# Patient Record
Sex: MALE | Race: WHITE | NOT HISPANIC OR LATINO | Employment: OTHER | URBAN - METROPOLITAN AREA
[De-identification: names, ages, dates, MRNs, and addresses within clinical notes are randomized per-mention and may not be internally consistent; named-entity substitution may affect disease eponyms.]

---

## 2017-02-13 ENCOUNTER — TRANSFERRED RECORDS (OUTPATIENT)
Dept: HEALTH INFORMATION MANAGEMENT | Facility: CLINIC | Age: 77
End: 2017-02-13

## 2017-04-22 ENCOUNTER — TELEPHONE (OUTPATIENT)
Dept: NURSING | Facility: CLINIC | Age: 77
End: 2017-04-22

## 2017-04-22 DIAGNOSIS — E11.8 TYPE 2 DIABETES MELLITUS WITH COMPLICATION (H): ICD-10-CM

## 2017-04-22 RX ORDER — INSULIN GLARGINE 100 [IU]/ML
INJECTION, SOLUTION SUBCUTANEOUS
Qty: 45 ML | Refills: 0 | Status: CANCELLED | OUTPATIENT
Start: 2017-04-22

## 2017-04-22 NOTE — TELEPHONE ENCOUNTER
"Call Type: Triage Call    Presenting Problem: \"I am completely out of my Lantus.\"  Per FNA  protocol, patient will be given a refill on his Lantus.  Triage Note:  Guideline Title: Medication Questions - Adult  Recommended Disposition: Call Dispensing Pharmacy or Provider  Immediately  Original Inclination: Wanted to speak with a nurse  Override Disposition:  Intended Action: Follow advice given  Physician Contacted: No  Requests refill of prescribed medication that does NOT have a valid refill; lack  of medication may cause clinical risk to patient if not available. ?  YES  Sign(s) or symptom(s) associated with a diagnosed condition or with a new illness  ? NO  Prescription ordered today and not available at pharmacy putting patient at  clinical risk ? NO  Recurrence of a symptom(s) or illness post prescribed medication treatment AND  provider instructed patient to call if symptom(s) returned. ? NO  Unable to obtain prescribed medication related to available resources AND  situation poses immediate clinical risk ? NO  Pharmacy calling to clarify prescription order. ? NO  Physician Instructions:  Care Advice:  "

## 2017-04-22 NOTE — TELEPHONE ENCOUNTER
Patient requesting a refill on his Lantus Solostar 100 unit/mL          Last Written Prescription Date: 1/9/17  Last Fill Quantity: 45mL, # refills: 0  Last Office Visit with G, P or [facility] prescribing provider:  10/25/2016        BP Readings from Last 3 Encounters:   10/25/16 162/87   08/24/16 148/83     No results found for: MICROL  No results found for: UMALCR  No results found for: CR]  No results found for: GFRESTIMATED  No results found for: GFRESTBLACK  Lab Results   Component Value Date    CHOL 144 07/14/2016     Lab Results   Component Value Date    HDL 43 07/14/2016     Lab Results   Component Value Date    LDL 83 07/14/2016     Lab Results   Component Value Date    TRIG 90 07/14/2016     No results found for: CHOLHDLRATIO  No results found for: AST  No results found for: ALT  Lab Results   Component Value Date    A1C 8.4 10/25/2016    A1C 7.8 07/14/2016    A1C 6.0 09/23/2015    A1C 7.0 06/01/2015    A1C 9.0 02/26/2015     No results found for: POTASSIUM     Tonia Moon RN/FNA

## 2017-04-22 NOTE — TELEPHONE ENCOUNTER
Pending Prescriptions:                       Disp   Refills    LANTUS SOLOSTAR 100 UNIT/ML soln [Pharmac*45 mL  0            Sig: ADMINISTER 50 UNITS UNDER THE SKIN AT BEDTIME AS           DIRECTED             Last Written Prescription Date: 1/9/17  Last Fill Quantity: 45, # refills: 0  Last Office Visit with [facility], P or [facility] prescribing provider:  10/25/16 Yari        BP Readings from Last 3 Encounters:   10/25/16 162/87   08/24/16 148/83     No results found for: MICROL  No results found for: UMALCR  No results found for: CR]  No results found for: GFRESTIMATED  No results found for: GFRESTBLACK  Lab Results   Component Value Date    CHOL 144 07/14/2016     Lab Results   Component Value Date    HDL 43 07/14/2016     Lab Results   Component Value Date    LDL 83 07/14/2016     Lab Results   Component Value Date    TRIG 90 07/14/2016     No results found for: CHOLHDLRATIO  No results found for: AST  No results found for: ALT  Lab Results   Component Value Date    A1C 8.4 10/25/2016    A1C 7.8 07/14/2016    A1C 6.0 09/23/2015    A1C 7.0 06/01/2015    A1C 9.0 02/26/2015     No results found for: POTASSIUM    Yadira Francis, RT(R)

## 2017-05-09 DIAGNOSIS — Z79.4 TYPE 2 DIABETES MELLITUS WITHOUT COMPLICATION, WITH LONG-TERM CURRENT USE OF INSULIN (H): ICD-10-CM

## 2017-05-09 DIAGNOSIS — E11.9 TYPE 2 DIABETES MELLITUS WITHOUT COMPLICATION, WITH LONG-TERM CURRENT USE OF INSULIN (H): ICD-10-CM

## 2017-05-09 RX ORDER — PEN NEEDLE, DIABETIC 32GX 5/32"
NEEDLE, DISPOSABLE MISCELLANEOUS
Qty: 100 EACH | Refills: 0 | Status: SHIPPED | OUTPATIENT
Start: 2017-05-09 | End: 2017-06-09

## 2017-05-09 NOTE — TELEPHONE ENCOUNTER
BD Katelyn      Last Written Prescription Date: 11/25/16  Last Fill Quantity: 300,  # refills: 0   Last Office Visit with G, [facility] or [facility] prescribing provider: 10/25/16    Tawnya Newman MA

## 2017-05-09 NOTE — TELEPHONE ENCOUNTER
TCs,  Patient overdue for 2 month diabetes f/u with PCP  Please schedule    Once scheduled, can close encounter  Refilled Pen Needles for short term supply  Thank you,  Gloria FAUST RN

## 2017-05-16 ENCOUNTER — OFFICE VISIT (OUTPATIENT)
Dept: FAMILY MEDICINE | Facility: CLINIC | Age: 77
End: 2017-05-16
Payer: MEDICARE

## 2017-05-16 VITALS
BODY MASS INDEX: 28.85 KG/M2 | SYSTOLIC BLOOD PRESSURE: 136 MMHG | WEIGHT: 213 LBS | TEMPERATURE: 97.4 F | HEART RATE: 98 BPM | DIASTOLIC BLOOD PRESSURE: 76 MMHG | OXYGEN SATURATION: 95 % | HEIGHT: 72 IN

## 2017-05-16 DIAGNOSIS — G47.00 INSOMNIA, UNSPECIFIED TYPE: ICD-10-CM

## 2017-05-16 DIAGNOSIS — Z79.4 TYPE 2 DIABETES MELLITUS WITHOUT COMPLICATION, WITH LONG-TERM CURRENT USE OF INSULIN (H): Primary | ICD-10-CM

## 2017-05-16 DIAGNOSIS — E11.9 TYPE 2 DIABETES MELLITUS WITHOUT COMPLICATION, WITH LONG-TERM CURRENT USE OF INSULIN (H): Primary | ICD-10-CM

## 2017-05-16 DIAGNOSIS — E78.5 HYPERLIPIDEMIA LDL GOAL <100: ICD-10-CM

## 2017-05-16 DIAGNOSIS — J30.1 NON-SEASONAL ALLERGIC RHINITIS DUE TO POLLEN: ICD-10-CM

## 2017-05-16 DIAGNOSIS — I10 BENIGN ESSENTIAL HYPERTENSION: ICD-10-CM

## 2017-05-16 DIAGNOSIS — M25.561 RIGHT KNEE PAIN, UNSPECIFIED CHRONICITY: ICD-10-CM

## 2017-05-16 LAB
ALBUMIN UR-MCNC: 30 MG/DL
ANION GAP SERPL CALCULATED.3IONS-SCNC: 9 MMOL/L (ref 3–14)
APPEARANCE UR: CLEAR
BACTERIA #/AREA URNS HPF: ABNORMAL /HPF
BILIRUB UR QL STRIP: NEGATIVE
BUN SERPL-MCNC: 29 MG/DL (ref 7–30)
CALCIUM SERPL-MCNC: 8.4 MG/DL (ref 8.5–10.1)
CHLORIDE SERPL-SCNC: 109 MMOL/L (ref 94–109)
CO2 SERPL-SCNC: 23 MMOL/L (ref 20–32)
COLOR UR AUTO: YELLOW
CREAT SERPL-MCNC: 1.3 MG/DL (ref 0.66–1.25)
GFR SERPL CREATININE-BSD FRML MDRD: 53 ML/MIN/1.7M2
GLUCOSE SERPL-MCNC: 230 MG/DL (ref 70–99)
GLUCOSE UR STRIP-MCNC: 500 MG/DL
HBA1C MFR BLD: 7.1 % (ref 4.3–6)
HGB UR QL STRIP: NEGATIVE
KETONES UR STRIP-MCNC: NEGATIVE MG/DL
LEUKOCYTE ESTERASE UR QL STRIP: NEGATIVE
MUCOUS THREADS #/AREA URNS LPF: PRESENT /LPF
NITRATE UR QL: NEGATIVE
NON-SQ EPI CELLS #/AREA URNS LPF: ABNORMAL /LPF
PH UR STRIP: 5 PH (ref 5–7)
POTASSIUM SERPL-SCNC: 4.6 MMOL/L (ref 3.4–5.3)
RBC #/AREA URNS AUTO: ABNORMAL /HPF (ref 0–2)
SODIUM SERPL-SCNC: 141 MMOL/L (ref 133–144)
SP GR UR STRIP: 1.02 (ref 1–1.03)
URN SPEC COLLECT METH UR: ABNORMAL
UROBILINOGEN UR STRIP-ACNC: 0.2 EU/DL (ref 0.2–1)
WBC #/AREA URNS AUTO: ABNORMAL /HPF (ref 0–2)

## 2017-05-16 PROCEDURE — 80048 BASIC METABOLIC PNL TOTAL CA: CPT | Performed by: INTERNAL MEDICINE

## 2017-05-16 PROCEDURE — 83036 HEMOGLOBIN GLYCOSYLATED A1C: CPT | Performed by: INTERNAL MEDICINE

## 2017-05-16 PROCEDURE — 82043 UR ALBUMIN QUANTITATIVE: CPT | Performed by: INTERNAL MEDICINE

## 2017-05-16 PROCEDURE — 81001 URINALYSIS AUTO W/SCOPE: CPT | Performed by: INTERNAL MEDICINE

## 2017-05-16 PROCEDURE — 99214 OFFICE O/P EST MOD 30 MIN: CPT | Performed by: INTERNAL MEDICINE

## 2017-05-16 PROCEDURE — 99207 C FOOT EXAM  NO CHARGE: CPT | Performed by: INTERNAL MEDICINE

## 2017-05-16 PROCEDURE — 36415 COLL VENOUS BLD VENIPUNCTURE: CPT | Performed by: INTERNAL MEDICINE

## 2017-05-16 RX ORDER — SILDENAFIL CITRATE 100 MG
TABLET ORAL
Refills: 0 | COMMUNITY
Start: 2016-06-01 | End: 2018-12-05

## 2017-05-16 RX ORDER — BROMFENAC SODIUM 0.7 MG/ML
SOLUTION/ DROPS OPHTHALMIC
Refills: 4 | COMMUNITY
Start: 2017-01-21 | End: 2017-10-10

## 2017-05-16 NOTE — PROGRESS NOTES
"  SUBJECTIVE:                                                    Justyn Olivas is a 76 year old male who presents to clinic today for the following health issues:    Chief Complaint   Patient presents with     Diabetes     Patient reports he has been experiencing seasonal allergies with associated rhinorrhea, cough and sore throat. He reports Claritin helps. Last night he notes difficulty with swallowing for which he used \"cough medicine\" with help.    Patient has a history of diabetes and reports better control of symptoms since last visit. Blood glucose readings average 105, with the lowest below 100 and the highest at 150. He has been obrien diligent with blood glucose monitoring, particularly with afternoon reading. He notes exercise has decreased which he states is due to construction on his favorite park. While playing gold, he walks instead of useing a gold cart. Daily, he walks 2.5-3 miles over 45 minutes.    He complains of 2 days of right knee pain with known antecedent injury while playing gold. He reports a ball ricocheted off of a tree while using his iron, and hit his right knee. Right knee is slightly swollen in the morning but resolves as the day progresses. 2 tablets of Aleve offers help, though he has only used NSAID sporadically.    He notes weight gain which he attributes to lack of maintaining a healthy well-balanced diet during the holiday seasons. He states his goal weight is 200-202 pounds.     Problem list and histories reviewed & adjusted, as indicated.  Additional history: as documented    Current Outpatient Prescriptions   Medication Sig Dispense Refill     BD FERCHO U/F 32G X 4 MM insulin pen needle USE AS DIRECTED 100 each 0     insulin glargine (LANTUS SOLOSTAR) 100 UNIT/ML injection Inject 50 Units Subcutaneous At Bedtime As directed 45 mL 0     HUMALOG KWIKPEN 100 UNIT/ML soln INJECT UP TO 55 UNITS SUBCUTANEOUSLY DAILY AS DIRECTED 45 mL 11     metFORMIN (GLUCOPHAGE) 1000 MG tablet " Take 1 tablet (1,000 mg) by mouth 2 times daily (with meals) 180 tablet 3     simvastatin (ZOCOR) 20 MG tablet Take 1 tablet (20 mg) by mouth At Bedtime 90 tablet 3     valsartan (DIOVAN) 160 MG tablet Take 1 tablet (160 mg) by mouth 2 times daily 90 tablet 3     losartan (COZAAR) 50 MG tablet Take 1 tablet (50 mg) by mouth daily 90 tablet 3     traZODone (DESYREL) 100 MG tablet Take 1 tablet (100 mg) by mouth At Bedtime 90 tablet 3     fluticasone (FLONASE) 50 MCG/ACT nasal spray Spray 1 spray into both nostrils 2 times daily as needed for rhinitis or allergies       ketorolac (ACULAR) 0.5 % ophthalmic solution 1 drop 4 times daily       Glucose Blood (STEVE CONTOUR TEST VI)        blood glucose monitoring (NO BRAND SPECIFIED) test strip Use to test blood sugars 4 times daily or as directed 100 each 11     VIAGRA 100 MG cap/tab TK 1 T PO PRIOR TO INTERCOURSE  0     PROLENSA 0.07 % ophthalmic solution PLACE 1 DROP INTO THE RIGHT EYE QD  4     No Known Allergies    Reviewed and updated as needed this visit by clinical staff  Tobacco  Allergies  Meds       Reviewed and updated as needed this visit by Provider       ROS:  HEENT: Patient visits retina specialist every 6 weeks.    Constitutional, HEENT, cardiovascular, pulmonary, gi and gu systems are negative, except as otherwise noted.    This document serves as a record of the services and decisions personally performed and made by Bandar Greenberg MD. It was created on his/her behalf by Jeaneth Courtney, a trained medical scribe. The creation of this document is based the provider's statements to the medical scribe.  Adarsh Courtney 9:43 AM, May 16, 2017     OBJECTIVE:                                                    /88 (BP Location: Right arm, Cuff Size: Adult Regular)  Pulse 98  Temp 97.4  F (36.3  C) (Tympanic)  Ht 1.829 m (6')  Wt 96.6 kg (213 lb)  SpO2 95%  BMI 28.89 kg/m2  Body mass index is 28.89 kg/(m^2).   Weight gain 8 pounds since  last visit  HEENT: his nose is essentially closed with excessive mucus and nasal polyps. Throat streaky erythema.   Neck was supple without adenopathy or thyromegaly his carotids were normal without bruits  Chest clear to auscultation and percussion  Cardiovascular S1 and S2 are physiologic without murmurs or gallops  Abdomen bowel sounds were normal.  There is no palpable mass or organomegaly. Obese.  Extremities nontender without any edema  Right knee: medial inferior area of knee he has soft tissue swelling and point tenderness at the level of the anserine bursa.  Neuro: Filament exam normal.  Pulses pedal pulses are as described otherwise his pulses are bilaterally symmetrical throughout without bruits  Feet: Dry and scaly without any other skin defects.     ASSESSMENT/PLAN:                                                    1. Type 2 diabetes mellitus without complication, with long-term current use of insulin (H)  - UA reflex to Microscopic and Culture  - Hemoglobin A1c  - Albumin Random Urine Quantitative  - FOOT EXAM    2. Benign essential hypertension  - Basic metabolic panel  (Ca, Cl, CO2, Creat, Gluc, K, Na, BUN)    3. Hyperlipidemia LDL goal <100    4. Non-seasonal allergic rhinitis due to pollen  Claritin BID. Begin fluticasone 2 sprays each nostril for one week then 2 sprays QD.    5. Insomnia, unspecified type    6. Right knee pain, unspecified chronicity  2 tablets Aleve BID for one week.    Follow up with lab results   Patient reinitiating a an improved diet and exercise plan to regain control of his blood sugars etc. Plan on follow-up of lipids in such probably in August    Bandar Greenberg MD  [facility]    The information in this document, created by the medical scribe for me, accurately reflects the services I personally performed and the decisions made by me. I have reviewed and approved this document for accuracy prior to leaving the patient care area.  Bandar Greenberg MD  9:43  AM, 05/16/17

## 2017-05-16 NOTE — MR AVS SNAPSHOT
"              After Visit Summary   5/16/2017    Justyn Olivas    MRN: [id number]           Patient Information     Date Of Birth          1940        Visit Information        Provider Department      5/16/2017 9:30 AM Bandar Greenberg MD [facility]        Today's Diagnoses     Type 2 diabetes mellitus without complication, with long-term current use of insulin (H)    -  1    Benign essential hypertension        Hyperlipidemia LDL goal <100        Non-seasonal allergic rhinitis due to pollen        Insomnia, unspecified type        Right knee pain, unspecified chronicity           Follow-ups after your visit        Who to contact     If you have questions or need follow up information about today's clinic visit or your schedule please contact [facility] directly at [phone].  Normal or non-critical lab and imaging results will be communicated to you by MyChart, letter or phone within 4 business days after the clinic has received the results. If you do not hear from us within 7 days, please contact the clinic through MyChart or phone. If you have a critical or abnormal lab result, we will notify you by phone as soon as possible.  Submit refill requests through [employer] or call your pharmacy and they will forward the refill request to us. Please allow 3 business days for your refill to be completed.          Additional Information About Your Visit        My[employer]k Information     [employer] lets you send messages to your doctor, view your test results, renew your prescriptions, schedule appointments and more. To sign up, go to [email or web address] . Click on \"Log in\" on the left side of the screen, which will take you to the Welcome page. Then click on \"Sign up Now\" on the right side of the page.     You will be asked to enter the access code listed below, as well as some personal information. Please follow the directions to create your username and password.     Your access code " is: [email or web address]  Expires: 2017  5:22 AM     Your access code will  in 90 days. If you need help or a new code, please call your [facility] clinic or [phone].        Care EveryWhere ID     This is your Care EveryWhere ID. This could be used by other organizations to access your [facility] medical records  [id number]        Your Vitals Were     Pulse Temperature Height Pulse Oximetry BMI (Body Mass Index)       98 97.4  F (36.3  C) (Tympanic) 6' (1.829 m) 95% 28.89 kg/m2        Blood Pressure from Last 3 Encounters:   17 136/76   10/25/16 162/87   16 148/83    Weight from Last 3 Encounters:   17 213 lb (96.6 kg)   10/25/16 205 lb 9.6 oz (93.3 kg)   16 202 lb (91.6 kg)              We Performed the Following     Albumin Random Urine Quantitative     Basic metabolic panel  (Ca, Cl, CO2, Creat, Gluc, K, Na, BUN)     FOOT EXAM     Hemoglobin A1c     UA reflex to Microscopic and Culture     Urine Microscopic        Primary Care Provider Office Phone # Fax #    Bandar Greenberg [phone]       [facility] [address] [facility] [address]  [facility] [address]        Thank you!     Thank you for choosing [facility]  for your care. Our goal is always to provide you with excellent care. Hearing back from our patients is one way we can continue to improve our services. Please take a few minutes to complete the written survey that you may receive in the mail after your visit with us. Thank you!             Your Updated Medication List - Protect others around you: Learn how to safely use, store and throw away your medicines at www.disposemymeds.org.          This list is accurate as of: 17 11:59 PM.  Always use your most recent med list.                   Brand Name Dispense Instructions for use    * STEVE CONTOUR TEST VI          * blood glucose monitoring test strip    no brand specified    100 each    Use to test blood sugars 4 times daily or as directed        BD FERCHO U/F 32G X 4 MM   Generic drug:  insulin pen needle     100 each    USE AS DIRECTED       FLONASE 50 MCG/ACT spray   Generic drug:  fluticasone      Spray 1 spray into both nostrils 2 times daily as needed for rhinitis or allergies       HumaLOG KWIKpen 100 UNIT/ML injection   Generic drug:  insulin lispro     45 mL    INJECT UP TO 55 UNITS SUBCUTANEOUSLY DAILY AS DIRECTED       insulin glargine 100 UNIT/ML injection    LANTUS SOLOSTAR    45 mL    Inject 50 Units Subcutaneous At Bedtime As directed       ketorolac 0.5 % ophthalmic solution    ACULAR     1 drop 4 times daily       losartan 50 MG tablet    COZAAR    90 tablet    Take 1 tablet (50 mg) by mouth daily       metFORMIN 1000 MG tablet    GLUCOPHAGE    180 tablet    Take 1 tablet (1,000 mg) by mouth 2 times daily (with meals)       PROLENSA 0.07 % ophthalmic solution   Generic drug:  bromfenac      PLACE 1 DROP INTO THE RIGHT EYE QD       simvastatin 20 MG tablet    ZOCOR    90 tablet    Take 1 tablet (20 mg) by mouth At Bedtime       traZODone 100 MG tablet    DESYREL    90 tablet    Take 1 tablet (100 mg) by mouth At Bedtime       valsartan 160 MG tablet    DIOVAN    90 tablet    Take 1 tablet (160 mg) by mouth 2 times daily       VIAGRA 100 MG cap/tab   Generic drug:  sildenafil      TK 1 T PO PRIOR TO INTERCOURSE       * Notice:  This list has 2 medication(s) that are the same as other medications prescribed for you. Read the directions carefully, and ask your doctor or other care provider to review them with you.

## 2017-05-16 NOTE — NURSING NOTE
Chief Complaint   Patient presents with     Diabetes       Initial /88 (BP Location: Right arm, Cuff Size: Adult Regular)  Pulse 98  Temp 97.4  F (36.3  C) (Tympanic)  Ht 6' (1.829 m)  Wt 213 lb (96.6 kg)  SpO2 95%  BMI 28.89 kg/m2 Estimated body mass index is 28.89 kg/(m^2) as calculated from the following:    Height as of this encounter: 6' (1.829 m).    Weight as of this encounter: 213 lb (96.6 kg).  Medication Reconciliation: complete     Ijeoma Lyons MA

## 2017-05-17 LAB
CREAT UR-MCNC: 131 MG/DL
MICROALBUMIN UR-MCNC: 171 MG/L
MICROALBUMIN/CREAT UR: 130.53 MG/G CR (ref 0–17)

## 2017-05-19 ENCOUNTER — TELEPHONE (OUTPATIENT)
Dept: FAMILY MEDICINE | Facility: CLINIC | Age: 77
End: 2017-05-19

## 2017-05-19 NOTE — TELEPHONE ENCOUNTER
Reason for Call:  Request for results:    Name of test or procedure: labs    Date of test of procedure: 5/16/2017    Location of the test or procedure: [facility]    OK to leave the result message on voice mail or with a family member? YES    Phone number Patient can be reached at:  Home number on file [phone] (home)    Additional comments:  The patient said that Dr. Greenberg was going to call him with the results and he said he is the [facility]. (nicely)    Call taken on 5/19/2017 at 10:57 AM by Brenda Ricardo

## 2017-06-09 DIAGNOSIS — E11.9 TYPE 2 DIABETES MELLITUS WITHOUT COMPLICATION, WITH LONG-TERM CURRENT USE OF INSULIN (H): ICD-10-CM

## 2017-06-09 DIAGNOSIS — Z79.4 TYPE 2 DIABETES MELLITUS WITHOUT COMPLICATION, WITH LONG-TERM CURRENT USE OF INSULIN (H): ICD-10-CM

## 2017-06-09 RX ORDER — PEN NEEDLE, DIABETIC 32GX 5/32"
NEEDLE, DISPOSABLE MISCELLANEOUS
Qty: 100 EACH | Refills: 1 | Status: SHIPPED | OUTPATIENT
Start: 2017-06-09 | End: 2017-08-14

## 2017-06-09 NOTE — TELEPHONE ENCOUNTER
Prescription approved per [facility] Refill Protocol.  Ashleigh Daugherty RN

## 2017-06-09 NOTE — TELEPHONE ENCOUNTER
Pending Prescriptions:                       Disp   Refills    BD FERCHO U/F 32G X 4 MM insulin pen needle*100 ea*0            Sig: USE AS DIRECTED             Last Written Prescription Date: 5/9/17  Last Fill Quantity: 100, # refills: 0  Last Office Visit with [facility], [facility] or [facility] prescribing provider:  5/6/17        BP Readings from Last 3 Encounters:   05/16/17 136/76   10/25/16 162/87   08/24/16 148/83     Lab Results   Component Value Date    MICROL 171 05/16/2017     Lab Results   Component Value Date    UMALCR 130.53 05/16/2017     Creatinine   Date Value Ref Range Status   05/16/2017 1.30 (H) 0.66 - 1.25 mg/dL Final   ]  GFR Estimate   Date Value Ref Range Status   05/16/2017 53 (L) >60 mL/min/1.7m2 Final     Comment:     Non  GFR Calc     GFR Estimate If Black   Date Value Ref Range Status   05/16/2017 65 >60 mL/min/1.7m2 Final     Comment:      GFR Calc     Lab Results   Component Value Date    CHOL 144 07/14/2016     Lab Results   Component Value Date    HDL 43 07/14/2016     Lab Results   Component Value Date    LDL 83 07/14/2016     Lab Results   Component Value Date    TRIG 90 07/14/2016     No results found for: CHOLHDLRATIO  No results found for: AST  No results found for: ALT  Lab Results   Component Value Date    A1C 7.1 05/16/2017    A1C 8.4 10/25/2016    A1C 7.8 07/14/2016    A1C 6.0 09/23/2015    A1C 7.0 06/01/2015     Potassium   Date Value Ref Range Status   05/16/2017 4.6 3.4 - 5.3 mmol/L Final

## 2017-07-13 DIAGNOSIS — G47.00 INSOMNIA, UNSPECIFIED TYPE: ICD-10-CM

## 2017-07-13 NOTE — TELEPHONE ENCOUNTER
traZODone (DESYREL) 100 MG tablet       Last Written Prescription Date: 8/24/16  Last Fill Quantity: 90; # refills: 3  Last Office Visit with [facility], [facility] or [facility] prescribing provider:  5/16/17        Last PHQ-9 score on record= No flowsheet data found.    No results found for: AST  No results found for: ALT          Iraida Mi RT(R)

## 2017-07-14 RX ORDER — TRAZODONE HYDROCHLORIDE 100 MG/1
100 TABLET ORAL AT BEDTIME
Qty: 90 TABLET | Refills: 2 | Status: SHIPPED | OUTPATIENT
Start: 2017-07-14 | End: 2018-04-11

## 2017-07-14 NOTE — TELEPHONE ENCOUNTER
Prescription approved per [facility] Refill Protocol.    Lizzeth Morales RN

## 2017-07-18 DIAGNOSIS — E11.9 TYPE 2 DIABETES MELLITUS WITHOUT COMPLICATION, WITH LONG-TERM CURRENT USE OF INSULIN (H): Primary | ICD-10-CM

## 2017-07-18 DIAGNOSIS — Z79.4 TYPE 2 DIABETES MELLITUS WITHOUT COMPLICATION, WITH LONG-TERM CURRENT USE OF INSULIN (H): Primary | ICD-10-CM

## 2017-07-18 DIAGNOSIS — E11.8 TYPE 2 DIABETES MELLITUS WITH COMPLICATION (H): ICD-10-CM

## 2017-07-18 NOTE — TELEPHONE ENCOUNTER
WENDI KHALILOSTAR 100 UNIT/ML soln           Last Written Prescription Date: 4/22/2017  Last Fill Quantity: 45mL, # refills: 0  Last Office Visit with G, P or [facility] prescribing provider:  5/16/2017        BP Readings from Last 3 Encounters:   05/16/17 136/76   10/25/16 162/87   08/24/16 148/83     Lab Results   Component Value Date    MICROL 171 05/16/2017     Lab Results   Component Value Date    UMALCR 130.53 05/16/2017     Creatinine   Date Value Ref Range Status   05/16/2017 1.30 (H) 0.66 - 1.25 mg/dL Final   ]  GFR Estimate   Date Value Ref Range Status   05/16/2017 53 (L) >60 mL/min/1.7m2 Final     Comment:     Non  GFR Calc     GFR Estimate If Black   Date Value Ref Range Status   05/16/2017 65 >60 mL/min/1.7m2 Final     Comment:      GFR Calc     Lab Results   Component Value Date    CHOL 144 07/14/2016     Lab Results   Component Value Date    HDL 43 07/14/2016     Lab Results   Component Value Date    LDL 83 07/14/2016     Lab Results   Component Value Date    TRIG 90 07/14/2016     No results found for: CHOLHDLRATIO  No results found for: AST  No results found for: ALT  Lab Results   Component Value Date    A1C 7.1 05/16/2017    A1C 8.4 10/25/2016    A1C 7.8 07/14/2016    A1C 6.0 09/23/2015    A1C 7.0 06/01/2015     Potassium   Date Value Ref Range Status   05/16/2017 4.6 3.4 - 5.3 mmol/L Final

## 2017-07-19 NOTE — TELEPHONE ENCOUNTER
Routing refill request to provider for review/approval because:  Labs out of range:    Lab Results   Component Value Date    CR 1.30 05/16/2017     Lucero Fall RN

## 2017-07-21 RX ORDER — INSULIN GLARGINE 100 [IU]/ML
INJECTION, SOLUTION SUBCUTANEOUS
Qty: 45 ML | Refills: 1 | Status: SHIPPED | OUTPATIENT
Start: 2017-07-21 | End: 2018-01-12

## 2017-07-26 ENCOUNTER — TELEPHONE (OUTPATIENT)
Dept: FAMILY MEDICINE | Facility: CLINIC | Age: 77
End: 2017-07-26

## 2017-07-26 NOTE — TELEPHONE ENCOUNTER
I got form back, signed. I will fax to [facility] Medicare department at [phone] and sent copy to scanning.    Cong Sanchez, CMA

## 2017-07-26 NOTE — TELEPHONE ENCOUNTER
I received form from [facility] Medicare for the N diabetes testing. I filled this form out in its entirety and placed on Dr Davidson's desk. It looks like Dr Davidson's name was put on the form by [facility], perhaps because he signed a script at some point for the testing supplies in the past.     Anyway it just needs signature and then I can fax back to [facility].    Cong Sanchez, [facility]

## 2017-08-14 DIAGNOSIS — Z79.4 TYPE 2 DIABETES MELLITUS WITHOUT COMPLICATION, WITH LONG-TERM CURRENT USE OF INSULIN (H): ICD-10-CM

## 2017-08-14 DIAGNOSIS — E11.9 TYPE 2 DIABETES MELLITUS WITHOUT COMPLICATION, WITH LONG-TERM CURRENT USE OF INSULIN (H): ICD-10-CM

## 2017-08-14 NOTE — TELEPHONE ENCOUNTER
BD FERCHO U/F 32G X 4 MM insulin pen needle 100 each 1 6/9/2017  No   Sig: USE AS DIRECTED         DO WE NEED TO BE MORE SPECIFIC with directions like daily as needed ?     Last Written Prescription Date: 06/09/2017  Last Fill Quantity: 100,  # refills: 1   Last Office Visit with [facility], [facility] or [facility] prescribing provider: 05/2017

## 2017-08-15 RX ORDER — PEN NEEDLE, DIABETIC 32GX 5/32"
NEEDLE, DISPOSABLE MISCELLANEOUS
Qty: 100 EACH | Refills: 1 | Status: SHIPPED | OUTPATIENT
Start: 2017-08-15 | End: 2017-10-17

## 2017-08-15 NOTE — TELEPHONE ENCOUNTER
Prescription approved per [facility] Refill Protocol.    Jocelyn Mcclellan RN   [facility]

## 2017-09-21 DIAGNOSIS — E11.8 TYPE 2 DIABETES MELLITUS WITH COMPLICATION (H): ICD-10-CM

## 2017-09-22 ENCOUNTER — TELEPHONE (OUTPATIENT)
Dept: FAMILY MEDICINE | Facility: CLINIC | Age: 77
End: 2017-09-22

## 2017-10-10 ENCOUNTER — OFFICE VISIT (OUTPATIENT)
Dept: FAMILY MEDICINE | Facility: CLINIC | Age: 77
End: 2017-10-10
Payer: COMMERCIAL

## 2017-10-10 ENCOUNTER — RADIANT APPOINTMENT (OUTPATIENT)
Dept: GENERAL RADIOLOGY | Facility: CLINIC | Age: 77
End: 2017-10-10
Attending: INTERNAL MEDICINE
Payer: MEDICARE

## 2017-10-10 VITALS
HEART RATE: 94 BPM | WEIGHT: 208 LBS | OXYGEN SATURATION: 97 % | TEMPERATURE: 97.7 F | HEIGHT: 72 IN | SYSTOLIC BLOOD PRESSURE: 147 MMHG | BODY MASS INDEX: 28.17 KG/M2 | DIASTOLIC BLOOD PRESSURE: 86 MMHG

## 2017-10-10 DIAGNOSIS — E78.5 HYPERLIPIDEMIA LDL GOAL <100: ICD-10-CM

## 2017-10-10 DIAGNOSIS — Z79.4 TYPE 2 DIABETES MELLITUS WITHOUT COMPLICATION, WITH LONG-TERM CURRENT USE OF INSULIN (H): ICD-10-CM

## 2017-10-10 DIAGNOSIS — M16.0 PRIMARY OSTEOARTHRITIS OF BOTH HIPS: Primary | ICD-10-CM

## 2017-10-10 DIAGNOSIS — M16.0 PRIMARY OSTEOARTHRITIS OF BOTH HIPS: ICD-10-CM

## 2017-10-10 DIAGNOSIS — E11.9 TYPE 2 DIABETES MELLITUS WITHOUT COMPLICATION, WITH LONG-TERM CURRENT USE OF INSULIN (H): ICD-10-CM

## 2017-10-10 DIAGNOSIS — I10 BENIGN ESSENTIAL HYPERTENSION: ICD-10-CM

## 2017-10-10 LAB — HBA1C MFR BLD: 7.5 % (ref 4.3–6)

## 2017-10-10 PROCEDURE — 36415 COLL VENOUS BLD VENIPUNCTURE: CPT | Performed by: INTERNAL MEDICINE

## 2017-10-10 PROCEDURE — 99214 OFFICE O/P EST MOD 30 MIN: CPT | Performed by: INTERNAL MEDICINE

## 2017-10-10 PROCEDURE — 73502 X-RAY EXAM HIP UNI 2-3 VIEWS: CPT

## 2017-10-10 PROCEDURE — 83036 HEMOGLOBIN GLYCOSYLATED A1C: CPT | Performed by: INTERNAL MEDICINE

## 2017-10-10 NOTE — PROGRESS NOTES
SUBJECTIVE:   Justyn Olivas is a 77 year old male who presents to clinic today for the following health issues:    Musculoskeletal problem/pain      Duration: [id number]    Description  Location: Left leg    Intensity:  moderate, severe    Accompanying signs and symptoms: radiation of pain to down side of left leg inside groin region of mid thigh and weakness of left leg    History  Previous similar problem: YES  Previous evaluation:  None-no recent evaluation    Precipitating or alleviating factors:  Trauma or overuse: YES- playing golf   Aggravating factors include: standing, walking, climbing stairs, overuse and bending.    Therapies tried and outcome: rest/inactivity, heat, ice, massage, stretching, Ibuprofen and NSAID - aleve    Patient reports left hip pain with radiation to the lateral thigh for 3 weeks. He believes he there was an injury while playing golf. He has used ice, extra strength tylenol, 2 tablets Aleve BID with food and 2-3 200 mg tablets Ibuprofen in the and 2 200 mg tablets in the PM. He notes nothing has worked in the moment but he has seen general improvment within the last week. Of note, he stopped golfing 3 weeks ago. Patient reports previous cortisone injection has helped.    Patient note he has been having tooth pain which is aggravated with cool weather. He notes pain originates from the maseter muscle and is helped with a specialty mouth guard.     He reports acid reflux as he experienced chest pain after dinner while walking the dog. Denies radiation.     Patient has a history of diabetes and report he monitors his blood glucose levels routinely throughout the day. His morning readings range from  and his afternoon and PM readings range are <150. He notes slight numbness of the feet.     Problem list and histories reviewed & adjusted, as indicated.  Additional history: as documented    Current Outpatient Prescriptions   Medication Sig Dispense Refill     STEVE CONTOUR test  strip TESTING FOUR TIMES DAILY OR AS DIRECTED 300 strip 1     valsartan (DIOVAN) 160 MG tablet TAKE 1 TABLET(160 MG) BY MOUTH TWICE DAILY 180 tablet 3     BD FERCHO U/F 32G X 4 MM insulin pen needle USE AS DIRECTED 100 each 1     LANTUS SOLOSTAR 100 UNIT/ML soln ADMINISTER 50 UNITS UNDER THE SKIN AT BEDTIME AS DIRECTED 45 mL 1     traZODone (DESYREL) 100 MG tablet Take 1 tablet (100 mg) by mouth At Bedtime 90 tablet 2     VIAGRA 100 MG cap/tab TK 1 T PO PRIOR TO INTERCOURSE  0     HUMALOG KWIKPEN 100 UNIT/ML soln INJECT UP TO 55 UNITS SUBCUTANEOUSLY DAILY AS DIRECTED 45 mL 11     metFORMIN (GLUCOPHAGE) 1000 MG tablet Take 1 tablet (1,000 mg) by mouth 2 times daily (with meals) 180 tablet 3     simvastatin (ZOCOR) 20 MG tablet Take 1 tablet (20 mg) by mouth At Bedtime 90 tablet 3     losartan (COZAAR) 50 MG tablet Take 1 tablet (50 mg) by mouth daily 90 tablet 3     fluticasone (FLONASE) 50 MCG/ACT nasal spray Spray 1 spray into both nostrils 2 times daily as needed for rhinitis or allergies       ketorolac (ACULAR) 0.5 % ophthalmic solution 1 drop 4 times daily       Glucose Blood (STEVE CONTOUR TEST VI)        No Known Allergies    Reviewed and updated as needed this visit by clinical staffTobacco  Allergies  Soc Hx      Reviewed and updated as needed this visit by Provider       ROS:  Constitutional, HEENT, cardiovascular, pulmonary, gi and gu systems are negative, except as otherwise noted.    This document serves as a record of the services and decisions personally performed and made by Bandar Greenberg MD. It was created on his/her behalf by Jeaneth Courtney, a trained medical scribe. The creation of this document is based the provider's statements to the medical scribe.  Adarsh Courtney 3:39 PM, October 10, 2017     OBJECTIVE:   /86 (BP Location: Left arm, Patient Position: Sitting, Cuff Size: Adult Large)  Pulse 94  Temp 97.7  F (36.5  C) (Tympanic)  Ht 1.829 m (6')  Wt 94.3 kg (208 lb)   SpO2 97%  BMI 28.21 kg/m2  Body mass index is 28.21 kg/(m^2).  Neck was supple without adenopathy or thyromegaly his carotids were normal without bruits  Chest clear to auscultation and percussion  Cardiovascular S1 and S2 are physiologic without murmurs or gallops  Abdomen bowel sounds were normal.  There is no palpable mass or organomegaly  Extremities nontender without any edema  Back nontender, SI joints good, normal flexion right hip decreased external rotation to 60 degrees without pain. Left hip has decreased flexion to less that 85 degrees external rotation to about 45 reproduces pain.   Pulses pedal pulses are as described otherwise his pulses are bilaterally symmetrical throughout without bruits  Skin without significant abnormality     ASSESSMENT/PLAN:   1. Primary osteoarthritis of both hips  Notable arthritic changes  - XR Hip Left 2-3 Views; Future    2. Hyperlipidemia LDL goal <100    3. Benign essential hypertension    4. Type 2 diabetes mellitus without complication, with long-term current use of insulin (H)  - Hemoglobin A1c    Except otherwise noted as above, all conditions are stable and medications are tolerated well. Continue related medications unchanged.     Schedule cortisone injection with radiology. Advised to stop ASA and NSAIDs until injection    Follow up dependent on efficacy of injection and lab results.    Bandar Greenberg MD  [facility]    The information in this document, created by the medical scribe for me, accurately reflects the services I personally performed and the decisions made by me. I have reviewed and approved this document for accuracy prior to leaving the patient care area.  Bandar Greenberg MD  3:39 PM, 10/10/17

## 2017-10-10 NOTE — MR AVS SNAPSHOT
"              After Visit Summary   10/10/2017    Justyn Olivas    MRN: [id number]           Patient Information     Date Of Birth          1940        Visit Information        Provider Department      10/10/2017 3:30 PM Bandar Greenberg MD [facility]        Today's Diagnoses     Primary osteoarthritis of both hips    -  1    Hyperlipidemia LDL goal <100        Benign essential hypertension        Type 2 diabetes mellitus without complication, with long-term current use of insulin (H)           Follow-ups after your visit        Who to contact     If you have questions or need follow up information about today's clinic visit or your schedule please contact [facility] directly at [phone].  Normal or non-critical lab and imaging results will be communicated to you by MyChart, letter or phone within 4 business days after the clinic has received the results. If you do not hear from us within 7 days, please contact the clinic through MyChart or phone. If you have a critical or abnormal lab result, we will notify you by phone as soon as possible.  Submit refill requests through [employer] or call your pharmacy and they will forward the refill request to us. Please allow 3 business days for your refill to be completed.          Additional Information About Your Visit        MyChart Information     [employer] lets you send messages to your doctor, view your test results, renew your prescriptions, schedule appointments and more. To sign up, go to [email or web address] . Click on \"Log in\" on the left side of the screen, which will take you to the Welcome page. Then click on \"Sign up Now\" on the right side of the page.     You will be asked to enter the access code listed below, as well as some personal information. Please follow the directions to create your username and password.     Your access code is: [email or web address]  Expires: 2018 12:26 PM     Your access code will  in 90 days. " If you need help or a new code, please call your [place] clinic or [phone].        Care EveryWhere ID     This is your Care EveryWhere ID. This could be used by other organizations to access your [facility] medical records  [id number]        Your Vitals Were     Pulse Temperature Height Pulse Oximetry BMI (Body Mass Index)       94 97.7  F (36.5  C) (Tympanic) 6' (1.829 m) 97% 28.21 kg/m2        Blood Pressure from Last 3 Encounters:   10/10/17 147/86   05/16/17 136/76   10/25/16 162/87    Weight from Last 3 Encounters:   10/10/17 208 lb (94.3 kg)   05/16/17 213 lb (96.6 kg)   10/25/16 205 lb 9.6 oz (93.3 kg)              We Performed the Following     Hemoglobin A1c        Primary Care Provider Office Phone # Fax #    Bandar Greenberg [phone] [address] [facility] [address]  [facility] [address]        Equal Access to Services     [facility]: Hadii aad ku hadasho Soomaali, waaxda luqadaha, qaybta kaalmada adeegyada, lynne russell . So [facility] [phone].    ATENCIÓN: Si habla español, tiene a joe disposición servicios gratuitos de asistencia lingüística. Tataame al [phone].    We comply with applicable federal civil rights laws and [place] laws. We do not discriminate on the basis of race, color, national origin, age, disability, sex, sexual orientation, or gender identity.            Thank you!     Thank you for choosing [facility]  for your care. Our goal is always to provide you with excellent care. Hearing back from our patients is one way we can continue to improve our services. Please take a few minutes to complete the written survey that you may receive in the mail after your visit with us. Thank you!             Your Updated Medication List - Protect others around you: Learn how to safely use, store and throw away your medicines at www.disposemymeds.org.          This list is accurate as of: 10/10/17 11:59 PM.  Always use your most recent  med list.                   Brand Name Dispense Instructions for use Diagnosis    * STEVE CONTOUR TEST VI           * STEVE CONTOUR test strip   Generic drug:  blood glucose monitoring     300 strip    TESTING FOUR TIMES DAILY OR AS DIRECTED    Type 2 diabetes mellitus with complication (H)       BD FERCHO U/F 32G X 4 MM   Generic drug:  insulin pen needle     100 each    USE AS DIRECTED    Type 2 diabetes mellitus without complication, with long-term current use of insulin (H)       FLONASE 50 MCG/ACT spray   Generic drug:  fluticasone      Spray 1 spray into both nostrils 2 times daily as needed for rhinitis or allergies        HumaLOG KWIKpen 100 UNIT/ML injection   Generic drug:  insulin lispro     45 mL    INJECT UP TO 55 UNITS SUBCUTANEOUSLY DAILY AS DIRECTED    Type 2 diabetes mellitus without complication, with long-term current use of insulin (H)       ketorolac 0.5 % ophthalmic solution    ACULAR     1 drop 4 times daily        LANTUS SOLOSTAR 100 UNIT/ML injection   Generic drug:  insulin glargine     45 mL    ADMINISTER 50 UNITS UNDER THE SKIN AT BEDTIME AS DIRECTED    Type 2 diabetes mellitus without complication, with long-term current use of insulin (H)       losartan 50 MG tablet    COZAAR    90 tablet    Take 1 tablet (50 mg) by mouth daily    Benign essential hypertension       metFORMIN 1000 MG tablet    GLUCOPHAGE    180 tablet    Take 1 tablet (1,000 mg) by mouth 2 times daily (with meals)    Type 2 diabetes mellitus without complication, with long-term current use of insulin (H)       simvastatin 20 MG tablet    ZOCOR    90 tablet    Take 1 tablet (20 mg) by mouth At Bedtime    Hyperlipidemia LDL goal <100       traZODone 100 MG tablet    DESYREL    90 tablet    Take 1 tablet (100 mg) by mouth At Bedtime    Insomnia, unspecified type       valsartan 160 MG tablet    DIOVAN    180 tablet    TAKE 1 TABLET(160 MG) BY MOUTH TWICE DAILY    Benign essential hypertension       VIAGRA 100 MG tablet    Generic drug:  sildenafil      TK 1 T PO PRIOR TO INTERCOURSE        * Notice:  This list has 2 medication(s) that are the same as other medications prescribed for you. Read the directions carefully, and ask your doctor or other care provider to review them with you.

## 2017-10-11 ENCOUNTER — TELEPHONE (OUTPATIENT)
Dept: FAMILY MEDICINE | Facility: CLINIC | Age: 77
End: 2017-10-11

## 2017-10-11 DIAGNOSIS — M16.10 PRIMARY OSTEOARTHRITIS OF HIP, UNSPECIFIED LATERALITY: Primary | ICD-10-CM

## 2017-10-11 NOTE — TELEPHONE ENCOUNTER
"Spoke with pt's wife Cecile (on Consent to Communicate).   Per OV notes from yesterday \"Schedule cortisone injection with radiology. Advised to stop ASA and NSAIDs until injection\"  Cecile has number for [facility] but asking that PCP place order for injection   PCP - please order cortisone injection in pt's chart     Miguelina FLOOD RN    "

## 2017-10-11 NOTE — TELEPHONE ENCOUNTER
Reason for Call:  Cortizone Shot Friday or Saturdau    Detailed comments: Justyn Olivas is calling because he is wondering if Dr. Greenberg can schedule his cortizone injection for this Friday or Saturday due to the fact that he is unavailable. Justyn Olivas would like a call as soon as possible.    Phone Number Patient can be reached at: Home number on file [phone] (home)    Best Time: ASAP     Can we leave a detailed message on this number? YES    Call taken on 10/11/2017 at 10:06 AM by Amanda Barakat

## 2017-10-12 ENCOUNTER — TRANSFERRED RECORDS (OUTPATIENT)
Dept: HEALTH INFORMATION MANAGEMENT | Facility: CLINIC | Age: 77
End: 2017-10-12

## 2017-10-17 DIAGNOSIS — E78.5 HYPERLIPIDEMIA LDL GOAL <100: ICD-10-CM

## 2017-10-17 DIAGNOSIS — E11.9 TYPE 2 DIABETES MELLITUS WITHOUT COMPLICATION, WITH LONG-TERM CURRENT USE OF INSULIN (H): ICD-10-CM

## 2017-10-17 DIAGNOSIS — Z79.4 TYPE 2 DIABETES MELLITUS WITHOUT COMPLICATION, WITH LONG-TERM CURRENT USE OF INSULIN (H): ICD-10-CM

## 2017-10-18 RX ORDER — PEN NEEDLE, DIABETIC 32GX 5/32"
NEEDLE, DISPOSABLE MISCELLANEOUS
Qty: 100 EACH | Refills: 4 | Status: SHIPPED | OUTPATIENT
Start: 2017-10-18 | End: 2018-03-14

## 2017-10-18 RX ORDER — SIMVASTATIN 20 MG
20 TABLET ORAL AT BEDTIME
Qty: 90 TABLET | Refills: 3 | Status: SHIPPED | OUTPATIENT
Start: 2017-10-18 | End: 2018-02-08

## 2017-10-18 NOTE — TELEPHONE ENCOUNTER
Requesting 90 day supply of Simvastatin - Duplicate    Pending Prescriptions:                       Disp   Refills    simvastatin (ZOCOR) 20 MG tablet [Pharmac*90 tab*0            Sig: TAKE 1 TABLET BY MOUTH EVERY NIGHT AT BEDTIME    BD FERCHO U/F 32G X 4 MM insulin pen needle*100 ea*0            Sig: USE AS DIRECTED    Simvastatin     Last Written Prescription Date: 10/17/17  Last Fill Quantity: 30, # refills: 0  Last Office Visit with [facility], [facility] or [facility] prescribing provider: 10/10/17 Yari       Lab Results   Component Value Date    CHOL 144 07/14/2016     Lab Results   Component Value Date    HDL 43 07/14/2016     Lab Results   Component Value Date    LDL 83 07/14/2016     Lab Results   Component Value Date    TRIG 90 07/14/2016     No results found for: CHOLHDLRATIO    [place]      Last Written Prescription Date: 8/15/17  Last Fill Quantity: 100,  # refills: 1   Last Office Visit with [facility], [facility] or [facility] prescribing provider: 10/10/17 RT Jennifer(R)

## 2017-10-31 DIAGNOSIS — E11.9 TYPE 2 DIABETES MELLITUS WITHOUT COMPLICATION, WITH LONG-TERM CURRENT USE OF INSULIN (H): ICD-10-CM

## 2017-10-31 DIAGNOSIS — Z79.4 TYPE 2 DIABETES MELLITUS WITHOUT COMPLICATION, WITH LONG-TERM CURRENT USE OF INSULIN (H): ICD-10-CM

## 2017-11-01 NOTE — TELEPHONE ENCOUNTER
Routing refill request to provider for review/approval because:  Labs not current:  Needs yearly LDL  Recent B/Ps also above goal of 140/90  Gloria FAUST RN    Requested Prescriptions   Pending Prescriptions Disp Refills     insulin lispro (HUMALOG KWIKPEN) 100 UNIT/ML injection 45 mL 11     Sig: INJECT UP TO 55 UNITS SUBCUTANEOUSLY DAILY AS DIRECTED    Short Acting Insulin Protocol Failed    10/31/2017  5:50 PM       Failed - Blood pressure less than 140/90 in past 6 months    BP Readings from Last 3 Encounters:   10/10/17 147/86   05/16/17 136/76   10/25/16 162/87                Failed - LDL on file in past 12 months    Recent Labs   Lab Test 07/14/16   LDL  83            Passed - Microalbumin on file in past 12 months    Recent Labs   Lab Test  05/16/17   1017   MICROL  171   UMALCR  130.53*            Passed - Serum creatinine on file in past 12 months    Recent Labs   Lab Test  05/16/17   1017   CR  1.30*            Passed - HgbA1C in past 3 or 6 months    Recent Labs   Lab Test  10/10/17   1646   A1C  7.5*            Passed - Patient is age 18 or older       Passed - Recent visit with authorizing provider's specialty in past 6 months    Patient had office visit in the last 6 months or has a visit in the next 30 days with authorizing provider.  See chart review.

## 2017-11-15 ENCOUNTER — RADIANT APPOINTMENT (OUTPATIENT)
Dept: GENERAL RADIOLOGY | Facility: CLINIC | Age: 77
End: 2017-11-15
Attending: INTERNAL MEDICINE
Payer: MEDICARE

## 2017-11-15 ENCOUNTER — OFFICE VISIT (OUTPATIENT)
Dept: FAMILY MEDICINE | Facility: CLINIC | Age: 77
End: 2017-11-15
Payer: MEDICARE

## 2017-11-15 VITALS
OXYGEN SATURATION: 97 % | HEART RATE: 101 BPM | TEMPERATURE: 98.7 F | HEIGHT: 72 IN | WEIGHT: 213 LBS | DIASTOLIC BLOOD PRESSURE: 86 MMHG | SYSTOLIC BLOOD PRESSURE: 152 MMHG | BODY MASS INDEX: 28.85 KG/M2

## 2017-11-15 DIAGNOSIS — E11.9 TYPE 2 DIABETES MELLITUS WITHOUT COMPLICATION, WITH LONG-TERM CURRENT USE OF INSULIN (H): ICD-10-CM

## 2017-11-15 DIAGNOSIS — J69.0 ASPIRATION PNEUMONITIS (H): ICD-10-CM

## 2017-11-15 DIAGNOSIS — Z79.4 TYPE 2 DIABETES MELLITUS WITHOUT COMPLICATION, WITH LONG-TERM CURRENT USE OF INSULIN (H): ICD-10-CM

## 2017-11-15 DIAGNOSIS — J30.89 CHRONIC NONSEASONAL ALLERGIC RHINITIS DUE TO POLLEN: Primary | ICD-10-CM

## 2017-11-15 DIAGNOSIS — I10 BENIGN ESSENTIAL HYPERTENSION: ICD-10-CM

## 2017-11-15 PROCEDURE — 99214 OFFICE O/P EST MOD 30 MIN: CPT | Performed by: INTERNAL MEDICINE

## 2017-11-15 PROCEDURE — 71020 XR CHEST 2 VW: CPT

## 2017-11-15 RX ORDER — LEVOFLOXACIN 500 MG/1
500 TABLET, FILM COATED ORAL DAILY
Qty: 7 TABLET | Refills: 0 | Status: SHIPPED | OUTPATIENT
Start: 2017-11-15 | End: 2017-12-11

## 2017-11-15 RX ORDER — BENZONATATE 200 MG/1
200 CAPSULE ORAL
COMMUNITY
Start: 2017-11-11 | End: 2018-02-01

## 2017-11-15 RX ORDER — ALBUTEROL SULFATE 90 UG/1
AEROSOL, METERED RESPIRATORY (INHALATION)
Refills: 0 | COMMUNITY
Start: 2017-11-11 | End: 2017-12-11

## 2017-11-15 RX ORDER — AZITHROMYCIN 250 MG/1
TABLET, FILM COATED ORAL
COMMUNITY
Start: 2017-11-11 | End: 2017-12-11

## 2017-11-15 NOTE — MR AVS SNAPSHOT
"              After Visit Summary   11/15/2017    Justyn Olivas    MRN: [id number]           Patient Information     Date Of Birth          1940        Visit Information        Provider Department      11/15/2017 10:00 AM Bandar Greenberg MD [facility]        Today's Diagnoses     Chronic nonseasonal allergic rhinitis due to pollen    -  1    Type 2 diabetes mellitus without complication, with long-term current use of insulin (H)        Benign essential hypertension        Aspiration pneumonitis (H)           Follow-ups after your visit        Who to contact     If you have questions or need follow up information about today's clinic visit or your schedule please contact [facility] directly at [phone].  Normal or non-critical lab and imaging results will be communicated to you by MyChart, letter or phone within 4 business days after the clinic has received the results. If you do not hear from us within 7 days, please contact the clinic through MyChart or phone. If you have a critical or abnormal lab result, we will notify you by phone as soon as possible.  Submit refill requests through [employer] or call your pharmacy and they will forward the refill request to us. Please allow 3 business days for your refill to be completed.          Additional Information About Your Visit        MyChart Information     [employer] lets you send messages to your doctor, view your test results, renew your prescriptions, schedule appointments and more. To sign up, go to [email or web address] . Click on \"Log in\" on the left side of the screen, which will take you to the Welcome page. Then click on \"Sign up Now\" on the right side of the page.     You will be asked to enter the access code listed below, as well as some personal information. Please follow the directions to create your username and password.     Your access code is: [email or web address]  Expires: 1/8/2018 11:26 AM     Your access code will "  in 90 days. If you need help or a new code, please call your [place] clinic or [phone].        Care EveryWhere ID     This is your Care EveryWhere ID. This could be used by other organizations to access your [place] medical records  [id number]        Your Vitals Were     Pulse Temperature Height Pulse Oximetry BMI (Body Mass Index)       101 98.7  F (37.1  C) (Tympanic) 6' (1.829 m) 97% 28.89 kg/m2        Blood Pressure from Last 3 Encounters:   11/15/17 152/86   10/10/17 147/86   17 136/76    Weight from Last 3 Encounters:   11/15/17 213 lb (96.6 kg)   10/10/17 208 lb (94.3 kg)   17 213 lb (96.6 kg)                 Today's Medication Changes          These changes are accurate as of: 11/15/17 11:59 PM.  If you have any questions, ask your nurse or doctor.               Start taking these medicines.        Dose/Directions    levofloxacin 500 MG tablet   Commonly known as:  LEVAQUIN   Used for:  Aspiration pneumonitis (H)   Started by:  Bandar Greenberg MD        Dose:  500 mg   Take 1 tablet (500 mg) by mouth daily   Quantity:  7 tablet   Refills:  0            Where to get your medicines      [facility]  [address] [facility] [address]  [address] [place] [address]     Phone:  [phone]     levofloxacin 500 MG tablet                Primary Care Provider Office Phone # Fax #    Bandar Greenberg [phone] [address]  [place] [address]        Equal Access to Services     BRANDY MORENO AH: Zen Lopez, zana eubanks, loyd nance, lynne santos. So [facility] [phone].    ATENCIÓN: Si habla español, tiene a joe disposición servicios gratuitos de asistencia lingüística. Llame al [phone].    We comply with applicable federal civil rights laws and [place] laws. We do not discriminate on the basis of race,  color, national origin, age, disability, sex, sexual orientation, or gender identity.            Thank you!     Thank you for choosing [facility]  for your care. Our goal is always to provide you with excellent care. Hearing back from our patients is one way we can continue to improve our services. Please take a few minutes to complete the written survey that you may receive in the mail after your visit with us. Thank you!             Your Updated Medication List - Protect others around you: Learn how to safely use, store and throw away your medicines at www.disposemymeds.org.          This list is accurate as of: 11/15/17 11:59 PM.  Always use your most recent med list.                   Brand Name Dispense Instructions for use Diagnosis    azithromycin 250 MG tablet    ZITHROMAX     Take 500 mg (2 tabs) by mouth on day 1, then 250 mg (1 tab) daily for days 2-5.        * STEVE CONTOUR TEST VI           * STEVE CONTOUR test strip   Generic drug:  blood glucose monitoring     300 strip    TESTING FOUR TIMES DAILY OR AS DIRECTED    Type 2 diabetes mellitus with complication (H)       BD FERCHO U/F 32G X 4 MM   Generic drug:  insulin pen needle     100 each    USE AS DIRECTED    Type 2 diabetes mellitus without complication, with long-term current use of insulin (H)       benzonatate 200 MG capsule    TESSALON     Take 200 mg by mouth        FLONASE 50 MCG/ACT spray   Generic drug:  fluticasone      Spray 1 spray into both nostrils 2 times daily as needed for rhinitis or allergies        insulin lispro 100 UNIT/ML injection    HumaLOG KWIKpen    45 mL    INJECT UP TO 55 UNITS SUBCUTANEOUSLY DAILY AS DIRECTED    Type 2 diabetes mellitus without complication, with long-term current use of insulin (H)       ketorolac 0.5 % ophthalmic solution    ACULAR     1 drop 4 times daily        LANTUS SOLOSTAR 100 UNIT/ML injection   Generic drug:  insulin glargine     45 mL    ADMINISTER 50 UNITS UNDER THE SKIN AT BEDTIME  AS DIRECTED    Type 2 diabetes mellitus without complication, with long-term current use of insulin (H)       levofloxacin 500 MG tablet    LEVAQUIN    7 tablet    Take 1 tablet (500 mg) by mouth daily    Aspiration pneumonitis (H)       losartan 50 MG tablet    COZAAR    90 tablet    Take 1 tablet (50 mg) by mouth daily    Benign essential hypertension       metFORMIN 1000 MG tablet    GLUCOPHAGE    180 tablet    Take 1 tablet (1,000 mg) by mouth 2 times daily (with meals)    Type 2 diabetes mellitus without complication, with long-term current use of insulin (H)       order for DME     1 Units    Hip steroid injectoion    Primary osteoarthritis of hip, unspecified laterality       simvastatin 20 MG tablet    ZOCOR    90 tablet    Take 1 tablet (20 mg) by mouth At Bedtime    Hyperlipidemia LDL goal <100       traZODone 100 MG tablet    DESYREL    90 tablet    Take 1 tablet (100 mg) by mouth At Bedtime    Insomnia, unspecified type       valsartan 160 MG tablet    DIOVAN    180 tablet    TAKE 1 TABLET(160 MG) BY MOUTH TWICE DAILY    Benign essential hypertension       VENTOLIN  (90 BASE) MCG/ACT Inhaler   Generic drug:  albuterol      INL 2 PFS PO QID PRN        VIAGRA 100 MG tablet   Generic drug:  sildenafil      TK 1 T PO PRIOR TO INTERCOURSE        * Notice:  This list has 2 medication(s) that are the same as other medications prescribed for you. Read the directions carefully, and ask your doctor or other care provider to review them with you.

## 2017-11-15 NOTE — NURSING NOTE
Chief Complaint   Patient presents with     Hospital F/U       Initial /86 (BP Location: Left arm, Cuff Size: Adult Large)  Pulse 101  Temp 98.7  F (37.1  C) (Tympanic)  Ht 6' (1.829 m)  Wt 213 lb (96.6 kg)  SpO2 97%  BMI 28.89 kg/m2 Estimated body mass index is 28.89 kg/(m^2) as calculated from the following:    Height as of this encounter: 6' (1.829 m).    Weight as of this encounter: 213 lb (96.6 kg).  Medication Reconciliation: complete     Tawnya Newman MA

## 2017-11-15 NOTE — PROGRESS NOTES
SUBJECTIVE:   Justyn Olivas is a 77 year old male who presents to clinic today for the following health issues:      ED/UC Followup:    Facility:  [facility]  Date of visit: 11/11/17  Reason for visit: URI  Current Status: patient still not feeling well, pt feeling very short of breath and cannot catch his breath when coughing     Patient presents to the clinic complaining of continued URI symptoms. Patient was treated at [facility] on 11/11/17. He notes symptoms started with a sore throat 12-13 days ago. He then developed rhinorrhea for several days. Patient then developed a pronounced congested cough with small amounts of dark green sputum and related central chest pain, shortness of breath and wheezing. Cough worsens when he lays down. Patient notes he started Zithromax and Ventolin on 2 days ago. Patient self-discontinued Ventolin as it worsened his cough. Endorses one night of night sweats. Denies arthralgias and myalgias. Of note, patient has a history of diabetes and notes his blood glucose levels have stayed WNL with present illness.     Problem list and histories reviewed & adjusted, as indicated.  Additional history: as documented    Current Outpatient Prescriptions   Medication Sig Dispense Refill     azithromycin (ZITHROMAX) 250 MG tablet Take 500 mg (2 tabs) by mouth on day 1, then 250 mg (1 tab) daily for days 2-5.       benzonatate (TESSALON) 200 MG capsule Take 200 mg by mouth       VENTOLIN  (90 BASE) MCG/ACT Inhaler INL 2 PFS PO QID PRN  0     insulin lispro (HUMALOG KWIKPEN) 100 UNIT/ML injection INJECT UP TO 55 UNITS SUBCUTANEOUSLY DAILY AS DIRECTED 45 mL 11     simvastatin (ZOCOR) 20 MG tablet Take 1 tablet (20 mg) by mouth At Bedtime 90 tablet 3     BD FERCHO U/F 32G X 4 MM insulin pen needle USE AS DIRECTED 100 each 4     order for DME Hip steroid injectoion 1 Units 0     STEVE CONTOUR test strip TESTING FOUR TIMES DAILY OR AS DIRECTED 300 strip 1     valsartan (DIOVAN)  160 MG tablet TAKE 1 TABLET(160 MG) BY MOUTH TWICE DAILY 180 tablet 3     LANTUS SOLOSTAR 100 UNIT/ML soln ADMINISTER 50 UNITS UNDER THE SKIN AT BEDTIME AS DIRECTED 45 mL 1     traZODone (DESYREL) 100 MG tablet Take 1 tablet (100 mg) by mouth At Bedtime 90 tablet 2     VIAGRA 100 MG cap/tab TK 1 T PO PRIOR TO INTERCOURSE  0     metFORMIN (GLUCOPHAGE) 1000 MG tablet Take 1 tablet (1,000 mg) by mouth 2 times daily (with meals) 180 tablet 3     losartan (COZAAR) 50 MG tablet Take 1 tablet (50 mg) by mouth daily 90 tablet 3     fluticasone (FLONASE) 50 MCG/ACT nasal spray Spray 1 spray into both nostrils 2 times daily as needed for rhinitis or allergies       ketorolac (ACULAR) 0.5 % ophthalmic solution 1 drop 4 times daily       Glucose Blood (STEVE CONTOUR TEST VI)        No Known Allergies    Reviewed and updated as needed this visit by clinical staff  Tobacco  Allergies  Meds  Problems  Med Hx  Surg Hx  Fam Hx  Soc Hx        Reviewed and updated as needed this visit by Provider       ROS:  MS: He has a history of left hip pain and reports cortisone injection has sufficiently resolved pain.    Constitutional, HEENT, cardiovascular, pulmonary, gi and gu systems are negative, except as otherwise noted.    This document serves as a record of the services and decisions personally performed and made by Bandar Greenberg MD. It was created on his/her behalf by Jeaneth Courtney, a trained medical scribe. The creation of this document is based the provider's statements to the medical scribe.  Scribjose antonio Courtney 10:19 AM, November 15, 2017     OBJECTIVE:   /86 (BP Location: Left arm, Cuff Size: Adult Large)  Pulse 101  Temp 98.7  F (37.1  C) (Tympanic)  Ht 1.829 m (6')  Wt 96.6 kg (213 lb)  SpO2 97%  BMI 28.89 kg/m2  Body mass index is 28.89 kg/(m^2).   Inactive SpO2 94%  HEENT: TM normal, throat clear, lindsey nasi congested with excessive clear secretions  Neck was supple without adenopathy or  thyromegaly his carotids were normal without bruits  Chest inspitory rales with faint rhonchi on the left, no wheezing   Cardiovascular S1 and S2 are physiologic without murmurs or gallops  Abdomen bowel sounds were normal.  There is no palpable mass or organomegaly  Extremities nontender without any edema  Pulses pedal pulses are as described otherwise his pulses are bilaterally symmetrical throughout without bruits  Skin without significant abnormality     Xray reviewed: showed patchy right lower lobe infiltrate     ASSESSMENT/PLAN:   Chronic nonseasonal allergic rhinitis due to pollen    Type 2 diabetes mellitus without complication, with long-term current use of insulin (H)    Benign essential hypertension    Aspiration pneumonitis (H)  Discontinue Zithromax and Ventolin. Begin Levaquin at dose and regimen below. Use lozenges or hard candies to help moisten the throat. Discussed adding prednisone if irritability increases. Before bed or for paroxymal coughing, use Tessalon Perles.  - XR Chest 2 Views  - levofloxacin (LEVAQUIN) 500 MG tablet  Dispense: 7 tablet; Refill: 0    Follow up after antibiotic course is finished     Bandar Greenberg MD  [facility]    The information in this document, created by the medical scribe for me, accurately reflects the services I personally performed and the decisions made by me. I have reviewed and approved this document for accuracy prior to leaving the patient care area.  Bandar Greenberg MD  10:10 AM, 11/15/17

## 2017-11-15 NOTE — PROGRESS NOTES
"  SUBJECTIVE:   Justyn Olivas is a 77 year old male who presents to clinic today for the following health issues:      RESPIRATORY SYMPTOMS      Duration: ***    Description  { :070319}    Severity: {MILD, MODERATE, SEVERE LOW:759079}    Accompanying signs and symptoms: {NONE DEFAULTED:886234::\"None\"}    History (predisposing factors):  { :122786::\"none\"}    Precipitating or alleviating factors: {NONE DEFAULTED:290566::\"None\"}    Therapies tried and outcome:  { :149721::\"none\"}        {additional problems for provider to add:057323}    Problem list and histories reviewed & adjusted, as indicated.  Additional history: {NONE - AS DOCUMENTED:683105::\"as documented\"}    {HIST REVIEW/ LINKS 2:666998}    Reviewed and updated as needed this visit by clinical staff     Reviewed and updated as needed this visit by Provider         {PROVIDER CHARTING PREFERENCE:170453}  "

## 2017-12-04 ENCOUNTER — TELEPHONE (OUTPATIENT)
Dept: FAMILY MEDICINE | Facility: CLINIC | Age: 77
End: 2017-12-04

## 2017-12-04 NOTE — TELEPHONE ENCOUNTER
Reason for Call:  Form, our goal is to have forms completed with 72 hours, however, some forms may require a visit or additional information.    Type of letter, form or note:  [place] [employer] form    Who is the form from?: Patient    Where did the form come from: Patient or family brought in       What clinic location was the form placed at?: [facility]    Where the form was placed: 's Box    What number is listed as a contact on the form?:        Additional comments:     Call taken on 12/4/2017 at 1:40 PM by Tawnya Rodriguez

## 2017-12-11 ENCOUNTER — OFFICE VISIT (OUTPATIENT)
Dept: FAMILY MEDICINE | Facility: CLINIC | Age: 77
End: 2017-12-11
Payer: MEDICARE

## 2017-12-11 VITALS
DIASTOLIC BLOOD PRESSURE: 86 MMHG | SYSTOLIC BLOOD PRESSURE: 143 MMHG | HEART RATE: 96 BPM | WEIGHT: 214 LBS | TEMPERATURE: 97.3 F | BODY MASS INDEX: 28.99 KG/M2 | OXYGEN SATURATION: 96 % | HEIGHT: 72 IN

## 2017-12-11 DIAGNOSIS — J20.9 ACUTE BRONCHITIS, UNSPECIFIED ORGANISM: Primary | ICD-10-CM

## 2017-12-11 DIAGNOSIS — R05.9 COUGH: ICD-10-CM

## 2017-12-11 PROCEDURE — 99213 OFFICE O/P EST LOW 20 MIN: CPT | Performed by: INTERNAL MEDICINE

## 2017-12-11 RX ORDER — CODEINE PHOSPHATE AND GUAIFENESIN 10; 100 MG/5ML; MG/5ML
1 SOLUTION ORAL 2 TIMES DAILY PRN
Qty: 120 ML | Refills: 1 | Status: SHIPPED | OUTPATIENT
Start: 2017-12-11 | End: 2018-02-01

## 2017-12-11 RX ORDER — PREDNISONE 20 MG/1
20 TABLET ORAL 2 TIMES DAILY WITH MEALS
Qty: 20 TABLET | Refills: 1 | Status: SHIPPED | OUTPATIENT
Start: 2017-12-11 | End: 2017-12-21

## 2017-12-11 RX ORDER — LEVOFLOXACIN 500 MG/1
500 TABLET, FILM COATED ORAL DAILY
Qty: 7 TABLET | Refills: 0 | Status: SHIPPED | OUTPATIENT
Start: 2017-12-11 | End: 2018-02-01

## 2017-12-11 RX ORDER — BENZONATATE 200 MG/1
200 CAPSULE ORAL
Qty: 42 CAPSULE | Status: CANCELLED | OUTPATIENT
Start: 2017-12-11

## 2017-12-11 RX ORDER — ALBUTEROL SULFATE 90 UG/1
AEROSOL, METERED RESPIRATORY (INHALATION)
Qty: 1 INHALER | Refills: 3 | Status: SHIPPED | OUTPATIENT
Start: 2017-12-11 | End: 2020-12-30

## 2017-12-11 NOTE — PROGRESS NOTES
SUBJECTIVE:   Justyn Olivas is a 77 year old male who presents to clinic today for the following health issues:      RESPIRATORY SYMPTOMS      Duration: recurrent URI    Description  cough, wheezing, headache, fatigue/malaise and hoarse voice    Severity: severe    Accompanying signs and symptoms: None    History (predisposing factors):  none    Precipitating or alleviating factors: None    Therapies tried and outcome:  rest and fluids oral decongestant    Current Medications:     Current Outpatient Prescriptions   Medication Sig Dispense Refill     VENTOLIN  (90 BASE) MCG/ACT Inhaler INL 2 PFS PO QID PRN 1 Inhaler 3     predniSONE (DELTASONE) 20 MG tablet Take 1 tablet (20 mg) by mouth 2 times daily (with meals) for 10 days 20 tablet 1     guaiFENesin-codeine (ROBITUSSIN AC) 100-10 MG/5ML SOLN solution Take 5 mLs by mouth 2 times daily as needed 120 mL 1     levofloxacin (LEVAQUIN) 500 MG tablet Take 1 tablet (500 mg) by mouth daily 7 tablet 0     benzonatate (TESSALON) 200 MG capsule Take 200 mg by mouth       insulin lispro (HUMALOG KWIKPEN) 100 UNIT/ML injection INJECT UP TO 55 UNITS SUBCUTANEOUSLY DAILY AS DIRECTED 45 mL 11     simvastatin (ZOCOR) 20 MG tablet Take 1 tablet (20 mg) by mouth At Bedtime 90 tablet 3     BD FERCHO U/F 32G X 4 MM insulin pen needle USE AS DIRECTED 100 each 4     order for DME Hip steroid injectoion 1 Units 0     STEVE CONTOUR test strip TESTING FOUR TIMES DAILY OR AS DIRECTED 300 strip 1     valsartan (DIOVAN) 160 MG tablet TAKE 1 TABLET(160 MG) BY MOUTH TWICE DAILY 180 tablet 3     LANTUS SOLOSTAR 100 UNIT/ML soln ADMINISTER 50 UNITS UNDER THE SKIN AT BEDTIME AS DIRECTED 45 mL 1     traZODone (DESYREL) 100 MG tablet Take 1 tablet (100 mg) by mouth At Bedtime 90 tablet 2     VIAGRA 100 MG cap/tab TK 1 T PO PRIOR TO INTERCOURSE  0     metFORMIN (GLUCOPHAGE) 1000 MG tablet Take 1 tablet (1,000 mg) by mouth 2 times daily (with meals) 180 tablet 3     losartan (COZAAR) 50 MG  tablet Take 1 tablet (50 mg) by mouth daily 90 tablet 3     fluticasone (FLONASE) 50 MCG/ACT nasal spray Spray 1 spray into both nostrils 2 times daily as needed for rhinitis or allergies       ketorolac (ACULAR) 0.5 % ophthalmic solution 1 drop 4 times daily       Glucose Blood (STEVE CONTOUR TEST VI)            Allergies:    No Known Allergies         Past Medical History:   No past medical history on file.      Past Surgical History:   No past surgical history on file.      Family Medical History:     Family History   Problem Relation Age of Onset     Family History Negative Mother      Family History Negative Father          Social History:     Social History     Social History     Marital status:      Spouse name: N/A     Number of children: N/A     Years of education: N/A     Occupational History     Not on file.     Social History Main Topics     Smoking status: Never Smoker     Smokeless tobacco: Never Used     Alcohol use No     Drug use: No     Sexual activity: Yes     Partners: Female     Birth control/ protection: None     Other Topics Concern     Not on file     Social History Narrative           Review of System:     Constitutional: Negative for fever or chills  Skin: Negative for rashes  Ears/Nose/Throat: Negative for nasal congestion, sore throat  Respiratory: Positive for cough  Cardiovascular: Negative for chest pain  Gastrointestinal: Negative for nausea, vomiting  Genitourinary: Negative for dysuria, hematuria  Musculoskeletal: Negative for myalgias  Neurologic: Negative for headaches  Psychiatric: Negative for depression, anxiety  Hematologic/Lymphatic/Immunologic: Negative  Endocrine: Negative  Behavioral: Negative for tobacco use       Physical Exam:   /86 (BP Location: Right arm, Patient Position: Sitting, Cuff Size: Adult Regular)  Pulse 96  Temp 97.3  F (36.3  C) (Tympanic)  Ht 6' (1.829 m)  Wt 214 lb (97.1 kg)  SpO2 96%  BMI 29.02 kg/m2    GENERAL: alert and no  distress  EYES: eyes grossly normal to inspection, and conjunctivae and sclerae normal  HENT: Normocephalic atraumatic. Nose and mouth without ulcers or lesions  NECK: supple  RESP: intermittent dry coughing spells present   CV: regular rate and rhythm, normal S1 S2  LYMPH: no peripheral edema   ABDOMEN: nondistended  MS: no gross musculoskeletal defects noted  SKIN: no suspicious lesions or rashes  NEURO: Alert & Oriented x 3.   PSYCH: mentation appears normal, affect normal          ASSESSMENT/PLAN:       (J20.9) Acute bronchitis, unspecified organism  (primary encounter diagnosis)  (R05) Cough  Comment: several days of new acute bronchitis symptoms with cough.  Plan: I have ordered  levofloxacin (LEVAQUIN) 500 MG tablet, VENTOLIN  (90 BASE) MCG/ACT Inhaler, predniSONE (DELTASONE) 20 MG tablet, guaiFENesin-codeine (ROBITUSSIN AC) 100-10 MG/5ML SOLN solution for treatment.             Follow Up Plan:     Patient is instructed to return to Internal Medicine clinic for follow-up visit in 1 week.        Meagan Sawyer MD  Internal Medicine  [facility]

## 2017-12-11 NOTE — NURSING NOTE
Chief Complaint   Patient presents with     Cough       Initial /86 (BP Location: Right arm, Patient Position: Sitting, Cuff Size: Adult Regular)  Pulse 96  Temp 97.3  F (36.3  C) (Tympanic)  Ht 6' (1.829 m)  Wt 214 lb (97.1 kg)  SpO2 96%  BMI 29.02 kg/m2 Estimated body mass index is 29.02 kg/(m^2) as calculated from the following:    Height as of this encounter: 6' (1.829 m).    Weight as of this encounter: 214 lb (97.1 kg).  Medication Reconciliation: complete

## 2017-12-11 NOTE — MR AVS SNAPSHOT
"              After Visit Summary   2017    Justyn Olivas    MRN: [id number]           Patient Information     Date Of Birth          1940        Visit Information        Provider Department      2017 3:00 PM Meagan Sawyer MD [facility]        Today's Diagnoses     Acute bronchitis, unspecified organism    -  1    Cough           Follow-ups after your visit        Who to contact     If you have questions or need follow up information about today's clinic visit or your schedule please contact [facility] directly at [phone].  Normal or non-critical lab and imaging results will be communicated to you by [employer], letter or phone within 4 business days after the clinic has received the results. If you do not hear from us within 7 days, please contact the clinic through [employer] or phone. If you have a critical or abnormal lab result, we will notify you by phone as soon as possible.  Submit refill requests through [employer] or call your pharmacy and they will forward the refill request to us. Please allow 3 business days for your refill to be completed.          Additional Information About Your Visit        MyChart Information     [employer] lets you send messages to your doctor, view your test results, renew your prescriptions, schedule appointments and more. To sign up, go to [email or web address] . Click on \"Log in\" on the left side of the screen, which will take you to the Welcome page. Then click on \"Sign up Now\" on the right side of the page.     You will be asked to enter the access code listed below, as well as some personal information. Please follow the directions to create your username and password.     Your access code is: [email or web address]  Expires: 2018 11:26 AM     Your access code will  in 90 days. If you need help or a new code, please call your [facility] or [phone].        Care EveryWhere ID     This is your Care EveryWhere ID. This " could be used by other organizations to access your [facility] medical records  [id number]        Your Vitals Were     Pulse Temperature Height Pulse Oximetry BMI (Body Mass Index)       96 97.3  F (36.3  C) (Tympanic) 6' (1.829 m) 96% 29.02 kg/m2        Blood Pressure from Last 3 Encounters:   12/11/17 143/86   11/15/17 152/86   10/10/17 147/86    Weight from Last 3 Encounters:   12/11/17 214 lb (97.1 kg)   11/15/17 213 lb (96.6 kg)   10/10/17 208 lb (94.3 kg)              Today, you had the following     No orders found for display         Today's Medication Changes          These changes are accurate as of: 12/11/17 11:59 PM.  If you have any questions, ask your nurse or doctor.               Start taking these medicines.        Dose/Directions    guaiFENesin-codeine 100-10 MG/5ML Soln solution   Commonly known as:  ROBITUSSIN AC   Used for:  Cough, Acute bronchitis, unspecified organism   Started by:  Meagan Sawyer MD        Dose:  1 tsp.   Take 5 mLs by mouth 2 times daily as needed   Quantity:  120 mL   Refills:  1       predniSONE 20 MG tablet   Commonly known as:  DELTASONE   Used for:  Acute bronchitis, unspecified organism, Cough   Started by:  Meagan Sawyer MD        Dose:  20 mg   Take 1 tablet (20 mg) by mouth 2 times daily (with meals) for 10 days   Quantity:  20 tablet   Refills:  1         These medicines have changed or have updated prescriptions.        Dose/Directions    VENTOLIN  (90 BASE) MCG/ACT Inhaler   This may have changed:  See the new instructions.   Used for:  Acute bronchitis, unspecified organism   Generic drug:  albuterol   Changed by:  Meagan Sawyer MD        INL 2 PFS PO QID PRN   Quantity:  1 Inhaler   Refills:  3            Where to get your medicines      These medications were sent to [facility] [address] [place] [address] [place] [address] [place] [facility] [place] [address]     Phone:  [phone]     levofloxacin  500 MG tablet    VENTOLIN  (90 BASE) MCG/ACT Inhaler         Some of these will need a paper prescription and others can be bought over the counter.  Ask your nurse if you have questions.     Bring a paper prescription for each of these medications     guaiFENesin-codeine 100-10 MG/5ML Soln solution    predniSONE 20 MG tablet                Primary Care Provider Office Phone # Fax #    Bandar Greenberg [phone] [address]  [facility] [address]        Equal Access to Services     [facility]: Hadii aad ku hadasho Soomaali, waaxda luqadaha, qaybta kaalmada adeegyada, waxzully brennan haychristiane russell . So [facility] [phone].    ATENCIÓN: Si habla español, tiene a joe disposición servicios gratuitos de asistencia lingüística. [facility] [phone].    We comply with applicable federal civil rights laws and [place] laws. We do not discriminate on the basis of race, color, national origin, age, disability, sex, sexual orientation, or gender identity.            Thank you!     Thank you for choosing [facility]  for your care. Our goal is always to provide you with excellent care. Hearing back from our patients is one way we can continue to improve our services. Please take a few minutes to complete the written survey that you may receive in the mail after your visit with us. Thank you!             Your Updated Medication List - Protect others around you: Learn how to safely use, store and throw away your medicines at www.disposemymeds.org.          This list is accurate as of: 12/11/17 11:59 PM.  Always use your most recent med list.                   Brand Name Dispense Instructions for use Diagnosis    * STEVE CONTOUR TEST VI           * STEVE CONTOUR test strip   Generic drug:  blood glucose monitoring     300 strip    TESTING FOUR TIMES DAILY OR AS DIRECTED    Type 2 diabetes mellitus with complication (H)       BD FERCHO U/F 32G X 4 MM   Generic drug:   insulin pen needle     100 each    USE AS DIRECTED    Type 2 diabetes mellitus without complication, with long-term current use of insulin (H)       benzonatate 200 MG capsule    TESSALON     Take 200 mg by mouth        FLONASE 50 MCG/ACT spray   Generic drug:  fluticasone      Spray 1 spray into both nostrils 2 times daily as needed for rhinitis or allergies        guaiFENesin-codeine 100-10 MG/5ML Soln solution    ROBITUSSIN AC    120 mL    Take 5 mLs by mouth 2 times daily as needed    Cough, Acute bronchitis, unspecified organism       insulin lispro 100 UNIT/ML injection    HumaLOG KWIKpen    45 mL    INJECT UP TO 55 UNITS SUBCUTANEOUSLY DAILY AS DIRECTED    Type 2 diabetes mellitus without complication, with long-term current use of insulin (H)       ketorolac 0.5 % ophthalmic solution    ACULAR     1 drop 4 times daily        LANTUS SOLOSTAR 100 UNIT/ML injection   Generic drug:  insulin glargine     45 mL    ADMINISTER 50 UNITS UNDER THE SKIN AT BEDTIME AS DIRECTED    Type 2 diabetes mellitus without complication, with long-term current use of insulin (H)       levofloxacin 500 MG tablet    LEVAQUIN    7 tablet    Take 1 tablet (500 mg) by mouth daily        losartan 50 MG tablet    COZAAR    90 tablet    Take 1 tablet (50 mg) by mouth daily    Benign essential hypertension       metFORMIN 1000 MG tablet    GLUCOPHAGE    180 tablet    Take 1 tablet (1,000 mg) by mouth 2 times daily (with meals)    Type 2 diabetes mellitus without complication, with long-term current use of insulin (H)       order for DME     1 Units    Hip steroid injectoion    Primary osteoarthritis of hip, unspecified laterality       predniSONE 20 MG tablet    DELTASONE    20 tablet    Take 1 tablet (20 mg) by mouth 2 times daily (with meals) for 10 days    Acute bronchitis, unspecified organism, Cough       simvastatin 20 MG tablet    ZOCOR    90 tablet    Take 1 tablet (20 mg) by mouth At Bedtime    Hyperlipidemia LDL goal <100        traZODone 100 MG tablet    DESYREL    90 tablet    Take 1 tablet (100 mg) by mouth At Bedtime    Insomnia, unspecified type       valsartan 160 MG tablet    DIOVAN    180 tablet    TAKE 1 TABLET(160 MG) BY MOUTH TWICE DAILY    Benign essential hypertension       VENTOLIN  (90 BASE) MCG/ACT Inhaler   Generic drug:  albuterol     1 Inhaler    INL 2 PFS PO QID PRN    Acute bronchitis, unspecified organism       VIAGRA 100 MG tablet   Generic drug:  sildenafil      TK 1 T PO PRIOR TO INTERCOURSE        * Notice:  This list has 2 medication(s) that are the same as other medications prescribed for you. Read the directions carefully, and ask your doctor or other care provider to review them with you.

## 2017-12-14 DIAGNOSIS — Z79.4 TYPE 2 DIABETES MELLITUS WITHOUT COMPLICATION, WITH LONG-TERM CURRENT USE OF INSULIN (H): ICD-10-CM

## 2017-12-14 DIAGNOSIS — E11.9 TYPE 2 DIABETES MELLITUS WITHOUT COMPLICATION, WITH LONG-TERM CURRENT USE OF INSULIN (H): ICD-10-CM

## 2017-12-18 NOTE — TELEPHONE ENCOUNTER
Routing refill request to provider for review/approval because:  Labs out of range:  Creat  No lipids in last year    Requested Prescriptions   Pending Prescriptions Disp Refills     metFORMIN (GLUCOPHAGE) 1000 MG tablet [Pharmacy Med Name: METFORMIN 1000MG TABLETS] 180 tablet      Sig: TAKE 1 TABLET(1000 MG) BY MOUTH TWICE DAILY WITH MEALS    Biguanide Agents Failed    12/14/2017  2:53 PM       Failed - Patient has documented LDL within the past 12 mos.    Recent Labs   Lab Test 07/14/16   LDL  83            Passed - Patient's BP is less than 140/90    BP Readings from Last 3 Encounters:   12/11/17 143/86   11/15/17 152/86   10/10/17 147/86                Passed - Patient has had a Microalbumin in the past 12 mos.    Recent Labs   Lab Test  05/16/17   1017   MICROL  171   UMALCR  130.53*            Passed - Patient is age 10 or older       Passed - Patient has documented A1c within the specified period of time.    Recent Labs   Lab Test  10/10/17   1646   A1C  7.5*            Passed - Patient's CR is NOT>1.4 OR Patient's EGFR is NOT<45 within past 12 mos.    Recent Labs   Lab Test  05/16/17   1017   GFRESTIMATED  53*   GFRESTBLACK  65       Recent Labs   Lab Test  05/16/17   1017   CR  1.30*            Passed - Patient does NOT have a diagnosis of CHF.       Passed - Recent (6 mos) or future visit with authorizing provider's specialty    Patient had office visit in the last 6 months or has a visit in the next 30 days with authorizing provider.  See chart review.

## 2018-01-24 ENCOUNTER — TELEPHONE (OUTPATIENT)
Dept: FAMILY MEDICINE | Facility: CLINIC | Age: 78
End: 2018-01-24

## 2018-01-24 NOTE — TELEPHONE ENCOUNTER
Called pt to schedule appointment with Dr. MCKEON for bp and lab f/u.     Please schedule him.   Tawnya Newman MA

## 2018-01-24 NOTE — TELEPHONE ENCOUNTER
Spoke with patient and reviewed plan for an appt. Patient states he would like a physical. Scheduled 2-1-2018 for physical. Will come in fasting.  Deb John MA

## 2018-02-01 ENCOUNTER — OFFICE VISIT (OUTPATIENT)
Dept: FAMILY MEDICINE | Facility: CLINIC | Age: 78
End: 2018-02-01
Payer: MEDICARE

## 2018-02-01 ENCOUNTER — RADIANT APPOINTMENT (OUTPATIENT)
Dept: GENERAL RADIOLOGY | Facility: CLINIC | Age: 78
End: 2018-02-01
Attending: INTERNAL MEDICINE
Payer: MEDICARE

## 2018-02-01 VITALS
HEIGHT: 72 IN | OXYGEN SATURATION: 97 % | WEIGHT: 215 LBS | TEMPERATURE: 97 F | HEART RATE: 100 BPM | SYSTOLIC BLOOD PRESSURE: 145 MMHG | DIASTOLIC BLOOD PRESSURE: 87 MMHG | BODY MASS INDEX: 29.12 KG/M2 | RESPIRATION RATE: 18 BRPM

## 2018-02-01 DIAGNOSIS — J30.89 CHRONIC NONSEASONAL ALLERGIC RHINITIS DUE TO POLLEN: ICD-10-CM

## 2018-02-01 DIAGNOSIS — Z12.5 PROSTATE CANCER SCREENING: ICD-10-CM

## 2018-02-01 DIAGNOSIS — Z79.4 TYPE 2 DIABETES MELLITUS WITHOUT COMPLICATION, WITH LONG-TERM CURRENT USE OF INSULIN (H): Primary | ICD-10-CM

## 2018-02-01 DIAGNOSIS — E78.5 HYPERLIPIDEMIA LDL GOAL <100: ICD-10-CM

## 2018-02-01 DIAGNOSIS — Z00.00 ENCOUNTER FOR ROUTINE ADULT HEALTH EXAMINATION WITHOUT ABNORMAL FINDINGS: ICD-10-CM

## 2018-02-01 DIAGNOSIS — I10 BENIGN ESSENTIAL HYPERTENSION: ICD-10-CM

## 2018-02-01 DIAGNOSIS — M15.0 PRIMARY OSTEOARTHRITIS INVOLVING MULTIPLE JOINTS: ICD-10-CM

## 2018-02-01 DIAGNOSIS — E11.9 TYPE 2 DIABETES MELLITUS WITHOUT COMPLICATION, WITH LONG-TERM CURRENT USE OF INSULIN (H): Primary | ICD-10-CM

## 2018-02-01 DIAGNOSIS — G47.00 INSOMNIA, UNSPECIFIED TYPE: ICD-10-CM

## 2018-02-01 DIAGNOSIS — E55.9 VITAMIN D DEFICIENCY: ICD-10-CM

## 2018-02-01 LAB
ALBUMIN SERPL-MCNC: 3.8 G/DL (ref 3.4–5)
ALBUMIN UR-MCNC: 30 MG/DL
ALP SERPL-CCNC: 92 U/L (ref 40–150)
ALT SERPL W P-5'-P-CCNC: 20 U/L (ref 0–70)
ANION GAP SERPL CALCULATED.3IONS-SCNC: 7 MMOL/L (ref 3–14)
APPEARANCE UR: CLEAR
AST SERPL W P-5'-P-CCNC: 16 U/L (ref 0–45)
BACTERIA #/AREA URNS HPF: ABNORMAL /HPF
BILIRUB SERPL-MCNC: 0.5 MG/DL (ref 0.2–1.3)
BILIRUB UR QL STRIP: NEGATIVE
BUN SERPL-MCNC: 25 MG/DL (ref 7–30)
CALCIUM SERPL-MCNC: 8.8 MG/DL (ref 8.5–10.1)
CHLORIDE SERPL-SCNC: 109 MMOL/L (ref 94–109)
CHOLEST SERPL-MCNC: 141 MG/DL
CO2 SERPL-SCNC: 24 MMOL/L (ref 20–32)
COLOR UR AUTO: YELLOW
CREAT SERPL-MCNC: 1.3 MG/DL (ref 0.66–1.25)
CREAT UR-MCNC: 176 MG/DL
DEPRECATED CALCIDIOL+CALCIFEROL SERPL-MC: 30 UG/L (ref 20–75)
ERYTHROCYTE [DISTWIDTH] IN BLOOD BY AUTOMATED COUNT: 14.7 % (ref 10–15)
FEF 25/75: NORMAL
FEV-1: NORMAL
FEV1/FVC: NORMAL
FVC: NORMAL
GFR SERPL CREATININE-BSD FRML MDRD: 53 ML/MIN/1.7M2
GLUCOSE SERPL-MCNC: 173 MG/DL (ref 70–99)
GLUCOSE UR STRIP-MCNC: NEGATIVE MG/DL
GRAN CASTS #/AREA URNS LPF: ABNORMAL /LPF
HBA1C MFR BLD: 8.3 % (ref 4.3–6)
HCT VFR BLD AUTO: 40.4 % (ref 40–53)
HDLC SERPL-MCNC: 48 MG/DL
HGB BLD-MCNC: 13.1 G/DL (ref 13.3–17.7)
HGB UR QL STRIP: NEGATIVE
KETONES UR STRIP-MCNC: NEGATIVE MG/DL
LDLC SERPL CALC-MCNC: 78 MG/DL
LEUKOCYTE ESTERASE UR QL STRIP: NEGATIVE
MCH RBC QN AUTO: 27.1 PG (ref 26.5–33)
MCHC RBC AUTO-ENTMCNC: 32.4 G/DL (ref 31.5–36.5)
MCV RBC AUTO: 84 FL (ref 78–100)
MICROALBUMIN UR-MCNC: 177 MG/L
MICROALBUMIN/CREAT UR: 100.57 MG/G CR (ref 0–17)
MUCOUS THREADS #/AREA URNS LPF: PRESENT /LPF
NITRATE UR QL: NEGATIVE
NON-SQ EPI CELLS #/AREA URNS LPF: ABNORMAL /LPF
NONHDLC SERPL-MCNC: 93 MG/DL
PH UR STRIP: 5.5 PH (ref 5–7)
PLATELET # BLD AUTO: 229 10E9/L (ref 150–450)
POTASSIUM SERPL-SCNC: 4.7 MMOL/L (ref 3.4–5.3)
PROT SERPL-MCNC: 7 G/DL (ref 6.8–8.8)
PSA SERPL-ACNC: 0.31 UG/L (ref 0–4)
RBC # BLD AUTO: 4.83 10E12/L (ref 4.4–5.9)
RBC #/AREA URNS AUTO: ABNORMAL /HPF
SODIUM SERPL-SCNC: 140 MMOL/L (ref 133–144)
SOURCE: ABNORMAL
SP GR UR STRIP: 1.02 (ref 1–1.03)
TRIGL SERPL-MCNC: 75 MG/DL
TSH SERPL DL<=0.005 MIU/L-ACNC: 1.22 MU/L (ref 0.4–4)
UROBILINOGEN UR STRIP-ACNC: 0.2 EU/DL (ref 0.2–1)
WBC # BLD AUTO: 8.3 10E9/L (ref 4–11)
WBC #/AREA URNS AUTO: ABNORMAL /HPF

## 2018-02-01 PROCEDURE — 83036 HEMOGLOBIN GLYCOSYLATED A1C: CPT | Performed by: INTERNAL MEDICINE

## 2018-02-01 PROCEDURE — 36415 COLL VENOUS BLD VENIPUNCTURE: CPT | Performed by: INTERNAL MEDICINE

## 2018-02-01 PROCEDURE — 99397 PER PM REEVAL EST PAT 65+ YR: CPT | Mod: 25 | Performed by: INTERNAL MEDICINE

## 2018-02-01 PROCEDURE — 99207 C FOOT EXAM  NO CHARGE: CPT | Mod: 25 | Performed by: INTERNAL MEDICINE

## 2018-02-01 PROCEDURE — 94010 BREATHING CAPACITY TEST: CPT | Performed by: INTERNAL MEDICINE

## 2018-02-01 PROCEDURE — 82306 VITAMIN D 25 HYDROXY: CPT | Performed by: INTERNAL MEDICINE

## 2018-02-01 PROCEDURE — 84443 ASSAY THYROID STIM HORMONE: CPT | Performed by: INTERNAL MEDICINE

## 2018-02-01 PROCEDURE — 71046 X-RAY EXAM CHEST 2 VIEWS: CPT

## 2018-02-01 PROCEDURE — G0103 PSA SCREENING: HCPCS | Performed by: INTERNAL MEDICINE

## 2018-02-01 PROCEDURE — 81001 URINALYSIS AUTO W/SCOPE: CPT | Performed by: INTERNAL MEDICINE

## 2018-02-01 PROCEDURE — 82043 UR ALBUMIN QUANTITATIVE: CPT | Performed by: INTERNAL MEDICINE

## 2018-02-01 PROCEDURE — 80061 LIPID PANEL: CPT | Performed by: INTERNAL MEDICINE

## 2018-02-01 PROCEDURE — 80053 COMPREHEN METABOLIC PANEL: CPT | Performed by: INTERNAL MEDICINE

## 2018-02-01 PROCEDURE — 85027 COMPLETE CBC AUTOMATED: CPT | Performed by: INTERNAL MEDICINE

## 2018-02-01 PROCEDURE — 99213 OFFICE O/P EST LOW 20 MIN: CPT | Mod: 25 | Performed by: INTERNAL MEDICINE

## 2018-02-01 NOTE — MR AVS SNAPSHOT
After Visit Summary   2/1/2018    Justyn Olivas    MRN: [id number]           Patient Information     Date Of Birth          1940        Visit Information        Provider Department      2/1/2018 8:30 AM Bandar Greenberg MD [facility]        Today's Diagnoses     Type 2 diabetes mellitus without complication, with long-term current use of insulin (H)    -  1    Benign essential hypertension        Hyperlipidemia LDL goal <100        Chronic nonseasonal allergic rhinitis due to pollen        Insomnia, unspecified type        Primary osteoarthritis involving multiple joints        Encounter for routine adult health examination without abnormal findings        Vitamin D deficiency        Prostate cancer screening          Care Instructions      Preventive Health Recommendations:       Male Ages 65 and over    Yearly exam:             See your health care provider every year in order to  o   Review health changes.   o   Discuss preventive care.    o   Review your medicines if your doctor has prescribed any.    Talk with your health care provider about whether you should have a test to screen for prostate cancer (PSA).    Every 3 years, have a diabetes test (fasting glucose). If you are at risk for diabetes, you should have this test more often.    Every 5 years, have a cholesterol test. Have this test more often if you are at risk for high cholesterol or heart disease.     Every 10 years, have a colonoscopy. Or, have a yearly FIT test (stool test). These exams will check for colon cancer.    Talk to with your health care provider about screening for Abdominal Aortic Aneurysm if you have a family history of AAA or have a history of smoking.  Shots:     Get a flu shot each year.     Get a tetanus shot every 10 years.     Talk to your doctor about your pneumonia vaccines. There are now two you should receive - Pneumovax (PPSV 23) and Prevnar (PCV 13).    Talk to your doctor about a  shingles vaccine.     Talk to your doctor about the hepatitis B vaccine.  Nutrition:     Eat at least 5 servings of fruits and vegetables each day.     Eat whole-grain bread, whole-wheat pasta and brown rice instead of white grains and rice.     Talk to your doctor about Calcium and Vitamin D.   Lifestyle    Exercise for at least 150 minutes a week (30 minutes a day, 5 days a week). This will help you control your weight and prevent disease.     Limit alcohol to one drink per day.     No smoking.     Wear sunscreen to prevent skin cancer.     See your dentist every six months for an exam and cleaning.     See your eye doctor every 1 to 2 years to screen for conditions such as glaucoma, macular degeneration and cataracts.          Follow-ups after your visit        Your next 10 appointments already scheduled     Feb 08, 2018  1:00 PM CST   [facility]    [address] [id number] [phone]           Bring a current list of meds and any records pertaining to this visit. For Physicals, please bring immunization records and any forms needing to be filled out. Please arrive 10 minutes early to complete paperwork.              Who to contact     If you have questions or need follow up information about today's clinic visit or your schedule please contact [facility] directly at [phone].  Normal or non-critical lab and imaging results will be communicated to you by MyChart, letter or phone within 4 business days after the clinic has received the results. If you do not hear from us within 7 days, please contact the clinic through MyChart or phone. If you have a critical or abnormal lab result, we will notify you by phone as soon as possible.  Submit refill requests through [employer] or call your pharmacy and they will forward the refill request to us. Please allow 3 business days for your refill to be completed.     "      Additional Information About Your Visit        MyChart Information     [employer]Me[employer] lets you send messages to your doctor, view your test results, renew your prescriptions, schedule appointments and more. To sign up, go to [email or web address] . Click on \"Log in\" on the left side of the screen, which will take you to the Welcome page. Then click on \"Sign up Now\" on the right side of the page.     You will be asked to enter the access code listed below, as well as some personal information. Please follow the directions to create your username and password.     Your access code is: [email or web address]  Expires: 2018  4:17 AM     Your access code will  in 90 days. If you need help or a new code, please call your [facility] clinic or [phone].        Care EveryWhere ID     This is your Care EveryWhere ID. This could be used by other organizations to access your [facility] medical records  [id number]        Your Vitals Were     Pulse Temperature Respirations Height Pulse Oximetry BMI (Body Mass Index)    100 97  F (36.1  C) (Oral) 18 6' (1.829 m) 97% 29.16 kg/m2       Blood Pressure from Last 3 Encounters:   18 145/87   17 143/86   11/15/17 152/86    Weight from Last 3 Encounters:   18 215 lb (97.5 kg)   17 214 lb (97.1 kg)   11/15/17 213 lb (96.6 kg)              We Performed the Following     Albumin Random Urine Quantitative with Creat Ratio     C FOOT EXAM  NO CHARGE     CBC with platelets     Comprehensive metabolic panel     Hemoglobin A1c     Lipid panel reflex to direct LDL Fasting     OFFICE/OUTPT VISIT,EST,LEVL III     Prostate spec antigen screen     Spirometry, Breathing Capacity: Normal Order, Clinic Performed     TSH with free T4 reflex     UA reflex to Microscopic and Culture     Urine Microscopic     Vitamin D Deficiency          Today's Medication Changes          These changes are accurate as of 18 11:59 PM.  If you have any questions, ask your nurse or doctor.       "         Stop taking these medicines if you haven't already. Please contact your care team if you have questions.     benzonatate 200 MG capsule   Commonly known as:  TESSALON   Stopped by:  Bandar Greenberg MD           guaiFENesin-codeine 100-10 MG/5ML Soln solution   Commonly known as:  ROBITUSSIN AC   Stopped by:  Bandar Greenberg MD           levofloxacin 500 MG tablet   Commonly known as:  LEVAQUIN   Stopped by:  Bandar Greenberg MD                    Primary Care Provider Office Phone # Fax #    Bandar Greenberg [phone] [address]  [facility] [address]        Equal Access to Services     [facility]: Hadii ramila rizzo hadasho Soomaali, waaxda luqadaha, qaybta kaalmada adeegyada, lynne russell . So [facility] [phone].    ATENCIÓN: Si habla español, tiene a joe disposición servicios gratuitos de asistencia lingüística. Llame al [phone].    We comply with applicable federal civil rights laws and [place] laws. We do not discriminate on the basis of race, color, national origin, age, disability, sex, sexual orientation, or gender identity.            Thank you!     Thank you for choosing [facility]  for your care. Our goal is always to provide you with excellent care. Hearing back from our patients is one way we can continue to improve our services. Please take a few minutes to complete the written survey that you may receive in the mail after your visit with us. Thank you!             Your Updated Medication List - Protect others around you: Learn how to safely use, store and throw away your medicines at www.disposemymeds.org.          This list is accurate as of 2/1/18 11:59 PM.  Always use your most recent med list.                   Brand Name Dispense Instructions for use Diagnosis    * STEVE CONTOUR TEST VI           * STEVE CONTOUR test strip   Generic drug:  blood glucose monitoring     300 strip    TESTING FOUR TIMES DAILY OR  AS DIRECTED    Type 2 diabetes mellitus with complication (H)       BD FERCHO U/F 32G X 4 MM   Generic drug:  insulin pen needle     100 each    USE AS DIRECTED    Type 2 diabetes mellitus without complication, with long-term current use of insulin (H)       FLONASE 50 MCG/ACT spray   Generic drug:  fluticasone      Spray 1 spray into both nostrils 2 times daily as needed for rhinitis or allergies        insulin lispro 100 UNIT/ML injection    HumaLOG KWIKpen    45 mL    INJECT UP TO 55 UNITS SUBCUTANEOUSLY DAILY AS DIRECTED    Type 2 diabetes mellitus without complication, with long-term current use of insulin (H)       ketorolac 0.5 % ophthalmic solution    ACULAR     1 drop 4 times daily        LANTUS SOLOSTAR 100 UNIT/ML injection   Generic drug:  insulin glargine     45 mL    ADMINISTER 50 UNITS UNDER THE SKIN AT BEDTIME AS DIRECTED    Type 2 diabetes mellitus without complication, with long-term current use of insulin (H)       losartan 50 MG tablet    COZAAR    90 tablet    Take 1 tablet (50 mg) by mouth daily    Benign essential hypertension       metFORMIN 1000 MG tablet    GLUCOPHAGE    180 tablet    TAKE 1 TABLET(1000 MG) BY MOUTH TWICE DAILY WITH MEALS    Type 2 diabetes mellitus without complication, with long-term current use of insulin (H)       order for DME     1 Units    Hip steroid injectoion    Primary osteoarthritis of hip, unspecified laterality       simvastatin 20 MG tablet    ZOCOR    90 tablet    Take 1 tablet (20 mg) by mouth At Bedtime    Hyperlipidemia LDL goal <100       traZODone 100 MG tablet    DESYREL    90 tablet    Take 1 tablet (100 mg) by mouth At Bedtime    Insomnia, unspecified type       valsartan 160 MG tablet    DIOVAN    180 tablet    TAKE 1 TABLET(160 MG) BY MOUTH TWICE DAILY    Benign essential hypertension       VENTOLIN  (90 BASE) MCG/ACT Inhaler   Generic drug:  albuterol     1 Inhaler    INL 2 PFS PO QID PRN    Acute bronchitis, unspecified organism       VIAGRA  100 MG tablet   Generic drug:  sildenafil      TK 1 T PO PRIOR TO INTERCOURSE        * Notice:  This list has 2 medication(s) that are the same as other medications prescribed for you. Read the directions carefully, and ask your doctor or other care provider to review them with you.

## 2018-02-01 NOTE — PROGRESS NOTES
SUBJECTIVE:   Justyn Olivas is a 77 year old male who presents for Preventive Visit.    Are you in the first 12 months of your Medicare Part B coverage?  No    Healthy Habits:    Do you get at least three servings of calcium containing foods daily (dairy, green leafy vegetables, etc.)? yes    Amount of exercise or daily activities, outside of work: 2 day(s) per week    Problems taking medications regularly No    Medication side effects: No    Have you had an eye exam in the past two years? yes    Do you see a dentist twice per year? yes    Do you have sleep apnea, excessive snoring or daytime drowsiness?no    Ability to successfully perform activities of daily living: Yes, no assistance needed    Home safety:  none identified     Hearing impairment: No    Fall risk:  Fallen 2 or more times in the past year?: No  Any fall with injury in the past year?: No    COGNITIVE SCREEN  1) Repeat 3 items (Banana, Sunrise, Chair)    2) Clock draw: NORMAL  3) 3 item recall: Recalls 2 objects   Results: NORMAL clock, 1-2 items recalled: COGNITIVE IMPAIRMENT LESS LIKELY    Mini-CogTM Copyright YELENA Franco. Licensed by the author for use in [facility]; reprinted with permission ([email or web address]). All rights reserved.      Patient presents to the clinic for a wellness visit. He reports that he had a bout of pneumonia in December. He was seen in the clinic and was given prednisone. He reports that the prednisone improved his breathing but he feels as though his breathing is not back to normal. He isn't having any coughing episodes regularly and states his breathing is better now than it was in December. He is using fluticasone nasal spray to manage his breathing. He denies any wheezing or musical breath sounds. He also denies coughing due to cold weather. He hasn't been doing mch walking and or exercise.    He has history of hyperglycemia. States that he has not done a good job since thanksgiving with regard to eating.  He thinks he is eating a well balanced nutritious diet. Started cutting back on the snacking a week ago and his blood sugars are improving. His good blood sugar readings have been around 100, the maximum number he has gotten in the last week was 240 before dinner, and average before dinner blood pressure ranges from 140-160. Denies any problems with his feet or sores on his feet. He has nocturia 2x.    Patient reports that he has chronic masseter jaw pain. He states that the pain is aggravated by cold weather. He states that he often finds himself losing sleep due to the chronic pain in his jaw. Reports feeling stiff or achy in the morning for about an hour. He reports arthritis in his left hip pain is severe enough that he is unable to put his sock on by himself.    He denies any headaches, vision changes, angina, palpitations, heartburn, acid indigestion, diarrhea, constipation, hematochezia, urinary issues, and skin changes.    Reviewed and updated as needed this visit by clinical staff  Tobacco  Allergies  Meds  Med Hx  Surg Hx  Fam Hx  Soc Hx      Reviewed and updated as needed this visit by Provider        Social History   Substance Use Topics     Smoking status: Never Smoker     Smokeless tobacco: Never Used     Alcohol use No     If you drink alcohol do you typically have >3 drinks per day or >7 drinks per week? No                        Today's PHQ-2 Score:   PHQ-2 ( 1999 [employer]) 2/1/2018 10/10/2017   Q1: Little interest or pleasure in doing things 0 0   Q2: Feeling down, depressed or hopeless 0 0   PHQ-2 Score 0 0     Do you feel safe in your environment - Yes    Do you have a Health Care Directive?: Yes: Patient states has Advance Directive and will bring in a copy to clinic.    Current providers sharing in care for this patient include:   Patient Care Team:  Bandar Greenberg MD as PCP - General (Internal Medicine)    The following health maintenance items are reviewed in Epic and correct as of  today:  Health Maintenance   Topic Date Due     EYE EXAM Q1 YEAR  09/30/1941     TSH W/ FREE T4 REFLEX Q2 YEAR  09/30/1941     ADVANCE DIRECTIVE PLANNING Q5 YRS  09/30/1994     PNEUMOCOCCAL (2 of 2 - PPSV23) 02/26/2016     LIPID MONITORING Q1 YEAR  07/14/2017     A1C Q6 MO  04/10/2018     FOOT EXAM Q1 YEAR  05/16/2018     CREATININE Q1 YEAR  05/16/2018     MICROALBUMIN Q1 YEAR  05/16/2018     FALL RISK ASSESSMENT  10/10/2018     TETANUS IMMUNIZATION (SYSTEM ASSIGNED)  03/06/2023     INFLUENZA VACCINE (SYSTEM ASSIGNED)  Completed     Current Outpatient Prescriptions   Medication Sig Dispense Refill     LANTUS SOLOSTAR 100 UNIT/ML soln ADMINISTER 50 UNITS UNDER THE SKIN AT BEDTIME AS DIRECTED 45 mL 3     metFORMIN (GLUCOPHAGE) 1000 MG tablet TAKE 1 TABLET(1000 MG) BY MOUTH TWICE DAILY WITH MEALS 180 tablet 3     VENTOLIN  (90 BASE) MCG/ACT Inhaler INL 2 PFS PO QID PRN 1 Inhaler 3     insulin lispro (HUMALOG KWIKPEN) 100 UNIT/ML injection INJECT UP TO 55 UNITS SUBCUTANEOUSLY DAILY AS DIRECTED 45 mL 11     simvastatin (ZOCOR) 20 MG tablet Take 1 tablet (20 mg) by mouth At Bedtime 90 tablet 3     BD FERCHO U/F 32G X 4 MM insulin pen needle USE AS DIRECTED 100 each 4     order for DME Hip steroid injectoion 1 Units 0     STEVE CONTOUR test strip TESTING FOUR TIMES DAILY OR AS DIRECTED 300 strip 1     valsartan (DIOVAN) 160 MG tablet TAKE 1 TABLET(160 MG) BY MOUTH TWICE DAILY 180 tablet 3     traZODone (DESYREL) 100 MG tablet Take 1 tablet (100 mg) by mouth At Bedtime 90 tablet 2     VIAGRA 100 MG cap/tab TK 1 T PO PRIOR TO INTERCOURSE  0     losartan (COZAAR) 50 MG tablet Take 1 tablet (50 mg) by mouth daily 90 tablet 3     fluticasone (FLONASE) 50 MCG/ACT nasal spray Spray 1 spray into both nostrils 2 times daily as needed for rhinitis or allergies       ketorolac (ACULAR) 0.5 % ophthalmic solution 1 drop 4 times daily       Glucose Blood (STEVE CONTOUR TEST VI)        No Known Allergies    ROS:  C: NEGATIVE for  fever, chills, change in weight  I: NEGATIVE for worrisome rashes, moles or lesions  E: NEGATIVE for vision changes or irritation  E/M: NEGATIVE for ear, mouth and throat problems  R: NEGATIVE for significant cough or SOB  B: NEGATIVE for masses, tenderness or discharge  CV: NEGATIVE for chest pain, palpitations or peripheral edema  GI: NEGATIVE for nausea, abdominal pain, heartburn, or change in bowel habits  : NEGATIVE for frequency, dysuria, or hematuria  M: NEGATIVE for significant arthralgias or myalgia  N: NEGATIVE for weakness, dizziness or paresthesias  E: NEGATIVE for temperature intolerance, skin/hair changes  H: NEGATIVE for bleeding problems  P: NEGATIVE for changes in mood or affect    This document serves as a record of the services and decisions personally performed and made by Bandar Greenberg MD. It was created on his/her behalf by Flako Eaton, a trained medical scribe. The creation of this document is based the provider's statements to the medical scribe.  Scribjose antonio Eaton 8:27 AM, February 1, 2018    OBJECTIVE:   /87 (BP Location: Right arm, Patient Position: Chair, Cuff Size: Adult Large)  Pulse 100  Temp 97  F (36.1  C) (Oral)  Resp 18  Ht 6' (1.829 m)  Wt 215 lb (97.5 kg)  SpO2 97%  BMI 29.16 kg/m2 Estimated body mass index is 29.16 kg/(m^2) as calculated from the following:    Height as of this encounter: 6' (1.829 m).    Weight as of this encounter: 215 lb (97.5 kg).  EXAM:   GENERAL: healthy, alert and no distress  EYES: Eyes grossly normal to inspection, PERRL and conjunctivae and sclerae normal  HENT: ear canals and TM's normal, nose and mouth without ulcers or lesions, his ali nasi are almost completely closed with congestion and secretions  NECK: no adenopathy, no asymmetry, masses, or scars and thyroid normal to palpation  RESP: lungs clear to auscultation - no rales, rhonchi or wheezes  CV: regular rate and rhythm, normal S1 S2, no S3 or S4, no murmur, click  or rub, no peripheral edema and peripheral pulses strong  ABDOMEN: soft, nontender, no hepatosplenomegaly, no masses and bowel sounds normal   (male): normal male genitalia without lesions or urethral discharge, no hernia  RECTAL: normal sphincter tone, no rectal masses, prostate 2+, smooth, nontender without nodules or masses  MS: no gross musculoskeletal defects noted, no edema, he has a trace to 1+ pretibial edema  HIPS: his left hip has 85 degrees of flexion and 15 degrees of external rotation, the right hip has normal flexion and 45 degrees of external rotation  FEET: dry and scaly with prominent callus formation  SKIN: no suspicious lesions or rashes, dry  NEURO: Normal strength and tone, mentation intact and speech normal  PSYCH: mentation appears normal, affect normal/bright    ASSESSMENT / PLAN:   1. Type 2 diabetes mellitus without complication, with long-term current use of insulin (H)  - Hemoglobin A1c  - Albumin Random Urine Quantitative with Creat Ratio  - C FOOT EXAM  NO CHARGE    2. Benign essential hypertension  - UA reflex to Microscopic and Culture  - Comprehensive metabolic panel    3. Hyperlipidemia LDL goal <100  - Lipid panel reflex to direct LDL Fasting    4. Chronic nonseasonal allergic rhinitis due to pollen  - Spirometry, Breathing Capacity: Normal Order, Clinic Performed  - XR Chest 2 Views; Future    5. Insomnia, unspecified type    6. Primary osteoarthritis involving multiple joints    7. Encounter for routine adult health examination without abnormal findings  - UA reflex to Microscopic and Culture  - CBC with platelets  - Comprehensive metabolic panel  - Lipid panel reflex to direct LDL Fasting  - Prostate spec antigen screen  - TSH with free T4 reflex  - Vitamin D Deficiency  - Hemoglobin A1c  - Albumin Random Urine Quantitative with Creat Ratio  - C FOOT EXAM  NO CHARGE  - Spirometry, Breathing Capacity: Normal Order, Clinic Performed    8. Vitamin D deficiency  - Vitamin D  Deficiency    9. Prostate cancer screening  - Prostate spec antigen screen      -Advised patient to return in a week to follow up on labs and medications.    End of Life Planning:  Patient currently has an advanced directive: No.  I have verified the patient's ablity to prepare an advanced directive/make health care decisions.  Literature was provided to assist patient in preparing an advanced directive.    COUNSELING:  Reviewed preventive health counseling, as reflected in patient instructions       Regular exercise       Healthy diet/nutrition       Vision screening       Hearing screening       Dental care    Estimated body mass index is 29.16 kg/(m^2) as calculated from the following:    Height as of this encounter: 6' (1.829 m).    Weight as of this encounter: 215 lb (97.5 kg).     reports that he has never smoked. He has never used smokeless tobacco.    Appropriate preventive services were discussed with this patient, including applicable screening as appropriate for cardiovascular disease, diabetes, osteopenia/osteoporosis, and glaucoma.  As appropriate for age/gender, discussed screening for colorectal cancer, prostate cancer, breast cancer, and cervical cancer. Checklist reviewing preventive services available has been given to the patient.    Reviewed patients plan of care and provided an AVS. The Basic Care Plan (routine screening as documented in Health Maintenance) for Justyn meets the Care Plan requirement. This Care Plan has been established and reviewed with the Patient.    Counseling Resources:  ATP IV Guidelines  Pooled Cohorts Equation Calculator  Breast Cancer Risk Calculator  FRAX Risk Assessment  ICSI Preventive Guidelines  Dietary Guidelines for Americans, 2010  USDA's MyPlate  ASA Prophylaxis  Lung CA Screening    Bandar Greenberg MD  [facility]

## 2018-02-08 ENCOUNTER — OFFICE VISIT (OUTPATIENT)
Dept: FAMILY MEDICINE | Facility: CLINIC | Age: 78
End: 2018-02-08
Payer: MEDICARE

## 2018-02-08 VITALS
DIASTOLIC BLOOD PRESSURE: 83 MMHG | WEIGHT: 215 LBS | HEIGHT: 72 IN | HEART RATE: 102 BPM | OXYGEN SATURATION: 97 % | TEMPERATURE: 96.9 F | SYSTOLIC BLOOD PRESSURE: 132 MMHG | BODY MASS INDEX: 29.12 KG/M2

## 2018-02-08 DIAGNOSIS — M16.0 PRIMARY OSTEOARTHRITIS OF BOTH HIPS: ICD-10-CM

## 2018-02-08 DIAGNOSIS — Z79.4 TYPE 2 DIABETES MELLITUS WITHOUT COMPLICATION, WITH LONG-TERM CURRENT USE OF INSULIN (H): Primary | ICD-10-CM

## 2018-02-08 DIAGNOSIS — E11.9 TYPE 2 DIABETES MELLITUS WITHOUT COMPLICATION, WITH LONG-TERM CURRENT USE OF INSULIN (H): Primary | ICD-10-CM

## 2018-02-08 DIAGNOSIS — J30.89 CHRONIC NON-SEASONAL ALLERGIC RHINITIS, UNSPECIFIED TRIGGER: ICD-10-CM

## 2018-02-08 DIAGNOSIS — E78.5 HYPERLIPIDEMIA LDL GOAL <100: ICD-10-CM

## 2018-02-08 DIAGNOSIS — I10 BENIGN ESSENTIAL HYPERTENSION: ICD-10-CM

## 2018-02-08 PROCEDURE — 99214 OFFICE O/P EST MOD 30 MIN: CPT | Performed by: INTERNAL MEDICINE

## 2018-02-08 NOTE — NURSING NOTE
Chief Complaint   Patient presents with     RECHECK     f/u labs       Initial /83 (BP Location: Left arm, Cuff Size: Adult Large)  Pulse 102  Temp 96.9  F (36.1  C) (Oral)  Ht 6' (1.829 m)  Wt 215 lb (97.5 kg)  SpO2 97%  BMI 29.16 kg/m2 Estimated body mass index is 29.16 kg/(m^2) as calculated from the following:    Height as of this encounter: 6' (1.829 m).    Weight as of this encounter: 215 lb (97.5 kg).  Medication Reconciliation: complete     Tawnya Newman MA

## 2018-02-08 NOTE — MR AVS SNAPSHOT
"              After Visit Summary   2/8/2018    Justyn Olivas    MRN: [id number]           Patient Information     Date Of Birth          1940        Visit Information        Provider Department      2/8/2018 1:00 PM Bandar Greenberg MD [facility]        Today's Diagnoses     Type 2 diabetes mellitus without complication, with long-term current use of insulin (H)    -  1    Benign essential hypertension        Hyperlipidemia LDL goal <100        Primary osteoarthritis of both hips        Chronic non-seasonal allergic rhinitis, unspecified trigger           Follow-ups after your visit        Who to contact     If you have questions or need follow up information about today's clinic visit or your schedule please contact [facility] directly at [phone].  Normal or non-critical lab and imaging results will be communicated to you by MyChart, letter or phone within 4 business days after the clinic has received the results. If you do not hear from us within 7 days, please contact the clinic through [employer] or phone. If you have a critical or abnormal lab result, we will notify you by phone as soon as possible.  Submit refill requests through [employer] or call your pharmacy and they will forward the refill request to us. Please allow 3 business days for your refill to be completed.          Additional Information About Your Visit        MyChart Information     [employer] lets you send messages to your doctor, view your test results, renew your prescriptions, schedule appointments and more. To sign up, go to [email or web address] . Click on \"Log in\" on the left side of the screen, which will take you to the Welcome page. Then click on \"Sign up Now\" on the right side of the page.     You will be asked to enter the access code listed below, as well as some personal information. Please follow the directions to create your username and password.     Your access code is: [email or web address]  Expires: " 2018  4:17 AM     Your access code will  in 90 days. If you need help or a new code, please call your [place] clinic or [phone].        Care EveryWhere ID     This is your Care EveryWhere ID. This could be used by other organizations to access your [place] medical records  [id number]        Your Vitals Were     Pulse Temperature Height Pulse Oximetry BMI (Body Mass Index)       102 96.9  F (36.1  C) (Oral) 6' (1.829 m) 97% 29.16 kg/m2        Blood Pressure from Last 3 Encounters:   18 132/83   18 145/87   17 143/86    Weight from Last 3 Encounters:   18 215 lb (97.5 kg)   18 215 lb (97.5 kg)   17 214 lb (97.1 kg)              Today, you had the following     No orders found for display         Where to get your medicines      These medications were sent to [id number] - [place] - [address] [facility] [address]  [facility] [place] [address]     Phone:  [phone]     simvastatin 20 MG tablet          Primary Care Provider Office Phone # Fax #    Bandar Greenberg [phone] [address]  [place] [address]        Equal Access to Services     [facility] : Hadii aad ku hadasho Soomaali, waaxda luqadaha, qaybta kaalmada adeegyada, lynne santos. So [facility] [phone].    ATENCIÓN: Si habla español, tiene a joe disposición servicios gratuitos de asistencia lingüística. Llame al [phone].    We comply with applicable federal civil rights laws and [place] laws. We do not discriminate on the basis of race, color, national origin, age, disability, sex, sexual orientation, or gender identity.            Thank you!     Thank you for choosing [facility]  for your care. Our goal is always to provide you with excellent care. Hearing back from our patients is one way we can continue to improve our services. Please take a few minutes to complete the  written survey that you may receive in the mail after your visit with us. Thank you!             Your Updated Medication List - Protect others around you: Learn how to safely use, store and throw away your medicines at www.disposemymeds.org.          This list is accurate as of 2/8/18 11:59 PM.  Always use your most recent med list.                   Brand Name Dispense Instructions for use Diagnosis    * STEVE CONTOUR TEST VI           * STEVE CONTOUR test strip   Generic drug:  blood glucose monitoring     300 strip    TESTING FOUR TIMES DAILY OR AS DIRECTED    Type 2 diabetes mellitus with complication (H)       BD FERCHO U/F 32G X 4 MM   Generic drug:  insulin pen needle     100 each    USE AS DIRECTED    Type 2 diabetes mellitus without complication, with long-term current use of insulin (H)       FLONASE 50 MCG/ACT spray   Generic drug:  fluticasone      Spray 1 spray into both nostrils 2 times daily as needed for rhinitis or allergies        insulin lispro 100 UNIT/ML injection    HumaLOG KWIKpen    45 mL    INJECT UP TO 55 UNITS SUBCUTANEOUSLY DAILY AS DIRECTED    Type 2 diabetes mellitus without complication, with long-term current use of insulin (H)       ketorolac 0.5 % ophthalmic solution    ACULAR     1 drop 4 times daily        LANTUS SOLOSTAR 100 UNIT/ML injection   Generic drug:  insulin glargine     45 mL    ADMINISTER 50 UNITS UNDER THE SKIN AT BEDTIME AS DIRECTED    Type 2 diabetes mellitus without complication, with long-term current use of insulin (H)       losartan 50 MG tablet    COZAAR    90 tablet    Take 1 tablet (50 mg) by mouth daily    Benign essential hypertension       metFORMIN 1000 MG tablet    GLUCOPHAGE    180 tablet    TAKE 1 TABLET(1000 MG) BY MOUTH TWICE DAILY WITH MEALS    Type 2 diabetes mellitus without complication, with long-term current use of insulin (H)       order for DME     1 Units    Hip steroid injectoion    Primary osteoarthritis of hip, unspecified laterality        simvastatin 20 MG tablet    ZOCOR    90 tablet    Take 1 tablet (20 mg) by mouth At Bedtime    Hyperlipidemia LDL goal <100       traZODone 100 MG tablet    DESYREL    90 tablet    Take 1 tablet (100 mg) by mouth At Bedtime    Insomnia, unspecified type       valsartan 160 MG tablet    DIOVAN    180 tablet    TAKE 1 TABLET(160 MG) BY MOUTH TWICE DAILY    Benign essential hypertension       VENTOLIN  (90 BASE) MCG/ACT Inhaler   Generic drug:  albuterol     1 Inhaler    INL 2 PFS PO QID PRN    Acute bronchitis, unspecified organism       VIAGRA 100 MG tablet   Generic drug:  sildenafil      TK 1 T PO PRIOR TO INTERCOURSE        * Notice:  This list has 2 medication(s) that are the same as other medications prescribed for you. Read the directions carefully, and ask your doctor or other care provider to review them with you.

## 2018-02-08 NOTE — PROGRESS NOTES
SUBJECTIVE:   Justyn Olivas is a 77 year old male who presents to clinic today for the following health issues:    Patient presents to the clinic to follow up on his lab reports and to review his medications. He reports that he hasn't been checking his blood sugars that frequently but he did check it today and it was 116. We discussed options to better control patient's blood sugar.        Problem list and histories reviewed & adjusted, as indicated.  Additional history: as documented    Current Outpatient Prescriptions   Medication Sig Dispense Refill     LANTUS SOLOSTAR 100 UNIT/ML soln ADMINISTER 50 UNITS UNDER THE SKIN AT BEDTIME AS DIRECTED 45 mL 3     metFORMIN (GLUCOPHAGE) 1000 MG tablet TAKE 1 TABLET(1000 MG) BY MOUTH TWICE DAILY WITH MEALS 180 tablet 3     VENTOLIN  (90 BASE) MCG/ACT Inhaler INL 2 PFS PO QID PRN 1 Inhaler 3     insulin lispro (HUMALOG KWIKPEN) 100 UNIT/ML injection INJECT UP TO 55 UNITS SUBCUTANEOUSLY DAILY AS DIRECTED 45 mL 11     simvastatin (ZOCOR) 20 MG tablet Take 1 tablet (20 mg) by mouth At Bedtime 90 tablet 3     BD FERCHO U/F 32G X 4 MM insulin pen needle USE AS DIRECTED 100 each 4     order for DME Hip steroid injectoion 1 Units 0     STEVE CONTOUR test strip TESTING FOUR TIMES DAILY OR AS DIRECTED 300 strip 1     valsartan (DIOVAN) 160 MG tablet TAKE 1 TABLET(160 MG) BY MOUTH TWICE DAILY 180 tablet 3     traZODone (DESYREL) 100 MG tablet Take 1 tablet (100 mg) by mouth At Bedtime 90 tablet 2     VIAGRA 100 MG cap/tab TK 1 T PO PRIOR TO INTERCOURSE  0     losartan (COZAAR) 50 MG tablet Take 1 tablet (50 mg) by mouth daily 90 tablet 3     fluticasone (FLONASE) 50 MCG/ACT nasal spray Spray 1 spray into both nostrils 2 times daily as needed for rhinitis or allergies       ketorolac (ACULAR) 0.5 % ophthalmic solution 1 drop 4 times daily       Glucose Blood (STEVE CONTOUR TEST VI)        No Known Allergies    Reviewed and updated as needed this visit by clinical  staff  Allergies       Reviewed and updated as needed this visit by Provider         ROS:  Constitutional, HEENT, cardiovascular, pulmonary, gi and gu systems are negative, except as otherwise noted.    This document serves as a record of the services and decisions personally performed and made by Bandar Greenberg MD. It was created on his/her behalf by Flako Eaton, a trained medical scribe. The creation of this document is based the provider's statements to the medical scribe.  Scribe Flako Eaton 1:07 PM, February 8, 2018    OBJECTIVE:     /83 (BP Location: Left arm, Cuff Size: Adult Large)  Pulse 102  Temp 96.9  F (36.1  C) (Oral)  Ht 1.829 m (6')  Wt 97.5 kg (215 lb)  SpO2 97%  BMI 29.16 kg/m2  Body mass index is 29.16 kg/(m^2).    25 minutes spent with patient, over 50% time counseling, coordinating care and explaining about nature of the patient's conditions.    Diagnostic Test Results:  none     ASSESSMENT/PLAN:     1. Type 2 diabetes mellitus without complication, with long-term current use of insulin (H)  -Recommend patient undertake a exercise program. We also recommended he watch his diet better, and to monitor his blood sugars more regularly at home.    2. Benign essential hypertension  -Well managed with current therapies.    3. Hyperlipidemia LDL goal <100  - simvastatin (ZOCOR) 20 MG tablet; Take 1 tablet (20 mg) by mouth At Bedtime  Dispense: 90 tablet; Refill: 3    4. Primary osteoarthritis of both hips    5. Chronic non-seasonal allergic rhinitis, unspecified trigger  -Will be reevaluated at another time      -Recommend he do some aerobic activity regularly.    -Patient will adjust his routine to become more healthy and will call in a week with his blood sugar reports.    Bandar Greenberg MD  [facility]    The information in this document, created by the medical scribe for me, accurately reflects the services I personally performed and the decisions made by me. I  have reviewed and approved this document for accuracy prior to leaving the patient care area.  Bandar Greenberg MD  1:46 PM, 02/08/18

## 2018-02-11 RX ORDER — SIMVASTATIN 20 MG
20 TABLET ORAL AT BEDTIME
Qty: 90 TABLET | Refills: 3 | Status: SHIPPED | OUTPATIENT
Start: 2018-02-11 | End: 2019-02-06

## 2018-02-20 ENCOUNTER — OFFICE VISIT (OUTPATIENT)
Dept: FAMILY MEDICINE | Facility: CLINIC | Age: 78
End: 2018-02-20
Payer: MEDICARE

## 2018-02-20 VITALS
TEMPERATURE: 97.2 F | BODY MASS INDEX: 29.12 KG/M2 | OXYGEN SATURATION: 97 % | WEIGHT: 215 LBS | SYSTOLIC BLOOD PRESSURE: 146 MMHG | HEART RATE: 88 BPM | HEIGHT: 72 IN | DIASTOLIC BLOOD PRESSURE: 85 MMHG

## 2018-02-20 DIAGNOSIS — E78.5 HYPERLIPIDEMIA LDL GOAL <100: ICD-10-CM

## 2018-02-20 DIAGNOSIS — Z79.4 TYPE 2 DIABETES MELLITUS WITHOUT COMPLICATION, WITH LONG-TERM CURRENT USE OF INSULIN (H): Primary | ICD-10-CM

## 2018-02-20 DIAGNOSIS — E11.9 TYPE 2 DIABETES MELLITUS WITHOUT COMPLICATION, WITH LONG-TERM CURRENT USE OF INSULIN (H): Primary | ICD-10-CM

## 2018-02-20 DIAGNOSIS — I10 BENIGN ESSENTIAL HYPERTENSION: ICD-10-CM

## 2018-02-20 PROCEDURE — 99213 OFFICE O/P EST LOW 20 MIN: CPT | Performed by: INTERNAL MEDICINE

## 2018-02-20 NOTE — MR AVS SNAPSHOT
After Visit Summary   2/20/2018    Justyn Olivas    MRN: [id number]           Patient Information     Date Of Birth          1940        Visit Information        Provider Department      2/20/2018 10:00 AM Bandar Greenberg MD [facility]        Today's Diagnoses     Type 2 diabetes mellitus without complication, with long-term current use of insulin (H)    -  1    Hyperlipidemia LDL goal <100        Benign essential hypertension           Follow-ups after your visit        Who to contact     If you have questions or need follow up information about today's clinic visit or your schedule please contact [facility] directly at [phone].  Normal or non-critical lab and imaging results will be communicated to you by MyChart, letter or phone within 4 business days after the clinic has received the results. If you do not hear from us within 7 days, please contact the clinic through [employer] or phone. If you have a critical or abnormal lab result, we will notify you by phone as soon as possible.  Submit refill requests through [facility] or call your pharmacy and they will forward the refill request to us. Please allow 3 business days for your refill to be completed.          Additional Information About Your Visit        MyChart Information     [facility] gives you secure access to your electronic health record. If you see a primary care provider, you can also send messages to your care team and make appointments. If you have questions, please call your primary care clinic.  If you do not have a primary care provider, please call [phone] and they will assist you.        Care EveryWhere ID     This is your Care EveryWhere ID. This could be used by other organizations to access your [place] medical records  [id number]        Your Vitals Were     Pulse Temperature Height Pulse Oximetry BMI (Body Mass Index)       88 97.2  F (36.2  C) (Oral) 6' (1.829 m) 97% 29.16 kg/m2         Blood Pressure from Last 3 Encounters:   02/20/18 146/85   02/08/18 132/83   02/01/18 145/87    Weight from Last 3 Encounters:   02/20/18 215 lb (97.5 kg)   02/08/18 215 lb (97.5 kg)   02/01/18 215 lb (97.5 kg)              Today, you had the following     No orders found for display       Primary Care Provider Office Phone # Fax #    Bandar Greenberg [phone] [address]  [facility] [address]        Equal Access to Services     [facility]: Hadii aad ku hadasho Soomaali, waaxda luqadaha, qaybta kaalmada ademonseyada, lynne russell . So [facility] [phone].    ATENCIÓN: Si habla español, tiene a joe disposición servicios gratuitos de asistencia lingüística. [facility] [phone].    We comply with applicable federal civil rights laws and [place] laws. We do not discriminate on the basis of race, color, national origin, age, disability, sex, sexual orientation, or gender identity.            Thank you!     Thank you for choosing [facility]  for your care. Our goal is always to provide you with excellent care. Hearing back from our patients is one way we can continue to improve our services. Please take a few minutes to complete the written survey that you may receive in the mail after your visit with us. Thank you!             Your Updated Medication List - Protect others around you: Learn how to safely use, store and throw away your medicines at www.disposemymeds.org.          This list is accurate as of 2/20/18 11:59 PM.  Always use your most recent med list.                   Brand Name Dispense Instructions for use Diagnosis    * STEVE CONTOUR TEST VI           * STEVE CONTOUR test strip   Generic drug:  blood glucose monitoring     300 strip    TESTING FOUR TIMES DAILY OR AS DIRECTED    Type 2 diabetes mellitus with complication (H)       BD FERCHO U/F 32G X 4 MM   Generic drug:  insulin pen needle     100 each    USE AS DIRECTED    Type 2  diabetes mellitus without complication, with long-term current use of insulin (H)       FLONASE 50 MCG/ACT spray   Generic drug:  fluticasone      Spray 1 spray into both nostrils 2 times daily as needed for rhinitis or allergies        insulin lispro 100 UNIT/ML injection    HumaLOG KWIKpen    45 mL    INJECT UP TO 55 UNITS SUBCUTANEOUSLY DAILY AS DIRECTED    Type 2 diabetes mellitus without complication, with long-term current use of insulin (H)       ketorolac 0.5 % ophthalmic solution    ACULAR     1 drop 4 times daily        LANTUS SOLOSTAR 100 UNIT/ML injection   Generic drug:  insulin glargine     45 mL    ADMINISTER 50 UNITS UNDER THE SKIN AT BEDTIME AS DIRECTED    Type 2 diabetes mellitus without complication, with long-term current use of insulin (H)       losartan 50 MG tablet    COZAAR    90 tablet    Take 1 tablet (50 mg) by mouth daily    Benign essential hypertension       metFORMIN 1000 MG tablet    GLUCOPHAGE    180 tablet    TAKE 1 TABLET(1000 MG) BY MOUTH TWICE DAILY WITH MEALS    Type 2 diabetes mellitus without complication, with long-term current use of insulin (H)       order for DME     1 Units    Hip steroid injectoion    Primary osteoarthritis of hip, unspecified laterality       simvastatin 20 MG tablet    ZOCOR    90 tablet    Take 1 tablet (20 mg) by mouth At Bedtime    Hyperlipidemia LDL goal <100       traZODone 100 MG tablet    DESYREL    90 tablet    Take 1 tablet (100 mg) by mouth At Bedtime    Insomnia, unspecified type       valsartan 160 MG tablet    DIOVAN    180 tablet    TAKE 1 TABLET(160 MG) BY MOUTH TWICE DAILY    Benign essential hypertension       VENTOLIN  (90 BASE) MCG/ACT Inhaler   Generic drug:  albuterol     1 Inhaler    INL 2 PFS PO QID PRN    Acute bronchitis, unspecified organism       VIAGRA 100 MG tablet   Generic drug:  sildenafil      TK 1 T PO PRIOR TO INTERCOURSE        * Notice:  This list has 2 medication(s) that are the same as other medications  prescribed for you. Read the directions carefully, and ask your doctor or other care provider to review them with you.

## 2018-02-20 NOTE — NURSING NOTE
Chief Complaint   Patient presents with     RECHECK       Initial /85 (BP Location: Left arm, Patient Position: Sitting, Cuff Size: Adult Regular)  Pulse 88  Temp 97.2  F (36.2  C) (Oral)  Ht 6' (1.829 m)  Wt 215 lb (97.5 kg)  SpO2 97%  BMI 29.16 kg/m2 Estimated body mass index is 29.16 kg/(m^2) as calculated from the following:    Height as of this encounter: 6' (1.829 m).    Weight as of this encounter: 215 lb (97.5 kg).  Medication Reconciliation: complete   Olga [place]- CMA

## 2018-02-20 NOTE — PROGRESS NOTES
SUBJECTIVE:   Justyn Olivas is a 77 year old male who presents to clinic today for the following health issues:    Diabetes Follow-up  Patient is checking blood sugars: four times daily.    Blood sugar testing frequency justification: Uncontrolled diabetes - His hemoglobin A1C was 8.3 on 2/1/2018.  Results are as follows:         am - 82,71,125,71,98,139         lunchtime - na         suppertime - 140s-160s (Based on his sliding scale he should be taking 7-9 units of Humalog before dinner but for the last week he has been taking 20 units before dinner in an attempt to lower his readings before bedtime)         bedtime - 129,242,250,221,127,195    Diabetic concerns: None     Symptoms of hypoglycemia (low blood sugar): none     Paresthesias (numbness or burning in feet) or sores: No     Date of last diabetic eye exam: 02/2018    The patient was seen on 2/8/2018 for uncontrolled diabetes. He was recommended to undertake an exercise program and to improve his diet. He has been doing these lifestyle changes since his last visit in an attempt to better manage his diabetes.     He is taking Humalog before meals three times a day on a sliding scale and Lantus before bedtime. He is also taking 1000 mg of metformin BID.    One week ago the patient mistakenly took 50 mg of Humalog rather than his Lantus. His blood glucose level dropped to 40. He ate a few candy bars and was able to get his blood glucose level above 100 within forty minutes.      BP Readings from Last 2 Encounters:   02/20/18 146/85   02/08/18 132/83     Hemoglobin A1C (%)   Date Value   02/01/2018 8.3 (H)   10/10/2017 7.5 (H)     LDL Cholesterol Calculated (mg/dL)   Date Value   02/01/2018 78   07/14/2016 83       Amount of exercise or physical activity: 4-5 days/week walks dog    Problems taking medications regularly: No    Medication side effects: none    Diet: regular (no restrictions)        Problem list and histories reviewed & adjusted, as  indicated.  Additional history: as documented    Current Outpatient Prescriptions   Medication Sig Dispense Refill     simvastatin (ZOCOR) 20 MG tablet Take 1 tablet (20 mg) by mouth At Bedtime 90 tablet 3     LANTUS SOLOSTAR 100 UNIT/ML soln ADMINISTER 50 UNITS UNDER THE SKIN AT BEDTIME AS DIRECTED 45 mL 3     metFORMIN (GLUCOPHAGE) 1000 MG tablet TAKE 1 TABLET(1000 MG) BY MOUTH TWICE DAILY WITH MEALS 180 tablet 3     VENTOLIN  (90 BASE) MCG/ACT Inhaler INL 2 PFS PO QID PRN 1 Inhaler 3     insulin lispro (HUMALOG KWIKPEN) 100 UNIT/ML injection INJECT UP TO 55 UNITS SUBCUTANEOUSLY DAILY AS DIRECTED 45 mL 11     BD FERCHO U/F 32G X 4 MM insulin pen needle USE AS DIRECTED 100 each 4     order for DME Hip steroid injectoion 1 Units 0     STEVE CONTOUR test strip TESTING FOUR TIMES DAILY OR AS DIRECTED 300 strip 1     valsartan (DIOVAN) 160 MG tablet TAKE 1 TABLET(160 MG) BY MOUTH TWICE DAILY 180 tablet 3     traZODone (DESYREL) 100 MG tablet Take 1 tablet (100 mg) by mouth At Bedtime 90 tablet 2     VIAGRA 100 MG cap/tab TK 1 T PO PRIOR TO INTERCOURSE  0     losartan (COZAAR) 50 MG tablet Take 1 tablet (50 mg) by mouth daily 90 tablet 3     fluticasone (FLONASE) 50 MCG/ACT nasal spray Spray 1 spray into both nostrils 2 times daily as needed for rhinitis or allergies       ketorolac (ACULAR) 0.5 % ophthalmic solution 1 drop 4 times daily       Glucose Blood (STEVE CONTOUR TEST VI)        Recent Labs   Lab Test  02/01/18   1002  10/10/17   1646  05/16/17   1017  07/14/16  05/04/15   A1C  8.3*  7.5*  7.1*   < >  7.8*   < >   --    LDL  78   --    --    --   83   --   83   HDL  48   --    --    --   43   --   44   TRIG  75   --    --    --   90   --   64   ALT  20   --    --    --    --    --    --    CR  1.30*   --   1.30*   --    --    --    --    GFRESTIMATED  53*   --   53*   --    --    --    --    GFRESTBLACK  65   --   65   --    --    --    --    POTASSIUM  4.7   --   4.6   --    --    --    --    TSH  1.22    --    --    --    --    --    --     < > = values in this interval not displayed.      BP Readings from Last 3 Encounters:   02/20/18 146/85   02/08/18 132/83   02/01/18 145/87    Wt Readings from Last 3 Encounters:   02/20/18 97.5 kg (215 lb)   02/08/18 97.5 kg (215 lb)   02/01/18 97.5 kg (215 lb)              Reviewed and updated as needed this visit by clinical staff  Tobacco  Allergies  Meds  Soc Hx      Reviewed and updated as needed this visit by Provider         ROS:  Constitutional, HEENT, cardiovascular, pulmonary, gi and gu systems are negative, except as otherwise noted.    This document serves as a record of the services and decisions personally performed and made by Bandar Greenberg MD. It was created on his behalf by Kelly Avila, a trained medical scribe. The creation of this document is based the provider's statements to the medical scribe. 2/20/2018  OBJECTIVE:   /85 (BP Location: Left arm, Patient Position: Sitting, Cuff Size: Adult Regular)  Pulse 88  Temp 97.2  F (36.2  C) (Oral)  Ht 1.829 m (6')  Wt 97.5 kg (215 lb)  SpO2 97%  BMI 29.16 kg/m2  Body mass index is 29.16 kg/(m^2).  25 minutes spent with patient, over 50% time counseling, coordinating care and explaining about nature of the patient's conditions.    Diagnostic Test Results:  none   ASSESSMENT/PLAN:   1. Type 2 diabetes mellitus without complication, with long-term current use of insulin (H)  Unable to completely evaluate due to the patient not taking his blood glucose levels prior to lunch and dinner meals. Instructed patient to discontinue taking static 20 units of Humalog prior to evening meal and instead use sliding scale to adjust Humalog dose.   Continue with exercise routine and dietary changes.     2. Hyperlipidemia LDL goal <100  Controlled with simvastatin.     3. Benign essential hypertension  Controlled with valsartan and losartan.       Bandar Greenberg MD  [facility]    The information in  this document, created by the medical scribe for me, accurately reflects the services I personally performed and the decisions made by me. I have reviewed and approved this document for accuracy prior to leaving the patient care area.  Bandar Greenberg MD  10:47 AM, 02/20/18

## 2018-03-04 DIAGNOSIS — E11.8 TYPE 2 DIABETES MELLITUS WITH COMPLICATION (H): ICD-10-CM

## 2018-03-05 NOTE — TELEPHONE ENCOUNTER
"STEVE CONTOUR test strip 300 strip 1 9/21/2017         Last Written Prescription Date:  09/21/2017  Last Fill Quantity: 300,  # refills: 1   Last office visit: 2/20/2018 with prescribing provider:     Future Office Visit:  Unknown    Requested Prescriptions   Pending Prescriptions Disp Refills     STEVE CONTOUR test strip [Pharmacy Med Name: CONTOUR TEST STRIPS 100'S] 300 strip 0     Sig: TESTING FOUR TIMES DAILY OR AS DIRECTED    Diabetic Supplies Protocol Passed    3/4/2018 11:30 AM       Passed - Patient is 18 years of age or older       Passed - Recent (6 mo) or future (30 days) visit within the authorizing provider's specialty    Patient had office visit in the last 6 months or has a visit in the next 30 days with authorizing provider.  See \"Patient Info\" tab in inbasket, or \"Choose Columns\" in Meds & Orders section of the refill encounter.              "

## 2018-03-06 NOTE — TELEPHONE ENCOUNTER
Prescription approved per [facility] Refill Protocol.  Yudi Arguelles RN

## 2018-04-11 DIAGNOSIS — G47.00 INSOMNIA, UNSPECIFIED TYPE: ICD-10-CM

## 2018-04-11 NOTE — TELEPHONE ENCOUNTER
"Last Written Prescription Date:  7/14/17  Last Fill Quantity: 90 tablet,  # refills: 2   Last office visit: 2/20/2018 with prescribing provider:  Yari   Future Office Visit:      Requested Prescriptions   Pending Prescriptions Disp Refills     traZODone (DESYREL) 100 MG tablet [Pharmacy Med Name: TRAZODONE 100MG TABLETS] 90 tablet 0     Sig: TAKE 1 TABLET(100 MG) BY MOUTH AT BEDTIME    Serotonin Modulators Passed    4/11/2018  5:20 PM       Passed - Recent (12 mo) or future (30 days) visit within the authorizing provider's specialty    Patient had office visit in the last 12 months or has a visit in the next 30 days with authorizing provider or within the authorizing provider's specialty.  See \"Patient Info\" tab in inbasket, or \"Choose Columns\" in Meds & Orders section of the refill encounter.           Passed - Patient is age 18 or older          "

## 2018-04-13 RX ORDER — TRAZODONE HYDROCHLORIDE 100 MG/1
TABLET ORAL
Qty: 90 TABLET | Refills: 1 | Status: SHIPPED | OUTPATIENT
Start: 2018-04-13 | End: 2018-10-05

## 2018-04-13 NOTE — TELEPHONE ENCOUNTER
Prescription approved per [facility] Refill Protocol.  Yudi Arguelles RN

## 2018-05-18 DIAGNOSIS — E11.8 TYPE 2 DIABETES MELLITUS WITH COMPLICATION (H): ICD-10-CM

## 2018-05-18 NOTE — TELEPHONE ENCOUNTER
"STEVE CONTOUR test strip 300 strip 0 3/6/2018     Last Written Prescription Date:  3/6/18  Last Fill Quantity: 300,  # refills: 0   Last office visit: 2/20/2018 with prescribing provider:  Yari   Future Office Visit:  none    Requested Prescriptions   Pending Prescriptions Disp Refills     STEVE CONTOUR test strip [Pharmacy Med Name: CONTOUR TEST STRIPS 100'S] 300 strip 0     Sig: TESTING FOUR TIMES DAILY OR AS DIRECTED    Diabetic Supplies Protocol Passed    5/18/2018 10:19 AM       Passed - Patient is 18 years of age or older       Passed - Recent (6 mo) or future (30 days) visit within the authorizing provider's specialty    Patient had office visit in the last 6 months or has a visit in the next 30 days with authorizing provider.  See \"Patient Info\" tab in inbasket, or \"Choose Columns\" in Meds & Orders section of the refill encounter.            No flowsheet data found.  "

## 2018-05-18 NOTE — TELEPHONE ENCOUNTER
Prescription approved per [facility] Refill Protocol.  Miguelina FLOOD RN

## 2018-05-31 ENCOUNTER — OFFICE VISIT (OUTPATIENT)
Dept: FAMILY MEDICINE | Facility: CLINIC | Age: 78
End: 2018-05-31
Payer: MEDICARE

## 2018-05-31 VITALS
OXYGEN SATURATION: 98 % | WEIGHT: 215.5 LBS | TEMPERATURE: 98 F | HEIGHT: 72 IN | SYSTOLIC BLOOD PRESSURE: 132 MMHG | DIASTOLIC BLOOD PRESSURE: 78 MMHG | BODY MASS INDEX: 29.19 KG/M2 | HEART RATE: 72 BPM

## 2018-05-31 DIAGNOSIS — I10 BENIGN ESSENTIAL HYPERTENSION: ICD-10-CM

## 2018-05-31 DIAGNOSIS — J30.89 CHRONIC NON-SEASONAL ALLERGIC RHINITIS, UNSPECIFIED TRIGGER: ICD-10-CM

## 2018-05-31 DIAGNOSIS — M16.0 PRIMARY OSTEOARTHRITIS OF BOTH HIPS: ICD-10-CM

## 2018-05-31 DIAGNOSIS — Z79.4 TYPE 2 DIABETES MELLITUS WITHOUT COMPLICATION, WITH LONG-TERM CURRENT USE OF INSULIN (H): Primary | ICD-10-CM

## 2018-05-31 DIAGNOSIS — E11.9 TYPE 2 DIABETES MELLITUS WITHOUT COMPLICATION, WITH LONG-TERM CURRENT USE OF INSULIN (H): Primary | ICD-10-CM

## 2018-05-31 PROCEDURE — 99214 OFFICE O/P EST MOD 30 MIN: CPT | Performed by: INTERNAL MEDICINE

## 2018-05-31 RX ORDER — OXYCODONE AND ACETAMINOPHEN 5; 325 MG/1; MG/1
1 TABLET ORAL EVERY 6 HOURS PRN
Qty: 18 TABLET | Refills: 0 | Status: SHIPPED | OUTPATIENT
Start: 2018-05-31 | End: 2018-12-05

## 2018-05-31 NOTE — PROGRESS NOTES
SUBJECTIVE:   Justyn Olivas is a 77 year old male who presents to clinic today for the following health issues:    Musculoskeletal problem/pain      Duration: 3 days    Description  Location: sharp shooting pains that start at posterior R hip and shoots down thigh to R knee    Intensity:  severe    Accompanying signs and symptoms: none    History  Previous similar problem: YES- has had previous hip and knee pain, given cortisone inj in past  Previous evaluation:  x-ray    Precipitating or alleviating factors:  Trauma or overuse: YES  Aggravating factors include: laying down    Therapies tried and outcome: heat, acetaminophen, Ibuprofen and steroid injection      Patient presents to the clinic to follow up on his right hip pain. He reports a sharp shooting pain that starts at posterior right hip and shoots down the thigh to his right knee. He reports that at night it is more of a constant ache. He states that pain is so severe he is unable to sleep for two days. He has had previous knee and hip pain on the left side and this is similar. Hip pain exacerbated by laying down. He has used heat, acetaminophen, and Ibuprofen and they were not effective. Some repetitive activities that may have led to the hip pain was playing golf and walking the dog for long period of time.      Problem list and histories reviewed & adjusted, as indicated.  Additional history: as documented    Current Outpatient Prescriptions   Medication Sig Dispense Refill     fluticasone (FLONASE) 50 MCG/ACT nasal spray Spray 1 spray into both nostrils 2 times daily as needed for rhinitis or allergies       Glucose Blood (STEVE CONTOUR TEST VI)        insulin lispro (HUMALOG KWIKPEN) 100 UNIT/ML injection INJECT UP TO 55 UNITS SUBCUTANEOUSLY DAILY AS DIRECTED 45 mL 11     ketorolac (ACULAR) 0.5 % ophthalmic solution 1 drop 4 times daily       LANTUS SOLOSTAR 100 UNIT/ML soln ADMINISTER 50 UNITS UNDER THE SKIN AT BEDTIME AS DIRECTED 45 mL 3      losartan (COZAAR) 50 MG tablet Take 1 tablet (50 mg) by mouth daily 90 tablet 3     metFORMIN (GLUCOPHAGE) 1000 MG tablet TAKE 1 TABLET(1000 MG) BY MOUTH TWICE DAILY WITH MEALS 180 tablet 3     order for DME Hip steroid injectoion 1 Units 0     simvastatin (ZOCOR) 20 MG tablet Take 1 tablet (20 mg) by mouth At Bedtime 90 tablet 3     valsartan (DIOVAN) 160 MG tablet TAKE 1 TABLET(160 MG) BY MOUTH TWICE DAILY 180 tablet 3     VENTOLIN  (90 BASE) MCG/ACT Inhaler INL 2 PFS PO QID PRN 1 Inhaler 3     VIAGRA 100 MG cap/tab TK 1 T PO PRIOR TO INTERCOURSE  0     STEVE CONTOUR test strip TESTING FOUR TIMES DAILY OR AS DIRECTED 300 strip 0     BD FERCHO U/F 32G X 4 MM insulin pen needle USE AS DIRECTED 100 each 3     traZODone (DESYREL) 100 MG tablet TAKE 1 TABLET(100 MG) BY MOUTH AT BEDTIME 90 tablet 1     No Known Allergies    Reviewed and updated as needed this visit by clinical staff       Reviewed and updated as needed this visit by Provider         ROS:  Constitutional, HEENT, cardiovascular, pulmonary, gi and gu systems are negative, except as otherwise noted.    This document serves as a record of the services and decisions personally performed and made by Bandar Greenberg MD. It was created on his/her behalf by Flako Eaton, a trained medical scribe. The creation of this document is based the provider's statements to the medical scribe.  Adarsh Eaton 5:32 PM, May 31, 2018    OBJECTIVE:     /78  Pulse 72  Temp 98  F (36.7  C) (Oral)  Ht 1.829 m (6')  Wt 97.8 kg (215 lb 8 oz)  SpO2 98%  BMI 29.23 kg/m2  Body mass index is 29.23 kg/(m^2).    Neck was supple without adenopathy or thyromegaly his carotids were normal without bruits  Chest clear to auscultation and percussion  Cardiovascular S1 and S2 are physiologic without murmurs or gallops  Abdomen bowel sounds were normal.  There is no palpable mass or organomegaly  Back: non tender straight leg was negative, his hip was uncomfortable  with flexion at 85 degrees pain was reproduced with external rotation at 60 degree, posterior aspect of hip joint was tender to the touch  Extremities nontender without any edema  Pulses pedal pulses are as described otherwise his pulses are bilaterally symmetrical throughout without bruits  Skin without significant abnormality    Diagnostic Test Results:  none     ASSESSMENT/PLAN:     1. Type 2 diabetes mellitus without complication, with long-term current use of insulin (H)  -Well controlled with current therapies.    2. Benign essential hypertension  -Controlled.    3. Primary osteoarthritis of both hips  - oxyCODONE-acetaminophen (PERCOCET) 5-325 MG per tablet; Take 1 tablet by mouth every 6 hours as needed for pain  Dispense: 18 tablet; Refill: 0  Schedule for right hip injection      4. Chronic non-seasonal allergic rhinitis, unspecified trigger    -Refer him for rt hip injection on Monday, Jun 04 2018  -Recommended patient hold his Aspirin and Naproxen until then.    Bandar Greenberg MD  [facility]    The information in this document, created by the medical scribe for me, accurately reflects the services I personally performed and the decisions made by me. I have reviewed and approved this document for accuracy prior to leaving the patient care area.  Bandar Greenberg MD  5:44 PM, 05/31/18

## 2018-05-31 NOTE — MR AVS SNAPSHOT
After Visit Summary   5/31/2018    Justyn Olivas    MRN: [id number]           Patient Information     Date Of Birth          1940        Visit Information        Provider Department      5/31/2018 4:30 PM Bandar Greenberg MD [facility]        Today's Diagnoses     Type 2 diabetes mellitus without complication, with long-term current use of insulin (H)    -  1    Benign essential hypertension        Primary osteoarthritis of both hips        Chronic non-seasonal allergic rhinitis, unspecified trigger           Follow-ups after your visit        Who to contact     If you have questions or need follow up information about today's clinic visit or your schedule please contact [facility] directly at [phone].  Normal or non-critical lab and imaging results will be communicated to you by MyChart, letter or phone within 4 business days after the clinic has received the results. If you do not hear from us within 7 days, please contact the clinic through [facility] or phone. If you have a critical or abnormal lab result, we will notify you by phone as soon as possible.  Submit refill requests through [employer] or call your pharmacy and they will forward the refill request to us. Please allow 3 business days for your refill to be completed.          Additional Information About Your Visit        MyChart Information     [employer] gives you secure access to your electronic health record. If you see a primary care provider, you can also send messages to your care team and make appointments. If you have questions, please call your primary care clinic.  If you do not have a primary care provider, please call [phone] and they will assist you.        Care EveryWhere ID     This is your Care EveryWhere ID. This could be used by other organizations to access your [facility] medical records  [id number]        Your Vitals Were     Pulse Temperature Height Pulse Oximetry BMI (Body Mass  Index)       72 98  F (36.7  C) (Oral) 6' (1.829 m) 98% 29.23 kg/m2        Blood Pressure from Last 3 Encounters:   05/31/18 132/78   02/20/18 146/85   02/08/18 132/83    Weight from Last 3 Encounters:   05/31/18 215 lb 8 oz (97.8 kg)   02/20/18 215 lb (97.5 kg)   02/08/18 215 lb (97.5 kg)              Today, you had the following     No orders found for display         Today's Medication Changes          These changes are accurate as of 5/31/18 11:59 PM.  If you have any questions, ask your nurse or doctor.               Start taking these medicines.        Dose/Directions    oxyCODONE-acetaminophen 5-325 MG per tablet   Commonly known as:  PERCOCET   Used for:  Primary osteoarthritis of both hips   Started by:  Bandar Greenberg MD        Dose:  1 tablet   Take 1 tablet by mouth every 6 hours as needed for pain   Quantity:  18 tablet   Refills:  0            Where to get your medicines      Some of these will need a paper prescription and others can be bought over the counter.  Ask your nurse if you have questions.     Bring a paper prescription for each of these medications     oxyCODONE-acetaminophen 5-325 MG per tablet               Information about OPIOIDS     PRESCRIPTION OPIOIDS: WHAT YOU NEED TO KNOW   You have a prescription for an opioid (narcotic) pain medicine. Opioids can cause addiction. If you have a history of chemical dependency of any type, you are at a higher risk of becoming addicted to opioids. Only take this medicine after all other options have been tried. Take it for as short a time and as few doses as possible.     Do not:    Drive. If you drive while taking these medicines, you could be arrested for driving under the influence (DUI).    Operate heavy machinery    Do any other dangerous activities while taking these medicines.     Drink any alcohol while taking these medicines.      Take with any other medicines that contain acetaminophen. Read all labels carefully. Look for the word   acetaminophen  or  Tylenol.  Ask your pharmacist if you have questions or are unsure.    Store your pills in a secure place, locked if possible. We will not replace any lost or stolen medicine. If you don t finish your medicine, please throw away (dispose) as directed by your pharmacist. The [place] Pollution [employer] has more information about safe disposal: https://www.pca.[facility].[place]./living-green/managing-unwanted-medications    All opioids tend to cause constipation. Drink plenty of water and eat foods that have a lot of fiber, such as fruits, vegetables, prune juice, apple juice and high-fiber cereal. Take a laxative (Miralax, milk of magnesia, Colace, Senna) if you don t move your bowels at least every other day.          Primary Care Provider Office Phone # Fax #    Bandar Greenberg [phone] [address] [address]        Equal Access to Services     JOE MORENO : Hadii aad ku hadasho Soomaali, waaxda luqadaha, qaybta kaalmada adeegyada, waxay anu russell . So [facility] [phone].    ATENCIÓN: Si habla español, tiene a joe disposición servicios gratuitos de asistencia lingüística. Constantin al [phone].    We comply with applicable federal civil rights laws and [place] laws. We do not discriminate on the basis of race, color, national origin, age, disability, sex, sexual orientation, or gender identity.            Thank you!     Thank you for choosing [facility]  for your care. Our goal is always to provide you with excellent care. Hearing back from our patients is one way we can continue to improve our services. Please take a few minutes to complete the written survey that you may receive in the mail after your visit with us. Thank you!             Your Updated Medication List - Protect others around you: Learn how to safely use, store and throw away your medicines at www.disposemymeds.org.          This list is  accurate as of 5/31/18 11:59 PM.  Always use your most recent med list.                   Brand Name Dispense Instructions for use Diagnosis    * STEVE CONTOUR TEST VI           * STEVE CONTOUR test strip   Generic drug:  blood glucose monitoring     300 strip    TESTING FOUR TIMES DAILY OR AS DIRECTED    Type 2 diabetes mellitus with complication (H)       BD FERCHO U/F 32G X 4 MM   Generic drug:  insulin pen needle     100 each    USE AS DIRECTED    Type 2 diabetes mellitus without complication, with long-term current use of insulin (H)       FLONASE 50 MCG/ACT spray   Generic drug:  fluticasone      Spray 1 spray into both nostrils 2 times daily as needed for rhinitis or allergies        insulin lispro 100 UNIT/ML injection    HumaLOG KWIKpen    45 mL    INJECT UP TO 55 UNITS SUBCUTANEOUSLY DAILY AS DIRECTED    Type 2 diabetes mellitus without complication, with long-term current use of insulin (H)       ketorolac 0.5 % ophthalmic solution    ACULAR     1 drop 4 times daily        LANTUS SOLOSTAR 100 UNIT/ML injection   Generic drug:  insulin glargine     45 mL    ADMINISTER 50 UNITS UNDER THE SKIN AT BEDTIME AS DIRECTED    Type 2 diabetes mellitus without complication, with long-term current use of insulin (H)       losartan 50 MG tablet    COZAAR    90 tablet    Take 1 tablet (50 mg) by mouth daily    Benign essential hypertension       metFORMIN 1000 MG tablet    GLUCOPHAGE    180 tablet    TAKE 1 TABLET(1000 MG) BY MOUTH TWICE DAILY WITH MEALS    Type 2 diabetes mellitus without complication, with long-term current use of insulin (H)       order for DME     1 Units    Hip steroid injectoion    Primary osteoarthritis of hip, unspecified laterality       oxyCODONE-acetaminophen 5-325 MG per tablet    PERCOCET    18 tablet    Take 1 tablet by mouth every 6 hours as needed for pain    Primary osteoarthritis of both hips       simvastatin 20 MG tablet    ZOCOR    90 tablet    Take 1 tablet (20 mg) by mouth At  Bedtime    Hyperlipidemia LDL goal <100       traZODone 100 MG tablet    DESYREL    90 tablet    TAKE 1 TABLET(100 MG) BY MOUTH AT BEDTIME    Insomnia, unspecified type       valsartan 160 MG tablet    DIOVAN    180 tablet    TAKE 1 TABLET(160 MG) BY MOUTH TWICE DAILY    Benign essential hypertension       VENTOLIN  (90 Base) MCG/ACT Inhaler   Generic drug:  albuterol     1 Inhaler    INL 2 PFS PO QID PRN    Acute bronchitis, unspecified organism       VIAGRA 100 MG tablet   Generic drug:  sildenafil      TK 1 T PO PRIOR TO INTERCOURSE        * Notice:  This list has 2 medication(s) that are the same as other medications prescribed for you. Read the directions carefully, and ask your doctor or other care provider to review them with you.

## 2018-06-04 ENCOUNTER — TRANSFERRED RECORDS (OUTPATIENT)
Dept: HEALTH INFORMATION MANAGEMENT | Facility: CLINIC | Age: 78
End: 2018-06-04

## 2018-06-11 DIAGNOSIS — Z79.4 TYPE 2 DIABETES MELLITUS WITHOUT COMPLICATION, WITH LONG-TERM CURRENT USE OF INSULIN (H): ICD-10-CM

## 2018-06-11 DIAGNOSIS — E11.9 TYPE 2 DIABETES MELLITUS WITHOUT COMPLICATION, WITH LONG-TERM CURRENT USE OF INSULIN (H): ICD-10-CM

## 2018-06-11 NOTE — TELEPHONE ENCOUNTER
"**90 Day Supply Request**    Last Written Prescription Date:  3/15/18  Last Fill Quantity: 100 each,  # refills: 3   Last office visit: 5/31/2018 with prescribing provider:  Yari   Future Office Visit:      Requested Prescriptions   Pending Prescriptions Disp Refills     insulin pen needle (BD FERCHO U/F) 32G X 4  each 3     Sig: See Admin Instructions Use  pen needles daily or as directed.    Diabetic Supplies Protocol Passed    6/11/2018  5:28 PM       Passed - Patient is 18 years of age or older       Passed - Recent (6 mo) or future (30 days) visit within the authorizing provider's specialty    Patient had office visit in the last 6 months or has a visit in the next 30 days with authorizing provider.  See \"Patient Info\" tab in inbasket, or \"Choose Columns\" in Meds & Orders section of the refill encounter.              "

## 2018-07-30 ENCOUNTER — TELEPHONE (OUTPATIENT)
Dept: FAMILY MEDICINE | Facility: CLINIC | Age: 78
End: 2018-07-30

## 2018-07-30 DIAGNOSIS — E11.8 TYPE 2 DIABETES MELLITUS WITH COMPLICATION (H): ICD-10-CM

## 2018-07-30 DIAGNOSIS — I10 BENIGN ESSENTIAL HYPERTENSION: ICD-10-CM

## 2018-07-30 DIAGNOSIS — I10 BENIGN ESSENTIAL HYPERTENSION: Primary | ICD-10-CM

## 2018-07-30 RX ORDER — IRBESARTAN 150 MG/1
150 TABLET ORAL AT BEDTIME
Qty: 30 TABLET | Refills: 1 | Status: SHIPPED | OUTPATIENT
Start: 2018-07-30 | End: 2018-09-07

## 2018-07-30 NOTE — TELEPHONE ENCOUNTER
"Last Written Prescription Date:  5/18/18  Last Fill Quantity: 300,  # refills: 0   Last office visit: 5/31/2018 with prescribing provider:     Future Office Visit:    Requested Prescriptions   Pending Prescriptions Disp Refills     STEVE CONTOUR test strip [Pharmacy Med Name: CONTOUR TEST STRIPS 100'S] 300 strip 0     Sig: TESTING FOUR TIMES DAILY OR AS DIRECTED    Diabetic Supplies Protocol Passed    7/30/2018 10:16 AM       Passed - Patient is 18 years of age or older       Passed - Recent (6 mo) or future (30 days) visit within the authorizing provider's specialty    Patient had office visit in the last 6 months or has a visit in the next 30 days with authorizing provider.  See \"Patient Info\" tab in inbasket, or \"Choose Columns\" in Meds & Orders section of the refill encounter.              "

## 2018-07-30 NOTE — TELEPHONE ENCOUNTER
Reason for Call:  Medication or medication refill:    Do you use a [facility]?  Name of the pharmacy and phone number for the current request:  [facility] [place], [place] - [address] AT [facility] [address]      Name of the medication requested: VALSARTAN  replacement    Other request: pt is out of meds would like filled asap    Can we leave a detailed message on this number? YES    Phone number patient can be reached at: Home number on file [phone] (home)    Best Time: anytime    Call taken on 7/30/2018 at 8:43 AM by Ramu Espinosa

## 2018-07-31 RX ORDER — IRBESARTAN 150 MG/1
TABLET ORAL
Start: 2018-07-31

## 2018-07-31 NOTE — TELEPHONE ENCOUNTER
"Rx sent yesterday for 30 with 1 refill  Pharmacy requesting 90 day  Denied  New med - 90 day not appropriate  Gloria FAUST, RN    Requested Prescriptions   Pending Prescriptions Disp Refills     irbesartan (AVAPRO) 150 MG tablet [Pharmacy Med Name: IRBESARTAN 150MG TABLETS] 90 tablet 1     Sig: TAKE 1 TABLET(150 MG) BY MOUTH AT BEDTIME    Angiotensin-II Receptors Failed    7/30/2018  5:57 PM       Failed - Normal serum creatinine on file in past 12 months    Recent Labs   Lab Test  02/01/18   1002   CR  1.30*            Passed - Blood pressure under 140/90 in past 12 months    BP Readings from Last 3 Encounters:   05/31/18 132/78   02/20/18 146/85   02/08/18 132/83                Passed - Recent (12 mo) or future (30 days) visit within the authorizing provider's specialty    Patient had office visit in the last 12 months or has a visit in the next 30 days with authorizing provider or within the authorizing provider's specialty.  See \"Patient Info\" tab in inbasket, or \"Choose Columns\" in Meds & Orders section of the refill encounter.           Passed - Patient is age 18 or older       Passed - Normal serum potassium on file in past 12 months    Recent Labs   Lab Test  02/01/18   1002   POTASSIUM  4.7                        "

## 2018-08-01 NOTE — TELEPHONE ENCOUNTER
Prescription approved per [facility] Refill Protocol.  Annie Jenkins RN

## 2018-09-07 DIAGNOSIS — I10 BENIGN ESSENTIAL HYPERTENSION: ICD-10-CM

## 2018-09-10 RX ORDER — IRBESARTAN 150 MG/1
TABLET ORAL
Qty: 30 TABLET | Refills: 0 | Status: SHIPPED | OUTPATIENT
Start: 2018-09-10 | End: 2018-10-25

## 2018-09-10 NOTE — TELEPHONE ENCOUNTER
"irbesartan (AVAPRO) 150 MG tablet 30 tablet 1 7/30/2018     Last Written Prescription Date:  7/30/18  Last Fill Quantity: 30,  # refills: 1   Last office visit: 5/31/2018 with prescribing provider:  Yari   Future Office Visit:    Requested Prescriptions   Pending Prescriptions Disp Refills     irbesartan (AVAPRO) 150 MG tablet [Pharmacy Med Name: IRBESARTAN 150MG TABLETS] 30 tablet 0     Sig: TAKE 1 TABLET(150 MG) BY MOUTH AT BEDTIME    Angiotensin-II Receptors Failed    9/7/2018  1:03 PM       Failed - Normal serum creatinine on file in past 12 months    Recent Labs   Lab Test  02/01/18   1002   CR  1.30*            Passed - Blood pressure under 140/90 in past 12 months    BP Readings from Last 3 Encounters:   05/31/18 132/78   02/20/18 146/85   02/08/18 132/83                Passed - Recent (12 mo) or future (30 days) visit within the authorizing provider's specialty    Patient had office visit in the last 12 months or has a visit in the next 30 days with authorizing provider or within the authorizing provider's specialty.  See \"Patient Info\" tab in inbasket, or \"Choose Columns\" in Meds & Orders section of the refill encounter.           Passed - Patient is age 18 or older       Passed - Normal serum potassium on file in past 12 months    Recent Labs   Lab Test  02/01/18   1002   POTASSIUM  4.7                    No flowsheet data found.  "

## 2018-09-10 NOTE — TELEPHONE ENCOUNTER
To PCP:     Routing refill request to provider for review/approval because:  Labs out of range:  Creatinine      Recent Labs   Lab Test  02/01/18   1002   CR  1.30*        Please review and authorize if appropriate,     Thank you,   Ana SANCHES RN

## 2018-10-05 DIAGNOSIS — G47.00 INSOMNIA, UNSPECIFIED TYPE: ICD-10-CM

## 2018-10-06 NOTE — TELEPHONE ENCOUNTER
"Requested Prescriptions   Pending Prescriptions Disp Refills     traZODone (DESYREL) 100 MG tablet [Pharmacy Med Name: TRAZODONE 100MG TABLETS]  Last Written Prescription Date:  4/13/18  Last Fill Quantity: 90,  # refills: 1   Last office visit: 5/31/2018 with prescribing provider:  leela   Future Office Visit:     90 tablet 0     Sig: TAKE 1 TABLET(100 MG) BY MOUTH AT BEDTIME    Serotonin Modulators Passed    10/5/2018  1:17 PM       Passed - Recent (12 mo) or future (30 days) visit within the authorizing provider's specialty    Patient had office visit in the last 12 months or has a visit in the next 30 days with authorizing provider or within the authorizing provider's specialty.  See \"Patient Info\" tab in inbasket, or \"Choose Columns\" in Meds & Orders section of the refill encounter.           Passed - Patient is age 18 or older              "

## 2018-10-08 RX ORDER — TRAZODONE HYDROCHLORIDE 100 MG/1
TABLET ORAL
Qty: 90 TABLET | Refills: 0 | Status: SHIPPED | OUTPATIENT
Start: 2018-10-08 | End: 2019-01-03

## 2018-10-08 NOTE — TELEPHONE ENCOUNTER
Prescription approved per [facility] Refill Protocol.  Due for diabetes follow up end November/December.  Yudi Arguelles RN

## 2018-10-25 DIAGNOSIS — I10 BENIGN ESSENTIAL HYPERTENSION: ICD-10-CM

## 2018-10-25 NOTE — TELEPHONE ENCOUNTER
"Irbesartan 150 mg    Last Written Prescription Date:  09/10/18  Last Fill Quantity: 30 tablets,  # refills: 0   Last office visit: 5/31/2018 with prescribing provider:  Yari   Future Office Visit:      Requested Prescriptions   Pending Prescriptions Disp Refills     irbesartan (AVAPRO) 150 MG tablet 30 tablet 0    Angiotensin-II Receptors Failed    10/25/2018 11:33 AM       Failed - Normal serum creatinine on file in past 12 months    Recent Labs   Lab Test  02/01/18   1002   CR  1.30*            Passed - Blood pressure under 140/90 in past 12 months    BP Readings from Last 3 Encounters:   05/31/18 132/78   02/20/18 146/85   02/08/18 132/83                Passed - Recent (12 mo) or future (30 days) visit within the authorizing provider's specialty    Patient had office visit in the last 12 months or has a visit in the next 30 days with authorizing provider or within the authorizing provider's specialty.  See \"Patient Info\" tab in inbasket, or \"Choose Columns\" in Meds & Orders section of the refill encounter.             Passed - Patient is age 18 or older       Passed - Normal serum potassium on file in past 12 months    Recent Labs   Lab Test  02/01/18   1002   POTASSIUM  4.7                      "

## 2018-10-26 DIAGNOSIS — I10 BENIGN ESSENTIAL HYPERTENSION: ICD-10-CM

## 2018-10-26 RX ORDER — IRBESARTAN 150 MG/1
TABLET ORAL
Qty: 30 TABLET | Refills: 0 | Status: SHIPPED | OUTPATIENT
Start: 2018-10-26 | End: 2019-02-05

## 2018-10-26 RX ORDER — IRBESARTAN 150 MG/1
TABLET ORAL
Qty: 90 TABLET | Refills: 0 | OUTPATIENT
Start: 2018-10-26

## 2018-10-26 NOTE — TELEPHONE ENCOUNTER
Carolin Robles ([facility]),  Routing refill request to provider for review/approval because:  Labs out of range:  Creatinine = 1.30 (2/1/18)  Thanks, Yudi Arguelles, RN

## 2018-10-26 NOTE — TELEPHONE ENCOUNTER
"Last Written Prescription Date:  10/26/2018  Last Fill Quantity: 30,  # refills: 0   Last office visit: 5/31/2018 with prescribing provider:  Yari   Future Office Visit:    Requested Prescriptions   Pending Prescriptions Disp Refills     irbesartan (AVAPRO) 150 MG tablet [Pharmacy Med Name: IRBESARTAN 150MG TABLETS] 90 tablet 0     Sig: TAKE 1 TABLET(150 MG) BY MOUTH AT BEDTIME    Angiotensin-II Receptors Failed    10/26/2018 10:10 AM       Failed - Normal serum creatinine on file in past 12 months    Recent Labs   Lab Test  02/01/18   1002   CR  1.30*            Passed - Blood pressure under 140/90 in past 12 months    BP Readings from Last 3 Encounters:   05/31/18 132/78   02/20/18 146/85   02/08/18 132/83                Passed - Recent (12 mo) or future (30 days) visit within the authorizing provider's specialty    Patient had office visit in the last 12 months or has a visit in the next 30 days with authorizing provider or within the authorizing provider's specialty.  See \"Patient Info\" tab in inbasket, or \"Choose Columns\" in Meds & Orders section of the refill encounter.             Passed - Patient is age 18 or older       Passed - Normal serum potassium on file in past 12 months    Recent Labs   Lab Test  02/01/18   1002   POTASSIUM  4.7                      "

## 2018-11-24 DIAGNOSIS — I10 BENIGN ESSENTIAL HYPERTENSION: ICD-10-CM

## 2018-11-24 NOTE — TELEPHONE ENCOUNTER
"Last Written Prescription Date:  10/26/18  Last Fill Quantity: 30 tablet,  # refills: 0   Last office visit: 5/31/2018 with prescribing provider:  Yari   Future Office Visit:      Requested Prescriptions   Pending Prescriptions Disp Refills     irbesartan (AVAPRO) 150 MG tablet [Pharmacy Med Name: IRBESARTAN 150MG TABLETS] 30 tablet 0     Sig: TAKE 1 TABLET(150 MG) BY MOUTH AT BEDTIME    Angiotensin-II Receptors Failed    11/24/2018 11:36 AM       Failed - Normal serum creatinine on file in past 12 months    Recent Labs   Lab Test  02/01/18   1002   CR  1.30*            Passed - Blood pressure under 140/90 in past 12 months    BP Readings from Last 3 Encounters:   05/31/18 132/78   02/20/18 146/85   02/08/18 132/83                Passed - Recent (12 mo) or future (30 days) visit within the authorizing provider's specialty    Patient had office visit in the last 12 months or has a visit in the next 30 days with authorizing provider or within the authorizing provider's specialty.  See \"Patient Info\" tab in inbasket, or \"Choose Columns\" in Meds & Orders section of the refill encounter.             Passed - Patient is age 18 or older       Passed - Normal serum potassium on file in past 12 months    Recent Labs   Lab Test  02/01/18   1002   POTASSIUM  4.7                      "

## 2018-11-27 RX ORDER — IRBESARTAN 150 MG/1
TABLET ORAL
Qty: 30 TABLET | Refills: 0 | Status: SHIPPED | OUTPATIENT
Start: 2018-11-27 | End: 2018-12-05

## 2018-11-27 NOTE — TELEPHONE ENCOUNTER
Called and left VM asking patient to return call: due for OV (6 month Diabetes check). Also sent Prepared Response message.     Ana LUNDBERG RN

## 2018-11-27 NOTE — TELEPHONE ENCOUNTER
Pt is scheduled for first available appointment on 12/5 at 10:30.  Please send 30 day refill, he is down to last pill

## 2018-12-05 ENCOUNTER — OFFICE VISIT (OUTPATIENT)
Dept: FAMILY MEDICINE | Facility: CLINIC | Age: 78
End: 2018-12-05
Payer: MEDICARE

## 2018-12-05 VITALS
DIASTOLIC BLOOD PRESSURE: 80 MMHG | BODY MASS INDEX: 28.91 KG/M2 | HEIGHT: 72 IN | SYSTOLIC BLOOD PRESSURE: 136 MMHG | WEIGHT: 213.4 LBS | HEART RATE: 81 BPM | TEMPERATURE: 96.8 F | OXYGEN SATURATION: 99 %

## 2018-12-05 DIAGNOSIS — J30.1 NON-SEASONAL ALLERGIC RHINITIS DUE TO POLLEN: ICD-10-CM

## 2018-12-05 DIAGNOSIS — Z79.4 TYPE 2 DIABETES MELLITUS WITHOUT COMPLICATION, WITH LONG-TERM CURRENT USE OF INSULIN (H): Primary | ICD-10-CM

## 2018-12-05 DIAGNOSIS — E78.5 HYPERLIPIDEMIA LDL GOAL <100: ICD-10-CM

## 2018-12-05 DIAGNOSIS — M16.0 PRIMARY OSTEOARTHRITIS OF BOTH HIPS: ICD-10-CM

## 2018-12-05 DIAGNOSIS — E11.9 TYPE 2 DIABETES MELLITUS WITHOUT COMPLICATION, WITH LONG-TERM CURRENT USE OF INSULIN (H): Primary | ICD-10-CM

## 2018-12-05 DIAGNOSIS — I10 BENIGN ESSENTIAL HYPERTENSION: ICD-10-CM

## 2018-12-05 LAB — HBA1C MFR BLD: 8.1 % (ref 0–5.6)

## 2018-12-05 PROCEDURE — 99214 OFFICE O/P EST MOD 30 MIN: CPT | Performed by: INTERNAL MEDICINE

## 2018-12-05 PROCEDURE — 36415 COLL VENOUS BLD VENIPUNCTURE: CPT | Performed by: INTERNAL MEDICINE

## 2018-12-05 PROCEDURE — 80048 BASIC METABOLIC PNL TOTAL CA: CPT | Performed by: INTERNAL MEDICINE

## 2018-12-05 PROCEDURE — 83036 HEMOGLOBIN GLYCOSYLATED A1C: CPT | Performed by: INTERNAL MEDICINE

## 2018-12-05 NOTE — MR AVS SNAPSHOT
After Visit Summary   12/5/2018    Justyn Olivas    MRN: [id number]           Patient Information     Date Of Birth          1940        Visit Information        Provider Department      12/5/2018 10:30 AM Bandar Greenberg MD [facility]        Today's Diagnoses     Type 2 diabetes mellitus without complication, with long-term current use of insulin (H)    -  1    Benign essential hypertension        Hyperlipidemia LDL goal <100        Non-seasonal allergic rhinitis due to pollen        Primary osteoarthritis of both hips           Follow-ups after your visit        Follow-up notes from your care team     Return in about 3 months (around 3/5/2019) for Physical Exam.      Your next 10 appointments already scheduled     Feb 05, 2019 10:30 AM CST   Office Visit with Bandar Greenberg MD   [facility]    [address] [address] [phone]           Bring a current list of meds and any records pertaining to this visit. For Physicals, please bring immunization records and any forms needing to be filled out. Please arrive 10 minutes early to complete paperwork.              Who to contact     If you have questions or need follow up information about today's clinic visit or your schedule please contact [facility] directly at [phone].  Normal or non-critical lab and imaging results will be communicated to you by MyChart, letter or phone within 4 business days after the clinic has received the results. If you do not hear from us within 7 days, please contact the clinic through MyChart or phone. If you have a critical or abnormal lab result, we will notify you by phone as soon as possible.  Submit refill requests through [employer] or call your pharmacy and they will forward the refill request to us. Please allow 3 business days for your refill to be completed.          Additional Information About Your Visit         [employer] Information     [employer] gives you secure access to your electronic health record. If you see a primary care provider, you can also send messages to your care team and make appointments. If you have questions, please call your primary care clinic.  If you do not have a primary care provider, please call [phone] and they will assist you.        Care EveryWhere ID     This is your Care EveryWhere ID. This could be used by other organizations to access your [place] medical records  [id number]        Your Vitals Were     Pulse Temperature Height Pulse Oximetry BMI (Body Mass Index)       81 96.8  F (36  C) (Tympanic) 6' (1.829 m) 99% 28.94 kg/m2        Blood Pressure from Last 3 Encounters:   12/05/18 166/87   05/31/18 132/78   02/20/18 146/85    Weight from Last 3 Encounters:   12/05/18 213 lb 6.4 oz (96.8 kg)   05/31/18 215 lb 8 oz (97.8 kg)   02/20/18 215 lb (97.5 kg)              We Performed the Following     Basic metabolic panel     Hemoglobin A1c          Today's Medication Changes          These changes are accurate as of 12/5/18 11:55 AM.  If you have any questions, ask your nurse or doctor.               These medicines have changed or have updated prescriptions.        Dose/Directions    irbesartan 150 MG tablet   Commonly known as:  AVAPRO   This may have changed:  Another medication with the same name was removed. Continue taking this medication, and follow the directions you see here.   Used for:  Benign essential hypertension   Changed by:  Bandar Greenberg MD        TAKE 1 TABLET(150 MG) BY MOUTH AT BEDTIME   Quantity:  30 tablet   Refills:  0         Stop taking these medicines if you haven't already. Please contact your care team if you have questions.     ketorolac 0.5 % ophthalmic solution   Commonly known as:  ACULAR   Stopped by:  Bandar Greenberg MD           losartan 50 MG tablet   Commonly known as:  COZAAR   Stopped by:  Bandar Greenberg MD           oxyCODONE-acetaminophen  5-325 MG tablet   Commonly known as:  PERCOCET   Stopped by:  Bandar Greenberg MD           valsartan 160 MG tablet   Commonly known as:  DIOVAN   Stopped by:  Bandar Greenberg MD           VIAGRA 100 MG tablet   Generic drug:  sildenafil   Stopped by:  Bandar Greenberg MD                    Primary Care Provider Office Phone # Fax #    Bandar Greenberg [phone] [address]  [facility] [address]        Equal Access to Services     [facility]: Hadii aad ku hadasho Soomaali, waaxda luqadaha, qaybta kaalmada adeegyada, waxay idiin hayaan adeeg kharastacy laAgnesaasasha . So [facility] [phone].    ATENCIÓN: Si habla español, tiene a joe disposición servicios gratuitos de asistencia lingüística. Ll[facility] [phone].    We comply with applicable federal civil rights laws and [place] laws. We do not discriminate on the basis of race, color, national origin, age, disability, sex, sexual orientation, or gender identity.            Thank you!     Thank you for choosing [facility]  for your care. Our goal is always to provide you with excellent care. Hearing back from our patients is one way we can continue to improve our services. Please take a few minutes to complete the written survey that you may receive in the mail after your visit with us. Thank you!             Your Updated Medication List - Protect others around you: Learn how to safely use, store and throw away your medicines at www.disposemymeds.org.          This list is accurate as of 12/5/18 11:55 AM.  Always use your most recent med list.                   Brand Name Dispense Instructions for use Diagnosis    ASPIRIN PO      Take 325 mg by mouth        * STEVE CONTOUR TEST VI           * STEVE CONTOUR test strip   Generic drug:  blood glucose monitoring     300 strip    TESTING FOUR TIMES DAILY OR AS DIRECTED    Type 2 diabetes mellitus with complication (H)       FLONASE 50 MCG/ACT nasal spray   Generic drug:   fluticasone      Spray 1 spray into both nostrils 2 times daily as needed for rhinitis or allergies        insulin lispro 100 UNIT/ML pen    HumaLOG KWIKpen    45 mL    INJECT UP TO 55 UNITS SUBCUTANEOUSLY DAILY AS DIRECTED    Type 2 diabetes mellitus without complication, with long-term current use of insulin (H)       insulin pen needle 32G X 4 MM miscellaneous    BD FERCHO U/F    400 each    USE AS DIRECTED up to 4 times daily    Type 2 diabetes mellitus without complication, with long-term current use of insulin (H)       irbesartan 150 MG tablet    AVAPRO    30 tablet    TAKE 1 TABLET(150 MG) BY MOUTH AT BEDTIME    Benign essential hypertension       LANTUS SOLOSTAR 100 UNIT/ML pen   Generic drug:  insulin glargine     45 mL    ADMINISTER 50 UNITS UNDER THE SKIN AT BEDTIME AS DIRECTED    Type 2 diabetes mellitus without complication, with long-term current use of insulin (H)       metFORMIN 1000 MG tablet    GLUCOPHAGE    180 tablet    TAKE 1 TABLET(1000 MG) BY MOUTH TWICE DAILY WITH MEALS    Type 2 diabetes mellitus without complication, with long-term current use of insulin (H)       order for DME     1 Units    Hip steroid injectoion    Primary osteoarthritis of hip, unspecified laterality       simvastatin 20 MG tablet    ZOCOR    90 tablet    Take 1 tablet (20 mg) by mouth At Bedtime    Hyperlipidemia LDL goal <100       traZODone 100 MG tablet    DESYREL    90 tablet    TAKE 1 TABLET(100 MG) BY MOUTH AT BEDTIME    Insomnia, unspecified type       VENTOLIN  (90 Base) MCG/ACT inhaler   Generic drug:  albuterol     1 Inhaler    INL 2 PFS PO QID PRN    Acute bronchitis, unspecified organism       * Notice:  This list has 2 medication(s) that are the same as other medications prescribed for you. Read the directions carefully, and ask your doctor or other care provider to review them with you.

## 2018-12-05 NOTE — PROGRESS NOTES
SUBJECTIVE:   Justyn Olivas is a 78 year old male who presents to clinic today for the following health issues:    Diabetes Follow-up  Patient is checking blood sugars: three times daily.   Blood sugar testing frequency justification: Uncontrolled diabetes  Results are as follows:         am -          lunchtime -          suppertime -     Diabetic concerns: None     Symptoms of hypoglycemia (low blood sugar): none     Paresthesias (numbness or burning in feet) or sores: No     Date of last diabetic eye exam: sees specialist [id number]    Kamran stats that six months ago, he had periods of high sugar counts while using 50 units of Lantus. He increased his intake to 52 units in which his counts were reduced to 80. He has lost two pounds since his last office visit and states he takes dog for a walk for half an hour (1-1.5mi). He proudly announces that he will be joining a health club in January and commit to becoming in shape with a goal of 200lbs.       BP Readings from Last 2 Encounters:   12/05/18 166/87   05/31/18 132/78     Diabetes Management Resources  Hemoglobin A1C (%)   Date Value   02/01/2018 8.3 (H)   10/10/2017 7.5 (H)     LDL Cholesterol Calculated (mg/dL)   Date Value   02/01/2018 78   07/14/2016 83     Hypertension Follow-up    Outpatient blood pressures are not being checked.    Low Salt Diet: no added salt    Amount of exercise or physical activity: 4-5 days/week for an average of 30-45 minutes    Problems taking medications regularly: No    Medication side effects: none    Diet: regular (no restrictions)    Kamran's BP reading was elevated initially but after a recheck was normalized to 130/80. He reports that he has been taking his hypertensive medications as prescribed without complication.       Medication list reviewed.  Problem list and histories reviewed & adjusted, as indicated.  Additional history: as documented    Patient Active Problem List   Diagnosis     Shoulder pain      Insomnia, unspecified type     Type 2 diabetes mellitus without complication, with long-term current use of insulin (H)     Benign essential hypertension     Hyperlipidemia LDL goal <100     Non-seasonal allergic rhinitis due to pollen     Primary osteoarthritis of both hips     Primary osteoarthritis involving multiple joints     Chronic non-seasonal allergic rhinitis, unspecified trigger     No past surgical history on file.    Social History   Substance Use Topics     Smoking status: Never Smoker     Smokeless tobacco: Never Used     Alcohol use No     Family History   Problem Relation Age of Onset     Family History Negative Mother      Family History Negative Father          Labs reviewed in EPIC    Reviewed and updated as needed this visit by clinical staff  Tobacco  Allergies  Meds  Problems       Reviewed and updated as needed this visit by Provider         ROS:  CONSTITUTIONAL: NEGATIVE for fever, chills, change in weight  INTEGUMENTARY/SKIN: NEGATIVE for worrisome rashes, moles or lesions; Right middle flank Cyst  EYES: NEGATIVE for vision changes or irritation  ENT/MOUTH: NEGATIVE for ear, mouth and throat problems  RESP: NEGATIVE for significant cough or SOB  BREAST: NEGATIVE for masses, tenderness or discharge  CV: NEGATIVE for chest pain, palpitations or peripheral edema  GI: NEGATIVE for nausea, abdominal pain, heartburn, or change in bowel habits  : NEGATIVE for frequency, dysuria, or hematuria  MUSCULOSKELETAL: NEGATIVE for significant arthralgias or myalgia  NEURO: NEGATIVE for weakness, dizziness or paresthesias  ENDOCRINE: NEGATIVE for temperature intolerance, skin/hair changes  HEME: NEGATIVE for bleeding problems  PSYCHIATRIC: NEGATIVE for changes in mood or affect    This document serves as a record of the services and decisions personally performed and made by Bandar Greenberg MD. It was created on his behalf by Angel Newsome, a trained medical scribe. The creation of this document is based  the provider's statements to the medical scribe.  Adarsh Angel Newsome 11:26 AM, December 5, 2018    OBJECTIVE:   /87 (BP Location: Left arm, Cuff Size: Adult Large)  Pulse 81  Temp 96.8  F (36  C) (Tympanic)  Ht 1.829 m (6')  Wt 96.8 kg (213 lb 6.4 oz)  SpO2 99%  BMI 28.94 kg/m2  Body mass index is 28.94 kg/(m^2).  Neck was supple without adenopathy or thyromegaly his carotids were normal without bruits  Chest clear to auscultation and percussion  Cardiovascular S1 and S2 are physiologic without murmurs or gallops  Abdomen bowel sounds were normal.  There is no palpable mass or organomegaly  Extremities nontender without any edema  Pulses pedal pulses are as described otherwise his pulses are bilaterally symmetrical throughout without bruits  Skin without significant abnormality  Feet: Clean and dry    BP Recheck: 136/80    ASSESSMENT/PLAN:   Diagnoses and all orders for this visit:    Type 2 diabetes mellitus without complication, with long-term current use of insulin (H)  Controlled on current therapy, pt is looking to lose weight and join a fitness club to increase his activities.   -     Hemoglobin A1c    Benign essential hypertension  Well controlled on current therapy, encouraged to continue exercise routine and to lose weight  -     Basic metabolic panel    Hyperlipidemia LDL goal <100    Non-seasonal allergic rhinitis due to pollen  Stable no concerns    Primary osteoarthritis of both hips    Patient is due for wellness exam    The information in this document, created by the medical scribe for me, accurately reflects the services I personally performed and the decisions made by me. I have reviewed and approved this document for accuracy prior to leaving the patient care area.  11:51 AM, 12/05/18    Bandar Greenberg MD  [facility]

## 2018-12-06 LAB
ANION GAP SERPL CALCULATED.3IONS-SCNC: 5 MMOL/L (ref 3–14)
BUN SERPL-MCNC: 28 MG/DL (ref 7–30)
CALCIUM SERPL-MCNC: 8.9 MG/DL (ref 8.5–10.1)
CHLORIDE SERPL-SCNC: 111 MMOL/L (ref 94–109)
CO2 SERPL-SCNC: 25 MMOL/L (ref 20–32)
CREAT SERPL-MCNC: 1.28 MG/DL (ref 0.66–1.25)
GFR SERPL CREATININE-BSD FRML MDRD: 54 ML/MIN/1.7M2
GLUCOSE SERPL-MCNC: 95 MG/DL (ref 70–99)
POTASSIUM SERPL-SCNC: 4.8 MMOL/L (ref 3.4–5.3)
SODIUM SERPL-SCNC: 141 MMOL/L (ref 133–144)

## 2018-12-09 DIAGNOSIS — E11.9 TYPE 2 DIABETES MELLITUS WITHOUT COMPLICATION, WITH LONG-TERM CURRENT USE OF INSULIN (H): ICD-10-CM

## 2018-12-09 DIAGNOSIS — Z79.4 TYPE 2 DIABETES MELLITUS WITHOUT COMPLICATION, WITH LONG-TERM CURRENT USE OF INSULIN (H): ICD-10-CM

## 2018-12-10 NOTE — TELEPHONE ENCOUNTER
"Last Written Prescription Date:  11/2/17  Last Fill Quantity: 45 ml,  # refills: 11   Last office visit: 12/5/2018 with prescribing provider:     Future Office Visit:   Next 5 appointments (look out 90 days)    Feb 05, 2019 10:30 AM CST  [facility] [address] [facility] [address]  [phone]         Requested Prescriptions   Pending Prescriptions Disp Refills     insulin lispro (HUMALOG KWIKPEN) 100 UNIT/ML pen [Pharmacy Med Name: HUMALOG 100 U/ML KWIK PEN INJ 3ML] 45 mL 0     Sig: ADMINISTER UP TO 55 UNITS UNDER THE SKIN DAILY AS DIRECTED    Short Acting Insulin Protocol Passed - 12/9/2018  3:12 PM       Passed - Blood pressure less than 140/90 in past 6 months    BP Readings from Last 3 Encounters:   12/05/18 136/80   05/31/18 132/78   02/20/18 146/85                Passed - LDL on file in past 12 months    Recent Labs   Lab Test 02/01/18  1002   LDL 78            Passed - Microalbumin on file in past 12 months    Recent Labs   Lab Test 02/01/18  1002   MICROL 177   UMALCR 100.57*            Passed - Serum creatinine on file in past 12 months    Recent Labs   Lab Test 12/05/18  1201   CR 1.28*            Passed - HgbA1C in past 3 or 6 months    If HgbA1C is 8 or greater, it needs to be on file within the past 3 months.  If less than 8, must be on file within the past 6 months.     Recent Labs   Lab Test 12/05/18  1201   A1C 8.1*            Passed - Patient is age 18 or older       Passed - Recent (6 mo) or future (30 days) visit within the authorizing provider's specialty    Patient had office visit in the last 6 months or has a visit in the next 30 days with authorizing provider or within the authorizing provider's specialty.  See \"Patient Info\" tab in inbasket, or \"Choose Columns\" in Meds & Orders section of the refill encounter.              "

## 2018-12-11 DIAGNOSIS — Z79.4 TYPE 2 DIABETES MELLITUS WITHOUT COMPLICATION, WITH LONG-TERM CURRENT USE OF INSULIN (H): ICD-10-CM

## 2018-12-11 DIAGNOSIS — E11.9 TYPE 2 DIABETES MELLITUS WITHOUT COMPLICATION, WITH LONG-TERM CURRENT USE OF INSULIN (H): ICD-10-CM

## 2018-12-11 NOTE — TELEPHONE ENCOUNTER
Prescription approved per [facility] Refill Protocol.  Due for a 3 month visit in march.  Bobbi Aguilar RN

## 2018-12-20 ENCOUNTER — TELEPHONE (OUTPATIENT)
Dept: FAMILY MEDICINE | Facility: CLINIC | Age: 78
End: 2018-12-20

## 2018-12-20 DIAGNOSIS — E11.8 TYPE 2 DIABETES MELLITUS WITH COMPLICATION (H): ICD-10-CM

## 2018-12-20 DIAGNOSIS — Z79.4 TYPE 2 DIABETES MELLITUS WITHOUT COMPLICATION, WITH LONG-TERM CURRENT USE OF INSULIN (H): ICD-10-CM

## 2018-12-20 DIAGNOSIS — E11.9 TYPE 2 DIABETES MELLITUS WITHOUT COMPLICATION, WITH LONG-TERM CURRENT USE OF INSULIN (H): ICD-10-CM

## 2018-12-20 RX ORDER — INFLUENZA A VIRUS A/VICTORIA/4897/2022 IVR-238 (H1N1) ANTIGEN (FORMALDEHYDE INACTIVATED), INFLUENZA A VIRUS A/CALIFORNIA/122/2022 SAN-022 (H3N2) ANTIGEN (FORMALDEHYDE INACTIVATED), AND INFLUENZA B VIRUS B/MICHIGAN/01/2021 ANTIGEN (FORMALDEHYDE INACTIVATED) 60; 60; 60 UG/.5ML; UG/.5ML; UG/.5ML
INJECTION, SUSPENSION INTRAMUSCULAR
Refills: 0 | COMMUNITY
Start: 2018-09-18 | End: 2020-07-21

## 2018-12-20 RX ORDER — ZOSTER VACCINE RECOMBINANT, ADJUVANTED 50 MCG/0.5
KIT INTRAMUSCULAR
Refills: 0 | COMMUNITY
Start: 2018-11-24 | End: 2020-07-21

## 2018-12-20 NOTE — TELEPHONE ENCOUNTER
Reason for Call:  Medication or medication refill:    Do you use a [facility]?  Name of the pharmacy and phone number for the current request:      [facility] [address] [facility] [place] - [address] AT [facility] [address]    Name of the medication requested: STEVE CONTOUR test strip    Other request: patient just realized he is out of test strips.  He needs these ordered asap!    Can we leave a detailed message on this number? YES    Phone number patient can be reached at: Home number on file [phone] (home)    Best Time: anytime    Call taken on 12/20/2018 at 8:56 AM by Liberty Maher  .

## 2018-12-20 NOTE — TELEPHONE ENCOUNTER
Prescription approved per [facility] Refill Protocol.  Miguelina FLOOD RN

## 2018-12-21 NOTE — TELEPHONE ENCOUNTER
metFORMIN (GLUCOPHAGE) 1000 MG tablet  Last Written Prescription Date:  121/18/17  Last Fill Quantity: 180,  # refills: 3   Last office visit: 12/5/2018 with prescribing provider:  leela   Future Office Visit:   Next 5 appointments (look out 90 days)    Feb 05, 2019 10:30 AM CST  Office Visit with Bandar Greenberg MD  [facility] [address] [facility] [id number] [phone]               Requested Prescriptions   Pending Prescriptions Disp Refills     metFORMIN (GLUCOPHAGE) 1000 MG tablet [Pharmacy Med Name: METFORMIN 1000MG TABLETS] 180 tablet 0     Sig: TAKE 1 TABLET(1000 MG) BY MOUTH TWICE DAILY WITH MEALS    Biguanide Agents Passed - 12/20/2018 10:14 AM       Passed - Blood pressure less than 140/90 in past 6 months    BP Readings from Last 3 Encounters:   12/05/18 136/80   05/31/18 132/78   02/20/18 146/85                Passed - Patient has documented LDL within the past 12 mos.    Recent Labs   Lab Test 02/01/18  1002   LDL 78            Passed - Patient has had a Microalbumin in the past 15 mos.    Recent Labs   Lab Test 02/01/18  1002   MICROL 177   UMALCR 100.57*            Passed - Patient is age 10 or older       Passed - Patient has documented A1c within the specified period of time.    If HgbA1C is 8 or greater, it needs to be on file within the past 3 months.  If less than 8, must be on file within the past 6 months.     Recent Labs   Lab Test 12/05/18  1201   A1C 8.1*            Passed - Patient's CR is NOT>1.4 OR Patient's EGFR is NOT<45 within past 12 mos.    Recent Labs   Lab Test 12/05/18  1201   GFRESTIMATED 54*   GFRESTBLACK 66       Recent Labs   Lab Test 12/05/18  1201   CR 1.28*            Passed - Patient does NOT have a diagnosis of CHF.       Passed - Recent (6 mo) or future (30 days) visit within the authorizing provider's specialty    Patient had office visit in the last 6 months or has a visit in the next 30 days with authorizing  "provider or within the authorizing provider's specialty.  See \"Patient Info\" tab in inbasket, or \"Choose Columns\" in Meds & Orders section of the refill encounter.              "

## 2018-12-21 NOTE — TELEPHONE ENCOUNTER
Called pharmacy   They will ask Medicare to fax us a form to complete   Patient notified - will plan to leave encounter in basket as reminder   He is okay until Monday but states he needs strips that day    Miguelina FLOOD RN

## 2018-12-21 NOTE — TELEPHONE ENCOUNTER
Routing refill request to provider for review/approval because:  Labs out of range:  Cr, A1c    DURAN AlfredN, RN  [employer] [facility]

## 2018-12-21 NOTE — TELEPHONE ENCOUNTER
Patient called back.  Miguel received the prescription for test strips, but now, they need some form completed for Medicare before he can get the test strips!    Patient thinks that it is called a CN form ( but not sure).    Is there any way we can do this today?    Patient only has 10 strips left and really needs this filled asap!    Patient can be reached at:  [phone]  OK to leave VM

## 2018-12-24 DIAGNOSIS — I10 BENIGN ESSENTIAL HYPERTENSION: ICD-10-CM

## 2018-12-26 NOTE — TELEPHONE ENCOUNTER
"  irbesartan (AVAPRO) 150 MG tablet 30 tablet 0 10/26/2018       Last Written Prescription Date:  10/26/2018  Last Fill Quantity: 30,  # refills: 0   Last office visit: 12/5/2018 with prescribing provider:     Future Office Visit: 02/05/2019  Next 5 appointments (look out 90 days)    Feb 05, 2019 10:30 AM CST  [facility] [address] [id number] [phone]         Requested Prescriptions   Pending Prescriptions Disp Refills     irbesartan (AVAPRO) 150 MG tablet [Pharmacy Med Name: IRBESARTAN 150MG TABLETS] 30 tablet 0     Sig: TAKE 1 TABLET(150 MG) BY MOUTH AT BEDTIME    Angiotensin-II Receptors Failed - 12/24/2018  5:09 PM       Failed - Normal serum creatinine on file in past 12 months    Recent Labs   Lab Test 12/05/18  1201   CR 1.28*            Passed - Blood pressure under 140/90 in past 12 months    BP Readings from Last 3 Encounters:   12/05/18 136/80   05/31/18 132/78   02/20/18 146/85                Passed - Recent (12 mo) or future (30 days) visit within the authorizing provider's specialty    Patient had office visit in the last 12 months or has a visit in the next 30 days with authorizing provider or within the authorizing provider's specialty.  See \"Patient Info\" tab in inbasket, or \"Choose Columns\" in Meds & Orders section of the refill encounter.             Passed - Patient is age 18 or older       Passed - Normal serum potassium on file in past 12 months    Recent Labs   Lab Test 12/05/18  1201   POTASSIUM 4.8                      "

## 2018-12-27 RX ORDER — IRBESARTAN 150 MG/1
TABLET ORAL
Qty: 90 TABLET | Refills: 0 | Status: SHIPPED | OUTPATIENT
Start: 2018-12-27 | End: 2019-02-05

## 2018-12-27 NOTE — TELEPHONE ENCOUNTER
Prescription approved per [facility].  Has upcoming appt  Gloria FAUST RN

## 2018-12-29 DIAGNOSIS — Z79.4 TYPE 2 DIABETES MELLITUS WITHOUT COMPLICATION, WITH LONG-TERM CURRENT USE OF INSULIN (H): ICD-10-CM

## 2018-12-29 DIAGNOSIS — E11.9 TYPE 2 DIABETES MELLITUS WITHOUT COMPLICATION, WITH LONG-TERM CURRENT USE OF INSULIN (H): ICD-10-CM

## 2018-12-29 NOTE — TELEPHONE ENCOUNTER
"Requested Prescriptions   Pending Prescriptions Disp Refills     LANTUS SOLOSTAR 100 UNIT/ML soln [Pharmacy Med Name: LANTUS SOLOSTAR PEN INJ 3ML]  Last Written Prescription Date:  1/17/18  Last Fill Quantity: 45 ML,  # refills: 3   Last office visit: 12/5/2018 with prescribing provider:  SANDRA   Future Office Visit:   Next 5 appointments (look out 90 days)    Feb 05, 2019 10:30 AM CST  Office Visit with Bandar Greenberg MD  [facility] [address] [address]  [phone]          45 mL 0     Sig: ADMINISTER 50 UNITS UNDER THE SKIN AT BEDTIME AS DIRECTED    Long Acting Insulin Protocol Passed - 12/29/2018 10:16 AM       Passed - Blood pressure less than 140/90 in past 6 months    BP Readings from Last 3 Encounters:   12/05/18 136/80   05/31/18 132/78   02/20/18 146/85                Passed - LDL on file in past 12 months    Recent Labs   Lab Test 02/01/18  1002   LDL 78            Passed - Microalbumin on file in past 12 months    Recent Labs   Lab Test 02/01/18  1002   MICROL 177   UMALCR 100.57*            Passed - Serum creatinine on file in past 12 months    Recent Labs   Lab Test 12/05/18  1201   CR 1.28*            Passed - HgbA1C in past 3 or 6 months    If HgbA1C is 8 or greater, it needs to be on file within the past 3 months.  If less than 8, must be on file within the past 6 months.     Recent Labs   Lab Test 12/05/18  1201   A1C 8.1*            Passed - Patient is age 18 or older       Passed - Recent (6 mo) or future (30 days) visit within the authorizing provider's specialty    Patient had office visit in the last 6 months or has a visit in the next 30 days with authorizing provider or within the authorizing provider's specialty.  See \"Patient Info\" tab in inbasket, or \"Choose Columns\" in Meds & Orders section of the refill encounter.              "

## 2018-12-31 RX ORDER — INSULIN GLARGINE 100 [IU]/ML
INJECTION, SOLUTION SUBCUTANEOUS
Qty: 45 ML | Refills: 0 | Status: SHIPPED | OUTPATIENT
Start: 2018-12-31 | End: 2020-12-20

## 2018-12-31 NOTE — TELEPHONE ENCOUNTER
Reviewed chart  Unsure if form was received at clinic or if PCP completed  Called pt - states Dr Greenberg filled out forms  Pt already picked up test strips  Will close encounter  Gloria FAUST RN

## 2019-02-05 ENCOUNTER — OFFICE VISIT (OUTPATIENT)
Dept: FAMILY MEDICINE | Facility: CLINIC | Age: 79
End: 2019-02-05
Payer: MEDICARE

## 2019-02-05 VITALS
HEIGHT: 72 IN | TEMPERATURE: 97.3 F | SYSTOLIC BLOOD PRESSURE: 140 MMHG | DIASTOLIC BLOOD PRESSURE: 76 MMHG | WEIGHT: 215 LBS | HEART RATE: 74 BPM | BODY MASS INDEX: 29.12 KG/M2 | OXYGEN SATURATION: 97 %

## 2019-02-05 DIAGNOSIS — E55.9 VITAMIN D DEFICIENCY: ICD-10-CM

## 2019-02-05 DIAGNOSIS — E78.5 HYPERLIPIDEMIA LDL GOAL <100: ICD-10-CM

## 2019-02-05 DIAGNOSIS — J30.89 CHRONIC NONSEASONAL ALLERGIC RHINITIS DUE TO POLLEN: ICD-10-CM

## 2019-02-05 DIAGNOSIS — Z12.5 SCREENING FOR PROSTATE CANCER: ICD-10-CM

## 2019-02-05 DIAGNOSIS — Z00.00 ENCOUNTER FOR ROUTINE ADULT HEALTH EXAMINATION WITHOUT ABNORMAL FINDINGS: ICD-10-CM

## 2019-02-05 DIAGNOSIS — Z79.4 TYPE 2 DIABETES MELLITUS WITHOUT COMPLICATION, WITH LONG-TERM CURRENT USE OF INSULIN (H): Primary | ICD-10-CM

## 2019-02-05 DIAGNOSIS — G47.00 INSOMNIA, UNSPECIFIED TYPE: ICD-10-CM

## 2019-02-05 DIAGNOSIS — M16.0 PRIMARY OSTEOARTHRITIS OF BOTH HIPS: ICD-10-CM

## 2019-02-05 DIAGNOSIS — M15.0 PRIMARY OSTEOARTHRITIS INVOLVING MULTIPLE JOINTS: ICD-10-CM

## 2019-02-05 DIAGNOSIS — I10 BENIGN ESSENTIAL HYPERTENSION: ICD-10-CM

## 2019-02-05 DIAGNOSIS — E11.9 TYPE 2 DIABETES MELLITUS WITHOUT COMPLICATION, WITH LONG-TERM CURRENT USE OF INSULIN (H): Primary | ICD-10-CM

## 2019-02-05 LAB
ALBUMIN UR-MCNC: 30 MG/DL
APPEARANCE UR: CLEAR
BILIRUB UR QL STRIP: NEGATIVE
COLOR UR AUTO: YELLOW
ERYTHROCYTE [DISTWIDTH] IN BLOOD BY AUTOMATED COUNT: 14 % (ref 10–15)
GLUCOSE UR STRIP-MCNC: >=1000 MG/DL
HBA1C MFR BLD: 7.7 % (ref 0–5.6)
HCT VFR BLD AUTO: 41.1 % (ref 40–53)
HGB BLD-MCNC: 13.5 G/DL (ref 13.3–17.7)
HGB UR QL STRIP: NEGATIVE
HYALINE CASTS #/AREA URNS LPF: ABNORMAL /LPF
KETONES UR STRIP-MCNC: NEGATIVE MG/DL
LEUKOCYTE ESTERASE UR QL STRIP: NEGATIVE
MCH RBC QN AUTO: 29 PG (ref 26.5–33)
MCHC RBC AUTO-ENTMCNC: 32.8 G/DL (ref 31.5–36.5)
MCV RBC AUTO: 88 FL (ref 78–100)
NITRATE UR QL: NEGATIVE
NON-SQ EPI CELLS #/AREA URNS LPF: ABNORMAL /LPF
PH UR STRIP: 5.5 PH (ref 5–7)
PLATELET # BLD AUTO: 218 10E9/L (ref 150–450)
RBC # BLD AUTO: 4.66 10E12/L (ref 4.4–5.9)
RBC #/AREA URNS AUTO: ABNORMAL /HPF
SOURCE: ABNORMAL
SP GR UR STRIP: >1.03 (ref 1–1.03)
UROBILINOGEN UR STRIP-ACNC: 0.2 EU/DL (ref 0.2–1)
WBC # BLD AUTO: 6.9 10E9/L (ref 4–11)
WBC #/AREA URNS AUTO: ABNORMAL /HPF

## 2019-02-05 PROCEDURE — 36415 COLL VENOUS BLD VENIPUNCTURE: CPT | Performed by: INTERNAL MEDICINE

## 2019-02-05 PROCEDURE — 85027 COMPLETE CBC AUTOMATED: CPT | Performed by: INTERNAL MEDICINE

## 2019-02-05 PROCEDURE — 82306 VITAMIN D 25 HYDROXY: CPT | Performed by: INTERNAL MEDICINE

## 2019-02-05 PROCEDURE — 80053 COMPREHEN METABOLIC PANEL: CPT | Performed by: INTERNAL MEDICINE

## 2019-02-05 PROCEDURE — 99207 C FOOT EXAM  NO CHARGE: CPT | Performed by: INTERNAL MEDICINE

## 2019-02-05 PROCEDURE — 80061 LIPID PANEL: CPT | Performed by: INTERNAL MEDICINE

## 2019-02-05 PROCEDURE — 84443 ASSAY THYROID STIM HORMONE: CPT | Performed by: INTERNAL MEDICINE

## 2019-02-05 PROCEDURE — 83036 HEMOGLOBIN GLYCOSYLATED A1C: CPT | Performed by: INTERNAL MEDICINE

## 2019-02-05 PROCEDURE — 82043 UR ALBUMIN QUANTITATIVE: CPT | Performed by: INTERNAL MEDICINE

## 2019-02-05 PROCEDURE — 81001 URINALYSIS AUTO W/SCOPE: CPT | Performed by: INTERNAL MEDICINE

## 2019-02-05 PROCEDURE — G0103 PSA SCREENING: HCPCS | Performed by: INTERNAL MEDICINE

## 2019-02-05 PROCEDURE — G0439 PPPS, SUBSEQ VISIT: HCPCS | Performed by: INTERNAL MEDICINE

## 2019-02-05 PROCEDURE — 99213 OFFICE O/P EST LOW 20 MIN: CPT | Mod: 25 | Performed by: INTERNAL MEDICINE

## 2019-02-05 ASSESSMENT — MIFFLIN-ST. JEOR: SCORE: 1728.23

## 2019-02-05 NOTE — PROGRESS NOTES
"  SUBJECTIVE:   Justyn Olivas is a 78 year old male who presents for Preventive Visit.    Are you in the first 12 months of your Medicare Part B coverage?  No    Physical Health:    In general, how would you rate your overall physical health? good    Outside of work, how many days during the week do you exercise? 2-3 days/week    Outside of work, approximately how many minutes a day do you exercise?greater than 60 minutes    If you drink alcohol do you typically have >3 drinks per day or >7 drinks per week? No    Do you usually eat at least 4 servings of fruit and vegetables a day, include whole grains & fiber and avoid regularly eating high fat or \"junk\" foods? Yes- snacks on peanuts and protein type snacks.    Do you have any problems taking medications regularly?  No    Do you have any side effects from medications? none    Needs assistance for the following daily activities: no assistance needed    Which of the following safety concerns are present in your home?  none identified     Hearing impairment: Yes, Bilateral hearing aids.    In the past 6 months, have you been bothered by leaking of urine? no    Mental Health:    In general, how would you rate your overall mental or emotional health? good  PHQ-2 Score:      Do you feel safe in your environment? YES    Do you have a Health Care Directive? No: Advance care planning reviewed with patient; information given to patient to review.    Additional concerns to address?      Joined [employer] and has a  2x/week. Then goes 1 more time during the week. Drinks a daily protein shake. Weight tetters between 214-216.     DM  This morning his blood sugar was 84. He has been checking it before dinner and has been staying below 150. Denies feeling of lightheadedness or extreme hunger. Has experienced nights of lows causing excessive night sweats but is only rare - does not associate this with specific foods eating. Has not had an eye exam. 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 Struggling with vision as he feels he needs a stronger prescription.   Denies     Fall risk:  Fallen 2 or more times in the past year?: No  Any fall with injury in the past year?: No    Cognitive Screenin) Repeat 3 items (Leader, Season, Table)    2) Clock draw: NORMAL  3) 3 item recall: Recalls 3 objects  Results: 3 items recalled: COGNITIVE IMPAIRMENT LESS LIKELY    Mini-CogTM Copyright YELENA Franco. Licensed by the author for use in [facility]; reprinted with permission (gisel@[facility]). All rights reserved.      Do you have sleep apnea, excessive snoring or daytime drowsiness?: daytime drowsiness       Reviewed and updated as needed this visit by clinical staff  Tobacco  Meds         Reviewed and updated as needed this visit by Provider        Social History     Tobacco Use     Smoking status: Never Smoker     Smokeless tobacco: Never Used   Substance Use Topics     Alcohol use: No     Alcohol/week: 0.0 oz                           Current providers sharing in care for this patient include:   Patient Care Team:  Bandar Greenberg MD as PCP - General (Internal Medicine)  Bandar Greenberg MD as PCP - Assigned PCP    The following health maintenance items are reviewed in Epic and correct as of today:  Health Maintenance   Topic Date Due     ADVANCE DIRECTIVE PLANNING Q5 YRS  1994     PNEUMOCOCCAL IMMUNIZATION 65+ LOW/MEDIUM RISK (2 of 2 - PPSV23) 2016     FOOT EXAM Q1 YEAR  2019     LIPID MONITORING Q1 YEAR  2019     MICROALBUMIN Q1 YEAR  2019     FALL RISK ASSESSMENT  2019     PHQ-2 Q1 YR  2019     EYE EXAM Q1 YEAR  02/15/2019     A1C Q6 MO  2019     CREATININE Q1 YEAR  2019     TSH W/ FREE T4 REFLEX Q2 YEAR  2020     DTAP/TDAP/TD IMMUNIZATION (2 - Td) 2023     INFLUENZA VACCINE  Completed     ZOSTER IMMUNIZATION  Completed     IPV IMMUNIZATION  Aged  "Out     MENINGITIS IMMUNIZATION  Aged Out     {Chronicprobdata (Optional):295325}  {Decision Support (Optional):468074}    ROS:  {ROS COMP:763150}    OBJECTIVE:   Ht 1.829 m (6')   Wt 97.5 kg (215 lb)   BMI 29.16 kg/m   Estimated body mass index is 29.16 kg/m  as calculated from the following:    Height as of this encounter: 1.829 m (6').    Weight as of this encounter: 97.5 kg (215 lb).  EXAM:   {Exam :301127}    {Diagnostic Test Results (Optional):642871::\"Diagnostic Test Results:\",\"none \"}    ASSESSMENT / PLAN:   {Diag Picklist:503575}    End of Life Planning:  Patient currently has an advanced directive: { :264885}    COUNSELING:  {Medicare Counselin}    BP Readings from Last 1 Encounters:   18 136/80     Estimated body mass index is 29.16 kg/m  as calculated from the following:    Height as of this encounter: 1.829 m (6').    Weight as of this encounter: 97.5 kg (215 lb).    {BP Counseling- Complete if BP >= 120/80  (Optional):476656}  {Weight Management Plan (ACO) Complete if BMI is abnormal-  Ages 18-64  BMI >24.9.  Age 65+ with BMI <23 or >30 (Optional):112417}     reports that  has never smoked. he has never used smokeless tobacco.  {Tobacco Cessation -- Complete if patient is a smoker (Optional):183468}    Appropriate preventive services were discussed with this patient, including applicable screening as appropriate for cardiovascular disease, diabetes, osteopenia/osteoporosis, and glaucoma.  As appropriate for age/gender, discussed screening for colorectal cancer, prostate cancer, breast cancer, and cervical cancer. Checklist reviewing preventive services available has been given to the patient.    Reviewed patients plan of care and provided an AVS. The {CarePlan:356502} for Justyn meets the Care Plan requirement. This Care Plan has been established and reviewed with the {PATIENT, FAMILY MEMBER, CAREGIVER:544931}.    Counseling Resources:  ATP IV Guidelines  Pooled Cohorts Equation " Calculator  Breast Cancer Risk Calculator  FRAX Risk Assessment  ICSI Preventive Guidelines  Dietary Guidelines for Americans, 2010  [employer] MyPlate  ASA Prophylaxis  Lung CA Screening    Bandar Greenberg MD  [facility]

## 2019-02-05 NOTE — PROGRESS NOTES
"  SUBJECTIVE:   Justyn Olivas is a 78 year old male who presents for Preventive Visit.    Are you in the first 12 months of your Medicare Part B coverage?  No    Physical Health:    In general, how would you rate your overall physical health? good    Outside of work, how many days during the week do you exercise? 2-3 days/week    Outside of work, approximately how many minutes a day do you exercise?greater than 60 minutes    If you drink alcohol do you typically have >3 drinks per day or >7 drinks per week? No    Do you usually eat at least 4 servings of fruit and vegetables a day, include whole grains & fiber and avoid regularly eating high fat or \"junk\" foods? Yes- snacks on peanuts and protein type snacks.    Do you have any problems taking medications regularly?  No    Do you have any side effects from medications? none    Needs assistance for the following daily activities: no assistance needed    Which of the following safety concerns are present in your home?  none identified     Hearing impairment: Yes, Bilateral hearing aids.    In the past 6 months, have you been bothered by leaking of urine? no    Mental Health:    In general, how would you rate your overall mental or emotional health? good  PHQ-2 Score:      Do you feel safe in your environment? YES    Do you have a Health Care Directive? No: Advance care planning reviewed with patient; information given to patient to review.    Additional concerns to address?      Exercise  Joined [employer] and has a  2x/week. Goes 1 more time during the week and exercises on his own. Drinks a daily protein shake. Weight tetters between 214-216. Goal weight is under 200.  Wt Readings from Last 5 Encounters:   02/05/19 97.5 kg (215 lb)   12/05/18 96.8 kg (213 lb 6.4 oz)   05/31/18 97.8 kg (215 lb 8 oz)   02/20/18 97.5 kg (215 lb)   02/08/18 97.5 kg (215 lb)       DM  Justyn is controlling his DM with Lantus 50 units at bedtime, metformin 1000 mg " 2x/day, and humalog up to 55 units PRN. This morning his blood sugar was 84. He has been checking it before dinner as well, which has been staying below 150. Denies feeling of lightheadedness or extreme hunger. Has experienced rare nights of lows causing excessive night sweats but does not associate this with specific foods. 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 Struggling with vision as he feels he needs a stronger prescription.  Denies issues with driving.   Lab Results   Component Value Date    A1C 7.7 2019    A1C 8.1 2018    A1C 8.3 2018    A1C 7.5 10/10/2017    A1C 7.1 2017         HTN  Justyn is controlling his HTN with Avapro 150 mg at bedtime. He reports compliance with this medication and no adverse side effects.   BP Readings from Last 3 Encounters:   19 140/76   18 136/80   18 132/78     HLD  Pt is controlling his HLD with simvastatin 20 mg at bedtime. He reports this medication is working well with no adverse side effects.      Insomnia  Pt is controlling his insomnia with Trazodone 100 mg at bedtime. He reports compliance with this medication with no adverse side effects.  No longer getting up 2-3x/night to use the bathroom - only getting up 1x/night.     Fall risk:  Fallen 2 or more times in the past year?: No  Any fall with injury in the past year?: No    Cognitive Screenin) Repeat 3 items (Leader, Season, Table)    2) Clock draw: NORMAL  3) 3 item recall: Recalls 3 objects  Results: 3 items recalled: COGNITIVE IMPAIRMENT LESS LIKELY    Mini-CogTM Copyright S Salvador. Licensed by the author for use in [facility]; reprinted with permission (gisel@.[facility]). All rights reserved.      Do you have sleep apnea, excessive snoring or daytime drowsiness?: daytime drowsiness       Reviewed and updated as needed this visit by clinical staff  Tobacco  Meds         Reviewed and updated as needed  this visit by Provider        Social History     Tobacco Use     Smoking status: Never Smoker     Smokeless tobacco: Never Used   Substance Use Topics     Alcohol use: No     Alcohol/week: 0.0 oz                           Current providers sharing in care for this patient include:   Patient Care Team:  Bandar Greenberg MD as PCP - General (Internal Medicine)  Bandar Greenberg MD as PCP - Assigned PCP    The following health maintenance items are reviewed in Epic and correct as of today:  Health Maintenance   Topic Date Due     ADVANCE DIRECTIVE PLANNING Q5 YRS  09/30/1994     PNEUMOCOCCAL IMMUNIZATION 65+ LOW/MEDIUM RISK (2 of 2 - PPSV23) 02/26/2016     FOOT EXAM Q1 YEAR  02/01/2019     LIPID MONITORING Q1 YEAR  02/01/2019     MICROALBUMIN Q1 YEAR  02/01/2019     FALL RISK ASSESSMENT  02/08/2019     PHQ-2 Q1 YR  02/08/2019     EYE EXAM Q1 YEAR  02/15/2019     A1C Q6 MO  06/05/2019     CREATININE Q1 YEAR  12/05/2019     TSH W/ FREE T4 REFLEX Q2 YEAR  02/01/2020     DTAP/TDAP/TD IMMUNIZATION (2 - Td) 03/06/2023     INFLUENZA VACCINE  Completed     ZOSTER IMMUNIZATION  Completed     IPV IMMUNIZATION  Aged Out     MENINGITIS IMMUNIZATION  Aged Out     Current Outpatient Medications   Medication Sig Dispense Refill     ASPIRIN PO Take 325 mg by mouth       blood glucose monitoring (STEVE CONTOUR) test strip TESTING FOUR TIMES DAILY OR AS DIRECTED 400 strip 1     fluticasone (FLONASE) 50 MCG/ACT nasal spray Spray 1 spray into both nostrils 2 times daily as needed for rhinitis or allergies       FLUZONE HIGH-DOSE 0.5 ML injection ADM 0.5ML IM UTD  0     Glucose Blood (STEVE CONTOUR TEST VI)        insulin glargine (LANTUS SOLOSTAR PEN) 100 UNIT/ML pen Inject 50 Units Subcutaneous At Bedtime 45 mL 0     insulin lispro (HUMALOG KWIKPEN) 100 UNIT/ML pen ADMINISTER UP TO 55 UNITS UNDER THE SKIN DAILY AS DIRECTED 45 mL 0     insulin pen needle (BD FERCHO U/F) 32G X 4 MM USE AS DIRECTED up to 4 times daily 400 each 1      irbesartan (AVAPRO) 150 MG tablet TAKE 1 TABLET(150 MG) BY MOUTH AT BEDTIME 90 tablet 0     LANTUS SOLOSTAR 100 UNIT/ML soln ADMINISTER 50 UNITS UNDER THE SKIN AT BEDTIME AS DIRECTED 45 mL 0     metFORMIN (GLUCOPHAGE) 1000 MG tablet TAKE 1 TABLET(1000 MG) BY MOUTH TWICE DAILY WITH MEALS 180 tablet 1     order for DME Hip steroid injectoion 1 Units 0     SHINGRIX injection ADM 0.5ML IM UTD  0     simvastatin (ZOCOR) 20 MG tablet Take 1 tablet (20 mg) by mouth At Bedtime 90 tablet 3     traZODone (DESYREL) 100 MG tablet TAKE 1 TABLET(100 MG) BY MOUTH AT BEDTIME 90 tablet 1     VENTOLIN  (90 BASE) MCG/ACT Inhaler INL 2 PFS PO QID PRN 1 Inhaler 3     No Known Allergies  Pneumonia Vaccine:Adults age 65+ who received Pneumovax (PPSV23) at 65 years or older: Should be given PCV13 > 1 year after their most recent PPSV23    ROS:  Constitutional, HEENT, cardiovascular, pulmonary, gi and gu systems are negative, except as otherwise noted.    This document serves as a record of the services and decisions personally performed and made by Bandar Greenberg MD. It was created on his behalf by Ijeoma Mayer, a trained medical scribe. The creation of this document is based on the provider's statements to the medical scribe.  Ijeoma Mayer February 5, 2019 11:17 AM      OBJECTIVE:   Ht 1.829 m (6')   Wt 97.5 kg (215 lb)   BMI 29.16 kg/m   Estimated body mass index is 29.16 kg/m  as calculated from the following:    Height as of this encounter: 1.829 m (6').    Weight as of this encounter: 97.5 kg (215 lb).  EXAM:   GENERAL: healthy, alert and no distress  EYES: Eyes grossly normal to inspection, PERRL and conjunctivae and sclerae normal  HENT: ear canals and TM's normal, nose and mouth without ulcers or lesions  NECK: no adenopathy, no asymmetry, masses, or scars and thyroid normal to palpation  RESP: lungs clear to auscultation - no rales, rhonchi or wheezes  CV: regular rate and rhythm, normal S1 S2, no S3 or S4, no  murmur, click or rub, no peripheral edema and peripheral pulses strong  ABDOMEN: soft, nontender, no hepatosplenomegaly, no masses and bowel sounds normal   (male): normal male genitalia without lesions or urethral discharge, no hernia  RECTAL: prostate is 2-3+enlarged, smooth, normal sphincter tone, no rectal masses, nontender without nodules or masses  MS: no gross musculoskeletal defects noted, no edema  SKIN: no suspicious lesions or rashes  FEET: clean and dry with excessive scaliness and no paraesthesias  NEURO: Normal strength and tone, mentation intact and speech normal  PSYCH: mentation appears normal, affect normal/bright  LYMPH: no cervical, supraclavicular, axillary, or inguinal adenopathy    Diagnostic Test Results:  Results for orders placed or performed in visit on 02/05/19 (from the past 24 hour(s))   UA reflex to Microscopic and Culture   Result Value Ref Range    Color Urine Yellow     Appearance Urine Clear     Glucose Urine >=1000 (A) NEG^Negative mg/dL    Bilirubin Urine Negative NEG^Negative    Ketones Urine Negative NEG^Negative mg/dL    Specific Gravity Urine >1.030 1.003 - 1.035    Blood Urine Negative NEG^Negative    pH Urine 5.5 5.0 - 7.0 pH    Protein Albumin Urine 30 (A) NEG^Negative mg/dL    Urobilinogen Urine 0.2 0.2 - 1.0 EU/dL    Nitrite Urine Negative NEG^Negative    Leukocyte Esterase Urine Negative NEG^Negative    Source Midstream Urine    Urine Microscopic   Result Value Ref Range    WBC Urine 0 - 5 OTO5^0 - 5 /HPF    RBC Urine O - 2 OTO2^O - 2 /HPF    Hyaline Casts 2-5 (A) OTO2^O - 2 /LPF    Squamous Epithelial /LPF Urine Few FEW^Few /LPF   CBC with platelets   Result Value Ref Range    WBC 6.9 4.0 - 11.0 10e9/L    RBC Count 4.66 4.4 - 5.9 10e12/L    Hemoglobin 13.5 13.3 - 17.7 g/dL    Hematocrit 41.1 40.0 - 53.0 %    MCV 88 78 - 100 fl    MCH 29.0 26.5 - 33.0 pg    MCHC 32.8 31.5 - 36.5 g/dL    RDW 14.0 10.0 - 15.0 %    Platelet Count 218 150 - 450 10e9/L   Hemoglobin A1c    Result Value Ref Range    Hemoglobin A1C 7.7 (H) 0 - 5.6 %       ASSESSMENT / PLAN:   Justyn was seen today for wellness visit.    Diagnoses and all orders for this visit:    Encounter for routine adult health examination without abnormal findings  Normal physical exam, pt doing well. Checking labs today for surveillance.   -     UA reflex to Microscopic and Culture  -     CBC with platelets  -     Comprehensive metabolic panel  -     Lipid panel reflex to direct LDL Fasting  -     TSH with free T4 reflex  -     Vitamin D Deficiency  -     Prostate spec antigen screen  -     Albumin Random Urine Quantitative with Creat Ratio  -     C FOOT EXAM  NO CHARGE  -     Hemoglobin A1c    Type 2 diabetes mellitus without complication, with long-term current use of insulin (H)  Rechecking labs for surveillance. Recommended OTC Amlactin cream for dry feet.  -     TSH with free T4 reflex  -     Albumin Random Urine Quantitative with Creat Ratio  -     C FOOT EXAM  NO CHARGE  -     Hemoglobin A1c  -     metFORMIN (GLUCOPHAGE) 1000 MG tablet; TAKE 1 TABLET(1000 MG) BY MOUTH TWICE DAILY WITH MEALS  -     insulin glargine (LANTUS SOLOSTAR PEN) 100 UNIT/ML pen; Inject 50 Units Subcutaneous At Bedtime  -     Urine Microscopic    Primary osteoarthritis of both hips  Will continue to follow closely as his activity continues to increase, it may be helping to control his arthritis.    Hyperlipidemia LDL goal <100  -     Lipid panel reflex to direct LDL Fasting  -     simvastatin (ZOCOR) 20 MG tablet; Take 1 tablet (20 mg) by mouth At Bedtime    Chronic nonseasonal allergic rhinitis due to pollen  Has improved, not requiring as much medication.    Benign essential hypertension  Well controlled with current therapy.  -     UA reflex to Microscopic and Culture  -     CBC with platelets  -     Comprehensive metabolic panel  -     irbesartan (AVAPRO) 150 MG tablet; TAKE 1 TABLET(150 MG) BY MOUTH AT BEDTIME    Primary osteoarthritis involving  multiple joints    Insomnia, unspecified type    Screening for prostate cancer  -     Prostate spec antigen screen    Vitamin D deficiency  -     Vitamin D Deficiency    Type 2 diabetes mellitus with complication (H)  With his new exercise program, his A1c is falling and hopefully he will reach his goal on 7.0 with ongoing weight loss and increased activity.  -     blood glucose (STEVE CONTOUR) test strip; TESTING FOUR TIMES DAILY OR AS DIRECTED    Other orders  -     fluticasone (FLONASE) 50 MCG/ACT nasal spray; Spray 1 spray into both nostrils 2 times daily as needed for rhinitis or allergies       End of Life Planning:  Patient currently has an advanced directive: Unknown    COUNSELING:  Reviewed preventive health counseling, as reflected in patient instructions       Regular exercise       Healthy diet/nutrition       Vision screening       Prostate cancer screening    BP Readings from Last 1 Encounters:   02/05/19 140/76     Estimated body mass index is 29.16 kg/m  as calculated from the following:    Height as of this encounter: 1.829 m (6').    Weight as of this encounter: 97.5 kg (215 lb).      Weight management plan: Discussed healthy diet and exercise guidelines     reports that  has never smoked. he has never used smokeless tobacco.      Appropriate preventive services were discussed with this patient, including applicable screening as appropriate for cardiovascular disease, diabetes, osteopenia/osteoporosis, and glaucoma.  As appropriate for age/gender, discussed screening for colorectal cancer, prostate cancer, breast cancer, and cervical cancer. Checklist reviewing preventive services available has been given to the patient.    Reviewed patients plan of care and provided an AVS. The Basic Care Plan (routine screening as documented in Health Maintenance) for Justyn meets the Care Plan requirement. This Care Plan has been established and reviewed with the Patient.    Counseling Resources:  ATP IV  Guidelines  Pooled Cohorts Equation Calculator  Breast Cancer Risk Calculator  FRAX Risk Assessment  ICSI Preventive Guidelines  Dietary Guidelines for Americans, 2010  USDA's MyPlate  ASA Prophylaxis  Lung CA Screening    The information in this document, created by the medical scribe for me, accurately reflects the services I personally performed and the decisions made by me. I have reviewed and approved this document for accuracy prior to leaving the patient care area.  February 5, 2019 11:18 AM    Bandar Greenberg MD  [facility]

## 2019-02-05 NOTE — PATIENT INSTRUCTIONS
Preventive Health Recommendations:     See your health care provider every year to    Review health changes.     Discuss preventive care.      Review your medicines if your doctor has prescribed any.    Talk with your health care provider about whether you should have a test to screen for prostate cancer (PSA).    Every 3 years, have a diabetes test (fasting glucose). If you are at risk for diabetes, you should have this test more often.    Every 5 years, have a cholesterol test. Have this test more often if you are at risk for high cholesterol or heart disease.     Every 10 years, have a colonoscopy. Or, have a yearly FIT test (stool test). These exams will check for colon cancer.    Talk to with your health care provider about screening for Abdominal Aortic Aneurysm if you have a family history of AAA or have a history of smoking.  Shots:     Get a flu shot each year.     Get a tetanus shot every 10 years.     Talk to your doctor about your pneumonia vaccines. There are now two you should receive - Pneumovax (PPSV 23) and Prevnar (PCV 13).    Talk to your pharmacist about a shingles vaccine.     Talk to your doctor about the hepatitis B vaccine.  Nutrition:     Eat at least 5 servings of fruits and vegetables each day.     Eat whole-grain bread, whole-wheat pasta and brown rice instead of white grains and rice.     Get adequate Calcium and Vitamin D.   Lifestyle    Exercise for at least 150 minutes a week (30 minutes a day, 5 days a week). This will help you control your weight and prevent disease.     Limit alcohol to one drink per day.     No smoking.     Wear sunscreen to prevent skin cancer.     See your dentist every six months for an exam and cleaning.     See your eye doctor every 1 to 2 years to screen for conditions such as glaucoma, macular degeneration and cataracts.    Personalized Prevention Plan  You are due for the preventive services outlined below.  Your care team is available to assist you in  scheduling these services.  If you have already completed any of these items, please share that information with your care team to update in your medical record.    Health Maintenance Due   Topic Date Due     Discuss Advance Directive Planning  09/30/1994     Pneumococcal Vaccine (2 of 2 - PPSV23) 02/26/2016     Diabetic Foot Exam - yearly  02/01/2019     Cholesterol Lab - yearly  02/01/2019     Microalbumin Lab - yearly  02/01/2019     FALL RISK ASSESSMENT  02/08/2019     Depression Assessment 2 - yearly  02/08/2019     Eye Exam - yearly  02/15/2019

## 2019-02-06 LAB
ALBUMIN SERPL-MCNC: 3.9 G/DL (ref 3.4–5)
ALP SERPL-CCNC: 89 U/L (ref 40–150)
ALT SERPL W P-5'-P-CCNC: 30 U/L (ref 0–70)
ANION GAP SERPL CALCULATED.3IONS-SCNC: 3 MMOL/L (ref 3–14)
AST SERPL W P-5'-P-CCNC: 18 U/L (ref 0–45)
BILIRUB SERPL-MCNC: 0.5 MG/DL (ref 0.2–1.3)
BUN SERPL-MCNC: 28 MG/DL (ref 7–30)
CALCIUM SERPL-MCNC: 8.7 MG/DL (ref 8.5–10.1)
CHLORIDE SERPL-SCNC: 107 MMOL/L (ref 94–109)
CHOLEST SERPL-MCNC: 156 MG/DL
CO2 SERPL-SCNC: 25 MMOL/L (ref 20–32)
CREAT SERPL-MCNC: 1.37 MG/DL (ref 0.66–1.25)
CREAT UR-MCNC: 143 MG/DL
DEPRECATED CALCIDIOL+CALCIFEROL SERPL-MC: 29 UG/L (ref 20–75)
GFR SERPL CREATININE-BSD FRML MDRD: 49 ML/MIN/{1.73_M2}
GLUCOSE SERPL-MCNC: 282 MG/DL (ref 70–99)
HDLC SERPL-MCNC: 40 MG/DL
LDLC SERPL CALC-MCNC: 92 MG/DL
MICROALBUMIN UR-MCNC: 289 MG/L
MICROALBUMIN/CREAT UR: 202.1 MG/G CR (ref 0–17)
NONHDLC SERPL-MCNC: 116 MG/DL
POTASSIUM SERPL-SCNC: 4.7 MMOL/L (ref 3.4–5.3)
PROT SERPL-MCNC: 7.3 G/DL (ref 6.8–8.8)
PSA SERPL-ACNC: 0.22 UG/L (ref 0–4)
SODIUM SERPL-SCNC: 135 MMOL/L (ref 133–144)
TRIGL SERPL-MCNC: 119 MG/DL
TSH SERPL DL<=0.005 MIU/L-ACNC: 1.02 MU/L (ref 0.4–4)

## 2019-02-06 RX ORDER — SIMVASTATIN 20 MG
20 TABLET ORAL AT BEDTIME
Qty: 90 TABLET | Refills: 3 | Status: SHIPPED | OUTPATIENT
Start: 2019-02-06 | End: 2019-06-04

## 2019-02-06 RX ORDER — IRBESARTAN 150 MG/1
TABLET ORAL
Qty: 90 TABLET | Refills: 3 | Status: SHIPPED | OUTPATIENT
Start: 2019-02-06 | End: 2019-06-04

## 2019-02-06 RX ORDER — FLUTICASONE PROPIONATE 50 MCG
1 SPRAY, SUSPENSION (ML) NASAL 2 TIMES DAILY PRN
Qty: 3 BOTTLE | Refills: 3 | Status: SHIPPED | OUTPATIENT
Start: 2019-02-06 | End: 2020-12-20

## 2019-02-19 ENCOUNTER — TRANSFERRED RECORDS (OUTPATIENT)
Dept: HEALTH INFORMATION MANAGEMENT | Facility: CLINIC | Age: 79
End: 2019-02-19

## 2019-02-21 ENCOUNTER — OFFICE VISIT (OUTPATIENT)
Dept: FAMILY MEDICINE | Facility: CLINIC | Age: 79
End: 2019-02-21
Payer: MEDICARE

## 2019-02-21 VITALS
BODY MASS INDEX: 28.66 KG/M2 | WEIGHT: 211.6 LBS | HEART RATE: 100 BPM | HEIGHT: 72 IN | TEMPERATURE: 97.1 F | OXYGEN SATURATION: 96 % | SYSTOLIC BLOOD PRESSURE: 129 MMHG | DIASTOLIC BLOOD PRESSURE: 78 MMHG

## 2019-02-21 DIAGNOSIS — Z79.4 TYPE 2 DIABETES MELLITUS WITHOUT COMPLICATION, WITH LONG-TERM CURRENT USE OF INSULIN (H): ICD-10-CM

## 2019-02-21 DIAGNOSIS — I10 BENIGN ESSENTIAL HYPERTENSION: ICD-10-CM

## 2019-02-21 DIAGNOSIS — M15.0 PRIMARY OSTEOARTHRITIS INVOLVING MULTIPLE JOINTS: ICD-10-CM

## 2019-02-21 DIAGNOSIS — I63.9 CEREBROVASCULAR ACCIDENT (CVA), UNSPECIFIED MECHANISM (H): Primary | ICD-10-CM

## 2019-02-21 DIAGNOSIS — E78.5 HYPERLIPIDEMIA LDL GOAL <100: ICD-10-CM

## 2019-02-21 DIAGNOSIS — E11.9 TYPE 2 DIABETES MELLITUS WITHOUT COMPLICATION, WITH LONG-TERM CURRENT USE OF INSULIN (H): ICD-10-CM

## 2019-02-21 DIAGNOSIS — J01.91 ACUTE RECURRENT SINUSITIS, UNSPECIFIED LOCATION: ICD-10-CM

## 2019-02-21 PROCEDURE — 99214 OFFICE O/P EST MOD 30 MIN: CPT | Performed by: INTERNAL MEDICINE

## 2019-02-21 RX ORDER — ATORVASTATIN CALCIUM 20 MG/1
20 TABLET, FILM COATED ORAL DAILY
COMMUNITY
End: 2019-05-07

## 2019-02-21 RX ORDER — CLOPIDOGREL BISULFATE 75 MG/1
75 TABLET ORAL DAILY
COMMUNITY
End: 2019-05-06

## 2019-02-21 ASSESSMENT — MIFFLIN-ST. JEOR: SCORE: 1712.81

## 2019-02-21 NOTE — PROGRESS NOTES
SUBJECTIVE:   Justyn Olivas is a 78 year old male who presents to clinic today for the following health issues:      ED/UC Followup:    Facility:  [facility] Emergency Department  Date of visit: 02/13/2019  Reason for visit: Stroke SX  Current Status: still having discomfort     Reports when he was traveling to [place] he was in the emergency department for stroke. States 1 week prior to going to [place] his L side started to become weak and lack of balance which he noticed was odd since he was going to a . Was progressively worsening when getting to [place] and went into emergency department after landing. 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 Was placed on plavix and reduced aspirin to 81 MG. Was also switched to atorvostatin  20 MG. Notes his strength is about the same as when in emergency department; balance still is not good particularly after sitting for long periods of time. Wife notes speech is better, a little slower but not searching for words as prior. Denies trouble with word searching. Denies tachycardia with exercise.     Notes his sugars avg  and . Was cut back to 30 units insulin since being in hospital.    States about 1 week ago around the same time in [place] he developed a cold. Congestion, ear pain, sore throat and rhinorrhea. Is now coughing up dark brown phlegm. He is not feeling feverish, sweats or chills. Denies sinus pain.           Problem list and histories reviewed & adjusted, as indicated.  Additional history: as documented    Patient Active Problem List   Diagnosis     Shoulder pain     Insomnia, unspecified type     Type 2 diabetes mellitus without complication, with long-term current use of insulin (H)     Benign essential hypertension     Hyperlipidemia LDL goal <100     Non-seasonal allergic rhinitis due to pollen     Primary osteoarthritis of both hips     Primary osteoarthritis involving multiple joints     Chronic non-seasonal allergic rhinitis, unspecified  trigger     History reviewed. No pertinent surgical history.    Social History     Tobacco Use     Smoking status: Never Smoker     Smokeless tobacco: Never Used   Substance Use Topics     Alcohol use: No     Alcohol/week: 0.0 oz     Family History   Problem Relation Age of Onset     Family History Negative Mother      Family History Negative Father          Current Outpatient Medications   Medication Sig Dispense Refill     ASPIRIN PO Take 325 mg by mouth       atorvastatin (LIPITOR) 20 MG tablet Take 20 mg by mouth daily       blood glucose (STEVE CONTOUR) test strip TESTING FOUR TIMES DAILY OR AS DIRECTED 400 strip 1     clopidogrel (PLAVIX) 75 MG tablet Take 75 mg by mouth daily       fluticasone (FLONASE) 50 MCG/ACT nasal spray Spray 1 spray into both nostrils 2 times daily as needed for rhinitis or allergies 3 Bottle 3     FLUZONE HIGH-DOSE 0.5 ML injection ADM 0.5ML IM UTD  0     Glucose Blood (STEVE CONTOUR TEST VI)        insulin glargine (LANTUS SOLOSTAR PEN) 100 UNIT/ML pen Inject 50 Units Subcutaneous At Bedtime 45 mL 0     insulin lispro (HUMALOG KWIKPEN) 100 UNIT/ML pen ADMINISTER UP TO 55 UNITS UNDER THE SKIN DAILY AS DIRECTED 45 mL 0     insulin pen needle (BD FERCHO U/F) 32G X 4 MM USE AS DIRECTED up to 4 times daily 400 each 1     irbesartan (AVAPRO) 150 MG tablet TAKE 1 TABLET(150 MG) BY MOUTH AT BEDTIME 90 tablet 3     LANTUS SOLOSTAR 100 UNIT/ML soln ADMINISTER 50 UNITS UNDER THE SKIN AT BEDTIME AS DIRECTED 45 mL 0     metFORMIN (GLUCOPHAGE) 1000 MG tablet TAKE 1 TABLET(1000 MG) BY MOUTH TWICE DAILY WITH MEALS 180 tablet 3     order for DME Hip steroid injectoion 1 Units 0     SHINGRIX injection ADM 0.5ML IM UTD  0     simvastatin (ZOCOR) 20 MG tablet Take 1 tablet (20 mg) by mouth At Bedtime 90 tablet 3     traZODone (DESYREL) 100 MG tablet TAKE 1 TABLET(100 MG) BY MOUTH AT BEDTIME 90 tablet 1     VENTOLIN  (90 BASE) MCG/ACT Inhaler INL 2 PFS PO QID PRN 1 Inhaler 3     No Known  Allergies  Recent Labs   Lab Test 02/05/19  1217 12/05/18  1201 02/01/18  1002  07/14/16   A1C 7.7* 8.1* 8.3*   < > 7.8*   LDL 92  --  78  --  83   HDL 40  --  48  --  43   TRIG 119  --  75  --  90   ALT 30  --  20  --   --    CR 1.37* 1.28* 1.30*   < >  --    GFRESTIMATED 49* 54* 53*   < >  --    GFRESTBLACK 56* 66 65   < >  --    POTASSIUM 4.7 4.8 4.7   < >  --    TSH 1.02  --  1.22  --   --     < > = values in this interval not displayed.      BP Readings from Last 3 Encounters:   02/21/19 129/78   02/05/19 140/76   12/05/18 136/80    Wt Readings from Last 3 Encounters:   02/21/19 96 kg (211 lb 9.6 oz)   02/05/19 97.5 kg (215 lb)   12/05/18 96.8 kg (213 lb 6.4 oz)                  Labs reviewed in EPIC    Reviewed and updated as needed this visit by clinical staff       Reviewed and updated as needed this visit by Provider         ROS:  Constitutional, HEENT, cardiovascular, pulmonary, GI, , musculoskeletal, neuro, skin, endocrine and psych systems are negative, except as otherwise noted.    OBJECTIVE:     /78 (BP Location: Left arm, Patient Position: Sitting, Cuff Size: Adult Regular)   Pulse 100   Temp 97.1  F (36.2  C) (Tympanic)   Ht 1.829 m (6')   Wt 96 kg (211 lb 9.6 oz)   SpO2 96%   BMI 28.70 kg/m    Body mass index is 28.7 kg/m .  GENERAL: healthy, alert, situational distressed and also having excessive sinus discomfort   HENT: ear canals and TM's normal, nose and mouth without ulcers or lesions; streaky erythema in posterior pharynx with dangling secretions   NECK: no adenopathy, no asymmetry, masses, or scars and thyroid normal to palpation  RESP: lungs clear to auscultation - no rales, rhonchi or wheezes  CV: regular rate and rhythm, normal S1 S2, no S3 or S4, no murmur, click or rub, no peripheral edema and peripheral pulses strong  ABDOMEN: soft, nontender, no hepatosplenomegaly, no masses and bowel sounds normal  MS: no gross musculoskeletal defects noted, no edema  NEURO: Alert and  oriented with confluent speech assocciated with a nasal twang  Normal strength and tone, mentation intact and speech normal; extremities 5/5 bilaterally throughout; DTRs 2+ and symmetric babinski withdrawal bilaterally   Romberg shaky but normal   PSYCH: mentation appears normal, affect normal/bright    Diagnostic Test Results:  No results found for this or any previous visit (from the past 24 hour(s)).    ASSESSMENT/PLAN:   1. Cerebrovascular accident (CVA), unspecified mechanism (H)  Stable. Continue plavix and aspirin; advised to have w/fiood.  Send for speech therapy, OT and Zio patch for 1 month to r/o afib or arrhythmia as cause. Hold on driving for time being; handicap sticker form completed.     2. Type 2 diabetes mellitus without complication, with long-term current use of insulin (H)  Stable; monitoring blood sugars closely, discussed monitoring blood sugars and not worrying about dose of insulin. No changes. Discussed eating a healthy, balanced and portion controlled diet.     3. Benign essential hypertension  Normotensive in clinic today. Continue medications w/o changes. Advised patient not to push too hard with workouts.  Reevaluate blood pressure medication to probably increase to 10 mg of lisinopril in the near term    4. Hyperlipidemia LDL goal <100  Switched to atorvastatin 20 MG in emergency department; continue without changes.     5. Primary osteoarthritis involving multiple joints    Sinusitis   Advised mucinex regularly; augmentin sent in advised taking yogurt with abx daily. Side effects reviewed including diarrhea.     Follow up in 1 month or as needed.     The information in this document, created by the medical scribe for me, accurately reflects the services I personally performed and the decisions made by me. I have reviewed and approved this document for accuracy prior to leaving the patient care area.  Bandar Greenberg MD  [facility]

## 2019-02-21 NOTE — PATIENT INSTRUCTIONS
Mucinex regularly for cold. Start and complete Augmentin 1 tab 2x per day (antibioitc) have a yogurt daily with the antibiotic.

## 2019-02-25 ENCOUNTER — HOSPITAL ENCOUNTER (OUTPATIENT)
Dept: PHYSICAL THERAPY | Facility: CLINIC | Age: 79
Setting detail: THERAPIES SERIES
End: 2019-02-25
Attending: INTERNAL MEDICINE
Payer: MEDICARE

## 2019-02-25 DIAGNOSIS — I63.9 CEREBROVASCULAR ACCIDENT (CVA), UNSPECIFIED MECHANISM (H): ICD-10-CM

## 2019-02-25 PROCEDURE — 97112 NEUROMUSCULAR REEDUCATION: CPT | Mod: GP | Performed by: PHYSICAL THERAPIST

## 2019-02-25 PROCEDURE — 97162 PT EVAL MOD COMPLEX 30 MIN: CPT | Mod: GP | Performed by: PHYSICAL THERAPIST

## 2019-02-25 ASSESSMENT — 6 MINUTE WALK TEST (6MWT): TOTAL DISTANCE WALKED (FT): 1240

## 2019-02-25 NOTE — PROGRESS NOTES
02/25/19 1600   Quick Adds   Type of Visit Initial OP PT Evaluation   General Information   Start of Care Date 02/25/19   Referring Physician Dr. Bandar Greenberg   Orders Evaluate and Treat as Indicated   Order Date 02/22/19   Medical Diagnosis CVA, unspecified mechanism   Onset of illness/injury or Date of Surgery 02/13/19   Surgical/Medical history reviewed Yes   Pertinent history of current problem (include personal factors and/or comorbidities that impact the POC) Reports when he was traveling to [place] he was in the emergency department for stroke. States 1 week prior to going to OR his L side started to become weak and lack of balance which he noticed was odd since he was going to a . Was progressively worsening when getting to [place], spoke with a neurologist and  went into emergency department after landing. 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 Was placed on plavix and reduced aspirin to 81 MG. Was also switched to atorvostatin  20 MG. Notes his strength is about the same as when in emergency department; balance still is not good particularly after sitting for long periods of time. Wife notes speech is better, a little slower but not searching for words as prior. Denies trouble with word searching. Denies tachycardia with exercise.    Prior level of function comment Did not use an AD. Had been working with  3x/week and then decreased to 2x/week. Works out at [employer]. 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  No   Have you fallen and had an injury in the past year? No   Timed Up and Go score (seconds) 12.14   Is patient a fall risk? Yes   Fall screen comments according to FGA is at increased risk for falls.    Pain   Patient currently in pain No   Cognitive Status Examination   Level of Consciousness alert   Cognitive Comment Wears hearing aids. No slurred speech noted.    Strength   Strength Comments B hip flexion, B hip abduction, B hip extension, B knee extension, B knee flexion, B dorsiflexion all 5/5 throughout.    Bed Mobility   Bed Mobility Comments Independent   Transfer Skills   Transfer Comments Independent   Gait   Gait Comments Arrived ambulating with cane. When ambulating without an AD demonstrates increased path deviation and unsteadiness.    Gait Special Tests Functional Gait Assessment Score out of 30   Score out of 30 19   Comments Indicates at increased risk for falls.    Gait Special Tests Six Minute Walk Test   Feet 1240 Feet   Balance   Balance Comments SLS 1-2 seconds on either side. Unable to do tandem stand.    Balance Special Tests Modified CTSIB Conditions   Condition 4, seconds 30 Seconds  (mild sway but very shaky)   Condition 5, seconds 3 Seconds   Balance Special Tests Sit to Stand Reps in 30 Seconds   Reps in 30 seconds 6   Comments Is below age/gender norms.    Sensory Examination   Sensory Perception Comments Reports sensation feels symmetrical   Coordination   Coordination Comments Heel to shin WFL, Finger to nose WFL. Still appears slightly incoordinated with L while walking though.    Planned Therapy Interventions   Planned Therapy Interventions balance training;gait training;neuromuscular re-education;strengthening   Clinical Impression   Criteria for Skilled Therapeutic Interventions Met yes, treatment indicated   PT Diagnosis Difficulty walking   Influenced by the following impairments Impaired standing balance   Functional limitations due to impairments at increased risk for falls,  using cane.    Clinical Presentation Evolving/Changing   Clinical Presentation Rationale variable fatigue, TUG, 6MWT, FGA, SLS, mCTSIB   Clinical Decision Making (Complexity) Moderate complexity   Therapy Frequency other (see comments)   Predicted Duration of Therapy Intervention (days/wks) 2x/week for 2 weeks and then 1x/week for 4 weeks   Risk & Benefits of therapy have been explained Yes   Patient, Family & other staff in agreement with plan of care Yes   GOALS   PT Eval Goals 1;2;3   Goal 1   Goal Identifier 6MWT   Goal Description Client will score at least a 1400 ft to be closer to age/gender means/    Target Date 04/07/19   Goal 2   Goal Identifier AD   Goal Description Client will be able to ambulate both indoor and community ambulation without an AD in order to be back to baseline level of functioning   Target Date 04/07/19   Goal 3   Goal Identifier FGA   Goal Description Client will score a 24 or better on the FGA to demonstrate that he is not at increased risk for falls   Target Date 04/07/19   Total Evaluation Time   PT Eval, Moderate Complexity Minutes (90324) 46

## 2019-02-25 NOTE — PROGRESS NOTES
[facility]        OUTPATIENT PHYSICAL THERAPY FUNCTIONAL EVALUATION  PLAN OF TREATMENT FOR OUTPATIENT REHABILITATION  (COMPLETE FOR INITIAL CLAIMS ONLY)  Patient's Last Name, First Name, M.I.  YOB: 1940  Justyn Olivas     Provider's Name   [facility]   Medical Record No.  [id number]     Start of Care Date:  02/25/19   Onset Date:  02/13/19   Type:     _X__PT   ____OT  ____SLP Medical Diagnosis:   CVA, unspecified mechanism     PT Diagnosis:  Difficulty walking Visits from SOC:  1                              __________________________________________________________________________________  Plan of Treatment/Functional Goals:  balance training, gait training, neuromuscular re-education, strengthening           GOALS  6MWT  Client will score at least a 1400 ft to be closer to age/gender means/   04/07/19    AD  Client will be able to ambulate both indoor and community ambulation without an AD in order to be back to baseline level of functioning  04/07/19    FGA  Client will score a 24 or better on the FGA to demonstrate that he is not at increased risk for falls  04/07/19        Therapy Frequency:  other (see comments)   Predicted Duration of Therapy Intervention:  2x/week for 2 weeks and then 1x/week for 4 weeks    Annamarie Coppola, PT                                    I CERTIFY THE NEED FOR THESE SERVICES FURNISHED UNDER        THIS PLAN OF TREATMENT AND WHILE UNDER MY CARE     (Physician co-signature of this document indicates review and certification of the therapy plan).                Certification Date From:    2/25/19  Certification Date To:   4/7/19    Referring Provider:  Dr. Bandar Greenberg    Initial Assessment  See Epic Evaluation- Start of Care Date: 02/25/19

## 2019-02-27 ENCOUNTER — HOSPITAL ENCOUNTER (OUTPATIENT)
Dept: SPEECH THERAPY | Facility: CLINIC | Age: 79
Setting detail: THERAPIES SERIES
End: 2019-02-27
Attending: INTERNAL MEDICINE
Payer: MEDICARE

## 2019-02-27 DIAGNOSIS — I63.9 CEREBROVASCULAR ACCIDENT (CVA), UNSPECIFIED MECHANISM (H): ICD-10-CM

## 2019-02-27 PROCEDURE — 92523 SPEECH SOUND LANG COMPREHEN: CPT | Mod: GN | Performed by: SPEECH-LANGUAGE PATHOLOGIST

## 2019-02-27 NOTE — PROGRESS NOTES
"     Speech-Language Pathology Department   EVALUATION  [facility]      02/27/19 1200       Present No   General Information   Type of Evaluation Speech and Language;Dysarthria   Type Of Visit Initial   Start Of Care Date 02/27/19   Referring Physician Dr. JOSHUA Greenberg MD    Orders Evaluate And Treat   Orders Comment Aphasia/Cognition/Language    Medical Diagnosis CVA   Onset Of Illness/injury Or Date Of Surgery 02/13/19  (Pt presented to ED. )   Precautions/Limitations no known precautions/limitations   Surgical/Medical history reviewed Yes   Pertinent History Of Current Problem Pt is a 78 y.o. male who traveled to [place] to visit family, he presented to the ED @ [facility] on 2/13/2019 d/t L leg weakness, imbalanced gait and unsteadiness (Pt reports these symptoms as early as 2/8/19), hoarse voice, and slower/slurred speech. CVA identified, Pt started on Plavex and discharged. Pt referred for OP SLP evaluation following apt with PCP on 2/22/19, where Pt reported improved word finding, but mildly reduced speaking rate.    Prior Level Of Function Comment Pt independent in all ADL's.    Current Community Support  Family/friend caregiver  (Pt lives @ home with his wife. )   Patient Role/employment History Retired  (Still involved with personal business. )   Living environment [facility]   Patient/family Goals To complete testing w/o concerns. Pt does not having concerns regarding speech/language/cog/dysphagia at this time.    Fall Risk Screen   Fall screen completed by PT   Pain Assessment   Pain Reported No   Oral Motor Sensory Function   Comments Pt reports perhaps mild changes in swallowing, but no report of overt s/sx of aspiration. Pt reports going slower, small bites/sips, reduced rate of intake.    Speech   Speech Comments Pt read aloud the \"Grandfather Passage\"=100% intelligibilty with appropriate inflection and pacing. In conversation Pt is 100% intelligible, he does " not feel a need to impliment reduced pacing for speech.    Language: Auditory Comprehension (understanding of spoken language)   Tests were administered at the following levels Complex (vocation/community/social activities)   Paragraph; Discourse Comprehension Test (out of 8 total; less than 7 is below mean) 8   Functional Assessment Scale (Auditory Comprehension) No Impairment   Language: Verbal Expression (use of spoken language to express information)   Tests were administered at the following levels Moderate (routine daily activities);Complex (vocation/community/social activities)   Generate Sentences; [place] Test for Differential Diagnosis Of Aphasia (out of 6 total) 6   [place] Naming Test, short form (out of 15 total) 12   Define Words; [place] Test for Differential Diagnosis Of Aphasia (out of 10 total) 8.5   Generative Naming Score; Cognitive Linguistic Quick Test 6   Generative Naming; Cognitive Linguistic Quick Test Result WNL  (Mean=5.14, SD=+/- 1.53)   Conversation; [place] Diagnostic Aphasia Exam rating (out of 5 total) 5   Functional Assessment Scale (Verbal Expression) No Impairment   Reading Comprehension (understanding of written language)   Comments (Reading Comprehension) Did not assess, Pt does not report any concerns.    Written Expression (use of writing to express information)   Comments (Written Expression) Did not assess, Pt did not report any concerns.    Education Assessment   Barriers to Learning No barriers   Preferred Learning Style Listening;Reading;Demonstration;Pictures/video   General Therapy Interventions   Intervention Comments No skilled intervention recommended based on normal results.    Clinical Impression, SLP Eval   Criteria for Skilled Therapeutic Interventions Met (SLP Eval) no problems identified which require skilled intervention   Risks and Benefits of Treatment have been explained. Yes   Patient, Family & other staff in agreement with plan of care Yes   Clinical  Impression Comments Based on the results of this assessment Pt does not present with deficits in the areas of speech or language requiring skilled intervention. Pt demonstrate mild word finding errors on the [place] Naming Test, however, Pt reports this is baseline for him. Pt verbalized understanding of all test results, and recommendation to return for services should further concerns arise.    Total Session Time   Sound production with lang comprehension and expression minutes (89726) 38   Total Evaluation Time 38     Thank you for referring Justyn MarkKamranTheo Deisy  to outpatient therapy at [facility] in [place].  Please call Florence Huff MA, SLP-CCC at [phone] or email [email or web address] with any questions or concerns.      Florence Huff M.A., SLP-CCC  Speech-Language Pathologist

## 2019-02-28 ENCOUNTER — HOSPITAL ENCOUNTER (OUTPATIENT)
Dept: PHYSICAL THERAPY | Facility: CLINIC | Age: 79
Setting detail: THERAPIES SERIES
End: 2019-02-28
Attending: INTERNAL MEDICINE
Payer: MEDICARE

## 2019-02-28 PROCEDURE — 97112 NEUROMUSCULAR REEDUCATION: CPT | Mod: GP | Performed by: PHYSICAL THERAPIST

## 2019-03-01 ENCOUNTER — TELEPHONE (OUTPATIENT)
Dept: FAMILY MEDICINE | Facility: CLINIC | Age: 79
End: 2019-03-01

## 2019-03-01 DIAGNOSIS — I48.0 PAROXYSMAL ATRIAL FIBRILLATION (H): Primary | ICD-10-CM

## 2019-03-01 NOTE — TELEPHONE ENCOUNTER
Reason for Call:  Other call back    Detailed comments: hospital was suppose to contact patient about placing in a heart monitor.  has not heard anything about this appointment told to call Dr Greenberg and tell him.    Phone Number Patient can be reached at: Home number on file [phone] (home)    Best Time: Soon    Can we leave a detailed message on this number? NO    Call taken on 3/1/2019 at 11:24 AM by Tea Kitchen

## 2019-03-01 NOTE — TELEPHONE ENCOUNTER
To PCP:     Please see message below.      If you would like 30 days of monitoring: The order would be Cardiac Event Monitor     If 14 days or less: Zio Patch     Pended Event Monitor for 30 days.     Please see pended order and revise as necessary.     Thank you,   Ana LUNDBERG RN

## 2019-03-03 DIAGNOSIS — E11.9 TYPE 2 DIABETES MELLITUS WITHOUT COMPLICATION, WITH LONG-TERM CURRENT USE OF INSULIN (H): ICD-10-CM

## 2019-03-03 DIAGNOSIS — Z79.4 TYPE 2 DIABETES MELLITUS WITHOUT COMPLICATION, WITH LONG-TERM CURRENT USE OF INSULIN (H): ICD-10-CM

## 2019-03-04 ENCOUNTER — HOSPITAL ENCOUNTER (OUTPATIENT)
Dept: PHYSICAL THERAPY | Facility: CLINIC | Age: 79
Setting detail: THERAPIES SERIES
End: 2019-03-04
Attending: INTERNAL MEDICINE
Payer: MEDICARE

## 2019-03-04 PROCEDURE — 97112 NEUROMUSCULAR REEDUCATION: CPT | Mod: GP | Performed by: PHYSICAL THERAPIST

## 2019-03-04 NOTE — TELEPHONE ENCOUNTER
"  insulin pen needle (BD FERCHO U/F) 32G X 4  each 1 6/12/2018         Last Written Prescription Date:  06/12/2018  Last Fill Quantity: 400,  # refills: 1   Last office visit: 2/21/2019 with prescribing provider:     Future Office Visit:  Unknown    Requested Prescriptions   Pending Prescriptions Disp Refills     insulin pen needle (BD FERCHO U/F) 32G X 4 MM miscellaneous [Pharmacy Med Name: B-D PEN NDL FERCHO [id number](5/32) GRN] 400 each 0     Sig: USE AS DIRECTED UP TO FOUR TIMES DAILY    Diabetic Supplies Protocol Passed - 3/3/2019  4:14 PM       Passed - Medication is active on med list       Passed - Patient is 18 years of age or older       Passed - Recent (6 mo) or future (30 days) visit within the authorizing provider's specialty    Patient had office visit in the last 6 months or has a visit in the next 30 days with authorizing provider.  See \"Patient Info\" tab in inbasket, or \"Choose Columns\" in Meds & Orders section of the refill encounter.              "

## 2019-03-05 DIAGNOSIS — I48.0 PAROXYSMAL A-FIB (H): Primary | ICD-10-CM

## 2019-03-05 NOTE — TELEPHONE ENCOUNTER
Called [facility]  They do not have the order  Fax: [phone]  refaxed    Rad HARRISON RN

## 2019-03-05 NOTE — TELEPHONE ENCOUNTER
Pt calling to inform the clinic that the hospital has not received the order for this  Please resend

## 2019-03-06 ENCOUNTER — HOSPITAL ENCOUNTER (OUTPATIENT)
Dept: CARDIOLOGY | Facility: CLINIC | Age: 79
Discharge: HOME | End: 2019-03-06
Attending: INTERNAL MEDICINE | Admitting: INTERNAL MEDICINE
Payer: MEDICARE

## 2019-03-06 DIAGNOSIS — I48.0 PAROXYSMAL A-FIB (H): ICD-10-CM

## 2019-03-06 PROCEDURE — 93270 REMOTE 30 DAY ECG REV/REPORT: CPT

## 2019-03-06 PROCEDURE — 93272 ECG/REVIEW INTERPRET ONLY: CPT | Performed by: INTERNAL MEDICINE

## 2019-03-07 ENCOUNTER — HOSPITAL ENCOUNTER (OUTPATIENT)
Dept: PHYSICAL THERAPY | Facility: CLINIC | Age: 79
Setting detail: THERAPIES SERIES
End: 2019-03-07
Attending: INTERNAL MEDICINE
Payer: MEDICARE

## 2019-03-07 PROCEDURE — 97112 NEUROMUSCULAR REEDUCATION: CPT | Mod: GP | Performed by: PHYSICAL THERAPIST

## 2019-03-11 ENCOUNTER — HOSPITAL ENCOUNTER (OUTPATIENT)
Dept: PHYSICAL THERAPY | Facility: CLINIC | Age: 79
Setting detail: THERAPIES SERIES
End: 2019-03-11
Attending: INTERNAL MEDICINE
Payer: MEDICARE

## 2019-03-11 PROCEDURE — 97112 NEUROMUSCULAR REEDUCATION: CPT | Mod: GP | Performed by: PHYSICAL THERAPIST

## 2019-03-11 PROCEDURE — 97750 PHYSICAL PERFORMANCE TEST: CPT | Mod: GP | Performed by: PHYSICAL THERAPIST

## 2019-03-11 NOTE — ADDENDUM NOTE
Encounter addended by: Florence Huff, SLP on: 3/11/2019 3:16 PM   Actions taken: Document created, Document edited

## 2019-03-14 ENCOUNTER — HOSPITAL ENCOUNTER (OUTPATIENT)
Dept: PHYSICAL THERAPY | Facility: CLINIC | Age: 79
Setting detail: THERAPIES SERIES
End: 2019-03-14
Attending: INTERNAL MEDICINE
Payer: MEDICARE

## 2019-03-14 PROCEDURE — 97112 NEUROMUSCULAR REEDUCATION: CPT | Mod: GP | Performed by: PHYSICAL THERAPIST

## 2019-03-18 ENCOUNTER — HOSPITAL ENCOUNTER (OUTPATIENT)
Dept: PHYSICAL THERAPY | Facility: CLINIC | Age: 79
Setting detail: THERAPIES SERIES
End: 2019-03-18
Attending: INTERNAL MEDICINE
Payer: MEDICARE

## 2019-03-18 PROCEDURE — 97112 NEUROMUSCULAR REEDUCATION: CPT | Mod: GP | Performed by: PHYSICAL THERAPIST

## 2019-03-18 PROCEDURE — 97110 THERAPEUTIC EXERCISES: CPT | Mod: GP | Performed by: PHYSICAL THERAPIST

## 2019-03-25 ENCOUNTER — HOSPITAL ENCOUNTER (OUTPATIENT)
Dept: PHYSICAL THERAPY | Facility: CLINIC | Age: 79
Setting detail: THERAPIES SERIES
End: 2019-03-25
Attending: INTERNAL MEDICINE
Payer: MEDICARE

## 2019-03-25 PROCEDURE — 97110 THERAPEUTIC EXERCISES: CPT | Mod: GP | Performed by: PHYSICAL THERAPIST

## 2019-03-25 PROCEDURE — 97750 PHYSICAL PERFORMANCE TEST: CPT | Mod: GP | Performed by: PHYSICAL THERAPIST

## 2019-04-08 ENCOUNTER — MYC MEDICAL ADVICE (OUTPATIENT)
Dept: FAMILY MEDICINE | Facility: CLINIC | Age: 79
End: 2019-04-08

## 2019-04-08 NOTE — TELEPHONE ENCOUNTER
Next 5 appointments (look out 90 days)    Apr 18, 2019 10:00 AM CDT  Office Visit with Bandar [facility]berg [facility]  [facility] [address] [facility] [id number] [phone]        Spoke with patient and rescheduled his visit to next week    Miguelina FLOOD RN

## 2019-04-09 ENCOUNTER — HOSPITAL ENCOUNTER (OUTPATIENT)
Dept: PHYSICAL THERAPY | Facility: CLINIC | Age: 79
Setting detail: THERAPIES SERIES
End: 2019-04-09
Attending: INTERNAL MEDICINE
Payer: MEDICARE

## 2019-04-09 PROCEDURE — 97112 NEUROMUSCULAR REEDUCATION: CPT | Mod: GP | Performed by: PHYSICAL THERAPIST

## 2019-04-09 NOTE — DISCHARGE SUMMARY
Outpatient Physical Therapy Discharge Note     Patient: Justyn Olivas  : 1940    Beginning/End Dates of Reporting Period:  2019 to 2019    Referring Provider: Dr. Bandar Greenberg    Therapy Diagnosis: Difficulty Walking     Client Self Report: Justyn states he feels he is back to normal and feels he is ready to be done with therapy. Plans to continue with HEP and regular exercise.     Objective Measurements:   30 second sit to stand:  Start: 6    6 Minute Walk Test:  Start: 1,240 ft  End: 1,580 ft       Modified CTSIB:  Condition 4: 30 seconds  Condition 5: 3 seconds      FGA:   Start:    End:        Goals:  Goal Identifier 6MWT   Goal Description Client will score at least a 1400 ft to be closer to age/gender means/    Target Date 19   Date Met      Progress: Goal met.      Goal Identifier AD   Goal Description Client will be able to ambulate both indoor and community ambulation without an AD in order to be back to baseline level of functioning   Target Date 19(MET)   Date Met      Progress:     Goal Identifier FGA   Goal Description Client will score a 24 or better on the FGA to demonstrate that he is not at increased risk for falls   Target Date 19   Date Met      Progress:  Goal met.        Progress Toward Goals:   Progress this reporting period: Justyn has met 3/3 of his goals. He demonstrates improvement in FGA, indicating decreased fall risk. Significant improvement as well in 6 minute walk test, indicating improved activity tolerance for community ambulation. Overall, he has improved in activity tolerance and is demonstrates safety with most static/dynamic activity. He is currently not using an assistive device with ambulation. He regularly goes on walks outside and will continue with HEP and exercising with a .    Plan:  Discharge from therapy.    Discharge:    Reason for Discharge: Patient has met all goals.    Equipment Issued: None    Discharge Plan:  Patient to continue home program.

## 2019-04-18 ENCOUNTER — OFFICE VISIT (OUTPATIENT)
Dept: FAMILY MEDICINE | Facility: CLINIC | Age: 79
End: 2019-04-18
Payer: MEDICARE

## 2019-04-18 VITALS
HEIGHT: 72 IN | HEART RATE: 77 BPM | WEIGHT: 212.6 LBS | OXYGEN SATURATION: 98 % | TEMPERATURE: 96.7 F | BODY MASS INDEX: 28.79 KG/M2 | DIASTOLIC BLOOD PRESSURE: 89 MMHG | SYSTOLIC BLOOD PRESSURE: 152 MMHG

## 2019-04-18 DIAGNOSIS — E78.5 HYPERLIPIDEMIA LDL GOAL <100: ICD-10-CM

## 2019-04-18 DIAGNOSIS — E11.9 TYPE 2 DIABETES MELLITUS WITHOUT COMPLICATION, WITH LONG-TERM CURRENT USE OF INSULIN (H): Primary | ICD-10-CM

## 2019-04-18 DIAGNOSIS — I10 BENIGN ESSENTIAL HYPERTENSION: ICD-10-CM

## 2019-04-18 DIAGNOSIS — Z79.4 TYPE 2 DIABETES MELLITUS WITHOUT COMPLICATION, WITH LONG-TERM CURRENT USE OF INSULIN (H): Primary | ICD-10-CM

## 2019-04-18 DIAGNOSIS — I63.40 CEREBROVASCULAR ACCIDENT (CVA) DUE TO EMBOLISM OF CEREBRAL ARTERY (H): ICD-10-CM

## 2019-04-18 DIAGNOSIS — M15.0 PRIMARY OSTEOARTHRITIS INVOLVING MULTIPLE JOINTS: ICD-10-CM

## 2019-04-18 LAB — HBA1C MFR BLD: 8.1 % (ref 0–5.6)

## 2019-04-18 PROCEDURE — 83036 HEMOGLOBIN GLYCOSYLATED A1C: CPT | Performed by: INTERNAL MEDICINE

## 2019-04-18 PROCEDURE — 99214 OFFICE O/P EST MOD 30 MIN: CPT | Performed by: INTERNAL MEDICINE

## 2019-04-18 PROCEDURE — 36415 COLL VENOUS BLD VENIPUNCTURE: CPT | Performed by: INTERNAL MEDICINE

## 2019-04-18 ASSESSMENT — MIFFLIN-ST. JEOR: SCORE: 1717.35

## 2019-04-18 NOTE — PROGRESS NOTES
SUBJECTIVE:   Justyn Olivas is a 78 year old male who presents to clinic today for the following   health issues:    Patient did well with rehab for his stroke and has completed therapy.  He feels that his symptoms have completely resolved.    He has an ongoing back which was treated with Robitussin and seems to be better.  He sounds nasally congested and is unaware of postnasal drip.  There is been no shortness of breath, wheezing, fevers, chills or sweats.    DM  Has embarked on an exercise program, he feels it is diet is improved however his weight has not changed and upon further review seems to be doing an excessive amount of grazing which includes simple carbohydrates.  Medication Followup of Plavix    Taking Medication as prescribed: yes    Side Effects:  None    Medication Helping Symptoms:  yes       Medication Followup of Simvastatin    Taking Medication as prescribed: NO-was told to stop taking  Side Effects:  None    Medication Helping Symptoms:  yes       Additional history: as documented    Reviewed  and updated as needed this visit by clinical staff  Tobacco  Allergies  Meds         Reviewed and updated as needed this visit by Provider         Current Outpatient Medications   Medication Sig Dispense Refill     ASPIRIN PO Take 325 mg by mouth       atorvastatin (LIPITOR) 20 MG tablet Take 20 mg by mouth daily       blood glucose (STEVE CONTOUR) test strip TESTING FOUR TIMES DAILY OR AS DIRECTED 400 strip 1     clopidogrel (PLAVIX) 75 MG tablet Take 75 mg by mouth daily       fluticasone (FLONASE) 50 MCG/ACT nasal spray Spray 1 spray into both nostrils 2 times daily as needed for rhinitis or allergies 3 Bottle 3     FLUZONE HIGH-DOSE 0.5 ML injection ADM 0.5ML IM UTD  0     Glucose Blood (STEVE CONTOUR TEST VI)        insulin glargine (LANTUS SOLOSTAR PEN) 100 UNIT/ML pen Inject 50 Units Subcutaneous At Bedtime 45 mL 0     insulin lispro (HUMALOG KWIKPEN) 100 UNIT/ML pen ADMINISTER UP TO 55  UNITS UNDER THE SKIN DAILY AS DIRECTED 45 mL 0     insulin pen needle (BD FERCHO U/F) 32G X 4 MM miscellaneous USE AS DIRECTED UP TO FOUR TIMES DAILY 400 each 1     irbesartan (AVAPRO) 150 MG tablet TAKE 1 TABLET(150 MG) BY MOUTH AT BEDTIME 90 tablet 3     LANTUS SOLOSTAR 100 UNIT/ML soln ADMINISTER 50 UNITS UNDER THE SKIN AT BEDTIME AS DIRECTED 45 mL 0     metFORMIN (GLUCOPHAGE) 1000 MG tablet TAKE 1 TABLET(1000 MG) BY MOUTH TWICE DAILY WITH MEALS 180 tablet 3     order for DME Hip steroid injectoion 1 Units 0     SHINGRIX injection ADM 0.5ML IM UTD  0     traZODone (DESYREL) 100 MG tablet TAKE 1 TABLET(100 MG) BY MOUTH AT BEDTIME 90 tablet 1     VENTOLIN  (90 BASE) MCG/ACT Inhaler INL 2 PFS PO QID PRN 1 Inhaler 3     simvastatin (ZOCOR) 20 MG tablet Take 1 tablet (20 mg) by mouth At Bedtime (Patient not taking: Reported on 4/18/2019) 90 tablet 3     No Known Allergies    ROS:  Constitutional, HEENT, cardiovascular, pulmonary, gi and gu systems are negative, except as otherwise noted.    OBJECTIVE:     /89   Pulse 77   Temp 96.7  F (35.9  C) (Oral)   Ht 1.829 m (6')   Wt 96.4 kg (212 lb 9.6 oz)   SpO2 98%   BMI 28.83 kg/m    Body mass index is 28.83 kg/m .  GENERAL: healthy, alert and no distress.  HEENT nostrils both congested throat showed streaking of the posterior pharynx  NECK: no adenopathy, no asymmetry, masses, or scars and thyroid normal to palpation  RESP: lungs clear to auscultation - no rales, rhonchi or wheezes  CV: regular rate and rhythm, normal S1 S2, no S3 or S4, no murmur, click or rub, no peripheral edema and peripheral pulses strong  ABDOMEN: soft, nontender, no hepatosplenomegaly, no masses and bowel sounds normal  MS: no gross musculoskeletal defects noted, no edema    Hemoglobin A1c 8.1    ASSESSMENT/PLAN:             1. Type 2 diabetes mellitus without complication, with long-term current use of insulin (H)  Reviewed opportunities to improve his-control including increasing  his insulin which would most likely cause weight gain or improved dietary discretion along with regular exercise or activity.  Patient agreeable to managing his diet and exercise more carefully and will proceed with close follow-up.  - Hemoglobin A1c    2. Benign essential hypertension  Recommend checking his blood pressure frequently at home to ensure good control with a goal of 130/80 or less    3. Cerebrovascular accident (CVA) due to embolism of cerebral artery (H)  Finished rehab with good success with managing his deficits with return of normal neuro function.    4. Hyperlipidemia LDL goal <100  Continue with same parameters and hopefully this will improve with his plan routine    5. Primary osteoarthritis involving multiple joints          Bandar Greenberg MD  [facility]

## 2019-05-05 ENCOUNTER — MYC MEDICAL ADVICE (OUTPATIENT)
Dept: FAMILY MEDICINE | Facility: CLINIC | Age: 79
End: 2019-05-05

## 2019-05-05 DIAGNOSIS — I63.40 CEREBROVASCULAR ACCIDENT (CVA) DUE TO EMBOLISM OF CEREBRAL ARTERY (H): ICD-10-CM

## 2019-05-05 DIAGNOSIS — E78.5 HYPERLIPIDEMIA LDL GOAL <100: Primary | ICD-10-CM

## 2019-05-07 RX ORDER — ATORVASTATIN CALCIUM 20 MG/1
20 TABLET, FILM COATED ORAL DAILY
Qty: 90 TABLET | Refills: 3 | Status: SHIPPED | OUTPATIENT
Start: 2019-05-07 | End: 2020-02-10

## 2019-05-07 RX ORDER — CLOPIDOGREL BISULFATE 75 MG/1
75 TABLET ORAL DAILY
Qty: 90 TABLET | Refills: 3 | Status: SHIPPED | OUTPATIENT
Start: 2019-05-07 | End: 2020-05-04

## 2019-05-07 NOTE — TELEPHONE ENCOUNTER
Routing refill request to provider for review/approval because:  Medication is reported/historical  Please authorize if appropriate.  Thanks,  Bobbi Aguilar RN

## 2019-06-04 ENCOUNTER — OFFICE VISIT (OUTPATIENT)
Dept: FAMILY MEDICINE | Facility: CLINIC | Age: 79
End: 2019-06-04
Payer: MEDICARE

## 2019-06-04 VITALS
BODY MASS INDEX: 28.85 KG/M2 | OXYGEN SATURATION: 97 % | DIASTOLIC BLOOD PRESSURE: 86 MMHG | WEIGHT: 213 LBS | SYSTOLIC BLOOD PRESSURE: 139 MMHG | TEMPERATURE: 97.4 F | HEART RATE: 92 BPM | HEIGHT: 72 IN

## 2019-06-04 DIAGNOSIS — J30.1 NON-SEASONAL ALLERGIC RHINITIS DUE TO POLLEN: ICD-10-CM

## 2019-06-04 DIAGNOSIS — E78.5 HYPERLIPIDEMIA LDL GOAL <100: ICD-10-CM

## 2019-06-04 DIAGNOSIS — M16.0 PRIMARY OSTEOARTHRITIS OF BOTH HIPS: ICD-10-CM

## 2019-06-04 DIAGNOSIS — I10 BENIGN ESSENTIAL HYPERTENSION: ICD-10-CM

## 2019-06-04 DIAGNOSIS — E11.9 TYPE 2 DIABETES MELLITUS WITHOUT COMPLICATION, WITH LONG-TERM CURRENT USE OF INSULIN (H): Primary | ICD-10-CM

## 2019-06-04 DIAGNOSIS — M15.0 PRIMARY OSTEOARTHRITIS INVOLVING MULTIPLE JOINTS: ICD-10-CM

## 2019-06-04 DIAGNOSIS — Z79.4 TYPE 2 DIABETES MELLITUS WITHOUT COMPLICATION, WITH LONG-TERM CURRENT USE OF INSULIN (H): Primary | ICD-10-CM

## 2019-06-04 DIAGNOSIS — I63.40 CEREBROVASCULAR ACCIDENT (CVA) DUE TO EMBOLISM OF CEREBRAL ARTERY (H): ICD-10-CM

## 2019-06-04 LAB
ERYTHROCYTE [DISTWIDTH] IN BLOOD BY AUTOMATED COUNT: 13.9 % (ref 10–15)
HBA1C MFR BLD: 8.1 % (ref 0–5.6)
HCT VFR BLD AUTO: 38.7 % (ref 40–53)
HGB BLD-MCNC: 12.9 G/DL (ref 13.3–17.7)
MCH RBC QN AUTO: 29.3 PG (ref 26.5–33)
MCHC RBC AUTO-ENTMCNC: 33.3 G/DL (ref 31.5–36.5)
MCV RBC AUTO: 88 FL (ref 78–100)
PLATELET # BLD AUTO: 216 10E9/L (ref 150–450)
RBC # BLD AUTO: 4.41 10E12/L (ref 4.4–5.9)
WBC # BLD AUTO: 6.1 10E9/L (ref 4–11)

## 2019-06-04 PROCEDURE — 99214 OFFICE O/P EST MOD 30 MIN: CPT | Performed by: INTERNAL MEDICINE

## 2019-06-04 PROCEDURE — 83036 HEMOGLOBIN GLYCOSYLATED A1C: CPT | Performed by: INTERNAL MEDICINE

## 2019-06-04 PROCEDURE — 80048 BASIC METABOLIC PNL TOTAL CA: CPT | Performed by: INTERNAL MEDICINE

## 2019-06-04 PROCEDURE — 85027 COMPLETE CBC AUTOMATED: CPT | Performed by: INTERNAL MEDICINE

## 2019-06-04 PROCEDURE — 36415 COLL VENOUS BLD VENIPUNCTURE: CPT | Performed by: INTERNAL MEDICINE

## 2019-06-04 RX ORDER — IRBESARTAN 150 MG/1
TABLET ORAL
Qty: 90 TABLET | Refills: 3 | Status: SHIPPED | OUTPATIENT
Start: 2019-06-04 | End: 2020-05-04

## 2019-06-04 ASSESSMENT — MIFFLIN-ST. JEOR: SCORE: 1719.16

## 2019-06-05 LAB
ANION GAP SERPL CALCULATED.3IONS-SCNC: 9 MMOL/L (ref 3–14)
BUN SERPL-MCNC: 26 MG/DL (ref 7–30)
CALCIUM SERPL-MCNC: 8.6 MG/DL (ref 8.5–10.1)
CHLORIDE SERPL-SCNC: 111 MMOL/L (ref 94–109)
CO2 SERPL-SCNC: 21 MMOL/L (ref 20–32)
CREAT SERPL-MCNC: 1.41 MG/DL (ref 0.66–1.25)
GFR SERPL CREATININE-BSD FRML MDRD: 47 ML/MIN/{1.73_M2}
GLUCOSE SERPL-MCNC: 228 MG/DL (ref 70–99)
POTASSIUM SERPL-SCNC: 4.8 MMOL/L (ref 3.4–5.3)
SODIUM SERPL-SCNC: 141 MMOL/L (ref 133–144)

## 2019-06-10 ENCOUNTER — TRANSFERRED RECORDS (OUTPATIENT)
Dept: HEALTH INFORMATION MANAGEMENT | Facility: CLINIC | Age: 79
End: 2019-06-10

## 2019-06-27 DIAGNOSIS — G47.00 INSOMNIA, UNSPECIFIED TYPE: ICD-10-CM

## 2019-06-27 NOTE — TELEPHONE ENCOUNTER
"Last Written Prescription Date:  1/04/19  Last Fill Quantity: 90 tablet,  # refills: 1   Last office visit: 6/4/2019 with prescribing provider:  Yari   Future Office Visit:   Next 5 appointments (look out 90 days)    Jul 08, 2019 10:30 AM CDT  Office Visit with Bandar Greenberg MD  [facility] [address] [facility] [address]  [phone]         Requested Prescriptions   Pending Prescriptions Disp Refills     traZODone (DESYREL) 100 MG tablet [Pharmacy Med Name: TRAZODONE 100MG TABLETS] 90 tablet 0     Sig: TAKE 1 TABLET(100 MG) BY MOUTH AT BEDTIME       Serotonin Modulators Passed - 6/27/2019  9:45 AM        Passed - Recent (12 mo) or future (30 days) visit within the authorizing provider's specialty     Patient had office visit in the last 12 months or has a visit in the next 30 days with authorizing provider or within the authorizing provider's specialty.  See \"Patient Info\" tab in inbasket, or \"Choose Columns\" in Meds & Orders section of the refill encounter.              Passed - Medication is active on med list        Passed - Patient is age 18 or older          "

## 2019-07-01 RX ORDER — TRAZODONE HYDROCHLORIDE 100 MG/1
TABLET ORAL
Qty: 90 TABLET | Refills: 1 | Status: SHIPPED | OUTPATIENT
Start: 2019-07-01 | End: 2019-12-30

## 2019-07-01 NOTE — TELEPHONE ENCOUNTER
Prescription approved per [facility] Refill Protocol.    Rad HARRISON RN

## 2019-07-08 ENCOUNTER — TELEPHONE (OUTPATIENT)
Dept: FAMILY MEDICINE | Facility: CLINIC | Age: 79
End: 2019-07-08

## 2019-07-08 NOTE — TELEPHONE ENCOUNTER
Spoke with patient and set up OV for him at sooner date which was available on  schedule   Live Zuniga CMA on 7/8/2019 at 5:08 PM'

## 2019-07-08 NOTE — TELEPHONE ENCOUNTER
Reason for Call:  Other     Detailed comments: Pt requested Olga to give him a call. Did not want to leave any further details to his message.     This is after Pt came in to see Dr. Greenberg for an tim't and did not receive our VM to R/S. Pt was unhappy the next available appointment is 2 weeks later    Phone Number Patient can be reached at: Home number on file [phone] (home)    Best Time: any    Can we leave a detailed message on this number? YES    Call taken on 7/8/2019 at 10:24 AM by Shelbi Johnson

## 2019-07-11 ENCOUNTER — ANCILLARY PROCEDURE (OUTPATIENT)
Dept: GENERAL RADIOLOGY | Facility: CLINIC | Age: 79
End: 2019-07-11
Attending: PHYSICIAN ASSISTANT
Payer: MEDICARE

## 2019-07-11 ENCOUNTER — OFFICE VISIT (OUTPATIENT)
Dept: URGENT CARE | Facility: URGENT CARE | Age: 79
End: 2019-07-11
Payer: MEDICARE

## 2019-07-11 VITALS
SYSTOLIC BLOOD PRESSURE: 118 MMHG | WEIGHT: 213 LBS | BODY MASS INDEX: 28.89 KG/M2 | TEMPERATURE: 98.8 F | DIASTOLIC BLOOD PRESSURE: 66 MMHG | HEART RATE: 100 BPM | OXYGEN SATURATION: 93 %

## 2019-07-11 DIAGNOSIS — J11.1 INFLUENZA-LIKE ILLNESS: Primary | ICD-10-CM

## 2019-07-11 DIAGNOSIS — E11.9 TYPE 2 DIABETES MELLITUS WITHOUT COMPLICATION, WITH LONG-TERM CURRENT USE OF INSULIN (H): ICD-10-CM

## 2019-07-11 DIAGNOSIS — J20.9 ACUTE BRONCHITIS WITH SYMPTOMS > 10 DAYS: ICD-10-CM

## 2019-07-11 DIAGNOSIS — Z79.4 TYPE 2 DIABETES MELLITUS WITHOUT COMPLICATION, WITH LONG-TERM CURRENT USE OF INSULIN (H): ICD-10-CM

## 2019-07-11 PROCEDURE — 99214 OFFICE O/P EST MOD 30 MIN: CPT

## 2019-07-11 PROCEDURE — 71046 X-RAY EXAM CHEST 2 VIEWS: CPT

## 2019-07-11 RX ORDER — AZITHROMYCIN 250 MG/1
TABLET, FILM COATED ORAL
Qty: 6 TABLET | Refills: 0 | Status: SHIPPED | OUTPATIENT
Start: 2019-07-11 | End: 2019-07-17

## 2019-07-11 ASSESSMENT — ENCOUNTER SYMPTOMS
HEADACHES: 0
EYE REDNESS: 0
NEUROLOGICAL NEGATIVE: 1
DIZZINESS: 0
LIGHT-HEADEDNESS: 0
CARDIOVASCULAR NEGATIVE: 1
DIAPHORESIS: 0
ADENOPATHY: 0
MUSCULOSKELETAL NEGATIVE: 1
NAUSEA: 0
EYES NEGATIVE: 1
DIARRHEA: 0
CONSTITUTIONAL NEGATIVE: 1
ENDOCRINE NEGATIVE: 1
PALPITATIONS: 0
ABDOMINAL PAIN: 0
VOMITING: 0
WEAKNESS: 0
EYE ITCHING: 0
FEVER: 0
CHILLS: 0
DYSURIA: 0
MYALGIAS: 0
POLYDIPSIA: 0
SHORTNESS OF BREATH: 0
FREQUENCY: 0
RHINORRHEA: 0
WHEEZING: 0
COUGH: 1
GASTROINTESTINAL NEGATIVE: 1
EYE DISCHARGE: 0
CHEST TIGHTNESS: 0
HEMATURIA: 0
SORE THROAT: 0

## 2019-07-11 NOTE — PROGRESS NOTES
Chief Complaint:     Chief Complaint   Patient presents with     Urgent Care     URI     Coughing and congested. 10 days ago at  for viral infection. last couple days went into chest. gets worse when lays down       HPI: Justyn Olivas is an 78 year old male who presents with productive cough and nasal congestion. It began 10 day(s) ago and has unchanged. He has had some green and yellow sputum.  He did have body aches and a sore throat, but these have resolved.  He was in to an  in [place], and strep testing was negative.  Cough is nonproductive, occasional There is no shortness of breath, wheezing and chest pain.      Recent travel?  no.      ROS:     Review of Systems   Constitutional: Negative.  Negative for chills, diaphoresis and fever.   HENT: Positive for congestion. Negative for ear pain, rhinorrhea and sore throat.    Eyes: Negative.  Negative for discharge, redness and itching.   Respiratory: Positive for cough. Negative for chest tightness, shortness of breath and wheezing.    Cardiovascular: Negative.  Negative for chest pain and palpitations.   Gastrointestinal: Negative.  Negative for abdominal pain, diarrhea, nausea and vomiting.   Endocrine: Negative.  Negative for polydipsia and polyuria.   Genitourinary: Negative for dysuria, frequency, hematuria and urgency.   Musculoskeletal: Negative.  Negative for myalgias.   Skin: Negative for rash.   Allergic/Immunologic: Negative for immunocompromised state.   Neurological: Negative.  Negative for dizziness, weakness, light-headedness and headaches.   Hematological: Negative for adenopathy.        Respiratory History  occasional episodes of bronchitis       Family History   Family History   Problem Relation Age of Onset     Family History Negative Mother      Family History Negative Father         Problem history  Patient Active Problem List   Diagnosis     Shoulder pain     Insomnia, unspecified type     Type 2 diabetes mellitus without  complication, with long-term current use of insulin (H)     Benign essential hypertension     Hyperlipidemia LDL goal <100     Non-seasonal allergic rhinitis due to pollen     Primary osteoarthritis of both hips     Primary osteoarthritis involving multiple joints     Chronic non-seasonal allergic rhinitis, unspecified trigger     Cerebrovascular accident (CVA) due to embolism of cerebral artery (H)        Allergies  No Known Allergies     Social History  Social History     Socioeconomic History     Marital status:      Spouse name: Not on file     Number of children: Not on file     Years of education: Not on file     Highest education level: Not on file   Occupational History     Not on file   Social Needs     Financial resource strain: Not on file     Food insecurity:     Worry: Not on file     Inability: Not on file     Transportation needs:     Medical: Not on file     Non-medical: Not on file   Tobacco Use     Smoking status: Never Smoker     Smokeless tobacco: Never Used   Substance and Sexual Activity     Alcohol use: No     Alcohol/week: 0.0 oz     Drug use: No     Sexual activity: Yes     Partners: Female     Birth control/protection: None   Lifestyle     Physical activity:     Days per week: Not on file     Minutes per session: Not on file     Stress: Not on file   Relationships     Social connections:     Talks on phone: Not on file     Gets together: Not on file     Attends Taoism service: Not on file     Active member of club or organization: Not on file     Attends meetings of clubs or organizations: Not on file     Relationship status: Not on file     Intimate partner violence:     Fear of current or ex partner: Not on file     Emotionally abused: Not on file     Physically abused: Not on file     Forced sexual activity: Not on file   Other Topics Concern     Parent/sibling w/ CABG, MI or angioplasty before 65F 55M? Not Asked   Social History Narrative     Not on file        Current  Meds    Current Outpatient Medications:      azithromycin (ZITHROMAX) 250 MG tablet, Two tablets first day, then one tablet daily for four days., Disp: 6 tablet, Rfl: 0     ASPIRIN PO, Take 325 mg by mouth, Disp: , Rfl:      atorvastatin (LIPITOR) 20 MG tablet, Take 1 tablet (20 mg) by mouth daily, Disp: 90 tablet, Rfl: 3     blood glucose (STEVE CONTOUR) test strip, TESTING FOUR TIMES DAILY OR AS DIRECTED, Disp: 400 strip, Rfl: 1     clopidogrel (PLAVIX) 75 MG tablet, Take 1 tablet (75 mg) by mouth daily, Disp: 90 tablet, Rfl: 3     fluticasone (FLONASE) 50 MCG/ACT nasal spray, Spray 1 spray into both nostrils 2 times daily as needed for rhinitis or allergies, Disp: 3 Bottle, Rfl: 3     FLUZONE HIGH-DOSE 0.5 ML injection, ADM 0.5ML IM UTD, Disp: , Rfl: 0     Glucose Blood (STEVE CONTOUR TEST VI), , Disp: , Rfl:      insulin glargine (LANTUS SOLOSTAR PEN) 100 UNIT/ML pen, Inject 50 Units Subcutaneous At Bedtime, Disp: 45 mL, Rfl: 0     insulin lispro (HUMALOG KWIKPEN) 100 UNIT/ML pen, ADMINISTER UP TO 55 UNITS UNDER THE SKIN DAILY AS DIRECTED, Disp: 45 mL, Rfl: 0     insulin pen needle (BD FERCHO U/F) 32G X 4 MM miscellaneous, USE AS DIRECTED UP TO FOUR TIMES DAILY, Disp: 400 each, Rfl: 1     irbesartan (AVAPRO) 150 MG tablet, TAKE 1 TABLET(150 MG) BY MOUTH AT BEDTIME, Disp: 90 tablet, Rfl: 3     LANTUS SOLOSTAR 100 UNIT/ML soln, ADMINISTER 50 UNITS UNDER THE SKIN AT BEDTIME AS DIRECTED, Disp: 45 mL, Rfl: 0     metFORMIN (GLUCOPHAGE) 1000 MG tablet, TAKE 1 TABLET(1000 MG) BY MOUTH TWICE DAILY WITH MEALS, Disp: 180 tablet, Rfl: 3     order for DME, Hip steroid injectoion, Disp: 1 Units, Rfl: 0     SHINGRIX injection, ADM 0.5ML IM UTD, Disp: , Rfl: 0     traZODone (DESYREL) 100 MG tablet, TAKE 1 TABLET(100 MG) BY MOUTH AT BEDTIME, Disp: 90 tablet, Rfl: 1     VENTOLIN  (90 BASE) MCG/ACT Inhaler, INL 2 PFS PO QID PRN, Disp: 1 Inhaler, Rfl: 3        OBJECTIVE     Vital signs reviewed by Leif Ornelas  /66    Pulse 100   Temp 98.8  F (37.1  C) (Oral)   Wt 96.6 kg (213 lb)   SpO2 93%   BMI 28.89 kg/m       Physical Exam   Constitutional: He is oriented to person, place, and time. He appears well-developed and well-nourished. He is cooperative.  Non-toxic appearance. He does not have a sickly appearance. He does not appear ill. No distress.   HENT:   Head: Normocephalic and atraumatic.   Right Ear: Hearing, tympanic membrane, external ear and ear canal normal. Tympanic membrane is not perforated, not erythematous, not retracted and not bulging.   Left Ear: Hearing, tympanic membrane, external ear and ear canal normal. Tympanic membrane is not perforated, not erythematous, not retracted and not bulging.   Nose: Mucosal edema and rhinorrhea present. Right sinus exhibits no maxillary sinus tenderness and no frontal sinus tenderness. Left sinus exhibits no maxillary sinus tenderness and no frontal sinus tenderness.   Mouth/Throat: Mucous membranes are normal. Posterior oropharyngeal erythema present. No oropharyngeal exudate or posterior oropharyngeal edema. Tonsils are 0 on the right. Tonsils are 0 on the left. No tonsillar exudate.   Eyes: Pupils are equal, round, and reactive to light. Conjunctivae, EOM and lids are normal. Right eye exhibits no discharge and no exudate. Left eye exhibits no discharge and no exudate. Right conjunctiva is not injected. Left conjunctiva is not injected.   Neck: Normal range of motion. Neck supple.   Cardiovascular: Normal rate, regular rhythm, normal heart sounds and intact distal pulses. Exam reveals no gallop and no friction rub.   No murmur heard.  Pulmonary/Chest: Effort normal. No stridor. No respiratory distress. He has no decreased breath sounds. He has wheezes. He has no rhonchi. He has no rales. He exhibits no tenderness.   Abdominal: Soft. Bowel sounds are normal. He exhibits no distension and no mass. There is no tenderness. There is no rigidity, no rebound, no guarding and no  CVA tenderness.   Musculoskeletal: Normal range of motion.   Neurological: He is alert and oriented to person, place, and time. He displays normal reflexes. No cranial nerve deficit or sensory deficit. He exhibits normal muscle tone. Coordination normal.   Skin: Skin is warm. Capillary refill takes less than 2 seconds. He is not diaphoretic.   Psychiatric: He has a normal mood and affect. His behavior is normal. Judgment and thought content normal.         Labs:     Results for orders placed or performed in visit on 07/11/19   XR Chest 2 Views    Narrative    CHEST TWO VIEWS    7/11/2019 5:31 PM     HISTORY: Ten days of worsening cough, rule out pneumonia.  Influenza-like illness.    COMPARISON: 2/1/2018.      Impression    IMPRESSION: No acute cardiopulmonary disease. Mild atelectasis at the  left lung base.       Medical Decision Making:    Differential Diagnosis:  URI Adult/Peds:  Bronchitis-viral, Influenza, Pneumonia and Viral upper respiratory illness        ASSESSMENT    1. Influenza-like illness    2. Type 2 diabetes mellitus without complication, with long-term current use of insulin (H)    3. Acute bronchitis with symptoms > 10 days        PLAN    Patient presents with 10 days of productive cough and nasal congestion.  Patient is in no acute distress.  Temp is 98.8 in clinic today.  Lung sounds had some wheezing bilaterally and O2 sats at 93% on RA.  Imaging to rule out pneumonia ordered.  CXR was negative for any acute infiltrates or consolidations per my read.  We will call with radiology interpretation only if different.  With length of symptoms, Rx for Zithromax today.  Patient encouraged to continue to monitor blood sugars closely.  Last A1C was 8.1 on 6/4/2019  Rest, Push fluids, vaporizer, elevation of head of bed.  Ibuprofen and or Tylenol for any fever or body aches.  Over the counter cough suppressant- PRN- as discussed.   If symptoms worsen, recheck immediately otherwise follow up with your PCP  in 1 week if symptoms are not improving.  Worrisome symptoms discussed with instructions to go to the ED.  Patient verbalized understanding and agreed with this plan.         Leif Ornelas  7/11/2019, 5:01 PM

## 2019-07-11 NOTE — PATIENT INSTRUCTIONS

## 2019-07-17 ENCOUNTER — OFFICE VISIT (OUTPATIENT)
Dept: FAMILY MEDICINE | Facility: CLINIC | Age: 79
End: 2019-07-17
Payer: MEDICARE

## 2019-07-17 VITALS
BODY MASS INDEX: 28.88 KG/M2 | SYSTOLIC BLOOD PRESSURE: 124 MMHG | HEART RATE: 105 BPM | OXYGEN SATURATION: 96 % | WEIGHT: 213.2 LBS | HEIGHT: 72 IN | TEMPERATURE: 97.4 F | DIASTOLIC BLOOD PRESSURE: 78 MMHG

## 2019-07-17 DIAGNOSIS — J20.9 ACUTE BRONCHITIS, UNSPECIFIED ORGANISM: ICD-10-CM

## 2019-07-17 DIAGNOSIS — I48.0 PAROXYSMAL ATRIAL FIBRILLATION (H): Primary | ICD-10-CM

## 2019-07-17 DIAGNOSIS — M16.0 PRIMARY OSTEOARTHRITIS OF BOTH HIPS: ICD-10-CM

## 2019-07-17 DIAGNOSIS — I10 BENIGN ESSENTIAL HYPERTENSION: ICD-10-CM

## 2019-07-17 DIAGNOSIS — I63.40 CEREBROVASCULAR ACCIDENT (CVA) DUE TO EMBOLISM OF CEREBRAL ARTERY (H): ICD-10-CM

## 2019-07-17 DIAGNOSIS — E78.5 HYPERLIPIDEMIA LDL GOAL <100: ICD-10-CM

## 2019-07-17 DIAGNOSIS — E11.9 TYPE 2 DIABETES MELLITUS WITHOUT COMPLICATION, WITH LONG-TERM CURRENT USE OF INSULIN (H): ICD-10-CM

## 2019-07-17 DIAGNOSIS — J30.89 CHRONIC NONSEASONAL ALLERGIC RHINITIS DUE TO POLLEN: ICD-10-CM

## 2019-07-17 DIAGNOSIS — Z79.4 TYPE 2 DIABETES MELLITUS WITHOUT COMPLICATION, WITH LONG-TERM CURRENT USE OF INSULIN (H): ICD-10-CM

## 2019-07-17 DIAGNOSIS — G47.00 INSOMNIA, UNSPECIFIED TYPE: ICD-10-CM

## 2019-07-17 LAB
ERYTHROCYTE [DISTWIDTH] IN BLOOD BY AUTOMATED COUNT: 12.8 % (ref 10–15)
HBA1C MFR BLD: 8.3 % (ref 0–5.6)
HCT VFR BLD AUTO: 36.7 % (ref 40–53)
HGB BLD-MCNC: 12.3 G/DL (ref 13.3–17.7)
MCH RBC QN AUTO: 29 PG (ref 26.5–33)
MCHC RBC AUTO-ENTMCNC: 33.5 G/DL (ref 31.5–36.5)
MCV RBC AUTO: 87 FL (ref 78–100)
PLATELET # BLD AUTO: 316 10E9/L (ref 150–450)
RBC # BLD AUTO: 4.24 10E12/L (ref 4.4–5.9)
WBC # BLD AUTO: 8.7 10E9/L (ref 4–11)

## 2019-07-17 PROCEDURE — 83036 HEMOGLOBIN GLYCOSYLATED A1C: CPT | Performed by: INTERNAL MEDICINE

## 2019-07-17 PROCEDURE — 85027 COMPLETE CBC AUTOMATED: CPT | Performed by: INTERNAL MEDICINE

## 2019-07-17 PROCEDURE — 99214 OFFICE O/P EST MOD 30 MIN: CPT | Performed by: INTERNAL MEDICINE

## 2019-07-17 PROCEDURE — 36415 COLL VENOUS BLD VENIPUNCTURE: CPT | Performed by: INTERNAL MEDICINE

## 2019-07-17 ASSESSMENT — MIFFLIN-ST. JEOR: SCORE: 1720.07

## 2019-07-17 NOTE — PROGRESS NOTES
Subjective     Justyn Olivas is a 78 year old male who presents to clinic today for the following health issues:    HPI   ED/UC Followup:    Facility:  [facility]  Date of visit: 07/11/19  Reason for visit: URI  Current Status: Symptoms are starting to get better but are still slightly present     Patient reported to urgent care with progressive illness for 1 week with significant sore throat.  He also had an associated hacking dry cough without wheezing or shortness of breath.  He has a residual dry cough which is less frequent and less severe    Patient states that since his last office visit his blood sugars have been improved and he has been losing weight ?  He reports he has been managing his diet better has been reducing his Lantus insulin and his blood sugar this morning was 102.      Current Outpatient Medications   Medication Sig Dispense Refill     ASPIRIN PO Take 325 mg by mouth       atorvastatin (LIPITOR) 20 MG tablet Take 1 tablet (20 mg) by mouth daily 90 tablet 3     blood glucose (STEVE CONTOUR) test strip TESTING FOUR TIMES DAILY OR AS DIRECTED 400 strip 1     clopidogrel (PLAVIX) 75 MG tablet Take 1 tablet (75 mg) by mouth daily 90 tablet 3     fluticasone (FLONASE) 50 MCG/ACT nasal spray Spray 1 spray into both nostrils 2 times daily as needed for rhinitis or allergies 3 Bottle 3     FLUZONE HIGH-DOSE 0.5 ML injection ADM 0.5ML IM UTD  0     Glucose Blood (STEVE CONTOUR TEST VI)        insulin glargine (LANTUS SOLOSTAR PEN) 100 UNIT/ML pen Inject 50 Units Subcutaneous At Bedtime 45 mL 0     insulin lispro (HUMALOG KWIKPEN) 100 UNIT/ML pen ADMINISTER UP TO 55 UNITS UNDER THE SKIN DAILY AS DIRECTED 45 mL 0     insulin pen needle (BD FERCHO U/F) 32G X 4 MM miscellaneous USE AS DIRECTED UP TO FOUR TIMES DAILY 400 each 1     irbesartan (AVAPRO) 150 MG tablet TAKE 1 TABLET(150 MG) BY MOUTH AT BEDTIME 90 tablet 3     LANTUS SOLOSTAR 100 UNIT/ML soln ADMINISTER 50 UNITS UNDER  THE SKIN AT BEDTIME AS DIRECTED 45 mL 0     metFORMIN (GLUCOPHAGE) 1000 MG tablet TAKE 1 TABLET(1000 MG) BY MOUTH TWICE DAILY WITH MEALS 180 tablet 3     order for DME Hip steroid injectoion 1 Units 0     SHINGRIX injection ADM 0.5ML IM UTD  0     traZODone (DESYREL) 100 MG tablet TAKE 1 TABLET(100 MG) BY MOUTH AT BEDTIME 90 tablet 1     VENTOLIN  (90 BASE) MCG/ACT Inhaler INL 2 PFS PO QID PRN 1 Inhaler 3     No Known Allergies      Reviewed and updated as needed this visit by Provider         Review of Systems   ROS COMP: Constitutional, HEENT, cardiovascular, pulmonary, gi and gu systems are negative, except as otherwise noted.    /78 (BP Location: Left arm, Patient Position: Sitting, Cuff Size: Adult Regular)   Pulse 105   Temp 97.4  F (36.3  C) (Oral)   Ht 1.829 m (6')   Wt 96.7 kg (213 lb 3.2 oz)   SpO2 96%   BMI 28.92 kg/m      Objective    There were no vitals taken for this visit.  There is no height or weight on file to calculate BMI.  Physical Exam   GENERAL: healthy, alert and no distress  HEENT nasal congestion throat was clear  NECK: no adenopathy, no asymmetry, masses, or scars and thyroid normal to palpation  RESP: lungs clear to auscultation - no rales, rhonchi or wheezes  CV: regular rate and rhythm, normal S1 S2, no S3 or S4, no murmur, click or rub, no peripheral edema and peripheral pulses strong  ABDOMEN: soft, nontender, no hepatosplenomegaly, no masses and bowel sounds normal  MS: no gross musculoskeletal defects noted, no edema            Assessment & Plan     1. Paroxysmal atrial fibrillation (H)  No current symptoms of recurrence    2. Benign essential hypertension  Blood pressure under good control with current therapy  - CBC with platelets    3. Primary osteoarthritis of both hips      4. Cerebrovascular accident (CVA) due to embolism of cerebral artery (H)      5. Type 2 diabetes mellitus without complication, with long-term current use of insulin (H)  Follow-up with  A1c to reevaluate his diabetes and to ensure improvement as suggested.  - Hemoglobin A1c    6. Insomnia, unspecified type      7. Hyperlipidemia LDL goal <100      8. Chronic nonseasonal allergic rhinitis due to pollen      9. Acute bronchitis, unspecified organism  , Improving and no further medications necessary at this time.     BMI:   Estimated body mass index is 28.92 kg/m  as calculated from the following:    Height as of this encounter: 1.829 m (6').    Weight as of this encounter: 96.7 kg (213 lb 3.2 oz).           FUTURE APPOINTMENTS:       - Follow-up visit in 2 mo.  No follow-ups on file.    Bandar Greenberg MD  [facility]

## 2019-08-04 DIAGNOSIS — E11.9 TYPE 2 DIABETES MELLITUS WITHOUT COMPLICATION, WITH LONG-TERM CURRENT USE OF INSULIN (H): ICD-10-CM

## 2019-08-04 DIAGNOSIS — Z79.4 TYPE 2 DIABETES MELLITUS WITHOUT COMPLICATION, WITH LONG-TERM CURRENT USE OF INSULIN (H): ICD-10-CM

## 2019-08-05 NOTE — TELEPHONE ENCOUNTER
"LANTUS SOLOSTAR 100 UNIT/ML soln 45 mL 0 12/31/2018     Last Written Prescription Date:  12/31/2018  Last Fill Quantity: 45 mL,  # refills: 0   Last office visit: 7/17/2019 with prescribing provider: KADY Greenberg   Future Office Visit: Unknown    Requested Prescriptions   Pending Prescriptions Disp Refills     LANTUS SOLOSTAR 100 UNIT/ML soln [Pharmacy Med Name: LANTUS SOLOSTAR PEN INJ 3ML] 45 mL 0     Sig: ADMINISTER 50 UNITS UNDER THE SKIN AT BEDTIME       Long Acting Insulin Protocol Passed - 8/4/2019 10:11 AM        Passed - Blood pressure less than 140/90 in past 6 months     BP Readings from Last 3 Encounters:   07/17/19 124/78   07/11/19 118/66   06/04/19 139/86                 Passed - LDL on file in past 12 months     Recent Labs   Lab Test 02/05/19  1217   LDL 92             Passed - Microalbumin on file in past 12 months     Recent Labs   Lab Test 02/05/19  1216   MICROL 289   UMALCR 202.10*             Passed - Serum creatinine on file in past 12 months     Recent Labs   Lab Test 06/04/19  1322   CR 1.41*             Passed - HgbA1C in past 3 or 6 months     If HgbA1C is 8 or greater, it needs to be on file within the past 3 months.  If less than 8, must be on file within the past 6 months.     Recent Labs   Lab Test 07/17/19  1720   A1C 8.3*             Passed - Medication is active on med list        Passed - Patient is age 18 or older        Passed - Recent (6 mo) or future (30 days) visit within the authorizing provider's specialty     Patient had office visit in the last 6 months or has a visit in the next 30 days with authorizing provider or within the authorizing provider's specialty.  See \"Patient Info\" tab in inbasket, or \"Choose Columns\" in Meds & Orders section of the refill encounter.              "

## 2019-08-06 RX ORDER — INSULIN GLARGINE 100 [IU]/ML
INJECTION, SOLUTION SUBCUTANEOUS
Qty: 14 ML | Refills: 0 | Status: SHIPPED | OUTPATIENT
Start: 2019-08-06 | End: 2019-09-16

## 2019-08-06 NOTE — TELEPHONE ENCOUNTER
Prescription approved per [facility].  Refilled 2 months and added in pharm comments due for visit in Sept.  Bobbi Aguilar RN       Return in about 2 months (around 9/17/2019)

## 2019-09-16 DIAGNOSIS — I10 BENIGN ESSENTIAL HYPERTENSION: ICD-10-CM

## 2019-09-16 DIAGNOSIS — E11.9 TYPE 2 DIABETES MELLITUS WITHOUT COMPLICATION, WITH LONG-TERM CURRENT USE OF INSULIN (H): ICD-10-CM

## 2019-09-16 DIAGNOSIS — Z79.4 TYPE 2 DIABETES MELLITUS WITHOUT COMPLICATION, WITH LONG-TERM CURRENT USE OF INSULIN (H): ICD-10-CM

## 2019-09-16 RX ORDER — IRBESARTAN 150 MG/1
TABLET ORAL
Qty: 90 TABLET | Refills: 3 | Status: CANCELLED | OUTPATIENT
Start: 2019-09-16

## 2019-09-17 NOTE — TELEPHONE ENCOUNTER
"LANTUS SOLOSTAR 100 UNIT/ML soln  Last Written Prescription Date:  8/06/19  Last Fill Quantity: 14 mL,  # refills: 0   Last office visit: 7/17/2019 with prescribing provider:  Yari Prabhakar Office Visit:      irbesartan (AVAPRO) 150 MG tablet  Last Written Prescription Date:  6/04/19  Last Fill Quantity: 90 tablet,  # refills: 3   Last office visit: 7/17/2019 with prescribing provider:  Yari Prabhakar Office Visit:      Requested Prescriptions   Pending Prescriptions Disp Refills     LANTUS SOLOSTAR 100 UNIT/ML soln [Pharmacy Med Name: LANTUS SOLOSTAR PEN INJ 3ML] 15 mL 0     Sig: INJECT 50 UNITS UNDER THE SKIN AT BEDTIME       Long Acting Insulin Protocol Passed - 9/16/2019  6:37 PM        Passed - Blood pressure less than 140/90 in past 6 months     BP Readings from Last 3 Encounters:   07/17/19 124/78   07/11/19 118/66   06/04/19 139/86                 Passed - LDL on file in past 12 months     Recent Labs   Lab Test 02/05/19  1217   LDL 92             Passed - Microalbumin on file in past 12 months     Recent Labs   Lab Test 02/05/19  1216   MICROL 289   UMALCR 202.10*             Passed - Serum creatinine on file in past 12 months     Recent Labs   Lab Test 06/04/19  1322   CR 1.41*             Passed - HgbA1C in past 3 or 6 months     If HgbA1C is 8 or greater, it needs to be on file within the past 3 months.  If less than 8, must be on file within the past 6 months.     Recent Labs   Lab Test 07/17/19  1720   A1C 8.3*             Passed - Medication is active on med list        Passed - Patient is age 18 or older        Passed - Recent (6 mo) or future (30 days) visit within the authorizing provider's specialty     Patient had office visit in the last 6 months or has a visit in the next 30 days with authorizing provider or within the authorizing provider's specialty.  See \"Patient Info\" tab in inbasket, or \"Choose Columns\" in Meds & Orders section of the refill encounter.            irbesartan (AVAPRO) " "150 MG tablet 90 tablet 3     Sig: TAKE 1 TABLET(150 MG) BY MOUTH AT BEDTIME       Angiotensin-II Receptors Failed - 9/16/2019  6:37 PM        Failed - Normal serum creatinine on file in past 12 months     Recent Labs   Lab Test 06/04/19  1322   CR 1.41*             Passed - Last blood pressure under 140/90 in past 12 months     BP Readings from Last 3 Encounters:   07/17/19 124/78   07/11/19 118/66   06/04/19 139/86                 Passed - Recent (12 mo) or future (30 days) visit within the authorizing provider's specialty     Patient had office visit in the last 12 months or has a visit in the next 30 days with authorizing provider or within the authorizing provider's specialty.  See \"Patient Info\" tab in inbasket, or \"Choose Columns\" in Meds & Orders section of the refill encounter.              Passed - Medication is active on med list        Passed - Patient is age 18 or older        Passed - Normal serum potassium on file in past 12 months     Recent Labs   Lab Test 06/04/19  1322   POTASSIUM 4.8                      "

## 2019-09-18 NOTE — TELEPHONE ENCOUNTER
Avapro:  Denied  Too early; Rx sent 6/4/2019 for 1 year    Lantus:   Routing refill request to provider for review/approval because:  Labs out of range:  Creatinine    Gloria FAUST RN

## 2019-09-20 RX ORDER — INSULIN GLARGINE 100 [IU]/ML
INJECTION, SOLUTION SUBCUTANEOUS
Qty: 15 ML | Refills: 0 | Status: SHIPPED | OUTPATIENT
Start: 2019-09-20 | End: 2019-09-20

## 2019-09-20 NOTE — TELEPHONE ENCOUNTER
TO PCP:     30 day Rx sent as patient completely OUT of medication     Routing to PCP as FYI as kidney labs out of range. Also pended larger quantity in case you want refills on file    Miguelina FLOOD RN

## 2019-09-20 NOTE — TELEPHONE ENCOUNTER
PCP - pt sent a [employer] message asking for 90 day Rx instead (pended)    Miguelina FLOOD RN

## 2019-10-11 NOTE — TELEPHONE ENCOUNTER
MARIANN:    Chart reviewed and attended am rounds. CM attempted to meet with pt this afternoon but she is resting. No family in room. No planned discharge over weekend. 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 CM will continue to follow.      Aguila Kohli RN [employer] Pt is calling and needs this med filled. He is down  To his last Pen. Then he will be out.

## 2019-10-21 DIAGNOSIS — E11.9 TYPE 2 DIABETES MELLITUS WITHOUT COMPLICATION, WITH LONG-TERM CURRENT USE OF INSULIN (H): ICD-10-CM

## 2019-10-21 DIAGNOSIS — Z79.4 TYPE 2 DIABETES MELLITUS WITHOUT COMPLICATION, WITH LONG-TERM CURRENT USE OF INSULIN (H): ICD-10-CM

## 2019-10-22 RX ORDER — INSULIN GLARGINE 100 [IU]/ML
INJECTION, SOLUTION SUBCUTANEOUS
Start: 2019-10-22

## 2019-11-05 DIAGNOSIS — Z79.4 TYPE 2 DIABETES MELLITUS WITHOUT COMPLICATION, WITH LONG-TERM CURRENT USE OF INSULIN (H): ICD-10-CM

## 2019-11-05 DIAGNOSIS — E11.9 TYPE 2 DIABETES MELLITUS WITHOUT COMPLICATION, WITH LONG-TERM CURRENT USE OF INSULIN (H): ICD-10-CM

## 2019-11-05 NOTE — TELEPHONE ENCOUNTER
"Last Written Prescription Date:  3/5/19  Last Fill Quantity: 400,  # refills: 1   Last office visit: 7/17/2019 with prescribing provider:     Future Office Visit:    Requested Prescriptions   Pending Prescriptions Disp Refills     insulin pen needle (BD FERCHO U/F) 32G X 4 MM miscellaneous [Pharmacy Med Name: B-D PEN NDL FERCHO 63NE2UI(5/32) GRN] 400 each 0     Sig: USE AS DIRECTED UP TO FOUR TIMES DAILY       Diabetic Supplies Protocol Passed - 11/5/2019 10:31 AM        Passed - Medication is active on med list        Passed - Patient is 18 years of age or older        Passed - Recent (6 mo) or future (30 days) visit within the authorizing provider's specialty     Patient had office visit in the last 6 months or has a visit in the next 30 days with authorizing provider.  See \"Patient Info\" tab in inbasket, or \"Choose Columns\" in Meds & Orders section of the refill encounter.              "

## 2019-11-14 ENCOUNTER — TELEPHONE (OUTPATIENT)
Dept: FAMILY MEDICINE | Facility: CLINIC | Age: 79
End: 2019-11-14

## 2019-11-14 ENCOUNTER — MYC REFILL (OUTPATIENT)
Dept: FAMILY MEDICINE | Facility: CLINIC | Age: 79
End: 2019-11-14

## 2019-11-14 DIAGNOSIS — E11.9 TYPE 2 DIABETES MELLITUS WITHOUT COMPLICATION, WITH LONG-TERM CURRENT USE OF INSULIN (H): ICD-10-CM

## 2019-11-14 DIAGNOSIS — Z79.4 TYPE 2 DIABETES MELLITUS WITHOUT COMPLICATION, WITH LONG-TERM CURRENT USE OF INSULIN (H): ICD-10-CM

## 2019-11-14 NOTE — TELEPHONE ENCOUNTER
Reason for Call: Request for an order or referral:    Order or referral being requested: [facility]one shot for [facility]     Date needed: as soon as possible    Has the patient been seen by the PCP for this problem? YES    Additional comments:     Phone number Patient can be reached at:  Home number on file [phone] (home)    Best Time:  any    Can we leave a detailed message on this number?  YES    Call taken on 11/14/2019 at 4:00 PM by Roula Fuller

## 2019-11-18 NOTE — TELEPHONE ENCOUNTER
Patient Called to follow-up on this today. Still waiting for orders at [facility] [place]    [phone]

## 2019-11-21 ENCOUNTER — MYC MEDICAL ADVICE (OUTPATIENT)
Dept: FAMILY MEDICINE | Facility: CLINIC | Age: 79
End: 2019-11-21

## 2019-11-21 NOTE — TELEPHONE ENCOUNTER
To PCP:     Please see message below RE: Cortisone Shot for right hip    Please advise,     Thank you,   Ana LUNDBERG RN

## 2019-11-26 ENCOUNTER — TRANSFERRED RECORDS (OUTPATIENT)
Dept: HEALTH INFORMATION MANAGEMENT | Facility: CLINIC | Age: 79
End: 2019-11-26

## 2019-12-06 DIAGNOSIS — E11.9 TYPE 2 DIABETES MELLITUS WITHOUT COMPLICATION, WITH LONG-TERM CURRENT USE OF INSULIN (H): ICD-10-CM

## 2019-12-06 DIAGNOSIS — Z79.4 TYPE 2 DIABETES MELLITUS WITHOUT COMPLICATION, WITH LONG-TERM CURRENT USE OF INSULIN (H): ICD-10-CM

## 2019-12-06 NOTE — TELEPHONE ENCOUNTER
"insulin lispro (HUMALOG KWIKPEN) 100 UNIT/ML pen 45 mL 0 12/11/2018     Last Written Prescription Date:  12/11/2018  Last Fill Quantity: 45 mL,  # refills: 0   Last office visit: 7/17/2019 with prescribing provider: KADY Greenberg    Future Office Visit: Unknown     Requested Prescriptions   Pending Prescriptions Disp Refills     insulin lispro (HUMALOG KWIKPEN) 100 UNIT/ML (1 unit dial) pen [Pharmacy Med Name: HUMALOG 100 U/ML KWIK PEN INJ 3ML] 45 mL 0     Sig: ADMINISTER UP TO 55 UNITS UNDER THE SKIN EVERY DAY AS DIRECTED       Short Acting Insulin Protocol Failed - 12/6/2019  9:20 AM        Failed - HgbA1C in past 3 or 6 months     If HgbA1C is 8 or greater, it needs to be on file within the past 3 months.  If less than 8, must be on file within the past 6 months.     Recent Labs   Lab Test 07/17/19  1720   A1C 8.3*             Passed - Blood pressure less than 140/90 in past 6 months     BP Readings from Last 3 Encounters:   07/17/19 124/78   07/11/19 118/66   06/04/19 139/86                 Passed - LDL on file in past 12 months     Recent Labs   Lab Test 02/05/19  1217   LDL 92             Passed - Microalbumin on file in past 12 months     Recent Labs   Lab Test 02/05/19  1216   MICROL 289   UMALCR 202.10*             Passed - Serum creatinine on file in past 12 months     Recent Labs   Lab Test 06/04/19  1322   CR 1.41*             Passed - Medication is active on med list        Passed - Patient is age 18 or older        Passed - Recent (6 mo) or future (30 days) visit within the authorizing provider's specialty     Patient had office visit in the last 6 months or has a visit in the next 30 days with authorizing provider or within the authorizing provider's specialty.  See \"Patient Info\" tab in inbasket, or \"Choose Columns\" in Meds & Orders section of the refill encounter.              "

## 2019-12-09 NOTE — TELEPHONE ENCOUNTER
Routing refill request to provider for review/approval because:  A break in medication - Last Rx 12/11/18 for #45 with 0 refills     Miguelina FLOOD RN

## 2019-12-10 DIAGNOSIS — Z79.4 TYPE 2 DIABETES MELLITUS WITHOUT COMPLICATION, WITH LONG-TERM CURRENT USE OF INSULIN (H): ICD-10-CM

## 2019-12-10 DIAGNOSIS — E11.9 TYPE 2 DIABETES MELLITUS WITHOUT COMPLICATION, WITH LONG-TERM CURRENT USE OF INSULIN (H): ICD-10-CM

## 2019-12-10 RX ORDER — INSULIN LISPRO 100 [IU]/ML
INJECTION, SOLUTION INTRAVENOUS; SUBCUTANEOUS
Qty: 48 ML | Refills: 0 | OUTPATIENT
Start: 2019-12-10

## 2019-12-10 RX ORDER — INSULIN LISPRO 100 [IU]/ML
INJECTION, SOLUTION INTRAVENOUS; SUBCUTANEOUS
Qty: 45 ML | Refills: 0 | Status: SHIPPED | OUTPATIENT
Start: 2019-12-10 | End: 2020-08-10

## 2019-12-10 NOTE — TELEPHONE ENCOUNTER
"Disp Refills Start End RODRIGUEZ    insulin lispro (HUMALOG KWIKPEN) 100 UNIT/ML (1 unit dial) pen 45 mL 0 12/10/2019  No   Last office visit: 7/17/2019 with prescribing provider:  PCP   Future Office Visit:    Requested Prescriptions   Pending Prescriptions Disp Refills     insulin lispro (HUMALOG KWIKPEN) 100 UNIT/ML (1 unit dial) pen [Pharmacy Med Name: HUMALOG 100 U/ML KWIK PEN INJ 3ML] 48 mL 0     Sig: INJECT UP TO 55 UNITS UNDER THE SKIN ONCE DAILY AS DIRECTED       Short Acting Insulin Protocol Failed - 12/10/2019  8:04 AM        Failed - HgbA1C in past 3 or 6 months     If HgbA1C is 8 or greater, it needs to be on file within the past 3 months.  If less than 8, must be on file within the past 6 months.     Recent Labs   Lab Test 07/17/19  1720   A1C 8.3*             Passed - Blood pressure less than 140/90 in past 6 months     BP Readings from Last 3 Encounters:   07/17/19 124/78   07/11/19 118/66   06/04/19 139/86                 Passed - LDL on file in past 12 months     Recent Labs   Lab Test 02/05/19  1217   LDL 92             Passed - Microalbumin on file in past 12 months     Recent Labs   Lab Test 02/05/19  1216   MICROL 289   UMALCR 202.10*             Passed - Serum creatinine on file in past 12 months     Recent Labs   Lab Test 06/04/19  1322   CR 1.41*             Passed - Medication is active on med list        Passed - Patient is age 18 or older        Passed - Recent (6 mo) or future (30 days) visit within the authorizing provider's specialty     Patient had office visit in the last 6 months or has a visit in the next 30 days with authorizing provider or within the authorizing provider's specialty.  See \"Patient Info\" tab in inbasket, or \"Choose Columns\" in Meds & Orders section of the refill encounter.          Future refills by PCP Dr. Bandar Greenberg with phone number [phone].  "

## 2019-12-30 ENCOUNTER — MYC MEDICAL ADVICE (OUTPATIENT)
Dept: FAMILY MEDICINE | Facility: CLINIC | Age: 79
End: 2019-12-30

## 2019-12-30 DIAGNOSIS — R05.9 COUGH: Primary | ICD-10-CM

## 2019-12-30 RX ORDER — BENZONATATE 200 MG/1
200 CAPSULE ORAL 3 TIMES DAILY PRN
Qty: 12 CAPSULE | Refills: 1 | Status: SHIPPED | OUTPATIENT
Start: 2019-12-30 | End: 2020-07-21

## 2019-12-30 NOTE — TELEPHONE ENCOUNTER
"Dr Greenberg--    Patient is requesting RX Benzonatate for dry cough. See [employer] message .     Sent several \"triage\" questions to patient and only received a answers to a couple of the questions.      Not sure onset of symptoms.  Not sure what OTC products patient has tried.     Do you want patient to schedule appointment?      If OK for Benzonatate---pharmacy is pended.    Looks like the med was Rx'd once in the past.     Thank you,  Vita PATE RN,BSN          "

## 2020-01-22 ENCOUNTER — MYC MEDICAL ADVICE (OUTPATIENT)
Dept: FAMILY MEDICINE | Facility: CLINIC | Age: 80
End: 2020-01-22

## 2020-01-22 DIAGNOSIS — E78.5 HYPERLIPIDEMIA LDL GOAL <100: ICD-10-CM

## 2020-01-22 NOTE — TELEPHONE ENCOUNTER
"   atorvastatin (LIPITOR) 20 MG tablet 90 tablet 3 5/7/2019       Last Written Prescription Date:  05/07/2019  Last Fill Quantity: 90,  # refills: 3   Last office visit: 7/17/2019 with prescribing provider:     Future Office Visit:   Next 5 appointments (look out 90 days)    Feb 10, 2020 11:00 AM CST  PHYSICAL with Bandar Greenberg MD  [facility] ([facility] [address] [facility] [id number] [phone]           Requested Prescriptions   Pending Prescriptions Disp Refills     atorvastatin (LIPITOR) 20 MG tablet [Pharmacy Med Name: ATORVASTATIN 20MG TABLETS] 90 tablet 3     Sig: TAKE 1 TABLET(20 MG) BY MOUTH DAILY       Statins Protocol Passed - 1/22/2020  5:13 AM        Passed - LDL on file in past 12 months     Recent Labs   Lab Test 02/05/19  1217   LDL 92             Passed - No abnormal creatine kinase in past 12 months     No lab results found.             Passed - Recent (12 mo) or future (30 days) visit within the authorizing provider's specialty     Patient has had an office visit with the authorizing provider or a provider within the authorizing providers department within the previous 12 mos or has a future within next 30 days. See \"Patient Info\" tab in inbasket, or \"Choose Columns\" in Meds & Orders section of the refill encounter.              Passed - Medication is active on med list        Passed - Patient is age 18 or older          "

## 2020-01-23 RX ORDER — ATORVASTATIN CALCIUM 20 MG/1
TABLET, FILM COATED ORAL
Qty: 90 TABLET | Refills: 3 | OUTPATIENT
Start: 2020-01-23

## 2020-01-23 NOTE — TELEPHONE ENCOUNTER
TO PCP:     See below, do you have a provider with [facility] you can recommend for pt to see for hip concerns?     Please advise     Miguelina FLOOD RN

## 2020-01-29 ENCOUNTER — TRANSFERRED RECORDS (OUTPATIENT)
Dept: HEALTH INFORMATION MANAGEMENT | Facility: CLINIC | Age: 80
End: 2020-01-29

## 2020-02-03 ENCOUNTER — TRANSFERRED RECORDS (OUTPATIENT)
Dept: HEALTH INFORMATION MANAGEMENT | Facility: CLINIC | Age: 80
End: 2020-02-03

## 2020-02-05 DIAGNOSIS — E78.5 HYPERLIPIDEMIA LDL GOAL <100: ICD-10-CM

## 2020-02-05 NOTE — TELEPHONE ENCOUNTER
"Last Written Prescription Date:  5/7/19  Last Fill Quantity: 90,  # refills: 3   Last office visit: 7/17/2019 with prescribing provider:     Future Office Visit:   Next 5 appointments (look out 90 days)    Feb 10, 2020 11:00 AM CST  PHYSICAL with Bandar Greenberg MD  [facility] [address] [facility] [id number] [phone]         Requested Prescriptions   Pending Prescriptions Disp Refills     atorvastatin (LIPITOR) 20 MG tablet [Pharmacy Med Name: ATORVASTATIN 20MG TABLETS] 90 tablet 3     Sig: TAKE 1 TABLET(20 MG) BY MOUTH DAILY       Statins Protocol Passed - 2/5/2020 10:11 AM        Passed - LDL on file in past 12 months     Recent Labs   Lab Test 02/05/19  1217   LDL 92             Passed - No abnormal creatine kinase in past 12 months     No lab results found.             Passed - Recent (12 mo) or future (30 days) visit within the authorizing provider's specialty     Patient has had an office visit with the authorizing provider or a provider within the authorizing providers department within the previous 12 mos or has a future within next 30 days. See \"Patient Info\" tab in inbasket, or \"Choose Columns\" in Meds & Orders section of the refill encounter.              Passed - Medication is active on med list        Passed - Patient is age 18 or older          "

## 2020-02-06 ENCOUNTER — TRANSFERRED RECORDS (OUTPATIENT)
Dept: HEALTH INFORMATION MANAGEMENT | Facility: CLINIC | Age: 80
End: 2020-02-06

## 2020-02-06 RX ORDER — ATORVASTATIN CALCIUM 20 MG/1
TABLET, FILM COATED ORAL
Qty: 90 TABLET | Refills: 3 | OUTPATIENT
Start: 2020-02-06

## 2020-02-08 DIAGNOSIS — E78.5 HYPERLIPIDEMIA LDL GOAL <100: ICD-10-CM

## 2020-02-08 NOTE — TELEPHONE ENCOUNTER
"Last Written Prescription Date:  5/07/19  Last Fill Quantity: 90 tablet,  # refills: 3   Last office visit: 7/17/2019 with prescribing provider:  Yari   Future Office Visit:   Next 5 appointments (look out 90 days)    Feb 10, 2020 11:00 AM CST  PHYSICAL with Bandar Greenberg MD  [facility] [address] [facility] [id number] [phone]         Requested Prescriptions   Pending Prescriptions Disp Refills     atorvastatin (LIPITOR) 20 MG tablet [Pharmacy Med Name: ATORVASTATIN 20MG TABLETS] 90 tablet 3     Sig: TAKE 1 TABLET(20 MG) BY MOUTH DAILY       Statins Protocol Failed - 2/8/2020 10:21 AM        Failed - LDL on file in past 12 months     Recent Labs   Lab Test 02/05/19  1217   LDL 92             Passed - No abnormal creatine kinase in past 12 months     No lab results found.             Passed - Recent (12 mo) or future (30 days) visit within the authorizing provider's specialty     Patient has had an office visit with the authorizing provider or a provider within the authorizing providers department within the previous 12 mos or has a future within next 30 days. See \"Patient Info\" tab in inbasket, or \"Choose Columns\" in Meds & Orders section of the refill encounter.              Passed - Medication is active on med list        Passed - Patient is age 18 or older          "

## 2020-02-10 ENCOUNTER — TELEPHONE (OUTPATIENT)
Dept: FAMILY MEDICINE | Facility: CLINIC | Age: 80
End: 2020-02-10

## 2020-02-10 ENCOUNTER — OFFICE VISIT (OUTPATIENT)
Dept: FAMILY MEDICINE | Facility: CLINIC | Age: 80
End: 2020-02-10
Payer: MEDICARE

## 2020-02-10 VITALS
BODY MASS INDEX: 28.85 KG/M2 | WEIGHT: 213 LBS | TEMPERATURE: 97 F | OXYGEN SATURATION: 96 % | HEART RATE: 85 BPM | DIASTOLIC BLOOD PRESSURE: 64 MMHG | SYSTOLIC BLOOD PRESSURE: 134 MMHG | HEIGHT: 72 IN

## 2020-02-10 DIAGNOSIS — I48.0 PAROXYSMAL ATRIAL FIBRILLATION (H): ICD-10-CM

## 2020-02-10 DIAGNOSIS — M16.0 PRIMARY OSTEOARTHRITIS OF BOTH HIPS: ICD-10-CM

## 2020-02-10 DIAGNOSIS — I10 BENIGN ESSENTIAL HYPERTENSION: ICD-10-CM

## 2020-02-10 DIAGNOSIS — E55.9 VITAMIN D DEFICIENCY: ICD-10-CM

## 2020-02-10 DIAGNOSIS — E11.9 TYPE 2 DIABETES MELLITUS WITHOUT COMPLICATION, WITH LONG-TERM CURRENT USE OF INSULIN (H): Primary | ICD-10-CM

## 2020-02-10 DIAGNOSIS — Z79.4 TYPE 2 DIABETES MELLITUS WITHOUT COMPLICATION, WITH LONG-TERM CURRENT USE OF INSULIN (H): Primary | ICD-10-CM

## 2020-02-10 DIAGNOSIS — J30.89 CHRONIC NON-SEASONAL ALLERGIC RHINITIS: ICD-10-CM

## 2020-02-10 DIAGNOSIS — J41.0 SIMPLE CHRONIC BRONCHITIS (H): ICD-10-CM

## 2020-02-10 DIAGNOSIS — Z00.00 ROUTINE GENERAL MEDICAL EXAMINATION AT A HEALTH CARE FACILITY: ICD-10-CM

## 2020-02-10 DIAGNOSIS — E78.5 HYPERLIPIDEMIA LDL GOAL <100: ICD-10-CM

## 2020-02-10 DIAGNOSIS — I63.40 CEREBROVASCULAR ACCIDENT (CVA) DUE TO EMBOLISM OF CEREBRAL ARTERY (H): ICD-10-CM

## 2020-02-10 LAB
ALBUMIN UR-MCNC: 30 MG/DL
APPEARANCE UR: CLEAR
BACTERIA #/AREA URNS HPF: ABNORMAL /HPF
BILIRUB UR QL STRIP: NEGATIVE
COLOR UR AUTO: YELLOW
ERYTHROCYTE [DISTWIDTH] IN BLOOD BY AUTOMATED COUNT: 16.2 % (ref 10–15)
FEF 25/75: NORMAL
FEV-1: NORMAL
FEV1/FVC: NORMAL
FVC: NORMAL
GLUCOSE UR STRIP-MCNC: NEGATIVE MG/DL
HBA1C MFR BLD: 8.3 % (ref 0–5.6)
HCT VFR BLD AUTO: 29.1 % (ref 40–53)
HGB BLD-MCNC: 8.7 G/DL (ref 13.3–17.7)
HGB UR QL STRIP: NEGATIVE
KETONES UR STRIP-MCNC: NEGATIVE MG/DL
LEUKOCYTE ESTERASE UR QL STRIP: NEGATIVE
MCH RBC QN AUTO: 23.3 PG (ref 26.5–33)
MCHC RBC AUTO-ENTMCNC: 29.9 G/DL (ref 31.5–36.5)
MCV RBC AUTO: 78 FL (ref 78–100)
NITRATE UR QL: NEGATIVE
PH UR STRIP: 5.5 PH (ref 5–7)
PLATELET # BLD AUTO: 242 10E9/L (ref 150–450)
RBC # BLD AUTO: 3.74 10E12/L (ref 4.4–5.9)
RBC #/AREA URNS AUTO: ABNORMAL /HPF
SOURCE: ABNORMAL
SP GR UR STRIP: 1.02 (ref 1–1.03)
UROBILINOGEN UR STRIP-ACNC: 0.2 EU/DL (ref 0.2–1)
WBC # BLD AUTO: 7.4 10E9/L (ref 4–11)
WBC #/AREA URNS AUTO: ABNORMAL /HPF

## 2020-02-10 PROCEDURE — 82043 UR ALBUMIN QUANTITATIVE: CPT | Performed by: INTERNAL MEDICINE

## 2020-02-10 PROCEDURE — 81001 URINALYSIS AUTO W/SCOPE: CPT | Performed by: INTERNAL MEDICINE

## 2020-02-10 PROCEDURE — 36415 COLL VENOUS BLD VENIPUNCTURE: CPT | Performed by: INTERNAL MEDICINE

## 2020-02-10 PROCEDURE — 99214 OFFICE O/P EST MOD 30 MIN: CPT | Mod: 25 | Performed by: INTERNAL MEDICINE

## 2020-02-10 PROCEDURE — 84443 ASSAY THYROID STIM HORMONE: CPT | Performed by: INTERNAL MEDICINE

## 2020-02-10 PROCEDURE — 80061 LIPID PANEL: CPT | Performed by: INTERNAL MEDICINE

## 2020-02-10 PROCEDURE — G0439 PPPS, SUBSEQ VISIT: HCPCS | Performed by: INTERNAL MEDICINE

## 2020-02-10 PROCEDURE — 83036 HEMOGLOBIN GLYCOSYLATED A1C: CPT | Performed by: INTERNAL MEDICINE

## 2020-02-10 PROCEDURE — 85027 COMPLETE CBC AUTOMATED: CPT | Performed by: INTERNAL MEDICINE

## 2020-02-10 PROCEDURE — 80053 COMPREHEN METABOLIC PANEL: CPT | Performed by: INTERNAL MEDICINE

## 2020-02-10 PROCEDURE — 94010 BREATHING CAPACITY TEST: CPT | Performed by: INTERNAL MEDICINE

## 2020-02-10 PROCEDURE — 99207 C FOOT EXAM  NO CHARGE: CPT | Performed by: INTERNAL MEDICINE

## 2020-02-10 PROCEDURE — 82306 VITAMIN D 25 HYDROXY: CPT | Performed by: INTERNAL MEDICINE

## 2020-02-10 RX ORDER — ATORVASTATIN CALCIUM 20 MG/1
20 TABLET, FILM COATED ORAL DAILY
Qty: 90 TABLET | Refills: 1 | Status: SHIPPED | OUTPATIENT
Start: 2020-02-10 | End: 2020-08-03

## 2020-02-10 RX ORDER — ATORVASTATIN CALCIUM 20 MG/1
TABLET, FILM COATED ORAL
Qty: 90 TABLET | Refills: 3 | OUTPATIENT
Start: 2020-02-10

## 2020-02-10 ASSESSMENT — MIFFLIN-ST. JEOR: SCORE: 1714.16

## 2020-02-10 ASSESSMENT — ACTIVITIES OF DAILY LIVING (ADL): CURRENT_FUNCTION: NO ASSISTANCE NEEDED

## 2020-02-10 NOTE — TELEPHONE ENCOUNTER
Spoke with [facility] pharmacist  The system will not allow them to fill 90 tabs  Pt was seen by PCP today  Will send new Rx of 90 tabs    Rad HARRISON RN

## 2020-02-10 NOTE — PROGRESS NOTES
"SUBJECTIVE:   Justyn Olivas is a 79 year old male who presents for Preventive Visit.      Are you in the first 12 months of your Medicare coverage?  No    Healthy Habits:    In general, how would you rate your overall health?  Good    Frequency of exercise:  6-7 days/week    Duration of exercise:  30-45 minutes    Do you usually eat at least 4 servings of fruit and vegetables a day, include whole grains    & fiber and avoid regularly eating high fat or \"junk\" foods?  Yes    Taking medications regularly:  Yes    Barriers to taking medications:  Not applicable    Medication side effects:  Not applicable    Ability to successfully perform activities of daily living:  No assistance needed    Home Safety:  No safety concerns identified    Hearing Impairment:  No hearing concerns    In the past 6 months, have you been bothered by leaking of urine?  No    In general, how would you rate your overall mental or emotional health?  Good      PHQ-2 Total Score:    Additional concerns today:  No  Pt with a hx of type 2 DM, CVA, atrial fibrillation and chronic bronchitis presents to the clinic for a routine physical exam. Today the pt states that he is feeling, 'okay'. The pt explains that his CVA was ~1 year ago. He inquires if he will always have to take Plavix. He denies having palpitations or racing heart beats.     The pt notes that he checks his blood/glucose levels various times throughout the day. He notes that his blood/glucose level this AM was 75 mg/dL and 80 mg/dL yesterday AM. He notes that his levels will be as high as 200 mg/dLby dinner. He admits that he forgets to take his dosage of Humalog at lunch occasionally. He also notes that he occasionally forgets to take his dosage of metformin before dinner.     The pt notes that the bursitis of his right hip has improved slightly with PT and icing. He notes that his PT routine consists of treadmill walking for 8 minutes and then a series of toning exercises. The pt " states that his bursitis is 6-7/10 today as compared to 8-9/10 during his LOV.     Patient denies any headaches, back or neck pain, chest pain, palpitations, heartburn, acid indigestion, bowel changes, hematochezia, urinary issues or skin changes.     Do you feel safe in your environment? Yes    Have you ever done Advance Care Planning? (For example, a Health Directive, POLST, or a discussion with a medical provider or your loved ones about your wishes): Yes, advance care planning is on file.      Fall risk  Fallen 2 or more times in the past year?: No  Any fall with injury in the past year?: No    Cognitive Screening   1) Repeat 3 items (Leader, Season, Table)    2) Clock draw: NORMAL  3) 3 item recall: Recalls 3 objects  Results: 3 items recalled: COGNITIVE IMPAIRMENT LESS LIKELY    Mini-CogTM Copyright S Salvador. Licensed by the author for use in [facility]; reprinted with permission (gisel@[facility]). All rights reserved.      Do you have sleep apnea, excessive snoring or daytime drowsiness?: no    Reviewed and updated as needed this visit by clinical staff  Tobacco  Allergies  Meds  Problems  Med Hx  Surg Hx  Soc Hx        Reviewed and updated as needed this visit by Provider        Social History     Tobacco Use     Smoking status: Never Smoker     Smokeless tobacco: Never Used   Substance Use Topics     Alcohol use: No     Alcohol/week: 0.0 standard drinks     If you drink alcohol do you typically have >3 drinks per day or >7 drinks per week? No    Alcohol Use 2/10/2020   Prescreen: >3 drinks/day or >7 drinks/week? No               Current providers sharing in care for this patient include:   Patient Care Team:  Bandar Greenberg MD as PCP - General (Internal Medicine)  Bandar Greenberg MD as Assigned PCP    The following health maintenance items are reviewed in Epic and correct as of today:  Health Maintenance   Topic Date Due     COPD ACTION PLAN  1940     PNEUMOCOCCAL IMMUNIZATION 65+  LOW/MEDIUM RISK (2 of 2 - PPSV23) 02/26/2016     A1C  01/17/2020     LIPID  02/05/2020     MICROALBUMIN  02/05/2020     DIABETIC FOOT EXAM  02/06/2020     MEDICARE ANNUAL WELLNESS VISIT  02/05/2020     EYE EXAM  02/19/2020     FALL RISK ASSESSMENT  02/21/2020     BMP  06/04/2020     TSH W/FREE T4 REFLEX  02/05/2021     DTAP/TDAP/TD IMMUNIZATION (2 - Td) 03/06/2023     ADVANCE CARE PLANNING  02/10/2025     SPIROMETRY  Completed     PHQ-2  Completed     INFLUENZA VACCINE  Completed     ZOSTER IMMUNIZATION  Completed     IPV IMMUNIZATION  Aged Out     MENINGITIS IMMUNIZATION  Aged Out     Patient Active Problem List   Diagnosis     Shoulder pain     Insomnia, unspecified type     Type 2 diabetes mellitus without complication, with long-term current use of insulin (H)     Benign essential hypertension     Hyperlipidemia LDL goal <100     Non-seasonal allergic rhinitis due to pollen     Primary osteoarthritis of both hips     Primary osteoarthritis involving multiple joints     Chronic non-seasonal allergic rhinitis, unspecified trigger     Cerebrovascular accident (CVA) due to embolism of cerebral artery (H)     History reviewed. No pertinent surgical history.    Social History     Tobacco Use     Smoking status: Never Smoker     Smokeless tobacco: Never Used   Substance Use Topics     Alcohol use: No     Alcohol/week: 0.0 standard drinks     Family History   Problem Relation Age of Onset     Family History Negative Mother      Family History Negative Father          Current Outpatient Medications   Medication Sig Dispense Refill     ASPIRIN PO Take 325 mg by mouth       atorvastatin (LIPITOR) 20 MG tablet Take 1 tablet (20 mg) by mouth daily 90 tablet 3     benzonatate (TESSALON) 200 MG capsule Take 1 capsule (200 mg) by mouth 3 times daily as needed for cough 12 capsule 1     blood glucose (STEVE CONTOUR) test strip TESTING FOUR TIMES DAILY OR AS DIRECTED 400 strip 1     clopidogrel (PLAVIX) 75 MG tablet Take 1 tablet  (75 mg) by mouth daily 90 tablet 3     fluticasone (FLONASE) 50 MCG/ACT nasal spray Spray 1 spray into both nostrils 2 times daily as needed for rhinitis or allergies 3 Bottle 3     FLUZONE HIGH-DOSE 0.5 ML injection ADM 0.5ML IM UTD  0     Glucose Blood (STEVE CONTOUR TEST VI)        insulin glargine (LANTUS SOLOSTAR) 100 UNIT/ML pen INJECT 50 UNITS UNDER THE SKIN AT BEDTIME 45 mL 3     insulin lispro (HUMALOG KWIKPEN) 100 UNIT/ML (1 unit dial) pen ADMINISTER UP TO 55 UNITS UNDER THE SKIN EVERY DAY AS DIRECTED 45 mL 0     insulin pen needle (BD FERCHO U/F) 32G X 4 MM miscellaneous USE AS DIRECTED UP TO FOUR TIMES DAILY 400 each 1     irbesartan (AVAPRO) 150 MG tablet TAKE 1 TABLET(150 MG) BY MOUTH AT BEDTIME 90 tablet 3     LANTUS SOLOSTAR 100 UNIT/ML soln ADMINISTER 50 UNITS UNDER THE SKIN AT BEDTIME AS DIRECTED 45 mL 0     metFORMIN (GLUCOPHAGE) 1000 MG tablet TAKE 1 TABLET(1000 MG) BY MOUTH TWICE DAILY WITH MEALS 180 tablet 3     order for DME Hip steroid injectoion 1 Units 0     SHINGRIX injection ADM 0.5ML IM UTD  0     traZODone (DESYREL) 100 MG tablet TAKE 1 TABLET(100 MG) BY MOUTH AT BEDTIME 90 tablet 1     VENTOLIN  (90 BASE) MCG/ACT Inhaler INL 2 PFS PO QID PRN 1 Inhaler 3     No Known Allergies      Review of Systems  CONSTITUTIONAL: NEGATIVE for fever, chills, change in weight  INTEGUMENTARY/SKIN: NEGATIVE for worrisome rashes, moles or lesions  EYES: POSITIVE for vision changes NEGATIVE for vision changes or irritation  ENT/MOUTH: POSITIVE for sinus problems NEGATIVE for ear, mouth and throat problems  RESP: POSITIVE for MARKS (stair walking) NEGATIVE for significant cough   BREAST: NEGATIVE for masses, tenderness or discharge  CV: NEGATIVE for chest pain, palpitations or peripheral edema  GI: NEGATIVE for nausea, abdominal pain, heartburn, or change in bowel habits  : POSITIVE for nocturia x2 NEGATIVE for frequency, dysuria, or hematuria  MUSCULOSKELETAL: POSITIVE for right hip pain NEGATIVE for  significant myalgia  NEURO: NEGATIVE for weakness, dizziness or paresthesias  ENDOCRINE: NEGATIVE for temperature intolerance, skin/hair changes  HEME: NEGATIVE for bleeding problems  PSYCHIATRIC: NEGATIVE for changes in mood or affect    This document serves as a record of the services and decisions personally performed and made by Bandar Greenberg MD. It was created on his behalf by Bishop Phillips, a trained medical scribe. The creation of this document is based on the provider's statements to the medical scribe.  Bishop Phillips February 10, 2020 11:08 AM      OBJECTIVE:   /64 (BP Location: Left arm, Patient Position: Sitting, Cuff Size: Adult Large)   Pulse 85   Temp 97  F (36.1  C) (Oral)   Ht 1.829 m (6')   Wt 96.6 kg (213 lb)   SpO2 96%   BMI 28.89 kg/m   Estimated body mass index is 28.89 kg/m  as calculated from the following:    Height as of this encounter: 1.829 m (6').    Weight as of this encounter: 96.6 kg (213 lb).     BP Recheck   134/64    Physical Exam  GENERAL: healthy, alert and no distress  EYES: Eyes grossly normal to inspection, PERRL and conjunctivae and sclerae normal  HENT: ear canals and TM's normal, nose and mouth without ulcers or lesions  NECK: no adenopathy, no asymmetry, masses, or scars and thyroid normal to palpation  RESP: lungs clear to auscultation - no rales, rhonchi or wheezes  CV: regular rate and rhythm, normal S1 S2, no S3 or S4, no murmur, click or rub, no peripheral edema and peripheral pulses strong  ABDOMEN: soft, nontender, no hepatosplenomegaly, no masses and bowel sounds normal   (male): normal male genitalia without lesions or urethral discharge, no hernia  RECTAL: normal sphincter tone, no rectal masses, prostate 2-3+ size, smooth, nontender without nodules or masses  MS: no gross musculoskeletal defects noted, no edema  SKIN: dry/scaly skin, eschars on the right posterior calf, left lower lateral knee and superior back most likely related to excoriations, no  suspicious lesions or rashes  FEET: clean and dry, filament exam normal   NEURO: Normal strength and tone, mentation intact and speech normal  PSYCH: mentation appears normal, affect normal/bright    Diagnostic Test Results:  Labs reviewed in Epic  Results for orders placed or performed in visit on 02/10/20 (from the past 24 hour(s))   Spirometry, Breathing Capacity: Normal Order, Clinic Performed   Result Value Ref Range    FEV-1      FVC      FEV1/FVC      FEF 25/75         ASSESSMENT / PLAN:   Type 2 diabetes mellitus without complication, with long-term current use of insulin (H)  Recommended Humalog TID (each meal) and metformin BID. Recommended Am Lactin cream for his feet.   - TSH with free T4 reflex  - Hemoglobin A1c  - Albumin Random Urine Quantitative with Creat Ratio  - C FOOT EXAM  NO CHARGE    Cerebrovascular accident (CVA) due to embolism of cerebral artery (H)  See below    Paroxysmal atrial fibrillation (H)  The pt should start taking his baby aspirin daily.     Benign essential hypertension  Well controlled with current therapies.   - UA reflex to Microscopic and Culture  - CBC with platelets  - Comprehensive metabolic panel    Primary osteoarthritis of both hips      Hyperlipidemia LDL goal <100  Rechecking the pt's lipid panel today in lab.   - Lipid panel reflex to direct LDL Fasting    Chronic non-seasonal allergic rhinitis, unspecified trigger  Suspect the pt's poor pulmonary function is complicated by his chronic rhinitis. Recommended that the pt start using Fluticasone nasal spray, 2 squirts each nostril BID. He should also take Claritin 1 tablet daily.     Routine general medical examination at a health care facility  The pt will receive a report concerning his lab results once they are made available.   - UA reflex to Microscopic and Culture  - CBC with platelets  - Comprehensive metabolic panel  - Lipid panel reflex to direct LDL Fasting  - TSH with free T4 reflex  - Vitamin D  Deficiency  - Hemoglobin A1c  - Albumin Random Urine Quantitative with Creat Ratio  - C FOOT EXAM  NO CHARGE  - Spirometry, Breathing Capacity: Normal Order, Clinic Performed    Simple chronic bronchitis (H)  See above. Will reevaluate in 1 month after using antihistamine and nasal sprayer regularly.   - Spirometry, Breathing Capacity: Normal Order, Clinic Performed    Vitamin D deficiency    - Vitamin D Deficiency            COUNSELING:  Reviewed preventive health counseling, as reflected in patient instructions       Regular exercise       Healthy diet/nutrition    Estimated body mass index is 28.89 kg/m  as calculated from the following:    Height as of this encounter: 1.829 m (6').    Weight as of this encounter: 96.6 kg (213 lb).         reports that he has never smoked. He has never used smokeless tobacco.      Appropriate preventive services were discussed with this patient, including applicable screening as appropriate for cardiovascular disease, diabetes, osteopenia/osteoporosis, and glaucoma.  As appropriate for age/gender, discussed screening for colorectal cancer, prostate cancer, breast cancer, and cervical cancer. Checklist reviewing preventive services available has been given to the patient.    Reviewed patients plan of care and provided an AVS. The Basic Care Plan (routine screening as documented in Health Maintenance) for Justyn meets the Care Plan requirement. This Care Plan has been established and reviewed with the Patient.    Counseling Resources:  ATP IV Guidelines  Pooled Cohorts Equation Calculator  Breast Cancer Risk Calculator  FRAX Risk Assessment  ICSI Preventive Guidelines  Dietary Guidelines for Americans, 2010  USDA's MyPlate  ASA Prophylaxis  Lung CA Screening    The information in this document, created by the medical scribe for me, accurately reflects the services I personally performed and the decisions made by me. I have reviewed and approved this document for accuracy prior to  leaving the patient care area.  February 10, 2020 12:10 AM    Bandar Greenberg MD  [facility]    Identified Health Risks:

## 2020-02-10 NOTE — TELEPHONE ENCOUNTER
Reason for call:  Other   Patient called regarding (reason for call): Jovanna, pharmacist [facility] [phone], called to request rx atorvastatin (LIPITOR) 20 MG tablet,qty 90 sent to them. The pt received only 3 tablets on last fill, she doesn't know why, and instead of dispensing the remaining 87 tablets to pt he would like all 90 tablets.  [facility] # [phone]  Additional comments:     Phone number to reach patient:  Home number on file [phone] (home)    Best Time:      Can we leave a detailed message on this number?  YES

## 2020-02-11 ENCOUNTER — TRANSFERRED RECORDS (OUTPATIENT)
Dept: HEALTH INFORMATION MANAGEMENT | Facility: CLINIC | Age: 80
End: 2020-02-11

## 2020-02-11 LAB
ALBUMIN SERPL-MCNC: 3.5 G/DL (ref 3.4–5)
ALP SERPL-CCNC: 88 U/L (ref 40–150)
ALT SERPL W P-5'-P-CCNC: 20 U/L (ref 0–70)
ANION GAP SERPL CALCULATED.3IONS-SCNC: 7 MMOL/L (ref 3–14)
AST SERPL W P-5'-P-CCNC: 13 U/L (ref 0–45)
BILIRUB SERPL-MCNC: 0.3 MG/DL (ref 0.2–1.3)
BUN SERPL-MCNC: 35 MG/DL (ref 7–30)
CALCIUM SERPL-MCNC: 8.5 MG/DL (ref 8.5–10.1)
CHLORIDE SERPL-SCNC: 112 MMOL/L (ref 94–109)
CHOLEST SERPL-MCNC: 141 MG/DL
CO2 SERPL-SCNC: 23 MMOL/L (ref 20–32)
CREAT SERPL-MCNC: 1.52 MG/DL (ref 0.66–1.25)
CREAT UR-MCNC: 130 MG/DL
DEPRECATED CALCIDIOL+CALCIFEROL SERPL-MC: 32 UG/L (ref 20–75)
GFR SERPL CREATININE-BSD FRML MDRD: 43 ML/MIN/{1.73_M2}
GLUCOSE SERPL-MCNC: 99 MG/DL (ref 70–99)
HDLC SERPL-MCNC: 48 MG/DL
LDLC SERPL CALC-MCNC: 79 MG/DL
MICROALBUMIN UR-MCNC: 224 MG/L
MICROALBUMIN/CREAT UR: 172.31 MG/G CR (ref 0–17)
NONHDLC SERPL-MCNC: 93 MG/DL
POTASSIUM SERPL-SCNC: 4.9 MMOL/L (ref 3.4–5.3)
PROT SERPL-MCNC: 7 G/DL (ref 6.8–8.8)
SODIUM SERPL-SCNC: 142 MMOL/L (ref 133–144)
TRIGL SERPL-MCNC: 71 MG/DL
TSH SERPL DL<=0.005 MIU/L-ACNC: 0.97 MU/L (ref 0.4–4)

## 2020-02-13 ENCOUNTER — TRANSFERRED RECORDS (OUTPATIENT)
Dept: HEALTH INFORMATION MANAGEMENT | Facility: CLINIC | Age: 80
End: 2020-02-13

## 2020-02-25 ENCOUNTER — TRANSFERRED RECORDS (OUTPATIENT)
Dept: HEALTH INFORMATION MANAGEMENT | Facility: CLINIC | Age: 80
End: 2020-02-25

## 2020-02-25 ENCOUNTER — MEDICAL CORRESPONDENCE (OUTPATIENT)
Dept: HEALTH INFORMATION MANAGEMENT | Facility: CLINIC | Age: 80
End: 2020-02-25

## 2020-02-25 ENCOUNTER — OFFICE VISIT (OUTPATIENT)
Dept: FAMILY MEDICINE | Facility: CLINIC | Age: 80
End: 2020-02-25
Payer: MEDICARE

## 2020-02-25 VITALS
OXYGEN SATURATION: 99 % | SYSTOLIC BLOOD PRESSURE: 136 MMHG | HEART RATE: 84 BPM | HEIGHT: 72 IN | DIASTOLIC BLOOD PRESSURE: 76 MMHG | TEMPERATURE: 97.5 F | BODY MASS INDEX: 28.85 KG/M2 | WEIGHT: 213 LBS

## 2020-02-25 DIAGNOSIS — Z79.4 TYPE 2 DIABETES MELLITUS WITHOUT COMPLICATION, WITH LONG-TERM CURRENT USE OF INSULIN (H): ICD-10-CM

## 2020-02-25 DIAGNOSIS — M15.0 PRIMARY OSTEOARTHRITIS INVOLVING MULTIPLE JOINTS: ICD-10-CM

## 2020-02-25 DIAGNOSIS — I10 BENIGN ESSENTIAL HYPERTENSION: ICD-10-CM

## 2020-02-25 DIAGNOSIS — I63.40 CEREBROVASCULAR ACCIDENT (CVA) DUE TO EMBOLISM OF CEREBRAL ARTERY (H): ICD-10-CM

## 2020-02-25 DIAGNOSIS — N18.30 CKD (CHRONIC KIDNEY DISEASE) STAGE 3, GFR 30-59 ML/MIN (H): ICD-10-CM

## 2020-02-25 DIAGNOSIS — E11.9 TYPE 2 DIABETES MELLITUS WITHOUT COMPLICATION, WITH LONG-TERM CURRENT USE OF INSULIN (H): ICD-10-CM

## 2020-02-25 DIAGNOSIS — I48.0 PAROXYSMAL ATRIAL FIBRILLATION (H): ICD-10-CM

## 2020-02-25 DIAGNOSIS — D62 ANEMIA DUE TO BLOOD LOSS, ACUTE: Primary | ICD-10-CM

## 2020-02-25 DIAGNOSIS — J41.0 SIMPLE CHRONIC BRONCHITIS (H): ICD-10-CM

## 2020-02-25 LAB
ERYTHROCYTE [DISTWIDTH] IN BLOOD BY AUTOMATED COUNT: 15.8 % (ref 10–15)
HCT VFR BLD AUTO: 30 % (ref 40–53)
HGB BLD-MCNC: 9 G/DL (ref 13.3–17.7)
MCH RBC QN AUTO: 22.6 PG (ref 26.5–33)
MCHC RBC AUTO-ENTMCNC: 30 G/DL (ref 31.5–36.5)
MCV RBC AUTO: 75 FL (ref 78–100)
PLATELET # BLD AUTO: 239 10E9/L (ref 150–450)
RBC # BLD AUTO: 3.98 10E12/L (ref 4.4–5.9)
RETINOPATHY: POSITIVE
WBC # BLD AUTO: 7.5 10E9/L (ref 4–11)

## 2020-02-25 PROCEDURE — 85027 COMPLETE CBC AUTOMATED: CPT | Performed by: INTERNAL MEDICINE

## 2020-02-25 PROCEDURE — 36415 COLL VENOUS BLD VENIPUNCTURE: CPT | Performed by: INTERNAL MEDICINE

## 2020-02-25 PROCEDURE — 99214 OFFICE O/P EST MOD 30 MIN: CPT | Performed by: INTERNAL MEDICINE

## 2020-02-25 PROCEDURE — 83540 ASSAY OF IRON: CPT | Performed by: INTERNAL MEDICINE

## 2020-02-25 PROCEDURE — 83550 IRON BINDING TEST: CPT | Performed by: INTERNAL MEDICINE

## 2020-02-25 ASSESSMENT — MIFFLIN-ST. JEOR: SCORE: 1714.16

## 2020-02-26 LAB
IRON SATN MFR SERPL: 4 % (ref 15–46)
IRON SERPL-MCNC: 21 UG/DL (ref 35–180)
TIBC SERPL-MCNC: 468 UG/DL (ref 240–430)

## 2020-03-05 ENCOUNTER — TELEPHONE (OUTPATIENT)
Dept: FAMILY MEDICINE | Facility: CLINIC | Age: 80
End: 2020-03-05

## 2020-03-05 NOTE — TELEPHONE ENCOUNTER
Panel Management Review      Patient has the following on his problem list: None      Composite cancer screening  Chart review shows that this patient is due/due soon for the following Eye exam  Summary:    Patient is due/failing the following:   Eye exam    Action needed:   Patient needs office visit for Eye exam.    Type of outreach:    [facility] faxed us the patients eye exam records. We are going to scan this into the patients medical records.     Questions for provider review:    None                                                                                                                                    Maury Kumari CMA on 3/5/2020 at 9:42 AM       Chart routed to Care Team .

## 2020-03-25 ENCOUNTER — TELEPHONE (OUTPATIENT)
Dept: FAMILY MEDICINE | Facility: CLINIC | Age: 80
End: 2020-03-25

## 2020-03-25 DIAGNOSIS — D62 ANEMIA DUE TO BLOOD LOSS, ACUTE: Primary | ICD-10-CM

## 2020-03-25 NOTE — TELEPHONE ENCOUNTER
Reason for Call:  Other lab    Detailed comments: patient said due to low hemoglobin he has been taking iron supplements suggested by Dr Greenberg.  Patient is asking when he should come back for  Repeat lab to see if supplements have been effective.     Phone Number Patient can be reached at: Home number on file [phone] (home)    Best Time: any    Can we leave a detailed message on this number? YES   OK TO LEAVE DETAILED  MESSAGE    Call taken on 3/25/2020 at 2:48 PM by Jacinta Cosby

## 2020-03-26 DIAGNOSIS — D62 ANEMIA DUE TO BLOOD LOSS, ACUTE: ICD-10-CM

## 2020-03-26 LAB
ERYTHROCYTE [DISTWIDTH] IN BLOOD BY AUTOMATED COUNT: 23 % (ref 10–15)
HCT VFR BLD AUTO: 36.7 % (ref 40–53)
HGB BLD-MCNC: 11.4 G/DL (ref 13.3–17.7)
IRON SATN MFR SERPL: 38 % (ref 15–46)
IRON SERPL-MCNC: 166 UG/DL (ref 35–180)
MCH RBC QN AUTO: 25.9 PG (ref 26.5–33)
MCHC RBC AUTO-ENTMCNC: 31.1 G/DL (ref 31.5–36.5)
MCV RBC AUTO: 83 FL (ref 78–100)
PLATELET # BLD AUTO: 214 10E9/L (ref 150–450)
RBC # BLD AUTO: 4.4 10E12/L (ref 4.4–5.9)
TIBC SERPL-MCNC: 437 UG/DL (ref 240–430)
WBC # BLD AUTO: 6.5 10E9/L (ref 4–11)

## 2020-03-26 PROCEDURE — 83550 IRON BINDING TEST: CPT | Performed by: INTERNAL MEDICINE

## 2020-03-26 PROCEDURE — 85027 COMPLETE CBC AUTOMATED: CPT | Performed by: INTERNAL MEDICINE

## 2020-03-26 PROCEDURE — 83540 ASSAY OF IRON: CPT | Performed by: INTERNAL MEDICINE

## 2020-03-26 PROCEDURE — 36415 COLL VENOUS BLD VENIPUNCTURE: CPT | Performed by: INTERNAL MEDICINE

## 2020-04-06 ENCOUNTER — TELEPHONE (OUTPATIENT)
Dept: FAMILY MEDICINE | Facility: CLINIC | Age: 80
End: 2020-04-06

## 2020-04-06 NOTE — TELEPHONE ENCOUNTER
Form updated with primary diabetes diagnosis code and faxed back over. Filed in accordion folder on Laura side.    Prudencio Newman CMA on 4/6/2020 at 12:20 PM

## 2020-04-06 NOTE — TELEPHONE ENCOUNTER
"Reason for Call:  Other Glucose Device/FAXED Paperwork    Detailed comments: Brie from [facility] calling in to have Dr. Greenberg \"vishal\" the primary DX for the pt as it was missed on the form. She just re-faxed it over to us.    Please have Yari update the form & send back.    Phone Number Patient can be reached at: Cell number on file:    Telephone Information:   Mobile [phone]       Best Time: any    Can we leave a detailed message on this number? YES    Call taken on 4/6/2020 at 10:07 AM by Demian Schaefer      "

## 2020-04-30 DIAGNOSIS — I63.40 CEREBROVASCULAR ACCIDENT (CVA) DUE TO EMBOLISM OF CEREBRAL ARTERY (H): ICD-10-CM

## 2020-04-30 DIAGNOSIS — Z79.4 TYPE 2 DIABETES MELLITUS WITHOUT COMPLICATION, WITH LONG-TERM CURRENT USE OF INSULIN (H): ICD-10-CM

## 2020-04-30 DIAGNOSIS — I10 BENIGN ESSENTIAL HYPERTENSION: ICD-10-CM

## 2020-04-30 DIAGNOSIS — E11.9 TYPE 2 DIABETES MELLITUS WITHOUT COMPLICATION, WITH LONG-TERM CURRENT USE OF INSULIN (H): ICD-10-CM

## 2020-05-01 NOTE — TELEPHONE ENCOUNTER
Routing refill request to provider for review/approval because:  Labs out of range:  Cr, Hgb    DURAN AlfredN, RN  [employer] Triage

## 2020-05-04 RX ORDER — IRBESARTAN 150 MG/1
TABLET ORAL
Qty: 90 TABLET | Refills: 3 | Status: SHIPPED | OUTPATIENT
Start: 2020-05-04 | End: 2020-12-20

## 2020-05-04 RX ORDER — CLOPIDOGREL BISULFATE 75 MG/1
TABLET ORAL
Qty: 90 TABLET | Refills: 3 | Status: SHIPPED | OUTPATIENT
Start: 2020-05-04 | End: 2021-02-12

## 2020-05-06 ENCOUNTER — TRANSFERRED RECORDS (OUTPATIENT)
Dept: HEALTH INFORMATION MANAGEMENT | Facility: CLINIC | Age: 80
End: 2020-05-06

## 2020-06-05 DIAGNOSIS — Z79.4 TYPE 2 DIABETES MELLITUS WITHOUT COMPLICATION, WITH LONG-TERM CURRENT USE OF INSULIN (H): ICD-10-CM

## 2020-06-05 DIAGNOSIS — E11.9 TYPE 2 DIABETES MELLITUS WITHOUT COMPLICATION, WITH LONG-TERM CURRENT USE OF INSULIN (H): ICD-10-CM

## 2020-06-06 ENCOUNTER — TELEPHONE (OUTPATIENT)
Dept: NURSING | Facility: CLINIC | Age: 80
End: 2020-06-06

## 2020-06-06 DIAGNOSIS — E11.9 TYPE 2 DIABETES MELLITUS WITHOUT COMPLICATION, WITH LONG-TERM CURRENT USE OF INSULIN (H): ICD-10-CM

## 2020-06-06 DIAGNOSIS — Z79.4 TYPE 2 DIABETES MELLITUS WITHOUT COMPLICATION, WITH LONG-TERM CURRENT USE OF INSULIN (H): ICD-10-CM

## 2020-06-08 RX ORDER — PEN NEEDLE, DIABETIC 32GX 5/32"
NEEDLE, DISPOSABLE MISCELLANEOUS
Qty: 400 EACH | Refills: 3 | Status: SHIPPED | OUTPATIENT
Start: 2020-06-08 | End: 2021-01-01

## 2020-06-08 RX ORDER — CARVEDILOL 25 MG/1
TABLET, FILM COATED ORAL
Qty: 400 STRIP | Refills: 1 | OUTPATIENT
Start: 2020-06-08

## 2020-06-27 DIAGNOSIS — G47.00 INSOMNIA, UNSPECIFIED TYPE: ICD-10-CM

## 2020-06-29 RX ORDER — TRAZODONE HYDROCHLORIDE 100 MG/1
TABLET ORAL
Qty: 90 TABLET | Refills: 1 | Status: SHIPPED | OUTPATIENT
Start: 2020-06-29 | End: 2020-11-02

## 2020-07-02 ENCOUNTER — THERAPY VISIT (OUTPATIENT)
Dept: PHYSICAL THERAPY | Facility: CLINIC | Age: 80
End: 2020-07-02
Payer: MEDICARE

## 2020-07-02 DIAGNOSIS — M25.551 HIP PAIN, RIGHT: ICD-10-CM

## 2020-07-02 DIAGNOSIS — M70.61 TROCHANTERIC BURSITIS OF RIGHT HIP: ICD-10-CM

## 2020-07-02 PROCEDURE — 97110 THERAPEUTIC EXERCISES: CPT | Mod: GP | Performed by: PHYSICAL THERAPIST

## 2020-07-02 PROCEDURE — 97140 MANUAL THERAPY 1/> REGIONS: CPT | Mod: GP | Performed by: PHYSICAL THERAPIST

## 2020-07-02 PROCEDURE — 97161 PT EVAL LOW COMPLEX 20 MIN: CPT | Mod: GP | Performed by: PHYSICAL THERAPIST

## 2020-07-02 ASSESSMENT — ACTIVITIES OF DAILY LIVING (ADL)
WALKING_DOWN_STEEP_HILLS: SLIGHT DIFFICULTY
STANDING_FOR_15_MINUTES: SLIGHT DIFFICULTY
GETTING_INTO_AND_OUT_OF_A_BATHTUB: SLIGHT DIFFICULTY
WALKING_INITIALLY: SLIGHT DIFFICULTY
WALKING_15_MINUTES_OR_GREATER: SLIGHT DIFFICULTY
GOING_DOWN_1_FLIGHT_OF_STAIRS: MODERATE DIFFICULTY
GOING_UP_1_FLIGHT_OF_STAIRS: MODERATE DIFFICULTY
WALKING_APPROXIMATELY_10_MINUTES: SLIGHT DIFFICULTY
WALKING_UP_STEEP_HILLS: MODERATE DIFFICULTY
PUTTING_ON_SOCKS_AND_SHOES: MODERATE DIFFICULTY
DEEP_SQUATTING: SLIGHT DIFFICULTY
STEPPING_UP_AND_DOWN_CURBS: SLIGHT DIFFICULTY
SITTING_FOR_15_MINUTES: SLIGHT DIFFICULTY
GETTING_INTO_AND_OUT_OF_AN_AVERAGE_CAR: EXTREME DIFFICULTY

## 2020-07-02 NOTE — LETTER
DEPARTMENT OF HEALTH AND HUMAN SERVICES  CENTERS FOR MEDICARE & MEDICAID SERVICES    PLAN/UPDATED PLAN OF PROGRESS FOR OUTPATIENT REHABILITATION    PATIENTS NAME:  Justyn Olivas   : 1940  PROVIDER NUMBER:    [id number]  HICN: [id number]   PROVIDER NAME: [facility]  MEDICAL RECORD NUMBER: [id number]   START OF CARE DATE:    2020  TYPE:  PT  PRIMARY/TREATMENT DIAGNOSIS: Trochanteric bursitis of right hip; Hip pain, right  VISITS FROM START OF CARE:  Rxs Used: 1     [facility] Initial Evaluation  Subjective:  The history is provided by the patient.   Patient Health History  Justyn Olivas being seen for R hip troch bursitis.   Problem began: 2020.   Problem occurred: insidious   Pain is reported as 7/10 on pain scale.  Pertinent medical history includes: high blood pressure, diabetes, hepatitis and stroke (CVA 19).   Current occupation is Self Employed.   Primary job tasks include:  Prolonged sitting.   Other job/home tasks details: plays golf for recreation.                Therapist Generated HPI Evaluation  Type of problem:  Right hip.  This is a chronic condition.  Condition occurred with:  Degenerative joint disease and insidious onset.  Patient reports pain:  Lateral.  Pain radiates to:  Hip.   Associated symptoms:  Loss of motion/stiffness. Symptoms are exacerbated by standing and sitting  and relieved by ice and NSAID's.                Objective:  Standing Alignment:  Lumbar:  Anterior pelvic tilt and sway back  Gait:    Weight Bearing Status:  WBAT     Deviations:  Lumbar:  Trunk lean R and trunk lean LHip:  Excessive flexion L, excessive flexion R and hip hiking R     Hip Evaluation  Hip PROM:  : limited end range IR, flex and add WNL. : Limited end range flex d/t ant hip pain, Limited IR past neutral, Flex/ER produces hip pain, limited add in 90 hip flex.  Hip Strength:  : Painful resisted hip abd; 3+/5.  Hip  Special Testing:    Right hip negative for the following special tests:  Piriformis    Hip Palpation:    Right hip tenderness present at:  Greater Trachanter; Piriformis; Gluteus Medius and Bursa (troch bursa tender )             PATIENTS NAME:  Justyn Olivas   : 1940          Assessment/Plan:    Patient is a 79 year old male with right side hip complaints.    Patient has the following significant findings with corresponding treatment plan.                Diagnosis 1:  R hip pain consistent with trochanteric bursitis and OA  Pain -  hot/cold therapy, manual therapy, self management, education and home program  Decreased ROM/flexibility - manual therapy, therapeutic exercise and home program  Decreased joint mobility - manual therapy, therapeutic exercise  Decreased strength - therapeutic exercise, therapeutic activities and home program  Impaired gait - gait training and home program  Impaired posture - neuro re-education, therapeutic activities and home program    Therapy Evaluation Codes: Cumulative Therapy Evaluation is: Low complexity.    Previous and current functional limitations:  (See Goal Flow Sheet for this information)    Short term and Long term goals: (See Goal Flow Sheet for this information)     Communication ability:  Patient appears to be able to clearly communicate and understand verbal and written communication and follow directions correctly.  Treatment Explanation - The following has been discussed with the patient:   RX ordered/plan of care  Anticipated outcomes  Possible risks and side effects  This patient would benefit from PT intervention to resume normal activities.   Rehab potential is good.    Frequency:  1 X week, once daily  Duration:  for 4 weeks tapering to 2 X a month over 6 weeks  Discharge Plan:  Achieve all LTG.  Independent in home treatment program.  Reach maximal therapeutic benefit.    Caregiver Signature/Credentials _____________________________ Date  "________       Treating Provider: Karina Del Toro DPT      I have reviewed and certified the need for these services and plan of treatment while under my care.        PHYSICIAN'S SIGNATURE:   _________________________________________  Date___________   Eliot Vivar MD    Certification period:  Beginning of Cert date period: 07/02/20 to  End of Cert period date: 09/29/20     Functional Level Progress Report: Please see attached \"Goal Flow sheet for Functional level.\"    ____X____ Continue Services or       ________ DC Services                Service dates: From    date to present                         "

## 2020-07-02 NOTE — LETTER
DEPARTMENT OF HEALTH AND HUMAN SERVICES  CENTERS FOR MEDICARE & MEDICAID SERVICES    PLAN/UPDATED PLAN OF PROGRESS FOR OUTPATIENT REHABILITATION    PATIENTS NAME:  Justyn Olivas   : 1940  PROVIDER NUMBER:    [id number]  HICN: [id number]   PROVIDER NAME: [facility] [place] [facility]  MEDICAL RECORD NUMBER: [id number]   START OF CARE DATE: 20   TYPE:  PT  PRIMARY/TREATMENT DIAGNOSIS: Trochanteric bursitis of right hip; Hip pain, right  VISITS FROM START OF CARE:  Rxs Used: 1     [place] [facility] Initial Evaluation  Subjective:  The history is provided by the patient.   Patient Health History  Justyn Olivas being seen for R hip troch bursitis.   Problem began: 2020.   Problem occurred: insidious   Pain is reported as 7/10 on pain scale.  Pertinent medical history includes: high blood pressure, diabetes, hepatitis and stroke (CVA 19).   Current occupation is Self Employed.   Primary job tasks include:  Prolonged sitting.   Other job/home tasks details: plays golf for recreation.                Therapist Generated HPI Evaluation  Type of problem:  Right hip.  This is a chronic condition.  Condition occurred with:  Degenerative joint disease and insidious onset.  Patient reports pain:  Lateral.  Pain radiates to:  Hip.   Associated symptoms:  Loss of motion/stiffness. Symptoms are exacerbated by standing and sitting  and relieved by ice and NSAID's.                   Objective:  Standing Alignment:    Lumbar:  Anterior pelvic tilt and sway back  Gait:    Weight Bearing Status:  WBAT     Deviations:  Lumbar:  Trunk lean R and trunk lean LHip:  Excessive flexion L, excessive flexion R and hip hiking R       Hip Evaluation  Hip PROM:  limited end range IR, flex and add WNL. Limited end range flex d/t ant hip pain, Limited IR past neutral, Flex/ER produces hip pain, limited add in 90 hip flex.  Hip Strength:  Painful resisted hip abd; 3+/5.  Hip  Special Testing:    Right hip negative for the following special tests:  Piriformis    Hip Palpation:    Right hip tenderness present at:  Greater Trachanter; Piriformis; Gluteus Medius and Bursa (troch bursa tender )         PATIENTS NAME:  Justyn Olivas   : 1940        Assessment/Plan:    Patient is a 79 year old male with right side hip complaints.    Patient has the following significant findings with corresponding treatment plan.                Diagnosis 1:  R hip pain consistent with trochanteric bursitis and OA  Pain -  hot/cold therapy, manual therapy, self management, education and home program  Decreased ROM/flexibility - manual therapy, therapeutic exercise and home program  Decreased joint mobility - manual therapy, therapeutic exercise  Decreased strength - therapeutic exercise, therapeutic activities and home program  Impaired gait - gait training and home program  Impaired posture - neuro re-education, therapeutic activities and home program    Therapy Evaluation Codes:   Cumulative Therapy Evaluation is: Low complexity.    Previous and current functional limitations:  (See Goal Flow Sheet for this information)    Short term and Long term goals: (See Goal Flow Sheet for this information)     Communication ability:  Patient appears to be able to clearly communicate and understand verbal and written communication and follow directions correctly.  Treatment Explanation - The following has been discussed with the patient:   RX ordered/plan of care  Anticipated outcomes  Possible risks and side effects  This patient would benefit from PT intervention to resume normal activities.   Rehab potential is good.    Frequency:  1 X week, once daily  Duration:  for 4 weeks tapering to 2 X a month over 6 weeks  Discharge Plan:  Achieve all LTG.  Independent in home treatment program.  Reach maximal therapeutic benefit.    Caregiver Signature/Credentials _____________________________ Date  "________       Treating Provider: Sea Albarran     I have reviewed and certified the need for these services and plan of treatment while under my care.        PHYSICIAN'S SIGNATURE:   _________________________________________  Date___________   Eliot Vivar MD    Certification period:  Beginning of Cert date period: 07/02/20 to  End of Cert period date: 09/29/20     Functional Level Progress Report: Please see attached \"Goal Flow sheet for Functional level.\"    ____X____ Continue Services or       ________ DC Services                Service dates: From  SOC Date: 07/02/20 date to present                         "

## 2020-07-02 NOTE — PROGRESS NOTES
[place] [facility] Initial Evaluation  Subjective:  The history is provided by the patient.   Patient Health History  Justyn Olivas being seen for R hip troch bursitis.     Problem began: 6/22/2020.   Problem occurred: insidious   Pain is reported as 7/10 on pain scale.    Pertinent medical history includes: high blood pressure, diabetes, hepatitis and stroke (CVA 2/13/19).                Current occupation is Self Employed.   Primary job tasks include:  Prolonged sitting.   Other job/home tasks details: plays golf for recreation.                Therapist Generated HPI Evaluation         Type of problem:  Right hip.    This is a chronic condition.  Condition occurred with:  Degenerative joint disease and insidious onset.    Patient reports pain:  Lateral.    Pain radiates to:  Hip.     Associated symptoms:  Loss of motion/stiffness. Symptoms are exacerbated by standing and sitting  and relieved by ice and NSAID's.                              Objective:  Standing Alignment:        Lumbar:  Anterior pelvic tilt and sway back            Gait:    Weight Bearing Status:  WBAT     Deviations:  Lumbar:  Trunk lean R and trunk lean LHip:  Excessive flexion L, excessive flexion R and hip hiking R                                                 Hip Evaluation  Hip PROM:  : limited end range IR, flex and add WNL. : Limited end range flex d/t ant hip pain, Limited IR past neutral, Flex/ER produces hip pain, limited add in 90 hip flex.                          Hip Strength:  : Painful resisted hip abd; 3+/5.                          Hip Special Testing:      Right hip negative for the following special tests:  Piriformis    Hip Palpation:      Right hip tenderness present at:  Greater Trachanter; Piriformis; Gluteus Medius and Bursa (troch bursa tender )             General     ROS    Assessment/Plan:    Patient is a 79 year old male with right side hip complaints.    Patient has the following significant  findings with corresponding treatment plan.                Diagnosis 1:  R hip pain consistent with trochanteric bursitis and OA  Pain -  hot/cold therapy, manual therapy, self management, education and home program  Decreased ROM/flexibility - manual therapy, therapeutic exercise and home program  Decreased joint mobility - manual therapy, therapeutic exercise  Decreased strength - therapeutic exercise, therapeutic activities and home program  Impaired gait - gait training and home program  Impaired posture - neuro re-education, therapeutic activities and home program    Therapy Evaluation Codes:   Cumulative Therapy Evaluation is: Low complexity.    Previous and current functional limitations:  (See Goal Flow Sheet for this information)    Short term and Long term goals: (See Goal Flow Sheet for this information)     Communication ability:  Patient appears to be able to clearly communicate and understand verbal and written communication and follow directions correctly.  Treatment Explanation - The following has been discussed with the patient:   RX ordered/plan of care  Anticipated outcomes  Possible risks and side effects  This patient would benefit from PT intervention to resume normal activities.   Rehab potential is good.    Frequency:  1 X week, once daily  Duration:  for 4 weeks tapering to 2 X a month over 6 weeks  Discharge Plan:  Achieve all LTG.  Independent in home treatment program.  Reach maximal therapeutic benefit.    Please refer to the daily flowsheet for treatment today, total treatment time and time spent performing 1:1 timed codes.

## 2020-07-16 ENCOUNTER — THERAPY VISIT (OUTPATIENT)
Dept: PHYSICAL THERAPY | Facility: CLINIC | Age: 80
End: 2020-07-16
Payer: MEDICARE

## 2020-07-16 DIAGNOSIS — M70.61 TROCHANTERIC BURSITIS OF RIGHT HIP: ICD-10-CM

## 2020-07-16 DIAGNOSIS — M25.551 HIP PAIN, RIGHT: ICD-10-CM

## 2020-07-16 PROCEDURE — 97110 THERAPEUTIC EXERCISES: CPT | Mod: GP | Performed by: PHYSICAL THERAPIST

## 2020-07-16 PROCEDURE — 97140 MANUAL THERAPY 1/> REGIONS: CPT | Mod: GP | Performed by: PHYSICAL THERAPIST

## 2020-07-21 ENCOUNTER — OFFICE VISIT (OUTPATIENT)
Dept: FAMILY MEDICINE | Facility: CLINIC | Age: 80
End: 2020-07-21
Payer: MEDICARE

## 2020-07-21 VITALS
SYSTOLIC BLOOD PRESSURE: 142 MMHG | DIASTOLIC BLOOD PRESSURE: 90 MMHG | OXYGEN SATURATION: 97 % | BODY MASS INDEX: 28.85 KG/M2 | WEIGHT: 213 LBS | HEART RATE: 90 BPM | HEIGHT: 72 IN | TEMPERATURE: 97.7 F

## 2020-07-21 DIAGNOSIS — E78.5 HYPERLIPIDEMIA LDL GOAL <100: ICD-10-CM

## 2020-07-21 DIAGNOSIS — I48.0 PAROXYSMAL ATRIAL FIBRILLATION (H): ICD-10-CM

## 2020-07-21 DIAGNOSIS — N18.30 CKD (CHRONIC KIDNEY DISEASE) STAGE 3, GFR 30-59 ML/MIN (H): ICD-10-CM

## 2020-07-21 DIAGNOSIS — Z01.818 PREOP GENERAL PHYSICAL EXAM: Primary | ICD-10-CM

## 2020-07-21 DIAGNOSIS — E11.9 TYPE 2 DIABETES MELLITUS WITHOUT COMPLICATION, WITH LONG-TERM CURRENT USE OF INSULIN (H): ICD-10-CM

## 2020-07-21 DIAGNOSIS — J30.1 NON-SEASONAL ALLERGIC RHINITIS DUE TO POLLEN: ICD-10-CM

## 2020-07-21 DIAGNOSIS — I63.40 CEREBROVASCULAR ACCIDENT (CVA) DUE TO EMBOLISM OF CEREBRAL ARTERY (H): ICD-10-CM

## 2020-07-21 DIAGNOSIS — Z79.4 TYPE 2 DIABETES MELLITUS WITHOUT COMPLICATION, WITH LONG-TERM CURRENT USE OF INSULIN (H): ICD-10-CM

## 2020-07-21 DIAGNOSIS — D62 ANEMIA DUE TO BLOOD LOSS, ACUTE: ICD-10-CM

## 2020-07-21 DIAGNOSIS — M16.0 PRIMARY OSTEOARTHRITIS OF BOTH HIPS: ICD-10-CM

## 2020-07-21 DIAGNOSIS — J41.0 SIMPLE CHRONIC BRONCHITIS (H): ICD-10-CM

## 2020-07-21 LAB
ERYTHROCYTE [DISTWIDTH] IN BLOOD BY AUTOMATED COUNT: 13.8 % (ref 10–15)
HBA1C MFR BLD: 8.1 % (ref 0–5.6)
HCT VFR BLD AUTO: 35.4 % (ref 40–53)
HGB BLD-MCNC: 11.4 G/DL (ref 13.3–17.7)
MCH RBC QN AUTO: 27.4 PG (ref 26.5–33)
MCHC RBC AUTO-ENTMCNC: 32.2 G/DL (ref 31.5–36.5)
MCV RBC AUTO: 85 FL (ref 78–100)
PLATELET # BLD AUTO: 254 10E9/L (ref 150–450)
RBC # BLD AUTO: 4.16 10E12/L (ref 4.4–5.9)
WBC # BLD AUTO: 5.3 10E9/L (ref 4–11)

## 2020-07-21 PROCEDURE — 83540 ASSAY OF IRON: CPT | Performed by: INTERNAL MEDICINE

## 2020-07-21 PROCEDURE — 99214 OFFICE O/P EST MOD 30 MIN: CPT | Performed by: INTERNAL MEDICINE

## 2020-07-21 PROCEDURE — 83550 IRON BINDING TEST: CPT | Performed by: INTERNAL MEDICINE

## 2020-07-21 PROCEDURE — 36415 COLL VENOUS BLD VENIPUNCTURE: CPT | Performed by: INTERNAL MEDICINE

## 2020-07-21 PROCEDURE — 85027 COMPLETE CBC AUTOMATED: CPT | Performed by: INTERNAL MEDICINE

## 2020-07-21 PROCEDURE — 80048 BASIC METABOLIC PNL TOTAL CA: CPT | Performed by: INTERNAL MEDICINE

## 2020-07-21 PROCEDURE — 83036 HEMOGLOBIN GLYCOSYLATED A1C: CPT | Performed by: INTERNAL MEDICINE

## 2020-07-21 ASSESSMENT — MIFFLIN-ST. JEOR: SCORE: 1714.16

## 2020-07-21 NOTE — PROGRESS NOTES
[address] [address]  [phone]  Dept: [phone]    PRE-OP EVALUATION:  Today's date: 2020    Justyn Olivas (: 1940) presents for pre-operative evaluation assessment as requested by Dr. Eliot Foss.  He requires evaluation and anesthesia risk assessment prior to undergoing surgery/procedure for treatment of Epi Retinol membrane ( Rt eye) .    Proposed Surgery/ Procedure: Pars Plana Victrectomy, Membrane peel( Rt eye)  Date of Surgery/ Procedure: 2020  Time of Surgery/ Procedure: 10:00 AM  Hospital/Surgical Facility: [facility]  Surgery Fax Number: [phone]/ [phone]  Primary Physician: Bandar Greenberg  Type of Anesthesia Anticipated: Local    Preoperative Questionnaire:   YES - HAVE YOU EVER HAD A HEART ATTACK OR STROKE? Stroke in 2019  No - Have you ever had surgery on your heart or blood vessels, such as a stent, coronary (heart) bypass, or surgery on an artery in the head, neck, heart, or legs?  No - Do you have chest pain when you are physically active?  No - Do you have a history of heart failure?  No - Do you currently have a cold, bronchitis, or symptoms of other respiratory (head and chest) infections?  No - Do you have a cough, shortness of breath, or wheezing?  No - Do you or anyone in your family have a history of blood clots?  No - Do you or anyone in your family have a serious bleeding problem, such as long-lasting bleeding after surgeries or cuts?  YES - HAVE YOU EVERY HAD ANEMIA OR BEEN TOLD TO TAKE IRON PILLS? Right after stroke had low iron, was advised iron tablets ( currently not taking)  No - Have you had any abnormal blood loss such as black, tarry or bloody stools, or abnormal vaginal bleeding?  No - Have you ever had a blood transfusion?  Yes - ARE YOU WILLING TO HAVE A BLOOD TRANSFUSION IF IT IS MEDICALLY NEEDED BEFORE, DURING, OR AFTER YOUR SURGERY?   No - Have you or anyone  in your family ever had problems with anesthesia (sedation for surgery)?  No - Do you have sleep apnea, excessive snoring, or daytime drowsiness?   No - Do you have any artifical heart valves or other implanted medical devices, such as a pacemaker, defibrillator, or continuous glucose monitor?  No - Do you have any artifical joints?  No - Are you allergic to latex?  No - Is there any chance that you may be pregnant?    Patient has a Health Care Directive or Living Will:  NO    HPI:     HPI related to upcoming procedure: 79-year-old with adult onset diabetes with inadequate control, CKD 3, history of CVA and chronic bronchitis presents for a preoperative evaluation prior to his right cataract surgery recommended because of declining vision.      COPD - Patient has a longstanding history of asthma . Patient has been doing well overall noting NO SYMPTOMS and continues on medication regimen consisting of    without adverse reactions or side effects.    DIABETES - Patient has a longstanding history of DiabetesType Type II . Patient is being treated with insulin injections and denies significant side effects. Control has been poor. . Patient reports that his a.m. blood sugars are between 85 and 105 and the p.m. readings are more variable.    HYPERTENSION - Patient has longstanding history of HTN , currently denies any symptoms referable to elevated blood pressure. Specifically denies chest pain, palpitations, dyspnea, orthopnea, PND or peripheral edema. Blood pressure readings have been in normal range. Current medication regimen is as listed below. Patient denies any side effects of medication.       MEDICAL HISTORY:     Patient Active Problem List    Diagnosis Date Noted     Trochanteric bursitis of right hip 07/02/2020     Priority: Medium     Hip pain, right 07/02/2020     Priority: Medium     CKD (chronic kidney disease) stage 3, GFR 30-59 ml/min (H) 02/25/2020     Priority: Medium     Anemia due to blood loss, acute  02/25/2020     Priority: Medium     Simple chronic bronchitis (H) 02/25/2020     Priority: Medium     Cerebrovascular accident (CVA) due to embolism of cerebral artery (H) 04/18/2019     Priority: Medium     Chronic non-seasonal allergic rhinitis, unspecified trigger 02/08/2018     Priority: Medium     Primary osteoarthritis involving multiple joints 02/01/2018     Priority: Medium     Primary osteoarthritis of both hips 10/10/2017     Priority: Medium     Non-seasonal allergic rhinitis due to pollen 05/16/2017     Priority: Medium     Type 2 diabetes mellitus without complication, with long-term current use of insulin (H) 10/25/2016     Priority: Medium     Benign essential hypertension 10/25/2016     Priority: Medium     Hyperlipidemia LDL goal <100 10/25/2016     Priority: Medium     Insomnia, unspecified type 08/24/2016     Priority: Medium     Shoulder pain 02/04/2013     Priority: Medium      No past medical history on file.  No past surgical history on file.  Current Outpatient Medications   Medication Sig Dispense Refill     ASPIRIN PO Take 81 mg by mouth daily        atorvastatin (LIPITOR) 20 MG tablet Take 1 tablet (20 mg) by mouth daily 90 tablet 1     blood glucose (STEVE CONTOUR) test strip TESTING FOUR TIMES DAILY OR AS DIRECTED 400 strip 0     blood glucose (STEVE CONTOUR) test strip TESTING FOUR TIMES DAILY OR AS DIRECTED 400 strip 1     clopidogrel (PLAVIX) 75 MG tablet TAKE 1 TABLET(75 MG) BY MOUTH DAILY 90 tablet 3     fluticasone (FLONASE) 50 MCG/ACT nasal spray Spray 1 spray into both nostrils 2 times daily as needed for rhinitis or allergies 3 Bottle 3     insulin glargine (LANTUS SOLOSTAR) 100 UNIT/ML pen INJECT 50 UNITS UNDER THE SKIN AT BEDTIME 45 mL 3     insulin lispro (HUMALOG KWIKPEN) 100 UNIT/ML (1 unit dial) pen ADMINISTER UP TO 55 UNITS UNDER THE SKIN EVERY DAY AS DIRECTED 45 mL 0     insulin pen needle (BD FERCHO U/F) 32G X 4 MM miscellaneous USE AS DIRECTED UP TO FOUR TIMES DAILY 400  each 3     irbesartan (AVAPRO) 150 MG tablet TAKE 1 TABLET(150 MG) BY MOUTH AT BEDTIME 90 tablet 3     LANTUS SOLOSTAR 100 UNIT/ML soln ADMINISTER 50 UNITS UNDER THE SKIN AT BEDTIME AS DIRECTED 45 mL 0     metFORMIN (GLUCOPHAGE) 1000 MG tablet TAKE 1 TABLET(1000 MG) BY MOUTH TWICE DAILY WITH MEALS 180 tablet 3     order for DME Hip steroid injectoion 1 Units 0     traZODone (DESYREL) 100 MG tablet TAKE 1 TABLET(100 MG) BY MOUTH AT BEDTIME 90 tablet 1     VENTOLIN  (90 BASE) MCG/ACT Inhaler INL 2 PFS PO QID PRN 1 Inhaler 3     Glucose Blood (STEVE CONTOUR TEST VI)        OTC products: None, except as noted above    No Known Allergies   Latex Allergy: NO    Social History     Tobacco Use     Smoking status: Never Smoker     Smokeless tobacco: Never Used   Substance Use Topics     Alcohol use: No     Alcohol/week: 0.0 standard drinks     History   Drug Use No       REVIEW OF SYSTEMS:   CONSTITUTIONAL: NEGATIVE for fever, chills, change in weight  INTEGUMENTARY/SKIN: NEGATIVE for worrisome rashes, moles or lesions  EYES: NEGATIVE for vision changes or irritation  ENT/MOUTH: NEGATIVE for ear, mouth and throat problems  Bifrontal headaches associated with chronic sinus congestion  RESP: NEGATIVE for significant cough or SOB  BREAST: NEGATIVE for masses, tenderness or discharge  CV: NEGATIVE for chest pain, palpitations or peripheral edema  inactive complicated by the pandemic  GI: NEGATIVE for nausea, abdominal pain, heartburn, or change in bowel habits  : NEGATIVE for frequency, dysuria, or hematuria, nocturia x1-2  MUSCULOSKELETAL: NEGATIVE for significant arthralgias or myalgia, greater trochanteric bursitis and physical therapy  NEURO: NEGATIVE for weakness, dizziness or paresthesias  ENDOCRINE: NEGATIVE for temperature intolerance, skin/hair changes  HEME: NEGATIVE for bleeding problems  PSYCHIATRIC: NEGATIVE for changes in mood or affect    EXAM:   BP (!) 142/90   Pulse 90   Temp 97.7  F (36.5  C) (Skin)    Ht 1.829 m (6')   Wt 96.6 kg (213 lb)   SpO2 97%   BMI 28.89 kg/m      GENERAL APPEARANCE: healthy, alert and no distress     EYES: EOMI,  PERRL     HENT: ear canals and TM's normal and nose and mouth without ulcers or lesions     NECK: no adenopathy, no asymmetry, masses, or scars and thyroid normal to palpation     RESP: lungs clear to auscultation - no rales, rhonchi or wheezes     CV: regular rates and rhythm, normal S1 S2, no S3 or S4 and no murmur, click or rub     ABDOMEN:  soft, nontender, no HSM or masses and bowel sounds normal     MS: extremities normal- no gross deformities noted, no evidence of inflammation in joints, FROM in all extremities.     SKIN: no suspicious lesions or rashes     NEURO: Normal strength and tone, sensory exam grossly normal, mentation intact and speech normal     PSYCH: mentation appears normal. and affect normal/bright     LYMPHATICS: No cervical adenopathy    DIAGNOSTICS:       Recent Labs CBC, BMP, hemoglobin A1c, iron iron-binding capacity       IMPRESSION:       The proposed surgical procedure is considered LOW risk.    REVISED CARDIAC RISK INDEX  The patient has the following serious cardiovascular risks for perioperative complications such as (MI, PE, VFib and 3  AV Block):  No serious cardiac risks  INTERPRETATION: 0 risks: Class I (very low risk - 0.4% complication rate)    The patient has the following additional risks for perioperative complications:  AODM  CKD 3  COPD  No identified additional risks      ICD-10-CM    1. Preop general physical exam  Z01.818 CBC with platelets     Basic metabolic panel     Hemoglobin A1c   2. CKD (chronic kidney disease) stage 3, GFR 30-59 ml/min (H)  N18.3    3. Type 2 diabetes mellitus without complication, with long-term current use of insulin (H)  E11.9     Z79.4    4. Simple chronic bronchitis (H)  J41.0    5. Cerebrovascular accident (CVA) due to embolism of cerebral artery (H)  I63.40    6. Hyperlipidemia LDL goal <100   E78.5    7. Primary osteoarthritis of both hips  M16.0    8. Non-seasonal allergic rhinitis due to pollen  J30.1    9. Anemia due to blood loss, acute  D62    10. Paroxysmal atrial fibrillation (H)  I48.0        RECOMMENDATIONS:         --Patient is to take all scheduled medications on the day of surgery EXCEPT for modifications listed below.    APPROVAL GIVEN to proceed with proposed procedure, without further diagnostic evaluation         Signed Electronically by: Bandar Greenberg MD    Copy of this evaluation report is provided to requesting physician.    [place] Preop Guidelines    Revised Cardiac Risk Index

## 2020-07-22 LAB
ANION GAP SERPL CALCULATED.3IONS-SCNC: 6 MMOL/L (ref 3–14)
BUN SERPL-MCNC: 26 MG/DL (ref 7–30)
CALCIUM SERPL-MCNC: 8.9 MG/DL (ref 8.5–10.1)
CHLORIDE SERPL-SCNC: 109 MMOL/L (ref 94–109)
CO2 SERPL-SCNC: 26 MMOL/L (ref 20–32)
CREAT SERPL-MCNC: 1.5 MG/DL (ref 0.66–1.25)
GFR SERPL CREATININE-BSD FRML MDRD: 43 ML/MIN/{1.73_M2}
GLUCOSE SERPL-MCNC: 263 MG/DL (ref 70–99)
IRON SATN MFR SERPL: 15 % (ref 15–46)
IRON SERPL-MCNC: 63 UG/DL (ref 35–180)
POTASSIUM SERPL-SCNC: 5 MMOL/L (ref 3.4–5.3)
SODIUM SERPL-SCNC: 141 MMOL/L (ref 133–144)
TIBC SERPL-MCNC: 426 UG/DL (ref 240–430)

## 2020-07-29 NOTE — RESULT ENCOUNTER NOTE
I called the patient and informed of results. The following changes were made to treatment:   I reviewed the lab studies associated with his upcoming surgery.  I tried to impress upon him the need to take responsibility for managing his diet and activities to improve the control of his diabetes.    Patient voices understanding and will contact me with any further questions.     Bandar Greenberg MD

## 2020-07-30 ENCOUNTER — THERAPY VISIT (OUTPATIENT)
Dept: PHYSICAL THERAPY | Facility: CLINIC | Age: 80
End: 2020-07-30
Payer: MEDICARE

## 2020-07-30 DIAGNOSIS — M25.551 HIP PAIN, RIGHT: ICD-10-CM

## 2020-07-30 DIAGNOSIS — M70.61 TROCHANTERIC BURSITIS OF RIGHT HIP: ICD-10-CM

## 2020-07-30 PROCEDURE — 97140 MANUAL THERAPY 1/> REGIONS: CPT | Mod: GP | Performed by: PHYSICAL THERAPIST

## 2020-07-30 PROCEDURE — 97110 THERAPEUTIC EXERCISES: CPT | Mod: GP | Performed by: PHYSICAL THERAPIST

## 2020-08-01 DIAGNOSIS — E78.5 HYPERLIPIDEMIA LDL GOAL <100: ICD-10-CM

## 2020-08-03 RX ORDER — ATORVASTATIN CALCIUM 20 MG/1
TABLET, FILM COATED ORAL
Qty: 90 TABLET | Refills: 2 | Status: SHIPPED | OUTPATIENT
Start: 2020-08-03 | End: 2021-04-28

## 2020-08-07 DIAGNOSIS — Z79.4 TYPE 2 DIABETES MELLITUS WITHOUT COMPLICATION, WITH LONG-TERM CURRENT USE OF INSULIN (H): ICD-10-CM

## 2020-08-07 DIAGNOSIS — E11.9 TYPE 2 DIABETES MELLITUS WITHOUT COMPLICATION, WITH LONG-TERM CURRENT USE OF INSULIN (H): ICD-10-CM

## 2020-08-10 RX ORDER — INSULIN LISPRO 100 [IU]/ML
INJECTION, SOLUTION INTRAVENOUS; SUBCUTANEOUS
Qty: 45 ML | Refills: 0 | Status: SHIPPED | OUTPATIENT
Start: 2020-08-10 | End: 2020-11-02

## 2020-09-04 ENCOUNTER — APPOINTMENT (OUTPATIENT)
Dept: MRI IMAGING | Facility: CLINIC | Age: 80
End: 2020-09-04
Attending: EMERGENCY MEDICINE
Payer: MEDICARE

## 2020-09-04 ENCOUNTER — HOSPITAL ENCOUNTER (EMERGENCY)
Facility: CLINIC | Age: 80
Discharge: HOME | End: 2020-09-04
Attending: EMERGENCY MEDICINE | Admitting: EMERGENCY MEDICINE
Payer: MEDICARE

## 2020-09-04 ENCOUNTER — MYC MEDICAL ADVICE (OUTPATIENT)
Dept: FAMILY MEDICINE | Facility: CLINIC | Age: 80
End: 2020-09-04

## 2020-09-04 VITALS
TEMPERATURE: 98.4 F | WEIGHT: 213 LBS | HEART RATE: 96 BPM | SYSTOLIC BLOOD PRESSURE: 181 MMHG | HEIGHT: 72 IN | OXYGEN SATURATION: 95 % | DIASTOLIC BLOOD PRESSURE: 98 MMHG | RESPIRATION RATE: 14 BRPM | BODY MASS INDEX: 28.85 KG/M2

## 2020-09-04 DIAGNOSIS — I67.1 INTRACRANIAL ANEURYSM: ICD-10-CM

## 2020-09-04 DIAGNOSIS — N18.30 CKD (CHRONIC KIDNEY DISEASE) STAGE 3, GFR 30-59 ML/MIN (H): ICD-10-CM

## 2020-09-04 DIAGNOSIS — R51.9 NONINTRACTABLE EPISODIC HEADACHE, UNSPECIFIED HEADACHE TYPE: ICD-10-CM

## 2020-09-04 DIAGNOSIS — R26.81 UNSTEADINESS: ICD-10-CM

## 2020-09-04 DIAGNOSIS — D64.9 ANEMIA, UNSPECIFIED TYPE: ICD-10-CM

## 2020-09-04 LAB
ANION GAP SERPL CALCULATED.3IONS-SCNC: 4 MMOL/L (ref 3–14)
APTT PPP: 29 SEC (ref 22–37)
BASOPHILS # BLD AUTO: 0 10E9/L (ref 0–0.2)
BASOPHILS NFR BLD AUTO: 0.6 %
BUN SERPL-MCNC: 29 MG/DL (ref 7–30)
CALCIUM SERPL-MCNC: 8.9 MG/DL (ref 8.5–10.1)
CHLORIDE SERPL-SCNC: 112 MMOL/L (ref 94–109)
CO2 SERPL-SCNC: 27 MMOL/L (ref 20–32)
CREAT SERPL-MCNC: 1.31 MG/DL (ref 0.66–1.25)
DIFFERENTIAL METHOD BLD: ABNORMAL
EOSINOPHIL # BLD AUTO: 0.4 10E9/L (ref 0–0.7)
EOSINOPHIL NFR BLD AUTO: 5.8 %
ERYTHROCYTE [DISTWIDTH] IN BLOOD BY AUTOMATED COUNT: 14 % (ref 10–15)
GFR SERPL CREATININE-BSD FRML MDRD: 51 ML/MIN/{1.73_M2}
GLUCOSE SERPL-MCNC: 139 MG/DL (ref 70–99)
HCT VFR BLD AUTO: 32.9 % (ref 40–53)
HGB BLD-MCNC: 10.4 G/DL (ref 13.3–17.7)
IMM GRANULOCYTES # BLD: 0 10E9/L (ref 0–0.4)
IMM GRANULOCYTES NFR BLD: 0.3 %
INR PPP: 0.99 (ref 0.86–1.14)
INTERPRETATION ECG - MUSE: NORMAL
LYMPHOCYTES # BLD AUTO: 1.2 10E9/L (ref 0.8–5.3)
LYMPHOCYTES NFR BLD AUTO: 16.4 %
MCH RBC QN AUTO: 25.1 PG (ref 26.5–33)
MCHC RBC AUTO-ENTMCNC: 31.6 G/DL (ref 31.5–36.5)
MCV RBC AUTO: 80 FL (ref 78–100)
MONOCYTES # BLD AUTO: 0.8 10E9/L (ref 0–1.3)
MONOCYTES NFR BLD AUTO: 11.2 %
NEUTROPHILS # BLD AUTO: 4.8 10E9/L (ref 1.6–8.3)
NEUTROPHILS NFR BLD AUTO: 65.7 %
NRBC # BLD AUTO: 0 10*3/UL
NRBC BLD AUTO-RTO: 0 /100
PLATELET # BLD AUTO: 275 10E9/L (ref 150–450)
POTASSIUM SERPL-SCNC: 4.1 MMOL/L (ref 3.4–5.3)
RBC # BLD AUTO: 4.14 10E12/L (ref 4.4–5.9)
SODIUM SERPL-SCNC: 143 MMOL/L (ref 133–144)
WBC # BLD AUTO: 7.3 10E9/L (ref 4–11)

## 2020-09-04 PROCEDURE — 99285 EMERGENCY DEPT VISIT HI MDM: CPT | Mod: 25

## 2020-09-04 PROCEDURE — 80048 BASIC METABOLIC PNL TOTAL CA: CPT | Performed by: EMERGENCY MEDICINE

## 2020-09-04 PROCEDURE — 85610 PROTHROMBIN TIME: CPT | Performed by: EMERGENCY MEDICINE

## 2020-09-04 PROCEDURE — 96361 HYDRATE IV INFUSION ADD-ON: CPT

## 2020-09-04 PROCEDURE — 93005 ELECTROCARDIOGRAM TRACING: CPT

## 2020-09-04 PROCEDURE — 70544 MR ANGIOGRAPHY HEAD W/O DYE: CPT

## 2020-09-04 PROCEDURE — 96360 HYDRATION IV INFUSION INIT: CPT | Mod: 59

## 2020-09-04 PROCEDURE — 25800030 ZZH RX IP 258 OP 636: Performed by: EMERGENCY MEDICINE

## 2020-09-04 PROCEDURE — 85025 COMPLETE CBC W/AUTO DIFF WBC: CPT | Performed by: EMERGENCY MEDICINE

## 2020-09-04 PROCEDURE — 85730 THROMBOPLASTIN TIME PARTIAL: CPT | Performed by: EMERGENCY MEDICINE

## 2020-09-04 PROCEDURE — 25000132 ZZH RX MED GY IP 250 OP 250 PS 637: Mod: GY | Performed by: EMERGENCY MEDICINE

## 2020-09-04 PROCEDURE — 25500064 ZZH RX 255 OP 636: Performed by: EMERGENCY MEDICINE

## 2020-09-04 PROCEDURE — 70553 MRI BRAIN STEM W/O & W/DYE: CPT

## 2020-09-04 PROCEDURE — A9585 GADOBUTROL INJECTION: HCPCS | Performed by: EMERGENCY MEDICINE

## 2020-09-04 PROCEDURE — 70549 MR ANGIOGRAPH NECK W/O&W/DYE: CPT

## 2020-09-04 RX ORDER — ACETAMINOPHEN 325 MG/1
975 TABLET ORAL ONCE
Status: COMPLETED | OUTPATIENT
Start: 2020-09-04 | End: 2020-09-04

## 2020-09-04 RX ORDER — GADOBUTROL 604.72 MG/ML
10 INJECTION INTRAVENOUS ONCE
Status: COMPLETED | OUTPATIENT
Start: 2020-09-04 | End: 2020-09-04

## 2020-09-04 RX ADMIN — GADOBUTROL 10 ML: 604.72 INJECTION INTRAVENOUS at 17:05

## 2020-09-04 RX ADMIN — ACETAMINOPHEN 975 MG: 325 TABLET, FILM COATED ORAL at 17:41

## 2020-09-04 RX ADMIN — SODIUM CHLORIDE 500 ML: 9 INJECTION, SOLUTION INTRAVENOUS at 15:30

## 2020-09-04 ASSESSMENT — ENCOUNTER SYMPTOMS
FEVER: 0
HEADACHES: 1
SPEECH DIFFICULTY: 0
COUGH: 0
NUMBNESS: 0

## 2020-09-04 ASSESSMENT — MIFFLIN-ST. JEOR: SCORE: 1714.16

## 2020-09-04 NOTE — ED NOTES
Complains of headache behind right eye.  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  Had surgery a week ago to remove the scar tissue.  He has felt that he has been weak bilaterally and has had slurred speech for a couple of weeks.  Wife states his speech has been slow, but not slurred.  Denies falls.  ABCDs intact.

## 2020-09-04 NOTE — DISCHARGE INSTRUCTIONS
Discharge Instructions  Headache    You were seen today for a headache. Headaches may be caused by many different things such as muscle tension, sinus inflammation, anxiety and stress, having too little sleep, too much alcohol, some medical conditions or injury. You may have a migraine, which is caused by changes in the blood vessels in your head.  At this time your provider does not find that your headache is a sign of anything dangerous or life-threatening.  However, sometimes the signs of serious illness do not show up right away.      Generally, every Emergency Department visit should have a follow-up clinic visit with either a primary or a specialty clinic/provider. Please follow-up as instructed by your emergency provider today.    Return to the Emergency Department if:  You get a new fever of 100.4 F or higher.  Your headache gets much worse.  You get a stiff neck with your headache.  You get a new headache that is significantly different or worse than headaches you have had before.  You are vomiting (throwing up) and cannot keep food or water down.  You have blurry or double vision or other problems with your eyes.  You have a new weakness on one side of your body.  You have difficulty with balance which is new.  You or your family thinks you are confused.  You have a seizure.    What can I do to help myself?  Pain medications - You may take a pain medication such as Tylenol  (acetaminophen), Advil , Motrin  (ibuprofen) or Aleve  (naproxen).  Take a pain reliever as soon as you notice symptoms.  Starting medications as soon as you start to have symptoms may lessen the amount of pain you have.  Relaxing in a quiet, dark room may help.  Get enough sleep and eat meals regularly.  You may need to watch for certain foods or other things which may trigger your headaches.  Keeping a journal of your headaches and possible triggers may help you and your primary provider to identify things which you should avoid which  may be causing your headaches.  If you were given a prescription for medicine here today, be sure to read all of the information (including the package insert) that comes with your prescription.  This will include important information about the medicine, its side effects, and any warnings that you need to know about.  The pharmacist who fills the prescription can provide more information and answer questions you may have about the medicine.  If you have questions or concerns that the pharmacist cannot address, please call or return to the Emergency Department.   Remember that you can always come back to the Emergency Department if you are not able to see your regular provider in the amount of time listed above, if you get any new symptoms, or if there is anything that worries you.     Discharge Instructions  Incidental Findings    An incidental finding is something unexpected that was found while you were being treated and is felt to not be related to the reason that you came to the Emergency Department.  While this finding is not an emergency, you need to follow up with your primary provider (or occasionally a specialist) to determine if anything should be done about it.    These findings can come from:  Checking your vital signs (example: high blood pressure).  Taking your history (example: unexplained weight loss).  The physical exam (example: a heart murmur).  Laboratory study (example: anemia or low blood count).  X-rays/ultrasound/CT or other imaging (example: an unexplained mass).    Generally, every Emergency Department visit should have a follow-up clinic visit with either a primary or a specialty clinic/provider. Please follow-up as instructed by your emergency provider today.    Return to the Emergency Department if:  Your condition worsens.  You develop unexpected pain.  You now develop new symptoms or have new concerns.  If you were given a prescription for medicine here today, be sure to read all of  the information (including the package insert) that comes with your prescription.  This will include important information about the medicine, its side effects, and any warnings that you need to know about.  The pharmacist who fills the prescription can provide more information and answer questions you may have about the medicine.  If you have questions or concerns that the pharmacist cannot address, please call or return to the Emergency Department.   Remember that you can always come back to the Emergency Department if you are not able to see your regular provider in the amount of time listed above, if you get any new symptoms, or if there is anything that worries you.

## 2020-09-04 NOTE — ED PROVIDER NOTES
History     Chief Complaint:  Headache    HPI   Justyn Olivas is a 79 year old male with a history of CVA, Type II diabetes, CKD, and hypertension who presents with a headache and being off-balance. The patient reports he has been off-balance for over a week and today he began having a headache. He last felt normal 1-2 weeks ago. The patient notes he had a stroke a year ago and he had outpatient eye surgery 2 weeks ago. The patient takes blood thinners and Plavix as blood thinners. He denies fall, head trauma, slurred speech, difficultly speaking, numbness or tingling in arms or legs, fever, cough, or known sick contacts.    Allergies:  No Known Drug Allergies      Medications:    Aspirin  Lipitor  Plavix  Insulin glargine  Insulin lispro  Avapro  Metformin  Desyrel  Ventolin inhaler    Past Medical History:    CVA  Type II diabetes    Hyperlipidemia   Insomnia  Hypertension   Osteoarthritis  CKD    Past Surgical History:    History reviewed. No pertinent surgical history.     Family History:    History reviewed. No pertinent family history.      Social History:  Smoking status: Former  Alcohol use: No  Drug use: No  Patient presents with wife.  PCP: Bandar Greenberg    Marital Status:        Review of Systems   Constitutional: Negative for fever.   Respiratory: Negative for cough.    Neurological: Positive for headaches. Negative for speech difficulty and numbness.   All other systems reviewed and are negative.      Physical Exam     Patient Vitals for the past 24 hrs:   BP Temp Temp src Pulse Resp SpO2 Height Weight   09/04/20 1500 (!) 165/97 -- -- 102 15 96 % -- --   09/04/20 1445 (!) 190/110 -- -- 98 14 96 % -- --   09/04/20 1430 (!) 179/105 -- -- 97 -- 96 % -- --   09/04/20 1415 (!) 174/99 -- -- 98 20 96 % -- --   09/04/20 1400 (!) 162/90 -- -- 95 14 95 % -- --   09/04/20 1345 (!) 156/104 -- -- 96 16 96 % -- --   09/04/20 1316 (!) 194/93 98.4  F (36.9  C) Temporal 100 18 94 % 1.829 m (6') 96.6 kg  (213 lb)      Physical Exam  General: Alert, interactive in mild distress  Head:  Scalp is atraumatic  Eyes:  The pupils are equal, round, and reactive to light    EOM's intact    No scleral icterus  ENT:      Nose:  The external nose is normal  Ears:  External ears are normal  Mouth/Throat: The oropharynx is normal    Mucus membranes are moist      Neck:  Normal range of motion.      There is no rigidity.    Trachea is in the midline         CV:  Regular rate and rhythm    No murmur   Resp:  Breath sounds are clear bilaterally    Non-labored, no retractions or accessory muscle use      GI:  Abdomen is soft, no distension, no tenderness.       MS:  Normal strength in all 4 extremities, No midline cervical, thoracic, or lumbar tenderness  Skin:  Warm and dry, No rash or lesions noted.  Neuro:      Strength 5/5 x4.  Sensation intact  In all 4 extremities.       Cranial nerves 2-12 intact.    GCS: 15    [employer] Stroke Scale  Exam Interval: Baseline   Score    Level of consciousness: (0)   Alert, keenly responsive    LOC questions: (0)   Answers both questions correctly    LOC commands: (0)   Performs both tasks correctly    Best gaze: (0)   Normal    Visual: (0)   No visual loss    Facial palsy: (0)   Normal symmetrical movements    Motor arm (left): (0)   No drift    Motor arm (right): (0)   No drift    Motor leg (left): (0)   No drift    Motor leg (right): (0)   No drift    Limb ataxia: (0)   Absent    Sensory: (0)   Normal- no sensory loss    Best language: (0)   Normal- no aphasia    Dysarthria: (0)   Normal    Extinction and inattention: (0)   No abnormality        Total Score:  0       Psych:  Awake. Alert.  Normal affect.      Appropriate interactions.     Emergency Department Course   ECG:  @ 1350  Vent. Rate 95 bpm. TN interval 174 ms. QRS duration 88 ms. QT/QTc 380/477 ms. P-R-T axis 45 42 29.   Normal sinus rhythm. Normal ECG.  No significant change when compared to previous ECG from  10/12/03   Read @ 1405 by Dr. Murcia.     Imaging:  MR Brain w/o & w/ contrast:  Pending  Result per radiology.    MR Neck w/o & w/ contrast angiogram:  Pending  Result per radiology.    MRA Head w/o contrast angiogram:  Pending    MR Neck w/o & w Contrast Angiogram   Preliminary Result   IMPRESSION:  Unremarkable MR angiogram of the neck.           MR Brain w/o & w Contrast   Preliminary Result   IMPRESSION:      1. No evidence of acute ischemia or hemorrhage.   2. Volume loss and white matter T2 hyperintensities which likely   represent chronic small vessel ischemic change.      MR Head w/o Contrast Angiogram   Final Result   IMPRESSION:   1. 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   2. Mild stenoses of the left internal carotid artery.   3. Questionable 2 to 3 mm outpouching off the cavernous segment of the   left internal carotid artery. Consider follow-up CTA or MRA in 6-12   months.      LAYTON COX MD         Laboratory:  CBC: WBC 7.3, HGB 10.4 (L),     BMP: Glucose 139 (H), Chloride: 112 (H), Creatinine: 1.31 (H), GFR: 51 (L), o/w WNL     INR: 0.99    Partial thromboplastin time: 29     Interventions:  1530 0.9% Sodium Chloride BOLUS 500 mLs IV       Emergency Department Course:  1329 Nursing notes and vitals reviewed. I performed an exam of the patient as documented above.     EKG was done, interpretation as above.    IV inserted. Medicine administered as documented above. Blood drawn. This was sent to the lab for further testing, results above.    The patient was sent for a MRI while in the emergency department, findings above.     Findings and plan explained to the Patient and spouse. Patient signed out to my partner Dr. Norris pending MRI results.     Impression & Plan      Medical Decision Making:  Following presentation history and physical examination were performed, given the patient's unsteadiness MRI was performed to evaluate for stroke this is currently pending.  Incidentally  the patient does have a potential aneurysm noted on his MR angiogram the patient was informed of this and advised to follow-up in 6 to 12 months for repeat imaging.  There is no signs of an acute infectious etiology, the patient's laboratory work-up is unremarkable, his headache is improving during his time in the ED.  He can safely be discharged home and closely follow-up with his primary care provider.    Diagnosis:    ICD-10-CM    1. Nonintractable episodic headache, unspecified headache type  R51    2. Unsteadiness  R26.81    3. Intracranial aneurysm  I67.1    4. CKD (chronic kidney disease) stage 3, GFR 30-59 ml/min (H)  N18.3    5. Anemia, unspecified type  D64.9        Disposition:  Signed out to Dr. Norris    Discharge Medications:  New Prescriptions    No medications on file       Rody Garvey  9/4/2020    EMERGENCY DEPARTMENT    Scribe Disclosure:  I, Rody Garvey, am serving as a scribe at 1:29 PM on 9/4/2020 to document services personally performed by Paco Murcia MD based on my observations and the provider's statements to me.         Paco Murcia MD  09/04/20 1721       Paco Murcia MD  09/04/20 1738

## 2020-09-04 NOTE — ED AVS SNAPSHOT
Emergency Department  [address] [place] [id number]  Phone:  [phone]  Fax:  [phone]                                    Justyn Olivas   MRN: [id number]    Department:   Emergency Department   Date of Visit:  9/4/2020           After Visit Summary Signature Page    I have received my discharge instructions, and my questions have been answered. I have discussed any challenges I see with this plan with the nurse or doctor.    ..........................................................................................................................................  Patient/Patient Representative Signature      ..........................................................................................................................................  Patient Representative Print Name and Relationship to Patient    ..................................................               ................................................  Date                                   Time    ..........................................................................................................................................  Reviewed by Signature/Title    ...................................................              ..............................................  Date                                               Time          22EPIC Rev 08/18

## 2020-09-08 ENCOUNTER — TELEPHONE (OUTPATIENT)
Dept: FAMILY MEDICINE | Facility: CLINIC | Age: 80
End: 2020-09-08

## 2020-09-08 NOTE — TELEPHONE ENCOUNTER
LVM to call to schedule Hospital F/U   The patient is a 64y Female who is followed by neurology because of headache    Headache  suspect occipital neuralgia,  ESR=28, CRP<0.40, doubt temporal arteritis  continue to treat headache/neuralgia symptomatically with GBP and prn flexeril  If medication no help, may need occipital nerve block as out patient      HTN  normalize BP gradually to avoid CVA from hypotension    Neurologically stable for d/c when medically stable    Thank you for allowing me to participate in the care of your patient    Loyd Muñoz MD, PhD   [id number]

## 2020-09-14 ENCOUNTER — OFFICE VISIT (OUTPATIENT)
Dept: FAMILY MEDICINE | Facility: CLINIC | Age: 80
End: 2020-09-14
Payer: MEDICARE

## 2020-09-14 VITALS
HEART RATE: 97 BPM | OXYGEN SATURATION: 97 % | WEIGHT: 216 LBS | TEMPERATURE: 98.3 F | HEIGHT: 72 IN | SYSTOLIC BLOOD PRESSURE: 165 MMHG | BODY MASS INDEX: 29.26 KG/M2 | DIASTOLIC BLOOD PRESSURE: 87 MMHG

## 2020-09-14 DIAGNOSIS — Z79.4 TYPE 2 DIABETES MELLITUS WITHOUT COMPLICATION, WITH LONG-TERM CURRENT USE OF INSULIN (H): Primary | ICD-10-CM

## 2020-09-14 DIAGNOSIS — I10 BENIGN ESSENTIAL HYPERTENSION: ICD-10-CM

## 2020-09-14 DIAGNOSIS — D62 ANEMIA DUE TO BLOOD LOSS, ACUTE: ICD-10-CM

## 2020-09-14 DIAGNOSIS — I48.0 PAROXYSMAL A-FIB (H): ICD-10-CM

## 2020-09-14 DIAGNOSIS — J41.0 SIMPLE CHRONIC BRONCHITIS (H): ICD-10-CM

## 2020-09-14 DIAGNOSIS — G44.209 TENSION HEADACHE: ICD-10-CM

## 2020-09-14 DIAGNOSIS — I63.40 CEREBROVASCULAR ACCIDENT (CVA) DUE TO EMBOLISM OF CEREBRAL ARTERY (H): ICD-10-CM

## 2020-09-14 DIAGNOSIS — M15.0 PRIMARY OSTEOARTHRITIS INVOLVING MULTIPLE JOINTS: ICD-10-CM

## 2020-09-14 DIAGNOSIS — N18.30 CKD (CHRONIC KIDNEY DISEASE) STAGE 3, GFR 30-59 ML/MIN (H): ICD-10-CM

## 2020-09-14 DIAGNOSIS — E11.9 TYPE 2 DIABETES MELLITUS WITHOUT COMPLICATION, WITH LONG-TERM CURRENT USE OF INSULIN (H): Primary | ICD-10-CM

## 2020-09-14 LAB — HBA1C MFR BLD: 7.3 % (ref 0–5.6)

## 2020-09-14 PROCEDURE — 99214 OFFICE O/P EST MOD 30 MIN: CPT | Performed by: INTERNAL MEDICINE

## 2020-09-14 PROCEDURE — 83036 HEMOGLOBIN GLYCOSYLATED A1C: CPT | Performed by: INTERNAL MEDICINE

## 2020-09-14 PROCEDURE — 36415 COLL VENOUS BLD VENIPUNCTURE: CPT | Performed by: INTERNAL MEDICINE

## 2020-09-14 RX ORDER — HYDROCHLOROTHIAZIDE 25 MG/1
25 TABLET ORAL DAILY
Qty: 30 TABLET | Refills: 1 | Status: SHIPPED | OUTPATIENT
Start: 2020-09-14 | End: 2020-10-07

## 2020-09-14 ASSESSMENT — MIFFLIN-ST. JEOR: SCORE: 1727.77

## 2020-09-14 NOTE — PROGRESS NOTES
Subjective     Justyn Olivas is a 79 year old male who presents to clinic today for the following health issues:    HPI       ED/UC Followup:    Facility:  [facility] Emergency Department  Date of visit: 9/4/2020  Reason for visit: headache, off balance    Current Status: bursitis in right hip is throwing off his balance    Presented to the emergency room with a severe headache at at bedtime on both sides and characterized as bifrontal.?  Chronic sinusitis    AODM  Blood sugars in the morning are consistently in the 80s however they are much more variable in the day and he oftentimes skips the noon insulin and is not checking his blood sugars before meals to use the sliding scale.  Again noted    Right hip bursitis allowing him to walk for 35 to 40 minutes.  Prior to the pandemic he was walking and using a  twice weekly.  He plays golf with a cart  At the golf club he feels imbalanced or woozy as he would to stand up around or picks his ball up off the green      Review of Systems   Constitutional, HEENT, cardiovascular, pulmonary, gi and gu systems are negative, except as otherwise noted.      Objective    BP (!) 165/87 (BP Location: Right arm, Patient Position: Sitting)   Pulse 97   Temp 98.3  F (36.8  C) (Tympanic)   Ht 1.829 m (6')   Wt 98 kg (216 lb)   SpO2 97%   BMI 29.29 kg/m    Body mass index is 29.29 kg/m .  Physical Exam   GENERAL: healthy, alert and no distress  NECK: no adenopathy, no asymmetry, masses, or scars and thyroid normal to palpation  RESP: lungs clear to auscultation - no rales, rhonchi or wheezes  CV: regular rate and rhythm, normal S1 S2, no S3 or S4, no murmur, click or rub, no peripheral edema and peripheral pulses strong  ABDOMEN: soft, nontender, no hepatosplenomegaly, no masses and bowel sounds normal  MS: no gross musculoskeletal defects noted, no edema            Assessment & Plan     Type 2 diabetes mellitus without complication, with long-term current use  of insulin (H)  Recommend not skipping his insulin and checking his blood sugars regularly to assist in improving his blood sugar control I did warn him about the possibility of weight gain if he is not managing his diet as well.  - Hemoglobin A1c    CKD (chronic kidney disease) stage 3, GFR 30-59 ml/min (H)  Follow closely to avoid prerenal azotemia and avoiding nephrotoxic drugs  - hydrochlorothiazide (HYDRODIURIL) 25 MG tablet; Take 1 tablet (25 mg) by mouth daily    Simple chronic bronchitis (H)  No recent exacerbations    Cerebrovascular accident (CVA) due to embolism of cerebral artery (H)    No sign of any reoccurrences and his previous CVA was probably related to paroxysmal atrial fibrillation  Paroxysmal A-fib (H)  Quiesced sent at this time  Anemia due to blood loss, acute      Benign essential hypertension  Follow closely and reevaluate medications at his next visit  - hydrochlorothiazide (HYDRODIURIL) 25 MG tablet; Take 1 tablet (25 mg) by mouth daily    Primary osteoarthritis involving multiple joints      Tension headache  Complicated by ongoing sinusitis       BMI:   Estimated body mass index is 29.29 kg/m  as calculated from the following:    Height as of this encounter: 1.829 m (6').    Weight as of this encounter: 98 kg (216 lb).           FUTURE APPOINTMENTS:       - Follow-up visit in 1 mo    No follow-ups on file.    Bandar Greenberg MD  [facility]

## 2020-09-23 ENCOUNTER — TRANSFERRED RECORDS (OUTPATIENT)
Dept: HEALTH INFORMATION MANAGEMENT | Facility: CLINIC | Age: 80
End: 2020-09-23

## 2020-10-05 ENCOUNTER — MYC MEDICAL ADVICE (OUTPATIENT)
Dept: FAMILY MEDICINE | Facility: CLINIC | Age: 80
End: 2020-10-05

## 2020-10-05 ENCOUNTER — OFFICE VISIT (OUTPATIENT)
Dept: FAMILY MEDICINE | Facility: CLINIC | Age: 80
End: 2020-10-05
Payer: MEDICARE

## 2020-10-05 VITALS
DIASTOLIC BLOOD PRESSURE: 87 MMHG | WEIGHT: 211 LBS | HEIGHT: 72 IN | OXYGEN SATURATION: 97 % | SYSTOLIC BLOOD PRESSURE: 182 MMHG | BODY MASS INDEX: 28.58 KG/M2 | HEART RATE: 95 BPM | TEMPERATURE: 97.2 F

## 2020-10-05 DIAGNOSIS — N18.31 STAGE 3A CHRONIC KIDNEY DISEASE (H): ICD-10-CM

## 2020-10-05 DIAGNOSIS — E11.9 TYPE 2 DIABETES MELLITUS WITHOUT COMPLICATION, WITH LONG-TERM CURRENT USE OF INSULIN (H): ICD-10-CM

## 2020-10-05 DIAGNOSIS — Z79.4 TYPE 2 DIABETES MELLITUS WITHOUT COMPLICATION, WITH LONG-TERM CURRENT USE OF INSULIN (H): ICD-10-CM

## 2020-10-05 DIAGNOSIS — I10 BENIGN ESSENTIAL HYPERTENSION: ICD-10-CM

## 2020-10-05 PROCEDURE — 99214 OFFICE O/P EST MOD 30 MIN: CPT | Performed by: INTERNAL MEDICINE

## 2020-10-05 ASSESSMENT — MIFFLIN-ST. JEOR: SCORE: 1700.09

## 2020-10-05 NOTE — PROGRESS NOTES
Subjective     Justyn Olivas is a 80 year old male who presents to clinic today for the following health issues:    HPI    Follow-up on blood pressure and diabetes.  Patient's control is variable.  Notably continuing to miss checking his blood sugars before lunch and not using his recommended insulin at that time.    Exercise and activity status quo, noted 5 pound weight loss with improved dietary discretion     2 week follow up       Review of Systems   Constitutional, HEENT, cardiovascular, pulmonary, gi and gu systems are negative, except as otherwise noted.      Objective    BP (!) 182/87 (BP Location: Left arm, Patient Position: Sitting)   Pulse 95   Temp 97.2  F (36.2  C) (Tympanic)   Ht 1.829 m (6')   Wt 95.7 kg (211 lb)   SpO2 97%   BMI 28.62 kg/m    Body mass index is 28.62 kg/m .  Physical Exam /84  GENERAL: healthy, alert and no distress  NECK: no adenopathy, no asymmetry, masses, or scars and thyroid normal to palpation  RESP: lungs clear to auscultation - no rales, rhonchi or wheezes  CV: regular rate and rhythm, normal S1 S2, no S3 or S4, no murmur, click or ruband peripheral pulses strong  ABDOMEN: soft, nontender, no hepatosplenomegaly, no masses and bowel sounds normal  MS: no gross musculoskeletal defects noted, 1+ pretibial edema            Assessment & Plan     Stage 3a chronic kidney disease  Add hydrochlorothiazide for blood pressure control, watching closely to avoid prerenal azotemia and progression of his CKD.  Return to clinic in 1 to 2 weeks for follow-up labs and reevaluation of his blood pressure  - hydrochlorothiazide (HYDRODIURIL) 25 MG tablet; Take 1 tablet (25 mg) by mouth daily    Benign essential hypertension  See discussion above  - hydrochlorothiazide (HYDRODIURIL) 25 MG tablet; Take 1 tablet (25 mg) by mouth daily    Type 2 diabetes mellitus without complication, with long-term current use of insulin (H)  We discussed blood sugar control and the importance of  consistency in taking his insulin and the importance of checking his blood sugars regularly.  We reviewed diet  - insulin glargine (LANTUS SOLOSTAR) 100 UNIT/ML pen; INJECT 50 UNITS UNDER THE SKIN AT BEDTIME     BMI:   Estimated body mass index is 28.62 kg/m  as calculated from the following:    Height as of this encounter: 1.829 m (6').    Weight as of this encounter: 95.7 kg (211 lb).            FUTURE APPOINTMENTS:       - Follow-up visit in 2 mo    No follow-ups on file.  30 minutes were spent during this encounter with greater than 50% of the time spent in counseling and coordinating care regarding his hypertension and diabetes  Bandar Greenberg MD  [facility]

## 2020-10-06 DIAGNOSIS — N18.30 CKD (CHRONIC KIDNEY DISEASE) STAGE 3, GFR 30-59 ML/MIN (H): ICD-10-CM

## 2020-10-06 DIAGNOSIS — I10 BENIGN ESSENTIAL HYPERTENSION: ICD-10-CM

## 2020-10-06 NOTE — TELEPHONE ENCOUNTER
Refill request:    HYDROCHLOROTHIAZIDE 25 MG TABLETS    Summary: Take 1 tablet (25 mg) by mouth daily, Disp-30 tablet,R-1, E-Prescribe   Dose, Route, Frequency: 25 mg, Oral, DAILY  Start: 9/14/2020  End: 10/14/2020  Ord/Sold: 9/14/2020

## 2020-10-07 ENCOUNTER — TELEPHONE (OUTPATIENT)
Dept: FAMILY MEDICINE | Facility: CLINIC | Age: 80
End: 2020-10-07

## 2020-10-07 RX ORDER — HYDROCHLOROTHIAZIDE 25 MG/1
25 TABLET ORAL DAILY
Qty: 30 TABLET | Refills: 1 | Status: SHIPPED | OUTPATIENT
Start: 2020-10-07 | End: 2020-11-11

## 2020-10-07 RX ORDER — HYDROCHLOROTHIAZIDE 25 MG/1
25 TABLET ORAL DAILY
Start: 2020-10-07

## 2020-10-07 NOTE — TELEPHONE ENCOUNTER
Same recommendation as before on the irbesartan, 300 mg/day.  Oh by the way he had an episode of hypoglycemia playing golf this morning.  This morning blood sugar was 65 had a piece of toast and went to play golf with subsequent hypoglycemia.  His blood sugar responded to oral therapy and he is now doing well.  We reviewed diet and activities to avoid similar episodes in the future.

## 2020-10-07 NOTE — TELEPHONE ENCOUNTER
Fax received from [email or web address]    Seeing refill of Lantus Solostar pen    Medication was pended for refill at LOV 10/5/20 - waiting for approval.    Please review and approve    RT Salty (R)

## 2020-10-14 ENCOUNTER — MYC MEDICAL ADVICE (OUTPATIENT)
Dept: FAMILY MEDICINE | Facility: CLINIC | Age: 80
End: 2020-10-14

## 2020-10-14 DIAGNOSIS — I10 BENIGN ESSENTIAL HYPERTENSION: ICD-10-CM

## 2020-10-15 RX ORDER — IRBESARTAN 150 MG/1
300 TABLET ORAL DAILY
Qty: 180 TABLET | Refills: 1 | Status: CANCELLED | OUTPATIENT
Start: 2020-10-15

## 2020-10-15 RX ORDER — IRBESARTAN 300 MG/1
300 TABLET ORAL AT BEDTIME
Qty: 90 TABLET | Refills: 3 | Status: SHIPPED | OUTPATIENT
Start: 2020-10-15 | End: 2021-03-03

## 2020-10-15 NOTE — TELEPHONE ENCOUNTER
Dr Greenberg,  Please see below MyChart message and advise.  Assume pt means 300mg?  Tried to clarify with pt several times  See previous MyCharts  Thanks,  Gloria FAUST RN

## 2020-11-02 DIAGNOSIS — N18.31 STAGE 3A CHRONIC KIDNEY DISEASE (H): ICD-10-CM

## 2020-11-02 DIAGNOSIS — Z79.4 TYPE 2 DIABETES MELLITUS WITHOUT COMPLICATION, WITH LONG-TERM CURRENT USE OF INSULIN (H): ICD-10-CM

## 2020-11-02 DIAGNOSIS — I10 BENIGN ESSENTIAL HYPERTENSION: ICD-10-CM

## 2020-11-02 DIAGNOSIS — E11.9 TYPE 2 DIABETES MELLITUS WITHOUT COMPLICATION, WITH LONG-TERM CURRENT USE OF INSULIN (H): ICD-10-CM

## 2020-11-02 DIAGNOSIS — G47.00 INSOMNIA, UNSPECIFIED TYPE: ICD-10-CM

## 2020-11-02 NOTE — TELEPHONE ENCOUNTER
LOV 10-5-2020 Woody  Follow up 2 months, not scheduled  Pharm note given    Will send MyChart reminder    RT Salty (R)

## 2020-11-03 RX ORDER — INSULIN LISPRO 100 [IU]/ML
INJECTION, SOLUTION INTRAVENOUS; SUBCUTANEOUS
Qty: 45 ML | Refills: 1 | Status: SHIPPED | OUTPATIENT
Start: 2020-11-03 | End: 2021-01-01

## 2020-11-03 RX ORDER — TRAZODONE HYDROCHLORIDE 100 MG/1
TABLET ORAL
Qty: 90 TABLET | Refills: 0 | Status: SHIPPED | OUTPATIENT
Start: 2020-11-03 | End: 2021-01-29

## 2020-11-10 NOTE — TELEPHONE ENCOUNTER
Routing refill request to provider for review/approval because:  BP out of range    Please review and authorize if appropriate.    Thank you,   Rad HARRISON RN

## 2020-11-10 NOTE — TELEPHONE ENCOUNTER
Pharmacy still hasn't rcvd this rx   hydrochlorothiazide (HYDRODIURIL) 25 MG tablet 30 tablet 1     Please refill pt is out

## 2020-11-11 ENCOUNTER — TRANSFERRED RECORDS (OUTPATIENT)
Dept: HEALTH INFORMATION MANAGEMENT | Facility: CLINIC | Age: 80
End: 2020-11-11

## 2020-11-11 DIAGNOSIS — N18.31 STAGE 3A CHRONIC KIDNEY DISEASE (H): ICD-10-CM

## 2020-11-11 DIAGNOSIS — I10 BENIGN ESSENTIAL HYPERTENSION: ICD-10-CM

## 2020-11-11 RX ORDER — HYDROCHLOROTHIAZIDE 25 MG/1
25 TABLET ORAL DAILY
Qty: 30 TABLET | Refills: 1 | Status: SHIPPED | OUTPATIENT
Start: 2020-11-11 | End: 2020-11-12

## 2020-11-12 NOTE — TELEPHONE ENCOUNTER
Routing refill request to provider for review/approval because:  Labs out of range:  BP and creat.  Please authorize if appropriate.  Thanks,  Bobbi Aguilar RN    Pt is scheduled this month with PCP

## 2020-11-13 RX ORDER — HYDROCHLOROTHIAZIDE 25 MG/1
TABLET ORAL
Qty: 90 TABLET | Refills: 3 | Status: ON HOLD | OUTPATIENT
Start: 2020-11-13 | End: 2020-12-23

## 2020-11-26 ENCOUNTER — NURSE TRIAGE (OUTPATIENT)
Dept: NURSING | Facility: CLINIC | Age: 80
End: 2020-11-26

## 2020-11-26 ENCOUNTER — APPOINTMENT (OUTPATIENT)
Dept: CT IMAGING | Facility: CLINIC | Age: 80
End: 2020-11-26
Attending: EMERGENCY MEDICINE
Payer: MEDICARE

## 2020-11-26 ENCOUNTER — HOSPITAL ENCOUNTER (EMERGENCY)
Facility: CLINIC | Age: 80
Discharge: HOME | End: 2020-11-26
Attending: EMERGENCY MEDICINE | Admitting: EMERGENCY MEDICINE
Payer: MEDICARE

## 2020-11-26 ENCOUNTER — APPOINTMENT (OUTPATIENT)
Dept: ULTRASOUND IMAGING | Facility: CLINIC | Age: 80
End: 2020-11-26
Attending: EMERGENCY MEDICINE
Payer: MEDICARE

## 2020-11-26 ENCOUNTER — MYC MEDICAL ADVICE (OUTPATIENT)
Dept: FAMILY MEDICINE | Facility: CLINIC | Age: 80
End: 2020-11-26

## 2020-11-26 VITALS
HEART RATE: 95 BPM | HEIGHT: 72 IN | WEIGHT: 210 LBS | DIASTOLIC BLOOD PRESSURE: 97 MMHG | OXYGEN SATURATION: 94 % | TEMPERATURE: 98 F | SYSTOLIC BLOOD PRESSURE: 172 MMHG | BODY MASS INDEX: 28.44 KG/M2 | RESPIRATION RATE: 15 BRPM

## 2020-11-26 DIAGNOSIS — R09.1 PLEURISY: ICD-10-CM

## 2020-11-26 DIAGNOSIS — R07.81 PLEURITIC CHEST PAIN: ICD-10-CM

## 2020-11-26 LAB
ALBUMIN SERPL-MCNC: 3.2 G/DL (ref 3.4–5)
ALP SERPL-CCNC: 89 U/L (ref 40–150)
ALT SERPL W P-5'-P-CCNC: 25 U/L (ref 0–70)
ANION GAP SERPL CALCULATED.3IONS-SCNC: 8 MMOL/L (ref 3–14)
AST SERPL W P-5'-P-CCNC: 13 U/L (ref 0–45)
BASOPHILS # BLD AUTO: 0 10E9/L (ref 0–0.2)
BASOPHILS NFR BLD AUTO: 0.3 %
BILIRUB DIRECT SERPL-MCNC: 0.2 MG/DL (ref 0–0.2)
BILIRUB SERPL-MCNC: 0.4 MG/DL (ref 0.2–1.3)
BUN SERPL-MCNC: 24 MG/DL (ref 7–30)
CALCIUM SERPL-MCNC: 9.3 MG/DL (ref 8.5–10.1)
CHLORIDE SERPL-SCNC: 103 MMOL/L (ref 94–109)
CO2 SERPL-SCNC: 28 MMOL/L (ref 20–32)
CREAT SERPL-MCNC: 1.43 MG/DL (ref 0.66–1.25)
DIFFERENTIAL METHOD BLD: ABNORMAL
EOSINOPHIL # BLD AUTO: 0.2 10E9/L (ref 0–0.7)
EOSINOPHIL NFR BLD AUTO: 1.8 %
ERYTHROCYTE [DISTWIDTH] IN BLOOD BY AUTOMATED COUNT: 16.6 % (ref 10–15)
GFR SERPL CREATININE-BSD FRML MDRD: 46 ML/MIN/{1.73_M2}
GLUCOSE SERPL-MCNC: 159 MG/DL (ref 70–99)
HCT VFR BLD AUTO: 36.5 % (ref 40–53)
HGB BLD-MCNC: 11.4 G/DL (ref 13.3–17.7)
IMM GRANULOCYTES # BLD: 0 10E9/L (ref 0–0.4)
IMM GRANULOCYTES NFR BLD: 0.3 %
INTERPRETATION ECG - MUSE: NORMAL
LYMPHOCYTES # BLD AUTO: 1.4 10E9/L (ref 0.8–5.3)
LYMPHOCYTES NFR BLD AUTO: 14.6 %
MCH RBC QN AUTO: 26.5 PG (ref 26.5–33)
MCHC RBC AUTO-ENTMCNC: 31.2 G/DL (ref 31.5–36.5)
MCV RBC AUTO: 85 FL (ref 78–100)
MONOCYTES # BLD AUTO: 0.5 10E9/L (ref 0–1.3)
MONOCYTES NFR BLD AUTO: 5.1 %
NEUTROPHILS # BLD AUTO: 7.6 10E9/L (ref 1.6–8.3)
NEUTROPHILS NFR BLD AUTO: 77.9 %
NRBC # BLD AUTO: 0 10*3/UL
NRBC BLD AUTO-RTO: 0 /100
NT-PROBNP SERPL-MCNC: 1544 PG/ML (ref 0–1800)
PLATELET # BLD AUTO: 279 10E9/L (ref 150–450)
POTASSIUM SERPL-SCNC: 3.2 MMOL/L (ref 3.4–5.3)
PROT SERPL-MCNC: 6.8 G/DL (ref 6.8–8.8)
RBC # BLD AUTO: 4.3 10E12/L (ref 4.4–5.9)
SARS-COV-2 RNA SPEC QL NAA+PROBE: NORMAL
SODIUM SERPL-SCNC: 139 MMOL/L (ref 133–144)
SPECIMEN SOURCE: NORMAL
TROPONIN I SERPL-MCNC: 0.03 UG/L (ref 0–0.04)
TROPONIN I SERPL-MCNC: 0.04 UG/L (ref 0–0.04)
TSH SERPL DL<=0.005 MIU/L-ACNC: 0.76 MU/L (ref 0.4–4)
WBC # BLD AUTO: 9.7 10E9/L (ref 4–11)

## 2020-11-26 PROCEDURE — 71275 CT ANGIOGRAPHY CHEST: CPT

## 2020-11-26 PROCEDURE — 93005 ELECTROCARDIOGRAM TRACING: CPT

## 2020-11-26 PROCEDURE — 258N000003 HC RX IP 258 OP 636: Performed by: EMERGENCY MEDICINE

## 2020-11-26 PROCEDURE — 84443 ASSAY THYROID STIM HORMONE: CPT | Performed by: EMERGENCY MEDICINE

## 2020-11-26 PROCEDURE — 85025 COMPLETE CBC W/AUTO DIFF WBC: CPT | Performed by: EMERGENCY MEDICINE

## 2020-11-26 PROCEDURE — 80048 BASIC METABOLIC PNL TOTAL CA: CPT | Performed by: EMERGENCY MEDICINE

## 2020-11-26 PROCEDURE — 83880 ASSAY OF NATRIURETIC PEPTIDE: CPT | Performed by: EMERGENCY MEDICINE

## 2020-11-26 PROCEDURE — 80076 HEPATIC FUNCTION PANEL: CPT | Performed by: EMERGENCY MEDICINE

## 2020-11-26 PROCEDURE — 250N000009 HC RX 250: Performed by: EMERGENCY MEDICINE

## 2020-11-26 PROCEDURE — 250N000011 HC RX IP 250 OP 636: Performed by: EMERGENCY MEDICINE

## 2020-11-26 PROCEDURE — U0003 INFECTIOUS AGENT DETECTION BY NUCLEIC ACID (DNA OR RNA); SEVERE ACUTE RESPIRATORY SYNDROME CORONAVIRUS 2 (SARS-COV-2) (CORONAVIRUS DISEASE [COVID-19]), AMPLIFIED PROBE TECHNIQUE, MAKING USE OF HIGH THROUGHPUT TECHNOLOGIES AS DESCRIBED BY CMS-2020-01-R: HCPCS | Performed by: EMERGENCY MEDICINE

## 2020-11-26 PROCEDURE — 99285 EMERGENCY DEPT VISIT HI MDM: CPT | Mod: 25

## 2020-11-26 PROCEDURE — 96374 THER/PROPH/DIAG INJ IV PUSH: CPT | Mod: 59

## 2020-11-26 PROCEDURE — 96361 HYDRATE IV INFUSION ADD-ON: CPT

## 2020-11-26 PROCEDURE — 84484 ASSAY OF TROPONIN QUANT: CPT | Performed by: EMERGENCY MEDICINE

## 2020-11-26 PROCEDURE — 250N000013 HC RX MED GY IP 250 OP 250 PS 637: Performed by: EMERGENCY MEDICINE

## 2020-11-26 PROCEDURE — C9803 HOPD COVID-19 SPEC COLLECT: HCPCS

## 2020-11-26 PROCEDURE — 76705 ECHO EXAM OF ABDOMEN: CPT

## 2020-11-26 PROCEDURE — 250N000013 HC RX MED GY IP 250 OP 250 PS 637: Mod: GY | Performed by: EMERGENCY MEDICINE

## 2020-11-26 RX ORDER — LABETALOL HYDROCHLORIDE 5 MG/ML
20 INJECTION, SOLUTION INTRAVENOUS ONCE
Status: COMPLETED | OUTPATIENT
Start: 2020-11-26 | End: 2020-11-26

## 2020-11-26 RX ORDER — SODIUM CHLORIDE 9 MG/ML
INJECTION, SOLUTION INTRAVENOUS CONTINUOUS
Status: DISCONTINUED | OUTPATIENT
Start: 2020-11-26 | End: 2020-11-26 | Stop reason: HOSPADM

## 2020-11-26 RX ORDER — ACETAMINOPHEN 500 MG
1000 TABLET ORAL ONCE
Status: COMPLETED | OUTPATIENT
Start: 2020-11-26 | End: 2020-11-26

## 2020-11-26 RX ORDER — POTASSIUM CHLORIDE 1.5 G/1.58G
40 POWDER, FOR SOLUTION ORAL ONCE
Status: COMPLETED | OUTPATIENT
Start: 2020-11-26 | End: 2020-11-26

## 2020-11-26 RX ORDER — IOPAMIDOL 755 MG/ML
75 INJECTION, SOLUTION INTRAVASCULAR ONCE
Status: COMPLETED | OUTPATIENT
Start: 2020-11-26 | End: 2020-11-26

## 2020-11-26 RX ADMIN — SODIUM CHLORIDE 98 ML: 9 INJECTION, SOLUTION INTRAVENOUS at 14:38

## 2020-11-26 RX ADMIN — LIDOCAINE HYDROCHLORIDE 30 ML: 20 SOLUTION ORAL; TOPICAL at 15:31

## 2020-11-26 RX ADMIN — IOPAMIDOL 75 ML: 755 INJECTION, SOLUTION INTRAVENOUS at 14:38

## 2020-11-26 RX ADMIN — POTASSIUM CHLORIDE 40 MEQ: 1.5 POWDER, FOR SOLUTION ORAL at 15:26

## 2020-11-26 RX ADMIN — ACETAMINOPHEN 1000 MG: 500 TABLET, FILM COATED ORAL at 17:55

## 2020-11-26 RX ADMIN — LABETALOL HYDROCHLORIDE 20 MG: 5 INJECTION, SOLUTION INTRAVENOUS at 15:27

## 2020-11-26 RX ADMIN — SODIUM CHLORIDE 1000 ML: 9 INJECTION, SOLUTION INTRAVENOUS at 13:20

## 2020-11-26 ASSESSMENT — MIFFLIN-ST. JEOR: SCORE: 1695.55

## 2020-11-26 NOTE — TELEPHONE ENCOUNTER
Pt calls in with concern of CHEST PAIN ( at rest rates 5-6/10)  As well slight difficulty breathing - pain increase with inspiration     Pt is 81 yo    This all started when he woke up this morning     Per protocol advised to go to ED now     No 2nd level triage need for chest pain       Pt will have wife drive him to [facility] ED now    Protocol and care advice reviewed  Caller states understanding of the recommended disposition  Advised to call back if further questions or concerns    Bandar Sanchez , RN / [facility]    Reason for Disposition    [1] Chest pain lasts > 5 minutes AND [2] age > 50    Additional Information    Negative: Severe difficulty breathing (e.g., struggling for each breath, speaks in single words)    Negative: Difficult to awaken or acting confused (e.g., disoriented, slurred speech)    Negative: Shock suspected (e.g., cold/pale/clammy skin, too weak to stand, low BP, rapid pulse)    Negative: [1] Chest pain lasts > 5 minutes AND [2] history of heart disease  (i.e., heart attack, bypass surgery, angina, angioplasty, CHF; not just a heart murmur)    Negative: [1] Chest pain lasts > 5 minutes AND [2] described as crushing, pressure-like, or heavy    Protocols used: CHEST PAIN-A-

## 2020-11-26 NOTE — ED PROVIDER NOTES
History     Chief Complaint:  Chest Pain and Shortness of Breath     The history is provided by the patient.     Justyn Olivas is a 80-year-old male with past medical history significant for type 2 diabetes who presents to the emergency department with chief complaint of moderate to severe, central left, pleuritic chest pain onset overnight prior to arrival, radiating to upper back without specific alleviating symptoms.  Patient denies cough but endorses associated shortness of breath.  He denies fevers, chills, nausea, vomiting, changes in urination, changes in bowel movements, rashes, focal weakness, recent trauma, history of VTE/DVT.  Patient reports that he is in contact with his nephew who had Covid-like symptoms but tested negative.     Patient denies tobacco, alcohol, illicit drugs.    Allergies:  No Known Allergies    Medications:   Aspirin  Lipitor  Plavix  Hydrochlorothiazide  Lantus  Humalog  Avapro  Metformin  Trazodone     Medical History:   Cerebral infarction  Diabetes  Hyperlipidemia   Trochanteric bursitis of right hip  CKD, stage 4  Osteoarthritis  Hypertension    Surgical History   The patient does not have any pertinent past surgical history.    Family History:   No past pertinent family history.    Social History:  Smoking status: Former - quit date: 1990  Alcohol use: Positive  Drug use: Negative  Marital Status:   PCP: Bandar Greenberg     Review of Systems  Full ROS completed and negative other than pertinent positives and negatives noted in HPI    Physical Exam     Patient Vitals for the past 24 hrs:   BP Temp Temp src Pulse Resp SpO2 Height Weight   11/26/20 1600 (!) 148/96 -- -- 88 -- 97 % -- --   11/26/20 1530 (!) 177/97 -- -- 96 -- 97 % -- --   11/26/20 1455 (!) 175/93 -- -- 103 -- -- -- --   11/26/20 1400 (!) 183/100 -- -- 98 15 96 % -- --   11/26/20 1252 (!) 202/106 98  F (36.7  C) Temporal 109 16 98 % 1.829 m (6') 95.3 kg (210 lb)       Physical Exam  Constitutional: Well  developed, nontox appearance  Head: Atraumatic.   Neck:  no stridor  Eyes: no scleral icterus  Cardiovascular: Reg tachycardia, 2+ bilat radial pulses  Pulmonary/Chest: nml resp effort, Clear BS bilat, chest nontender  Abdominal: ND, soft, NT, no rebound or guarding   Ext: Warm, well perfused, no edema  Neurological: A&O, symmetric facies, moves ext x4  Skin: Skin is warm and dry.   Psychiatric: Behavior is normal. Thought content normal.   Nursing note and vitals reviewed.    Emergency Department Course     ECG:  Indication: Chest pain and shortness of breath   Time: 1321  Vent. Rate 102 bpm. MA interval 158. QRS duration 86. QT/QTc 360/469. P-R-T axis 48 49 71. Sinus tachycardia with premature atrial complexes. Possible left atrial enlargement. T wave abnormality, consider inferolateral ischemia. Abnormal ECG. Read time: 1326    There are no significant changes when compared to an EKG done on 07/04/2020.    Imaging:  Radiology findings were communicated with the patient who voiced understanding of the findings.    CT Chest w/ IV contrast - PE protocol:   1.  No pulmonary embolism demonstrated.  2.  No aortic dissection.  3.  Mild mediastinal adenopathy which is nonspecific.  4.  Gallbladder distention with tiny probable gallstones present. As per radiology.     Laboratory:  Laboratory findings were communicated with the patient who voiced understanding of the findings.    CBC: WBC: 9.7, HGB: 11.4 (L), PLT: 279  BMP: Glucose: 159 (H), Potassium: 3.2 (L), GFR: 46 (L), Creatinine: 1.43 (H), o/w WNL     BNP: 1,544  TSH with Free T4 Reflex: 0.76    Troponin (1317): 0.030    Symptomatic COVID-19 PCR: Pending     Procedures      Interventions:  1320 NS 1L IV    Medications   0.9% sodium chloride BOLUS (0 mLs Intravenous Stopped 11/26/20 1525)     Followed by   sodium chloride 0.9% infusion (has no administration in time range)   Saline (98 mLs As instructed Given 11/26/20 1438)   iopamidol (ISOVUE-370) solution 75 mL (75  mLs Intravenous Given 11/26/20 1438)   potassium chloride (KLOR-CON) Packet 40 mEq (40 mEq Oral Given 11/26/20 1526)   lidocaine (XYLOCAINE) 2 % 15 mL, alum & mag hydroxide-simethicone (MAALOX) 15 mL GI Cocktail (30 mLs Oral Given 11/26/20 1531)   labetalol (NORMODYNE/TRANDATE) injection 20 mg (20 mg Intravenous Given 11/26/20 1527)       Emergency Department Course:  Past medical records, nursing notes, and vitals reviewed.    1:06 PM I performed an exam of the patient as documented above.     1445 I rechecked the patient and discussed the results of his workup thus far.       Impression & Plan     Covid-19  Justyn Olivas was evaluated during a global COVID-19 pandemic, which necessitated consideration that the patient might be at risk for infection with the SARS-CoV-2 virus that causes COVID-19.   Applicable protocols for evaluation were followed during the patient's care. A test was obtained during this visit.    Medical Decision Making:  Justyn Olivas is an 80 year old male presenting w/ left-sided pleuritic chest pain     DDx includes viral syndrome NOS, influenza-like illness, influenza, COVID-19, PE, pneumonia, dissection, pleurisy, pericarditis.  EKG interpretation as noted above significant for sinus tachycardia.  ACS seems unlikely given pleuritic nature.  Labs significant for troponin within normal limits, BNP within normal limits, hypokalemia, COVID-19 test ordered and pending.  Imaging sig for mild mediastinal adenopathy, mildly distended gallbladder, neg for PE or pneumonia, .  Pt declines pain meds.  Upon recheck, pt has no RUQ or epigastric tenderness, but RUQ ordered given no obvious cause of pt's pleuritic chest pain.  Rpt trop ordered as well and given pt is somewhat hypertensive and tachycardic, labetalol ordered.  Pt subsequently signed out shift change awaiting f/u troponin and RUQ US.  Should these return unremarkable/WNL, the pt will likely be safe for discharge particularly given 8  hours since pt's pain started. Pt counseled on all results, disposition and diagnosis thus far.  They are understanding and agreeable to plan. Patient signed out in stable condition.      Diagnosis:    ICD-10-CM    1. Pleuritic chest pain  R07.81    2. Pleurisy  R09.1        Disposition:  Signed out to Dr. Pyle     Discharge Medications:  New Prescriptions    No medications on file       Scribe Disclosure:  I, Keyona Etienne, am serving as a scribe on 11/26/2020 at 1:06 PM to personally document services performed by Calderon Roberts MD based on my observations and the provider's statements to me.      Calderon Roberts MD  11/26/20 2872

## 2020-11-26 NOTE — ED AVS SNAPSHOT
[facility] Emergency Dept  [address] [place] [address]  Phone: [phone]  Fax: [phone]                                    Justyn Olivas   MRN: [id number]    Department: [facility] Emergency Dept   Date of Visit: 11/26/2020           After Visit Summary Signature Page    I have received my discharge instructions, and my questions have been answered. I have discussed any challenges I see with this plan with the nurse or doctor.    ..........................................................................................................................................  Patient/Patient Representative Signature      ..........................................................................................................................................  Patient Representative Print Name and Relationship to Patient    ..................................................               ................................................  Date                                   Time    ..........................................................................................................................................  Reviewed by Signature/Title    ...................................................              ..............................................  Date                                               Time          22EPIC Rev 08/18

## 2020-11-26 NOTE — DISCHARGE INSTRUCTIONS
Discharge Instructions  Chest Pain    You have been seen today for chest pain or discomfort.  At this time, your provider has found no signs that your chest pain is due to a serious or life-threatening condition, (or you have declined more testing and/or admission to the hospital). However, sometimes there is a serious problem that does not show up right away. Your evaluation today may not be complete and you may need further testing and evaluation.     Generally, every Emergency Department visit should have a follow-up clinic visit with either a primary or a specialty clinic/provider. Please follow-up as instructed by your emergency provider today.  Return to the Emergency Department if:  Your chest pain changes, gets worse, starts to happen more often, or comes with less activity.  You are newly short of breath.  You get very weak or tired.  You pass out or faint.  You have any new symptoms, like fever, cough, numb legs, or you cough up blood.  You have anything else that worries you.    Until you follow-up with your regular provider, please do the following:  Take one aspirin daily unless you have an allergy or are told not to by your provider.  If a stress test appointment has been made, go to the appointment.  If you have questions, contact your regular provider.  Follow-up with your regular provider/clinic as directed; this is very important.    If you were given a prescription for medicine here today, be sure to read all of the information (including the package insert) that comes with your prescription.  This will include important information about the medicine, its side effects, and any warnings that you need to know about.  The pharmacist who fills the prescription can provide more information and answer questions you may have about the medicine.  If you have questions or concerns that the pharmacist cannot address, please call or return to the Emergency Department.       Remember that you can  always come back to the Emergency Department if you are not able to see your regular provider in the amount of time listed above, if you get any new symptoms, or if there is anything that worries you.

## 2020-11-27 ENCOUNTER — PATIENT OUTREACH (OUTPATIENT)
Dept: NURSING | Facility: CLINIC | Age: 80
End: 2020-11-27
Payer: MEDICARE

## 2020-11-27 DIAGNOSIS — Z20.822 SUSPECTED COVID-19 VIRUS INFECTION: Primary | ICD-10-CM

## 2020-11-27 DIAGNOSIS — Z20.822 SUSPECTED 2019 NOVEL CORONAVIRUS INFECTION: ICD-10-CM

## 2020-11-27 DIAGNOSIS — Z71.89 OTHER SPECIFIED COUNSELING: ICD-10-CM

## 2020-11-27 LAB
LABORATORY COMMENT REPORT: NORMAL
SARS-COV-2 RNA SPEC QL NAA+PROBE: NEGATIVE
SPECIMEN SOURCE: NORMAL

## 2020-11-27 NOTE — TELEPHONE ENCOUNTER
Patient sent My Chart Chest pain to Dr. Greenberg yesterday.   Then apparently called clinic and was triaged by LALITO and went to ED.  See ED encounter.  Diamante Bond RN

## 2020-11-27 NOTE — ED PROVIDER NOTES
ED course:  Patient received as a handoff from my partner Dr. Roberts.  On face-to-face evaluation patient reports no additional complaints.  He was provided Tylenol for pleuritic chest discomfort and recommended continued supportive care.  Right upper quadrant ultrasound without significant findings as noted below.  Patient's troponin is not significantly changed on repeat testing.  Presentation is inconsistent with ACS.  LFTs without significant findings.  At this time no emergent cause for patient's pleuritic chest pain could be determined.  Recommended close follow-up with PCP.  Return precautions were discussed.  Covid testing obtained and pending; discussed self quarantine until results known.  At time of discharge patient's symptoms were well controlled and he was hemodynamically stable without evidence of any respiratory distress.    Abdomen US, limited (RUQ only)   Final Result   IMPRESSION: Fatty infiltration of the liver. No gallstones or bile   duct dilatation.      NAVDEEP PULIDO MD      CT Chest Pulmonary Embolism w Contrast   Final Result   IMPRESSION:   1.  No pulmonary embolism demonstrated.   2.  No aortic dissection.   3.  Mild mediastinal adenopathy which is nonspecific.   4.  Gallbladder distention with tiny probable gallstones present.      WAYLON MCRAE MD          Impression:    ICD-10-CM    1. Pleuritic chest pain  R07.81 Symptomatic COVID-19 Virus (Coronavirus) by PCR     SARS-CoV-2 COVID-19 Virus (Coronavirus) RT-PCR     SARS-CoV-2 COVID-19 Virus (Coronavirus) RT-PCR   2. Pleurisy  R09.1          Disposition:  discharge         Calderon Pyle, DO  11/26/20 1805       Calderon Pyle, DO  11/26/20 1805

## 2020-11-27 NOTE — LETTER
[facility] CARE COORDINATION  [facility]  November 27, 2020    Justyn Olivas  [address]  [place] [address]      Dear Justyn,    I am a clinic community health worker who works with Bandar Greenberg MD at the [facility]. I wanted to thank you for spending the time to talk with me.  Below is a description of clinic care coordination and how I can further assist you.      The clinic care coordination team is made up of a registered nurse,  and community health worker who understand the health care system. The goal of clinic care coordination is to help you manage your health and improve access to the health care system in the most efficient manner. The team can assist you in meeting your health care goals by providing education, coordinating services, strengthening the communication among your providers and supporting you with any resource needs.    Please feel free to contact me at [phone] with any questions or concerns. We are focused on providing you with the highest-quality healthcare experience possible and that all starts with you.     Sincerely,     CAROL Osorio, B.S. [facility]  Clinic Care Coordination  [facility] Clinics:  [place], [place], [place], [place], and [place]  Phone: [phone]

## 2020-12-01 ENCOUNTER — OFFICE VISIT (OUTPATIENT)
Dept: FAMILY MEDICINE | Facility: CLINIC | Age: 80
End: 2020-12-01
Payer: MEDICARE

## 2020-12-01 DIAGNOSIS — I10 BENIGN ESSENTIAL HYPERTENSION: ICD-10-CM

## 2020-12-01 DIAGNOSIS — I63.40 CEREBROVASCULAR ACCIDENT (CVA) DUE TO EMBOLISM OF CEREBRAL ARTERY (H): ICD-10-CM

## 2020-12-01 DIAGNOSIS — J41.0 SIMPLE CHRONIC BRONCHITIS (H): ICD-10-CM

## 2020-12-01 DIAGNOSIS — J30.89 CHRONIC NON-SEASONAL ALLERGIC RHINITIS: ICD-10-CM

## 2020-12-01 DIAGNOSIS — Z79.4 TYPE 2 DIABETES MELLITUS WITHOUT COMPLICATION, WITH LONG-TERM CURRENT USE OF INSULIN (H): Primary | ICD-10-CM

## 2020-12-01 DIAGNOSIS — I48.0 PAROXYSMAL ATRIAL FIBRILLATION (H): ICD-10-CM

## 2020-12-01 DIAGNOSIS — M62.830 BACK MUSCLE SPASM: ICD-10-CM

## 2020-12-01 DIAGNOSIS — R09.1 PLEURISY: ICD-10-CM

## 2020-12-01 DIAGNOSIS — R07.81 PLEURITIC CHEST PAIN: ICD-10-CM

## 2020-12-01 DIAGNOSIS — D50.0 IRON DEFICIENCY ANEMIA DUE TO CHRONIC BLOOD LOSS: ICD-10-CM

## 2020-12-01 DIAGNOSIS — E11.9 TYPE 2 DIABETES MELLITUS WITHOUT COMPLICATION, WITH LONG-TERM CURRENT USE OF INSULIN (H): Primary | ICD-10-CM

## 2020-12-01 LAB
ERYTHROCYTE [DISTWIDTH] IN BLOOD BY AUTOMATED COUNT: 15.7 % (ref 10–15)
HBA1C MFR BLD: 7 % (ref 0–5.6)
HCT VFR BLD AUTO: 32.7 % (ref 40–53)
HGB BLD-MCNC: 10.2 G/DL (ref 13.3–17.7)
MCH RBC QN AUTO: 26.6 PG (ref 26.5–33)
MCHC RBC AUTO-ENTMCNC: 31.2 G/DL (ref 31.5–36.5)
MCV RBC AUTO: 85 FL (ref 78–100)
PLATELET # BLD AUTO: 295 10E9/L (ref 150–450)
RBC # BLD AUTO: 3.84 10E12/L (ref 4.4–5.9)
WBC # BLD AUTO: 7.4 10E9/L (ref 4–11)

## 2020-12-01 PROCEDURE — 99214 OFFICE O/P EST MOD 30 MIN: CPT | Performed by: INTERNAL MEDICINE

## 2020-12-01 PROCEDURE — 83540 ASSAY OF IRON: CPT | Performed by: INTERNAL MEDICINE

## 2020-12-01 PROCEDURE — 85027 COMPLETE CBC AUTOMATED: CPT | Performed by: INTERNAL MEDICINE

## 2020-12-01 PROCEDURE — 36415 COLL VENOUS BLD VENIPUNCTURE: CPT | Performed by: INTERNAL MEDICINE

## 2020-12-01 PROCEDURE — 83036 HEMOGLOBIN GLYCOSYLATED A1C: CPT | Performed by: INTERNAL MEDICINE

## 2020-12-01 PROCEDURE — 83550 IRON BINDING TEST: CPT | Performed by: INTERNAL MEDICINE

## 2020-12-01 PROCEDURE — 80053 COMPREHEN METABOLIC PANEL: CPT | Performed by: INTERNAL MEDICINE

## 2020-12-01 RX ORDER — CYCLOBENZAPRINE HCL 10 MG
TABLET ORAL
Qty: 30 TABLET | Refills: 0 | Status: SHIPPED | OUTPATIENT
Start: 2020-12-01 | End: 2020-12-20

## 2020-12-01 ASSESSMENT — MIFFLIN-ST. JEOR: SCORE: 1718.23

## 2020-12-01 NOTE — PROGRESS NOTES
Subjective     Justyn Olivas is a 80 year old male who presents to clinic today for the following health issues:    HPI         ED/UC Followup:    Facility:  [facility] ED  Date of visit: 11/26/2020  Reason for visit:     Pleuritic chest pain   Pleurisy     Current Status: patient reports that hes feeling better but not at his best   Reviewed emergency room evaluation and course with no clear cut explanation for his pleuritic chest pain.  He currently has developed excess sinus congestion with a cough which is nonproductive and associated with fatigue and dyspnea on exertion.  He has had no fever or other symptoms to suggest sinusitis or COVID-19       Walking the dog 35 to 40 minutes daily    Right neck pain without radicular symptoms        Review of Systems   Constitutional, HEENT, cardiovascular, pulmonary, gi and gu systems are negative, except as otherwise noted.    Neuromuscular right hip pain  Objective    BP (!) 145/88 (BP Location: Left arm, Patient Position: Sitting, Cuff Size: Adult Regular)   Pulse 104   Temp 96.9  F (36.1  C) (Temporal)   Ht 1.829 m (6')   Wt 97.5 kg (215 lb)   SpO2 98%   BMI 29.16 kg/m    Body mass index is 29.16 kg/m .  Physical Exam /86  GENERAL: healthy, alert and no distress  NECK: no adenopathy, no asymmetry, masses, or scars and thyroid normal to palpation  RESP: lungs clear to auscultation - no rales, rhonchi or wheezes  CV: regular rate and rhythm, normal S1 S2, no S3 or S4, no murmur, click or rub, no peripheral edema and peripheral pulses strong  ABDOMEN: soft, nontender, no hepatosplenomegaly, no masses and bowel sounds normal  MS: no gross musculoskeletal defects noted, no edema  Right hip flexion to 85 degrees with external rotation to 30+ degrees with pain          Assessment & Plan     Type 2 diabetes mellitus without complication, with long-term current use of insulin (H)  Noted significant improvement in control  - Hemoglobin A1c    Simple chronic  bronchitis (H)  Aggravated by chronic?  Seasonal rhinitis    Cerebrovascular accident (CVA) due to embolism of cerebral artery (H)  No current symptoms    Paroxysmal atrial fibrillation (H)  Currently in a normal sinus rhythm    Chronic non-seasonal allergic rhinitis, unspecified trigger  Chronic sinusitis, recommend using his Flonase and nonsedating antihistamine more regularly    Benign essential hypertension  Adequately controlled with current therapy recommend serial follow-up at home to determine whether his medications need further adjustment  - CBC with platelets  - Comprehensive metabolic panel    Pleurisy  No noted etiology and it may be associated with pleuritic chest pain from his ongoing periodic coughing    Iron deficiency anemia due to chronic blood loss  Follow-up with lab when available  - CBC with platelets  - Iron and iron binding capacity    Back muscle spasm  Right-sided neck tightness associated with tightness of the trapezius muscle.  Recommended follow-up exercise routine as well as Flexeril as needed  - cyclobenzaprine (FLEXERIL) 10 MG tablet; 1/2 po bid prn & 1po qhs         BMI:   Estimated body mass index is 29.16 kg/m  as calculated from the following:    Height as of this encounter: 1.829 m (6').    Weight as of this encounter: 97.5 kg (215 lb).            FUTURE APPOINTMENTS:       - Follow-up visit in 1 mo    No follow-ups on file.    Bandar Greenberg MD  [facility]

## 2020-12-02 LAB
ALBUMIN SERPL-MCNC: 3.2 G/DL (ref 3.4–5)
ALP SERPL-CCNC: 84 U/L (ref 40–150)
ALT SERPL W P-5'-P-CCNC: 19 U/L (ref 0–70)
ANION GAP SERPL CALCULATED.3IONS-SCNC: 5 MMOL/L (ref 3–14)
AST SERPL W P-5'-P-CCNC: 13 U/L (ref 0–45)
BILIRUB SERPL-MCNC: 0.4 MG/DL (ref 0.2–1.3)
BUN SERPL-MCNC: 30 MG/DL (ref 7–30)
CALCIUM SERPL-MCNC: 8.8 MG/DL (ref 8.5–10.1)
CHLORIDE SERPL-SCNC: 105 MMOL/L (ref 94–109)
CO2 SERPL-SCNC: 29 MMOL/L (ref 20–32)
CREAT SERPL-MCNC: 1.59 MG/DL (ref 0.66–1.25)
GFR SERPL CREATININE-BSD FRML MDRD: 40 ML/MIN/{1.73_M2}
GLUCOSE SERPL-MCNC: 125 MG/DL (ref 70–99)
IRON SATN MFR SERPL: 5 % (ref 15–46)
IRON SERPL-MCNC: 21 UG/DL (ref 35–180)
POTASSIUM SERPL-SCNC: 3.3 MMOL/L (ref 3.4–5.3)
PROT SERPL-MCNC: 7.1 G/DL (ref 6.8–8.8)
SODIUM SERPL-SCNC: 139 MMOL/L (ref 133–144)
TIBC SERPL-MCNC: 394 UG/DL (ref 240–430)

## 2020-12-06 VITALS
DIASTOLIC BLOOD PRESSURE: 86 MMHG | HEIGHT: 72 IN | OXYGEN SATURATION: 98 % | BODY MASS INDEX: 29.12 KG/M2 | HEART RATE: 104 BPM | SYSTOLIC BLOOD PRESSURE: 135 MMHG | WEIGHT: 215 LBS | TEMPERATURE: 96.9 F

## 2020-12-10 ENCOUNTER — NURSE TRIAGE (OUTPATIENT)
Dept: NURSING | Facility: CLINIC | Age: 80
End: 2020-12-10

## 2020-12-10 ENCOUNTER — TELEPHONE (OUTPATIENT)
Dept: FAMILY MEDICINE | Facility: CLINIC | Age: 80
End: 2020-12-10

## 2020-12-10 ENCOUNTER — OFFICE VISIT (OUTPATIENT)
Dept: FAMILY MEDICINE | Facility: CLINIC | Age: 80
End: 2020-12-10
Payer: MEDICARE

## 2020-12-10 VITALS
HEIGHT: 72 IN | WEIGHT: 215 LBS | SYSTOLIC BLOOD PRESSURE: 120 MMHG | TEMPERATURE: 97.1 F | OXYGEN SATURATION: 99 % | DIASTOLIC BLOOD PRESSURE: 60 MMHG | HEART RATE: 110 BPM | BODY MASS INDEX: 29.12 KG/M2

## 2020-12-10 DIAGNOSIS — R07.81 PLEURITIC CHEST PAIN: ICD-10-CM

## 2020-12-10 DIAGNOSIS — D62 ANEMIA DUE TO BLOOD LOSS, ACUTE: ICD-10-CM

## 2020-12-10 DIAGNOSIS — I10 BENIGN ESSENTIAL HYPERTENSION: ICD-10-CM

## 2020-12-10 DIAGNOSIS — I63.40 CEREBROVASCULAR ACCIDENT (CVA) DUE TO EMBOLISM OF CEREBRAL ARTERY (H): ICD-10-CM

## 2020-12-10 DIAGNOSIS — E87.6 HYPOKALEMIA: ICD-10-CM

## 2020-12-10 DIAGNOSIS — I48.0 PAROXYSMAL ATRIAL FIBRILLATION (H): ICD-10-CM

## 2020-12-10 DIAGNOSIS — N18.31 STAGE 3A CHRONIC KIDNEY DISEASE (H): ICD-10-CM

## 2020-12-10 DIAGNOSIS — Z79.4 TYPE 2 DIABETES MELLITUS WITHOUT COMPLICATION, WITH LONG-TERM CURRENT USE OF INSULIN (H): Primary | ICD-10-CM

## 2020-12-10 DIAGNOSIS — J41.0 SIMPLE CHRONIC BRONCHITIS (H): ICD-10-CM

## 2020-12-10 DIAGNOSIS — D64.9 ANEMIA, UNSPECIFIED TYPE: Primary | ICD-10-CM

## 2020-12-10 DIAGNOSIS — E11.9 TYPE 2 DIABETES MELLITUS WITHOUT COMPLICATION, WITH LONG-TERM CURRENT USE OF INSULIN (H): Primary | ICD-10-CM

## 2020-12-10 LAB
ERYTHROCYTE [DISTWIDTH] IN BLOOD BY AUTOMATED COUNT: 15.3 % (ref 10–15)
HCT VFR BLD AUTO: 25.2 % (ref 40–53)
HGB BLD-MCNC: 7.7 G/DL (ref 13.3–17.7)
MCH RBC QN AUTO: 25.8 PG (ref 26.5–33)
MCHC RBC AUTO-ENTMCNC: 30.6 G/DL (ref 31.5–36.5)
MCV RBC AUTO: 85 FL (ref 78–100)
PLATELET # BLD AUTO: 389 10E9/L (ref 150–450)
RBC # BLD AUTO: 2.98 10E12/L (ref 4.4–5.9)
WBC # BLD AUTO: 10.9 10E9/L (ref 4–11)

## 2020-12-10 PROCEDURE — 85027 COMPLETE CBC AUTOMATED: CPT | Performed by: INTERNAL MEDICINE

## 2020-12-10 PROCEDURE — 36415 COLL VENOUS BLD VENIPUNCTURE: CPT | Performed by: INTERNAL MEDICINE

## 2020-12-10 PROCEDURE — 80048 BASIC METABOLIC PNL TOTAL CA: CPT | Performed by: INTERNAL MEDICINE

## 2020-12-10 PROCEDURE — 99214 OFFICE O/P EST MOD 30 MIN: CPT | Performed by: INTERNAL MEDICINE

## 2020-12-10 ASSESSMENT — MIFFLIN-ST. JEOR: SCORE: 1718.23

## 2020-12-10 NOTE — PROGRESS NOTES
Subjective     Justyn Olivas is a 80 year old male who presents to clinic today for the following health issues:    HPI         Follow up pleuritic chest pain, cough and dyspnea on exertion  Patient's pleuritic chest pain has subsided however he continues to have a dry hacking cough with dyspnea on exertion and fatigue.    Undergoing follow-up injections in his right eye for macular degeneration            Review of Systems   Constitutional, HEENT, cardiovascular, pulmonary, gi and gu systems are negative, except as otherwise noted.    /60   Pulse 110   Temp 97.1  F (36.2  C) (Temporal)   Ht 1.829 m (6')   Wt 97.5 kg (215 lb)   SpO2 99%   BMI 29.16 kg/m      Objective    There were no vitals taken for this visit.  There is no height or weight on file to calculate BMI.  Physical Exam   GENERAL: Appears fatigued otherwise, alert and no distress  NECK: no adenopathy, no asymmetry, masses, or scars and thyroid normal to palpation  RESP: lungs clear to auscultation - no rales, rhonchi or wheezes  CV: regular rate and rhythm, normal S1 S2, no S3 or S4, no murmur, click or rub, no peripheral edema and peripheral pulses strong  ABDOMEN: soft, nontender, no hepatosplenomegaly, no masses and bowel sounds normal  MS: no gross musculoskeletal defects noted, no edema            Assessment & Plan     Type 2 diabetes mellitus without complication, with long-term current use of insulin (H)  Managing well and his A1c's are the best they have been in quite some time  - Basic metabolic panel    Paroxysmal atrial fibrillation (H)  No current symptoms    Stage 3a chronic kidney disease  Continue to follow closely monitoring for prerenal azotemia or nephrotoxic drugs    Simple chronic bronchitis (H)  Managing relatively well    Cerebrovascular accident (CVA) due to embolism of cerebral artery (H)    No sign of recurrence  Benign essential hypertension  Well-controlled with current therapy    Pleuritic chest pain  Most  likely related to chronic rhinitis with postnasal drip and dryness    Anemia due to blood loss, acute  Noted microcytic anemia with ongoing low blood counts from the ER and will follow-up iron stores and repeat hemoglobin  - CBC with platelets    Hypokalemia  Reevaluate lab,       BMI:   Estimated body mass index is 29.16 kg/m  as calculated from the following:    Height as of this encounter: 1.829 m (6').    Weight as of this encounter: 97.5 kg (215 lb).            FUTURE APPOINTMENTS:       - Follow-up visit in 1 mo    No follow-ups on file.    Bandar Greenberg MD  [facility]

## 2020-12-11 ENCOUNTER — DOCUMENTATION ONLY (OUTPATIENT)
Dept: FAMILY MEDICINE | Facility: CLINIC | Age: 80
End: 2020-12-11

## 2020-12-11 DIAGNOSIS — K29.01 ACUTE SUPERFICIAL GASTRITIS WITH HEMORRHAGE: ICD-10-CM

## 2020-12-11 DIAGNOSIS — K29.01 ACUTE SUPERFICIAL GASTRITIS WITH HEMORRHAGE: Primary | ICD-10-CM

## 2020-12-11 LAB
ANION GAP SERPL CALCULATED.3IONS-SCNC: 8 MMOL/L (ref 3–14)
BUN SERPL-MCNC: 39 MG/DL (ref 7–30)
CALCIUM SERPL-MCNC: 8.8 MG/DL (ref 8.5–10.1)
CHLORIDE SERPL-SCNC: 103 MMOL/L (ref 94–109)
CO2 SERPL-SCNC: 27 MMOL/L (ref 20–32)
CREAT SERPL-MCNC: 1.58 MG/DL (ref 0.66–1.25)
GFR SERPL CREATININE-BSD FRML MDRD: 40 ML/MIN/{1.73_M2}
GLUCOSE SERPL-MCNC: 158 MG/DL (ref 70–99)
POTASSIUM SERPL-SCNC: 3.7 MMOL/L (ref 3.4–5.3)
SODIUM SERPL-SCNC: 138 MMOL/L (ref 133–144)

## 2020-12-11 RX ORDER — PANTOPRAZOLE SODIUM 40 MG/1
40 TABLET, DELAYED RELEASE ORAL 2 TIMES DAILY
Qty: 60 TABLET | Refills: 1 | Status: SHIPPED | OUTPATIENT
Start: 2020-12-11 | End: 2020-12-14

## 2020-12-11 NOTE — TELEPHONE ENCOUNTER
Hemoglobin is 7.7  I spoke to patient as ' s note is not typed yet  He has chronic history of anemia  No active bleeding  He feels very tired   I tried to call  but could not get hold of him  He denied any active bleeding   Some epigastric discomfort  I told him to hold Asprin and Plavix  He has Pepcid at home told him to take twice a day  He would need work up   Patient says he would like to discuss with .  So I am sent  him a message   Patient is advised to go to ED is develop any acute sob  Severe pain or any active bleeding   Otherwise we will take care of this tomorrow  Dr.Nasima Ezra MD

## 2020-12-11 NOTE — TELEPHONE ENCOUNTER
"Call received from Cristina Comecer with [facility] lab.    She reports Critical Lab: Hgb = 7.7    Previous Hgb levels.  12/1 - Hgb = 10.2   11/26 - Hgb = 11.4    Chart review shows history of: Iron deficiency anemia due to chronic blood loss    7:40 pm - contacted pt; Justyn reports that he feels very tired - \"miquel off balance\";  He was in clinic this afternoon. He was tested again for Covid; He was also instructed to take 325 mg of Ferrous Sulfate supplement once a day.    Denies blood in stool. No vomiting  He was in the ER on 11/26 due to chest pain - diagnosed with pleurisy.  He reports that has improved some but still feels somewhat short of breath. He states he feels, \"lackadaisical\".    7:57 pm - Smart web page sent to on-call provider Dr. Chelly Munguia    8:06 pm - Call received from Dr. Munguia. Notified of Critical Lab result and pt reports of fatigue. Dr. Munguia states that she will call patient. She will also contact PCP tomorrow, (possibly tonight). ER not necessary at this time.      Janneth Antonio RN  [facility]        " stated

## 2020-12-14 ENCOUNTER — DOCUMENTATION ONLY (OUTPATIENT)
Dept: FAMILY MEDICINE | Facility: CLINIC | Age: 80
End: 2020-12-14

## 2020-12-14 ENCOUNTER — TELEPHONE (OUTPATIENT)
Dept: FAMILY MEDICINE | Facility: CLINIC | Age: 80
End: 2020-12-14

## 2020-12-14 ENCOUNTER — OFFICE VISIT (OUTPATIENT)
Dept: FAMILY MEDICINE | Facility: CLINIC | Age: 80
End: 2020-12-14
Payer: MEDICARE

## 2020-12-14 DIAGNOSIS — E11.9 TYPE 2 DIABETES MELLITUS WITHOUT COMPLICATION, WITH LONG-TERM CURRENT USE OF INSULIN (H): Primary | ICD-10-CM

## 2020-12-14 DIAGNOSIS — I48.0 PAROXYSMAL ATRIAL FIBRILLATION (H): ICD-10-CM

## 2020-12-14 DIAGNOSIS — Z79.4 TYPE 2 DIABETES MELLITUS WITHOUT COMPLICATION, WITH LONG-TERM CURRENT USE OF INSULIN (H): Primary | ICD-10-CM

## 2020-12-14 DIAGNOSIS — N18.31 STAGE 3A CHRONIC KIDNEY DISEASE (H): ICD-10-CM

## 2020-12-14 DIAGNOSIS — D50.0 IRON DEFICIENCY ANEMIA DUE TO CHRONIC BLOOD LOSS: ICD-10-CM

## 2020-12-14 DIAGNOSIS — J41.0 SIMPLE CHRONIC BRONCHITIS (H): ICD-10-CM

## 2020-12-14 DIAGNOSIS — I10 BENIGN ESSENTIAL HYPERTENSION: ICD-10-CM

## 2020-12-14 DIAGNOSIS — D62 ANEMIA DUE TO BLOOD LOSS, ACUTE: ICD-10-CM

## 2020-12-14 DIAGNOSIS — I63.40 CEREBROVASCULAR ACCIDENT (CVA) DUE TO EMBOLISM OF CEREBRAL ARTERY (H): ICD-10-CM

## 2020-12-14 LAB
ERYTHROCYTE [DISTWIDTH] IN BLOOD BY AUTOMATED COUNT: 15.6 % (ref 10–15)
HCT VFR BLD AUTO: 24.6 % (ref 40–53)
HGB BLD-MCNC: 7.4 G/DL (ref 13.3–17.7)
MCH RBC QN AUTO: 25.2 PG (ref 26.5–33)
MCHC RBC AUTO-ENTMCNC: 30.1 G/DL (ref 31.5–36.5)
MCV RBC AUTO: 84 FL (ref 78–100)
PLATELET # BLD AUTO: 433 10E9/L (ref 150–450)
RBC # BLD AUTO: 2.94 10E12/L (ref 4.4–5.9)
WBC # BLD AUTO: 10.2 10E9/L (ref 4–11)

## 2020-12-14 PROCEDURE — 36415 COLL VENOUS BLD VENIPUNCTURE: CPT | Performed by: INTERNAL MEDICINE

## 2020-12-14 PROCEDURE — 85027 COMPLETE CBC AUTOMATED: CPT | Performed by: INTERNAL MEDICINE

## 2020-12-14 PROCEDURE — 99214 OFFICE O/P EST MOD 30 MIN: CPT | Performed by: INTERNAL MEDICINE

## 2020-12-14 RX ORDER — HEPARIN SODIUM (PORCINE) LOCK FLUSH IV SOLN 100 UNIT/ML 100 UNIT/ML
5 SOLUTION INTRAVENOUS
Status: CANCELLED | OUTPATIENT
Start: 2020-12-14

## 2020-12-14 RX ORDER — HEPARIN SODIUM,PORCINE 10 UNIT/ML
5 VIAL (ML) INTRAVENOUS
Status: CANCELLED | OUTPATIENT
Start: 2020-12-14

## 2020-12-14 NOTE — TELEPHONE ENCOUNTER
Pt had OV today with PCP  PCP would like pt to have iron infusion asap    Pended in admit/therapy plan    Please review and route back once complete.      Thank you,   Rad HARRISON RN

## 2020-12-14 NOTE — PROGRESS NOTES
Nasal swab for Covid in lab has . If test is wanted patient will need to return and an order must be in as well.

## 2020-12-14 NOTE — PROGRESS NOTES
Subjective     Justyn Olivas is a 80 year old male who presents to clinic today for the following health issues:    HPI         Recheck -- cough and chest pains  Minor dyspnea with exertion, no fever chills or sweats  Cough prominent with supine positioning which is better within the last 24 hours but not resolved.  The patient started taking iron tablets so melena is not a option for follow-up of ongoing GI bleeding    Current Outpatient Medications   Medication     ASPIRIN PO     atorvastatin (LIPITOR) 20 MG tablet     blood glucose (STEVE CONTOUR) test strip     blood glucose (STEVE CONTOUR) test strip     clopidogrel (PLAVIX) 75 MG tablet     cyclobenzaprine (FLEXERIL) 10 MG tablet     fluticasone (FLONASE) 50 MCG/ACT nasal spray     Glucose Blood (STEVE CONTOUR TEST VI)     hydrochlorothiazide (HYDRODIURIL) 25 MG tablet     insulin glargine (LANTUS SOLOSTAR) 100 UNIT/ML pen     insulin lispro (HUMALOG KWIKPEN) 100 UNIT/ML (1 unit dial) KWIKPEN     insulin pen needle (BD FERCHO U/F) 32G X 4 MM miscellaneous     irbesartan (AVAPRO) 150 MG tablet     irbesartan (AVAPRO) 300 MG tablet     LANTUS SOLOSTAR 100 UNIT/ML soln     metFORMIN (GLUCOPHAGE) 1000 MG tablet     order for DME     pantoprazole (PROTONIX) 40 MG EC tablet     traZODone (DESYREL) 100 MG tablet     VENTOLIN  (90 BASE) MCG/ACT Inhaler     No current facility-administered medications for this visit.        Review of Systems   Constitutional, HEENT, cardiovascular, pulmonary, gi and gu systems are negative, except as otherwise noted.      Objective  BP (!) 153/77 (BP Location: Left arm, Cuff Size: Adult Regular)   Pulse 117   Temp 97.3  F (36.3  C) (Tympanic)   Wt 97.5 kg (215 lb)   SpO2 100%   BMI 29.16 kg/m    BP repeat 126/70, heart rate 112  There were no vitals taken for this visit.  There is no height or weight on file to calculate BMI.  Physical Exam   GENERAL: healthy, alert and no distress  NECK: no adenopathy, no asymmetry,  masses, or scars and thyroid normal to palpation  RESP: lungs clear to auscultation - no rales, rhonchi or wheezes  CV: Irregularly irregular rhythm, normal S1 S2, no S3 or S4, no murmur, click or rub, trace pretibial peripheral edema and peripheral pulses strong  ABDOMEN: soft, nontender, no hepatosplenomegaly, no masses and bowel sounds normal  MS: no gross musculoskeletal defects noted,             Assessment & Plan     Type 2 diabetes mellitus without complication, with long-term current use of insulin (H)    - Asymptomatic COVID-19 Virus (Coronavirus) by PCR; Future  Completed in the ER  Cerebrovascular accident (CVA) due to embolism of cerebral artery (H)  No residual, no new symptoms    Paroxysmal atrial fibrillation (H)  With the stress of the ongoing anemia the patient has recurrent atrial fibrillation    Simple chronic bronchitis (H)  No symptoms associated with his bronchospastic pulmonary disease.  The acute coughing seems to be improving    Stage 3a chronic kidney disease  Continuing to monitor closely.  Avoid complications associated with prerenal azotemia or nephrotoxic drugs    Benign essential hypertension  Follow closely to maintain blood pressure well within the normal range    Anemia due to blood loss, acute  Stable, reportedly scheduled for endoscopy on Wednesday.  Will reevaluate to make sure that there are no glitches in the schedule.  Case was discussed with Dr. Michel, Plavix and aspirin were discontinued anticipating endoscopy on Wednesday.  - CBC with platelets  - Asymptomatic COVID-19 Virus (Coronavirus) by PCR; Future    Iron deficiency anemia due to chronic blood loss  Low iron stores and with the degree of anemia iron infusion would be a superior choice to oral therapy.  - CBC with platelets     BMI:   Estimated body mass index is 29.16 kg/m  as calculated from the following:    Height as of 12/10/20: 1.829 m (6').    Weight as of this encounter: 97.5 kg (215 lb).            FUTURE  APPOINTMENTS:       - Follow-up visit in approximately 1 week or after iron infusion and upper GI endoscopy.  Endoscopy scheduled for 1120 on 12/16  Medications: Aspirin and Plavix on hold for 6 days  Other medications Lantus reduced to 35 mg the evening prior to the procedure, Avapro as per usual the night prior to the procedure  On the day of procedure he is holding his Metformin and hydrochlorothiazide.    No follow-ups on file.    Bandar Greenberg MD  [facility]

## 2020-12-15 VITALS
BODY MASS INDEX: 29.16 KG/M2 | HEART RATE: 117 BPM | TEMPERATURE: 97.3 F | OXYGEN SATURATION: 100 % | DIASTOLIC BLOOD PRESSURE: 77 MMHG | WEIGHT: 215 LBS | SYSTOLIC BLOOD PRESSURE: 153 MMHG

## 2020-12-15 DIAGNOSIS — D50.0 IRON DEFICIENCY ANEMIA DUE TO CHRONIC BLOOD LOSS: ICD-10-CM

## 2020-12-15 DIAGNOSIS — Z79.4 TYPE 2 DIABETES MELLITUS WITHOUT COMPLICATION, WITH LONG-TERM CURRENT USE OF INSULIN (H): ICD-10-CM

## 2020-12-15 DIAGNOSIS — D62 ANEMIA DUE TO BLOOD LOSS, ACUTE: ICD-10-CM

## 2020-12-15 DIAGNOSIS — E11.9 TYPE 2 DIABETES MELLITUS WITHOUT COMPLICATION, WITH LONG-TERM CURRENT USE OF INSULIN (H): ICD-10-CM

## 2020-12-15 DIAGNOSIS — E61.1 IRON DEFICIENCY: ICD-10-CM

## 2020-12-15 LAB
LABORATORY COMMENT REPORT: NORMAL
SARS-COV-2 RNA SPEC QL NAA+PROBE: NEGATIVE
SARS-COV-2 RNA SPEC QL NAA+PROBE: NORMAL
SPECIMEN SOURCE: NORMAL
SPECIMEN SOURCE: NORMAL

## 2020-12-15 PROCEDURE — U0003 INFECTIOUS AGENT DETECTION BY NUCLEIC ACID (DNA OR RNA); SEVERE ACUTE RESPIRATORY SYNDROME CORONAVIRUS 2 (SARS-COV-2) (CORONAVIRUS DISEASE [COVID-19]), AMPLIFIED PROBE TECHNIQUE, MAKING USE OF HIGH THROUGHPUT TECHNOLOGIES AS DESCRIBED BY CMS-2020-01-R: HCPCS | Performed by: INTERNAL MEDICINE

## 2020-12-16 ENCOUNTER — TRANSFERRED RECORDS (OUTPATIENT)
Dept: HEALTH INFORMATION MANAGEMENT | Facility: CLINIC | Age: 80
End: 2020-12-16

## 2020-12-16 PROBLEM — M25.551 HIP PAIN, RIGHT: Status: RESOLVED | Noted: 2020-07-02 | Resolved: 2020-12-16

## 2020-12-16 PROBLEM — M70.61 TROCHANTERIC BURSITIS OF RIGHT HIP: Status: RESOLVED | Noted: 2020-07-02 | Resolved: 2020-12-16

## 2020-12-16 RX ORDER — PANTOPRAZOLE SODIUM 40 MG/1
TABLET, DELAYED RELEASE ORAL
Qty: 180 TABLET | Refills: 0 | Status: SHIPPED | OUTPATIENT
Start: 2020-12-16 | End: 2021-01-11

## 2020-12-18 PROBLEM — D50.9 IDA (IRON DEFICIENCY ANEMIA): Status: ACTIVE | Noted: 2020-12-18

## 2020-12-18 PROBLEM — E61.1 IRON DEFICIENCY: Status: ACTIVE | Noted: 2020-12-18

## 2020-12-18 PROBLEM — D50.9 ANEMIA, IRON DEFICIENCY: Status: ACTIVE | Noted: 2020-12-18

## 2020-12-20 ENCOUNTER — HOSPITAL ENCOUNTER (INPATIENT)
Facility: CLINIC | Age: 80
LOS: 3 days | Discharge: HOME HEALTH | DRG: 314 | End: 2020-12-23
Attending: EMERGENCY MEDICINE | Admitting: INTERNAL MEDICINE
Payer: MEDICARE

## 2020-12-20 ENCOUNTER — APPOINTMENT (OUTPATIENT)
Dept: CARDIOLOGY | Facility: CLINIC | Age: 80
DRG: 314 | End: 2020-12-20
Attending: EMERGENCY MEDICINE
Payer: MEDICARE

## 2020-12-20 ENCOUNTER — NURSE TRIAGE (OUTPATIENT)
Dept: NURSING | Facility: CLINIC | Age: 80
End: 2020-12-20

## 2020-12-20 ENCOUNTER — APPOINTMENT (OUTPATIENT)
Dept: CT IMAGING | Facility: CLINIC | Age: 80
DRG: 314 | End: 2020-12-20
Attending: EMERGENCY MEDICINE
Payer: MEDICARE

## 2020-12-20 DIAGNOSIS — J41.0 SIMPLE CHRONIC BRONCHITIS (H): ICD-10-CM

## 2020-12-20 DIAGNOSIS — D50.9 ANEMIA, IRON DEFICIENCY: ICD-10-CM

## 2020-12-20 DIAGNOSIS — R06.03 ACUTE RESPIRATORY DISTRESS: ICD-10-CM

## 2020-12-20 DIAGNOSIS — M15.0 PRIMARY OSTEOARTHRITIS INVOLVING MULTIPLE JOINTS: ICD-10-CM

## 2020-12-20 DIAGNOSIS — J30.89 CHRONIC NON-SEASONAL ALLERGIC RHINITIS: ICD-10-CM

## 2020-12-20 DIAGNOSIS — M16.0 PRIMARY OSTEOARTHRITIS OF BOTH HIPS: ICD-10-CM

## 2020-12-20 DIAGNOSIS — I63.40 CEREBROVASCULAR ACCIDENT (CVA) DUE TO EMBOLISM OF CEREBRAL ARTERY (H): ICD-10-CM

## 2020-12-20 DIAGNOSIS — I10 BENIGN ESSENTIAL HYPERTENSION: ICD-10-CM

## 2020-12-20 DIAGNOSIS — I31.39 PERICARDIAL EFFUSION: Primary | ICD-10-CM

## 2020-12-20 DIAGNOSIS — E11.9 TYPE 2 DIABETES MELLITUS WITHOUT COMPLICATION, WITH LONG-TERM CURRENT USE OF INSULIN (H): ICD-10-CM

## 2020-12-20 DIAGNOSIS — D50.9 IDA (IRON DEFICIENCY ANEMIA): ICD-10-CM

## 2020-12-20 DIAGNOSIS — G47.00 INSOMNIA, UNSPECIFIED TYPE: ICD-10-CM

## 2020-12-20 DIAGNOSIS — E78.5 HYPERLIPIDEMIA LDL GOAL <100: ICD-10-CM

## 2020-12-20 DIAGNOSIS — R91.8 PULMONARY NODULES: ICD-10-CM

## 2020-12-20 DIAGNOSIS — Z79.4 TYPE 2 DIABETES MELLITUS WITHOUT COMPLICATION, WITH LONG-TERM CURRENT USE OF INSULIN (H): ICD-10-CM

## 2020-12-20 DIAGNOSIS — D62 ANEMIA DUE TO BLOOD LOSS, ACUTE: ICD-10-CM

## 2020-12-20 DIAGNOSIS — E61.1 IRON DEFICIENCY: ICD-10-CM

## 2020-12-20 DIAGNOSIS — J30.1 NON-SEASONAL ALLERGIC RHINITIS DUE TO POLLEN: ICD-10-CM

## 2020-12-20 DIAGNOSIS — D50.0 IRON DEFICIENCY ANEMIA DUE TO CHRONIC BLOOD LOSS: ICD-10-CM

## 2020-12-20 LAB
ALBUMIN SERPL-MCNC: 2.7 G/DL (ref 3.4–5)
ALP SERPL-CCNC: 94 U/L (ref 40–150)
ALT SERPL W P-5'-P-CCNC: 28 U/L (ref 0–70)
ANION GAP SERPL CALCULATED.3IONS-SCNC: 10 MMOL/L (ref 3–14)
AST SERPL W P-5'-P-CCNC: 17 U/L (ref 0–45)
BASE EXCESS BLDV CALC-SCNC: 5.9 MMOL/L
BASOPHILS # BLD AUTO: 0 10E9/L (ref 0–0.2)
BASOPHILS NFR BLD AUTO: 0.3 %
BILIRUB SERPL-MCNC: 0.5 MG/DL (ref 0.2–1.3)
BUN SERPL-MCNC: 25 MG/DL (ref 7–30)
CALCIUM SERPL-MCNC: 9 MG/DL (ref 8.5–10.1)
CHLORIDE SERPL-SCNC: 94 MMOL/L (ref 94–109)
CO2 SERPL-SCNC: 31 MMOL/L (ref 20–32)
CREAT SERPL-MCNC: 1.74 MG/DL (ref 0.66–1.25)
D DIMER PPP FEU-MCNC: 2.9 UG/ML FEU (ref 0–0.5)
DIFFERENTIAL METHOD BLD: ABNORMAL
EOSINOPHIL # BLD AUTO: 0 10E9/L (ref 0–0.7)
EOSINOPHIL NFR BLD AUTO: 0.2 %
ERYTHROCYTE [DISTWIDTH] IN BLOOD BY AUTOMATED COUNT: 16.2 % (ref 10–15)
GFR SERPL CREATININE-BSD FRML MDRD: 36 ML/MIN/{1.73_M2}
GLUCOSE BLDC GLUCOMTR-MCNC: 166 MG/DL (ref 70–99)
GLUCOSE SERPL-MCNC: 218 MG/DL (ref 70–99)
HCO3 BLDV-SCNC: 31 MMOL/L (ref 21–28)
HCT VFR BLD AUTO: 26.4 % (ref 40–53)
HGB BLD-MCNC: 7.9 G/DL (ref 13.3–17.7)
IMM GRANULOCYTES # BLD: 0.1 10E9/L (ref 0–0.4)
IMM GRANULOCYTES NFR BLD: 0.6 %
INTERPRETATION ECG - MUSE: NORMAL
LACTATE BLD-SCNC: 3.8 MMOL/L (ref 0.7–2)
LACTATE BLD-SCNC: 5.4 MMOL/L (ref 0.7–2)
LYMPHOCYTES # BLD AUTO: 0.6 10E9/L (ref 0.8–5.3)
LYMPHOCYTES NFR BLD AUTO: 4.3 %
MCH RBC QN AUTO: 23.8 PG (ref 26.5–33)
MCHC RBC AUTO-ENTMCNC: 29.9 G/DL (ref 31.5–36.5)
MCV RBC AUTO: 80 FL (ref 78–100)
MONOCYTES # BLD AUTO: 1.7 10E9/L (ref 0–1.3)
MONOCYTES NFR BLD AUTO: 12.8 %
NEUTROPHILS # BLD AUTO: 11.1 10E9/L (ref 1.6–8.3)
NEUTROPHILS NFR BLD AUTO: 81.8 %
NRBC # BLD AUTO: 0 10*3/UL
NRBC BLD AUTO-RTO: 0 /100
OXYHGB MFR BLDV: 21 %
PCO2 BLDV: 47 MM HG (ref 40–50)
PH BLDV: 7.43 PH (ref 7.32–7.43)
PLATELET # BLD AUTO: 446 10E9/L (ref 150–450)
PO2 BLDV: 18 MM HG (ref 25–47)
POTASSIUM SERPL-SCNC: 2.5 MMOL/L (ref 3.4–5.3)
PROCALCITONIN SERPL-MCNC: 0.17 NG/ML
PROT SERPL-MCNC: 7.6 G/DL (ref 6.8–8.8)
RBC # BLD AUTO: 3.32 10E12/L (ref 4.4–5.9)
SARS-COV-2 RNA SPEC QL NAA+PROBE: NORMAL
SODIUM SERPL-SCNC: 135 MMOL/L (ref 133–144)
SPECIMEN SOURCE: NORMAL
TROPONIN I SERPL-MCNC: 0.02 UG/L (ref 0–0.04)
WBC # BLD AUTO: 13.5 10E9/L (ref 4–11)

## 2020-12-20 PROCEDURE — 250N000011 HC RX IP 250 OP 636: Performed by: EMERGENCY MEDICINE

## 2020-12-20 PROCEDURE — 84145 PROCALCITONIN (PCT): CPT | Performed by: INTERNAL MEDICINE

## 2020-12-20 PROCEDURE — C9803 HOPD COVID-19 SPEC COLLECT: HCPCS

## 2020-12-20 PROCEDURE — 71275 CT ANGIOGRAPHY CHEST: CPT

## 2020-12-20 PROCEDURE — 84145 PROCALCITONIN (PCT): CPT | Performed by: EMERGENCY MEDICINE

## 2020-12-20 PROCEDURE — 99223 1ST HOSP IP/OBS HIGH 75: CPT | Mod: AI | Performed by: INTERNAL MEDICINE

## 2020-12-20 PROCEDURE — 86900 BLOOD TYPING SEROLOGIC ABO: CPT | Performed by: EMERGENCY MEDICINE

## 2020-12-20 PROCEDURE — 84484 ASSAY OF TROPONIN QUANT: CPT | Performed by: EMERGENCY MEDICINE

## 2020-12-20 PROCEDURE — 84484 ASSAY OF TROPONIN QUANT: CPT | Performed by: INTERNAL MEDICINE

## 2020-12-20 PROCEDURE — 96368 THER/DIAG CONCURRENT INF: CPT

## 2020-12-20 PROCEDURE — 255N000002 HC RX 255 OP 636: Performed by: INTERNAL MEDICINE

## 2020-12-20 PROCEDURE — 80053 COMPREHEN METABOLIC PANEL: CPT | Performed by: EMERGENCY MEDICINE

## 2020-12-20 PROCEDURE — 86923 COMPATIBILITY TEST ELECTRIC: CPT | Performed by: EMERGENCY MEDICINE

## 2020-12-20 PROCEDURE — 96365 THER/PROPH/DIAG IV INF INIT: CPT | Mod: 59

## 2020-12-20 PROCEDURE — 250N000009 HC RX 250: Performed by: EMERGENCY MEDICINE

## 2020-12-20 PROCEDURE — 86901 BLOOD TYPING SEROLOGIC RH(D): CPT | Performed by: EMERGENCY MEDICINE

## 2020-12-20 PROCEDURE — 93325 DOPPLER ECHO COLOR FLOW MAPG: CPT | Mod: 26 | Performed by: INTERNAL MEDICINE

## 2020-12-20 PROCEDURE — 87040 BLOOD CULTURE FOR BACTERIA: CPT | Performed by: EMERGENCY MEDICINE

## 2020-12-20 PROCEDURE — 96361 HYDRATE IV INFUSION ADD-ON: CPT

## 2020-12-20 PROCEDURE — 86850 RBC ANTIBODY SCREEN: CPT | Performed by: EMERGENCY MEDICINE

## 2020-12-20 PROCEDURE — 93005 ELECTROCARDIOGRAM TRACING: CPT | Mod: 76

## 2020-12-20 PROCEDURE — 93005 ELECTROCARDIOGRAM TRACING: CPT

## 2020-12-20 PROCEDURE — 999N000208 ECHOCARDIOGRAM LIMITED

## 2020-12-20 PROCEDURE — 258N000003 HC RX IP 258 OP 636: Performed by: EMERGENCY MEDICINE

## 2020-12-20 PROCEDURE — 85379 FIBRIN DEGRADATION QUANT: CPT | Performed by: EMERGENCY MEDICINE

## 2020-12-20 PROCEDURE — 210N000002 HC R&B HEART CARE

## 2020-12-20 PROCEDURE — 99291 CRITICAL CARE FIRST HOUR: CPT | Mod: 25

## 2020-12-20 PROCEDURE — 85025 COMPLETE CBC W/AUTO DIFF WBC: CPT | Performed by: EMERGENCY MEDICINE

## 2020-12-20 PROCEDURE — 82805 BLOOD GASES W/O2 SATURATION: CPT | Performed by: EMERGENCY MEDICINE

## 2020-12-20 PROCEDURE — 96367 TX/PROPH/DG ADDL SEQ IV INF: CPT

## 2020-12-20 PROCEDURE — 83605 ASSAY OF LACTIC ACID: CPT | Performed by: EMERGENCY MEDICINE

## 2020-12-20 PROCEDURE — 999N001017 HC STATISTIC GLUCOSE BY METER IP

## 2020-12-20 PROCEDURE — 93321 DOPPLER ECHO F-UP/LMTD STD: CPT | Mod: 26 | Performed by: INTERNAL MEDICINE

## 2020-12-20 PROCEDURE — 93308 TTE F-UP OR LMTD: CPT | Mod: 26 | Performed by: INTERNAL MEDICINE

## 2020-12-20 PROCEDURE — U0003 INFECTIOUS AGENT DETECTION BY NUCLEIC ACID (DNA OR RNA); SEVERE ACUTE RESPIRATORY SYNDROME CORONAVIRUS 2 (SARS-COV-2) (CORONAVIRUS DISEASE [COVID-19]), AMPLIFIED PROBE TECHNIQUE, MAKING USE OF HIGH THROUGHPUT TECHNOLOGIES AS DESCRIBED BY CMS-2020-01-R: HCPCS | Performed by: EMERGENCY MEDICINE

## 2020-12-20 PROCEDURE — 99292 CRITICAL CARE ADDL 30 MIN: CPT

## 2020-12-20 RX ORDER — ACETAMINOPHEN 650 MG/1
650 SUPPOSITORY RECTAL EVERY 4 HOURS PRN
Status: DISCONTINUED | OUTPATIENT
Start: 2020-12-20 | End: 2020-12-23 | Stop reason: HOSPADM

## 2020-12-20 RX ORDER — IOPAMIDOL 755 MG/ML
68 INJECTION, SOLUTION INTRAVASCULAR ONCE
Status: DISCONTINUED | OUTPATIENT
Start: 2020-12-20 | End: 2020-12-20

## 2020-12-20 RX ORDER — POTASSIUM CHLORIDE 7.45 MG/ML
10 INJECTION INTRAVENOUS ONCE
Status: COMPLETED | OUTPATIENT
Start: 2020-12-20 | End: 2020-12-20

## 2020-12-20 RX ORDER — PANTOPRAZOLE SODIUM 40 MG/1
40 TABLET, DELAYED RELEASE ORAL
Status: DISCONTINUED | OUTPATIENT
Start: 2020-12-21 | End: 2020-12-23 | Stop reason: HOSPADM

## 2020-12-20 RX ORDER — ATORVASTATIN CALCIUM 20 MG/1
20 TABLET, FILM COATED ORAL DAILY
Status: DISCONTINUED | OUTPATIENT
Start: 2020-12-21 | End: 2020-12-23 | Stop reason: HOSPADM

## 2020-12-20 RX ORDER — ONDANSETRON 2 MG/ML
4 INJECTION INTRAMUSCULAR; INTRAVENOUS EVERY 6 HOURS PRN
Status: DISCONTINUED | OUTPATIENT
Start: 2020-12-20 | End: 2020-12-23 | Stop reason: HOSPADM

## 2020-12-20 RX ORDER — ASPIRIN 81 MG/1
81 TABLET ORAL DAILY
Status: ON HOLD | COMMUNITY
End: 2021-04-01

## 2020-12-20 RX ORDER — FLUTICASONE PROPIONATE 50 MCG
1 SPRAY, SUSPENSION (ML) NASAL 2 TIMES DAILY PRN
COMMUNITY
End: 2021-02-03

## 2020-12-20 RX ORDER — DEXTROSE MONOHYDRATE 25 G/50ML
25-50 INJECTION, SOLUTION INTRAVENOUS
Status: DISCONTINUED | OUTPATIENT
Start: 2020-12-20 | End: 2020-12-21

## 2020-12-20 RX ORDER — HYDRALAZINE HYDROCHLORIDE 20 MG/ML
10 INJECTION INTRAMUSCULAR; INTRAVENOUS EVERY 4 HOURS PRN
Status: DISCONTINUED | OUTPATIENT
Start: 2020-12-20 | End: 2020-12-23 | Stop reason: HOSPADM

## 2020-12-20 RX ORDER — LIDOCAINE 40 MG/G
CREAM TOPICAL
Status: DISCONTINUED | OUTPATIENT
Start: 2020-12-20 | End: 2020-12-23 | Stop reason: HOSPADM

## 2020-12-20 RX ORDER — NICOTINE POLACRILEX 4 MG
15-30 LOZENGE BUCCAL
Status: DISCONTINUED | OUTPATIENT
Start: 2020-12-20 | End: 2020-12-21

## 2020-12-20 RX ORDER — DORZOLAMIDE HYDROCHLORIDE AND TIMOLOL MALEATE 20; 5 MG/ML; MG/ML
1 SOLUTION/ DROPS OPHTHALMIC 2 TIMES DAILY
COMMUNITY

## 2020-12-20 RX ORDER — ACETAMINOPHEN 325 MG/1
650 TABLET ORAL EVERY 4 HOURS PRN
Status: DISCONTINUED | OUTPATIENT
Start: 2020-12-20 | End: 2020-12-23 | Stop reason: HOSPADM

## 2020-12-20 RX ORDER — PIPERACILLIN SODIUM, TAZOBACTAM SODIUM 3; .375 G/15ML; G/15ML
3.38 INJECTION, POWDER, LYOPHILIZED, FOR SOLUTION INTRAVENOUS ONCE
Status: COMPLETED | OUTPATIENT
Start: 2020-12-20 | End: 2020-12-20

## 2020-12-20 RX ORDER — MAGNESIUM SULFATE HEPTAHYDRATE 40 MG/ML
2 INJECTION, SOLUTION INTRAVENOUS ONCE
Status: COMPLETED | OUTPATIENT
Start: 2020-12-20 | End: 2020-12-20

## 2020-12-20 RX ORDER — IOPAMIDOL 755 MG/ML
76 INJECTION, SOLUTION INTRAVASCULAR ONCE
Status: COMPLETED | OUTPATIENT
Start: 2020-12-20 | End: 2020-12-20

## 2020-12-20 RX ORDER — ONDANSETRON 4 MG/1
4 TABLET, ORALLY DISINTEGRATING ORAL EVERY 6 HOURS PRN
Status: DISCONTINUED | OUTPATIENT
Start: 2020-12-20 | End: 2020-12-23 | Stop reason: HOSPADM

## 2020-12-20 RX ADMIN — MAGNESIUM SULFATE HEPTAHYDRATE 2 G: 40 INJECTION, SOLUTION INTRAVENOUS at 18:03

## 2020-12-20 RX ADMIN — VANCOMYCIN HYDROCHLORIDE 2000 MG: 5 INJECTION, POWDER, LYOPHILIZED, FOR SOLUTION INTRAVENOUS at 18:05

## 2020-12-20 RX ADMIN — HUMAN ALBUMIN MICROSPHERES AND PERFLUTREN 9 ML: 10; .22 INJECTION, SOLUTION INTRAVENOUS at 20:45

## 2020-12-20 RX ADMIN — IOPAMIDOL 76 ML: 755 INJECTION, SOLUTION INTRAVENOUS at 17:44

## 2020-12-20 RX ADMIN — SODIUM CHLORIDE 1000 ML: 9 INJECTION, SOLUTION INTRAVENOUS at 17:04

## 2020-12-20 RX ADMIN — POTASSIUM CHLORIDE 10 MEQ: 7.46 INJECTION, SOLUTION INTRAVENOUS at 18:03

## 2020-12-20 RX ADMIN — POTASSIUM CHLORIDE 10 MEQ: 7.46 INJECTION, SOLUTION INTRAVENOUS at 19:09

## 2020-12-20 RX ADMIN — PIPERACILLIN SODIUM AND TAZOBACTAM SODIUM 3.38 G: 3; .375 INJECTION, POWDER, LYOPHILIZED, FOR SOLUTION INTRAVENOUS at 18:03

## 2020-12-20 RX ADMIN — SODIUM CHLORIDE 99 ML: 9 INJECTION, SOLUTION INTRAVENOUS at 17:45

## 2020-12-20 ASSESSMENT — ENCOUNTER SYMPTOMS: SHORTNESS OF BREATH: 1

## 2020-12-20 ASSESSMENT — MIFFLIN-ST. JEOR: SCORE: 1761.78

## 2020-12-20 NOTE — ED TRIAGE NOTES
Shortness of breath with cough. Patient states he is due for an iron infusion tomorrow. Dyspneic in triage.

## 2020-12-20 NOTE — ED PROVIDER NOTES
History   Chief Complaint  Shortness of Breath    HPI   Justyn Olivas is a 80 year old male with a history of cerebral infarction, diabetes, and hyperlipidemia who presents for evaluation of shortness of breath. Justyn states he is due for an iron infusion tomorrow. He notes shortness of breath with mild pain the last two days. He believes it is because he isanemic. No leg swelling, having significant issues with his ADL. Symptoms are constant and worsening.     Allergies  NKDA    Medications  Aspirin 81 mg  Lipitor  Plavix  Hydrochlorothiazide  Insulin glargine  insulin lispro  Avapro  lantus solostar  Glucophage  Protonix     Past Medical History  Cerebral infarction  Diabetes  Hyperlipidemia    Social History  Tobacco use: former smoker  Alcohol use: no  Drug use: no  Marital Status:     Review of Systems   Respiratory: Positive for shortness of breath.    Cardiovascular: Positive for chest pain. Negative for leg swelling.   All other systems reviewed and are negative.      Physical Exam     Patient Vitals for the past 24 hrs:   BP Temp Temp src Pulse Resp SpO2 Height   12/20/20 2100 -- -- -- 109 16 96 % --   12/20/20 2030 -- -- -- 101 10 93 % --   12/20/20 2000 (!) 154/104 -- -- 105 28 96 % --   12/20/20 1952 (!) 174/107 -- -- 108 26 96 % --   12/20/20 1930 -- -- -- 111 29 96 % --   12/20/20 1900 -- -- -- 103 18 93 % --   12/20/20 1830 -- -- -- 104 11 94 % --   12/20/20 1800 -- -- -- 102 16 94 % --   12/20/20 1700 -- -- -- 106 18 96 % --   12/20/20 1603 (!) 138/111 97.9  F (36.6  C) Oral 70 20 98 % 1.829 m (6')   12/20/20 1557 -- -- -- 125 (!) 32 95 % --       Physical Exam  Vitals: reviewed by me  General: Pt seen on [facility], pleasant, cooperative, and alert to conversation, appears to be in mild to moderate respiratory distress however.  Eyes: Tracking well, clear conjunctiva BL  ENT: MMM, midline trachea.   Lungs: Tachypneic, no accessory muscle use noted, mild to moderate respiratory  distress.  Satting well, speaking in full sentences.  CV: Rate as above, regular rhythm.    Abd: Soft, non tender, no guarding, no rebound. Non distended  MSK: no peripheral edema or joint effusion.  No evidence of trauma  Skin: No rash, normal turgor and temperature  Neuro: Clear speech and no facial droop.  Psych: Not RIS, no e/o AH/VH        Emergency Department Course   EKG  Indication: Shortness of breath  Time: 1626  Rate 107 bpm. CO interval 150. QRS duration 98. QT/QTc 358/477.   Sinus tachycardia  Possible left atrial enlargement  Minor ST elevation consider inferior injury or acute infarct   Consider right ventricular involvement in acute inferior infarct  Read time: 1630  Read by Jone Barnard MD    EKG  Indication: Shortness of breath  Time: 1649  Rate 106 bpm. CO interval 150. QRS duration 98. QT/QTc 374/496.   Sinus tachycardia with fusion complexes  Possible left atrial enlargement  Minor ST elevation consider inferior injury or acute infarct   Consider right ventricular involvement in acute inferior infarct      EKG  Indication: Shortness of breath  Time: 2044  Rate 105 bpm. CO interval 158. QRS duration 100. QT/QTc 366/483.   Sinus tachycardia with fusion complexes  Cannot rule out anterior infarct, age undetermined  Abnormal ECG   Read time: 2057  Read by Jone Barnard MD    Imaging:  Radiology findings were communicated with the patient who voiced understanding of the findings.    Echocardiogram Limited  IMPRESSION  Left ventricular systolic function is mildly reduced.The visual ejection  fraction is estimated at 45%.Basal inferior wall hypokinesia, basal to mid  inferolateral wall hypokinesia  The right ventricular systolic function is normal.  Moderate pericardial effusion with partial organization  Abnromal septal wall motion noted with respiratory variation.Early diastolic  flattening/mild collapse of RV free wall noted (best seen image # 21, frame  10)  Increased  respiratory variation noted trans tricuspid inflow velocities.  Dilation of the inferior vena cava is present with abnormal respiratory  variation in diameter.  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    CT Chest Pulmonary Embolism w Contrast  IMPRESSION:  1.  No pulmonary emboli.  2.  New large pericardial effusion.  3.  New small left pleural effusion and mild left basilar compressive  atelectasis.  4.  Stable mild nonspecific mediastinal and hilar lymphadenopathy.  5.  Stable 7 mm right upper lobe nodule. See follow-up guidelines.  6.  Cholelithiasis.    Readings per Radiology    Laboratory:  Laboratory findings were communicated with the patient who voiced understanding of the findings.    CBC: WBC: 13.5 (high), HGB: 7.9 (low), PLT: 446  CMP: Glucose 218 (high), Potassium: 2.5 (low), GFR Estimate: 36 (low), Albumin: 2.7 (low), o/w WNL (Creatinine: 1.74 (high))  D dimer: 2.9 (high)  Troponin I (Collected at 1614): 0.019  Troponin I (Collected at 1824): 0.024  Blood gas venous (collected at 1614): pO2 Venous 18 (low), Bicarbonate Venous 31 (high), o/w WNL on RA   Lactic acid (Resulted at 1654): 5.4 (high)  Lactic acid (Resulted at 1844): 3.8 (high)  ABO: A, Rh(D): Pos, Antibody: Neg    Blood Cultures (x2): pending    Asypmtomatic SARS-CoV2 virus multiplex: pending    Emergency Department Course:  Reviewed:  1602 PM I reviewed the patient's nursing notes, vitals, past medical records, Care Everywhere.     Assessments:  1607 I physically examined the patient as documented above.    Consults:   1632 Cardiology was paged.    1645 I spoke with Dr. Mcgee, Cardiology, who did not recommend Cath lab activation in favor of repeat EKG and troponin.  1714 I consulted with Dr. Mcgee, Cardiology, regarding the patient's second EKG and laboratory work up thus far. He agrees with no  STEMI.  1906 I consulted with Dr. Mcgee, Cardiology, regarding the patient's second troponin and CT results.   2124 Dr. Navarrete of Cardiology called to inform me of the results of the patient's Echocardiogram.  2136 I consulted with Dr. Mcgee, Cardiology, regarding the patient's admission course in the setting of his recent echocardiogram results.    Interventions:  1704 NS 1L IV   1803 Zosyn 3.375 g IV  1803 magnesium sulfate 2 g IV  1803 potassium chloride 10 mEq IV  1805 vancomycin 2000 mg IV    Disposition:  The patient was admitted to the hospital under the care of Dr. Robles.     Impression & Plan   Covid-19  Justyn Olivas was evaluated during a global COVID-19 pandemic, which necessitated consideration that the patient might be at risk for infection with the SARS-CoV-2 virus that causes COVID-19.   Applicable protocols for evaluation were followed during the patient's care.   COVID-19 was considered as part of the patient's evaluation. The plan for testing is:  a test was obtained during this visit.    Medical Decision Making:  This is a very pleasant 80-year-old male who presents emergency room with significant dyspnea for the last 48 hours.  He is quite clear that he does not have chest pain, but only shortness of breath and an uncomfortable feeling that he tells me he has trouble describing.  Regardless, I was very concerned when I 1st saw his EKG is read as a an acute STEMI, but after I spoke to the cardiologist, and reevaluate the patient, compared with priors, we decided to wait and repeat the EKG.  The 2nd EKG was essentially unchanged, and the troponin came back at a normal level.  The delta troponin at 2 hours also came back at a normal level.  Antibiotics were given for leukocytosis with a lactate over 4.  After the 1st trop came back, my concern switch to a possible pulmonary embolism, and a CT scan was done which revealed no pulmonary embolism, but a large pericardial effusion.   The patient was still normotensive if not hypertensive at this point, and clinically stable.  We got a stat echo, and I also spoke to Dr. Mcgee, and Dr. ALMAGUER, the latter after reading the echo, and we decided as a team that the patient was still stable to be admitted to the CCU, although the ultrasound did show very early signs of possible tamponade physiology.  I also spoke to Dr. Robles a number of times, of the hospitalist team will be admitting the patient to the CCU.  The patient is stable at this point with a good blood pressure, and no discomfort at rest.  Should things change or he become hypotensive, he would of course need to go for pericardial window and drainage immediately, even if it was the middle the night, but at this time he does appear to be stable to wait in the morning.  I left a voicemail with the patient's wife, with an update.  Will monitor very carefully till inpatient bed in the CCU is available.    Critical care time 35 minutes with a cardiac organ threat, initial treatment for severe sepsis, and IV fluids for preload dependent cardiac emergency.    Diagnosis:    ICD-10-CM    1. Acute respiratory distress  R06.03 Blood culture     Asymptomatic COVID-19 Virus (Coronavirus) by PCR     Procalcitonin     Procalcitonin     Troponin I (now)     CANCELED: Procalcitonin   2. Pericardial effusion  I31.3        Disposition:  Admitted to general medicine.    Scribe Disclosure:  I, Ayden Scales, am serving as a scribe at 4:02 PM on 12/20/2020 to document services personally performed by Jone Barnard MD based on my observations and the provider's statements to me.      Jone Barnard MD  12/20/20 2619

## 2020-12-20 NOTE — TELEPHONE ENCOUNTER
Called about worsened difficulty breathing.  States this started x 2 days ago.  Caller states he has a low iron level, has an appointment for iron infusion tomorrow.  States it has been very difficult for him to go up and down the stairs.  Caller denied chest pain, reported cough  Mei Varghese RN.  COVID 19 Nurse Triage Plan/Patient Instructions    Please be aware that novel coronavirus (COVID-19) may be circulating in the community. If you develop symptoms such as fever, cough, or SOB or if you have concerns about the presence of another infection including coronavirus (COVID-19), please contact your health care provider or visit [email or web address].     Disposition/Instructions    ED Visit recommended. Follow protocol based instructions.     Bring Your Own Device:  Please also bring your smart device(s) (smart phones, tablets, laptops) and their charging cables for your personal use and to communicate with your care team during your visit.    Thank you for taking steps to prevent the spread of this virus.  o Limit your contact with others.  o Wear a simple mask to cover your cough.  o Wash your hands well and often.    Resources    [facility]: About COVID-19: [email or web address]    CDC: What to Do If You're Sick: www.cdc.gov/coronavirus/2019-ncov/about/steps-when-sick.html    CDC: Ending Home Isolation: www.cdc.gov/coronavirus/2019-ncov/hcp/disposition-in-home-patients.html     CDC: Caring for Someone: www.cdc.gov/coronavirus/2019-ncov/if-you-are-sick/care-for-someone.html     [facility]: Interim Guidance for Hospital Discharge to Home: www.health.[facility].[place]/diseases/coronavirus/hcp/hospdischarge.pdf    [facility] clinical trials (COVID-19 research studies): [email or web address]     Below are the COVID-19 hotlines at the [place] Department of Health ([facility]). Interpreters are available.   o For health questions: Call 724-067-5075 or 1-553.108.7939 (7 a.m. to 7  p.m.)  o For questions about schools and childcare: Call 782-604-3871 or 1-343.123.8817 (7 a.m. to 7 p.m.)                     Reason for Disposition    [1] MODERATE difficulty breathing (e.g., speaks in phrases, SOB even at rest, pulse 100-120) AND [2] NEW-onset or WORSE than normal    Additional Information    Negative: [1] Breathing stopped AND [2] hasn't returned    Negative: Choking on something    Negative: Severe difficulty breathing (e.g., struggling for each breath, speaks in single words)    Negative: Bluish (or gray) lips or face now    Negative: Difficult to awaken or acting confused (e.g., disoriented, slurred speech)    Negative: Passed out (i.e., lost consciousness, collapsed and was not responding)    Negative: Wheezing started suddenly after medicine, an allergic food or bee sting    Negative: Stridor    Negative: Slow, shallow and weak breathing    Negative: Sounds like a life-threatening emergency to the triager    Negative: Chest pain    Negative: [1] Wheezing (high pitched whistling sound) AND [2] previous asthma attacks or use of asthma medicines    Negative: [1] Difficulty breathing AND [2] only present when coughing    Negative: [1] Difficulty breathing AND [2] only from stuffy or runny nose    Protocols used: BREATHING DIFFICULTY-A-AH

## 2020-12-21 ENCOUNTER — APPOINTMENT (OUTPATIENT)
Dept: CARDIOLOGY | Facility: CLINIC | Age: 80
DRG: 314 | End: 2020-12-21
Attending: INTERNAL MEDICINE
Payer: MEDICARE

## 2020-12-21 LAB
ABO + RH BLD: NORMAL
ALBUMIN FLD-MCNC: 2.1 G/DL
ANION GAP SERPL CALCULATED.3IONS-SCNC: 6 MMOL/L (ref 3–14)
APPEARANCE FLD: NORMAL
BASOPHILS NFR FLD MANUAL: 1 %
BLD GP AB SCN SERPL QL: NORMAL
BLD GP AB SCN SERPL QL: NORMAL
BLD PROD TYP BPU: NORMAL
BLD UNIT ID BPU: 0
BLD UNIT ID BPU: 0
BLOOD BANK CMNT PATIENT-IMP: NORMAL
BLOOD BANK CMNT PATIENT-IMP: NORMAL
BLOOD PRODUCT CODE: NORMAL
BLOOD PRODUCT CODE: NORMAL
BPU ID: NORMAL
BPU ID: NORMAL
BUN SERPL-MCNC: 26 MG/DL (ref 7–30)
CALCIUM SERPL-MCNC: 8.1 MG/DL (ref 8.5–10.1)
CHLORIDE SERPL-SCNC: 99 MMOL/L (ref 94–109)
CO2 SERPL-SCNC: 29 MMOL/L (ref 20–32)
COLOR FLD: NORMAL
CREAT SERPL-MCNC: 1.62 MG/DL (ref 0.66–1.25)
ERYTHROCYTE [DISTWIDTH] IN BLOOD BY AUTOMATED COUNT: 16.5 % (ref 10–15)
GFR SERPL CREATININE-BSD FRML MDRD: 39 ML/MIN/{1.73_M2}
GLUCOSE BLDC GLUCOMTR-MCNC: 150 MG/DL (ref 70–99)
GLUCOSE BLDC GLUCOMTR-MCNC: 152 MG/DL (ref 70–99)
GLUCOSE BLDC GLUCOMTR-MCNC: 163 MG/DL (ref 70–99)
GLUCOSE BLDC GLUCOMTR-MCNC: 179 MG/DL (ref 70–99)
GLUCOSE BLDC GLUCOMTR-MCNC: 181 MG/DL (ref 70–99)
GLUCOSE BLDC GLUCOMTR-MCNC: 277 MG/DL (ref 70–99)
GLUCOSE FLD-MCNC: 125 MG/DL
GLUCOSE SERPL-MCNC: 178 MG/DL (ref 70–99)
HCT VFR BLD AUTO: 22.8 % (ref 40–53)
HGB BLD-MCNC: 6.9 G/DL (ref 13.3–17.7)
LABORATORY COMMENT REPORT: NORMAL
LACTATE BLD-SCNC: 2 MMOL/L (ref 0.7–2)
LDH FLD L TO P-CCNC: 1028 U/L
LYMPHOCYTES NFR FLD MANUAL: 5 %
MCH RBC QN AUTO: 23.5 PG (ref 26.5–33)
MCHC RBC AUTO-ENTMCNC: 30.3 G/DL (ref 31.5–36.5)
MCV RBC AUTO: 78 FL (ref 78–100)
MONOS+MACROS NFR FLD MANUAL: 4 %
NEUTS BAND NFR FLD MANUAL: 90 %
NUM BPU REQUESTED: 1
NUM BPU REQUESTED: 1
PLATELET # BLD AUTO: 386 10E9/L (ref 150–450)
POTASSIUM SERPL-SCNC: 2.8 MMOL/L (ref 3.4–5.3)
POTASSIUM SERPL-SCNC: 2.8 MMOL/L (ref 3.4–5.3)
POTASSIUM SERPL-SCNC: 3 MMOL/L (ref 3.4–5.3)
PROT FLD-MCNC: 4.9 G/DL
RBC # BLD AUTO: 2.94 10E12/L (ref 4.4–5.9)
SARS-COV-2 RNA SPEC QL NAA+PROBE: NEGATIVE
SODIUM SERPL-SCNC: 134 MMOL/L (ref 133–144)
SPECIMEN EXP DATE BLD: NORMAL
SPECIMEN EXP DATE BLD: NORMAL
SPECIMEN SOURCE FLD: NORMAL
SPECIMEN SOURCE: NORMAL
TRANSFUSION STATUS PATIENT QL: NORMAL
WBC # BLD AUTO: 11.5 10E9/L (ref 4–11)
WBC # FLD AUTO: 2250 /UL

## 2020-12-21 PROCEDURE — 250N000012 HC RX MED GY IP 250 OP 636 PS 637: Performed by: INTERNAL MEDICINE

## 2020-12-21 PROCEDURE — 82945 GLUCOSE OTHER FLUID: CPT | Performed by: INTERNAL MEDICINE

## 2020-12-21 PROCEDURE — 250N000013 HC RX MED GY IP 250 OP 250 PS 637: Performed by: INTERNAL MEDICINE

## 2020-12-21 PROCEDURE — 84132 ASSAY OF SERUM POTASSIUM: CPT | Performed by: INTERNAL MEDICINE

## 2020-12-21 PROCEDURE — 999N000064 ECHOCARDIOGRAM LIMITED

## 2020-12-21 PROCEDURE — 82042 OTHER SOURCE ALBUMIN QUAN EA: CPT | Performed by: INTERNAL MEDICINE

## 2020-12-21 PROCEDURE — 88305 TISSUE EXAM BY PATHOLOGIST: CPT | Mod: TC | Performed by: INTERNAL MEDICINE

## 2020-12-21 PROCEDURE — 250N000011 HC RX IP 250 OP 636: Performed by: INTERNAL MEDICINE

## 2020-12-21 PROCEDURE — 99223 1ST HOSP IP/OBS HIGH 75: CPT | Mod: 25 | Performed by: INTERNAL MEDICINE

## 2020-12-21 PROCEDURE — 272N000001 HC OR GENERAL SUPPLY STERILE: Performed by: INTERNAL MEDICINE

## 2020-12-21 PROCEDURE — 86901 BLOOD TYPING SEROLOGIC RH(D): CPT | Performed by: INTERNAL MEDICINE

## 2020-12-21 PROCEDURE — 88112 CYTOPATH CELL ENHANCE TECH: CPT | Mod: 26 | Performed by: PATHOLOGY

## 2020-12-21 PROCEDURE — 87070 CULTURE OTHR SPECIMN AEROBIC: CPT | Performed by: INTERNAL MEDICINE

## 2020-12-21 PROCEDURE — 88112 CYTOPATH CELL ENHANCE TECH: CPT | Mod: TC | Performed by: INTERNAL MEDICINE

## 2020-12-21 PROCEDURE — 99152 MOD SED SAME PHYS/QHP 5/>YRS: CPT | Performed by: INTERNAL MEDICINE

## 2020-12-21 PROCEDURE — 83605 ASSAY OF LACTIC ACID: CPT | Performed by: INTERNAL MEDICINE

## 2020-12-21 PROCEDURE — 87205 SMEAR GRAM STAIN: CPT | Performed by: INTERNAL MEDICINE

## 2020-12-21 PROCEDURE — 86923 COMPATIBILITY TEST ELECTRIC: CPT | Performed by: INTERNAL MEDICINE

## 2020-12-21 PROCEDURE — C1894 INTRO/SHEATH, NON-LASER: HCPCS | Performed by: INTERNAL MEDICINE

## 2020-12-21 PROCEDURE — 84311 SPECTROPHOTOMETRY: CPT | Performed by: INTERNAL MEDICINE

## 2020-12-21 PROCEDURE — 250N000009 HC RX 250: Performed by: INTERNAL MEDICINE

## 2020-12-21 PROCEDURE — 84157 ASSAY OF PROTEIN OTHER: CPT | Performed by: INTERNAL MEDICINE

## 2020-12-21 PROCEDURE — 86850 RBC ANTIBODY SCREEN: CPT | Performed by: INTERNAL MEDICINE

## 2020-12-21 PROCEDURE — 84999 UNLISTED CHEMISTRY PROCEDURE: CPT | Performed by: INTERNAL MEDICINE

## 2020-12-21 PROCEDURE — 999N001014 HC STATISTIC CYTO WRIGHT STAIN TC: Performed by: INTERNAL MEDICINE

## 2020-12-21 PROCEDURE — 36415 COLL VENOUS BLD VENIPUNCTURE: CPT | Performed by: INTERNAL MEDICINE

## 2020-12-21 PROCEDURE — 0W9D30Z DRAINAGE OF PERICARDIAL CAVITY WITH DRAINAGE DEVICE, PERCUTANEOUS APPROACH: ICD-10-PCS | Performed by: INTERNAL MEDICINE

## 2020-12-21 PROCEDURE — 33017 PRCRD DRG 6YR+ W/O CGEN CAR: CPT | Performed by: INTERNAL MEDICINE

## 2020-12-21 PROCEDURE — 210N000002 HC R&B HEART CARE

## 2020-12-21 PROCEDURE — 83615 LACTATE (LD) (LDH) ENZYME: CPT | Performed by: INTERNAL MEDICINE

## 2020-12-21 PROCEDURE — 99153 MOD SED SAME PHYS/QHP EA: CPT | Performed by: INTERNAL MEDICINE

## 2020-12-21 PROCEDURE — 80048 BASIC METABOLIC PNL TOTAL CA: CPT | Performed by: INTERNAL MEDICINE

## 2020-12-21 PROCEDURE — P9016 RBC LEUKOCYTES REDUCED: HCPCS | Performed by: INTERNAL MEDICINE

## 2020-12-21 PROCEDURE — 89051 BODY FLUID CELL COUNT: CPT | Performed by: INTERNAL MEDICINE

## 2020-12-21 PROCEDURE — 85027 COMPLETE CBC AUTOMATED: CPT | Performed by: INTERNAL MEDICINE

## 2020-12-21 PROCEDURE — 999N001017 HC STATISTIC GLUCOSE BY METER IP

## 2020-12-21 PROCEDURE — 99233 SBSQ HOSP IP/OBS HIGH 50: CPT | Performed by: INTERNAL MEDICINE

## 2020-12-21 PROCEDURE — 999N001018 HC STATISTIC H-CELL BLOCK W/STAIN: Performed by: INTERNAL MEDICINE

## 2020-12-21 PROCEDURE — 88305 TISSUE EXAM BY PATHOLOGIST: CPT | Mod: 26 | Performed by: PATHOLOGY

## 2020-12-21 PROCEDURE — 86900 BLOOD TYPING SEROLOGIC ABO: CPT | Performed by: INTERNAL MEDICINE

## 2020-12-21 RX ORDER — POTASSIUM CHLORIDE 1500 MG/1
40 TABLET, EXTENDED RELEASE ORAL 3 TIMES DAILY
Status: DISCONTINUED | OUTPATIENT
Start: 2020-12-21 | End: 2020-12-22

## 2020-12-21 RX ORDER — GUAIFENESIN/DEXTROMETHORPHAN 100-10MG/5
5 SYRUP ORAL EVERY 4 HOURS PRN
Status: DISCONTINUED | OUTPATIENT
Start: 2020-12-21 | End: 2020-12-23 | Stop reason: HOSPADM

## 2020-12-21 RX ORDER — DEXTROSE MONOHYDRATE 25 G/50ML
25-50 INJECTION, SOLUTION INTRAVENOUS
Status: DISCONTINUED | OUTPATIENT
Start: 2020-12-21 | End: 2020-12-23 | Stop reason: HOSPADM

## 2020-12-21 RX ORDER — POTASSIUM CHLORIDE 1500 MG/1
40 TABLET, EXTENDED RELEASE ORAL 2 TIMES DAILY
Status: DISCONTINUED | OUTPATIENT
Start: 2020-12-22 | End: 2020-12-21

## 2020-12-21 RX ORDER — POTASSIUM CHLORIDE 1500 MG/1
20 TABLET, EXTENDED RELEASE ORAL ONCE
Status: DISCONTINUED | OUTPATIENT
Start: 2020-12-21 | End: 2020-12-23 | Stop reason: ALTCHOICE

## 2020-12-21 RX ORDER — NICOTINE POLACRILEX 4 MG
15-30 LOZENGE BUCCAL
Status: DISCONTINUED | OUTPATIENT
Start: 2020-12-21 | End: 2020-12-23 | Stop reason: HOSPADM

## 2020-12-21 RX ORDER — POTASSIUM CHLORIDE 1.5 G/1.58G
40 POWDER, FOR SOLUTION ORAL ONCE
Status: COMPLETED | OUTPATIENT
Start: 2020-12-21 | End: 2020-12-21

## 2020-12-21 RX ORDER — FENTANYL CITRATE 50 UG/ML
INJECTION, SOLUTION INTRAMUSCULAR; INTRAVENOUS
Status: DISCONTINUED | OUTPATIENT
Start: 2020-12-21 | End: 2020-12-21 | Stop reason: HOSPADM

## 2020-12-21 RX ORDER — POTASSIUM CHLORIDE 1500 MG/1
40 TABLET, EXTENDED RELEASE ORAL ONCE
Status: COMPLETED | OUTPATIENT
Start: 2020-12-21 | End: 2020-12-21

## 2020-12-21 RX ORDER — FUROSEMIDE 10 MG/ML
20 INJECTION INTRAMUSCULAR; INTRAVENOUS ONCE
Status: COMPLETED | OUTPATIENT
Start: 2020-12-21 | End: 2020-12-21

## 2020-12-21 RX ADMIN — INSULIN ASPART 1 UNITS: 100 INJECTION, SOLUTION INTRAVENOUS; SUBCUTANEOUS at 00:37

## 2020-12-21 RX ADMIN — ACETAMINOPHEN 650 MG: 325 TABLET, FILM COATED ORAL at 22:07

## 2020-12-21 RX ADMIN — POTASSIUM CHLORIDE 40 MEQ: 1500 TABLET, EXTENDED RELEASE ORAL at 16:58

## 2020-12-21 RX ADMIN — INSULIN ASPART 1 UNITS: 100 INJECTION, SOLUTION INTRAVENOUS; SUBCUTANEOUS at 05:58

## 2020-12-21 RX ADMIN — Medication 1 MG: at 02:02

## 2020-12-21 RX ADMIN — INSULIN ASPART 3 UNITS: 100 INJECTION, SOLUTION INTRAVENOUS; SUBCUTANEOUS at 22:07

## 2020-12-21 RX ADMIN — FUROSEMIDE 20 MG: 10 INJECTION, SOLUTION INTRAVENOUS at 12:47

## 2020-12-21 RX ADMIN — POTASSIUM CHLORIDE 40 MEQ: 1500 TABLET, EXTENDED RELEASE ORAL at 11:11

## 2020-12-21 RX ADMIN — INSULIN ASPART 1 UNITS: 100 INJECTION, SOLUTION INTRAVENOUS; SUBCUTANEOUS at 17:04

## 2020-12-21 RX ADMIN — INSULIN GLARGINE 40 UNITS: 100 INJECTION, SOLUTION SUBCUTANEOUS at 22:07

## 2020-12-21 RX ADMIN — INSULIN ASPART 1 UNITS: 100 INJECTION, SOLUTION INTRAVENOUS; SUBCUTANEOUS at 13:43

## 2020-12-21 RX ADMIN — POTASSIUM CHLORIDE 40 MEQ: 1.5 POWDER, FOR SOLUTION ORAL at 01:58

## 2020-12-21 RX ADMIN — GUAIFENESIN AND DEXTROMETHORPHAN 5 ML: 100; 10 SYRUP ORAL at 22:12

## 2020-12-21 RX ADMIN — HYDRALAZINE HYDROCHLORIDE 10 MG: 20 INJECTION INTRAMUSCULAR; INTRAVENOUS at 00:39

## 2020-12-21 RX ADMIN — POTASSIUM CHLORIDE 40 MEQ: 1500 TABLET, EXTENDED RELEASE ORAL at 09:08

## 2020-12-21 RX ADMIN — HYDRALAZINE HYDROCHLORIDE 10 MG: 20 INJECTION INTRAMUSCULAR; INTRAVENOUS at 07:58

## 2020-12-21 RX ADMIN — ATORVASTATIN CALCIUM 20 MG: 20 TABLET, FILM COATED ORAL at 08:04

## 2020-12-21 RX ADMIN — INSULIN GLARGINE 40 UNITS: 100 INJECTION, SOLUTION SUBCUTANEOUS at 00:37

## 2020-12-21 RX ADMIN — HYDRALAZINE HYDROCHLORIDE 10 MG: 20 INJECTION INTRAMUSCULAR; INTRAVENOUS at 17:02

## 2020-12-21 RX ADMIN — POTASSIUM CHLORIDE 40 MEQ: 1500 TABLET, EXTENDED RELEASE ORAL at 23:23

## 2020-12-21 RX ADMIN — PANTOPRAZOLE SODIUM 40 MG: 40 TABLET, DELAYED RELEASE ORAL at 08:04

## 2020-12-21 RX ADMIN — INSULIN ASPART 1 UNITS: 100 INJECTION, SOLUTION INTRAVENOUS; SUBCUTANEOUS at 09:09

## 2020-12-21 SDOH — ECONOMIC STABILITY: FOOD INSECURITY: WITHIN THE PAST 12 MONTHS, YOU WORRIED THAT YOUR FOOD WOULD RUN OUT BEFORE YOU GOT MONEY TO BUY MORE.: NOT ASKED

## 2020-12-21 SDOH — ECONOMIC STABILITY: FOOD INSECURITY: WITHIN THE PAST 12 MONTHS, THE FOOD YOU BOUGHT JUST DIDN'T LAST AND YOU DIDN'T HAVE MONEY TO GET MORE.: NOT ASKED

## 2020-12-21 SDOH — ECONOMIC STABILITY: TRANSPORTATION INSECURITY
IN THE PAST 12 MONTHS, HAS LACK OF TRANSPORTATION KEPT YOU FROM MEETINGS, WORK, OR FROM GETTING THINGS NEEDED FOR DAILY LIVING?: NOT ASKED

## 2020-12-21 SDOH — ECONOMIC STABILITY: INCOME INSECURITY: HOW HARD IS IT FOR YOU TO PAY FOR THE VERY BASICS LIKE FOOD, HOUSING, MEDICAL CARE, AND HEATING?: NOT ASKED

## 2020-12-21 SDOH — ECONOMIC STABILITY: TRANSPORTATION INSECURITY
IN THE PAST 12 MONTHS, HAS THE LACK OF TRANSPORTATION KEPT YOU FROM MEDICAL APPOINTMENTS OR FROM GETTING MEDICATIONS?: NOT ASKED

## 2020-12-21 NOTE — PROGRESS NOTES
[facility]    Hospitalist Progress Note    Assessment & Plan   Justyn Olivas is a 80 year old male with a history of DM type II, CVA, HTN, HLP and iron deficiency anemia who presented to the ED on 12/20/2020 with dyspnea and orthopnea and found to have a large pericardial effusion as the likely cause      Large pericardial effusion   Patient has chronic dyspnea that he states is related to MAIRA but over the past 2 days this has been worsening to the point that he state he is no longer able lay flat.  EKG on arrival showed possible ST elevations in the inferior leads and cardiology was consulted.  They reviewed the EKG and did not feel it was a true STEMI.  Troponin in the ED was 0.019 but d-dimer was significantly elevated concerning for PE.  CT scan did not show any PE though but there was a new large pericardial effusion with small left pleural effusion and mild left basilar compressive atelectasis.    -Appreciate cardiology input, plan for pericardiocentesis later today, continue patient in CCU, repeat echo as per cardiology.  Hypokalemia  -Severe, replace per protocol.  Lactic acidosis  Patient triggered the sepsis BPA due to tachycardiac and tachypnea with a WBC of 13.5.  A lactate was drawn and was elevated at 5.4.  He was given IVF and lactate improved to 3.8.  Given this initial elevation though the patient was given IV Zosyn and Vancomycin in the ED but there has been no evidence of infection.    I suspect this lactate elevation may actually be due to Metformin use with worsening kidney function   -His procalcitonin is mildly elevated, his white count is trending down, will hold off on any further antibiotics unless a source of infection is identified.      DM type II   [PTA Metformin 1000 mg BID, Lantus 50 U at bedtime].  HgbA1c from 12/1/20 was 7.0   - Holding metformin   - Decreased Lantus to 40 U while NPO   - Moderate intensity SSI      CKD stage IIIb with worsening Cr    Patient's baseline Cr appears to be 1.4-1.5 and was 1.74 on presentation   - Continue to monitor  - Avoid nephrotoxins.  Will hold PTA Irbesartan and hydrochlorothiazide for now, creatinine is slowly trending down to 1.62 today.     Iron deficiency anemia    Patient began to have issues with dyspnea almost 2 years ago and work up at that time showed iron deficiency anemia that was believed to be the cause.  Since then he has been receiving iron supplements and was actually scheduled for an IV infusion next week.  Hemoglobin in the ED was 7.9 and baseline appears to be 10 though since December 10th has been 7.7 and then 7.4 on December 14th without any evidence of bleeding.   - His hemoglobin dropped to 6.9, plan to transfuse 1 unit PRBC today, will repeat hemoglobin in a.m.      CVA   - Holding PTA ASA and Plavix due to possible intervention of the pericardial effusion.  Resume once okay with cardiology      HTN   HLP   - Holding PTA anti-hypertensives due to concerns of possible tamponade  - Hydralazine PRN for SBP >160   - PTA Lipitor      Pulmonary nodule  Stable 7 mm nodule in right upper lobe.    - Follow up CT scan in 6-12 months  - This was not discussed with the patient     DVT Prophylaxis:scd   Code Status: Full Code     Disposition: Expected discharge in 2 to 3 days    Cesilia Alejandra MD  Text Page   (7am to 6pm)    Interval History   Patient is awake, short of breath with minimal activity, he is 3 L of oxygen, denies any chest pain, consent obtained for blood transfusion he appears occasionally confused, denies nausea or vomiting.    -Data reviewed today: I reviewed all new labs and imaging results over the last 24 hours.    Physical Exam   Temp: 98.4  F (36.9  C) Temp src: Oral BP: (!) 163/81 Pulse: 105   Resp: 22 SpO2: 97 % O2 Device: None (Room air)    Vitals:    12/20/20 2243   Weight: 101.9 kg (224 lb 9.6 oz)     Vital Signs with Ranges  Temp:  [97.9  F (36.6  C)-98.4  F (36.9  C)] 98.4  F (36.9   C)  Pulse:  [] 105  Resp:  [8-32] 22  BP: (120-177)/() 163/81  SpO2:  [93 %-98 %] 97 %  I/O last 3 completed shifts:  In: 2290 [P.O.:360; IV Piggyback:1930]  Out: 475 [Urine:475]    Constitutional: Awake, alert, cooperative, no apparent distress  Respiratory: Breath sounds reduced bilateral bases  Cardiovascular: Tachycardic, heart sounds distant  GI: Normal bowel sounds, soft, non-distended, non-tender  Skin/Integumen: No rashes, no cyanosis, edema noted lower extremity  Neuro : moving all 4 extremities, no focal deficit noted     Medications       atorvastatin  20 mg Oral Daily     insulin aspart  1-6 Units Subcutaneous Q4H     insulin glargine  40 Units Subcutaneous At Bedtime     pantoprazole  40 mg Oral QAM AC     sodium chloride (PF)  3 mL Intracatheter Q8H       Data   Recent Labs   Lab 12/21/20  0614 12/20/20  2050 12/20/20  1824 12/20/20  1614 12/14/20  1119   WBC 11.5*  --   --  13.5* 10.2   HGB 6.9*  --   --  7.9* 7.4*   MCV 78  --   --  80 84     --   --  446 433     --   --  135  --    POTASSIUM 2.8*  --   --  2.5*  --    CHLORIDE 99  --   --  94  --    CO2 29  --   --  31  --    BUN 26  --   --  25  --    CR 1.62*  --   --  1.74*  --    ANIONGAP 6  --   --  10  --    VANESA 8.1*  --   --  9.0  --    *  --   --  218*  --    ALBUMIN  --   --   --  2.7*  --    PROTTOTAL  --   --   --  7.6  --    BILITOTAL  --   --   --  0.5  --    ALKPHOS  --   --   --  94  --    ALT  --   --   --  28  --    AST  --   --   --  17  --    TROPI  --  0.020 0.024 0.019  --      Recent Labs   Lab 12/21/20  0614 12/21/20  0517 12/21/20  0036 12/20/20  2247 12/20/20  1614   [facility] 178*  --   --   --  218*   BG  --  181* 179* 166*  --        Imaging:   Recent Results (from the past 24 hour(s))   CT Chest Pulmonary Embolism w Contrast    Narrative    CT CHEST PULMONARY EMBOLISM W CONTRAST 12/20/2020 5:58 PM    CLINICAL HISTORY: Shortness of breath  TECHNIQUE: CT angiogram chest during arterial phase  injection IV  contrast. 2D and 3D MIP reconstructions were performed by the CT  technologist. Dose reduction techniques were used.     CONTRAST: 76 mL Isovue-370    COMPARISON: 11/26/2020    FINDINGS:  ANGIOGRAM CHEST: Pulmonary arteries are normal caliber and negative  for pulmonary emboli. Thoracic aorta is negative for dissection. No CT  evidence of right heart strain.    LUNGS AND PLEURA: New small left pleural effusion. Mild compressive  atelectasis in the left lung base. No pulmonary infiltrate. Stable 7  mm right upper lobe spiculated nodule (series 4, image 39).    MEDIASTINUM/AXILLAE: Interval development of a large pericardial  effusion. Stable mildly enlarged mediastinal and hilar lymph nodes.  For example there is a 12 mm short axis right infrahilar lymph node  (series 3, image 76). Severe coronary artery calcifications.    UPPER ABDOMEN: Cholelithiasis.    MUSCULOSKELETAL: No concerning bone lesions.      Impression    IMPRESSION:  1.  No pulmonary emboli.  2.  New large pericardial effusion.  3.  New small left pleural effusion and mild left basilar compressive  atelectasis.  4.  Stable mild nonspecific mediastinal and hilar lymphadenopathy.  5.  Stable 7 mm right upper lobe nodule. See follow-up guidelines.  6.  Cholelithiasis.    Recommendations for an incidental lung nodule = or > 6mm to 8mm:    Low risk patients: Initial follow-up CT at 6-12 months, then  consider CT at 18-24 months if no change.    High risk patients: Initial follow-up CT at 6-12 months, then CT at  18-24 months if no change.    *Low Risk: Minimal or absent history of smoking or other known risk  factors.  *Nonsolid (ground-glass) or partly solid nodules may require longer  follow-up to exclude indolent adenocarcinoma.  *Recommendations based on Guidelines for the Management of Incidental  Pulmonary Nodules Detected at CT: From the Fleischner Society 2017,  Radiology 2017.    GRACE ALVAREZ MD   Echocardiogram Limited    Narrative     [id number]  [facility]  [id number]  [id number]^JOEL^FILI^AZALIA CARTAGENA           [facility]  Echocardiography Laboratory  [address] [place] [address]        Name: LISA CAMARA  MRN: [id number]  : 1940  Study Date: 2020 07:54 PM  Age: 80 yrs  Gender: Male  Patient Location: [facility]  Reason For Study: Pericardial Effusion  Ordering Physician: FILI MALDONADO  Referring Physician: WM FLORES  Performed By: Aldair Pressley RDCS     BSA: 2.2 m2  Height: 72 in  Weight: 215 lb  HR: 104  BP: 138/111 mmHg  _____________________________________________________________________________  __        Procedure  Limited Portable Echo Adult. (Emergent exam, abbreviated study performed).  Optison (NDC #[id number]) given intravenously.  _____________________________________________________________________________  __        Interpretation Summary     Left ventricular systolic function is mildly reduced.The visual ejection  fraction is estimated at 45%.Basal inferior wall hypokinesia, basal to mid  inferolateral wall hypokinesia  The right ventricular systolic function is normal.  Moderate pericardial effusion with partial organization  Abnromal septal wall motion noted with respiratory variation.Early diastolic  flattening/mild collapse of RV free wall noted (best seen image # 21, frame  10)  Increased respiratory variation noted trans tricuspid inflow velocities.  Dilation of the inferior vena cava is present with abnormal respiratory  variation in diameter.  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     Discussed in detail with Dr Lacey.  _____________________________________________________________________________  __        Left Ventricle  The left ventricle is normal in size. There is normal left ventricular wall  thickness. Left ventricular  systolic function is mildly reduced. The visual  ejection fraction is estimated at 45%. Basal inferior wall hypokinesia, basal  to mid inferolateral wall hypokinesia.     Right Ventricle  The right ventricle is normal size. The right ventricular systolic function is  normal.     Atria  The left atrium is mildly dilated. Right atrial size is normal.     Mitral Valve  There is trace mitral regurgitation.        Tricuspid Valve  There is trace tricuspid regurgitation. Right ventricular systolic pressure  could not be approximated due to inadequate tricuspid regurgitation.     Aortic Valve  No aortic regurgitation is present. No aortic stenosis is present.     Pulmonic Valve  There is mild (1+) pulmonic valvular regurgitation.     Vessels  Mild aortic root dilatation. 3.9 cm. The ascending aorta is Mildly dilated.  3.8 cm. Dilation of the inferior vena cava is present with abnormal  respiratory variation in diameter.     Pericardium  Moderate pericardial effusion. Partial organization  Abnromal septal wall motion noted with respiratory variation.  Early diastolic flattening/mild collapse of RV free wall noted (best seen  image # 21, frame 10  Increased respiratory variation noted trans mitral inflow velocities. Moderate  left pleural effusion.     _____________________________________________________________________________  __  MMode/2D Measurements & Calculations  IVSd: 1.1 cm  LVIDd: 5.2 cm  LVIDs: 4.1 cm  LVPWd: 0.90 cm  FS: 21.0 %  LV mass(C)d: 191.1 grams  LV mass(C)dI: 87.0 grams/m2     Ao root diam: 3.9 cm  LA dimension: 3.9 cm  asc Aorta Diam: 3.8 cm  LA/Ao: 1.0  RWT: 0.35                 _____________________________________________________________________________  __           Report approved by: Oneida Mccann 12/20/2020 09:33 PM

## 2020-12-21 NOTE — PLAN OF CARE
[id number] A&O x 4. Patient denies pain. VSS, on RA, HTN improved with 10mg hydralazine x1. Up with SBA at bedside to use urinal, minimal activity encouraged. Tele: SR. FAUST replaced overnight, recheck in AM. Plan for cardiology consult today - pt NPO since 3 am. Continue to Monitor.

## 2020-12-21 NOTE — CONSULTS
[facility]  Cardiology Consultation     Date of Admission:  12/20/2020  Date of Consult (When I saw the patient): 12/21/20  Reason for Consult: Pericardial effusion    Primary Cardiologist: No previous cardiology care    History of Present Illness   Justyn Olivas is a 80 year old male who presents with past medical history notable for hypertension, hyperlipidemia, history of CVA in 2019 (on aspirin and Plavix), and iron deficiency anemia (hemoglobin baseline is around 10 with recent decline to 7; recently underwent work-up with EGD which was normal).    Kamran presented to the hospital due to worsening shortness of breath with orthopnea, abdominal distention, and peripheral edema.  He denies acute bleeding issues.  He denies previous cardiac history.  He resides with his wife.  He denies recent history of tobacco use or alcohol use.  He denies URI type of symptoms recently.  He has always had shortness of breath but this has been significantly worse 1 week ago.  He was no longer able to lay flat which prompted him to come into the emergency room.    In the ED his oxygen saturation was normal at room air.  Blood pressure was significantly elevated.  Telemetry showed sinus tachycardia.  Initial ECG showed sinus tachycardia with trivial ST elevation in leads III and aVF.  Patient had no chest discomfort.  These findings were reviewed with cardiology team and recommendation was made to order stat echocardiogram and to repeat ECG.  Second ECGs showed slight improvement and ST elevation in leads III and aVF.  Echocardiogram showed reduced LVEF at 45% with basal inferior wall hypokinesis as well as basal and mid inferior wall hypokinesis.  Right ventricular function was normal.  He had moderate pericardial effusion with partial organization.  He did have abnormal septal wall motion with respiratory variation.  He also had early diastolic flattening and mild collapse of RV free wall.  IVC was  dilated.  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  Labs showed anemia with hemoglobin of 7.9 and mild leukocytosis with white count of 13.5.  BMP showed creatinine of 1.7 (baseline 1.3-1.4) with significant hypokalemia at 2.5.  Troponin x3 were essentially negative.    He was admitted for observation and possible consideration for pericardial drainage the following day.  He was felt to be hemodynamically stable.  He was placed on potassium replacement therapy and his aspirin and Plavix were held.    -------------------------------------------------------------------------------------------    Assessment:  Justyn Olivas is a 80 year old male who was admitted on 12/20/2020 with worsening shortness of breath.    Moderate pericardial effusion with early tamponade physiology due to presumed pericarditis  --He appears to be hemodynamically stable (telemetry shows sinus tachycardia but blood pressure is robust)  --N.p.o. today  --Plavix and aspirin held on 12/20    Acute systolic heart failure/systolic cardiomyopathy  --Pleural effusion noted on CT; evidence of hypervolemia on clinical exam today  --Not on diuretics due to significant pericardial effusion    Acute on chronic anemia  --History of iron deficiency anemia with routine IV infusions  --Hemoglobin worse today at 6.9  --Recent EGD normal  --Plan for 1 unit of blood transfusion today    Hypokalemia  --Slightly better at 2.8 today but needs further replacement therapy  --Getting 40 mEq 3 times daily today    Hypertension  --Blood pressure remains elevated    T2DM  -- insulin dependent    HLD    -------------------------------------------------------------------------------------------    Plan:   --Increase potassium supplement to 40 mEq 3 times daily  --Pericardial drainage today; consent form will be signed  --Blood transfusion today  --Continue to hold Plavix and aspirin  --Appears  hypervolemic and therefore will need gentle diuresis  --We will initiate therapy for presumed pericarditis    -------------------------------------------------------------------------------------------    Code Status    Full Code    Past Medical History   I have reviewed this patient's medical history and updated it with pertinent information if needed.   Past Medical History:   Diagnosis Date     Cerebral infarction (H)      Diabetes (H)      Hyperlipemia        Past Surgical History   I have reviewed this patient's surgical history and updated it with pertinent information if needed.  No past surgical history on file.    Prior to Admission Medications   Prior to Admission Medications   Prescriptions Last Dose Informant Patient Reported? Taking?   Glucose Blood (STEVE CONTOUR TEST VI)  Pharmacy Yes No   VENTOLIN  (90 BASE) MCG/ACT Inhaler  at PRN Pharmacy No Yes   Sig: INL 2 PFS PO QID PRN   aspirin 81 MG EC tablet 12/19/2020 at Unknown time Pharmacy Yes Yes   Sig: Take 81 mg by mouth daily   atorvastatin (LIPITOR) 20 MG tablet 12/20/2020 at Unknown time Pharmacy No Yes   Sig: TAKE 1 TABLET(20 MG) BY MOUTH DAILY   blood glucose (STEVE CONTOUR) test strip  Pharmacy No No   Sig: TESTING FOUR TIMES DAILY OR AS DIRECTED   blood glucose (STEVE CONTOUR) test strip  Pharmacy No No   Sig: TESTING FOUR TIMES DAILY OR AS DIRECTED   clopidogrel (PLAVIX) 75 MG tablet 12/19/2020 at Unknown time Pharmacy No Yes   Sig: TAKE 1 TABLET(75 MG) BY MOUTH DAILY   dorzolamide-timolol (COSOPT) 2-0.5 % ophthalmic solution  Pharmacy Yes Yes   Sig: Place 1 drop into both eyes 2 times daily   fluticasone (FLONASE) 50 MCG/ACT nasal spray 12/20/2020 at Unknown time Pharmacy Yes Yes   Sig: [place] 1 spray into both nostrils 2 times daily as needed for rhinitis or allergies   hydrochlorothiazide (HYDRODIURIL) 25 MG tablet  Pharmacy No No   Sig: TAKE 1 TABLET(25 MG) BY MOUTH DAILY   insulin glargine (LANTUS SOLOSTAR) 100 UNIT/ML pen  12/19/2020 at Unknown time Pharmacy No Yes   Sig: INJECT 50 UNITS UNDER THE SKIN AT BEDTIME   insulin lispro (HUMALOG KWIKPEN) 100 UNIT/ML (1 unit dial) KWIKPEN 12/20/2020 at Unknown time Pharmacy No Yes   Sig: INJECT UP TO 55 UNITS UNDER THE SKIN ONCE DAILY AS DIRECTED   insulin pen needle (BD FERCHO U/F) 32G X 4 MM miscellaneous  Pharmacy No No   Sig: USE AS DIRECTED UP TO FOUR TIMES DAILY   irbesartan (AVAPRO) 300 MG tablet 12/19/2020 at Unknown time Pharmacy No Yes   Sig: Take 1 tablet (300 mg) by mouth At Bedtime   metFORMIN (GLUCOPHAGE) 1000 MG tablet 12/20/2020 at Unknown time Pharmacy No Yes   Sig: TAKE 1 TABLET(1000 MG) BY MOUTH TWICE DAILY WITH MEALS   order for DME  Pharmacy No No   Sig: Hip steroid injectoion   pantoprazole (PROTONIX) 40 MG EC tablet  Pharmacy No No   Sig: TAKE 1 TABLET(40 MG) BY MOUTH TWICE DAILY   traZODone (DESYREL) 100 MG tablet 12/19/2020 at Unknown time Pharmacy No Yes   Sig: TAKE 1 TABLET(100 MG) BY MOUTH AT BEDTIME      Facility-Administered Medications: None     Allergies   No Known Allergies    Social History   I have reviewed this patient's social history and updated it with pertinent information if needed. Justyn Olivas  reports that he has quit smoking. He has never used smokeless tobacco. He reports that he does not drink alcohol or use drugs.    Family History   I have reviewed this patient's family history and updated it with pertinent information if needed.   Family History   Problem Relation Age of Onset     Family History Negative Mother      Family History Negative Father        --------------------------------------------------------------------------------------------    Review of Systems   The 10 point Review of Systems is negative other than noted in the HPI or here.     Temp:  [97.9  F (36.6  C)-98.9  F (37.2  C)] 98.9  F (37.2  C)  Pulse:  [] 105  Resp:  [8-32] 22  BP: (120-177)/() 163/81  SpO2:  [93 %-98 %] 97 %  224 lbs 9.6 oz    Constitutional:  Obese body habitus. Comfortable at rest. Cooperative, alert and oriented, well developed, well nourished.  Eyes: Pupils equal and round, conjunctivae and lids unremarkable, sclera white, no xanthalasma,   ENT: Satisfactory dentition.  No cyanosis or pallor.  Neck: No thyromegaly.     Respiratory: Normal respiratory effort with symmetrical chest wall movements and no use of accessory muscles. Good air entry with normal breath sounds and no rales or wheeze.  Cardiovascular: Normal jugular venous pulse and pressure.  Normal carotid pulse character and volume.  No carotid bruit.  Apical impulse is undisplaced and normal to palpation without parasternal heave or thrill.  Heart sounds are normal and regular.  No S3 or S4.  No audible murmur.  GI: Soft, nontender, no hepatosplenomegaly, no masses, active bowel sounds.    Skin: No rash, erythema, ecchymosis.  Musculoskeletal: Normal muscle tone and strength. Normal gait. No spinal deformities.  Neuropsychiatric: Oriented to time place and person.  Affect normal.  No gross motor deficits.  Extremities: No edema, clubbing or deformities.  Vascular:     Data   Reviewed.     Tele: Sinus tachycardia    Esteban Ruiz PA-C   12/21/2020  Pager: [phone]

## 2020-12-21 NOTE — PROVIDER NOTIFICATION
MD Notification    Notified Person: MD    Notified Person Name: Yenni      Notification Date/Time: 12/21/2020    Notification Interaction:  Page     Purpose of Notification: 268 FA...T. Pt's K was 2.5. Given 10 mEq of K x2 in ED. Do you want to recheck now or in morning? Sakshi RN *[phone]    Orders Received: 40 mEq potassium ordered. BMP in morning.     Comments:

## 2020-12-21 NOTE — ED NOTES
[facility]  ED Nurse Handoff Report    ED Chief complaint: Shortness of Breath      ED Diagnosis:   Final diagnoses:   Acute respiratory distress   Pericardial effusion       Code Status: To be addressed by admitting provider     Allergies: No Known Allergies    Patient Story: Pt comes from home for SOB with a cough.     Focused Assessment:  A&Ox4. Reports CP with coughing, and shortness of breath. Some ekg changes with ST elevation, cardiology consulted not considering stemi at this time.  Denies n/v/d. Hypertensive, otherwise VSS. Usually independent with cares and ambulation. Calm, cooperative and resting on cart.    Labs Ordered and Resulted from Time of ED Arrival Up to the Time of Departure from the ED   CBC WITH PLATELETS DIFFERENTIAL - Abnormal; Notable for the following components:       Result Value    WBC 13.5 (*)     RBC Count 3.32 (*)     Hemoglobin 7.9 (*)     Hematocrit 26.4 (*)     MCH 23.8 (*)     MCHC 29.9 (*)     RDW 16.2 (*)     Absolute Neutrophil 11.1 (*)     Absolute Lymphocytes 0.6 (*)     Absolute Monocytes 1.7 (*)     All other components within normal limits   COMPREHENSIVE METABOLIC PANEL - Abnormal; Notable for the following components:    Potassium 2.5 (*)     Glucose 218 (*)     Creatinine 1.74 (*)     GFR Estimate 36 (*)     GFR Estimate If Black 42 (*)     Albumin 2.7 (*)     All other components within normal limits   D DIMER QUANTITATIVE - Abnormal; Notable for the following components:    D Dimer 2.9 (*)     All other components within normal limits   LACTIC ACID WHOLE BLOOD - Abnormal; Notable for the following components:    Lactic Acid 5.4 (*)     All other components within normal limits   BLOOD GAS VENOUS AND OXYHGB - Abnormal; Notable for the following components:    PO2 Venous 18 (*)     Bicarbonate Venous 31 (*)     All other components within normal limits   LACTIC ACID WHOLE BLOOD - Abnormal; Notable for the following components:    Lactic Acid 3.8 (*)      All other components within normal limits   TROPONIN I   TROPONIN I   COVID-19 VIRUS (CORONAVIRUS) BY PCR   PROCALCITONIN   ASSIGN ATTENDING PROVIDER   VITAL SIGNS   PERIPHERAL IV CATHETER   APPLY PNEUMATIC COMPRESSION DEVICE (PCD)   ABO/RH TYPE AND SCREEN   BLOOD CULTURE   BLOOD CULTURE     CT Chest Pulmonary Embolism w Contrast   Final Result   IMPRESSION:   1.  No pulmonary emboli.   2.  New large pericardial effusion.   3.  New small left pleural effusion and mild left basilar compressive   atelectasis.   4.  Stable mild nonspecific mediastinal and hilar lymphadenopathy.   5.  Stable 7 mm right upper lobe nodule. See follow-up guidelines.   6.  Cholelithiasis.      Recommendations for an incidental lung nodule = or > 6mm to 8mm:     Low risk patients: Initial follow-up CT at 6-12 months, then   consider CT at 18-24 months if no change.     High risk patients: Initial follow-up CT at 6-12 months, then CT at   18-24 months if no change.      *Low Risk: Minimal or absent history of smoking or other known risk   factors.   *Nonsolid (ground-glass) or partly solid nodules may require longer   follow-up to exclude indolent adenocarcinoma.   *Recommendations based on Guidelines for the Management of Incidental   Pulmonary Nodules Detected at CT: From the Fleischner Society 2017,   Radiology 2017.      GRACE ALVAREZ MD      Echocardiogram Complete    (Results Pending)     Treatments and/or interventions provided: Stat echo at bedside, ct chest  Medications   iopamidol (ISOVUE-370) solution 68 mL (has no administration in time range)   Saline (has no administration in time range)   lidocaine 1 % 0.1-1 mL (has no administration in time range)   lidocaine (LMX4) cream (has no administration in time range)   sodium chloride (PF) 0.9% PF flush 3 mL (has no administration in time range)   sodium chloride (PF) 0.9% PF flush 3 mL (has no administration in time range)   melatonin tablet 1 mg (has no administration in time  range)   acetaminophen (TYLENOL) tablet 650 mg (has no administration in time range)   acetaminophen (TYLENOL) Suppository 650 mg (has no administration in time range)   ondansetron (ZOFRAN-ODT) ODT tab 4 mg (has no administration in time range)     Or   ondansetron (ZOFRAN) injection 4 mg (has no administration in time range)   0.9% sodium chloride BOLUS (0 mLs Intravenous Stopped 12/20/20 1819)   piperacillin-tazobactam (ZOSYN) 3.375 g vial to attach to  mL bag (0 g Intravenous Stopped 12/20/20 1909)   magnesium sulfate 2 g in water intermittent infusion (0 g Intravenous Stopped 12/20/20 1930)   potassium chloride 10 mEq in 100 mL sterile water intermittent infusion (premix) (0 mEq Intravenous Stopped 12/20/20 2009)   potassium chloride 10 mEq in 100 mL sterile water intermittent infusion (premix) (0 mEq Intravenous Stopped 12/20/20 1909)   vancomycin 2000 mg in 0.9% NaCl 500 ml intermittent infusion 2,000 mg (2,000 mg Intravenous Given 12/20/20 1805)   Saline (99 mLs As instructed Given 12/20/20 1745)   iopamidol (ISOVUE-370) solution 76 mL (76 mLs Intravenous Given 12/20/20 1744)     Patient's response to treatments and/or interventions: resting    To be done/followed up on inpatient unit:  Continue with plan of care per admitting MD.    Does this patient have any cognitive concerns?: None    Activity level - Baseline/Home:  Independent  Activity Level - Current:   Independent    Patient's Preferred language: English      Needed?: No    Isolation: None  Infection: Not Applicable  Patient tested for COVID 19 prior to admission: YES  Bariatric?: No    Vital Signs:   Vitals:    12/20/20 1830 12/20/20 1900 12/20/20 1930 12/20/20 1952   BP:    (!) 174/107   Pulse: 104 103 111 108   Resp: 11 18 29 26   Temp:       TempSrc:       SpO2: 94% 93% 96% 96%   Height:           Cardiac Rhythm:     Was the PSS-3 completed:   Yes  What interventions are required if any?  NA             Family Comments: NA  OBS  brochure/video discussed/provided to patient/family: NA                  For the majority of the shift this patient's behavior was Green.   Behavioral interventions performed were NA.    ED NURSE PHONE NUMBER: *[phone]

## 2020-12-21 NOTE — H&P
[facility]    History and Physical - Hospitalist Service       Date of Admission:  12/20/2020    Assessment & Plan   Justyn Olivas is a 80 year old male with a history of DM type II, CVA, HTN, HLP and iron deficiency anemia who presented to the ED on 12/20/2020 with dyspnea and orthopnea and found to have a large pericardial effusion as the likely cause     Large pericardial effusion   Patient has chronic dyspnea that he states is related to MAIRA but over the past 2 days this has been worsening to the point that he state he is no longer able lay flat.  EKG on arrival showed possible ST elevations in the inferior leads and cardiology was consulted.  They reviewed the EKG and did not feel it was a true STEMI.  Troponin in the ED was 0.019 but d-dimer was significantly elevated concerning for PE.  CT scan did not show any PE though but there was a new large pericardial effusion with small left pleural effusion and mild left basilar compressive atelectasis.    Cardiology is aware of the pericardial effusion and they recommended a stat echocardiogram in the ED and may possible take the patient for pericardial window if there is evidence of tamponade.    - Admission to CCU on telemetry  - Cardiology consulted and appreciate their recommendations    Addendum:   Echo done in the ED showed mildly reduced EF of 45% with basal inferior wall hypokinesis , basal to mid inferolateral wall hypokinesia.  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  At this time cardiology is planning to monitor over night and possible take to the OR/Cath lab tomorrow  - If patient decompensates cardiology should be made aware    Lactic acidosis  Patient triggered the sepsis BPA due to tachycardiac and tachypnea with a WBC of 13.5.  A lactate was drawn and was elevated at 5.4.  He was given IVF and lactate improved to 3.8.  Given this initial elevation though the patient was given IV  Zosyn and Vancomycin in the ED but there has been no evidence of infection.    I suspect this lactate elevation may actually be due to Metformin use with worsening kidney function   - Will add on pro-calcitonin  - Hold off on further antibiotics if pro-calcitonin is normal   - May consider stopping Metformin until seen by PCP       DM type II   [PTA Metformin 1000 mg BID, Lantus 50 U at bedtime].  HgbA1c from 12/1/20 was 7.0   - Holding metformin   - Decreased Lantus to 40 U while NPO   - Moderate intensity SSI     CKD stage IIIb with worsening Cr   Patient's baseline Cr appears to be 1.4-1.5 and was 1.74 on presentation   - Continue to monitor  - Avoid nephrotoxins.  Will hold PTA Irbesartan and hydrochlorothiazide for now     Iron deficiency anemia    Patient began to have issues with dyspnea almost 2 years ago and work up at that time showed iron deficiency anemia that was believed to be the cause.  Since then he has been receiving iron supplements and was actually scheduled for an IV infusion next week.  Hemoglobin in the ED was 7.9 and baseline appears to be 10 though since December 10th has been 7.7 and then 7.4 on December 14th without any evidence of bleeding.   - Repeat CBC in AM     CVA   - Holding PTA ASA and Plavix due to possible intervention of the pericardial effusion.  Resume once okay with cardiology     HTN   HLP   - Holding PTA anti-hypertensives due to concerns of possible tamponade  - Hydralazine PRN for SBP >160   - PTA Lipitor     Pulmonary nodule  Stable 7 mm nodule in right upper lobe.    - Follow up CT scan in 6-12 months  - This was not discussed with the patient        Diet: NPO per Anesthesia Guidelines for Procedure/Surgery Except for: Meds    DVT Prophylaxis: Pneumatic Compression Devices  Glasgow Catheter: not present  Code Status: Full Code           Disposition Plan   Expected discharge: TBD, recommended to prior living arrangement once evaluation complete  Entered: Eagle Robles,  DO 12/20/2020, 7:37 PM     The patient's care was discussed with the Patient and ED provider.    DO CHRISTY [facility]  Contact information available via [facility] Paging/Directory      ______________________________________________________________________    Chief Complaint   Dyspnea     History is obtained from the patient    History of Present Illness   Justyn Olivas is a 80 year old male with a history of DM type II, CVA, HTN, HLP and iron deficiency anemia who presented to the ED on 12/20/2020 with dyspnea and orthopnea.  Patient has chronic dyspnea that he states is related to MAIRA but over the past 2 days this has been worsening to the point that he state he is no longer able lay flat.   He also reported some increased lower extremity edema.  He denies any fevers, chills, cough, sore throat, chest pain, light headedness, palpitations, abdominal pain, N/V/D or difficulties with urination.      Review of Systems    The 10 point Review of Systems is negative other than noted in the HPI    Past Medical History    I have reviewed this patient's medical history and updated it with pertinent information if needed.   Past Medical History:   Diagnosis Date     Cerebral infarction (H)      Diabetes (H)      Hyperlipemia        Past Surgical History   I have reviewed this patient's surgical history and updated it with pertinent information if needed.  Patient denied any pertinent past surgical history     Social History   I have reviewed this patient's social history and updated it with pertinent information if needed.  Social History     Tobacco Use     Smoking status: Former Smoker     Smokeless tobacco: Never Used   Substance Use Topics     Alcohol use: No     Alcohol/week: 0.0 standard drinks     Drug use: No       Family History   I have reviewed this patient's family history and updated it with pertinent information if needed.  Family History   Problem Relation Age of Onset      Family History Negative Mother      Family History Negative Father        Prior to Admission Medications   Prior to Admission Medications   Prescriptions Last Dose Informant Patient Reported? Taking?   ASPIRIN PO   Yes No   Sig: Take 81 mg by mouth daily    Glucose Blood (STEVE CONTOUR TEST VI)   Yes No   LANTUS SOLOSTAR 100 UNIT/ML soln   No No   Sig: ADMINISTER 50 UNITS UNDER THE SKIN AT BEDTIME AS DIRECTED   VENTOLIN  (90 BASE) MCG/ACT Inhaler   No No   Sig: INL 2 PFS PO QID PRN   atorvastatin (LIPITOR) 20 MG tablet   No No   Sig: TAKE 1 TABLET(20 MG) BY MOUTH DAILY   blood glucose (STEVE CONTOUR) test strip   No No   Sig: TESTING FOUR TIMES DAILY OR AS DIRECTED   blood glucose (STEVE CONTOUR) test strip   No No   Sig: TESTING FOUR TIMES DAILY OR AS DIRECTED   clopidogrel (PLAVIX) 75 MG tablet   No No   Sig: TAKE 1 TABLET(75 MG) BY MOUTH DAILY   cyclobenzaprine (FLEXERIL) 10 MG tablet   No No   Si/2 po bid prn & 1po qhs   fluticasone (FLONASE) 50 MCG/ACT nasal spray   No No   Sig: Spray 1 spray into both nostrils 2 times daily as needed for rhinitis or allergies   Patient taking differently: Spray 1 spray into both nostrils 2 times daily as needed for rhinitis or allergies    hydrochlorothiazide (HYDRODIURIL) 25 MG tablet   No No   Sig: TAKE 1 TABLET(25 MG) BY MOUTH DAILY   insulin glargine (LANTUS SOLOSTAR) 100 UNIT/ML pen   No No   Sig: INJECT 50 UNITS UNDER THE SKIN AT BEDTIME   insulin lispro (HUMALOG KWIKPEN) 100 UNIT/ML (1 unit dial) KWIKPEN   No No   Sig: INJECT UP TO 55 UNITS UNDER THE SKIN ONCE DAILY AS DIRECTED   insulin pen needle (BD FERCHO U/F) 32G X 4 MM miscellaneous   No No   Sig: USE AS DIRECTED UP TO FOUR TIMES DAILY   irbesartan (AVAPRO) 150 MG tablet   No No   Sig: TAKE 1 TABLET(150 MG) BY MOUTH AT BEDTIME   irbesartan (AVAPRO) 300 MG tablet   No No   Sig: Take 1 tablet (300 mg) by mouth At Bedtime   metFORMIN (GLUCOPHAGE) 1000 MG tablet   No No   Sig: TAKE 1 TABLET(1000 MG) BY  MOUTH TWICE DAILY WITH MEALS   order for DME   No No   Sig: Hip steroid injectoion   pantoprazole (PROTONIX) 40 MG EC tablet   No No   Sig: TAKE 1 TABLET(40 MG) BY MOUTH TWICE DAILY   traZODone (DESYREL) 100 MG tablet   No No   Sig: TAKE 1 TABLET(100 MG) BY MOUTH AT BEDTIME      Facility-Administered Medications: None     Allergies   No Known Allergies    Physical Exam   Vital Signs: Temp: 97.9  F (36.6  C) Temp src: Oral BP: (!) 138/111 Pulse: 103   Resp: 18 SpO2: 93 % O2 Device: None (Room air)    Weight: 0 lbs 0 oz    General Appearance: Resting comfortably.  NAD   Eyes: EOMI.  Normal conjuctiva  HEENT: NC/AT.  Moist mucous membranes  Respiratory: Clear to auscultation.  No respiratory distress  Cardiovascular: Distant heart sounds.  No obvious murmurs  GI: Bowel sounds present.  Non-tender   Skin: No obvious rashes or cyanosis  Musculoskeletal: 1+ lower extremity edema.  No calf tenderness  Neurologic: Moving all extremities grossly.  CN appear intact  Psychiatric: Alert and pleasant     Data   Data reviewed today: I reviewed all medications, new labs and imaging results over the last 24 hours. I personally reviewed CT scan results as below  EKG:  Sinus tachycardia.  Rate 107 BPM.  ST elevations noted in the inferior leads    Recent Labs   Lab 12/20/20  1824 12/20/20  1614 12/14/20  1119   WBC  --  13.5* 10.2   HGB  --  7.9* 7.4*   MCV  --  80 84   PLT  --  446 433   NA  --  135  --    POTASSIUM  --  2.5*  --    CHLORIDE  --  94  --    CO2  --  31  --    BUN  --  25  --    CR  --  1.74*  --    ANIONGAP  --  10  --    VANESA  --  9.0  --    GLC  --  218*  --    ALBUMIN  --  2.7*  --    PROTTOTAL  --  7.6  --    BILITOTAL  --  0.5  --    ALKPHOS  --  94  --    ALT  --  28  --    AST  --  17  --    TROPI 0.024 0.019  --      Recent Results (from the past 24 hour(s))   CT Chest Pulmonary Embolism w Contrast    Narrative    CT CHEST PULMONARY EMBOLISM W CONTRAST 12/20/2020 5:58 PM    CLINICAL HISTORY: Shortness of  breath  TECHNIQUE: CT angiogram chest during arterial phase injection IV  contrast. 2D and 3D MIP reconstructions were performed by the CT  technologist. Dose reduction techniques were used.     CONTRAST: 76 mL Isovue-370    COMPARISON: 11/26/2020    FINDINGS:  ANGIOGRAM CHEST: Pulmonary arteries are normal caliber and negative  for pulmonary emboli. Thoracic aorta is negative for dissection. No CT  evidence of right heart strain.    LUNGS AND PLEURA: New small left pleural effusion. Mild compressive  atelectasis in the left lung base. No pulmonary infiltrate. Stable 7  mm right upper lobe spiculated nodule (series 4, image 39).    MEDIASTINUM/AXILLAE: Interval development of a large pericardial  effusion. Stable mildly enlarged mediastinal and hilar lymph nodes.  For example there is a 12 mm short axis right infrahilar lymph node  (series 3, image 76). Severe coronary artery calcifications.    UPPER ABDOMEN: Cholelithiasis.    MUSCULOSKELETAL: No concerning bone lesions.      Impression    IMPRESSION:  1.  No pulmonary emboli.  2.  New large pericardial effusion.  3.  New small left pleural effusion and mild left basilar compressive  atelectasis.  4.  Stable mild nonspecific mediastinal and hilar lymphadenopathy.  5.  Stable 7 mm right upper lobe nodule. See follow-up guidelines.  6.  Cholelithiasis.    Recommendations for an incidental lung nodule = or > 6mm to 8mm:    Low risk patients: Initial follow-up CT at 6-12 months, then  consider CT at 18-24 months if no change.    High risk patients: Initial follow-up CT at 6-12 months, then CT at  18-24 months if no change.    *Low Risk: Minimal or absent history of smoking or other known risk  factors.  *Nonsolid (ground-glass) or partly solid nodules may require longer  follow-up to exclude indolent adenocarcinoma.  *Recommendations based on Guidelines for the Management of Incidental  Pulmonary Nodules Detected at CT: From the Fleischner Society 2017,  Radiology  2017.    GRACE ALVAREZ MD

## 2020-12-21 NOTE — PLAN OF CARE
MD Notification    Notified Person: MD    Notified Person Name: Justyn    Notification Date/Time:3:40 PM     Notification Interaction:web-based paging    Purpose of Notification:Pt back from pericardiocentesis, RBC infusing, can he have a diet ordered?    Orders Received: heart healthy diet    Comments:

## 2020-12-21 NOTE — PLAN OF CARE
Assumed cares from [id number]. A&Ox4. 1 unit PRBC given for hgb of 6.9. Pericardiocentesis complete today, pericardial drain in place with serosanguinous drainage. 100 ml drainage in container when pt returned from cath lab,  Additional 100 ml drainage out since then. , 150, 163. Denies any pain, continues to have SOB worse while lying flat, per pt report is slightly improved after pericardiocentesis, 2L NC for comfort. Continues to have nonproductive cough. Voiding frequently. Hypertensive, PRN hydralazine x2. Wife Cecile updated this afternoon. Nursing will continue to monitor closely.     Yanci Andersen RN on 12/21/2020 at 6:34 PM

## 2020-12-21 NOTE — PLAN OF CARE
"MD Notification    Notified Person: MD    Notified Person Name: Justyn    Notification Date/Time: 7:34 AM     Notification Interaction: web-based    Purpose of Notification:Lab critical value hgb 6.9 this AM    Orders Received:    Comments:      \"K+ also 2.8 this  AM,do you want any replacement?\" paged 7:41AM    Potassium replacement ordered & 1U PRBC ordered - awaiting blood consent    "

## 2020-12-21 NOTE — PLAN OF CARE
MD Notification    Notified Person: MD    Notified Person Name: Justyn    Notification Date/Time: 10:16 AM     Notification Interaction: web-based paging    Purpose of Notification: Are you able to consent pt for blood transfusion? Plan for  pericardiocentesis today per cards    Orders Received: provider coming to bedside    Comments:

## 2020-12-21 NOTE — PHARMACY-ADMISSION MEDICATION HISTORY
Pharmacy Medication History  Admission medication history interview status for the 12/20/2020  admission is complete. See EPIC admission navigator for prior to admission medications       Medication history sources: Patient and Pharmacy ([phone])  Location of interview: Phone  Adherence Assessment: Good    Significant changes made to the medication list:  Removed cyclobenzaprine    Added Cosopt         Additional medication history information:   Patient wasn't confident about the list he has for his medications. He reported he is taking eye drop but didn't have it written. Per [facility] the recent filled eye drop is Cosopt. He was also not sure of the Protonix and Hydrochlorothiazide. I left it in the med list because he was filling it from [facility] with the other meds.Howevre, it can't be confirmed he is talking it for sure since he couldn't remember.He reported taking Humalog up to 55U with meals. He stated he took 9U of Humalog today.     Medication reconciliation completed by provider prior to medication history? No    Time spent in this activity: 25 minutes       Prior to Admission medications    Medication Sig Last Dose Taking? Auth Provider   aspirin 81 MG EC tablet Take 81 mg by mouth daily 12/19/2020 at Unknown time Yes Unknown, Entered By History   atorvastatin (LIPITOR) 20 MG tablet TAKE 1 TABLET(20 MG) BY MOUTH DAILY 12/20/2020 at Unknown time Yes Bandar Greenberg MD   clopidogrel (PLAVIX) 75 MG tablet TAKE 1 TABLET(75 MG) BY MOUTH DAILY 12/19/2020 at Unknown time Yes Bandar Greenberg MD   dorzolamide-timolol (COSOPT) 2-0.5 % ophthalmic solution Place 1 drop into both eyes 2 times daily  Yes Unknown, Entered By History   fluticasone (FLONASE) 50 MCG/ACT nasal spray Spray 1 spray into both nostrils 2 times daily as needed for rhinitis or allergies 12/20/2020 at Unknown time Yes Unknown, Entered By History   insulin glargine (LANTUS SOLOSTAR) 100 UNIT/ML pen INJECT 50 UNITS UNDER THE SKIN AT  BEDTIME 12/19/2020 at Unknown time Yes Bandar Greenberg MD   insulin lispro (HUMALOG KWIKPEN) 100 UNIT/ML (1 unit dial) KWIKPEN INJECT UP TO 55 UNITS UNDER THE SKIN ONCE DAILY AS DIRECTED 12/20/2020 at Unknown time Yes Bandar Greenberg MD   irbesartan (AVAPRO) 300 MG tablet Take 1 tablet (300 mg) by mouth At Bedtime 12/19/2020 at Unknown time Yes Bandar Greenberg MD   metFORMIN (GLUCOPHAGE) 1000 MG tablet TAKE 1 TABLET(1000 MG) BY MOUTH TWICE DAILY WITH MEALS 12/20/2020 at Unknown time Yes Bandar Greenberg MD   traZODone (DESYREL) 100 MG tablet TAKE 1 TABLET(100 MG) BY MOUTH AT BEDTIME 12/19/2020 at Unknown time Yes Bandar Greenberg MD   VENTOLIN  (90 BASE) MCG/ACT Inhaler INL 2 PFS PO QID PRN  at PRN Yes Meagan Sawyer MD   blood glucose (STEVE CONTOUR) test strip TESTING FOUR TIMES DAILY OR AS DIRECTED   Bandar Greenberg MD   blood glucose (STEVE CONTOUR) test strip TESTING FOUR TIMES DAILY OR AS DIRECTED   Bandar Greenberg MD   Glucose Blood (STEVE CONTOUR TEST VI)    Reported, Patient   hydrochlorothiazide (HYDRODIURIL) 25 MG tablet TAKE 1 TABLET(25 MG) BY MOUTH DAILY   Bandar Greenberg MD   insulin pen needle (BD FERCHO U/F) 32G X 4 MM miscellaneous USE AS DIRECTED UP TO FOUR TIMES DAILY   Bandar Greenberg MD   order for DME Hip steroid injectoion   Bandar Greenberg MD   pantoprazole (PROTONIX) 40 MG EC tablet TAKE 1 TABLET(40 MG) BY MOUTH TWICE DAILY   Bandar Greenberg MD       The information provided in this note is only as accurate as the sources available at the time of the update(s).   Marivel Said   Student Pharmacist

## 2020-12-22 ENCOUNTER — APPOINTMENT (OUTPATIENT)
Dept: CARDIOLOGY | Facility: CLINIC | Age: 80
DRG: 314 | End: 2020-12-22
Attending: STUDENT IN AN ORGANIZED HEALTH CARE EDUCATION/TRAINING PROGRAM
Payer: MEDICARE

## 2020-12-22 LAB
BUN SERPL-MCNC: 28 MG/DL (ref 7–30)
CALCIUM SERPL-MCNC: 8.6 MG/DL (ref 8.5–10.1)
CHLORIDE SERPL-SCNC: 102 MMOL/L (ref 94–109)
CO2 SERPL-SCNC: 29 MMOL/L (ref 20–32)
CREAT SERPL-MCNC: 1.55 MG/DL (ref 0.66–1.25)
GFR SERPL CREATININE-BSD FRML MDRD: 41 ML/MIN/{1.73_M2}
GLUCOSE BLDC GLUCOMTR-MCNC: 162 MG/DL (ref 70–99)
GLUCOSE BLDC GLUCOMTR-MCNC: 203 MG/DL (ref 70–99)
GLUCOSE BLDC GLUCOMTR-MCNC: 235 MG/DL (ref 70–99)
GLUCOSE BLDC GLUCOMTR-MCNC: 236 MG/DL (ref 70–99)
GLUCOSE BLDC GLUCOMTR-MCNC: 267 MG/DL (ref 70–99)
GLUCOSE BLDC GLUCOMTR-MCNC: 311 MG/DL (ref 70–99)
GLUCOSE SERPL-MCNC: 175 MG/DL (ref 70–99)
GRAM STN SPEC: NORMAL
HGB BLD-MCNC: 8.3 G/DL (ref 13.3–17.7)
LACTATE BLD-SCNC: 1.2 MMOL/L (ref 0.7–2)
POTASSIUM SERPL-SCNC: 3.1 MMOL/L (ref 3.4–5.3)
SODIUM SERPL-SCNC: 137 MMOL/L (ref 133–144)
SPECIMEN SOURCE: NORMAL

## 2020-12-22 PROCEDURE — 36415 COLL VENOUS BLD VENIPUNCTURE: CPT | Performed by: INTERNAL MEDICINE

## 2020-12-22 PROCEDURE — 250N000011 HC RX IP 250 OP 636: Performed by: INTERNAL MEDICINE

## 2020-12-22 PROCEDURE — 250N000013 HC RX MED GY IP 250 OP 250 PS 637: Performed by: INTERNAL MEDICINE

## 2020-12-22 PROCEDURE — 93325 DOPPLER ECHO COLOR FLOW MAPG: CPT

## 2020-12-22 PROCEDURE — 250N000012 HC RX MED GY IP 250 OP 636 PS 637: Performed by: INTERNAL MEDICINE

## 2020-12-22 PROCEDURE — 93321 DOPPLER ECHO F-UP/LMTD STD: CPT | Mod: 26 | Performed by: INTERNAL MEDICINE

## 2020-12-22 PROCEDURE — 999N001017 HC STATISTIC GLUCOSE BY METER IP

## 2020-12-22 PROCEDURE — 85018 HEMOGLOBIN: CPT | Performed by: INTERNAL MEDICINE

## 2020-12-22 PROCEDURE — 99232 SBSQ HOSP IP/OBS MODERATE 35: CPT | Mod: 25 | Performed by: INTERNAL MEDICINE

## 2020-12-22 PROCEDURE — 250N000013 HC RX MED GY IP 250 OP 250 PS 637: Performed by: PHYSICIAN ASSISTANT

## 2020-12-22 PROCEDURE — 93005 ELECTROCARDIOGRAM TRACING: CPT

## 2020-12-22 PROCEDURE — 99233 SBSQ HOSP IP/OBS HIGH 50: CPT | Performed by: INTERNAL MEDICINE

## 2020-12-22 PROCEDURE — 93010 ELECTROCARDIOGRAM REPORT: CPT | Performed by: INTERNAL MEDICINE

## 2020-12-22 PROCEDURE — 93325 DOPPLER ECHO COLOR FLOW MAPG: CPT | Mod: 26 | Performed by: INTERNAL MEDICINE

## 2020-12-22 PROCEDURE — 93308 TTE F-UP OR LMTD: CPT | Mod: 26 | Performed by: INTERNAL MEDICINE

## 2020-12-22 PROCEDURE — 83605 ASSAY OF LACTIC ACID: CPT | Performed by: INTERNAL MEDICINE

## 2020-12-22 PROCEDURE — 80048 BASIC METABOLIC PNL TOTAL CA: CPT | Performed by: INTERNAL MEDICINE

## 2020-12-22 PROCEDURE — 210N000002 HC R&B HEART CARE

## 2020-12-22 RX ORDER — IRBESARTAN 150 MG/1
300 TABLET ORAL EVERY MORNING
Status: DISCONTINUED | OUTPATIENT
Start: 2020-12-22 | End: 2020-12-23 | Stop reason: HOSPADM

## 2020-12-22 RX ORDER — POTASSIUM CHLORIDE 1500 MG/1
40 TABLET, EXTENDED RELEASE ORAL 4 TIMES DAILY
Status: DISCONTINUED | OUTPATIENT
Start: 2020-12-22 | End: 2020-12-23 | Stop reason: HOSPADM

## 2020-12-22 RX ORDER — TRAZODONE HYDROCHLORIDE 50 MG/1
50 TABLET, FILM COATED ORAL AT BEDTIME
Status: DISCONTINUED | OUTPATIENT
Start: 2020-12-22 | End: 2020-12-23 | Stop reason: HOSPADM

## 2020-12-22 RX ORDER — FUROSEMIDE 10 MG/ML
40 INJECTION INTRAMUSCULAR; INTRAVENOUS ONCE
Status: COMPLETED | OUTPATIENT
Start: 2020-12-22 | End: 2020-12-22

## 2020-12-22 RX ORDER — CARVEDILOL 6.25 MG/1
6.25 TABLET ORAL 2 TIMES DAILY WITH MEALS
Status: DISCONTINUED | OUTPATIENT
Start: 2020-12-22 | End: 2020-12-22

## 2020-12-22 RX ORDER — CARVEDILOL 6.25 MG/1
6.25 TABLET ORAL 2 TIMES DAILY WITH MEALS
Status: DISCONTINUED | OUTPATIENT
Start: 2020-12-22 | End: 2020-12-23 | Stop reason: HOSPADM

## 2020-12-22 RX ORDER — DIAZEPAM 5 MG
5 TABLET ORAL EVERY 6 HOURS PRN
Status: DISCONTINUED | OUTPATIENT
Start: 2020-12-22 | End: 2020-12-23 | Stop reason: HOSPADM

## 2020-12-22 RX ADMIN — ATORVASTATIN CALCIUM 20 MG: 20 TABLET, FILM COATED ORAL at 08:55

## 2020-12-22 RX ADMIN — FUROSEMIDE 40 MG: 10 INJECTION, SOLUTION INTRAVENOUS at 13:12

## 2020-12-22 RX ADMIN — IRBESARTAN 300 MG: 150 TABLET ORAL at 14:37

## 2020-12-22 RX ADMIN — ACETAMINOPHEN 650 MG: 325 TABLET, FILM COATED ORAL at 12:50

## 2020-12-22 RX ADMIN — HYDRALAZINE HYDROCHLORIDE 10 MG: 20 INJECTION INTRAMUSCULAR; INTRAVENOUS at 10:25

## 2020-12-22 RX ADMIN — POTASSIUM CHLORIDE 40 MEQ: 1500 TABLET, EXTENDED RELEASE ORAL at 21:53

## 2020-12-22 RX ADMIN — INSULIN ASPART 2 UNITS: 100 INJECTION, SOLUTION INTRAVENOUS; SUBCUTANEOUS at 13:10

## 2020-12-22 RX ADMIN — ACETAMINOPHEN 650 MG: 325 TABLET, FILM COATED ORAL at 19:17

## 2020-12-22 RX ADMIN — INSULIN ASPART 1 UNITS: 100 INJECTION, SOLUTION INTRAVENOUS; SUBCUTANEOUS at 06:29

## 2020-12-22 RX ADMIN — ACETAMINOPHEN 650 MG: 325 TABLET, FILM COATED ORAL at 23:30

## 2020-12-22 RX ADMIN — POTASSIUM CHLORIDE 40 MEQ: 1500 TABLET, EXTENDED RELEASE ORAL at 13:12

## 2020-12-22 RX ADMIN — INSULIN GLARGINE 42 UNITS: 100 INJECTION, SOLUTION SUBCUTANEOUS at 21:54

## 2020-12-22 RX ADMIN — TRAZODONE HYDROCHLORIDE 50 MG: 50 TABLET ORAL at 21:53

## 2020-12-22 RX ADMIN — DIAZEPAM 5 MG: 5 TABLET ORAL at 23:30

## 2020-12-22 RX ADMIN — POTASSIUM CHLORIDE 40 MEQ: 1500 TABLET, EXTENDED RELEASE ORAL at 08:55

## 2020-12-22 RX ADMIN — CARVEDILOL 6.25 MG: 6.25 TABLET, FILM COATED ORAL at 14:37

## 2020-12-22 RX ADMIN — INSULIN ASPART 2 UNITS: 100 INJECTION, SOLUTION INTRAVENOUS; SUBCUTANEOUS at 01:37

## 2020-12-22 RX ADMIN — INSULIN ASPART 4 UNITS: 100 INJECTION, SOLUTION INTRAVENOUS; SUBCUTANEOUS at 21:54

## 2020-12-22 RX ADMIN — CARVEDILOL 6.25 MG: 6.25 TABLET, FILM COATED ORAL at 21:53

## 2020-12-22 RX ADMIN — PANTOPRAZOLE SODIUM 40 MG: 40 TABLET, DELAYED RELEASE ORAL at 06:29

## 2020-12-22 RX ADMIN — ACETAMINOPHEN 650 MG: 325 TABLET, FILM COATED ORAL at 07:42

## 2020-12-22 RX ADMIN — GUAIFENESIN AND DEXTROMETHORPHAN 5 ML: 100; 10 SYRUP ORAL at 19:18

## 2020-12-22 RX ADMIN — ACETAMINOPHEN 650 MG: 325 TABLET, FILM COATED ORAL at 02:45

## 2020-12-22 RX ADMIN — GUAIFENESIN AND DEXTROMETHORPHAN 5 ML: 100; 10 SYRUP ORAL at 10:28

## 2020-12-22 RX ADMIN — GUAIFENESIN AND DEXTROMETHORPHAN 5 ML: 100; 10 SYRUP ORAL at 06:28

## 2020-12-22 RX ADMIN — POTASSIUM CHLORIDE 40 MEQ: 1500 TABLET, EXTENDED RELEASE ORAL at 19:14

## 2020-12-22 RX ADMIN — DIAZEPAM 5 MG: 5 TABLET ORAL at 14:53

## 2020-12-22 NOTE — PLAN OF CARE
Neuro- A/O x4 but can be forgetful   Most Recent Vitals- Temp: 98.9  F (37.2  C) Temp src: Oral BP: (!) 153/90 Pulse: 106   Resp: 28 SpO2: 95 % O2 Device: None (Room air) Oxygen Delivery: 2 LPM  Tele/Cardiac- ST, HR up to 160's intermittently, but did not sustain for longer than 5 mins. MD aware and no new orders or concerns addressed.    Resp- room air but still continues to have SOB and MARKS  Activity- 1 assist, GB, uses cane at home   Pain- C/o pain in chest at incision/pericardial drain site. Tylenol given with some relief.   Drips- IV SL   Skin- pale but warm, +2 edema to BLE, drain site CDI.   GI/- voiding via urinal, dribbling, urgency and hesitancy occurring with voiding.   Aggression Color- Green  Plan- have echo today.   Misc- patient had BM this morning, and cough syrup given x2 during this shift. Discharge pending condition improvement. Will continue to monitor.     KHUSHI SORENSEN RN

## 2020-12-22 NOTE — PROGRESS NOTES
[facility]    Hospitalist Progress Note    Assessment & Plan   Justyn Olivas is a 80 year old male with a history of DM type II, CVA, HTN, HLP and iron deficiency anemia who presented to the ED on 12/20/2020 with dyspnea and orthopnea and found to have a large pericardial effusion as the likely cause      Large pericardial effusion   Patient has chronic dyspnea that he states is related to MAIRA but over the past 2 days this has been worsening to the point that he state he is no longer able lay flat.  EKG on arrival showed possible ST elevations in the inferior leads and cardiology was consulted.  They reviewed the EKG and did not feel it was a true STEMI.  Troponin in the ED was 0.019 but d-dimer was significantly elevated concerning for PE.  CT scan did not show any PE though but there was a new large pericardial effusion with small left pleural effusion and mild left basilar compressive atelectasis.    -Status post pericardiocentesis on 12/21, he had around to 20 mL drained, currently there is around 260 mm in the drain tube.  Repeat echocardiogram tomorrow, management of the pericardial drain as per cardiology.  Hypokalemia  -Severe, replace per protocol.,  Cardiology scheduled 40 mEq potassium 3 times a day.  Lactic acidosis  Patient triggered the sepsis BPA due to tachycardiac and tachypnea with a WBC of 13.5.  A lactate was drawn and was elevated at 5.4.  He was given IVF and lactate improved to 3.8.  Given this initial elevation though the patient was given IV Zosyn and Vancomycin in the ED but there has been no evidence of infection.    I suspect this lactate elevation may actually be due to Metformin use with worsening kidney function   -His procalcitonin is mildly elevated, his white count is trending down, will hold off on any further antibiotics unless a source of infection is identified.  CBC in a.m.      DM type II   [PTA Metformin 1000 mg BID, Lantus 50 U at bedtime].   HgbA1c from 12/1/20 was 7.0   - Holding metformin   - Decreased Lantus to 40 U while NPO   - Moderate intensity SSI      CKD stage IIIb with worsening Cr   Patient's baseline Cr appears to be 1.4-1.5 and was 1.74 on presentation   - Continue to monitor  - Avoid nephrotoxins.    Creatinine is slowly trending down but cardiology initiated irbesartan 300 mg today     Iron deficiency anemia    Patient began to have issues with dyspnea almost 2 years ago and work up at that time showed iron deficiency anemia that was believed to be the cause.  Since then he has been receiving iron supplements and was actually scheduled for an IV infusion next week.  Hemoglobin in the ED was 7.9 and baseline appears to be 10 though since December 10th has been 7.7 and then 7.4 on December 14th without any evidence of bleeding.   - His hemoglobin dropped to 6.9, received 1 unit PRBC on 12/21, currently hemoglobin is 8.3.  CBC in a.m.  CVA   - Holding PTA ASA and Plavix due to possible intervention of the pericardial effusion.  Resume once okay with cardiology      HTN   HLP   -Blood pressures are high today, slowly restarting his PTA medications.  Started on irbesartan and carvedilol 12/22.  - Hydralazine PRN for SBP >160   - PTA Lipitor      Pulmonary nodule  Stable 7 mm nodule in right upper lobe.    - Follow up CT scan in 6-12 months  - This was not discussed with the patient     DVT Prophylaxis:scd   Code Status: Full Code     Disposition: Expected discharge in 2 to 3 days, might need TCU, PT OT to evaluate patient today.    Cesilia Alejandra MD  Text Page   (7am to 6pm)    Interval History   Patient is awake, he is off of oxygen, pericardial drain noted, bloody drainage seen, patient appears to be quite anxious, he was worried about how he would be doing in the future.  Denies any nausea    -Data reviewed today: I reviewed all new labs and imaging results over the last 24 hours.    Physical Exam   Temp: 98.2  F (36.8  C) Temp src: Oral  BP: (!) 144/73 Pulse: 105   Resp: 22 SpO2: 95 % O2 Device: None (Room air) Oxygen Delivery: 2 LPM  Vitals:    12/20/20 2243   Weight: 101.9 kg (224 lb 9.6 oz)     Vital Signs with Ranges  Temp:  [98.2  F (36.8  C)-98.9  F (37.2  C)] 98.2  F (36.8  C)  Pulse:  [] 105  Resp:  [16-42] 22  BP: (104-183)/() 144/73  SpO2:  [94 %-97 %] 95 %  I/O last 3 completed shifts:  In: 959 [P.O.:680]  Out: 1625 [Urine:1325; Drains:300]    Constitutional: Awake, alert, cooperative, no apparent distress  Respiratory: Breath sounds reduced bilateral bases  Cardiovascular: Tachycardic, S1-S2 heard.,  Pericardial drain present  GI: Normal bowel sounds, soft, non-distended, non-tender  Skin/Integumen: No rashes, no cyanosis, edema noted lower extremity  Neuro : moving all 4 extremities, no focal deficit noted     Medications       atorvastatin  20 mg Oral Daily     carvedilol  6.25 mg Oral BID w/meals     insulin aspart  1-6 Units Subcutaneous Q4H     insulin glargine  40 Units Subcutaneous At Bedtime     irbesartan  300 mg Oral QAM     pantoprazole  40 mg Oral QAM AC     potassium chloride  20 mEq Oral Once     potassium chloride  40 mEq Oral 4x Daily     sodium chloride (PF)  3 mL Intracatheter Q8H       Data   Recent Labs   Lab 12/22/20  0722 12/21/20  1553 12/21/20  1034 12/21/20  0614 12/20/20  2050 12/20/20  1824 12/20/20  1614   WBC  --   --   --  11.5*  --   --  13.5*   HGB 8.3*  --   --  6.9*  --   --  7.9*   MCV  --   --   --  78  --   --  80   PLT  --   --   --  386  --   --  446     --   --  134  --   --  135   POTASSIUM 3.1* 3.0* 2.8* 2.8*  --   --  2.5*   CHLORIDE 102  --   --  99  --   --  94   CO2 29  --   --  29  --   --  31   BUN 28  --   --  26  --   --  25   CR 1.55*  --   --  1.62*  --   --  1.74*   ANIONGAP  --   --   --  6  --   --  10   VANESA 8.6  --   --  8.1*  --   --  9.0   *  --   --  178*  --   --  218*   ALBUMIN  --   --   --   --   --   --  2.7*   PROTTOTAL  --   --   --   --   --   --   7.6   BILITOTAL  --   --   --   --   --   --  0.5   ALKPHOS  --   --   --   --   --   --  94   ALT  --   --   --   --   --   --  28   AST  --   --   --   --   --   --  17   TROPI  --   --   --   --  0.020 0.024 0.019     Recent Labs   Lab 20  1227 20  0957 20  0722 20  0625 20  0131 20  2151 20  0614 20  0614 20  1614 20  1614   GLC  --   --  175*  --   --   --   --  178*  --  218*   * 267*  --  162* 235* 277*   < >  --    < >  --     < > = values in this interval not displayed.       Imaging:   Recent Results (from the past 24 hour(s))   Cardiac Catheterization    Narrative    1. Successful placement of pericardial drain in left intercostal space.   220cc serosanguinous fluid drained.    Echocardiogram Limited    Narrative    [id number]  [id number]  [id number]  [id number]^AGUILAR^GAL^[facility]  Echocardiography Laboratory  [address] [place] [address]        Name: LISA CAMARA  MRN: [id number]  : 1940  Study Date: 2020 02:30 PM  Age: 80 yrs  Gender: Male  Patient Location: [facility]  Reason For Study: Pericardial Effusion  Ordering Physician: GAL SHEIKH  Referring Physician: Bandar Greenberg  Performed By: Aldair Pressley RDCS     BSA: 2.2 m2  Height: 72 in  Weight: 224 lb  HR: 88  BP: 140/84 mmHg  _____________________________________________________________________________  __        Procedure  Echo Guided Centesis Adult. Limited Portable Echo Adult.  _____________________________________________________________________________  __        Interpretation Summary     Left ventricular systolic function is normal.  This is echo guided periardial tap. Bubbles not performed for PE space  Moderate circumferential pericardial effusion at start of case. By end of case  trivial pericardial effusion.  Note there is either thin fibrin layer or more likely pericardial fat noted in  pericardial space.  By  report 200cc fluid removed with good result  Limited views were obtained.  _____________________________________________________________________________  __        Left Ventricle  The left ventricle is normal in size. Left ventricular systolic function is  normal.     Right Ventricle  The right ventricular systolic function is normal.     Pericardium  Moderate pericardial effusion.     _____________________________________________________________________________  __                             Report approved by: Oneida Vyas 2020 04:38 PM                    _____________________________________________________________________________  __      Echocardiogram Limited    Narrative    [id number]  [id number]  [id number]  [id number]^CHUCK^[facility]  Echocardiography Laboratory  [address] [place] [address]        Name: LISA CAMARA  MRN: [id number]  : 1940  Study Date: 2020 07:57 AM  Age: 80 yrs  Gender: Male  Patient Location: [facility]  Reason For Study: Pericardial Effusion  Ordering Physician: NAVEEN WOODS  Referring Physician: WM FLORES  Performed By: Aldair Pressley RDCS     BSA: 2.2 m2  Height: 72 in  Weight: 224 lb  HR: 104  BP: 131/71 mmHg  _____________________________________________________________________________  __        Procedure  Limited Portable Echo Adult.  _____________________________________________________________________________  __        Interpretation Summary     1. The left ventricle is normal in size. Left ventricular systolic function is  mildly reduced. The ejection fraction is estimated at 45-50%.  2. There is trivial pericardial effusion. There is a smooth layer of  echolucent material in the pericardial space. Differential diagnosis includes  pericardial fat and less likely clots.  When compared to the post procedure images from yesterday 2020,  no  change.  _____________________________________________________________________________  __        Left Ventricle  The left ventricle is normal in size. Left ventricular systolic function is  mildly reduced. The visual ejection fraction is estimated at 45-50%.     Mitral Valve  The mitral valve leaflets appear thickened, but open well.     Vessels  IVC diameter and respiratory changes fall into an intermediate range  suggesting an RA pressure of 8 mmHg.     Pericardium  Trivial pericardial effusion.     _____________________________________________________________________________  __                             Report approved by: Oneida Rome 12/22/2020 09:36 AM                       _____________________________________________________________________________  __

## 2020-12-22 NOTE — PLAN OF CARE
MD Notification    Notified Person: MD    Notified Person Name: Justyn    Notification Date/Time: 2:22 PM     Notification Interaction: web-based paging    Purpose of Notification: Pt having spasms in abdomen/around pericardial drain site - cardiology made aware & discussing possibly pulling drain but pt is having lots of pain/difficulty catching breath when it happens. Can we do anything in the meantime for anxiety/pain?    Orders Received: PRN valium ordered    Comments:  Esteban already assessed pt & stated she would discuss possibly pulling drain out with Dr. Rosenbaum. Pt is vitally stable except tachycardia & does not desat on RA.

## 2020-12-22 NOTE — PROGRESS NOTES
[facility]  Cardiology Progress Note    Date of Service (when I saw the patient): 12/22/2020  Primary Cardiologist: no previous cardiology care       Interval History:   His SOB is a bit better. He has some tenderness at the pericardial tube insertion site. No other issues. No CP.     ----------------------------------------------------------------------------------------    Assessment:  Justyn Olivas is a 80 year old male who was admitted on 12/20/2020 with worsening shortness of breath.     Moderate pericardial effusion   -- s/p pericardiocentesis 12/21 with 220 ml drained; had additional 260 ml since the procedure; drain tube in place  -- fluid analysis pending  -- echo pending this morning     Acute systolic heart failure/systolic cardiomyopathy  --Pleural effusion noted on CT; evidence of hypervolemia on clinical exam today  --Some improvement with IV lasix 20 mg once 12/21     Acute on chronic anemia  --History of iron deficiency anemia with routine IV infusions  --Hemoglobin worse 12/21 at 6.9; better at 8.3 today  --Recent EGD normal  --Received 1 unit PRBC 12/21  --management per primary service     Hypokalemia  --Slightly better at 2.8 today but needs further replacement therapy  --Getting 40 mEq 3 times daily today     Hypertension  --Blood pressure remains elevated     T2DM  -- insulin dependent     HLD  -- atorvastatin 20 mg     ----------------------------------------------------------------------------------------    Plan:  -- We need to continue with IV lasix   -- Increase potassium to 40 mEq QID   -- Start irbesartan 300 mg daily  -- Start carvedilol 6.25 mg BID   -- Check weight today   -- Low sodium diet and restricted fluid intake   -- Limited echo today   -- Management of anemia per primary team     ----------------------------------------------------------------------------------------  Physical Exam   Temp: 98.3  F (36.8  C) Temp src: Oral BP: (!) 164/89 Pulse:  108   Resp: 23 SpO2: 94 % O2 Device: None (Room air) Oxygen Delivery: 2 LPM  Vitals:    12/20/20 2243   Weight: 101.9 kg (224 lb 9.6 oz)     GEN:  NAD. Oxygen per nasal cannula. Pericardial drain in place.  HEENT: Mucous membranes moist.  NECK:  Elevated JVD.  C/V:  Regular rhythm with borderline tachycardia. No murmur, rub, or gallop.   RESP: Slightly diminished at the bases bilaterally.   GI: Abdomen soft, nontender, nondistended.    EXTREM: 2+ pitting LE edema.   NEURO: Alert and oriented, cooperative.   PSYCH: Normal affect.  SKIN: Warm and dry.   VASC: 2+ radial and dorsalis pedis pulses bilaterally.      Medications       atorvastatin  20 mg Oral Daily     furosemide  40 mg Intravenous Once     insulin aspart  1-6 Units Subcutaneous Q4H     insulin glargine  40 Units Subcutaneous At Bedtime     irbesartan  300 mg Oral QAM     pantoprazole  40 mg Oral QAM AC     potassium chloride  20 mEq Oral Once     potassium chloride  40 mEq Oral TID     sodium chloride (PF)  3 mL Intracatheter Q8H       Data   Reviewed.     Tele: sinus tachycardia      Esteban Ruiz PA-C   12/22/2020  Pager: [phone]

## 2020-12-22 NOTE — PLAN OF CARE
Assumed cares from [id number]. Lethargic, oriented x4. Pt having painful spasms in upper abdomen with pain to pericardiocentesis site & SOB. PRN valium ordered. Pericardial drain removed at 1600 per cardiology with some improvement in symptoms. Drain removal site CDI. Restarted home irbesartan & started carvedilol which is new med to pt. 40 mg IV lasix given. Tele SR 90s with PVCs. K+ 3.1 this AM, 40 meq QID per cardiology, recheck in AM. , 236, 203. Plan for PT/OT tomorrow. Nursing will continue to monitor closely.     Yanci Andersen RN on 12/22/2020 at 8:07 PM

## 2020-12-22 NOTE — PROVIDER NOTIFICATION
MD Notification    Notified Person: MD    Notified Person Name: Dr. Arevalo     Notification Date/Time: 2004    Notification Interaction: [email or web address] WEB    Purpose of Notification: Pt room 268 T.F. can patient have cough suppressant for mild cough?      Orders Received: guaifenesin ordered.     Comments:

## 2020-12-22 NOTE — PROVIDER NOTIFICATION
MD Notification    Notified Person: MD    Notified Person Name: Yenni    Notification Date/Time: 12/22/20 0255    Notification Interaction: [employer]con text page    Purpose of Notification: Pt intermittently up into 150s HR, BP stable, denies CP, no prns available, doing EKG now, please advise    Orders Received: no new orders received, advised to continue to monitor.America Jenkins RN     Comments:

## 2020-12-23 ENCOUNTER — APPOINTMENT (OUTPATIENT)
Dept: OCCUPATIONAL THERAPY | Facility: CLINIC | Age: 80
DRG: 314 | End: 2020-12-23
Attending: INTERNAL MEDICINE
Payer: MEDICARE

## 2020-12-23 ENCOUNTER — APPOINTMENT (OUTPATIENT)
Dept: PHYSICAL THERAPY | Facility: CLINIC | Age: 80
DRG: 314 | End: 2020-12-23
Attending: INTERNAL MEDICINE
Payer: MEDICARE

## 2020-12-23 VITALS
RESPIRATION RATE: 27 BRPM | BODY MASS INDEX: 29.93 KG/M2 | TEMPERATURE: 97.8 F | OXYGEN SATURATION: 97 % | HEART RATE: 89 BPM | SYSTOLIC BLOOD PRESSURE: 130 MMHG | DIASTOLIC BLOOD PRESSURE: 76 MMHG | HEIGHT: 72 IN | WEIGHT: 221 LBS

## 2020-12-23 LAB
ANION GAP SERPL CALCULATED.3IONS-SCNC: 5 MMOL/L (ref 3–14)
BUN SERPL-MCNC: 27 MG/DL (ref 7–30)
CALCIUM SERPL-MCNC: 8.4 MG/DL (ref 8.5–10.1)
CHLORIDE SERPL-SCNC: 103 MMOL/L (ref 94–109)
CO2 SERPL-SCNC: 29 MMOL/L (ref 20–32)
COPATH REPORT: NORMAL
CREAT SERPL-MCNC: 1.54 MG/DL (ref 0.66–1.25)
ERYTHROCYTE [DISTWIDTH] IN BLOOD BY AUTOMATED COUNT: 17 % (ref 10–15)
GFR SERPL CREATININE-BSD FRML MDRD: 42 ML/MIN/{1.73_M2}
GLUCOSE BLDC GLUCOMTR-MCNC: 102 MG/DL (ref 70–99)
GLUCOSE BLDC GLUCOMTR-MCNC: 131 MG/DL (ref 70–99)
GLUCOSE BLDC GLUCOMTR-MCNC: 133 MG/DL (ref 70–99)
GLUCOSE SERPL-MCNC: 106 MG/DL (ref 70–99)
HCT VFR BLD AUTO: 27.1 % (ref 40–53)
HGB BLD-MCNC: 8.1 G/DL (ref 13.3–17.7)
MCH RBC QN AUTO: 23.2 PG (ref 26.5–33)
MCHC RBC AUTO-ENTMCNC: 29.9 G/DL (ref 31.5–36.5)
MCV RBC AUTO: 78 FL (ref 78–100)
PLATELET # BLD AUTO: 396 10E9/L (ref 150–450)
POTASSIUM SERPL-SCNC: 3.7 MMOL/L (ref 3.4–5.3)
RBC # BLD AUTO: 3.49 10E12/L (ref 4.4–5.9)
RESULT: NORMAL
SEND OUTS MISC TEST CODE: NORMAL
SEND OUTS MISC TEST SPECIMEN: NORMAL
SODIUM SERPL-SCNC: 137 MMOL/L (ref 133–144)
TEST NAME: NORMAL
WBC # BLD AUTO: 9 10E9/L (ref 4–11)

## 2020-12-23 PROCEDURE — 999N001017 HC STATISTIC GLUCOSE BY METER IP

## 2020-12-23 PROCEDURE — 250N000013 HC RX MED GY IP 250 OP 250 PS 637: Performed by: INTERNAL MEDICINE

## 2020-12-23 PROCEDURE — 80048 BASIC METABOLIC PNL TOTAL CA: CPT | Performed by: INTERNAL MEDICINE

## 2020-12-23 PROCEDURE — 36415 COLL VENOUS BLD VENIPUNCTURE: CPT | Performed by: INTERNAL MEDICINE

## 2020-12-23 PROCEDURE — 97161 PT EVAL LOW COMPLEX 20 MIN: CPT | Mod: GP

## 2020-12-23 PROCEDURE — 85027 COMPLETE CBC AUTOMATED: CPT | Performed by: INTERNAL MEDICINE

## 2020-12-23 PROCEDURE — 99239 HOSP IP/OBS DSCHRG MGMT >30: CPT | Performed by: INTERNAL MEDICINE

## 2020-12-23 PROCEDURE — 97110 THERAPEUTIC EXERCISES: CPT | Mod: GP

## 2020-12-23 PROCEDURE — 250N000013 HC RX MED GY IP 250 OP 250 PS 637: Performed by: PHYSICIAN ASSISTANT

## 2020-12-23 PROCEDURE — 97165 OT EVAL LOW COMPLEX 30 MIN: CPT | Mod: GO

## 2020-12-23 PROCEDURE — 97535 SELF CARE MNGMENT TRAINING: CPT | Mod: GO

## 2020-12-23 RX ORDER — TORSEMIDE 10 MG/1
10 TABLET ORAL DAILY
Qty: 30 TABLET | Refills: 0 | Status: SHIPPED | OUTPATIENT
Start: 2020-12-23 | End: 2021-01-14

## 2020-12-23 RX ORDER — TORSEMIDE 10 MG/1
10 TABLET ORAL DAILY
Status: DISCONTINUED | OUTPATIENT
Start: 2020-12-23 | End: 2020-12-23 | Stop reason: HOSPADM

## 2020-12-23 RX ORDER — POTASSIUM CHLORIDE 1500 MG/1
40 TABLET, EXTENDED RELEASE ORAL 2 TIMES DAILY
Qty: 60 TABLET | Refills: 0 | Status: SHIPPED | OUTPATIENT
Start: 2020-12-23 | End: 2021-01-14

## 2020-12-23 RX ORDER — CARVEDILOL 6.25 MG/1
6.25 TABLET ORAL 2 TIMES DAILY WITH MEALS
Qty: 60 TABLET | Refills: 1 | Status: SHIPPED | OUTPATIENT
Start: 2020-12-23 | End: 2021-01-14

## 2020-12-23 RX ADMIN — PANTOPRAZOLE SODIUM 40 MG: 40 TABLET, DELAYED RELEASE ORAL at 08:10

## 2020-12-23 RX ADMIN — CARVEDILOL 6.25 MG: 6.25 TABLET, FILM COATED ORAL at 08:10

## 2020-12-23 RX ADMIN — IRBESARTAN 300 MG: 150 TABLET ORAL at 08:10

## 2020-12-23 RX ADMIN — POTASSIUM CHLORIDE 40 MEQ: 1500 TABLET, EXTENDED RELEASE ORAL at 08:09

## 2020-12-23 RX ADMIN — ATORVASTATIN CALCIUM 20 MG: 20 TABLET, FILM COATED ORAL at 08:10

## 2020-12-23 RX ADMIN — GUAIFENESIN AND DEXTROMETHORPHAN 5 ML: 100; 10 SYRUP ORAL at 11:48

## 2020-12-23 RX ADMIN — POTASSIUM CHLORIDE 40 MEQ: 1500 TABLET, EXTENDED RELEASE ORAL at 12:58

## 2020-12-23 RX ADMIN — TORSEMIDE 10 MG: 10 TABLET ORAL at 11:43

## 2020-12-23 ASSESSMENT — ACTIVITIES OF DAILY LIVING (ADL): PREVIOUS_RESPONSIBILITIES: DRIVING;FINANCES;MEDICATION MANAGEMENT

## 2020-12-23 ASSESSMENT — MIFFLIN-ST. JEOR: SCORE: 1745.45

## 2020-12-23 NOTE — DISCHARGE SUMMARY
[facility]    Discharge Summary  Hospitalist    Date of Admission:  12/20/2020  Date of Discharge:  12/23/2020  Discharging Provider: Cesilia Alejandra MD    Discharge Diagnoses     Pericardial effusion  Acute hypoxic respiratory distress secondary to above-resolved    History of Present Illness   Please review admission history and physical.    Hospital Course   Justyn Olivas was admitted on 12/20/2020.  The following problems were addressed during his hospitalization:    Active Problems:    Acute respiratory distress    Pericardial effusion  Justyn Olivas is a 80 year old male with a history of DM type II, CVA, HTN, HLP and iron deficiency anemia who presented to the ED on 12/20/2020 with dyspnea and orthopnea and found to have a large pericardial effusion as the likely cause      Large pericardial effusion   Patient has chronic dyspnea that he states is related to MAIRA but over the past 2 days this has been worsening to the point that he state he is no longer able lay flat.  EKG on arrival showed possible ST elevations in the inferior leads and cardiology was consulted.  They reviewed the EKG and did not feel it was a true STEMI.  Troponin in the ED was 0.019 but d-dimer was significantly elevated concerning for PE.  CT scan did not show any PE though but there was a new large pericardial effusion with small left pleural effusion and mild left basilar compressive atelectasis.  Patient underwent pericardiocentesis on 12/21, he had around to 200 mL drained, the drain was left and he had around 260 mL in the drain tube the next day, following repeat echocardiogram, drain was removed.  Cardiology recommended outpatient follow-up echocardiogram and follow-up with cardiology.  Patient was noted to have hypokalemia mostly related with the diuresis, he was started on high dose of potassium replacement, it was adjusted prior to discharge, he will need outpatient BMP to further adjust  this.    Lactic acidosis  Patient triggered the sepsis BPA due to tachycardiac and tachypnea with a WBC of 13.5.  A lactate was drawn and was elevated at 5.4.  He was given IVF and lactate improved to 3.8.  Given this initial elevation though the patient was given IV Zosyn and Vancomycin in the ED but there has been no evidence of infection.  His lactate elevation was suspected to be due to his respiratory distress, no antibiotics were continued during his stay here.  .      DM type II   [PTA Metformin 1000 mg BID, Lantus 50 U at bedtime].  HgbA1c from 12/1/20 was 7.0   His Metformin was held during his stay here, restarted on discharge, Lantus dose was also adjusted according to blood sugars here.       CKD stage IIIb with worsening Cr   Patient's baseline Cr appears to be 1.4-1.5 and was 1.74 on presentation   His creatinine had trended down to baseline, he was started on his PTA medications while here.       Iron deficiency anemia    Patient began to have issues with dyspnea almost 2 years ago and work up at that time showed iron deficiency anemia that was believed to be the cause.  Since then he has been receiving iron supplements and was actually scheduled for an IV infusion next week.  Hemoglobin in the ED was 7.9 and baseline appears to be 10 though since December 10th has been 7.7 and then 7.4 on December 14th without any evidence of bleeding.  His hemoglobin had dropped to 6.9 while here, received 1 unit PRBC on 12/21, upon discharge his hemoglobin is stable at 8.3.  Patient had work-up regarding this and he is going to follow-up outpatient with gastroenterology.    CVA   His ASA and Plavix was on hold prior to pericardiocentesis, it was restarted prior to discharge.     HTN   HLP   Continue his PTA medications, dose adjusted, Lipitor also restarted.     Pulmonary nodule  Stable 7 mm nodule in right upper lobe.    - Follow up CT scan in 6-12 months, outpatient CT scan orders placed    Plan of discharge was  discussed in detail with the patient/spouse on the phone prior to discharge.    Cesilia Alejandra MD    Significant Results and Procedures       Pending Results   These results will be followed up by primary care  Unresulted Labs Ordered in the Past 30 Days of this Admission     Date and Time Order Name Status Description    12/21/2020 1450 [facility]UP Miscellaneous Test In process     12/21/2020 1402 Fluid Culture Aerobic Bacterial Preliminary     12/21/2020 1402 Adenosine Deaminase Fluid: Laboratory Miscellaneous Order In process     12/20/2020 1657 Blood culture Preliminary     12/20/2020 1657 Blood culture Preliminary           Code Status   Full Code       Primary Care Physician   Bandar Greenberg    Physical Exam   Temp: 97.8  F (36.6  C) Temp src: Oral BP: (!) 147/85 Pulse: 90   Resp: 25 SpO2: 97 % O2 Device: None (Room air)    Vitals:    12/20/20 2243 12/23/20 0600   Weight: 101.9 kg (224 lb 9.6 oz) 100.2 kg (221 lb)     Vital Signs with Ranges  Temp:  [97.8  F (36.6  C)-98.4  F (36.9  C)] 97.8  F (36.6  C)  Pulse:  [] 90  Resp:  [18-42] 25  BP: (130-147)/() 147/85  SpO2:  [92 %-98 %] 97 %  I/O last 3 completed shifts:  In: 1355 [P.O.:1355]  Out: 1780 [Urine:1730; Drains:50]    The patient was examined on the day of discharge.    Discharge Disposition   Discharged to home with home health care  Condition at discharge: Stable    Consultations This Hospital Stay   PHARMACY TO DOSE VANCO  CARDIOLOGY IP CONSULT  PHYSICAL THERAPY ADULT IP CONSULT  OCCUPATIONAL THERAPY ADULT IP CONSULT  GASTROENTEROLOGY IP CONSULT    Time Spent on this Encounter   I, Cesilia Alejandra MD, personally saw the patient today and spent greater than 30 minutes discharging this patient.    Discharge Orders      Home care nursing referral      Home Care PT Referral for Hospital Discharge      Reason for your hospital stay    Pericardial effusion and shortness of breath secondary to that, anemia     Follow-up and recommended labs and tests      Follow up with primary care provider, Bandar Greenberg, within 7 days to evaluate medication change and for hospital follow- up.  The following labs/tests are recommended: CBC and BMP to be done as early as 12/28.  Follow-up with Dr. murphy next week.  Follow-up with cardiology as recommended     Activity    Your activity upon discharge: activity as tolerated     MD face to face encounter    Documentation of Face to Face and Certification for Home Health Services    I certify that patient: Justyn Olivas is under my care and that I, or a nurse practitioner or physician's assistant working with me, had a face-to-face encounter that meets the physician face-to-face encounter requirements with this patient on: 12/23/2020.    This encounter with the patient was in whole, or in part, for the following medical condition, which is the primary reason for home health care: .    I certify that, based on my findings, the following services are medically necessary home health services: Nursing and Physical Therapy.    My clinical findings support the need for the above services because: Nurse is needed: To assess for vital  signs after changes in medications or other medical regimen.. and Physical Therapy Services are needed to assess and treat the following functional impairments: Deconditioning.    Further, I certify that my clinical findings support that this patient is homebound (i.e. absences from home require considerable and taxing effort and are for medical reasons or Muslim services or infrequently or of short duration when for other reasons) because: Requires assistance of another person or specialized equipment to access medical services because patient: Is unable to exit home safely on own due to: Increased debility...    Based on the above findings. I certify that this patient is confined to the home and needs intermittent skilled nursing care, physical therapy and/or speech therapy.  The patient is under  my care, and I have initiated the establishment of the plan of care.  This patient will be followed by a physician who will periodically review the plan of care.  Physician/Provider to provide follow up care: Bandar Greenberg    Attending hospital physician (the Medicare certified [place] provider): Cesilia Alejandra MD  Physician Signature: See electronic signature associated with these discharge orders.  Date: 12/23/2020     Full Code     Diet    Follow this diet upon discharge: Orders Placed This Encounter      Low Saturated Fat Na <2400 mg     Discharge Medications   Current Discharge Medication List      START taking these medications    Details   carvedilol (COREG) 6.25 MG tablet Take 1 tablet (6.25 mg) by mouth 2 times daily (with meals)  Qty: 60 tablet, Refills: 1    Associated Diagnoses: Pericardial effusion      potassium chloride ER (KLOR-CON M) 20 MEQ CR tablet Take 2 tablets (40 mEq) by mouth 2 times daily  Qty: 60 tablet, Refills: 0    Associated Diagnoses: Pericardial effusion      torsemide (DEMADEX) 10 MG tablet Take 1 tablet (10 mg) by mouth daily  Qty: 30 tablet, Refills: 0    Associated Diagnoses: Pericardial effusion         CONTINUE these medications which have NOT CHANGED    Details   aspirin 81 MG EC tablet Take 81 mg by mouth daily      atorvastatin (LIPITOR) 20 MG tablet TAKE 1 TABLET(20 MG) BY MOUTH DAILY  Qty: 90 tablet, Refills: 2    Associated Diagnoses: Hyperlipidemia LDL goal <100      clopidogrel (PLAVIX) 75 MG tablet TAKE 1 TABLET(75 MG) BY MOUTH DAILY  Qty: 90 tablet, Refills: 3    Associated Diagnoses: Cerebrovascular accident (CVA) due to embolism of cerebral artery (H)      dorzolamide-timolol (COSOPT) 2-0.5 % ophthalmic solution Place 1 drop into both eyes 2 times daily      fluticasone (FLONASE) 50 MCG/ACT nasal spray Spray 1 spray into both nostrils 2 times daily as needed for rhinitis or allergies      insulin glargine (LANTUS SOLOSTAR) 100 UNIT/ML pen INJECT 50 UNITS UNDER THE  SKIN AT BEDTIME  Qty: 45 mL, Refills: 3    Comments: If Lantus is not covered by insurance, may substitute Basaglar at same dose and frequency.    Associated Diagnoses: Type 2 diabetes mellitus without complication, with long-term current use of insulin (H)      insulin lispro (HUMALOG KWIKPEN) 100 UNIT/ML (1 unit dial) KWIKPEN INJECT UP TO 55 UNITS UNDER THE SKIN ONCE DAILY AS DIRECTED  Qty: 45 mL, Refills: 1    Associated Diagnoses: Type 2 diabetes mellitus without complication, with long-term current use of insulin (H)      irbesartan (AVAPRO) 300 MG tablet Take 1 tablet (300 mg) by mouth At Bedtime  Qty: 90 tablet, Refills: 3    Associated Diagnoses: Benign essential hypertension      metFORMIN (GLUCOPHAGE) 1000 MG tablet TAKE 1 TABLET(1000 MG) BY MOUTH TWICE DAILY WITH MEALS  Qty: 180 tablet, Refills: 3    Associated Diagnoses: Type 2 diabetes mellitus without complication, with long-term current use of insulin (H)      traZODone (DESYREL) 100 MG tablet TAKE 1 TABLET(100 MG) BY MOUTH AT BEDTIME  Qty: 90 tablet, Refills: 0    Associated Diagnoses: Insomnia, unspecified type      VENTOLIN  (90 BASE) MCG/ACT Inhaler INL 2 PFS PO QID PRN  Qty: 1 Inhaler, Refills: 3    Associated Diagnoses: Acute bronchitis, unspecified organism      !! blood glucose (STEVE CONTOUR) test strip TESTING FOUR TIMES DAILY OR AS DIRECTED  Qty: 400 strip, Refills: 1    Associated Diagnoses: Type 2 diabetes mellitus without complication, with long-term current use of insulin (H)      !! blood glucose (STEVE CONTOUR) test strip TESTING FOUR TIMES DAILY OR AS DIRECTED  Qty: 400 strip, Refills: 0    Associated Diagnoses: Type 2 diabetes mellitus without complication, with long-term current use of insulin (H)      !! Glucose Blood (STEVE CONTOUR TEST VI)       insulin pen needle (BD FERCHO U/F) 32G X 4 MM miscellaneous USE AS DIRECTED UP TO FOUR TIMES DAILY  Qty: 400 each, Refills: 3    Associated Diagnoses: Type 2 diabetes mellitus  without complication, with long-term current use of insulin (H)      order for DME Hip steroid injectoion  Qty: 1 Units, Refills: 0    Associated Diagnoses: Primary osteoarthritis of hip, unspecified laterality      pantoprazole (PROTONIX) 40 MG EC tablet TAKE 1 TABLET(40 MG) BY MOUTH TWICE DAILY  Qty: 180 tablet, Refills: 0    Comments: **Patient requests 90 days supply**  Associated Diagnoses: Acute superficial gastritis with hemorrhage       !! - Potential duplicate medications found. Please discuss with provider.      STOP taking these medications       hydrochlorothiazide (HYDRODIURIL) 25 MG tablet Comments:   Reason for Stopping:             Allergies   No Known Allergies  Data   Most Recent 3 CBC's:  Recent Labs   Lab Test 12/23/20  0532 12/22/20  0722 12/21/20  0614 12/20/20  1614   WBC 9.0  --  11.5* 13.5*   HGB 8.1* 8.3* 6.9* 7.9*   MCV 78  --  78 80     --  386 446      Most Recent 3 BMP's:  Recent Labs   Lab Test 12/23/20  0532 12/22/20  0722 12/21/20  1553 12/21/20  0614 12/21/20  0614 12/20/20  1614    137  --   --  134 135   POTASSIUM 3.7 3.1* 3.0*   < > 2.8* 2.5*   CHLORIDE 103 102  --   --  99 94   CO2 29 29  --   --  29 31   BUN 27 28  --   --  26 25   CR 1.54* 1.55*  --   --  1.62* 1.74*   ANIONGAP 5  --   --   --  6 10   VANESA 8.4* 8.6  --   --  8.1* 9.0   * 175*  --   --  178* 218*    < > = values in this interval not displayed.     Most Recent 2 LFT's:  Recent Labs   Lab Test 12/20/20  1614 12/01/20  1224   AST 17 13   ALT 28 19   ALKPHOS 94 84   BILITOTAL 0.5 0.4     Most Recent INR's and Anticoagulation Dosing History:  Anticoagulation Dose History     Recent Dosing and Labs Latest Ref Rng & Units 9/4/2020    INR 0.86 - 1.14 0.99        Most Recent 3 Troponin's:  Recent Labs   Lab Test 12/20/20 2050 12/20/20  1824 12/20/20  1614   TROPI 0.020 0.024 0.019     Most Recent Cholesterol Panel:  Recent Labs   Lab Test 02/10/20  1215   CHOL 141   LDL 79   HDL 48   TRIG 71     Most  Recent 6 Bacteria Isolates From Any Culture (See EPIC Reports for Culture Details):  Recent Labs   Lab Test 12/21/20  1450 12/20/20  1802 12/20/20  1615   CULT Culture negative monitoring continues No growth after 3 days No growth after 3 days     Most Recent TSH, T4 and A1c Labs:  Recent Labs   Lab Test 12/01/20  1224 11/26/20  1317   TSH  --  0.76   A1C 7.0*  --      Results for orders placed or performed during the hospital encounter of 12/20/20   CT Chest Pulmonary Embolism w Contrast    Narrative    CT CHEST PULMONARY EMBOLISM W CONTRAST 12/20/2020 5:58 PM    CLINICAL HISTORY: Shortness of breath  TECHNIQUE: CT angiogram chest during arterial phase injection IV  contrast. 2D and 3D MIP reconstructions were performed by the CT  technologist. Dose reduction techniques were used.     CONTRAST: 76 mL [id number]-[id number]    COMPARISON: 11/26/2020    FINDINGS:  ANGIOGRAM CHEST: Pulmonary arteries are normal caliber and negative  for pulmonary emboli. Thoracic aorta is negative for dissection. No CT  evidence of right heart strain.    LUNGS AND PLEURA: New small left pleural effusion. Mild compressive  atelectasis in the left lung base. No pulmonary infiltrate. Stable 7  mm right upper lobe spiculated nodule (series 4, image 39).    MEDIASTINUM/AXILLAE: Interval development of a large pericardial  effusion. Stable mildly enlarged mediastinal and hilar lymph nodes.  For example there is a 12 mm short axis right infrahilar lymph node  (series 3, image 76). Severe coronary artery calcifications.    UPPER ABDOMEN: Cholelithiasis.    MUSCULOSKELETAL: No concerning bone lesions.      Impression    IMPRESSION:  1.  No pulmonary emboli.  2.  New large pericardial effusion.  3.  New small left pleural effusion and mild left basilar compressive  atelectasis.  4.  Stable mild nonspecific mediastinal and hilar lymphadenopathy.  5.  Stable 7 mm right upper lobe nodule. See follow-up guidelines.  6.  Cholelithiasis.    Recommendations for  an incidental lung nodule = or > 6mm to 8mm:    Low risk patients: Initial follow-up CT at 6-12 months, then  consider CT at 18-24 months if no change.    High risk patients: Initial follow-up CT at 6-12 months, then CT at  18-24 months if no change.    *Low Risk: Minimal or absent history of smoking or other known risk  factors.  *Nonsolid (ground-glass) or partly solid nodules may require longer  follow-up to exclude indolent adenocarcinoma.  *Recommendations based on Guidelines for the Management of Incidental  Pulmonary Nodules Detected at CT: From the Fleischner Society 2017,  Radiology 2017.    GRACE ALVAREZ MD   Echocardiogram Limited    Narrative    [id number]  [id number]  [id number]  [id number]^JOEL^FILI^AZALIA CARTAGENA           [facility]  Echocardiography Laboratory  [address] [place] [address]        Name: LISA CAMARA  MRN: [id number]  : 1940  Study Date: 2020 07:54 PM  Age: 80 yrs  Gender: Male  Patient Location: [facility]  Reason For Study: Pericardial Effusion  Ordering Physician: FILI MALDONADO  Referring Physician: WM FLORES  Performed By: Aldair Pressley RDCS     BSA: 2.2 m2  Height: 72 in  Weight: 215 lb  HR: 104  BP: 138/111 mmHg  _____________________________________________________________________________  __        Procedure  Limited Portable Echo Adult. (Emergent exam, abbreviated study performed).  Optison (NDC #2857-3143) given intravenously.  _____________________________________________________________________________  __        Interpretation Summary     Left ventricular systolic function is mildly reduced.The visual ejection  fraction is estimated at 45%.Basal inferior wall hypokinesia, basal to mid  inferolateral wall hypokinesia  The right ventricular systolic function is normal.  Moderate pericardial effusion with partial organization  Abnromal septal wall motion noted with respiratory variation.Early diastolic  flattening/mild  collapse of RV free wall noted (best seen image # 21, frame  10)  Increased respiratory variation noted trans tricuspid inflow velocities.  Dilation of the inferior vena cava is present with abnormal respiratory  variation in diameter.  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     Discussed in detail with Dr Lacey.  _____________________________________________________________________________  __        Left Ventricle  The left ventricle is normal in size. There is normal left ventricular wall  thickness. Left ventricular systolic function is mildly reduced. The visual  ejection fraction is estimated at 45%. Basal inferior wall hypokinesia, basal  to mid inferolateral wall hypokinesia.     Right Ventricle  The right ventricle is normal size. The right ventricular systolic function is  normal.     Atria  The left atrium is mildly dilated. Right atrial size is normal.     Mitral Valve  There is trace mitral regurgitation.        Tricuspid Valve  There is trace tricuspid regurgitation. Right ventricular systolic pressure  could not be approximated due to inadequate tricuspid regurgitation.     Aortic Valve  No aortic regurgitation is present. No aortic stenosis is present.     Pulmonic Valve  There is mild (1+) pulmonic valvular regurgitation.     Vessels  Mild aortic root dilatation. 3.9 cm. The ascending aorta is Mildly dilated.  3.8 cm. Dilation of the inferior vena cava is present with abnormal  respiratory variation in diameter.     Pericardium  Moderate pericardial effusion. Partial organization  Abnromal septal wall motion noted with respiratory variation.  Early diastolic flattening/mild collapse of RV free wall noted (best seen  image # 21, frame 10  Increased respiratory variation noted trans mitral inflow velocities. Moderate  left pleural effusion.      _____________________________________________________________________________  __  MMode/2D Measurements & Calculations  IVSd: 1.1 cm  LVIDd: 5.2 cm  LVIDs: 4.1 cm  LVPWd: 0.90 cm  FS: 21.0 %  LV mass(C)d: 191.1 grams  LV mass(C)dI: 87.0 grams/m2     Ao root diam: 3.9 cm  LA dimension: 3.9 cm  asc Aorta Diam: 3.8 cm  LA/Ao: 1.0  RWT: 0.35                 _____________________________________________________________________________  __           Report approved by: Oneida Mccann 2020 09:33 PM      Echocardiogram Limited    Narrative    [id number]  [id number]  [id number]  [id number]^AGUILAR^GAL^MARY           [facility]  Echocardiography Laboratory  [address] [place] [address]        Name: LISA CAMARA  MRN: [id number]  : 1940  Study Date: 2020 02:30 PM  Age: 80 yrs  Gender: Male  Patient Location: [facility]  Reason For Study: Pericardial Effusion  Ordering Physician: GAL SHEIKH  Referring Physician: Bandar Greenberg  Performed By: Aldair Pressely RDCS     BSA: 2.2 m2  Height: 72 in  Weight: 224 lb  HR: 88  BP: 140/84 mmHg  _____________________________________________________________________________  __        Procedure  Echo Guided Centesis Adult. Limited Portable Echo Adult.  _____________________________________________________________________________  __        Interpretation Summary     Left ventricular systolic function is normal.  This is echo guided periardial tap. Bubbles not performed for PE space  Moderate circumferential pericardial effusion at start of case. By end of case  trivial pericardial effusion.  Note there is either thin fibrin layer or more likely pericardial fat noted in  pericardial space.  By report 200cc fluid removed with good result  Limited views were obtained.  _____________________________________________________________________________  __        Left Ventricle  The left ventricle is normal in size. Left ventricular systolic  function is  normal.     Right Ventricle  The right ventricular systolic function is normal.     Pericardium  Moderate pericardial effusion.     _____________________________________________________________________________  __                             Report approved by: Oneida Vyas 2020 04:38 PM                    _____________________________________________________________________________  __      Echocardiogram Limited    Narrative    [id number]  [id number]  [id number]^CHUCK^NAVEEN           [facility]  Echocardiography Laboratory  [address] [place] [id number]        Name: LISA CAMARA  MRN: [id number]  : 1940  Study Date: 2020 07:57 AM  Age: 80 yrs  Gender: Male  Patient Location: [facility]  Reason For Study: Pericardial Effusion  Ordering Physician: NAVEEN WOODS  Referring Physician: WM FLORES  Performed By: Aldair Pressley RDCS     BSA: 2.2 m2  Height: 72 in  Weight: 224 lb  HR: 104  BP: 131/71 mmHg  _____________________________________________________________________________  __        Procedure  Limited Portable Echo Adult.  _____________________________________________________________________________  __        Interpretation Summary     1. The left ventricle is normal in size. Left ventricular systolic function is  mildly reduced. The ejection fraction is estimated at 45-50%.  2. There is trivial pericardial effusion. There is a smooth layer of  echolucent material in the pericardial space. Differential diagnosis includes  pericardial fat and less likely clots.  When compared to the post procedure images from yesterday 2020, no  change.  _____________________________________________________________________________  __        Left Ventricle  The left ventricle is normal in size. Left ventricular systolic function is  mildly reduced. The visual ejection fraction is estimated at 45-50%.     Mitral Valve  The mitral valve leaflets  appear thickened, but open well.     Vessels  IVC diameter and respiratory changes fall into an intermediate range  suggesting an RA pressure of 8 mmHg.     Pericardium  Trivial pericardial effusion.     _____________________________________________________________________________  __                             Report approved by: Oneida Rome 12/22/2020 09:36 AM                       _____________________________________________________________________________  __      Cardiac Catheterization    Narrative    1. Successful placement of pericardial drain in left intercostal space.   220cc serosanguinous fluid drained.

## 2020-12-23 NOTE — PROGRESS NOTES
[facility] Care  Spoke with patient's spouse to discuss plans for HC. Patient to be discharged home today and has agreed to have [facility] follow with RN and PT. Patient care support center processing referral. Patient's spouse verbalized understanding that initial visit is scheduled for 12/27 or 12/28. Patient has 24 hour phone number for [facility] for any questions or concerns.    Che Morataya RN   [facility] Care Liaison   [phone]

## 2020-12-23 NOTE — CONSULTS
[facility]  Gastroenterology Consultation         Justyn Olivas  [address] [place] [address]  80 year old male    Admission Date/Time: 12/20/2020  Primary Care Provider: Bandar Greenberg  Referring / Attending Physician: Cesilia Alejandra    We were asked to see the patient in consultation by Dr. Cesilia Alejandra for evaluation of anemia and possible GI bleed.     CC: Anemia and possible GI bleed    HPI: Justyn Olivas is a 80 year old male with a history of DM type II, CVA, HTN, HLP and iron deficiency anemia who presented to the ED on 12/20/2020 with dyspnea and orthopnea and found to have a large pericardial effusion as the likely cause. His hemoglobin was 7.9 on admission and dropped to 6.9. He was transfused one unit of blood and post-transfusion hemoglobin was 8.3 yesterday. His hemoglobin this AM is stable at 8.1. Nursing staff and patient report no bloody or black stools since admission. He underwent EGD on 12/18/2020 at [facility] in [place] and it was normal. Patient had some abdominal cramping yesterday which resolved after having two large bowel movements yesterday. He denies abdominal pain, chest pain, shortness of breath, hematochezia, melena, nausea or vomiting. He feels well and is hoping to go home today.     ROS: A comprehensive ten point review of systems was negative aside from those in mentioned in the HPI.      PAST MED HX:  I have reviewed this patient's medical history and updated it with pertinent information if needed.   Past Medical History:   Diagnosis Date     Cerebral infarction (H)      Diabetes (H)      Hyperlipemia        MEDICATIONS:   Prior to Admission Medications   Prescriptions Last Dose Informant Patient Reported? Taking?   Glucose Blood (STEVE CONTOUR TEST VI)  Pharmacy Yes No   VENTOLIN  (90 BASE) MCG/ACT Inhaler  at PRN Pharmacy No Yes   Sig: INL 2 PFS PO QID PRN   aspirin 81 MG EC tablet 12/19/2020 at Unknown time Pharmacy  Yes Yes   Sig: Take 81 mg by mouth daily   atorvastatin (LIPITOR) 20 MG tablet 12/20/2020 at Unknown time Pharmacy No Yes   Sig: TAKE 1 TABLET(20 MG) BY MOUTH DAILY   blood glucose (STEVE CONTOUR) test strip  Pharmacy No No   Sig: TESTING FOUR TIMES DAILY OR AS DIRECTED   blood glucose (STEVE CONTOUR) test strip  Pharmacy No No   Sig: TESTING FOUR TIMES DAILY OR AS DIRECTED   clopidogrel (PLAVIX) 75 MG tablet 12/19/2020 at Unknown time Pharmacy No Yes   Sig: TAKE 1 TABLET(75 MG) BY MOUTH DAILY   dorzolamide-timolol (COSOPT) 2-0.5 % ophthalmic solution  Pharmacy Yes Yes   Sig: Place 1 drop into both eyes 2 times daily   fluticasone (FLONASE) 50 MCG/ACT nasal spray 12/20/2020 at Unknown time Pharmacy Yes Yes   Sig: [place] 1 spray into both nostrils 2 times daily as needed for rhinitis or allergies   hydrochlorothiazide (HYDRODIURIL) 25 MG tablet  Pharmacy No No   Sig: TAKE 1 TABLET(25 MG) BY MOUTH DAILY   insulin glargine (LANTUS SOLOSTAR) 100 UNIT/ML pen 12/19/2020 at Unknown time Pharmacy No Yes   Sig: INJECT 50 UNITS UNDER THE SKIN AT BEDTIME   insulin lispro (HUMALOG KWIKPEN) 100 UNIT/ML (1 unit dial) KWIKPEN 12/20/2020 at Unknown time Pharmacy No Yes   Sig: INJECT UP TO 55 UNITS UNDER THE SKIN ONCE DAILY AS DIRECTED   insulin pen needle (BD FERCHO U/F) 32G X 4 MM miscellaneous  Pharmacy No No   Sig: USE AS DIRECTED UP TO FOUR TIMES DAILY   irbesartan (AVAPRO) 300 MG tablet 12/19/2020 at Unknown time Pharmacy No Yes   Sig: Take 1 tablet (300 mg) by mouth At Bedtime   metFORMIN (GLUCOPHAGE) 1000 MG tablet 12/20/2020 at Unknown time Pharmacy No Yes   Sig: TAKE 1 TABLET(1000 MG) BY MOUTH TWICE DAILY WITH MEALS   order for DME  Pharmacy No No   Sig: Hip steroid injectoion   pantoprazole (PROTONIX) 40 MG EC tablet  Pharmacy No No   Sig: TAKE 1 TABLET(40 MG) BY MOUTH TWICE DAILY   traZODone (DESYREL) 100 MG tablet 12/19/2020 at Unknown time Pharmacy No Yes   Sig: TAKE 1 TABLET(100 MG) BY MOUTH AT BEDTIME       Facility-Administered Medications: None       ALLERGIES: No Known Allergies    SOCIAL HISTORY:  Social History     Tobacco Use     Smoking status: Former Smoker     Smokeless tobacco: Never Used   Substance Use Topics     Alcohol use: No     Alcohol/week: 0.0 standard drinks     Drug use: No       FAMILY HISTORY:  Family History   Problem Relation Age of Onset     Family History Negative Mother      Family History Negative Father        PHYSICAL EXAM:   Vital Signs with Ranges  Temp: 97.8  F (36.6  C) Temp src: Oral BP: (!) 147/85 Pulse: 90   Resp: 25 SpO2: 97 % O2 Device: None (Room air)    I/O last 3 completed shifts:  In: 1355 [P.O.:1355]  Out: 1780 [Urine:1730; Drains:50]    Constitutional: Awake, alert, cooperative, no apparent distress.  Eyes: Conjunctiva and pupils examined and normal.  HEENT: Moist mucous membranes, normal dentition.  Respiratory: Clear to auscultation bilaterally, no crackles or wheezing.  Cardiovascular: Regular rate and rhythm, normal S1 and S2, and no murmur noted.  GI: Soft, non-distended, non-tender, normal bowel sounds.  Lymph/Hematologic: No anterior cervical or supraclavicular adenopathy.  Skin: No rashes, no cyanosis, no edema.  Musculoskeletal: No joint swelling, erythema or tenderness.  Neurologic: Cranial nerves 2-12 intact, normal strength and sensation.  Psychiatric: Alert, oriented to person, place and time, no obvious anxiety or depression.    ADDITIONAL COMMENTS:   I reviewed the patient's new clinical lab test results.   Recent Labs   Lab Test 12/23/20  0532 12/22/20  0722 12/21/20  0614 12/20/20  1614 09/04/20  1338 09/04/20  1338   WBC 9.0  --  11.5* 13.5*   < > 7.3   HGB 8.1* 8.3* 6.9* 7.9*   < > 10.4*   MCV 78  --  78 80   < > 80     --  386 446   < > 275   INR  --   --   --   --   --  0.99    < > = values in this interval not displayed.     Recent Labs   Lab Test 12/23/20  0532 12/22/20  0722 12/21/20  1553 12/21/20  0614 12/21/20  0614 12/20/20  1614    POTASSIUM 3.7 3.1* 3.0*   < > 2.8* 2.5*   CHLORIDE 103 102  --   --  99 94   CO2 29 29  --   --  29 31   BUN 27 28  --   --  26 25   ANIONGAP 5  --   --   --  6 10    < > = values in this interval not displayed.     Recent Labs   Lab Test 12/20/20  1614 12/01/20  1224 11/26/20  1540 02/10/20  1215 02/05/19  1216 02/05/19  1216 02/01/18  1002   ALBUMIN 2.7* 3.2* 3.2* 3.5   < >  --  3.8   BILITOTAL 0.5 0.4 0.4 0.3   < >  --  0.5   ALT 28 19 25 20   < >  --  20   AST 17 13 13 13   < >  --  16   PROTEIN  --   --   --  30*  --  30* 30*    < > = values in this interval not displayed.     I reviewed the patient's new imaging results.    CONSULTATION ASSESSMENT AND PLAN:      PRINCIPAL GI PROBLEMS:  Acute on chronic anemia: stable at this time, without signs of active GI bleed; DDX: hemodilution from IV fluids vs GI bleed. Less likely GI bleed with no active signs of bleeding    PLAN:  - Daily hemoglobins  - Continue pantoprazole 40 mg daily  - Ok to discharge patient when medically stable with plans to follow-up with [facility] as outpatient early next week with repeat hemoglobin level  - No need for endoscopy at this time, unless evidence of active bleeding occurs or hemoglobin drops significantly    ACTIVE PROBLEMS:    Acute respiratory distress    Pericardial effusion      [facility].  Office: [phone]  Cell : [phone]

## 2020-12-23 NOTE — CONSULTS
Care Management Discharge Note    Discharge Date: 12/24/20       Discharge Disposition:  Home w/ spouse    Discharge Services:  HHC RN/PT.    Discharge DME:      Discharge Transportation: family or friend will provide. Spouse    Private pay costs discussed: Not applicable    PAS Confirmation Code:    Patient/family educated on Medicare website which has current facility and service quality ratings:      Education Provided on the Discharge Plan:    Persons Notified of Discharge Plans: patient  Patient/Family in Agreement with the Plan:      Handoff Referral Completed: No    Additional Information:    Met w/ patient. He agrees to [facility] RN/PT. He wishes to use services from [facility]. Referral sent. AVS updated    PCP and Cards follow up scheduled. Info on AVS for GI follow up

## 2020-12-23 NOTE — PLAN OF CARE
Neuro- A&Ox4  Most Recent Vitals- Temp: 98.4  F (36.9  C) Temp src: Oral BP: 131/74 Pulse: 103   Resp: 18 SpO2: 98 % O2 Device: None (Room air)    Tele/Cardiac- SR  Resp- diminished  Activity- A1  Pain- denies  Drips- none  Drains/Tubes- PIV  Skin- bruising, incision  GI/- voiding adequately  Aggression Color- Green  COVID status- Negative  Plan- discharge 1 to 2 days pending clinical improvment  Misc-     Brie Porter, RN

## 2020-12-23 NOTE — PLAN OF CARE
VSS. No pain or complaints at discharge. Medications have been filled at discharge pharmacy and sent with patient. Discussed follow up appointments that have been made as well as follow up appt with GI that pt will need to make himself. All belongings sent with patient. Education discussed with patient and all questions answered. Picked up at door 5 by wife.     Yanci Andersen RN on 12/23/2020 at 3:33 PM

## 2020-12-23 NOTE — PLAN OF CARE
MD Notification    Notified Person: MD    Notified Person Name: Justyn    Notification Date/Time: 12:52 PM     Notification Interaction: web-based paging    Purpose of Notification: Just want to clarify - pt ok to restart his ASA & Plavix upon discharge? Awaiting OT to see him before discharge    Orders Received: ok to start plavix    Comments:

## 2020-12-23 NOTE — PLAN OF CARE
Occupational Therapy Discharge Summary    Reason for therapy discharge:    All goals and outcomes met, no further needs identified.    Progress towards therapy goal(s). See goals on Care Plan in [facility] electronic health record for goal details.  Goals met    Therapy recommendation(s):    No further therapy is recommended.

## 2020-12-23 NOTE — PROGRESS NOTES
12/23/20 0920   Quick Adds   Type of Visit Initial PT Evaluation   Living Environment   People in home spouse   Current Living Arrangements house   Home Accessibility stairs to enter home;stairs within home   Number of Stairs, Main Entrance 4   Stair Railings, Main Entrance railings on both sides of stairs   Number of Stairs, Within Home, Primary other (see comments)  (13)   Stair Railings, Within Home, Primary railing on left side (ascending)   Transportation Anticipated family or friend will provide   Living Environment Comments pt reports driving less recently, wife drives typically    Self-Care   Usual Activity Tolerance moderate   Current Activity Tolerance fair   Regular Exercise Yes   Activity/Exercise Type   (walks daily)   Exercise Amount/Frequency 2 times/wk   Equipment Currently Used at Home none   Activity/Exercise/Self-Care Comment pt reports just finished with PT for right hip bursitis, pt reports was going to a  at [employer] until the gyms were recently closed, reports had a stroke 2019 reports right sided weakness has resolved with therapy   Disability/Function   Hearing Difficulty or Deaf yes   Patient's preferred means of communication English speaker with hearing loss, no speech problems.   Use of hearing assistive devices none   Concentrating, Remembering or Making Decisions Difficulty no   Difficulty Communicating no   Difficulty Eating/Swallowing no   Walking or Climbing Stairs Difficulty no   Dressing/Bathing Difficulty no   Toileting issues no   Change in Functional Status Since Onset of Current Illness/Injury no   General Information   Onset of Illness/Injury or Date of Surgery 12/20/20   Referring Physician Cesilia Alejandra MD   Patient/Family Therapy Goals Statement (PT) to go home today   Pertinent History of Current Problem (include personal factors and/or comorbidities that impact the POC) 80 year old male with a history of DM type II, CVA (2019), HTN, HLP and  iron deficiency anemia who presented to the ED on 12/20/2020 with dyspnea and orthopnea and found to have a large pericardial effusion Status post pericardiocentesis on 12/21/2020   Existing Precautions/Restrictions no known precautions/restrictions   General Observations sitting up in recliner, NAD, pt reports feels much better than when he came into the hospital   Cognition   Orientation Status (Cognition) oriented x 3   Pain Assessment   Patient Currently in Pain No   Range of Motion (ROM)   ROM Quick Adds ROM WFL   ROM Comment with general mobility   Strength   Manual Muscle Testing Quick Adds Strength WFL   Strength Comments with general mobility   Bed Mobility   Comment (Bed Mobility) NT-pt sitting up in his chair during session.    Transfers   Transfer Safety Comments sit to stand IND, uses posterior thighs intermittently for initial standing balance   Gait/Stairs (Locomotion)   Comment (Gait/Stairs) amb 10ft with SBA, slight antalgic gait pattern-pt reports right hip bursitis.    Balance   Balance Comments IND with standing balance with WBOS. slight decreased dynamic standing balance, however no LOB-pt reports this has been ongoing since his stroke in 2019 and reports uses a cane in the winter when walking his dog   Sensory Examination   Sensory Perception Comments pt denies any numbness/tingling   Clinical Impression   Criteria for Skilled Therapeutic Intervention yes, treatment indicated   PT Diagnosis (PT) impaired gait   Influenced by the following impairments impaired activity tolerance, SOB   Functional limitations due to impairments impaired tolerance with functional activity   Clinical Presentation Stable/Uncomplicated   Clinical Presentation Rationale based on clinical judgement   Clinical Decision Making (Complexity) low complexity   Therapy Frequency (PT) One time eval and treatment only   Predicted Duration of Therapy Intervention (days/wks) 1 day   Planned Therapy Interventions (PT)  patient/family education;stair training;gait training   Anticipated Equipment Needs at Discharge (PT)   (none )   Risk & Benefits of therapy have been explained evaluation/treatment results reviewed;care plan/treatment goals reviewed;risks/benefits reviewed;current/potential barriers reviewed;participants voiced agreement with care plan;participants included;patient   PT Discharge Planning    PT Discharge Recommendation (DC Rec) home with assist   PT Rationale for DC Rec Pt with increased SOB with low level activity, therefore discussed with pt to have wife assist with household tasks until his activity tolerance improves.    Total Evaluation Time   Total Evaluation Time (Minutes) 15

## 2020-12-23 NOTE — PROGRESS NOTES
12/23/20 1318   Quick Adds   Type of Visit Initial Occupational Therapy Evaluation   Living Environment   People in home spouse   Current Living Arrangements house   Home Accessibility stairs to enter home;stairs within home   Transportation Anticipated car, drives self;family or friend will provide   Self-Care   Usual Activity Tolerance moderate   Current Activity Tolerance fair   Activity/Exercise/Self-Care Comment At baseline is independent in self-cares. Has shower chair but does not use. pt reports just finished with PT for right hip bursitis, pt reports was going to a  at [employer] until the gyms were recently closed, reports had a stroke 2019 reports right sided weakness has resolved with therapy   Disability/Function   Fall history within last six months no   General Information   Onset of Illness/Injury or Date of Surgery 12/20/20   Referring Physician Cesilia Alejandra MD   Patient/Family Therapy Goal Statement (OT) Return home today   Additional Occupational Profile Info/Pertinent History of Current Problem history of DM type II, CVA, HTN, HLP and iron deficiency anemia who presented to the ED on 12/20/2020 with dyspnea and orthopnea and found to have a large pericardial effusion as the likely cause   Existing Precautions/Restrictions fall   Cognitive Status Examination   Orientation Status orientation to person, place and time   Affect/Mental Status (Cognitive) WFL   Follows Commands follows multi-step commands   Cognitive Status Comments Very charismatic and engaged in education   Visual Perception   Visual Impairment/Limitations corrective lenses full-time;WFL   Sensory   Sensory Comments denies BUE/BLE numbness or tingling   Pain Assessment   Patient Currently in Pain Yes, see Vital Sign flowsheet   Strength Comprehensive (MMT)   Comment, General Manual Muscle Testing (MMT) Assessment WFL in BUEs   Coordination   Upper Extremity Coordination No deficits were identified    Transfers   Transfers sit-stand transfer;toilet transfer   Sit-Stand Transfer   Sit-Stand [place] (Transfers) supervision   Toilet Transfer   Type (Toilet Transfer) sit-stand   [place] Level (Toilet Transfer) supervision   Activities of Daily Living   BADL Assessment/Intervention grooming;lower body dressing;toileting   Lower Body Dressing Assessment/Training   [place] Level (Lower Body Dressing) supervision   Grooming Assessment/Training   [place] Level (Grooming) supervision   Position (Grooming) sink side;unsupported sitting   Toileting   [place] Level (Toileting) supervision   Instrumental Activities of Daily Living (IADL)   Previous Responsibilities driving;finances;medication management   IADL Comments has small dog   Clinical Impression   Criteria for Skilled Therapeutic Interventions Met (OT) yes   OT Diagnosis decline function   OT Problem List-Impairments impacting ADL activity tolerance impaired;balance   Assessment of Occupational Performance 5 or more Performance Deficits   Identified Performance Deficits bathing, toileting, functional mobility, caring for dog   Clinical Decision Making Complexity (OT) low complexity   Therapy Frequency (OT) 1x eval and treat   Anticipated Equipment Needs Upon Discharge (OT) reacher  (toilet safety frame)   Risks and Benefits of Treatment have been explained. Yes   Patient, Family & other staff in agreement with plan of care Yes   Comment-Clinical Impression Pt functioning below baseline and will benefit from continued skilled OT to maximize safety and independence   OT Discharge Planning    OT Discharge Recommendation (DC Rec) Home with assist   OT Rationale for DC Rec Overall pt is completing self-cares with supervision, can have supervision from his spouse at discharge. Pt SOB with minimal activity, Recommend assist with strenuous IADLs at discharge, pt reports his spouse can assist.   OT Brief overview of current status  Pt appears to have  a good understanding of adaptive equipment recommendations and energy conservation strategies.   Total Evaluation Time (Minutes)   Total Evaluation Time (Minutes) 8

## 2020-12-24 ENCOUNTER — PATIENT OUTREACH (OUTPATIENT)
Dept: NURSING | Facility: CLINIC | Age: 80
End: 2020-12-24
Payer: MEDICARE

## 2020-12-24 ENCOUNTER — TELEPHONE (OUTPATIENT)
Dept: FAMILY MEDICINE | Facility: CLINIC | Age: 80
End: 2020-12-24

## 2020-12-24 ASSESSMENT — ACTIVITIES OF DAILY LIVING (ADL): DEPENDENT_IADLS:: INDEPENDENT

## 2020-12-24 NOTE — LETTER
[facility]  Complex Care Plan  About Me:    Patient Name:  Justyn Olivas    YOB: 1940  Age:         80 year old   [facility] MRN:    [id number] Telephone Information:  Home Phone [phone]   Mobile [phone]       Address:  [address]   [place] [address] Email address:  [email or web address]      Emergency Contact(s)    Name Relationship Lgl Grd Work Phone Home Phone Mobile Phone   1. EBEN OLIVAS* Spouse   [phone]   2. LINH OLIVAS* Son   [phone]           Primary language:  English     needed? No   [facility]:  [phone] op. 1  Other communication barriers: None  Preferred Method of Communication:  Mail  Current living arrangement: I live in a private home with family  Mobility Status/ Medical Equipment: Independent    Health Maintenance  Health Maintenance Reviewed: Not assessed    My Access Plan  Medical Emergency 911   Primary Clinic Line [facility] - [phone]   24 Hour Appointment Line [phone] or  [phone]) (toll-free)   24 Hour Nurse Line [phone] (toll-free)   Preferred Urgent Care [facility], [phone]   Preferred Hospital [facility]  [phone]   Preferred Pharmacy [id number] - [place], [place] - [address] AT [facility] [address]     Behavioral Health Crisis Line The National Suicide Prevention Lifeline at 1-939.422.8015 or 911             My Care Team Members  Patient Care Team       Relationship Specialty Notifications Start End    Bandar Greenberg MD PCP - General Internal Medicine  3/26/14     Phone: [phone] Pager: [phone] Fax: [phone] [address] [place] [address]    Bandar Greenberg MD Assigned PCP   11/11/16     Phone: [phone] Pager: [phone] Fax: [phone] [address] [place]  [address]    [facility] ([facility] ([place])  12/23/20     Phone: [phone]         Jerel Leija, RN Lead Care Coordinator Primary Care - CC  12/24/20     Phone: [phone]                 My Care Plans  Self Management and Treatment Plan  Goals and (Comments)  Goals        General    1. Improve chronic symptoms (pt-stated)     Notes - Note created  12/24/2020  9:15 AM by Namita Chaidez RN    Goal Statement: I will verbalize an increase in my strength and mobility.   Date Goal set: 12/24/20  Barriers: Multiple health concerns.  Strengths: Motivated and engaged in care coordination.   Date to Achieve By: 6/1/2021  Patient expressed understanding of goal: Yes.   Action steps to achieve this goal:  1. I will work with Physical Therapy to build my strength and mobility.   2. I will follow up with PCP and Cardiology and follow their recommendations.   3. I will continue to work with care coordination for any additional resources and support.                Action Plans on File:                       Advance Care Plans/Directives Type:        My Medical and Care Information  Problem List   Patient Active Problem List   Diagnosis     Shoulder pain     Insomnia, unspecified type     Type 2 diabetes mellitus without complication, with long-term current use of insulin (H)     Benign essential hypertension     Hyperlipidemia LDL goal <100     Non-seasonal allergic rhinitis due to pollen     Primary osteoarthritis of both hips     Primary osteoarthritis involving multiple joints     Chronic non-seasonal allergic rhinitis, unspecified trigger     Cerebrovascular accident (CVA) due to embolism of cerebral artery (H)     CKD (chronic kidney disease) stage 3, GFR 30-59 ml/min     Anemia due to blood loss, acute     Simple chronic bronchitis (H)     Iron deficiency anemia due to chronic blood loss     MAIRA (iron deficiency anemia)     Anemia, iron deficiency     Iron deficiency     Acute  respiratory distress     Pericardial effusion      Current Medications and Allergies:  See printed Medication Report.    Care Coordination Start Date: 12/24/2020   Frequency of Care Coordination:     Form Last Updated: 12/24/2020

## 2020-12-24 NOTE — TELEPHONE ENCOUNTER
Chief Complaint: Acute Respiratory Distress, Pericardial Effusion, WED 23-DEC-2020, 2 / 1    Pt ph: [phone] (home)

## 2020-12-24 NOTE — LETTER
[place] CARE COORDINATION  [address]  [place] [address]    December 24, 2020    Justyn Olivas  [address]  [place] [address]      Dear Justyn,    I am a clinic care coordinator who works with Bandar Greenberg MD at [facility]. I wanted to thank you for spending the time to talk with me.  Below is a description of clinic care coordination and how I can further assist you.      The clinic care coordination team is made up of a registered nurse,  and community health worker who understand the health care system. The goal of clinic care coordination is to help you manage your health and improve access to the health care system in the most efficient manner. The team can assist you in meeting your health care goals by providing education, coordinating services, strengthening the communication among your providers and supporting you with any resource needs.    Please feel free to contact me with any questions or concerns. We are focused on providing you with the highest-quality healthcare experience possible and that all starts with you.     Sincerely,    Jerel Leija RN  Nurse Care Coordinator  [facility]  Tye Reyna [place], [facility] Women [email or web address]  Office: [phone]      Enclosed: I have enclosed a copy of the Complex Care Plan. This has helpful information and goals that we have talked about. Please keep this in an easy to access place to use as needed.

## 2020-12-24 NOTE — PROGRESS NOTES
Clinic Care Coordination Contact    Clinic Care Coordination Contact  OUTREACH    Referral Information:  Referral Source: IP Report    Primary Diagnosis: Cardiovascular - other    Chief Complaint   Patient presents with     Clinic Care Coordination - Initial     Clinic Care Coordination - Post Hospital        Universal Utilization: [facility] 12/20 - 12/22/20 for Paricardial effusion, Acute respiratory distress, Anemia deficiency requiring a blood transfusion.    Clinic Utilization  Difficulty keeping appointments: No  Compliance Concerns: No  No-Show Concerns: No  No PCP office visit in Past Year: No  Utilization    Last refreshed: 12/24/2020  9:16 AM: Hospital Admissions 1           Last refreshed: 12/24/2020  9:16 AM: ED Visits 3           Last refreshed: 12/24/2020  9:16 AM: No Show Count (past year) 1              Current as of: 12/24/2020  9:16 AM              Clinical Concerns:  Current Medical Concerns:    Patient Active Problem List   Diagnosis     Shoulder pain     Insomnia, unspecified type     Type 2 diabetes mellitus without complication, with long-term current use of insulin (H)     Benign essential hypertension     Hyperlipidemia LDL goal <100     Non-seasonal allergic rhinitis due to pollen     Primary osteoarthritis of both hips     Primary osteoarthritis involving multiple joints     Chronic non-seasonal allergic rhinitis, unspecified trigger     Cerebrovascular accident (CVA) due to embolism of cerebral artery (H)     CKD (chronic kidney disease) stage 3, GFR 30-59 ml/min     Anemia due to blood loss, acute     Simple chronic bronchitis (H)     Iron deficiency anemia due to chronic blood loss     MAIRA (iron deficiency anemia)     Anemia, iron deficiency     Iron deficiency     Acute respiratory distress     Pericardial effusion       Current Behavioral Concerns: None noted.     Education Provided to patient: RN CC role and reason for call.    Pain  Pain : No  Health Maintenance Reviewed:  Not assessed  Clinical Pathway: None    Medication Management:  Current Outpatient Medications   Medication Instructions     aspirin 81 mg, Oral, DAILY     atorvastatin (LIPITOR) 20 MG tablet TAKE 1 TABLET(20 MG) BY MOUTH DAILY     blood glucose (STEVE CONTOUR) test strip TESTING FOUR TIMES DAILY OR AS DIRECTED     blood glucose (STEVE CONTOUR) test strip TESTING FOUR TIMES DAILY OR AS DIRECTED     carvedilol (COREG) 6.25 mg, Oral, 2 TIMES DAILY WITH MEALS     clopidogrel (PLAVIX) 75 MG tablet TAKE 1 TABLET(75 MG) BY MOUTH DAILY     dorzolamide-timolol (COSOPT) 2-0.5 % ophthalmic solution 1 drop, Both Eyes, 2 TIMES DAILY     fluticasone (FLONASE) 50 MCG/ACT nasal spray 1 spray, Both Nostrils, 2 TIMES DAILY PRN     Glucose Blood (STEVE CONTOUR TEST VI) In Vitro     insulin glargine (LANTUS SOLOSTAR) 100 UNIT/ML pen INJECT 50 UNITS UNDER THE SKIN AT BEDTIME     insulin lispro (HUMALOG KWIKPEN) 100 UNIT/ML (1 unit dial) KWIKPEN INJECT UP TO 55 UNITS UNDER THE SKIN ONCE DAILY AS DIRECTED     insulin pen needle (BD FERCHO U/F) 32G X 4 MM miscellaneous USE AS DIRECTED UP TO FOUR TIMES DAILY     irbesartan (AVAPRO) 300 mg, Oral, AT BEDTIME     metFORMIN (GLUCOPHAGE) 1000 MG tablet TAKE 1 TABLET(1000 MG) BY MOUTH TWICE DAILY WITH MEALS     order for DME Hip steroid injectoion     pantoprazole (PROTONIX) 40 MG EC tablet TAKE 1 TABLET(40 MG) BY MOUTH TWICE DAILY     potassium chloride ER (KLOR-CON M) 20 MEQ CR tablet 40 mEq, Oral, 2 TIMES DAILY     torsemide (DEMADEX) 10 mg, Oral, DAILY     traZODone (DESYREL) 100 MG tablet TAKE 1 TABLET(100 MG) BY MOUTH AT BEDTIME     VENTOLIN  (90 BASE) MCG/ACT Inhaler INL 2 PFS PO QID PRN        Functional Status:  Dependent ADLs: Independent, Ambulation-cane(Patient is use cane outside of home for long distance)  Dependent IADLs: Independent  Bed or wheelchair confined: No  Mobility Status: Independent  Fallen 2 or more times in the past year?: No  Any fall with injury in the past  year?: No    Living Situation:  Current living arrangement: I live in a private home with family    Lifestyle & Psychosocial Needs:        Diet: Diabetic diet, No added salt  Inadequate nutrition : No  Tube Feeding: No  Inadequate activity/exercise : No  Significant changes in sleep pattern : No  Transportation means: Accessible car     Yarsani or spiritual beliefs that impact treatment: No  Mental health DX: No  Mental health management concern : No  Chemical Dependency Status: No Current Concerns  Informal Support system: Family, Spouse   Socioeconomic History     Marital status:      Spouse name: Cecile     Number of children: Not on file     Years of education: Not on file     Highest education level: Not on file     Tobacco Use     Smoking status: Former Smoker     Smokeless tobacco: Never Used   Substance and Sexual Activity     Alcohol use: No     Alcohol/week: 0.0 standard drinks     Drug use: No     Sexual activity: Yes     Partners: Female     Birth control/protection: None     Patient complains of generalized fatigue and shortness of breath with exertion. Patient has a non-productive cough, which started a few weeks ago. Patient denies headache, chest pain, SOB while at rest, and states lower extremity edema has much improved since hospitalization. Patient blood glucose this morning was 181, before insulin dosing, patient asymptomatic to hyperglycemia.     Home Health Care and PT have been ordered for patient through [facility]. Patient will be evaluated by skilled nursing on 12/28/20. Patient would like to rebuild his strength and mobility, and is looking forward to working with PT.    Reviewed AVS, upcoming appointments and post hospitalization medication changes with patient, he states he does not know what to take and what not to take. Patient requested I speak with his wife Cecile, and handed the phone to her. Cecile also states she is confused by medication changes. EULOGIO CC had Cecile gather  all medication bottles and reviewed current medication list and purpose of medications with Cecile. RN CC advised Cecile to discard discontinued medication bottle, Cecile verbalized understanding.     Resources and Interventions:  Current Resources:   List of home care services: Skilled Nursing, Physicial Therapy  Community Resources: DME  Supplies used at home: None  Equipment Currently Used at Home: cane, straight  Employment Status: retired)     Advance Care Plan/Directive  Advanced Care Plans/Directives on file: No  Advanced Care Plan/Directive Status: Considering Options    Referrals Placed: None     Goals:   Goals        General    1. Improve chronic symptoms (pt-stated)     Notes - Note created  12/24/2020  9:15 AM by Namita Chaidez RN    Goal Statement: I will verbalize an increase in my strength and mobility.   Date Goal set: 12/24/20  Barriers: Multiple health concerns.  Strengths: Motivated and engaged in care coordination.   Date to Achieve By: 6/1/2021  Patient expressed understanding of goal: Yes.   Action steps to achieve this goal:  1. I will work with Physical Therapy to build my strength and mobility.   2. I will follow up with PCP and Cardiology and follow their recommendations.   3. I will continue to work with care coordination for any additional resources and support.               Patient/Caregiver understanding: Patient reports understanding and denies any additional questions or concerns at this times. RN CC engaged in A[facility]ET communication during encounter.       Future Appointments              In 6 days Bandar Greenberg MD [facility], CS    In 2 weeks Shaniqua Ram PA-C [facility] Heart [facility], [facility]          Plan: Patient will establish care with [facility] HHC and PT. Patient will follow up with PCP 12/30/20, and Cardiology 1/8/21. RN CC will send patient via [facility] Care Coordination introduction letter and complex care plan. RN CC Jerel  Heladio will follow up with patient in 2 weeks.     Hellen Chaidez RN Care Coordinator  [facility]  Email: [email or web address]  Phone: [phone]

## 2020-12-25 LAB — INTERPRETATION ECG - MUSE: NORMAL

## 2020-12-26 LAB
BACTERIA SPEC CULT: NO GROWTH
SPECIMEN SOURCE: NORMAL

## 2020-12-28 ENCOUNTER — TELEPHONE (OUTPATIENT)
Dept: FAMILY MEDICINE | Facility: CLINIC | Age: 80
End: 2020-12-28

## 2020-12-28 ENCOUNTER — TELEPHONE (OUTPATIENT)
Dept: CARDIOLOGY | Facility: CLINIC | Age: 80
End: 2020-12-28

## 2020-12-28 NOTE — TELEPHONE ENCOUNTER
Patient was evaluated by cardiology while inpatient for increased SOB and orthopnea. PMH: DM type II, CVA, HTN, HLD, CKD III and iron deficiency anemia with chronic SOB. Echo showed EF 45% with normal RV function, large pericardial effusion. 12/21/20: Pericardiocentesis drained 220 ml and an additional 260 ml's prior to removal. IV Lasix diuresed. Discharged on Coreg, Avapro, Demadex and KCL. PTA hydrochlorothiazide discontinued. Called patient to discuss any post hospital d/c questions, review discharge medication, and confirm f/u appts. Patient denied any questions regarding new or changes to PTA medications. Patient denied any increased SOB, any chest pain, any increased edema, or light headedness. RN confirmed with patient that he has an apt scheduled with SHAHANA Shaniqua Ram on 1/8/21 at 1230 at our [place] Office. Instructed patient to weigh self every AM, after waking and using the restroom, but before breakfast and medications. Call clinic for a weight gain of 2 lbs overnight or 5 lbs in a week. Low Na+ diet encouraged. Pt instructed to bring daily wt/BP diary and medications with to f/u OV. Patient advised to call clinic with any cardiac related questions or concerns prior to his appt. Patient verbalized understanding and agreed with plan. FARSHAD Weaver RN.

## 2020-12-29 ENCOUNTER — TELEPHONE (OUTPATIENT)
Dept: FAMILY MEDICINE | Facility: CLINIC | Age: 80
End: 2020-12-29

## 2020-12-29 NOTE — TELEPHONE ENCOUNTER
Needs orders for skilled nursing 1x a week for 3 weeks 2xs as needed, Physical Therapy eval. Ok to leave a detailed message.

## 2020-12-30 ENCOUNTER — OFFICE VISIT (OUTPATIENT)
Dept: FAMILY MEDICINE | Facility: CLINIC | Age: 80
End: 2020-12-30
Payer: MEDICARE

## 2020-12-30 DIAGNOSIS — I48.0 PAROXYSMAL ATRIAL FIBRILLATION (H): ICD-10-CM

## 2020-12-30 DIAGNOSIS — D50.0 IRON DEFICIENCY ANEMIA DUE TO CHRONIC BLOOD LOSS: ICD-10-CM

## 2020-12-30 DIAGNOSIS — J41.0 SIMPLE CHRONIC BRONCHITIS (H): ICD-10-CM

## 2020-12-30 DIAGNOSIS — I50.811 ACUTE RIGHT-SIDED CONGESTIVE HEART FAILURE (H): ICD-10-CM

## 2020-12-30 DIAGNOSIS — K29.01 ACUTE SUPERFICIAL GASTRITIS WITH HEMORRHAGE: ICD-10-CM

## 2020-12-30 DIAGNOSIS — E11.9 TYPE 2 DIABETES MELLITUS WITHOUT COMPLICATION, WITH LONG-TERM CURRENT USE OF INSULIN (H): Primary | ICD-10-CM

## 2020-12-30 DIAGNOSIS — M15.0 PRIMARY OSTEOARTHRITIS INVOLVING MULTIPLE JOINTS: ICD-10-CM

## 2020-12-30 DIAGNOSIS — D62 ANEMIA DUE TO BLOOD LOSS, ACUTE: ICD-10-CM

## 2020-12-30 DIAGNOSIS — G47.00 INSOMNIA, UNSPECIFIED TYPE: ICD-10-CM

## 2020-12-30 DIAGNOSIS — I30.9 ACUTE PERICARDITIS, UNSPECIFIED TYPE: ICD-10-CM

## 2020-12-30 DIAGNOSIS — Z79.4 TYPE 2 DIABETES MELLITUS WITHOUT COMPLICATION, WITH LONG-TERM CURRENT USE OF INSULIN (H): Primary | ICD-10-CM

## 2020-12-30 DIAGNOSIS — N18.31 STAGE 3A CHRONIC KIDNEY DISEASE (H): ICD-10-CM

## 2020-12-30 LAB
ANION GAP SERPL CALCULATED.3IONS-SCNC: 9 MMOL/L (ref 3–14)
BUN SERPL-MCNC: 25 MG/DL (ref 7–30)
CALCIUM SERPL-MCNC: 8.6 MG/DL (ref 8.5–10.1)
CHLORIDE SERPL-SCNC: 108 MMOL/L (ref 94–109)
CO2 SERPL-SCNC: 24 MMOL/L (ref 20–32)
CREAT SERPL-MCNC: 1.69 MG/DL (ref 0.66–1.25)
ERYTHROCYTE [DISTWIDTH] IN BLOOD BY AUTOMATED COUNT: 18.7 % (ref 10–15)
GFR SERPL CREATININE-BSD FRML MDRD: 37 ML/MIN/{1.73_M2}
GLUCOSE SERPL-MCNC: 144 MG/DL (ref 70–99)
HCT VFR BLD AUTO: 29.7 % (ref 40–53)
HGB BLD-MCNC: 8.8 G/DL (ref 13.3–17.7)
MCH RBC QN AUTO: 24 PG (ref 26.5–33)
MCHC RBC AUTO-ENTMCNC: 29.6 G/DL (ref 31.5–36.5)
MCV RBC AUTO: 81 FL (ref 78–100)
NT-PROBNP SERPL-MCNC: 4976 PG/ML (ref 0–450)
PLATELET # BLD AUTO: 459 10E9/L (ref 150–450)
POTASSIUM SERPL-SCNC: 4.4 MMOL/L (ref 3.4–5.3)
RBC # BLD AUTO: 3.67 10E12/L (ref 4.4–5.9)
SODIUM SERPL-SCNC: 140 MMOL/L (ref 133–144)
WBC # BLD AUTO: 7.9 10E9/L (ref 4–11)

## 2020-12-30 PROCEDURE — 80048 BASIC METABOLIC PNL TOTAL CA: CPT | Performed by: INTERNAL MEDICINE

## 2020-12-30 PROCEDURE — 99495 TRANSJ CARE MGMT MOD F2F 14D: CPT | Performed by: INTERNAL MEDICINE

## 2020-12-30 PROCEDURE — 36415 COLL VENOUS BLD VENIPUNCTURE: CPT | Performed by: INTERNAL MEDICINE

## 2020-12-30 PROCEDURE — 83880 ASSAY OF NATRIURETIC PEPTIDE: CPT | Performed by: INTERNAL MEDICINE

## 2020-12-30 PROCEDURE — 85027 COMPLETE CBC AUTOMATED: CPT | Performed by: INTERNAL MEDICINE

## 2020-12-30 RX ORDER — ALBUTEROL SULFATE 90 UG/1
AEROSOL, METERED RESPIRATORY (INHALATION)
Qty: 1 INHALER | Refills: 3 | Status: SHIPPED | OUTPATIENT
Start: 2020-12-30 | End: 2021-04-07

## 2020-12-30 RX ORDER — LORAZEPAM 0.5 MG/1
0.5 TABLET ORAL AT BEDTIME
Qty: 10 TABLET | Refills: 0 | Status: SHIPPED | OUTPATIENT
Start: 2020-12-30 | End: 2021-03-03

## 2020-12-30 ASSESSMENT — MIFFLIN-ST. JEOR: SCORE: 1727.31

## 2020-12-30 NOTE — PROGRESS NOTES
Subjective     Justyn Olivas is a 80 year old male who presents to clinic today for the following health issues:    HPI    Acute dyspnea complicated by iron deficiency anemia and GI blood loss.  Evaluation demonstrated acute pericarditis with 450 cc of fluid drained during his hospitalization.  Drain was removed and he has had no reoccurrence of his dyspnea.  Transfused 1 unit of packed red blood cells in the hospital.  Did not receive his iron infusion.  Weight gain noted since discharge    Recent upper GI endoscopy showed esophagitis question vishal secondary candidiasis.  No acute GI bleeding     Sleeplessness  Hospital Follow-up Visit:    Hospital/Nursing Home/IP Rehab Facility: [facility]  Date of Admission: 12/20/2020  Date of Discharge: 12/23/2020  Reason(s) for Admission:     Pericardial effusion  Acute hypoxic respiratory distress secondary to above-resolved      Was your hospitalization related to COVID-19? No   Problems taking medications regularly:  None  Medication changes since discharge: None  Problems adhering to non-medication therapy:  None    Summary of hospitalization:  [facility] discharge summary reviewed  Diagnostic Tests/Treatments reviewed.  Follow up needed: none  Other Healthcare Providers Involved in Patient s Care:         None  Update since discharge: improved.       Post Discharge Medication Reconciliation: discharge medications reconciled, continue medications without change.  Plan of care communicated with patient and caregiver                  Review of Systems   Constitutional, HEENT, cardiovascular, pulmonary, GI, , musculoskeletal, neuro, skin, endocrine and psych systems are negative, except as otherwise noted.      Objective    BP (!) 172/91 (BP Location: Left arm, Patient Position: Sitting, Cuff Size: Adult Large)   Pulse 103   Temp 97.7  F (36.5  C) (Temporal)   Ht 1.829 m (6')   Wt 98.4 kg (217 lb)   SpO2 100%   BMI 29.43 kg/m    Body mass  index is 29.43 kg/m .  Physical Exam 138/84  GENERAL: healthy, alert and no distress  NECK: no adenopathy, no asymmetry, masses, or scars and thyroid normal to palpation  RESP: lungs clear to auscultation - no rales, rhonchi or wheezes  CV: regular rate and rhythm, normal S1 S2, no S3 or S4, no murmur, click or rub, no peripheral edema and peripheral pulses strong  ABDOMEN: soft, nontender, no hepatosplenomegaly, no masses and bowel sounds normal  MS: no gross musculoskeletal defects noted, 1+ pretibial edema  Neck veins not evaluatable          Assessment & Plan     Acute pericarditis, unspecified type  Resolved with removal during hospitalization    Type 2 diabetes mellitus without complication, with long-term current use of insulin (H)  Managing relatively well with noted improvement over the last 2 months    Paroxysmal atrial fibrillation (H)  No acute episodes 4 months    Acute right-sided congestive heart failure (H)  Rule out as a complication of his recent problems and address as anticipated him receiving an iron infusion.  Reviewed fluids with the patient and his wife recommending six 8 ounce glasses of beverage equivalents per day  - CBC with platelets  - Basic metabolic panel  - BNP-N terminal pro    Simple chronic bronchitis (H)  Managing well with current therapy    Anemia due to blood loss, acute  Follow-up iron infusion and reevaluate recent endoscopy managing adequately    Primary osteoarthritis involving multiple joints      Stage 3a chronic kidney disease  Serial follow-up indicated with anticipated upcoming diuresis and avoiding nephrotoxic drugs    Iron deficiency anemia due to chronic blood loss  Need to reschedule his iron infusion and follow his hemoglobin closely     Acute superficial gastritis with hemorrhage  Follow-up recent biopsy and continue to hold blood thinners until his hemoglobin is in a safe range    Insomnia, unspecified type  With the patient's recent acute course he is having  difficulty sleeping because of the fear of lying down.  Will recommend a short course of lorazepam to break the cycle and to reevaluate sleep aids in the near term  - LORazepam (ATIVAN) 0.5 MG tablet; Take 1 tablet (0.5 mg) by mouth At Bedtime     BMI:   Estimated body mass index is 29.43 kg/m  as calculated from the following:    Height as of this encounter: 1.829 m (6').    Weight as of this encounter: 98.4 kg (217 lb).            FUTURE APPOINTMENTS:       - Follow-up visit in 2 wks    Return in about 2 weeks (around 1/13/2021).    Bandar Greenberg MD  [facility]

## 2020-12-31 NOTE — TELEPHONE ENCOUNTER
Returned call. OK'd requested orders.  Orders will be faxed to PCP for review and signature.   Vita John RN

## 2021-01-01 ENCOUNTER — MEDICAL CORRESPONDENCE (OUTPATIENT)
Dept: HEALTH INFORMATION MANAGEMENT | Facility: CLINIC | Age: 81
End: 2021-01-01
Payer: MEDICARE

## 2021-01-01 ENCOUNTER — ALLIED HEALTH/NURSE VISIT (OUTPATIENT)
Dept: INFUSION THERAPY | Facility: CLINIC | Age: 81
End: 2021-01-01
Attending: INTERNAL MEDICINE
Payer: MEDICARE

## 2021-01-01 ENCOUNTER — MYC MEDICAL ADVICE (OUTPATIENT)
Dept: FAMILY MEDICINE | Facility: CLINIC | Age: 81
End: 2021-01-01

## 2021-01-01 ENCOUNTER — TELEPHONE (OUTPATIENT)
Dept: FAMILY MEDICINE | Facility: CLINIC | Age: 81
End: 2021-01-01

## 2021-01-01 ENCOUNTER — APPOINTMENT (OUTPATIENT)
Dept: ULTRASOUND IMAGING | Facility: CLINIC | Age: 81
DRG: 392 | End: 2021-01-01
Attending: EMERGENCY MEDICINE
Payer: MEDICARE

## 2021-01-01 ENCOUNTER — ANESTHESIA EVENT (OUTPATIENT)
Dept: SURGERY | Facility: CLINIC | Age: 81
End: 2021-01-01
Payer: MEDICARE

## 2021-01-01 ENCOUNTER — TELEPHONE (OUTPATIENT)
Dept: ONCOLOGY | Facility: CLINIC | Age: 81
End: 2021-01-01

## 2021-01-01 ENCOUNTER — OFFICE VISIT (OUTPATIENT)
Dept: FAMILY MEDICINE | Facility: CLINIC | Age: 81
End: 2021-01-01
Payer: MEDICARE

## 2021-01-01 ENCOUNTER — HOSPITAL ENCOUNTER (OUTPATIENT)
Facility: CLINIC | Age: 81
Discharge: HOME | End: 2021-09-02
Attending: SURGERY | Admitting: SURGERY
Payer: MEDICARE

## 2021-01-01 ENCOUNTER — TELEPHONE (OUTPATIENT)
Dept: SURGERY | Facility: CLINIC | Age: 81
End: 2021-01-01

## 2021-01-01 ENCOUNTER — APPOINTMENT (OUTPATIENT)
Dept: SURGERY | Facility: PHYSICIAN GROUP | Age: 81
End: 2021-01-01
Payer: MEDICARE

## 2021-01-01 ENCOUNTER — PATIENT OUTREACH (OUTPATIENT)
Dept: NURSING | Facility: CLINIC | Age: 81
End: 2021-01-01
Payer: MEDICARE

## 2021-01-01 ENCOUNTER — APPOINTMENT (OUTPATIENT)
Dept: GENERAL RADIOLOGY | Facility: CLINIC | Age: 81
End: 2021-01-01
Attending: EMERGENCY MEDICINE
Payer: MEDICARE

## 2021-01-01 ENCOUNTER — HOSPITAL ENCOUNTER (OUTPATIENT)
Facility: CLINIC | Age: 81
Discharge: HOME | End: 2021-08-19
Attending: INTERNAL MEDICINE | Admitting: INTERNAL MEDICINE
Payer: MEDICARE

## 2021-01-01 ENCOUNTER — NURSE TRIAGE (OUTPATIENT)
Dept: NURSING | Facility: CLINIC | Age: 81
End: 2021-01-01

## 2021-01-01 ENCOUNTER — PATIENT OUTREACH (OUTPATIENT)
Dept: CARE COORDINATION | Facility: CLINIC | Age: 81
End: 2021-01-01

## 2021-01-01 ENCOUNTER — NURSE TRIAGE (OUTPATIENT)
Dept: FAMILY MEDICINE | Facility: CLINIC | Age: 81
End: 2021-01-01

## 2021-01-01 ENCOUNTER — OFFICE VISIT (OUTPATIENT)
Dept: SURGERY | Facility: CLINIC | Age: 81
End: 2021-01-01
Payer: MEDICARE

## 2021-01-01 ENCOUNTER — HOSPITAL ENCOUNTER (OUTPATIENT)
Dept: NUCLEAR MEDICINE | Facility: CLINIC | Age: 81
Setting detail: NUCLEAR MEDICINE
Discharge: HOME | End: 2021-08-18
Attending: SURGERY | Admitting: SURGERY
Payer: MEDICARE

## 2021-01-01 ENCOUNTER — TELEPHONE (OUTPATIENT)
Dept: SURGERY | Facility: CLINIC | Age: 81
End: 2021-01-01
Payer: MEDICARE

## 2021-01-01 ENCOUNTER — TRANSFERRED RECORDS (OUTPATIENT)
Dept: HEALTH INFORMATION MANAGEMENT | Facility: CLINIC | Age: 81
End: 2021-01-01

## 2021-01-01 ENCOUNTER — APPOINTMENT (OUTPATIENT)
Dept: CT IMAGING | Facility: CLINIC | Age: 81
DRG: 392 | End: 2021-01-01
Attending: EMERGENCY MEDICINE
Payer: MEDICARE

## 2021-01-01 ENCOUNTER — APPOINTMENT (OUTPATIENT)
Dept: CT IMAGING | Facility: CLINIC | Age: 81
End: 2021-01-01
Attending: EMERGENCY MEDICINE
Payer: MEDICARE

## 2021-01-01 ENCOUNTER — LAB (OUTPATIENT)
Dept: LAB | Facility: CLINIC | Age: 81
End: 2021-01-01
Payer: MEDICARE

## 2021-01-01 ENCOUNTER — HOSPITAL ENCOUNTER (OUTPATIENT)
Facility: CLINIC | Age: 81
Discharge: HOME | End: 2021-09-08
Attending: INTERNAL MEDICINE | Admitting: INTERNAL MEDICINE
Payer: MEDICARE

## 2021-01-01 ENCOUNTER — HOSPITAL ENCOUNTER (OUTPATIENT)
Dept: PHYSICAL THERAPY | Facility: CLINIC | Age: 81
Setting detail: THERAPIES SERIES
End: 2021-12-08
Attending: INTERNAL MEDICINE
Payer: MEDICARE

## 2021-01-01 ENCOUNTER — MYC MEDICAL ADVICE (OUTPATIENT)
Dept: FAMILY MEDICINE | Facility: CLINIC | Age: 81
End: 2021-01-01
Payer: MEDICARE

## 2021-01-01 ENCOUNTER — LAB (OUTPATIENT)
Dept: URGENT CARE | Facility: URGENT CARE | Age: 81
End: 2021-01-01
Attending: INTERNAL MEDICINE
Payer: MEDICARE

## 2021-01-01 ENCOUNTER — HOSPITAL ENCOUNTER (OUTPATIENT)
Facility: CLINIC | Age: 81
End: 2021-07-29
Attending: INTERNAL MEDICINE
Payer: MEDICARE

## 2021-01-01 ENCOUNTER — ANCILLARY PROCEDURE (OUTPATIENT)
Dept: GENERAL RADIOLOGY | Facility: CLINIC | Age: 81
End: 2021-01-01
Attending: INTERNAL MEDICINE
Payer: MEDICARE

## 2021-01-01 ENCOUNTER — HOSPITAL ENCOUNTER (OUTPATIENT)
Dept: CT IMAGING | Facility: CLINIC | Age: 81
Discharge: HOME | End: 2021-07-22
Attending: INTERNAL MEDICINE | Admitting: INTERNAL MEDICINE
Payer: MEDICARE

## 2021-01-01 ENCOUNTER — HOSPITAL ENCOUNTER (OUTPATIENT)
Dept: CT IMAGING | Facility: CLINIC | Age: 81
Discharge: HOME | End: 2021-09-12
Attending: SURGERY | Admitting: SURGERY
Payer: MEDICARE

## 2021-01-01 ENCOUNTER — HOSPITAL ENCOUNTER (INPATIENT)
Facility: CLINIC | Age: 81
LOS: 1 days | Discharge: HOME | DRG: 392 | End: 2021-08-05
Attending: EMERGENCY MEDICINE | Admitting: INTERNAL MEDICINE
Payer: MEDICARE

## 2021-01-01 ENCOUNTER — HOSPITAL ENCOUNTER (OUTPATIENT)
Dept: ULTRASOUND IMAGING | Facility: CLINIC | Age: 81
Discharge: HOME | End: 2021-08-12
Attending: INTERNAL MEDICINE | Admitting: INTERNAL MEDICINE
Payer: MEDICARE

## 2021-01-01 ENCOUNTER — VIRTUAL VISIT (OUTPATIENT)
Dept: FAMILY MEDICINE | Facility: CLINIC | Age: 81
End: 2021-01-01
Payer: MEDICARE

## 2021-01-01 ENCOUNTER — APPOINTMENT (OUTPATIENT)
Dept: GENERAL RADIOLOGY | Facility: CLINIC | Age: 81
DRG: 392 | End: 2021-01-01
Attending: HOSPITALIST
Payer: MEDICARE

## 2021-01-01 ENCOUNTER — MYC MEDICAL ADVICE (OUTPATIENT)
Dept: ONCOLOGY | Facility: CLINIC | Age: 81
End: 2021-01-01
Payer: MEDICARE

## 2021-01-01 ENCOUNTER — HOSPITAL ENCOUNTER (OUTPATIENT)
Dept: CT IMAGING | Facility: CLINIC | Age: 81
Discharge: HOME | End: 2021-12-23
Attending: SURGERY | Admitting: SURGERY
Payer: MEDICARE

## 2021-01-01 ENCOUNTER — HOSPITAL ENCOUNTER (OUTPATIENT)
Facility: CLINIC | Age: 81
Setting detail: SPECIMEN
Discharge: HOME | End: 2021-07-01
Attending: INTERNAL MEDICINE | Admitting: INTERNAL MEDICINE
Payer: MEDICARE

## 2021-01-01 ENCOUNTER — ANESTHESIA (OUTPATIENT)
Dept: SURGERY | Facility: CLINIC | Age: 81
End: 2021-01-01
Payer: MEDICARE

## 2021-01-01 ENCOUNTER — HOSPITAL ENCOUNTER (OUTPATIENT)
Dept: NUCLEAR MEDICINE | Facility: CLINIC | Age: 81
Setting detail: NUCLEAR MEDICINE
Discharge: HOME | End: 2021-09-16
Attending: SURGERY | Admitting: SURGERY
Payer: MEDICARE

## 2021-01-01 ENCOUNTER — NURSE TRIAGE (OUTPATIENT)
Dept: FAMILY MEDICINE | Facility: CLINIC | Age: 81
End: 2021-01-01
Payer: MEDICARE

## 2021-01-01 ENCOUNTER — PREP FOR PROCEDURE (OUTPATIENT)
Dept: SURGERY | Facility: CLINIC | Age: 81
End: 2021-01-01

## 2021-01-01 ENCOUNTER — HOSPITAL ENCOUNTER (OUTPATIENT)
Facility: CLINIC | Age: 81
Setting detail: SPECIMEN
Discharge: HOME | End: 2021-06-17
Attending: INTERNAL MEDICINE | Admitting: INTERNAL MEDICINE
Payer: MEDICARE

## 2021-01-01 ENCOUNTER — HOSPITAL ENCOUNTER (EMERGENCY)
Facility: CLINIC | Age: 81
Discharge: HOME | End: 2021-10-13
Attending: EMERGENCY MEDICINE | Admitting: EMERGENCY MEDICINE
Payer: MEDICARE

## 2021-01-01 VITALS
OXYGEN SATURATION: 99 % | DIASTOLIC BLOOD PRESSURE: 74 MMHG | SYSTOLIC BLOOD PRESSURE: 134 MMHG | RESPIRATION RATE: 20 BRPM | TEMPERATURE: 97.8 F | HEART RATE: 93 BPM

## 2021-01-01 VITALS
DIASTOLIC BLOOD PRESSURE: 90 MMHG | RESPIRATION RATE: 16 BRPM | SYSTOLIC BLOOD PRESSURE: 168 MMHG | WEIGHT: 210 LBS | OXYGEN SATURATION: 98 % | HEART RATE: 93 BPM | TEMPERATURE: 97.8 F | BODY MASS INDEX: 28.48 KG/M2

## 2021-01-01 VITALS
OXYGEN SATURATION: 95 % | DIASTOLIC BLOOD PRESSURE: 66 MMHG | BODY MASS INDEX: 28.07 KG/M2 | WEIGHT: 207.23 LBS | HEIGHT: 72 IN | RESPIRATION RATE: 17 BRPM | TEMPERATURE: 98.1 F | SYSTOLIC BLOOD PRESSURE: 118 MMHG | HEART RATE: 81 BPM

## 2021-01-01 VITALS
SYSTOLIC BLOOD PRESSURE: 146 MMHG | DIASTOLIC BLOOD PRESSURE: 89 MMHG | RESPIRATION RATE: 20 BRPM | HEART RATE: 96 BPM | TEMPERATURE: 98 F

## 2021-01-01 VITALS
DIASTOLIC BLOOD PRESSURE: 79 MMHG | WEIGHT: 202 LBS | BODY MASS INDEX: 27.36 KG/M2 | HEIGHT: 72 IN | OXYGEN SATURATION: 98 % | SYSTOLIC BLOOD PRESSURE: 136 MMHG | TEMPERATURE: 97.7 F | HEART RATE: 88 BPM

## 2021-01-01 VITALS
OXYGEN SATURATION: 100 % | RESPIRATION RATE: 18 BRPM | HEART RATE: 95 BPM | TEMPERATURE: 98.3 F | DIASTOLIC BLOOD PRESSURE: 74 MMHG | SYSTOLIC BLOOD PRESSURE: 132 MMHG

## 2021-01-01 VITALS
RESPIRATION RATE: 18 BRPM | SYSTOLIC BLOOD PRESSURE: 121 MMHG | HEART RATE: 92 BPM | OXYGEN SATURATION: 99 % | TEMPERATURE: 97.9 F | DIASTOLIC BLOOD PRESSURE: 65 MMHG

## 2021-01-01 VITALS
TEMPERATURE: 97.1 F | SYSTOLIC BLOOD PRESSURE: 110 MMHG | OXYGEN SATURATION: 97 % | HEART RATE: 96 BPM | HEIGHT: 72 IN | BODY MASS INDEX: 28.04 KG/M2 | WEIGHT: 207 LBS | DIASTOLIC BLOOD PRESSURE: 69 MMHG

## 2021-01-01 VITALS
RESPIRATION RATE: 16 BRPM | TEMPERATURE: 98.3 F | SYSTOLIC BLOOD PRESSURE: 130 MMHG | DIASTOLIC BLOOD PRESSURE: 70 MMHG | HEART RATE: 100 BPM | OXYGEN SATURATION: 98 %

## 2021-01-01 VITALS
TEMPERATURE: 97.5 F | BODY MASS INDEX: 29.04 KG/M2 | RESPIRATION RATE: 14 BRPM | HEART RATE: 79 BPM | HEIGHT: 72 IN | WEIGHT: 214.4 LBS | DIASTOLIC BLOOD PRESSURE: 97 MMHG | OXYGEN SATURATION: 96 % | SYSTOLIC BLOOD PRESSURE: 152 MMHG

## 2021-01-01 VITALS
WEIGHT: 198 LBS | TEMPERATURE: 98.1 F | HEART RATE: 89 BPM | RESPIRATION RATE: 18 BRPM | DIASTOLIC BLOOD PRESSURE: 80 MMHG | SYSTOLIC BLOOD PRESSURE: 127 MMHG | BODY MASS INDEX: 26.82 KG/M2 | HEIGHT: 72 IN | OXYGEN SATURATION: 100 %

## 2021-01-01 VITALS
WEIGHT: 216 LBS | OXYGEN SATURATION: 98 % | BODY MASS INDEX: 29.29 KG/M2 | SYSTOLIC BLOOD PRESSURE: 174 MMHG | HEART RATE: 89 BPM | RESPIRATION RATE: 20 BRPM | TEMPERATURE: 97.3 F | DIASTOLIC BLOOD PRESSURE: 82 MMHG

## 2021-01-01 VITALS
HEART RATE: 78 BPM | DIASTOLIC BLOOD PRESSURE: 79 MMHG | TEMPERATURE: 98 F | SYSTOLIC BLOOD PRESSURE: 152 MMHG | OXYGEN SATURATION: 99 %

## 2021-01-01 VITALS
TEMPERATURE: 97.8 F | RESPIRATION RATE: 16 BRPM | SYSTOLIC BLOOD PRESSURE: 118 MMHG | DIASTOLIC BLOOD PRESSURE: 77 MMHG | HEART RATE: 97 BPM

## 2021-01-01 VITALS
DIASTOLIC BLOOD PRESSURE: 68 MMHG | OXYGEN SATURATION: 97 % | WEIGHT: 207.6 LBS | SYSTOLIC BLOOD PRESSURE: 100 MMHG | HEIGHT: 72 IN | RESPIRATION RATE: 16 BRPM | HEART RATE: 96 BPM | TEMPERATURE: 98 F | BODY MASS INDEX: 28.12 KG/M2

## 2021-01-01 VITALS
TEMPERATURE: 97.7 F | WEIGHT: 208 LBS | SYSTOLIC BLOOD PRESSURE: 126 MMHG | DIASTOLIC BLOOD PRESSURE: 70 MMHG | BODY MASS INDEX: 28.21 KG/M2 | HEART RATE: 105 BPM | RESPIRATION RATE: 16 BRPM

## 2021-01-01 VITALS
SYSTOLIC BLOOD PRESSURE: 128 MMHG | HEART RATE: 104 BPM | TEMPERATURE: 97.3 F | OXYGEN SATURATION: 99 % | DIASTOLIC BLOOD PRESSURE: 70 MMHG | RESPIRATION RATE: 20 BRPM | BODY MASS INDEX: 28.82 KG/M2 | WEIGHT: 212.5 LBS

## 2021-01-01 VITALS
DIASTOLIC BLOOD PRESSURE: 98 MMHG | SYSTOLIC BLOOD PRESSURE: 168 MMHG | TEMPERATURE: 98.7 F | HEART RATE: 84 BPM | RESPIRATION RATE: 16 BRPM | OXYGEN SATURATION: 99 %

## 2021-01-01 VITALS
OXYGEN SATURATION: 97 % | SYSTOLIC BLOOD PRESSURE: 132 MMHG | HEART RATE: 92 BPM | RESPIRATION RATE: 16 BRPM | DIASTOLIC BLOOD PRESSURE: 74 MMHG | TEMPERATURE: 98.1 F

## 2021-01-01 VITALS
SYSTOLIC BLOOD PRESSURE: 145 MMHG | OXYGEN SATURATION: 98 % | RESPIRATION RATE: 16 BRPM | HEART RATE: 83 BPM | TEMPERATURE: 98.8 F | DIASTOLIC BLOOD PRESSURE: 90 MMHG

## 2021-01-01 VITALS
HEART RATE: 91 BPM | OXYGEN SATURATION: 98 % | DIASTOLIC BLOOD PRESSURE: 88 MMHG | TEMPERATURE: 97.8 F | RESPIRATION RATE: 18 BRPM | SYSTOLIC BLOOD PRESSURE: 163 MMHG

## 2021-01-01 VITALS
OXYGEN SATURATION: 100 % | HEART RATE: 81 BPM | DIASTOLIC BLOOD PRESSURE: 82 MMHG | HEIGHT: 72 IN | SYSTOLIC BLOOD PRESSURE: 146 MMHG | WEIGHT: 211 LBS | TEMPERATURE: 97.7 F | BODY MASS INDEX: 28.58 KG/M2

## 2021-01-01 VITALS — OXYGEN SATURATION: 93 %

## 2021-01-01 VITALS — SYSTOLIC BLOOD PRESSURE: 136 MMHG | DIASTOLIC BLOOD PRESSURE: 74 MMHG | HEART RATE: 94 BPM | RESPIRATION RATE: 18 BRPM

## 2021-01-01 DIAGNOSIS — R07.81 RIB PAIN ON RIGHT SIDE: ICD-10-CM

## 2021-01-01 DIAGNOSIS — Z20.822 COVID-19 RULED OUT: ICD-10-CM

## 2021-01-01 DIAGNOSIS — R10.11 RIGHT UPPER QUADRANT ABDOMINAL PAIN: ICD-10-CM

## 2021-01-01 DIAGNOSIS — D63.1 ANEMIA DUE TO STAGE 3 CHRONIC KIDNEY DISEASE, UNSPECIFIED WHETHER STAGE 3A OR 3B CKD (H): Primary | ICD-10-CM

## 2021-01-01 DIAGNOSIS — D36.7 BENIGN NEOPLASM OF OTHER SPECIFIED SITES: ICD-10-CM

## 2021-01-01 DIAGNOSIS — K80.50 BILIARY COLIC: ICD-10-CM

## 2021-01-01 DIAGNOSIS — Z71.89 OTHER SPECIFIED COUNSELING: ICD-10-CM

## 2021-01-01 DIAGNOSIS — Z23 NEED FOR PROPHYLACTIC VACCINATION AND INOCULATION AGAINST INFLUENZA: ICD-10-CM

## 2021-01-01 DIAGNOSIS — E11.22 CKD STAGE 4 DUE TO TYPE 2 DIABETES MELLITUS (H): ICD-10-CM

## 2021-01-01 DIAGNOSIS — N18.4 CKD STAGE 4 DUE TO TYPE 2 DIABETES MELLITUS (H): ICD-10-CM

## 2021-01-01 DIAGNOSIS — N18.30 ANEMIA DUE TO STAGE 3 CHRONIC KIDNEY DISEASE, UNSPECIFIED WHETHER STAGE 3A OR 3B CKD (H): ICD-10-CM

## 2021-01-01 DIAGNOSIS — M25.519 SHOULDER PAIN, UNSPECIFIED CHRONICITY, UNSPECIFIED LATERALITY: ICD-10-CM

## 2021-01-01 DIAGNOSIS — D50.9 IRON DEFICIENCY ANEMIA, UNSPECIFIED IRON DEFICIENCY ANEMIA TYPE: ICD-10-CM

## 2021-01-01 DIAGNOSIS — E11.22 TYPE 2 DIABETES MELLITUS WITH STAGE 3B CHRONIC KIDNEY DISEASE, WITH LONG-TERM CURRENT USE OF INSULIN (H): ICD-10-CM

## 2021-01-01 DIAGNOSIS — N18.30 ANEMIA DUE TO STAGE 3 CHRONIC KIDNEY DISEASE, UNSPECIFIED WHETHER STAGE 3A OR 3B CKD (H): Primary | ICD-10-CM

## 2021-01-01 DIAGNOSIS — K82.8 BILIARY DYSKINESIA: Primary | ICD-10-CM

## 2021-01-01 DIAGNOSIS — Z20.822 COVID-19 RULED OUT: Primary | ICD-10-CM

## 2021-01-01 DIAGNOSIS — Z79.4 TYPE 2 DIABETES MELLITUS WITH STAGE 3B CHRONIC KIDNEY DISEASE, WITH LONG-TERM CURRENT USE OF INSULIN (H): Primary | ICD-10-CM

## 2021-01-01 DIAGNOSIS — Z11.59 ENCOUNTER FOR SCREENING FOR OTHER VIRAL DISEASES: ICD-10-CM

## 2021-01-01 DIAGNOSIS — Z79.4 TYPE 2 DIABETES MELLITUS WITHOUT COMPLICATION, WITH LONG-TERM CURRENT USE OF INSULIN (H): ICD-10-CM

## 2021-01-01 DIAGNOSIS — M16.0 PRIMARY OSTEOARTHRITIS OF BOTH HIPS: ICD-10-CM

## 2021-01-01 DIAGNOSIS — Z79.4 TYPE 2 DIABETES MELLITUS WITH STAGE 3B CHRONIC KIDNEY DISEASE, WITH LONG-TERM CURRENT USE OF INSULIN (H): ICD-10-CM

## 2021-01-01 DIAGNOSIS — I63.40 CEREBROVASCULAR ACCIDENT (CVA) DUE TO EMBOLISM OF CEREBRAL ARTERY (H): ICD-10-CM

## 2021-01-01 DIAGNOSIS — F32.0 MILD MAJOR DEPRESSION (H): ICD-10-CM

## 2021-01-01 DIAGNOSIS — D50.8 OTHER IRON DEFICIENCY ANEMIA: ICD-10-CM

## 2021-01-01 DIAGNOSIS — Z01.818 PREOP GENERAL PHYSICAL EXAM: Primary | ICD-10-CM

## 2021-01-01 DIAGNOSIS — E11.9 TYPE 2 DIABETES MELLITUS WITHOUT COMPLICATION, WITH LONG-TERM CURRENT USE OF INSULIN (H): ICD-10-CM

## 2021-01-01 DIAGNOSIS — D50.0 IRON DEFICIENCY ANEMIA DUE TO CHRONIC BLOOD LOSS: ICD-10-CM

## 2021-01-01 DIAGNOSIS — E87.6 HYPOKALEMIA: ICD-10-CM

## 2021-01-01 DIAGNOSIS — D64.9 ANEMIA, UNSPECIFIED TYPE: ICD-10-CM

## 2021-01-01 DIAGNOSIS — E61.1 IRON DEFICIENCY: Primary | ICD-10-CM
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
## 2021-01-01 DIAGNOSIS — R10.11 RIGHT UPPER QUADRANT ABDOMINAL PAIN: Primary | ICD-10-CM

## 2021-01-01 DIAGNOSIS — D50.0 IRON DEFICIENCY ANEMIA DUE TO CHRONIC BLOOD LOSS: Primary | ICD-10-CM

## 2021-01-01 DIAGNOSIS — M79.642 PAIN IN BOTH HANDS: ICD-10-CM

## 2021-01-01 DIAGNOSIS — K59.00 CONSTIPATION, UNSPECIFIED CONSTIPATION TYPE: Primary | ICD-10-CM

## 2021-01-01 DIAGNOSIS — R07.81 RIB PAIN ON RIGHT SIDE: Primary | ICD-10-CM

## 2021-01-01 DIAGNOSIS — I10 BENIGN ESSENTIAL HYPERTENSION: Primary | ICD-10-CM

## 2021-01-01 DIAGNOSIS — I50.32 CHRONIC DIASTOLIC HEART FAILURE WITH PRESERVED EJECTION FRACTION (H): ICD-10-CM

## 2021-01-01 DIAGNOSIS — M79.641 PAIN IN BOTH HANDS: ICD-10-CM

## 2021-01-01 DIAGNOSIS — R26.89 LOSS OF BALANCE: ICD-10-CM

## 2021-01-01 DIAGNOSIS — N18.32 TYPE 2 DIABETES MELLITUS WITH STAGE 3B CHRONIC KIDNEY DISEASE, WITH LONG-TERM CURRENT USE OF INSULIN (H): ICD-10-CM

## 2021-01-01 DIAGNOSIS — K83.8: ICD-10-CM

## 2021-01-01 DIAGNOSIS — K29.01 ACUTE SUPERFICIAL GASTRITIS WITH HEMORRHAGE: ICD-10-CM

## 2021-01-01 DIAGNOSIS — R10.11 RUQ ABDOMINAL PAIN: Primary | ICD-10-CM

## 2021-01-01 DIAGNOSIS — D63.1 ANEMIA DUE TO STAGE 3 CHRONIC KIDNEY DISEASE, UNSPECIFIED WHETHER STAGE 3A OR 3B CKD (H): ICD-10-CM

## 2021-01-01 DIAGNOSIS — D50.8 OTHER IRON DEFICIENCY ANEMIA: Primary | ICD-10-CM

## 2021-01-01 DIAGNOSIS — E11.22 TYPE 2 DIABETES MELLITUS WITH STAGE 3B CHRONIC KIDNEY DISEASE, WITH LONG-TERM CURRENT USE OF INSULIN (H): Primary | ICD-10-CM

## 2021-01-01 DIAGNOSIS — D51.3 OTHER DIETARY VITAMIN B12 DEFICIENCY ANEMIA: ICD-10-CM

## 2021-01-01 DIAGNOSIS — M54.2 NECK PAIN: ICD-10-CM

## 2021-01-01 DIAGNOSIS — G47.00 INSOMNIA, UNSPECIFIED TYPE: ICD-10-CM

## 2021-01-01 DIAGNOSIS — N18.32 TYPE 2 DIABETES MELLITUS WITH STAGE 3B CHRONIC KIDNEY DISEASE, WITH LONG-TERM CURRENT USE OF INSULIN (H): Primary | ICD-10-CM

## 2021-01-01 DIAGNOSIS — K80.50 BILIARY COLIC: Primary | ICD-10-CM

## 2021-01-01 DIAGNOSIS — E78.5 HYPERLIPIDEMIA LDL GOAL <100: ICD-10-CM

## 2021-01-01 DIAGNOSIS — S09.90XA TRAUMATIC INJURY OF HEAD, INITIAL ENCOUNTER: ICD-10-CM

## 2021-01-01 DIAGNOSIS — Z90.49 S/P CHOLECYSTECTOMY: ICD-10-CM
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
## 2021-01-01 LAB
ALBUMIN SERPL-MCNC: 3 G/DL (ref 3.4–5)
ALBUMIN SERPL-MCNC: 3.2 G/DL (ref 3.4–5)
ALBUMIN SERPL-MCNC: 3.3 G/DL (ref 3.4–5)
ALBUMIN SERPL-MCNC: 3.4 G/DL (ref 3.4–5)
ALBUMIN SERPL-MCNC: 3.6 G/DL (ref 3.4–5)
ALBUMIN SERPL-MCNC: 3.8 G/DL (ref 3.4–5)
ALBUMIN UR-MCNC: NEGATIVE MG/DL
ALP SERPL-CCNC: 109 U/L (ref 40–150)
ALP SERPL-CCNC: 109 U/L (ref 40–150)
ALP SERPL-CCNC: 132 U/L (ref 40–150)
ALP SERPL-CCNC: 133 U/L (ref 40–150)
ALP SERPL-CCNC: 163 U/L (ref 40–150)
ALP SERPL-CCNC: 93 U/L (ref 40–150)
ALT SERPL W P-5'-P-CCNC: 20 U/L (ref 0–70)
ALT SERPL W P-5'-P-CCNC: 21 U/L (ref 0–70)
ALT SERPL W P-5'-P-CCNC: 24 U/L (ref 0–70)
ALT SERPL W P-5'-P-CCNC: 27 U/L (ref 0–70)
ANION GAP SERPL CALCULATED.3IONS-SCNC: 1 MMOL/L (ref 3–14)
ANION GAP SERPL CALCULATED.3IONS-SCNC: 2 MMOL/L (ref 3–14)
ANION GAP SERPL CALCULATED.3IONS-SCNC: 3 MMOL/L (ref 3–14)
ANION GAP SERPL CALCULATED.3IONS-SCNC: 3 MMOL/L (ref 3–14)
ANION GAP SERPL CALCULATED.3IONS-SCNC: 4 MMOL/L (ref 3–14)
ANION GAP SERPL CALCULATED.3IONS-SCNC: 5 MMOL/L (ref 3–14)
ANION GAP SERPL CALCULATED.3IONS-SCNC: 5 MMOL/L (ref 3–14)
ANION GAP SERPL CALCULATED.3IONS-SCNC: 6 MMOL/L (ref 3–14)
ANION GAP SERPL CALCULATED.3IONS-SCNC: 7 MMOL/L (ref 3–14)
ANION GAP SERPL CALCULATED.3IONS-SCNC: <1 MMOL/L (ref 3–14)
APPEARANCE UR: CLEAR
AST SERPL W P-5'-P-CCNC: 12 U/L (ref 0–45)
AST SERPL W P-5'-P-CCNC: 13 U/L (ref 0–45)
AST SERPL W P-5'-P-CCNC: 15 U/L (ref 0–45)
AST SERPL W P-5'-P-CCNC: 19 U/L (ref 0–45)
ATRIAL RATE - MUSE: 86 BPM
ATRIAL RATE - MUSE: 91 BPM
BASOPHILS # BLD AUTO: 0 10E3/UL (ref 0–0.2)
BASOPHILS # BLD AUTO: 0 10E9/L (ref 0–0.2)
BASOPHILS # BLD AUTO: 0.1 10E3/UL (ref 0–0.2)
BASOPHILS NFR BLD AUTO: 0 %
BASOPHILS NFR BLD AUTO: 0.6 %
BASOPHILS NFR BLD AUTO: 1 %
BILIRUB SERPL-MCNC: 0.4 MG/DL (ref 0.2–1.3)
BILIRUB SERPL-MCNC: 0.5 MG/DL (ref 0.2–1.3)
BILIRUB SERPL-MCNC: 0.5 MG/DL (ref 0.2–1.3)
BILIRUB UR QL STRIP: NEGATIVE
BUN SERPL-MCNC: 25 MG/DL (ref 7–30)
BUN SERPL-MCNC: 26 MG/DL (ref 7–30)
BUN SERPL-MCNC: 28 MG/DL (ref 7–30)
BUN SERPL-MCNC: 32 MG/DL (ref 7–30)
BUN SERPL-MCNC: 36 MG/DL (ref 7–30)
BUN SERPL-MCNC: 38 MG/DL (ref 7–30)
BUN SERPL-MCNC: 40 MG/DL (ref 7–30)
BUN SERPL-MCNC: 47 MG/DL (ref 7–30)
BUN SERPL-MCNC: 48 MG/DL (ref 7–30)
BUN SERPL-MCNC: 50 MG/DL (ref 7–30)
BUN SERPL-MCNC: 54 MG/DL (ref 7–30)
BUN SERPL-MCNC: 54 MG/DL (ref 7–30)
CALCIUM SERPL-MCNC: 7.7 MG/DL (ref 8.5–10.1)
CALCIUM SERPL-MCNC: 7.9 MG/DL (ref 8.5–10.1)
CALCIUM SERPL-MCNC: 8.1 MG/DL (ref 8.5–10.1)
CALCIUM SERPL-MCNC: 8.1 MG/DL (ref 8.5–10.1)
CALCIUM SERPL-MCNC: 8.2 MG/DL (ref 8.5–10.1)
CALCIUM SERPL-MCNC: 8.5 MG/DL (ref 8.5–10.1)
CALCIUM SERPL-MCNC: 8.9 MG/DL (ref 8.5–10.1)
CALCIUM SERPL-MCNC: 9 MG/DL (ref 8.5–10.1)
CALCIUM SERPL-MCNC: 9.1 MG/DL (ref 8.5–10.1)
CHLORIDE BLD-SCNC: 102 MMOL/L (ref 94–109)
CHLORIDE BLD-SCNC: 103 MMOL/L (ref 94–109)
CHLORIDE BLD-SCNC: 103 MMOL/L (ref 94–109)
CHLORIDE BLD-SCNC: 106 MMOL/L (ref 94–109)
CHLORIDE BLD-SCNC: 108 MMOL/L (ref 94–109)
CHLORIDE BLD-SCNC: 110 MMOL/L (ref 94–109)
CHLORIDE BLD-SCNC: 111 MMOL/L (ref 94–109)
CHLORIDE BLD-SCNC: 113 MMOL/L (ref 94–109)
CHLORIDE BLD-SCNC: 97 MMOL/L (ref 94–109)
CHLORIDE SERPL-SCNC: 103 MMOL/L (ref 94–109)
CHLORIDE SERPL-SCNC: 106 MMOL/L (ref 94–109)
CHLORIDE SERPL-SCNC: 95 MMOL/L (ref 94–109)
CK SERPL-CCNC: 48 U/L (ref 30–300)
CO2 SERPL-SCNC: 23 MMOL/L (ref 20–32)
CO2 SERPL-SCNC: 25 MMOL/L (ref 20–32)
CO2 SERPL-SCNC: 28 MMOL/L (ref 20–32)
CO2 SERPL-SCNC: 31 MMOL/L (ref 20–32)
CO2 SERPL-SCNC: 33 MMOL/L (ref 20–32)
CO2 SERPL-SCNC: 34 MMOL/L (ref 20–32)
CO2 SERPL-SCNC: 35 MMOL/L (ref 20–32)
CO2 SERPL-SCNC: 36 MMOL/L (ref 20–32)
CO2 SERPL-SCNC: 37 MMOL/L (ref 20–32)
CO2 SERPL-SCNC: 40 MMOL/L (ref 20–32)
COLOR UR AUTO: ABNORMAL
CREAT BLD-MCNC: 2.1 MG/DL (ref 0.7–1.3)
CREAT SERPL-MCNC: 2.07 MG/DL (ref 0.66–1.25)
CREAT SERPL-MCNC: 2.16 MG/DL (ref 0.66–1.25)
CREAT SERPL-MCNC: 2.17 MG/DL (ref 0.66–1.25)
CREAT SERPL-MCNC: 2.22 MG/DL (ref 0.66–1.25)
CREAT SERPL-MCNC: 2.47 MG/DL (ref 0.66–1.25)
CREAT SERPL-MCNC: 2.54 MG/DL (ref 0.66–1.25)
CREAT SERPL-MCNC: 2.63 MG/DL (ref 0.66–1.25)
CREAT SERPL-MCNC: 2.69 MG/DL (ref 0.66–1.25)
CREAT SERPL-MCNC: 2.84 MG/DL (ref 0.66–1.25)
CREAT SERPL-MCNC: 2.87 MG/DL (ref 0.66–1.25)
CREAT SERPL-MCNC: 3.03 MG/DL (ref 0.66–1.25)
CREAT SERPL-MCNC: 3.13 MG/DL (ref 0.66–1.25)
CRP SERPL-MCNC: 3.6 MG/L (ref 0–8)
DIASTOLIC BLOOD PRESSURE - MUSE: NORMAL MMHG
DIASTOLIC BLOOD PRESSURE - MUSE: NORMAL MMHG
DIFFERENTIAL METHOD BLD: ABNORMAL
EOSINOPHIL # BLD AUTO: 0.2 10E9/L (ref 0–0.7)
EOSINOPHIL # BLD AUTO: 0.3 10E3/UL (ref 0–0.7)
EOSINOPHIL # BLD AUTO: 0.4 10E3/UL (ref 0–0.7)
EOSINOPHIL NFR BLD AUTO: 3.4 %
EOSINOPHIL NFR BLD AUTO: 5 %
EOSINOPHIL NFR BLD AUTO: 5 %
ERYTHROCYTE [DISTWIDTH] IN BLOOD BY AUTOMATED COUNT: 14.1 % (ref 10–15)
ERYTHROCYTE [DISTWIDTH] IN BLOOD BY AUTOMATED COUNT: 14.7 % (ref 10–15)
ERYTHROCYTE [DISTWIDTH] IN BLOOD BY AUTOMATED COUNT: 14.8 % (ref 10–15)
ERYTHROCYTE [DISTWIDTH] IN BLOOD BY AUTOMATED COUNT: 14.9 % (ref 10–15)
ERYTHROCYTE [DISTWIDTH] IN BLOOD BY AUTOMATED COUNT: 15.3 % (ref 10–15)
ERYTHROCYTE [DISTWIDTH] IN BLOOD BY AUTOMATED COUNT: 15.5 % (ref 10–15)
ERYTHROCYTE [DISTWIDTH] IN BLOOD BY AUTOMATED COUNT: 15.5 % (ref 10–15)
ERYTHROCYTE [DISTWIDTH] IN BLOOD BY AUTOMATED COUNT: 15.9 % (ref 10–15)
ERYTHROCYTE [DISTWIDTH] IN BLOOD BY AUTOMATED COUNT: 15.9 % (ref 10–15)
ERYTHROCYTE [DISTWIDTH] IN BLOOD BY AUTOMATED COUNT: 16.2 % (ref 10–15)
FERRITIN SERPL-MCNC: 12 NG/ML (ref 26–388)
FERRITIN SERPL-MCNC: 14 NG/ML (ref 26–388)
FERRITIN SERPL-MCNC: 190 NG/ML (ref 26–388)
FERRITIN SERPL-MCNC: 20 NG/ML (ref 26–388)
GFR SERPL CREATININE-BSD FRML MDRD: 18 ML/MIN/1.73M2
GFR SERPL CREATININE-BSD FRML MDRD: 18 ML/MIN/{1.73_M2}
GFR SERPL CREATININE-BSD FRML MDRD: 20 ML/MIN/1.73M2
GFR SERPL CREATININE-BSD FRML MDRD: 20 ML/MIN/{1.73_M2}
GFR SERPL CREATININE-BSD FRML MDRD: 21 ML/MIN/1.73M2
GFR SERPL CREATININE-BSD FRML MDRD: 22 ML/MIN/{1.73_M2}
GFR SERPL CREATININE-BSD FRML MDRD: 23 ML/MIN/1.73M2
GFR SERPL CREATININE-BSD FRML MDRD: 24 ML/MIN/1.73M2
GFR SERPL CREATININE-BSD FRML MDRD: 27 ML/MIN/1.73M2
GFR SERPL CREATININE-BSD FRML MDRD: 27 ML/MIN/1.73M2
GFR SERPL CREATININE-BSD FRML MDRD: 28 ML/MIN/1.73M2
GFR SERPL CREATININE-BSD FRML MDRD: 29 ML/MIN/1.73M2
GFR SERPL CREATININE-BSD FRML MDRD: 31 ML/MIN/1.73M2
GLUCOSE BLD-MCNC: 108 MG/DL (ref 70–99)
GLUCOSE BLD-MCNC: 113 MG/DL (ref 70–99)
GLUCOSE BLD-MCNC: 115 MG/DL (ref 70–99)
GLUCOSE BLD-MCNC: 154 MG/DL (ref 70–99)
GLUCOSE BLD-MCNC: 208 MG/DL (ref 70–99)
GLUCOSE BLD-MCNC: 238 MG/DL (ref 70–99)
GLUCOSE BLD-MCNC: 250 MG/DL (ref 70–99)
GLUCOSE BLD-MCNC: 353 MG/DL (ref 70–99)
GLUCOSE BLD-MCNC: 96 MG/DL (ref 70–99)
GLUCOSE BLDC GLUCOMTR-MCNC: 103 MG/DL (ref 70–99)
GLUCOSE BLDC GLUCOMTR-MCNC: 112 MG/DL (ref 70–99)
GLUCOSE BLDC GLUCOMTR-MCNC: 115 MG/DL (ref 70–99)
GLUCOSE BLDC GLUCOMTR-MCNC: 143 MG/DL (ref 70–99)
GLUCOSE BLDC GLUCOMTR-MCNC: 166 MG/DL (ref 70–99)
GLUCOSE BLDC GLUCOMTR-MCNC: 177 MG/DL (ref 70–99)
GLUCOSE BLDC GLUCOMTR-MCNC: 186 MG/DL (ref 70–99)
GLUCOSE BLDC GLUCOMTR-MCNC: 189 MG/DL (ref 70–99)
GLUCOSE BLDC GLUCOMTR-MCNC: 207 MG/DL (ref 70–99)
GLUCOSE BLDC GLUCOMTR-MCNC: 246 MG/DL (ref 70–99)
GLUCOSE BLDC GLUCOMTR-MCNC: 248 MG/DL (ref 70–99)
GLUCOSE BLDC GLUCOMTR-MCNC: 249 MG/DL (ref 70–99)
GLUCOSE BLDC GLUCOMTR-MCNC: 85 MG/DL (ref 70–99)
GLUCOSE SERPL-MCNC: 123 MG/DL (ref 70–99)
GLUCOSE SERPL-MCNC: 226 MG/DL (ref 70–99)
GLUCOSE SERPL-MCNC: 286 MG/DL (ref 70–99)
GLUCOSE UR STRIP-MCNC: 500 MG/DL
HBA1C MFR BLD: 6.2 % (ref 0–5.6)
HCO3 BLDV-SCNC: 35 MMOL/L (ref 21–28)
HCT VFR BLD AUTO: 26.2 % (ref 40–53)
HCT VFR BLD AUTO: 26.7 % (ref 40–53)
HCT VFR BLD AUTO: 27.3 % (ref 40–53)
HCT VFR BLD AUTO: 27.3 % (ref 40–53)
HCT VFR BLD AUTO: 28.4 % (ref 40–53)
HCT VFR BLD AUTO: 28.5 % (ref 40–53)
HCT VFR BLD AUTO: 28.8 % (ref 40–53)
HCT VFR BLD AUTO: 29.6 % (ref 40–53)
HCT VFR BLD AUTO: 30.4 % (ref 40–53)
HCT VFR BLD AUTO: 30.6 % (ref 40–53)
HCT VFR BLD AUTO: 30.7 % (ref 40–53)
HCT VFR BLD AUTO: 31.5 % (ref 40–53)
HCT VFR BLD AUTO: 31.6 % (ref 40–53)
HCT VFR BLD AUTO: 31.6 % (ref 40–53)
HCT VFR BLD AUTO: 31.9 % (ref 40–53)
HCT VFR BLD AUTO: 33.1 % (ref 40–53)
HGB BLD-MCNC: 10.1 G/DL (ref 13.3–17.7)
HGB BLD-MCNC: 10.2 G/DL (ref 13.3–17.7)
HGB BLD-MCNC: 7.2 G/DL (ref 13.3–17.7)
HGB BLD-MCNC: 7.8 G/DL (ref 13.3–17.7)
HGB BLD-MCNC: 8.2 G/DL (ref 13.3–17.7)
HGB BLD-MCNC: 8.2 G/DL (ref 13.3–17.7)
HGB BLD-MCNC: 8.3 G/DL (ref 13.3–17.7)
HGB BLD-MCNC: 8.4 G/DL (ref 13.3–17.7)
HGB BLD-MCNC: 8.8 G/DL (ref 13.3–17.7)
HGB BLD-MCNC: 9.2 G/DL (ref 13.3–17.7)
HGB BLD-MCNC: 9.3 G/DL (ref 13.3–17.7)
HGB BLD-MCNC: 9.4 G/DL (ref 13.3–17.7)
HGB BLD-MCNC: 9.5 G/DL (ref 13.3–17.7)
HGB BLD-MCNC: 9.6 G/DL (ref 13.3–17.7)
HGB BLD-MCNC: 9.6 G/DL (ref 13.3–17.7)
HGB BLD-MCNC: 9.7 G/DL (ref 13.3–17.7)
HGB BLD-MCNC: 9.8 G/DL (ref 13.3–17.7)
HGB BLD-MCNC: 9.9 G/DL (ref 13.3–17.7)
HGB UR QL STRIP: NEGATIVE
IMM GRANULOCYTES # BLD: 0 10E3/UL
IMM GRANULOCYTES # BLD: 0 10E3/UL
IMM GRANULOCYTES # BLD: 0 10E9/L (ref 0–0.4)
IMM GRANULOCYTES NFR BLD: 0 %
IMM GRANULOCYTES NFR BLD: 0 %
IMM GRANULOCYTES NFR BLD: 0.3 %
INTERPRETATION ECG - MUSE: NORMAL
INTERPRETATION ECG - MUSE: NORMAL
IRON SATN MFR SERPL: 16 % (ref 15–46)
IRON SATN MFR SERPL: 5 % (ref 15–46)
IRON SATN MFR SERPL: 54 % (ref 15–46)
IRON SATN MFR SERPL: 7 % (ref 15–46)
IRON SERPL-MCNC: 20 UG/DL (ref 35–180)
IRON SERPL-MCNC: 205 UG/DL (ref 35–180)
IRON SERPL-MCNC: 30 UG/DL (ref 35–180)
IRON SERPL-MCNC: 57 UG/DL (ref 35–180)
KETONES UR STRIP-MCNC: NEGATIVE MG/DL
LACTATE BLD-SCNC: 0.9 MMOL/L
LEUKOCYTE ESTERASE UR QL STRIP: NEGATIVE
LIPASE SERPL-CCNC: 35 U/L (ref 73–393)
LIPASE SERPL-CCNC: 50 U/L (ref 73–393)
LYMPHOCYTES # BLD AUTO: 0.5 10E3/UL (ref 0.8–5.3)
LYMPHOCYTES # BLD AUTO: 1 10E3/UL (ref 0.8–5.3)
LYMPHOCYTES # BLD AUTO: 1 10E9/L (ref 0.8–5.3)
LYMPHOCYTES NFR BLD AUTO: 13.8 %
LYMPHOCYTES NFR BLD AUTO: 16 %
LYMPHOCYTES NFR BLD AUTO: 8 %
MCH RBC QN AUTO: 24.1 PG (ref 26.5–33)
MCH RBC QN AUTO: 25.3 PG (ref 26.5–33)
MCH RBC QN AUTO: 25.5 PG (ref 26.5–33)
MCH RBC QN AUTO: 27.3 PG (ref 26.5–33)
MCH RBC QN AUTO: 27.8 PG (ref 26.5–33)
MCH RBC QN AUTO: 27.8 PG (ref 26.5–33)
MCH RBC QN AUTO: 28.6 PG (ref 26.5–33)
MCH RBC QN AUTO: 28.6 PG (ref 26.5–33)
MCHC RBC AUTO-ENTMCNC: 29.2 G/DL (ref 31.5–36.5)
MCHC RBC AUTO-ENTMCNC: 29.8 G/DL (ref 31.5–36.5)
MCHC RBC AUTO-ENTMCNC: 30.8 G/DL (ref 31.5–36.5)
MCHC RBC AUTO-ENTMCNC: 30.8 G/DL (ref 31.5–36.5)
MCHC RBC AUTO-ENTMCNC: 30.9 G/DL (ref 31.5–36.5)
MCHC RBC AUTO-ENTMCNC: 31.1 G/DL (ref 31.5–36.5)
MCHC RBC AUTO-ENTMCNC: 31.3 G/DL (ref 31.5–36.5)
MCHC RBC AUTO-ENTMCNC: 31.4 G/DL (ref 31.5–36.5)
MCHC RBC AUTO-ENTMCNC: 31.7 G/DL (ref 31.5–36.5)
MCHC RBC AUTO-ENTMCNC: 31.7 G/DL (ref 31.5–36.5)
MCV RBC AUTO: 83 FL (ref 78–100)
MCV RBC AUTO: 83 FL (ref 78–100)
MCV RBC AUTO: 85 FL (ref 78–100)
MCV RBC AUTO: 87 FL (ref 78–100)
MCV RBC AUTO: 88 FL (ref 78–100)
MCV RBC AUTO: 88 FL (ref 78–100)
MCV RBC AUTO: 89 FL (ref 78–100)
MCV RBC AUTO: 89 FL (ref 78–100)
MCV RBC AUTO: 90 FL (ref 78–100)
MCV RBC AUTO: 91 FL (ref 78–100)
MONOCYTES # BLD AUTO: 0.7 10E3/UL (ref 0–1.3)
MONOCYTES # BLD AUTO: 0.8 10E3/UL (ref 0–1.3)
MONOCYTES # BLD AUTO: 0.9 10E9/L (ref 0–1.3)
MONOCYTES NFR BLD AUTO: 11 %
MONOCYTES NFR BLD AUTO: 13 %
MONOCYTES NFR BLD AUTO: 13.1 %
NEUTROPHILS # BLD AUTO: 4.3 10E3/UL (ref 1.6–8.3)
NEUTROPHILS # BLD AUTO: 4.8 10E9/L (ref 1.6–8.3)
NEUTROPHILS # BLD AUTO: 5.1 10E3/UL (ref 1.6–8.3)
NEUTROPHILS NFR BLD AUTO: 65 %
NEUTROPHILS NFR BLD AUTO: 68.8 %
NEUTROPHILS NFR BLD AUTO: 76 %
NITRATE UR QL: NEGATIVE
NRBC # BLD AUTO: 0 10*3/UL
NRBC # BLD AUTO: 0 10E3/UL
NRBC # BLD AUTO: 0 10E3/UL
NRBC BLD AUTO-RTO: 0 /100
P AXIS - MUSE: 37 DEGREES
P AXIS - MUSE: 48 DEGREES
PATH REPORT.COMMENTS IMP SPEC: NORMAL
PATH REPORT.COMMENTS IMP SPEC: NORMAL
PATH REPORT.FINAL DX SPEC: NORMAL
PATH REPORT.GROSS SPEC: NORMAL
PATH REPORT.MICROSCOPIC SPEC OTHER STN: NORMAL
PATH REPORT.RELEVANT HX SPEC: NORMAL
PCO2 BLDV: 57 MM HG (ref 40–50)
PH BLDV: 7.4 [PH] (ref 7.32–7.43)
PH UR STRIP: 5.5 [PH] (ref 5–7)
PHOTO IMAGE: NORMAL
PLATELET # BLD AUTO: 165 10E3/UL (ref 150–450)
PLATELET # BLD AUTO: 168 10E3/UL (ref 150–450)
PLATELET # BLD AUTO: 168 10E3/UL (ref 150–450)
PLATELET # BLD AUTO: 173 10E9/L (ref 150–450)
PLATELET # BLD AUTO: 178 10E3/UL (ref 150–450)
PLATELET # BLD AUTO: 182 10E3/UL (ref 150–450)
PLATELET # BLD AUTO: 198 10E3/UL (ref 150–450)
PLATELET # BLD AUTO: 204 10E3/UL (ref 150–450)
PLATELET # BLD AUTO: 207 10E9/L (ref 150–450)
PLATELET # BLD AUTO: 216 10E3/UL (ref 150–450)
PO2 BLDV: 18 MM HG (ref 25–47)
POTASSIUM BLD-SCNC: 2.6 MMOL/L (ref 3.4–5.3)
POTASSIUM BLD-SCNC: 2.6 MMOL/L (ref 3.4–5.3)
POTASSIUM BLD-SCNC: 3 MMOL/L (ref 3.4–5.3)
POTASSIUM BLD-SCNC: 3.2 MMOL/L (ref 3.4–5.3)
POTASSIUM BLD-SCNC: 3.6 MMOL/L (ref 3.4–5.3)
POTASSIUM BLD-SCNC: 3.6 MMOL/L (ref 3.4–5.3)
POTASSIUM BLD-SCNC: 3.8 MMOL/L (ref 3.4–5.3)
POTASSIUM BLD-SCNC: 4 MMOL/L (ref 3.4–5.3)
POTASSIUM BLD-SCNC: 4 MMOL/L (ref 3.4–5.3)
POTASSIUM BLD-SCNC: 4.5 MMOL/L (ref 3.4–5.3)
POTASSIUM BLD-SCNC: 4.6 MMOL/L (ref 3.4–5.3)
POTASSIUM BLD-SCNC: 4.7 MMOL/L (ref 3.4–5.3)
POTASSIUM SERPL-SCNC: 2.7 MMOL/L (ref 3.4–5.3)
POTASSIUM SERPL-SCNC: 3.1 MMOL/L (ref 3.4–5.3)
POTASSIUM SERPL-SCNC: 4.3 MMOL/L (ref 3.4–5.3)
PR INTERVAL - MUSE: 176 MS
PR INTERVAL - MUSE: 212 MS
PROT SERPL-MCNC: 6.7 G/DL (ref 6.8–8.8)
PROT SERPL-MCNC: 6.9 G/DL (ref 6.8–8.8)
PROT SERPL-MCNC: 7.1 G/DL (ref 6.8–8.8)
PROT SERPL-MCNC: 7.6 G/DL (ref 6.8–8.8)
QRS DURATION - MUSE: 90 MS
QRS DURATION - MUSE: 94 MS
QT - MUSE: 376 MS
QT - MUSE: 416 MS
QTC - MUSE: 462 MS
QTC - MUSE: 497 MS
R AXIS - MUSE: 14 DEGREES
R AXIS - MUSE: 21 DEGREES
RBC # BLD AUTO: 3.08 10E6/UL (ref 4.4–5.9)
RBC # BLD AUTO: 3.23 10E6/UL (ref 4.4–5.9)
RBC # BLD AUTO: 3.3 10E12/L (ref 4.4–5.9)
RBC # BLD AUTO: 3.36 10E6/UL (ref 4.4–5.9)
RBC # BLD AUTO: 3.39 10E6/UL (ref 4.4–5.9)
RBC # BLD AUTO: 3.41 10E12/L (ref 4.4–5.9)
RBC # BLD AUTO: 3.44 10E6/UL (ref 4.4–5.9)
RBC # BLD AUTO: 3.53 10E6/UL (ref 4.4–5.9)
RBC # BLD AUTO: 3.63 10E6/UL (ref 4.4–5.9)
RBC # BLD AUTO: 3.73 10E6/UL (ref 4.4–5.9)
RBC URINE: <1 /HPF
SAO2 % BLDV: 26 % (ref 94–100)
SARS-COV-2 RNA RESP QL NAA+PROBE: NEGATIVE
SODIUM SERPL-SCNC: 136 MMOL/L (ref 133–144)
SODIUM SERPL-SCNC: 137 MMOL/L (ref 133–144)
SODIUM SERPL-SCNC: 138 MMOL/L (ref 133–144)
SODIUM SERPL-SCNC: 138 MMOL/L (ref 133–144)
SODIUM SERPL-SCNC: 139 MMOL/L (ref 133–144)
SODIUM SERPL-SCNC: 141 MMOL/L (ref 133–144)
SODIUM SERPL-SCNC: 142 MMOL/L (ref 133–144)
SODIUM SERPL-SCNC: 142 MMOL/L (ref 133–144)
SODIUM SERPL-SCNC: 143 MMOL/L (ref 133–144)
SP GR UR STRIP: 1.01 (ref 1–1.03)
SQUAMOUS EPITHELIAL: <1 /HPF
SYSTOLIC BLOOD PRESSURE - MUSE: NORMAL MMHG
SYSTOLIC BLOOD PRESSURE - MUSE: NORMAL MMHG
T AXIS - MUSE: 112 DEGREES
T AXIS - MUSE: 117 DEGREES
TIBC SERPL-MCNC: 359 UG/DL (ref 240–430)
TIBC SERPL-MCNC: 378 UG/DL (ref 240–430)
TIBC SERPL-MCNC: 404 UG/DL (ref 240–430)
TIBC SERPL-MCNC: 427 UG/DL (ref 240–430)
TROPONIN I SERPL-MCNC: <0.015 UG/L (ref 0–0.04)
UROBILINOGEN UR STRIP-MCNC: NORMAL MG/DL
VENTRICULAR RATE- MUSE: 86 BPM
VENTRICULAR RATE- MUSE: 91 BPM
VIT B12 SERPL-MCNC: 2550 PG/ML (ref 193–986)
WBC # BLD AUTO: 6 10E3/UL (ref 4–11)
WBC # BLD AUTO: 6.1 10E3/UL (ref 4–11)
WBC # BLD AUTO: 6.1 10E3/UL (ref 4–11)
WBC # BLD AUTO: 6.5 10E3/UL (ref 4–11)
WBC # BLD AUTO: 6.7 10E3/UL (ref 4–11)
WBC # BLD AUTO: 6.8 10E3/UL (ref 4–11)
WBC # BLD AUTO: 6.9 10E9/L (ref 4–11)
WBC # BLD AUTO: 7 10E3/UL (ref 4–11)
WBC # BLD AUTO: 7 10E9/L (ref 4–11)
WBC # BLD AUTO: 7.7 10E3/UL (ref 4–11)
WBC URINE: <1 /HPF

## 2021-01-01 PROCEDURE — 36415 COLL VENOUS BLD VENIPUNCTURE: CPT | Performed by: INTERNAL MEDICINE

## 2021-01-01 PROCEDURE — 36415 COLL VENOUS BLD VENIPUNCTURE: CPT

## 2021-01-01 PROCEDURE — 85025 COMPLETE CBC W/AUTO DIFF WBC: CPT | Performed by: EMERGENCY MEDICINE

## 2021-01-01 PROCEDURE — 96372 THER/PROPH/DIAG INJ SC/IM: CPT | Performed by: INTERNAL MEDICINE

## 2021-01-01 PROCEDURE — 83690 ASSAY OF LIPASE: CPT

## 2021-01-01 PROCEDURE — 47562 LAPAROSCOPIC CHOLECYSTECTOMY: CPT | Performed by: SURGERY

## 2021-01-01 PROCEDURE — 74176 CT ABD & PELVIS W/O CONTRAST: CPT | Mod: ME

## 2021-01-01 PROCEDURE — C9113 INJ PANTOPRAZOLE SODIUM, VIA: HCPCS | Performed by: INTERNAL MEDICINE

## 2021-01-01 PROCEDURE — 250N000011 HC RX IP 250 OP 636: Performed by: INTERNAL MEDICINE

## 2021-01-01 PROCEDURE — 80053 COMPREHEN METABOLIC PANEL: CPT

## 2021-01-01 PROCEDURE — 258N000003 HC RX IP 258 OP 636: Performed by: INTERNAL MEDICINE

## 2021-01-01 PROCEDURE — 82565 ASSAY OF CREATININE: CPT

## 2021-01-01 PROCEDURE — 99214 OFFICE O/P EST MOD 30 MIN: CPT | Performed by: INTERNAL MEDICINE

## 2021-01-01 PROCEDURE — A9537 TC99M MEBROFENIN: HCPCS | Performed by: SURGERY

## 2021-01-01 PROCEDURE — 250N000011 HC RX IP 250 OP 636: Performed by: HOSPITALIST

## 2021-01-01 PROCEDURE — 250N000013 HC RX MED GY IP 250 OP 250 PS 637: Performed by: HOSPITALIST

## 2021-01-01 PROCEDURE — 710N000012 HC RECOVERY PHASE 2, PER MINUTE: Performed by: SURGERY

## 2021-01-01 PROCEDURE — 80053 COMPREHEN METABOLIC PANEL: CPT | Performed by: EMERGENCY MEDICINE

## 2021-01-01 PROCEDURE — 99495 TRANSJ CARE MGMT MOD F2F 14D: CPT | Performed by: INTERNAL MEDICINE

## 2021-01-01 PROCEDURE — 120N000004 HC R&B MS OVERFLOW

## 2021-01-01 PROCEDURE — 250N000011 HC RX IP 250 OP 636: Mod: EC | Performed by: INTERNAL MEDICINE

## 2021-01-01 PROCEDURE — 78226 HEPATOBILIARY SYSTEM IMAGING: CPT | Mod: MG

## 2021-01-01 PROCEDURE — 82728 ASSAY OF FERRITIN: CPT | Performed by: INTERNAL MEDICINE

## 2021-01-01 PROCEDURE — C9803 HOPD COVID-19 SPEC COLLECT: HCPCS

## 2021-01-01 PROCEDURE — 96365 THER/PROPH/DIAG IV INF INIT: CPT

## 2021-01-01 PROCEDURE — 85014 HEMATOCRIT: CPT | Performed by: INTERNAL MEDICINE

## 2021-01-01 PROCEDURE — 96374 THER/PROPH/DIAG INJ IV PUSH: CPT

## 2021-01-01 PROCEDURE — 250N000013 HC RX MED GY IP 250 OP 250 PS 637: Performed by: INTERNAL MEDICINE

## 2021-01-01 PROCEDURE — 70450 CT HEAD/BRAIN W/O DYE: CPT | Mod: MG

## 2021-01-01 PROCEDURE — 36415 COLL VENOUS BLD VENIPUNCTURE: CPT | Performed by: HOSPITALIST

## 2021-01-01 PROCEDURE — 85025 COMPLETE CBC W/AUTO DIFF WBC: CPT | Performed by: INTERNAL MEDICINE

## 2021-01-01 PROCEDURE — 36415 COLL VENOUS BLD VENIPUNCTURE: CPT | Performed by: EMERGENCY MEDICINE

## 2021-01-01 PROCEDURE — 84132 ASSAY OF SERUM POTASSIUM: CPT | Performed by: HOSPITALIST

## 2021-01-01 PROCEDURE — 360N000076 HC SURGERY LEVEL 3, PER MIN: Performed by: SURGERY

## 2021-01-01 PROCEDURE — 86140 C-REACTIVE PROTEIN: CPT | Performed by: HOSPITALIST

## 2021-01-01 PROCEDURE — 999N000141 HC STATISTIC PRE-PROCEDURE NURSING ASSESSMENT: Performed by: SURGERY

## 2021-01-01 PROCEDURE — 97110 THERAPEUTIC EXERCISES: CPT | Mod: GP | Performed by: PHYSICAL THERAPIST

## 2021-01-01 PROCEDURE — 250N000009 HC RX 250: Performed by: SURGERY

## 2021-01-01 PROCEDURE — 82607 VITAMIN B-12: CPT | Performed by: INTERNAL MEDICINE

## 2021-01-01 PROCEDURE — 88304 TISSUE EXAM BY PATHOLOGIST: CPT | Mod: TC | Performed by: SURGERY

## 2021-01-01 PROCEDURE — 80048 BASIC METABOLIC PNL TOTAL CA: CPT | Performed by: INTERNAL MEDICINE

## 2021-01-01 PROCEDURE — 87635 SARS-COV-2 COVID-19 AMP PRB: CPT

## 2021-01-01 PROCEDURE — 82728 ASSAY OF FERRITIN: CPT

## 2021-01-01 PROCEDURE — 99231 SBSQ HOSP IP/OBS SF/LOW 25: CPT | Performed by: SURGERY

## 2021-01-01 PROCEDURE — 96376 TX/PRO/DX INJ SAME DRUG ADON: CPT

## 2021-01-01 PROCEDURE — 99233 SBSQ HOSP IP/OBS HIGH 50: CPT | Performed by: HOSPITALIST

## 2021-01-01 PROCEDURE — 343N000001 HC RX 343: Performed by: SURGERY

## 2021-01-01 PROCEDURE — 97162 PT EVAL MOD COMPLEX 30 MIN: CPT | Mod: GP | Performed by: PHYSICAL THERAPIST

## 2021-01-01 PROCEDURE — 76705 ECHO EXAM OF ABDOMEN: CPT

## 2021-01-01 PROCEDURE — 47562 LAPAROSCOPIC CHOLECYSTECTOMY: CPT | Mod: AS | Performed by: PHYSICIAN ASSISTANT

## 2021-01-01 PROCEDURE — 72070 X-RAY EXAM THORAC SPINE 2VWS: CPT | Performed by: RADIOLOGY

## 2021-01-01 PROCEDURE — 85027 COMPLETE CBC AUTOMATED: CPT | Performed by: INTERNAL MEDICINE

## 2021-01-01 PROCEDURE — 250N000011 HC RX IP 250 OP 636: Performed by: SURGERY

## 2021-01-01 PROCEDURE — 36415 COLL VENOUS BLD VENIPUNCTURE: CPT | Performed by: ANESTHESIOLOGY

## 2021-01-01 PROCEDURE — 99213 OFFICE O/P EST LOW 20 MIN: CPT | Performed by: SURGERY

## 2021-01-01 PROCEDURE — 99239 HOSP IP/OBS DSCHRG MGMT >30: CPT | Performed by: HOSPITALIST

## 2021-01-01 PROCEDURE — 88304 TISSUE EXAM BY PATHOLOGIST: CPT | Mod: 26 | Performed by: PATHOLOGY

## 2021-01-01 PROCEDURE — 71250 CT THORAX DX C-: CPT | Mod: MG

## 2021-01-01 PROCEDURE — 96375 TX/PRO/DX INJ NEW DRUG ADDON: CPT

## 2021-01-01 PROCEDURE — G0378 HOSPITAL OBSERVATION PER HR: HCPCS

## 2021-01-01 PROCEDURE — 73110 X-RAY EXAM OF WRIST: CPT | Mod: LT

## 2021-01-01 PROCEDURE — 250N000012 HC RX MED GY IP 250 OP 636 PS 637: Performed by: INTERNAL MEDICINE

## 2021-01-01 PROCEDURE — 272N000001 HC OR GENERAL SUPPLY STERILE: Performed by: SURGERY

## 2021-01-01 PROCEDURE — U0005 INFEC AGEN DETEC AMPLI PROBE: HCPCS

## 2021-01-01 PROCEDURE — 99285 EMERGENCY DEPT VISIT HI MDM: CPT | Mod: 25

## 2021-01-01 PROCEDURE — G0009 ADMIN PNEUMOCOCCAL VACCINE: HCPCS | Performed by: INTERNAL MEDICINE

## 2021-01-01 PROCEDURE — 85018 HEMOGLOBIN: CPT | Performed by: INTERNAL MEDICINE

## 2021-01-01 PROCEDURE — 78227 HEPATOBIL SYST IMAGE W/DRUG: CPT | Mod: ME

## 2021-01-01 PROCEDURE — 85018 HEMOGLOBIN: CPT

## 2021-01-01 PROCEDURE — 93005 ELECTROCARDIOGRAM TRACING: CPT

## 2021-01-01 PROCEDURE — 83550 IRON BINDING TEST: CPT | Performed by: INTERNAL MEDICINE

## 2021-01-01 PROCEDURE — G1004 CDSM NDSC: HCPCS

## 2021-01-01 PROCEDURE — 81001 URINALYSIS AUTO W/SCOPE: CPT | Performed by: EMERGENCY MEDICINE

## 2021-01-01 PROCEDURE — 73090 X-RAY EXAM OF FOREARM: CPT | Mod: LT

## 2021-01-01 PROCEDURE — 71101 X-RAY EXAM UNILAT RIBS/CHEST: CPT | Mod: RT | Performed by: RADIOLOGY

## 2021-01-01 PROCEDURE — 83036 HEMOGLOBIN GLYCOSYLATED A1C: CPT | Performed by: INTERNAL MEDICINE

## 2021-01-01 PROCEDURE — 72125 CT NECK SPINE W/O DYE: CPT | Mod: MG

## 2021-01-01 PROCEDURE — 96361 HYDRATE IV INFUSION ADD-ON: CPT

## 2021-01-01 PROCEDURE — 250N000011 HC RX IP 250 OP 636: Performed by: ANESTHESIOLOGY

## 2021-01-01 PROCEDURE — 99442 PR PHYSICIAN TELEPHONE EVALUATION 11-20 MIN: CPT | Mod: 95 | Performed by: FAMILY MEDICINE

## 2021-01-01 PROCEDURE — 74176 CT ABD & PELVIS W/O CONTRAST: CPT | Mod: MG

## 2021-01-01 PROCEDURE — 93000 ELECTROCARDIOGRAM COMPLETE: CPT | Performed by: INTERNAL MEDICINE

## 2021-01-01 PROCEDURE — 250N000025 HC SEVOFLURANE, PER MIN: Performed by: SURGERY

## 2021-01-01 PROCEDURE — 250N000011 HC RX IP 250 OP 636: Performed by: NURSE ANESTHETIST, CERTIFIED REGISTERED

## 2021-01-01 PROCEDURE — 370N000017 HC ANESTHESIA TECHNICAL FEE, PER MIN: Performed by: SURGERY

## 2021-01-01 PROCEDURE — 258N000003 HC RX IP 258 OP 636: Performed by: NURSE ANESTHETIST, CERTIFIED REGISTERED

## 2021-01-01 PROCEDURE — 83690 ASSAY OF LIPASE: CPT | Performed by: EMERGENCY MEDICINE

## 2021-01-01 PROCEDURE — 80053 COMPREHEN METABOLIC PANEL: CPT | Performed by: INTERNAL MEDICINE

## 2021-01-01 PROCEDURE — U0003 INFECTIOUS AGENT DETECTION BY NUCLEIC ACID (DNA OR RNA); SEVERE ACUTE RESPIRATORY SYNDROME CORONAVIRUS 2 (SARS-COV-2) (CORONAVIRUS DISEASE [COVID-19]), AMPLIFIED PROBE TECHNIQUE, MAKING USE OF HIGH THROUGHPUT TECHNOLOGIES AS DESCRIBED BY CMS-2020-01-R: HCPCS

## 2021-01-01 PROCEDURE — 99214 OFFICE O/P EST MOD 30 MIN: CPT | Mod: 25 | Performed by: INTERNAL MEDICINE

## 2021-01-01 PROCEDURE — 84484 ASSAY OF TROPONIN QUANT: CPT | Performed by: EMERGENCY MEDICINE

## 2021-01-01 PROCEDURE — 83540 ASSAY OF IRON: CPT | Performed by: INTERNAL MEDICINE

## 2021-01-01 PROCEDURE — 82550 ASSAY OF CK (CPK): CPT | Performed by: HOSPITALIST

## 2021-01-01 PROCEDURE — 99442 PR PHYSICIAN TELEPHONE EVALUATION 11-20 MIN: CPT | Mod: 95 | Performed by: INTERNAL MEDICINE

## 2021-01-01 PROCEDURE — 250N000009 HC RX 250: Performed by: NURSE ANESTHETIST, CERTIFIED REGISTERED

## 2021-01-01 PROCEDURE — 90732 PPSV23 VACC 2 YRS+ SUBQ/IM: CPT | Performed by: INTERNAL MEDICINE

## 2021-01-01 PROCEDURE — 710N000009 HC RECOVERY PHASE 1, LEVEL 1, PER MIN: Performed by: SURGERY

## 2021-01-01 PROCEDURE — 84132 ASSAY OF SERUM POTASSIUM: CPT | Performed by: ANESTHESIOLOGY

## 2021-01-01 PROCEDURE — 82803 BLOOD GASES ANY COMBINATION: CPT

## 2021-01-01 PROCEDURE — 87635 SARS-COV-2 COVID-19 AMP PRB: CPT | Performed by: EMERGENCY MEDICINE

## 2021-01-01 PROCEDURE — 250N000013 HC RX MED GY IP 250 OP 250 PS 637: Performed by: PHYSICIAN ASSISTANT

## 2021-01-01 PROCEDURE — 250N000011 HC RX IP 250 OP 636: Performed by: EMERGENCY MEDICINE

## 2021-01-01 PROCEDURE — 99220 PR INITIAL OBSERVATION CARE,LEVEL III: CPT | Performed by: INTERNAL MEDICINE

## 2021-01-01 PROCEDURE — 85018 HEMOGLOBIN: CPT | Performed by: HOSPITALIST

## 2021-01-01 PROCEDURE — 99221 1ST HOSP IP/OBS SF/LOW 40: CPT | Performed by: SURGERY

## 2021-01-01 PROCEDURE — 80048 BASIC METABOLIC PNL TOTAL CA: CPT | Performed by: HOSPITALIST

## 2021-01-01 PROCEDURE — G0008 ADMIN INFLUENZA VIRUS VAC: HCPCS | Performed by: INTERNAL MEDICINE

## 2021-01-01 PROCEDURE — 99207 PR NO CHARGE NURSE ONLY: CPT

## 2021-01-01 PROCEDURE — 71101 X-RAY EXAM UNILAT RIBS/CHEST: CPT | Mod: RT

## 2021-01-01 PROCEDURE — 85027 COMPLETE CBC AUTOMATED: CPT

## 2021-01-01 PROCEDURE — 74176 CT ABD & PELVIS W/O CONTRAST: CPT

## 2021-01-01 PROCEDURE — 90662 IIV NO PRSV INCREASED AG IM: CPT | Performed by: INTERNAL MEDICINE

## 2021-01-01 RX ORDER — ONDANSETRON 2 MG/ML
INJECTION INTRAMUSCULAR; INTRAVENOUS PRN
Status: DISCONTINUED | OUTPATIENT
Start: 2021-01-01 | End: 2021-01-01

## 2021-01-01 RX ORDER — NALOXONE HYDROCHLORIDE 0.4 MG/ML
0.2 INJECTION, SOLUTION INTRAMUSCULAR; INTRAVENOUS; SUBCUTANEOUS
Status: DISCONTINUED | OUTPATIENT
Start: 2021-01-01 | End: 2021-01-01 | Stop reason: HOSPADM

## 2021-01-01 RX ORDER — NALOXONE HYDROCHLORIDE 0.4 MG/ML
0.2 INJECTION, SOLUTION INTRAMUSCULAR; INTRAVENOUS; SUBCUTANEOUS
Status: CANCELLED | OUTPATIENT
Start: 2021-01-01

## 2021-01-01 RX ORDER — SODIUM CHLORIDE, SODIUM LACTATE, POTASSIUM CHLORIDE, CALCIUM CHLORIDE 600; 310; 30; 20 MG/100ML; MG/100ML; MG/100ML; MG/100ML
INJECTION, SOLUTION INTRAVENOUS CONTINUOUS
Status: DISCONTINUED | OUTPATIENT
Start: 2021-01-01 | End: 2021-01-01 | Stop reason: HOSPADM

## 2021-01-01 RX ORDER — POTASSIUM CHLORIDE 7.45 MG/ML
10 INJECTION INTRAVENOUS ONCE
Status: COMPLETED | OUTPATIENT
Start: 2021-01-01 | End: 2021-01-01

## 2021-01-01 RX ORDER — HEPARIN SODIUM (PORCINE) LOCK FLUSH IV SOLN 100 UNIT/ML 100 UNIT/ML
5 SOLUTION INTRAVENOUS
Status: CANCELLED | OUTPATIENT
Start: 2021-01-01

## 2021-01-01 RX ORDER — METHYLPREDNISOLONE SODIUM SUCCINATE 125 MG/2ML
125 INJECTION, POWDER, LYOPHILIZED, FOR SOLUTION INTRAMUSCULAR; INTRAVENOUS
Status: CANCELLED
Start: 2021-01-01

## 2021-01-01 RX ORDER — DIPHENHYDRAMINE HYDROCHLORIDE 50 MG/ML
50 INJECTION INTRAMUSCULAR; INTRAVENOUS
Status: CANCELLED
Start: 2021-01-01

## 2021-01-01 RX ORDER — ALBUTEROL SULFATE 0.83 MG/ML
2.5 SOLUTION RESPIRATORY (INHALATION)
Status: CANCELLED | OUTPATIENT
Start: 2021-01-01

## 2021-01-01 RX ORDER — ALBUTEROL SULFATE 90 UG/1
1-2 AEROSOL, METERED RESPIRATORY (INHALATION)
Status: CANCELLED
Start: 2021-01-01

## 2021-01-01 RX ORDER — INSULIN GLARGINE 100 [IU]/ML
INJECTION, SOLUTION SUBCUTANEOUS
Qty: 45 ML | Refills: 3
Start: 2021-01-01 | End: 2021-01-01

## 2021-01-01 RX ORDER — EPINEPHRINE 1 MG/ML
0.3 INJECTION, SOLUTION INTRAMUSCULAR; SUBCUTANEOUS EVERY 5 MIN PRN
Status: CANCELLED | OUTPATIENT
Start: 2021-01-01

## 2021-01-01 RX ORDER — DEXTROSE MONOHYDRATE 25 G/50ML
25-50 INJECTION, SOLUTION INTRAVENOUS
Status: DISCONTINUED | OUTPATIENT
Start: 2021-01-01 | End: 2021-01-01 | Stop reason: HOSPADM

## 2021-01-01 RX ORDER — MEPERIDINE HYDROCHLORIDE 25 MG/ML
25 INJECTION INTRAMUSCULAR; INTRAVENOUS; SUBCUTANEOUS EVERY 30 MIN PRN
Status: CANCELLED | OUTPATIENT
Start: 2021-01-01

## 2021-01-01 RX ORDER — POTASSIUM CHLORIDE 1500 MG/1
20 TABLET, EXTENDED RELEASE ORAL DAILY
Status: CANCELLED | OUTPATIENT
Start: 2021-01-01

## 2021-01-01 RX ORDER — PANTOPRAZOLE SODIUM 40 MG/1
40 TABLET, DELAYED RELEASE ORAL
Status: DISCONTINUED | OUTPATIENT
Start: 2021-01-01 | End: 2021-01-01 | Stop reason: HOSPADM

## 2021-01-01 RX ORDER — SERTRALINE HYDROCHLORIDE 25 MG/1
25 TABLET, FILM COATED ORAL AT BEDTIME
Qty: 90 TABLET | Refills: 3 | Status: SHIPPED | OUTPATIENT
Start: 2021-01-01 | End: 2022-01-01

## 2021-01-01 RX ORDER — NALOXONE HYDROCHLORIDE 0.4 MG/ML
0.4 INJECTION, SOLUTION INTRAMUSCULAR; INTRAVENOUS; SUBCUTANEOUS
Status: DISCONTINUED | OUTPATIENT
Start: 2021-01-01 | End: 2021-01-01 | Stop reason: HOSPADM

## 2021-01-01 RX ORDER — ALBUTEROL SULFATE 5 MG/ML
2.5 SOLUTION RESPIRATORY (INHALATION)
Status: CANCELLED | OUTPATIENT
Start: 2021-01-01

## 2021-01-01 RX ORDER — DEXAMETHASONE SODIUM PHOSPHATE 4 MG/ML
INJECTION, SOLUTION INTRA-ARTICULAR; INTRALESIONAL; INTRAMUSCULAR; INTRAVENOUS; SOFT TISSUE PRN
Status: DISCONTINUED | OUTPATIENT
Start: 2021-01-01 | End: 2021-01-01

## 2021-01-01 RX ORDER — OXYCODONE AND ACETAMINOPHEN 5; 325 MG/1; MG/1
1 TABLET ORAL EVERY 6 HOURS PRN
Qty: 5 TABLET | Refills: 0 | Status: SHIPPED | OUTPATIENT
Start: 2021-01-01 | End: 2021-01-01

## 2021-01-01 RX ORDER — ONDANSETRON 4 MG/1
4 TABLET, ORALLY DISINTEGRATING ORAL EVERY 6 HOURS PRN
Status: DISCONTINUED | OUTPATIENT
Start: 2021-01-01 | End: 2021-01-01 | Stop reason: HOSPADM

## 2021-01-01 RX ORDER — HEPARIN SODIUM,PORCINE 10 UNIT/ML
5 VIAL (ML) INTRAVENOUS
Status: CANCELLED | OUTPATIENT
Start: 2021-01-01

## 2021-01-01 RX ORDER — INSULIN GLARGINE 100 [IU]/ML
INJECTION, SOLUTION SUBCUTANEOUS
Qty: 45 ML | Refills: 3 | Status: SHIPPED | OUTPATIENT
Start: 2021-01-01 | End: 2022-01-01

## 2021-01-01 RX ORDER — LIDOCAINE 4 G/G
1 PATCH TOPICAL EVERY 24 HOURS
Qty: 30 PATCH | Refills: 3 | Status: ON HOLD | OUTPATIENT
Start: 2021-01-01 | End: 2021-01-01

## 2021-01-01 RX ORDER — OXYCODONE AND ACETAMINOPHEN 5; 325 MG/1; MG/1
1 TABLET ORAL
Qty: 12 TABLET | Refills: 0 | Status: SHIPPED | OUTPATIENT
Start: 2021-01-01 | End: 2021-01-01

## 2021-01-01 RX ORDER — HYDROCHLOROTHIAZIDE 25 MG/1
25 TABLET ORAL DAILY
Status: ON HOLD | COMMUNITY
End: 2021-01-01

## 2021-01-01 RX ORDER — SINCALIDE 5 UG/5ML
0.02 INJECTION, POWDER, LYOPHILIZED, FOR SOLUTION INTRAVENOUS ONCE
Status: DISCONTINUED | OUTPATIENT
Start: 2021-01-01 | End: 2021-01-01

## 2021-01-01 RX ORDER — LIDOCAINE 40 MG/G
CREAM TOPICAL
Status: DISCONTINUED | OUTPATIENT
Start: 2021-01-01 | End: 2021-01-01

## 2021-01-01 RX ORDER — MORPHINE SULFATE 2 MG/ML
2 INJECTION, SOLUTION INTRAMUSCULAR; INTRAVENOUS
Status: DISCONTINUED | OUTPATIENT
Start: 2021-01-01 | End: 2021-01-01 | Stop reason: HOSPADM

## 2021-01-01 RX ORDER — OXYCODONE AND ACETAMINOPHEN 5; 325 MG/1; MG/1
1-2 TABLET ORAL EVERY 4 HOURS PRN
Qty: 12 TABLET | Refills: 0 | Status: SHIPPED | OUTPATIENT
Start: 2021-01-01 | End: 2021-01-01

## 2021-01-01 RX ORDER — POTASSIUM CHLORIDE 1.5 G/1.58G
40 POWDER, FOR SOLUTION ORAL ONCE
Status: COMPLETED | OUTPATIENT
Start: 2021-01-01 | End: 2021-01-01

## 2021-01-01 RX ORDER — INSULIN GLARGINE 100 [IU]/ML
INJECTION, SOLUTION SUBCUTANEOUS
Qty: 45 ML | Refills: 3 | OUTPATIENT
Start: 2021-01-01

## 2021-01-01 RX ORDER — POLYETHYLENE GLYCOL 3350 17 G/17G
17 POWDER, FOR SOLUTION ORAL DAILY
Status: DISCONTINUED | OUTPATIENT
Start: 2021-01-01 | End: 2021-01-01 | Stop reason: DRUGHIGH

## 2021-01-01 RX ORDER — NICOTINE POLACRILEX 4 MG
15-30 LOZENGE BUCCAL
Status: DISCONTINUED | OUTPATIENT
Start: 2021-01-01 | End: 2021-01-01 | Stop reason: HOSPADM

## 2021-01-01 RX ORDER — INSULIN GLARGINE 100 [IU]/ML
INJECTION, SOLUTION SUBCUTANEOUS
Qty: 45 ML | Refills: 3 | Status: SHIPPED | OUTPATIENT
Start: 2021-01-01 | End: 2021-01-01

## 2021-01-01 RX ORDER — POLYETHYLENE GLYCOL 3350 17 G/17G
1 POWDER, FOR SOLUTION ORAL DAILY
Qty: 507 G | Refills: 0 | Status: SHIPPED | OUTPATIENT
Start: 2021-01-01 | End: 2022-01-01

## 2021-01-01 RX ORDER — FERROUS SULFATE 324(65)MG
324 TABLET, DELAYED RELEASE (ENTERIC COATED) ORAL DAILY
COMMUNITY
End: 2021-01-01

## 2021-01-01 RX ORDER — ACETAMINOPHEN 325 MG/1
650 TABLET ORAL EVERY 6 HOURS PRN
Status: DISCONTINUED | OUTPATIENT
Start: 2021-01-01 | End: 2021-01-01 | Stop reason: HOSPADM

## 2021-01-01 RX ORDER — PEN NEEDLE, DIABETIC 32GX 5/32"
NEEDLE, DISPOSABLE MISCELLANEOUS
Qty: 400 EACH | Refills: 3 | Status: SHIPPED | OUTPATIENT
Start: 2021-01-01

## 2021-01-01 RX ORDER — OXYCODONE AND ACETAMINOPHEN 5; 325 MG/1; MG/1
1 TABLET ORAL EVERY 6 HOURS PRN
Qty: 12 TABLET | Refills: 0 | Status: SHIPPED | OUTPATIENT
Start: 2021-01-01 | End: 2022-01-01

## 2021-01-01 RX ORDER — LIDOCAINE 40 MG/G
CREAM TOPICAL
Status: DISCONTINUED | OUTPATIENT
Start: 2021-01-01 | End: 2021-01-01 | Stop reason: HOSPADM

## 2021-01-01 RX ORDER — OXYCODONE AND ACETAMINOPHEN 5; 325 MG/1; MG/1
1 TABLET ORAL
Qty: 12 TABLET | Refills: 0 | Status: ON HOLD | OUTPATIENT
Start: 2021-01-01 | End: 2021-01-01

## 2021-01-01 RX ORDER — NEOSTIGMINE METHYLSULFATE 1 MG/ML
VIAL (ML) INJECTION PRN
Status: DISCONTINUED | OUTPATIENT
Start: 2021-01-01 | End: 2021-01-01

## 2021-01-01 RX ORDER — KIT FOR THE PREPARATION OF TECHNETIUM TC 99M MEBROFENIN 45 MG/10ML
5 INJECTION, POWDER, LYOPHILIZED, FOR SOLUTION INTRAVENOUS ONCE
Status: DISCONTINUED | OUTPATIENT
Start: 2021-01-01 | End: 2021-01-01 | Stop reason: HOSPADM

## 2021-01-01 RX ORDER — POTASSIUM CHLORIDE 1500 MG/1
20 TABLET, EXTENDED RELEASE ORAL ONCE
Status: COMPLETED | OUTPATIENT
Start: 2021-01-01 | End: 2021-01-01

## 2021-01-01 RX ORDER — KIT FOR THE PREPARATION OF TECHNETIUM TC 99M MEBROFENIN 45 MG/10ML
5 INJECTION, POWDER, LYOPHILIZED, FOR SOLUTION INTRAVENOUS ONCE
Status: COMPLETED | OUTPATIENT
Start: 2021-01-01 | End: 2021-01-01

## 2021-01-01 RX ORDER — FENTANYL CITRATE 50 UG/ML
INJECTION, SOLUTION INTRAMUSCULAR; INTRAVENOUS PRN
Status: DISCONTINUED | OUTPATIENT
Start: 2021-01-01 | End: 2021-01-01

## 2021-01-01 RX ORDER — ACETAMINOPHEN 650 MG/1
650 SUPPOSITORY RECTAL EVERY 6 HOURS PRN
Status: DISCONTINUED | OUTPATIENT
Start: 2021-01-01 | End: 2021-01-01 | Stop reason: HOSPADM

## 2021-01-01 RX ORDER — PANTOPRAZOLE SODIUM 40 MG/1
TABLET, DELAYED RELEASE ORAL
Qty: 180 TABLET | Refills: 2 | OUTPATIENT
Start: 2021-01-01

## 2021-01-01 RX ORDER — PANTOPRAZOLE SODIUM 40 MG/1
TABLET, DELAYED RELEASE ORAL
Qty: 180 TABLET | Refills: 2 | Status: SHIPPED | OUTPATIENT
Start: 2021-01-01 | End: 2022-01-01

## 2021-01-01 RX ORDER — OXYCODONE HYDROCHLORIDE 5 MG/1
5 TABLET ORAL EVERY 4 HOURS PRN
Status: DISCONTINUED | OUTPATIENT
Start: 2021-01-01 | End: 2021-01-01 | Stop reason: HOSPADM

## 2021-01-01 RX ORDER — GLYCOPYRROLATE 0.2 MG/ML
INJECTION, SOLUTION INTRAMUSCULAR; INTRAVENOUS PRN
Status: DISCONTINUED | OUTPATIENT
Start: 2021-01-01 | End: 2021-01-01

## 2021-01-01 RX ORDER — TORSEMIDE 20 MG/1
10 TABLET ORAL DAILY
Status: CANCELLED | OUTPATIENT
Start: 2021-01-01

## 2021-01-01 RX ORDER — BISACODYL 10 MG
10 SUPPOSITORY, RECTAL RECTAL DAILY PRN
Status: DISCONTINUED | OUTPATIENT
Start: 2021-01-01 | End: 2021-01-01 | Stop reason: HOSPADM

## 2021-01-01 RX ORDER — FERROUS SULFATE 324(65)MG
324 TABLET, DELAYED RELEASE (ENTERIC COATED) ORAL 2 TIMES DAILY
COMMUNITY
Start: 2021-01-01 | End: 2022-01-01

## 2021-01-01 RX ORDER — LABETALOL HYDROCHLORIDE 5 MG/ML
15 INJECTION, SOLUTION INTRAVENOUS ONCE
Status: COMPLETED | OUTPATIENT
Start: 2021-01-01 | End: 2021-01-01

## 2021-01-01 RX ORDER — ONDANSETRON 2 MG/ML
4 INJECTION INTRAMUSCULAR; INTRAVENOUS EVERY 30 MIN PRN
Status: DISCONTINUED | OUTPATIENT
Start: 2021-01-01 | End: 2021-01-01 | Stop reason: HOSPADM

## 2021-01-01 RX ORDER — OXYCODONE AND ACETAMINOPHEN 5; 325 MG/1; MG/1
0.5 TABLET ORAL
Status: ON HOLD | COMMUNITY
End: 2021-01-01

## 2021-01-01 RX ORDER — SODIUM CHLORIDE AND POTASSIUM CHLORIDE 150; 900 MG/100ML; MG/100ML
INJECTION, SOLUTION INTRAVENOUS CONTINUOUS
Status: ACTIVE | OUTPATIENT
Start: 2021-01-01 | End: 2021-01-01

## 2021-01-01 RX ORDER — PROPOFOL 10 MG/ML
INJECTION, EMULSION INTRAVENOUS PRN
Status: DISCONTINUED | OUTPATIENT
Start: 2021-01-01 | End: 2021-01-01

## 2021-01-01 RX ORDER — CARVEDILOL 6.25 MG/1
6.25 TABLET ORAL 2 TIMES DAILY WITH MEALS
Status: DISCONTINUED | OUTPATIENT
Start: 2021-01-01 | End: 2021-01-01 | Stop reason: HOSPADM

## 2021-01-01 RX ORDER — ONDANSETRON 4 MG/1
4 TABLET, ORALLY DISINTEGRATING ORAL EVERY 30 MIN PRN
Status: DISCONTINUED | OUTPATIENT
Start: 2021-01-01 | End: 2021-01-01 | Stop reason: HOSPADM

## 2021-01-01 RX ORDER — TRAZODONE HYDROCHLORIDE 50 MG/1
100 TABLET, FILM COATED ORAL AT BEDTIME
Status: DISCONTINUED | OUTPATIENT
Start: 2021-01-01 | End: 2021-01-01 | Stop reason: HOSPADM

## 2021-01-01 RX ORDER — CEFAZOLIN SODIUM 2 G/100ML
2 INJECTION, SOLUTION INTRAVENOUS SEE ADMIN INSTRUCTIONS
Status: DISCONTINUED | OUTPATIENT
Start: 2021-01-01 | End: 2021-01-01 | Stop reason: HOSPADM

## 2021-01-01 RX ORDER — LIDOCAINE HYDROCHLORIDE 20 MG/ML
INJECTION, SOLUTION INFILTRATION; PERINEURAL PRN
Status: DISCONTINUED | OUTPATIENT
Start: 2021-01-01 | End: 2021-01-01

## 2021-01-01 RX ORDER — MEPERIDINE HYDROCHLORIDE 25 MG/ML
12.5 INJECTION INTRAMUSCULAR; INTRAVENOUS; SUBCUTANEOUS
Status: DISCONTINUED | OUTPATIENT
Start: 2021-01-01 | End: 2021-01-01 | Stop reason: HOSPADM

## 2021-01-01 RX ORDER — FENTANYL CITRATE 0.05 MG/ML
50 INJECTION, SOLUTION INTRAMUSCULAR; INTRAVENOUS
Status: DISCONTINUED | OUTPATIENT
Start: 2021-01-01 | End: 2021-01-01 | Stop reason: HOSPADM

## 2021-01-01 RX ORDER — ONDANSETRON 2 MG/ML
4 INJECTION INTRAMUSCULAR; INTRAVENOUS EVERY 6 HOURS PRN
Status: DISCONTINUED | OUTPATIENT
Start: 2021-01-01 | End: 2021-01-01 | Stop reason: HOSPADM

## 2021-01-01 RX ORDER — SENNA AND DOCUSATE SODIUM 50; 8.6 MG/1; MG/1
1 TABLET, FILM COATED ORAL 2 TIMES DAILY
Qty: 60 TABLET | Refills: 1 | Status: ON HOLD | OUTPATIENT
Start: 2021-01-01 | End: 2022-01-01

## 2021-01-01 RX ORDER — HYDROMORPHONE HYDROCHLORIDE 1 MG/ML
.3-.5 INJECTION, SOLUTION INTRAMUSCULAR; INTRAVENOUS; SUBCUTANEOUS
Status: DISCONTINUED | OUTPATIENT
Start: 2021-01-01 | End: 2021-01-01 | Stop reason: HOSPADM

## 2021-01-01 RX ORDER — HYDROMORPHONE HCL IN WATER/PF 6 MG/30 ML
0.2 PATIENT CONTROLLED ANALGESIA SYRINGE INTRAVENOUS EVERY 5 MIN PRN
Status: DISCONTINUED | OUTPATIENT
Start: 2021-01-01 | End: 2021-01-01 | Stop reason: HOSPADM

## 2021-01-01 RX ORDER — SODIUM CHLORIDE 9 MG/ML
INJECTION, SOLUTION INTRAVENOUS CONTINUOUS
Status: DISCONTINUED | OUTPATIENT
Start: 2021-01-01 | End: 2021-01-01

## 2021-01-01 RX ORDER — ONDANSETRON 2 MG/ML
4 INJECTION INTRAMUSCULAR; INTRAVENOUS
Status: DISCONTINUED | OUTPATIENT
Start: 2021-01-01 | End: 2021-01-01 | Stop reason: HOSPADM

## 2021-01-01 RX ORDER — SODIUM CHLORIDE, SODIUM LACTATE, POTASSIUM CHLORIDE, CALCIUM CHLORIDE 600; 310; 30; 20 MG/100ML; MG/100ML; MG/100ML; MG/100ML
INJECTION, SOLUTION INTRAVENOUS CONTINUOUS PRN
Status: DISCONTINUED | OUTPATIENT
Start: 2021-01-01 | End: 2021-01-01

## 2021-01-01 RX ORDER — PEN NEEDLE, DIABETIC 32GX 5/32"
NEEDLE, DISPOSABLE MISCELLANEOUS
OUTPATIENT
Start: 2021-01-01

## 2021-01-01 RX ORDER — IOPAMIDOL 755 MG/ML
102 INJECTION, SOLUTION INTRAVASCULAR ONCE
Status: DISCONTINUED | OUTPATIENT
Start: 2021-01-01 | End: 2021-01-01

## 2021-01-01 RX ORDER — POLYETHYLENE GLYCOL 3350 17 G/17G
17 POWDER, FOR SOLUTION ORAL 2 TIMES DAILY
Status: DISCONTINUED | OUTPATIENT
Start: 2021-01-01 | End: 2021-01-01 | Stop reason: HOSPADM

## 2021-01-01 RX ORDER — EPINEPHRINE 1 MG/ML
0.3 INJECTION, SOLUTION, CONCENTRATE INTRAVENOUS EVERY 5 MIN PRN
Status: CANCELLED | OUTPATIENT
Start: 2021-01-01

## 2021-01-01 RX ORDER — INSULIN LISPRO 100 [IU]/ML
5-15 INJECTION, SOLUTION INTRAVENOUS; SUBCUTANEOUS
Qty: 45 ML | Refills: 3 | Status: ON HOLD | OUTPATIENT
Start: 2021-01-01 | End: 2022-01-01

## 2021-01-01 RX ORDER — MORPHINE SULFATE 4 MG/ML
4 INJECTION, SOLUTION INTRAMUSCULAR; INTRAVENOUS
Status: DISCONTINUED | OUTPATIENT
Start: 2021-01-01 | End: 2021-01-01

## 2021-01-01 RX ORDER — FENTANYL CITRATE 0.05 MG/ML
25 INJECTION, SOLUTION INTRAMUSCULAR; INTRAVENOUS EVERY 5 MIN PRN
Status: DISCONTINUED | OUTPATIENT
Start: 2021-01-01 | End: 2021-01-01 | Stop reason: HOSPADM

## 2021-01-01 RX ORDER — PEN NEEDLE, DIABETIC 32GX 5/32"
NEEDLE, DISPOSABLE MISCELLANEOUS
Qty: 400 EACH | Refills: 3 | Status: SHIPPED | OUTPATIENT
Start: 2021-01-01 | End: 2021-01-01

## 2021-01-01 RX ORDER — OXYCODONE AND ACETAMINOPHEN 5; 325 MG/1; MG/1
1-2 TABLET ORAL
Status: COMPLETED | OUTPATIENT
Start: 2021-01-01 | End: 2021-01-01

## 2021-01-01 RX ORDER — CEFAZOLIN SODIUM 2 G/100ML
2 INJECTION, SOLUTION INTRAVENOUS
Status: DISCONTINUED | OUTPATIENT
Start: 2021-01-01 | End: 2021-01-01 | Stop reason: HOSPADM

## 2021-01-01 RX ADMIN — DARBEPOETIN ALFA 60 MCG: 60 INJECTION, SOLUTION INTRAVENOUS; SUBCUTANEOUS at 13:59

## 2021-01-01 RX ADMIN — INSULIN ASPART 1 UNITS: 100 INJECTION, SOLUTION INTRAVENOUS; SUBCUTANEOUS at 04:19

## 2021-01-01 RX ADMIN — FERRIC CARBOXYMALTOSE INJECTION 750 MG: 50 INJECTION, SOLUTION INTRAVENOUS at 15:19

## 2021-01-01 RX ADMIN — FENTANYL CITRATE 25 MCG: 50 INJECTION, SOLUTION INTRAMUSCULAR; INTRAVENOUS at 12:53

## 2021-01-01 RX ADMIN — FENTANYL CITRATE 100 MCG: 50 INJECTION, SOLUTION INTRAMUSCULAR; INTRAVENOUS at 11:10

## 2021-01-01 RX ADMIN — CISATRACURIUM BESYLATE 14 MG: 2 INJECTION INTRAVENOUS at 11:10

## 2021-01-01 RX ADMIN — CARVEDILOL 6.25 MG: 6.25 TABLET, FILM COATED ORAL at 18:58

## 2021-01-01 RX ADMIN — INSULIN ASPART 3 UNITS: 100 INJECTION, SOLUTION INTRAVENOUS; SUBCUTANEOUS at 09:22

## 2021-01-01 RX ADMIN — MORPHINE SULFATE 4 MG: 4 INJECTION, SOLUTION INTRAMUSCULAR; INTRAVENOUS at 21:21

## 2021-01-01 RX ADMIN — FERRIC CARBOXYMALTOSE INJECTION 750 MG: 50 INJECTION, SOLUTION INTRAVENOUS at 14:39

## 2021-01-01 RX ADMIN — POTASSIUM CHLORIDE AND SODIUM CHLORIDE: 900; 150 INJECTION, SOLUTION INTRAVENOUS at 11:22

## 2021-01-01 RX ADMIN — PROPOFOL 200 MG: 10 INJECTION, EMULSION INTRAVENOUS at 11:10

## 2021-01-01 RX ADMIN — TRAZODONE HYDROCHLORIDE 100 MG: 50 TABLET ORAL at 21:53

## 2021-01-01 RX ADMIN — ONDANSETRON 4 MG: 2 INJECTION INTRAMUSCULAR; INTRAVENOUS at 08:09

## 2021-01-01 RX ADMIN — POLYETHYLENE GLYCOL 3350 17 G: 17 POWDER, FOR SOLUTION ORAL at 08:00

## 2021-01-01 RX ADMIN — POTASSIUM CHLORIDE 20 MEQ: 1500 TABLET, EXTENDED RELEASE ORAL at 08:35

## 2021-01-01 RX ADMIN — SODIUM CHLORIDE 250 ML: 9 INJECTION, SOLUTION INTRAVENOUS at 13:20

## 2021-01-01 RX ADMIN — DARBEPOETIN ALFA 40 MCG: 40 INJECTION, SOLUTION INTRAVENOUS; SUBCUTANEOUS at 12:34

## 2021-01-01 RX ADMIN — DEXMEDETOMIDINE 4 MCG: 100 INJECTION, SOLUTION, CONCENTRATE INTRAVENOUS at 11:46

## 2021-01-01 RX ADMIN — MEBROFENIN 5 MILLICURIE: 45 INJECTION, POWDER, LYOPHILIZED, FOR SOLUTION INTRAVENOUS at 07:52

## 2021-01-01 RX ADMIN — GLYCOPYRROLATE 0.6 MG: 0.2 INJECTION, SOLUTION INTRAMUSCULAR; INTRAVENOUS at 12:03

## 2021-01-01 RX ADMIN — PANTOPRAZOLE SODIUM 40 MG: 40 TABLET, DELAYED RELEASE ORAL at 06:40

## 2021-01-01 RX ADMIN — INSULIN GLARGINE 25 UNITS: 100 INJECTION, SOLUTION SUBCUTANEOUS at 00:45

## 2021-01-01 RX ADMIN — MORPHINE SULFATE 2 MG: 2 INJECTION, SOLUTION INTRAMUSCULAR; INTRAVENOUS at 08:09

## 2021-01-01 RX ADMIN — ACETAMINOPHEN 650 MG: 325 TABLET, FILM COATED ORAL at 17:16

## 2021-01-01 RX ADMIN — ACETAMINOPHEN 650 MG: 325 TABLET, FILM COATED ORAL at 00:00

## 2021-01-01 RX ADMIN — POTASSIUM CHLORIDE 40 MEQ: 1.5 POWDER, FOR SOLUTION ORAL at 12:03

## 2021-01-01 RX ADMIN — INSULIN GLARGINE 25 UNITS: 100 INJECTION, SOLUTION SUBCUTANEOUS at 21:24

## 2021-01-01 RX ADMIN — INSULIN ASPART 3 UNITS: 100 INJECTION, SOLUTION INTRAVENOUS; SUBCUTANEOUS at 21:24

## 2021-01-01 RX ADMIN — DARBEPOETIN ALFA 40 MCG: 40 INJECTION, SOLUTION INTRAVENOUS; SUBCUTANEOUS at 10:04

## 2021-01-01 RX ADMIN — FERRIC CARBOXYMALTOSE INJECTION 750 MG: 50 INJECTION, SOLUTION INTRAVENOUS at 13:21

## 2021-01-01 RX ADMIN — INSULIN ASPART 1 UNITS: 100 INJECTION, SOLUTION INTRAVENOUS; SUBCUTANEOUS at 00:48

## 2021-01-01 RX ADMIN — MIDAZOLAM 1 MG: 1 INJECTION INTRAMUSCULAR; INTRAVENOUS at 11:03

## 2021-01-01 RX ADMIN — DEXMEDETOMIDINE 8 MCG: 100 INJECTION, SOLUTION, CONCENTRATE INTRAVENOUS at 11:41

## 2021-01-01 RX ADMIN — TRAZODONE HYDROCHLORIDE 100 MG: 50 TABLET ORAL at 00:26

## 2021-01-01 RX ADMIN — MORPHINE SULFATE 4 MG: 4 INJECTION, SOLUTION INTRAMUSCULAR; INTRAVENOUS at 19:33

## 2021-01-01 RX ADMIN — SODIUM CHLORIDE 250 ML: 9 INJECTION, SOLUTION INTRAVENOUS at 14:35

## 2021-01-01 RX ADMIN — FERRIC CARBOXYMALTOSE INJECTION 750 MG: 50 INJECTION, SOLUTION INTRAVENOUS at 13:03

## 2021-01-01 RX ADMIN — DEXAMETHASONE SODIUM PHOSPHATE 4 MG: 4 INJECTION, SOLUTION INTRA-ARTICULAR; INTRALESIONAL; INTRAMUSCULAR; INTRAVENOUS; SOFT TISSUE at 11:16

## 2021-01-01 RX ADMIN — INSULIN ASPART 3 UNITS: 100 INJECTION, SOLUTION INTRAVENOUS; SUBCUTANEOUS at 04:32

## 2021-01-01 RX ADMIN — HYDROMORPHONE HYDROCHLORIDE 0.25 MG: 1 INJECTION, SOLUTION INTRAMUSCULAR; INTRAVENOUS; SUBCUTANEOUS at 11:34

## 2021-01-01 RX ADMIN — DARBEPOETIN ALFA 40 MCG: 40 INJECTION, SOLUTION INTRAVENOUS; SUBCUTANEOUS at 12:15

## 2021-01-01 RX ADMIN — DARBEPOETIN ALFA 40 MCG: 40 INJECTION, SOLUTION INTRAVENOUS; SUBCUTANEOUS at 12:42

## 2021-01-01 RX ADMIN — NEOSTIGMINE METHYLSULFATE 4 MG: 1 INJECTION, SOLUTION INTRAVENOUS at 12:03

## 2021-01-01 RX ADMIN — MAGNESIUM HYDROXIDE 30 ML: 400 SUSPENSION ORAL at 17:16

## 2021-01-01 RX ADMIN — MIDAZOLAM 1 MG: 1 INJECTION INTRAMUSCULAR; INTRAVENOUS at 11:15

## 2021-01-01 RX ADMIN — DARBEPOETIN ALFA 40 MCG: 40 INJECTION, SOLUTION INTRAVENOUS; SUBCUTANEOUS at 12:28

## 2021-01-01 RX ADMIN — DARBEPOETIN ALFA 40 MCG: 40 INJECTION, SOLUTION INTRAVENOUS; SUBCUTANEOUS at 15:02

## 2021-01-01 RX ADMIN — LABETALOL HYDROCHLORIDE 15 MG: 5 INJECTION, SOLUTION INTRAVENOUS at 12:39

## 2021-01-01 RX ADMIN — POTASSIUM CHLORIDE 10 MEQ: 7.46 INJECTION, SOLUTION INTRAVENOUS at 13:29

## 2021-01-01 RX ADMIN — SODIUM CHLORIDE: 9 INJECTION, SOLUTION INTRAVENOUS at 00:51

## 2021-01-01 RX ADMIN — LIDOCAINE HYDROCHLORIDE 60 MG: 20 INJECTION, SOLUTION INFILTRATION; PERINEURAL at 11:10

## 2021-01-01 RX ADMIN — PANTOPRAZOLE SODIUM 40 MG: 40 INJECTION, POWDER, FOR SOLUTION INTRAVENOUS at 00:28

## 2021-01-01 RX ADMIN — DARBEPOETIN ALFA 40 MCG: 40 INJECTION, SOLUTION INTRAVENOUS; SUBCUTANEOUS at 10:23

## 2021-01-01 RX ADMIN — SODIUM CHLORIDE 250 ML: 9 INJECTION, SOLUTION INTRAVENOUS at 13:02

## 2021-01-01 RX ADMIN — POLYETHYLENE GLYCOL 3350 17 G: 17 POWDER, FOR SOLUTION ORAL at 21:24

## 2021-01-01 RX ADMIN — SINCALIDE 1.9 MCG: 5 INJECTION, POWDER, LYOPHILIZED, FOR SOLUTION INTRAVENOUS at 08:44

## 2021-01-01 RX ADMIN — POTASSIUM CHLORIDE 10 MEQ: 7.46 INJECTION, SOLUTION INTRAVENOUS at 12:26

## 2021-01-01 RX ADMIN — FERRIC CARBOXYMALTOSE INJECTION 750 MG: 50 INJECTION, SOLUTION INTRAVENOUS at 15:28

## 2021-01-01 RX ADMIN — SODIUM CHLORIDE 250 ML: 9 INJECTION, SOLUTION INTRAVENOUS at 15:29

## 2021-01-01 RX ADMIN — HYDROMORPHONE HYDROCHLORIDE 0.25 MG: 1 INJECTION, SOLUTION INTRAMUSCULAR; INTRAVENOUS; SUBCUTANEOUS at 11:22

## 2021-01-01 RX ADMIN — HYDROMORPHONE HYDROCHLORIDE 0.5 MG: 1 INJECTION, SOLUTION INTRAMUSCULAR; INTRAVENOUS; SUBCUTANEOUS at 11:19

## 2021-01-01 RX ADMIN — PANTOPRAZOLE SODIUM 40 MG: 40 INJECTION, POWDER, FOR SOLUTION INTRAVENOUS at 08:36

## 2021-01-01 RX ADMIN — INSULIN ASPART 1 UNITS: 100 INJECTION, SOLUTION INTRAVENOUS; SUBCUTANEOUS at 17:22

## 2021-01-01 RX ADMIN — SODIUM CHLORIDE, POTASSIUM CHLORIDE, SODIUM LACTATE AND CALCIUM CHLORIDE: 600; 310; 30; 20 INJECTION, SOLUTION INTRAVENOUS at 11:03

## 2021-01-01 RX ADMIN — ONDANSETRON 4 MG: 2 INJECTION INTRAMUSCULAR; INTRAVENOUS at 11:59

## 2021-01-01 RX ADMIN — CARVEDILOL 6.25 MG: 6.25 TABLET, FILM COATED ORAL at 08:36

## 2021-01-01 RX ADMIN — MEBROFENIN 5.1 MILLICURIE: 45 INJECTION, POWDER, LYOPHILIZED, FOR SOLUTION INTRAVENOUS at 07:55

## 2021-01-01 RX ADMIN — SODIUM CHLORIDE 250 ML: 9 INJECTION, SOLUTION INTRAVENOUS at 15:18

## 2021-01-01 RX ADMIN — OXYCODONE HYDROCHLORIDE AND ACETAMINOPHEN 1 TABLET: 5; 325 TABLET ORAL at 13:36

## 2021-01-01 RX ADMIN — DARBEPOETIN ALFA 60 MCG: 60 INJECTION, SOLUTION INTRAVENOUS; SUBCUTANEOUS at 12:16

## 2021-01-01 RX ADMIN — CARVEDILOL 6.25 MG: 6.25 TABLET, FILM COATED ORAL at 08:00

## 2021-01-01 ASSESSMENT — PAIN SCALES - GENERAL
PAINLEVEL: SEVERE PAIN (7)
PAINLEVEL: NO PAIN (0)
PAINLEVEL: NO PAIN (0)
PAINLEVEL: MODERATE PAIN (4)
PAINLEVEL: NO PAIN (0)
PAINLEVEL: SEVERE PAIN (6)
PAINLEVEL: SEVERE PAIN (6)
PAINLEVEL: NO PAIN (0)
PAINLEVEL: MODERATE PAIN (5)
PAINLEVEL: NO PAIN (0)

## 2021-01-01 ASSESSMENT — ACTIVITIES OF DAILY LIVING (ADL)
DEPENDENT_IADLS:: TRANSPORTATION
ADLS_ACUITY_SCORE: 13
ADLS_ACUITY_SCORE: 13
DEPENDENT_IADLS:: TRANSPORTATION
ADLS_ACUITY_SCORE: 13
DEPENDENT_IADLS:: TRANSPORTATION
ADLS_ACUITY_SCORE: 13
DEPENDENT_IADLS:: TRANSPORTATION
ADLS_ACUITY_SCORE: 13
ADLS_ACUITY_SCORE: 13
DEPENDENT_IADLS:: TRANSPORTATION
ADLS_ACUITY_SCORE: 13

## 2021-01-01 ASSESSMENT — ENCOUNTER SYMPTOMS
FREQUENCY: 0
SEIZURES: 0
CONSTIPATION: 1
NAUSEA: 0
VOMITING: 0
BACK PAIN: 0
ABDOMINAL PAIN: 1
HEMATURIA: 0
ARTHRALGIAS: 1
DIARRHEA: 0
WOUND: 1
FEVER: 0
DYSRHYTHMIAS: 0
DYSURIA: 0

## 2021-01-01 ASSESSMENT — MIFFLIN-ST. JEOR
SCORE: 1683
SCORE: 1641.12
SCORE: 1637
SCORE: 1700.09
SCORE: 1659.27
SCORE: 1641.12
SCORE: 1684.67
SCORE: 1681.95
SCORE: 1715.51

## 2021-01-01 ASSESSMENT — COPD QUESTIONNAIRES: COPD: 0

## 2021-01-01 ASSESSMENT — LIFESTYLE VARIABLES: TOBACCO_USE: 0

## 2021-01-03 VITALS
BODY MASS INDEX: 29.39 KG/M2 | DIASTOLIC BLOOD PRESSURE: 84 MMHG | OXYGEN SATURATION: 100 % | TEMPERATURE: 97.7 F | HEART RATE: 103 BPM | WEIGHT: 217 LBS | SYSTOLIC BLOOD PRESSURE: 138 MMHG | HEIGHT: 72 IN

## 2021-01-05 LAB — MISCELLANEOUS TEST: NORMAL

## 2021-01-07 ENCOUNTER — PATIENT OUTREACH (OUTPATIENT)
Dept: CARE COORDINATION | Facility: CLINIC | Age: 81
End: 2021-01-07

## 2021-01-07 ASSESSMENT — ACTIVITIES OF DAILY LIVING (ADL): DEPENDENT_IADLS:: INDEPENDENT

## 2021-01-07 NOTE — PROGRESS NOTES
Clinic Care Coordination Contact  [facility]/Voicemail    Referral Source: IP Report  Clinical Data: Care Coordinator Outreach  CC RN outreach to get updates on patient status, assess goal progress, and provide support and additional resources as needed.    Outreach attempted x 1.  Left message on patient's voicemail with call back information and requested return call.    Plan: Care Coordinator will try to reach patient again in 3-5 business days.    Jerel Leija RN  Clinic Care Coordinator  [facility] [place], [place], [facility]  Ph: [phone]

## 2021-01-11 ENCOUNTER — INFUSION THERAPY VISIT (OUTPATIENT)
Dept: INFUSION THERAPY | Facility: CLINIC | Age: 81
End: 2021-01-11
Attending: INTERNAL MEDICINE
Payer: MEDICARE

## 2021-01-11 VITALS
TEMPERATURE: 97.5 F | RESPIRATION RATE: 22 BRPM | HEART RATE: 99 BPM | SYSTOLIC BLOOD PRESSURE: 178 MMHG | DIASTOLIC BLOOD PRESSURE: 97 MMHG | OXYGEN SATURATION: 100 %

## 2021-01-11 DIAGNOSIS — K29.01 ACUTE SUPERFICIAL GASTRITIS WITH HEMORRHAGE: ICD-10-CM

## 2021-01-11 DIAGNOSIS — N18.31 STAGE 3A CHRONIC KIDNEY DISEASE (H): ICD-10-CM

## 2021-01-11 DIAGNOSIS — D50.0 IRON DEFICIENCY ANEMIA DUE TO CHRONIC BLOOD LOSS: Primary | ICD-10-CM

## 2021-01-11 PROCEDURE — 96374 THER/PROPH/DIAG INJ IV PUSH: CPT

## 2021-01-11 PROCEDURE — 250N000011 HC RX IP 250 OP 636: Performed by: INTERNAL MEDICINE

## 2021-01-11 PROCEDURE — 258N000003 HC RX IP 258 OP 636: Performed by: INTERNAL MEDICINE

## 2021-01-11 RX ORDER — HEPARIN SODIUM (PORCINE) LOCK FLUSH IV SOLN 100 UNIT/ML 100 UNIT/ML
5 SOLUTION INTRAVENOUS
Status: CANCELLED | OUTPATIENT
Start: 2021-01-18

## 2021-01-11 RX ORDER — HEPARIN SODIUM,PORCINE 10 UNIT/ML
5 VIAL (ML) INTRAVENOUS
Status: CANCELLED | OUTPATIENT
Start: 2021-01-18

## 2021-01-11 RX ADMIN — FERRIC CARBOXYMALTOSE INJECTION 750 MG: 50 INJECTION, SOLUTION INTRAVENOUS at 14:51

## 2021-01-11 RX ADMIN — SODIUM CHLORIDE 250 ML: 9 INJECTION, SOLUTION INTRAVENOUS at 14:53

## 2021-01-11 ASSESSMENT — PAIN SCALES - GENERAL: PAINLEVEL: NO PAIN (0)

## 2021-01-11 NOTE — PROGRESS NOTES
Infusion Nursing Note:  Justyn Olivas presents today for Injectafer.    Patient seen by provider today: No   present during visit today: Not Applicable.    Note: observed for 30 minutes post infusion.    Intravenous Access:  Peripheral IV placed (3 attempts).    Treatment Conditions:  Not Applicable.      Post Infusion Assessment:  Patient tolerated infusion without incident.  Site patent and intact, free from redness, edema or discomfort.  No evidence of extravasations.  Access discontinued per protocol.       Discharge Plan:   Discharge instructions reviewed with: Patient.  Copy of AVS reviewed with patient and/or family.  Patient will return as scheduled for next appointment.   Patient discharged in stable condition accompanied by: self.  Departure Mode: Ambulatory.    EULOGIO Jarvis RN

## 2021-01-12 ENCOUNTER — PATIENT OUTREACH (OUTPATIENT)
Dept: NURSING | Facility: CLINIC | Age: 81
End: 2021-01-12
Payer: MEDICARE

## 2021-01-12 ASSESSMENT — ACTIVITIES OF DAILY LIVING (ADL): DEPENDENT_IADLS:: INDEPENDENT

## 2021-01-12 NOTE — PROGRESS NOTES
Clinic Care Coordination Contact    Follow Up Progress Note      Assessment:    Patient reported to be doing fairly well since last CC outreach.    Patient updated that he had his first iron infusion yesterday which went well but patient initially thought he'd feel more immediately better.  Patient has since discussed this with his care team and now understands that it does take time for the iron infusions to help him to feel better.  Patient has another iron infusion next week.    Patient continues working with [facility].  He stated he believes he is done with PT as he completed his goals with them.  Patient has not been needing an assistive device for ambulation but does have a cane available for longer distances.  He noted that navigated the 13 stairs in his home has become much easier over the past couple of weeks, which is encouraging.    Patient did note a persistent dry cough.  He is using cough drops.  He agreed to discuss this with PCP if cough persists.    Patient confirmed he has been doing well with medication medication management with assistance from his wife; he denied any questions or concerns at this time.    Goals addressed this encounter:   Goals Addressed                 This Visit's Progress       Patient Stated      1. Improve chronic symptoms (pt-stated)   30%     Goal Statement: I will verbalize an increase in my strength and mobility.   Date Goal set: 12/24/20  Barriers: Multiple health concerns.  Strengths: Motivated and engaged in care coordination.   Date to Achieve By: 6/1/2021  Patient expressed understanding of goal: Yes    Action steps to achieve this goal:  1. I will work with Physical Therapy to build my strength and mobility.   2. I will follow up with PCP and Cardiology and follow their recommendations.   3. I will continue to work with care coordination for any additional resources and support.        Intervention/Education provided during outreach:    CC RN encouraged  patient to continue doing home exercises as advised by PT to continue working on improving his strength and mobility; patient agreed to do so.    CC RN reviewed patient's upcoming appointments; patient denied questions/concerns.    CC RN to call with any needs; he agreed to do so.     Outreach Frequency: monthly    Plan:     Patient will continue taking his medications as prescribed.    Patient will attend upcoming appointments as scheduled.    Patient will notify his care team of any new or worsening symptoms.    Patient agreed to contact CC RN with additional questions or concerns.    CC CHW will continue with outreaches at this time with next outreach in approximately 1 month. CC CHW will inform CC RN of any concerns or changes regarding patient as needed.  CC RN will review patient's chart in approximately 6 weeks and outreach to patient as indicated.    Jerel Leija, RN  Clinic Care Coordinator  [facility]  Ph: [phone]

## 2021-01-12 NOTE — TELEPHONE ENCOUNTER
Pantoprazole 40 mg tablets    Summary: TAKE 1 TABLET(40 MG) BY MOUTH TWICE DAILY, Disp-180 tablet, R-0, E-Prescribe  **Patient requests 90 days supply**     Start: 12/16/2020  Ord/Sold: 12/16/2020

## 2021-01-13 RX ORDER — PANTOPRAZOLE SODIUM 40 MG/1
TABLET, DELAYED RELEASE ORAL
Qty: 180 TABLET | Refills: 2 | Status: SHIPPED | OUTPATIENT
Start: 2021-01-13 | End: 2021-01-01

## 2021-01-13 NOTE — PROGRESS NOTES
[facility]ART [facility]      Assessment & Plan   Problem List Items Addressed This Visit     Pericardial effusion - Primary    Relevant Medications    carvedilol (COREG) 6.25 MG tablet    torsemide (DEMADEX) 10 MG tablet    potassium chloride ER (KLOR-CON M) 20 MEQ CR tablet    Other Relevant Orders    Echocardiogram Limited    Basic metabolic panel (Completed)    CBC with platelets (Completed)           [facility] HEART [facility]    I had the pleasure of meeting Kamran when he came for follow up of recent hospitalization.  This 80 year old saw Dr. Rosenbaum while hospitalized and she reviewed his h/o:     1. CM - EF 45% on echo  2. Pericardial Effusion - noted on CT 12/20/2020. Underwent pericardiocentesis 12/21/2020  3. DM2 - on insulin  4. H/o basilar CVA - DAPT since 4/2019  5. HTN  6. Dyslipidemia  7. Chronic MARKS - thought d/t anemia dx'd 2018  8. Iron Deficiency Anemia - PTA on iron supplements since ~2018. Baseline Hgb ~10 g/dL, down to 6.9g/dL requiring 1 unit pRBCs 12/21/2020.     Kamran presented to [facility] 12/20/2020 and was admitted 12/20-23/2020 for acute worsening of his chronic MARKS and c/o orthopnea. CT ruled out PE but a new large pericardial effusion with small L pleural effusion noted.  Dr. Rosenbaum reviewed echo showing EF 47% with nl RVSF and no evidence of tamponade.    D/t orthopnea and tachycardia, proceeded with pericardiocentesis 12/21, removing 260 cc. GI also saw and reviewed EGD done as outpatient the week prior to admission, which showed no areas of active bleeding. Outpt follow-up rec'd.     He was therefore DCd home 12/23 @ 221# on Coreg 6.25 mg BID, KCl 40 BID and torsemide 10 mg. Hydrochlorothiazide 25 was stopped.     Kamran was due to see Shaniqua Ram 1.8 but appt was rescheduled to see me and I'm meeting him today. It also appears that the recommended limited echo to check effusion was not ordered.     It appears he's been started on IV iron infusions. He's seen Dr. Greenberg (last  "12/30). Weight was 217#, 1+ edema noted.     Interval History:  Kamran notes a significant \"hacking\" cough that he has had for the last 1.5-2 years, worse at night.  Now, it seems like it's happening \"all the time\" as soon as he starts to talk.    When not coughing, feels breathing is \"OK.\" If he's not coughing, he can go up the stairs without terrible SOB which before the hospitalization wasn't the case. A humidifier last night helped this some. He thinks this cough is worse since he left the hospital and has worsened even since he saw Dr. Greenberg 12/30.    No c/o fever/chills/ache. Cough is not productive. He's not used his inhaler.    No blood loss that he's aware of. He thinks he's felt better on iron infusions and is eager to see Dr. Greenberg again to review.  His Plavix was placed on hold as of 12/30 but he does remain on ASA.      VITALS:  Vitals: BP (!) 145/77   Pulse 94   Ht 1.829 m (6')   Wt 95.7 kg (211 lb)   SpO2 100%   BMI 28.62 kg/m      Diagnostic Testing:  EKG today, which I overread, showed SR 95 bpm. LAE. LVH/nonspecific STTW changes  Echocardiogram 12/20/2020 - EF 45-50% with basal inferior, basal/mid IL wall HK. Nl RV function. No sig valve abnl  Echo Stress Testing 3/2014 - no exercise-induced WMA  Component      Latest Ref Rng & Units 12/23/2020 12/30/2020           5:32 AM  9:12 AM   Sodium      133 - 144 mmol/L 137 140   Potassium      3.4 - 5.3 mmol/L 3.7 4.4   Chloride      94 - 109 mmol/L 103 108   Carbon Dioxide      20 - 32 mmol/L 29 24   Anion Gap      3 - 14 mmol/L 5 9   Glucose      70 - 99 mg/dL 106 (H) 144 (H)   Urea Nitrogen      7 - 30 mg/dL 27 25   Creatinine      0.66 - 1.25 mg/dL 1.54 (H) 1.69 (H)   GFR Estimate      >60 mL/min/1.73:m2 42 (L) 37 (L)   GFR Estimate If Black      >60 mL/min/1.73:m2 48 (L) 43 (L)   Calcium      8.5 - 10.1 mg/dL 8.4 (L) 8.6     Component      Latest Ref Rng & Units 11/26/2020 12/30/2020           1:17 PM  9:12 AM   N-Terminal Pro BNP Inpatient     " " 0 - 1,800 pg/mL 1,544    N-Terminal Pro Bnp      0 - 450 pg/mL  4,976 (H)     Component       Hemoglobin Hematocrit   Latest Ref Rng & Units       13.3 - 17.7 g/dL 40.0 - 53.0 %   7/21/2020      11:38 AM 11.4 (L) 35.4 (L)   12/1/2020      12:24 PM 10.2 (L) 32.7 (L)   12/10/2020      3:58 PM 7.7 (LL) 25.2 (L)   12/14/2020      11:19 AM 7.4 (LL) 24.6 (L)   12/21/2020      6:14 AM 6.9 (LL) 22.8 (L)   12/23/2020      5:32 AM 8.1 (L) 27.1 (L)   12/30/2020      9:12 AM 8.8 (L) 29.7 (L)     Plan:  1. BMP, CBC today  2. Limited echo to ensure no reaccumulation of fluid. We'll call with results  3. Use albuterol inhaler qid for cough    Assessment/Plan:    1. Pericardial Effusion    Required pericardiocentesis 12/21 removing 260 ml per DCSummary. Follow-up limited echo done but not done since drain removal    Pericardial fluid without malignancy. Gram Stain negative    Theorizing that pericardial effusion etiology was HF? Not noted in any Cardiology/DC/FU notes.     PLAN:    Needs a repeat echo to ensure no recurrence of pericardial fluid    Will confirm no further w/u needed with Dr. Rosenbaum    ADDENDUM 1/21: Reviewed echo showing no reaccumulation of fluid; Per Dr. Rosenbaum, no further w/u needed. Esteban will see in follow-up     2. HFrEF    NTpBNP not checked on admit (?) on admit. EF 45-50% on echo    PTA on only hydrochlorothiazide but switched to torsemide 10 mg daily. Breathing is better; cough is really the main issue now    BMP at follow-up 12/30 showed nl electrolytes but Cr relatively stable 1.6    Coreg started; remains on irbesartan 300 mg daily    PLAN:    For now continue Coreg 6.25 mg BID and torsemide 10 mg daily as when he's not coughing, breathing is \"better\"    CBC and BMP today      Ekaterina Sen PA-C, MSPAS      Orders Placed This Encounter   Procedures     Basic metabolic panel     CBC with platelets     EKG 12-lead complete w/read - Clinics (performed today)     Echocardiogram Limited     Orders Placed This " Encounter   Medications     carvedilol (COREG) 6.25 MG tablet     Sig: Take 1 tablet (6.25 mg) by mouth 2 times daily (with meals)     Dispense:  60 tablet     Refill:  3     torsemide (DEMADEX) 10 MG tablet     Sig: Take 1 tablet (10 mg) by mouth daily     Dispense:  30 tablet     Refill:  3     potassium chloride ER (KLOR-CON M) 20 MEQ CR tablet     Sig: Take 2 tablets (40 mEq) by mouth 2 times daily     Dispense:  60 tablet     Refill:  3     Medications Discontinued During This Encounter   Medication Reason     carvedilol (COREG) 6.25 MG tablet Reorder     potassium chloride ER (KLOR-CON M) 20 MEQ CR tablet Reorder     torsemide (DEMADEX) 10 MG tablet Reorder         Encounter Diagnosis   Name Primary?     Pericardial effusion Yes       CURRENT MEDICATIONS:  Current Outpatient Medications   Medication Sig Dispense Refill     atorvastatin (LIPITOR) 20 MG tablet TAKE 1 TABLET(20 MG) BY MOUTH DAILY 90 tablet 2     blood glucose (STEVE CONTOUR) test strip TESTING FOUR TIMES DAILY OR AS DIRECTED 400 strip 1     blood glucose (STEVE CONTOUR) test strip TESTING FOUR TIMES DAILY OR AS DIRECTED 400 strip 0     carvedilol (COREG) 6.25 MG tablet Take 1 tablet (6.25 mg) by mouth 2 times daily (with meals) 60 tablet 3     dorzolamide-timolol (COSOPT) 2-0.5 % ophthalmic solution Place 1 drop into both eyes 2 times daily       fluticasone (FLONASE) 50 MCG/ACT nasal spray Spray 1 spray into both nostrils 2 times daily as needed for rhinitis or allergies       Glucose Blood (STEVE CONTOUR TEST VI)        insulin glargine (LANTUS SOLOSTAR) 100 UNIT/ML pen INJECT 50 UNITS UNDER THE SKIN AT BEDTIME 45 mL 3     insulin lispro (HUMALOG KWIKPEN) 100 UNIT/ML (1 unit dial) KWIKPEN INJECT UP TO 55 UNITS UNDER THE SKIN ONCE DAILY AS DIRECTED 45 mL 1     insulin pen needle (BD FERCHO U/F) 32G X 4 MM miscellaneous USE AS DIRECTED UP TO FOUR TIMES DAILY 400 each 3     irbesartan (AVAPRO) 300 MG tablet Take 1 tablet (300 mg) by mouth At Bedtime  90 tablet 3     metFORMIN (GLUCOPHAGE) 1000 MG tablet TAKE 1 TABLET(1000 MG) BY MOUTH TWICE DAILY WITH MEALS 180 tablet 3     order for DME Hip steroid injectoion 1 Units 0     pantoprazole (PROTONIX) 40 MG EC tablet TAKE 1 TABLET(40 MG) BY MOUTH TWICE DAILY 180 tablet 2     potassium chloride ER (KLOR-CON M) 20 MEQ CR tablet Take 2 tablets (40 mEq) by mouth 2 times daily 60 tablet 3     torsemide (DEMADEX) 10 MG tablet Take 1 tablet (10 mg) by mouth daily 30 tablet 3     traZODone (DESYREL) 100 MG tablet TAKE 1 TABLET(100 MG) BY MOUTH AT BEDTIME 90 tablet 0     VENTOLIN  (90 Base) MCG/ACT inhaler INL 2 PFS PO QID PRN 1 Inhaler 3     aspirin 81 MG EC tablet Take 81 mg by mouth daily       clopidogrel (PLAVIX) 75 MG tablet TAKE 1 TABLET(75 MG) BY MOUTH DAILY (Patient not taking: Reported on 1/11/2021) 90 tablet 3     LORazepam (ATIVAN) 0.5 MG tablet Take 1 tablet (0.5 mg) by mouth At Bedtime (Patient not taking: Reported on 1/14/2021) 10 tablet 0       ALLERGIES   No Known Allergies      Review of Systems:  Skin:        Eyes:       ENT:       Respiratory:       Cardiovascular:       Gastroenterology:      Genitourinary:       Musculoskeletal:       Neurologic:       Psychiatric:       Heme/Lymph/Imm:       Endocrine:         Physical Exam:  Vitals: BP (!) 145/77   Pulse 94   Ht 1.829 m (6')   Wt 95.7 kg (211 lb)   SpO2 100%   BMI 28.62 kg/m      Constitutional:  cooperative, alert and oriented, well developed, well nourished, in no acute distress   Cough (nonproductive)    Skin:  warm and dry to the touch, no apparent skin lesions or masses noted        Head:  normocephalic, no masses or lesions        Eyes:  pupils equal and round;conjunctivae and lids unremarkable;sclera white        ENT:  no pallor or cyanosis, dentition good        Neck:  JVP normal;no carotid bruit        Chest:  normal breath sounds, clear to auscultation, normal A-P diameter, normal symmetry, normal respiratory excursion, no use of  accessory muscles        Cardiac: regular rhythm;no murmurs, gallops or rubs detected                  Abdomen:  abdomen soft obese      Vascular: pulses full and equal                                      Extremities and Back:  no deformities, clubbing, cyanosis, erythema observed        Neurological:  no gross motor deficits            PAST MEDICAL HISTORY:  Past Medical History:   Diagnosis Date     Cerebral infarction (H)      Diabetes (H)      Hyperlipemia        PAST SURGICAL HISTORY:  Past Surgical History:   Procedure Laterality Date     CV PERICARDIOCENTESIS N/A 2020    Procedure: Pericardiocentesis;  Surgeon: Chas Chambers MD;  Location:  [facility]       FAMILY HISTORY:  Family History   Problem Relation Age of Onset     Family History Negative Mother      Family History Negative Father        SOCIAL HISTORY:  Social History     Socioeconomic History     Marital status:      Spouse name: Cecile     Number of children: None     Years of education: None     Highest education level: None   Occupational History     None   Social Needs     Financial resource strain: None     Food insecurity     Worry: None     Inability: None     Transportation needs     Medical: None     Non-medical: None   Tobacco Use     Smoking status: Former Smoker     Packs/day: 2.00     Years: 11.00     Pack years: 22.00     Types: Cigarettes     Start date:      Quit date:      Years since quittin.0     Smokeless tobacco: Never Used   Substance and Sexual Activity     Alcohol use: No     Alcohol/week: 0.0 standard drinks     Drug use: No     Sexual activity: Yes     Partners: Female     Birth control/protection: None   Lifestyle     Physical activity     Days per week: None     Minutes per session: None     Stress: None   Relationships     Social connections     Talks on phone: None     Gets together: None     Attends Jain service: None     Active member of club or organization: None      Attends meetings of clubs or organizations: None     Relationship status: None     Intimate partner violence     Fear of current or ex partner: None     Emotionally abused: None     Physically abused: None     Forced sexual activity: None   Other Topics Concern     Parent/sibling w/ CABG, MI or angioplasty before 65F 55M? Not Asked   Social History Narrative     None

## 2021-01-14 ENCOUNTER — DOCUMENTATION ONLY (OUTPATIENT)
Dept: CARDIOLOGY | Facility: CLINIC | Age: 81
End: 2021-01-14

## 2021-01-14 ENCOUNTER — OFFICE VISIT (OUTPATIENT)
Dept: CARDIOLOGY | Facility: CLINIC | Age: 81
End: 2021-01-14
Payer: MEDICARE

## 2021-01-14 VITALS
OXYGEN SATURATION: 100 % | SYSTOLIC BLOOD PRESSURE: 145 MMHG | WEIGHT: 211 LBS | DIASTOLIC BLOOD PRESSURE: 77 MMHG | HEIGHT: 72 IN | HEART RATE: 94 BPM | BODY MASS INDEX: 28.58 KG/M2

## 2021-01-14 DIAGNOSIS — I31.39 PERICARDIAL EFFUSION: Primary | ICD-10-CM

## 2021-01-14 DIAGNOSIS — I31.39 PERICARDIAL EFFUSION: ICD-10-CM

## 2021-01-14 DIAGNOSIS — I48.0 PAROXYSMAL A-FIB (H): ICD-10-CM

## 2021-01-14 LAB
ANION GAP SERPL CALCULATED.3IONS-SCNC: 4 MMOL/L (ref 3–14)
BUN SERPL-MCNC: 22 MG/DL (ref 7–30)
CALCIUM SERPL-MCNC: 8.9 MG/DL (ref 8.5–10.1)
CHLORIDE SERPL-SCNC: 107 MMOL/L (ref 94–109)
CO2 SERPL-SCNC: 28 MMOL/L (ref 20–32)
CREAT SERPL-MCNC: 1.68 MG/DL (ref 0.66–1.25)
ERYTHROCYTE [DISTWIDTH] IN BLOOD BY AUTOMATED COUNT: 18.4 % (ref 10–15)
GFR SERPL CREATININE-BSD FRML MDRD: 37 ML/MIN/{1.73_M2}
GLUCOSE SERPL-MCNC: 235 MG/DL (ref 70–99)
HCT VFR BLD AUTO: 29.5 % (ref 40–53)
HGB BLD-MCNC: 8.4 G/DL (ref 13.3–17.7)
MCH RBC QN AUTO: 22.3 PG (ref 26.5–33)
MCHC RBC AUTO-ENTMCNC: 28.5 G/DL (ref 31.5–36.5)
MCV RBC AUTO: 79 FL (ref 78–100)
PLATELET # BLD AUTO: 323 10E9/L (ref 150–450)
POTASSIUM SERPL-SCNC: 4.3 MMOL/L (ref 3.4–5.3)
RBC # BLD AUTO: 3.76 10E12/L (ref 4.4–5.9)
SODIUM SERPL-SCNC: 139 MMOL/L (ref 133–144)
WBC # BLD AUTO: 8.5 10E9/L (ref 4–11)

## 2021-01-14 PROCEDURE — 36415 COLL VENOUS BLD VENIPUNCTURE: CPT | Performed by: PHYSICIAN ASSISTANT

## 2021-01-14 PROCEDURE — 99214 OFFICE O/P EST MOD 30 MIN: CPT | Performed by: PHYSICIAN ASSISTANT

## 2021-01-14 PROCEDURE — 93000 ELECTROCARDIOGRAM COMPLETE: CPT | Performed by: PHYSICIAN ASSISTANT

## 2021-01-14 PROCEDURE — 80048 BASIC METABOLIC PNL TOTAL CA: CPT | Performed by: PHYSICIAN ASSISTANT

## 2021-01-14 PROCEDURE — 85027 COMPLETE CBC AUTOMATED: CPT | Performed by: PHYSICIAN ASSISTANT

## 2021-01-14 RX ORDER — CARVEDILOL 6.25 MG/1
6.25 TABLET ORAL 2 TIMES DAILY WITH MEALS
Qty: 60 TABLET | Refills: 3 | Status: SHIPPED | OUTPATIENT
Start: 2021-01-14 | End: 2021-01-15

## 2021-01-14 RX ORDER — TORSEMIDE 10 MG/1
10 TABLET ORAL DAILY
Qty: 30 TABLET | Refills: 3 | Status: SHIPPED | OUTPATIENT
Start: 2021-01-14 | End: 2021-01-24

## 2021-01-14 RX ORDER — POTASSIUM CHLORIDE 1500 MG/1
40 TABLET, EXTENDED RELEASE ORAL 2 TIMES DAILY
Qty: 60 TABLET | Refills: 3 | Status: SHIPPED | OUTPATIENT
Start: 2021-01-14 | End: 2021-03-03

## 2021-01-14 ASSESSMENT — MIFFLIN-ST. JEOR: SCORE: 1700.09

## 2021-01-14 NOTE — PROGRESS NOTES
Dr. Robi dyer cared for Kamran in the hospital when he was noted to have pericardial effusion.    His follow-up was cancelled d/t PA illness so he was rescheduled to me and I met him today and ordered the limited echo that was to be done following discharge.    1) Are you thinking that his pericardial effusion was related to HF?  He was diuresed and EF was noted to be ~45%.    Cytology and basic labs done on pericardial fluid appeared benign to me, and no malignant cells found, but didn't see any other work up for this effusion ... 2) do you think any other testing is needed?  He has no EP issues so I will plan to have this all followed up with General [facility]HANA.    Casper Tobar

## 2021-01-14 NOTE — PATIENT INSTRUCTIONS
Kamran - it was nice to meet you today!    1. Reviewed your hospitalization  2. Reviewed the cough that is now MUCH worse during the day (previously worse at night)    PLAN:  1. Keep track of BPs. Let us know if they're getting <100 on the top!  2. Start using the inhaler from Dr. Greenberg 4 times a day!  See if this improves the daytime cough at all!  3. We'll call you and Peg about results of echo!    [phone] - my nurse Ijeoma!

## 2021-01-14 NOTE — LETTER
1/14/2021    Bandar Greenberg MD  [address]  [facility] [address]    RE: Justyn Olivas       Dear Colleague,    I had the pleasure of seeing Justyn Olivas in the [facility].    [facility]      Assessment & Plan   Problem List Items Addressed This Visit     Pericardial effusion - Primary    Relevant Medications    carvedilol (COREG) 6.25 MG tablet    torsemide (DEMADEX) 10 MG tablet    potassium chloride ER (KLOR-CON M) 20 MEQ CR tablet    Other Relevant Orders    Echocardiogram Limited    Basic metabolic panel (Completed)    CBC with platelets (Completed)           [facility] TYE    I had the pleasure of meeting Kamran when he came for follow up of recent hospitalization.  This 80 year old saw Dr. Rosenbaum while hospitalized and she reviewed his h/o:     1. CM - EF 45% on echo  2. Pericardial Effusion - noted on CT 12/20/2020. Underwent pericardiocentesis 12/21/2020  3. DM2 - on insulin  4. H/o basilar CVA - DAPT since 4/2019  5. HTN  6. Dyslipidemia  7. Chronic MARKS - thought d/t anemia dx'd 2018  8. Iron Deficiency Anemia - PTA on iron supplements since ~2018. Baseline Hgb ~10 g/dL, down to 6.9g/dL requiring 1 unit pRBCs 12/21/2020.     Kamran [facility] to [facility] and was admitted 12/20-23/2020 for acute worsening of his chronic MARKS and c/o orthopnea. CT ruled out PE but a new large pericardial effusion with small L pleural effusion noted.  Dr. Rosenbaum reviewed echo showing EF 47% with nl RVSF and no evidence of tamponade.    D/t orthopnea and tachycardia, proceeded with pericardiocentesis 12/21, removing 260 cc. GI also saw and reviewed EGD done as outpatient the week prior to admission, which showed no areas of active bleeding. Outpt follow-up rec'd.     He was therefore DCd home 12/23 @ 221# on Coreg 6.25 mg BID, KCl 40 BID and torsemide 10 mg. Hydrochlorothiazide 25 was stopped.     Kamran was due to see Shaniqua Ram  "1.8 but appt was rescheduled to see me and I'm meeting him today. It also appears that the recommended limited echo to check effusion was not ordered.     It appears he's been started on IV iron infusions. He's seen Dr. Greenberg (last 12/30). Weight was 217#, 1+ edema noted.     Interval History:  Kamran notes a significant \"hacking\" cough that he has had for the last 1.5-2 years, worse at night.  Now, it seems like it's happening \"all the time\" as soon as he starts to talk.    When not coughing, feels breathing is \"OK.\" If he's not coughing, he can go up the stairs without terrible SOB which before the hospitalization wasn't the case. A humidifier last night helped this some. He thinks this cough is worse since he left the hospital and has worsened even since he saw Dr. Greenberg 12/30.    No c/o fever/chills/ache. Cough is not productive. He's not used his inhaler.    No blood loss that he's aware of. He thinks he's felt better on iron infusions and is eager to see Dr. Greenberg again to review.  His Plavix was placed on hold as of 12/30 but he does remain on ASA.      VITALS:  Vitals: BP (!) 145/77   Pulse 94   Ht 1.829 m (6')   Wt 95.7 kg (211 lb)   SpO2 100%   BMI 28.62 kg/m      Diagnostic Testing:  EKG today, which I overread, showed SR 95 bpm. LAE. LVH/nonspecific STTW changes  Echocardiogram 12/20/2020 - EF 45-50% with basal inferior, basal/mid IL wall HK. Nl RV function. No sig valve abnl  Echo Stress Testing 3/2014 - no exercise-induced WMA  Component      Latest Ref Rng & Units 12/23/2020 12/30/2020           5:32 AM  9:12 AM   Sodium      133 - 144 mmol/L 137 140   Potassium      3.4 - 5.3 mmol/L 3.7 4.4   Chloride      94 - 109 mmol/L 103 108   Carbon Dioxide      20 - 32 mmol/L 29 24   Anion Gap      3 - 14 mmol/L 5 9   Glucose      70 - 99 mg/dL 106 (H) 144 (H)   Urea Nitrogen      7 - 30 mg/dL 27 25   Creatinine      0.66 - 1.25 mg/dL 1.54 (H) 1.69 (H)   GFR Estimate      >60 mL/min/1.73:m2 42 (L) 37 " "(L)   GFR Estimate If Black      >60 mL/min/1.73:m2 48 (L) 43 (L)   Calcium      8.5 - 10.1 mg/dL 8.4 (L) 8.6     Component      Latest Ref Rng & Units 11/26/2020 12/30/2020           1:17 PM  9:12 AM   N-Terminal Pro BNP Inpatient      0 - 1,800 pg/mL 1,544    N-Terminal Pro Bnp      0 - 450 pg/mL  4,976 (H)     Component       Hemoglobin Hematocrit   Latest Ref Rng & Units       13.3 - 17.7 g/dL 40.0 - 53.0 %   7/21/2020      11:38 AM 11.4 (L) 35.4 (L)   12/1/2020      12:24 PM 10.2 (L) 32.7 (L)   12/10/2020      3:58 PM 7.7 (LL) 25.2 (L)   12/14/2020      11:19 AM 7.4 (LL) 24.6 (L)   12/21/2020      6:14 AM 6.9 (LL) 22.8 (L)   12/23/2020      5:32 AM 8.1 (L) 27.1 (L)   12/30/2020      9:12 AM 8.8 (L) 29.7 (L)     Plan:  1. BMP, CBC today  2. Limited echo to ensure no reaccumulation of fluid. We'll call with results  3. Use albuterol inhaler qid for cough    Assessment/Plan:    1. Pericardial Effusion    Required pericardiocentesis 12/21 removing 260 ml per DCSummary. Follow-up limited echo done but not done since drain removal    Pericardial fluid without malignancy. Gram Stain negative    Theorizing that pericardial effusion etiology was HF? Not noted in any Cardiology/DC/FU notes.     PLAN:    Needs a repeat echo to ensure no recurrence of pericardial fluid    Will confirm no further w/u needed with Dr. Rosenbaum    2. HFrEF    NTpBNP not checked on admit (?) on admit. EF 45-50% on echo    PTA on only hydrochlorothiazide but switched to torsemide 10 mg daily. Breathing is better; cough is really the main issue now    BMP at follow-up 12/30 showed nl electrolytes but Cr relatively stable 1.6    Coreg started; remains on irbesartan 300 mg daily    PLAN:    For now continue Coreg 6.25 mg BID and torsemide 10 mg daily as when he's not coughing, breathing is \"better\"    CBC and BMP today      Ekaterina Sen PA-C, MSPAS      Orders Placed This Encounter   Procedures     Basic metabolic panel     CBC with platelets     EKG " 12-lead complete w/read - Clinics (performed today)     Echocardiogram Limited     Orders Placed This Encounter   Medications     carvedilol (COREG) 6.25 MG tablet     Sig: Take 1 tablet (6.25 mg) by mouth 2 times daily (with meals)     Dispense:  60 tablet     Refill:  3     torsemide (DEMADEX) 10 MG tablet     Sig: Take 1 tablet (10 mg) by mouth daily     Dispense:  30 tablet     Refill:  3     potassium chloride ER (KLOR-CON M) 20 MEQ CR tablet     Sig: Take 2 tablets (40 mEq) by mouth 2 times daily     Dispense:  60 tablet     Refill:  3     Medications Discontinued During This Encounter   Medication Reason     carvedilol (COREG) 6.25 MG tablet Reorder     potassium chloride ER (KLOR-CON M) 20 MEQ CR tablet Reorder     torsemide (DEMADEX) 10 MG tablet Reorder         Encounter Diagnosis   Name Primary?     Pericardial effusion Yes       CURRENT MEDICATIONS:  Current Outpatient Medications   Medication Sig Dispense Refill     atorvastatin (LIPITOR) 20 MG tablet TAKE 1 TABLET(20 MG) BY MOUTH DAILY 90 tablet 2     blood glucose (STEVE CONTOUR) test strip TESTING FOUR TIMES DAILY OR AS DIRECTED 400 strip 1     blood glucose (STEVE CONTOUR) test strip TESTING FOUR TIMES DAILY OR AS DIRECTED 400 strip 0     carvedilol (COREG) 6.25 MG tablet Take 1 tablet (6.25 mg) by mouth 2 times daily (with meals) 60 tablet 3     dorzolamide-timolol (COSOPT) 2-0.5 % ophthalmic solution Place 1 drop into both eyes 2 times daily       fluticasone (FLONASE) 50 MCG/ACT nasal spray Spray 1 spray into both nostrils 2 times daily as needed for rhinitis or allergies       Glucose Blood (STEVE CONTOUR TEST VI)        insulin glargine (LANTUS SOLOSTAR) 100 UNIT/ML pen INJECT 50 UNITS UNDER THE SKIN AT BEDTIME 45 mL 3     insulin lispro (HUMALOG KWIKPEN) 100 UNIT/ML (1 unit dial) KWIKPEN INJECT UP TO 55 UNITS UNDER THE SKIN ONCE DAILY AS DIRECTED 45 mL 1     insulin pen needle (BD FERCHO U/F) 32G X 4 MM miscellaneous USE AS DIRECTED UP TO FOUR  TIMES DAILY 400 each 3     irbesartan (AVAPRO) 300 MG tablet Take 1 tablet (300 mg) by mouth At Bedtime 90 tablet 3     metFORMIN (GLUCOPHAGE) 1000 MG tablet TAKE 1 TABLET(1000 MG) BY MOUTH TWICE DAILY WITH MEALS 180 tablet 3     order for DME Hip steroid injectoion 1 Units 0     pantoprazole (PROTONIX) 40 MG EC tablet TAKE 1 TABLET(40 MG) BY MOUTH TWICE DAILY 180 tablet 2     potassium chloride ER (KLOR-CON M) 20 MEQ CR tablet Take 2 tablets (40 mEq) by mouth 2 times daily 60 tablet 3     torsemide (DEMADEX) 10 MG tablet Take 1 tablet (10 mg) by mouth daily 30 tablet 3     traZODone (DESYREL) 100 MG tablet TAKE 1 TABLET(100 MG) BY MOUTH AT BEDTIME 90 tablet 0     VENTOLIN  (90 Base) MCG/ACT inhaler INL 2 PFS PO QID PRN 1 Inhaler 3     aspirin 81 MG EC tablet Take 81 mg by mouth daily       clopidogrel (PLAVIX) 75 MG tablet TAKE 1 TABLET(75 MG) BY MOUTH DAILY (Patient not taking: Reported on 1/11/2021) 90 tablet 3     LORazepam (ATIVAN) 0.5 MG tablet Take 1 tablet (0.5 mg) by mouth At Bedtime (Patient not taking: Reported on 1/14/2021) 10 tablet 0       ALLERGIES   No Known Allergies      Review of Systems:  Skin:        Eyes:       ENT:       Respiratory:       Cardiovascular:       Gastroenterology:      Genitourinary:       Musculoskeletal:       Neurologic:       Psychiatric:       Heme/Lymph/Imm:       Endocrine:         Physical Exam:  Vitals: BP (!) 145/77   Pulse 94   Ht 1.829 m (6')   Wt 95.7 kg (211 lb)   SpO2 100%   BMI 28.62 kg/m      Constitutional:  cooperative, alert and oriented, well developed, well nourished, in no acute distress   Cough (nonproductive)    Skin:  warm and dry to the touch, no apparent skin lesions or masses noted        Head:  normocephalic, no masses or lesions        Eyes:  pupils equal and round;conjunctivae and lids unremarkable;sclera white        ENT:  no pallor or cyanosis, dentition good        Neck:  JVP normal;no carotid bruit        Chest:  normal breath  sounds, clear to auscultation, normal A-P diameter, normal symmetry, normal respiratory excursion, no use of accessory muscles        Cardiac: regular rhythm;no murmurs, gallops or rubs detected                  Abdomen:  abdomen soft obese      Vascular: pulses full and equal                                      Extremities and Back:  no deformities, clubbing, cyanosis, erythema observed        Neurological:  no gross motor deficits            PAST MEDICAL HISTORY:  Past Medical History:   Diagnosis Date     Cerebral infarction (H)      Diabetes (H)      Hyperlipemia        PAST SURGICAL HISTORY:  Past Surgical History:   Procedure Laterality Date     CV PERICARDIOCENTESIS N/A 2020    Procedure: Pericardiocentesis;  Surgeon: Chas Chambers MD;  Location:  [facility]       FAMILY HISTORY:  Family History   Problem Relation Age of Onset     Family History Negative Mother      Family History Negative Father        SOCIAL HISTORY:  Social History     Socioeconomic History     Marital status:      Spouse name: Cecile     Number of children: None     Years of education: None     Highest education level: None   Occupational History     None   Social Needs     Financial resource strain: None     Food insecurity     Worry: None     Inability: None     Transportation needs     Medical: None     Non-medical: None   Tobacco Use     Smoking status: Former Smoker     Packs/day: 2.00     Years: 11.00     Pack years: 22.00     Types: Cigarettes     Start date:      Quit date:      Years since quittin.0     Smokeless tobacco: Never Used   Substance and Sexual Activity     Alcohol use: No     Alcohol/week: 0.0 standard drinks     Drug use: No     Sexual activity: Yes     Partners: Female     Birth control/protection: None   Lifestyle     Physical activity     Days per week: None     Minutes per session: None     Stress: None   Relationships     Social connections     Talks on phone:  None     Gets together: None     Attends Cheondo[employer] service: None     Active member of club or organization: None     Attends meetings of clubs or organizations: None     Relationship status: None     Intimate partner violence     Fear of current or ex partner: None     Emotionally abused: None     Physically abused: None     Forced sexual activity: None   Other Topics Concern     Parent/sibling w/ CABG, MI or angioplasty before 65F 55M? Not Asked   Social History Narrative     None         Thank you for allowing me to participate in the care of your patient.    Sincerely,     Kelly Sen PA-C     [facility]

## 2021-01-14 NOTE — LETTER
1/14/2021    Bandar Greenberg MD  [address]  [facility] [address]    RE: Justyn Olivas       Dear Colleague,    I had the pleasure of seeing Justyn Olivas in the [facility].    [facility] HE[facility]      Assessment & Plan   Problem List Items Addressed This Visit     Pericardial effusion - Primary    Relevant Medications    carvedilol (COREG) 6.25 MG tablet    torsemide (DEMADEX) 10 MG tablet    potassium chloride ER (KLOR-CON M) 20 MEQ CR tablet    Other Relevant Orders    Echocardiogram Limited    Basic metabolic panel (Completed)    CBC with platelets (Completed)           [facility] HEART [facility] TYE    I had the pleasure of meeting Kamran when he came for follow up of recent hospitalization.  This 80 year old saw Dr. Rosenbaum while hospitalized and she reviewed his h/o:     1. CM - EF 45% on echo  2. Pericardial Effusion - noted on CT 12/20/2020. Underwent pericardiocentesis 12/21/2020  3. DM2 - on insulin  4. H/o basilar CVA - DAPT since 4/2019  5. HTN  6. Dyslipidemia  7. Chronic MARKS - thought d/t anemia dx'd 2018  8. Iron Deficiency Anemia - PTA on iron supplements since ~2018. Baseline Hgb ~10 g/dL, down to 6.9g/dL requiring 1 unit pRBCs 12/21/2020.     Kamran presented to [facility] 12/20/2020 and was admitted 12/20-23/2020 for acute worsening of his chronic MARKS and c/o orthopnea. CT ruled out PE but a new large pericardial effusion with small L pleural effusion noted.  Dr. Rosenbaum reviewed echo showing EF 47% with nl RVSF and no evidence of tamponade.    D/t orthopnea and tachycardia, proceeded with pericardiocentesis 12/21, removing 260 cc. GI also saw and reviewed EGD done as outpatient the week prior to admission, which showed no areas of active bleeding. Outpt follow-up rec'd.     He was therefore DCd home 12/23 @ 221# on Coreg 6.25 mg BID, KCl 40 BID and torsemide 10 mg. Hydrochlorothiazide 25 was stopped.     Kamran was due to see Shaniqua Ram  "1.8 but appt was rescheduled to see me and I'm meeting him today. It also appears that the recommended limited echo to check effusion was not ordered.     It appears he's been started on IV iron infusions. He's seen Dr. Greenberg (last 12/30). Weight was 217#, 1+ edema noted.     Interval History:  Kamran notes a significant \"hacking\" cough that he has had for the last 1.5-2 years, worse at night.  Now, it seems like it's happening \"all the time\" as soon as he starts to talk.    When not coughing, feels breathing is \"OK.\" If he's not coughing, he can go up the stairs without terrible SOB which before the hospitalization wasn't the case. A humidifier last night helped this some. He thinks this cough is worse since he left the hospital and has worsened even since he saw Dr. Greenberg 12/30.    No c/o fever/chills/ache. Cough is not productive. He's not used his inhaler.    No blood loss that he's aware of. He thinks he's felt better on iron infusions and is eager to see Dr. Greenberg again to review.  His Plavix was placed on hold as of 12/30 but he does remain on ASA.      VITALS:  Vitals: BP (!) 145/77   Pulse 94   Ht 1.829 m (6')   Wt 95.7 kg (211 lb)   SpO2 100%   BMI 28.62 kg/m      Diagnostic Testing:  EKG today, which I overread, showed SR 95 bpm. LAE. LVH/nonspecific STTW changes  Echocardiogram 12/20/2020 - EF 45-50% with basal inferior, basal/mid IL wall HK. Nl RV function. No sig valve abnl  Echo Stress Testing 3/2014 - no exercise-induced WMA  Component      Latest Ref Rng & Units 12/23/2020 12/30/2020           5:32 AM  9:12 AM   Sodium      133 - 144 mmol/L 137 140   Potassium      3.4 - 5.3 mmol/L 3.7 4.4   Chloride      94 - 109 mmol/L 103 108   Carbon Dioxide      20 - 32 mmol/L 29 24   Anion Gap      3 - 14 mmol/L 5 9   Glucose      70 - 99 mg/dL 106 (H) 144 (H)   Urea Nitrogen      7 - 30 mg/dL 27 25   Creatinine      0.66 - 1.25 mg/dL 1.54 (H) 1.69 (H)   GFR Estimate      >60 mL/min/1.73:m2 42 (L) 37 " "(L)   GFR Estimate If Black      >60 mL/min/1.73:m2 48 (L) 43 (L)   Calcium      8.5 - 10.1 mg/dL 8.4 (L) 8.6     Component      Latest Ref Rng & Units 11/26/2020 12/30/2020           1:17 PM  9:12 AM   N-Terminal Pro BNP Inpatient      0 - 1,800 pg/mL 1,544    N-Terminal Pro Bnp      0 - 450 pg/mL  4,976 (H)     Component       Hemoglobin Hematocrit   Latest Ref Rng & Units       13.3 - 17.7 g/dL 40.0 - 53.0 %   7/21/2020      11:38 AM 11.4 (L) 35.4 (L)   12/1/2020      12:24 PM 10.2 (L) 32.7 (L)   12/10/2020      3:58 PM 7.7 (LL) 25.2 (L)   12/14/2020      11:19 AM 7.4 (LL) 24.6 (L)   12/21/2020      6:14 AM 6.9 (LL) 22.8 (L)   12/23/2020      5:32 AM 8.1 (L) 27.1 (L)   12/30/2020      9:12 AM 8.8 (L) 29.7 (L)     Plan:  1. BMP, CBC today  2. Limited echo to ensure no reaccumulation of fluid. We'll call with results  3. Use albuterol inhaler qid for cough    Assessment/Plan:    1. Pericardial Effusion    Required pericardiocentesis 12/21 removing 260 ml per DCSummary. Follow-up limited echo done but not done since drain removal    Pericardial fluid without malignancy. Gram Stain negative    Theorizing that pericardial effusion etiology was HF? Not noted in any Cardiology/DC/FU notes.     PLAN:    Needs a repeat echo to ensure no recurrence of pericardial fluid    Will confirm no further w/u needed with Dr. Rosenbaum    2. HFrEF    NTpBNP not checked on admit (?) on admit. EF 45-50% on echo    PTA on only hydrochlorothiazide but switched to torsemide 10 mg daily. Breathing is better; cough is really the main issue now    BMP at follow-up 12/30 showed nl electrolytes but Cr relatively stable 1.6    Coreg started; remains on irbesartan 300 mg daily    PLAN:    For now continue Coreg 6.25 mg BID and torsemide 10 mg daily as when he's not coughing, breathing is \"better\"    CBC and BMP today      Ekaterina Sen PA-C, MSPAS      Orders Placed This Encounter   Procedures     Basic metabolic panel     CBC with platelets     EKG " 12-lead complete w/read - Clinics (performed today)     Echocardiogram Limited     Orders Placed This Encounter   Medications     carvedilol (COREG) 6.25 MG tablet     Sig: Take 1 tablet (6.25 mg) by mouth 2 times daily (with meals)     Dispense:  60 tablet     Refill:  3     torsemide (DEMADEX) 10 MG tablet     Sig: Take 1 tablet (10 mg) by mouth daily     Dispense:  30 tablet     Refill:  3     potassium chloride ER (KLOR-CON M) 20 MEQ CR tablet     Sig: Take 2 tablets (40 mEq) by mouth 2 times daily     Dispense:  60 tablet     Refill:  3     Medications Discontinued During This Encounter   Medication Reason     carvedilol (COREG) 6.25 MG tablet Reorder     potassium chloride ER (KLOR-CON M) 20 MEQ CR tablet Reorder     torsemide (DEMADEX) 10 MG tablet Reorder         Encounter Diagnosis   Name Primary?     Pericardial effusion Yes       CURRENT MEDICATIONS:  Current Outpatient Medications   Medication Sig Dispense Refill     atorvastatin (LIPITOR) 20 MG tablet TAKE 1 TABLET(20 MG) BY MOUTH DAILY 90 tablet 2     blood glucose (STEVE CONTOUR) test strip TESTING FOUR TIMES DAILY OR AS DIRECTED 400 strip 1     blood glucose (STEVE CONTOUR) test strip TESTING FOUR TIMES DAILY OR AS DIRECTED 400 strip 0     carvedilol (COREG) 6.25 MG tablet Take 1 tablet (6.25 mg) by mouth 2 times daily (with meals) 60 tablet 3     dorzolamide-timolol (COSOPT) 2-0.5 % ophthalmic solution Place 1 drop into both eyes 2 times daily       fluticasone (FLONASE) 50 MCG/ACT nasal spray Spray 1 spray into both nostrils 2 times daily as needed for rhinitis or allergies       Glucose Blood (STEVE CONTOUR TEST VI)        insulin glargine (LANTUS SOLOSTAR) 100 UNIT/ML pen INJECT 50 UNITS UNDER THE SKIN AT BEDTIME 45 mL 3     insulin lispro (HUMALOG KWIKPEN) 100 UNIT/ML (1 unit dial) KWIKPEN INJECT UP TO 55 UNITS UNDER THE SKIN ONCE DAILY AS DIRECTED 45 mL 1     insulin pen needle (BD FERCHO U/F) 32G X 4 MM miscellaneous USE AS DIRECTED UP TO FOUR  TIMES DAILY 400 each 3     irbesartan (AVAPRO) 300 MG tablet Take 1 tablet (300 mg) by mouth At Bedtime 90 tablet 3     metFORMIN (GLUCOPHAGE) 1000 MG tablet TAKE 1 TABLET(1000 MG) BY MOUTH TWICE DAILY WITH MEALS 180 tablet 3     order for DME Hip steroid injectoion 1 Units 0     pantoprazole (PROTONIX) 40 MG EC tablet TAKE 1 TABLET(40 MG) BY MOUTH TWICE DAILY 180 tablet 2     potassium chloride ER (KLOR-CON M) 20 MEQ CR tablet Take 2 tablets (40 mEq) by mouth 2 times daily 60 tablet 3     torsemide (DEMADEX) 10 MG tablet Take 1 tablet (10 mg) by mouth daily 30 tablet 3     traZODone (DESYREL) 100 MG tablet TAKE 1 TABLET(100 MG) BY MOUTH AT BEDTIME 90 tablet 0     VENTOLIN  (90 Base) MCG/ACT inhaler INL 2 PFS PO QID PRN 1 Inhaler 3     aspirin 81 MG EC tablet Take 81 mg by mouth daily       clopidogrel (PLAVIX) 75 MG tablet TAKE 1 TABLET(75 MG) BY MOUTH DAILY (Patient not taking: Reported on 1/11/2021) 90 tablet 3     LORazepam (ATIVAN) 0.5 MG tablet Take 1 tablet (0.5 mg) by mouth At Bedtime (Patient not taking: Reported on 1/14/2021) 10 tablet 0       ALLERGIES   No Known Allergies      Review of Systems:  Skin:        Eyes:       ENT:       Respiratory:       Cardiovascular:       Gastroenterology:      Genitourinary:       Musculoskeletal:       Neurologic:       Psychiatric:       Heme/Lymph/Imm:       Endocrine:         Physical Exam:  Vitals: BP (!) 145/77   Pulse 94   Ht 1.829 m (6')   Wt 95.7 kg (211 lb)   SpO2 100%   BMI 28.62 kg/m      Constitutional:  cooperative, alert and oriented, well developed, well nourished, in no acute distress   Cough (nonproductive)    Skin:  warm and dry to the touch, no apparent skin lesions or masses noted        Head:  normocephalic, no masses or lesions        Eyes:  pupils equal and round;conjunctivae and lids unremarkable;sclera white        ENT:  no pallor or cyanosis, dentition good        Neck:  JVP normal;no carotid bruit        Chest:  normal breath  sounds, clear to auscultation, normal A-P diameter, normal symmetry, normal respiratory excursion, no use of accessory muscles        Cardiac: regular rhythm;no murmurs, gallops or rubs detected                  Abdomen:  abdomen soft obese      Vascular: pulses full and equal                                      Extremities and Back:  no deformities, clubbing, cyanosis, erythema observed        Neurological:  no gross motor deficits            PAST MEDICAL HISTORY:  Past Medical History:   Diagnosis Date     Cerebral infarction (H)      Diabetes (H)      Hyperlipemia        PAST SURGICAL HISTORY:  Past Surgical History:   Procedure Laterality Date     CV PERICARDIOCENTESIS N/A 2020    Procedure: Pericardiocentesis;  Surgeon: Chas Chambers MD;  Location:  HEART CARDI[facility] CATH LAB       FAMILY HISTORY:  Family History   Problem Relation Age of Onset     Family History Negative Mother      Family History Negative Father        SOCIAL HISTORY:  Social History     Socioeconomic History     Marital status:      Spouse name: Cecile     Number of children: None     Years of education: None     Highest education level: None   Occupational History     None   Social Needs     Financial resource strain: None     Food insecurity     Worry: None     Inability: None     Transportation needs     Medical: None     Non-medical: None   Tobacco Use     Smoking status: Former Smoker     Packs/day: 2.00     Years: 11.00     Pack years: 22.00     Types: Cigarettes     Start date:      Quit date:      Years since quittin.0     Smokeless tobacco: Never Used   Substance and Sexual Activity     Alcohol use: No     Alcohol/week: 0.0 standard drinks     Drug use: No     Sexual activity: Yes     Partners: Female     Birth control/protection: None   Lifestyle     Physical activity     Days per week: None     Minutes per session: None     Stress: None   Relationships     Social connections     Talks on phone:  None     Gets together: None     Attends Holiness service: None     Active member of club or organization: None     Attends meetings of clubs or organizations: None     Relationship status: None     Intimate partner violence     Fear of current or ex partner: None     Emotionally abused: None     Physically abused: None     Forced sexual activity: None   Other Topics Concern     Parent/sibling w/ CABG, MI or angioplasty before 65F 55M? Not Asked   Social History Narrative     None             Thank you for allowing me to participate in the care of your patient.      Sincerely,     Kelly Sen PA-C     [facility]    cc:   No referring provider defined for this encounter.

## 2021-01-15 ENCOUNTER — DOCUMENTATION ONLY (OUTPATIENT)
Dept: FAMILY MEDICINE | Facility: CLINIC | Age: 81
End: 2021-01-15

## 2021-01-15 ENCOUNTER — TELEPHONE (OUTPATIENT)
Dept: FAMILY MEDICINE | Facility: CLINIC | Age: 81
End: 2021-01-15

## 2021-01-15 DIAGNOSIS — R05.9 COUGH: Primary | ICD-10-CM

## 2021-01-15 DIAGNOSIS — I31.39 PERICARDIAL EFFUSION: ICD-10-CM

## 2021-01-15 DIAGNOSIS — R05.9 COUGH: ICD-10-CM

## 2021-01-15 RX ORDER — CARVEDILOL 6.25 MG/1
6.25 TABLET ORAL 2 TIMES DAILY WITH MEALS
Qty: 180 TABLET | Refills: 3 | Status: SHIPPED | OUTPATIENT
Start: 2021-01-15 | End: 2022-01-01

## 2021-01-15 RX ORDER — FLUTICASONE PROPIONATE 50 MCG
2 SPRAY, SUSPENSION (ML) NASAL 2 TIMES DAILY
Qty: 56.6 ML | Refills: 3 | Status: SHIPPED | OUTPATIENT
Start: 2021-01-15 | End: 2021-01-16

## 2021-01-15 RX ORDER — CODEINE PHOSPHATE AND GUAIFENESIN 10; 100 MG/5ML; MG/5ML
1-2 SOLUTION ORAL EVERY 4 HOURS PRN
Qty: 118 ML | Refills: 0 | Status: SHIPPED | OUTPATIENT
Start: 2021-01-15 | End: 2021-01-28

## 2021-01-15 NOTE — TELEPHONE ENCOUNTER
Reason for call:  Other   Patient called regarding (reason for call): appointment  Additional comments: Patient is in need of a new primary doctor, and was highly recommended to se Dr. Davidson by Dr. Tito Thompson. Patient is wondering if he can be added to the schedule, and have Dr. Davidson be his primary physician. Please  Contact patient.     Phone number to reach patient:  Home number on file [phone] (home)    Best Time:  Any time    Can we leave a detailed message on this number?  YES    Travel screening: Not Applicable

## 2021-01-15 NOTE — TELEPHONE ENCOUNTER
Called and scheduled patient with Dr Davidson to [facility].     Vita GARZONN, RN

## 2021-01-15 NOTE — PROGRESS NOTES
Cielo Rosenbaum MD Moody, Katherine McAllan, ANTONIETTA   Caller: Unspecified (Yesterday,  5:11 PM)               Rickie Tobar    Hard to know what caused the pericardial effusion if all testing is unremarkable.  Heart failure rarely causes significant pericardial effusion.  He is 80 years old and apart from a follow-up echocardiogram, I do not think additional testing is needed.  Thanks for seeing him.    SJ

## 2021-01-16 RX ORDER — FLUTICASONE PROPIONATE 50 MCG
SPRAY, SUSPENSION (ML) NASAL
Qty: 96 G | Refills: 0 | Status: SHIPPED | OUTPATIENT
Start: 2021-01-16 | End: 2021-02-03

## 2021-01-18 ENCOUNTER — HOSPITAL ENCOUNTER (OUTPATIENT)
Facility: CLINIC | Age: 81
Setting detail: SPECIMEN
Discharge: HOME | End: 2021-01-18
Attending: INTERNAL MEDICINE | Admitting: INTERNAL MEDICINE
Payer: MEDICARE

## 2021-01-18 ENCOUNTER — INFUSION THERAPY VISIT (OUTPATIENT)
Dept: INFUSION THERAPY | Facility: CLINIC | Age: 81
End: 2021-01-18
Attending: INTERNAL MEDICINE
Payer: MEDICARE

## 2021-01-18 ENCOUNTER — MYC MEDICAL ADVICE (OUTPATIENT)
Dept: FAMILY MEDICINE | Facility: CLINIC | Age: 81
End: 2021-01-18

## 2021-01-18 VITALS
HEART RATE: 76 BPM | RESPIRATION RATE: 16 BRPM | OXYGEN SATURATION: 100 % | TEMPERATURE: 97.2 F | SYSTOLIC BLOOD PRESSURE: 148 MMHG | DIASTOLIC BLOOD PRESSURE: 85 MMHG

## 2021-01-18 DIAGNOSIS — D50.0 IRON DEFICIENCY ANEMIA DUE TO CHRONIC BLOOD LOSS: Primary | ICD-10-CM

## 2021-01-18 DIAGNOSIS — N18.31 STAGE 3A CHRONIC KIDNEY DISEASE (H): ICD-10-CM

## 2021-01-18 LAB
LABORATORY COMMENT REPORT: NORMAL
SARS-COV-2 RNA RESP QL NAA+PROBE: NEGATIVE
SARS-COV-2 RNA RESP QL NAA+PROBE: NORMAL
SPECIMEN SOURCE: NORMAL
SPECIMEN SOURCE: NORMAL

## 2021-01-18 PROCEDURE — 250N000011 HC RX IP 250 OP 636: Performed by: INTERNAL MEDICINE

## 2021-01-18 PROCEDURE — 96365 THER/PROPH/DIAG IV INF INIT: CPT

## 2021-01-18 PROCEDURE — 258N000003 HC RX IP 258 OP 636: Performed by: INTERNAL MEDICINE

## 2021-01-18 PROCEDURE — U0003 INFECTIOUS AGENT DETECTION BY NUCLEIC ACID (DNA OR RNA); SEVERE ACUTE RESPIRATORY SYNDROME CORONAVIRUS 2 (SARS-COV-2) (CORONAVIRUS DISEASE [COVID-19]), AMPLIFIED PROBE TECHNIQUE, MAKING USE OF HIGH THROUGHPUT TECHNOLOGIES AS DESCRIBED BY CMS-2020-01-R: HCPCS | Performed by: INTERNAL MEDICINE

## 2021-01-18 PROCEDURE — U0005 INFEC AGEN DETEC AMPLI PROBE: HCPCS | Performed by: INTERNAL MEDICINE

## 2021-01-18 RX ORDER — HEPARIN SODIUM,PORCINE 10 UNIT/ML
5 VIAL (ML) INTRAVENOUS
Status: CANCELLED | OUTPATIENT
Start: 2021-01-18

## 2021-01-18 RX ORDER — HEPARIN SODIUM (PORCINE) LOCK FLUSH IV SOLN 100 UNIT/ML 100 UNIT/ML
5 SOLUTION INTRAVENOUS
Status: CANCELLED | OUTPATIENT
Start: 2021-01-18

## 2021-01-18 RX ADMIN — FERRIC CARBOXYMALTOSE INJECTION 750 MG: 50 INJECTION, SOLUTION INTRAVENOUS at 15:03

## 2021-01-18 RX ADMIN — SODIUM CHLORIDE 250 ML: 9 INJECTION, SOLUTION INTRAVENOUS at 15:03

## 2021-01-18 ASSESSMENT — PAIN SCALES - GENERAL: PAINLEVEL: NO PAIN (0)

## 2021-01-18 NOTE — PROGRESS NOTES
Infusion Nursing Note:  Justyn Olivas presents today for injectafer.    Patient seen by provider today: No   present during visit today: Not Applicable.      Note: N/A.  Patient did meet criteria for an asymptomatic covid-19 PCR test in infusion today. Patient accepted the covid-19 test.    Intravenous Access:  Peripheral IV placed.    Treatment Conditions:  Not Applicable.      Post Infusion Assessment:  Patient tolerated infusion without incident.  Site patent and intact, free from redness, edema or discomfort.  No evidence of extravasations.  Access discontinued per protocol.       Discharge Plan:   Patient and/or family verbalized understanding of discharge instructions and all questions answered.  Patient discharged in stable condition accompanied by: self.  Departure Mode: Ambulatory.    Dagmar Fields RN

## 2021-01-20 ENCOUNTER — HOSPITAL ENCOUNTER (OUTPATIENT)
Dept: CARDIOLOGY | Facility: CLINIC | Age: 81
Discharge: HOME | End: 2021-01-20
Attending: PHYSICIAN ASSISTANT | Admitting: PHYSICIAN ASSISTANT
Payer: MEDICARE

## 2021-01-20 DIAGNOSIS — I31.39 PERICARDIAL EFFUSION: ICD-10-CM

## 2021-01-20 PROCEDURE — 93308 TTE F-UP OR LMTD: CPT | Mod: 26 | Performed by: INTERNAL MEDICINE

## 2021-01-20 PROCEDURE — 93325 DOPPLER ECHO COLOR FLOW MAPG: CPT | Mod: 26 | Performed by: INTERNAL MEDICINE

## 2021-01-20 PROCEDURE — 93325 DOPPLER ECHO COLOR FLOW MAPG: CPT

## 2021-01-20 PROCEDURE — 93321 DOPPLER ECHO F-UP/LMTD STD: CPT | Mod: 26 | Performed by: INTERNAL MEDICINE

## 2021-01-21 ENCOUNTER — OFFICE VISIT (OUTPATIENT)
Dept: FAMILY MEDICINE | Facility: CLINIC | Age: 81
End: 2021-01-21
Payer: MEDICARE

## 2021-01-21 VITALS
TEMPERATURE: 97.1 F | BODY MASS INDEX: 28.99 KG/M2 | SYSTOLIC BLOOD PRESSURE: 170 MMHG | OXYGEN SATURATION: 99 % | HEART RATE: 81 BPM | WEIGHT: 214 LBS | DIASTOLIC BLOOD PRESSURE: 93 MMHG | HEIGHT: 72 IN

## 2021-01-21 DIAGNOSIS — I10 BENIGN ESSENTIAL HYPERTENSION: Primary | ICD-10-CM

## 2021-01-21 DIAGNOSIS — D62 ANEMIA DUE TO BLOOD LOSS, ACUTE: ICD-10-CM

## 2021-01-21 DIAGNOSIS — D50.0 IRON DEFICIENCY ANEMIA DUE TO CHRONIC BLOOD LOSS: ICD-10-CM

## 2021-01-21 DIAGNOSIS — I50.811 ACUTE RIGHT-SIDED CONGESTIVE HEART FAILURE (H): ICD-10-CM

## 2021-01-21 LAB
ERYTHROCYTE [DISTWIDTH] IN BLOOD BY AUTOMATED COUNT: 24.4 % (ref 10–15)
HCT VFR BLD AUTO: 31.9 % (ref 40–53)
HGB BLD-MCNC: 9.3 G/DL (ref 13.3–17.7)
MCH RBC QN AUTO: 24.1 PG (ref 26.5–33)
MCHC RBC AUTO-ENTMCNC: 29.2 G/DL (ref 31.5–36.5)
MCV RBC AUTO: 83 FL (ref 78–100)
PLATELET # BLD AUTO: 230 10E9/L (ref 150–450)
RBC # BLD AUTO: 3.86 10E12/L (ref 4.4–5.9)
WBC # BLD AUTO: 7.8 10E9/L (ref 4–11)

## 2021-01-21 PROCEDURE — 80048 BASIC METABOLIC PNL TOTAL CA: CPT | Performed by: INTERNAL MEDICINE

## 2021-01-21 PROCEDURE — 99213 OFFICE O/P EST LOW 20 MIN: CPT | Performed by: INTERNAL MEDICINE

## 2021-01-21 PROCEDURE — 83880 ASSAY OF NATRIURETIC PEPTIDE: CPT | Performed by: INTERNAL MEDICINE

## 2021-01-21 PROCEDURE — 85027 COMPLETE CBC AUTOMATED: CPT | Performed by: INTERNAL MEDICINE

## 2021-01-21 PROCEDURE — 36415 COLL VENOUS BLD VENIPUNCTURE: CPT | Performed by: INTERNAL MEDICINE

## 2021-01-21 ASSESSMENT — MIFFLIN-ST. JEOR: SCORE: 1713.7

## 2021-01-21 NOTE — PROGRESS NOTES
Assessment & Plan   Problem List Items Addressed This Visit     Benign essential hypertension - Primary    Relevant Orders    CBC with platelets (Completed)    Basic metabolic panel (Completed)    Anemia due to blood loss, acute    Iron deficiency anemia due to chronic blood loss      Other Visit Diagnoses     Acute right-sided congestive heart failure (H)        Relevant Orders    CBC with platelets (Completed)    Basic metabolic panel (Completed)    BNP-N terminal pro (Completed)      Resume taking a baby aspirin daily.  Will continue to hold the Plavix until the patient has a better hemoglobin.  Noted the AV malformations which may be contributing to his GI blood loss.                             BMI:   Estimated body mass index is 29.02 kg/m  as calculated from the following:    Height as of this encounter: 1.829 m (6').    Weight as of this encounter: 97.1 kg (214 lb).         FUTURE APPOINTMENTS:       - Follow-up visit in 7 days    No follow-ups on file.    Bandar Greenberg MD  [facility] TYE Andrew is a 80 year old who presents to clinic today for the following health issues     HPI       Follow up          Review of Systems   Constitutional, HEENT, cardiovascular, pulmonary, gi and gu systems are negative, except as otherwise noted.      Objective    There were no vitals taken for this visit.  There is no height or weight on file to calculate BMI.  Physical Exam   GENERAL: healthy, alert and no distress  NECK: no adenopathy, no asymmetry, masses, or scars and thyroid normal to palpation  RESP: lungs clear to auscultation - no rales, rhonchi or wheezes  CV: regular rate and rhythm, normal S1 S2, no S3 or S4, no murmur, click or rub, no peripheral edema and peripheral pulses strong  ABDOMEN: soft, nontender, no hepatosplenomegaly, no masses and bowel sounds normal  MS: no gross musculoskeletal defects noted, no edema

## 2021-01-22 LAB
ANION GAP SERPL CALCULATED.3IONS-SCNC: 5 MMOL/L (ref 3–14)
BUN SERPL-MCNC: 25 MG/DL (ref 7–30)
CALCIUM SERPL-MCNC: 8.8 MG/DL (ref 8.5–10.1)
CHLORIDE SERPL-SCNC: 110 MMOL/L (ref 94–109)
CO2 SERPL-SCNC: 28 MMOL/L (ref 20–32)
CREAT SERPL-MCNC: 1.68 MG/DL (ref 0.66–1.25)
GFR SERPL CREATININE-BSD FRML MDRD: 37 ML/MIN/{1.73_M2}
GLUCOSE SERPL-MCNC: 147 MG/DL (ref 70–99)
NT-PROBNP SERPL-MCNC: 3730 PG/ML (ref 0–450)
POTASSIUM SERPL-SCNC: 4.5 MMOL/L (ref 3.4–5.3)
SODIUM SERPL-SCNC: 143 MMOL/L (ref 133–144)

## 2021-01-24 DIAGNOSIS — I31.39 PERICARDIAL EFFUSION: ICD-10-CM

## 2021-01-24 RX ORDER — TORSEMIDE 10 MG/1
10 TABLET ORAL DAILY
Qty: 90 TABLET | Refills: 3 | Status: SHIPPED | OUTPATIENT
Start: 2021-01-24 | End: 2021-02-03

## 2021-01-26 RX ORDER — HEPARIN SODIUM (PORCINE) LOCK FLUSH IV SOLN 100 UNIT/ML 100 UNIT/ML
5 SOLUTION INTRAVENOUS
Status: CANCELLED | OUTPATIENT
Start: 2021-01-26

## 2021-01-26 RX ORDER — HEPARIN SODIUM,PORCINE 10 UNIT/ML
5 VIAL (ML) INTRAVENOUS
Status: CANCELLED | OUTPATIENT
Start: 2021-01-26

## 2021-01-27 DIAGNOSIS — R05.9 COUGH: ICD-10-CM

## 2021-01-28 RX ORDER — CODEINE PHOSPHATE AND GUAIFENESIN 10; 100 MG/5ML; MG/5ML
1-2 SOLUTION ORAL EVERY 4 HOURS PRN
Qty: 118 ML | Refills: 0 | Status: SHIPPED | OUTPATIENT
Start: 2021-01-28 | End: 2021-03-03

## 2021-01-29 ENCOUNTER — INFUSION THERAPY VISIT (OUTPATIENT)
Dept: INFUSION THERAPY | Facility: CLINIC | Age: 81
End: 2021-01-29
Attending: INTERNAL MEDICINE
Payer: MEDICARE

## 2021-01-29 VITALS
OXYGEN SATURATION: 98 % | HEART RATE: 100 BPM | RESPIRATION RATE: 18 BRPM | SYSTOLIC BLOOD PRESSURE: 171 MMHG | DIASTOLIC BLOOD PRESSURE: 81 MMHG | TEMPERATURE: 98.7 F

## 2021-01-29 DIAGNOSIS — D50.0 IRON DEFICIENCY ANEMIA DUE TO CHRONIC BLOOD LOSS: Primary | ICD-10-CM

## 2021-01-29 DIAGNOSIS — N18.31 STAGE 3A CHRONIC KIDNEY DISEASE (H): ICD-10-CM

## 2021-01-29 PROCEDURE — 250N000011 HC RX IP 250 OP 636: Performed by: INTERNAL MEDICINE

## 2021-01-29 PROCEDURE — 258N000003 HC RX IP 258 OP 636: Performed by: INTERNAL MEDICINE

## 2021-01-29 PROCEDURE — 96365 THER/PROPH/DIAG IV INF INIT: CPT

## 2021-01-29 RX ORDER — HEPARIN SODIUM (PORCINE) LOCK FLUSH IV SOLN 100 UNIT/ML 100 UNIT/ML
5 SOLUTION INTRAVENOUS
Status: CANCELLED | OUTPATIENT
Start: 2021-02-05

## 2021-01-29 RX ORDER — HEPARIN SODIUM,PORCINE 10 UNIT/ML
5 VIAL (ML) INTRAVENOUS
Status: CANCELLED | OUTPATIENT
Start: 2021-02-05

## 2021-01-29 RX ADMIN — FERRIC CARBOXYMALTOSE INJECTION 750 MG: 50 INJECTION, SOLUTION INTRAVENOUS at 14:42

## 2021-01-29 RX ADMIN — SODIUM CHLORIDE 250 ML: 9 INJECTION, SOLUTION INTRAVENOUS at 14:42

## 2021-01-29 ASSESSMENT — PAIN SCALES - GENERAL: PAINLEVEL: NO PAIN (0)

## 2021-01-29 NOTE — PROGRESS NOTES
Infusion Nursing Note:  Justyn Olivas presents today for Injectafer.    Patient seen by provider today: No   present during visit today: Not Applicable.    Note: N/A.    Intravenous Access:  Peripheral IV placed.    Treatment Conditions:  Not Applicable.      Post Infusion Assessment:  Patient tolerated infusion without incident.  Site patent and intact, free from redness, edema or discomfort.  No evidence of extravasations.  Access discontinued per protocol.       Discharge Plan:   Discharge instructions reviewed with: Patient.  Patient and/or family verbalized understanding of discharge instructions and all questions answered.  AVS to patient via [employer].  Patient will return 2/5/21 for next appointment.   Patient discharged in stable condition accompanied by: self.  Departure Mode: Ambulatory.    Minerva Beltrán RN

## 2021-02-01 ENCOUNTER — IMMUNIZATION (OUTPATIENT)
Dept: NURSING | Facility: CLINIC | Age: 81
End: 2021-02-01
Payer: MEDICARE

## 2021-02-01 PROCEDURE — 0001A PR COVID VAC PFIZER DIL RECON 30 MCG/0.3 ML IM: CPT

## 2021-02-01 PROCEDURE — 91300 PR COVID VAC PFIZER DIL RECON 30 MCG/0.3 ML IM: CPT

## 2021-02-03 ENCOUNTER — ANCILLARY PROCEDURE (OUTPATIENT)
Dept: GENERAL RADIOLOGY | Facility: CLINIC | Age: 81
End: 2021-02-03
Attending: INTERNAL MEDICINE
Payer: MEDICARE

## 2021-02-03 ENCOUNTER — OFFICE VISIT (OUTPATIENT)
Dept: FAMILY MEDICINE | Facility: CLINIC | Age: 81
End: 2021-02-03
Payer: MEDICARE

## 2021-02-03 VITALS
DIASTOLIC BLOOD PRESSURE: 92 MMHG | HEART RATE: 106 BPM | BODY MASS INDEX: 31.56 KG/M2 | SYSTOLIC BLOOD PRESSURE: 171 MMHG | TEMPERATURE: 97.3 F | HEIGHT: 72 IN | WEIGHT: 233 LBS

## 2021-02-03 DIAGNOSIS — I50.30 HEART FAILURE WITH PRESERVED EJECTION FRACTION, NYHA CLASS I (H): ICD-10-CM

## 2021-02-03 DIAGNOSIS — N18.31 STAGE 3A CHRONIC KIDNEY DISEASE (H): ICD-10-CM

## 2021-02-03 DIAGNOSIS — I31.39 PERICARDIAL EFFUSION: ICD-10-CM

## 2021-02-03 DIAGNOSIS — I10 BENIGN ESSENTIAL HYPERTENSION: Primary | ICD-10-CM

## 2021-02-03 LAB
ERYTHROCYTE [DISTWIDTH] IN BLOOD BY AUTOMATED COUNT: 27.3 % (ref 10–15)
HCT VFR BLD AUTO: 33.3 % (ref 40–53)
HGB BLD-MCNC: 9.8 G/DL (ref 13.3–17.7)
MCH RBC QN AUTO: 25.5 PG (ref 26.5–33)
MCHC RBC AUTO-ENTMCNC: 29.4 G/DL (ref 31.5–36.5)
MCV RBC AUTO: 87 FL (ref 78–100)
NT-PROBNP SERPL-MCNC: 3783 PG/ML (ref 0–450)
PLATELET # BLD AUTO: 214 10E9/L (ref 150–450)
RBC # BLD AUTO: 3.85 10E12/L (ref 4.4–5.9)
TROPONIN I SERPL-MCNC: <0.015 UG/L (ref 0–0.04)
WBC # BLD AUTO: 7 10E9/L (ref 4–11)

## 2021-02-03 PROCEDURE — 80048 BASIC METABOLIC PNL TOTAL CA: CPT | Performed by: INTERNAL MEDICINE

## 2021-02-03 PROCEDURE — 84484 ASSAY OF TROPONIN QUANT: CPT | Performed by: INTERNAL MEDICINE

## 2021-02-03 PROCEDURE — 71046 X-RAY EXAM CHEST 2 VIEWS: CPT | Performed by: RADIOLOGY

## 2021-02-03 PROCEDURE — 99215 OFFICE O/P EST HI 40 MIN: CPT | Performed by: INTERNAL MEDICINE

## 2021-02-03 PROCEDURE — 83880 ASSAY OF NATRIURETIC PEPTIDE: CPT | Performed by: INTERNAL MEDICINE

## 2021-02-03 PROCEDURE — 85027 COMPLETE CBC AUTOMATED: CPT | Performed by: INTERNAL MEDICINE

## 2021-02-03 PROCEDURE — 36415 COLL VENOUS BLD VENIPUNCTURE: CPT | Performed by: INTERNAL MEDICINE

## 2021-02-03 PROCEDURE — 93000 ELECTROCARDIOGRAM COMPLETE: CPT | Performed by: INTERNAL MEDICINE

## 2021-02-03 RX ORDER — TORSEMIDE 10 MG/1
20 TABLET ORAL 2 TIMES DAILY
Qty: 360 TABLET | Refills: 3 | COMMUNITY
Start: 2021-02-03 | End: 2021-02-16

## 2021-02-03 RX ORDER — TORSEMIDE 10 MG/1
20 TABLET ORAL DAILY
Qty: 180 TABLET | Refills: 3 | COMMUNITY
Start: 2021-02-03 | End: 2021-02-03

## 2021-02-03 RX ORDER — POTASSIUM CHLORIDE 1500 MG/1
40 TABLET, EXTENDED RELEASE ORAL DAILY
Qty: 60 TABLET | Refills: 0 | Status: SHIPPED | OUTPATIENT
Start: 2021-02-03 | End: 2021-02-03

## 2021-02-03 ASSESSMENT — MIFFLIN-ST. JEOR: SCORE: 1799.88

## 2021-02-03 NOTE — PATIENT INSTRUCTIONS
(I10) Benign essential hypertension  (primary encounter diagnosis)  Comment: blood pressure is a bit elevated, but may be due to strain  Plan:     (I31.3) Pericardial effusion  Comment: Recommend increase torsemide to 20 mg daily and plan to obtain labs, EKG, chest x-ray.  May have had fluid retention following IV iron. We will also check basic metabolic panel and complete blood counts and refer for repeat echocardiogram and follow up in cardiology - [facility] [phone]    Plan: torsemide (DEMADEX) 10 MG tablet            (N18.31) Stage 3a chronic kidney disease  Comment: recheck labs today  Plan:     (I50.30) Heart failure with preserved ejection fraction, NYHA class I (H)  Comment: Continue torsemide, with increase to 20 mg daily and check labs today.  Continue irbesartan, and carvedilol   Plan: torsemide (DEMADEX) 10 MG tablet, BNP-N         terminal pro, Troponin I, Basic metabolic panel        (Ca, Cl, CO2, Creat, Gluc, K, Na, BUN), CBC         with platelets, Echocardiogram Complete, EKG         12-lead complete w/read - Clinics, XR Chest 2         Views

## 2021-02-03 NOTE — PROGRESS NOTES
[facility]  CLINIC PROGRESS NOTE    Subjective:  Congestive heart failure   Justyn Olivas has noticed worsening shortness of breath, fatigue, cough, but more noticeable weight gain of about 20 pounds over the past three weeks.  No chest pain, but finds it hard to breathe - worse than it was 3 weeks ago.  Edema has considerably worsened.  Not sleeping at night, waking up short of breath.  Has coughing spells throughout the day.  Previously, he wasn't experiencing any wheezing, but now, he is having worsening wheezing.        Past medical history, medications, allergies, social history, family history reviewed and updated in EPIC as of 2/3/2021 .    ROS  CONSTITUTIONAL: no fatigue, no unexpected change in weight  SKIN: no worrisome rashes, no worrisome moles, no worrisome lesions  EYES: no acute vision problems or changes  ENT: no ear problems, no mouth problems, no throat problems  RESP: as above    CV: no chest pain, no palpitations, + worsening peripheral edema  GI: no nausea, no vomiting, no constipation, no diarrhea  : no frequency, no dysuria, no hematuria  MS: no claudication, no myalgias, no joint aches  PSYCHIATRIC: no changes in mood or affect      Objective:  Vitals  BP (!) 171/92 (BP Location: Left arm, Patient Position: Sitting, Cuff Size: Adult Regular)   Pulse 106   Temp 97.3  F (36.3  C) (Temporal)   Ht 1.829 m (6')   Wt 105.7 kg (233 lb)   BMI 31.60 kg/m    GEN: Alert Oriented x3 NAD  HEENT: Atraumatic, normocephalic, neck supple, no thyromegaly, negative cervical adenopathy  TM: TM bilaterally pearly and grey with normal light reflex  CV: tachycardia no murmurs or rubs 2+ edema bilateral lower extremity   PULM: Diminished breath sounds bilaterally  ABD: Soft,  Mildly distended, no hepatosplenomegally  SKIN: No visible skin lesion or ulcerations  EXT: 2+  edema bilateral lower extremities  NEURO: Gait and station normal, No focal neurologic deficits  PSYCH: Mood good, affect  mood congruent    No images are attached to the encounter.    EKG: sinus tachycardia - nonspecific ST changes concerning for LVH    Chest x-ray - enlarged cardiac silhouette, mild pulmonology vascular congestion, small left sided pleural effusion    No results found for this or any previous visit (from the past 24 hour(s)).    Assessment/Plan:  Patient Instructions   (I10) Benign essential hypertension  (primary encounter diagnosis)  Comment: blood pressure is a bit elevated, but may be due to strain  Plan:     (I31.3) Pericardial effusion  Comment: Recommend increase torsemide to 20 mg daily and plan to obtain labs, EKG, chest x-ray.  May have had fluid retention following IV iron. We will also check basic metabolic panel and complete blood counts and refer for repeat echocardiogram and follow up in cardiology - [facility] [phone]    Plan: torsemide (DEMADEX) 10 MG tablet            (N18.31) Stage 3a chronic kidney disease  Comment: recheck labs today  Plan:     (I50.30) Heart failure with preserved ejection fraction, NYHA class I (H)  Comment: Continue torsemide, with increase to 20 mg daily and check labs today.  Continue irbesartan, and carvedilol   Plan: torsemide (DEMADEX) 10 MG tablet, BNP-N         terminal pro, Troponin I, Basic metabolic panel        (Ca, Cl, CO2, Creat, Gluc, K, Na, BUN), CBC         with platelets, Echocardiogram Complete, EKG         12-lead complete w/read - Clinics, XR Chest 2         Views           ADDNEDUM: I spoke to Dr. Rosenbaum in cardiology and we discussed his follow up labs, EKG, chest x-ray and clinical picture.  It was felt that likely he was experiencing fluid retention from IV iron and would treat agressively with oral Torsemide 20 mg twice per day in addition to conitnued irbesartan and carvedilol.  Also will add higher dose of potassium 40 meq and follow up labs on Friday - basic metabolic panel.  Advised to go to emergency department if worsening over that  time    Follow up in cardiology    Disclaimer: This note consists of symbols derived from keyboarding, dictation and/or voice recognition software. As a result, there may be errors in the script that have gone undetected. Please consider this when interpreting information found in this chart.    Christian Davidson MD  [phone]

## 2021-02-04 LAB
ANION GAP SERPL CALCULATED.3IONS-SCNC: 5 MMOL/L (ref 3–14)
BUN SERPL-MCNC: 24 MG/DL (ref 7–30)
CALCIUM SERPL-MCNC: 8.6 MG/DL (ref 8.5–10.1)
CHLORIDE SERPL-SCNC: 109 MMOL/L (ref 94–109)
CO2 SERPL-SCNC: 27 MMOL/L (ref 20–32)
CREAT SERPL-MCNC: 1.51 MG/DL (ref 0.66–1.25)
GFR SERPL CREATININE-BSD FRML MDRD: 43 ML/MIN/{1.73_M2}
GLUCOSE SERPL-MCNC: 132 MG/DL (ref 70–99)
POTASSIUM SERPL-SCNC: 3.7 MMOL/L (ref 3.4–5.3)
SODIUM SERPL-SCNC: 141 MMOL/L (ref 133–144)

## 2021-02-05 DIAGNOSIS — I10 BENIGN ESSENTIAL HYPERTENSION: ICD-10-CM

## 2021-02-05 DIAGNOSIS — I31.39 PERICARDIAL EFFUSION: ICD-10-CM

## 2021-02-05 PROCEDURE — 80048 BASIC METABOLIC PNL TOTAL CA: CPT | Performed by: INTERNAL MEDICINE

## 2021-02-05 PROCEDURE — 36415 COLL VENOUS BLD VENIPUNCTURE: CPT | Performed by: INTERNAL MEDICINE

## 2021-02-05 RX ORDER — POTASSIUM CHLORIDE 1500 MG/1
TABLET, EXTENDED RELEASE ORAL
Qty: 180 TABLET | Refills: 1 | Status: SHIPPED | OUTPATIENT
Start: 2021-02-05 | End: 2021-03-03

## 2021-02-06 LAB
ANION GAP SERPL CALCULATED.3IONS-SCNC: 6 MMOL/L (ref 3–14)
BUN SERPL-MCNC: 23 MG/DL (ref 7–30)
CALCIUM SERPL-MCNC: 8.9 MG/DL (ref 8.5–10.1)
CHLORIDE SERPL-SCNC: 107 MMOL/L (ref 94–109)
CO2 SERPL-SCNC: 26 MMOL/L (ref 20–32)
CREAT SERPL-MCNC: 1.59 MG/DL (ref 0.66–1.25)
GFR SERPL CREATININE-BSD FRML MDRD: 40 ML/MIN/{1.73_M2}
GLUCOSE SERPL-MCNC: 103 MG/DL (ref 70–99)
POTASSIUM SERPL-SCNC: 4 MMOL/L (ref 3.4–5.3)
SODIUM SERPL-SCNC: 139 MMOL/L (ref 133–144)

## 2021-02-07 NOTE — RESULT ENCOUNTER NOTE
Rickie Alejandra,    I have had the opportunity to review your recent results and an interpretation is as follows:  Your follow up basic metabolic panel shows stable renal function and electrolytes     Sincerely,  Christian Davidson MD

## 2021-02-09 ENCOUNTER — HOSPITAL ENCOUNTER (OUTPATIENT)
Dept: CARDIOLOGY | Facility: CLINIC | Age: 81
Discharge: HOME | End: 2021-02-09
Attending: INTERNAL MEDICINE | Admitting: INTERNAL MEDICINE
Payer: MEDICARE

## 2021-02-09 DIAGNOSIS — I50.30 HEART FAILURE WITH PRESERVED EJECTION FRACTION, NYHA CLASS I (H): ICD-10-CM

## 2021-02-09 PROCEDURE — 93306 TTE W/DOPPLER COMPLETE: CPT | Mod: 26 | Performed by: INTERNAL MEDICINE

## 2021-02-09 PROCEDURE — 93306 TTE W/DOPPLER COMPLETE: CPT

## 2021-02-10 NOTE — RESULT ENCOUNTER NOTE
Rickie Alejandra,    I have had the opportunity to review your recent results and an interpretation is as follows:  Your follow up echocardiogram shows no recurrence of the pericardial effusion and stable cardiac function - plan to follow up in cardiology on Friday    Sincerely,  Christian Davidson MD

## 2021-02-12 ENCOUNTER — OFFICE VISIT (OUTPATIENT)
Dept: CARDIOLOGY | Facility: CLINIC | Age: 81
End: 2021-02-12
Payer: MEDICARE

## 2021-02-12 VITALS
BODY MASS INDEX: 30.52 KG/M2 | WEIGHT: 225 LBS | HEART RATE: 89 BPM | SYSTOLIC BLOOD PRESSURE: 144 MMHG | DIASTOLIC BLOOD PRESSURE: 76 MMHG | OXYGEN SATURATION: 98 %

## 2021-02-12 DIAGNOSIS — I50.31 ACUTE HEART FAILURE WITH PRESERVED EJECTION FRACTION (H): Primary | ICD-10-CM

## 2021-02-12 DIAGNOSIS — I10 BENIGN ESSENTIAL HYPERTENSION: ICD-10-CM

## 2021-02-12 DIAGNOSIS — E78.5 HYPERLIPIDEMIA LDL GOAL <100: ICD-10-CM

## 2021-02-12 PROCEDURE — 99214 OFFICE O/P EST MOD 30 MIN: CPT | Performed by: PHYSICIAN ASSISTANT

## 2021-02-12 NOTE — PROGRESS NOTES
Primary Cardiologist: Dr. Rosenbaum     Reason for Visit: 2 month follow up/PCP referral for HF    History of Present Illness:   Kamran is a pleasant 80 year old male with past medical history:     #. CM - EF 45-50% on echo; unknown etiology- no ischemic work up has been done (echo does show inferior WMA)   #. Hx of Pericardial Effusion - noted during admission in 2020. Underwent successful pericardiocentesis. Cytology was negative for malignancy. No clear cause identified.   #. Acute on Chronic HFpEF- had been on low dose Torsemide but now reports weight gain and more SOB- after discussion with Dr. Rosenbaum Torsemide was increased to 20 mg BID  #. DM2 - insulin depedent  #. H/o basilar CVA - was on DAPT but plavix discontinue by PCP due to significant anemia   #. HTN- uncontrolled during recent visits- unclear if white coat hypertension  #. Dyslipidemia-   #. Chronic MARKS - thought d/t anemia dx'd   #. Iron Deficiency Anemia - PTA on iron supplements; question of possible bleeding from AV formations noted on recent GI work up- PCP evaluating this; during recent admission his Hgb was low at 6.9; improved to  8-9 since transfusion and iron infusions  #. CKD- Cr baseline 1.5-1.6    Kamran was recently seen by his PCP with reports of SOB and weight gain. His case was also discussed with Dr. Rosenbaum who recommended increasing Torsemide to 20 mg BID with potassium of 40 mEq and follow up in cardiology clinic.     He presents with his spouse reporting he is feeling better but continues to be tired. He by himself decided to reduce torsemide to 20 mg daily 3 earlier this week. He reports losing 15 Ibs over the last week. He reduced torsemide because he was urinating frequently throughout the night. His orthopnea is better. He denies exertional chest pain. He does have family history of CAD (his father  from MI at age 73). He denies lightheadedness or bleeding issues.     Assessment and Plan:   Kamran is a pleasant 80 year old male  with past medical history:     #. CM - EF 45-50% on echo; unknown etiology- no ischemic work up has been done (echo does show inferior WMA)   #. Hx of Pericardial Effusion - noted during admission in 12/2020. Underwent successful pericardiocentesis. Cytology was negative for malignancy. No clear cause identified.   #. Acute on Chronic HFpEF- had been on low dose Torsemide but now reports weight gain and more SOB- after discussion with Dr. Rosenbaum Torsemide was increased to 20 mg BID  #. DM2 - insulin depedent  #. H/o basilar CVA - was on DAPT but plavix discontinue by PCP due to significant anemia   #. HTN- uncontrolled during recent visits- unclear if white coat hypertension  #. Dyslipidemia-   #. Chronic MARKS - thought d/t anemia dx'd 2018  #. Iron Deficiency Anemia - PTA on iron supplements; question of possible bleeding from AV formations noted on recent GI work up- PCP evaluating this; during recent admission his Hgb was low at 6.9; improved to  8-9 since transfusion and iron infusions  #. CKD- Cr baseline 1.5-1.6    Kamran has made significant progress from a volume standpoint but he is not at his dry weight yet. We have 10 Ibs to go before reaching that point. I asked him to increase torsemide back to 20 mg BID but instructed that he takes the second dose earlier in the day, no later than 1 PM. He will give this a try. I will see him back in 2 weeks with repeat BMP. He will get in touch with his PCP regarding his Hgb monitoring. I discussed with him that once he is more stable from volume and anemia standpoint, we should obtain stress test to rule out significant ischemia.       This note was completed in part using Dragon voice recognition software. Although reviewed after completion, some word and grammatical errors may occur.    Orders this Visit:  Orders Placed This Encounter   Procedures     Basic metabolic panel     Follow-Up with Cardiac Advanced Practice Provider     No orders of the defined types were  placed in this encounter.    Medications Discontinued During This Encounter   Medication Reason     clopidogrel (PLAVIX) 75 MG tablet          Encounter Diagnoses   Name Primary?     Acute heart failure with preserved ejection fraction (H) Yes     Benign essential hypertension      Hyperlipidemia LDL goal <100        CURRENT MEDICATIONS:  Current Outpatient Medications   Medication Sig Dispense Refill     aspirin 81 MG EC tablet Take 81 mg by mouth daily       atorvastatin (LIPITOR) 20 MG tablet TAKE 1 TABLET(20 MG) BY MOUTH DAILY 90 tablet 2     blood glucose (STEVE CONTOUR) test strip TESTING FOUR TIMES DAILY OR AS DIRECTED 400 strip 0     carvedilol (COREG) 6.25 MG tablet Take 1 tablet (6.25 mg) by mouth 2 times daily (with meals) 180 tablet 3     dorzolamide-timolol (COSOPT) 2-0.5 % ophthalmic solution Place 1 drop into both eyes 2 times daily       guaiFENesin-codeine (ROBITUSSIN AC) 100-10 MG/5ML solution Take 5-10 mLs by mouth every 4 hours as needed for cough 118 mL 0     insulin glargine (LANTUS SOLOSTAR) 100 UNIT/ML pen INJECT 50 UNITS UNDER THE SKIN AT BEDTIME 45 mL 3     insulin lispro (HUMALOG KWIKPEN) 100 UNIT/ML (1 unit dial) KWIKPEN INJECT UP TO 55 UNITS UNDER THE SKIN ONCE DAILY AS DIRECTED 45 mL 1     insulin pen needle (BD FERCHO U/F) 32G X 4 MM miscellaneous USE AS DIRECTED UP TO FOUR TIMES DAILY 400 each 3     irbesartan (AVAPRO) 300 MG tablet Take 1 tablet (300 mg) by mouth At Bedtime 90 tablet 3     LORazepam (ATIVAN) 0.5 MG tablet Take 1 tablet (0.5 mg) by mouth At Bedtime 10 tablet 0     metFORMIN (GLUCOPHAGE) 1000 MG tablet TAKE 1 TABLET(1000 MG) BY MOUTH TWICE DAILY WITH MEALS 180 tablet 3     order for DME Hip steroid injectoion 1 Units 0     pantoprazole (PROTONIX) 40 MG EC tablet TAKE 1 TABLET(40 MG) BY MOUTH TWICE DAILY 180 tablet 2     potassium chloride ER (K-TAB) 20 MEQ CR tablet TAKE 2 TABLETS(40 MEQ) BY MOUTH DAILY 180 tablet 1     potassium chloride ER (KLOR-CON M) 20 MEQ CR tablet  Take 2 tablets (40 mEq) by mouth 2 times daily 60 tablet 3     torsemide (DEMADEX) 10 MG tablet Take 2 tablets (20 mg) by mouth 2 times daily 360 tablet 3     traZODone (DESYREL) 100 MG tablet TAKE 1 TABLET(100 MG) BY MOUTH AT BEDTIME 90 tablet 3     VENTOLIN  (90 Base) MCG/ACT inhaler INL 2 PFS PO QID PRN 1 Inhaler 3       ALLERGIES   No Known Allergies    PAST MEDICAL HISTORY:  Past Medical History:   Diagnosis Date     Cerebral infarction (H)      Diabetes (H)      Hyperlipemia        PAST SURGICAL HISTORY:  Past Surgical History:   Procedure Laterality Date     CV PERICARDIOCENTESIS N/A 2020    Procedure: Pericardiocentesis;  Surgeon: Chas Chambers MD;  Location:  [facility]       FAMILY HISTORY:  Family History   Problem Relation Age of Onset     Family History Negative Mother      Family History Negative Father        SOCIAL HISTORY:  Social History     Socioeconomic History     Marital status:      Spouse name: Cecile     Number of children: None     Years of education: None     Highest education level: None   Occupational History     None   Social Needs     Financial resource strain: None     Food insecurity     Worry: None     Inability: None     Transportation needs     Medical: None     Non-medical: None   Tobacco Use     Smoking status: Former Smoker     Packs/day: 2.00     Years: 11.00     Pack years: 22.00     Types: Cigarettes     Start date:      Quit date:      Years since quittin.1     Smokeless tobacco: Never Used   Substance and Sexual Activity     Alcohol use: No     Alcohol/week: 0.0 standard drinks     Drug use: No     Sexual activity: Yes     Partners: Female     Birth control/protection: None   Lifestyle     Physical activity     Days per week: None     Minutes per session: None     Stress: None   Relationships     Social connections     Talks on phone: None     Gets together: None     Attends [employer] service: None     Active member of  club or organization: None     Attends meetings of clubs or organizations: None     Relationship status: None     Intimate partner violence     Fear of current or ex partner: None     Emotionally abused: None     Physically abused: None     Forced sexual activity: None   Other Topics Concern     Parent/sibling w/ CABG, MI or angioplasty before 65F 55M? Not Asked   Social History Narrative     None       Review of Systems:  Skin:  Negative     Eyes:  Positive for glasses  ENT:  Negative    Respiratory:  Negative shortness of breath  Cardiovascular:  Negative;palpitations;chest pain;lightheadedness;dizziness Positive for;edema  Gastroenterology: Positive for reflux  Genitourinary:  not assessed    Musculoskeletal:  Positive for    Neurologic:  Positive for stroke  Psychiatric:  Negative    Heme/Lymph/Imm:  Positive for anemia  Endocrine:  not assessed      Physical Exam:  Vitals: BP (!) 144/76   Pulse 89   Wt 102.1 kg (225 lb)   SpO2 98%   BMI 30.52 kg/m       GEN:  NAD  NECK: Moderate JVD at 45 degree angle.   C/V:  Regular rate and rhythm, no murmur, rub or gallop.  RESP: Clear to auscultation bilaterally without wheezing, rales, or rhonchi.  GI: Abdomen soft, nontender, nondistended. HSM present.   EXTREM: 2+ pitting LE edema up to his knees.   NEURO: Alert and oriented, cooperative. No obvious focal deficits.   PSYCH: Normal affect.  SKIN: Warm and dry.       Recent Lab Results:  LIPID RESULTS:  Lab Results   Component Value Date    CHOL 141 02/10/2020    HDL 48 02/10/2020    LDL 79 02/10/2020    TRIG 71 02/10/2020       LIVER ENZYME RESULTS:  Lab Results   Component Value Date    AST 17 12/20/2020    ALT 28 12/20/2020       CBC RESULTS:  Lab Results   Component Value Date    WBC 7.0 02/03/2021    RBC 3.85 (L) 02/03/2021    HGB 9.8 (L) 02/03/2021    HCT 33.3 (L) 02/03/2021    MCV 87 02/03/2021    MCH 25.5 (L) 02/03/2021    MCHC 29.4 (L) 02/03/2021    RDW 27.3 (H) 02/03/2021     02/03/2021       BMP  RESULTS:  Lab Results   Component Value Date     02/05/2021    POTASSIUM 4.0 02/05/2021    CHLORIDE 107 02/05/2021    CO2 26 02/05/2021    ANIONGAP 6 02/05/2021     (H) 02/05/2021    BUN 23 02/05/2021    CR 1.59 (H) 02/05/2021    GFRESTIMATED 40 (L) 02/05/2021    GFRESTBLACK 46 (L) 02/05/2021    VANESA 8.9 02/05/2021        A1C RESULTS:  Lab Results   Component Value Date    A1C 7.0 (H) 12/01/2020       INR RESULTS:  Lab Results   Component Value Date    INR 0.99 09/04/2020           Esteban Ruiz PA-C  February 12, 2021

## 2021-02-12 NOTE — PATIENT INSTRUCTIONS
Today's Plan:   1) Increase torsemide back to two tablets (20 mg) two times daily.   2) Take first dose early in the morning and second dose early in the afternoon (no later than 1 PM).   3) Ask Dr. Davidson about when to check your hemoglobin next time.   4) Come back in 2 weeks for follow up with lab work.     If you have questions or concerns please call my nurse team at [phone].     Scheduling phone number: [phone]  Reminder: Please bring in all current medications, over the counter supplements and vitamin bottles to your next appointment.    It was a pleasure seeing you today!     Esteban Ruiz PA-C  2/12/2021

## 2021-02-12 NOTE — LETTER
2/12/2021    Christian Davidson MD, MD  [address]  [facility] [address]    RE: Justyn Olivas       Dear Colleague,    I had the pleasure of seeing Justyn Olivas in the [facility].    Primary Cardiologist: Dr. Rosenbaum     Reason for Visit: 2 month follow up/PCP referral for HF    History of Present Illness:   Kamran is a pleasant 80 year old male with past medical history:     #. CM - EF 45-50% on echo; unknown etiology- no ischemic work up has been done (echo does show inferior WMA)   #. Hx of Pericardial Effusion - noted during admission in 12/2020. Underwent successful pericardiocentesis. Cytology was negative for malignancy. No clear cause identified.   #. Acute on Chronic HFpEF- had been on low dose Torsemide but now reports weight gain and more SOB- after discussion with Dr. Rosenbaum Torsemide was increased to 20 mg BID  #. DM2 - insulin depedent  #. H/o basilar CVA - was on DAPT but plavix discontinue by PCP due to significant anemia   #. HTN- uncontrolled during recent visits- unclear if white coat hypertension  #. Dyslipidemia-   #. Chronic MARKS - thought d/t anemia dx'd 2018  #. Iron Deficiency Anemia - PTA on iron supplements; question of possible bleeding from AV formations noted on recent GI work up- PCP evaluating this; during recent admission his Hgb was low at 6.9; improved to  8-9 since transfusion and iron infusions  #. CKD- Cr baseline 1.5-1.6    Kamran was recently seen by his PCP with reports of SOB and weight gain. His case was also discussed with Dr. Rosenbaum who recommended increasing Torsemide to 20 mg BID with potassium of 40 mEq and follow up in cardiology clinic.     He presents with his spouse reporting he is feeling better but continues to be tired. He by himself decided to reduce torsemide to 20 mg daily 3 earlier this week. He reports losing 15 Ibs over the last week. He reduced torsemide because he was urinating  frequently throughout the night. His orthopnea is better. He denies exertional chest pain. He does have family history of CAD (his father  from MI at age 73). He denies lightheadedness or bleeding issues.     Assessment and Plan:   Kamran is a pleasant 80 year old male with past medical history:     #. CM - EF 45-50% on echo; unknown etiology- no ischemic work up has been done (echo does show inferior WMA)   #. Hx of Pericardial Effusion - noted during admission in 2020. Underwent successful pericardiocentesis. Cytology was negative for malignancy. No clear cause identified.   #. Acute on Chronic HFpEF- had been on low dose Torsemide but now reports weight gain and more SOB- after discussion with Dr. Rosenbaum Torsemide was increased to 20 mg BID  #. DM2 - insulin depedent  #. H/o basilar CVA - was on DAPT but plavix discontinue by PCP due to significant anemia   #. HTN- uncontrolled during recent visits- unclear if white coat hypertension  #. Dyslipidemia-   #. Chronic MARKS - thought d/t anemia dx'd   #. Iron Deficiency Anemia - PTA on iron supplements; question of possible bleeding from AV formations noted on recent GI work up- PCP evaluating this; during recent admission his Hgb was low at 6.9; improved to  8-9 since transfusion and iron infusions  #. CKD- Cr baseline 1.5-1.6    Kamran has made significant progress from a volume standpoint but he is not at his dry weight yet. We have 10 Ibs to go before reaching that point. I asked him to increase torsemide back to 20 mg BID but instructed that he takes the second dose earlier in the day, no later than 1 PM. He will give this a try. I will see him back in 2 weeks with repeat BMP. He will get in touch with his PCP regarding his Hgb monitoring. I discussed with him that once he is more stable from volume and anemia standpoint, we should obtain stress test to rule out significant ischemia.       This note was completed in part using Dragon voice recognition software.  Although reviewed after completion, some word and grammatical errors may occur.    Orders this Visit:  Orders Placed This Encounter   Procedures     Basic metabolic panel     Follow-Up with Cardiac Advanced Practice Provider     No orders of the defined types were placed in this encounter.    Medications Discontinued During This Encounter   Medication Reason     clopidogrel (PLAVIX) 75 MG tablet          Encounter Diagnoses   Name Primary?     Acute heart failure with preserved ejection fraction (H) Yes     Benign essential hypertension      Hyperlipidemia LDL goal <100        CURRENT MEDICATIONS:  Current Outpatient Medications   Medication Sig Dispense Refill     aspirin 81 MG EC tablet Take 81 mg by mouth daily       atorvastatin (LIPITOR) 20 MG tablet TAKE 1 TABLET(20 MG) BY MOUTH DAILY 90 tablet 2     blood glucose (STEVE CONTOUR) test strip TESTING FOUR TIMES DAILY OR AS DIRECTED 400 strip 0     carvedilol (COREG) 6.25 MG tablet Take 1 tablet (6.25 mg) by mouth 2 times daily (with meals) 180 tablet 3     dorzolamide-timolol (COSOPT) 2-0.5 % ophthalmic solution Place 1 drop into both eyes 2 times daily       guaiFENesin-codeine (ROBITUSSIN AC) 100-10 MG/5ML solution Take 5-10 mLs by mouth every 4 hours as needed for cough 118 mL 0     insulin glargine (LANTUS SOLOSTAR) 100 UNIT/ML pen INJECT 50 UNITS UNDER THE SKIN AT BEDTIME 45 mL 3     insulin lispro (HUMALOG KWIKPEN) 100 UNIT/ML (1 unit dial) KWIKPEN INJECT UP TO 55 UNITS UNDER THE SKIN ONCE DAILY AS DIRECTED 45 mL 1     insulin pen needle (BD FERCHO U/F) 32G X 4 MM miscellaneous USE AS DIRECTED UP TO FOUR TIMES DAILY 400 each 3     irbesartan (AVAPRO) 300 MG tablet Take 1 tablet (300 mg) by mouth At Bedtime 90 tablet 3     LORazepam (ATIVAN) 0.5 MG tablet Take 1 tablet (0.5 mg) by mouth At Bedtime 10 tablet 0     metFORMIN (GLUCOPHAGE) 1000 MG tablet TAKE 1 TABLET(1000 MG) BY MOUTH TWICE DAILY WITH MEALS 180 tablet 3     order for DME Hip steroid injectoion 1  Units 0     pantoprazole (PROTONIX) 40 MG EC tablet TAKE 1 TABLET(40 MG) BY MOUTH TWICE DAILY 180 tablet 2     potassium chloride ER (K-TAB) 20 MEQ CR tablet TAKE 2 TABLETS(40 MEQ) BY MOUTH DAILY 180 tablet 1     potassium chloride ER (KLOR-CON M) 20 MEQ CR tablet Take 2 tablets (40 mEq) by mouth 2 times daily 60 tablet 3     torsemide (DEMADEX) 10 MG tablet Take 2 tablets (20 mg) by mouth 2 times daily 360 tablet 3     traZODone (DESYREL) 100 MG tablet TAKE 1 TABLET(100 MG) BY MOUTH AT BEDTIME 90 tablet 3     VENTOLIN  (90 Base) MCG/ACT inhaler INL 2 PFS PO QID PRN 1 Inhaler 3       ALLERGIES   No Known Allergies    PAST MEDICAL HISTORY:  Past Medical History:   Diagnosis Date     Cerebral infarction (H)      Diabetes (H)      Hyperlipemia        PAST SURGICAL HISTORY:  Past Surgical History:   Procedure Laterality Date     CV PERICARDIOCENTESIS N/A 2020    Procedure: Pericardiocentesis;  Surgeon: Chas Chambers MD;  Location:  [facility]       FAMILY HISTORY:  Family History   Problem Relation Age of Onset     Family History Negative Mother      Family History Negative Father        SOCIAL HISTORY:  Social History     Socioeconomic History     Marital status:      Spouse name: Cecile     Number of children: None     Years of education: None     Highest education level: None   Occupational History     None   Social Needs     Financial resource strain: None     Food insecurity     Worry: None     Inability: None     Transportation needs     Medical: None     Non-medical: None   Tobacco Use     Smoking status: Former Smoker     Packs/day: 2.00     Years: 11.00     Pack years: 22.00     Types: Cigarettes     Start date:      Quit date:      Years since quittin.1     Smokeless tobacco: Never Used   Substance and Sexual Activity     Alcohol use: No     Alcohol/week: 0.0 standard drinks     Drug use: No     Sexual activity: Yes     Partners: Female     Birth  control/protection: None   Lifestyle     Physical activity     Days per week: None     Minutes per session: None     Stress: None   Relationships     Social connections     Talks on phone: None     Gets together: None     Attends Protestant service: None     Active member of club or organization: None     Attends meetings of clubs or organizations: None     Relationship status: None     Intimate partner violence     Fear of current or ex partner: None     Emotionally abused: None     Physically abused: None     Forced sexual activity: None   Other Topics Concern     Parent/sibling w/ CABG, MI or angioplasty before 65F 55M? Not Asked   Social History Narrative     None       Review of Systems:  Skin:  Negative     Eyes:  Positive for glasses  ENT:  Negative    Respiratory:  Negative shortness of breath  Cardiovascular:  Negative;palpitations;chest pain;lightheadedness;dizziness Positive for;edema  Gastroenterology: Positive for reflux  Genitourinary:  not assessed    Musculoskeletal:  Positive for    Neurologic:  Positive for stroke  Psychiatric:  Negative    Heme/Lymph/Imm:  Positive for anemia  Endocrine:  not assessed      Physical Exam:  Vitals: BP (!) 144/76   Pulse 89   Wt 102.1 kg (225 lb)   SpO2 98%   BMI 30.52 kg/m       GEN:  NAD  NECK: Moderate JVD at 45 degree angle.   C/V:  Regular rate and rhythm, no murmur, rub or gallop.  RESP: Clear to auscultation bilaterally without wheezing, rales, or rhonchi.  GI: Abdomen soft, nontender, nondistended. HSM present.   EXTREM: 2+ pitting LE edema up to his knees.   NEURO: Alert and oriented, cooperative. No obvious focal deficits.   PSYCH: Normal affect.  SKIN: Warm and dry.       Recent Lab Results:  LIPID RESULTS:  Lab Results   Component Value Date    CHOL 141 02/10/2020    HDL 48 02/10/2020    LDL 79 02/10/2020    TRIG 71 02/10/2020       LIVER ENZYME RESULTS:  Lab Results   Component Value Date    AST 17 12/20/2020    ALT 28 12/20/2020       CBC  RESULTS:  Lab Results   Component Value Date    WBC 7.0 02/03/2021    RBC 3.85 (L) 02/03/2021    HGB 9.8 (L) 02/03/2021    HCT 33.3 (L) 02/03/2021    MCV 87 02/03/2021    MCH 25.5 (L) 02/03/2021    MCHC 29.4 (L) 02/03/2021    RDW 27.3 (H) 02/03/2021     02/03/2021       BMP RESULTS:  Lab Results   Component Value Date     02/05/2021    POTASSIUM 4.0 02/05/2021    CHLORIDE 107 02/05/2021    CO2 26 02/05/2021    ANIONGAP 6 02/05/2021     (H) 02/05/2021    BUN 23 02/05/2021    CR 1.59 (H) 02/05/2021    GFRESTIMATED 40 (L) 02/05/2021    GFRESTBLACK 46 (L) 02/05/2021    VANESA 8.9 02/05/2021        A1C RESULTS:  Lab Results   Component Value Date    A1C 7.0 (H) 12/01/2020       INR RESULTS:  Lab Results   Component Value Date    INR 0.99 09/04/2020         Thank you for allowing me to participate in the care of your patient.    Sincerely,     Esteban Ruiz PA-C     [facility]

## 2021-02-13 ENCOUNTER — MYC MEDICAL ADVICE (OUTPATIENT)
Dept: FAMILY MEDICINE | Facility: CLINIC | Age: 81
End: 2021-02-13

## 2021-02-13 DIAGNOSIS — I31.39 PERICARDIAL EFFUSION: ICD-10-CM

## 2021-02-13 DIAGNOSIS — I50.30 HEART FAILURE WITH PRESERVED EJECTION FRACTION, NYHA CLASS I (H): ICD-10-CM

## 2021-02-15 NOTE — TELEPHONE ENCOUNTER
Will await pt response on torsemide.  Was ordered 2/3/21 in chart, but is historical so needs reorder.  Lucero Baca, RN  [facility] RN Triage Team

## 2021-02-16 RX ORDER — TORSEMIDE 20 MG/1
40 TABLET ORAL DAILY
Qty: 180 TABLET | Refills: 1 | Status: SHIPPED | OUTPATIENT
Start: 2021-02-16 | End: 2021-03-03

## 2021-02-16 NOTE — TELEPHONE ENCOUNTER
See mychart. Pt is requesting 40mg rx of torsemide to mail order pharmacy    Pended as 40 mg once daily, change as appropriate.   Lucero Baca, RN  [facility] RN Triage Team

## 2021-02-19 DIAGNOSIS — M62.830 BACK MUSCLE SPASM: ICD-10-CM

## 2021-02-22 ENCOUNTER — IMMUNIZATION (OUTPATIENT)
Dept: NURSING | Facility: CLINIC | Age: 81
End: 2021-02-22
Attending: INTERNAL MEDICINE
Payer: MEDICARE

## 2021-02-22 PROCEDURE — 0002A PR COVID VAC PFIZER DIL RECON 30 MCG/0.3 ML IM: CPT

## 2021-02-22 PROCEDURE — 91300 PR COVID VAC PFIZER DIL RECON 30 MCG/0.3 ML IM: CPT

## 2021-02-22 RX ORDER — CYCLOBENZAPRINE HCL 10 MG
TABLET ORAL
Qty: 30 TABLET | Refills: 0 | Status: SHIPPED | OUTPATIENT
Start: 2021-02-22 | End: 2021-06-02

## 2021-02-22 NOTE — TELEPHONE ENCOUNTER
Routing refill request to provider for review/approval because:  Drug not on the [facility] refill protocol   [facility]ita PATE RN,BSN

## 2021-02-25 ENCOUNTER — TRANSFERRED RECORDS (OUTPATIENT)
Dept: HEALTH INFORMATION MANAGEMENT | Facility: CLINIC | Age: 81
End: 2021-02-25

## 2021-03-02 ENCOUNTER — TRANSFERRED RECORDS (OUTPATIENT)
Dept: HEALTH INFORMATION MANAGEMENT | Facility: CLINIC | Age: 81
End: 2021-03-02

## 2021-03-02 DIAGNOSIS — I10 BENIGN ESSENTIAL HYPERTENSION: ICD-10-CM

## 2021-03-02 DIAGNOSIS — E78.5 HYPERLIPIDEMIA LDL GOAL <100: ICD-10-CM

## 2021-03-02 DIAGNOSIS — I50.31 ACUTE HEART FAILURE WITH PRESERVED EJECTION FRACTION (H): ICD-10-CM

## 2021-03-02 LAB
ANION GAP SERPL CALCULATED.3IONS-SCNC: 10 MMOL/L (ref 3–14)
BUN SERPL-MCNC: 99 MG/DL (ref 7–30)
CALCIUM SERPL-MCNC: 9.4 MG/DL (ref 8.5–10.1)
CHLORIDE SERPL-SCNC: 98 MMOL/L (ref 94–109)
CO2 SERPL-SCNC: 31 MMOL/L (ref 20–32)
CREAT SERPL-MCNC: 2.66 MG/DL (ref 0.66–1.25)
GFR SERPL CREATININE-BSD FRML MDRD: 21 ML/MIN/{1.73_M2}
GLUCOSE SERPL-MCNC: 192 MG/DL (ref 70–99)
POTASSIUM SERPL-SCNC: 4 MMOL/L (ref 3.4–5.3)
RETINOPATHY: POSITIVE
SODIUM SERPL-SCNC: 139 MMOL/L (ref 133–144)

## 2021-03-02 PROCEDURE — 80048 BASIC METABOLIC PNL TOTAL CA: CPT | Performed by: PHYSICIAN ASSISTANT

## 2021-03-02 PROCEDURE — 36415 COLL VENOUS BLD VENIPUNCTURE: CPT | Performed by: PHYSICIAN ASSISTANT

## 2021-03-03 ENCOUNTER — OFFICE VISIT (OUTPATIENT)
Dept: CARDIOLOGY | Facility: CLINIC | Age: 81
End: 2021-03-03
Attending: PHYSICIAN ASSISTANT
Payer: MEDICARE

## 2021-03-03 VITALS
WEIGHT: 211 LBS | SYSTOLIC BLOOD PRESSURE: 128 MMHG | DIASTOLIC BLOOD PRESSURE: 75 MMHG | BODY MASS INDEX: 28.58 KG/M2 | HEART RATE: 97 BPM | HEIGHT: 72 IN

## 2021-03-03 DIAGNOSIS — I50.30 HEART FAILURE WITH PRESERVED EJECTION FRACTION, NYHA CLASS I (H): ICD-10-CM

## 2021-03-03 DIAGNOSIS — E78.5 HYPERLIPIDEMIA LDL GOAL <100: ICD-10-CM

## 2021-03-03 DIAGNOSIS — N17.9 ACUTE RENAL FAILURE, UNSPECIFIED ACUTE RENAL FAILURE TYPE (H): Primary | ICD-10-CM

## 2021-03-03 DIAGNOSIS — I50.31 ACUTE HEART FAILURE WITH PRESERVED EJECTION FRACTION (H): ICD-10-CM

## 2021-03-03 DIAGNOSIS — I31.39 PERICARDIAL EFFUSION: ICD-10-CM

## 2021-03-03 DIAGNOSIS — I10 BENIGN ESSENTIAL HYPERTENSION: ICD-10-CM

## 2021-03-03 PROCEDURE — 99214 OFFICE O/P EST MOD 30 MIN: CPT | Performed by: PHYSICIAN ASSISTANT

## 2021-03-03 RX ORDER — IRBESARTAN 300 MG/1
150 TABLET ORAL 2 TIMES DAILY
Qty: 90 TABLET | Refills: 3 | COMMUNITY
Start: 2021-03-03 | End: 2021-03-26

## 2021-03-03 RX ORDER — TORSEMIDE 20 MG/1
TABLET ORAL
Qty: 180 TABLET | Refills: 1 | COMMUNITY
Start: 2021-03-03 | End: 2021-03-26

## 2021-03-03 RX ORDER — POTASSIUM CHLORIDE 1500 MG/1
TABLET, EXTENDED RELEASE ORAL
Qty: 180 TABLET | Refills: 1 | COMMUNITY
Start: 2021-03-03 | End: 2021-03-26

## 2021-03-03 ASSESSMENT — MIFFLIN-ST. JEOR: SCORE: 1700.09

## 2021-03-03 NOTE — PROGRESS NOTES
Primary Cardiologist: will establish care with Dr. Rosenbaum    Reason for Visit: 2 week follow up to assess volume status and discuss ischemic work up    History of Present Illness:   Kamran is a pleasant 80 year old male with past medical history:      #. CM - EF 45-50% on echo; unknown etiology- no ischemic work up has been done (echo does show inferior WMA)   #. Hx of Pericardial Effusion - noted during admission in 12/2020. Underwent successful pericardiocentesis. Cytology was negative for malignancy. No clear cause identified.   #. Acute on Chronic HFpEF- had been on low dose Torsemide but now reports weight gain and more SOB- after discussion with Dr. Rosenbaum Torsemide was increased to 20 mg BID  #. DM2 - insulin depedent  #. H/o basilar CVA - was on DAPT but plavix discontinue by PCP due to significant anemia   #. HTN- uncontrolled during recent visits- unclear if white coat hypertension  #. Dyslipidemia-   #. Chronic MARKS - thought d/t anemia dx'd 2018  #. Iron Deficiency Anemia - PTA on iron supplements; question of possible bleeding from AV formations noted on recent GI work up- PCP evaluating this; during recent admission his Hgb was low at 6.9; improved to  8-9 since transfusion and iron infusions  #. CKD- Cr baseline 1.5-1.6    He reports his home weight has decreased from 134 Ibs to 106 Ibs in 2.5 weeks. He feels tired and weak. He feels thirsty and continues to have frequent urination with torsemide. He denies syncope, presyncope, palpitations, CP, lightheadedness, or bleeding issues. He also asks if he should be back on plavix and when his Hgb should be checked again.     Assessment and Plan:  Kamran is a pleasant 80 year old male with past medical history:     #. CASS in the setting of CKD- Cr baseline 1.5-1.6 but recent BMP shows significant rise in Cr to 2.6 with BUN of 99; likely from over diuresis   #. Acute on Chronic HFpEF- had been on low dose Torsemide but due to recent reports weight gain and more SOB-  after discussion with Dr. Rosenbaum Torsemide was increased to 20 mg BID several weeks ago and when I saw patient 2 weeks ago we decided to continue with the same dose for a while as his weight appear to be still higher than his baseline  #. CM - EF 45-50% on echo; unknown etiology- no ischemic work up has been done (echo does show inferior WMA)   #. Hx of Pericardial Effusion - noted during admission in 12/2020. Underwent successful pericardiocentesis. Cytology was negative for malignancy. No clear cause identified.   #. DM2 - insulin depedent  #. H/o basilar CVA - was on DAPT but plavix discontinue by PCP due to significant anemia   #. HTN- uncontrolled during recent visits- unclear if white coat hypertension  #. Dyslipidemia-   #. Chronic MARKS - thought d/t anemia dx'd 2018  #. Iron Deficiency Anemia - PTA on iron supplements; question of possible bleeding from AV formations noted on recent GI work up- PCP evaluating this; during recent admission his Hgb was low at 6.9; improved to  8-9 since transfusion and iron infusions; most recent check was 1 month ago and it was 9.8    Kamran has significant CASS. I asked him to discontinue torsemide and hold irbesartan until 3/6/2021 to give his kidneys time to recover.  I also asked him to stop potassium supplement. We will get repeat BMP this Friday (3/5/21). If he becomes lethargic or has progressive weakness, I asked him to be seen in the ED. I also asked him to increase his water intake.     Given his reduced LVEF and regional WMA, we need to further assess for CAD. I will have him undergo lexiscan nuclear test to rule out significant ischemia. We will work on this in the near future.    I asked him to discuss with his PCP regarding when his Hgb should be checked and when/if he can be on plavix again. He is nervous to be off of this.       This note was completed in part using Dragon voice recognition software. Although reviewed after completion, some word and grammatical  errors may occur.    Orders this Visit:  Orders Placed This Encounter   Procedures     Basic metabolic panel     Orders Placed This Encounter   Medications     FLUTICASONE PROPIONATE HFA IN     torsemide (DEMADEX) 20 MG tablet     Sig: Hold until you get updated instructions from cardiology. 3/3/21     Dispense:  180 tablet     Refill:  1     irbesartan (AVAPRO) 300 MG tablet     Sig: Start on 3/6.     Dispense:  90 tablet     Refill:  3     potassium chloride ER (K-TAB) 20 MEQ CR tablet     Sig: Don't take it until you hear from cardiology. 3/3/21.     Dispense:  180 tablet     Refill:  1     Medications Discontinued During This Encounter   Medication Reason     irbesartan (AVAPRO) 300 MG tablet      torsemide (DEMADEX) 20 MG tablet      guaiFENesin-codeine (ROBITUSSIN AC) 100-10 MG/5ML solution      LORazepam (ATIVAN) 0.5 MG tablet      potassium chloride ER (KLOR-CON M) 20 MEQ CR tablet      potassium chloride ER (K-TAB) 20 MEQ CR tablet          Encounter Diagnoses   Name Primary?     Acute heart failure with preserved ejection fraction (H)      Benign essential hypertension      Hyperlipidemia LDL goal <100      Pericardial effusion      Heart failure with preserved ejection fraction, NYHA class I (H)      Acute renal failure, unspecified acute renal failure type (H) Yes       CURRENT MEDICATIONS:  Current Outpatient Medications   Medication Sig Dispense Refill     aspirin 81 MG EC tablet Take 81 mg by mouth daily       atorvastatin (LIPITOR) 20 MG tablet TAKE 1 TABLET(20 MG) BY MOUTH DAILY 90 tablet 2     blood glucose (STEVE CONTOUR) test strip TESTING FOUR TIMES DAILY OR AS DIRECTED 400 strip 0     carvedilol (COREG) 6.25 MG tablet Take 1 tablet (6.25 mg) by mouth 2 times daily (with meals) 180 tablet 3     cyclobenzaprine (FLEXERIL) 10 MG tablet 1/2 po bid prn & 1po qhs 30 tablet 0     dorzolamide-timolol (COSOPT) 2-0.5 % ophthalmic solution Place 1 drop into both eyes 2 times daily       FLUTICASONE  PROPIONATE HFA IN        insulin glargine (LANTUS SOLOSTAR) 100 UNIT/ML pen INJECT 50 UNITS UNDER THE SKIN AT BEDTIME 45 mL 3     insulin lispro (HUMALOG KWIKPEN) 100 UNIT/ML (1 unit dial) KWIKPEN INJECT UP TO 55 UNITS UNDER THE SKIN ONCE DAILY AS DIRECTED 45 mL 1     insulin pen needle (BD FERCHO U/F) 32G X 4 MM miscellaneous USE AS DIRECTED UP TO FOUR TIMES DAILY 400 each 3     irbesartan (AVAPRO) 300 MG tablet Start on 3/6. 90 tablet 3     metFORMIN (GLUCOPHAGE) 1000 MG tablet TAKE 1 TABLET(1000 MG) BY MOUTH TWICE DAILY WITH MEALS 180 tablet 3     order for DME Hip steroid injectoion 1 Units 0     pantoprazole (PROTONIX) 40 MG EC tablet TAKE 1 TABLET(40 MG) BY MOUTH TWICE DAILY 180 tablet 2     potassium chloride ER (K-TAB) 20 MEQ CR tablet Don't take it until you hear from cardiology. 3/3/21. 180 tablet 1     torsemide (DEMADEX) 20 MG tablet Hold until you get updated instructions from cardiology. 3/3/21 180 tablet 1     traZODone (DESYREL) 100 MG tablet TAKE 1 TABLET(100 MG) BY MOUTH AT BEDTIME 90 tablet 3     VENTOLIN  (90 Base) MCG/ACT inhaler INL 2 PFS PO QID PRN 1 Inhaler 3       ALLERGIES   No Known Allergies    PAST MEDICAL HISTORY:  Past Medical History:   Diagnosis Date     Cerebral infarction (H)      Diabetes (H)      Hyperlipemia        PAST SURGICAL HISTORY:  Past Surgical History:   Procedure Laterality Date     CV PERICARDIOCENTESIS N/A 12/21/2020    Procedure: Pericardiocentesis;  Surgeon: Chas Chambers MD;  Location:  [facility]       FAMILY HISTORY:  Family History   Problem Relation Age of Onset     Family History Negative Mother      Family History Negative Father        SOCIAL HISTORY:  Social History     Socioeconomic History     Marital status:      Spouse name: Cecile     Number of children: None     Years of education: None     Highest education level: None   Occupational History     None   Social Needs     Financial resource strain: None     Food  insecurity     Worry: None     Inability: None     Transportation needs     Medical: None     Non-medical: None   Tobacco Use     Smoking status: Former Smoker     Packs/day: 2.00     Years: 11.00     Pack years: 22.00     Types: Cigarettes     Start date:      Quit date:      Years since quittin.2     Smokeless tobacco: Never Used   Substance and Sexual Activity     Alcohol use: No     Alcohol/week: 0.0 standard drinks     Drug use: No     Sexual activity: Yes     Partners: Female     Birth control/protection: None   Lifestyle     Physical activity     Days per week: None     Minutes per session: None     Stress: None   Relationships     Social connections     Talks on phone: None     Gets together: None     Attends [employer]od[employer] service: None     Active member of club or organization: None     Attends meetings of clubs or organizations: None     Relationship status: None     Intimate partner violence     Fear of current or ex partner: None     Emotionally abused: None     Physically abused: None     Forced sexual activity: None   Other Topics Concern     Parent/sibling w/ CABG, MI or angioplasty before 65F 55M? Not Asked   Social History Narrative     None       Review of Systems:  Skin:  Negative     Eyes:  Positive for glasses  ENT:  Negative    Respiratory:  Positive for cough  Cardiovascular:    edema;Positive for;fatigue  Gastroenterology: Positive for reflux  Genitourinary:  Negative    Musculoskeletal:  Positive for    Neurologic:  Positive for stroke  Psychiatric:  Negative    Heme/Lymph/Imm:  Positive for anemia  Endocrine:  Positive for diabetes    Physical Exam:  Vitals: /75   Pulse 97   Ht 1.829 m (6')   Wt 95.7 kg (211 lb)   BMI 28.62 kg/m       GEN:  NAD  NECK: No JVD  C/V:  Regular rate and rhythm, no murmur, rub or gallop.  RESP: Clear to auscultation bilaterally without wheezing, rales, or rhonchi.  GI: Abdomen soft, nontender, nondistended. No HSM appreciated.   EXTREM: No LE  edema.   NEURO: Alert and oriented, cooperative. No obvious focal deficits.   PSYCH: Normal affect.  SKIN: Warm and dry.       Recent Lab Results:  LIPID RESULTS:  Lab Results   Component Value Date    CHOL 141 02/10/2020    HDL 48 02/10/2020    LDL 79 02/10/2020    TRIG 71 02/10/2020       LIVER ENZYME RESULTS:  Lab Results   Component Value Date    AST 17 12/20/2020    ALT 28 12/20/2020       CBC RESULTS:  Lab Results   Component Value Date    WBC 7.0 02/03/2021    RBC 3.85 (L) 02/03/2021    HGB 9.8 (L) 02/03/2021    HCT 33.3 (L) 02/03/2021    MCV 87 02/03/2021    MCH 25.5 (L) 02/03/2021    MCHC 29.4 (L) 02/03/2021    RDW 27.3 (H) 02/03/2021     02/03/2021       BMP RESULTS:  Lab Results   Component Value Date     03/02/2021    POTASSIUM 4.0 03/02/2021    CHLORIDE 98 03/02/2021    CO2 31 03/02/2021    ANIONGAP 10 03/02/2021     (H) 03/02/2021    BUN 99 (H) 03/02/2021    CR 2.66 (H) 03/02/2021    GFRESTIMATED 21 (L) 03/02/2021    GFRESTBLACK 25 (L) 03/02/2021    VANESA 9.4 03/02/2021        A1C RESULTS:  Lab Results   Component Value Date    A1C 7.0 (H) 12/01/2020       INR RESULTS:  Lab Results   Component Value Date    INR 0.99 09/04/2020           Esteban Ruiz PA-C  March 3, 2021

## 2021-03-03 NOTE — PATIENT INSTRUCTIONS
Today's Plan:   1) Hold second dose of torsemide for today.  2) Hold evening dose of irbesartan for today.   3) Tomorrow (3/4) and the following day (3/5) hold both torsemide and irbesartan.   4) Stop potassium pill.   5) Drink extra fluid.   6) Blood work on Monday (to check your kidney function)  7) Restart irbesartan on Saturday.   8) Continue to hold torsemide until you hear from me.     If you have questions or concerns please call my nurse team at [phone].     Scheduling phone number: [phone]  Reminder: Please bring in all current medications, over the counter supplements and vitamin bottles to your next appointment.    It was a pleasure seeing you today!     Esteban Ruiz PA-C  3/3/2021

## 2021-03-03 NOTE — LETTER
3/3/2021    Christian Davidson MD, MD  [address]  [place] [address]    RE: Justyn Olivas       Dear Colleague,    I had the pleasure of seeing Justyn Olivas in the [facility].    Primary Cardiologist: will establish care with Dr. Rosenbaum    Reason for Visit: 2 week follow up to assess volume status and discuss ischemic work up    History of Present Illness:   Kamran is a pleasant 80 year old male with past medical history:      #. CM - EF 45-50% on echo; unknown etiology- no ischemic work up has been done (echo does show inferior WMA)   #. Hx of Pericardial Effusion - noted during admission in 12/2020. Underwent successful pericardiocentesis. Cytology was negative for malignancy. No clear cause identified.   #. Acute on Chronic HFpEF- had been on low dose Torsemide but now reports weight gain and more SOB- after discussion with Dr. Rosenbaum Torsemide was increased to 20 mg BID  #. DM2 - insulin depedent  #. H/o basilar CVA - was on DAPT but plavix discontinue by PCP due to significant anemia   #. HTN- uncontrolled during recent visits- unclear if white coat hypertension  #. Dyslipidemia-   #. Chronic MARKS - thought d/t anemia dx'd 2018  #. Iron Deficiency Anemia - PTA on iron supplements; question of possible bleeding from AV formations noted on recent GI work up- PCP evaluating this; during recent admission his Hgb was low at 6.9; improved to  8-9 since transfusion and iron infusions  #. CKD- Cr baseline 1.5-1.6    He reports his home weight has decreased from 134 Ibs to 106 Ibs in 2.5 weeks. He feels tired and weak. He feels thirsty and continues to have frequent urination with torsemide. He denies syncope, presyncope, palpitations, CP, lightheadedness, or bleeding issues. He also asks if he should be back on plavix and when his Hgb should be checked again.     Assessment and Plan:  Kamran is a pleasant 80 year old male with past medical  history:     #. CASS in the setting of CKD- Cr baseline 1.5-1.6 but recent BMP shows significant rise in Cr to 2.6 with BUN of 99; likely from over diuresis   #. Acute on Chronic HFpEF- had been on low dose Torsemide but due to recent reports weight gain and more SOB- after discussion with Dr. Rosenbaum Torsemide was increased to 20 mg BID several weeks ago and when I saw patient 2 weeks ago we decided to continue with the same dose for a while as his weight appear to be still higher than his baseline  #. CM - EF 45-50% on echo; unknown etiology- no ischemic work up has been done (echo does show inferior WMA)   #. Hx of Pericardial Effusion - noted during admission in 12/2020. Underwent successful pericardiocentesis. Cytology was negative for malignancy. No clear cause identified.   #. DM2 - insulin depedent  #. H/o basilar CVA - was on DAPT but plavix discontinue by PCP due to significant anemia   #. HTN- uncontrolled during recent visits- unclear if white coat hypertension  #. Dyslipidemia-   #. Chronic MARKS - thought d/t anemia dx'd 2018  #. Iron Deficiency Anemia - PTA on iron supplements; question of possible bleeding from AV formations noted on recent GI work up- PCP evaluating this; during recent admission his Hgb was low at 6.9; improved to  8-9 since transfusion and iron infusions; most recent check was 1 month ago and it was 9.8    Kamran has significant CASS. I asked him to discontinue torsemide and hold irbesartan until 3/6/2021 to give his kidneys time to recover.  I also asked him to stop potassium supplement. We will get repeat BMP this Friday (3/5/21). If he becomes lethargic or has progressive weakness, I asked him to be seen in the ED. I also asked him to increase his water intake.     Given his reduced LVEF and regional WMA, we need to further assess for CAD. I will have him undergo lexiscan nuclear test to rule out significant ischemia. We will work on this in the near future.    I asked him to discuss with  his PCP regarding when his Hgb should be checked and when/if he can be on plavix again. He is nervous to be off of this.       This note was completed in part using Dragon voice recognition software. Although reviewed after completion, some word and grammatical errors may occur.    Orders this Visit:  Orders Placed This Encounter   Procedures     Basic metabolic panel     Orders Placed This Encounter   Medications     FLUTICASONE PROPIONATE HFA IN     torsemide (DEMADEX) 20 MG tablet     Sig: Hold until you get updated instructions from cardiology. 3/3/21     Dispense:  180 tablet     Refill:  1     irbesartan (AVAPRO) 300 MG tablet     Sig: Start on 3/6.     Dispense:  90 tablet     Refill:  3     potassium chloride ER (K-TAB) 20 MEQ CR tablet     Sig: Don't take it until you hear from cardiology. 3/3/21.     Dispense:  180 tablet     Refill:  1     Medications Discontinued During This Encounter   Medication Reason     irbesartan (AVAPRO) 300 MG tablet      torsemide (DEMADEX) 20 MG tablet      guaiFENesin-codeine (ROBITUSSIN AC) 100-10 MG/5ML solution      LORazepam (ATIVAN) 0.5 MG tablet      potassium chloride ER (KLOR-CON M) 20 MEQ CR tablet      potassium chloride ER (K-TAB) 20 MEQ CR tablet          Encounter Diagnoses   Name Primary?     Acute heart failure with preserved ejection fraction (H)      Benign essential hypertension      Hyperlipidemia LDL goal <100      Pericardial effusion      Heart failure with preserved ejection fraction, NYHA class I (H)      Acute renal failure, unspecified acute renal failure type (H) Yes       CURRENT MEDICATIONS:  Current Outpatient Medications   Medication Sig Dispense Refill     aspirin 81 MG EC tablet Take 81 mg by mouth daily       atorvastatin (LIPITOR) 20 MG tablet TAKE 1 TABLET(20 MG) BY MOUTH DAILY 90 tablet 2     blood glucose (STEVE CONTOUR) test strip TESTING FOUR TIMES DAILY OR AS DIRECTED 400 strip 0     carvedilol (COREG) 6.25 MG tablet Take 1 tablet (6.25  mg) by mouth 2 times daily (with meals) 180 tablet 3     cyclobenzaprine (FLEXERIL) 10 MG tablet 1/2 po bid prn & 1po qhs 30 tablet 0     dorzolamide-timolol (COSOPT) 2-0.5 % ophthalmic solution Place 1 drop into both eyes 2 times daily       FLUTICASONE PROPIONATE HFA IN        insulin glargine (LANTUS SOLOSTAR) 100 UNIT/ML pen INJECT 50 UNITS UNDER THE SKIN AT BEDTIME 45 mL 3     insulin lispro (HUMALOG KWIKPEN) 100 UNIT/ML (1 unit dial) KWIKPEN INJECT UP TO 55 UNITS UNDER THE SKIN ONCE DAILY AS DIRECTED 45 mL 1     insulin pen needle (BD FERCHO U/F) 32G X 4 MM miscellaneous USE AS DIRECTED UP TO FOUR TIMES DAILY 400 each 3     irbesartan (AVAPRO) 300 MG tablet Start on 3/6. 90 tablet 3     metFORMIN (GLUCOPHAGE) 1000 MG tablet TAKE 1 TABLET(1000 MG) BY MOUTH TWICE DAILY WITH MEALS 180 tablet 3     order for DME Hip steroid injectoion 1 Units 0     pantoprazole (PROTONIX) 40 MG EC tablet TAKE 1 TABLET(40 MG) BY MOUTH TWICE DAILY 180 tablet 2     potassium chloride ER (K-TAB) 20 MEQ CR tablet Don't take it until you hear from cardiology. 3/3/21. 180 tablet 1     torsemide (DEMADEX) 20 MG tablet Hold until you get updated instructions from cardiology. 3/3/21 180 tablet 1     traZODone (DESYREL) 100 MG tablet TAKE 1 TABLET(100 MG) BY MOUTH AT BEDTIME 90 tablet 3     VENTOLIN  (90 Base) MCG/ACT inhaler INL 2 PFS PO QID PRN 1 Inhaler 3       ALLERGIES   No Known Allergies    PAST MEDICAL HISTORY:  Past Medical History:   Diagnosis Date     Cerebral infarction (H)      Diabetes (H)      Hyperlipemia        PAST SURGICAL HISTORY:  Past Surgical History:   Procedure Laterality Date     CV PERICARDIOCENTESIS N/A 12/21/2020    Procedure: Pericardiocentesis;  Surgeon: Chas Chambers MD;  Location:  [facility]       FAMILY HISTORY:  Family History   Problem Relation Age of Onset     Family History Negative Mother      Family History Negative Father        SOCIAL HISTORY:  Social History      Socioeconomic History     Marital status:      Spouse name: Cecile     Number of children: None     Years of education: None     Highest education level: None   Occupational History     None   Social Needs     Financial resource strain: None     Food insecurity     Worry: None     Inability: None     Transportation needs     Medical: None     Non-medical: None   Tobacco Use     Smoking status: Former Smoker     Packs/day: 2.00     Years: 11.00     Pack years: 22.00     Types: Cigarettes     Start date:      Quit date:      Years since quittin.2     Smokeless tobacco: Never Used   Substance and Sexual Activity     Alcohol use: No     Alcohol/week: 0.0 standard drinks     Drug use: No     Sexual activity: Yes     Partners: Female     Birth control/protection: None   Lifestyle     Physical activity     Days per week: None     Minutes per session: None     Stress: None   Relationships     Social connections     Talks on phone: None     Gets together: None     Attends [employer]ism service: None     Active member of club or organization: None     Attends meetings of clubs or organizations: None     Relationship status: None     Intimate partner violence     Fear of current or ex partner: None     Emotionally abused: None     Physically abused: None     Forced sexual activity: None   Other Topics Concern     Parent/sibling w/ CABG, MI or angioplasty before 65F 55M? Not Asked   Social History Narrative     None       Review of Systems:  Skin:  Negative     Eyes:  Positive for glasses  ENT:  Negative    Respiratory:  Positive for cough  Cardiovascular:    edema;Positive for;fatigue  Gastroenterology: Positive for reflux  Genitourinary:  Negative    Musculoskeletal:  Positive for    Neurologic:  Positive for stroke  Psychiatric:  Negative    Heme/Lymph/Imm:  Positive for anemia  Endocrine:  Positive for diabetes    Physical Exam:  Vitals: /75   Pulse 97   Ht 1.829 m (6')   Wt 95.7 kg (211 lb)    BMI 28.62 kg/m       GEN:  NAD  NECK: No JVD  C/V:  Regular rate and rhythm, no murmur, rub or gallop.  RESP: Clear to auscultation bilaterally without wheezing, rales, or rhonchi.  GI: Abdomen soft, nontender, nondistended. No HSM appreciated.   EXTREM: No LE edema.   NEURO: Alert and oriented, cooperative. No obvious focal deficits.   PSYCH: Normal affect.  SKIN: Warm and dry.       Recent Lab Results:  LIPID RESULTS:  Lab Results   Component Value Date    CHOL 141 02/10/2020    HDL 48 02/10/2020    LDL 79 02/10/2020    TRIG 71 02/10/2020       LIVER ENZYME RESULTS:  Lab Results   Component Value Date    AST 17 12/20/2020    ALT 28 12/20/2020       CBC RESULTS:  Lab Results   Component Value Date    WBC 7.0 02/03/2021    RBC 3.85 (L) 02/03/2021    HGB 9.8 (L) 02/03/2021    HCT 33.3 (L) 02/03/2021    MCV 87 02/03/2021    MCH 25.5 (L) 02/03/2021    MCHC 29.4 (L) 02/03/2021    RDW 27.3 (H) 02/03/2021     02/03/2021       BMP RESULTS:  Lab Results   Component Value Date     03/02/2021    POTASSIUM 4.0 03/02/2021    CHLORIDE 98 03/02/2021    CO2 31 03/02/2021    ANIONGAP 10 03/02/2021     (H) 03/02/2021    BUN 99 (H) 03/02/2021    CR 2.66 (H) 03/02/2021    GFRESTIMATED 21 (L) 03/02/2021    GFRESTBLACK 25 (L) 03/02/2021    VANESA 9.4 03/02/2021        A1C RESULTS:  Lab Results   Component Value Date    A1C 7.0 (H) 12/01/2020       INR RESULTS:  Lab Results   Component Value Date    INR 0.99 09/04/2020           Esteban Ruiz PA-C  March 3, 2021     cc:   Christian Davidson MD  [address]  [place] [address]

## 2021-03-05 ENCOUNTER — TELEPHONE (OUTPATIENT)
Dept: CARDIOLOGY | Facility: CLINIC | Age: 81
End: 2021-03-05

## 2021-03-05 DIAGNOSIS — N17.9 ACUTE RENAL FAILURE, UNSPECIFIED ACUTE RENAL FAILURE TYPE (H): ICD-10-CM

## 2021-03-05 DIAGNOSIS — I10 BENIGN ESSENTIAL HYPERTENSION: Primary | ICD-10-CM

## 2021-03-05 LAB
ANION GAP SERPL CALCULATED.3IONS-SCNC: 12 MMOL/L (ref 3–14)
BUN SERPL-MCNC: 70 MG/DL (ref 7–30)
CALCIUM SERPL-MCNC: 9.1 MG/DL (ref 8.5–10.1)
CHLORIDE SERPL-SCNC: 101 MMOL/L (ref 94–109)
CO2 SERPL-SCNC: 26 MMOL/L (ref 20–32)
CREAT SERPL-MCNC: 2.28 MG/DL (ref 0.66–1.25)
GFR SERPL CREATININE-BSD FRML MDRD: 26 ML/MIN/{1.73_M2}
GLUCOSE SERPL-MCNC: 175 MG/DL (ref 70–99)
POTASSIUM SERPL-SCNC: 4.2 MMOL/L (ref 3.4–5.3)
SODIUM SERPL-SCNC: 139 MMOL/L (ref 133–144)

## 2021-03-05 PROCEDURE — 80048 BASIC METABOLIC PNL TOTAL CA: CPT | Performed by: PHYSICIAN ASSISTANT

## 2021-03-05 PROCEDURE — 36415 COLL VENOUS BLD VENIPUNCTURE: CPT | Performed by: PHYSICIAN ASSISTANT

## 2021-03-05 NOTE — TELEPHONE ENCOUNTER
Message from SHAHANA Esteban Ruiz:  Esteban Ruiz PA-C [facility] 2             BMP shows his creatinine is improving. Let's have him resume irbesartan on Sunday. I had told him to resume it on Saturday instead but let's wait one more day. Please have him take half tablet twice daily (150 mg BID). Hopefully he is less tired?     Esteban      [id number] spoke with patient to review recommendations to wait for irbesartan restart until 3/7/2021 and then to restart at 1/2 the dose: 150mg BID. Patient verbalized understanding and agreed with plan.    Patient states he is feeling less tired and his balance is a bit better. He is trying to drink more water.    Will update SHAHANA Esteban Ruiz and check for next follow up.      [id number] reply from SHAHANA Esteban Ruiz:  BMP and Follow-up in 2 weeks.     Esteban

## 2021-03-05 NOTE — TELEPHONE ENCOUNTER
Reply from SHAHANA Esteban Ruiz:  We can do follow up whenever I have openings next. It does not need to be 2 weeks exactly, so 26th would be fine. BMP however I would like him to get in 2 weeks.     Esteban

## 2021-03-08 NOTE — TELEPHONE ENCOUNTER
Spoke with Patient who agrees with repeat BMP in 2 weeks and next available [facility] visit.  Orders entered.   Patient will call with any questions or concerns.

## 2021-03-10 ENCOUNTER — PATIENT OUTREACH (OUTPATIENT)
Dept: NURSING | Facility: CLINIC | Age: 81
End: 2021-03-10
Payer: MEDICARE

## 2021-03-10 NOTE — PROGRESS NOTES
Clinic Care Coordination Contact  Community Health Worker Follow Up    Goals:   Goals Addressed as of 3/10/2021 at 9:00 AM                 Today    1/12/21       Patient Stated      1. Improve chronic symptoms (pt-stated)   50%  30%    Added 12/24/20 by Namita Chaidez RN     Goal Statement: I will verbalize an increase in my strength and mobility.   Date Goal set: 12/24/20  Barriers: Multiple health concerns.  Strengths: Motivated and engaged in care coordination.   Date to Achieve By: 6/1/2021  Patient expressed understanding of goal: Yes    Action steps to achieve this goal:  1. I will work with Physical Therapy to build my strength and mobility.   2. I will follow up with PCP and Cardiology and follow their recommendations.   3. I will continue to work with care coordination for any additional resources and support.        Discussed:  Patient stated that he is doing pretty good.  Patient stated that he has been in contact with his Cardiologist and his PCP.  Patient stated that he does home exercises when able.    Intervention and Education during outreach: CHW encouraged patient to contact CC if there are any other changes or resources needed.  Patient acknowledged understanding.      Plan: Patient will continue to follow up with Cardiology and PCP.  Patient will continue to do his exercises as he is able.  CHW will follow up in one month.    Next Month: 4/9/21    CAROL Sharp  Clinic Care Coordination  [facility]: [facility], [place], [place], and [facility]  Phone: [phone]

## 2021-03-11 ENCOUNTER — PATIENT OUTREACH (OUTPATIENT)
Dept: CARE COORDINATION | Facility: CLINIC | Age: 81
End: 2021-03-11

## 2021-03-11 NOTE — PROGRESS NOTES
Care Coordination Clinician Chart Review  Situation: Patient chart reviewed by care coordinator.       Background: Care Coordination initial assessment and enrollment to Care Coordination was 12/24/2020.   Patient centered goals were developed with participation from patient.  RN CC handed patient off to CHW for continued outreach every 30 days.        Assessment: Per chart review, patient outreach completed by CC CHW on 12/24/2020.  Patient is actively working to accomplish goal.  Patient's goals remain appropriate and relevant at this time.   Patient is due for updated Complex Care Plan.  Annual assessment will be due 12/24/2021.      Goals       1. Improve chronic symptoms (pt-stated)      Goal Statement: I will verbalize an increase in my strength and mobility.   Date Goal set: 12/24/20  Barriers: Multiple health concerns.  Strengths: Motivated and engaged in care coordination.   Date to Achieve By: 6/1/2021  Patient expressed understanding of goal: Yes    Action steps to achieve this goal:  1. I will work with Physical Therapy to build my strength and mobility.   2. I will follow up with PCP and Cardiology and follow their recommendations.   3. I will continue to work with care coordination for any additional resources and support.               Plan/Recommendations: The patient will continue working with Care Coordination to achieve goal as above.  CHW will involve RN CC as needed or if patient is ready to move to maintenance.  RN CC will continue to monitor progress to goals and CHW outreaches every 6 weeks.   Care Plan updated and mailed to patient: Yes,        Judy FISHER, RN, PHN, [facility]  Primary Clinic Care Coordination    [facility]  Primary Care Clinics  [email or web address]   Office: [phone]  Employed by [facility]

## 2021-03-11 NOTE — LETTER
[facility]  Complex Care Plan  About Me:    Patient Name:  Justyn Olivas    YOB: 1940  Age:         80 year old   [facility] MRN:    [id number] Telephone Information:  Home Phone [phone]   Mobile [phone]       Address:  [address] [place] [place] [address] Email address:  [email or web address]      Emergency Contact(s)    Name Relationship Lgl Grd Work Phone Home Phone Mobile Phone   1. EBEN OLIVAS* Spouse   [phone]   2. LINH OLIVAS* Son   [phone]           Primary language:  English     needed? No   [employer]:  [phone] op. 1  Other communication barriers:    Preferred Method of Communication:  Mail  Current living arrangement:    Mobility Status/ Medical Equipment:      Health Maintenance  Health Maintenance Reviewed:      My Access Plan  Medical Emergency 911   Primary Clinic Line [facility] - [phone]   24 Hour Appointment Line [phone] or  [phone]) (toll-free)   24 Hour Nurse Line [phone] (toll-free)   Preferred Urgent Care     Preferred Hospital     Preferred Pharmacy [id number] - [place], [place] - [address] AT [facility] [address]     Behavioral Health Crisis Line The National Suicide Prevention Lifeline at 1-278.368.6719 or 911       My Care Team Members  Patient Care Team       Relationship Specialty Notifications Start End    Christian Davidson MD PCP - General Internal Medicine  2/9/21     Phone: [phone] Pager: [phone] Fax: [phone] [address] [place] [address]    Bandar Greenberg MD Assigned PCP   11/11/16     Phone: [phone] Pager: [phone] Fax: [phone] [address] [place] [address]    Jerel Leija RN Lead Care Coordinator Primary Care - CC  12/24/20     Phone: [phone]         Charley Valencia MA Community Health Worker Primary  Care - CC  1/12/21     Phone: [phone]                 My Care Plans  Self Management and Treatment Plan  Goals and (Comments)  Goals        General    1. Improve chronic symptoms (pt-stated)     Goal Statement: I will verbalize an increase in my strength and mobility.   Date Goal set: 12/24/20  Barriers: Multiple health concerns.  Strengths: Motivated and engaged in care coordination.   Date to Achieve By: 6/1/2021  Patient expressed understanding of goal: Yes    Action steps to achieve this goal:  1. I will work with Physical Therapy to build my strength and mobility.   2. I will follow up with PCP and Cardiology and follow their recommendations.   3. I will continue to work with care coordination for any additional resources and support.             Action Plans on File:     Advance Care Plans/Directives Type:        My Medical and Care Information  Problem List   Patient Active Problem List   Diagnosis     Shoulder pain     Insomnia, unspecified type     Type 2 diabetes mellitus without complication, with long-term current use of insulin (H)     Benign essential hypertension     Hyperlipidemia LDL goal <100     Non-seasonal allergic rhinitis due to pollen     Primary osteoarthritis of both hips     Primary osteoarthritis involving multiple joints     Chronic non-seasonal allergic rhinitis, unspecified trigger     Cerebrovascular accident (CVA) due to embolism of cerebral artery (H)     CKD (chronic kidney disease) stage 3, GFR 30-59 ml/min     Anemia due to blood loss, acute     Simple chronic bronchitis (H)     Iron deficiency anemia due to chronic blood loss     MAIRA (iron deficiency anemia)     Anemia, iron deficiency     Iron deficiency     Acute respiratory distress     Pericardial effusion      Current Medications and Allergies:  See printed Medication Report.    Care Coordination Start Date: 12/24/2020   Frequency of Care Coordination:  Monthly   Form Last Updated: 03/11/2021

## 2021-03-18 ENCOUNTER — MYC MEDICAL ADVICE (OUTPATIENT)
Dept: CARDIOLOGY | Facility: CLINIC | Age: 81
End: 2021-03-18

## 2021-03-18 DIAGNOSIS — I10 BENIGN ESSENTIAL HYPERTENSION: ICD-10-CM

## 2021-03-18 DIAGNOSIS — I10 BENIGN ESSENTIAL HYPERTENSION: Primary | ICD-10-CM

## 2021-03-18 LAB
ANION GAP SERPL CALCULATED.3IONS-SCNC: 7 MMOL/L (ref 3–14)
BUN SERPL-MCNC: 53 MG/DL (ref 7–30)
CALCIUM SERPL-MCNC: 8.3 MG/DL (ref 8.5–10.1)
CHLORIDE SERPL-SCNC: 106 MMOL/L (ref 94–109)
CO2 SERPL-SCNC: 28 MMOL/L (ref 20–32)
CREAT SERPL-MCNC: 2.28 MG/DL (ref 0.66–1.25)
GFR SERPL CREATININE-BSD FRML MDRD: 26 ML/MIN/{1.73_M2}
GLUCOSE SERPL-MCNC: 161 MG/DL (ref 70–99)
POTASSIUM SERPL-SCNC: 3.2 MMOL/L (ref 3.4–5.3)
SODIUM SERPL-SCNC: 141 MMOL/L (ref 133–144)

## 2021-03-18 PROCEDURE — 36415 COLL VENOUS BLD VENIPUNCTURE: CPT | Performed by: PHYSICIAN ASSISTANT

## 2021-03-18 PROCEDURE — 80048 BASIC METABOLIC PNL TOTAL CA: CPT | Performed by: PHYSICIAN ASSISTANT

## 2021-03-18 RX ORDER — POTASSIUM CHLORIDE 1500 MG/1
20 TABLET, EXTENDED RELEASE ORAL DAILY
Qty: 1 TABLET | Refills: 0 | COMMUNITY
Start: 2021-03-18 | End: 2021-04-14

## 2021-03-18 RX ORDER — TORSEMIDE 20 MG/1
10 TABLET ORAL DAILY
Qty: 1 TABLET | Refills: 0 | Status: ON HOLD | COMMUNITY
Start: 2021-03-18 | End: 2021-04-01

## 2021-03-18 NOTE — TELEPHONE ENCOUNTER
BMP drawn today, K = 3.2. Message from Selvin Ruiz, ANTONIETTA Orellana [facility] Heart Team 2             Have him take potassium 40 mEq for 2 days and then 20 mEq thereafter. Start torsemide 10 mg daily, starting today.   Check bmp with visit on 3/26/2021  Esteban      Spoke with patient's spouse, patient has k-tab 20 mEq at home - will take 2 tabs for 2 days and then daily after that.  Patient has torsemide 20mg tabs at home - will take 1/2 tab daily  Scheduled bmp for 3/25/2021 and OV 3/16/2021 with SHAHANA Esteban Ruiz  Reviewed with spouse that patient can cut back on his water consumption.     Med list updated.  Copy of plan from SHAHANA Esteban Ruiz sent to patient on My Chart to review

## 2021-03-18 NOTE — TELEPHONE ENCOUNTER
My chart message from patient:  I am retaining water. Have stop with the direcdic and potassium   What should I do ... drinking a lot of water     Patient's torsemide and K+ were discontinued 3/3/2021. Irbesartan was restarted on 3/7/2021.    Patient is scheduled for bmp tomorrow on 3/19/201 and to see SHAHANA Esteban Ruiz on 3/26/2021.    Spoke with patient, he believes he has gained 10# in the last 7-10 days. He states he ankles and feet are puffy. His breathing is at baseline.  Patient thinks he needs to restart a diuretic. Rescheduled bmp for 1:15 today instead of tomorrow.    Will update SHAHANA Esteban Ruiz to review

## 2021-03-24 ENCOUNTER — TELEPHONE (OUTPATIENT)
Dept: CARDIOLOGY | Facility: CLINIC | Age: 81
End: 2021-03-24

## 2021-03-24 DIAGNOSIS — I10 BENIGN ESSENTIAL HYPERTENSION: Primary | ICD-10-CM

## 2021-03-25 ENCOUNTER — DOCUMENTATION ONLY (OUTPATIENT)
Dept: CARDIOLOGY | Facility: CLINIC | Age: 81
End: 2021-03-25

## 2021-03-25 DIAGNOSIS — I10 BENIGN ESSENTIAL HYPERTENSION: ICD-10-CM

## 2021-03-25 LAB
ANION GAP SERPL CALCULATED.3IONS-SCNC: 7 MMOL/L (ref 3–14)
BUN SERPL-MCNC: 49 MG/DL (ref 7–30)
CALCIUM SERPL-MCNC: 8.7 MG/DL (ref 8.5–10.1)
CHLORIDE SERPL-SCNC: 103 MMOL/L (ref 94–109)
CO2 SERPL-SCNC: 31 MMOL/L (ref 20–32)
CREAT SERPL-MCNC: 2.44 MG/DL (ref 0.66–1.25)
GFR SERPL CREATININE-BSD FRML MDRD: 24 ML/MIN/{1.73_M2}
GLUCOSE SERPL-MCNC: 189 MG/DL (ref 70–99)
POTASSIUM SERPL-SCNC: 3.3 MMOL/L (ref 3.4–5.3)
SODIUM SERPL-SCNC: 141 MMOL/L (ref 133–144)

## 2021-03-25 PROCEDURE — 36415 COLL VENOUS BLD VENIPUNCTURE: CPT | Performed by: INTERNAL MEDICINE

## 2021-03-25 PROCEDURE — 80048 BASIC METABOLIC PNL TOTAL CA: CPT | Performed by: INTERNAL MEDICINE

## 2021-03-25 NOTE — PROGRESS NOTES
Bmp 3/25/2021 noted. Patient to see SHAHANA Esteban Ruiz for OV tomorrow. Restarted torsemide and K+ on 3/18/2021 for c/o edema and 10# weight gain.    Component      Latest Ref Rng & Units 3/25/2021   Sodium      133 - 144 mmol/L 141   Potassium      3.4 - 5.3 mmol/L 3.3 (L)   Chloride      94 - 109 mmol/L 103   Carbon Dioxide      20 - 32 mmol/L 31   Anion Gap      3 - 14 mmol/L 7   Glucose      70 - 99 mg/dL 189 (H)   Urea Nitrogen      7 - 30 mg/dL 49 (H)   Creatinine      0.66 - 1.25 mg/dL 2.44 (H)   GFR Estimate      >60 mL/min/1.73:m2 24 (L)   GFR Estimate If Black      >60 mL/min/1.73:m2 28 (L)   Calcium      8.5 - 10.1 mg/dL 8.7       Will message SHAHANA Esteban Ruiz to review prior to OV.      1250 reply from SHAHANA Esteban Ruiz:  I will review this with Dr. Rosenbaum. He may need nephro referral.     Esteban

## 2021-03-26 ENCOUNTER — HOSPITAL ENCOUNTER (INPATIENT)
Facility: CLINIC | Age: 81
LOS: 6 days | Discharge: HOME | DRG: 291 | End: 2021-04-01
Attending: EMERGENCY MEDICINE | Admitting: INTERNAL MEDICINE
Payer: MEDICARE

## 2021-03-26 ENCOUNTER — APPOINTMENT (OUTPATIENT)
Dept: GENERAL RADIOLOGY | Facility: CLINIC | Age: 81
DRG: 291 | End: 2021-03-26
Attending: EMERGENCY MEDICINE
Payer: MEDICARE

## 2021-03-26 ENCOUNTER — OFFICE VISIT (OUTPATIENT)
Dept: CARDIOLOGY | Facility: CLINIC | Age: 81
End: 2021-03-26
Attending: PHYSICIAN ASSISTANT
Payer: MEDICARE

## 2021-03-26 ENCOUNTER — DOCUMENTATION ONLY (OUTPATIENT)
Dept: CARDIOLOGY | Facility: CLINIC | Age: 81
End: 2021-03-26

## 2021-03-26 VITALS
SYSTOLIC BLOOD PRESSURE: 149 MMHG | BODY MASS INDEX: 30.76 KG/M2 | WEIGHT: 227.1 LBS | DIASTOLIC BLOOD PRESSURE: 78 MMHG | HEART RATE: 92 BPM | HEIGHT: 72 IN

## 2021-03-26 DIAGNOSIS — Z79.4 TYPE 2 DIABETES MELLITUS WITHOUT COMPLICATION, WITH LONG-TERM CURRENT USE OF INSULIN (H): ICD-10-CM

## 2021-03-26 DIAGNOSIS — I10 BENIGN ESSENTIAL HYPERTENSION: ICD-10-CM

## 2021-03-26 DIAGNOSIS — R06.02 SHORTNESS OF BREATH: ICD-10-CM

## 2021-03-26 DIAGNOSIS — D64.9 ANEMIA, UNSPECIFIED TYPE: ICD-10-CM

## 2021-03-26 DIAGNOSIS — I50.33 ACUTE ON CHRONIC DIASTOLIC HEART FAILURE (H): Primary | ICD-10-CM

## 2021-03-26 DIAGNOSIS — E11.9 TYPE 2 DIABETES MELLITUS WITHOUT COMPLICATION, WITH LONG-TERM CURRENT USE OF INSULIN (H): ICD-10-CM

## 2021-03-26 LAB
ABO + RH BLD: NORMAL
ABO + RH BLD: NORMAL
ALBUMIN SERPL-MCNC: 3.4 G/DL (ref 3.4–5)
ALP SERPL-CCNC: 81 U/L (ref 40–150)
ALT SERPL W P-5'-P-CCNC: 23 U/L (ref 0–70)
ANION GAP SERPL CALCULATED.3IONS-SCNC: 9 MMOL/L (ref 3–14)
AST SERPL W P-5'-P-CCNC: 14 U/L (ref 0–45)
BASOPHILS # BLD AUTO: 0 10E9/L (ref 0–0.2)
BASOPHILS NFR BLD AUTO: 0.4 %
BILIRUB SERPL-MCNC: 0.5 MG/DL (ref 0.2–1.3)
BLD GP AB SCN SERPL QL: NORMAL
BLD PROD TYP BPU: NORMAL
BLD PROD TYP BPU: NORMAL
BLD UNIT ID BPU: 0
BLOOD BANK CMNT PATIENT-IMP: NORMAL
BLOOD PRODUCT CODE: NORMAL
BPU ID: NORMAL
BUN SERPL-MCNC: 49 MG/DL (ref 7–30)
CALCIUM SERPL-MCNC: 8.5 MG/DL (ref 8.5–10.1)
CHLORIDE SERPL-SCNC: 105 MMOL/L (ref 94–109)
CO2 SERPL-SCNC: 28 MMOL/L (ref 20–32)
CREAT SERPL-MCNC: 2.66 MG/DL (ref 0.66–1.25)
DIFFERENTIAL METHOD BLD: ABNORMAL
EOSINOPHIL # BLD AUTO: 0.2 10E9/L (ref 0–0.7)
EOSINOPHIL NFR BLD AUTO: 4.3 %
ERYTHROCYTE [DISTWIDTH] IN BLOOD BY AUTOMATED COUNT: 21.2 % (ref 10–15)
FERRITIN SERPL-MCNC: 45 NG/ML (ref 26–388)
FOLATE SERPL-MCNC: 15.3 NG/ML
GFR SERPL CREATININE-BSD FRML MDRD: 21 ML/MIN/{1.73_M2}
GLUCOSE BLDC GLUCOMTR-MCNC: 156 MG/DL (ref 70–99)
GLUCOSE BLDC GLUCOMTR-MCNC: 199 MG/DL (ref 70–99)
GLUCOSE SERPL-MCNC: 166 MG/DL (ref 70–99)
HCT VFR BLD AUTO: 22.1 % (ref 40–53)
HGB BLD-MCNC: 6.8 G/DL (ref 13.3–17.7)
IMM GRANULOCYTES # BLD: 0 10E9/L (ref 0–0.4)
IMM GRANULOCYTES NFR BLD: 0.6 %
INTERPRETATION ECG - MUSE: NORMAL
IRON SATN MFR SERPL: 14 % (ref 15–46)
IRON SERPL-MCNC: 44 UG/DL (ref 35–180)
LABORATORY COMMENT REPORT: NORMAL
LYMPHOCYTES # BLD AUTO: 0.8 10E9/L (ref 0.8–5.3)
LYMPHOCYTES NFR BLD AUTO: 15.6 %
MCH RBC QN AUTO: 28.2 PG (ref 26.5–33)
MCHC RBC AUTO-ENTMCNC: 30.8 G/DL (ref 31.5–36.5)
MCV RBC AUTO: 92 FL (ref 78–100)
MONOCYTES # BLD AUTO: 0.5 10E9/L (ref 0–1.3)
MONOCYTES NFR BLD AUTO: 10.9 %
NEUTROPHILS # BLD AUTO: 3.3 10E9/L (ref 1.6–8.3)
NEUTROPHILS NFR BLD AUTO: 68.2 %
NRBC # BLD AUTO: 0 10*3/UL
NRBC BLD AUTO-RTO: 0 /100
NT-PROBNP SERPL-MCNC: 2513 PG/ML (ref 0–1800)
NUM BPU REQUESTED: 1
PLATELET # BLD AUTO: 190 10E9/L (ref 150–450)
POTASSIUM SERPL-SCNC: 2.9 MMOL/L (ref 3.4–5.3)
PROT SERPL-MCNC: 6.6 G/DL (ref 6.8–8.8)
RBC # BLD AUTO: 2.41 10E12/L (ref 4.4–5.9)
SARS-COV-2 RNA RESP QL NAA+PROBE: NEGATIVE
SODIUM SERPL-SCNC: 142 MMOL/L (ref 133–144)
SPECIMEN EXP DATE BLD: NORMAL
SPECIMEN SOURCE: NORMAL
TIBC SERPL-MCNC: 315 UG/DL (ref 240–430)
TRANSFERRIN SERPL-MCNC: 251 MG/DL (ref 210–360)
TRANSFUSION STATUS PATIENT QL: NORMAL
TRANSFUSION STATUS PATIENT QL: NORMAL
TROPONIN I SERPL-MCNC: 0.02 UG/L (ref 0–0.04)
VIT B12 SERPL-MCNC: 215 PG/ML (ref 193–986)
WBC # BLD AUTO: 4.9 10E9/L (ref 4–11)

## 2021-03-26 PROCEDURE — 99285 EMERGENCY DEPT VISIT HI MDM: CPT | Mod: 25

## 2021-03-26 PROCEDURE — 86850 RBC ANTIBODY SCREEN: CPT | Performed by: EMERGENCY MEDICINE

## 2021-03-26 PROCEDURE — 250N000011 HC RX IP 250 OP 636: Performed by: INTERNAL MEDICINE

## 2021-03-26 PROCEDURE — 84484 ASSAY OF TROPONIN QUANT: CPT | Performed by: EMERGENCY MEDICINE

## 2021-03-26 PROCEDURE — 250N000013 HC RX MED GY IP 250 OP 250 PS 637: Performed by: INTERNAL MEDICINE

## 2021-03-26 PROCEDURE — 87635 SARS-COV-2 COVID-19 AMP PRB: CPT | Performed by: EMERGENCY MEDICINE

## 2021-03-26 PROCEDURE — 250N000012 HC RX MED GY IP 250 OP 636 PS 637: Performed by: INTERNAL MEDICINE

## 2021-03-26 PROCEDURE — 82746 ASSAY OF FOLIC ACID SERUM: CPT | Performed by: EMERGENCY MEDICINE

## 2021-03-26 PROCEDURE — 82607 VITAMIN B-12: CPT | Performed by: EMERGENCY MEDICINE

## 2021-03-26 PROCEDURE — 93005 ELECTROCARDIOGRAM TRACING: CPT

## 2021-03-26 PROCEDURE — C9803 HOPD COVID-19 SPEC COLLECT: HCPCS

## 2021-03-26 PROCEDURE — 83880 ASSAY OF NATRIURETIC PEPTIDE: CPT | Performed by: EMERGENCY MEDICINE

## 2021-03-26 PROCEDURE — 210N000002 HC R&B HEART CARE

## 2021-03-26 PROCEDURE — 83550 IRON BINDING TEST: CPT | Performed by: EMERGENCY MEDICINE

## 2021-03-26 PROCEDURE — 36430 TRANSFUSION BLD/BLD COMPNT: CPT

## 2021-03-26 PROCEDURE — 86923 COMPATIBILITY TEST ELECTRIC: CPT | Performed by: EMERGENCY MEDICINE

## 2021-03-26 PROCEDURE — 250N000013 HC RX MED GY IP 250 OP 250 PS 637: Performed by: EMERGENCY MEDICINE

## 2021-03-26 PROCEDURE — 86900 BLOOD TYPING SEROLOGIC ABO: CPT | Performed by: EMERGENCY MEDICINE

## 2021-03-26 PROCEDURE — 83540 ASSAY OF IRON: CPT | Performed by: EMERGENCY MEDICINE

## 2021-03-26 PROCEDURE — 80053 COMPREHEN METABOLIC PANEL: CPT | Performed by: EMERGENCY MEDICINE

## 2021-03-26 PROCEDURE — 99223 1ST HOSP IP/OBS HIGH 75: CPT | Mod: AI | Performed by: INTERNAL MEDICINE

## 2021-03-26 PROCEDURE — 84466 ASSAY OF TRANSFERRIN: CPT | Performed by: EMERGENCY MEDICINE

## 2021-03-26 PROCEDURE — 99215 OFFICE O/P EST HI 40 MIN: CPT | Performed by: PHYSICIAN ASSISTANT

## 2021-03-26 PROCEDURE — 85025 COMPLETE CBC W/AUTO DIFF WBC: CPT | Performed by: EMERGENCY MEDICINE

## 2021-03-26 PROCEDURE — 71046 X-RAY EXAM CHEST 2 VIEWS: CPT

## 2021-03-26 PROCEDURE — 999N001017 HC STATISTIC GLUCOSE BY METER IP

## 2021-03-26 PROCEDURE — 86901 BLOOD TYPING SEROLOGIC RH(D): CPT | Performed by: EMERGENCY MEDICINE

## 2021-03-26 PROCEDURE — 96374 THER/PROPH/DIAG INJ IV PUSH: CPT

## 2021-03-26 PROCEDURE — P9016 RBC LEUKOCYTES REDUCED: HCPCS | Performed by: EMERGENCY MEDICINE

## 2021-03-26 PROCEDURE — 82668 ASSAY OF ERYTHROPOIETIN: CPT | Performed by: EMERGENCY MEDICINE

## 2021-03-26 PROCEDURE — 82728 ASSAY OF FERRITIN: CPT | Performed by: EMERGENCY MEDICINE

## 2021-03-26 PROCEDURE — 250N000011 HC RX IP 250 OP 636: Performed by: EMERGENCY MEDICINE

## 2021-03-26 RX ORDER — CYANOCOBALAMIN 1000 UG/ML
1000 INJECTION, SOLUTION INTRAMUSCULAR; SUBCUTANEOUS ONCE
Status: COMPLETED | OUTPATIENT
Start: 2021-03-26 | End: 2021-03-27

## 2021-03-26 RX ORDER — FLUTICASONE PROPIONATE 50 MCG
2 SPRAY, SUSPENSION (ML) NASAL DAILY
COMMUNITY
End: 2022-01-01

## 2021-03-26 RX ORDER — ATORVASTATIN CALCIUM 20 MG/1
20 TABLET, FILM COATED ORAL DAILY
Status: DISCONTINUED | OUTPATIENT
Start: 2021-03-26 | End: 2021-04-01 | Stop reason: HOSPADM

## 2021-03-26 RX ORDER — POLYETHYLENE GLYCOL 3350 17 G/17G
17 POWDER, FOR SOLUTION ORAL DAILY PRN
Status: DISCONTINUED | OUTPATIENT
Start: 2021-03-26 | End: 2021-04-01 | Stop reason: HOSPADM

## 2021-03-26 RX ORDER — CYCLOBENZAPRINE HCL 5 MG
5-10 TABLET ORAL 3 TIMES DAILY PRN
Status: DISCONTINUED | OUTPATIENT
Start: 2021-03-26 | End: 2021-04-01 | Stop reason: HOSPADM

## 2021-03-26 RX ORDER — ALBUTEROL SULFATE 90 UG/1
2 AEROSOL, METERED RESPIRATORY (INHALATION) EVERY 4 HOURS PRN
Status: DISCONTINUED | OUTPATIENT
Start: 2021-03-26 | End: 2021-04-01 | Stop reason: HOSPADM

## 2021-03-26 RX ORDER — ONDANSETRON 4 MG/1
4 TABLET, ORALLY DISINTEGRATING ORAL EVERY 6 HOURS PRN
Status: DISCONTINUED | OUTPATIENT
Start: 2021-03-26 | End: 2021-04-01 | Stop reason: HOSPADM

## 2021-03-26 RX ORDER — IRBESARTAN 150 MG/1
150 TABLET ORAL AT BEDTIME
COMMUNITY
End: 2021-03-26

## 2021-03-26 RX ORDER — DEXTROSE MONOHYDRATE 25 G/50ML
25-50 INJECTION, SOLUTION INTRAVENOUS
Status: DISCONTINUED | OUTPATIENT
Start: 2021-03-26 | End: 2021-03-27

## 2021-03-26 RX ORDER — FUROSEMIDE 10 MG/ML
60 INJECTION INTRAMUSCULAR; INTRAVENOUS ONCE
Status: COMPLETED | OUTPATIENT
Start: 2021-03-26 | End: 2021-03-26

## 2021-03-26 RX ORDER — IRBESARTAN 150 MG/1
150 TABLET ORAL AT BEDTIME
Status: ON HOLD | COMMUNITY
End: 2021-04-01

## 2021-03-26 RX ORDER — POTASSIUM CHLORIDE 1500 MG/1
20 TABLET, EXTENDED RELEASE ORAL DAILY
Status: DISCONTINUED | OUTPATIENT
Start: 2021-03-27 | End: 2021-04-01 | Stop reason: HOSPADM

## 2021-03-26 RX ORDER — ONDANSETRON 2 MG/ML
4 INJECTION INTRAMUSCULAR; INTRAVENOUS EVERY 6 HOURS PRN
Status: DISCONTINUED | OUTPATIENT
Start: 2021-03-26 | End: 2021-04-01 | Stop reason: HOSPADM

## 2021-03-26 RX ORDER — LIDOCAINE 40 MG/G
CREAM TOPICAL
Status: DISCONTINUED | OUTPATIENT
Start: 2021-03-26 | End: 2021-04-01 | Stop reason: HOSPADM

## 2021-03-26 RX ORDER — FLUTICASONE PROPIONATE 50 MCG
2 SPRAY, SUSPENSION (ML) NASAL DAILY
Status: DISCONTINUED | OUTPATIENT
Start: 2021-03-27 | End: 2021-04-01 | Stop reason: HOSPADM

## 2021-03-26 RX ORDER — TRAZODONE HYDROCHLORIDE 100 MG/1
100 TABLET ORAL AT BEDTIME
Status: DISCONTINUED | OUTPATIENT
Start: 2021-03-26 | End: 2021-04-01 | Stop reason: HOSPADM

## 2021-03-26 RX ORDER — FUROSEMIDE 10 MG/ML
40 INJECTION INTRAMUSCULAR; INTRAVENOUS
Status: DISCONTINUED | OUTPATIENT
Start: 2021-03-26 | End: 2021-03-27

## 2021-03-26 RX ORDER — PROCHLORPERAZINE MALEATE 5 MG
5 TABLET ORAL EVERY 6 HOURS PRN
Status: DISCONTINUED | OUTPATIENT
Start: 2021-03-26 | End: 2021-04-01 | Stop reason: HOSPADM

## 2021-03-26 RX ORDER — CLOPIDOGREL BISULFATE 75 MG/1
75 TABLET ORAL DAILY
Status: DISCONTINUED | OUTPATIENT
Start: 2021-03-27 | End: 2021-04-01 | Stop reason: HOSPADM

## 2021-03-26 RX ORDER — PANTOPRAZOLE SODIUM 40 MG/1
40 TABLET, DELAYED RELEASE ORAL 2 TIMES DAILY
Status: DISCONTINUED | OUTPATIENT
Start: 2021-03-26 | End: 2021-04-01 | Stop reason: HOSPADM

## 2021-03-26 RX ORDER — CARVEDILOL 6.25 MG/1
6.25 TABLET ORAL 2 TIMES DAILY WITH MEALS
Status: DISCONTINUED | OUTPATIENT
Start: 2021-03-26 | End: 2021-04-01 | Stop reason: HOSPADM

## 2021-03-26 RX ORDER — POTASSIUM CHLORIDE 1500 MG/1
40 TABLET, EXTENDED RELEASE ORAL ONCE
Status: COMPLETED | OUTPATIENT
Start: 2021-03-26 | End: 2021-03-26

## 2021-03-26 RX ORDER — ACETAMINOPHEN 325 MG/1
650 TABLET ORAL EVERY 4 HOURS PRN
Status: DISCONTINUED | OUTPATIENT
Start: 2021-03-26 | End: 2021-04-01 | Stop reason: HOSPADM

## 2021-03-26 RX ORDER — NICOTINE POLACRILEX 4 MG
15-30 LOZENGE BUCCAL
Status: DISCONTINUED | OUTPATIENT
Start: 2021-03-26 | End: 2021-03-27

## 2021-03-26 RX ORDER — PROCHLORPERAZINE 25 MG
12.5 SUPPOSITORY, RECTAL RECTAL EVERY 12 HOURS PRN
Status: DISCONTINUED | OUTPATIENT
Start: 2021-03-26 | End: 2021-04-01 | Stop reason: HOSPADM

## 2021-03-26 RX ADMIN — DARBEPOETIN ALFA 100 MCG: 100 INJECTION, SOLUTION INTRAVENOUS; SUBCUTANEOUS at 22:59

## 2021-03-26 RX ADMIN — INSULIN GLARGINE 50 UNITS: 100 INJECTION, SOLUTION SUBCUTANEOUS at 21:48

## 2021-03-26 RX ADMIN — ACETAMINOPHEN: 500 TABLET, FILM COATED ORAL at 22:59

## 2021-03-26 RX ADMIN — POTASSIUM CHLORIDE 40 MEQ: 1500 TABLET, EXTENDED RELEASE ORAL at 14:49

## 2021-03-26 RX ADMIN — TRAZODONE HYDROCHLORIDE 100 MG: 100 TABLET ORAL at 21:47

## 2021-03-26 RX ADMIN — ACETAMINOPHEN: 500 TABLET, FILM COATED ORAL at 23:01

## 2021-03-26 RX ADMIN — ATORVASTATIN CALCIUM 20 MG: 20 TABLET, FILM COATED ORAL at 21:47

## 2021-03-26 RX ADMIN — PANTOPRAZOLE SODIUM 40 MG: 40 TABLET, DELAYED RELEASE ORAL at 19:11

## 2021-03-26 RX ADMIN — FUROSEMIDE 60 MG: 10 INJECTION, SOLUTION INTRAVENOUS at 14:49

## 2021-03-26 RX ADMIN — CARVEDILOL 6.25 MG: 6.25 TABLET, FILM COATED ORAL at 19:11

## 2021-03-26 RX ADMIN — FUROSEMIDE 40 MG: 10 INJECTION, SOLUTION INTRAMUSCULAR; INTRAVENOUS at 19:11

## 2021-03-26 RX ADMIN — INSULIN ASPART 9 UNITS: 100 INJECTION, SOLUTION INTRAVENOUS; SUBCUTANEOUS at 19:10

## 2021-03-26 ASSESSMENT — MIFFLIN-ST. JEOR: SCORE: 1773.12

## 2021-03-26 ASSESSMENT — ENCOUNTER SYMPTOMS
SHORTNESS OF BREATH: 1
UNEXPECTED WEIGHT CHANGE: 0
FATIGUE: 1

## 2021-03-26 NOTE — ED TRIAGE NOTES
Pt sent down from heart clinic with CHF that is not well controlled. Pt having bilateral leg swelling and sob. Torsemide was increased on the 18th. Pt states not having as much output.

## 2021-03-26 NOTE — PHARMACY-ADMISSION MEDICATION HISTORY
Pharmacy Medication History  Admission medication history interview status for the 3/26/2021  admission is complete. See EPIC admission navigator for prior to admission medications     Location of Interview: Phone  Medication history sources: Patient, Patient's family/friend (wife), Surescripts and clinic notes    Significant changes made to the medication list:  Modified flonase, avapro, protonix, humalog   Added Tylenol PM    In the past week, patient estimated taking medication this percent of the time: greater than 90%    Additional medication history information:   Irbesartan, Torsemide, and Potassium dosing have been adjusted a few times this month. See clinic notes     Medication reconciliation completed by provider prior to medication history? No    Time spent in this activity: 15 minutes    Prior to Admission medications    Medication Sig Last Dose Taking? Auth Provider   aspirin 81 MG EC tablet Take 81 mg by mouth daily 3/26/2021 at am Yes Unknown, Entered By History   atorvastatin (LIPITOR) 20 MG tablet TAKE 1 TABLET(20 MG) BY MOUTH DAILY 3/25/2021 at hs Yes Bandar Greenberg MD   carvedilol (COREG) 6.25 MG tablet Take 1 tablet (6.25 mg) by mouth 2 times daily (with meals) 3/26/2021 at am Yes Kelly Sen PA-C   clopidogrel (PLAVIX) 75 MG tablet Take 1 tablet (75 mg) by mouth daily 3/26/2021 at am Yes Christian Davidson MD   cyclobenzaprine (FLEXERIL) 10 MG tablet 1/2 po bid prn & 1po qhs  Patient taking differently: Take 5-10 mg by mouth 3 times daily as needed 1/2 po bid prn & 1po qhs prn Yes Christian Davidson MD   diphenhydrAMINE-acetaminophen (TYLENOL PM)  MG tablet Take 2 tablets by mouth nightly as needed for sleep Past Month at Unknown time Yes Unknown, Entered By History   dorzolamide-timolol (COSOPT) 2-0.5 % ophthalmic solution Place 1 drop into both eyes 2 times daily 3/26/2021 at am Yes Unknown, Entered By History   fluticasone (FLONASE) 50 MCG/ACT nasal spray  Spray 2 sprays into both nostrils daily 3/26/2021 at am Yes Unknown, Entered By History   insulin glargine (LANTUS SOLOSTAR) 100 UNIT/ML pen INJECT 50 UNITS UNDER THE SKIN AT BEDTIME 3/25/2021 at hs Yes Bandar Greenberg MD   insulin lispro (HUMALOG KWIKPEN) 100 UNIT/ML (1 unit dial) KWIKPEN INJECT UP TO 55 UNITS UNDER THE SKIN ONCE DAILY AS DIRECTED  Patient taking differently: Inject Subcutaneous 3 times daily (before meals) Sliding scale with meals 3/26/2021 at am 5 units Yes Bandar Greenberg MD   irbesartan (AVAPRO) 150 MG tablet Take 150 mg by mouth At Bedtime  3/25/2021 at am Yes Unknown, Entered By History   metFORMIN (GLUCOPHAGE) 1000 MG tablet TAKE 1 TABLET(1000 MG) BY MOUTH TWICE DAILY WITH MEALS 3/26/2021 at Unknown time Yes Bandar Greenberg MD   pantoprazole (PROTONIX) 40 MG EC tablet TAKE 1 TABLET(40 MG) BY MOUTH TWICE DAILY  Patient taking differently: Take 40 mg by mouth At Bedtime TAKE 1 TABLET(40 MG) BY MOUTH TWICE DAILY 3/25/2021 at hs Yes Bandar Greenberg MD   potassium chloride ER (K-TAB) 20 MEQ CR tablet Take 1 tablet (20 mEq) by mouth daily 3/26/2021 at am Yes Esteban Ruiz PA-C   torsemide (DEMADEX) 20 MG tablet Take 10 mg by mouth daily Take 1/2 tab (10mg) daily 3/26/2021 at am Yes Esteban Ruiz PA-C   traZODone (DESYREL) 100 MG tablet TAKE 1 TABLET(100 MG) BY MOUTH AT BEDTIME 3/25/2021 at hs Yes Bandar Greenberg MD   VENTOLIN  (90 Base) MCG/ACT inhaler INL 2 PFS PO QID PRN prn Yes Bandar Greenberg MD   blood glucose (STEVE CONTOUR) test strip TESTING FOUR TIMES DAILY OR AS DIRECTED   Bandar Greenberg MD   insulin pen needle (BD FERCHO U/F) 32G X 4 MM miscellaneous USE AS DIRECTED UP TO FOUR TIMES DAILY   Bandar Greenberg MD   order for DME Hip steroid injectoion   Bandar Greenberg MD       The information provided in this note is only as accurate as the sources available at the time of update(s)

## 2021-03-26 NOTE — ED PROVIDER NOTES
History   Chief Complaint:  Leg Swelling and Shortness of Breath    HPI   Justyn Olivas is a 80 year old male with history of type 2 diabetes mellitus, chronic kidney disease, stoke, and pericardial effusion who presents with bilateral leg swelling and shortness of breath. The patient was sent from the heart clinic due to uncontrolled chronic heart failure. The patient states the symptoms have been ongoing since previous emergency department visit on Thanksgiving Day last year and a pericardiocentesis on 12/21/20. He denies any unexpected weight change. He notes that his torsemide prescription was increased on the 18th of this month. He began taking Plavix 2 days ago. He notes he had a stroke 2 years ago. He denies chest pain. He endorses shortness of breath on exertion and at night.     Review of Systems   Constitutional: Positive for fatigue. Negative for unexpected weight change.   Respiratory: Positive for shortness of breath.    Cardiovascular: Positive for leg swelling.   All other systems reviewed and are negative.    Allergies:  The patient has no known allergies.     Medications:  Lipitor  Aspirin  Coreg  Plavix  Flexeril  Cosopt  Lantus  Humalog  Avapro  Metformin  Protonix  Demadex  Desyrel     Past Medical History:    Cerebral infarction  Type 2 diabetes mellitus   Hyperlipidemia   Hypertension  Osteoarthritis  Cerebrovascular accident due to embolism of cerebral artery  Chronic kidney disease, stage 3  Anemia  Chronic bronchitis   Pericardial effusion    Past Surgical History:    Pericardiocentesis     Social History:  The patient presents to the emergency department with his wife.    Physical Exam     Patient Vitals for the past 24 hrs:   BP Temp Temp src Pulse Resp SpO2   03/26/21 1445 -- -- -- -- -- 100 %   03/26/21 1430 -- -- -- -- -- 100 %   03/26/21 1415 -- -- -- -- -- 98 %   03/26/21 1400 -- -- -- -- -- 100 %   03/26/21 1345 -- -- -- -- -- 97 %   03/26/21 1330 -- -- -- -- -- 100 %    03/26/21 1315 (!) 143/81 -- -- -- -- 99 %   03/26/21 1229 131/64 97.6  F (36.4  C) Oral 88 19 100 %       Physical Exam  Eyes:  The pupils are equal and round    Conjunctivae and sclerae are normal  ENT:    The nose is normal    Pinnae are normal    The oropharynx is normal  CV:  Regular rate and rhythm     Edema in bilateral lower extremities, symmetric  Resp:  Lungs are clear    Non-labored    No rales    No wheezing   GI:  Abdomen is soft and non-tender, there is no rigidity    No distension    No rebound tenderness   MS:  Normal muscular tone    No asymmetric leg swelling  Skin:  No rash or acute skin lesions noted  Neuro:   Awake, alert.      Speech is normal and fluent.    Face is symmetric.     Moves all extremities    Emergency Department Course   ECG  ECG taken at 1353, ECG read at 1355  Normal sinus rhythm  ST & T wave abnormality, consider inferolateral ischemia  Abnormal ECG   Rate 90 bpm. UT interval 172 ms. QRS duration 90 ms. QT/QTc 360/440 ms. P-R-T axes 44 50 223.     Imaging:  XR Chest 2 Views  Mild cardiomegaly with normal pulmonary vascularity. The  lungs and pleural spaces are clear. No acute findings.  Reading per radiology.    Laboratory:   CBC: WBC 4.9, HGB 6.8 (L!),    CMP: Potassium 2.9 (L), Glucose 166 (H), BUN 49 (H), Creatinine 2.66 (H), GFR 21/25 (L), Protein 6.6 (L), o/w WNL   Troponin (Collected 1326): 0.022  Nt probnp inpatient: 2,513 (H)  Hemoglobin: pending.   ABO RH Type and Screen: A positive, antibody negative.    Asymptomatic COVID19 Virus PCR by nasopharyngeal swab: negative.     Emergency Department Course:    Reviewed:  I reviewed vitals, past medical history and care everywhere    Assessments:  1314 I obtained history and examined the patient as noted above.   1400 I rechecked the patient and explained findings.     Consults:   1500 I spoke with Dr. Braden of the Hospitalist service regarding patient's presentation, findings, and plan of care.      Interventions:  1449 Klor-Con 40 mEq PO  1449 Lasix 60 mg IV    Disposition:  The patient was admitted to the hospital under the care of Dr. Braden.     Impression & Plan   CMS Diagnoses: None    Medical Decision Making:  Justyn Olivas is an 80 year old male who presents from the cardiology clinic for concerns about edema. He has been having difficulty keeping his edema under control with his worsening creatinine levels. He notes that he has had some shortness of breath with exertion and when laying flat. On arrival here, his oxygen level is normal and he has a normal respiratory effort. Laboratory work up revealed a hemoglobin of 6.8 which could be contributing to his shortness of breath. In any case, his weight has increased from baseline. Plan for admission to the hospital for transfusion. He was given Lasix in addition to the blood products. Discussed with the hospitalist who agreed to accept him as an admission.      Covid-19  Justyn Olivas was evaluated during a global COVID-19 pandemic, which necessitated consideration that the patient might be at risk for infection with the SARS-CoV-2 virus that causes COVID-19.   Applicable protocols for evaluation were followed during the patient's care.   COVID-19 was considered as part of the patient's evaluation. The plan for testing is:  a test was obtained during this visit.    Diagnosis:    ICD-10-CM    1. Anemia, unspecified type  D64.9    2. Shortness of breath  R06.02      Scribe Disclosure:  I, Ashleigh Lee, am serving as a scribe at 1:13 PM on 3/26/2021 to document services personally performed by Ayaan Morales MD based on my observations and the provider's statements to me.         Ayaan Morales MD  03/26/21 7711

## 2021-03-26 NOTE — PROGRESS NOTES
Primary Cardiologist: Dr. Rosenbaum    Reason for Visit: Follow up after adding back torsemide 10 mg daily with repeat BMP    History of Present Illness:   Kamran is a pleasant 80 year old male with past medical history:      #. Acute on Chronic HFpEF- had been on Torsemide 10 mg since his admission in 12/2020; had 20 Ib weight gain (233 Ibs from baseline of 214 Ibs) when he saw his PCP on 2/3/21581- this was felt to be related to fluid gain from recent Iron infusion - after discussion with Dr. Rosenbaum Torsemide was increased to 20 mg BID; he unfortunately developed CASS with Cr up to 2.6- we had to hold his torsemide with only minimal improvement in his renal function to 2.28; he started to gain weight subsequently and during last visit we added back low dose Torsemide 10 mg.   #. Acute on Chronic CKD- Cr baseline 1.5-1.6 but has been elevated to 2.28-2.6 over the last one month    #. CM - EF 45-50% on echo; unknown etiology- no ischemic work up has been done (echo does show inferior WMA)   #. Hx of Pericardial Effusion - noted during admission in 12/2020. Underwent successful pericardiocentesis. Cytology was negative for malignancy. No clear cause identified. Repeat echo in 2/2021 showed no recurrence.  #. DM2 - insulin depedent  #. H/o basilar CVA - was on DAPT but plavix discontinue by PCP due to significant anemia   #. HTN- uncontrolled during recent visits- unclear if white coat hypertension  #. Dyslipidemia-   #. Chronic MARKS - thought d/t anemia dx'd 2018  #. Iron Deficiency Anemia - PTA on iron supplements; question of possible bleeding from AV formations noted on recent GI work up- PCP evaluating this; during recent admission his Hgb was low at 6.9; improved to  8-9 since transfusion and iron infusions; his Hgb has not been checked recently; he is due for a check again next week    Kamran returns to clinic today with his spouse, stating that he feels awful.  Despite resuming lower dose of torsemide 10 mg his weight  continues to go up.  He is about 12 pounds higher than his dry weight.  He reports PND, orthopnea, and pitting peripheral edema up to his mid thighs.  He also reports a few episodes where he woke up in the middle of the night got up and he had tremors.  He denies syncope or lightheadedness.  He denies bleeding issues.  His head feels foggy and he feels very fatigued.  He is frustrated by his recent weight gain.  He denies any type of chest discomfort.  He reports he is not urinating very much with addition of torsemide.    Assessment and Plan:  Kamran is a pleasant 80 year old male with past medical history:      #. Acute on Chronic HFpEF- had been on Torsemide 10 mg since his admission in 12/2020; had 20 Ib weight gain (233 Ibs from baseline of 214 Ibs) when he saw his PCP on 2/3/84019- this was felt to be related to fluid gain from recent Iron infusion - after discussion with Dr. Rosenbaum Torsemide was increased to 20 mg BID; he unfortunately developed CASS with Cr up to 2.6- we had to hold his torsemide with only minimal improvement in his renal function to 2.28; he started to gain weight subsequently and during last visit we added back low dose Torsemide 10 mg.     #. Acute on Chronic CKD- Cr baseline 1.5-1.6 but has been elevated to 2.28-2.6 over the last one month    #. CM - EF 45-50% on echo; unknown etiology- no ischemic work up has been done (echo does show inferior WMA)   #. Hx of Pericardial Effusion - noted during admission in 12/2020. Underwent successful pericardiocentesis. Cytology was negative for malignancy. No clear cause identified. Repeat echo in 2/2021 showed no recurrence.  #. DM2 - insulin depedent  #. H/o basilar CVA - was on DAPT but plavix discontinue by PCP due to significant anemia   #. HTN- uncontrolled during recent visits- unclear if white coat hypertension  #. Dyslipidemia-   #. Chronic MARKS - thought d/t anemia dx'd 2018  #. Iron Deficiency Anemia - PTA on iron supplements; question of  possible bleeding from AV formations noted on recent GI work up- PCP evaluating this; during recent admission his Hgb was low at 6.9; improved to  8-9 since transfusion and iron infusions; his Hgb has not been checked recently; he is due for a check again next week    Kamran presents with acute on chronic heart failure with preserved ejection fraction.  He had 20 pound weight gain as stated above earlier last month requiring up titration of his torsemide.  This led to a significant renal injury with creatinine up to 2.6 and BUN of 99.  We had to stop his irbesartan and hold torsemide but he gained significant weight subsequently.  We decided to put him back on low-dose torsemide recently but his weight continues to go up and his creatinine remains between 2.2 and 2.4.  Today he is significantly volume up and does not feel very well.  At this time we have limited in adjusting his p.o. diuretics given his worsening renal function.  We talked about possibly increasing his torsemide but unsure if this would create a significant improvement.  We could sign him up for infusion clinic but we do not have this available until next Wednesday.      He will need IV diuresis at this time and close monitoring of his renal function.  It would also be beneficial for us to run basic lab work including CBC as he is at a higher risk of having anemia.  He should have a repeat echocardiogram as well to assess his LV function as well as to look for any recurrent pericardial effusion.      I discussed Kamran's case in detail with Dr. Corrales in the absence of Dr. Rosenbaum who recommended sending patient to the emergency room for further evaluation and management.  I discussed with Kamran and his spouse that he will most likely will need to be admitted for IV diuresis, renal function monitoring, echocardiogram, and anemia management.  Both he and his spouse verbalized understanding and agreed with this plan.  I contacted ED triage team to update them  and also provided my personal contact information should there are any questions.    I spent 50 minutes in care coordination, chart review, personally reviewing images, and discussing his case with ED triage team and one of our cardiologists.    This note was completed in part using Dragon voice recognition software. Although reviewed after completion, some word and grammatical errors may occur.    Orders this Visit:  No orders of the defined types were placed in this encounter.    No orders of the defined types were placed in this encounter.    There are no discontinued medications.      Encounter Diagnosis   Name Primary?     Benign essential hypertension        CURRENT MEDICATIONS:  Current Outpatient Medications   Medication Sig Dispense Refill     aspirin 81 MG EC tablet Take 81 mg by mouth daily       atorvastatin (LIPITOR) 20 MG tablet TAKE 1 TABLET(20 MG) BY MOUTH DAILY 90 tablet 2     blood glucose (STEVE CONTOUR) test strip TESTING FOUR TIMES DAILY OR AS DIRECTED 400 strip 0     carvedilol (COREG) 6.25 MG tablet Take 1 tablet (6.25 mg) by mouth 2 times daily (with meals) 180 tablet 3     clopidogrel (PLAVIX) 75 MG tablet Take 1 tablet (75 mg) by mouth daily 90 tablet 3     cyclobenzaprine (FLEXERIL) 10 MG tablet 1/2 po bid prn & 1po qhs 30 tablet 0     dorzolamide-timolol (COSOPT) 2-0.5 % ophthalmic solution Place 1 drop into both eyes 2 times daily       FLUTICASONE PROPIONATE HFA IN        insulin glargine (LANTUS SOLOSTAR) 100 UNIT/ML pen INJECT 50 UNITS UNDER THE SKIN AT BEDTIME 45 mL 3     insulin lispro (HUMALOG KWIKPEN) 100 UNIT/ML (1 unit dial) KWIKPEN INJECT UP TO 55 UNITS UNDER THE SKIN ONCE DAILY AS DIRECTED 45 mL 1     insulin pen needle (BD FERCHO U/F) 32G X 4 MM miscellaneous USE AS DIRECTED UP TO FOUR TIMES DAILY 400 each 3     irbesartan (AVAPRO) 300 MG tablet 150 mg 2 times daily (1/2 tab) 90 tablet 3     metFORMIN (GLUCOPHAGE) 1000 MG tablet TAKE 1 TABLET(1000 MG) BY MOUTH TWICE DAILY WITH  MEALS 180 tablet 3     order for DME Hip steroid injectoion 1 Units 0     pantoprazole (PROTONIX) 40 MG EC tablet TAKE 1 TABLET(40 MG) BY MOUTH TWICE DAILY 180 tablet 2     potassium chloride ER (K-TAB) 20 MEQ CR tablet Take 1 tablet (20 mEq) by mouth daily 1 tablet 0     torsemide (DEMADEX) 20 MG tablet Hold until you get updated instructions from cardiology. 3/3/21 180 tablet 1     traZODone (DESYREL) 100 MG tablet TAKE 1 TABLET(100 MG) BY MOUTH AT BEDTIME 90 tablet 3     VENTOLIN  (90 Base) MCG/ACT inhaler INL 2 PFS PO QID PRN 1 Inhaler 3     potassium chloride ER (K-TAB) 20 MEQ CR tablet Don't take it until you hear from cardiology. 3/3/21. 180 tablet 1     torsemide (DEMADEX) 20 MG tablet Take 1/2 tab (10mg) daily 1 tablet 0       ALLERGIES   No Known Allergies    PAST MEDICAL HISTORY:  Past Medical History:   Diagnosis Date     Cerebral infarction (H)      Diabetes (H)      Hyperlipemia        PAST SURGICAL HISTORY:  Past Surgical History:   Procedure Laterality Date     CV PERICARDIOCENTESIS N/A 2020    Procedure: Pericardiocentesis;  Surgeon: Chas Chambers MD;  Location:  [facility]       FAMILY HISTORY:  Family History   Problem Relation Age of Onset     Family History Negative Mother      Family History Negative Father        SOCIAL HISTORY:  Social History     Socioeconomic History     Marital status:      Spouse name: Cecile     Number of children: None     Years of education: None     Highest education level: None   Occupational History     None   Social Needs     Financial resource strain: None     Food insecurity     Worry: None     Inability: None     Transportation needs     Medical: None     Non-medical: None   Tobacco Use     Smoking status: Former Smoker     Packs/day: 2.00     Years: 11.00     Pack years: 22.00     Types: Cigarettes     Start date:      Quit date:      Years since quittin.2     Smokeless tobacco: Never Used   Substance and  Sexual Activity     Alcohol use: No     Alcohol/week: 0.0 standard drinks     Drug use: No     Sexual activity: Yes     Partners: Female     Birth control/protection: None   Lifestyle     Physical activity     Days per week: None     Minutes per session: None     Stress: None   Relationships     Social connections     Talks on phone: None     Gets together: None     Attends Mandaen service: None     Active member of club or organization: None     Attends meetings of clubs or organizations: None     Relationship status: None     Intimate partner violence     Fear of current or ex partner: None     Emotionally abused: None     Physically abused: None     Forced sexual activity: None   Other Topics Concern     Parent/sibling w/ CABG, MI or angioplasty before 65F 55M? Not Asked   Social History Narrative     None       Review of Systems:  Skin:  Negative     Eyes:  Positive for glasses  ENT:  Negative    Respiratory:  Positive for dyspnea on exertion  Cardiovascular:    Positive for;edema  Gastroenterology: Negative    Genitourinary:  Negative    Musculoskeletal:  Positive for    Neurologic:  Negative    Psychiatric:  Negative    Heme/Lymph/Imm:  Positive for chills  Endocrine:  Negative      Physical Exam:  Vitals: BP (!) 149/78   Pulse 92   Ht 1.829 m (6')   Wt 103 kg (227 lb 1.6 oz)   BMI 30.80 kg/m       GEN:  NAD. Pale and in mild distress.  NECK: Elevated JVP at 45 degree angle.  C/V:  Regular rate and rhythm, no murmur, rub or gallop.  RESP: Diminished at the bases bilaterally.  GI: Abdomen soft, nontender, nondistended. HJR noted.  EXTREM: 2+ pitting LE edema bilaterally up to mid thigh.   NEURO: Alert and oriented, cooperative. No obvious focal deficits.   PSYCH: Normal affect.  SKIN: Warm and dry.       Recent Lab Results:  LIPID RESULTS:  Lab Results   Component Value Date    CHOL 141 02/10/2020    HDL 48 02/10/2020    LDL 79 02/10/2020    TRIG 71 02/10/2020       LIVER ENZYME RESULTS:  Lab Results    Component Value Date    AST 17 12/20/2020    ALT 28 12/20/2020       CBC RESULTS:  Lab Results   Component Value Date    WBC 7.0 02/03/2021    RBC 3.85 (L) 02/03/2021    HGB 9.8 (L) 02/03/2021    HCT 33.3 (L) 02/03/2021    MCV 87 02/03/2021    MCH 25.5 (L) 02/03/2021    MCHC 29.4 (L) 02/03/2021    RDW 27.3 (H) 02/03/2021     02/03/2021       BMP RESULTS:  Lab Results   Component Value Date     03/25/2021    POTASSIUM 3.3 (L) 03/25/2021    CHLORIDE 103 03/25/2021    CO2 31 03/25/2021    ANIONGAP 7 03/25/2021     (H) 03/25/2021    BUN 49 (H) 03/25/2021    CR 2.44 (H) 03/25/2021    GFRESTIMATED 24 (L) 03/25/2021    GFRESTBLACK 28 (L) 03/25/2021    VANESA 8.7 03/25/2021        A1C RESULTS:  Lab Results   Component Value Date    A1C 7.0 (H) 12/01/2020       INR RESULTS:  Lab Results   Component Value Date    INR 0.99 09/04/2020           Esteban Ruiz PA-C  March 26, 2021

## 2021-03-26 NOTE — LETTER
3/26/2021    Christian Davidson MD, MD  [address]  [facility] [address]    RE: Justyn Olivas       Dear Colleague,    I had the pleasure of seeing Justyn Olivas in the [facility].    Primary Cardiologist: Dr. Rosenbaum    Reason for Visit: Follow up after adding back torsemide 10 mg daily with repeat BMP    History of Present Illness:   Kamran is a pleasant 80 year old male with past medical history:      #. Acute on Chronic HFpEF- had been on Torsemide 10 mg since his admission in 12/2020; had 20 Ib weight gain (233 Ibs from baseline of 214 Ibs) when he saw his PCP on 2/3/82261- this was felt to be related to fluid gain from recent Iron infusion - after discussion with Dr. Rosenbaum Torsemide was increased to 20 mg BID; he unfortunately developed CASS with Cr up to 2.6- we had to hold his torsemide with only minimal improvement in his renal function to 2.28; he started to gain weight subsequently and during last visit we added back low dose Torsemide 10 mg.   #. Acute on Chronic CKD- Cr baseline 1.5-1.6 but has been elevated to 2.28-2.6 over the last one month    #. CM - EF 45-50% on echo; unknown etiology- no ischemic work up has been done (echo does show inferior WMA)   #. Hx of Pericardial Effusion - noted during admission in 12/2020. Underwent successful pericardiocentesis. Cytology was negative for malignancy. No clear cause identified. Repeat echo in 2/2021 showed no recurrence.  #. DM2 - insulin depedent  #. H/o basilar CVA - was on DAPT but plavix discontinue by PCP due to significant anemia   #. HTN- uncontrolled during recent visits- unclear if white coat hypertension  #. Dyslipidemia-   #. Chronic MARKS - thought d/t anemia dx'd 2018  #. Iron Deficiency Anemia - PTA on iron supplements; question of possible bleeding from AV formations noted on recent GI work up- PCP evaluating this; during recent admission his Hgb was low at 6.9; improved  to  8-9 since transfusion and iron infusions; his Hgb has not been checked recently; he is due for a check again next week    Kamran returns to clinic today with his spouse, stating that he feels awful.  Despite resuming lower dose of torsemide 10 mg his weight continues to go up.  He is about 12 pounds higher than his dry weight.  He reports PND, orthopnea, and pitting peripheral edema up to his mid thighs.  He also reports a few episodes where he woke up in the middle of the night got up and he had tremors.  He denies syncope or lightheadedness.  He denies bleeding issues.  His head feels foggy and he feels very fatigued.  He is frustrated by his recent weight gain.  He denies any type of chest discomfort.  He reports he is not urinating very much with addition of torsemide.    Assessment and Plan:  Kamran is a pleasant 80 year old male with past medical history:      #. Acute on Chronic HFpEF- had been on Torsemide 10 mg since his admission in 12/2020; had 20 Ib weight gain (233 Ibs from baseline of 214 Ibs) when he saw his PCP on 2/3/74025- this was felt to be related to fluid gain from recent Iron infusion - after discussion with Dr. Rosenbaum Torsemide was increased to 20 mg BID; he unfortunately developed CASS with Cr up to 2.6- we had to hold his torsemide with only minimal improvement in his renal function to 2.28; he started to gain weight subsequently and during last visit we added back low dose Torsemide 10 mg.     #. Acute on Chronic CKD- Cr baseline 1.5-1.6 but has been elevated to 2.28-2.6 over the last one month    #. CM - EF 45-50% on echo; unknown etiology- no ischemic work up has been done (echo does show inferior WMA)   #. Hx of Pericardial Effusion - noted during admission in 12/2020. Underwent successful pericardiocentesis. Cytology was negative for malignancy. No clear cause identified. Repeat echo in 2/2021 showed no recurrence.  #. DM2 - insulin depedent  #. H/o basilar CVA - was on DAPT but plavix  discontinue by PCP due to significant anemia   #. HTN- uncontrolled during recent visits- unclear if white coat hypertension  #. Dyslipidemia-   #. Chronic MARKS - thought d/t anemia dx'd 2018  #. Iron Deficiency Anemia - PTA on iron supplements; question of possible bleeding from AV formations noted on recent GI work up- PCP evaluating this; during recent admission his Hgb was low at 6.9; improved to  8-9 since transfusion and iron infusions; his Hgb has not been checked recently; he is due for a check again next week    Kamran presents with acute on chronic heart failure with preserved ejection fraction.  He had 20 pound weight gain as stated above earlier last month requiring up titration of his torsemide.  This led to a significant renal injury with creatinine up to 2.6 and BUN of 99.  We had to stop his irbesartan and hold torsemide but he gained significant weight subsequently.  We decided to put him back on low-dose torsemide recently but his weight continues to go up and his creatinine remains between 2.2 and 2.4.  Today he is significantly volume up and does not feel very well.  At this time we have limited in adjusting his p.o. diuretics given his worsening renal function.  We talked about possibly increasing his torsemide but unsure if this would create a significant improvement.  We could sign him up for infusion clinic but we do not have this available until next Wednesday.      He will need IV diuresis at this time and close monitoring of his renal function.  It would also be beneficial for us to run basic lab work including CBC as he is at a higher risk of having anemia.  He should have a repeat echocardiogram as well to assess his LV function as well as to look for any recurrent pericardial effusion.      I discussed Kamran's case in detail with Dr. Corrales in the absence of Dr. Rosenbaum who recommended sending patient to the emergency room for further evaluation and management.  I discussed with Kamran and his  spouse that he will most likely will need to be admitted for IV diuresis, renal function monitoring, echocardiogram, and anemia management.  Both he and his spouse verbalized understanding and agreed with this plan.  I contacted ED triage team to update them and also provided my personal contact information should there are any questions.    I spent 50 minutes in care coordination, chart review, personally reviewing images, and discussing his case with ED triage team and one of our cardiologists.    This note was completed in part using Dragon voice recognition software. Although reviewed after completion, some word and grammatical errors may occur.    Orders this Visit:  No orders of the defined types were placed in this encounter.    No orders of the defined types were placed in this encounter.    There are no discontinued medications.      Encounter Diagnosis   Name Primary?     Benign essential hypertension        CURRENT MEDICATIONS:  Current Outpatient Medications   Medication Sig Dispense Refill     aspirin 81 MG EC tablet Take 81 mg by mouth daily       atorvastatin (LIPITOR) 20 MG tablet TAKE 1 TABLET(20 MG) BY MOUTH DAILY 90 tablet 2     blood glucose (STEVE CONTOUR) test strip TESTING FOUR TIMES DAILY OR AS DIRECTED 400 strip 0     carvedilol (COREG) 6.25 MG tablet Take 1 tablet (6.25 mg) by mouth 2 times daily (with meals) 180 tablet 3     clopidogrel (PLAVIX) 75 MG tablet Take 1 tablet (75 mg) by mouth daily 90 tablet 3     cyclobenzaprine (FLEXERIL) 10 MG tablet 1/2 po bid prn & 1po qhs 30 tablet 0     dorzolamide-timolol (COSOPT) 2-0.5 % ophthalmic solution Place 1 drop into both eyes 2 times daily       FLUTICASONE PROPIONATE HFA IN        insulin glargine (LANTUS SOLOSTAR) 100 UNIT/ML pen INJECT 50 UNITS UNDER THE SKIN AT BEDTIME 45 mL 3     insulin lispro (HUMALOG KWIKPEN) 100 UNIT/ML (1 unit dial) KWIKPEN INJECT UP TO 55 UNITS UNDER THE SKIN ONCE DAILY AS DIRECTED 45 mL 1     insulin pen needle  (BD FERCHO U/F) 32G X 4 MM miscellaneous USE AS DIRECTED UP TO FOUR TIMES DAILY 400 each 3     irbesartan (AVAPRO) 300 MG tablet 150 mg 2 times daily (1/2 tab) 90 tablet 3     metFORMIN (GLUCOPHAGE) 1000 MG tablet TAKE 1 TABLET(1000 MG) BY MOUTH TWICE DAILY WITH MEALS 180 tablet 3     order for DME Hip steroid injectoion 1 Units 0     pantoprazole (PROTONIX) 40 MG EC tablet TAKE 1 TABLET(40 MG) BY MOUTH TWICE DAILY 180 tablet 2     potassium chloride ER (K-TAB) 20 MEQ CR tablet Take 1 tablet (20 mEq) by mouth daily 1 tablet 0     torsemide (DEMADEX) 20 MG tablet Hold until you get updated instructions from cardiology. 3/3/21 180 tablet 1     traZODone (DESYREL) 100 MG tablet TAKE 1 TABLET(100 MG) BY MOUTH AT BEDTIME 90 tablet 3     VENTOLIN  (90 Base) MCG/ACT inhaler INL 2 PFS PO QID PRN 1 Inhaler 3     potassium chloride ER (K-TAB) 20 MEQ CR tablet Don't take it until you hear from cardiology. 3/3/21. 180 tablet 1     torsemide (DEMADEX) 20 MG tablet Take 1/2 tab (10mg) daily 1 tablet 0       ALLERGIES   No Known Allergies    PAST MEDICAL HISTORY:  Past Medical History:   Diagnosis Date     Cerebral infarction (H)      Diabetes (H)      Hyperlipemia        PAST SURGICAL HISTORY:  Past Surgical History:   Procedure Laterality Date     CV PERICARDIOCENTESIS N/A 12/21/2020    Procedure: Pericardiocentesis;  Surgeon: Chas Chambers MD;  Location:  [facility]       FAMILY HISTORY:  Family History   Problem Relation Age of Onset     Family History Negative Mother      Family History Negative Father        SOCIAL HISTORY:  Social History     Socioeconomic History     Marital status:      Spouse name: Cecile     Number of children: None     Years of education: None     Highest education level: None   Occupational History     None   Social Needs     Financial resource strain: None     Food insecurity     Worry: None     Inability: None     Transportation needs     Medical: None      Non-medical: None   Tobacco Use     Smoking status: Former Smoker     Packs/day: 2.00     Years: 11.00     Pack years: 22.00     Types: Cigarettes     Start date:      Quit date:      Years since quittin.2     Smokeless tobacco: Never Used   Substance and Sexual Activity     Alcohol use: No     Alcohol/week: 0.0 standard drinks     Drug use: No     Sexual activity: Yes     Partners: Female     Birth control/protection: None   Lifestyle     Physical activity     Days per week: None     Minutes per session: None     Stress: None   Relationships     Social connections     Talks on phone: None     Gets together: None     Attends [employer] service: None     Active member of club or organization: None     Attends meetings of clubs or organizations: None     Relationship status: None     Intimate partner violence     Fear of current or ex partner: None     Emotionally abused: None     Physically abused: None     Forced sexual activity: None   Other Topics Concern     Parent/sibling w/ CABG, MI or angioplasty before 65F 55M? Not Asked   Social History Narrative     None       Review of Systems:  Skin:  Negative     Eyes:  Positive for glasses  ENT:  Negative    Respiratory:  Positive for dyspnea on exertion  Cardiovascular:    Positive for;edema  Gastroenterology: Negative    Genitourinary:  Negative    Musculoskeletal:  Positive for    Neurologic:  Negative    Psychiatric:  Negative    Heme/Lymph/Imm:  Positive for chills  Endocrine:  Negative      Physical Exam:  Vitals: BP (!) 149/78   Pulse 92   Ht 1.829 m (6')   Wt 103 kg (227 lb 1.6 oz)   BMI 30.80 kg/m       GEN:  NAD. Pale and in mild distress.  NECK: Elevated JVP at 45 degree angle.  C/V:  Regular rate and rhythm, no murmur, rub or gallop.  RESP: Diminished at the bases bilaterally.  GI: Abdomen soft, nontender, nondistended. HJR noted.  EXTREM: 2+ pitting LE edema bilaterally up to mid thigh.   NEURO: Alert and oriented, cooperative. No obvious  focal deficits.   PSYCH: Normal affect.  SKIN: Warm and dry.       Recent Lab Results:  LIPID RESULTS:  Lab Results   Component Value Date    CHOL 141 02/10/2020    HDL 48 02/10/2020    LDL 79 02/10/2020    TRIG 71 02/10/2020       LIVER ENZYME RESULTS:  Lab Results   Component Value Date    AST 17 12/20/2020    ALT 28 12/20/2020       CBC RESULTS:  Lab Results   Component Value Date    WBC 7.0 02/03/2021    RBC 3.85 (L) 02/03/2021    HGB 9.8 (L) 02/03/2021    HCT 33.3 (L) 02/03/2021    MCV 87 02/03/2021    MCH 25.5 (L) 02/03/2021    MCHC 29.4 (L) 02/03/2021    RDW 27.3 (H) 02/03/2021     02/03/2021       BMP RESULTS:  Lab Results   Component Value Date     03/25/2021    POTASSIUM 3.3 (L) 03/25/2021    CHLORIDE 103 03/25/2021    CO2 31 03/25/2021    ANIONGAP 7 03/25/2021     (H) 03/25/2021    BUN 49 (H) 03/25/2021    CR 2.44 (H) 03/25/2021    GFRESTIMATED 24 (L) 03/25/2021    GFRESTBLACK 28 (L) 03/25/2021    VANESA 8.7 03/25/2021        A1C RESULTS:  Lab Results   Component Value Date    A1C 7.0 (H) 12/01/2020       INR RESULTS:  Lab Results   Component Value Date    INR 0.99 09/04/2020           Esteban Ruiz PA-C  March 26, 2021       cc:   Esteban Ruiz PA-C  [address] [place] [address]

## 2021-03-26 NOTE — H&P
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      ASSESSMENT AND PLAN:   1.  Significant anemia:  This is an 80-year-old male with history of iron deficiency/anemia of chronic kidney disease, presented with worsening shortness of breath on exertion, fatigue, tiredness of balance and weight gain.  I think most of his symptoms are related to significant low hemoglobin.  His last hemoglobin was 9.8.  It is ranging around that range 9.8-10 range and now significantly decreased, without any obvious GI bleeding.  He does have history of EGD in 12/16/2020 which shows esophagitis, normal stomach, small hiatal hernia, normal duodenal bulb.  First portion of the duodenum and second portion of the duodenum were normal.  A single nonbleeding angiodysplastic lesion in the duodenum was found.  He did get iron infusion after that, but not for the last month or so.  For now, I think we will admit him.  He already gave the consent.  We will transfuse 1 unit of packed RBCs, IV Lasix after that.  Check hemoglobin again tomorrow.  I will go ahead and check serum iron, iron binding capacity, transferrin, ferritin, B12, folate level as well.  His iron levels are low.  We will benefit from iron infusion.  Consult GI to evaluate the patient and he may benefit from colonoscopy to complete the workup.  I will check erythropoietin levels as well as low.  May benefit from erythropoietin or Aranesp.  For that, we will consult  Nephrology as well to evaluate him.        1.  Heart combined chronic systolic and diastolic congestive heart failure:  The patient's last echocardiogram was done in 2/20/2001, EF of 45% -50% with mild basal inferolateral hypokinesis, grade 2 diastolic dysfunction and trivial pericardial effusion at that time.  At this time, he does not have any elevated JVD.  His lungs sounds clear.  No crackles.  His chest x-ray does not show pulmonary edema, although he has 2+ lower extremity edema.  I think he is not in worse acute exacerbation of congestive heart failure at this time.  I think he maybe has some high output heart failure at this time because of low hemoglobin.  Need to transfuse hemoglobin blood and keep his hemoglobin 8 or above to see if his symptoms improve.  In the meantime, we will keep him on IV Lasix 60 b.i.d. for now, holding his irbesartan to see if his kidney functions improve.  He is on 150 mg, irbesartan.  Will hold it for now.   2.  Acute on chronic renal failure:  Previous baseline creatinine was 1.5-1.6.  He was aggressively diuresed and creatinine increased to 2.6.  Torsemide dose was decreased, but creatinine did not return back to his normal.  New baseline is 2.28-2.66 in the last month or so.  For now, we will hold losartan.   Hold torsemide, give him Lasix 40 b.i.d. for now.  Transfuse him and see if his kidney function improved with that.  We will consult Nephrology to evaluate him for worsening creatinine, as well as for anemia.   3.  History of cerebrovascular accident:  He was restarted back on Plavix.  Continue with that.  Continue Lipitor as he is not having any significant bleeding at this point.   4.  Diabetes mellitus type 2.  He is on Lantus 50 units at night.  We will continue with that.  Will keep him on sliding scale insulin for correction hypoglycemia protocol, decrease the dose of lispro to 12 units t.i.d. and adjust as needed.   5.  Hyperlipidemia, on Lipitor.  We will  continue with that.   6.  Hypokalemia:  Potassium is 2.9.  The may be the cause of his weakness and tiredness.  We will replace potassium now and then keep him on replacement protocol.   7.  History of pericardial effusion:  Recent echocardiogram in February was negative for any significant pericardial effusion.  We will repeat the echocardiogram because of his symptoms now.   8.  Gastrointestinal prophylaxis:  Protonix.  Keep him on 40 b.i.d. for now.   9.  Deep venous thrombosis prophylaxis with SCDs.      CODE STATUS:  Full code as per the patient wishes.      The case discussed with ER physician and the nursing staff taking care of the patient.         MARCUS BRADEN MD             D: 03/26/2021   T: 03/26/2021   MT:         DVT Prophylaxis: Pneumatic Compression Devices  Code Status: Full Code    Disposition: Expected discharge in 2 days once stable.    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      According to the patient, he had history of pericardial effusion requiring pericardiocentesis in December of last year and since then, he has been followed by Cardiology and was getting diuretic and symptoms got better.  His weight went down to 207.  The last couple of weeks, he has noticed that his shortness of breath has been getting worse.  He getting tired easily.   He is started gaining weight again and today the weight in the clinic was 227 pounds.  He denies any orthopnea or PND, but does get dyspnea on exertion, even walking within the home, making him short of breath, tired, fatigued and sometimes felt palpitation as well.  He noticed that his balance is slightly off as well.  Recently, he does use stick to walk.  He denies any chest pain, fever, chills, headache, dizziness, lightheadedness, no orthopnea or PND, occasional palpitation or dysuria, hematuria, constipation, diarrhea.  The patient noticed no hematemesis, melena or hematochezia.  No bleeding from anywhere else.      The patient has history of a stroke 2 years ago.  His Plavix was discontinued at the time when he had a pericardial effusion.  Now he has been back on the Plavix for the last 2 days.  His symptoms have been worsening for the last couple of weeks, which include as mentioned above, weakness, tiredness, shortness of breath on exertion, lower extremity edema and weight gain.      He has been followed by Cardiology and they are increasing or decreasing his torsemide.  He was initially on 40 mg of torsemide, decreased to 10 mg as his kidney function went up and still his kidney functions are not back to normal.  The rest of the review of system is negative at this time.   Past Medical History    I have reviewed this patient's medical history and updated it with pertinent information if needed.   Past Medical History:   Diagnosis Date     Cerebral infarction (H)      Diabetes (H)      Hyperlipemia        Past Surgical History   I have reviewed this patient's surgical history and updated it with pertinent information if needed.  Past Surgical History:   Procedure Laterality Date     CV PERICARDIOCENTESIS N/A 12/21/2020    Procedure: Pericardiocentesis;  Surgeon: Chas Chambers MD;  Location:  [facility]       Prior to Admission Medications   Prior to Admission Medications    Prescriptions Last Dose Informant Patient Reported? Taking?   VENTOLIN  (90 Base) MCG/ACT inhaler prn  No Yes   Sig: INL 2 PFS PO QID PRN   aspirin 81 MG EC tablet 3/26/2021 at am Pharmacy Yes Yes   Sig: Take 81 mg by mouth daily   atorvastatin (LIPITOR) 20 MG tablet 3/25/2021 at hs Pharmacy No Yes   Sig: TAKE 1 TABLET(20 MG) BY MOUTH DAILY   blood glucose (STEVE CONTOUR) test strip  Pharmacy No No   Sig: TESTING FOUR TIMES DAILY OR AS DIRECTED   carvedilol (COREG) 6.25 MG tablet 3/26/2021 at am  No Yes   Sig: Take 1 tablet (6.25 mg) by mouth 2 times daily (with meals)   clopidogrel (PLAVIX) 75 MG tablet 3/26/2021 at am  Yes Yes   Sig: Take 1 tablet (75 mg) by mouth daily   cyclobenzaprine (FLEXERIL) 10 MG tablet prn  No Yes   Si/2 po bid prn & 1po qhs   Patient taking differently: Take 5-10 mg by mouth 3 times daily as needed 1/2 po bid prn & 1po qhs   diphenhydrAMINE-acetaminophen (TYLENOL PM)  MG tablet Past Month at Unknown time  Yes Yes   Sig: Take 2 tablets by mouth nightly as needed for sleep   dorzolamide-timolol (COSOPT) 2-0.5 % ophthalmic solution 3/26/2021 at am Pharmacy Yes Yes   Sig: Place 1 drop into both eyes 2 times daily   fluticasone (FLONASE) 50 MCG/ACT nasal spray 3/26/2021 at am  Yes Yes   Sig: Spray 2 sprays into both nostrils daily   insulin glargine (LANTUS SOLOSTAR) 100 UNIT/ML pen 3/25/2021 at hs Pharmacy No Yes   Sig: INJECT 50 UNITS UNDER THE SKIN AT BEDTIME   insulin lispro (HUMALOG KWIKPEN) 100 UNIT/ML (1 unit dial) KWIKPEN 3/26/2021 at am 5 units Pharmacy No Yes   Sig: INJECT UP TO 55 UNITS UNDER THE SKIN ONCE DAILY AS DIRECTED   Patient taking differently: Inject Subcutaneous 3 times daily (before meals) Sliding scale with meals   insulin pen needle (BD FERCHO U/F) 32G X 4 MM miscellaneous  Pharmacy No No   Sig: USE AS DIRECTED UP TO FOUR TIMES DAILY   irbesartan (AVAPRO) 150 MG tablet 3/25/2021 at am  Yes Yes   Sig: Take 150 mg by mouth At Bedtime    metFORMIN  (GLUCOPHAGE) 1000 MG tablet 3/26/2021 at Unknown time Pharmacy No Yes   Sig: TAKE 1 TABLET(1000 MG) BY MOUTH TWICE DAILY WITH MEALS   order for [facility]  Pharmacy No No   Sig: Hip steroid injectoion   pantoprazole (PROTONIX) 40 MG EC tablet 3/25/2021 at hs  No Yes   Sig: TAKE 1 TABLET(40 MG) BY MOUTH TWICE DAILY   Patient taking differently: Take 40 mg by mouth At Bedtime TAKE 1 TABLET(40 MG) BY MOUTH TWICE DAILY   potassium chloride ER (K-TAB) 20 MEQ CR tablet 3/26/2021 at am  Yes Yes   Sig: Take 1 tablet (20 mEq) by mouth daily   torsemide (DEMADEX) 20 MG tablet 3/26/2021 at am  Yes Yes   Sig: Take 10 mg by mouth daily Take 20 mg x 1/2 tab = 10mg daily   traZODone (DESYREL) 100 MG tablet 3/25/2021 at hs  No Yes   Sig: TAKE 1 TABLET(100 MG) BY MOUTH AT BEDTIME      Facility-Administered Medications: None     Allergies   No Known Allergies    Social History   I have reviewed this patient's social history and updated it with pertinent information if needed. Justyn FRIAS Deisy  reports that he quit smoking about 45 years ago. His smoking use included cigarettes. He started smoking about 56 years ago. He has a 22.00 pack-year smoking history. He has never used smokeless tobacco. He reports that he does not drink alcohol or use drugs.    Family History   I have reviewed this patient's family history and updated it with pertinent information if needed.   Family History   Problem Relation Age of Onset     Family History Negative Mother      Family History Negative Father        Review of Systems   CONSTITUTIONAL:  positive for  fatigue and malaise  EYES:  negative  HEENT:  negative  RESPIRATORY:  positive for  dyspnea  CARDIOVASCULAR:  positive for  edema, MARKS  GASTROINTESTINAL:  negative  GENITOURINARY:  negative  INTEGUMENT/BREAST:  negative  HEMATOLOGIC/LYMPHATIC:  negative  ALLERGIC/IMMUNOLOGIC:  negative  ENDOCRINE:  negative  MUSCULOSKELETAL:  negative  NEUROLOGICAL:  positive for gait problems  BEHAVIOR/PSYCH:   negative    Physical Exam   Temp: 97.6  F (36.4  C) Temp src: Oral BP: (!) 143/81 Pulse: 88   Resp: 19 SpO2: 100 % O2 Device: None (Room air)    Vital Signs with Ranges  Temp:  [97.6  F (36.4  C)] 97.6  F (36.4  C)  Pulse:  [88-92] 88  Resp:  [19] 19  BP: (131-149)/(64-81) 143/81  SpO2:  [97 %-100 %] 100 %  0 lbs 0 oz    Constitutional: Awake, alert, cooperative, no apparent distress.  Eyes: Conjunctiva and pupils examined and normal.  HEENT: Moist mucous membranes, normal dentition.  Respiratory: Clear to auscultation bilaterally, no crackles or wheezing.  Cardiovascular: Regular rate and rhythm, normal S1 and S2, and no murmur noted.no JVD  GI: Soft, non-distended, non-tender, normal bowel sounds.  Lymph/Hematologic: No anterior cervical or supraclavicular adenopathy.  Skin: No rashes, no cyanosis, 2+ LE edema.  Musculoskeletal: No joint swelling, erythema or tenderness.  Neurologic: Cranial nerves 2-12 intact, normal strength and sensation. Gait not evaluated.   Psychiatric: Alert, oriented to person, place and time, no obvious anxiety or depression.    Data   Data reviewed today:  I personally reviewed the EKG tracing showing Sinus rhythm, non specific ST & T wave abnormality, no acute ischemic changes.     Recent Labs   Lab 03/26/21  1326 03/25/21  1042   WBC 4.9  --    HGB 6.8*  --    MCV 92  --      --     141   POTASSIUM 2.9* 3.3*   CHLORIDE 105 103   CO2 28 31   BUN 49* 49*   CR 2.66* 2.44*   ANIONGAP 9 7   VANESA 8.5 8.7   * 189*   ALBUMIN 3.4  --    PROTTOTAL 6.6*  --    BILITOTAL 0.5  --    ALKPHOS 81  --    ALT 23  --    AST 14  --    TROPI 0.022  --        Recent Results (from the past 24 hour(s))   XR Chest 2 Views    Narrative    XR CHEST 2 VW  3/26/2021 1:50 PM       INDICATION: dyspnea  COMPARISON: 2/3/2021       Impression    IMPRESSION: Mild cardiomegaly with normal pulmonary vascularity. The  lungs and pleural spaces are clear. No acute findings.    RAMOS PAYAN MD

## 2021-03-26 NOTE — ED NOTES
[facility]  ED Nurse Handoff Report    ED Chief complaint: Leg Swelling and Shortness of Breath      ED Diagnosis:   Final diagnoses:   None       Code Status: Full Code    Allergies: No Known Allergies    Patient Story: HPI   Pt sent from heart center and presents with bilateral leg swelling and shortness of breath. The patient was sent from the heart clinic due to uncontrolled chronic heart failure. The patient states the symptoms have been ongoing since previous emergency department visit on Thanksgiving Day last year and a pericardiocentesis on 12/21/20. He denies any unexpected weight change. He notes that his torsemide prescription was increased on the 18th of this month. He began taking Plavix 2 days ago. He notes he had a stroke 2 years ago. He denies chest pain. He endorses shortness of breath on exertion and at night.   Focused Assessment:  Pt aaox3, calm, cooperative    Treatments and/or interventions provided: iv and labs  Patient's response to treatments and/or interventions:     To be done/followed up on inpatient unit:      Does this patient have any cognitive concerns?: none    Activity level - Baseline/Home:  Independent  Activity Level - Current:   Independent    Patient's Preferred language: English   Needed?: No    Isolation: None  Infection: Not Applicable  Patient tested for COVID 19 prior to admission: YES  Bariatric?: No    Vital Signs:   Vitals:    03/26/21 1229   BP: 131/64   Pulse: 88   Resp: 19   Temp: 97.6  F (36.4  C)   TempSrc: Oral   SpO2: 100%       Cardiac Rhythm:     Was the PSS-3 completed:   Yes  What interventions are required if any?               Family Comments: wife at bedside  OBS brochure/video discussed/provided to patient/family: No              Name of person given brochure if not patient:              Relationship to patient:     For the majority of the shift this patient's behavior was Green.   Behavioral interventions performed were  .    ED NURSE PHONE NUMBER: [phone]  RECEIVING UNIT ED HANDOFF REVIEW    ED Nurse Handoff Report was reviewed by: Chas Pond RN on March 26, 2021 at 4:07 PM

## 2021-03-27 ENCOUNTER — APPOINTMENT (OUTPATIENT)
Dept: CARDIOLOGY | Facility: CLINIC | Age: 81
DRG: 291 | End: 2021-03-27
Attending: INTERNAL MEDICINE
Payer: MEDICARE

## 2021-03-27 ENCOUNTER — APPOINTMENT (OUTPATIENT)
Dept: PHYSICAL THERAPY | Facility: CLINIC | Age: 81
DRG: 291 | End: 2021-03-27
Attending: INTERNAL MEDICINE
Payer: MEDICARE

## 2021-03-27 LAB
ALBUMIN SERPL-MCNC: 3.2 G/DL (ref 3.4–5)
ALBUMIN UR-MCNC: NEGATIVE MG/DL
ANION GAP SERPL CALCULATED.3IONS-SCNC: 4 MMOL/L (ref 3–14)
APPEARANCE UR: CLEAR
BASOPHILS # BLD AUTO: 0 10E9/L (ref 0–0.2)
BASOPHILS NFR BLD AUTO: 0.5 %
BILIRUB UR QL STRIP: NEGATIVE
BUN SERPL-MCNC: 50 MG/DL (ref 7–30)
CALCIUM SERPL-MCNC: 8.5 MG/DL (ref 8.5–10.1)
CHLORIDE SERPL-SCNC: 106 MMOL/L (ref 94–109)
CO2 SERPL-SCNC: 34 MMOL/L (ref 20–32)
COLOR UR AUTO: NORMAL
CREAT SERPL-MCNC: 2.64 MG/DL (ref 0.66–1.25)
DIFFERENTIAL METHOD BLD: ABNORMAL
EOSINOPHIL # BLD AUTO: 0.3 10E9/L (ref 0–0.7)
EOSINOPHIL NFR BLD AUTO: 5.6 %
EPO SERPL-ACNC: 42 MU/ML (ref 4–27)
ERYTHROCYTE [DISTWIDTH] IN BLOOD BY AUTOMATED COUNT: 19.5 % (ref 10–15)
GFR SERPL CREATININE-BSD FRML MDRD: 22 ML/MIN/{1.73_M2}
GLUCOSE BLDC GLUCOMTR-MCNC: 115 MG/DL (ref 70–99)
GLUCOSE BLDC GLUCOMTR-MCNC: 208 MG/DL (ref 70–99)
GLUCOSE BLDC GLUCOMTR-MCNC: 286 MG/DL (ref 70–99)
GLUCOSE BLDC GLUCOMTR-MCNC: 70 MG/DL (ref 70–99)
GLUCOSE BLDC GLUCOMTR-MCNC: 95 MG/DL (ref 70–99)
GLUCOSE SERPL-MCNC: 131 MG/DL (ref 70–99)
GLUCOSE UR STRIP-MCNC: NEGATIVE MG/DL
HBA1C MFR BLD: 6 % (ref 0–5.6)
HCT VFR BLD AUTO: 23.8 % (ref 40–53)
HGB BLD-MCNC: 7.2 G/DL (ref 13.3–17.7)
HGB UR QL STRIP: NEGATIVE
IMM GRANULOCYTES # BLD: 0 10E9/L (ref 0–0.4)
IMM GRANULOCYTES NFR BLD: 0.5 %
KETONES UR STRIP-MCNC: NEGATIVE MG/DL
LEUKOCYTE ESTERASE UR QL STRIP: NEGATIVE
LYMPHOCYTES # BLD AUTO: 1 10E9/L (ref 0.8–5.3)
LYMPHOCYTES NFR BLD AUTO: 23.2 %
MCH RBC QN AUTO: 27.3 PG (ref 26.5–33)
MCHC RBC AUTO-ENTMCNC: 30.3 G/DL (ref 31.5–36.5)
MCV RBC AUTO: 90 FL (ref 78–100)
MONOCYTES # BLD AUTO: 0.7 10E9/L (ref 0–1.3)
MONOCYTES NFR BLD AUTO: 14.9 %
NEUTROPHILS # BLD AUTO: 2.5 10E9/L (ref 1.6–8.3)
NEUTROPHILS NFR BLD AUTO: 55.3 %
NITRATE UR QL: NEGATIVE
NRBC # BLD AUTO: 0 10*3/UL
NRBC BLD AUTO-RTO: 0 /100
PH UR STRIP: 6.5 PH (ref 5–7)
PHOSPHATE SERPL-MCNC: 4 MG/DL (ref 2.5–4.5)
PLATELET # BLD AUTO: 160 10E9/L (ref 150–450)
POTASSIUM SERPL-SCNC: 2.8 MMOL/L (ref 3.4–5.3)
POTASSIUM SERPL-SCNC: 3.1 MMOL/L (ref 3.4–5.3)
POTASSIUM SERPL-SCNC: 3.2 MMOL/L (ref 3.4–5.3)
PTH-INTACT SERPL-MCNC: 137 PG/ML (ref 12–64)
RBC # BLD AUTO: 2.64 10E12/L (ref 4.4–5.9)
SODIUM SERPL-SCNC: 144 MMOL/L (ref 133–144)
SOURCE: NORMAL
SP GR UR STRIP: 1.01 (ref 1–1.03)
UPPER GI ENDOSCOPY: NORMAL
UROBILINOGEN UR STRIP-MCNC: 0 MG/DL (ref 0–2)
WBC # BLD AUTO: 4.4 10E9/L (ref 4–11)

## 2021-03-27 PROCEDURE — 84132 ASSAY OF SERUM POTASSIUM: CPT | Performed by: HOSPITALIST

## 2021-03-27 PROCEDURE — 250N000009 HC RX 250: Performed by: INTERNAL MEDICINE

## 2021-03-27 PROCEDURE — 250N000011 HC RX IP 250 OP 636: Performed by: INTERNAL MEDICINE

## 2021-03-27 PROCEDURE — 250N000012 HC RX MED GY IP 250 OP 636 PS 637: Performed by: HOSPITALIST

## 2021-03-27 PROCEDURE — 97161 PT EVAL LOW COMPLEX 20 MIN: CPT | Mod: GP

## 2021-03-27 PROCEDURE — 93325 DOPPLER ECHO COLOR FLOW MAPG: CPT

## 2021-03-27 PROCEDURE — 0D598ZZ DESTRUCTION OF DUODENUM, VIA NATURAL OR ARTIFICIAL OPENING ENDOSCOPIC: ICD-10-PCS | Performed by: INTERNAL MEDICINE

## 2021-03-27 PROCEDURE — 43239 EGD BIOPSY SINGLE/MULTIPLE: CPT | Performed by: INTERNAL MEDICINE

## 2021-03-27 PROCEDURE — 210N000002 HC R&B HEART CARE

## 2021-03-27 PROCEDURE — 83036 HEMOGLOBIN GLYCOSYLATED A1C: CPT | Performed by: HOSPITALIST

## 2021-03-27 PROCEDURE — 999N001017 HC STATISTIC GLUCOSE BY METER IP

## 2021-03-27 PROCEDURE — 36415 COLL VENOUS BLD VENIPUNCTURE: CPT | Performed by: HOSPITALIST

## 2021-03-27 PROCEDURE — 81003 URINALYSIS AUTO W/O SCOPE: CPT | Performed by: INTERNAL MEDICINE

## 2021-03-27 PROCEDURE — 43255 EGD CONTROL BLEEDING ANY: CPT | Performed by: INTERNAL MEDICINE

## 2021-03-27 PROCEDURE — 83970 ASSAY OF PARATHORMONE: CPT | Performed by: INTERNAL MEDICINE

## 2021-03-27 PROCEDURE — 88305 TISSUE EXAM BY PATHOLOGIST: CPT | Mod: TC | Performed by: INTERNAL MEDICINE

## 2021-03-27 PROCEDURE — G0500 MOD SEDAT ENDO SERVICE >5YRS: HCPCS | Performed by: INTERNAL MEDICINE

## 2021-03-27 PROCEDURE — 99233 SBSQ HOSP IP/OBS HIGH 50: CPT | Performed by: HOSPITALIST

## 2021-03-27 PROCEDURE — 83036 HEMOGLOBIN GLYCOSYLATED A1C: CPT | Performed by: INTERNAL MEDICINE

## 2021-03-27 PROCEDURE — 85025 COMPLETE CBC W/AUTO DIFF WBC: CPT | Performed by: INTERNAL MEDICINE

## 2021-03-27 PROCEDURE — 250N000013 HC RX MED GY IP 250 OP 250 PS 637: Performed by: INTERNAL MEDICINE

## 2021-03-27 PROCEDURE — 80069 RENAL FUNCTION PANEL: CPT | Performed by: INTERNAL MEDICINE

## 2021-03-27 PROCEDURE — 93308 TTE F-UP OR LMTD: CPT | Mod: 26 | Performed by: INTERNAL MEDICINE

## 2021-03-27 PROCEDURE — 93321 DOPPLER ECHO F-UP/LMTD STD: CPT | Mod: 26 | Performed by: INTERNAL MEDICINE

## 2021-03-27 PROCEDURE — 258N000003 HC RX IP 258 OP 636: Performed by: INTERNAL MEDICINE

## 2021-03-27 PROCEDURE — 0DB68ZX EXCISION OF STOMACH, VIA NATURAL OR ARTIFICIAL OPENING ENDOSCOPIC, DIAGNOSTIC: ICD-10-PCS | Performed by: INTERNAL MEDICINE

## 2021-03-27 PROCEDURE — 99223 1ST HOSP IP/OBS HIGH 75: CPT | Performed by: INTERNAL MEDICINE

## 2021-03-27 PROCEDURE — 250N000013 HC RX MED GY IP 250 OP 250 PS 637: Performed by: HOSPITALIST

## 2021-03-27 PROCEDURE — 36415 COLL VENOUS BLD VENIPUNCTURE: CPT | Performed by: INTERNAL MEDICINE

## 2021-03-27 PROCEDURE — 88305 TISSUE EXAM BY PATHOLOGIST: CPT | Mod: 26 | Performed by: PATHOLOGY

## 2021-03-27 PROCEDURE — 99222 1ST HOSP IP/OBS MODERATE 55: CPT | Mod: 25 | Performed by: INTERNAL MEDICINE

## 2021-03-27 PROCEDURE — 97530 THERAPEUTIC ACTIVITIES: CPT | Mod: GP

## 2021-03-27 PROCEDURE — 93325 DOPPLER ECHO COLOR FLOW MAPG: CPT | Mod: 26 | Performed by: INTERNAL MEDICINE

## 2021-03-27 RX ORDER — FUROSEMIDE 10 MG/ML
40 INJECTION INTRAMUSCULAR; INTRAVENOUS DAILY
Status: DISCONTINUED | OUTPATIENT
Start: 2021-03-28 | End: 2021-04-01

## 2021-03-27 RX ORDER — NICOTINE POLACRILEX 4 MG
15-30 LOZENGE BUCCAL
Status: DISCONTINUED | OUTPATIENT
Start: 2021-03-27 | End: 2021-04-01 | Stop reason: HOSPADM

## 2021-03-27 RX ORDER — POTASSIUM CHLORIDE 1500 MG/1
40 TABLET, EXTENDED RELEASE ORAL ONCE
Status: COMPLETED | OUTPATIENT
Start: 2021-03-27 | End: 2021-03-27

## 2021-03-27 RX ORDER — FENTANYL CITRATE 50 UG/ML
INJECTION, SOLUTION INTRAMUSCULAR; INTRAVENOUS PRN
Status: COMPLETED | OUTPATIENT
Start: 2021-03-27 | End: 2021-03-27

## 2021-03-27 RX ORDER — POTASSIUM CHLORIDE 1500 MG/1
20 TABLET, EXTENDED RELEASE ORAL ONCE
Status: COMPLETED | OUTPATIENT
Start: 2021-03-27 | End: 2021-03-27

## 2021-03-27 RX ORDER — DEXTROSE MONOHYDRATE 25 G/50ML
25-50 INJECTION, SOLUTION INTRAVENOUS
Status: DISCONTINUED | OUTPATIENT
Start: 2021-03-27 | End: 2021-04-01 | Stop reason: HOSPADM

## 2021-03-27 RX ORDER — POTASSIUM CHLORIDE 1.5 G/1.58G
20 POWDER, FOR SOLUTION ORAL ONCE
Status: COMPLETED | OUTPATIENT
Start: 2021-03-27 | End: 2021-03-27

## 2021-03-27 RX ADMIN — POTASSIUM CHLORIDE 20 MEQ: 1500 TABLET, EXTENDED RELEASE ORAL at 17:46

## 2021-03-27 RX ADMIN — PANTOPRAZOLE SODIUM 40 MG: 40 TABLET, DELAYED RELEASE ORAL at 11:46

## 2021-03-27 RX ADMIN — INSULIN GLARGINE 20 UNITS: 100 INJECTION, SOLUTION SUBCUTANEOUS at 21:55

## 2021-03-27 RX ADMIN — FENTANYL CITRATE 50 MCG: 50 INJECTION, SOLUTION INTRAMUSCULAR; INTRAVENOUS at 09:25

## 2021-03-27 RX ADMIN — CLOPIDOGREL BISULFATE 75 MG: 75 TABLET ORAL at 11:46

## 2021-03-27 RX ADMIN — CARVEDILOL 6.25 MG: 6.25 TABLET, FILM COATED ORAL at 17:47

## 2021-03-27 RX ADMIN — POTASSIUM CHLORIDE 20 MEQ: 1500 TABLET, EXTENDED RELEASE ORAL at 11:47

## 2021-03-27 RX ADMIN — POTASSIUM CHLORIDE 40 MEQ: 1500 TABLET, EXTENDED RELEASE ORAL at 11:47

## 2021-03-27 RX ADMIN — FUROSEMIDE 40 MG: 10 INJECTION, SOLUTION INTRAMUSCULAR; INTRAVENOUS at 11:48

## 2021-03-27 RX ADMIN — ACETAMINOPHEN: 500 TABLET, FILM COATED ORAL at 21:56

## 2021-03-27 RX ADMIN — PANTOPRAZOLE SODIUM 40 MG: 40 TABLET, DELAYED RELEASE ORAL at 21:55

## 2021-03-27 RX ADMIN — POTASSIUM CHLORIDE 20 MEQ: 1.5 POWDER, FOR SOLUTION ORAL at 22:07

## 2021-03-27 RX ADMIN — Medication 1 TABLET: at 11:45

## 2021-03-27 RX ADMIN — TOPICAL ANESTHETIC 1 SPRAY: 200 SPRAY DENTAL; PERIODONTAL at 09:26

## 2021-03-27 RX ADMIN — TRAZODONE HYDROCHLORIDE 100 MG: 100 TABLET ORAL at 21:55

## 2021-03-27 RX ADMIN — CYANOCOBALAMIN 1000 MCG: 1000 INJECTION, SOLUTION INTRAMUSCULAR at 06:52

## 2021-03-27 RX ADMIN — IRON SUCROSE 200 MG: 20 INJECTION, SOLUTION INTRAVENOUS at 11:51

## 2021-03-27 RX ADMIN — ATORVASTATIN CALCIUM 20 MG: 20 TABLET, FILM COATED ORAL at 21:55

## 2021-03-27 RX ADMIN — CARVEDILOL 6.25 MG: 6.25 TABLET, FILM COATED ORAL at 11:45

## 2021-03-27 RX ADMIN — MIDAZOLAM 2 MG: 1 INJECTION INTRAMUSCULAR; INTRAVENOUS at 09:25

## 2021-03-27 RX ADMIN — FLUTICASONE PROPIONATE 2 SPRAY: 50 SPRAY, METERED NASAL at 11:51

## 2021-03-27 NOTE — PROGRESS NOTES
[facility]  Hospitalist Progress Note        Boyd Miranda MD   03/27/2021        Interval History:        - getting ECHO  - received 1 unit PRBC  - s/p EGD by Dr Nash today  - feels better; diuresing well with IV Lasix         Assessment and Plan:      Mr Olivas is a 80-year-old male with CHFpEF, h/o pericardial effusion, DM, HTN, DLD, h/o basilar CVA, Iron deficiency anemia who was sent from cardiology clinic for worsening LE edema and SOB with likely acute on chronic CHFpEF and also noted anemic with Hb 6.8    Chronic CHFpEF  Hypertension  Dyslipidemia  H/o basilar CVA  -chest x-ray on admission noted with mild cardiomegaly with normal pulmonary vascularity; BNP 2513  - he has been followed in cardiology clinic and on 2/3/21 his torsemide was increased to 20 BID--noted CASS with CR 2.6-- cut back on Torsemide to 10 mg daily; started with weight gain again and thus was admitted  - started on Lasix 40 mg IV BID; clinically not significantly volume overloaded and his worsening renal function seems to be coinciding with diuresis; nephrology decreased Lasix to 40 mg IV daily-- will d/w nephrology to adjust his PTA PO Torsemide (takes 10 mg daily PTA)  - ECHO this admission with EF 50-55%, noted severe inferior wall hypokinesis (Compared to prior study, there is no significant change); denies any chest pain-- will having follow-up with cardiology upon discharge  -will hold off on PTA Irbesartan due to worsening renal function  -continue Coreg, Plavix, Lipitor; will resume aspirin 3/28 if hemoglobin stable    Hypokalemia, likely diuretics induced  - K 3.3-- 2.9---3.2; being supplemented per protocol  -monitor BMP with diuresis    Acute on CKD stage III  - baseline hemoglobin until February 2021 seems around 1.5 to 1.7 but for past month Cr around 2.4 to 2.6  - Cr this admission 2.66---2.64  - evaluated by nephrology, concern for worsening renal failure coincident with diuresis  - hold off on  PTA Irbesartan and Metformin  - avoid NSAIDs, nephrotoxins  - clinically not significantly volume overloaded; nephrology decreased Lasix to 40 mg IV daily-- will d/w nephrology to adjust his PTA PO Torsemide (takes 10 mg daily PTA)    likely anemia of chronic disease  - recent baseline hemoglobin around 8 to 9 ; on admission Hb 6.8--- one unit PRBC---7.2  - iron studies noted with ferritin 45, normal TIBC  - evaluated by ITA and had EGD 3/27, noted with erythematous mucosa in the antrum and  four non-bleeding angioectasias in the duodenum, treated with argon plasma coagulation (APC)  - continue Protonix; ITA plans to follow-up as outpatient for colonoscopy  - started on iron sucrose per nephrology  - will monitor hemoglobin     Diabetes  - a1c 6.0; hold PTA metformin due to renal dysfunction (will discontinue on discharge)  -PTA regimen include Lantus 50 units bedtime, metformin 1000 mg bid , Lispro TID with meals (sliding scale)  -blood sugar here noted upto 199-- trended down to 70s with resumption of PTA dose Lantus  -will hold mealtime NovoLog (was ordered 12 units TID on admission)  -will increase Lantus to 20 units bedtime  -monitor blood sugars TID with meals; sliding scale insulin; hypoglycemia protocol    DVT prophylaxis: mechanical with PCD boots    code status: full code    COVID status: negative 3/26    Disposition: likely 1 to 2 days, pending clinical stability, stable renal function and adjustment of insulin and diuretics                     Physical Exam:      Blood pressure 134/74, pulse 89, temperature 98  F (36.7  C), temperature source Oral, resp. rate 28, weight 100.2 kg (221 lb), SpO2 95 %.  Vitals:    03/26/21 1805 03/27/21 0220   Weight: 99.8 kg (220 lb) 100.2 kg (221 lb)     Vital Signs with Ranges  Temp:  [97.6  F (36.4  C)-99  F (37.2  C)] 98  F (36.7  C)  Pulse:  [87-99] 89  Resp:  [8-32] 28  BP: (131-179)/() 134/74  SpO2:  [94 %-100 %] 95 %  I/O's Last 24 hours  I/O last 3  completed shifts:  In: 390 [P.O.:390]  Out: 750 [Urine:750]    Constitutional: Alert, awake and oriented X 3; resting comfortably in no apparent distress   HEENT: Pupils equal and reactive to light and accomodation, neck supple    Oral cavity: Moist mucosa   Cardiovascular: Normal s1 s2, regular rate and rhythm, no murmur   Lungs: B/l clear to auscultation, no wheezes or crepitations   Abdomen: Soft, nt, nd, no guarding, rigidity or rebound; BS +   LE : Mild b/l edema +   Musculoskeletal: Power 5/5 in all extremities   Neuro: No focal neurological deficits noted   Psychiatry: normal mood and affect                Medications:          atorvastatin  20 mg Oral Daily     carvedilol  6.25 mg Oral BID w/meals     clopidogrel  75 mg Oral Daily     fluticasone  2 spray Both Nostrils Daily     folic acid-vit B6-vit B12  1 tablet Oral Daily     furosemide  40 mg Intravenous BID     insulin aspart  12 Units Subcutaneous TID AC     insulin glargine  50 Units Subcutaneous At Bedtime     iron sucrose (VENOFER) intermittent infusion  200 mg Intravenous Daily     pantoprazole  40 mg Oral BID     potassium chloride ER  20 mEq Oral Daily     sodium chloride (PF)  3 mL Intracatheter Q8H     traZODone  100 mg Oral At Bedtime     PRN Meds: acetaminophen, albuterol, cyclobenzaprine, glucose **OR** dextrose **OR** glucagon, diphenhydramine-acetaminophen (TYLENOL PM) 25/500, lidocaine 4%, lidocaine (buffered or not buffered), melatonin, ondansetron **OR** ondansetron, polyethylene glycol, prochlorperazine **OR** prochlorperazine **OR** prochlorperazine, sodium chloride (PF), sodium chloride (PF)         Data:      All new lab and imaging data was reviewed.   Recent Labs   Lab Test 03/27/21  0610 03/26/21  1326 02/03/21  1225 09/04/20  1338 09/04/20  1338   WBC 4.4 4.9 7.0   < > 7.3   HGB 7.2* 6.8* 9.8*   < > 10.4*   MCV 90 92 87   < > 80    190 214   < > 275   INR  --   --   --   --  0.99    < > = values in this interval not  displayed.      Recent Labs   Lab Test 03/27/21  0610 03/26/21  1326 03/25/21  1042    142 141   POTASSIUM 2.8* 2.9* 3.3*   CHLORIDE 106 105 103   CO2 34* 28 31   BUN 50* 49* 49*   CR 2.64* 2.66* 2.44*   ANIONGAP 4 9 7   VANESA 8.5 8.5 8.7   * 166* 189*     Recent Labs   Lab Test 03/26/21  1326 02/03/21  1225 12/20/20 2050   TROPI 0.022 <0.015 0.020

## 2021-03-27 NOTE — PLAN OF CARE
Patient alert, A1 to bathroom, calls appropriately. GI put note, EGD today and NPO since midnight. Nephrology added orders of aranesp, B12 labs. Hgb 7.2 , cr 2.64. this am K+2.8 will page am hospitalist. LÁZARO SR. Updated wife on plan also.  0700 Pt weak this am, sl more unsteady, gait belt close assist of 1. Pt states that is usually happens in the am, denies use of assistive devices, PT/OT consult today

## 2021-03-27 NOTE — PROGRESS NOTES
03/27/21 1538   Quick Adds   Type of Visit Initial PT Evaluation   Living Environment   People in home spouse   Current Living Arrangements house   Home Accessibility stairs to enter home;stairs within home   Number of Stairs, Main Entrance 2   Stair Railings, Main Entrance none   Number of Stairs, Within Home, Primary other (see comments)  (12)   Stair Railings, Within Home, Primary railing on left side (ascending)   Transportation Anticipated family or friend will provide;car, drives self   Living Environment Comments bed room on second floor with bathroom, goes through garage with two stairs to enter home, sleeps in bed and gets out on patietns left side   Self-Care   Usual Activity Tolerance moderate   Current Activity Tolerance fair   Regular Exercise Yes   Activity/Exercise Type walking;other (see comments)  (was able to walk dog prior to thanksgiving but is now SOB)   Activity/Exercise/Self-Care Comment pt is concerned about SOB and being wobbly when walking, pt afraid of falling back down stairs once reach top. pt takes staris 2 at a time and hangs onto railing tight   Disability/Function   Hearing Difficulty or Deaf no   Were auxiliary aids offered? no   Wear Glasses or Blind yes   Fall history within last six months no  (but has had near falls where he catches himself)   General Information   Onset of Illness/Injury or Date of Surgery 03/26/21   Referring Physician Sampson Patel MD   Patient/Family Therapy Goals Statement (PT) increase balance   Pertinent History of Current Problem (include personal factors and/or comorbidities that impact the POC) Pt sent from heart center and presents with bilateral leg swelling and shortness of breath. The patient was sent from the heart clinic due to uncontrolled chronic heart failure. The patient states the symptoms have been ongoing since previous emergency department visit on Thanksgiving Day last year and a pericardiocentesis on 12/21/20.   Existing  Precautions/Restrictions fall   Cognition   Orientation Status (Cognition) oriented x 4   Affect/Mental Status (Cognition) WFL   Pain Assessment   Patient Currently in Pain No   Posture    Posture Forward head position;Protracted shoulders   Range of Motion (ROM)   ROM Quick Adds ROM WFL   Strength   Manual Muscle Testing Quick Adds Strength WFL   Bed Mobility   Bed Mobility supine-sit   Supine-Sit Winkler (Bed Mobility) supervision   Bed Mobility Limitations impaired ability to control trunk for mobility   Impairments Contributing to Impaired Bed Mobility decreased strength;impaired balance   Comment (Bed Mobility) pt performed supine to sit with bed flat and able to perform with slow motion to get trunk in upright position   Transfers   Transfers sit-stand transfer   Transfer Safety Concerns Noted losing balance backward   Impairments Contributing to Impaired Transfers impaired balance   Transfer Safety Comments pt stood with CGA, gb and fww but did not use walker. pt needed second attempt to stand   Gait/Stairs (Locomotion)   Winkler Level (Gait) supervision;verbal cues   Assistive Device (Gait) walker, front-wheeled   Distance in Feet (Required for LE Total Joints) 20ft   Pattern (Gait) step-through;3-point   Deviations/Abnormal Patterns (Gait) base of support, wide;festinating/shuffling;gait speed decreased   Comment (Gait/Stairs) pt walked from bed to toilet with CGA, fww and gb. pt did not used walker when turning to get into bathroom pt also did not use walker to walk to sink but grabbed it to walk back to bed   Clinical Impression   Criteria for Skilled Therapeutic Intervention yes, treatment indicated   PT Diagnosis (PT) decreased balance   Influenced by the following impairments current medical status   Functional limitations due to impairments decreased strength, decreased proprioception   Clinical Presentation Stable/Uncomplicated   Clinical Presentation Rationale clinical judgement and chart  review   Clinical Decision Making (Complexity) low complexity   Therapy Frequency (PT) Daily   Predicted Duration of Therapy Intervention (days/wks) 2-3 days   Planned Therapy Interventions (PT) home exercise program;gait training;patient/family education;transfer training;strengthening;stair training;balance training   Risk & Benefits of therapy have been explained evaluation/treatment results reviewed;care plan/treatment goals reviewed;risks/benefits reviewed;current/potential barriers reviewed;participants voiced agreement with care plan;participants included;patient;spouse/significant other   Clinical Impression Comments pt has walker and cane at home that he uses occasionally. pt is fearful of walking and stairs due to wobbly gait and SOB   PT Discharge Planning    PT Discharge Recommendation (DC Rec) home with assist;home with outpatient physical therapy   PT Rationale for DC Rec pt has supportive family that is able to assist and stay close during functional mobility and pt aware of deficits so he takes activity slow. pt would also benefit from continued PT services to work on balance and strength deficits   PT Brief overview of current status  CGA for all functional mobiltiy   Total Evaluation Time   Total Evaluation Time (Minutes) 10

## 2021-03-27 NOTE — CONSULTS
Consult Date:  03/27/2021      REQUESTING PHYSICIAN:  Esteban Ruiz PA-C      CONSULTANT:  Brien Greene MD      REASON FOR CONSULTATION:  Chronic kidney disease, acute kidney injury and anemia.      HISTORY OF PRESENT ILLNESS:  This is an 80-year-old admitted with weight gain.  He has a history of congestive heart failure and is being managed by adjusting the doses of his diuretics.  He was noted to have increasing weight gain and edema and his torsemide doses were increased and eventually he was admitted to the hospital.  Outpatient, he has been on Coreg, irbesartan, metformin, KCl and Demadex.  Inpatient, he has been getting some potassium replacement and high-dose IV Lasix.  He has a history of an elevated creatinine now in the hospital and worsening anemia.  He has some component of iron deficiency and has been getting iron infusions.      The patient tells me he has a history of heavy nonsteroidal anti-inflammatory drug use in the past but none recently.  He notes some nocturia and hesitancy, but no dysuria or hematuria.  He does note some edema, but it is not particularly bad at this time.  He denies chest pain or excessive shortness of breath and has not been on oxygen.  He is eating okay.  He has not had any urological surgery and denies kidney stones or urinary tract infection.  He is no longer taking nonsteroidal anti-inflammatory drugs.      PAST MEDICAL HISTORY:  Includes congestive heart failure, diabetes x 20 years, stroke and hypertension.  He had a pericardiocentesis last year done for pericardial effusion.      CURRENT MEDICATIONS:     1.  Carvedilol.   2.  IV Lasix 40 mg twice a day.   3.  Insulin.   .   4. Venofer.     5. He also got a dose of Aranesp and an injection of B12.      SOCIAL HISTORY:  He is .  He is a former smoker.  He does not drink.  He is retiring this year.  He lives in [place], [place].      FAMILY HISTORY:  Negative for chronic kidney disease or end-stage  renal disease.      REVIEW OF SYSTEMS:  Negative except as noted above.      PHYSICAL EXAMINATION:   GENERAL:  He is alert.  He is resting comfortably in bed.  He is not on oxygen.  Heart rate 87, respiratory rate 27.  Blood pressure 134/24.  He is afebrile.   SKIN:  Negative.   HEENT:  Head shows no trauma.  The eyes show pupils round and reactive to light.  Mouth shows good dentition.  Posterior pharynx is clear.   NECK:  Supple.   LUNGS:  Show clear breath sounds.   CARDIAC:  Regular rhythm, normal S1, S2, no murmur.  I cannot detect neck veins.   ABDOMEN:  Obese.  There are normal bowel sounds and no bruits, soft and nontender.   EXTREMITIES:  He has 1+ lower extremity bilaterally with good pedal pulses.  His hands show no clubbing and normal pulses.   NEUROLOGIC:  Intact.      LABORATORY DATA:  On 03/26, he had evidence of iron deficiency and B12 deficiency.  His folate was good.  His hemoglobin was 6.8.  Today it is 7.2.  On 03/27/2021, his electrolytes showed a low potassium at 2.8 and a high bicarbonate at 34.  His creatinine was elevated at 2.64.  His calcium and phosphorus and albumin were okay except for an albumin of 3.2.  His chest x-ray showed an enlarged heart, but was otherwise clear.  There is no evidence of pulmonary edema.  In 11/20 an abdominal ultrasound incidentally noted a normal right kidney.      IMPRESSION:   1.  Chronic kidney disease, stage III.  Possibilities for this include diabetic nephropathy, hypertensive nephropathy and analgesic nephropathy.  His baseline creatinine is 1.5-1.7 with an estimated GFR of 37-40.  His kidney function is now worse and this is most likely coincident with over diuresis.  He has some edema in the lower extremity, but his heart and lungs are clear and his chest x-ray is clear.  I do not think he is fluid overloaded at this point.  I am going to decrease the Lasix to 40 mg IV daily.  He is getting potassium replacement.  We will monitor his kidney function.   He should not be on metformin any longer either here or as an outpatient given his level of kidney function.  He should certainly not get any IV contrast or be exposed to nonsteroidal anti-inflammatory drugs.   2.  Acute kidney injury.  His creatinine again at baseline is 1.5-1.7 and now it is 2.64, this is coincident with diuresis.  We will back off on the diuretics and we will monitor his kidney function.   3.  Anemia.  He is iron and B12 deficient and may have chronic kidney disease itself contributing.  He got an injection of B12 and Aranesp and is getting IV iron.  We will put him on oral folgard.  We will need to monitor his hemoglobin.   4.  Congestive heart failure per Cardiology.      When he is discharged from the hospital, he will need followup in our clinic in 1-2 weeks.  We will follow him initially, and then he will need ongoing renal care to prevent progression of chronic kidney disease.  Again, he should avoid metformin and nonsteroidal anti-inflammatory drugs.         DAVID PEGUERO MD             D: 2021   T: 2021   MT: ANSHU      Name:     LISA CAMARA   MRN:      [id number]        Account:       [id number]   :      1940           Consult Date:  2021      Document: [id number]       cc: Esteban Ruiz PA-C

## 2021-03-27 NOTE — PLAN OF CARE
Pt complains of MARKS, up with 1 and a walker, VSS, denies chest pain, good uop, EGD done today, K+ replaced, Tele SR, Ace wraps on lower extremities,

## 2021-03-27 NOTE — PROGRESS NOTES
03/27/21 1556   Signing Clinician's Name / Credentials   Signing clinician's name / credentials Ekaterina Darling SPT   Greene Balance Scale (GREENE GVOIND, LIZ S, GORDY GARLAND, LINDA WITT: MEASURING BALANCE IN THE ELDERLY: VALIDATION OF AN INSTRUMENT. CAN. J. PUB. HEALTH, JULY/AUGUST SUPPLEMENT 2:S7-11, 1992.)   Sit To Stand 4   Standing Unsupported 4   Sitting Unsupported 4   Stand to Sit 3   Transfers 3   Standing with Eyes Closed 3   Standing Unsupported, Feet Together 3   Reach Forward With Outstretched Arm 3   Retrieve Object From Floor 3   Turning to Look Behind 2   Turn 360 Degrees 2   Placing Alternate Foot on Stool (4-6 inches) 3   Unsupported Tandem Stand (Demonstrate to Subject) 0   One Leg Stand 1   Total Score (A score of 45 or less has been correlated with an increased risk of falls)   Total Score (out of 56) 38   Greene Balance Scale (BBS) Cutoff Scores: A score of < 45/56 indicates an increased risk for falls.   Patient Score: 38/56    The BBS is a measure of static and dynamic standing balance that has been validated in community dwelling elderly individuals and individuals who have Parkinson's Disease, MS, and those who are s/p CVA and TBI. The test is administered without an assistive device. Scores from the Greene are used to determine the probability of falling based on the patient's previous history of falls and their test performance.     Minimal Detectable Change = 6.5   & Minimal Detectable Change (Parkinson's Disease) = 5 according to Zeyad & Samiey 2008    Assessment (rationale for performing, application to patient s function & care plan): assess pt's fall risk to assist with discharge planning  Minutes billed as physical performance test: 0, evaluation

## 2021-03-27 NOTE — CONSULTS
[facility]    Cardiology Consultation     Date of Admission:  3/26/2021    Assessment & Plan   Justyn Olivas is a 80 year old male who was admitted on 3/26/2021. I was asked to see the patient for acute on chronic congestive heart failure    Patient was sent via clinic providers for management of acute heart failure.  Patient has had significant weight gain due to volume overload.  Patient has had difficulty with diuretic titrations as an outpatient due to renal dysfunction.  Additionally the patient is significantly anemic with a hemoglobin of 6.8 yesterday.    Patient has multiple DM2, prior CVA, HTN, HLD, chronic anemia.      For unclear reasons the patient had a pericardial effusion and underwent pericardiocentesis in December 2020.  This is been stable without recurrence.    Patient has been medication compliant and does not endorse any major dietary indiscretions.    Repeat echocardiogram on this admission notes a LVEF of 50 to 55% with severe inferior wall hypokinesis.  There was no significant change from previous.    While the most likely explanation for this patient having recurrent issues with fluid buildup is related to the interaction of his diuretic and his renal dysfunction I am curious if there are other possible causes.    I believe the patient's shortness of breath is most likely multifactorial in the setting of his chronic heart failure and severe anemia.    Given that we are not entirely clear why the patient had an episode of significant pericardial effusion I am curious if he may have pericardial disease, constriction/restriction.  This likely would need to be worked up as an outpatient with possible advanced imaging of his pericardium as well as possibly an invasive hemodynamic study.  In the short-term we will focus on diuresing the patient as his kidneys tolerate      Plan:  Agree with diuresis for now, nephrology is on board and is adjusted his diuretic dose  we will not change the plan for today    Continue Lipitor, Coreg, Plavix    Diuresis while in hospital and then consider additional outpatient work-up as outlined above.  Garo Cavanaugh MD     Code Status    Full Code    Reason for Consult   Congestive heart failure    Primary Care Physician   Christian Davidson MD    Chief Complaint   Shortness of breath    History is obtained from the patient    History of Present Illness   Justyn Olivas is a 80 year old male who was admitted on 3/26/2021. I was asked to see the patient for acute on chronic congestive heart failure    Patient was sent via clinic providers for management of acute heart failure.  Patient has had significant weight gain due to volume overload.  Patient has had difficulty with diuretic titrations as an outpatient due to renal dysfunction.  Additionally the patient is significantly anemic with a hemoglobin of 6.8 yesterday.    Patient has multiple DM2, prior CVA, HTN, HLD, chronic anemia.      For unclear reasons the patient had a pericardial effusion and underwent pericardiocentesis in December 2020.  This is been stable without recurrence.    Patient has been medication compliant and does not endorse any major dietary indiscretions.    Repeat echocardiogram on this admission notes a LVEF of 50 to 55% with severe inferior wall hypokinesis.  There was no significant change from previous.    Past Medical History   I have reviewed this patient's medical history and updated it with pertinent information if needed.   Past Medical History:   Diagnosis Date     Cerebral infarction (H)      Diabetes (H)      Hyperlipemia        Past Surgical History   I have reviewed this patient's surgical history and updated it with pertinent information if needed.  Past Surgical History:   Procedure Laterality Date     CV PERICARDIOCENTESIS N/A 12/21/2020    Procedure: Pericardiocentesis;  Surgeon: Chas Chambers MD;  Location: [facility]  CATH LAB       Prior to Admission Medications   Prior to Admission Medications   Prescriptions Last Dose Informant Patient Reported? Taking?   VENTOLIN  (90 Base) MCG/ACT inhaler prn  No Yes   Sig: INL 2 PFS PO QID PRN   aspirin 81 MG EC tablet 3/26/2021 at am Pharmacy Yes Yes   Sig: Take 81 mg by mouth daily   atorvastatin (LIPITOR) 20 MG tablet 3/25/2021 at hs Pharmacy No Yes   Sig: TAKE 1 TABLET(20 MG) BY MOUTH DAILY   blood glucose (STEVE CONTOUR) test strip  Pharmacy No No   Sig: TESTING FOUR TIMES DAILY OR AS DIRECTED   carvedilol (COREG) 6.25 MG tablet 3/26/2021 at am  No Yes   Sig: Take 1 tablet (6.25 mg) by mouth 2 times daily (with meals)   clopidogrel (PLAVIX) 75 MG tablet 3/26/2021 at am  Yes Yes   Sig: Take 1 tablet (75 mg) by mouth daily   cyclobenzaprine (FLEXERIL) 10 MG tablet prn  No Yes   Si/2 po bid prn & 1po qhs   Patient taking differently: Take 5-10 mg by mouth 3 times daily as needed 1/2 po bid prn & 1po qhs   diphenhydrAMINE-acetaminophen (TYLENOL PM)  MG tablet Past Month at Unknown time  Yes Yes   Sig: Take 2 tablets by mouth nightly as needed for sleep   dorzolamide-timolol (COSOPT) 2-0.5 % ophthalmic solution 3/26/2021 at am Pharmacy Yes Yes   Sig: Place 1 drop into both eyes 2 times daily   fluticasone (FLONASE) 50 MCG/ACT nasal spray 3/26/2021 at am  Yes Yes   Sig: Spray 2 sprays into both nostrils daily   insulin glargine (LANTUS SOLOSTAR) 100 UNIT/ML pen 3/25/2021 at hs Pharmacy No Yes   Sig: INJECT 50 UNITS UNDER THE SKIN AT BEDTIME   insulin lispro (HUMALOG KWIKPEN) 100 UNIT/ML (1 unit dial) KWIKPEN 3/26/2021 at am 5 units Pharmacy No Yes   Sig: INJECT UP TO 55 UNITS UNDER THE SKIN ONCE DAILY AS DIRECTED   Patient taking differently: Inject Subcutaneous 3 times daily (before meals) Sliding scale with meals   insulin pen needle (BD FERCHO U/F) 32G X 4 MM miscellaneous  Pharmacy No No   Sig: USE AS DIRECTED UP TO FOUR TIMES DAILY   irbesartan (AVAPRO) 150 MG tablet  3/25/2021 at am  Yes Yes   Sig: Take 150 mg by mouth At Bedtime    metFORMIN (GLUCOPHAGE) 1000 MG tablet 3/26/2021 at Unknown time Pharmacy No Yes   Sig: TAKE 1 TABLET(1000 MG) BY MOUTH TWICE DAILY WITH MEALS   [place] [facility]  [place] No   Sig: Hip steroid injectoion   pantoprazole (PROTONIX) 40 MG EC tablet 3/25/2021 at hs  No Yes   Sig: TAKE 1 TABLET(40 MG) BY MOUTH TWICE DAILY   Patient taking differently: Take 40 mg by mouth At Bedtime TAKE 1 TABLET(40 MG) BY MOUTH TWICE DAILY   potassium chloride ER (K-TAB) 20 MEQ CR tablet 3/26/2021 at am  Yes Yes   Sig: Take 1 tablet (20 mEq) by mouth daily   torsemide (DEMADEX) 20 MG tablet 3/26/2021 at am  Yes Yes   Sig: Take 10 mg by mouth daily Take 20 mg x 1/2 tab = 10mg daily   traZODone (DESYREL) 100 MG tablet 3/25/2021 at hs  No Yes   Sig: TAKE 1 TABLET(100 MG) BY MOUTH AT BEDTIME      Facility-Administered Medications: None     Allergies   No Known Allergies    Social History   I have reviewed this patient's social history and updated it with pertinent information if needed. Justyn Olivas  reports that he quit smoking about 45 years ago. His smoking use included cigarettes. He started smoking about 56 years ago. He has a 22.00 pack-year smoking history. He has never used smokeless tobacco. He reports that he does not drink alcohol or use drugs.    Family History   I have reviewed this patient's family history and updated it with pertinent information if needed.   Family History   Problem Relation Age of Onset     Family History Negative Mother      Family History Negative Father        Review of Systems   The 12 point Review of Systems is negative other than noted in the HPI or here.     Physical Exam   Temp: 98  F (36.7  C) Temp src: Oral BP: 134/74 Pulse: 89   Resp: 28 SpO2: 95 % O2 Device: None (Room air)    Vital Signs with Ranges  Temp:  [98  F (36.7  C)-99  F (37.2  C)] 98  F (36.7  C)  Pulse:  [87-99] 89  Resp:  [8-32] 28  BP: (134-179)/()  134/74  SpO2:  [94 %-100 %] 95 %  221 lbs 0 oz    GENERAL APPEARANCE: Alert and oriented x3. No acute distress.  SKIN: Inspection of the skin reveals no rashes, ulcerations, warm, dry  NECK: Supple and symmetric.   Elevated JVP  LUNGS: Diminished in the bases  CARDIOVASCULAR: S1, S2, regular rate and rhythm without any murmurs, gallops, rubs. The carotid pulses were normal and 2+ bilaterally without bruits. Peripheral pulses were 2+ and symmetric.  ABDOMEN: Soft, non-tender, non-distended with normal bowel sounds.  No ascites noted.  EXTREMITIES: 2+ edema.  NEUROLOGIC:  Normal mood and affect.  Sensation to touch was normal.      Data   Results for orders placed or performed during the hospital encounter of 03/26/21 (from the past 24 hour(s))   Glucose by meter   Result Value Ref Range    Glucose 156 (H) 70 - 99 mg/dL   Glucose by meter   Result Value Ref Range    Glucose 199 (H) 70 - 99 mg/dL   Glucose by meter   Result Value Ref Range    Glucose 208 (H) 70 - 99 mg/dL   Renal panel   Result Value Ref Range    Sodium 144 133 - 144 mmol/L    Potassium 2.8 (L) 3.4 - 5.3 mmol/L    Chloride 106 94 - 109 mmol/L    Carbon Dioxide 34 (H) 20 - 32 mmol/L    Anion Gap 4 3 - 14 mmol/L    Glucose 131 (H) 70 - 99 mg/dL    Urea Nitrogen 50 (H) 7 - 30 mg/dL    Creatinine 2.64 (H) 0.66 - 1.25 mg/dL    GFR Estimate 22 (L) >60 mL/min/[1.73_m2]    GFR Estimate If Black 25 (L) >60 mL/min/[1.73_m2]    Calcium 8.5 8.5 - 10.1 mg/dL    Phosphorus 4.0 2.5 - 4.5 mg/dL    Albumin 3.2 (L) 3.4 - 5.0 g/dL   CBC with platelets differential   Result Value Ref Range    WBC 4.4 4.0 - 11.0 10e9/L    RBC Count 2.64 (L) 4.4 - 5.9 10e12/L    Hemoglobin 7.2 (L) 13.3 - 17.7 g/dL    Hematocrit 23.8 (L) 40.0 - 53.0 %    MCV 90 78 - 100 fl    MCH 27.3 26.5 - 33.0 pg    MCHC 30.3 (L) 31.5 - 36.5 g/dL    RDW 19.5 (H) 10.0 - 15.0 %    Platelet Count 160 150 - 450 10e9/L    Diff Method Automated Method     % Neutrophils 55.3 %    % Lymphocytes 23.2 %    %  Monocytes 14.9 %    % Eosinophils 5.6 %    % Basophils 0.5 %    % Immature Granulocytes 0.5 %    Nucleated RBCs 0 0 /100    Absolute Neutrophil 2.5 1.6 - 8.3 10e9/L    Absolute Lymphocytes 1.0 0.8 - 5.3 10e9/L    Absolute Monocytes 0.7 0.0 - 1.3 10e9/L    Absolute Eosinophils 0.3 0.0 - 0.7 10e9/L    Absolute Basophils 0.0 0.0 - 0.2 10e9/L    Abs Immature Granulocytes 0.0 0 - 0.4 10e9/L    Absolute Nucleated RBC 0.0    Parathyroid Hormone Intact   Result Value Ref Range    Parathyroid Hormone Intact 137 (H) 12 - 64 pg/mL   Hemoglobin A1c   Result Value Ref Range    Hemoglobin A1C 6.0 (H) 0 - 5.6 %   Glucose by meter   Result Value Ref Range    Glucose 95 70 - 99 mg/dL   UPPER GI ENDOSCOPY   Result Value Ref Range    Upper GI Endoscopy       [facility] Endoscopy Department  _______________________________________________________________________________  Patient Name: Justyn Olivas        Procedure Date: 3/27/2021 9:04 AM  MRN: [id number]                       Account Number: [id number]  YOB: 1940              Admit Type: Inpatient  Age: 80                               Room: 2  Note Status: Finalized                Attending MD: Luc Nash MD  Total Sedation Time:                  Instrument Name: 402 GIF- Gastroscope  _______________________________________________________________________________     Procedure:                Upper GI endoscopy  Indications:              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  _______________________________________________________________________________  Procedure:                Pre-Anesthesia Assessment:                            - Prior to the procedure, a History and Physical                             was performed, and patient medications and                             allergies were reviewed. The patient is competent.                             The risks and benefits of the procedure and the                             sedation options and risks were discussed with the                             patient. All questions were answered and informed                             consent was obtained. Patient identification and                             proposed procedure were verified by the physician.                             Mental Status Examination: normal. Airway                             Examination: normal oropharyngeal airway  and neck                             mobility. Respiratory Examination: clear to                             auscultation. Prophylactic Antibiotics: The patient                             does not require prophylactic antibiotics. Prior                             Anticoagulants: The patient has taken no previous                             anticoagulant or antiplatelet agents. ASA Grade                             Assessment: II - A patient with mild systemic                             disease. After reviewing the risks and benefits,                             the patient was deemed in satisfactory condition to                             undergo the procedure. The anesthesia plan was to                             use moderate sedation / analgesia (conscious                             sedation). Immediately prior to administration of                             medications, the patient was re-assessed for                             adequacy to receive sedatives. The heart rate,                              respiratory  rate, oxygen saturations, blood                             pressure, adequacy of pulmonary ventilation, and                             response to care were monitored throughout the                             procedure. The physical status of the patient was                             re-assessed after the procedure.                            After obtaining informed consent, the endoscope was                             passed under direct vision. Throughout the                             procedure, the patient's blood pressure, pulse, and                             oxygen saturations were monitored continuously. The                             Endoscope was introduced through the mouth, and                             advanced to the second part of duodenum. The upper                             GI endoscopy was accomplished without difficulty.                             The patient tolerated the procedure well.                                                                                    Findings:       The examined esophagus was normal.       The gastric body was normal. Biopsies were taken with a cold forceps for        Helicobacter pylori testing. Estimated blood loss: none.       Diffuse moderately erythematous mucosa without bleeding was found in the        gastric antrum.       The first portion of the duodenum was normal.       Four 2 mm angioectasias without bleeding were found in the second        portion of the duodenum. Coagulation for bleeding prevention using argon        plasma was successful. Estimated blood loss: none.                                                                                   Impression:               - Normal esophagus.                            - Normal gastric body. Biopsied.                            - Erythematous mucosa in the antrum.                            - Normal first portion of the duodenum.                            - Four non-bleeding  angioectasias in  the duodenum.                             Treated with argon plasma coagulation (APC).  Recommendation:           - Return patient to hospital [facility] for ongoing care.                            - Clear liquid diet today. Advance tomorrow and can                             consider discharge if Hgb stable.                            - Use Protonix (pantoprazole) 40 mg PO daily.                            - Continue present medications.                            - Outpatient colonoscopy to finish work up.                                                                                   Procedure Code(s):       --- Professional ---       67905, 59, Esophagogastroduodenoscopy, flexible, transoral; with control        of bleeding, any method       36613, Esophagogastroduodenoscopy, flexible, transoral; with biopsy,        single or multiple  Diagnosis Code(s):       --- Professional ---       K31.89, Other diseases of stomach and duodenum       K31.819, Angiodysplasia of stomach and duoden um without bleeding       D50.0, Iron deficiency anemia secondary to blood loss (chronic)       D50.9, Iron deficiency anemia, unspecified    CPT copyright 2019 [employer]. All rights reserved.    The codes documented in this report are preliminary and upon  review may   be revised to meet current compliance requirements.    Electronic Signature by Dr. Luc Nash  __________________  Luc Nash MD  3/27/2021 9:53:02 AM  I was physically present for the entire viewing portion of the exam.  Luc Nash MD  Number of Addenda: 0    Note Initiated On: 3/27/2021 9:04 AM  MRN:                      [id number]  Procedure Date:           3/27/2021 9:04:28 AM  Total Procedure Duration: 0 hours 15 minutes 47 seconds   Estimated Blood Loss:       Scope In: 9:26:33 AM  Scope Out: 9:42:20 AM     Echo Limited    Narrative    [id number]  [id number]  [id number]  [id number]^PAUL^SANTHOSH^JENNIFER Munoz  [facility]  Echocardiography Laboratory  [address] [place] [address]     Name: LISA CAMARA  MRN: [id number]  : 1940  Study Date: 2021 12:25 PM  Age: 80 yrs  Gender: Male  Patient [facility]: [facility]  Reason For Study: CHF  Ordering Physician: SANTHOSH MILLER  Referring Physician: ALBINO DORSEY  Performed By: Franklin Whittington RDCS     BSA: 2.2 m2  Height: 72 in  Weight: 221 lb  HR: 86  BP: 134/74 mmHg  ______________________________________________________________________________  Procedure  Limited Portable Echo Adult.  ______________________________________________________________________________  Interpretation Summary     The visual ejection fraction is estimated at 50-55%.  There is severe inferior wall hypokinesis.  Compared to prior study, there is no significant change.  ______________________________________________________________________________  Left Ventricle  The left ventricle is normal in size. There is normal left ventricular wall  thickness. The visual ejection fraction is estimated at 50-55%. Left  ventricular diastolic function is abnormal. There is severe inferior wall  hypokinesis. No evidence of left ventricular mass or tumors.     Right Ventricle  The right ventricle is normal in structure, function and size.     Atria  The left atrium is borderline dilated. Right atrial size is normal.     Mitral Valve  The mitral valve leaflets appear normal. There is no evidence of stenosis,  fluttering, or prolapse. There is mild (1+) mitral regurgitation.     Tricuspid Valve  Normal tricuspid valve.     Aortic Valve  Normal tricuspid aortic valve.     Pulmonic Valve  Normal pulmonic valve.     Vessels  The aortic root is normal size. The ascending aorta is Borderline dilated. The  inferior vena cava is normal.     Pericardium  There is no pericardial effusion.     Rhythm  Sinus rhythm was  noted.  ______________________________________________________________________________  MMode/2D Measurements & Calculations  IVSd: 1.1 cm     LVIDd: 5.2 cm  LVIDs: 3.7 cm  LVPWd: 1.1 cm  FS: 27.5 %  LV mass(C)d: 217.6 grams  LV mass(C)dI: 97.9 grams/m2  Ao root diam: 3.4 cm  LA dimension: 4.7 cm  asc Aorta Diam: 3.8 cm  LA/Ao: 1.4  LA Volume (BP): 78.0 ml  LA Volume Index (BP): 35.1 ml/m2  RWT: 0.42     Doppler Measurements & Calculations  MV E max dayna: 106.0 cm/sec  MV A max dayna: 117.4 cm/sec  MV E/A: 0.90  MV dec time: 0.17 sec  PA V2 max: 95.0 cm/sec  PA max PG: 3.6 mmHg  PA mean P.1 mmHg  PA V2 VTI: 19.7 cm  PA acc time: 0.12 sec  E/E' av.6  Lateral E/e': 18.7  Medial E/e': 18.4     ______________________________________________________________________________  Report approved by: Oneida Cooper 2021 03:07 PM         Glucose by meter   Result Value Ref Range    Glucose 70 70 - 99 mg/dL   UA reflex to Microscopic   Result Value Ref Range    Color Urine Straw     Appearance Urine Clear     Glucose Urine Negative NEG^Negative mg/dL    Bilirubin Urine Negative NEG^Negative    Ketones Urine Negative NEG^Negative mg/dL    Specific Gravity Urine 1.008 1.003 - 1.035    Blood Urine Negative NEG^Negative    pH Urine 6.5 5.0 - 7.0 pH    Protein Albumin Urine Negative NEG^Negative mg/dL    Urobilinogen mg/dL 0.0 0.0 - 2.0 mg/dL    Nitrite Urine Negative NEG^Negative    Leukocyte Esterase Urine Negative NEG^Negative    Source Midstream Urine    Potassium   Result Value Ref Range    Potassium 3.2 (L) 3.4 - 5.3 mmol/L   Glucose by meter   Result Value Ref Range    Glucose 115 (H) 70 - 99 mg/dL

## 2021-03-27 NOTE — PROGRESS NOTES
03/27/21 1538   Signing Clinician's Name / Credentials   Signing clinician's name / credentials Ekaterina Kuoalan SPT   Quick Adds   Rehab Discipline PT   Quick Adds Supervision   Functional Transfer Training   Minutes of Treatment 5   Symptoms Noted During/After Treatment none   Treatment Detail Pt greeted in supine and agreeable to PT. pt performed supine to sit with SBA and bed lwoered. pt sat at edge of bed with SBA. pt performed sit to stand with CGA due to one loss of balance backwards on first attempt. Pt transfered from bed to chair with SBA and use of bed railings and arm rest for stability. after treatment pt left in chair with chair alarm on and needs within reach   Gait Training   Minutes of Treatment (Gait Training) 4   Symptoms Noted During/After Treatment (Gait Training) fatigue   Distance in Feet (Required for LE Total Joints) 20ft   Treatment Detail pt walked with fww, gb and CGA. pt had wide base of support and unsteady right leg. see EVAL for details   Hickman Level (Gait Training) contact guard   Physical Assistance Level (Gait Training) supervision;verbal cues   Assistive Device (Gait Training) rolling walker   Gait Analysis Deviations decreased weight-shifting ability;decreased toe-to-floor clearance;decreased deborah   Therapeutic Exercise   Minutes of Treatment 2   Symptoms Noted During/After Treatment shortness of breath   Treatment Detail Pt performed 12 sit to stands in 30s with 1 loss of balance backwards and slowing down the last couple sets. pt able to not use arms but used back of bed for support   Physical Performance Test or Measurement   Minutes of Treatment 12   Symptoms Noted During/After Treatment none   Treatment Detail see GREENE BALANCE SCALE (38/56)   PT Discharge Planning    PT Discharge Recommendation (DC Rec) home with assist;home with outpatient physical therapy   PT Rationale for DC Rec pt has supportive family that is able to assist and stay close during functional  mobility and pt aware of deficits so he takes activity slow. pt would also benefit from continued PT services to work on balance and strength deficits   PT Brief overview of current status  CGA for all functional mobiltiy   Additional Documentation   PT Plan walking in hallways, DGI   Total Session Time   Total Session Time (minutes) 33 minutes

## 2021-03-28 ENCOUNTER — APPOINTMENT (OUTPATIENT)
Dept: OCCUPATIONAL THERAPY | Facility: CLINIC | Age: 81
DRG: 291 | End: 2021-03-28
Attending: INTERNAL MEDICINE
Payer: MEDICARE

## 2021-03-28 ENCOUNTER — APPOINTMENT (OUTPATIENT)
Dept: PHYSICAL THERAPY | Facility: CLINIC | Age: 81
DRG: 291 | End: 2021-03-28
Payer: MEDICARE

## 2021-03-28 LAB
ANION GAP SERPL CALCULATED.3IONS-SCNC: 3 MMOL/L (ref 3–14)
BUN SERPL-MCNC: 45 MG/DL (ref 7–30)
CALCIUM SERPL-MCNC: 8.1 MG/DL (ref 8.5–10.1)
CHLORIDE SERPL-SCNC: 104 MMOL/L (ref 94–109)
CO2 SERPL-SCNC: 33 MMOL/L (ref 20–32)
CREAT SERPL-MCNC: 2.5 MG/DL (ref 0.66–1.25)
CREAT UR-MCNC: 82 MG/DL
ERYTHROCYTE [DISTWIDTH] IN BLOOD BY AUTOMATED COUNT: 19.4 % (ref 10–15)
FRACT EXCRET NA UR+SERPL-RTO: 0.6 %
GFR SERPL CREATININE-BSD FRML MDRD: 23 ML/MIN/{1.73_M2}
GLUCOSE BLDC GLUCOMTR-MCNC: 180 MG/DL (ref 70–99)
GLUCOSE BLDC GLUCOMTR-MCNC: 186 MG/DL (ref 70–99)
GLUCOSE BLDC GLUCOMTR-MCNC: 255 MG/DL (ref 70–99)
GLUCOSE BLDC GLUCOMTR-MCNC: 262 MG/DL (ref 70–99)
GLUCOSE SERPL-MCNC: 178 MG/DL (ref 70–99)
HCT VFR BLD AUTO: 23.8 % (ref 40–53)
HGB BLD-MCNC: 7.4 G/DL (ref 13.3–17.7)
MCH RBC QN AUTO: 27.8 PG (ref 26.5–33)
MCHC RBC AUTO-ENTMCNC: 31.1 G/DL (ref 31.5–36.5)
MCV RBC AUTO: 90 FL (ref 78–100)
PLATELET # BLD AUTO: 160 10E9/L (ref 150–450)
POTASSIUM SERPL-SCNC: 3.1 MMOL/L (ref 3.4–5.3)
POTASSIUM SERPL-SCNC: 3.2 MMOL/L (ref 3.4–5.3)
POTASSIUM SERPL-SCNC: 3.3 MMOL/L (ref 3.4–5.3)
POTASSIUM SERPL-SCNC: 3.3 MMOL/L (ref 3.4–5.3)
RBC # BLD AUTO: 2.66 10E12/L (ref 4.4–5.9)
SODIUM SERPL-SCNC: 140 MMOL/L (ref 133–144)
SODIUM UR-SCNC: 28 MMOL/L
SODIUM UR-SCNC: NORMAL MMOL/L
WBC # BLD AUTO: 5 10E9/L (ref 4–11)

## 2021-03-28 PROCEDURE — 84132 ASSAY OF SERUM POTASSIUM: CPT | Performed by: HOSPITALIST

## 2021-03-28 PROCEDURE — 80048 BASIC METABOLIC PNL TOTAL CA: CPT | Performed by: HOSPITALIST

## 2021-03-28 PROCEDURE — 999N001017 HC STATISTIC GLUCOSE BY METER IP

## 2021-03-28 PROCEDURE — 250N000013 HC RX MED GY IP 250 OP 250 PS 637: Performed by: INTERNAL MEDICINE

## 2021-03-28 PROCEDURE — 82542 COL CHROMOTOGRAPHY QUAL/QUAN: CPT | Performed by: HOSPITALIST

## 2021-03-28 PROCEDURE — 99232 SBSQ HOSP IP/OBS MODERATE 35: CPT | Performed by: INTERNAL MEDICINE

## 2021-03-28 PROCEDURE — 97530 THERAPEUTIC ACTIVITIES: CPT | Mod: GP

## 2021-03-28 PROCEDURE — 99233 SBSQ HOSP IP/OBS HIGH 50: CPT | Performed by: HOSPITALIST

## 2021-03-28 PROCEDURE — 82570 ASSAY OF URINE CREATININE: CPT | Performed by: INTERNAL MEDICINE

## 2021-03-28 PROCEDURE — 36415 COLL VENOUS BLD VENIPUNCTURE: CPT | Performed by: INTERNAL MEDICINE

## 2021-03-28 PROCEDURE — 97165 OT EVAL LOW COMPLEX 30 MIN: CPT | Mod: GO

## 2021-03-28 PROCEDURE — 97116 GAIT TRAINING THERAPY: CPT | Mod: GP

## 2021-03-28 PROCEDURE — 85027 COMPLETE CBC AUTOMATED: CPT | Performed by: HOSPITALIST

## 2021-03-28 PROCEDURE — 99233 SBSQ HOSP IP/OBS HIGH 50: CPT | Performed by: INTERNAL MEDICINE

## 2021-03-28 PROCEDURE — 250N000011 HC RX IP 250 OP 636: Performed by: INTERNAL MEDICINE

## 2021-03-28 PROCEDURE — 258N000003 HC RX IP 258 OP 636: Performed by: INTERNAL MEDICINE

## 2021-03-28 PROCEDURE — 84300 ASSAY OF URINE SODIUM: CPT | Performed by: INTERNAL MEDICINE

## 2021-03-28 PROCEDURE — 250N000013 HC RX MED GY IP 250 OP 250 PS 637: Performed by: HOSPITALIST

## 2021-03-28 PROCEDURE — 120N000001 HC R&B MED SURG/OB

## 2021-03-28 PROCEDURE — 84132 ASSAY OF SERUM POTASSIUM: CPT | Performed by: INTERNAL MEDICINE

## 2021-03-28 PROCEDURE — 36415 COLL VENOUS BLD VENIPUNCTURE: CPT | Performed by: HOSPITALIST

## 2021-03-28 PROCEDURE — 250N000012 HC RX MED GY IP 250 OP 636 PS 637: Performed by: HOSPITALIST

## 2021-03-28 RX ORDER — POTASSIUM CHLORIDE 1.5 G/1.58G
20 POWDER, FOR SOLUTION ORAL ONCE
Status: COMPLETED | OUTPATIENT
Start: 2021-03-28 | End: 2021-03-28

## 2021-03-28 RX ORDER — POTASSIUM CHLORIDE 1500 MG/1
20 TABLET, EXTENDED RELEASE ORAL ONCE
Status: COMPLETED | OUTPATIENT
Start: 2021-03-29 | End: 2021-03-29

## 2021-03-28 RX ORDER — ASPIRIN 81 MG/1
81 TABLET ORAL DAILY
Status: DISCONTINUED | OUTPATIENT
Start: 2021-03-28 | End: 2021-03-30

## 2021-03-28 RX ADMIN — CARVEDILOL 6.25 MG: 6.25 TABLET, FILM COATED ORAL at 18:09

## 2021-03-28 RX ADMIN — FLUTICASONE PROPIONATE 2 SPRAY: 50 SPRAY, METERED NASAL at 09:00

## 2021-03-28 RX ADMIN — PANTOPRAZOLE SODIUM 40 MG: 40 TABLET, DELAYED RELEASE ORAL at 21:22

## 2021-03-28 RX ADMIN — INSULIN GLARGINE 30 UNITS: 100 INJECTION, SOLUTION SUBCUTANEOUS at 21:22

## 2021-03-28 RX ADMIN — PANTOPRAZOLE SODIUM 40 MG: 40 TABLET, DELAYED RELEASE ORAL at 08:26

## 2021-03-28 RX ADMIN — CARVEDILOL 6.25 MG: 6.25 TABLET, FILM COATED ORAL at 08:26

## 2021-03-28 RX ADMIN — POTASSIUM CHLORIDE 20 MEQ: 1.5 POWDER, FOR SOLUTION ORAL at 05:22

## 2021-03-28 RX ADMIN — POTASSIUM CHLORIDE 20 MEQ: 1.5 POWDER, FOR SOLUTION ORAL at 18:51

## 2021-03-28 RX ADMIN — ATORVASTATIN CALCIUM 20 MG: 20 TABLET, FILM COATED ORAL at 21:22

## 2021-03-28 RX ADMIN — TRAZODONE HYDROCHLORIDE 100 MG: 100 TABLET ORAL at 21:22

## 2021-03-28 RX ADMIN — Medication 1 TABLET: at 08:26

## 2021-03-28 RX ADMIN — FUROSEMIDE 40 MG: 10 INJECTION, SOLUTION INTRAMUSCULAR; INTRAVENOUS at 08:24

## 2021-03-28 RX ADMIN — POTASSIUM CHLORIDE 20 MEQ: 1.5 POWDER, FOR SOLUTION ORAL at 06:24

## 2021-03-28 RX ADMIN — CLOPIDOGREL BISULFATE 75 MG: 75 TABLET ORAL at 08:26

## 2021-03-28 RX ADMIN — ASPIRIN 81 MG: 81 TABLET, DELAYED RELEASE ORAL at 13:07

## 2021-03-28 RX ADMIN — POTASSIUM CHLORIDE 20 MEQ: 1500 TABLET, EXTENDED RELEASE ORAL at 08:25

## 2021-03-28 RX ADMIN — IRON SUCROSE 200 MG: 20 INJECTION, SOLUTION INTRAVENOUS at 11:06

## 2021-03-28 ASSESSMENT — ACTIVITIES OF DAILY LIVING (ADL): ADLS_ACUITY_SCORE: 16

## 2021-03-28 NOTE — PLAN OF CARE
[id number]: A&Ox4. VSS on RA. Tele SR. Denies CP or shortness of breath. LS diminished. BLE +2/+3 edema, ace wraps in place overnight, removed at 0500 per pt request. Abd soft, nontender, reports some cramping, BS active, had 1 small dark brown BM at 2000. Hgb trending up. Bg checks, snack given at bedtime. Up with 1 assist with walker, unsteady at times. 2200 K check showed 3.1, replaced with 20mEq orally, recheck at 0200 showed 3.2, replaced with 20mEq orally, recheck @ 0600 showed 3.3, given 20mEq orally. Tylenol pm given at bedtime per pt request.

## 2021-03-28 NOTE — PROGRESS NOTES
[facility]    Cardiology Consultation     Date of Admission:  3/26/2021    Assessment & Plan   Justyn Olivas is a 80 year old male who was admitted on 3/26/2021. I was asked to see the patient for acute on chronic congestive heart failure    Patient was sent via clinic providers for management of acute heart failure.  Patient has had significant weight gain due to volume overload.  Patient has had difficulty with diuretic titrations as an outpatient due to renal dysfunction.  Additionally the patient is significantly anemic with a hemoglobin of 6.8 yesterday.    Patient has multiple DM2, prior CVA, HTN, HLD, chronic anemia.      For unclear reasons the patient had a pericardial effusion and underwent pericardiocentesis in December 2020.  This is been stable without recurrence.    Patient has been medication compliant and does not endorse any major dietary indiscretions.    Repeat echocardiogram on this admission notes a LVEF of 50 to 55% with severe inferior wall hypokinesis.  There was no significant change from previous.    While the most likely explanation for this patient having recurrent issues with fluid buildup is related to the interaction of his diuretic and his renal dysfunction I am curious if there are other possible causes.    I believe the patient's shortness of breath is most likely multifactorial in the setting of his chronic heart failure and severe anemia.    Given that we are not entirely clear why the patient had an episode of significant pericardial effusion I am curious if he may have pericardial disease, constriction/restriction.  This likely would need to be worked up as an outpatient with possible advanced imaging of his pericardium as well as possibly an invasive hemodynamic study.  In the short-term we will focus on diuresing the patient as his kidneys tolerate    #1 HFpEF  #2 CKD  #3 Anemia    Plan:  Agree with diuresis, nephrology is on board and has  adjusted diuretics, he has not made major progress on diuresis so far.     Heart failure education    Consider CORE enrollment    Continue Lipitor, Coreg, Plavix    Diuresis while in hospital and then consider additional outpatient work-up as outlined above.      Garo Cavanaugh MD     Code Status    Full Code    Physical Exam   Temp: 98  F (36.7  C) Temp src: Oral BP: 127/71 Pulse: 96   Resp: 16 SpO2: 97 % O2 Device: None (Room air)    Vital Signs with Ranges  Temp:  [98  F (36.7  C)-98.6  F (37  C)] 98  F (36.7  C)  Pulse:  [80-96] 96  Resp:  [12-18] 16  BP: (103-141)/(58-86) 127/71  SpO2:  [95 %-99 %] 97 %  218 lbs 4.8 oz    GENERAL APPEARANCE: Alert and oriented x3. No acute distress.  SKIN: Inspection of the skin reveals no rashes, ulcerations, warm, dry  NECK: Supple and symmetric.   Elevated JVP  LUNGS: Diminished in the bases  CARDIOVASCULAR: S1, S2, regular rate and rhythm without any murmurs, gallops, rubs. The carotid pulses were normal and 2+ bilaterally without bruits. Peripheral pulses were 2+ and symmetric.  ABDOMEN: Soft, non-tender, non-distended with normal bowel sounds.  No ascites noted.  EXTREMITIES: 1+ edema.  NEUROLOGIC:  Normal mood and affect.  Sensation to touch was normal.      Physical Exam   Temp: 98  F (36.7  C) Temp src: Oral BP: 127/71 Pulse: 96   Resp: 16 SpO2: 97 % O2 Device: None (Room air)    Vital Signs with Ranges  Temp:  [98  F (36.7  C)-98.6  F (37  C)] 98  F (36.7  C)  Pulse:  [80-96] 96  Resp:  [12-18] 16  BP: (103-141)/(58-86) 127/71  SpO2:  [95 %-99 %] 97 %  218 lbs 4.8 oz    GENERAL APPEARANCE: Alert and oriented x3. No acute distress.  SKIN: Inspection of the skin reveals no rashes, ulcerations, warm, dry  NECK: Supple and symmetric.   Elevated JVP  LUNGS: Diminished in the bases  CARDIOVASCULAR: S1, S2, regular rate and rhythm without any murmurs, gallops, rubs. The carotid pulses were normal and 2+ bilaterally without bruits. Peripheral pulses were 2+ and  symmetric.  ABDOMEN: Soft, non-tender, non-distended with normal bowel sounds.  No ascites noted.  EXTREMITIES: 2+ edema.  NEUROLOGIC:  Normal mood and affect.  Sensation to touch was normal.      Data   Results for orders placed or performed during the hospital encounter of 03/26/21 (from the past 24 hour(s))   Potassium   Result Value Ref Range    Potassium 3.2 (L) 3.4 - 5.3 mmol/L   Glucose by meter   Result Value Ref Range    Glucose 115 (H) 70 - 99 mg/dL   Glucose by meter   Result Value Ref Range    Glucose 286 (H) 70 - 99 mg/dL   Potassium   Result Value Ref Range    Potassium 3.1 (L) 3.4 - 5.3 mmol/L   Potassium   Result Value Ref Range    Potassium 3.1 (L) 3.4 - 5.3 mmol/L   Glucose by meter   Result Value Ref Range    Glucose 262 (H) 70 - 99 mg/dL   Basic metabolic panel   Result Value Ref Range    Sodium 140 133 - 144 mmol/L    Potassium 3.3 (L) 3.4 - 5.3 mmol/L    Chloride 104 94 - 109 mmol/L    Carbon Dioxide 33 (H) 20 - 32 mmol/L    Anion Gap 3 3 - 14 mmol/L    Glucose 178 (H) 70 - 99 mg/dL    Urea Nitrogen 45 (H) 7 - 30 mg/dL    Creatinine 2.50 (H) 0.66 - 1.25 mg/dL    GFR Estimate 23 (L) >60 mL/min/[1.73_m2]    GFR Estimate If Black 27 (L) >60 mL/min/[1.73_m2]    Calcium 8.1 (L) 8.5 - 10.1 mg/dL   CBC with platelets   Result Value Ref Range    WBC 5.0 4.0 - 11.0 10e9/L    RBC Count 2.66 (L) 4.4 - 5.9 10e12/L    Hemoglobin 7.4 (L) 13.3 - 17.7 g/dL    Hematocrit 23.8 (L) 40.0 - 53.0 %    MCV 90 78 - 100 fl    MCH 27.8 26.5 - 33.0 pg    MCHC 31.1 (L) 31.5 - 36.5 g/dL    RDW 19.4 (H) 10.0 - 15.0 %    Platelet Count 160 150 - 450 10e9/L   Fractional excretion of sodium   Result Value Ref Range    Creatinine Urine 82 mg/dL    Sodium Urine mmol/L 28 mmol/L    %FENA 0.6 %   Sodium random urine   Result Value Ref Range    Sodium Urine mmol/L Canceled, Test credited mmol/L   Potassium   Result Value Ref Range    Potassium 3.3 (L) 3.4 - 5.3 mmol/L   Glucose by meter   Result Value Ref Range    Glucose 186 (H)  70 - 99 mg/dL

## 2021-03-28 NOTE — PROGRESS NOTES
Assessment and Plan:   CASS: Hx of CKD-3. On IV lasix and K replacement.   I/O 273/750 recorded. Wt 100.2 > 99.0 .   Cr is improving. Now 2.50. Na 140, K 3.3, HCO3 33. FENa is low at 0.6 and Mirian is 28.     Seems to be in pretty good fluid balance. He has 1+ edema but heart and lungs do not show evidence of fluid overload. We can change him back to oral torsemide. Re check labs in am  And likely discharge.     Follow up in my office with me in 2 weeks, Lab (renal panel) in one week.                 Interval History:   CHF: adm with increasing wt and edema. He is being managed with diuresis.   Currently on lasix 40 mg IV q d.    Anemia: on venofer.   Also got IM B12 and aranesp. On oral folgard.    DM           Review of Systems:   Taking po well. No chest or abd pain. No MARKS walking in room, not on O2. He feels his edema is decreased.           Medications:       aspirin  81 mg Oral Daily     atorvastatin  20 mg Oral Daily     carvedilol  6.25 mg Oral BID w/meals     clopidogrel  75 mg Oral Daily     fluticasone  2 spray Both Nostrils Daily     folic acid-vit B6-vit B12  1 tablet Oral Daily     furosemide  40 mg Intravenous Daily     insulin aspart  10 Units Subcutaneous TID AC     insulin aspart  1-7 Units Subcutaneous TID AC     insulin aspart  1-5 Units Subcutaneous At Bedtime     insulin glargine  30 Units Subcutaneous At Bedtime     iron sucrose (VENOFER) intermittent infusion  200 mg Intravenous Daily     pantoprazole  40 mg Oral BID     potassium chloride ER  20 mEq Oral Daily     sodium chloride (PF)  3 mL Intracatheter Q8H     traZODone  100 mg Oral At Bedtime         Current active medications and PTA medications reviewed, see medication list for details.            Physical Exam:   Vitals were reviewed  Patient Vitals for the past 24 hrs:   BP Temp Temp src Pulse Resp SpO2 Weight   03/28/21 1530 -- 98.7  F (37.1  C) Oral -- -- -- --   03/28/21 0801 127/71 98  F (36.7  C) Oral 96 -- 97 % --    21 0518 -- -- -- -- -- -- 99 kg (218 lb 4.8 oz)   21 0515 (!) 141/86 98.2  F (36.8  C) Oral 83 16 96 % --   21 0145 103/58 -- -- 92 15 95 % --   21 2200 (!) 140/81 -- -- 93 16 99 % --   21 1945 126/72 98.6  F (37  C) Oral 88 18 98 % --   21 1730 -- -- -- 80 12 96 % --       Temp:  [98  F (36.7  C)-98.7  F (37.1  C)] 98.7  F (37.1  C)  Pulse:  [80-96] 96  Resp:  [12-18] 16  BP: (103-141)/(58-86) 127/71  SpO2:  [95 %-99 %] 97 %    Temperatures:  Current - Temp: 98.7  F (37.1  C); Max - Temp  Av.4  F (36.9  C)  Min: 98  F (36.7  C)  Max: 98.7  F (37.1  C)  Respiration range: Resp  Avg: 15.4  Min: 12  Max: 18  Pulse range: Pulse  Av.7  Min: 80  Max: 96  Blood pressure range: Systolic (24hrs), Av , Min:103 , Max:141   ; Diastolic (24hrs), Av, Min:58, Max:86    Pulse oximetry range: SpO2  Av.8 %  Min: 95 %  Max: 99 %    I/O last 3 completed shifts:  In: 843 [P.O.:840; I.V.:3]  Out: 300 [Urine:300]      Intake/Output Summary (Last 24 hours) at 3/28/2021 1543  Last data filed at 3/28/2021 0801  Gross per 24 hour   Intake 843 ml   Output 300 ml   Net 543 ml       Alert, not on O2 up in chair  Lungs with faint bibasilar rales  Cor nl S1 S2 no M, no JVD, no rub  LE 1+ edema       Wt Readings from Last 4 Encounters:   21 99 kg (218 lb 4.8 oz)   21 103 kg (227 lb 1.6 oz)   21 95.7 kg (211 lb)   21 102.1 kg (225 lb)          Data:          Lab Results   Component Value Date     2021     2021     2021    Lab Results   Component Value Date    CHLORIDE 104 2021    CHLORIDE 106 2021    CHLORIDE 105 2021    Lab Results   Component Value Date    BUN 45 2021    BUN 50 2021    BUN 49 2021      Lab Results   Component Value Date    POTASSIUM 3.3 2021    POTASSIUM 3.3 2021    POTASSIUM 3.1 2021    Lab Results   Component Value Date    CO2 33 2021    CO2 34  03/27/2021    CO2 28 03/26/2021    Lab Results   Component Value Date    CR 2.50 03/28/2021    CR 2.64 03/27/2021    CR 2.66 03/26/2021        Recent Labs   Lab Test 03/28/21  0543 03/27/21  0610 03/26/21  1326   WBC 5.0 4.4 4.9   HGB 7.4* 7.2* 6.8*   HCT 23.8* 23.8* 22.1*   MCV 90 90 92    160 190     Recent Labs   Lab Test 03/26/21  1326 12/20/20  1614 12/01/20  1224   AST 14 17 13   ALT 23 28 19   ALKPHOS 81 94 84   BILITOTAL 0.5 0.5 0.4       No results for input(s): MAG in the last 63446 hours.  Recent Labs   Lab Test 03/27/21  0610   PHOS 4.0     Recent Labs   Lab Test 03/28/21  0543 03/27/21  0610 03/26/21  1326   VANESA 8.1* 8.5 8.5       Lab Results   Component Value Date    VANESA 8.1 (L) 03/28/2021     Lab Results   Component Value Date    WBC 5.0 03/28/2021    HGB 7.4 (L) 03/28/2021    HCT 23.8 (L) 03/28/2021    MCV 90 03/28/2021     03/28/2021     Lab Results   Component Value Date     03/28/2021    POTASSIUM 3.3 (L) 03/28/2021    CHLORIDE 104 03/28/2021    CO2 33 (H) 03/28/2021     (H) 03/28/2021     Lab Results   Component Value Date    BUN 45 (H) 03/28/2021    CR 2.50 (H) 03/28/2021     No results found for: MAG  Lab Results   Component Value Date    PHOS 4.0 03/27/2021       Creatinine   Date Value Ref Range Status   03/28/2021 2.50 (H) 0.66 - 1.25 mg/dL Final   03/27/2021 2.64 (H) 0.66 - 1.25 mg/dL Final   03/26/2021 2.66 (H) 0.66 - 1.25 mg/dL Final   03/25/2021 2.44 (H) 0.66 - 1.25 mg/dL Final   03/18/2021 2.28 (H) 0.66 - 1.25 mg/dL Final   03/05/2021 2.28 (H) 0.66 - 1.25 mg/dL Final       Attestation:  I have reviewed today's vital signs, notes, medications, labs and imaging.     Brien Greene MD

## 2021-03-28 NOTE — PROGRESS NOTES
03/28/21 1136   Quick Adds   Type of Visit Initial Occupational Therapy Evaluation   Living Environment   People in home spouse   Current Living Arrangements house   Home Accessibility stairs to enter home;stairs within home   Number of Stairs, Main Entrance 2   Stair Railings, Main Entrance none   Number of Stairs, Within Home, Primary other (see comments)  (12)   Stair Railings, Within Home, Primary railing on left side (ascending)   Transportation Anticipated family or friend will provide;car, drives self   Living Environment Comments Bed/bath 2nd fl   Self-Care   Usual Activity Tolerance moderate   Current Activity Tolerance fair   Regular Exercise Other (see comments)   Equipment Currently Used at Home   (Comfort ht stool, walk-in shower w/hand held hose on 2nd fl,)   Activity/Exercise/Self-Care Comment Own shower chair does not use. Pt. is concerned about SOB and being wobbly when walking, pt afraid of falling back down stairs once reach top. pt takes staris 2 at a time and hangs onto railing tight. Pt walked dog up until end of 2020 now unable to tolerate.   General Information   Onset of Illness/Injury or Date of Surgery 03/26/21   Referring Physician Dr. Marcus Carter   Additional Occupational Profile Info/Pertinent History of Current Problem 81yo male presents with bilateral leg swelling and shortness of breath. The patient was sent from the heart clinic due to uncontrolled chronic heart failure. The patient states the symptoms have been ongoing since previous emergency department visit on Thanksgiving Day last year and a pericardiocentesis on 12/21/20. Now dx'd with acute on chronic CHF. See PMH full medical hx.   Cognitive Status Examination   Orientation Status orientation to person, place and time   Affect/Mental Status (Cognitive) WFL   Follows Commands WFL   Visual Perception   Visual Impairment/Limitations WFL   Pain Assessment   Patient Currently in Pain No   Range of Motion Comprehensive   General  Range of Motion bilateral upper extremity ROM WFL   Strength Comprehensive (MMT)   Comment, General Manual Muscle Testing (MMT) Assessment B jaswinder 4/5   Coordination   Upper Extremity Coordination No deficits were identified   Bed Mobility   Comment (Bed Mobility) Not assessed - pt up in chair   Transfers   Transfers sit-stand transfer;toilet transfer   Sit-Stand Transfer   Sit-Stand [place] (Transfers) contact guard   Assistive Device (Sit-Stand Transfers) walker, front-wheeled   Toilet Transfer   Type (Toilet Transfer) sit-stand;stand-sit   [place] Level (Toilet Transfer) verbal cues;contact guard   Assistive Device (Toilet Transfer) walker, 4-wheeled;grab bars/safety frame   Toilet Transfer Comments Simulated home setting, used wall grab bar but would use vanity at home, has comfort ht stool. OT recommended consideration of GB on wall at home.    Balance   Balance Comments Pt amb in room with FWW CGA provided - no LOB but slightly unsteady on turns.    Activities of Daily Living   BADL Assessment/Intervention grooming;lower body dressing;toileting   Lower Body Dressing Assessment/Training   [place] Level (Lower Body Dressing) minimum assist (75% patient effort);set up   Position (Lower Body Dressing) unsupported sitting  (in chair)   Comment (Lower Body Dressing) At baseline, spouse and pt report that spouse helps with shoes and socs and to get clothing over feet and also to help pull up clothing in back if needed. They feel pt currently at baseline with this task.   Grooming Assessment/Training   [place] Level (Grooming) set up;supervision   Position (Grooming) supported standing   Comment (Grooming) Ot recommended pt keep one hand on vanity/stable surfacefor balance support when able.   Toileting   [place] Level (Toileting) contact guard assist   Comment (Toileting) In standing to manage clothing - spouse reports she can provide if needed.   Instrumental Activities of Daily Living (IADL)    IADL Comments Spouse does all household tasks, driving, shopping, outdoor work. Pt does drive occasionally short distance like to pharmacy to  meds.    Clinical Impression   Criteria for Skilled Therapeutic Interventions Met (OT) no;no problems identified which require skilled intervention   OT Diagnosis decline in ADL/IADL performance   OT Problem List-Impairments impacting ADL problems related to;activity tolerance impaired;balance   ADL comments/analysis Pt near baseline with ADL performance however is a bit unsteady on feet - PT to address mobility, spouse reports she will continue to provide assist as does at baseline, recommendations made for AE including grab bar in shower, long-handled sponge and use of shower chair they already own. No further skilled OT intervention needed.    Assessment of Occupational Performance 1-3 Performance Deficits   Identified Performance Deficits functional mobility   Clinical Decision Making Complexity (OT) low complexity   Predicted Duration of Therapy Eval only   OT Discharge Planning    OT Discharge Recommendation (DC Rec) Home with assist   OT Rationale for DC Rec Recommend home w/continued spousal assist for self-cares and use of AE as already in place at home. Recommended grab bar and long handled sponge for shower and use of their shower chair and patient in agreement with recommendation.   OT Brief overview of current status  Patient near baseline with basic self-care performance as spouse provides assist with basic tasks such as dressing and he currently is requiring same amount of assist to perform. CGA functional transfers and with amb in room using walker. Patient does have multiple stairs to manage and PT following for mobility training. At this time, no further skilled OT intervention needed and discharged.    Total Evaluation Time (Minutes)   Total Evaluation Time (Minutes) 30

## 2021-03-28 NOTE — PROGRESS NOTES
[facility]  Hospitalist Progress Note        Boyd Miranda MD   03/28/2021        Interval History:        - evaluated by cardiology; to continue diuresis while here-- plan additional cardiac workup as outpatient; still with some MARKS, no chest pain  - K 3.1---3.3; CR slightly down to 2.5  - Hb 6.8---7.2---7.4; getting iron sucrose infusion  - BS better in 180s---280s         Assessment and Plan:      Mr Olivas is a 80-year-old male with CHFpEF, h/o pericardial effusion, DM, HTN, DLD, h/o basilar CVA, Iron deficiency anemia who was sent from cardiology clinic for worsening LE edema and SOB with likely acute on chronic CHFpEF and also noted anemic with Hb 6.8    Dictated H&P from 3/26 still not available at this time    Chronic CHFpEF  Hypertension  Dyslipidemia  H/o basilar CVA  H/o pericardial effusion (s/p pericardiocentesis in December 2020)  - chest x-ray on admission noted with mild cardiomegaly with normal pulmonary vascularity; BNP 2513  - he has been followed in cardiology clinic and on 2/3/21 his torsemide was increased to 20 BID--noted CASS with CR 2.6-- cut back on Torsemide to 10 mg daily; started with weight gain again and thus was admitted  - started on IV Lasix bid, switched to 40 mg IV daily (while holding PTA PO Torsemide 10 mg daily)  - ECHO this admission with EF 50-55%, noted severe inferior wall hypokinesis (Compared to prior study, there is no significant change); denies any chest pain  - evaluated by cardiology, rec to continue diuresis while here-- plan additional cardiac workup as outpatient  -will continue to hold off on PTA Irbesartan due to worsened renal function  -continue Coreg, Plavix, Lipitor; resume aspirin 3/28 since Hb stable    Hypokalemia, likely diuretics induced  - K 3.3-- 2.9---3.2--3.3; being supplemented per protocol  -monitor BMP with diuresis    Acute on CKD stage III  - baseline hemoglobin until February 2021 seems around 1.5 to 1.7 but for past  month Cr around 2.4 to 2.6 (coinciding with increase in diuretics outpatient)  - Cr this admission 2.66---2.64---2.5  - evaluated by nephrology, concern for worsening renal failure coincident with diuresis  - hold off on PTA Irbesartan and Metformin  - avoid NSAIDs, nephrotoxins  -  Diuresis as above; monitor BMP    likely anemia of chronic disease  - recent baseline hemoglobin around 8 to 9 ; on admission Hb 6.8--- one unit PRBC---7.2---7.4  - iron studies noted with ferritin 45, normal TIBC  - evaluated by G.I. and had EGD 3/27, noted with erythematous mucosa in the antrum and  four non-bleeding angioectasias in the duodenum, treated with argon plasma coagulation (APC); hemodynamically stable and no active bleed  - continue Protonix; G.I. plans to follow-up as outpatient for colonoscopy  - started on iron sucrose per nephrology on 3/27  - will monitor hemoglobin     Diabetes  episodes of hypoglycemia  - a1c 6.0; hold PTA metformin due to renal dysfunction (will discontinue on discharge)  - PTA regimen include Lantus 50 units bedtime, metformin 1000 mg bid , Lispro TID with meals (sliding scale)  - blood sugar here noted upto 199-- trended down to 70s with resumption of PTA dose Lantus on 3/27 and mealtime Novolog was held with decrease in Lantus to 20 units  - BS now in 180s---280s; will resume mealtime Novolog at 10 units TID; increase Lantus to   -will hold mealtime NovoLog (was ordered 12 units TID on admission)  -will increase Lantus to 30 units bedtime  -monitor blood sugars TID with meals; sliding scale insulin; hypoglycemia protocol  -patient does report episodes of hypoglycemia at home with blood sugar at times in 40s to 50s with his PTA dose of 50 units Lantus; his insulin requirement might be low with worsened renal function    DVT prophylaxis: mechanical with PCD boots    code status: full code    COVID status: negative 3/26    Disposition: likely 1 to 2 days, pending stable renal function, volume  status and adjustment of insulin and diuretics, when cleared by cardiology and nephrology                   Physical Exam:      Blood pressure 127/71, pulse 96, temperature 98  F (36.7  C), temperature source Oral, resp. rate 16, weight 99 kg (218 lb 4.8 oz), SpO2 97 %.  Vitals:    03/26/21 1805 03/27/21 0220 03/28/21 0518   Weight: 99.8 kg (220 lb) 100.2 kg (221 lb) 99 kg (218 lb 4.8 oz)     Vital Signs with Ranges  Temp:  [98  F (36.7  C)-98.6  F (37  C)] 98  F (36.7  C)  Pulse:  [80-99] 96  Resp:  [12-32] 16  BP: (103-179)/() 127/71  SpO2:  [94 %-100 %] 97 %  I/O's Last 24 hours  I/O last 3 completed shifts:  In: 363 [P.O.:360; I.V.:3]  Out: 750 [Urine:750]    Constitutional: Alert, awake and oriented X 3; resting comfortably in no apparent distress   HEENT: Pupils equal and reactive to light and accomodation, neck supple    Oral cavity: Moist mucosa   Cardiovascular: Normal s1 s2, regular rate and rhythm, no murmur   Lungs: B/l clear to auscultation, no wheezes or crepitations   Abdomen: Soft, nt, nd, no guarding, rigidity or rebound; BS +   LE : b/l edema +   Musculoskeletal: Power 5/5 in all extremities   Neuro: No focal neurological deficits noted   Psychiatry: normal mood and affect                Medications:          atorvastatin  20 mg Oral Daily     carvedilol  6.25 mg Oral BID w/meals     clopidogrel  75 mg Oral Daily     fluticasone  2 spray Both Nostrils Daily     folic acid-vit B6-vit B12  1 tablet Oral Daily     furosemide  40 mg Intravenous Daily     insulin aspart  1-7 Units Subcutaneous TID AC     insulin aspart  1-5 Units Subcutaneous At Bedtime     [Held by provider] insulin aspart  12 Units Subcutaneous TID AC     insulin glargine  20 Units Subcutaneous At Bedtime     iron sucrose (VENOFER) intermittent infusion  200 mg Intravenous Daily     pantoprazole  40 mg Oral BID     potassium chloride ER  20 mEq Oral Daily     sodium chloride (PF)  3 mL Intracatheter Q8H     traZODone  100 mg  Oral At Bedtime     PRN Meds: acetaminophen, albuterol, cyclobenzaprine, glucose **OR** dextrose **OR** glucagon, diphenhydramine-acetaminophen (TYLENOL PM) 25/500, lidocaine 4%, lidocaine (buffered or not buffered), melatonin, ondansetron **OR** ondansetron, polyethylene glycol, prochlorperazine **OR** prochlorperazine **OR** prochlorperazine, sodium chloride (PF), sodium chloride (PF)         Data:      All new lab and imaging data was reviewed.   Recent Labs   Lab Test 03/28/21  0543 03/27/21  0610 03/26/21  1326 09/04/20  1338 09/04/20  1338   WBC 5.0 4.4 4.9   < > 7.3   HGB 7.4* 7.2* 6.8*   < > 10.4*   MCV 90 90 92   < > 80    160 190   < > 275   INR  --   --   --   --  0.99    < > = values in this interval not displayed.      Recent Labs   Lab Test 03/28/21  0543 03/28/21  0145 03/27/21  2124 03/27/21  0610 03/27/21  0610 03/26/21  1326     --   --   --  144 142   POTASSIUM 3.3* 3.1* 3.1*   < > 2.8* 2.9*   CHLORIDE 104  --   --   --  106 105   CO2 33*  --   --   --  34* 28   BUN 45*  --   --   --  50* 49*   CR 2.50*  --   --   --  2.64* 2.66*   ANIONGAP 3  --   --   --  4 9   VANESA 8.1*  --   --   --  8.5 8.5   *  --   --   --  131* 166*    < > = values in this interval not displayed.     Recent Labs   Lab Test 03/26/21  1326 02/03/21  1225 12/20/20 2050   TROPI 0.022 <0.015 0.020

## 2021-03-29 ENCOUNTER — PATIENT OUTREACH (OUTPATIENT)
Dept: CARE COORDINATION | Facility: CLINIC | Age: 81
End: 2021-03-29

## 2021-03-29 ENCOUNTER — APPOINTMENT (OUTPATIENT)
Dept: PHYSICAL THERAPY | Facility: CLINIC | Age: 81
DRG: 291 | End: 2021-03-29
Payer: MEDICARE

## 2021-03-29 PROBLEM — I50.33 ACUTE ON CHRONIC DIASTOLIC HEART FAILURE (H): Status: ACTIVE | Noted: 2021-03-29

## 2021-03-29 LAB
ALBUMIN SERPL-MCNC: 3 G/DL (ref 3.4–5)
ALP SERPL-CCNC: 74 U/L (ref 40–150)
ALT SERPL W P-5'-P-CCNC: 21 U/L (ref 0–70)
ANION GAP SERPL CALCULATED.3IONS-SCNC: 3 MMOL/L (ref 3–14)
AST SERPL W P-5'-P-CCNC: 12 U/L (ref 0–45)
BILIRUB SERPL-MCNC: 0.4 MG/DL (ref 0.2–1.3)
BUN SERPL-MCNC: 40 MG/DL (ref 7–30)
CALCIUM SERPL-MCNC: 8 MG/DL (ref 8.5–10.1)
CHLORIDE SERPL-SCNC: 105 MMOL/L (ref 94–109)
CHOLEST SERPL-MCNC: 107 MG/DL
CO2 SERPL-SCNC: 34 MMOL/L (ref 20–32)
CREAT SERPL-MCNC: 2.55 MG/DL (ref 0.66–1.25)
ERYTHROCYTE [DISTWIDTH] IN BLOOD BY AUTOMATED COUNT: 19.3 % (ref 10–15)
GFR SERPL CREATININE-BSD FRML MDRD: 23 ML/MIN/{1.73_M2}
GLUCOSE BLDC GLUCOMTR-MCNC: 113 MG/DL (ref 70–99)
GLUCOSE BLDC GLUCOMTR-MCNC: 163 MG/DL (ref 70–99)
GLUCOSE BLDC GLUCOMTR-MCNC: 191 MG/DL (ref 70–99)
GLUCOSE BLDC GLUCOMTR-MCNC: 201 MG/DL (ref 70–99)
GLUCOSE BLDC GLUCOMTR-MCNC: 212 MG/DL (ref 70–99)
GLUCOSE SERPL-MCNC: 184 MG/DL (ref 70–99)
HCT VFR BLD AUTO: 23 % (ref 40–53)
HDLC SERPL-MCNC: 41 MG/DL
HGB BLD-MCNC: 7 G/DL (ref 13.3–17.7)
HGB BLD-MCNC: 7.8 G/DL (ref 13.3–17.7)
HGB BLD-MCNC: 7.9 G/DL (ref 13.3–17.7)
IRON SATN MFR SERPL: 19 % (ref 15–46)
IRON SERPL-MCNC: 53 UG/DL (ref 35–180)
LDLC SERPL CALC-MCNC: 54 MG/DL
MAGNESIUM SERPL-MCNC: 2.2 MG/DL (ref 1.6–2.3)
MCH RBC QN AUTO: 28 PG (ref 26.5–33)
MCHC RBC AUTO-ENTMCNC: 30.4 G/DL (ref 31.5–36.5)
MCV RBC AUTO: 92 FL (ref 78–100)
NONHDLC SERPL-MCNC: 66 MG/DL
PLATELET # BLD AUTO: 160 10E9/L (ref 150–450)
POTASSIUM SERPL-SCNC: 3 MMOL/L (ref 3.4–5.3)
POTASSIUM SERPL-SCNC: 3.4 MMOL/L (ref 3.4–5.3)
POTASSIUM SERPL-SCNC: 3.7 MMOL/L (ref 3.4–5.3)
PROT SERPL-MCNC: 6 G/DL (ref 6.8–8.8)
RBC # BLD AUTO: 2.5 10E12/L (ref 4.4–5.9)
SODIUM SERPL-SCNC: 142 MMOL/L (ref 133–144)
TIBC SERPL-MCNC: 276 UG/DL (ref 240–430)
TRIGL SERPL-MCNC: 61 MG/DL
TSH SERPL DL<=0.005 MIU/L-ACNC: 1.09 MU/L (ref 0.4–4)
WBC # BLD AUTO: 5 10E9/L (ref 4–11)

## 2021-03-29 PROCEDURE — 36415 COLL VENOUS BLD VENIPUNCTURE: CPT | Performed by: INTERNAL MEDICINE

## 2021-03-29 PROCEDURE — 250N000013 HC RX MED GY IP 250 OP 250 PS 637: Performed by: HOSPITALIST

## 2021-03-29 PROCEDURE — 85027 COMPLETE CBC AUTOMATED: CPT | Performed by: HOSPITALIST

## 2021-03-29 PROCEDURE — 999N001017 HC STATISTIC GLUCOSE BY METER IP

## 2021-03-29 PROCEDURE — 83540 ASSAY OF IRON: CPT | Performed by: HOSPITALIST

## 2021-03-29 PROCEDURE — 250N000013 HC RX MED GY IP 250 OP 250 PS 637: Performed by: INTERNAL MEDICINE

## 2021-03-29 PROCEDURE — 80053 COMPREHEN METABOLIC PANEL: CPT | Performed by: HOSPITALIST

## 2021-03-29 PROCEDURE — 83550 IRON BINDING TEST: CPT | Performed by: HOSPITALIST

## 2021-03-29 PROCEDURE — 84132 ASSAY OF SERUM POTASSIUM: CPT | Performed by: HOSPITALIST

## 2021-03-29 PROCEDURE — 36415 COLL VENOUS BLD VENIPUNCTURE: CPT | Performed by: HOSPITALIST

## 2021-03-29 PROCEDURE — 84132 ASSAY OF SERUM POTASSIUM: CPT | Performed by: INTERNAL MEDICINE

## 2021-03-29 PROCEDURE — 120N000001 HC R&B MED SURG/OB

## 2021-03-29 PROCEDURE — 258N000003 HC RX IP 258 OP 636: Performed by: INTERNAL MEDICINE

## 2021-03-29 PROCEDURE — 250N000011 HC RX IP 250 OP 636: Performed by: INTERNAL MEDICINE

## 2021-03-29 PROCEDURE — 85018 HEMOGLOBIN: CPT | Performed by: HOSPITALIST

## 2021-03-29 PROCEDURE — 250N000012 HC RX MED GY IP 250 OP 636 PS 637: Performed by: HOSPITALIST

## 2021-03-29 PROCEDURE — 84443 ASSAY THYROID STIM HORMONE: CPT | Performed by: HOSPITALIST

## 2021-03-29 PROCEDURE — 80061 LIPID PANEL: CPT | Performed by: HOSPITALIST

## 2021-03-29 PROCEDURE — 99232 SBSQ HOSP IP/OBS MODERATE 35: CPT | Performed by: PHYSICIAN ASSISTANT

## 2021-03-29 PROCEDURE — 83735 ASSAY OF MAGNESIUM: CPT | Performed by: HOSPITALIST

## 2021-03-29 PROCEDURE — 99233 SBSQ HOSP IP/OBS HIGH 50: CPT | Performed by: HOSPITALIST

## 2021-03-29 PROCEDURE — 97116 GAIT TRAINING THERAPY: CPT | Mod: GP

## 2021-03-29 RX ORDER — POTASSIUM CHLORIDE 1500 MG/1
20 TABLET, EXTENDED RELEASE ORAL ONCE
Status: COMPLETED | OUTPATIENT
Start: 2021-03-29 | End: 2021-03-29

## 2021-03-29 RX ORDER — POTASSIUM CHLORIDE 1500 MG/1
40 TABLET, EXTENDED RELEASE ORAL ONCE
Status: COMPLETED | OUTPATIENT
Start: 2021-03-29 | End: 2021-03-29

## 2021-03-29 RX ADMIN — POTASSIUM CHLORIDE 20 MEQ: 1500 TABLET, EXTENDED RELEASE ORAL at 11:18

## 2021-03-29 RX ADMIN — ASPIRIN 81 MG: 81 TABLET, DELAYED RELEASE ORAL at 09:11

## 2021-03-29 RX ADMIN — POTASSIUM CHLORIDE 20 MEQ: 1500 TABLET, EXTENDED RELEASE ORAL at 09:11

## 2021-03-29 RX ADMIN — FLUTICASONE PROPIONATE 2 SPRAY: 50 SPRAY, METERED NASAL at 09:38

## 2021-03-29 RX ADMIN — POTASSIUM CHLORIDE 40 MEQ: 1500 TABLET, EXTENDED RELEASE ORAL at 05:34

## 2021-03-29 RX ADMIN — CARVEDILOL 6.25 MG: 6.25 TABLET, FILM COATED ORAL at 09:11

## 2021-03-29 RX ADMIN — PANTOPRAZOLE SODIUM 40 MG: 40 TABLET, DELAYED RELEASE ORAL at 09:11

## 2021-03-29 RX ADMIN — ATORVASTATIN CALCIUM 20 MG: 20 TABLET, FILM COATED ORAL at 21:36

## 2021-03-29 RX ADMIN — Medication 1 TABLET: at 09:11

## 2021-03-29 RX ADMIN — IRON SUCROSE 200 MG: 20 INJECTION, SOLUTION INTRAVENOUS at 09:12

## 2021-03-29 RX ADMIN — PANTOPRAZOLE SODIUM 40 MG: 40 TABLET, DELAYED RELEASE ORAL at 21:36

## 2021-03-29 RX ADMIN — CLOPIDOGREL BISULFATE 75 MG: 75 TABLET ORAL at 09:11

## 2021-03-29 RX ADMIN — FUROSEMIDE 40 MG: 10 INJECTION, SOLUTION INTRAMUSCULAR; INTRAVENOUS at 09:12

## 2021-03-29 RX ADMIN — CARVEDILOL 6.25 MG: 6.25 TABLET, FILM COATED ORAL at 18:45

## 2021-03-29 RX ADMIN — POTASSIUM CHLORIDE 20 MEQ: 1500 TABLET, EXTENDED RELEASE ORAL at 00:01

## 2021-03-29 RX ADMIN — TRAZODONE HYDROCHLORIDE 100 MG: 100 TABLET ORAL at 21:36

## 2021-03-29 RX ADMIN — INSULIN GLARGINE 30 UNITS: 100 INJECTION, SOLUTION SUBCUTANEOUS at 21:36

## 2021-03-29 ASSESSMENT — ACTIVITIES OF DAILY LIVING (ADL)
ADLS_ACUITY_SCORE: 16
DEPENDENT_IADLS:: INDEPENDENT
ADLS_ACUITY_SCORE: 16

## 2021-03-29 NOTE — PLAN OF CARE
DATE & TIME: 3/28/21 7044-5934    Cognitive Concerns/ Orientation : Alert and oriented x4   BEHAVIOR & AGGRESSION TOOL COLOR: Green  CIWA SCORE: N/A   ABNL VS/O2: VSS on RA  MOBILITY: Assist 1 with walker and GB  PAIN MANAGMENT: Denies pain  DIET: 2Gm Na  BOWEL/BLADDER: Continent B&B  ABNL LAB/BG: K 3.2, replaced, recheck 3.0, replaced, recheck @ 0930 3/29. Hgb 7.4, 7.0 (3/29 am). , 212. Cre 2.50.  DRAIN/DEVICES: PIV saline locked  TELEMETRY RHYTHM: NSR  SKIN: BLE +2 edema. Pale.  TESTS/PROCEDURES: none  D/C DATE: Pending  OTHER IMPORTANT INFO: Cardiology following, nephrology following.

## 2021-03-29 NOTE — CONSULTS
[facility]    Received CORE Clinic Consult from Dr. Cavanaugh.    I arranged for the first available CORE enrollment visit in [place] for Mr. Olivas. Unfortunately, this is not for ~2 weeks. Would recommend that he be seen by his primary provider in the interim to bridge him to CORE appointment and I see that he has such a visit scheduled for 4/5.    Future Appointments   Date Time Provider Department Center   4/5/2021  1:30 PM Christian Davidson MD [facility]   4/14/2021  8:15 AM [facility]   4/14/2021  9:10 AM Shaniqua Ram PA-C [facility]       Please call with questions.    Charlene Silva RN BSN  CORE Clinic Care Coordinator [place]  [phone]

## 2021-03-29 NOTE — H&P
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      According to the patient, he had history of pericardial effusion requiring pericardiocentesis in December of last year and since then, he has been followed by Cardiology and was getting diuretic and symptoms got better.  His weight went down to 207.  The last couple of weeks, he has noticed that his shortness of breath has been getting worse.  He getting tired easily.  He is started gaining weight again and today the weight in the clinic was 227 pounds.  He denies any orthopnea or PND, but does get dyspnea on exertion, even walking within the home, making him short of breath, tired, fatigued and sometimes felt palpitation as well.  He noticed that his balance is slightly off as well.  Recently, he does use stick to walk.  He denies any chest pain, fever, chills, headache, dizziness, lightheadedness, no orthopnea or PND, occasional palpitation or dysuria, hematuria, constipation, diarrhea.  The patient noticed no hematemesis, melena or hematochezia.  No bleeding from anywhere else.      The patient has history of a stroke 2 years ago.  His Plavix was discontinued at the time when he had a pericardial effusion.  Now he has been back on the Plavix for the last 2 days.  His symptoms have been worsening for the last couple of weeks, which include as mentioned above, weakness, tiredness, shortness of breath on exertion, lower extremity edema and weight gain.      He has been followed by Cardiology and they are increasing or decreasing  his torsemide.  He was initially on 40 mg of torsemide, decreased to 10 mg as his kidney function went up and still his kidney functions are not back to normal.  The rest of the review of system is negative at this time.      ASSESSMENT AND PLAN:   1.  Significant anemia:  This is an 80-year-old male with history of iron deficiency/anemia of chronic kidney disease, presented with worsening shortness of breath on exertion, fatigue, tiredness of balance and weight gain.  I think most of his symptoms are related to significant low hemoglobin.  His last hemoglobin was 9.8.  It is ranging around that range 9.8-10 range and now significantly decreased, without any obvious GI bleeding.  He does have history of EGD in 12/16/2020 which shows esophagitis, normal stomach, small hiatal hernia, normal duodenal bulb.  First portion of the duodenum and second portion of the duodenum were normal.  A single nonbleeding angiodysplastic lesion in the duodenum was found.  He did get iron infusion after that, but not for the last month or so.  For now, I think we will admit him.  He already gave the consent.  We will transfuse 1 unit of packed RBCs, IV Lasix after that.  Check hemoglobin again tomorrow.  I will go ahead and check serum iron, iron binding capacity, transferrin, ferritin, B12, folate level as well.  His iron levels are low.  We will benefit from iron infusion.  Consult GI to evaluate the patient and he may benefit from colonoscopy to complete the workup.  I will check erythropoietin levels as well as low.  May benefit from erythropoietin or Aranesp.  For that, we will consult Nephrology as well to evaluate him.        1.  Heart combined chronic systolic and diastolic congestive heart failure:  The patient's last echocardiogram was done in 2/20/2001, EF of 45% -50% with mild basal inferolateral hypokinesis, grade 2 diastolic dysfunction and trivial pericardial effusion at that time.  At this time, he does not have any  elevated JVD.  His lungs sounds clear.  No crackles.  His chest x-ray does not show pulmonary edema, although he has 2+ lower extremity edema.  I think he is not in worse acute exacerbation of congestive heart failure at this time.  I think he maybe has some high output heart failure at this time because of low hemoglobin.  Need to transfuse hemoglobin blood and keep his hemoglobin 8 or above to see if his symptoms improve.  In the meantime, we will keep him on IV Lasix 60 b.i.d. for now, holding his irbesartan to see if his kidney functions improve.  He is on 150 mg, irbesartan.  Will hold it for now.   2.  Acute on chronic renal failure:  Previous baseline creatinine was 1.5-1.6.  He was aggressively diuresed and creatinine increased to 2.6.  Torsemide dose was decreased, but creatinine did not return back to his normal.  New baseline is 2.28-2.66 in the last month or so.  For now, we will hold losartan.   Hold torsemide, give him Lasix 40 b.i.d. for now.  Transfuse him and see if his kidney function improved with that.  We will consult Nephrology to evaluate him for worsening creatinine, as well as for anemia.   3.  History of cerebrovascular accident:  He was restarted back on Plavix.  Continue with that.  Continue Lipitor as he is not having any significant bleeding at this point.   4.  Diabetes mellitus type 2.  He is on Lantus 50 units at night.  We will continue with that.  Will keep him on sliding scale insulin for correction hypoglycemia protocol, decrease the dose of lispro to 12 units t.i.d. and adjust as needed.   5.  Hyperlipidemia, on Lipitor.  We will continue with that.   6.  Hypokalemia:  Potassium is 2.9.  The may be the cause of his weakness and tiredness.  We will replace potassium now and then keep him on replacement protocol.   7.  History of pericardial effusion:  Recent echocardiogram in February was negative for any significant pericardial effusion.  We will repeat the echocardiogram  because of his symptoms now.   8.  Gastrointestinal prophylaxis:  Protonix.  Keep him on 40 b.i.d. for now.   9.  Deep venous thrombosis prophylaxis with SCDs.      CODE STATUS:  Full code as per the patient wishes.      The case discussed with ER physician and the nursing staff taking care of the patient.         SANTHOSH MILLER MD             D: 2021   T: 2021   MT:       Name:     LISA CAMARA   MRN:      [id number]        Account:      [id number]   :      1940        Admitted:     2021                   Document: [id number]

## 2021-03-29 NOTE — PROGRESS NOTES
Clinic Care Coordination Contact  Ambulatory Care Coordination to Inpatient Care Management   Hand-In Communication    Date:  March 29, 2021  Name: Justyn Olivas is enrolled in Ambulatory Care Coordination program and I am the Lead Care Coordinator.  CC Contact Information: Epic InBasket + phone  Payor Source: Payor: MEDICARE / Plan: MEDICARE / Product Type: Medicare /   Current services in place: DME, Home Care   Please see the CC Snaphot and Care Management Flowsheets for specific details of this Justyn Olivas care plan.   Additional details/specific concerns r/t this admission:    No additional concerns at this time .    I will follow this admission in Epic. Please feel free to contact me with questions or for further collaboration in discharge planning.    Jerel Leija, RN  Clinic Care Coordinator  [facility]  [facility]  Ph: [phone]

## 2021-03-29 NOTE — PROGRESS NOTES
[facility]  Hospitalist Progress Note   03/29/2021          Assessment and Plan:       Mr Olivas is a 80-year-old male with CHFpEF, h/o pericardial effusion, DM, HTN, DLD, h/o basilar CVA, Iron deficiency anemia sent in from cardiology clinic for worsening LE edema and SOB.    Dyspnea on exertion from volume overload, symptomatic anemia.  Acute exacerbation of heart failure with preserved ejection fraction.  Chronic CHFpEF [EF 50 to 55%]  Hypertension, Dyslipidemia  H/o pericardial effusion (s/p pericardiocentesis in December 2020 -unclear reasons)  Admitted with weight gain, shortness of breath. BNP 2513  Chest x-ray on admission with mild cardiomegaly with normal pulmonary vascularity;   ECHO EF 50-55%, noted severe inferior wall hypokinesis (Compared to prior study, there is no significant change)  Underwent aggressive diuresis with intravenous Lasix, tapered to IV Lasix 40 mg once daily.  Continue further diuresis per cardiology, nephrology.  Appreciate comanagement.  Holding PTA oral torsemide 10 mg daily.  Continue PTA aspirin [restarted 3/28 after hemoglobin stable] and Plavix 75 mg oral daily.  Continue PTA Coreg 6.25 mg twice daily.  Continue Lipitor 20 mg oral daily.  PTA irbesartan on hold due to worsening renal function.  Continue telemetry monitoring.  Cardiology team following, plan for additional cardiac work-up as outpatient.  Strict input output monitoring, daily weights.  Low-salt diet.  Fluid restriction to 1500 mL/day.     Acute on CKD stage III  baseline creatinine until 2/2021 seems around 1.5 to 1.7 but for past month Cr around 2.4 to 2.6 (coinciding with increase in diuretics outpatient)  Cr this admission 2.66---2.64---2.5  Nephrology followed follow-up in renal clinic in 2 weeks.  Hold off on PTA Irbesartan and Metformin  Avoid NSAIDs, nephrotoxins  Diuresis as above; monitor BMP     Severe symptomatic anemia, acute on chronic likely from nonbleeding angiectasia's in the  duodenum.  Anemia of chronic disease with iron deficiency.  Baseline hemoglobin around 8 to 9 ; on admission Hb 6.8, serial 1 unit of PRBC and hemoglobin this morning around 7.0.  Iron saturation index 19, serum iron 53.  Continue IV sucrose per nephrology.  [facility] following, EGD 3/27, noted with erythematous mucosa in the antrum and  four non-bleeding angioectasias in the duodenum, treated with argon plasma coagulation (APC); hemodynamically stable and no active bleed. Appreciate Comanagement.  Continue oral Protonix, to hemoglobin levels in 12 hours.  Plan outpatient colonoscopy vs inpatient if has evidence of lower GI bleed with bright red bleeding    Hypokalemia, likely diuretics induced  Potassium 3.0, received replacement we will recheck again today.    H/o basilar CVA  Continue aspirin, Plavix, Lipitor as above.    Mild hypoalbuminemia likely competent of dilutional, poor oral intake.  Albumin 3.0   Monitor levels periodically.    Type 2 diabetes mellitus with a hemoglobin A1c of 6.0.  PTA insulin Lantus 50 units at bedtime, lispro 3 times daily with meals [sliding scale], Metformin 1000 mg twice daily.  PTA metformin on hold since admission due to renal dysfunction, discontinued on discharge.  During hospitalization with PTA insulin Lantus decreased to 30 units at bedtime, NovoLog insulin continue to 10 units 3 times daily.  Given blood sugars in 150s to 250s increase NovoLog insulin to 12 units 3 times daily.  Of note patient reports hypoglycemic episodes at home with home dose of insulin Lantus, will continue Lantus 30 units at bedtime.    Hypoglycemia protocol in place.    Obesity with a BMI of 29.6.  Will need to consider lifestyle modification with diet and exercise as able to.     COVID status: negative 3/26     Orders Placed This Encounter      2 Gram Sodium Diet      DVT Prophylaxis: SCD, pharmacological prophylaxis on hold given GI bleed.  Code Status: Full Code  Disposition: Expected discharge  tomorrow pending uneventful stay overnight.    Again discussed with patient over the telephone at 1:55 PM-wants to leave the hospital today..-Patient needs monitoring on Hb, started on antiplatelet therapy yesterday.  Plan for discharge tomorrow if uneventful stay overnight.  P    Discussed with patient, bedside RN  Time greater than 35 minutes, more than 60% of time spent in direct patient care, care coordination, patient counseling, and formalizing plan of care.     Amador Almanza MD        Interval History:      Patient lying in bed.  Minimal ambulation out of bed.  No chest pain at this time.  Shortness of breath, no palpitation.  No headache or dizziness.  Hemoglobin this morning at 7.0.  Denies any blood in the stools or blood in the urine.  No abdominal pain.  No tingling or numbness or weakness.  Tolerating oral diet.         Physical Exam:        Physical Exam   Temp:  [97.4  F (36.3  C)-98.7  F (37.1  C)] 97.4  F (36.3  C)  Pulse:  [86-96] 90  Resp:  [16-20] 16  BP: (120-137)/(67-88) 137/75  SpO2:  [92 %-98 %] 95 %    Intake/Output Summary (Last 24 hours) at 3/29/2021 1306  Last data filed at 3/29/2021 0720  Gross per 24 hour   Intake 720 ml   Output 1150 ml   Net -430 ml       Admission Weight: 99.8 kg (220 lb)  Current Weight: 99 kg (218 lb 4.8 oz)    PHYSICAL EXAM  GENERAL: Patient is in no distress. Alert and oriented.  HEART: Regular rate and rhythm. S1S2. No murmurs  LUNGS: Bilateral slightly decreased breath sounds, no wheezing or crackles.  Respirations unlabored  ABDOMEN: Soft, no abdominal tenderness, bowel sounds heard   NEURO: Moving all extremities, no focal weakness.  EXTREMITIES: trace pedal edema.   SKIN: Warm, dry. No rash or bruising.  PSYCHIATRY Cooperative       Medications:          aspirin  81 mg Oral Daily     atorvastatin  20 mg Oral Daily     carvedilol  6.25 mg Oral BID w/meals     clopidogrel  75 mg Oral Daily     fluticasone  2 spray Both Nostrils Daily     folic acid-vit  B6-vit B12  1 tablet Oral Daily     furosemide  40 mg Intravenous Daily     insulin aspart  10 Units Subcutaneous TID AC     insulin aspart  1-7 Units Subcutaneous TID AC     insulin aspart  1-5 Units Subcutaneous At Bedtime     insulin glargine  30 Units Subcutaneous At Bedtime     iron sucrose (VENOFER) intermittent infusion  200 mg Intravenous Daily     pantoprazole  40 mg Oral BID     potassium chloride ER  20 mEq Oral Daily     sodium chloride (PF)  3 mL Intracatheter Q8H     traZODone  100 mg Oral At Bedtime     acetaminophen, albuterol, cyclobenzaprine, glucose **OR** dextrose **OR** glucagon, diphenhydramine-acetaminophen (TYLENOL PM) 25/500, lidocaine 4%, lidocaine (buffered or not buffered), melatonin, ondansetron **OR** ondansetron, polyethylene glycol, prochlorperazine **OR** prochlorperazine **OR** prochlorperazine, sodium chloride (PF), sodium chloride (PF)         Data:      All new lab and imaging data was reviewed.

## 2021-03-29 NOTE — PLAN OF CARE
A&OX4, VSS on RA. Transferred here from CCU. Denies chest pain, n&V. Up AX1 with GB and walker. 2g Na+ diet. BG checks. BLE edema +2. K+ replaced and recheck at 2300. On tele. Possible discharge tomorrow. Nephrology and cardiology following. Continue to monitor.

## 2021-03-29 NOTE — PROVIDER NOTIFICATION
MD Notification    Notified Person: MD    Notified Person Name: Cami    Notification Date/Time: 2/29 7:39 am    Notification Interaction: web based page    Purpose of Notification: Hgb this AM was 7.0. Do you want to transfuse?    Orders Received: Will check Hgb again at 11 am.     Comments:

## 2021-03-29 NOTE — PLAN OF CARE
Physical Therapy Discharge Summary    Reason for therapy discharge:    All goals and outcomes met, no further needs identified.    Progress towards therapy goal(s). See goals on Care Plan in [facility] electronic health record for goal details.  Goals met    Therapy recommendation(s):    Pt declining OP PT at this time

## 2021-03-29 NOTE — PROGRESS NOTES
[facility]  Cardiology Progress Note    Date of Service (when I saw the patient): 03/29/2021  Primary Cardiologist: Dr. Rosenbaum       Interval History:   Breathing is much better. No longer has abdominal distension. Continues to have peripheral edema but this is now down below his knees. He misses his dog and would like to go home soon.    ----------------------------------------------------------------------------------------    Assessment:  Justyn Olivas is a 80 year old male whom I sent in from cardiology clinic due to worsening SOB- he was seen in the ED and was admitted for worsening anemia with Hgb of 6.8 and acute on chronic HF on 3/26/2021.     Kamran is a pleasant 80 year old male with past medical history:      #. Acute on Chronic HFpEF- had been on Torsemide 10 mg since his admission in 12/2020; had 20 Ib weight gain (233 Ibs from baseline of 214 Ibs) when he saw his PCP on 2/3/39149- this was felt to be related to fluid gain from recent Iron infusion - after discussion with Dr. Rosenbaum Torsemide was increased to 20 mg BID; he unfortunately developed CASS with Cr up to 2.6- we had to hold his torsemide with only minimal improvement in his renal function to 2.28; he started to gain weight subsequently and during last visit we added back low dose Torsemide 10 mg.   - I saw him in clinic with no significant improvement and he was sent to the ED   - nephrology team was involved and he was IV diuresed with lasix with adequate output (it's not measured consistently); weight is down to 218 Ibs  - nephrology felt patient is back in euvolemic state though he has significant peripheral edema still   - he continues to be on IV lasix 40 mg (received last dose today)    #. Acute on Chronic CKD- Cr baseline 1.5-1.6 but has been elevated to 2.28-2.6 over the last one month  - Cr remains around 2.4   - has follow up with nephrology in 2 weeks     #. CM - EF 45-50% on echo; unknown etiology- no  ischemic work up has been done (echo does show inferior WMA)     #. Hx of Pericardial Effusion - noted during admission in 12/2020. Underwent successful pericardiocentesis. Cytology was negative for malignancy. No clear cause identified. Repeat echo in 2/2021 showed no recurrence.  - repeat echo on this admission showed no recurrence of pericardial effusion    #. DM2 - insulin depedent    #. Iron Deficiency Anemia - PTA on iron supplements; question of possible bleeding from AV formations noted on recent GI work up- PCP had been evaluating this; during recent admission his Hgb was low at 6.9; improved to  8-9 since transfusion and iron infusions; his Hgb has not been checked closely as outpatient   - Hgb back down to 6.8 on this admission; up to 7.8 with transfusion   - plan for possible inpatient colonoscopy for further evaluation- cause is still unclear    #. H/o basilar CVA - was on DAPT but plavix discontinued by PCP due to significant anemia for a while and he was placed back on plavix last week    #. HTN- uncontrolled during recent visits- unclear if white coat hypertension    #. Dyslipidemia-     #. Chronic MARKS - thought d/t anemia dx'd 2018      ----------------------------------------------------------------------------------------    Plan:  -- Continue with IV lasix for one more day; will switch to PO diuretic tomorrow   -- Cardiology follow up will be arranged   -- Patient's anemia needs to be addressed as this has been a recurrent issue; not sure if he needs to be on plavix as well which was started recently; it may be worth consulting neuro for opinion  -- GI plans to do further work up for cause of anemia; pt may benefit from inpatient colonoscopy rather than outpatient   -- Given worsening renal function and need for close follow up for diuretic adjustment; patient was referred to [facility] clinic     ----------------------------------------------------------------------------------------  Physical Exam    Temp: 98  F (36.7  C) Temp src: Oral BP: 131/76 Pulse: 88   Resp: 18 SpO2: 97 % O2 Device: Nasal cannula    Vitals:    03/26/21 1805 03/27/21 0220 03/28/21 0518   Weight: 99.8 kg (220 lb) 100.2 kg (221 lb) 99 kg (218 lb 4.8 oz)     GEN:  NAD Oxygen on room air  HEENT: Mucous membranes moist.  NECK:  No JVD.  C/V:  Regular rate and rhythm, no murmur, rub or gallop.   RESP: CTA, dilip  GI: Abdomen soft, nontender, nondistended.    EXTREM: 1+ pitting LE edema up to mid calves.   NEURO: Alert and oriented, cooperative.   PSYCH: Normal affect.  SKIN: Warm and dry.   VASC: 2+ radial and dorsalis pedis pulses bilaterally.      Medications       aspirin  81 mg Oral Daily     atorvastatin  20 mg Oral Daily     carvedilol  6.25 mg Oral BID w/meals     clopidogrel  75 mg Oral Daily     fluticasone  2 spray Both Nostrils Daily     folic acid-vit B6-vit B12  1 tablet Oral Daily     furosemide  40 mg Intravenous Daily     insulin aspart  12 Units Subcutaneous TID AC     insulin aspart  1-7 Units Subcutaneous TID AC     insulin aspart  1-5 Units Subcutaneous At Bedtime     insulin glargine  30 Units Subcutaneous At Bedtime     iron sucrose (VENOFER) intermittent infusion  200 mg Intravenous Daily     pantoprazole  40 mg Oral BID     potassium chloride ER  20 mEq Oral Daily     sodium chloride (PF)  3 mL Intracatheter Q8H     traZODone  100 mg Oral At Bedtime       Data   Reviewed.    Tele: JULIEN Ruiz PA-C   3/29/2021  Pager: [phone]

## 2021-03-29 NOTE — PROGRESS NOTES
[facility]  Gastroenterology Progress Note     Justyn Olivas MRN# [id number]   YOB: 1940 Age: 80 year old          Assessment and Plan:   Justyn Olivas is a 80 year old male admitted on 3/26 with shortness of breath and anemia     Shortness of breath    Anemia, unspecified type  Hemoglobin 7.0 and stable over last 3 draws.  3/27 EGD noted 4 non bleeding angioectasias in the duodenum and treated with APC    -- Recommend to check hemoglobin again in 12 hours  -- Plan outpatient colonoscopy vs inpatient if has evidence of lower GI bleed with bright red bleeding.  -- Continue with pantoprazole BID  --2 g sodium diet               Interval History:   no new complaints, doing well and doing well; no cp, sob, n/v/d, or abd pain.              Review of Systems:   C: NEGATIVE for fever, chills, change in weight  E/M: NEGATIVE for ear, mouth and throat problems  R: NEGATIVE for significant cough or SOB  CV: NEGATIVE for chest pain, palpitations or peripheral edema             Medications:   I have reviewed this patient's current medications    aspirin  81 mg Oral Daily     atorvastatin  20 mg Oral Daily     carvedilol  6.25 mg Oral BID w/meals     clopidogrel  75 mg Oral Daily     fluticasone  2 spray Both Nostrils Daily     folic acid-vit B6-vit B12  1 tablet Oral Daily     furosemide  40 mg Intravenous Daily     insulin aspart  10 Units Subcutaneous TID AC     insulin aspart  1-7 Units Subcutaneous TID AC     insulin aspart  1-5 Units Subcutaneous At Bedtime     insulin glargine  30 Units Subcutaneous At Bedtime     iron sucrose (VENOFER) intermittent infusion  200 mg Intravenous Daily     pantoprazole  40 mg Oral BID     potassium chloride ER  20 mEq Oral Daily     sodium chloride (PF)  3 mL Intracatheter Q8H     traZODone  100 mg Oral At Bedtime                  Physical Exam:   Vitals were reviewed  Vital Signs with Ranges  Temp:  [97.8  F (36.6  C)-98.7  F (37.1  C)] 98   F (36.7  C)  Pulse:  [86-96] 86  Resp:  [18-20] 18  BP: (120-137)/(67-88) 127/71  SpO2:  [92 %-98 %] 92 %  I/O last 3 completed shifts:  In: 1200 [P.O.:1200]  Out: 750 [Urine:750]  Constitutional: healthy, alert and no distress   Cardiovascular: negative, PMI normal. No lifts, heaves, or thrills. RRR. No murmurs, clicks gallops or rub  Respiratory: negative, Percussion normal. Good diaphragmatic excursion. Lungs clear  Head: Normocephalic. No masses, lesions, tenderness or abnormalities  Neck: Neck supple. No adenopathy. Thyroid symmetric, normal size,, Carotids without bruits.  Abdomen: Abdomen soft, non-tender. BS normal. No masses, organomegaly             Data:   I reviewed the patient's new clinical lab test results.   Recent Labs   Lab Test 03/29/21  0430 03/28/21  0543 03/27/21  0610 09/04/20  1338 09/04/20  1338   WBC 5.0 5.0 4.4   < > 7.3   HGB 7.0* 7.4* 7.2*   < > 10.4*   MCV 92 90 90   < > 80    160 160   < > 275   INR  --   --   --   --  0.99    < > = values in this interval not displayed.     Recent Labs   Lab Test 03/29/21  0430 03/28/21  2248 03/28/21  1048 03/28/21  0543 03/27/21  0610 03/27/21  0610   POTASSIUM 3.0* 3.2* 3.3* 3.3*   < > 2.8*   CHLORIDE 105  --   --  104  --  106   CO2 34*  --   --  33*  --  34*   BUN 40*  --   --  45*  --  50*   ANIONGAP 3  --   --  3  --  4    < > = values in this interval not displayed.     Recent Labs   Lab Test 03/29/21  0430 03/27/21  1416 03/27/21  0610 03/26/21  1326 12/20/20  1614 02/10/20  1215 02/10/20  1215 02/05/19  1216 02/05/19  1216   ALBUMIN 3.0*  --  3.2* 3.4 2.7*   < > 3.5   < >  --    BILITOTAL 0.4  --   --  0.5 0.5   < > 0.3   < >  --    ALT 21  --   --  23 28   < > 20   < >  --    AST 12  --   --  14 17   < > 13   < >  --    PROTEIN  --  Negative  --   --   --   --  30*  --  30*    < > = values in this interval not displayed.       I reviewed the patient's new imaging results.    All laboratory data reviewed  All imaging studies reviewed  by me.    Jackie Monroy PA-C,  3/29/2021  [facility]  Office : [phone]  Cell: [phone] (Dr. Lane)  Cell: [phone] (Jackie Monroy PA-C)

## 2021-03-29 NOTE — PLAN OF CARE
DATE & TIME: 3/29/21 4490-1726  Cognitive Concerns/ Orientation : Alert and oriented x4   BEHAVIOR & AGGRESSION TOOL COLOR: Green  CIWA SCORE: N/A   ABNL VS/O2: VSS on RA  MOBILITY: SBA with walker (Low Hgb)  PAIN MANAGMENT: Denies pain  DIET: 2Gm Na, 1500 ml FR  BOWEL/BLADDER: Continent B&B, strict I/O.  ABNL LAB/BG: K 3.2, replaced and recheck 3.0. Replaced, recheck 3.4. Replaced and recheck at 1530. Hgb 7.0 and 7.8, recheck again at 2000 and in AM. . Cre 2.55  DRAIN/DEVICES: PIV saline locked  TELEMETRY RHYTHM: NSR  SKIN: BLE +2 edema. Pale.  TESTS/PROCEDURES: none  D/C DATE: Likely tomorrow pending Hgb and cardiology and GI. Want to recheck Hgb again tonight.   OTHER IMPORTANT INFO: Cardiology, GI, and nephrology following.

## 2021-03-30 LAB
ANION GAP SERPL CALCULATED.3IONS-SCNC: 3 MMOL/L (ref 3–14)
BUN SERPL-MCNC: 40 MG/DL (ref 7–30)
CALCIUM SERPL-MCNC: 8 MG/DL (ref 8.5–10.1)
CHLORIDE SERPL-SCNC: 106 MMOL/L (ref 94–109)
CO2 SERPL-SCNC: 32 MMOL/L (ref 20–32)
COPATH REPORT: NORMAL
CREAT SERPL-MCNC: 2.58 MG/DL (ref 0.66–1.25)
GFR SERPL CREATININE-BSD FRML MDRD: 22 ML/MIN/{1.73_M2}
GLUCOSE BLDC GLUCOMTR-MCNC: 142 MG/DL (ref 70–99)
GLUCOSE BLDC GLUCOMTR-MCNC: 158 MG/DL (ref 70–99)
GLUCOSE BLDC GLUCOMTR-MCNC: 214 MG/DL (ref 70–99)
GLUCOSE BLDC GLUCOMTR-MCNC: 215 MG/DL (ref 70–99)
GLUCOSE BLDC GLUCOMTR-MCNC: 260 MG/DL (ref 70–99)
GLUCOSE SERPL-MCNC: 164 MG/DL (ref 70–99)
HGB BLD-MCNC: 7.3 G/DL (ref 13.3–17.7)
HGB BLD-MCNC: 7.7 G/DL (ref 13.3–17.7)
POTASSIUM SERPL-SCNC: 3.2 MMOL/L (ref 3.4–5.3)
POTASSIUM SERPL-SCNC: 3.2 MMOL/L (ref 3.4–5.3)
POTASSIUM SERPL-SCNC: 3.4 MMOL/L (ref 3.4–5.3)
SODIUM SERPL-SCNC: 141 MMOL/L (ref 133–144)

## 2021-03-30 PROCEDURE — 999N001017 HC STATISTIC GLUCOSE BY METER IP

## 2021-03-30 PROCEDURE — 85018 HEMOGLOBIN: CPT | Performed by: HOSPITALIST

## 2021-03-30 PROCEDURE — 250N000013 HC RX MED GY IP 250 OP 250 PS 637: Performed by: INTERNAL MEDICINE

## 2021-03-30 PROCEDURE — 120N000001 HC R&B MED SURG/OB

## 2021-03-30 PROCEDURE — 84132 ASSAY OF SERUM POTASSIUM: CPT | Performed by: HOSPITALIST

## 2021-03-30 PROCEDURE — 80048 BASIC METABOLIC PNL TOTAL CA: CPT | Performed by: HOSPITALIST

## 2021-03-30 PROCEDURE — 250N000012 HC RX MED GY IP 250 OP 636 PS 637: Performed by: HOSPITALIST

## 2021-03-30 PROCEDURE — 36415 COLL VENOUS BLD VENIPUNCTURE: CPT | Performed by: INTERNAL MEDICINE

## 2021-03-30 PROCEDURE — 99232 SBSQ HOSP IP/OBS MODERATE 35: CPT | Performed by: INTERNAL MEDICINE

## 2021-03-30 PROCEDURE — 36415 COLL VENOUS BLD VENIPUNCTURE: CPT | Performed by: HOSPITALIST

## 2021-03-30 PROCEDURE — 258N000003 HC RX IP 258 OP 636: Performed by: INTERNAL MEDICINE

## 2021-03-30 PROCEDURE — 250N000011 HC RX IP 250 OP 636: Performed by: INTERNAL MEDICINE

## 2021-03-30 PROCEDURE — 250N000013 HC RX MED GY IP 250 OP 250 PS 637: Performed by: PHYSICIAN ASSISTANT

## 2021-03-30 PROCEDURE — 85018 HEMOGLOBIN: CPT | Performed by: INTERNAL MEDICINE

## 2021-03-30 PROCEDURE — 250N000013 HC RX MED GY IP 250 OP 250 PS 637: Performed by: HOSPITALIST

## 2021-03-30 PROCEDURE — 99233 SBSQ HOSP IP/OBS HIGH 50: CPT | Performed by: HOSPITALIST

## 2021-03-30 PROCEDURE — 84132 ASSAY OF SERUM POTASSIUM: CPT | Performed by: INTERNAL MEDICINE

## 2021-03-30 RX ORDER — POTASSIUM CHLORIDE 1500 MG/1
20 TABLET, EXTENDED RELEASE ORAL ONCE
Status: COMPLETED | OUTPATIENT
Start: 2021-03-30 | End: 2021-03-30

## 2021-03-30 RX ORDER — FUROSEMIDE 10 MG/ML
40 INJECTION INTRAMUSCULAR; INTRAVENOUS ONCE
Status: COMPLETED | OUTPATIENT
Start: 2021-03-30 | End: 2021-03-30

## 2021-03-30 RX ORDER — POLYETHYLENE GLYCOL 3350 17 G/17G
238 POWDER, FOR SOLUTION ORAL ONCE
Status: COMPLETED | OUTPATIENT
Start: 2021-03-30 | End: 2021-03-30

## 2021-03-30 RX ORDER — POTASSIUM CHLORIDE 1500 MG/1
20 TABLET, EXTENDED RELEASE ORAL ONCE
Status: CANCELLED | OUTPATIENT
Start: 2021-03-30 | End: 2021-03-30

## 2021-03-30 RX ADMIN — CLOPIDOGREL BISULFATE 75 MG: 75 TABLET ORAL at 08:07

## 2021-03-30 RX ADMIN — POLYETHYLENE GLYCOL 3350 17 G: 17 POWDER, FOR SOLUTION ORAL at 12:06

## 2021-03-30 RX ADMIN — POTASSIUM CHLORIDE 20 MEQ: 1500 TABLET, EXTENDED RELEASE ORAL at 16:31

## 2021-03-30 RX ADMIN — POTASSIUM CHLORIDE 20 MEQ: 1500 TABLET, EXTENDED RELEASE ORAL at 09:14

## 2021-03-30 RX ADMIN — FLUTICASONE PROPIONATE 2 SPRAY: 50 SPRAY, METERED NASAL at 08:15

## 2021-03-30 RX ADMIN — ASPIRIN 81 MG: 81 TABLET, DELAYED RELEASE ORAL at 08:07

## 2021-03-30 RX ADMIN — POTASSIUM CHLORIDE 20 MEQ: 1500 TABLET, EXTENDED RELEASE ORAL at 08:07

## 2021-03-30 RX ADMIN — Medication 1 TABLET: at 08:06

## 2021-03-30 RX ADMIN — FUROSEMIDE 40 MG: 10 INJECTION, SOLUTION INTRAMUSCULAR; INTRAVENOUS at 16:31

## 2021-03-30 RX ADMIN — ATORVASTATIN CALCIUM 20 MG: 20 TABLET, FILM COATED ORAL at 21:26

## 2021-03-30 RX ADMIN — POLYETHYLENE GLYCOL 3350 238 G: 17 POWDER, FOR SOLUTION ORAL at 16:37

## 2021-03-30 RX ADMIN — CARVEDILOL 6.25 MG: 6.25 TABLET, FILM COATED ORAL at 18:22

## 2021-03-30 RX ADMIN — POTASSIUM CHLORIDE 20 MEQ: 1500 TABLET, EXTENDED RELEASE ORAL at 21:26

## 2021-03-30 RX ADMIN — TRAZODONE HYDROCHLORIDE 100 MG: 100 TABLET ORAL at 21:26

## 2021-03-30 RX ADMIN — PANTOPRAZOLE SODIUM 40 MG: 40 TABLET, DELAYED RELEASE ORAL at 08:07

## 2021-03-30 RX ADMIN — IRON SUCROSE 200 MG: 20 INJECTION, SOLUTION INTRAVENOUS at 09:15

## 2021-03-30 RX ADMIN — PANTOPRAZOLE SODIUM 40 MG: 40 TABLET, DELAYED RELEASE ORAL at 21:26

## 2021-03-30 RX ADMIN — FUROSEMIDE 40 MG: 10 INJECTION, SOLUTION INTRAMUSCULAR; INTRAVENOUS at 08:09

## 2021-03-30 RX ADMIN — CARVEDILOL 6.25 MG: 6.25 TABLET, FILM COATED ORAL at 08:07

## 2021-03-30 RX ADMIN — INSULIN GLARGINE 30 UNITS: 100 INJECTION, SOLUTION SUBCUTANEOUS at 21:27

## 2021-03-30 ASSESSMENT — ACTIVITIES OF DAILY LIVING (ADL)
ADLS_ACUITY_SCORE: 14
ADLS_ACUITY_SCORE: 17
ADLS_ACUITY_SCORE: 14

## 2021-03-30 NOTE — PLAN OF CARE
Cognitive Concerns/ Orientation : Alert and oriented x4   BEHAVIOR & AGGRESSION TOOL COLOR: Green  ABNL VS/O2: VSS on RA  MOBILITY: IND in room. Educated.   PAIN MANAGMENT: Denies pain  DIET: 2Gm Na, 1500 ml FR  BOWEL/BLADDER: Continent B&B  ABNL LAB/BG: K+ 3.7, Hgb 7.9, recheck again in AM. , 201. Cre 2.55  DRAIN/DEVICES: PIV saline locked  TELEMETRY RHYTHM: NSR  SKIN: BLE +2 edema. Pale.  TESTS/PROCEDURES: none  D/C DATE: Likely today pending Hgb  OTHER IMPORTANT INFO: Cardiology, GI, and nephrology following.

## 2021-03-30 NOTE — PROGRESS NOTES
[facility]  Gastroenterology Progress Note     Justyn Olivas MRN# [id number]   YOB: 1940 Age: 80 year old          Assessment and Plan:   Justyn Olivas is a 80 year old male admitted on 3/26 with shortness of breath and anemia      Shortness of breath    Anemia, unspecified type  Hemoglobin 7.0 and stable over last 3 draws. However has dropped from 7.9 to 7.3 in last day.  3/27 EGD noted 4 non bleeding angioectasias in the duodenum and treated with APC  Discussed with Dr. Almanza that patient may be non compliant and outpatient colonoscopy would not likely be scheduled. He is at high risk for recurrent GI bleed. Discussed with patient recommendations for a colonoscopy while inpatient. Patient agrees.  -- Recommend to check hemoglobin again in 12 hours  -- Plan colonoscopy tomorrow. Will start prep today  -- Clear liquid diet  -- Npo at midnight  -- Continue with pantoprazole BID                Interval History:   no new complaints, doing well, denies chest pain, denies shortness of breath, denies abdominal pain, alert, oriented to person, place and time, has had a bowel movement in the last 24 hours and doing well; no cp, sob, n/v/d, or abd pain.              Review of Systems:   C: NEGATIVE for fever, chills, change in weight  E/M: NEGATIVE for ear, mouth and throat problems  R: NEGATIVE for significant cough or SOB  CV: NEGATIVE for chest pain, palpitations or peripheral edema             Medications:   I have reviewed this patient's current medications    aspirin  81 mg Oral Daily     atorvastatin  20 mg Oral Daily     carvedilol  6.25 mg Oral BID w/meals     clopidogrel  75 mg Oral Daily     fluticasone  2 spray Both Nostrils Daily     folic acid-vit B6-vit B12  1 tablet Oral Daily     furosemide  40 mg Intravenous Daily     insulin aspart  12 Units Subcutaneous TID AC     insulin aspart  1-7 Units Subcutaneous TID AC     insulin aspart  1-5 Units Subcutaneous At  Bedtime     insulin glargine  30 Units Subcutaneous At Bedtime     iron sucrose (VENOFER) intermittent infusion  200 mg Intravenous Daily     pantoprazole  40 mg Oral BID     potassium chloride  20 mEq Oral Once     potassium chloride ER  20 mEq Oral Daily     sodium chloride (PF)  3 mL Intracatheter Q8H     traZODone  100 mg Oral At Bedtime                  Physical Exam:   Vitals were reviewed  Vital Signs with Ranges  Temp:  [97.4  F (36.3  C)-98  F (36.7  C)] 98  F (36.7  C)  Pulse:  [83-92] 90  Resp:  [16-18] 16  BP: (130-137)/(70-78) 135/70  SpO2:  [95 %-97 %] 96 %  I/O last 3 completed shifts:  In: 1200 [P.O.:1200]  Out: 1115 [Urine:1115]  Constitutional: healthy, alert and no distress   Cardiovascular: negative, PMI normal. No lifts, heaves, or thrills. RRR. No murmurs, clicks gallops or rub  Respiratory: negative, Percussion normal. Good diaphragmatic excursion. Lungs clear  Head: Normocephalic. No masses, lesions, tenderness or abnormalities  Neck: Neck supple. No adenopathy. Thyroid symmetric, normal size,, Carotids without bruits.  Abdomen: Abdomen soft, non-tender. BS normal. No masses, organomegaly           Data:   I reviewed the patient's new clinical lab test results.   Recent Labs   Lab Test 03/30/21  0612 03/29/21  2048 03/29/21  0936 03/29/21  0430 03/28/21  0543 03/27/21  0610 09/04/20  1338 09/04/20  1338   WBC  --   --   --  5.0 5.0 4.4   < > 7.3   HGB 7.3* 7.9* 7.8* 7.0* 7.4* 7.2*   < > 10.4*   MCV  --   --   --  92 90 90   < > 80   PLT  --   --   --  160 160 160   < > 275   INR  --   --   --   --   --   --   --  0.99    < > = values in this interval not displayed.     Recent Labs   Lab Test 03/30/21  0612 03/29/21  1632 03/29/21  0916 03/29/21  0430 03/28/21  0543 03/28/21  0543   POTASSIUM 3.2* 3.7 3.4 3.0*   < > 3.3*   CHLORIDE 106  --   --  105  --  104   CO2 32  --   --  34*  --  33*   BUN 40*  --   --  40*  --  45*   ANIONGAP 3  --   --  3  --  3    < > = values in this interval not  displayed.     Recent Labs   Lab Test 03/29/21  0430 03/27/21  1416 03/27/21  0610 03/26/21  1326 12/20/20  1614 02/10/20  1215 02/10/20  1215 02/05/19  1216 02/05/19  1216   ALBUMIN 3.0*  --  3.2* 3.4 2.7*   < > 3.5   < >  --    BILITOTAL 0.4  --   --  0.5 0.5   < > 0.3   < >  --    ALT 21  --   --  23 28   < > 20   < >  --    AST 12  --   --  14 17   < > 13   < >  --    PROTEIN  --  Negative  --   --   --   --  30*  --  30*    < > = values in this interval not displayed.       I reviewed the patient's new imaging results.    All laboratory data reviewed  All imaging studies reviewed by me.    Jackie Monroy PA-C,  3/30/2021  [facility]  Office : [phone]  Cell: [phone] (Dr. Lane)  Cell: [phone] (Jackie Monroy PA-C)

## 2021-03-30 NOTE — PROGRESS NOTES
[facility]  Hospitalist Progress Note   03/30/2021          Assessment and Plan:       Mr Olivas is a 80-year-old male with CHFpEF, h/o pericardial effusion, DM, HTN, DLD, h/o basilar CVA, Iron deficiency anemia sent in from cardiology clinic for worsening LE edema and SOB.    Dyspnea on exertion from volume overload, symptomatic anemia.  Acute exacerbation of heart failure with preserved ejection fraction.  Chronic CHFpEF [EF 50 to 55%]  Hypertension, Dyslipidemia  H/o pericardial effusion (s/p pericardiocentesis in December 2020 -unclear reasons)  Admitted with weight gain, shortness of breath. BNP 2513  Chest x-ray on admission with mild cardiomegaly with normal pulmonary vascularity;   ECHO EF 50-55%, noted severe inferior wall hypokinesis (Compared to prior study, there is no significant change)  Underwent aggressive diuresis with intravenous Lasix, tapered to IV Lasix 40 mg once daily.  Continue further diuresis per cardiology.  Currently on IV Lasix 40 mg oral daily, plan to transition to orals likely today.  Appreciate comanagement.  Holding PTA oral torsemide 10 mg daily.  Continue PTA aspirin [restarted 3/28 after hemoglobin stable] and Plavix 75 mg oral daily.  Continue PTA Coreg 6.25 mg twice daily.  Continue Lipitor 20 mg oral daily.  PTA irbesartan on hold due to worsening renal function.  Continue telemetry monitoring.  Strict input output monitoring, daily weights.    Low-salt diet.  Fluid restriction to 1500 mL/day.     Severe symptomatic anemia, acute on chronic -no established source yet.  Status post EGD with nonbleeding angiectasia's in the duodenum.  Anemia of chronic disease with iron deficiency.  Baseline hemoglobin around 8 to 9 ; on admission Hb 6.8, serial 1 unit of PRBC and hemoglobin this morning around 7.3  Iron saturation index 19, serum iron 53.  Continue IV sucrose per nephrology.  [facility] following, EGD 3/27, noted with erythematous mucosa in the antrum and   four non-bleeding angioectasias in the duodenum, treated with argon plasma coagulation (APC); hemodynamically stable and no active bleed.  Discussed with gastroenterologist today, patient with ongoing shortness of breath with anemia, on aspirin and Plavix and no established source of bleeding.  Benefit from colonoscopy inpatient.    GI team agrees with same, planning for colonoscopy tomorrow.  Appreciate Comanagement.  Continue oral Protonix, monitor hemoglobin levels in 12 hours.  Transfuse if hemoglobin less than 7 or symptomatic.    Acute on CKD stage III  baseline creatinine until 2/2021 seems around 1.5 to 1.7 but for past month Cr around 2.4 to 2.6 (coinciding with increase in diuretics outpatient)  Cr this admission 2.66---2.64---2.5  Nephrology followed follow-up in renal clinic in 2 weeks.  Hold off on PTA Irbesartan and Metformin  Avoid NSAIDs, nephrotoxins  Diuresis as above; monitor BMP    Hypokalemia, likely diuretics induced  Potassium 3.2, received replacement today with 20 meq. Will recheck and monitor     H/o basilar CVA  Continue aspirin, Plavix, Lipitor as above.    Mild hypoalbuminemia likely competent of dilutional, poor oral intake.  Albumin 3.0   Monitor levels periodically.    Type 2 diabetes mellitus with a hemoglobin A1c of 6.0.  PTA insulin Lantus 50 units at bedtime, lispro 3 times daily with meals [sliding scale], Metformin 1000 mg twice daily.  PTA metformin on hold since admission due to renal dysfunction, discontinued on discharge.  Blood sugars consistently greater than 150 - 250.  Increase NovoLog insulin to 15 units 3 times daily.  Continue Sliding scale insulin.  Of note patient reports hypoglycemic episodes at home with home dose of insulin Lantus, will continue Lantus 30 units at bedtime.    Hypoglycemia protocol in place.    Obesity with a BMI of 29.6.  Possible obstructive sleep apnea.  Will need to consider lifestyle modification with diet and exercise as able to.  Sleep  studies as outpatient.    Physical deconditioning in the setting of ongoing medical illness,   Senile fragility.  Lives at home with his wife.  Ambulate as able to.  If any concerns will consider physical therapy evaluation.     COVID status: negative 3/26     Orders Placed This Encounter      Clear Liquid Diet      DVT Prophylaxis: SCD, pharmacological prophylaxis on hold given GI bleed.  Code Status: Full Code  Disposition: Expected discharge pending clinical course.    Discussed with patient, bedside RN, gastroenterologist.  Time greater than 35 minutes, more than 60% of time spent in direct patient care, care coordination, patient counseling, and formalizing plan of care.     Amador Almanza MD        Interval History:      Patient lying in bed.  Ambulating out of bed.  Reports some improvement in symptoms.  No chest pain at this time.  Shortness of breath, no palpitation.  No headache or dizziness.  Hemoglobin this morning at 7.3.  Denies any blood in the stools or blood in the urine.  No abdominal pain.  No tingling or numbness or weakness.  Tolerating oral diet.  No bowel movement today, yesterday had dark-colored stool.         Physical Exam:        Physical Exam   Temp:  [97.9  F (36.6  C)-98  F (36.7  C)] 97.9  F (36.6  C)  Pulse:  [82-92] 82  Resp:  [16-18] 16  BP: (130-138)/(70-79) 138/79  SpO2:  [95 %-97 %] 95 %    Intake/Output Summary (Last 24 hours) at 3/29/2021 1306  Last data filed at 3/29/2021 0720  Gross per 24 hour   Intake 720 ml   Output 1150 ml   Net -430 ml     Admission Weight: 99.8 kg (220 lb)  Current Weight: 99 kg (218 lb 4.8 oz)    PHYSICAL EXAM  GENERAL: Patient is in no distress. Alert and oriented.  HEART: Regular rate and rhythm. S1S2. No murmurs  LUNGS: Bilateral slightly decreased breath sounds, no wheezing or crackles.  Respirations unlabored  ABDOMEN: Soft, no abdominal tenderness, bowel sounds heard   NEURO: Moving all extremities, no focal weakness.  EXTREMITIES: trace pedal  edema.   SKIN: Warm, dry. No rash or bruising.  PSYCHIATRY Cooperative       Medications:          aspirin  81 mg Oral Daily     atorvastatin  20 mg Oral Daily     carvedilol  6.25 mg Oral BID w/meals     clopidogrel  75 mg Oral Daily     fluticasone  2 spray Both Nostrils Daily     folic acid-vit B6-vit B12  1 tablet Oral Daily     furosemide  40 mg Intravenous Daily     insulin aspart  12 Units Subcutaneous TID AC     insulin aspart  1-7 Units Subcutaneous TID AC     insulin aspart  1-5 Units Subcutaneous At Bedtime     insulin glargine  30 Units Subcutaneous At Bedtime     iron sucrose (VENOFER) intermittent infusion  200 mg Intravenous Daily     pantoprazole  40 mg Oral BID     polyethylene glycol  238 g Oral Once     potassium chloride ER  20 mEq Oral Daily     sodium chloride (PF)  3 mL Intracatheter Q8H     traZODone  100 mg Oral At Bedtime     acetaminophen, albuterol, cyclobenzaprine, glucose **OR** dextrose **OR** glucagon, diphenhydramine-acetaminophen (TYLENOL PM) 25/500, lidocaine 4%, lidocaine (buffered or not buffered), melatonin, ondansetron **OR** ondansetron, polyethylene glycol, prochlorperazine **OR** prochlorperazine **OR** prochlorperazine, sodium chloride (PF), sodium chloride (PF)         Data:      All new lab and imaging data was reviewed.

## 2021-03-30 NOTE — PLAN OF CARE
DATE & TIME: 3/29/21 4385-2411  Cognitive Concerns/ Orientation : Alert and oriented x4   BEHAVIOR & AGGRESSION TOOL COLOR: Green  ABNL VS/O2: VSS on RA  MOBILITY: IND in room- pt educated and agreeable to call for help if he feels light headed/dizzy.  PAIN MANAGMENT: Denies pain  DIET: 2Gm Na, 1500 ml FR  BOWEL/BLADDER: Continent B&B  ABNL LAB/BG: K+ rechecked at 3.7. Hgb 7.9, recheck again in AM. , 201. Cre 2.55  DRAIN/DEVICES: PIV saline locked  TELEMETRY RHYTHM: NSR  SKIN: BLE +2 edema. Pale.  TESTS/PROCEDURES: none  D/C DATE: Likely tomorrow pending Hgb and cardiology and GI. Want to recheck Hgb again tonight.   OTHER IMPORTANT INFO: Cardiology, GI, and nephrology following.

## 2021-03-30 NOTE — PROGRESS NOTES
[facility]  Cardiology Progress Note    Date of Service (when I saw the patient): 03/30/2021  Primary Cardiologist: Dr. Rosenbaum       Interval History:   Peripheral edema is still present. No other concerns. Breathing is better. He is not urinating very much.     ----------------------------------------------------------------------------------------    Assessment:  Justyn Olivas is a 80 year old male whom I sent in from cardiology clinic due to worsening SOB- he was seen in the ED and was admitted for worsening anemia with Hgb of 6.8 and acute on chronic HF on 3/26/2021.     Kamran is a pleasant 80 year old male with past medical history:      #. Acute on Chronic HFpEF- had been on Torsemide 10 mg since his admission in 12/2020; had 20 Ib weight gain (233 Ibs from baseline of 214 Ibs) when he saw his PCP on 2/3/79065- this was felt to be related to fluid gain from recent Iron infusion - after discussion with Dr. Rosenbaum Torsemide was increased to 20 mg BID; he unfortunately developed CASS with Cr up to 2.6- we had to hold his torsemide with only minimal improvement in his renal function to 2.28; he started to gain weight subsequently and during last visit we added back low dose Torsemide 10 mg.   - I saw him in clinic with no significant improvement and he was sent to the ED   - nephrology team was involved and he was IV diuresed with lasix with adequate output (it's not measured consistently); weight is down to 218 Ibs  - nephrology felt patient is back in euvolemic state though he has significant peripheral edema still   - he continues to be on IV lasix 40 mg but no significant output and his weight is up to 220 Ibs; renal fxn is the same  - has low albumin which can contribute to some of his peripheral edema    #. Acute on Chronic CKD- Cr baseline 1.5-1.6 but has been elevated to 2.28-2.6 over the last one month  - Cr remains around 2.4   - has follow up with nephrology in 2  weeks     #. CM - EF 45-50% on echo; unknown etiology- no ischemic work up has been done (echo does show inferior WMA)     #. Hx of Pericardial Effusion - noted during admission in 12/2020. Underwent successful pericardiocentesis. Cytology was negative for malignancy. No clear cause identified. Repeat echo in 2/2021 showed no recurrence.  - repeat echo on this admission showed no recurrence of pericardial effusion    #. DM2 - insulin depedent    #. Acute Anemia - PTA on iron supplements; question of possible bleeding from AV formations noted on recent GI work up- PCP had been evaluating this; during recent admission his Hgb was low at 6.9; improved to  8-9 since transfusion and iron infusions; his Hgb has not been checked closely as outpatient   - Hgb back down to 6.8 on this admission; up to 7.8 with transfusion   - Hgb down to 7.3 today; GI plans to do colonoscopy tomorrow    #. H/o basilar CVA - was on DAPT but plavix discontinued by PCP due to significant anemia for a while and he was placed back on plavix last week    #. HTN- uncontrolled during recent visits- unclear if white coat hypertension    #. Dyslipidemia-     #. Chronic MARKS - thought d/t anemia dx'd 2018      ----------------------------------------------------------------------------------------    Plan:  -- Increase IV lasix   -- Heme consult for further evaluation of his anemia; it's possible colonoscopy will be normal  -- Given worsening renal function and need for close follow up for diuretic adjustment; patient was referred to [facility] clinic    ----------------------------------------------------------------------------------------  Physical Exam   Temp: 97.9  F (36.6  C) Temp src: Oral BP: 138/79 Pulse: 82   Resp: 16 SpO2: 95 % O2 Device: None (Room air)    Vitals:    03/27/21 0220 03/28/21 0518 03/30/21 0607   Weight: 100.2 kg (221 lb) 99 kg (218 lb 4.8 oz) 100 kg (220 lb 6.4 oz)     GEN:  NAD Oxygen on room air  HEENT: Mucous membranes  moist.  NECK:  No JVD.  C/V:  Regular rate and rhythm, no murmur, rub or gallop.   RESP: CTA, dilip  GI: Abdomen soft, nontender, nondistended.    EXTREM: 1+ pitting LE edema up to mid calves.   NEURO: Alert and oriented, cooperative.   PSYCH: Normal affect.  SKIN: Warm and dry.   VASC: 2+ radial and dorsalis pedis pulses bilaterally.      Medications       atorvastatin  20 mg Oral Daily     carvedilol  6.25 mg Oral BID w/meals     clopidogrel  75 mg Oral Daily     fluticasone  2 spray Both Nostrils Daily     folic acid-vit B6-vit B12  1 tablet Oral Daily     furosemide  40 mg Intravenous Once     furosemide  40 mg Intravenous Daily     insulin aspart  15 Units Subcutaneous TID AC     insulin aspart  1-7 Units Subcutaneous TID AC     insulin aspart  1-5 Units Subcutaneous At Bedtime     insulin glargine  30 Units Subcutaneous At Bedtime     iron sucrose (VENOFER) intermittent infusion  200 mg Intravenous Daily     pantoprazole  40 mg Oral BID     polyethylene glycol  238 g Oral Once     potassium chloride  20 mEq Oral Once     potassium chloride ER  20 mEq Oral Daily     sodium chloride (PF)  3 mL Intracatheter Q8H     traZODone  100 mg Oral At Bedtime       Data   Reviewed.    Tele: JULIEN Ruiz PA-C   3/30/2021  Pager: [phone]

## 2021-03-30 NOTE — PLAN OF CARE
DATE & TIME: 3/30/2021 4668-2157  Cognitive Concerns/ Orientation : Alert and oriented x4   BEHAVIOR & AGGRESSION TOOL COLOR: Green  ABNL VS/O2: VSS on RA  MOBILITY: SBA, Pt calls appropriately for help getting up to the bathroom. Educated on why we need to be with him because of low Hgb.    PAIN MANAGMENT: Denies pain  DIET: Clear liquid, 1500 ml FR  BOWEL/BLADDER: Continent B&B, IV lasix, strict I/O.   ABNL LAB/BG: K+ 3.2, replaced and recheck pending. Hgb 7.3. Cre 2.55  DRAIN/DEVICES: PIV infiltrated, marked and ice packs applied. New IV inserted. IV iron infusions and Lasix.   TELEMETRY RHYTHM: NSR  SKIN: BLE +2 edema. Pale.  TESTS/PROCEDURES: Colonoscopy tomorrow, start on bowel prep this afternoon.   D/C DATE: 1-2 days pending Hgb and consults. Colonscopy tomorrow.   OTHER IMPORTANT INFO: Cardiology, GI, and nephrology following.

## 2021-03-31 LAB
ANION GAP SERPL CALCULATED.3IONS-SCNC: 2 MMOL/L (ref 3–14)
BASOPHILS # BLD AUTO: 0 10E9/L (ref 0–0.2)
BASOPHILS NFR BLD AUTO: 0.4 %
BILIRUB DIRECT SERPL-MCNC: 0.2 MG/DL (ref 0–0.2)
BILIRUB SERPL-MCNC: 0.6 MG/DL (ref 0.2–1.3)
BUN SERPL-MCNC: 31 MG/DL (ref 7–30)
CALCIUM SERPL-MCNC: 8.4 MG/DL (ref 8.5–10.1)
CHLORIDE SERPL-SCNC: 108 MMOL/L (ref 94–109)
CO2 SERPL-SCNC: 34 MMOL/L (ref 20–32)
COLONOSCOPY: NORMAL
CREAT SERPL-MCNC: 2.44 MG/DL (ref 0.66–1.25)
DAT IGG-SP REAG RBC-IMP: NORMAL
DIFFERENTIAL METHOD BLD: ABNORMAL
EOSINOPHIL # BLD AUTO: 0.2 10E9/L (ref 0–0.7)
EOSINOPHIL NFR BLD AUTO: 2.9 %
ERYTHROCYTE [DISTWIDTH] IN BLOOD BY AUTOMATED COUNT: 19.9 % (ref 10–15)
ERYTHROCYTE [DISTWIDTH] IN BLOOD BY AUTOMATED COUNT: 19.9 % (ref 10–15)
GFR SERPL CREATININE-BSD FRML MDRD: 24 ML/MIN/{1.73_M2}
GLUCOSE BLDC GLUCOMTR-MCNC: 132 MG/DL (ref 70–99)
GLUCOSE BLDC GLUCOMTR-MCNC: 169 MG/DL (ref 70–99)
GLUCOSE BLDC GLUCOMTR-MCNC: 173 MG/DL (ref 70–99)
GLUCOSE BLDC GLUCOMTR-MCNC: 249 MG/DL (ref 70–99)
GLUCOSE BLDC GLUCOMTR-MCNC: 98 MG/DL (ref 70–99)
GLUCOSE SERPL-MCNC: 122 MG/DL (ref 70–99)
HCT VFR BLD AUTO: 25.1 % (ref 40–53)
HCT VFR BLD AUTO: 26.4 % (ref 40–53)
HGB BLD-MCNC: 7.5 G/DL (ref 13.3–17.7)
HGB BLD-MCNC: 7.9 G/DL (ref 13.3–17.7)
IMM GRANULOCYTES # BLD: 0 10E9/L (ref 0–0.4)
IMM GRANULOCYTES NFR BLD: 0.4 %
LDH SERPL L TO P-CCNC: 178 U/L (ref 85–227)
LYMPHOCYTES # BLD AUTO: 0.6 10E9/L (ref 0.8–5.3)
LYMPHOCYTES NFR BLD AUTO: 11.9 %
MCH RBC QN AUTO: 27.7 PG (ref 26.5–33)
MCH RBC QN AUTO: 28.4 PG (ref 26.5–33)
MCHC RBC AUTO-ENTMCNC: 29.9 G/DL (ref 31.5–36.5)
MCHC RBC AUTO-ENTMCNC: 29.9 G/DL (ref 31.5–36.5)
MCV RBC AUTO: 93 FL (ref 78–100)
MCV RBC AUTO: 95 FL (ref 78–100)
MONOCYTES # BLD AUTO: 0.8 10E9/L (ref 0–1.3)
MONOCYTES NFR BLD AUTO: 15.8 %
NEUTROPHILS # BLD AUTO: 3.6 10E9/L (ref 1.6–8.3)
NEUTROPHILS NFR BLD AUTO: 68.6 %
NRBC # BLD AUTO: 0 10*3/UL
NRBC BLD AUTO-RTO: 0 /100
PLATELET # BLD AUTO: 160 10E9/L (ref 150–450)
PLATELET # BLD AUTO: 189 10E9/L (ref 150–450)
POTASSIUM SERPL-SCNC: 3.1 MMOL/L (ref 3.4–5.3)
POTASSIUM SERPL-SCNC: 3.5 MMOL/L (ref 3.4–5.3)
POTASSIUM SERPL-SCNC: 3.6 MMOL/L (ref 3.4–5.3)
PTH RELATED PROT SERPL-SCNC: 3.3 PMOL/L (ref 0–2.3)
RBC # BLD AUTO: 2.71 10E12/L (ref 4.4–5.9)
RBC # BLD AUTO: 2.78 10E12/L (ref 4.4–5.9)
RETICS # AUTO: 176 10E9/L (ref 25–95)
RETICS/RBC NFR AUTO: 6.3 % (ref 0.5–2)
SODIUM SERPL-SCNC: 144 MMOL/L (ref 133–144)
WBC # BLD AUTO: 5.1 10E9/L (ref 4–11)
WBC # BLD AUTO: 5.2 10E9/L (ref 4–11)

## 2021-03-31 PROCEDURE — 86334 IMMUNOFIX E-PHORESIS SERUM: CPT | Mod: 26 | Performed by: PATHOLOGY

## 2021-03-31 PROCEDURE — 999N001109 HC STATISTIC MORPHOLOGY W/INTERP HISTOLOGY TC 85060: Performed by: INTERNAL MEDICINE

## 2021-03-31 PROCEDURE — 250N000011 HC RX IP 250 OP 636: Performed by: INTERNAL MEDICINE

## 2021-03-31 PROCEDURE — 82248 BILIRUBIN DIRECT: CPT | Performed by: INTERNAL MEDICINE

## 2021-03-31 PROCEDURE — 258N000003 HC RX IP 258 OP 636: Performed by: INTERNAL MEDICINE

## 2021-03-31 PROCEDURE — 83615 LACTATE (LD) (LDH) ENZYME: CPT | Performed by: INTERNAL MEDICINE

## 2021-03-31 PROCEDURE — 85027 COMPLETE CBC AUTOMATED: CPT | Performed by: HOSPITALIST

## 2021-03-31 PROCEDURE — 85060 BLOOD SMEAR INTERPRETATION: CPT | Mod: 26 | Performed by: PATHOLOGY

## 2021-03-31 PROCEDURE — 80048 BASIC METABOLIC PNL TOTAL CA: CPT | Performed by: HOSPITALIST

## 2021-03-31 PROCEDURE — 120N000001 HC R&B MED SURG/OB

## 2021-03-31 PROCEDURE — 82784 ASSAY IGA/IGD/IGG/IGM EACH: CPT | Performed by: INTERNAL MEDICINE

## 2021-03-31 PROCEDURE — 0D5H8ZZ DESTRUCTION OF CECUM, VIA NATURAL OR ARTIFICIAL OPENING ENDOSCOPIC: ICD-10-PCS | Performed by: INTERNAL MEDICINE

## 2021-03-31 PROCEDURE — 999N001017 HC STATISTIC GLUCOSE BY METER IP

## 2021-03-31 PROCEDURE — 99442 PR PHYSICIAN TELEPHONE EVALUATION 11-20 MIN: CPT | Mod: 95 | Performed by: INTERNAL MEDICINE

## 2021-03-31 PROCEDURE — 250N000013 HC RX MED GY IP 250 OP 250 PS 637: Performed by: INTERNAL MEDICINE

## 2021-03-31 PROCEDURE — 84165 PROTEIN E-PHORESIS SERUM: CPT | Mod: TC | Performed by: INTERNAL MEDICINE

## 2021-03-31 PROCEDURE — 83010 ASSAY OF HAPTOGLOBIN QUANT: CPT | Performed by: INTERNAL MEDICINE

## 2021-03-31 PROCEDURE — 99232 SBSQ HOSP IP/OBS MODERATE 35: CPT | Performed by: PHYSICIAN ASSISTANT

## 2021-03-31 PROCEDURE — 82247 BILIRUBIN TOTAL: CPT | Performed by: INTERNAL MEDICINE

## 2021-03-31 PROCEDURE — 86334 IMMUNOFIX E-PHORESIS SERUM: CPT | Mod: TC | Performed by: INTERNAL MEDICINE

## 2021-03-31 PROCEDURE — 86880 COOMBS TEST DIRECT: CPT | Performed by: INTERNAL MEDICINE

## 2021-03-31 PROCEDURE — 999N000099 HC STATISTIC MODERATE SEDATION < 10 MIN: Performed by: INTERNAL MEDICINE

## 2021-03-31 PROCEDURE — 999N001036 HC STATISTIC TOTAL PROTEIN: Performed by: INTERNAL MEDICINE

## 2021-03-31 PROCEDURE — 36415 COLL VENOUS BLD VENIPUNCTURE: CPT | Performed by: HOSPITALIST

## 2021-03-31 PROCEDURE — 250N000012 HC RX MED GY IP 250 OP 636 PS 637: Performed by: HOSPITALIST

## 2021-03-31 PROCEDURE — 85025 COMPLETE CBC W/AUTO DIFF WBC: CPT | Performed by: INTERNAL MEDICINE

## 2021-03-31 PROCEDURE — 84132 ASSAY OF SERUM POTASSIUM: CPT | Performed by: HOSPITALIST

## 2021-03-31 PROCEDURE — 45388 COLONOSCOPY W/ABLATION: CPT | Performed by: INTERNAL MEDICINE

## 2021-03-31 PROCEDURE — 99233 SBSQ HOSP IP/OBS HIGH 50: CPT | Performed by: HOSPITALIST

## 2021-03-31 PROCEDURE — 85045 AUTOMATED RETICULOCYTE COUNT: CPT | Performed by: INTERNAL MEDICINE

## 2021-03-31 PROCEDURE — 84132 ASSAY OF SERUM POTASSIUM: CPT | Performed by: INTERNAL MEDICINE

## 2021-03-31 PROCEDURE — 36415 COLL VENOUS BLD VENIPUNCTURE: CPT | Performed by: INTERNAL MEDICINE

## 2021-03-31 RX ORDER — NALOXONE HYDROCHLORIDE 0.4 MG/ML
0.2 INJECTION, SOLUTION INTRAMUSCULAR; INTRAVENOUS; SUBCUTANEOUS
Status: DISCONTINUED | OUTPATIENT
Start: 2021-03-31 | End: 2021-04-01 | Stop reason: HOSPADM

## 2021-03-31 RX ORDER — POTASSIUM CHLORIDE 1500 MG/1
20 TABLET, EXTENDED RELEASE ORAL ONCE
Status: COMPLETED | OUTPATIENT
Start: 2021-03-31 | End: 2021-03-31

## 2021-03-31 RX ORDER — NALOXONE HYDROCHLORIDE 0.4 MG/ML
0.4 INJECTION, SOLUTION INTRAMUSCULAR; INTRAVENOUS; SUBCUTANEOUS
Status: DISCONTINUED | OUTPATIENT
Start: 2021-03-31 | End: 2021-04-01 | Stop reason: HOSPADM

## 2021-03-31 RX ORDER — FENTANYL CITRATE 50 UG/ML
INJECTION, SOLUTION INTRAMUSCULAR; INTRAVENOUS PRN
Status: COMPLETED | OUTPATIENT
Start: 2021-03-31 | End: 2021-03-31

## 2021-03-31 RX ORDER — FLUMAZENIL 0.1 MG/ML
0.2 INJECTION, SOLUTION INTRAVENOUS
Status: ACTIVE | OUTPATIENT
Start: 2021-03-31 | End: 2021-04-01

## 2021-03-31 RX ORDER — LANOLIN ALCOHOL/MO/W.PET/CERES
1000 CREAM (GRAM) TOPICAL DAILY
Status: DISCONTINUED | OUTPATIENT
Start: 2021-03-31 | End: 2021-04-01 | Stop reason: HOSPADM

## 2021-03-31 RX ORDER — LIDOCAINE 40 MG/G
CREAM TOPICAL
Status: DISCONTINUED | OUTPATIENT
Start: 2021-03-31 | End: 2021-03-31 | Stop reason: HOSPADM

## 2021-03-31 RX ADMIN — CARVEDILOL 6.25 MG: 6.25 TABLET, FILM COATED ORAL at 17:57

## 2021-03-31 RX ADMIN — ATORVASTATIN CALCIUM 20 MG: 20 TABLET, FILM COATED ORAL at 21:26

## 2021-03-31 RX ADMIN — FLUTICASONE PROPIONATE 2 SPRAY: 50 SPRAY, METERED NASAL at 09:18

## 2021-03-31 RX ADMIN — FENTANYL CITRATE 100 MCG: 50 INJECTION, SOLUTION INTRAMUSCULAR; INTRAVENOUS at 15:19

## 2021-03-31 RX ADMIN — TRAZODONE HYDROCHLORIDE 100 MG: 100 TABLET ORAL at 21:26

## 2021-03-31 RX ADMIN — POTASSIUM CHLORIDE 20 MEQ: 1500 TABLET, EXTENDED RELEASE ORAL at 05:45

## 2021-03-31 RX ADMIN — MIDAZOLAM 1 MG: 1 INJECTION INTRAMUSCULAR; INTRAVENOUS at 15:25

## 2021-03-31 RX ADMIN — CLOPIDOGREL BISULFATE 75 MG: 75 TABLET ORAL at 08:27

## 2021-03-31 RX ADMIN — PANTOPRAZOLE SODIUM 40 MG: 40 TABLET, DELAYED RELEASE ORAL at 21:26

## 2021-03-31 RX ADMIN — CYANOCOBALAMIN TAB 1000 MCG 1000 MCG: 1000 TAB at 16:11

## 2021-03-31 RX ADMIN — CARVEDILOL 6.25 MG: 6.25 TABLET, FILM COATED ORAL at 08:27

## 2021-03-31 RX ADMIN — POTASSIUM CHLORIDE 20 MEQ: 1500 TABLET, EXTENDED RELEASE ORAL at 08:27

## 2021-03-31 RX ADMIN — IRON SUCROSE 200 MG: 20 INJECTION, SOLUTION INTRAVENOUS at 08:31

## 2021-03-31 RX ADMIN — INSULIN GLARGINE 30 UNITS: 100 INJECTION, SOLUTION SUBCUTANEOUS at 21:26

## 2021-03-31 RX ADMIN — MIDAZOLAM 2 MG: 1 INJECTION INTRAMUSCULAR; INTRAVENOUS at 15:19

## 2021-03-31 RX ADMIN — Medication 1 TABLET: at 08:27

## 2021-03-31 RX ADMIN — FUROSEMIDE 40 MG: 10 INJECTION, SOLUTION INTRAMUSCULAR; INTRAVENOUS at 08:30

## 2021-03-31 RX ADMIN — PANTOPRAZOLE SODIUM 40 MG: 40 TABLET, DELAYED RELEASE ORAL at 08:27

## 2021-03-31 ASSESSMENT — ACTIVITIES OF DAILY LIVING (ADL)
ADLS_ACUITY_SCORE: 17
ADLS_ACUITY_SCORE: 16
ADLS_ACUITY_SCORE: 14
ADLS_ACUITY_SCORE: 14
ADLS_ACUITY_SCORE: 16
ADLS_ACUITY_SCORE: 14

## 2021-03-31 NOTE — PROGRESS NOTES
[facility]  Hospitalist Progress Note   03/31/2021          Assessment and Plan:       Mr Olivas is a 80-year-old male with CHFpEF, h/o pericardial effusion, DM, HTN, DLD, h/o basilar CVA, Iron deficiency anemia sent in from cardiology clinic for worsening LE edema and SOB.    Dyspnea on exertion from volume overload, symptomatic anemia.  Acute exacerbation of heart failure with preserved ejection fraction.  Chronic CHFpEF [EF 50 to 55%]  Hypertension, Dyslipidemia  H/o pericardial effusion (s/p pericardiocentesis in December 2020 -unclear reasons)  Admitted with weight gain, shortness of breath. BNP 2513  Chest x-ray on admission with mild cardiomegaly with normal pulmonary vascularity;   ECHO EF 50-55%, noted severe inferior wall hypokinesis (Compared to prior study, there is no significant change)  Underwent aggressive diuresis with intravenous Lasix, tapered to IV Lasix 40 mg once daily.  Continue further diuresis per cardiology.  Currently on IV Lasix 40 mg oral daily, plan to transition to orals likely today, of note received extra dose of IV Lasix last night.  Appreciate comanagement.  Holding PTA oral torsemide 10 mg daily.  Cardiology recommended to discontinue aspirin given transfusion dependent anemia [no history of CAD or stents, history of basilar stroke 2 years ago]  Continue Plavix 75 mg oral daily.  Continue PTA Coreg 6.25 mg twice daily.  Continue Lipitor 20 mg oral daily.  PTA irbesartan on hold due to worsening renal function.  Continue telemetry monitoring.  Strict input output monitoring, daily weights.    Low-salt diet.  Fluid restriction to 1500 mL/day.     Severe symptomatic anemia, acute on chronic -no established source yet.  Status post EGD with nonbleeding angiectasia's in the duodenum.  Anemia of chronic disease with iron deficiency, requiring blood transfusion.  Baseline hemoglobin around 8 to 9 ; on admission Hb 6.8, received blood transfusion, hemoglobin stable  around 7-7.5  Iron saturation index 19, serum iron 53.  Received IV Venofer.  EGD 3/27, noted with erythematous mucosa in the antrum and  four non-bleeding angioectasias in the duodenum, treated with argon plasma coagulation (APC); hemodynamically stable and no active bleed.  [facility] following, for colonoscopy today. Appreciate management.  [facility] consulted, concern for ?  Marrow issue.  Continue oral Protonix, monitor hemoglobin levels every 12 hours.  Transfuse if hemoglobin less than 7 or symptomatic.    Acute on CKD stage III  baseline creatinine until 2/2021 seems around 1.5 to 1.7 but for past month Cr around 2.4 to 2.6 (coinciding with increase in diuretics outpatient)  Cr this admission 2.66---2.64---2.4  Nephrology followed (3/29) follow-up in renal clinic in 2 weeks. Appreciate management.  Hold off on PTA Irbesartan and Metformin  Avoid NSAIDs, nephrotoxins  Diuresis as above; monitor BMP    Hypokalemia, likely diuretics induced  Potassium 3.2 >3.6   Monitor     H/o basilar CVA (2 yrs ago)   PTA on aspirin and Plavix, aspirin discontinued as above per cardiology as stroke 2 years back [recurrent anemia requiring transfusion]  Continue PTA Plavix, continue PTA Lipitor.    Mild hypoalbuminemia likely competent of dilutional, poor oral intake.  Albumin 3.0   Monitor levels periodically.    Type 2 diabetes mellitus with a hemoglobin A1c of 6.0.  PTA insulin Lantus 50 units at bedtime, lispro 3 times daily with meals [sliding scale], Metformin 1000 mg twice daily.  PTA metformin on hold since admission due to renal dysfunction, discontinued on discharge.  Blood sugars consistently greater than 150 - 250.    Patient reports hypoglycemic episodes at home with home dose of insulin Lantus, will continue Lantus 30 units at bedtime.  Continue NovoLog insulin to 15 units 3 times daily.  Continue Sliding scale insulin.  Hypoglycemia protocol in place.    Obesity with a BMI of 29.6.  Possible  obstructive sleep apnea.  Will need to consider lifestyle modification with diet and exercise as able to.  Sleep studies as outpatient.    Physical deconditioning in the setting of ongoing medical illness,   Senile fragility.  Lives at home with his wife.  Ambulate as able to. If any concerns will consider physical therapy evaluation.     COVID status: negative 3/26     Orders Placed This Encounter      Clear Liquid Diet      DVT Prophylaxis: SCD, pharmacological prophylaxis on hold given GI bleed.  Code Status: Full Code  Disposition: Expected discharge pending clinical course, possible tomorrow.    Discussed with patient, bedside RN, gastroenterologist.  Time greater than 35 minutes, more than 60% of time spent in direct patient care [multiple questions answered], care coordination, patient counseling, and formalizing plan of care.     Amador Almanza MD        Interval History:      Patient lying in bed.  Ambulating out of bed.  Reports improvement in symptoms.  No chest pain at this time.  Shortness of breath, no palpitation.  No headache or dizziness.  Hemoglobin this morning at 7.5.  Denies any blood in the stools or blood in the urine.  No abdominal pain.  No tingling or numbness or weakness.  Tolerating oral diet.  Continues to have dark-colored stool.         Physical Exam:        Physical Exam   Temp:  [97.5  F (36.4  C)-98.2  F (36.8  C)] 98.2  F (36.8  C)  Pulse:  [88-99] 88  Resp:  [16-18] 18  BP: (130-144)/(71-81) 132/76  SpO2:  [94 %-98 %] 94 %    Intake/Output Summary (Last 24 hours) at 3/29/2021 1306  Last data filed at 3/29/2021 0720  Gross per 24 hour   Intake 720 ml   Output 1150 ml   Net -430 ml     Admission Weight: 99.8 kg (220 lb)  Current Weight: 99 kg (218 lb 4.8 oz)    PHYSICAL EXAM  GENERAL: Patient is in no distress. Alert and oriented.  HEART: Regular rate and rhythm. S1S2. No murmurs  LUNGS: Bilateral slightly decreased breath sounds, no wheezing or crackles.  Respirations  unlabored  ABDOMEN: Soft, no abdominal tenderness, bowel sounds heard   NEURO: Moving all extremities, no focal weakness.  EXTREMITIES: trace pedal edema.   SKIN: Warm, dry. No rash or bruising.  PSYCHIATRY Cooperative       Medications:          atorvastatin  20 mg Oral Daily     carvedilol  6.25 mg Oral BID w/meals     clopidogrel  75 mg Oral Daily     cyanocobalamin  1,000 mcg Oral Daily     fluticasone  2 spray Both Nostrils Daily     folic acid-vit B6-vit B12  1 tablet Oral Daily     furosemide  40 mg Intravenous Daily     insulin aspart  15 Units Subcutaneous TID AC     insulin aspart  1-7 Units Subcutaneous TID AC     insulin aspart  1-5 Units Subcutaneous At Bedtime     insulin glargine  30 Units Subcutaneous At Bedtime     pantoprazole  40 mg Oral BID     potassium chloride ER  20 mEq Oral Daily     sodium chloride (PF)  3 mL Intracatheter Q8H     traZODone  100 mg Oral At Bedtime     acetaminophen, albuterol, cyclobenzaprine, glucose **OR** dextrose **OR** glucagon, diphenhydramine-acetaminophen (TYLENOL PM) 25/500, lidocaine 4%, lidocaine (buffered or not buffered), melatonin, ondansetron **OR** ondansetron, polyethylene glycol, prochlorperazine **OR** prochlorperazine **OR** prochlorperazine, sodium chloride (PF), sodium chloride (PF)         Data:      All new lab and imaging data was reviewed.

## 2021-03-31 NOTE — PLAN OF CARE
DATE & TIME: 3/31/21 3561-9983           Cognitive Concerns/ Orientation : A&O x 4   BEHAVIOR & AGGRESSION TOOL COLOR: Green  CIWA SCORE: N/a    ABNL VS/O2: VSS on room air  MOBILITY: SBA (low hgb), steady  PAIN MANAGMENT: Denied  DIET: NPO  BOWEL/BLADDER: Continent, strict I/O.   ABNL LAB/BG: Potassium 3.1. replaced  DRAIN/DEVICES: PIV SL, IV iron and lasix.   TELEMETRY RHYTHM: NSR  SKIN: Pale. +2 edema to BLE  TESTS/PROCEDURES: Colonoscopy today.  D/C DATE: 1-2 days pending Hgb and Colonoscopy today.   OTHER IMPORTANT INFO: Cardiology, GI and Nephrology following. Hematology/Oncology consulted.

## 2021-03-31 NOTE — PROGRESS NOTES
PROGRESS NOTE    ASSESSMENT AND PLAN: Justyn Olivas is a 80 year old male admitted on 3/26 with shortness of breath and anemia      Shortness of breath    Anemia, unspecified type  Hemoglobin 7.0 and stable over last 3 draws. However has dropped from 7.9 to 7.3 in last day.  3/27 EGD noted 4 non bleeding angioectasias in the duodenum and treated with APC  Discussed with Dr. Almanza that patient may be non compliant and outpatient colonoscopy would not likely be scheduled. He is at high risk for recurrent GI bleed. Discussed with patient recommendations for a colonoscopy while inpatient. Patient agrees.  -- Recommend to check hemoglobin Q12 hours till stable then daily  -- Keep NPO for colonoscopy today.   -- Continue with pantoprazole BID    INTERIM VISIT: (admission day 5 ): Patient denies shortness of breath, chest pain, abdominal pain, melena or hematochezia. He tolerated prep for colonoscopy and is NPO. He reports pale yellow stools.     PHYSICAL EXAM:  Vital Signs: Temp: 98.2  F (36.8  C) Temp src: Oral BP: 132/76 Pulse: 88   Resp: 18 SpO2: 94 % O2 Device: None (Room air)    Weight: 220 lbs 6.4 oz    Constitutional: Awake, alert, cooperative, no apparent distress  Respiratory: Clear to auscultation bilaterally, no crackles or wheezing  Cardiovascular: Regular rate and rhythm, normal S1 and S2, and no murmur noted  GI: Normal bowel sounds, soft, non-distended, non-tender  Skin/Integumen: No rashes, no cyanosis, no edema    Recent Labs   Lab Test 03/31/21  0932 03/31/21  0732 03/30/21  0612 03/30/21  0612   POTASSIUM 3.6 3.5   < > 3.2*   CHLORIDE  --  108  --  106   BUN  --  31*  --  40*    < > = values in this interval not displayed.       Recent Labs   Lab Test 03/31/21  0732 03/30/21 2052 03/29/21  0430 03/29/21  0430 09/04/20  1338 09/04/20  1338   INR  --   --   --   --   --  0.99   WBC 5.1  --   --  5.0   < > 7.3   HGB 7.5* 7.7*   < > 7.0*   < > 10.4*     --   --  160   < > 275    < > = values  in this interval not displayed.     Active Problems:    Shortness of breath    Anemia, unspecified type    Acute on chronic diastolic heart failure (H)    ADDITIONAL COMMENTS:    I reviewed all medications, new labs and imaging results over the last 24 hours.   I discussed the patient's progress with Dr. Lane.     FLORENCE Pretty, [facility]   [phone] Office  [phone] Cell

## 2021-03-31 NOTE — PROGRESS NOTES
"SP[facility]UAL [facility] Progress Note  [facility]    Length-of-Stay visit with Ptnt Kamran and wife Cecile.    We shared a reflective conversation around spirituality, music, and resilience.    They mentioned frustration with not knowing why Kamran is having the physical ailments he is.     Kamran said it's important to pay attention to \"what's between the ears\" when it comes to coping, and that having a spiritual path is whitman. Cecile said she \"tries to have a positive attitude.\"    Kamran also spoke of the singing voice being a powerful avenue of expression, and of his love of music in general.    I offered emotional support and sang a blessing.    SH continues to be available.    Kelly Sidhu  Chaplain Resident  "

## 2021-03-31 NOTE — PLAN OF CARE
ATE & TIME: 3/30/2021 3395-1062  Cognitive Concerns/ Orientation : Alert and oriented x4   BEHAVIOR & AGGRESSION TOOL COLOR: Green  ABNL VS/O2: VSS on RA  MOBILITY: SBA, Pt calls appropriately for help getting up to the bathroom. Educated about this because of low Hgb  PAIN MANAGMENT: Denies pain  DIET: Clear liquid, 1500 ml FR- NPO @ Midnight  BOWEL/BLADDER: Continent B&B- BM's x5  IV lasix, strict I/O.   ABNL LAB/BG: K+ 3.2/replaceed recheck@0130. Hgb 7.7(rechecks q12hr). Cre 2.58 ,260  DRAIN/DEVICES: PIV SL. IV iron infusions and Lasix.   TELEMETRY RHYTHM: NSR  SKIN: BLE +2 edema. Pale.  TESTS/PROCEDURES: Colonoscopy tomorrow, bowel prep completed   D/C DATE: 1-2 days pending Hgb and  Colonscopy tomorrow.   OTHER IMPORTANT INFO: Cardiology, GI, and nephrology following. Hematology/Oncology Consult placed.

## 2021-03-31 NOTE — PLAN OF CARE
DATE & TIME: 3/30/21, 8380 - 2026   Cognitive Concerns/ Orientation : A&O x 4   BEHAVIOR & AGGRESSION TOOL COLOR: Green  CIWA SCORE: N/a   ABNL VS/O2: VSS on room air  MOBILITY: SBA  PAIN MANAGMENT: Denied  DIET: NPO  BOWEL/BLADDER: Continent  ABNL LAB/BG: Potassium 3.1. replaced  DRAIN/DEVICES: PIV SL  TELEMETRY RHYTHM: NSR  SKIN: Pale. +2 edema to BLE  TESTS/PROCEDURES: For Colonoscopy today  D/C DATE: 1-2 days pending Hgb and Colonoscopy today  OTHER IMPORTANT INFO: Cardiology, GI and Nephrology following. Hematology/Oncology consulted

## 2021-03-31 NOTE — CONSULTS
[facility] Consultation      Justyn Olivas MRN# [id number]   YOB: 1940 Age: 80 year old   Date of Admission: 3/26/2021  Requesting physician: Glo Rosenbaum MD  Reason for consult: Anemia.           Assessment and Plan:   80 year old male with DM2, stage 3 CKD, CVA, CHF, pericardial effusion, iron deficiency anemia, now hospitalized since 3/26/2021 for dyspnea, lower extremity edema, weight gain, and found to be severely anemic.    1. Severe normocytic anemia  2. Congestive heart failure  3. Stage 3B chronic kidney disease  4. Vitamin B12 deficiency  5. Iron deficiency  6. Diabetes mellitus type 2  --Discussed with Kamran that his anemia is likely multifactorial, attributed to component of low vitamin B12, iron deficiency, and anemia of chronic kidney disease.  However, if colonoscopy, which will be performed today, does not reveal a bleeding source, will consider bone marrow biopsy to rule out underlying malignancy.  --Will further evaluate with peripheral blood smear, reticulocyte count, hemolysis workup, and SPEP with immunofixation.  --Continue monitoring Hgb twice daily until stabilized and transfuse if Hgb < 7 g/dL.  --If above workup is unrevealing, he may be a candidate for MARGE.    Thank you for the consult. Will follow.    Kristine Nash MD  Hematology/Oncology  [facility]               Chief Complaint:   Leg Swelling and Shortness of Breath           History of Present Illness:   Visit/Communication Style   PHONE communication was used to talk with Justyn due to the COVID pandemic.  I did not personally see this patient.  Justyn consented to the use of phone communication: yes  Time spent speaking with the patient: 11-20 minutes           This patient is an 80 year old male with DM2, stage 3 CKD, CVA, CHF, pericardial effusion, iron deficiency anemia, now hospitalized since 3/26/2021 for dyspnea, lower extremity edema, and weight gain. He has been on  diuretics for his CHF but started to gain weight more recently.  He reports fatigue, generalized imbalance, but no hematochezia, melena, or hematemesis.  On admission he was found to have Hgb 6.8 with MCV 92, compared to previous Hgb of 9.8 on 2/3/2021.  Historically, his Hgb has been low in the 8-9 g/dL range since 2020.  Back in 2020, his Hgb was decreased to 6.9. 2020 EGD had shown esophagitis, no bleeding, small hiatal hernia, and a 3 mm angiodysplastic lesion without bleeding in the 3rd portion of the duodenum. Rest was unremarkable. Prior to 2020, his Hgb had fluctuated in the 8-13 g/dL range. Iron level was intermittently low in past on 2020 and 2020.    On this admission 3/26/2021, his ferritin is normal at 45, serum iron normal at 53, TIBC 276, and iron saturation index low at 14. Transferrin is normal at 251. Folate is normal at 15.3. Vitamin B12 is at lower end of normal at 215. 3/29/2021 TSH is normal at 1.09. On 3/27/2021, EGD showed 4 non-bleeding angioectasias in duodenum treated with APC.    Kamran received 1 unit packed RBCs, feeling better post-transfusion and after receiving furosemide.  He currently denies any shortness of breath at rest, chest pain, or fever.         Physical Exam:   Vitals were reviewed  Blood pressure 132/76, pulse 88, temperature 98.2  F (36.8  C), temperature source Oral, resp. rate 18, weight 100 kg (220 lb 6.4 oz), SpO2 94 %.  Temperatures:  Current - Temp: 98.2  F (36.8  C); Max - Temp  Av.9  F (36.6  C)  Min: 97.5  F (36.4  C)  Max: 98.2  F (36.8  C)  Respiration range: Resp  Av.3  Min: 16  Max: 18  Pulse range: Pulse  Av.8  Min: 88  Max: 99  Blood pressure range: Systolic (24hrs), Av , Min:130 , Max:144   ; Diastolic (24hrs), Av, Min:71, Max:81    Pulse oximetry range: SpO2  Av.3 %  Min: 94 %  Max: 98 %    Intake/Output Summary (Last 24 hours) at 3/31/2021 1432  Last data filed at 3/31/2021 0843  Gross per 24 hour    Intake 340 ml   Output 400 ml   Net -60 ml     GENERAL: No acute distress.  MENTAL: Alert and answers questions appropriately.            Past Medical History:   I have reviewed this patient's past medical history  Past Medical History:   Diagnosis Date     Cerebral infarction (H)      Diabetes (H)      Hyperlipemia              Past Surgical History:   I have reviewed this patient's past surgical history  Past Surgical History:   Procedure Laterality Date     CV PERICARDIOCENTESIS N/A 2020    Procedure: Pericardiocentesis;  Surgeon: Chas Chambers MD;  Location:  [facility]     ESOPHAGOSCOPY, GASTROSCOPY, DUODENOSCOPY (EGD), COMBINED N/A 3/27/2021    Procedure: Esophagogastroduodenoscopy, With Biopsy;  Surgeon: Luc Nash MD;  Location:  GI               Social History:   I have reviewed this patient's social history  Social History     Tobacco Use     Smoking status: Former Smoker     Packs/day: 2.00     Years: 11.00     Pack years: 22.00     Types: Cigarettes     Start date:      Quit date:      Years since quittin.2     Smokeless tobacco: Never Used   Substance Use Topics     Alcohol use: No     Alcohol/week: 0.0 standard drinks             Family History:   I have reviewed this patient's family history  Family History   Problem Relation Age of Onset     Family History Negative Mother      Family History Negative Father              Allergies:   No Known Allergies          Medications:   I have reviewed this patient's current medications  Medications Prior to Admission   Medication Sig Dispense Refill Last Dose     aspirin 81 MG EC tablet Take 81 mg by mouth daily   3/26/2021 at am     atorvastatin (LIPITOR) 20 MG tablet TAKE 1 TABLET(20 MG) BY MOUTH DAILY 90 tablet 2 3/25/2021 at hs     carvedilol (COREG) 6.25 MG tablet Take 1 tablet (6.25 mg) by mouth 2 times daily (with meals) 180 tablet 3 3/26/2021 at am     clopidogrel (PLAVIX) 75 MG tablet Take 1 tablet  (75 mg) by mouth daily 90 tablet 3 3/26/2021 at am     cyclobenzaprine (FLEXERIL) 10 MG tablet 1/2 po bid prn & 1po qhs (Patient taking differently: Take 5-10 mg by mouth 3 times daily as needed 1/2 po bid prn & 1po qhs) 30 tablet 0 prn     diphenhydrAMINE-acetaminophen (TYLENOL PM)  MG tablet Take 2 tablets by mouth nightly as needed for sleep   Past Month at Unknown time     dorzolamide-timolol (COSOPT) 2-0.5 % ophthalmic solution Place 1 drop into both eyes 2 times daily   3/26/2021 at am     fluticasone (FLONASE) 50 MCG/ACT nasal spray Spray 2 sprays into both nostrils daily   3/26/2021 at am     insulin glargine (LANTUS SOLOSTAR) 100 UNIT/ML pen INJECT 50 UNITS UNDER THE SKIN AT BEDTIME 45 mL 3 3/25/2021 at hs     insulin lispro (HUMALOG KWIKPEN) 100 UNIT/ML (1 unit dial) KWIKPEN INJECT UP TO 55 UNITS UNDER THE SKIN ONCE DAILY AS DIRECTED (Patient taking differently: Inject Subcutaneous 3 times daily (before meals) Sliding scale with meals) 45 mL 1 3/26/2021 at am 5 units     irbesartan (AVAPRO) 150 MG tablet Take 150 mg by mouth At Bedtime    3/25/2021 at am     metFORMIN (GLUCOPHAGE) 1000 MG tablet TAKE 1 TABLET(1000 MG) BY MOUTH TWICE DAILY WITH MEALS 180 tablet 3 3/26/2021 at Unknown time     pantoprazole (PROTONIX) 40 MG EC tablet TAKE 1 TABLET(40 MG) BY MOUTH TWICE DAILY (Patient taking differently: Take 40 mg by mouth At Bedtime TAKE 1 TABLET(40 MG) BY MOUTH TWICE DAILY) 180 tablet 2 3/25/2021 at hs     potassium chloride ER (K-TAB) 20 MEQ CR tablet Take 1 tablet (20 mEq) by mouth daily 1 tablet 0 3/26/2021 at am     torsemide (DEMADEX) 20 MG tablet Take 10 mg by mouth daily Take 20 mg x 1/2 tab = 10mg daily 1 tablet 0 3/26/2021 at am     traZODone (DESYREL) 100 MG tablet TAKE 1 TABLET(100 MG) BY MOUTH AT BEDTIME 90 tablet 3 3/25/2021 at hs     VENTOLIN  (90 Base) MCG/ACT inhaler INL 2 PFS PO QID PRN 1 Inhaler 3 prn     blood glucose (STEVE CONTOUR) test strip TESTING FOUR TIMES DAILY OR AS  DIRECTED 400 strip 0      insulin pen needle (BD FERCHO U/F) 32G X 4 MM miscellaneous USE AS DIRECTED UP TO FOUR TIMES DAILY 400 each 3      order for DME Hip steroid injectoion 1 Units 0      Current Facility-Administered Medications Ordered in Epic   Medication Dose Route Frequency Last Rate Last Admin     acetaminophen (TYLENOL) tablet 650 mg  650 mg Oral Q4H PRN         albuterol (PROAIR HFA/PROVENTIL HFA/VENTOLIN HFA) 108 (90 Base) MCG/ACT inhaler 2 puff  2 puff Inhalation Q4H PRN         atorvastatin (LIPITOR) tablet 20 mg  20 mg Oral Daily   20 mg at 03/30/21 2126     carvedilol (COREG) tablet 6.25 mg  6.25 mg Oral BID w/meals   6.25 mg at 03/31/21 0827     clopidogrel (PLAVIX) tablet 75 mg  75 mg Oral Daily   75 mg at 03/31/21 0827     cyanocobalamin (VITAMIN B-12) tablet 1,000 mcg  1,000 mcg Oral Daily         cyclobenzaprine (FLEXERIL) tablet 5-10 mg  5-10 mg Oral TID PRN         glucose gel 15-30 g  15-30 g Oral Q15 Min PRN        Or     dextrose 50 % injection 25-50 mL  25-50 mL Intravenous Q15 Min PRN        Or     glucagon injection 1 mg  1 mg Subcutaneous Q15 Min PRN         diphenhydrAMINE (BENADRYL) 25 mg, acetaminophen (TYLENOL) 500 mg alternative for Tylenol PM   Oral At Bedtime PRN   Given at 03/27/21 2156     fluticasone (FLONASE) 50 MCG/ACT spray 2 spray  2 spray Both Nostrils Daily   2 spray at 03/31/21 0918     folic acid-vit B6-vit B12 (FOLGARD) per tablet 1 tablet  1 tablet Oral Daily   1 tablet at 03/31/21 0827     furosemide (LASIX) injection 40 mg  40 mg Intravenous Daily   40 mg at 03/31/21 0830     insulin aspart (NovoLOG) injection (RAPID ACTING)  15 Units Subcutaneous TID AC   15 Units at 03/30/21 1817     insulin aspart (NovoLOG) injection (RAPID ACTING)  1-7 Units Subcutaneous TID AC   1 Units at 03/30/21 1817     insulin aspart (NovoLOG) injection (RAPID ACTING)  1-5 Units Subcutaneous At Bedtime   2 Units at 03/30/21 2126     insulin glargine (LANTUS PEN) injection 30 Units  30  Units Subcutaneous At Bedtime   30 Units at 03/30/21 2127     lidocaine (LMX4) cream   Topical Q1H PRN         lidocaine 1 % 0.1-1 mL  0.1-1 mL Other Q1H PRN         melatonin tablet 1 mg  1 mg Oral At Bedtime PRN         ondansetron (ZOFRAN-ODT) ODT tab 4 mg  4 mg Oral Q6H PRN        Or     ondansetron (ZOFRAN) injection 4 mg  4 mg Intravenous Q6H PRN         pantoprazole (PROTONIX) EC tablet 40 mg  40 mg Oral BID   40 mg at 03/31/21 0827     polyethylene glycol (MIRALAX) Packet 17 g  17 g Oral Daily PRN   17 g at 03/30/21 1206     potassium chloride ER (KLOR-CON M) CR tablet 20 mEq  20 mEq Oral Daily   20 mEq at 03/31/21 0827     prochlorperazine (COMPAZINE) injection 5 mg  5 mg Intravenous Q6H PRN        Or     prochlorperazine (COMPAZINE) tablet 5 mg  5 mg Oral Q6H PRN        Or     prochlorperazine (COMPAZINE) suppository 12.5 mg  12.5 mg Rectal Q12H PRN         sodium chloride (PF) 0.9% PF flush 3 mL  3 mL Intracatheter Q8H   3 mL at 03/31/21 0830     sodium chloride (PF) 0.9% PF flush 3 mL  3 mL Intracatheter Q1H PRN         sodium chloride (PF) 0.9% PF flush 3 mL  3 mL Intracatheter q1 min prn   3 mL at 03/28/21 1106     traZODone (DESYREL) tablet 100 mg  100 mg Oral At Bedtime   100 mg at 03/30/21 2126     No current [facility]-ordered outpatient medications on file.             Review of Systems:     The 14 point Review of Systems is negative other than noted in the HPI.            Data:   All laboratory data reviewed  Results for orders placed or performed during the hospital encounter of 03/26/21 (from the past 24 hour(s))   Glucose by meter   Result Value Ref Range    Glucose 142 (H) 70 - 99 mg/dL   Hemoglobin   Result Value Ref Range    Hemoglobin 7.7 (L) 13.3 - 17.7 g/dL   Potassium   Result Value Ref Range    Potassium 3.2 (L) 3.4 - 5.3 mmol/L   Glucose by meter   Result Value Ref Range    Glucose 260 (H) 70 - 99 mg/dL   Glucose by meter   Result Value Ref Range    Glucose 98 70 - 99 mg/dL   Potassium    Result Value Ref Range    Potassium 3.1 (L) 3.4 - 5.3 mmol/L   CBC with platelets   Result Value Ref Range    WBC 5.1 4.0 - 11.0 10e9/L    RBC Count 2.71 (L) 4.4 - 5.9 10e12/L    Hemoglobin 7.5 (L) 13.3 - 17.7 g/dL    Hematocrit 25.1 (L) 40.0 - 53.0 %    MCV 93 78 - 100 fl    MCH 27.7 26.5 - 33.0 pg    MCHC 29.9 (L) 31.5 - 36.5 g/dL    RDW 19.9 (H) 10.0 - 15.0 %    Platelet Count 160 150 - 450 10e9/L   Basic metabolic panel   Result Value Ref Range    Sodium 144 133 - 144 mmol/L    Potassium 3.5 3.4 - 5.3 mmol/L    Chloride 108 94 - 109 mmol/L    Carbon Dioxide 34 (H) 20 - 32 mmol/L    Anion Gap 2 (L) 3 - 14 mmol/L    Glucose 122 (H) 70 - 99 mg/dL    Urea Nitrogen 31 (H) 7 - 30 mg/dL    Creatinine 2.44 (H) 0.66 - 1.25 mg/dL    GFR Estimate 24 (L) >60 mL/min/[1.73_m2]    GFR Estimate If Black 28 (L) >60 mL/min/[1.73_m2]    Calcium 8.4 (L) 8.5 - 10.1 mg/dL   Glucose by meter   Result Value Ref Range    Glucose 132 (H) 70 - 99 mg/dL   Potassium   Result Value Ref Range    Potassium 3.6 3.4 - 5.3 mmol/L   Glucose by meter   Result Value Ref Range    Glucose 169 (H) 70 - 99 mg/dL

## 2021-03-31 NOTE — PROGRESS NOTES
[facility]  Cardiology Progress Note    Date of Service (when I saw the patient): 03/31/2021  Primary Cardiologist: Dr. Rosenbaum       Interval History:   Feels better. Continues to have mild peripheral edema.     ----------------------------------------------------------------------------------------    Assessment:  Justyn Olivas is a 80 year old male whom I sent in from cardiology clinic due to worsening SOB- he was seen in the ED and was admitted for worsening anemia with Hgb of 6.8 and acute on chronic HF on 3/26/2021.     Kamran is a pleasant 80 year old male with past medical history:      #. Acute on Chronic HFpEF (improved)- had been on Torsemide 10 mg since his admission in 12/2020; had 20 Ib weight gain (233 Ibs from baseline of 214 Ibs) when he saw his PCP on 2/3/42099- this was felt to be related to fluid gain from recent Iron infusion - after discussion with Dr. Rosenbaum Torsemide was increased to 20 mg BID; he unfortunately developed CASS with Cr up to 2.6- we had to hold his torsemide with only minimal improvement in his renal function to 2.28; he started to gain weight subsequently and during last visit we added back low dose Torsemide 10 mg.   - I saw him in clinic with no significant improvement and he was sent to the ED   - nephrology team was involved and he was IV diuresed with lasix with adequate output (it's not measured consistently); weight is down to 218 Ibs  - nephrology felt patient is back in euvolemic state though he has significant peripheral edema still   - he continues to be on IV lasix 40 mg but no significant output and his weight is up to 220 Ibs; renal fxn is the same  - has low albumin which can contribute to some of his peripheral edema    #. Acute on Chronic CKD- Cr baseline 1.5-1.6 but has been elevated to 2.28-2.6 over the last one month  - Cr remains around 2.4   - has follow up with nephrology in 2 weeks     #. CM - EF 45-50% on echo; unknown etiology-  no ischemic work up has been done (echo does show inferior WMA)     #. Hx of Pericardial Effusion - noted during admission in 12/2020. Underwent successful pericardiocentesis. Cytology was negative for malignancy. No clear cause identified. Repeat echo in 2/2021 showed no recurrence.  - repeat echo on this admission showed no recurrence of pericardial effusion    #. DM2 - insulin depedent    #. Acute Anemia - PTA on iron supplements; question of possible bleeding from AV formations noted on recent GI work up- PCP had been evaluating this; during recent admission his Hgb was low at 6.9; improved to  8-9 since transfusion and iron infusions; his Hgb has not been checked closely as outpatient   - Hgb back down to 6.8 on this admission; up to 7.8 with transfusion   - Hgb down to 7.3 today; GI plans to do colonoscopy tomorrow    #. H/o basilar CVA - was on DAPT but plavix discontinued by PCP due to significant anemia for a while and he was placed back on plavix last week    #. HTN- uncontrolled during recent visits- unclear if white coat hypertension    #. Dyslipidemia-     #. Chronic MARKS - thought d/t anemia dx'd 2018      ----------------------------------------------------------------------------------------    Plan:  -- One more IV lasix tomorrow and then transition to PO torsemide   -- CORE follow up     ----------------------------------------------------------------------------------------  Physical Exam   Temp: 97.9  F (36.6  C) Temp src: Oral BP: 137/73 Pulse: 86   Resp: 18 SpO2: 97 % O2 Device: None (Room air)    Vitals:    03/28/21 0518 03/30/21 0607 03/31/21 0647   Weight: 99 kg (218 lb 4.8 oz) 100 kg (220 lb 6.4 oz) 100 kg (220 lb 6.4 oz)     GEN:  NAD Oxygen on room air  HEENT: Mucous membranes moist.  NECK:  No JVD.  C/V:  Regular rate and rhythm, no murmur, rub or gallop.   RESP: CTA, dilip  GI: Abdomen soft, nontender, nondistended.    EXTREM: 1+ pitting LE edema up to mid calves.   NEURO: Alert and  oriented, cooperative.   PSYCH: Normal affect.  SKIN: Warm and dry.   VASC: 2+ radial and dorsalis pedis pulses bilaterally.      Medications       atorvastatin  20 mg Oral Daily     carvedilol  6.25 mg Oral BID w/meals     clopidogrel  75 mg Oral Daily     cyanocobalamin  1,000 mcg Oral Daily     fluticasone  2 spray Both Nostrils Daily     folic acid-vit B6-vit B12  1 tablet Oral Daily     furosemide  40 mg Intravenous Daily     insulin aspart  15 Units Subcutaneous TID AC     insulin aspart  1-7 Units Subcutaneous TID AC     insulin aspart  1-5 Units Subcutaneous At Bedtime     insulin glargine  30 Units Subcutaneous At Bedtime     pantoprazole  40 mg Oral BID     potassium chloride ER  20 mEq Oral Daily     sodium chloride (PF)  3 mL Intracatheter Q8H     traZODone  100 mg Oral At Bedtime       Data   Reviewed.    Tele: JULIEN Ruiz PA-C   3/30/2021  Pager: [phone]

## 2021-04-01 ENCOUNTER — TRANSCRIBE ORDERS (OUTPATIENT)
Dept: OTHER | Age: 81
End: 2021-04-01

## 2021-04-01 VITALS
BODY MASS INDEX: 29.75 KG/M2 | SYSTOLIC BLOOD PRESSURE: 122 MMHG | DIASTOLIC BLOOD PRESSURE: 66 MMHG | RESPIRATION RATE: 16 BRPM | TEMPERATURE: 98.2 F | OXYGEN SATURATION: 98 % | HEART RATE: 91 BPM | WEIGHT: 219.36 LBS

## 2021-04-01 DIAGNOSIS — D64.9 NORMOCYTIC ANEMIA: Primary | ICD-10-CM

## 2021-04-01 LAB
ALBUMIN SERPL ELPH-MCNC: 3.6 G/DL (ref 3.7–5.1)
ALBUMIN SERPL-MCNC: 3.2 G/DL (ref 3.4–5)
ALP SERPL-CCNC: 102 U/L (ref 40–150)
ALPHA1 GLOB SERPL ELPH-MCNC: 0.4 G/DL (ref 0.2–0.4)
ALPHA2 GLOB SERPL ELPH-MCNC: 0.9 G/DL (ref 0.5–0.9)
ALT SERPL W P-5'-P-CCNC: 33 U/L (ref 0–70)
ANION GAP SERPL CALCULATED.3IONS-SCNC: 4 MMOL/L (ref 3–14)
AST SERPL W P-5'-P-CCNC: 25 U/L (ref 0–45)
B-GLOBULIN SERPL ELPH-MCNC: 0.6 G/DL (ref 0.6–1)
BILIRUB SERPL-MCNC: 0.4 MG/DL (ref 0.2–1.3)
BUN SERPL-MCNC: 32 MG/DL (ref 7–30)
CALCIUM SERPL-MCNC: 8.7 MG/DL (ref 8.5–10.1)
CHLORIDE SERPL-SCNC: 107 MMOL/L (ref 94–109)
CO2 SERPL-SCNC: 31 MMOL/L (ref 20–32)
CREAT SERPL-MCNC: 2.55 MG/DL (ref 0.66–1.25)
GAMMA GLOB SERPL ELPH-MCNC: 0.7 G/DL (ref 0.7–1.6)
GFR SERPL CREATININE-BSD FRML MDRD: 23 ML/MIN/{1.73_M2}
GLUCOSE BLDC GLUCOMTR-MCNC: 205 MG/DL (ref 70–99)
GLUCOSE BLDC GLUCOMTR-MCNC: 215 MG/DL (ref 70–99)
GLUCOSE BLDC GLUCOMTR-MCNC: 224 MG/DL (ref 70–99)
GLUCOSE SERPL-MCNC: 222 MG/DL (ref 70–99)
HAPTOGLOB SERPL-MCNC: 188 MG/DL (ref 32–197)
HGB BLD-MCNC: 7.8 G/DL (ref 13.3–17.7)
M PROTEIN SERPL ELPH-MCNC: 0 G/DL
POTASSIUM SERPL-SCNC: 3.7 MMOL/L (ref 3.4–5.3)
PROT PATTERN SERPL ELPH-IMP: ABNORMAL
PROT SERPL-MCNC: 6.8 G/DL (ref 6.8–8.8)
SODIUM SERPL-SCNC: 142 MMOL/L (ref 133–144)

## 2021-04-01 PROCEDURE — 80053 COMPREHEN METABOLIC PANEL: CPT | Performed by: HOSPITALIST

## 2021-04-01 PROCEDURE — 250N000011 HC RX IP 250 OP 636: Performed by: INTERNAL MEDICINE

## 2021-04-01 PROCEDURE — 999N001017 HC STATISTIC GLUCOSE BY METER IP

## 2021-04-01 PROCEDURE — 99239 HOSP IP/OBS DSCHRG MGMT >30: CPT | Performed by: HOSPITALIST

## 2021-04-01 PROCEDURE — 36415 COLL VENOUS BLD VENIPUNCTURE: CPT | Performed by: HOSPITALIST

## 2021-04-01 PROCEDURE — 250N000013 HC RX MED GY IP 250 OP 250 PS 637: Performed by: INTERNAL MEDICINE

## 2021-04-01 PROCEDURE — 99232 SBSQ HOSP IP/OBS MODERATE 35: CPT | Performed by: PHYSICIAN ASSISTANT

## 2021-04-01 PROCEDURE — 250N000013 HC RX MED GY IP 250 OP 250 PS 637: Performed by: HOSPITALIST

## 2021-04-01 PROCEDURE — 85018 HEMOGLOBIN: CPT | Performed by: HOSPITALIST

## 2021-04-01 RX ORDER — FUROSEMIDE 10 MG/ML
40 INJECTION INTRAMUSCULAR; INTRAVENOUS 2 TIMES DAILY
Status: DISCONTINUED | OUTPATIENT
Start: 2021-04-01 | End: 2021-04-01 | Stop reason: HOSPADM

## 2021-04-01 RX ORDER — POTASSIUM CHLORIDE 1.5 G/1.58G
20 POWDER, FOR SOLUTION ORAL ONCE
Status: COMPLETED | OUTPATIENT
Start: 2021-04-01 | End: 2021-04-01

## 2021-04-01 RX ADMIN — CYANOCOBALAMIN TAB 1000 MCG 1000 MCG: 1000 TAB at 08:44

## 2021-04-01 RX ADMIN — POTASSIUM CHLORIDE 20 MEQ: 1.5 POWDER, FOR SOLUTION ORAL at 13:11

## 2021-04-01 RX ADMIN — CARVEDILOL 6.25 MG: 6.25 TABLET, FILM COATED ORAL at 08:44

## 2021-04-01 RX ADMIN — FLUTICASONE PROPIONATE 2 SPRAY: 50 SPRAY, METERED NASAL at 09:10

## 2021-04-01 RX ADMIN — FUROSEMIDE 40 MG: 10 INJECTION, SOLUTION INTRAMUSCULAR; INTRAVENOUS at 13:13

## 2021-04-01 RX ADMIN — CLOPIDOGREL BISULFATE 75 MG: 75 TABLET ORAL at 08:45

## 2021-04-01 RX ADMIN — Medication 1 TABLET: at 08:44

## 2021-04-01 RX ADMIN — POTASSIUM CHLORIDE 20 MEQ: 1500 TABLET, EXTENDED RELEASE ORAL at 08:45

## 2021-04-01 RX ADMIN — FUROSEMIDE 40 MG: 10 INJECTION, SOLUTION INTRAMUSCULAR; INTRAVENOUS at 08:43

## 2021-04-01 RX ADMIN — PANTOPRAZOLE SODIUM 40 MG: 40 TABLET, DELAYED RELEASE ORAL at 08:45

## 2021-04-01 ASSESSMENT — ACTIVITIES OF DAILY LIVING (ADL)
ADLS_ACUITY_SCORE: 16
ADLS_ACUITY_SCORE: 17
ADLS_ACUITY_SCORE: 16
ADLS_ACUITY_SCORE: 17

## 2021-04-01 NOTE — PROGRESS NOTES
DATE & TIME: 03/31/2021 Night    Cognitive Concerns/ Orientation : Alert/Oriented x4   BEHAVIOR & AGGRESSION TOOL COLOR: Green   ABNL VS/O2: VSS, room air  MOBILITY: SBA  PAIN MANAGMENT: Denies  DIET: Regular; 1500 mL fluid restricition  BOWEL/BLADDER: Up to bathroom  ABNL LAB/BG: Hgb 7.9; hematocrit 26.4;   DRAIN/DEVICES: L PIV saline locked  TELEMETRY RHYTHM: NSR  SKIN: +1/+2 BLE edema  TESTS/PROCEDURES: Colonoscopy completed 3/31-1 area cauterized.  D/C DATE: 1-2 days pending diuresis plan  OTHER IMPORTANT INFO: Cardiology, GI and Nephrology and Hematology/Oncology following

## 2021-04-01 NOTE — DISCHARGE SUMMARY
Discharge Summary  Hospitalist    Date of Admission:  3/26/2021  Date of Discharge:  4/1/2021  Discharging Provider: Amador Almanza MD    Primary Care Physician   Christian Davidson MD  Primary Care Provider Phone Number: [phone]  Primary Care Provider Fax Number: [phone]    PRINCIPAL DIAGNOSIS  Dyspnea on exertion from volume overload, symptomatic anemia.  Acute exacerbation of heart failure with preserved ejection fraction.  Severe symptomatic anemia, acute on chronic -multifactorial.  Anemia of chronic disease with iron deficiency, requiring blood transfusion.  Status post EGD with nonbleeding angiectasia's in the duodenum.  Status post colonoscopy with AVM.  Acute on CKD stage III  Hypokalemia, likely diuretics induced  Mild hypoalbuminemia likely competent of dilutional, poor oral intake.  Obesity with a BMI of 29.6.  Possible obstructive sleep apnea.  Physical deconditioning in the setting of ongoing medical illness,   Senile fragility.    Past Medical History:   Diagnosis Date     Cerebral infarction (H)      Diabetes (H)      Hyperlipemia        History of Present Illness   Justyn Olivas is an 80 year old male who presented with lower extremity edema     Hospital Course    Mr Olivas is a 80-year-old male with CHFpEF, h/o pericardial effusion, DM, HTN, DLD, h/o basilar CVA, Iron deficiency anemia sent in from cardiology clinic for worsening LE edema and SOB.     Dyspnea on exertion from volume overload, symptomatic anemia.  Acute exacerbation of heart failure with preserved ejection fraction.  Chronic CHFpEF [EF 50 to 55%]  Hypertension, Dyslipidemia  H/o pericardial effusion (s/p pericardiocentesis in December 2020 -unclear reasons)  Admitted with weight gain, shortness of breath. BNP 2513  Chest x-ray on admission with mild cardiomegaly with normal pulmonary vascularity;   Echo EF 50-55%, noted severe inferior wall hypokinesis (Compared to prior study, there is no significant  change)  Underwent aggressive diuresis with intravenous Lasix, tapered to IV Lasix 40 mg daily.  On day of discharge received 40 mg IV Lasix twice daily per cardiology recommendation.  Recommend monitoring BMP in 5 to 7 days or earlier if symptomatic.  Per cardiology recommendation oral torsemide increased to 20 mg oral daily on discharge.  Cardiology also recommended to discontinue aspirin given transfusion dependent anemia [no history of CAD or stents, history of basilar stroke 2 years ago]  Continue Plavix 75 mg oral daily.  Continue PTA Coreg 6.25 mg twice daily.  Continue Lipitor 20 mg oral daily.  PTA irbesartan continued on admission due to worsening renal function.  Follow-up with nephrology, cardiology as outpatient in 2 weeks and can consider restart if renal function improves.  Recommend to monitor daily blood pressure, heart rate, BP at home and review on provider visit if able to.  Low-salt diet.  Fluid restriction 1500 -2000 mL/day.  Follow-up in cardiology clinic in 1 to 2 weeks after discharge.     Severe symptomatic anemia, acute on chronic -multifactorial.  Anemia of chronic disease with iron deficiency, requiring blood transfusion.  Status post EGD with nonbleeding angiectasia's in the duodenum.  Status post colonoscopy with AVM.  Baseline hemoglobin around 8 to 9 ; on admission Hb 6.8, received blood transfusion, hemoglobin stable around 7-7.5  Iron saturation index 19, serum iron 53.  Received IV Venofer.    EGD 3/27, noted with erythematous mucosa in the antrum and  four non-bleeding angioectasias in the duodenum, treated with argon plasma coagulation (APC); hemodynamically stable and no active bleed.  Colonoscopy 3/31 findings consistent with poor prep 1 AVM was identified in the cecum which was cauterized multiple diverticuli seen throughout the colon moderate to large amount of dark stool noted throughout the colon.  [facility] recommend - possible capsule endoscopy.  Followed by [place]  [facility] oncology, anemia multifactorial.  Can consider bone marrow biopsy as outpatient if continues to be anemic, and further GI work-up negative.   Follow-up with [facility] hematology oncology in clinic in 2 weeks.  Follow-up with [facility] in 2 3 weeks or earlier if symptomatic.  Continue vitamin B12 supplements, Folgard.  Continue oral Protonix BID   Monitor hemoglobin levels in 5 days.  Transfuse if hemoglobin less than 7 or symptomatic.     Acute on CKD stage III  Baseline creatinine until 2/2021 seems around 1.5 to 1.7 but for past month Cr around 2.4 to 2.6 (coinciding with increase in diuretics outpatient)  Cr this admission 2.66---2.64---2.4--2.5  Nephrology followed (3/29) follow-up in renal clinic in 2 weeks.  Discontinued PTA Irbesartan and Metformin  Avoid NSAIDs, nephrotoxins  Diuresis as above; monitor BMP     Hypokalemia, likely diuretics induced  Potassium 3.2 >3.6   Continue PTA 20 mEq oral potassium daily.  On day of discharge administer another 20 mEq of oral potassium as patient received IV Lasix.  Monitor BMP in 5 to 7 days.     H/o basilar CVA (2 yrs ago)   PTA on aspirin and Plavix, aspirin discontinued as above per cardiology as stroke 2 years back [recurrent anemia requiring transfusion]  Continue PTA Plavix, continue PTA Lipitor.     Mild hypoalbuminemia likely competent of dilutional, poor oral intake.  Albumin 3.0  Monitor levels periodically.     Type 2 diabetes mellitus with a hemoglobin A1c of 6.0.  PTA insulin Lantus 50 units at bedtime, lispro 3 times daily with meals [sliding scale], Metformin 1000 mg twice daily.  PTA metformin on hold since admission due to renal dysfunction, discontinued on discharge.  Blood sugars consistently greater than 150 - 250.    Patient reports hypoglycemic episodes at home with home dose of insulin Lantus, during hospitalization was on Lantus 30 units at bedtime, on discharge increased to 40 units of Lantus at bedtime.     Continue PTA sliding scale mealtime insulin.  Monitor home blood sugar readings, review on provider visit.     Obesity with a BMI of 29.6.  Possible obstructive sleep apnea.  Will need to consider lifestyle modification with diet and exercise as able to.  Sleep studies as outpatient.     Physical deconditioning in the setting of ongoing medical illness,   Senile fragility.  Lives at home with his wife.  Ambulate as able to. If any concerns will consider physical therapy evaluation.     COVID status: negative 3/26    Amador Almanza MD, MD    Pending Results   These results will be followed up by ordering provider   Unresulted Labs Ordered in the Past 30 Days of this Admission     Date and Time Order Name Status Description    3/31/2021 1432 Protein Immunofixation Serum In process     3/31/2021 1432 Protein electrophoresis In process     3/31/2021 1432 Blood Morphology Pathologist Review In process              Physical Exam   Vitals:    03/30/21 0607 03/31/21 0647 04/01/21 0500   Weight: 100 kg (220 lb 6.4 oz) 100 kg (220 lb 6.4 oz) 99.5 kg (219 lb 5.7 oz)     Vital Signs with Ranges  Temp:  [97.9  F (36.6  C)-98.2  F (36.8  C)] 98.2  F (36.8  C)  Pulse:  [83-91] 91  Resp:  [10-27] 16  BP: (108-165)/(59-96) 122/66  SpO2:  [96 %-100 %] 98 %  I/O last 3 completed shifts:  In: 538 [P.O.:538]  Out: 650 [Urine:650]  PHYSICAL EXAM  GENERAL: Patient is in no distress. Alert and oriented.  HEART: Regular rate and rhythm. S1S2. No murmurs  LUNGS: Bilateral slightly decreased breath sounds, no wheezing or crackles.  Respirations unlabored  ABDOMEN: Soft, no abdominal tenderness, bowel sounds heard   NEURO: Moving all extremities, no focal weakness.  EXTREMITIES: trace pedal edema.   SKIN: Warm, dry. No rash or bruising.  PSYCHIATRY Cooperative  )Consultations This Hospital Stay   PHYSICAL THERAPY ADULT IP CONSULT  OCCUPATIONAL THERAPY ADULT IP CONSULT  GASTROENTEROLOGY IP CONSULT  NEPHROLOGY IP CONSULT  CARDIOLOGY IP  CONSULT  [facility] EVALUATION IP CONSULT  HEMATOLOGY & ONCOLOGY IP CONSULT  CARE MANAGEMENT / SOCIAL WORK IP CONSULT    Time Spent on this Encounter   I, Amador Almanza MD, personally saw the patient today and spent greater than 30 minutes discharging this patient.  Discussed with patient, bedside RN, cardiologist, cardiology SHAHANA.    Discharge Orders      Basic metabolic panel     N terminal pro BNP outpatient     Hemoglobin    Last Lab Result: Hemoglobin (g/dL)       Date                     Value                 03/29/2021               7.0 (L)          ----------     Follow-Up with [facility]      Reason for your hospital stay    You were admitted to the hospital with heart failure exacerbation.  Underwent diuresis with intravenous Lasix, tapered to oral torsemide on discharge.  Noted to have severe symptomatic anemia, underwent EGD and colonoscopy.  Hemoglobin level stable around 7.5.    Noted to have acute on chronic kidney injury, prior to admission irbesartan and Metformin discontinued.  Followed by nephrology during hospitalization.     Follow-up and recommended labs and tests     Follow-up with primary care provider within 5 to 7 days or earlier if symptomatic.  Check hemoglobin, BMP on provider visit.  Follow-up with nephrology in clinic in 2 weeks.  Follow-up with cardiology in 1 to 2 weeks or earlier if symptomatic.  Consider sleep studies as outpatient.  Consider age-appropriate health maintenance as outpatient.  Follow up with Dr. Kristine Nash, Hematology/Oncology - [facility] in 2 weeks for ongoing anemia.        Discharge Instructions    Consider lifestyle modification with diet and exercise as able to.  Aggressive incentive spirometry     Monitor and record    Monitor home blood pressure readings, heart rate and daily weights and review on provider visit if able to.  Monitor home blood sugar readings at least twice a day and review on provider visit and optimize  regimen.     Activity    Your activity upon discharge: activity as tolerated     Diet    Low-salt, low-fat, carbohydrate controlled diet encouraged.  1500 mL to 2000 mL fluid restriction.       Discharge Medications   Current Discharge Medication List      START taking these medications    Details   folic acid-vit B6-vit B12 (FOLGARD) 0.8-10-0.115 MG TABS per tablet Take 1 tablet by mouth daily Continue per Nephrology recommendation  Qty: 30 tablet, Refills: 0    Associated Diagnoses: Anemia, unspecified type         CONTINUE these medications which have CHANGED    Details   DEMADEX 20 MG tablet Take 1 tablet (20 mg) by mouth daily  Qty: 15 tablet, Refills: 0    Associated Diagnoses: Benign essential hypertension      insulin glargine (LANTUS SOLOSTAR) 100 UNIT/ML pen INJECT 40 UNITS UNDER THE SKIN AT BEDTIME  Qty: 45 mL, Refills: 3    Comments: If Lantus is not covered by insurance, may substitute Basaglar at same dose and frequency.    Associated Diagnoses: Type 2 diabetes mellitus without complication, with long-term current use of insulin (H)         CONTINUE these medications which have NOT CHANGED    Details   atorvastatin (LIPITOR) 20 MG tablet TAKE 1 TABLET(20 MG) BY MOUTH DAILY  Qty: 90 tablet, Refills: 2    Associated Diagnoses: Hyperlipidemia LDL goal <100      carvedilol (COREG) 6.25 MG tablet Take 1 tablet (6.25 mg) by mouth 2 times daily (with meals)  Qty: 180 tablet, Refills: 3    Associated Diagnoses: Pericardial effusion      clopidogrel (PLAVIX) 75 MG tablet Take 1 tablet (75 mg) by mouth daily  Qty: 90 tablet, Refills: 3    Associated Diagnoses: Cerebrovascular accident (CVA) due to embolism of cerebral artery (H)      cyclobenzaprine (FLEXERIL) 10 MG tablet 1/2 po bid prn & 1po qhs  Qty: 30 tablet, Refills: 0    Associated Diagnoses: Back muscle spasm      diphenhydrAMINE-acetaminophen (TYLENOL PM)  MG tablet Take 2 tablets by mouth nightly as needed for sleep      dorzolamide-timolol  (COSOPT) 2-0.5 % ophthalmic solution Place 1 drop into both eyes 2 times daily      fluticasone (FLONASE) 50 MCG/ACT nasal spray Spray 2 sprays into both nostrils daily      insulin lispro (HUMALOG KWIKPEN) 100 UNIT/ML (1 unit dial) KWIKPEN INJECT UP TO 55 UNITS UNDER THE SKIN ONCE DAILY AS DIRECTED  Qty: 45 mL, Refills: 1    Associated Diagnoses: Type 2 diabetes mellitus without complication, with long-term current use of insulin (H)      pantoprazole (PROTONIX) 40 MG EC tablet TAKE 1 TABLET(40 MG) BY MOUTH TWICE DAILY  Qty: 180 tablet, Refills: 2    Associated Diagnoses: Acute superficial gastritis with hemorrhage      potassium chloride ER (K-TAB) 20 MEQ CR tablet Take 1 tablet (20 mEq) by mouth daily  Qty: 1 tablet, Refills: 0    Associated Diagnoses: Benign essential hypertension      traZODone (DESYREL) 100 MG tablet TAKE 1 TABLET(100 MG) BY MOUTH AT BEDTIME  Qty: 90 tablet, Refills: 3    Associated Diagnoses: Insomnia, unspecified type      VENTOLIN  (90 Base) MCG/ACT inhaler INL 2 PFS PO QID PRN  Qty: 1 Inhaler, Refills: 3    Comments: Pharmacy may dispense brand covered by insurance (Proair, or proventil or ventolin or generic albuterol inhaler)      blood glucose (STEVE CONTOUR) test strip TESTING FOUR TIMES DAILY OR AS DIRECTED  Qty: 400 strip, Refills: 0    Associated Diagnoses: Type 2 diabetes mellitus without complication, with long-term current use of insulin (H)      insulin pen needle (BD FERCHO U/F) 32G X 4 MM miscellaneous USE AS DIRECTED UP TO FOUR TIMES DAILY  Qty: 400 each, Refills: 3    Associated Diagnoses: Type 2 diabetes mellitus without complication, with long-term current use of insulin (H)      order for DME Hip steroid injectoion  Qty: 1 Units, Refills: 0    Associated Diagnoses: Primary osteoarthritis of hip, unspecified laterality         STOP taking these medications       aspirin 81 MG EC tablet Comments:   Reason for Stopping:         irbesartan (AVAPRO) 150 MG tablet  Comments:   Reason for Stopping:         metFORMIN (GLUCOPHAGE) 1000 MG tablet Comments:   Reason for Stopping:             Allergies   No Known Allergies    Discharge Disposition   Discharged to home  Condition at discharge: Stable    DATA  Most Recent 3 CBC's:  Recent Labs   Lab Test 04/01/21  0813 03/31/21  1625 03/31/21  0732 03/29/21  0430 03/29/21  0430   WBC  --  5.2 5.1  --  5.0   HGB 7.8* 7.9* 7.5*   < > 7.0*   MCV  --  95 93  --  92   PLT  --  189 160  --  160    < > = values in this interval not displayed.      Most Recent 3 BMP's:  Recent Labs   Lab Test 04/01/21  0813 03/31/21  0932 03/31/21  0732 03/30/21  0612 03/30/21  0612     --  144  --  141   POTASSIUM 3.7 3.6 3.5   < > 3.2*   CHLORIDE 107  --  108  --  106   CO2 31  --  34*  --  32   BUN 32*  --  31*  --  40*   CR 2.55*  --  2.44*  --  2.58*   ANIONGAP 4  --  2*  --  3   VANESA 8.7  --  8.4*  --  8.0*   *  --  122*  --  164*    < > = values in this interval not displayed.     Most Recent 2 LFT's:  Recent Labs   Lab Test 04/01/21  0813 03/31/21  1625 03/29/21  0430   AST 25  --  12   ALT 33  --  21   ALKPHOS 102  --  74   BILITOTAL 0.4 0.6 0.4       Recent Labs   Lab Test 03/26/21  1326 02/03/21  1225 12/20/20  2050   TROPI 0.022 <0.015 0.020     Most Recent Cholesterol Panel:  Recent Labs   Lab Test 03/29/21  0430   CHOL 107   LDL 54   HDL 41   TRIG 61     Most Recent TSH, T4 and A1c Labs:  Recent Labs   Lab Test 03/29/21  0430 03/27/21  0610   TSH 1.09  --    A1C  --  6.0*     Results for orders placed or performed during the hospital encounter of 03/26/21   XR Chest 2 Views    Narrative    XR CHEST 2 VW  3/26/2021 1:50 PM       INDICATION: dyspnea  COMPARISON: 2/3/2021       Impression    IMPRESSION: Mild cardiomegaly with normal pulmonary vascularity. The  lungs and pleural spaces are clear. No acute findings.    RAMOS PAYAN MD   Echo Limited    Narrative    [id number]  [id number]  [id number]  [id number]^PAUL^SANTHOSH^JENNIFER     [place]  [facility]  Echocardiography Laboratory  [address] [place] [address]     Name: LISA CAMARA  MRN: [id number]  : 1940  Study Date: 2021 12:25 PM  Age: 80 yrs  Gender: Male  Patient Location: [facility]  Reason For Study: CHF  Ordering Physician: SANTHOSH MILLER  Referring Physician: ALBINO DORSEY  Performed By: Franklin Whittington RDCS     BSA: 2.2 m2  Height: 72 in  Weight: 221 lb  HR: 86  BP: 134/74 mmHg  ______________________________________________________________________________  Procedure  Limited Portable Echo Adult.  ______________________________________________________________________________  Interpretation Summary     The visual ejection fraction is estimated at 50-55%.  There is severe inferior wall hypokinesis.  Compared to prior study, there is no significant change.  ______________________________________________________________________________  Left Ventricle  The left ventricle is normal in size. There is normal left ventricular wall  thickness. The visual ejection fraction is estimated at 50-55%. Left  ventricular diastolic function is abnormal. There is severe inferior wall  hypokinesis. No evidence of left ventricular mass or tumors.     Right Ventricle  The right ventricle is normal in structure, function and size.     Atria  The left atrium is borderline dilated. Right atrial size is normal.     Mitral Valve  The mitral valve leaflets appear normal. There is no evidence of stenosis,  fluttering, or prolapse. There is mild (1+) mitral regurgitation.     Tricuspid Valve  Normal tricuspid valve.     Aortic Valve  Normal tricuspid aortic valve.     Pulmonic Valve  Normal pulmonic valve.     Vessels  The aortic root is normal size. The ascending aorta is Borderline dilated. The  inferior vena cava is normal.     Pericardium  There is no pericardial effusion.     Rhythm  Sinus rhythm was  noted.  ______________________________________________________________________________  MMode/2D Measurements & Calculations  IVSd: 1.1 cm     LVIDd: 5.2 cm  LVIDs: 3.7 cm  LVPWd: 1.1 cm  FS: 27.5 %  LV mass(C)d: 217.6 grams  LV mass(C)dI: 97.9 grams/m2  Ao root diam: 3.4 cm  LA dimension: 4.7 cm  asc Aorta Diam: 3.8 cm  LA/Ao: 1.4  LA Volume (BP): 78.0 ml  LA Volume Index (BP): 35.1 ml/m2  RWT: 0.42     Doppler Measurements & Calculations  MV E max dayna: 106.0 cm/sec  MV A max dayna: 117.4 cm/sec  MV E/A: 0.90  MV dec time: 0.17 sec  PA V2 max: 95.0 cm/sec  PA max PG: 3.6 mmHg  PA mean P.1 mmHg  PA V2 VTI: 19.7 cm  PA acc time: 0.12 sec  E/E' av.6  Lateral E/e': 18.7  Medial E/e': 18.4     ______________________________________________________________________________  Report approved by: Oneida Cooper 2021 03:07 PM

## 2021-04-01 NOTE — PROGRESS NOTES
PROGRESS NOTE    ASSESSMENT AND PLAN:    INTERIM VISIT: (admission day 6 ): Justyn Olivas is a 80 year old male admitted on 3/26 with shortness of breath and anemia      Shortness of breath   Anemia, unspecified type  Hemoglobin 7.8 and stable over last 3 draws.  3/27 EGD noted 4 non bleeding angioectasias in the duodenum and treated with APC  Discussed with Dr. Almanza that patient may be non compliant and outpatient colonoscopy would not likely be scheduled. He is at high risk for recurrent GI bleed. Discussed with patient recommendations for a colonoscopy while inpatient. Patient agrees.  -- Recommend to check hemoglobin daily  -- 3/31/21- Colonoscopy showed a single medium-sized localized angiodysplastic lesion without bleeding was found in the cecum. Coagulation for hemostasis using argon plasma at 0.9 liters/minute and 45 greco was successful.  There was also diverticulosis and internal hemorrhoids. There was inadequate prep.   -- Continue with pantoprazole BID.  Colonoscopy findings consistent with poor prep 1 AVM was identified in the cecum which was cauterized multiple diverticuli seen throughout the colon moderate to large amount of dark stool noted throughout the colon.  -- Recommend the patient to be evaluated with capsule endoscopy as outpatient.  Patient findings are quite suspicious for chronic slow GI bleed from small bowel AVMs.  Also recommend the patient have repeat colonoscopy in 3 to 6 months with better prep.  -- Follow-up with [facility] as outpatient in 1-2 weeks.   -- Ok to discharge patient per GI  -- [facility] appreciates the consult.     PHYSICAL EXAM:  Vital Signs: Temp: 98.2  F (36.8  C) Temp src: Oral BP: 122/66 Pulse: 91   Resp: 16 SpO2: 98 % O2 Device: None (Room air)    Weight: 219 lbs 5.72 oz    Constitutional: Awake, alert, cooperative, no apparent distress  Respiratory: Clear to auscultation bilaterally, no crackles or wheezing  Cardiovascular: Regular rate and rhythm, normal  S1 and S2, and no murmur noted  GI: Normal bowel sounds, soft, non-distended, non-tender  Skin/Integumen: No rashes, no cyanosis, no edema       Recent Labs   Lab Test 04/01/21  0813 03/31/21  0932 03/31/21  0732   POTASSIUM 3.7 3.6 3.5   CHLORIDE 107  --  108   BUN 32*  --  31*       Recent Labs   Lab Test 04/01/21  0813 03/31/21  1625 03/31/21  0732 09/04/20  1338 09/04/20  1338   INR  --   --   --   --  0.99   WBC  --  5.2 5.1   < > 7.3   HGB 7.8* 7.9* 7.5*   < > 10.4*   PLT  --  189 160   < > 275    < > = values in this interval not displayed.     Active Problems:    Shortness of breath    Anemia, unspecified type    Acute on chronic diastolic heart failure (H)      ADDITIONAL COMMENTS:    I reviewed all medications, new labs and imaging results over the last 24 hours.   I discussed the patient's progress with Dr. Lane.     FLORENCE Pretty, [facility]   [phone] Office  [phone] Cell

## 2021-04-01 NOTE — PLAN OF CARE
Discharge    Patient discharged to home via wheelchair to car with wife.     Care plan note  DATE & TIME: 04/01/21 8920-6251    Cognitive Concerns/ Orientation : A & O x4. Pleasant.   ABNL VS/O2: VSS on RA.  MOBILITY: IND  PAIN MANAGMENT: Denies  DIET: Regular with fluid restriction of 1500 mL.   BOWEL/BLADDER: Bladder scan x1 to assess for retention, 288 mL. Post diuresis voids low amounts.  ABNL LAB/BG: Cr 2.55, Hgb 7.8, and /205.   DRAIN/DEVICES: PIV SL and was removed for discharge.   TELEMETRY RHYTHM: Discontinued early due to pt was supposed to be discharged early.   SKIN: Pale intact. Edema +1-2 in arms and legs. Old IV site from yesterday in R middle arm red/pink warm and firm to touch. Outlined.   TESTS/PROCEDURES: None.   D/C DATE: Today back home.   Discharge Barriers: Another dose of IV Lasix was ordered by Cardiology. Paged Dr. Almanza to clarify the order and timing she stated to me to give but around 1300.   OTHER IMPORTANT INFO: Wife supportive at bedside. Discussed AVS and added some more education regarding Heart failure and Low salt diet. Answered all questions they had.     Listed belongings gathered and returned to patient. Yes  Belongings returned to patient from security/pharmacy Yes  Care Plan and Patient education resolved: Yes  Prescriptions if needed, hard copies sent with patient  NA  Home and hospital acquired medications returned to patient: NA  Medication Bin checked and emptied on discharge Yes  Follow up appointment made for patient: Yes

## 2021-04-01 NOTE — PLAN OF CARE
DATE & TIME: 3/31/21 0466-6596       Cognitive Concerns/ Orientation : A&O x 4   BEHAVIOR & AGGRESSION TOOL COLOR: Green  CIWA SCORE: N/a    ABNL VS/O2: VSS on room air  MOBILITY: SBA (due to low hgb), steady  PAIN MANAGMENT: Denied  DIET: Reg- good appetite   BOWEL/BLADDER: Continent, strict I/O. No BM's this evening  ABNL LAB/BG: Hbg 7.9, , 249  DRAIN/DEVICES: PIV SL, IV iron and lasix.   TELEMETRY RHYTHM: NSR  SKIN: Pale. +2 edema to BLE  TESTS/PROCEDURES: Colonoscopy done today- 1 area cauterized. See GI note  D/C DATE: 1-2 days pending Hgb and Diuresis plan.    OTHER IMPORTANT INFO: Cardiology, GI and Nephrology and Hematology/Oncology.

## 2021-04-01 NOTE — PROGRESS NOTES
[facility]  Cardiology Progress Note    Date of Service (when I saw the patient): 04/01/2021  Primary Cardiologist: Dr. Rosenbaum       Interval History:   Feels better. Continues to have mild peripheral edema but breathing is significantly better.     ----------------------------------------------------------------------------------------    Assessment:  Justyn Olivas is a 80 year old male whom I sent in from cardiology clinic due to worsening SOB- he was seen in the ED and was admitted for worsening anemia with Hgb of 6.8 and acute on chronic HF on 3/26/2021.     Kamran is a pleasant 80 year old male with past medical history:      #. Acute on Chronic HFpEF (improved)- had been on Torsemide 10 mg since his admission in 12/2020; had 20 Ib weight gain (233 Ibs from baseline of 214 Ibs) when he saw his PCP on 2/3/28913- this was felt to be related to fluid gain from recent Iron infusion - after discussion with Dr. Rosenbaum Torsemide was increased to 20 mg BID; he unfortunately developed CASS with Cr up to 2.6- we had to hold his torsemide with only minimal improvement in his renal function to 2.28; he started to gain weight subsequently and during last visit we added back low dose Torsemide 10 mg.   - I saw him in clinic with no significant improvement and he was sent to the ED   - nephrology team was involved and he was IV diuresed with lasix with adequate output (it's not measured consistently); weight is down to 218 Ibs and has increased to 220 Ibs and today 219 Ibs  - nephrology felt patient is back in euvolemic state on 3/29 though he has significant peripheral edema still , so we continued IV lasix   - renal function has remained around 2.5  - has low albumin which can contribute to some of his peripheral edema as well  - urine output has not been measured consistently so can't rely on numbers     #. Acute on Chronic CKD- Cr baseline 1.5-1.6 but has been elevated to 2.28-2.6 over the last one  month  - Cr remains around 2.5  - has follow up with nephrology in 2 weeks     #. CM - EF 45-50% on echo; unknown etiology- no ischemic work up has been done (echo does show inferior WMA; Dr. Rosenbaum felt less likely to be ischemic)     #. Hx of Pericardial Effusion - noted during admission in 12/2020. Underwent successful pericardiocentesis. Cytology was negative for malignancy. No clear cause identified. Repeat echo in 2/2021 showed no recurrence.  - repeat echo on this admission showed no recurrence of pericardial effusion    #. DM2 - insulin depedent    #. Acute Anemia - PTA on iron supplements; question of possible bleeding from AV formations noted on recent GI work up- PCP had been evaluating this; during recent admission his Hgb was low at 6.9; improved to  8-9 since transfusion and iron infusions; his Hgb has not been checked closely as outpatient   - Hgb back down to 6.8 on this admission; up to 7.8 with transfusion   - Hgb is slightly better at 7.8; GI team involved and thinks small bowel AVM could be contributory     #. H/o basilar CVA - was on DAPT but plavix discontinued by PCP due to significant anemia for a while and he was placed back on plavix last week  -- aspirin stopped on this admission    #. HTN- uncontrolled during recent visits- unclear if white coat hypertension    #. Dyslipidemia-     #. Chronic MARKS - thought d/t anemia dx'd 2018      ----------------------------------------------------------------------------------------    Plan:  -- IV lasix 40 mg BID and transition to torsemide 20 mg   -- Will have further GI work up as outpatient- improving his anemia will help with management of his heart failure, so I informed patient GI work up is important; has follow up with them in 2 weeks to look for smal intestine AVM's  -- Will continue with mono antiplatelet therapy in the form of plavix 75 mg (aspirin stopped on this admission)  -- Has nephrology follow up in 2 weeks (will avoid ARB and  Acei)  -- CORE follow up will be arranged with repeat BMP and BNP (hospitalist informed CBC will be checked next week at PCP)    ----------------------------------------------------------------------------------------  Physical Exam   Temp: 98.2  F (36.8  C) Temp src: Oral BP: 122/66 Pulse: 91   Resp: 16 SpO2: 98 % O2 Device: None (Room air)    Vitals:    03/30/21 0607 03/31/21 0647 04/01/21 0500   Weight: 100 kg (220 lb 6.4 oz) 100 kg (220 lb 6.4 oz) 99.5 kg (219 lb 5.7 oz)     GEN:  NAD Oxygen on room air  HEENT: Mucous membranes moist.  NECK:  No JVD.  C/V:  Regular rate and rhythm, no murmur, rub or gallop.   RESP: CTA, dilip  GI: Abdomen soft, nontender, nondistended.    EXTREM: 1+ pitting LE edema up to mid calves.   NEURO: Alert and oriented, cooperative.   PSYCH: Normal affect.  SKIN: Warm and dry.   VASC: 2+ radial and dorsalis pedis pulses bilaterally.      Medications     - MEDICATION INSTRUCTIONS -         atorvastatin  20 mg Oral Daily     carvedilol  6.25 mg Oral BID w/meals     clopidogrel  75 mg Oral Daily     cyanocobalamin  1,000 mcg Oral Daily     fluticasone  2 spray Both Nostrils Daily     folic acid-vit B6-vit B12  1 tablet Oral Daily     furosemide  40 mg Intravenous BID     insulin aspart  15 Units Subcutaneous TID AC     insulin aspart  1-7 Units Subcutaneous TID AC     insulin aspart  1-5 Units Subcutaneous At Bedtime     insulin glargine  30 Units Subcutaneous At Bedtime     pantoprazole  40 mg Oral BID     potassium chloride  20 mEq Oral Once     potassium chloride ER  20 mEq Oral Daily     sodium chloride (PF)  3 mL Intracatheter Q8H     traZODone  100 mg Oral At Bedtime       Data   Reviewed.    Tele: JULIEN Ruiz PA-C   4/1/2021  Pager: [phone]

## 2021-04-02 ENCOUNTER — TELEPHONE (OUTPATIENT)
Dept: CARDIOLOGY | Facility: CLINIC | Age: 81
End: 2021-04-02

## 2021-04-02 ENCOUNTER — PATIENT OUTREACH (OUTPATIENT)
Dept: NURSING | Facility: CLINIC | Age: 81
End: 2021-04-02
Payer: MEDICARE

## 2021-04-02 ENCOUNTER — TELEPHONE (OUTPATIENT)
Dept: FAMILY MEDICINE | Facility: CLINIC | Age: 81
End: 2021-04-02

## 2021-04-02 LAB
COPATH REPORT: NORMAL
IGA SERPL-MCNC: 138 MG/DL (ref 84–499)
IGG SERPL-MCNC: 708 MG/DL (ref 610–1616)
IGM SERPL-MCNC: 39 MG/DL (ref 35–242)
PROT PATTERN SERPL IFE-IMP: NORMAL

## 2021-04-02 ASSESSMENT — ACTIVITIES OF DAILY LIVING (ADL): DEPENDENT_IADLS:: TRANSPORTATION

## 2021-04-02 NOTE — TELEPHONE ENCOUNTER
Chief Complaint: Acute On Chronic Diastolic Heart Failure (H), Anemia, Unspecified Type, THU 01-APR-2021, 0 / 2

## 2021-04-02 NOTE — LETTER
[facility] COORDINATION  [address]  [place] [address]     April 2, 2021        Justyn Olivas  [address] [place] [address]          Dear Justyn,     Uyen is an updated Complex Care Plan for your continued enrollment in Care Coordination. Please let us know if you have additional questions, concerns, or goals that we can assist with.    Sincerely,    Lorraine Walters, RN Care Coordinator  [facility] - [place], [place], [place]  Email: [email or web address]  Phone: [phone]                 [facility]  Complex Care Plan  About Me:    Patient Name:  Justyn Olivas    YOB: 1940  Age:         80 year old   [employer] MRN:    [id number] Telephone Information:  Home Phone [phone]   Mobile [phone]       Address:  [address] [place] [address] Email address:  [email or web address]      Emergency Contact(s)    Name Relationship Lgl Grd Work Phone Home Phone Mobile Phone   1. EBEN OLIVAS* Spouse   [phone]   2. LINH OLIVAS* Son   [phone]           Primary language:  English     needed? No   [employer]:  [phone] op. 1  Other communication barriers: None  Preferred Method of Communication:  Mail  Current living arrangement: I live in a private home with family  Mobility Status/ Medical Equipment: Independent    Health Maintenance  Health Maintenance Reviewed: Due/Overdue   Health Maintenance Topics with due status: Overdue       Topic Date Due    HF ACTION PLAN Never done    COPD ACTION PLAN Never done    Pneumococcal Vaccine: Pediatrics (0 to 5 Years) and At-Risk Patients (6 to 64 Years) 04/23/2015    Pneumococcal Vaccine: 65+ Years 04/23/2015    MEDICARE ANNUAL WELLNESS VISIT 02/10/2021    MICROALBUMIN 02/10/2021    DIABETIC FOOT EXAM 02/10/2021     My Access Plan  Medical Emergency 911   Primary Clinic Line [facility] [place] -  [phone]   24 Hour Appointment Line [phone] or  [phone]) (toll-free)   24 Hour Nurse Line [phone] (toll-free)   Preferred Urgent Care [facility] [place], [phone]   Preferred Hospital [facility]  [phone]   Preferred Pharmacy [id number] - [place], [place] - [address] [place] [facility] [place] [address]     Behavioral Health Crisis Line The National Suicide Prevention Lifeline at 1-386.353.6835 or 260     My Care Team Members  Patient Care Team       Relationship Specialty Notifications Start End    Christian Davidson MD PCP - General Internal Medicine  2/9/21     Phone: [phone] Pager: [phone] Fax: [phone] [address] [place] [address]    Bandar Greenberg MD Assigned PCP   11/11/16     Phone: [phone] Pager: [phone] Fax: [phone] [address] [place] [address]    Jerel Leija RN Lead Care Coordinator Primary Care - CC Admissions 12/24/20     Phone: [phone]         Esteban Ruiz PA-C Assigned Heart and Vascular Provider   3/17/21     Phone: [phone] Fax: [phone] [address] [place] [address]            My Care Plans  Self Management and Treatment Plan  Goals and (Comments)  Goals        General    1. Improve chronic symptoms (pt-stated)     Notes - Note edited  4/2/2021 11:27 AM by Lorraine Walters RN    Goal Statement: I will verbalize an increase in my strength and mobility and correct knowledge of adequate management of my chronic health conditions to maintain my health and wellness in preventing hospital readmission.   Date Goal set: 12/24/20; Updated/modified 04/02/2021  Barriers: Multiple health concerns.  Strengths: Motivated and engaged in care coordination.   Date to Achieve By: 9/1/2021  Patient expressed understanding of goal: Yes    Action steps to achieve this goal:  1. I will work with Physical Therapy  to build my strength and mobility.   2. I will follow up with my providers as scheduled/recommended. (Primary Care Provider 04/05, CORE 04/14, sleep studies TBD, Dr. Lane GI 2-3 weeks, [facility] hematology/oncology 2 weeks, nephrology 2 weeks, etc.)   3. I will take my medications as prescribed.   4. I will continue monitoring my blood sugars, blood pressures, weight daily as recommended.   5. I will use my CHF action plan to monitor/manage my symptoms.   6. I will follow care team recommendations of fluid restriction, diabetic and cardiac diet.   7. I will use my incentive spirometer as directed and recommended by my care team.   8. I will continue to work with care coordination for any additional resources and support.  9. I will contact my care team with questions, concerns, support needs. I will use the clinic as a resource and I understand I can contact my clinic with 24/7 after hours services available. Care Coordinator will remain available as needed.                 Action Plans on File:                       Advance Care Plans/Directives Type:        My Medical and Care Information  Problem List   Patient Active Problem List   Diagnosis     Shoulder pain     Insomnia, unspecified type     Type 2 diabetes mellitus without complication, with long-term current use of insulin (H)     Benign essential hypertension     Hyperlipidemia LDL goal <100     Non-seasonal allergic rhinitis due to pollen     Primary osteoarthritis of both hips     Primary osteoarthritis involving multiple joints     Chronic non-seasonal allergic rhinitis, unspecified trigger     Cerebrovascular accident (CVA) due to embolism of cerebral artery (H)     CKD (chronic kidney disease) stage 3, GFR 30-59 ml/min     Anemia due to blood loss, acute     Simple chronic bronchitis (H)     Iron deficiency anemia due to chronic blood loss     MAIRA (iron deficiency anemia)     Anemia, iron deficiency     Iron deficiency     Acute respiratory distress      Pericardial effusion     Shortness of breath     Anemia, unspecified type     Acute on chronic diastolic heart failure (H)      Current Medications and Allergies:  No Known Allergies   Current Outpatient Medications   Medication     atorvastatin (LIPITOR) 20 MG tablet     blood glucose (STEVE CONTOUR) test strip     carvedilol (COREG) 6.25 MG tablet     clopidogrel (PLAVIX) 75 MG tablet     cyanocobalamin (CYANOCOBALAMIN) 1000 MCG tablet     cyclobenzaprine (FLEXERIL) 10 MG tablet     DEMADEX 20 MG tablet     diphenhydrAMINE-acetaminophen (TYLENOL PM)  MG tablet     dorzolamide-timolol (COSOPT) 2-0.5 % ophthalmic solution     fluticasone (FLONASE) 50 MCG/ACT nasal spray     folic acid-vit B6-vit B12 (FOLGARD) 0.8-10-0.115 MG TABS per tablet     insulin glargine (LANTUS SOLOSTAR) 100 UNIT/ML pen     insulin lispro (HUMALOG KWIKPEN) 100 UNIT/ML (1 unit dial) KWIKPEN     insulin pen needle (BD FERCHO U/F) 32G X 4 MM miscellaneous     order for DME     pantoprazole (PROTONIX) 40 MG EC tablet     potassium chloride ER (K-TAB) 20 MEQ CR tablet     traZODone (DESYREL) 100 MG tablet     VENTOLIN  (90 Base) MCG/ACT inhaler     No current facility-administered medications for this visit.      Care Coordination Start Date: 12/24/2020   Frequency of Care Coordination: weekly   Form Last Updated: 04/02/2021

## 2021-04-02 NOTE — PROGRESS NOTES
Clinic Care Coordination Contact  OUTREACH    Referral Information:  Referral Source: IP Report    Primary Diagnosis: CHF    Chief Complaint   Patient presents with     Clinic Care Coordination - Post Hospital     CHF        Universal Utilization: Patient was recently admitted to [facility] from 03/26/2021 to 04/01/2021 with a diagnosis of CHF.   Clinic Utilization  Difficulty keeping appointments:: No  Compliance Concerns: No  No-Show Concerns: No  No PCP office visit in Past Year: No  Utilization    Last refreshed: 4/2/2021  7:53 AM: Hospital Admissions 2           Last refreshed: 4/2/2021  7:53 AM: ED Visits 4           Last refreshed: 4/2/2021  7:53 AM: No Show Count (past year) 1              Current as of: 4/2/2021  7:53 AM            Clinical Concerns:  Current Medical Concerns:    Patient Active Problem List   Diagnosis     Shoulder pain     Insomnia, unspecified type     Type 2 diabetes mellitus without complication, with long-term current use of insulin (H)     Benign essential hypertension     Hyperlipidemia LDL goal <100     Non-seasonal allergic rhinitis due to pollen     Primary osteoarthritis of both hips     Primary osteoarthritis involving multiple joints     Chronic non-seasonal allergic rhinitis, unspecified trigger     Cerebrovascular accident (CVA) due to embolism of cerebral artery (H)     CKD (chronic kidney disease) stage 3, GFR 30-59 ml/min     Anemia due to blood loss, acute     Simple chronic bronchitis (H)     Iron deficiency anemia due to chronic blood loss     MAIRA (iron deficiency anemia)     Anemia, iron deficiency     Iron deficiency     Acute respiratory distress     Pericardial effusion     Shortness of breath     Anemia, unspecified type     Acute on chronic diastolic heart failure (H)     Patient verbalizes that he is feeling pretty well. Feels tired on occasion and strength and balance are slowly improving. He reports that he works with a personal  " twice per week. Reports that the swelling in his lower extremities has improved and his breathing is \"good\".  \"They got me taking all of these different medications\" (RNCC gets impression patient does not like taking so many medications, unable to assess compliance or discuss medications further with patient).   \"I need to get a few more appointments scheduled, and I go into the clinic to see my doctor on Monday\"   Per discharge instructions: (RNCC was unable to review specific details of this with patient)     - Follow-up with [facility] hematology oncology in clinic in 2 weeks.  - Follow-up with Ariana PARSON in 2 3 weeks or earlier if symptomatic.  - Consider completing outpatient sleep studies  - Follow up with Nephrology in 2 weeks.     - Fluid restriction 1500 - 2000 ml/day   - Aggressive incentive spirometry.      Patient reports that his blood pressure this morning was 127/79. Weight this morning was 219 lbs and blood sugar this morning was 67. He reports that he is checking his blood sugars daily before meals and feels his diabetes is very well controlled. He states that he has a CHF action plan and denies need for additional resources, support or assistance with following care team recommendations and/or managing chronic health conditions/symptoms.      Current Behavioral Concerns: patient was not engaged during outreach and requested to end outreach conversation prior to RNCC being able to complete assessment. Thus, RNCC was unable to review/reconcile medications, provide additional CHF education/assessment or review full AVS instructions with patient.    Education Provided to patient: CC role, Appointments, CHF action plan reviewed: signs/symptoms of exacerbation. RNCC encouraged use of clinic as a resource with 24/7 clinic after hours services available.    Pain  Pain (GOAL):: No  Health Maintenance Reviewed: Due/Overdue   Health Maintenance Topics with due status: Overdue       Topic " "Date Due    HF ACTION PLAN Never done    COPD ACTION PLAN Never done    Pneumococcal Vaccine: Pediatrics (0 to 5 Years) and At-Risk Patients (6 to 64 Years) 2015    Pneumococcal Vaccine: 65+ Years 2015    MEDICARE ANNUAL WELLNESS VISIT 02/10/2021    MICROALBUMIN 02/10/2021    DIABETIC FOOT EXAM 02/10/2021     Clinical Pathway: Clinic Care Coordination CHF Assessment    CHF:  Home scale available:  Yes  Home scale weight this mornin pounds  Heart Failure Zones sheet on refrigerator or available: Yes  Any increased SOB since hospital discharge:  No  Any increased edema since hospital discharge:  No  What number to call for YELLOW zones: [phone]    Symptom Review:   Heart Failure Symptoms  Shortness of breath:: No  Wheezing or noisy breathing?: No  Cough: No  Increased sputum: No  Fever: No  Chest pain: : No  Heartbeat: Regular  Dizzy or Lightheaded: No  Checking weight daily? : Yes  Weight?: Unchanged  Today's Weight?: 99.3 kg (219 lb)  Weight increase more than 2 lbs in 24 hours?: No  Weight Increase more than 5 lbs in 1 week? : No  Does the patient have understanding of Diuretic self-management?: Yes  Appetite:: Normal  Swelling: : Lower legs  Bloating:: None  Urination:: Normal  Fatigue: Sometimes  Weakness (Heaviness in limbs):: Yes  What Heart Failure zone are you currently in?: Green  Overall your CHF symptoms are (GOAL):: Improving    Medications:  \"How many new medications are you on since your hospitalization?\"  0 - 1  \"How many of your current medicines changed (dose, timing, name, etc.) while you were in the hospital?\"  2 or more -- Epic MTM referral needed  \"Do you have questions about your medications?\"  No  For patients on insulin: \"Did you start on insulin in the hospital or did you have your insulin dose changed?\"  Yes -- Diabetes education referral needed (unless MTM referral already ordered)  Is patient on Warfarin?  No  Is Ejection Fraction <40%: No    When is the first " appointment with the CHF clinic: 2021    Medication Management:  Medications not reviewed/reconciled with patient due to patient request to end outreach. New medications: Folic acid/B12. Discontinued medications: Aspirin, Metformin, Irbesartan    Functional Status:  Dependent ADLs:: Independent (Patient is use cane outside of home for long distance)  Dependent IADLs:: Transportation  Bed or wheelchair confined:: No  Mobility Status: Independent  Fallen 2 or more times in the past year?: No  Any fall with injury in the past year?: No    Living Situation:  Current living arrangement:: I live in a private home with family  Type of residence:: Private home - stairs    Lifestyle & Psychosocial Needs:        Diet:: Diabetic diet, No added salt  Inadequate nutrition (GOAL):: No  Tube Feeding: No  Inadequate activity/exercise (GOAL):: No  Significant changes in sleep pattern (GOAL): No  Transportation means:: Accessible car  Latter day or spiritual beliefs that impact treatment:: No  Mental health DX:: No  Mental health management concern (GOAL):: No  Chemical Dependency Status: No Current Concerns  Informal Support system:: Family, Spouse   Socioeconomic History     Marital status:      Spouse name: Cecile     Number of children: Not on file     Years of education: Not on file     Highest education level: Not on file     Tobacco Use     Smoking status: Former Smoker     Packs/day: 2.00     Years: 11.00     Pack years: 22.00     Types: Cigarettes     Start date:      Quit date:      Years since quittin.2     Smokeless tobacco: Never Used   Substance and Sexual Activity     Alcohol use: No     Alcohol/week: 0.0 standard drinks     Drug use: No     Sexual activity: Yes     Partners: Female     Birth control/protection: None      Resources and Interventions:  Current Resources:   Community Resources: [facility] [place]  Supplies used at home:: Hearing Aid Batteries, Diabetic Supplies  Equipment Currently  Used at Home: cane, straight, glucometer, walker, rolling  Employment Status: retired)  Advance Care Plan/Directive  Advanced Care Plans/Directives on file:: In process  Advanced Care Plan/Directive Status: In Process (has, just not on file)    Referrals Placed: None     Goals:   Goals        General    1. Improve chronic symptoms (pt-stated)     Notes - Note edited  4/2/2021 11:27 AM by Lorraine Walters RN    Goal Statement: I will verbalize an increase in my strength and mobility and correct knowledge of adequate management of my chronic health conditions to maintain my health and wellness in preventing hospital readmission.   Date Goal set: 12/24/20; Updated/modified 04/02/2021  Barriers: Multiple health concerns.  Strengths: Motivated and engaged in care coordination.   Date to Achieve By: 9/1/2021  Patient expressed understanding of goal: Yes    Action steps to achieve this goal:  1. I will work with Physical Therapy to build my strength and mobility.   2. I will follow up with my providers as scheduled/recommended. (Primary Care Provider 04/05, CORE 04/14, sleep studies TBD, Dr. Ariana PARSON 2-3 weeks, [facility] hematology/oncology 2 weeks, nephrology 2 weeks, etc.)   3. I will take my medications as prescribed.   4. I will continue monitoring my blood sugars, blood pressures, weight daily as recommended.   5. I will use my CHF action plan to monitor/manage my symptoms.   6. I will follow care team recommendations of fluid restriction, diabetic and cardiac diet.   7. I will use my incentive spirometer as directed and recommended by my care team.   8. I will continue to work with care coordination for any additional resources and support.  9. I will contact my care team with questions, concerns, support needs. I will use the clinic as a resource and I understand I can contact my clinic with 24/7 after hours services available. Care Coordinator will remain available as needed.              Patient/Caregiver  understanding: Patient verbalized understanding and denies any additional questions or concerns at this time. RNCC engaged in [employer] communications during encounter.     Outreach Frequency: weekly  Future Appointments              In 3 days Christian Davidson MD [facility], CS    In 1 week [facility], [facility]    In 1 week Shaniqua Ram PA-C [facility], [facility]          Plan: Patient will schedule and complete follow up appointments as recommended. Patient will take medications as prescribed. Patient will call RNCC with questions, concerns, support needs. RNCC will be available as needed.  RNCC sent new complex care plan to patient via [employer].  RNCC will follow up with patient again in the next 5 business days.     Lorraine Walters RN Care Coordinator  [facility] - [place], [place], [place]  Email: [email or web address]  Phone: [phone]

## 2021-04-02 NOTE — TELEPHONE ENCOUNTER
Patient was evaluated by cardiology while inpatient for increased SOB and BLE edema, acute on chronic HF in presence of worsening anemia with Hgb 6.8. PMH: Chronic MARKS, CKD, CVA, HTN, DM2, HLD, iron deficiency anemia-iron transfusions. Echo EF 50-55%, noted severe inferior wall hypokinesis (Compared to prior study, there is no significant change). GI consulted for worsening anemia. EGD and colonoscopy performed and AVM's noted/cauterized. IV Lasix diuresed with assistance of Nephrology secondary to CKD. Transitioned back to PTA Demadex. PTA Avapro, ASA and Metformin discontinued. Called patient to discuss any post hospital d/c questions, review discharge medication, and confirm f/u appts. Patient denied any questions regarding new or changes to PTA medications. Patient denied any SOB, chest pain, edema, or light headedness. RN confirmed with patient that he has labs scheduled on 4/14/21 at 0815 with OV scheduled with SHAHANA Shaniqua Ram at 0910 for CORE enrollment at our [place] [facility]. Instructed patient to weigh self every AM, after waking and using the restroom, but before breakfast and medications. Call clinic for a weight gain of 2 lbs overnight or 5 lbs in a week. Low Na+ diet encouraged. Pt instructed to bring daily wt/BP diary and medications with to f/u OV. Patient advised to call clinic with any cardiac related questions or concerns prior to his appt. Patient verbalized understanding and agreed with plan. FARSHAD Weaver RN.

## 2021-04-03 ENCOUNTER — TRANSFERRED RECORDS (OUTPATIENT)
Dept: HEALTH INFORMATION MANAGEMENT | Facility: CLINIC | Age: 81
End: 2021-04-03

## 2021-04-05 ENCOUNTER — OFFICE VISIT (OUTPATIENT)
Dept: FAMILY MEDICINE | Facility: CLINIC | Age: 81
End: 2021-04-05
Payer: MEDICARE

## 2021-04-05 VITALS
BODY MASS INDEX: 28.69 KG/M2 | HEART RATE: 83 BPM | SYSTOLIC BLOOD PRESSURE: 136 MMHG | TEMPERATURE: 97.3 F | WEIGHT: 211.8 LBS | DIASTOLIC BLOOD PRESSURE: 71 MMHG | OXYGEN SATURATION: 97 % | HEIGHT: 72 IN

## 2021-04-05 DIAGNOSIS — N18.32 TYPE 2 DIABETES MELLITUS WITH STAGE 3B CHRONIC KIDNEY DISEASE, WITH LONG-TERM CURRENT USE OF INSULIN (H): ICD-10-CM

## 2021-04-05 DIAGNOSIS — E11.22 TYPE 2 DIABETES MELLITUS WITH STAGE 3B CHRONIC KIDNEY DISEASE, WITH LONG-TERM CURRENT USE OF INSULIN (H): ICD-10-CM

## 2021-04-05 DIAGNOSIS — D50.9 IRON DEFICIENCY ANEMIA, UNSPECIFIED IRON DEFICIENCY ANEMIA TYPE: Primary | ICD-10-CM

## 2021-04-05 DIAGNOSIS — Z79.4 TYPE 2 DIABETES MELLITUS WITH STAGE 3B CHRONIC KIDNEY DISEASE, WITH LONG-TERM CURRENT USE OF INSULIN (H): ICD-10-CM

## 2021-04-05 DIAGNOSIS — N18.31 STAGE 3A CHRONIC KIDNEY DISEASE (H): ICD-10-CM

## 2021-04-05 LAB
ANION GAP SERPL CALCULATED.3IONS-SCNC: 4 MMOL/L (ref 3–14)
BUN SERPL-MCNC: 39 MG/DL (ref 7–30)
CALCIUM SERPL-MCNC: 8.5 MG/DL (ref 8.5–10.1)
CHLORIDE SERPL-SCNC: 97 MMOL/L (ref 94–109)
CO2 SERPL-SCNC: 38 MMOL/L (ref 20–32)
CREAT SERPL-MCNC: 2.82 MG/DL (ref 0.66–1.25)
ERYTHROCYTE [DISTWIDTH] IN BLOOD BY AUTOMATED COUNT: 18.5 % (ref 10–15)
GFR SERPL CREATININE-BSD FRML MDRD: 20 ML/MIN/{1.73_M2}
GLUCOSE SERPL-MCNC: 299 MG/DL (ref 70–99)
HCT VFR BLD AUTO: 28.6 % (ref 40–53)
HGB BLD-MCNC: 8.7 G/DL (ref 13.3–17.7)
MCH RBC QN AUTO: 29 PG (ref 26.5–33)
MCHC RBC AUTO-ENTMCNC: 30.4 G/DL (ref 31.5–36.5)
MCV RBC AUTO: 95 FL (ref 78–100)
PLATELET # BLD AUTO: 195 10E9/L (ref 150–450)
POTASSIUM SERPL-SCNC: 2.7 MMOL/L (ref 3.4–5.3)
RBC # BLD AUTO: 3 10E12/L (ref 4.4–5.9)
SODIUM SERPL-SCNC: 139 MMOL/L (ref 133–144)
WBC # BLD AUTO: 5.9 10E9/L (ref 4–11)

## 2021-04-05 PROCEDURE — 80048 BASIC METABOLIC PNL TOTAL CA: CPT | Performed by: INTERNAL MEDICINE

## 2021-04-05 PROCEDURE — 85027 COMPLETE CBC AUTOMATED: CPT | Performed by: INTERNAL MEDICINE

## 2021-04-05 PROCEDURE — 99214 OFFICE O/P EST MOD 30 MIN: CPT | Performed by: INTERNAL MEDICINE

## 2021-04-05 PROCEDURE — 36415 COLL VENOUS BLD VENIPUNCTURE: CPT | Performed by: INTERNAL MEDICINE

## 2021-04-05 ASSESSMENT — MIFFLIN-ST. JEOR: SCORE: 1703.72

## 2021-04-05 NOTE — PROGRESS NOTES
[facility]  CLINIC PROGRESS NOTE    Subjective:  Acute exacerbation of heart failure with preserved ejection fraction   Justyn Olivas was recently mated to [facility] on March 26 for weight gain shortness of breath and a chest x-ray showing mild cardiomegaly with normal pulmonary vascularity.  A repeat echocardiogram showed no change with regards to his ejection fraction, and he was treated aggressively with IV Lasix.  At time of discharge he was recommended to continue with oral torsemide 20 g daily for diuresis and follow-up with a cardiologist.  Notably his prior to admission irbesartan was discontinued and he was recommended to follow-up with nephrology in 2 weeks and monitor his renal function closely.  He is also been recommended to monitor vitals closely continue on a fluid restriction and follow-up in the cardiology clinic in about 2 weeks.  Anemia   His baseline hemoglobin has been between 8 and 9 and at time of admission his hemoglobin was noted to be only at 6.8.  He received 1 dose of IV Venofer.  He did have an upper endoscopy which showed some erythematous mucosa within the stomach and some nonbleeding angiectasia is in the duodenum which were treated with argon plasma coagulation and remained hemodynamically stable with no bleeding in the hospital.  A colonoscopy also performed during hospitalization showed 1 arteriovenous malformation which was cauterized, due to the fact that none of the findings were actively bleeding a possible capsule endoscopy was recommended and he will follow-up with gastroenterology for this.  He is also been recommended to follow-up with oncology and hematology and consider bone marrow biopsy if he continues to remain anemic without any evidence of bleeding in his stool.  Hypokalemia   Potassium is recommended to be rechecked upon discharge  History of stroke   Prior to admission he was taking both aspirin and Plavix.  Given his stroke was greater  than 2 years ago the aspirin was discontinued but he is recommended to maintain on Plavix which he discontinued.  Type 2 diabetes mellitus   Due to worsening renal function in the hospital his Metformin was discontinued.  He remains on insulin Lantus.  His original dose have been 50 units at bedtime, but this was lowered while in the hospital and at discharge he was recommended to continue only 40 units of Lantus at bedtime as well as insulin lispro 3 times daily with meals  Obstructive sleep apnea suspicion   He was recommended to have a sleep study as an outpatient    Past medical history, medications, allergies, social history, family history reviewed and updated in EPIC as of 4/5/2021 .    ROS  CONSTITUTIONAL: no fatigue, no unexpected change in weight  SKIN: no worrisome rashes, no worrisome moles, no worrisome lesions  EYES: no acute vision problems or changes  ENT: no ear problems, no mouth problems, no throat problems  RESP: no significant cough, no shortness of breath  CV: no chest pain, no palpitations, stable peripheral edema  GI: no nausea, no vomiting, no constipation, no diarrhea  : no frequency, no dysuria, no hematuria  MS: no claudication, no myalgias, no joint aches  PSYCHIATRIC: no changes in mood or affect      Objective:  Vitals  /71 (BP Location: Right arm, Patient Position: Sitting, Cuff Size: Adult Regular)   Pulse 83   Temp 97.3  F (36.3  C) (Temporal)   Ht 1.829 m (6')   Wt 96.1 kg (211 lb 12.8 oz)   SpO2 97%   BMI 28.73 kg/m    GEN: Alert Oriented x3 NAD  HEENT: Atraumatic, normocephalic, neck supple,    CV: RRR no murmurs or rubs  PULM: CTA no wheezes or crackles  ABD: Soft, nontender nondistended  SKIN: No visible skin lesion or ulcerations  EXT: 2+ dorsal pedis pulses, 2+ edema bilateral lower extremities  NEURO: Gait and station deferred, No focal neurologic deficits  PSYCH: Mood good, affect mood congruent    No images are attached to the encounter.    No results found  for this or any previous visit (from the past 24 hour(s)).    Assessment/Plan:  Patient Instructions   (D50.9) Iron deficiency anemia, unspecified iron deficiency anemia type  (primary encounter diagnosis)  Comment: We will recheck hemoglobin today and plan to follow up in gastroenterology to discuss capsule endoscopy  Plan: Basic metabolic panel  (Ca, Cl, CO2, Creat,         Gluc, K, Na, BUN), CBC with platelets            (E11.21,  N18.32,  Z79.4) Type 2 diabetes mellitus with stage 3b chronic kidney disease, with long-term current use of insulin (H)  Comment: Continue Inuslin.  Monitor blood glucose at home.  Checking blood glucose three times daily.  Do not resume metformin  Plan: Basic metabolic panel  (Ca, Cl, CO2, Creat,         Gluc, K, Na, BUN), CBC with platelets            (N18.31) Stage 3a chronic kidney disease  Comment: recheck creatinine and follow up in nephrology next week  Plan:        Follow up in 1 month    Disclaimer: This note consists of symbols derived from keyboarding, dictation and/or voice recognition software. As a result, there may be errors in the script that have gone undetected. Please consider this when interpreting information found in this chart.    Christian Davidson MD  [phone]

## 2021-04-05 NOTE — PATIENT INSTRUCTIONS
(D50.9) Iron deficiency anemia, unspecified iron deficiency anemia type  (primary encounter diagnosis)  Comment: We will recheck hemoglobin today and plan to follow up in gastroenterology to discuss capsule endoscopy  Plan: Basic metabolic panel  (Ca, Cl, CO2, Creat,         Gluc, K, Na, BUN), CBC with platelets            (E11.21,  N18.32,  Z79.4) Type 2 diabetes mellitus with stage 3b chronic kidney disease, with long-term current use of insulin (H)  Comment: Continue Inuslin.  Monitor blood glucose at home.  Checking blood glucose three times daily.  Do not resume metformin  Plan: Basic metabolic panel  (Ca, Cl, CO2, Creat,         Gluc, K, Na, BUN), CBC with platelets            (N18.31) Stage 3a chronic kidney disease  Comment: recheck creatinine and follow up in nephrology next week  Plan:

## 2021-04-06 ENCOUNTER — TELEPHONE (OUTPATIENT)
Dept: FAMILY MEDICINE | Facility: CLINIC | Age: 81
End: 2021-04-06

## 2021-04-06 DIAGNOSIS — I10 BENIGN ESSENTIAL HYPERTENSION: ICD-10-CM

## 2021-04-06 NOTE — TELEPHONE ENCOUNTER
I called and spoke with Justyn Olivas's wife Cecile and informed her of   Lab results with recommendation to reduce his torsemide dose from 20 down to 10 mg and to continue potassium with follow-up in nephrology to recheck metabolic panel

## 2021-04-06 NOTE — RESULT ENCOUNTER NOTE
Rickie Alejandra,    I have had the opportunity to review your recent results and an interpretation is as follows:  Your complete blood counts show improvement in your hemoglobin  However, your metabolic panel shows a worsening of your renal function with an elevation of your creatinine and reduced potassium.  I recommend we reduce your dose of torsemide to only 10 mg (1/2 tablet) and continue potassium and consult with Dr. Groves, and recheck basic metabolic panel again next week    Sincerely,  Christian Davidson MD

## 2021-04-06 NOTE — TELEPHONE ENCOUNTER
RECORDS STATUS - ALL OTHER DIAGNOSIS      RECORDS RECEIVED FROM: [facility]   DATE RECEIVED: 4/28/2021    NOTES STATUS DETAILS   OFFICE NOTE from referring provider     OFFICE NOTE from medical oncologist N/A    DISCHARGE SUMMARY from hospital Complete Epic- Anemia    DISCHARGE REPORT from the ER Complete Psychiatric- Anemia    OPERATIVE REPORT N/A    MEDICATION LIST Complete [facility]   CLINICAL TRIAL TREATMENTS TO DATE     LABS     PATHOLOGY REPORTS N/A    ANYTHING RELATED TO DIAGNOSIS Complete Labs last updated on 4/5/2021   GENONOMIC TESTING     TYPE:     IMAGING (NEED IMAGES & REPORT)     CT SCANS Complete CT Chest Pulmonary 12/20/2020, 11/26/2020    Xray Chest Complete 3/26/2021, 2/3/2021   MRI Complete MRA Neck 9/4/2020     MR Brain 9/4/2020    MAMMO     ULTRASOUND     PET

## 2021-04-07 DIAGNOSIS — R06.02 SHORTNESS OF BREATH: Primary | ICD-10-CM

## 2021-04-07 RX ORDER — ALBUTEROL SULFATE 90 UG/1
AEROSOL, METERED RESPIRATORY (INHALATION)
Qty: 8.5 G | Refills: 3 | Status: SHIPPED | OUTPATIENT
Start: 2021-04-07

## 2021-04-07 NOTE — TELEPHONE ENCOUNTER
Albuterol and Timolol Drug interaction.    Please refill as appropriate.    Diamante Bond RN  [facility]

## 2021-04-13 ENCOUNTER — PATIENT OUTREACH (OUTPATIENT)
Dept: CARE COORDINATION | Facility: CLINIC | Age: 81
End: 2021-04-13

## 2021-04-13 ASSESSMENT — ACTIVITIES OF DAILY LIVING (ADL): DEPENDENT_IADLS:: TRANSPORTATION

## 2021-04-13 NOTE — PROGRESS NOTES
Clinic Care Coordination Contact  [facility]    Referral Source: IP Report  Clinical Data: Care Coordinator Outreach  CC RN outreach to get updates on patient status, assess goal progress, and provide support and additional resources as needed.    Outreach attempted x 1.  Patient's wife answered phone and indicated they were unavailable to talk as they were on their way to an appointment.  Patient's wife agreeable to CC RN call back in a day or two; she did highlight patient's appointments tomorrow for labs and CORE follow-up.    Plan: Care Coordinator will try to reach patient again in 1-2 business days.    Jerel Leija, RN  Clinic Care Coordinator  [facility] [place], [place], [facility]  Ph: [phone]

## 2021-04-14 ENCOUNTER — CARE COORDINATION (OUTPATIENT)
Dept: CARDIOLOGY | Facility: CLINIC | Age: 81
End: 2021-04-14

## 2021-04-14 ENCOUNTER — OFFICE VISIT (OUTPATIENT)
Dept: CARDIOLOGY | Facility: CLINIC | Age: 81
End: 2021-04-14
Attending: PHYSICIAN ASSISTANT
Payer: MEDICARE

## 2021-04-14 VITALS
SYSTOLIC BLOOD PRESSURE: 118 MMHG | HEART RATE: 77 BPM | DIASTOLIC BLOOD PRESSURE: 64 MMHG | OXYGEN SATURATION: 97 % | HEIGHT: 72 IN | BODY MASS INDEX: 29.76 KG/M2 | WEIGHT: 219.7 LBS

## 2021-04-14 DIAGNOSIS — E78.5 HYPERLIPIDEMIA LDL GOAL <100: ICD-10-CM

## 2021-04-14 DIAGNOSIS — I10 BENIGN ESSENTIAL HYPERTENSION: ICD-10-CM

## 2021-04-14 DIAGNOSIS — I50.33 ACUTE ON CHRONIC DIASTOLIC HEART FAILURE (H): ICD-10-CM

## 2021-04-14 DIAGNOSIS — I50.30 HEART FAILURE WITH PRESERVED EJECTION FRACTION, NYHA CLASS I (H): ICD-10-CM

## 2021-04-14 DIAGNOSIS — I50.30 HEART FAILURE WITH PRESERVED EJECTION FRACTION, NYHA CLASS I (H): Primary | ICD-10-CM

## 2021-04-14 DIAGNOSIS — I10 BENIGN ESSENTIAL HYPERTENSION: Primary | ICD-10-CM

## 2021-04-14 LAB
ANION GAP SERPL CALCULATED.3IONS-SCNC: 5 MMOL/L (ref 3–14)
BUN SERPL-MCNC: 35 MG/DL (ref 7–30)
CALCIUM SERPL-MCNC: 8.7 MG/DL (ref 8.5–10.1)
CHLORIDE SERPL-SCNC: 98 MMOL/L (ref 94–109)
CO2 SERPL-SCNC: 38 MMOL/L (ref 20–32)
CREAT SERPL-MCNC: 2.47 MG/DL (ref 0.66–1.25)
GFR SERPL CREATININE-BSD FRML MDRD: 23 ML/MIN/{1.73_M2}
GLUCOSE SERPL-MCNC: 209 MG/DL (ref 70–99)
HGB BLD-MCNC: 8.9 G/DL (ref 13.3–17.7)
NT-PROBNP SERPL-MCNC: 1954 PG/ML (ref 0–450)
POTASSIUM SERPL-SCNC: 2.8 MMOL/L (ref 3.4–5.3)
SODIUM SERPL-SCNC: 141 MMOL/L (ref 133–144)

## 2021-04-14 PROCEDURE — 36415 COLL VENOUS BLD VENIPUNCTURE: CPT | Performed by: PHYSICIAN ASSISTANT

## 2021-04-14 PROCEDURE — 80048 BASIC METABOLIC PNL TOTAL CA: CPT | Performed by: PHYSICIAN ASSISTANT

## 2021-04-14 PROCEDURE — 85018 HEMOGLOBIN: CPT | Performed by: PHYSICIAN ASSISTANT

## 2021-04-14 PROCEDURE — 83880 ASSAY OF NATRIURETIC PEPTIDE: CPT | Performed by: PHYSICIAN ASSISTANT

## 2021-04-14 PROCEDURE — 99215 OFFICE O/P EST HI 40 MIN: CPT | Performed by: PHYSICIAN ASSISTANT

## 2021-04-14 RX ORDER — POTASSIUM CHLORIDE 1500 MG/1
60 TABLET, EXTENDED RELEASE ORAL 2 TIMES DAILY
Qty: 180 TABLET | Refills: 4 | Status: SHIPPED | OUTPATIENT
Start: 2021-04-14 | End: 2021-04-15

## 2021-04-14 RX ORDER — TORSEMIDE 20 MG/1
10 TABLET ORAL DAILY
COMMUNITY
Start: 2021-04-14 | End: 2022-01-01

## 2021-04-14 ASSESSMENT — MIFFLIN-ST. JEOR: SCORE: 1739.55

## 2021-04-14 NOTE — PROGRESS NOTES
[facility]   Eliza Dobbs, RN   4/14/2021  2:23 PM CDT      [facility]. Asked he message me back with confirmation.    Shaniqua Ram PA-C   4/14/2021  2:14 PM CDT      Spoke with Juanita Moore (nephrology) regarding today's results. She is asking that Kamran keep his torsemide the same (10 mg daily) and start KCl. Let's do 60 mEQ BID. She would like him to have a repeat BMP (and let's do a Mag too) in one week, and she'll determine based on those how soon he needs to come back in to see her. Can you relay this to Kamran? Thanks!         Rx sent to pharmacy for KCL 60meq BID. Also placed BMP and Mag order.     Eliza Dobbs RN, BSN, CHFN  04/14/21 at 2:28 PM

## 2021-04-14 NOTE — PATIENT INSTRUCTIONS
Today, we discussed the following:   Kamran, your heart function has improved per your most recent echocardiogram. However, we know that the muscle is stiff, which can cause fluid to back up into the lungs during times of stress, such as anemia from bleeding. Your kidneys are also a major contributor to this. We think that you may have a heart blockage, but we are managing that conservatively (with medications instead of procedures for now) because of your anemia and kidney disease. For this reason, you are taking Plavix and Lipitor.     I think the most important thing at this time is close follow up with your kidney doctor, hematologist and primary care physician. They will be monitoring your anemia, fluid status, and kidneys, which are the biggest issues here. I am happy to help where I can, but do not want to create confusion with multiple providers managing your medications.     For now, I would like you to follow up with your primary cardiologist, Dr. Rosenbaum, in about six weeks. In the meantime, it is important to limit your sodium intake and weigh yourself daily to monitor for signs of fluid re-accumulation. Again, I would ask you to contact your kidney doctor if you start to notice fluid accumulation, as they will be dictating your diuretic (water pill) therapy. I will relay your results from today to Dr. Davidson and Juanita Moore.    The [employer] tim is nice to monitor your fluid and sodium intake.   Stay as active as possible, continue the good work with your !    Please, remember to continue the followin.  Weigh yourself daily. Call if your weight is up > than 2 pounds in one day, or 5 pounds in one week; if you feel more short of breath or have worsening swelling in your legs or abdomen.  2.  Eat a low sodium diet (less than 2,000mg or 2g daily). If you eat less salt, you will retain less fluid.   3.  Avoid alcohol, as this can worsen heart failure.   4.  Avoid NSAIDs as able (For  example, Ibuprofen / aleve / advil / naprosen / diclofenac).    Please call CORE nurse (Eliza) for any questions or concerns Mon-Fri 8am-4pm:   [phone]  For concerns after hours:   [phone] option 2   Scheduling phone number:   [phone]    Thank you for visiting with us today.   Shaniqua Ram PA-C  ______________________________________________________________

## 2021-04-14 NOTE — PROGRESS NOTES
Cardiology Clinic Progress Note    Justyn Olivas MRN# [id number]   YOB: 1940 Age: 80 year old   Primary cardiologist: Dr. Rosenbaum         Assessment and Plan:     In summary, Justyn Olivas presents today for a core clinic enrollment visit, after a recent admission for acute on chronic HFpEF in the setting of significant anemia with hemoglobin of 6.8, and acute renal failure with a creatinine nearing 3.  He received blood products, and was diuresed about 7 pounds. Today, his weight is stable at 220 lbs, and he appears slightly volume up but overall well-compensated with FC II symptoms. He believes he feels best at closer to 210 lbs.     To avoid confusion in this patient's management, I will defer ongoing diuretic therapy to his nephrologist, whom he saw in clinic yesterday. He will continue close follow up with his PCP and hematologist for his acute on chronic anemia. I do not think he requires follow up with the [facility] clinic at this time, and he and his wife are appreciative of that. I will have him see his primary cardiologist, Dr. Rosenbaum, who he has yet to see in clinic, in about six weeks from now. He can follow-up with the general cardiology team after that. Kamran and his wife are very comfortable with this plan.     Of course, if his volume management becomes increasingly difficult in the setting of his renal disease, we can always perform a RHC for better evaluation of his volume status.     Regarding his probable ischemic cardiomyopathy, his LVEF has improved per his most recent echocardiogram, and he is not having chest pain. In the setting of advanced renal disease and anemia, conservative management for this is recommended. He is currently on Plavix (instead of aspirin in the setting of his GI issues), and statin therapy with an excellent fasting lipid profile.     Labs including BMP and hemoglobin were drawn prior to our visit today, but are pending still at this time. I will forward  those results to Kamran's PCP and nephrologist once they return.     I did provide education on a sodium restricted diet, daily weights, and when to notify the nephrology clinic with rapid weight gain. We spent a fair amount of time on counseling and review of his recent history and test results today. I believe I answered all questions to their satisfaction.     It was a pleasure meeting Kamran and Cecile today!        History of Presenting Illness:      Justyn Olivas is a pleasant 80 year old patient who presents today for a core clinic enrollment visit.    The patient has a history of the following -   1.  Chronic HFpEF.    2.  Presumed history of ischemic cardiomyopathy, LVEF 45-50% on prior echo, with an inferior wall motion abnormality.  EF has improved per most recent echo in March.  3.  History of pericardial effusion status post pericardiocentesis in December 2020.  Cytology negative for malignancy.  This has resolved.  4.  Insulin-dependent diabetes mellitus  5.  History of CVA  6.  Hypertension  7.  Dyslipidemia  8.  Chronic iron deficiency anemia  9.  GI AV malformations    In brief, Kamran presents for a core clinic enrollment visit, referred to see me after an admission for volume overload.  He was seen end of March by my colleague Esteban, who had been following him closely in attempt to diurese him with torsemide after a 20-lb wt gain was noted.  However, he developed CASS and his torsemide was held, then re-initiated at a lower dose.  When he returned to clinic, he was noted to be significantly volume overloaded and additionally was anemic with a hemoglobin of 6.8.  He was directed to the ER, where he was ultimately admitted for 5 days for IV diuresis and blood products.  Echo showed an EF of 50-55% with severe inferior wall hypokinesis.  The RV appeared normal.  There is mild MR, otherwise no significant VHD.  The IVC appeared normal.  Compared to the prior echo in February, his EF had improved, and the  wall motion abnormality was stable. Nephrology was also consulted during his stay.  Aspirin, irbesartan, and Metformin were all discontinued.  He was ultimately discharged on torsemide 20 mg daily at a weight of 220 pounds.  His admit weight was 227 pounds.  Discharge creatinine was 2.5.  It appears his baseline is closer to 1.5.      On 4/5, his labs were repeated with Dr. Burgess. His hemoglobin was improving, but his creatinine was worsening, up to 2.8, and he was hypokalemic at 2.7. His torsemide was reduced from 20 to 10 mg daily. He then saw nephrology (Juanita Moore) in follow-up on April 13 (yesterday), however I am unable to access that note. Kamran and his wife believe she is waiting to see labs from today to make recommendations.    Today, I am meeting Kamran along with his wife Cecile. He'sfeeling markedly better than when he was last in our clinic. His weight is stable from discharge at 220 lbs. He denies any dyspnea and orthopnea. There is still 1-2+ pitting edema in his ankles, and he is getting compression stockings tomorrow to help with this. He believes he feels his best closer to 210 lbs, but as I mentioned before, he's feeling well from a cardiovascular standpoint at this time. His biggest concerns today are regarding his poor balance and diabetes. He states that over time his balance has worsened, and moreso with his fluid retention. He is currently using a cane to ambulate, and is working with a  for this. He's also worried about his diabetes becoming uncontrolled after stopping the metformin. He was encouraged to review this with Dr. burgess. They are understandably overwhelmed with the amount of providers they are meeting, and are a little confused on who should be handling his medications and therapies. I note that they are scheduled to see hematology in two weeks.   We had a nice conversation today. We reviewed his recent cardiac history. Upon reflection, he does think he can  "recall an episode of chest pain in the distant past that he did not seek care for, and perhaps that could have represented an MI. However, he cannot recall any recent episodes of chest pain. He is not monitoring his liquid or sodium intake at home, but isn't a \"salt-lover.\" He is weighing himself daily. His cholesterol is fantastic on Lipitor.          Review of Systems:     12-pt ROS is negative except for as noted in the HPI.          Physical Exam:     Vitals: /64   Pulse 77   Ht 1.829 m (6')   Wt 99.7 kg (219 lb 11.2 oz)   SpO2 97%   BMI 29.80 kg/m    Wt Readings from Last 10 Encounters:   04/14/21 99.7 kg (219 lb 11.2 oz)   04/05/21 96.1 kg (211 lb 12.8 oz)   04/01/21 99.5 kg (219 lb 5.7 oz)   03/26/21 103 kg (227 lb 1.6 oz)   03/03/21 95.7 kg (211 lb)   02/12/21 102.1 kg (225 lb)   02/03/21 105.7 kg (233 lb)   01/21/21 97.1 kg (214 lb)   01/14/21 95.7 kg (211 lb)   12/30/20 98.4 kg (217 lb)       Constitutional:  Patient is pleasant, alert, cooperative, and in NAD.  HEENT:  NCAT. PERRLA. EOM's intact.   Neck:  CVP appears normal. No carotid bruits.   Pulmonary: Normal respiratory effort. CTAB.   Cardiac: RRR, normal S1/S2, no S3/S4, no murmur or rub.   Abdomen:  Non-tender abdomen, no hepatosplenomegaly appreciated.   Vascular: Pulses in the upper and lower extremities are 2+ and equal bilaterally.  Extremities: 1-2+ pitting ankle edema bilaterally. No erythema, cyanosis or tenderness appreciated.  Skin:  No rashes or lesions appreciated.   Neurological:  No gross motor or sensory deficits.   Psych: Appropriate affect.        Data:     Labs reviewed:  Recent Labs   Lab Test 03/29/21  0430 03/26/21  1326 02/03/21  1225 01/21/21  1713 12/30/20  0912 11/26/20  1317 11/26/20  1317 02/10/20  1215 02/10/20  1215 02/05/19  1217   LDL 54  --   --   --   --   --   --   --  79 92   HDL 41  --   --   --   --   --   --   --  48 40   NHDL 66  --   --   --   --   --   --   --  93 116   CHOL 107  --   --   --   -- "   --   --   --  141 156   TRIG 61  --   --   --   --   --   --   --  71 119   TSH 1.09  --   --   --   --   --  0.76  --  0.97 1.02   NTBNP  --   --  3,783* 3,730* 4,976*  --   --   --   --   --    IRON 53 44  --   --   --    < >  --    < >  --   --     315  --   --   --    < >  --    < >  --   --    IRONSAT 19 14*  --   --   --    < >  --    < >  --   --    VIRGINIA  --  45  --   --   --   --   --   --   --   --     < > = values in this interval not displayed.       Lab Results   Component Value Date    WBC 5.9 04/05/2021    RBC 3.00 (L) 04/05/2021    HGB 8.7 (L) 04/05/2021    HCT 28.6 (L) 04/05/2021    MCV 95 04/05/2021    MCH 29.0 04/05/2021    MCHC 30.4 (L) 04/05/2021    RDW 18.5 (H) 04/05/2021     04/05/2021       Lab Results   Component Value Date     04/05/2021    POTASSIUM 2.7 (L) 04/05/2021    CHLORIDE 97 04/05/2021    CO2 38 (H) 04/05/2021    ANIONGAP 4 04/05/2021     (H) 04/05/2021    BUN 39 (H) 04/05/2021    CR 2.82 (H) 04/05/2021    GFRESTIMATED 20 (L) 04/05/2021    GFRESTBLACK 23 (L) 04/05/2021    VANESA 8.5 04/05/2021      Lab Results   Component Value Date    AST 25 04/01/2021    ALT 33 04/01/2021       Lab Results   Component Value Date    A1C 6.0 (H) 03/27/2021       Lab Results   Component Value Date    INR 0.99 09/04/2020           Problem List:     Patient Active Problem List   Diagnosis     Shoulder pain     Insomnia, unspecified type     Type 2 diabetes mellitus without complication, with long-term current use of insulin (H)     Benign essential hypertension     Hyperlipidemia LDL goal <100     Non-seasonal allergic rhinitis due to pollen     Primary osteoarthritis of both hips     Primary osteoarthritis involving multiple joints     Chronic non-seasonal allergic rhinitis, unspecified trigger     Cerebrovascular accident (CVA) due to embolism of cerebral artery (H)     CKD (chronic kidney disease) stage 3, GFR 30-59 ml/min     Anemia due to blood loss, acute     Simple  chronic bronchitis (H)     Iron deficiency anemia due to chronic blood loss     MAIRA (iron deficiency anemia)     Anemia, iron deficiency     Iron deficiency     Acute respiratory distress     Pericardial effusion     Shortness of breath     Anemia, unspecified type     Acute on chronic diastolic heart failure (H)     Type 2 diabetes mellitus with stage 3b chronic kidney disease, with long-term current use of insulin (H)           Medications:     Current Outpatient Medications   Medication Sig Dispense Refill     albuterol (PROAIR HFA) 108 (90 Base) MCG/ACT inhaler INHALE 2 PUFFS BY MOUTH FOUR TIMES DAILY AS NEEDED 8.5 g 3     atorvastatin (LIPITOR) 20 MG tablet TAKE 1 TABLET(20 MG) BY MOUTH DAILY 90 tablet 2     blood glucose (STEVE CONTOUR) test strip TESTING FOUR TIMES DAILY OR AS DIRECTED 400 strip 0     carvedilol (COREG) 6.25 MG tablet Take 1 tablet (6.25 mg) by mouth 2 times daily (with meals) 180 tablet 3     clopidogrel (PLAVIX) 75 MG tablet Take 1 tablet (75 mg) by mouth daily 90 tablet 3     cyanocobalamin (CYANOCOBALAMIN) 1000 MCG tablet Take 1 tablet (1,000 mcg) by mouth daily Continue unitl hematology evaluation as borderline low vitamin b12. 30 tablet 0     cyclobenzaprine (FLEXERIL) 10 MG tablet 1/2 po bid prn & 1po qhs (Patient taking differently: Take 5-10 mg by mouth 3 times daily as needed 1/2 po bid prn & 1po qhs) 30 tablet 0     DEMADEX 20 MG tablet 10 mg daily 15 tablet 0     diphenhydrAMINE-acetaminophen (TYLENOL PM)  MG tablet Take 2 tablets by mouth nightly as needed for sleep       dorzolamide-timolol (COSOPT) 2-0.5 % ophthalmic solution Place 1 drop into both eyes 2 times daily       fluticasone (FLONASE) 50 MCG/ACT nasal spray Spray 2 sprays into both nostrils daily       folic acid-vit B6-vit B12 (FOLGARD) 0.8-10-0.115 MG TABS per tablet Take 1 tablet by mouth daily Continue per Nephrology recommendation 30 tablet 0     insulin glargine (LANTUS SOLOSTAR) 100 UNIT/ML pen INJECT 40  UNITS UNDER THE SKIN AT BEDTIME 45 mL 3     insulin lispro (HUMALOG KWIKPEN) 100 UNIT/ML (1 unit dial) KWIKPEN INJECT UP TO 55 UNITS UNDER THE SKIN ONCE DAILY AS DIRECTED (Patient taking differently: Inject Subcutaneous 3 times daily (before meals) Sliding scale with meals) 45 mL 1     insulin pen needle (BD FERCHO U/F) 32G X 4 MM miscellaneous USE AS DIRECTED UP TO FOUR TIMES DAILY 400 each 3     order for DME Hip steroid injectoion 1 Units 0     pantoprazole (PROTONIX) 40 MG EC tablet TAKE 1 TABLET(40 MG) BY MOUTH TWICE DAILY (Patient taking differently: Take 40 mg by mouth At Bedtime TAKE 1 TABLET(40 MG) BY MOUTH TWICE DAILY) 180 tablet 2     potassium chloride ER (K-TAB) 20 MEQ CR tablet Take 1 tablet (20 mEq) by mouth daily 1 tablet 0     traZODone (DESYREL) 100 MG tablet TAKE 1 TABLET(100 MG) BY MOUTH AT BEDTIME 90 tablet 3           Past Medical History:     Past Medical History:   Diagnosis Date     Cerebral infarction (H)      Diabetes (H)      Hyperlipemia      Past Surgical History:   Procedure Laterality Date     CV PERICARDIOCENTESIS N/A 12/21/2020    Procedure: Pericardiocentesis;  Surgeon: Chas Chambers MD;  Location:  [facility]     ESOPHAGOSCOPY, GASTROSCOPY, DUODENOSCOPY (EGD), COMBINED N/A 3/27/2021    Procedure: Esophagogastroduodenoscopy, With Biopsy;  Surgeon: Luc Nash MD;  Location:  GI     Family History   Problem Relation Age of Onset     Family History Negative Mother      Family History Negative Father      Social History     Socioeconomic History     Marital status:      Spouse name: Cecile     Number of children: Not on file     Years of education: Not on file     Highest education level: Not on file   Occupational History     Not on file   Social Needs     Financial resource strain: Not on file     Food insecurity     Worry: Not on file     Inability: Not on file     Transportation needs     Medical: Not on file     Non-medical: Not on file   Tobacco  Use     Smoking status: Former Smoker     Packs/day: 2.00     Years: 11.00     Pack years: 22.00     Types: Cigarettes     Start date:      Quit date:      Years since quittin.3     Smokeless tobacco: Never Used   Substance and Sexual Activity     Alcohol use: No     Alcohol/week: 0.0 standard drinks     Drug use: No     Sexual activity: Yes     Partners: Female     Birth control/protection: None   Lifestyle     Physical activity     Days per week: Not on file     Minutes per session: Not on file     Stress: Not on file   Relationships     Social connections     Talks on phone: Not on file     Gets together: Not on file     Attends [employer] service: Not on file     Active member of club or organization: Not on file     Attends meetings of clubs or organizations: Not on file     Relationship status: Not on file     Intimate partner violence     Fear of current or ex partner: Not on file     Emotionally abused: Not on file     Physically abused: Not on file     Forced sexual activity: Not on file   Other Topics Concern     Parent/sibling w/ CABG, MI or angioplasty before 65F 55M? Not Asked   Social History Narrative     Not on file           Allergies:   Patient has no known allergies.      Shaniqua Ram PA-C  [facility]  Pager: [phone]

## 2021-04-14 NOTE — LETTER
4/14/2021    Christian Davidson MD, MD  [address]  [facility] [address]    RE: Justyn Olivas       Dear Colleague,    I had the pleasure of seeing Justyn Olivas in the [facility].  Cardiology Clinic Progress Note    Justyn Olivas MRN# [id number]   YOB: 1940 Age: 80 year old   Primary cardiologist: Dr. Rosenbaum         Assessment and Plan:     In summary, Justyn Olivas presents today for a core clinic enrollment visit, after a recent admission for acute on chronic HFpEF in the setting of significant anemia with hemoglobin of 6.8, and acute renal failure with a creatinine nearing 3.  He received blood products, and was diuresed about 7 pounds. Today, his weight is stable at 220 lbs, and he appears slightly volume up but overall well-compensated with FC II symptoms. He believes he feels best at closer to 210 lbs.     To avoid confusion in this patient's management, I will defer ongoing diuretic therapy to his nephrologist, whom he saw in clinic yesterday. He will continue close follow up with his PCP and hematologist for his acute on chronic anemia. I do not think he requires follow up with the [facility] clinic at this time, and he and his wife are appreciative of that. I will have him see his primary cardiologist, Dr. Rosenbaum, who he has yet to see in clinic, in about six weeks from now. He can follow-up with the general cardiology team after that. Kamran and his wife are very comfortable with this plan.     Of course, if his volume management becomes increasingly difficult in the setting of his renal disease, we can always perform a RHC for better evaluation of his volume status.     Regarding his probable ischemic cardiomyopathy, his LVEF has improved per his most recent echocardiogram, and he is not having chest pain. In the setting of advanced renal disease and anemia, conservative management for this is recommended. He is  currently on Plavix (instead of aspirin in the setting of his GI issues), and statin therapy with an excellent fasting lipid profile.     Labs including BMP and hemoglobin were drawn prior to our visit today, but are pending still at this time. I will forward those results to Kamran's PCP and nephrologist once they return.     I did provide education on a sodium restricted diet, daily weights, and when to notify the nephrology clinic with rapid weight gain. We spent a fair amount of time on counseling and review of his recent history and test results today. I believe I answered all questions to their satisfaction.     It was a pleasure meeting Kamran and Cecile today!        History of Presenting Illness:      Justyn Olivas is a pleasant 80 year old patient who presents today for a core clinic enrollment visit.    The patient has a history of the following -   1.  Chronic HFpEF.    2.  Presumed history of ischemic cardiomyopathy, LVEF 45-50% on prior echo, with an inferior wall motion abnormality.  EF has improved per most recent echo in March.  3.  History of pericardial effusion status post pericardiocentesis in December 2020.  Cytology negative for malignancy.  This has resolved.  4.  Insulin-dependent diabetes mellitus  5.  History of CVA  6.  Hypertension  7.  Dyslipidemia  8.  Chronic iron deficiency anemia  9.  GI AV malformations    In brief, Kamran presents for a core clinic enrollment visit, referred to see me after an admission for volume overload.  He was seen end of March by my colleague Esteban, who had been following him closely in attempt to diurese him with torsemide after a 20-lb wt gain was noted.  However, he developed CASS and his torsemide was held, then re-initiated at a lower dose.  When he returned to clinic, he was noted to be significantly volume overloaded and additionally was anemic with a hemoglobin of 6.8.  He was directed to the ER, where he was ultimately admitted for 5 days for IV diuresis  and blood products.  Echo showed an EF of 50-55% with severe inferior wall hypokinesis.  The RV appeared normal.  There is mild MR, otherwise no significant VHD.  The IVC appeared normal.  Compared to the prior echo in February, his EF had improved, and the wall motion abnormality was stable. Nephrology was also consulted during his stay.  Aspirin, irbesartan, and Metformin were all discontinued.  He was ultimately discharged on torsemide 20 mg daily at a weight of 220 pounds.  His admit weight was 227 pounds.  Discharge creatinine was 2.5.  It appears his baseline is closer to 1.5.      On 4/5, his labs were repeated with Dr. Burgess. His hemoglobin was improving, but his creatinine was worsening, up to 2.8, and he was hypokalemic at 2.7. His torsemide was reduced from 20 to 10 mg daily. He then saw nephrology (Juanita Moore) in follow-up on April 13 (yesterday), however I am unable to access that note. Kamran and his wife believe she is waiting to see labs from today to make recommendations.    Today, I am meeting Kamran along with his wife Cecile. He'sfeeling markedly better than when he was last in our clinic. His weight is stable from discharge at 220 lbs. He denies any dyspnea and orthopnea. There is still 1-2+ pitting edema in his ankles, and he is getting compression stockings tomorrow to help with this. He believes he feels his best closer to 210 lbs, but as I mentioned before, he's feeling well from a cardiovascular standpoint at this time. His biggest concerns today are regarding his poor balance and diabetes. He states that over time his balance has worsened, and moreso with his fluid retention. He is currently using a cane to ambulate, and is working with a  for this. He's also worried about his diabetes becoming uncontrolled after stopping the metformin. He was encouraged to review this with Dr. burgess. They are understandably overwhelmed with the amount of providers they are meeting, and  "are a little confused on who should be handling his medications and therapies. I note that they are scheduled to see hematology in two weeks.   We had a nice conversation today. We reviewed his recent cardiac history. Upon reflection, he does think he can recall an episode of chest pain in the distant past that he did not seek care for, and perhaps that could have represented an MI. However, he cannot recall any recent episodes of chest pain. He is not monitoring his liquid or sodium intake at home, but isn't a \"salt-lover.\" He is weighing himself daily. His cholesterol is fantastic on Lipitor.          Review of Systems:     12-pt ROS is negative except for as noted in the HPI.          Physical Exam:     Vitals: /64   Pulse 77   Ht 1.829 m (6')   Wt 99.7 kg (219 lb 11.2 oz)   SpO2 97%   BMI 29.80 kg/m    Wt Readings from Last 10 Encounters:   04/14/21 99.7 kg (219 lb 11.2 oz)   04/05/21 96.1 kg (211 lb 12.8 oz)   04/01/21 99.5 kg (219 lb 5.7 oz)   03/26/21 103 kg (227 lb 1.6 oz)   03/03/21 95.7 kg (211 lb)   02/12/21 102.1 kg (225 lb)   02/03/21 105.7 kg (233 lb)   01/21/21 97.1 kg (214 lb)   01/14/21 95.7 kg (211 lb)   12/30/20 98.4 kg (217 lb)       Constitutional:  Patient is pleasant, alert, cooperative, and in NAD.  HEENT:  NCAT. PERRLA. EOM's intact.   Neck:  CVP appears normal. No carotid bruits.   Pulmonary: Normal respiratory effort. CTAB.   Cardiac: RRR, normal S1/S2, no S3/S4, no murmur or rub.   Abdomen:  Non-tender abdomen, no hepatosplenomegaly appreciated.   Vascular: Pulses in the upper and lower extremities are 2+ and equal bilaterally.  Extremities: 1-2+ pitting ankle edema bilaterally. No erythema, cyanosis or tenderness appreciated.  Skin:  No rashes or lesions appreciated.   Neurological:  No gross motor or sensory deficits.   Psych: Appropriate affect.        Data:     Labs reviewed:  Recent Labs   Lab Test 03/29/21  0430 03/26/21  1326 02/03/21  1225 01/21/21  1713 12/30/20  0912 " 11/26/20  1317 11/26/20  1317 02/10/20  1215 02/10/20  1215 02/05/19  1217   LDL 54  --   --   --   --   --   --   --  79 92   HDL 41  --   --   --   --   --   --   --  48 40   NHDL 66  --   --   --   --   --   --   --  93 116   CHOL 107  --   --   --   --   --   --   --  141 156   TRIG 61  --   --   --   --   --   --   --  71 119   TSH 1.09  --   --   --   --   --  0.76  --  0.97 1.02   NTBNP  --   --  3,783* 3,730* 4,976*  --   --   --   --   --    IRON 53 44  --   --   --    < >  --    < >  --   --     315  --   --   --    < >  --    < >  --   --    IRONSAT 19 14*  --   --   --    < >  --    < >  --   --    VIRGINIA  --  45  --   --   --   --   --   --   --   --     < > = values in this interval not displayed.       Lab Results   Component Value Date    WBC 5.9 04/05/2021    RBC 3.00 (L) 04/05/2021    HGB 8.7 (L) 04/05/2021    HCT 28.6 (L) 04/05/2021    MCV 95 04/05/2021    MCH 29.0 04/05/2021    MCHC 30.4 (L) 04/05/2021    RDW 18.5 (H) 04/05/2021     04/05/2021       Lab Results   Component Value Date     04/05/2021    POTASSIUM 2.7 (L) 04/05/2021    CHLORIDE 97 04/05/2021    CO2 38 (H) 04/05/2021    ANIONGAP 4 04/05/2021     (H) 04/05/2021    BUN 39 (H) 04/05/2021    CR 2.82 (H) 04/05/2021    GFRESTIMATED 20 (L) 04/05/2021    GFRESTBLACK 23 (L) 04/05/2021    VANESA 8.5 04/05/2021      Lab Results   Component Value Date    AST 25 04/01/2021    ALT 33 04/01/2021       Lab Results   Component Value Date    A1C 6.0 (H) 03/27/2021       Lab Results   Component Value Date    INR 0.99 09/04/2020           Problem List:     Patient Active Problem List   Diagnosis     Shoulder pain     Insomnia, unspecified type     Type 2 diabetes mellitus without complication, with long-term current use of insulin (H)     Benign essential hypertension     Hyperlipidemia LDL goal <100     Non-seasonal allergic rhinitis due to pollen     Primary osteoarthritis of both hips     Primary osteoarthritis involving multiple  joints     Chronic non-seasonal allergic rhinitis, unspecified trigger     Cerebrovascular accident (CVA) due to embolism of cerebral artery (H)     CKD (chronic kidney disease) stage 3, GFR 30-59 ml/min     Anemia due to blood loss, acute     Simple chronic bronchitis (H)     Iron deficiency anemia due to chronic blood loss     MAIRA (iron deficiency anemia)     Anemia, iron deficiency     Iron deficiency     Acute respiratory distress     Pericardial effusion     Shortness of breath     Anemia, unspecified type     Acute on chronic diastolic heart failure (H)     Type 2 diabetes mellitus with stage 3b chronic kidney disease, with long-term current use of insulin (H)           Medications:     Current Outpatient Medications   Medication Sig Dispense Refill     albuterol (PROAIR HFA) 108 (90 Base) MCG/ACT inhaler INHALE 2 PUFFS BY MOUTH FOUR TIMES DAILY AS NEEDED 8.5 g 3     atorvastatin (LIPITOR) 20 MG tablet TAKE 1 TABLET(20 MG) BY MOUTH DAILY 90 tablet 2     blood glucose (STEVE CONTOUR) test strip TESTING FOUR TIMES DAILY OR AS DIRECTED 400 strip 0     carvedilol (COREG) 6.25 MG tablet Take 1 tablet (6.25 mg) by mouth 2 times daily (with meals) 180 tablet 3     clopidogrel (PLAVIX) 75 MG tablet Take 1 tablet (75 mg) by mouth daily 90 tablet 3     cyanocobalamin (CYANOCOBALAMIN) 1000 MCG tablet Take 1 tablet (1,000 mcg) by mouth daily Continue unitl hematology evaluation as borderline low vitamin b12. 30 tablet 0     cyclobenzaprine (FLEXERIL) 10 MG tablet 1/2 po bid prn & 1po qhs (Patient taking differently: Take 5-10 mg by mouth 3 times daily as needed 1/2 po bid prn & 1po qhs) 30 tablet 0     DEMADEX 20 MG tablet 10 mg daily 15 tablet 0     diphenhydrAMINE-acetaminophen (TYLENOL PM)  MG tablet Take 2 tablets by mouth nightly as needed for sleep       dorzolamide-timolol (COSOPT) 2-0.5 % ophthalmic solution Place 1 drop into both eyes 2 times daily       fluticasone (FLONASE) 50 MCG/ACT nasal spray Spray 2  sprays into both nostrils daily       folic acid-vit B6-vit B12 (FOLGARD) 0.8-10-0.115 MG TABS per tablet Take 1 tablet by mouth daily Continue per Nephrology recommendation 30 tablet 0     insulin glargine (LANTUS SOLOSTAR) 100 UNIT/ML pen INJECT 40 UNITS UNDER THE SKIN AT BEDTIME 45 mL 3     insulin lispro (HUMALOG KWIKPEN) 100 UNIT/ML (1 unit dial) KWIKPEN INJECT UP TO 55 UNITS UNDER THE SKIN ONCE DAILY AS DIRECTED (Patient taking differently: Inject Subcutaneous 3 times daily (before meals) Sliding scale with meals) 45 mL 1     insulin pen needle (BD FERCHO U/F) 32G X 4 MM miscellaneous USE AS DIRECTED UP TO FOUR TIMES DAILY 400 each 3     order for DME Hip steroid injectoion 1 Units 0     pantoprazole (PROTONIX) 40 MG EC tablet TAKE 1 TABLET(40 MG) BY MOUTH TWICE DAILY (Patient taking differently: Take 40 mg by mouth At Bedtime TAKE 1 TABLET(40 MG) BY MOUTH TWICE DAILY) 180 tablet 2     potassium chloride ER (K-TAB) 20 MEQ CR tablet Take 1 tablet (20 mEq) by mouth daily 1 tablet 0     traZODone (DESYREL) 100 MG tablet TAKE 1 TABLET(100 MG) BY MOUTH AT BEDTIME 90 tablet 3           Past Medical History:     Past Medical History:   Diagnosis Date     Cerebral infarction (H)      Diabetes (H)      Hyperlipemia      Past Surgical History:   Procedure Laterality Date     CV PERICARDIOCENTESIS N/A 12/21/2020    Procedure: Pericardiocentesis;  Surgeon: Chas Chambers MD;  Location:  HE[facility]ARD[facility] CATH LAB     ESOPHAGOSCOPY, GASTROSCOPY, DUODENOSCOPY (EGD), COMBINED N/A 3/27/2021    Procedure: Esophagogastroduodenoscopy, With Biopsy;  Surgeon: Luc Nash MD;  Location:  GI     Family History   Problem Relation Age of Onset     Family History Negative Mother      Family History Negative Father      Social History     Socioeconomic History     Marital status:      Spouse name: Cecile     Number of children: Not on file     Years of education: Not on file     Highest education level: Not on file    Occupational History     Not on file   Social Needs     Financial resource strain: Not on file     Food insecurity     Worry: Not on file     Inability: Not on file     Transportation needs     Medical: Not on file     Non-medical: Not on file   Tobacco Use     Smoking status: Former Smoker     Packs/day: 2.00     Years: 11.00     Pack years: 22.00     Types: Cigarettes     Start date:      Quit date:      Years since quittin.3     Smokeless tobacco: Never Used   Substance and Sexual Activity     Alcohol use: No     Alcohol/week: 0.0 standard drinks     Drug use: No     Sexual activity: Yes     Partners: Female     Birth control/protection: None   Lifestyle     Physical activity     Days per week: Not on file     Minutes per session: Not on file     Stress: Not on file   Relationships     Social connections     Talks on phone: Not on file     Gets together: Not on file     Attends Gnosticism service: Not on file     Active member of club or organization: Not on file     Attends meetings of clubs or organizations: Not on file     Relationship status: Not on file     Intimate partner violence     Fear of current or ex partner: Not on file     Emotionally abused: Not on file     Physically abused: Not on file     Forced sexual activity: Not on file   Other Topics Concern     Parent/sibling w/ CABG, MI or angioplasty before 65F 55M? Not Asked   Social History Narrative     Not on file           Allergies:   Patient has no known allergies.      Shaniqua Ram PA-C  [facility]  Pager: [phone]    cc:   Garo Cavanaugh MD  [address] [place] [id number]

## 2021-04-15 ENCOUNTER — PATIENT OUTREACH (OUTPATIENT)
Dept: NURSING | Facility: CLINIC | Age: 81
End: 2021-04-15
Payer: MEDICARE

## 2021-04-15 DIAGNOSIS — I10 BENIGN ESSENTIAL HYPERTENSION: ICD-10-CM

## 2021-04-15 RX ORDER — POTASSIUM CHLORIDE 1500 MG/1
60 TABLET, EXTENDED RELEASE ORAL 2 TIMES DAILY
Qty: 540 TABLET | Refills: 3 | Status: SHIPPED | OUTPATIENT
Start: 2021-04-15 | End: 2021-05-21

## 2021-04-15 ASSESSMENT — ACTIVITIES OF DAILY LIVING (ADL): DEPENDENT_IADLS:: TRANSPORTATION

## 2021-04-15 NOTE — PROGRESS NOTES
Clinic Care Coordination Contact    Follow Up Progress Note      Assessment:    Patient reported to be doing okay, all things considered.  He noted the conclusion that his current issue is primarily due to kidney function and referenced his stage 3 kidney disease.  Patient reported feeling scared about this; he has done some research about the different stages of kidney disease and is hopeful to not progress to stage 4.  He is being followed by Nephrology at [facility] and was seen this week.  He is to have labs repeated next week and will then hear back about when he will have another follow-up visit pending his lab results.    Patient met with [facility] provider this week; he reported this to be a very good and helpful visit.  Patient confirmed he is following recommendations as per Cardiology/[facility].  His is tracking daily weights and keeps a log; today's weight was 215 lb which is stable.  He has started monitoring salt intake to limit himself to 2000 mg per day or less.    Patient continues closely monitoring his BG; he reported he's noticed an overall increase his BG.  Previously, most mornings would be BG  but now they are much higher; today was 369.  Patient's metformin did get discontinued after last hospital discharge due to his declining kidney function.  PCP did just re-increase his Lantus back to 50 units at bedtime which patient started doing as of last night.  He will continue monitoring his BG and will update PCP in a week or two, sooner if he is still having BG consistently > 200.    Patient noted ongoing visits twice weekly with personal training for ongoing assistance to improve strength, mobility, balance.    Goals addressed this encounter:   Goals Addressed                 This Visit's Progress       Patient Stated      1. Improve chronic symptoms (pt-stated)   60%     Goal Statement: I will verbalize an increase in my strength and mobility and correct knowledge of adequate management  of my chronic health conditions to maintain my health and wellness in preventing hospital readmission.   Date Goal set: 12/24/20; Updated/modified 04/02/2021  Barriers: Multiple health concerns.  Strengths: Motivated and engaged in care coordination.   Date to Achieve By: 9/1/2021  Patient expressed understanding of goal: Yes    Action steps to achieve this goal:  1. I will work with Physical Therapy to build my strength and mobility.   2. I will follow up with my providers as scheduled/recommended. (Primary Care Provider, [facility], sleep studies TBD, Dr. Ariana PARSON, [facility] hematology/oncology, nephrology)   3. I will take my medications as prescribed.   4. I will continue monitoring my blood sugars, blood pressures, weight daily as recommended.   5. I will use my CHF action plan to monitor/manage my symptoms.   6. I will follow care team recommendations of fluid restriction, diabetic and cardiac diet.   7. I will use my incentive spirometer as directed and recommended by my care team.   8. I will continue to work with care coordination for any additional resources and support.  9. I will contact my care team with questions, concerns, support needs. I will use the clinic as a resource and I understand I can contact my clinic with 24/7 after hours services available. Care Coordinator will remain available as needed.          Intervention/Education provided during outreach:    CC RN offered supportive listening to patient while he reflected on many health complications over past 6 months.  CC RN validated patient's experiences and provided him with encouragement that he is following recommendations and being compliant with plan of care.  Encouraged patient to continue close follow-up with care teams for ongoing health management, which he plans to continue doing.    CC RN reviewed HF education with patient: daily weight monitoring, limited daily salt intake, symptom monitoring; patient stated understanding.  He was also  give this education at [facility] appointment this week so he felt comfortable with the information/instruction and denied questions at this time.    CC RN reviewed who patient should contact for symptoms, questions, concerns pending what he is experiencing; patient stated understanding; he requested that some of his main care team contacts (PCP/CC, Nephrology, Cardiology) and phone numbers be sent to him via Xceedium message.    CC RN reviewed BG monitoring; encouraged patient to also start checking BG at bedtime prior to Lantus administration in light of recent dose increase; patient agreed to do so.    CC RN offered referral for additional mental health/emotional support in setting of managing many chronic diagnoses as discussed by patient; he declined at this time.  He noted he used to be a  so draws on dariel for strength; he considers himself to be an introspective person and is rather accepting of situation.  Patient also noted very good family support and referenced example of his son calling him every day to check on him.  Patient will keep in mind option of more formal mental health support if needed in the future.    CC RN assisted patient to schedule lab appointment for next week as recommended by Nephrology/Cardiology.    CC RN inquired if patient had any additional questions, concerns, or needs at this time; patient denied any at present.  He expressed gratitude for today's call and appreciates ongoing follow-up; he felt another call in a month would be helpful.     Outreach Frequency: monthly    Plan:    Patient will continue following plan of care as discussed.  He will report any symptoms, questions, or concerns to his care team as reviewed.  Xceedium message sent to patient, per his request, with main contact information per his care teams.    Patient will continue medications as prescribed.    Patient will attend upcoming appointments as scheduled.    CC RN will outreach to patient in  approximately 1 month to get updates on patient status, assess goal progress, and offer additional support and resources as indicated.    Jerel Leija, RN  Clinic Care Coordinator  [facility]  [facility]  Ph: [phone]

## 2021-04-15 NOTE — PROGRESS NOTES
[facility]  Pt sent message confirming MyChart message receipt with lab results/medication change/need for lab next week.     Messaged scheduling to call pt to arrange.     Eliza Dobbs RN, BSN, CHFN  04/15/21 at 9:35 AM

## 2021-04-22 DIAGNOSIS — I50.30 HEART FAILURE WITH PRESERVED EJECTION FRACTION, NYHA CLASS I (H): ICD-10-CM

## 2021-04-22 LAB
ANION GAP SERPL CALCULATED.3IONS-SCNC: 3 MMOL/L (ref 3–14)
BUN SERPL-MCNC: 42 MG/DL (ref 7–30)
CALCIUM SERPL-MCNC: 8.7 MG/DL (ref 8.5–10.1)
CHLORIDE SERPL-SCNC: 104 MMOL/L (ref 94–109)
CO2 SERPL-SCNC: 31 MMOL/L (ref 20–32)
CREAT SERPL-MCNC: 2.53 MG/DL (ref 0.66–1.25)
ERYTHROCYTE [DISTWIDTH] IN BLOOD BY AUTOMATED COUNT: 15.6 % (ref 10–15)
GFR SERPL CREATININE-BSD FRML MDRD: 23 ML/MIN/{1.73_M2}
GLUCOSE SERPL-MCNC: 177 MG/DL (ref 70–99)
HCT VFR BLD AUTO: 27.9 % (ref 40–53)
HGB BLD-MCNC: 8.5 G/DL (ref 13.3–17.7)
MAGNESIUM SERPL-MCNC: 2.7 MG/DL (ref 1.6–2.3)
MCH RBC QN AUTO: 28.5 PG (ref 26.5–33)
MCHC RBC AUTO-ENTMCNC: 30.5 G/DL (ref 31.5–36.5)
MCV RBC AUTO: 94 FL (ref 78–100)
PLATELET # BLD AUTO: 167 10E9/L (ref 150–450)
POTASSIUM SERPL-SCNC: 4.2 MMOL/L (ref 3.4–5.3)
RBC # BLD AUTO: 2.98 10E12/L (ref 4.4–5.9)
SODIUM SERPL-SCNC: 138 MMOL/L (ref 133–144)
WBC # BLD AUTO: 6.4 10E9/L (ref 4–11)

## 2021-04-22 PROCEDURE — 83735 ASSAY OF MAGNESIUM: CPT | Performed by: PHYSICIAN ASSISTANT

## 2021-04-22 PROCEDURE — 85027 COMPLETE CBC AUTOMATED: CPT | Performed by: PHYSICIAN ASSISTANT

## 2021-04-22 PROCEDURE — 36415 COLL VENOUS BLD VENIPUNCTURE: CPT | Performed by: PHYSICIAN ASSISTANT

## 2021-04-22 PROCEDURE — 80048 BASIC METABOLIC PNL TOTAL CA: CPT | Performed by: PHYSICIAN ASSISTANT

## 2021-04-22 NOTE — PROGRESS NOTES
"[facility]    Hematology/Oncology Established Patient Follow-up Note      Today's Date: 04/28/21    Reason for Follow-up: Anemia.    Justyn Olivas is a 80 year old male who is being evaluated via a billable video visit.      The patient has been notified of following:     \"This video visit will be conducted via a call between you and your physician/provider. We have found that certain health care needs can be provided without the need for an in-person physical exam.  This service lets us provide the care you need with a video conversation during the COVID-19 pandemic.  If a prescription is necessary we can send it directly to your pharmacy.  If lab work is needed we can place an order for that and you can then stop by our lab to have the test done at a later time.    Video visits are billed at different rates depending on your insurance coverage.  Please reach out to your insurance provider with any questions.    If during the course of the call the physician/provider feels a video visit is not appropriate, you will not be charged for this service.\"    Patient has given verbal consent for Video visit? Yes    How would you like to obtain your AVS? Yesicahart    Patient would like the video invitation sent by: Send to e-mail at: [email or web address]        Video-Visit Details    Type of service:  Video Visit      Originating Location (pt. Location): Home    Distant Location (provider location):  [facility]     Mode of Communication:  Video Conference via S[employer]om    Video visit duration: 12 minutes      HISTORY OF PRESENT ILLNESS: Justyn Olivas is a 80 year old male with DM2, stage 3 CKD, CVA, CHF, pericardial effusion, iron deficiency anemia who presents with the following hematologic history:  --Hospitalized 3/26/2021 for dyspnea, lower extremity edema, and weight gain. He has been on diuretics for his CHF but started to gain weight more recently.  He reports " fatigue, generalized imbalance, but no hematochezia, melena, or hematemesis.    --On admission he was found to have Hgb 6.8 with MCV 92, compared to previous Hgb of 9.8 on 2/3/2021.    --Historically, his Hgb has been low in the 8-9 g/dL range since 12/2020.  Back in 12/2020, his Hgb was decreased to 6.9. 12/16/2020 EGD had shown esophagitis, no bleeding, small hiatal hernia, and a 3 mm angiodysplastic lesion without bleeding in the 3rd portion of the duodenum. Rest was unremarkable. Prior to 12/2020, his Hgb had fluctuated in the 8-13 g/dL range. Iron level was intermittently low in past on 2/25/2020 and 12/1/2020.  --3/26/2021: His ferritin was normal at 45, serum iron normal at 53, TIBC 276, and iron saturation index low at 14. Transferrin was normal at 251. Folate was normal at 15.3. Vitamin B12 was at lower end of normal at 215. 3/29/2021 TSH was normal at 1.09.   --On 3/27/2021, EGD showed 4 non-bleeding angioectasias in duodenum treated with APC. Kamran received 1 unit packed RBCs.  --3/31/2021: Hgb 7.9, MCV 95, WBC 5.2, platelets 189,000; absolute reticulocyte count elevated at 176; VIK negative, LDH normal at 178, total and direct bilirubin normal, haptoglobin normal; SPEP showed no monoclonal protein. Peripheral blood smear showed moderate normocytic hypochromic anemia; moderate absolute lymphopenia.  --3/31/2021 Colonoscopy showed single medium-sized localized angiodysplastic lesion without bleeding in cecum; APC applied at this site.    INTERIM HISTORY:  Justyn Olivas reports feeling overall better since discharge from the hospital.  He has no new complaints.      REVIEW OF SYSTEMS:   14 point ROS was reviewed and is negative other than as noted above in HPI.       HOME MEDICATIONS:  Current Outpatient Medications   Medication Sig Dispense Refill     albuterol (PROAIR HFA) 108 (90 Base) MCG/ACT inhaler INHALE 2 PUFFS BY MOUTH FOUR TIMES DAILY AS NEEDED 8.5 g 3     atorvastatin (LIPITOR) 20 MG tablet  TAKE 1 TABLET(20 MG) BY MOUTH DAILY 90 tablet 2     blood glucose (STEVE CONTOUR) test strip TESTING FOUR TIMES DAILY OR AS DIRECTED 400 strip 0     carvedilol (COREG) 6.25 MG tablet Take 1 tablet (6.25 mg) by mouth 2 times daily (with meals) 180 tablet 3     clopidogrel (PLAVIX) 75 MG tablet Take 1 tablet (75 mg) by mouth daily 90 tablet 3     cyanocobalamin (CYANOCOBALAMIN) 1000 MCG tablet Take 1 tablet (1,000 mcg) by mouth daily Continue unitl hematology evaluation as borderline low vitamin b12. 30 tablet 0     cyclobenzaprine (FLEXERIL) 10 MG tablet 1/2 po bid prn & 1po qhs (Patient taking differently: Take 5-10 mg by mouth 3 times daily as needed 1/2 po bid prn & 1po qhs) 30 tablet 0     DEMADEX 20 MG tablet 10 mg daily 15 tablet 0     diphenhydrAMINE-acetaminophen (TYLENOL PM)  MG tablet Take 2 tablets by mouth nightly as needed for sleep       dorzolamide-timolol (COSOPT) 2-0.5 % ophthalmic solution Place 1 drop into both eyes 2 times daily       fluticasone (FLONASE) 50 MCG/ACT nasal spray Spray 2 sprays into both nostrils daily       folic acid-vit B6-vit B12 (FOLGARD) 0.8-10-0.115 MG TABS per tablet Take 1 tablet by mouth daily Continue per Nephrology recommendation 30 tablet 0     insulin glargine (LANTUS SOLOSTAR) 100 UNIT/ML pen INJECT 50 UNITS UNDER THE SKIN AT BEDTIME 45 mL 3     insulin lispro (HUMALOG KWIKPEN) 100 UNIT/ML (1 unit dial) KWIKPEN INJECT UP TO 55 UNITS UNDER THE SKIN ONCE DAILY AS DIRECTED (Patient taking differently: Inject Subcutaneous 3 times daily (before meals) Sliding scale with meals) 45 mL 1     insulin pen needle (BD FERCHO U/F) 32G X 4 MM miscellaneous USE AS DIRECTED UP TO FOUR TIMES DAILY 400 each 3     order for DME Hip steroid injectoion 1 Units 0     pantoprazole (PROTONIX) 40 MG EC tablet TAKE 1 TABLET(40 MG) BY MOUTH TWICE DAILY (Patient taking differently: Take 40 mg by mouth At Bedtime TAKE 1 TABLET(40 MG) BY MOUTH TWICE DAILY) 180 tablet 2     potassium chloride  ER (K-TAB) 20 MEQ CR tablet Take 3 tablets (60 mEq) by mouth 2 times daily 540 tablet 3     torsemide (DEMADEX) 10 MG tablet Take 1 tablet (10 mg) by mouth daily       traZODone (DESYREL) 100 MG tablet TAKE 1 TABLET(100 MG) BY MOUTH AT BEDTIME 90 tablet 3         ALLERGIES:  No Known Allergies      PAST MEDICAL HISTORY:  Past Medical History:   Diagnosis Date     Cerebral infarction (H)      Diabetes (H)      Hyperlipemia          PAST SURGICAL HISTORY:  Past Surgical History:   Procedure Laterality Date     CV PERICARDIOCENTESIS N/A 2020    Procedure: Pericardiocentesis;  Surgeon: Chas Chambers MD;  Location:  [facility] C[facility] LAB     ESOPHAGOSCOPY, GASTROSCOPY, DUODENOSCOPY (EGD), COMBINED N/A 3/27/2021    Procedure: Esophagogastroduodenoscopy, With Biopsy;  Surgeon: Luc Nash MD;  Location:  GI         SOCIAL HISTORY:  Social History     Socioeconomic History     Marital status:      Spouse name: Cecile     Number of children: Not on file     Years of education: Not on file     Highest education level: Not on file   Occupational History     Not on file   Social Needs     Financial resource strain: Not on file     Food insecurity     Worry: Not on file     Inability: Not on file     Transportation needs     Medical: Not on file     Non-medical: Not on file   Tobacco Use     Smoking status: Former Smoker     Packs/day: 2.00     Years: 11.00     Pack years: 22.00     Types: Cigarettes     Start date:      Quit date:      Years since quittin.3     Smokeless tobacco: Never Used   Substance and Sexual Activity     Alcohol use: No     Alcohol/week: 0.0 standard drinks     Drug use: No     Sexual activity: Yes     Partners: Female     Birth control/protection: None   Lifestyle     Physical activity     Days per week: Not on file     Minutes per session: Not on file     Stress: Not on file   Relationships     Social connections     Talks on phone: Not on file     Gets  together: Not on file     Attends Confucianism service: Not on file     Active member of club or organization: Not on file     Attends meetings of clubs or organizations: Not on file     Relationship status: Not on file     Intimate partner violence     Fear of current or ex partner: Not on file     Emotionally abused: Not on file     Physically abused: Not on file     Forced sexual activity: Not on file   Other Topics Concern     Parent/sibling w/ CABG, MI or angioplasty before 65F 55M? Not Asked   Social History Narrative     Not on file         FAMILY HISTORY:  Family History   Problem Relation Age of Onset     Family History Negative Mother      Family History Negative Father          PHYSICAL EXAM:  Vital signs:  There were no vitals taken for this visit.   GENERAL: No acute distress.  EYES: No scleral icterus. No overt erythema.  RESPIRATORY: No cough.  No labored breathing.  MUSCULOSKELETAL: Range of motion in the neck, shoulders, and arms appear normal.  SKIN: No overt rashes, discolorations, or lesions over the face and neck.  NEUROLOGIC: Alert.  No overt tremors.  PSYCHIATRIC: Normal affect and mood.  Does not appear anxious.    The rest of a comprehensive physical examination is deferred due to PHE (public health emergency) video visit restrictions.      LABS:  CBC RESULTS:   Recent Labs   Lab Test 04/22/21  1112   WBC 6.4   RBC 2.98*   HGB 8.5*   HCT 27.9*   MCV 94   MCH 28.5   MCHC 30.5*   RDW 15.6*          Recent Labs   Lab Test 04/14/21  0820 04/05/21  1416    139   POTASSIUM 2.8* 2.7*   CHLORIDE 98 97   CO2 38* 38*   ANIONGAP 5 4   * 299*   BUN 35* 39*   CR 2.47* 2.82*   VANESA 8.7 8.5         PATHOLOGY:  Reviewed as per HPI.    IMAGING:  Reviewed as per HPI.    ASSESSMENT/PLAN:  Justyn Olivas is a 80 year old male with the following issues:  1. Normocytic anemia  2. Congestive heart failure  3. Stage 3B chronic kidney disease  4. Vitamin B12 deficiency  5. Iron deficiency  6.  Diabetes mellitus type 2  --Discussed with Kamran that his anemia is likely multifactorial, attributed to component of low vitamin B12, iron deficiency, and anemia of chronic kidney disease.    --3/2021 EGD showed 3 non-bleeding angioectasias and colonoscopy showed non-bleeding angiodysplastic lesion.  --Discussed consideration for bone marrow biopsy to rule out underlying malignancy.  However, he would like to wait several weeks to months to allow more time on his vitamin B12 supplementation.  He is willing to consider bone marrow biopsy if his Hgb does not improve further over next 2 months.  --Refilled his B12 prescription today.  --Discussed that e may be a candidate for MARGE if his Hgb remains < 10 g/dL (and provided there is no underlying bone marrow malignancy).  --He has lab follow-up with Dr. Davidson in May. Will arrange for CBC, iron studies, and B12 level in 2 months.    Return in 2 months.    Kristine Nash MD  Hematology/Oncology  [facility]    Total time spent: 25 minutes in review of labs, patient evaluation, discussion of plan of care, test orders, and documentation.

## 2021-04-23 ENCOUNTER — MYC MEDICAL ADVICE (OUTPATIENT)
Dept: FAMILY MEDICINE | Facility: CLINIC | Age: 81
End: 2021-04-23

## 2021-04-28 ENCOUNTER — VIRTUAL VISIT (OUTPATIENT)
Dept: ONCOLOGY | Facility: CLINIC | Age: 81
End: 2021-04-28
Attending: INTERNAL MEDICINE
Payer: MEDICARE

## 2021-04-28 ENCOUNTER — TELEPHONE (OUTPATIENT)
Dept: FAMILY MEDICINE | Facility: CLINIC | Age: 81
End: 2021-04-28

## 2021-04-28 ENCOUNTER — PRE VISIT (OUTPATIENT)
Dept: ONCOLOGY | Facility: CLINIC | Age: 81
End: 2021-04-28

## 2021-04-28 DIAGNOSIS — D51.3 OTHER DIETARY VITAMIN B12 DEFICIENCY ANEMIA: ICD-10-CM

## 2021-04-28 DIAGNOSIS — E78.5 HYPERLIPIDEMIA LDL GOAL <100: ICD-10-CM

## 2021-04-28 DIAGNOSIS — I63.40 CEREBROVASCULAR ACCIDENT (CVA) DUE TO EMBOLISM OF CEREBRAL ARTERY (H): ICD-10-CM

## 2021-04-28 DIAGNOSIS — D50.0 IRON DEFICIENCY ANEMIA DUE TO CHRONIC BLOOD LOSS: ICD-10-CM

## 2021-04-28 DIAGNOSIS — D64.9 ANEMIA, UNSPECIFIED TYPE: Primary | ICD-10-CM

## 2021-04-28 DIAGNOSIS — N18.30 STAGE 3 CHRONIC KIDNEY DISEASE, UNSPECIFIED WHETHER STAGE 3A OR 3B CKD (H): ICD-10-CM

## 2021-04-28 PROCEDURE — 99214 OFFICE O/P EST MOD 30 MIN: CPT | Mod: 95 | Performed by: INTERNAL MEDICINE

## 2021-04-28 RX ORDER — ATORVASTATIN CALCIUM 20 MG/1
20 TABLET, FILM COATED ORAL DAILY
Qty: 90 TABLET | Refills: 3 | Status: SHIPPED | OUTPATIENT
Start: 2021-04-28 | End: 2022-01-01

## 2021-04-28 RX ORDER — CLOPIDOGREL BISULFATE 75 MG/1
75 TABLET ORAL DAILY
Qty: 90 TABLET | Refills: 3 | Status: ON HOLD | OUTPATIENT
Start: 2021-04-28 | End: 2022-01-01

## 2021-04-28 ASSESSMENT — PAIN SCALES - GENERAL: PAINLEVEL: NO PAIN (0)

## 2021-04-28 NOTE — PROGRESS NOTES
Kamran is a 80 year old who is being evaluated via a billable video visit.      How would you like to obtain your AVS? Zoyihart  If the video visit is dropped, the invitation should be resent by: Text to cell phone: [phone]  Will anyone else be joining your video visit? No      Video Start Time:   Video-Visit Details    Type of service:  Video Visit    Video End Time:    Originating Location (pt. Location): Home    Distant Location (provider location):  [facility]     Platform used for Video Visit: [employer]

## 2021-04-28 NOTE — LETTER
"    4/28/2021         RE: Justyn Olivas  [address] [place] [address]        Dear Colleague,    Thank you for referring your patient, Justyn Olivas, to the [facility]. Please see a copy of my visit note below.    [facility]    Hematology/Oncology Established Patient Follow-up Note      Today's Date: 04/28/21    Reason for Follow-up: Anemia.    Justyn Olivas is a 80 year old male who is being evaluated via a billable video visit.      The patient has been notified of following:     \"This video visit will be conducted via a call between you and your physician/provider. We have found that certain health care needs can be provided without the need for an in-person physical exam.  This service lets us provide the care you need with a video conversation during the COVID-19 pandemic.  If a prescription is necessary we can send it directly to your pharmacy.  If lab work is needed we can place an order for that and you can then stop by our lab to have the test done at a later time.    Video visits are billed at different rates depending on your insurance coverage.  Please reach out to your insurance provider with any questions.    If during the course of the call the physician/provider feels a video visit is not appropriate, you will not be charged for this service.\"    Patient has given verbal consent for Video visit? Yes    How would you like to obtain your AVS? Tammie    Patient would like the video invitation sent by: Send to e-mail at: [email or web address]        Video-Visit Details    Type of service:  Video Visit      Originating Location (pt. Location): Home    Distant Location (provider location):  [facility]     Mode of Communication:  Video Conference via NOZA    Video visit duration: 12 minutes      HISTORY OF PRESENT ILLNESS: Justyn Olivas is a 80 year old male with DM2, stage 3 CKD, CVA, CHF, pericardial effusion, " iron deficiency anemia who presents with the following hematologic history:  --Hospitalized 3/26/2021 for dyspnea, lower extremity edema, and weight gain. He has been on diuretics for his CHF but started to gain weight more recently.  He reports fatigue, generalized imbalance, but no hematochezia, melena, or hematemesis.    --On admission he was found to have Hgb 6.8 with MCV 92, compared to previous Hgb of 9.8 on 2/3/2021.    --Historically, his Hgb has been low in the 8-9 g/dL range since 12/2020.  Back in 12/2020, his Hgb was decreased to 6.9. 12/16/2020 EGD had shown esophagitis, no bleeding, small hiatal hernia, and a 3 mm angiodysplastic lesion without bleeding in the 3rd portion of the duodenum. Rest was unremarkable. Prior to 12/2020, his Hgb had fluctuated in the 8-13 g/dL range. Iron level was intermittently low in past on 2/25/2020 and 12/1/2020.  --3/26/2021: His ferritin was normal at 45, serum iron normal at 53, TIBC 276, and iron saturation index low at 14. Transferrin was normal at 251. Folate was normal at 15.3. Vitamin B12 was at lower end of normal at 215. 3/29/2021 TSH was normal at 1.09.   --On 3/27/2021, EGD showed 4 non-bleeding angioectasias in duodenum treated with APC. Kamran received 1 unit packed RBCs.  --3/31/2021: Hgb 7.9, MCV 95, WBC 5.2, platelets 189,000; absolute reticulocyte count elevated at 176; VIK negative, LDH normal at 178, total and direct bilirubin normal, haptoglobin normal; SPEP showed no monoclonal protein. Peripheral blood smear showed moderate normocytic hypochromic anemia; moderate absolute lymphopenia.  --3/31/2021 Colonoscopy showed single medium-sized localized angiodysplastic lesion without bleeding in cecum; APC applied at this site.    INTERIM HISTORY:  Justyn Olivas reports feeling overall better since discharge from the hospital.  He has no new complaints.      REVIEW OF SYSTEMS:   14 point ROS was reviewed and is negative other than as noted above in HPI.        HOME MEDICATIONS:  Current Outpatient Medications   Medication Sig Dispense Refill     albuterol (PROAIR HFA) 108 (90 Base) MCG/ACT inhaler INHALE 2 PUFFS BY MOUTH FOUR TIMES DAILY AS NEEDED 8.5 g 3     atorvastatin (LIPITOR) 20 MG tablet TAKE 1 TABLET(20 MG) BY MOUTH DAILY 90 tablet 2     blood glucose (STEVE CONTOUR) test strip TESTING FOUR TIMES DAILY OR AS DIRECTED 400 strip 0     carvedilol (COREG) 6.25 MG tablet Take 1 tablet (6.25 mg) by mouth 2 times daily (with meals) 180 tablet 3     clopidogrel (PLAVIX) 75 MG tablet Take 1 tablet (75 mg) by mouth daily 90 tablet 3     cyanocobalamin (CYANOCOBALAMIN) 1000 MCG tablet Take 1 tablet (1,000 mcg) by mouth daily Continue unitl hematology evaluation as borderline low vitamin b12. 30 tablet 0     cyclobenzaprine (FLEXERIL) 10 MG tablet 1/2 po bid prn & 1po qhs (Patient taking differently: Take 5-10 mg by mouth 3 times daily as needed 1/2 po bid prn & 1po qhs) 30 tablet 0     DEMADEX 20 MG tablet 10 mg daily 15 tablet 0     diphenhydrAMINE-acetaminophen (TYLENOL PM)  MG tablet Take 2 tablets by mouth nightly as needed for sleep       dorzolamide-timolol (COSOPT) 2-0.5 % ophthalmic solution Place 1 drop into both eyes 2 times daily       fluticasone (FLONASE) 50 MCG/ACT nasal spray Spray 2 sprays into both nostrils daily       folic acid-vit B6-vit B12 (FOLGARD) 0.8-10-0.115 MG TABS per tablet Take 1 tablet by mouth daily Continue per Nephrology recommendation 30 tablet 0     insulin glargine (LANTUS SOLOSTAR) 100 UNIT/ML pen INJECT 50 UNITS UNDER THE SKIN AT BEDTIME 45 mL 3     insulin lispro (HUMALOG KWIKPEN) 100 UNIT/ML (1 unit dial) KWIKPEN INJECT UP TO 55 UNITS UNDER THE SKIN ONCE DAILY AS DIRECTED (Patient taking differently: Inject Subcutaneous 3 times daily (before meals) Sliding scale with meals) 45 mL 1     insulin pen needle (BD FERCHO U/F) 32G X 4 MM miscellaneous USE AS DIRECTED UP TO FOUR TIMES DAILY 400 each 3     order for DME Hip steroid  injectoion 1 Units 0     pantoprazole (PROTONIX) 40 MG EC tablet TAKE 1 TABLET(40 MG) BY MOUTH TWICE DAILY (Patient taking differently: Take 40 mg by mouth At Bedtime TAKE 1 TABLET(40 MG) BY MOUTH TWICE DAILY) 180 tablet 2     potassium chloride ER (K-TAB) 20 MEQ CR tablet Take 3 tablets (60 mEq) by mouth 2 times daily 540 tablet 3     torsemide (DEMADEX) 10 MG tablet Take 1 tablet (10 mg) by mouth daily       traZODone (DESYREL) 100 MG tablet TAKE 1 TABLET(100 MG) BY MOUTH AT BEDTIME 90 tablet 3         ALLERGIES:  No Known Allergies      PAST MEDICAL HISTORY:  Past Medical History:   Diagnosis Date     Cerebral infarction (H)      Diabetes (H)      Hyperlipemia          PAST SURGICAL HISTORY:  Past Surgical History:   Procedure Laterality Date     CV PERICARDIOCENTESIS N/A 2020    Procedure: Pericardiocentesis;  Surgeon: Chas Chambers MD;  Location:  HEART CARDI[facility] CATH LAB     ESOPHAGOSCOPY, GASTROSCOPY, DUODENOSCOPY (EGD), COMBINED N/A 3/27/2021    Procedure: Esophagogastroduodenoscopy, With Biopsy;  Surgeon: Luc Nash MD;  Location:  GI         SOCIAL HISTORY:  Social History     Socioeconomic History     Marital status:      Spouse name: Cecile     Number of children: Not on file     Years of education: Not on file     Highest education level: Not on file   Occupational History     Not on file   Social Needs     Financial resource strain: Not on file     Food insecurity     Worry: Not on file     Inability: Not on file     Transportation needs     Medical: Not on file     Non-medical: Not on file   Tobacco Use     Smoking status: Former Smoker     Packs/day: 2.00     Years: 11.00     Pack years: 22.00     Types: Cigarettes     Start date:      Quit date:      Years since quittin.3     Smokeless tobacco: Never Used   Substance and Sexual Activity     Alcohol use: No     Alcohol/week: 0.0 standard drinks     Drug use: No     Sexual activity: Yes     Partners: Female      Birth control/protection: None   Lifestyle     Physical activity     Days per week: Not on file     Minutes per session: Not on file     Stress: Not on file   Relationships     Social connections     Talks on phone: Not on file     Gets together: Not on file     Attends Pentecostal service: Not on file     Active member of club or organization: Not on file     Attends meetings of clubs or organizations: Not on file     Relationship status: Not on file     Intimate partner violence     Fear of current or ex partner: Not on file     Emotionally abused: Not on file     Physically abused: Not on file     Forced sexual activity: Not on file   Other Topics Concern     Parent/sibling w/ CABG, MI or angioplasty before 65F 55M? Not Asked   Social History Narrative     Not on file         FAMILY HISTORY:  Family History   Problem Relation Age of Onset     Family History Negative Mother      Family History Negative Father          PHYSICAL EXAM:  Vital signs:  There were no vitals taken for this visit.   GENERAL: No acute distress.  EYES: No scleral icterus. No overt erythema.  RESPIRATORY: No cough.  No labored breathing.  MUSCULOSKELETAL: Range of motion in the neck, shoulders, and arms appear normal.  SKIN: No overt rashes, discolorations, or lesions over the face and neck.  NEUROLOGIC: Alert.  No overt tremors.  PSYCHIATRIC: Normal affect and mood.  Does not appear anxious.    The rest of a comprehensive physical examination is deferred due to PHE (public health emergency) video visit restrictions.      LABS:  CBC RESULTS:   Recent Labs   Lab Test 04/22/21  1112   WBC 6.4   RBC 2.98*   HGB 8.5*   HCT 27.9*   MCV 94   MCH 28.5   MCHC 30.5*   RDW 15.6*          Recent Labs   Lab Test 04/14/21  0820 04/05/21  1416    139   POTASSIUM 2.8* 2.7*   CHLORIDE 98 97   CO2 38* 38*   ANIONGAP 5 4   * 299*   BUN 35* 39*   CR 2.47* 2.82*   VANESA 8.7 8.5         PATHOLOGY:  Reviewed as per HPI.    IMAGING:  Reviewed as  per HPI.    ASSESSMENT/PLAN:  Justyn Olivas is a 80 year old male with the following issues:  1. Normocytic anemia  2. Congestive heart failure  3. Stage 3B chronic kidney disease  4. Vitamin B12 deficiency  5. Iron deficiency  6. Diabetes mellitus type 2  --Discussed with Kamran that his anemia is likely multifactorial, attributed to component of low vitamin B12, iron deficiency, and anemia of chronic kidney disease.    --3/2021 EGD showed 3 non-bleeding angioectasias and colonoscopy showed non-bleeding angiodysplastic lesion.  --Discussed consideration for bone marrow biopsy to rule out underlying malignancy.  However, he would like to wait several weeks to months to allow more time on his vitamin B12 supplementation.  He is willing to consider bone marrow biopsy if his Hgb does not improve further over next 2 months.  --Refilled his B12 prescription today.  --Discussed that e may be a candidate for MARGE if his Hgb remains < 10 g/dL (and provided there is no underlying bone marrow malignancy).  --He has lab follow-up with Dr. Davidson in May. Will arrange for CBC, iron studies, and B12 level in 2 months.    Return in 2 months.    Kristine Nash MD  Hematology/Oncology  [facility]    Total time spent: 25 minutes in review of labs, patient evaluation, discussion of plan of care, test orders, and documentation.    Kamran is a 80 year old who is being evaluated via a billable video visit.      How would you like to obtain your AVS? MyChart  If the video visit is dropped, the invitation should be resent by: Text to cell phone: [phone]  Will anyone else be joining your video visit? No      Video Start Time:   Video-Visit Details    Type of service:  Video Visit    Video End Time:    Originating Location (pt. Location): Home    Distant Location (provider location):  [facility]     Platform used for Video Visit: [employer]      Again, thank you for allowing me to  participate in the care of your patient.        Sincerely,        Kristine Nash MD

## 2021-04-28 NOTE — LETTER
"    4/28/2021         RE: Justyn Olivas  [address] [place] [place] [address]        Dear Colleague,    Thank you for referring your patient, Justyn Olivas, to the [facility]. Please see a copy of my visit note below.    [facility]    Hematology/Oncology Established Patient Follow-up Note      Today's Date: 04/28/21    Reason for Follow-up: Anemia.    Justyn Olivas is a 80 year old male who is being evaluated via a billable video visit.      The patient has been notified of following:     \"This video visit will be conducted via a call between you and your physician/provider. We have found that certain health care needs can be provided without the need for an in-person physical exam.  This service lets us provide the care you need with a video conversation during the COVID-19 pandemic.  If a prescription is necessary we can send it directly to your pharmacy.  If lab work is needed we can place an order for that and you can then stop by our lab to have the test done at a later time.    Video visits are billed at different rates depending on your insurance coverage.  Please reach out to your insurance provider with any questions.    If during the course of the call the physician/provider feels a video visit is not appropriate, you will not be charged for this service.\"    Patient has given verbal consent for Video visit? Yes    How would you like to obtain your AVS? Tammie    Patient would like the video invitation sent by: Send to e-mail at: [email or web address]        Video-Visit Details    Type of service:  Video Visit      Originating Location (pt. Location): Home    Distant Location (provider location):  [facility]     Mode of Communication:  Video Conference via [employer]    Video visit duration: 12 minutes      HISTORY OF PRESENT ILLNESS: Justyn Olivas is a 80 year old male with DM2, stage 3 CKD, CVA, CHF, pericardial effusion, " iron deficiency anemia who presents with the following hematologic history:  --Hospitalized 3/26/2021 for dyspnea, lower extremity edema, and weight gain. He has been on diuretics for his CHF but started to gain weight more recently.  He reports fatigue, generalized imbalance, but no hematochezia, melena, or hematemesis.    --On admission he was found to have Hgb 6.8 with MCV 92, compared to previous Hgb of 9.8 on 2/3/2021.    --Historically, his Hgb has been low in the 8-9 g/dL range since 12/2020.  Back in 12/2020, his Hgb was decreased to 6.9. 12/16/2020 EGD had shown esophagitis, no bleeding, small hiatal hernia, and a 3 mm angiodysplastic lesion without bleeding in the 3rd portion of the duodenum. Rest was unremarkable. Prior to 12/2020, his Hgb had fluctuated in the 8-13 g/dL range. Iron level was intermittently low in past on 2/25/2020 and 12/1/2020.  --3/26/2021: His ferritin was normal at 45, serum iron normal at 53, TIBC 276, and iron saturation index low at 14. Transferrin was normal at 251. Folate was normal at 15.3. Vitamin B12 was at lower end of normal at 215. 3/29/2021 TSH was normal at 1.09.   --On 3/27/2021, EGD showed 4 non-bleeding angioectasias in duodenum treated with APC. Kamran received 1 unit packed RBCs.  --3/31/2021: Hgb 7.9, MCV 95, WBC 5.2, platelets 189,000; absolute reticulocyte count elevated at 176; IVK negative, LDH normal at 178, total and direct bilirubin normal, haptoglobin normal; SPEP showed no monoclonal protein. Peripheral blood smear showed moderate normocytic hypochromic anemia; moderate absolute lymphopenia.  --3/31/2021 Colonoscopy showed single medium-sized localized angiodysplastic lesion without bleeding in cecum; APC applied at this site.    INTERIM HISTORY:  Justyn Olivas reports feeling overall better since discharge from the hospital.  He has no new complaints.      REVIEW OF SYSTEMS:   14 point ROS was reviewed and is negative other than as noted above in HPI.        HOME MEDICATIONS:  Current Outpatient Medications   Medication Sig Dispense Refill     albuterol (PROAIR HFA) 108 (90 Base) MCG/ACT inhaler INHALE 2 PUFFS BY MOUTH FOUR TIMES DAILY AS NEEDED 8.5 g 3     atorvastatin (LIPITOR) 20 MG tablet TAKE 1 TABLET(20 MG) BY MOUTH DAILY 90 tablet 2     blood glucose (STEVE CONTOUR) test strip TESTING FOUR TIMES DAILY OR AS DIRECTED 400 strip 0     carvedilol (COREG) 6.25 MG tablet Take 1 tablet (6.25 mg) by mouth 2 times daily (with meals) 180 tablet 3     clopidogrel (PLAVIX) 75 MG tablet Take 1 tablet (75 mg) by mouth daily 90 tablet 3     cyanocobalamin (CYANOCOBALAMIN) 1000 MCG tablet Take 1 tablet (1,000 mcg) by mouth daily Continue unitl hematology evaluation as borderline low vitamin b12. 30 tablet 0     cyclobenzaprine (FLEXERIL) 10 MG tablet 1/2 po bid prn & 1po qhs (Patient taking differently: Take 5-10 mg by mouth 3 times daily as needed 1/2 po bid prn & 1po qhs) 30 tablet 0     DEMADEX 20 MG tablet 10 mg daily 15 tablet 0     diphenhydrAMINE-acetaminophen (TYLENOL PM)  MG tablet Take 2 tablets by mouth nightly as needed for sleep       dorzolamide-timolol (COSOPT) 2-0.5 % ophthalmic solution Place 1 drop into both eyes 2 times daily       fluticasone (FLONASE) 50 MCG/ACT nasal spray Spray 2 sprays into both nostrils daily       folic acid-vit B6-vit B12 (FOLGARD) 0.8-10-0.115 MG TABS per tablet Take 1 tablet by mouth daily Continue per Nephrology recommendation 30 tablet 0     insulin glargine (LANTUS SOLOSTAR) 100 UNIT/ML pen INJECT 50 UNITS UNDER THE SKIN AT BEDTIME 45 mL 3     insulin lispro (HUMALOG KWIKPEN) 100 UNIT/ML (1 unit dial) KWIKPEN INJECT UP TO 55 UNITS UNDER THE SKIN ONCE DAILY AS DIRECTED (Patient taking differently: Inject Subcutaneous 3 times daily (before meals) Sliding scale with meals) 45 mL 1     insulin pen needle (BD FERCHO U/F) 32G X 4 MM miscellaneous USE AS DIRECTED UP TO FOUR TIMES DAILY 400 each 3     order for DME Hip steroid  injectoion 1 Units 0     pantoprazole (PROTONIX) 40 MG EC tablet TAKE 1 TABLET(40 MG) BY MOUTH TWICE DAILY (Patient taking differently: Take 40 mg by mouth At Bedtime TAKE 1 TABLET(40 MG) BY MOUTH TWICE DAILY) 180 tablet 2     potassium chloride ER (K-TAB) 20 MEQ CR tablet Take 3 tablets (60 mEq) by mouth 2 times daily 540 tablet 3     torsemide (DEMADEX) 10 MG tablet Take 1 tablet (10 mg) by mouth daily       traZODone (DESYREL) 100 MG tablet TAKE 1 TABLET(100 MG) BY MOUTH AT BEDTIME 90 tablet 3         ALLERGIES:  No Known Allergies      PAST MEDICAL HISTORY:  Past Medical History:   Diagnosis Date     Cerebral infarction (H)      Diabetes (H)      Hyperlipemia          PAST SURGICAL HISTORY:  Past Surgical History:   Procedure Laterality Date     CV PERICARDIOCENTESIS N/A 2020    Procedure: Pericardiocentesis;  Surgeon: Chas Chambers MD;  Location:  HE[facility]ARD[facility] CATH LAB     ESOPHAGOSCOPY, GASTROSCOPY, DUODENOSCOPY (EGD), COMBINED N/A 3/27/2021    Procedure: Esophagogastroduodenoscopy, With Biopsy;  Surgeon: Luc Nash MD;  Location:  GI         SOCIAL HISTORY:  Social History     Socioeconomic History     Marital status:      Spouse name: Cecile     Number of children: Not on file     Years of education: Not on file     Highest education level: Not on file   Occupational History     Not on file   Social Needs     Financial resource strain: Not on file     Food insecurity     Worry: Not on file     Inability: Not on file     Transportation needs     Medical: Not on file     Non-medical: Not on file   Tobacco Use     Smoking status: Former Smoker     Packs/day: 2.00     Years: 11.00     Pack years: 22.00     Types: Cigarettes     Start date:      Quit date:      Years since quittin.3     Smokeless tobacco: Never Used   Substance and Sexual Activity     Alcohol use: No     Alcohol/week: 0.0 standard drinks     Drug use: No     Sexual activity: Yes     Partners: Female      Birth control/protection: None   Lifestyle     Physical activity     Days per week: Not on file     Minutes per session: Not on file     Stress: Not on file   Relationships     Social connections     Talks on phone: Not on file     Gets together: Not on file     Attends Protestant service: Not on file     Active member of club or organization: Not on file     Attends meetings of clubs or organizations: Not on file     Relationship status: Not on file     Intimate partner violence     Fear of current or ex partner: Not on file     Emotionally abused: Not on file     Physically abused: Not on file     Forced sexual activity: Not on file   Other Topics Concern     Parent/sibling w/ CABG, MI or angioplasty before 65F 55M? Not Asked   Social History Narrative     Not on file         FAMILY HISTORY:  Family History   Problem Relation Age of Onset     Family History Negative Mother      Family History Negative Father          PHYSICAL EXAM:  Vital signs:  There were no vitals taken for this visit.   GENERAL: No acute distress.  EYES: No scleral icterus. No overt erythema.  RESPIRATORY: No cough.  No labored breathing.  MUSCULOSKELETAL: Range of motion in the neck, shoulders, and arms appear normal.  SKIN: No overt rashes, discolorations, or lesions over the face and neck.  NEUROLOGIC: Alert.  No overt tremors.  PSYCHIATRIC: Normal affect and mood.  Does not appear anxious.    The rest of a comprehensive physical examination is deferred due to PHE (public health emergency) video visit restrictions.      LABS:  CBC RESULTS:   Recent Labs   Lab Test 04/22/21  1112   WBC 6.4   RBC 2.98*   HGB 8.5*   HCT 27.9*   MCV 94   MCH 28.5   MCHC 30.5*   RDW 15.6*          Recent Labs   Lab Test 04/14/21  0820 04/05/21  1416    139   POTASSIUM 2.8* 2.7*   CHLORIDE 98 97   CO2 38* 38*   ANIONGAP 5 4   * 299*   BUN 35* 39*   CR 2.47* 2.82*   VANESA 8.7 8.5         PATHOLOGY:  Reviewed as per HPI.    IMAGING:  Reviewed as  per HPI.    ASSESSMENT/PLAN:  Justyn Olivas is a 80 year old male with the following issues:  1. Normocytic anemia  2. Congestive heart failure  3. Stage 3B chronic kidney disease  4. Vitamin B12 deficiency  5. Iron deficiency  6. Diabetes mellitus type 2  --Discussed with Kamran that his anemia is likely multifactorial, attributed to component of low vitamin B12, iron deficiency, and anemia of chronic kidney disease.    --3/2021 EGD showed 3 non-bleeding angioectasias and colonoscopy showed non-bleeding angiodysplastic lesion.  --Discussed consideration for bone marrow biopsy to rule out underlying malignancy.  However, he would like to wait several weeks to months to allow more time on his vitamin B12 supplementation.  He is willing to consider bone marrow biopsy if his Hgb does not improve further over next 2 months.  --Refilled his B12 prescription today.  --Discussed that e may be a candidate for MARGE if his Hgb remains < 10 g/dL (and provided there is no underlying bone marrow malignancy).  --He has lab follow-up with Dr. Davidson in May. Will arrange for CBC, iron studies, and B12 level in 2 months.    Return in 2 months.    Kristine Nash MD  Hematology/Oncology  [facility]    Total time spent: 25 minutes in review of labs, patient evaluation, discussion of plan of care, test orders, and documentation.    Kamran is a 80 year old who is being evaluated via a billable video visit.      How would you like to obtain your AVS? MyChart  If the video visit is dropped, the invitation should be resent by: Text to cell phone: [phone]  Will anyone else be joining your video visit? No      Video Start Time:   Video-Visit Details    Type of service:  Video Visit    Video End Time:    Originating Location (pt. Location): Home    Distant Location (provider location):  [facility]     Platform used for Video Visit: Dario      Again, thank you for allowing me to  participate in the care of your patient.        Sincerely,        Kristine Nash MD

## 2021-04-28 NOTE — TELEPHONE ENCOUNTER
I called Miguel and per Dr Davidson Irbesartan and metformin were both discontinued, Quyen Pelaez, CMA

## 2021-04-28 NOTE — TELEPHONE ENCOUNTER
Complex patient, recent hospital stay    Please review chart     Pharmacy asking for refills, but some medications appear to be discontinued - please confirm and I can notify pharmacy; or approve Rx's as refills if patient is to continue to take.    Irbesartan 150mg and Metformin 1000mg both stopped     Patient to resume Clopidogrel 75mg    Patient to continue Atorvastatin 20mg    LOV 4-5-2021 Stuart (4- nephrology, 4- cardiology, 4- onc/heme)    Next 5 appointments (look out 90 days)    May 03, 2021  2:30 PM  Office Visit with Christian Davidson MD  [facility] ([facility] [address] [facility] [id number] [phone]   May 21, 2021  9:45 AM  Return Visit with Cielo Rosenbaum MD  [facility] ([facility] ) [address] [id number] [phone]        Payton Maradiaga, RT (R)

## 2021-04-29 ENCOUNTER — TRANSFERRED RECORDS (OUTPATIENT)
Dept: HEALTH INFORMATION MANAGEMENT | Facility: CLINIC | Age: 81
End: 2021-04-29

## 2021-04-29 LAB
CREAT SERPL-MCNC: 2.36 MG/DL (ref 0.7–1.11)
GFR SERPL CREATININE-BSD FRML MDRD: 25 ML/MIN/1.73M2
GLUCOSE SERPL-MCNC: 249 MG/DL (ref 65–99)
POTASSIUM SERPL-SCNC: 4.4 MMOL/L (ref 3.5–5.3)

## 2021-04-30 ENCOUNTER — TELEPHONE (OUTPATIENT)
Dept: ONCOLOGY | Facility: CLINIC | Age: 81
End: 2021-04-30

## 2021-04-30 ENCOUNTER — TRANSFERRED RECORDS (OUTPATIENT)
Dept: HEALTH INFORMATION MANAGEMENT | Facility: CLINIC | Age: 81
End: 2021-04-30

## 2021-04-30 NOTE — TELEPHONE ENCOUNTER
Patient calling to report he has reconsidered BMBx discussed with Dr. Nash 4/28/21. He would like to proceed with BMBx now rather than waiting 2 months.     Will update Dr. Nash/Krista, EULOGIOCC and request orders for BMBx.    Jyoti Modi, BSN, RN, PHN, OCN  Oncology Care Coordinator  [facility]

## 2021-05-03 ENCOUNTER — OFFICE VISIT (OUTPATIENT)
Dept: FAMILY MEDICINE | Facility: CLINIC | Age: 81
End: 2021-05-03
Payer: MEDICARE

## 2021-05-03 VITALS
SYSTOLIC BLOOD PRESSURE: 142 MMHG | HEIGHT: 72 IN | BODY MASS INDEX: 30.07 KG/M2 | DIASTOLIC BLOOD PRESSURE: 86 MMHG | HEART RATE: 89 BPM | OXYGEN SATURATION: 98 % | TEMPERATURE: 98.4 F | WEIGHT: 222 LBS

## 2021-05-03 DIAGNOSIS — I50.33 ACUTE ON CHRONIC DIASTOLIC HEART FAILURE (H): ICD-10-CM

## 2021-05-03 DIAGNOSIS — Z79.4 TYPE 2 DIABETES MELLITUS WITH STAGE 3B CHRONIC KIDNEY DISEASE, WITH LONG-TERM CURRENT USE OF INSULIN (H): ICD-10-CM

## 2021-05-03 DIAGNOSIS — N18.30 STAGE 3 CHRONIC KIDNEY DISEASE, UNSPECIFIED WHETHER STAGE 3A OR 3B CKD (H): Primary | ICD-10-CM

## 2021-05-03 DIAGNOSIS — E11.22 TYPE 2 DIABETES MELLITUS WITH STAGE 3B CHRONIC KIDNEY DISEASE, WITH LONG-TERM CURRENT USE OF INSULIN (H): ICD-10-CM

## 2021-05-03 DIAGNOSIS — D64.9 ANEMIA, UNSPECIFIED TYPE: ICD-10-CM

## 2021-05-03 DIAGNOSIS — N18.32 TYPE 2 DIABETES MELLITUS WITH STAGE 3B CHRONIC KIDNEY DISEASE, WITH LONG-TERM CURRENT USE OF INSULIN (H): ICD-10-CM

## 2021-05-03 PROBLEM — J41.0 SIMPLE CHRONIC BRONCHITIS (H): Status: RESOLVED | Noted: 2020-02-25 | Resolved: 2021-05-03

## 2021-05-03 PROCEDURE — 99214 OFFICE O/P EST MOD 30 MIN: CPT | Performed by: INTERNAL MEDICINE

## 2021-05-03 ASSESSMENT — MIFFLIN-ST. JEOR: SCORE: 1749.99

## 2021-05-03 NOTE — PROGRESS NOTES
Subjective    Kamran is a 80 year old who presents for the following health issues  accompanied by his spouse:    HPI     Follow up chronic health cares       Stage 3 chronic kidney disease, unspecified whether stage 3a or 3b CKD  Acute on chronic diastolic heart failure (H)  Anemia, unspecified type   Justyn Olivas has had follow up in cardiology, nephrology and hematology.  He is occasionally liberal with sodium, but trying to keep his sodium less than 2,000 mg/day.  This last week was a bit better with sodium and thinks he might feel a bit better.  Weight is 222 pounds today in the clinic, but was 217 at home.  He is taking torsemide 10 mg daily as directed by cardiology.  Was referred to [facility] and is yet to have a follow up.  Diabetes mellitus    He notes that his blood glucose readings did spike up recently, and now back to 110-14 range.  He is consistently taking lantus 50 units at bedtime, and with meal insulin sliding scale depending on blood glucose readings.  He is watching his diet and feels that he is OK.      Review of Systems   Constitutional, HEENT, cardiovascular, pulmonary - slight shortness of breath, but not too bad, GI,  + may have left side hernia, musculoskeletal, neuro, skin, endocrine and psych systems are negative - has had worsening anxiety, except as otherwise noted.      Objective    BP (!) 161/90 (BP Location: Left arm, Cuff Size: Adult Large)   Pulse 89   Temp 98.4  F (36.9  C) (Temporal)   Ht 1.829 m (6')   Wt 100.7 kg (222 lb)   SpO2 98%   BMI 30.11 kg/m    Body mass index is 30.11 kg/m .  Physical Exam   GENERAL: healthy, alert and no distress  EYES: Eyes grossly normal to inspection, PERRL and conjunctivae and sclerae normal  HENT: ear canals and TM's normal, nose and mouth without ulcers or lesions  NECK: no adenopathy, no asymmetry, masses, or scars and thyroid normal to palpation  RESP: lungs clear to auscultation - no rales, rhonchi or wheezes  CV: regular rate  and rhythm, normal S1 S2, no S3 or S4, no murmur, click or rub, 2+  peripheral edema  ABDOMEN: Distended, soft, nontender, no hepatosplenomegaly,  : no appreciable hernia bilaterally  MS: no gross musculoskeletal defects noted, no edema  SKIN: no suspicious lesions or rashes  NEURO: Normal strength and tone, mentation intact and speech normal  PSYCH: mentation appears normal, affect normal/bright    Patient Instructions   (N18.30) Stage 3 chronic kidney disease, unspecified whether stage 3a or 3b CKD  (primary encounter diagnosis)  Comment: noted, improvement since adjusting sodium intake.  Continue to follow with nephrology and cardiology and recheck labs on May 10 as planned.  Plan:     (I50.33) Acute on chronic diastolic heart failure (H)  Comment: Continue current medications with follow up in cardiology - consider revisiting [facility] clinic  Plan:     (D64.9) Anemia, unspecified type  Comment: Due for recheck of iron studies and plan for bone marrow biopsy  Plan:     (E11.21,  N18.32,  Z79.4) Type 2 diabetes mellitus with stage 3b chronic kidney disease, with long-term current use of insulin (H)  Comment: Check hemoglobin A1c in July.  Continue insulin  Plan:         30 minutes spent with patient.  Over 50% of time counseling

## 2021-05-03 NOTE — PATIENT INSTRUCTIONS
(N18.30) Stage 3 chronic kidney disease, unspecified whether stage 3a or 3b CKD  (primary encounter diagnosis)  Comment: noted, improvement since adjusting sodium intake.  Continue to follow with nephrology and cardiology and recheck labs on May 10 as planned.  Plan:     (I50.33) Acute on chronic diastolic heart failure (H)  Comment: Continue current medications with follow up in cardiology - consider revisiting [facility] clinic  Plan:     (D64.9) Anemia, unspecified type  Comment: Due for recheck of iron studies and plan for bone marrow biopsy  Plan:     (E11.21,  N18.32,  Z79.4) Type 2 diabetes mellitus with stage 3b chronic kidney disease, with long-term current use of insulin (H)  Comment: Check hemoglobin A1c in July.  Continue insulin  Plan:

## 2021-05-04 ENCOUNTER — TELEPHONE (OUTPATIENT)
Dept: ONCOLOGY | Facility: CLINIC | Age: 81
End: 2021-05-04

## 2021-05-04 DIAGNOSIS — D50.8 OTHER IRON DEFICIENCY ANEMIA: Primary | ICD-10-CM

## 2021-05-04 DIAGNOSIS — D50.0 IRON DEFICIENCY ANEMIA DUE TO CHRONIC BLOOD LOSS: Primary | ICD-10-CM

## 2021-05-04 NOTE — TELEPHONE ENCOUNTER
No, he does not need to hold Plavix for bone marrow per Dr. Nash.     Called Kamran, he is aware that he does not need to hold his plavix for his bone marrow biopsy. He is also aware of his follow up appointment virtual on 5/20/21 with Dr. Nash. Krista Elkins RN,BSN,OCN

## 2021-05-04 NOTE — TELEPHONE ENCOUNTER
Consulted with Dr. Nash in clinic who stated that it is fine for Kamran to get bone marrow biopsy. Explained procedure to Kamran and need for a  with conscious sedation. Order for bone marrow biopsy will be placed and he is aware that scheduling will be calling him. He does take Plavix daily. Will check with Endo regarding policy with patient's on Plavix. Krista Elkins RN,BSN,OCN

## 2021-05-07 DIAGNOSIS — I50.33 ACUTE ON CHRONIC DIASTOLIC HEART FAILURE (H): ICD-10-CM

## 2021-05-07 DIAGNOSIS — I10 BENIGN ESSENTIAL HYPERTENSION: Primary | ICD-10-CM

## 2021-05-10 DIAGNOSIS — I50.33 ACUTE ON CHRONIC DIASTOLIC HEART FAILURE (H): ICD-10-CM

## 2021-05-10 DIAGNOSIS — I10 BENIGN ESSENTIAL HYPERTENSION: ICD-10-CM

## 2021-05-10 LAB
ANION GAP SERPL CALCULATED.3IONS-SCNC: 4 MMOL/L (ref 3–14)
BUN SERPL-MCNC: 31 MG/DL (ref 7–30)
CALCIUM SERPL-MCNC: 8.9 MG/DL (ref 8.5–10.1)
CHLORIDE SERPL-SCNC: 107 MMOL/L (ref 94–109)
CO2 SERPL-SCNC: 30 MMOL/L (ref 20–32)
CREAT SERPL-MCNC: 2.37 MG/DL (ref 0.66–1.25)
GFR SERPL CREATININE-BSD FRML MDRD: 25 ML/MIN/{1.73_M2}
GLUCOSE SERPL-MCNC: 171 MG/DL (ref 70–99)
POTASSIUM SERPL-SCNC: 4.7 MMOL/L (ref 3.4–5.3)
SODIUM SERPL-SCNC: 141 MMOL/L (ref 133–144)

## 2021-05-10 PROCEDURE — 80048 BASIC METABOLIC PNL TOTAL CA: CPT | Performed by: PHYSICIAN ASSISTANT

## 2021-05-10 PROCEDURE — 36415 COLL VENOUS BLD VENIPUNCTURE: CPT | Performed by: PHYSICIAN ASSISTANT

## 2021-05-11 DIAGNOSIS — D50.0 IRON DEFICIENCY ANEMIA DUE TO CHRONIC BLOOD LOSS: ICD-10-CM

## 2021-05-11 PROCEDURE — U0003 INFECTIOUS AGENT DETECTION BY NUCLEIC ACID (DNA OR RNA); SEVERE ACUTE RESPIRATORY SYNDROME CORONAVIRUS 2 (SARS-COV-2) (CORONAVIRUS DISEASE [COVID-19]), AMPLIFIED PROBE TECHNIQUE, MAKING USE OF HIGH THROUGHPUT TECHNOLOGIES AS DESCRIBED BY CMS-2020-01-R: HCPCS | Performed by: INTERNAL MEDICINE

## 2021-05-11 PROCEDURE — U0005 INFEC AGEN DETEC AMPLI PROBE: HCPCS | Performed by: INTERNAL MEDICINE

## 2021-05-13 NOTE — PROGRESS NOTES
"[facility]    Hematology/Oncology Established Patient Follow-up Note      Today's Date: 5/20/21    Reason for Follow-up: Anemia.    Justyn Olivas is a 80 year old male who is being evaluated via a billable video visit.      The patient has been notified of following:     \"This video visit will be conducted via a call between you and your physician/provider. We have found that certain health care needs can be provided without the need for an in-person physical exam.  This service lets us provide the care you need with a video conversation during the COVID-19 pandemic.  If a prescription is necessary we can send it directly to your pharmacy.  If lab work is needed we can place an order for that and you can then stop by our lab to have the test done at a later time.    Video visits are billed at different rates depending on your insurance coverage.  Please reach out to your insurance provider with any questions.    If during the course of the call the physician/provider feels a video visit is not appropriate, you will not be charged for this service.\"    Patient has given verbal consent for Video visit? Yes    How would you like to obtain your AVS? Yesicahart    Patient would like the video invitation sent by: Send to e-mail at: [email or web address]        Video-Visit Details    Type of service:  Video Visit      Originating Location (pt. Location): Home    Distant Location (provider location):  [facility]     Mode of Communication:  Video Conference via Doximity    Video visit duration: 14 minutes      HISTORY OF PRESENT ILLNESS: Justyn Olivas is a 80 year old male with DM2, stage 3 CKD, CVA, CHF, pericardial effusion, iron deficiency anemia who presents with the following hematologic history:  --Hospitalized 3/26/2021 for dyspnea, lower extremity edema, and weight gain. He has been on diuretics for his CHF but started to gain weight more recently.  He reports " fatigue, generalized imbalance, but no hematochezia, melena, or hematemesis.    --On admission he was found to have Hgb 6.8 with MCV 92, compared to previous Hgb of 9.8 on 2/3/2021.    --Historically, his Hgb has been low in the 8-9 g/dL range since 12/2020.  Back in 12/2020, his Hgb was decreased to 6.9. 12/16/2020 EGD had shown esophagitis, no bleeding, small hiatal hernia, and a 3 mm angiodysplastic lesion without bleeding in the 3rd portion of the duodenum. Rest was unremarkable. Prior to 12/2020, his Hgb had fluctuated in the 8-13 g/dL range. Iron level was intermittently low in past on 2/25/2020 and 12/1/2020.  --3/26/2021: His ferritin was normal at 45, serum iron normal at 53, TIBC 276, and iron saturation index low at 14. Transferrin was normal at 251. Folate was normal at 15.3. Vitamin B12 was at lower end of normal at 215. 3/29/2021 TSH was normal at 1.09.   --On 3/27/2021, EGD showed 4 non-bleeding angioectasias in duodenum treated with APC. Kamran received 1 unit packed RBCs.  --3/31/2021: Hgb 7.9, MCV 95, WBC 5.2, platelets 189,000; absolute reticulocyte count elevated at 176; VIK negative, LDH normal at 178, total and direct bilirubin normal, haptoglobin normal; SPEP showed no monoclonal protein. Peripheral blood smear showed moderate normocytic hypochromic anemia; moderate absolute lymphopenia.  --3/31/2021 Colonoscopy showed single medium-sized localized angiodysplastic lesion without bleeding in cecum; APC applied at this site.  --5/14/2021 Bone marrow biopsy showed normocellular marrow for age with trilineage hematopoietic maturation; no increase internal jugular blasts; no dysplasia; rare lymphoid aggregates favored to be reactive; adequate to increased storage iron; decreased sideroblastic iron. Cytogenetics pending as of 5/20/21.    INTERIM HISTORY:  Kamran reports    REVIEW OF SYSTEMS:   14 point ROS was reviewed and is negative other than as noted above in HPI.       HOME MEDICATIONS:  Current  Outpatient Medications   Medication Sig Dispense Refill     albuterol (PROAIR HFA) 108 (90 Base) MCG/ACT inhaler INHALE 2 PUFFS BY MOUTH FOUR TIMES DAILY AS NEEDED 8.5 g 3     atorvastatin (LIPITOR) 20 MG tablet Take 1 tablet (20 mg) by mouth daily 90 tablet 3     blood glucose (STEVE CONTOUR) test strip TESTING FOUR TIMES DAILY OR AS DIRECTED 400 strip 0     carvedilol (COREG) 6.25 MG tablet Take 1 tablet (6.25 mg) by mouth 2 times daily (with meals) 180 tablet 3     clopidogrel (PLAVIX) 75 MG tablet Take 1 tablet (75 mg) by mouth daily 90 tablet 3     cyclobenzaprine (FLEXERIL) 10 MG tablet 1/2 po bid prn & 1po qhs (Patient taking differently: Take 5-10 mg by mouth 3 times daily as needed 1/2 po bid prn & 1po qhs) 30 tablet 0     diphenhydrAMINE-acetaminophen (TYLENOL PM)  MG tablet Take 2 tablets by mouth nightly as needed for sleep       dorzolamide-timolol (COSOPT) 2-0.5 % ophthalmic solution Place 1 drop into both eyes 2 times daily       fluticasone (FLONASE) 50 MCG/ACT nasal spray Spray 2 sprays into both nostrils daily       folic acid-vit B6-vit B12 (FOLGARD) 0.8-10-0.115 MG TABS per tablet Take 1 tablet by mouth daily Continue per Nephrology recommendation 30 tablet 0     insulin glargine (LANTUS SOLOSTAR) 100 UNIT/ML pen INJECT 50 UNITS UNDER THE SKIN AT BEDTIME 45 mL 3     insulin lispro (HUMALOG KWIKPEN) 100 UNIT/ML (1 unit dial) KWIKPEN INJECT UP TO 55 UNITS UNDER THE SKIN ONCE DAILY AS DIRECTED (Patient taking differently: Inject Subcutaneous 3 times daily (before meals) Sliding scale with meals) 45 mL 1     insulin pen needle (BD FERCHO U/F) 32G X 4 MM miscellaneous USE AS DIRECTED UP TO FOUR TIMES DAILY 400 each 3     order for DME Hip steroid injectoion 1 Units 0     pantoprazole (PROTONIX) 40 MG EC tablet TAKE 1 TABLET(40 MG) BY MOUTH TWICE DAILY (Patient taking differently: Take 40 mg by mouth At Bedtime TAKE 1 TABLET(40 MG) BY MOUTH TWICE DAILY) 180 tablet 2     potassium chloride ER  (K-TAB) 20 MEQ CR tablet Take 3 tablets (60 mEq) by mouth 2 times daily 540 tablet 3     torsemide (DEMADEX) 10 MG tablet Take 1 tablet (10 mg) by mouth daily       traZODone (DESYREL) 100 MG tablet TAKE 1 TABLET(100 MG) BY MOUTH AT BEDTIME 90 tablet 3     vitamin B-12 (CYANOCOBALAMIN) 1000 MCG tablet Take 1 tablet (1,000 mcg) by mouth daily Continue unitl hematology evaluation as borderline low vitamin b12. 90 tablet 3         ALLERGIES:  No Known Allergies      PAST MEDICAL HISTORY:  Past Medical History:   Diagnosis Date     Cerebral infarction (H)      Diabetes (H)      Hyperlipemia          PAST SURGICAL HISTORY:  Past Surgical History:   Procedure Laterality Date     CV PERICARDIOCENTESIS N/A 2020    Procedure: Pericardiocentesis;  Surgeon: Chas Chambers MD;  Location:  [facility]     ESOPHAGOSCOPY, GASTROSCOPY, DUODENOSCOPY (EGD), COMBINED N/A 3/27/2021    Procedure: Esophagogastroduodenoscopy, With Biopsy;  Surgeon: Luc Nash MD;  Location:  GI         SOCIAL HISTORY:  Social History     Socioeconomic History     Marital status:      Spouse name: Cecile     Number of children: Not on file     Years of education: Not on file     Highest education level: Not on file   Occupational History     Not on file   Social Needs     Financial resource strain: Not on file     Food insecurity     Worry: Not on file     Inability: Not on file     Transportation needs     Medical: Not on file     Non-medical: Not on file   Tobacco Use     Smoking status: Former Smoker     Packs/day: 2.00     Years: 11.00     Pack years: 22.00     Types: Cigarettes     Start date:      Quit date:      Years since quittin.3     Smokeless tobacco: Never Used   Substance and Sexual Activity     Alcohol use: No     Alcohol/week: 0.0 standard drinks     Drug use: No     Sexual activity: Yes     Partners: Female     Birth control/protection: None   Lifestyle     Physical activity     Days  per week: Not on file     Minutes per session: Not on file     Stress: Not on file   Relationships     Social connections     Talks on phone: Not on file     Gets together: Not on file     Attends [employer]ism service: Not on file     Active member of club or organization: Not on file     Attends meetings of clubs or organizations: Not on file     Relationship status: Not on file     Intimate partner violence     Fear of current or ex partner: Not on file     Emotionally abused: Not on file     Physically abused: Not on file     Forced sexual activity: Not on file   Other Topics Concern     Parent/sibling w/ CABG, MI or angioplasty before 65F 55M? Not Asked   Social History Narrative     Not on file         FAMILY HISTORY:  Family History   Problem Relation Age of Onset     Family History Negative Mother      Family History Negative Father          PHYSICAL EXAM:  Vital signs:  There were no vitals taken for this visit.   GENERAL: No acute distress.  EYES: No scleral icterus. No overt erythema.  RESPIRATORY: No cough.  No labored breathing.  MUSCULOSKELETAL: Range of motion in the neck, shoulders, and arms appear normal.  SKIN: No overt rashes, discolorations, or lesions over the face and neck.  NEUROLOGIC: Alert.  No overt tremors.  PSYCHIATRIC: Normal affect and mood.  Does not appear anxious.    The rest of a comprehensive physical examination is deferred due to PHE (public health emergency) video visit restrictions.      LABS:  CBC RESULTS:   Recent Labs   Lab Test 05/14/21  1135   WBC 6.8   RBC 3.35*   HGB 9.2*   HCT 29.5*   MCV 88   MCH 27.5   MCHC 31.2*   RDW 14.3        Last Comprehensive Metabolic Panel:  Sodium   Date Value Ref Range Status   05/10/2021 141 133 - 144 mmol/L Final     Potassium   Date Value Ref Range Status   05/10/2021 4.7 3.4 - 5.3 mmol/L Final     Chloride   Date Value Ref Range Status   05/10/2021 107 94 - 109 mmol/L Final     Carbon Dioxide   Date Value Ref Range Status    05/10/2021 30 20 - 32 mmol/L Final     Anion Gap   Date Value Ref Range Status   05/10/2021 4 3 - 14 mmol/L Final     Glucose   Date Value Ref Range Status   05/10/2021 171 (H) 70 - 99 mg/dL Final     Urea Nitrogen   Date Value Ref Range Status   05/10/2021 31 (H) 7 - 30 mg/dL Final     Creatinine   Date Value Ref Range Status   05/10/2021 2.37 (H) 0.66 - 1.25 mg/dL Final     GFR Estimate   Date Value Ref Range Status   05/10/2021 25 (L) >60 mL/min/[1.73_m2] Final     Comment:     Non  GFR Calc  Starting 12/18/2018, serum creatinine based estimated GFR (eGFR) will be   calculated using the Chronic Kidney Disease Epidemiology Collaboration   (CKD-EPI) equation.       Calcium   Date Value Ref Range Status   05/10/2021 8.9 8.5 - 10.1 mg/dL Final     Bilirubin Total   Date Value Ref Range Status   04/01/2021 0.4 0.2 - 1.3 mg/dL Final     Alkaline Phosphatase   Date Value Ref Range Status   04/01/2021 102 40 - 150 U/L Final     ALT   Date Value Ref Range Status   04/01/2021 33 0 - 70 U/L Final     AST   Date Value Ref Range Status   04/01/2021 25 0 - 45 U/L Final       PATHOLOGY:  Reviewed as per HPI.    IMAGING:  Reviewed as per HPI.    ASSESSMENT/PLAN:  Justyn Olivas is a 80 year old male with the following issues:  1. Anemia of chronic kidney disease  2. Congestive heart failure  3. Stage 3B chronic kidney disease  4. Vitamin B12 deficiency  5. Iron deficiency  6. Diabetes mellitus type 2  --Discussed with Kamran that his anemia is likely multifactorial, attributed to component of low vitamin B12, iron deficiency, and anemia of chronic kidney disease.    --3/2021 EGD showed 3 non-bleeding angioectasias and colonoscopy showed non-bleeding angiodysplastic lesion.  --Discussed 5/14 bone marrow pathology which shows no evidence for malignancy or iron deficiency.  --Discussed that he is a candidate for MARGE given that his Hgb remains < 10 g/dL.  --Discussed risks/benefits of Aranesp to be given every 3  weeks. He agrees to proceed.  --He will continue on B12 and folate supplement.  --Will keep lab check for June to recheck B12 level.    Kristine Nash MD  Hematology/Oncology  [facility]    Total time spent: 33 minutes in review of labs, patient evaluation, discussion of plan of care, medication orders, and documentation.

## 2021-05-14 ENCOUNTER — HOSPITAL ENCOUNTER (OUTPATIENT)
Facility: CLINIC | Age: 81
Discharge: HOME | End: 2021-05-14
Admitting: PATHOLOGY
Payer: MEDICARE

## 2021-05-14 ENCOUNTER — ANESTHESIA (OUTPATIENT)
Dept: GASTROENTEROLOGY | Facility: CLINIC | Age: 81
End: 2021-05-14
Payer: MEDICARE

## 2021-05-14 ENCOUNTER — ANESTHESIA EVENT (OUTPATIENT)
Dept: GASTROENTEROLOGY | Facility: CLINIC | Age: 81
End: 2021-05-14
Payer: MEDICARE

## 2021-05-14 VITALS
TEMPERATURE: 98 F | OXYGEN SATURATION: 98 % | DIASTOLIC BLOOD PRESSURE: 95 MMHG | HEART RATE: 94 BPM | WEIGHT: 214 LBS | HEIGHT: 72 IN | RESPIRATION RATE: 11 BRPM | BODY MASS INDEX: 28.99 KG/M2 | SYSTOLIC BLOOD PRESSURE: 148 MMHG

## 2021-05-14 DIAGNOSIS — D64.9 ANEMIA, UNSPECIFIED TYPE: Primary | ICD-10-CM

## 2021-05-14 LAB
BASOPHILS # BLD AUTO: 0 10E9/L (ref 0–0.2)
BASOPHILS NFR BLD AUTO: 0.6 %
DIFFERENTIAL METHOD BLD: ABNORMAL
EOSINOPHIL # BLD AUTO: 0.4 10E9/L (ref 0–0.7)
EOSINOPHIL NFR BLD AUTO: 5.6 %
ERYTHROCYTE [DISTWIDTH] IN BLOOD BY AUTOMATED COUNT: 14.3 % (ref 10–15)
GLUCOSE BLDC GLUCOMTR-MCNC: 55 MG/DL (ref 70–99)
HCT VFR BLD AUTO: 29.5 % (ref 40–53)
HGB BLD-MCNC: 9.2 G/DL (ref 13.3–17.7)
IMM GRANULOCYTES # BLD: 0 10E9/L (ref 0–0.4)
IMM GRANULOCYTES NFR BLD: 0.3 %
LYMPHOCYTES # BLD AUTO: 1.1 10E9/L (ref 0.8–5.3)
LYMPHOCYTES NFR BLD AUTO: 15.8 %
MCH RBC QN AUTO: 27.5 PG (ref 26.5–33)
MCHC RBC AUTO-ENTMCNC: 31.2 G/DL (ref 31.5–36.5)
MCV RBC AUTO: 88 FL (ref 78–100)
MONOCYTES # BLD AUTO: 0.8 10E9/L (ref 0–1.3)
MONOCYTES NFR BLD AUTO: 11.5 %
NEUTROPHILS # BLD AUTO: 4.5 10E9/L (ref 1.6–8.3)
NEUTROPHILS NFR BLD AUTO: 66.2 %
NRBC # BLD AUTO: 0 10*3/UL
NRBC BLD AUTO-RTO: 0 /100
PLATELET # BLD AUTO: 159 10E9/L (ref 150–450)
RBC # BLD AUTO: 3.35 10E12/L (ref 4.4–5.9)
RETICS # AUTO: 54.6 10E9/L (ref 25–95)
RETICS/RBC NFR AUTO: 1.6 % (ref 0.5–2)
WBC # BLD AUTO: 6.8 10E9/L (ref 4–11)

## 2021-05-14 PROCEDURE — 88311 DECALCIFY TISSUE: CPT | Mod: 26 | Performed by: PATHOLOGY

## 2021-05-14 PROCEDURE — 88342 IMHCHEM/IMCYTCHM 1ST ANTB: CPT | Mod: 26 | Performed by: PATHOLOGY

## 2021-05-14 PROCEDURE — 999N001068 HC STATISTIC BONE MARROW CORE PERF TC 38221: Performed by: PATHOLOGY

## 2021-05-14 PROCEDURE — 370N000017 HC ANESTHESIA TECHNICAL FEE, PER MIN: Performed by: PATHOLOGY

## 2021-05-14 PROCEDURE — 88185 FLOWCYTOMETRY/TC ADD-ON: CPT | Performed by: INTERNAL MEDICINE

## 2021-05-14 PROCEDURE — 88305 TISSUE EXAM BY PATHOLOGIST: CPT | Mod: TC | Performed by: PATHOLOGY

## 2021-05-14 PROCEDURE — 88237 TISSUE CULTURE BONE MARROW: CPT | Performed by: INTERNAL MEDICINE

## 2021-05-14 PROCEDURE — 88291 CYTO/MOLECULAR REPORT: CPT | Performed by: MEDICAL GENETICS

## 2021-05-14 PROCEDURE — 88342 IMHCHEM/IMCYTCHM 1ST ANTB: CPT | Mod: TC | Performed by: PATHOLOGY

## 2021-05-14 PROCEDURE — 82962 GLUCOSE BLOOD TEST: CPT

## 2021-05-14 PROCEDURE — 36415 COLL VENOUS BLD VENIPUNCTURE: CPT | Performed by: PATHOLOGY

## 2021-05-14 PROCEDURE — 85025 COMPLETE CBC W/AUTO DIFF WBC: CPT | Performed by: PATHOLOGY

## 2021-05-14 PROCEDURE — 250N000009 HC RX 250: Performed by: NURSE ANESTHETIST, CERTIFIED REGISTERED

## 2021-05-14 PROCEDURE — 88311 DECALCIFY TISSUE: CPT | Mod: TC | Performed by: PATHOLOGY

## 2021-05-14 PROCEDURE — 88305 TISSUE EXAM BY PATHOLOGIST: CPT | Mod: 26 | Performed by: PATHOLOGY

## 2021-05-14 PROCEDURE — 999N001102 HC STATISTIC DNA PROCESS AND HOLD: Performed by: PATHOLOGY

## 2021-05-14 PROCEDURE — 88264 CHROMOSOME ANALYSIS 20-25: CPT | Performed by: INTERNAL MEDICINE

## 2021-05-14 PROCEDURE — 88280 CHROMOSOME KARYOTYPE STUDY: CPT | Performed by: INTERNAL MEDICINE

## 2021-05-14 PROCEDURE — 88341 IMHCHEM/IMCYTCHM EA ADD ANTB: CPT | Mod: 26 | Performed by: PATHOLOGY

## 2021-05-14 PROCEDURE — 38222 DX BONE MARROW BX & ASPIR: CPT | Performed by: PATHOLOGY

## 2021-05-14 PROCEDURE — 999N001109 HC STATISTIC MORPHOLOGY W/INTERP HISTOLOGY TC 85060: Performed by: PATHOLOGY

## 2021-05-14 PROCEDURE — 999N001137 HC STATISTIC FLOW >15 ABY TC 88189: Performed by: INTERNAL MEDICINE

## 2021-05-14 PROCEDURE — 250N000009 HC RX 250: Performed by: PATHOLOGY

## 2021-05-14 PROCEDURE — 88189 FLOWCYTOMETRY/READ 16 & >: CPT | Performed by: PATHOLOGY

## 2021-05-14 PROCEDURE — 999N000010 HC STATISTIC ANES STAT CODE-CRNA PER MINUTE: Performed by: PATHOLOGY

## 2021-05-14 PROCEDURE — 38221 DX BONE MARROW BIOPSIES: CPT | Performed by: PATHOLOGY

## 2021-05-14 PROCEDURE — 85060 BLOOD SMEAR INTERPRETATION: CPT | Performed by: PATHOLOGY

## 2021-05-14 PROCEDURE — 88341 IMHCHEM/IMCYTCHM EA ADD ANTB: CPT | Mod: TC | Performed by: PATHOLOGY

## 2021-05-14 PROCEDURE — 85045 AUTOMATED RETICULOCYTE COUNT: CPT | Performed by: PATHOLOGY

## 2021-05-14 PROCEDURE — 88313 SPECIAL STAINS GROUP 2: CPT | Mod: 26 | Performed by: PATHOLOGY

## 2021-05-14 PROCEDURE — 88313 SPECIAL STAINS GROUP 2: CPT | Mod: TC | Performed by: PATHOLOGY

## 2021-05-14 PROCEDURE — 250N000011 HC RX IP 250 OP 636: Performed by: NURSE ANESTHETIST, CERTIFIED REGISTERED

## 2021-05-14 PROCEDURE — 88184 FLOWCYTOMETRY/ TC 1 MARKER: CPT | Performed by: INTERNAL MEDICINE

## 2021-05-14 PROCEDURE — 88313 SPECIAL STAINS GROUP 2: CPT | Mod: TC,91 | Performed by: PATHOLOGY

## 2021-05-14 PROCEDURE — 258N000003 HC RX IP 258 OP 636: Performed by: NURSE ANESTHETIST, CERTIFIED REGISTERED

## 2021-05-14 RX ORDER — LIDOCAINE HYDROCHLORIDE 20 MG/ML
INJECTION, SOLUTION INFILTRATION; PERINEURAL PRN
Status: DISCONTINUED | OUTPATIENT
Start: 2021-05-14 | End: 2021-05-14

## 2021-05-14 RX ORDER — LIDOCAINE HYDROCHLORIDE 10 MG/ML
8-10 INJECTION, SOLUTION EPIDURAL; INFILTRATION; INTRACAUDAL; PERINEURAL
Status: DISCONTINUED | OUTPATIENT
Start: 2021-05-14 | End: 2021-05-14 | Stop reason: HOSPADM

## 2021-05-14 RX ORDER — FLUMAZENIL 0.1 MG/ML
0.2 INJECTION, SOLUTION INTRAVENOUS
Status: DISCONTINUED | OUTPATIENT
Start: 2021-05-14 | End: 2021-05-14 | Stop reason: HOSPADM

## 2021-05-14 RX ORDER — PROPOFOL 10 MG/ML
INJECTION, EMULSION INTRAVENOUS CONTINUOUS PRN
Status: DISCONTINUED | OUTPATIENT
Start: 2021-05-14 | End: 2021-05-14

## 2021-05-14 RX ORDER — NALOXONE HYDROCHLORIDE 0.4 MG/ML
0.4 INJECTION, SOLUTION INTRAMUSCULAR; INTRAVENOUS; SUBCUTANEOUS
Status: DISCONTINUED | OUTPATIENT
Start: 2021-05-14 | End: 2021-05-14 | Stop reason: HOSPADM

## 2021-05-14 RX ORDER — NALOXONE HYDROCHLORIDE 0.4 MG/ML
0.2 INJECTION, SOLUTION INTRAMUSCULAR; INTRAVENOUS; SUBCUTANEOUS
Status: DISCONTINUED | OUTPATIENT
Start: 2021-05-14 | End: 2021-05-14 | Stop reason: HOSPADM

## 2021-05-14 RX ADMIN — LIDOCAINE HYDROCHLORIDE 40 MG: 20 INJECTION, SOLUTION INFILTRATION; PERINEURAL at 12:06

## 2021-05-14 RX ADMIN — PROPOFOL 150 MCG/KG/MIN: 10 INJECTION, EMULSION INTRAVENOUS at 12:03

## 2021-05-14 RX ADMIN — DEXTROSE AND SODIUM CHLORIDE: 5; 200 INJECTION, SOLUTION INTRAVENOUS at 11:56

## 2021-05-14 RX ADMIN — LIDOCAINE HYDROCHLORIDE 6 ML: 10; .005 INJECTION, SOLUTION EPIDURAL; INFILTRATION; INTRACAUDAL; PERINEURAL at 12:12

## 2021-05-14 ASSESSMENT — ENCOUNTER SYMPTOMS: SEIZURES: 0

## 2021-05-14 ASSESSMENT — MIFFLIN-ST. JEOR: SCORE: 1713.7

## 2021-05-14 ASSESSMENT — COPD QUESTIONNAIRES: COPD: 0

## 2021-05-14 NOTE — ANESTHESIA CARE TRANSFER NOTE
Patient: Justyn Olivas    Procedure(s):  BIOPSY, BONE MARROW    Diagnosis: Iron deficiency anemia secondary to inadequate dietary iron intake [D50.8]  Diagnosis Additional Information: No value filed.    Anesthesia Type:   MAC     Note:    Oropharynx: oropharynx clear of all foreign objects  Level of Consciousness: awake  Oxygen Supplementation: nasal cannula    Independent Airway: airway patency satisfactory and stable  Dentition: dentition unchanged  Vital Signs Stable: post-procedure vital signs reviewed and stable  Report to RN Given: handoff report given  Patient transferred to: PACU    Handoff Report: Identifed the Patient, Identified the Reponsible Provider, Reviewed the pertinent medical history, Discussed the surgical course, Reviewed Intra-OP anesthesia mangement and issues during anesthesia, Set expectations for post-procedure period and Allowed opportunity for questions and acknowledgement of understanding      Vitals: (Last set prior to Anesthesia Care Transfer)  CRNA VITALS  5/14/2021 1208 - 5/14/2021 1246      5/14/2021             EKG:  Sinus rhythm        Electronically Signed By: BARI Esquivel CRNA  May 14, 2021  12:46 PM

## 2021-05-14 NOTE — ANESTHESIA POSTPROCEDURE EVALUATION
Patient: Justyn Olivas    Procedure(s):  BIOPSY, BONE MARROW    Diagnosis:Iron deficiency anemia secondary to inadequate dietary iron intake [D50.8]  Diagnosis Additional Information: No value filed.    Anesthesia Type:  MAC    Note:  Disposition: Outpatient   Postop Pain Control: Uneventful            Sign Out: Well controlled pain   PONV: No   Neuro/Psych: Uneventful            Sign Out: Acceptable/Baseline neuro status   Airway/Respiratory: Uneventful            Sign Out: Acceptable/Baseline resp. status   CV/Hemodynamics: Uneventful            Sign Out: Acceptable CV status; No obvious hypovolemia; No obvious fluid overload   Other NRE: NONE   DID A NON-ROUTINE EVENT OCCUR? No           Last vitals:  Vitals:    05/14/21 1300 05/14/21 1301 05/14/21 1310   BP: (!) 158/91  (!) 148/95   Pulse: 96 94 94   Resp: 19 28 11   Temp:      SpO2:  98% 98%       Last vitals prior to Anesthesia Care Transfer:  CRNA VITALS  5/14/2021 1208 - 5/14/2021 1308      5/14/2021             EKG:  Sinus rhythm          Electronically Signed By: Jaret Rivera MD  May 14, 2021  2:32 PM

## 2021-05-14 NOTE — ANESTHESIA PREPROCEDURE EVALUATION
Anesthesia Pre-Procedure Evaluation    Patient: Justyn Olivas   MRN: [id number] : 1940        Preoperative Diagnosis: Iron deficiency anemia secondary to inadequate dietary iron intake [D50.8]   Procedure : Procedure(s):  BIOPSY, BONE MARROW     Past Medical History:   Diagnosis Date     Cerebral infarction (H)      Diabetes (H)      Hyperlipemia       Past Surgical History:   Procedure Laterality Date     CV PERICARDIOCENTESIS N/A 2020    Procedure: Pericardiocentesis;  Surgeon: Chas Chambers MD;  Location:  [facility]     ESOPHAGOSCOPY, GASTROSCOPY, DUODENOSCOPY (EGD), COMBINED N/A 3/27/2021    Procedure: Esophagogastroduodenoscopy, With Biopsy;  Surgeon: Luc Nash MD;  Location:  GI      No Known Allergies   Social History     Tobacco Use     Smoking status: Former Smoker     Packs/day: 2.00     Years: 11.00     Pack years: 22.00     Types: Cigarettes     Start date:      Quit date:      Years since quittin.3     Smokeless tobacco: Never Used   Substance Use Topics     Alcohol use: No     Alcohol/week: 0.0 standard drinks      Wt Readings from Last 1 Encounters:   21 97.1 kg (214 lb)        Anesthesia Evaluation   Pt has had prior anesthetic.     No history of anesthetic complications       ROS/MED HX  ENT/Pulmonary:    (-) COPD and sleep apnea   Neurologic:     (+) CVA, without deficits,  (-) no seizures   Cardiovascular:     (+) Dyslipidemia hypertension-----CHF     METS/Exercise Tolerance:     Hematologic:     (+) anemia,     Musculoskeletal:       GI/Hepatic:    (-) GERD and liver disease   Renal/Genitourinary:     (+) renal disease, type: CRI,     Endo:     (+) type II DM,     Psychiatric/Substance Use:       Infectious Disease:       Malignancy:       Other:            Physical Exam    Airway        Mallampati: II   TM distance: > 3 FB   Neck ROM: full   Mouth opening: > 3 cm    Respiratory Devices and Support         Dental  no notable  dental history         Cardiovascular   cardiovascular exam normal          Pulmonary   pulmonary exam normal                OUTSIDE LABS:  CBC:   Lab Results   Component Value Date    WBC 6.8 05/14/2021    WBC 6.4 04/22/2021    HGB 9.2 (L) 05/14/2021    HGB 8.5 (L) 04/22/2021    HCT 29.5 (L) 05/14/2021    HCT 27.9 (L) 04/22/2021     05/14/2021     04/22/2021     BMP:   Lab Results   Component Value Date     05/10/2021     04/22/2021    POTASSIUM 4.7 05/10/2021    POTASSIUM 4.2 04/22/2021    CHLORIDE 107 05/10/2021    CHLORIDE 104 04/22/2021    CO2 30 05/10/2021    CO2 31 04/22/2021    BUN 31 (H) 05/10/2021    BUN 42 (H) 04/22/2021    CR 2.37 (H) 05/10/2021    CR 2.53 (H) 04/22/2021     (H) 05/10/2021     (H) 04/22/2021     COAGS:   Lab Results   Component Value Date    PTT 29 09/04/2020    INR 0.99 09/04/2020     POC:   Lab Results   Component Value Date    BGM 55 (L) 05/14/2021     HEPATIC:   Lab Results   Component Value Date    ALBUMIN 3.2 (L) 04/01/2021    PROTTOTAL 6.8 04/01/2021    ALT 33 04/01/2021    AST 25 04/01/2021    ALKPHOS 102 04/01/2021    BILITOTAL 0.4 04/01/2021     OTHER:   Lab Results   Component Value Date    LACT 3.8 (H) 12/20/2020    A1C 6.0 (H) 03/27/2021    VANESA 8.9 05/10/2021    PHOS 4.0 03/27/2021    MAG 2.7 (H) 04/22/2021    TSH 1.09 03/29/2021       Anesthesia Plan    ASA Status:  3      Anesthesia Type: MAC.     - Reason for MAC: straight local not clinically adequate              Consents    Anesthesia Plan(s) and associated risks, benefits, and realistic alternatives discussed. Questions answered and patient/representative(s) expressed understanding.     - Discussed with:  Patient         Postoperative Care       PONV prophylaxis: Ondansetron (or other 5HT-3)     Comments:    1/2 amp D50 in pre op      H&P reviewed: Unable to attach H&P to encounter due to EHR limitations. H&P Update: appropriate H&P reviewed, patient examined. No interval  changes since H&P (within 30 days).         Jaret Rivera MD

## 2021-05-17 LAB — COPATH REPORT: NORMAL

## 2021-05-18 LAB — COPATH REPORT: NORMAL

## 2021-05-19 LAB — COPATH REPORT: NORMAL

## 2021-05-20 ENCOUNTER — VIRTUAL VISIT (OUTPATIENT)
Dept: ONCOLOGY | Facility: CLINIC | Age: 81
End: 2021-05-20
Attending: INTERNAL MEDICINE
Payer: MEDICARE

## 2021-05-20 DIAGNOSIS — D51.3 OTHER DIETARY VITAMIN B12 DEFICIENCY ANEMIA: ICD-10-CM

## 2021-05-20 DIAGNOSIS — D63.1 ANEMIA DUE TO STAGE 3 CHRONIC KIDNEY DISEASE, UNSPECIFIED WHETHER STAGE 3A OR 3B CKD (H): Primary | ICD-10-CM

## 2021-05-20 DIAGNOSIS — N18.30 ANEMIA DUE TO STAGE 3 CHRONIC KIDNEY DISEASE, UNSPECIFIED WHETHER STAGE 3A OR 3B CKD (H): Primary | ICD-10-CM

## 2021-05-20 PROCEDURE — 99443 PR PHYSICIAN TELEPHONE EVALUATION 21-30 MIN: CPT | Mod: 95 | Performed by: INTERNAL MEDICINE

## 2021-05-20 RX ORDER — METHYLPREDNISOLONE SODIUM SUCCINATE 125 MG/2ML
125 INJECTION, POWDER, LYOPHILIZED, FOR SOLUTION INTRAMUSCULAR; INTRAVENOUS
Status: CANCELLED
Start: 2021-05-21

## 2021-05-20 RX ORDER — NALOXONE HYDROCHLORIDE 0.4 MG/ML
0.2 INJECTION, SOLUTION INTRAMUSCULAR; INTRAVENOUS; SUBCUTANEOUS
Status: CANCELLED | OUTPATIENT
Start: 2021-05-21

## 2021-05-20 RX ORDER — MEPERIDINE HYDROCHLORIDE 25 MG/ML
25 INJECTION INTRAMUSCULAR; INTRAVENOUS; SUBCUTANEOUS EVERY 30 MIN PRN
Status: CANCELLED | OUTPATIENT
Start: 2021-05-21

## 2021-05-20 RX ORDER — ALBUTEROL SULFATE 5 MG/ML
2.5 SOLUTION RESPIRATORY (INHALATION)
Status: CANCELLED | OUTPATIENT
Start: 2021-05-21

## 2021-05-20 RX ORDER — ALBUTEROL SULFATE 90 UG/1
1-2 AEROSOL, METERED RESPIRATORY (INHALATION)
Status: CANCELLED
Start: 2021-05-21

## 2021-05-20 RX ORDER — DIPHENHYDRAMINE HYDROCHLORIDE 50 MG/ML
50 INJECTION INTRAMUSCULAR; INTRAVENOUS
Status: CANCELLED
Start: 2021-05-21

## 2021-05-20 RX ORDER — EPINEPHRINE 1 MG/ML
0.3 INJECTION, SOLUTION INTRAMUSCULAR; SUBCUTANEOUS EVERY 5 MIN PRN
Status: CANCELLED | OUTPATIENT
Start: 2021-05-21

## 2021-05-20 ASSESSMENT — PAIN SCALES - GENERAL: PAINLEVEL: NO PAIN (0)

## 2021-05-20 NOTE — LETTER
"    5/20/2021         RE: Justyn Olivas  [address] [place] [address]        Dear Colleague,    Thank you for referring your patient, Justyn Olivas, to the [facility]. Please see a copy of my visit note below.    [facility]    Hematology/Oncology Established Patient Follow-up Note      Today's Date: 5/20/21    Reason for Follow-up: Anemia.    Justyn Olivas is a 80 year old male who is being evaluated via a billable video visit.      The patient has been notified of following:     \"This video visit will be conducted via a call between you and your physician/provider. We have found that certain health care needs can be provided without the need for an in-person physical exam.  This service lets us provide the care you need with a video conversation during the COVID-19 pandemic.  If a prescription is necessary we can send it directly to your pharmacy.  If lab work is needed we can place an order for that and you can then stop by our lab to have the test done at a later time.    Video visits are billed at different rates depending on your insurance coverage.  Please reach out to your insurance provider with any questions.    If during the course of the call the physician/provider feels a video visit is not appropriate, you will not be charged for this service.\"    Patient has given verbal consent for Video visit? Yes    How would you like to obtain your AVS? Tammie    Patient would like the video invitation sent by: Send to e-mail at: [email or web address]        Video-Visit Details    Type of service:  Video Visit      Originating Location (pt. Location): Home    Distant Location (provider location):  [facility]     Mode of Communication:  Video Conference via Doximity    Video visit duration: 14 minutes      HISTORY OF PRESENT ILLNESS: Justyn Olivas is a 80 year old male with DM2, stage 3 CKD, CVA, CHF, pericardial " effusion, iron deficiency anemia who presents with the following hematologic history:  --Hospitalized 3/26/2021 for dyspnea, lower extremity edema, and weight gain. He has been on diuretics for his CHF but started to gain weight more recently.  He reports fatigue, generalized imbalance, but no hematochezia, melena, or hematemesis.    --On admission he was found to have Hgb 6.8 with MCV 92, compared to previous Hgb of 9.8 on 2/3/2021.    --Historically, his Hgb has been low in the 8-9 g/dL range since 12/2020.  Back in 12/2020, his Hgb was decreased to 6.9. 12/16/2020 EGD had shown esophagitis, no bleeding, small hiatal hernia, and a 3 mm angiodysplastic lesion without bleeding in the 3rd portion of the duodenum. Rest was unremarkable. Prior to 12/2020, his Hgb had fluctuated in the 8-13 g/dL range. Iron level was intermittently low in past on 2/25/2020 and 12/1/2020.  --3/26/2021: His ferritin was normal at 45, serum iron normal at 53, TIBC 276, and iron saturation index low at 14. Transferrin was normal at 251. Folate was normal at 15.3. Vitamin B12 was at lower end of normal at 215. 3/29/2021 TSH was normal at 1.09.   --On 3/27/2021, EGD showed 4 non-bleeding angioectasias in duodenum treated with APC. Kamran received 1 unit packed RBCs.  --3/31/2021: Hgb 7.9, MCV 95, WBC 5.2, platelets 189,000; absolute reticulocyte count elevated at 176; VIK negative, LDH normal at 178, total and direct bilirubin normal, haptoglobin normal; SPEP showed no monoclonal protein. Peripheral blood smear showed moderate normocytic hypochromic anemia; moderate absolute lymphopenia.  --3/31/2021 Colonoscopy showed single medium-sized localized angiodysplastic lesion without bleeding in cecum; APC applied at this site.  --5/14/2021 Bone marrow biopsy showed normocellular marrow for age with trilineage hematopoietic maturation; no increase internal jugular blasts; no dysplasia; rare lymphoid aggregates favored to be reactive; adequate to  increased storage iron; decreased sideroblastic iron. Cytogenetics pending as of 5/20/21.    INTERIM HISTORY:  Kamran reports    REVIEW OF SYSTEMS:   14 point ROS was reviewed and is negative other than as noted above in HPI.       HOME MEDICATIONS:  Current Outpatient Medications   Medication Sig Dispense Refill     albuterol (PROAIR HFA) 108 (90 Base) MCG/ACT inhaler INHALE 2 PUFFS BY MOUTH FOUR TIMES DAILY AS NEEDED 8.5 g 3     atorvastatin (LIPITOR) 20 MG tablet Take 1 tablet (20 mg) by mouth daily 90 tablet 3     blood glucose (STEVE CONTOUR) test strip TESTING FOUR TIMES DAILY OR AS DIRECTED 400 strip 0     carvedilol (COREG) 6.25 MG tablet Take 1 tablet (6.25 mg) by mouth 2 times daily (with meals) 180 tablet 3     clopidogrel (PLAVIX) 75 MG tablet Take 1 tablet (75 mg) by mouth daily 90 tablet 3     cyclobenzaprine (FLEXERIL) 10 MG tablet 1/2 po bid prn & 1po qhs (Patient taking differently: Take 5-10 mg by mouth 3 times daily as needed 1/2 po bid prn & 1po qhs) 30 tablet 0     diphenhydrAMINE-acetaminophen (TYLENOL PM)  MG tablet Take 2 tablets by mouth nightly as needed for sleep       dorzolamide-timolol (COSOPT) 2-0.5 % ophthalmic solution Place 1 drop into both eyes 2 times daily       fluticasone (FLONASE) 50 MCG/ACT nasal spray Spray 2 sprays into both nostrils daily       folic acid-vit B6-vit B12 (FOLGARD) 0.8-10-0.115 MG TABS per tablet Take 1 tablet by mouth daily Continue per Nephrology recommendation 30 tablet 0     insulin glargine (LANTUS SOLOSTAR) 100 UNIT/ML pen INJECT 50 UNITS UNDER THE SKIN AT BEDTIME 45 mL 3     insulin lispro (HUMALOG KWIKPEN) 100 UNIT/ML (1 unit dial) KWIKPEN INJECT UP TO 55 UNITS UNDER THE SKIN ONCE DAILY AS DIRECTED (Patient taking differently: Inject Subcutaneous 3 times daily (before meals) Sliding scale with meals) 45 mL 1     insulin pen needle (BD FERCHO U/F) 32G X 4 MM miscellaneous USE AS DIRECTED UP TO FOUR TIMES DAILY 400 each 3     order for DME Hip steroid  injectoion 1 Units 0     pantoprazole (PROTONIX) 40 MG EC tablet TAKE 1 TABLET(40 MG) BY MOUTH TWICE DAILY (Patient taking differently: Take 40 mg by mouth At Bedtime TAKE 1 TABLET(40 MG) BY MOUTH TWICE DAILY) 180 tablet 2     potassium chloride ER (K-TAB) 20 MEQ CR tablet Take 3 tablets (60 mEq) by mouth 2 times daily 540 tablet 3     torsemide (DEMADEX) 10 MG tablet Take 1 tablet (10 mg) by mouth daily       traZODone (DESYREL) 100 MG tablet TAKE 1 TABLET(100 MG) BY MOUTH AT BEDTIME 90 tablet 3     vitamin B-12 (CYANOCOBALAMIN) 1000 MCG tablet Take 1 tablet (1,000 mcg) by mouth daily Continue unitl hematology evaluation as borderline low vitamin b12. 90 tablet 3         ALLERGIES:  No Known Allergies      PAST MEDICAL HISTORY:  Past Medical History:   Diagnosis Date     Cerebral infarction (H)      Diabetes (H)      Hyperlipemia          PAST SURGICAL HISTORY:  Past Surgical History:   Procedure Laterality Date     CV PERICARDIOCENTESIS N/A 2020    Procedure: Pericardiocentesis;  Surgeon: Chas Chambers MD;  Location:  HEART CARDIAC CATH LAB     ESOPHAGOSCOPY, GASTROSCOPY, DUODENOSCOPY (EGD), COMBINED N/A 3/27/2021    Procedure: Esophagogastroduodenoscopy, With Biopsy;  Surgeon: Luc Nash MD;  Location:  GI         SOCIAL HISTORY:  Social History     Socioeconomic History     Marital status:      Spouse name: Cecile     Number of children: Not on file     Years of education: Not on file     Highest education level: Not on file   Occupational History     Not on file   Social Needs     Financial resource strain: Not on file     Food insecurity     Worry: Not on file     Inability: Not on file     Transportation needs     Medical: Not on file     Non-medical: Not on file   Tobacco Use     Smoking status: Former Smoker     Packs/day: 2.00     Years: 11.00     Pack years: 22.00     Types: Cigarettes     Start date:      Quit date:      Years since quittin.3     Smokeless  tobacco: Never Used   Substance and Sexual Activity     Alcohol use: No     Alcohol/week: 0.0 standard drinks     Drug use: No     Sexual activity: Yes     Partners: Female     Birth control/protection: None   Lifestyle     Physical activity     Days per week: Not on file     Minutes per session: Not on file     Stress: Not on file   Relationships     Social connections     Talks on phone: Not on file     Gets together: Not on file     Attends Religion service: Not on file     Active member of club or organization: Not on file     Attends meetings of clubs or organizations: Not on file     Relationship status: Not on file     Intimate partner violence     Fear of current or ex partner: Not on file     Emotionally abused: Not on file     Physically abused: Not on file     Forced sexual activity: Not on file   Other Topics Concern     Parent/sibling w/ CABG, MI or angioplasty before 65F 55M? Not Asked   Social History Narrative     Not on file         FAMILY HISTORY:  Family History   Problem Relation Age of Onset     Family History Negative Mother      Family History Negative Father          PHYSICAL EXAM:  Vital signs:  There were no vitals taken for this visit.   GENERAL: No acute distress.  EYES: No scleral icterus. No overt erythema.  RESPIRATORY: No cough.  No labored breathing.  MUSCULOSKELETAL: Range of motion in the neck, shoulders, and arms appear normal.  SKIN: No overt rashes, discolorations, or lesions over the face and neck.  NEUROLOGIC: Alert.  No overt tremors.  PSYCHIATRIC: Normal affect and mood.  Does not appear anxious.    The rest of a comprehensive physical examination is deferred due to PHE (public health emergency) video visit restrictions.      LABS:  CBC RESULTS:   Recent Labs   Lab Test 05/14/21  1135   WBC 6.8   RBC 3.35*   HGB 9.2*   HCT 29.5*   MCV 88   MCH 27.5   MCHC 31.2*   RDW 14.3        Last Comprehensive Metabolic Panel:  Sodium   Date Value Ref Range Status   05/10/2021  141 133 - 144 mmol/L Final     Potassium   Date Value Ref Range Status   05/10/2021 4.7 3.4 - 5.3 mmol/L Final     Chloride   Date Value Ref Range Status   05/10/2021 107 94 - 109 mmol/L Final     Carbon Dioxide   Date Value Ref Range Status   05/10/2021 30 20 - 32 mmol/L Final     Anion Gap   Date Value Ref Range Status   05/10/2021 4 3 - 14 mmol/L Final     Glucose   Date Value Ref Range Status   05/10/2021 171 (H) 70 - 99 mg/dL Final     Urea Nitrogen   Date Value Ref Range Status   05/10/2021 31 (H) 7 - 30 mg/dL Final     Creatinine   Date Value Ref Range Status   05/10/2021 2.37 (H) 0.66 - 1.25 mg/dL Final     GFR Estimate   Date Value Ref Range Status   05/10/2021 25 (L) >60 mL/min/[1.73_m2] Final     Comment:     Non  GFR Calc  Starting 12/18/2018, serum creatinine based estimated GFR (eGFR) will be   calculated using the Chronic Kidney Disease Epidemiology Collaboration   (CKD-EPI) equation.       Calcium   Date Value Ref Range Status   05/10/2021 8.9 8.5 - 10.1 mg/dL Final     Bilirubin Total   Date Value Ref Range Status   04/01/2021 0.4 0.2 - 1.3 mg/dL Final     Alkaline Phosphatase   Date Value Ref Range Status   04/01/2021 102 40 - 150 U/L Final     ALT   Date Value Ref Range Status   04/01/2021 33 0 - 70 U/L Final     AST   Date Value Ref Range Status   04/01/2021 25 0 - 45 U/L Final       PATHOLOGY:  Reviewed as per HPI.    IMAGING:  Reviewed as per HPI.    ASSESSMENT/PLAN:  Justyn Olivas is a 80 year old male with the following issues:  1. Anemia of chronic kidney disease  2. Congestive heart failure  3. Stage 3B chronic kidney disease  4. Vitamin B12 deficiency  5. Iron deficiency  6. Diabetes mellitus type 2  --Discussed with Kamran that his anemia is likely multifactorial, attributed to component of low vitamin B12, iron deficiency, and anemia of chronic kidney disease.    --3/2021 EGD showed 3 non-bleeding angioectasias and colonoscopy showed non-bleeding angiodysplastic  lesion.  --Discussed 5/14 bone marrow pathology which shows no evidence for malignancy or iron deficiency.  --Discussed that he is a candidate for MARGE given that his Hgb remains < 10 g/dL.  --Discussed risks/benefits of Aranesp to be given every 3 weeks. He agrees to proceed.  --He will continue on B12 and folate supplement.  --Will keep lab check for Kendra to recheck B12 level.    Kristine Nash MD  Hematology/Oncology  [facility]    Total time spent: 33 minutes in review of labs, patient evaluation, discussion of plan of care, medication orders, and documentation.    Kamran is a 80 year old who is being evaluated via a billable video visit.      How would you like to obtain your AVS? MyChart  If the video visit is dropped, the invitation should be resent by: Text to cell phone: - [phone]   Will anyone else be joining your video visit? No        Video-Visit Details    Type of service:  Video Visit    Originating Location (pt. Location): Home    Distant Location (provider location):  [facility]     Platform used for Video Visit: Corwin        Again, thank you for allowing me to participate in the care of your patient.        Sincerely,        Kristine Nash MD

## 2021-05-20 NOTE — PROGRESS NOTES
Kamran is a 80 year old who is being evaluated via a billable video visit.      How would you like to obtain your AVS? [employer]  If the video visit is dropped, the invitation should be resent by: Text to cell phone: - [phone]   Will anyone else be joining your video visit? No        Video-Visit Details    Type of service:  Video Visit    Originating Location (pt. Location): Home    Distant Location (provider location):  [facility]     Platform used for Video Visit: [employer]

## 2021-05-21 ENCOUNTER — OFFICE VISIT (OUTPATIENT)
Dept: CARDIOLOGY | Facility: CLINIC | Age: 81
End: 2021-05-21
Attending: PHYSICIAN ASSISTANT
Payer: MEDICARE

## 2021-05-21 VITALS
HEIGHT: 72 IN | BODY MASS INDEX: 29.26 KG/M2 | SYSTOLIC BLOOD PRESSURE: 138 MMHG | OXYGEN SATURATION: 97 % | WEIGHT: 216 LBS | DIASTOLIC BLOOD PRESSURE: 81 MMHG | HEART RATE: 88 BPM

## 2021-05-21 DIAGNOSIS — E11.9 TYPE 2 DIABETES MELLITUS WITHOUT COMPLICATION, WITH LONG-TERM CURRENT USE OF INSULIN (H): ICD-10-CM

## 2021-05-21 DIAGNOSIS — D63.8 ANEMIA IN OTHER CHRONIC DISEASES CLASSIFIED ELSEWHERE: ICD-10-CM

## 2021-05-21 DIAGNOSIS — Z79.4 TYPE 2 DIABETES MELLITUS WITHOUT COMPLICATION, WITH LONG-TERM CURRENT USE OF INSULIN (H): ICD-10-CM

## 2021-05-21 DIAGNOSIS — N18.4 STAGE 4 CHRONIC KIDNEY DISEASE (H): ICD-10-CM

## 2021-05-21 DIAGNOSIS — I10 BENIGN ESSENTIAL HYPERTENSION: ICD-10-CM

## 2021-05-21 DIAGNOSIS — I50.32 CHRONIC HEART FAILURE WITH PRESERVED EJECTION FRACTION (H): Primary | ICD-10-CM

## 2021-05-21 PROBLEM — I50.33 ACUTE ON CHRONIC DIASTOLIC HEART FAILURE (H): Status: RESOLVED | Noted: 2021-03-29 | Resolved: 2021-05-21

## 2021-05-21 PROBLEM — R06.02 SHORTNESS OF BREATH: Status: RESOLVED | Noted: 2021-03-26 | Resolved: 2021-05-21

## 2021-05-21 PROBLEM — R06.03 ACUTE RESPIRATORY DISTRESS: Status: RESOLVED | Noted: 2020-12-20 | Resolved: 2021-05-21

## 2021-05-21 PROBLEM — I31.39 PERICARDIAL EFFUSION: Status: RESOLVED | Noted: 2020-12-20 | Resolved: 2021-05-21

## 2021-05-21 PROCEDURE — 99215 OFFICE O/P EST HI 40 MIN: CPT | Performed by: INTERNAL MEDICINE

## 2021-05-21 ASSESSMENT — MIFFLIN-ST. JEOR: SCORE: 1714.83

## 2021-05-21 NOTE — PROGRESS NOTES
Service Date: 05/21/2021    PRIMARY CARE PROVIDER:  Christian Davidson MD    CARDIOLOGY SHAHANA: Shaniqua Ram PA-C    REASON FOR VISIT:  Followup of heart failure with preserved ejection fraction and recent hospitalization in 04/2021.    HISTORY OF PRESENT ILLNESS:    It was my pleasure to follow up with Kamran, who was accompanied by his wife.  I had met him during his recent heart failure and anemia hospitalization 3/26-04/01/2021.    Kamran's medical history is significant for:  1.  Heart failure with preserved ejection fraction, LVEF 55%.  2.  Stage IV CKD with a creatinine of 2.3-2.4 and estimated GFR of 25.  3.  Recurrent anemia of unclear etiology, requiring blood transfusion.  Currently stable over the last 4 weeks.  Being started on Aranesp for kidney disease.  4.  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  5.  Aspirin stopped due to anemia.  He had no history of CAD.  6.  Type 2 diabetes mellitus.    Kamran has made many commendable changes since his hospital discharge.  He walks most days, has 2 weekly sessions with a , consistently takes his medications.  His BP today is 138/81 mmHg.  Weight is down to 216 pounds (he was 230 pounds in the hospital).  He is on 10 mg of torsemide.  His kidneys have tolerated this well with the most recent sodium of 141, potassium 4.7, BUN 31, creatinine at baseline at 2.3 with a GFR of 25.  Although he remains anemic, his hemoglobin is stabilized at 9.2 and is due to start getting Aranesp.  He is on carvedilol 6.25 mg twice daily with a heart rate in the 80s (sinus).  His aspirin was stopped at his recent hospitalization and he remains on clopidogrel 75 mg daily.  He has never had an overt blood loss.    PHYSICAL EXAMINATION:    VITAL SIGNS:  /81, pulse 88 per minute.  Height 5 feet 11 inches, weight 216 pounds, BMI 30 kg/m2.  GENERAL:  Mild pallor.  No icterus.  MUSCULOSKELETAL:  Walks independently.  Good muscle mass.  EXTREMITIES:  1+ bilateral pitting  edema.  CARDIOVASCULAR:  Normal JVP.  Heart sounds are regular.  No audible murmur.  LUNGS:  Clear to auscultation.    DIAGNOSES:  1.  Heart failure with preserved ejection fraction, LVEF 55%, NYHA class I.  2.  Other comorbidities listed above.    ASSESSMENT/PLAN:  1.  The patient is clinically stable following his recent heart failure hospitalization.  No changes to medication.  2.  I reiterated heart failure education and counseling.  A low-sodium diet is extremely important.  I congratulated him on walking every day.  3.  Anemia management per Dr. Davidson and his renal team.  4.  Shaniqua Yo is doing an excellent job managing his comorbidities and heart failure.  He will follow up with her in 6 months.  5.  I will see him yearly or as needed.    Total time 40 minutes.    cc:  Christian Davidson MD  [address] [address]    Shaniqua Ram, [facility]  [address] [place], [place] [id number] [place] [address] Glo Rosenbaum MD        D: 2021   T: 2021   MT: al    Name:     LISA CAMARA  MRN:      [id number]        Account:      [id number]   :      1940           Service Date: 2021       Document: [id number]

## 2021-05-21 NOTE — PATIENT INSTRUCTIONS
MEDICATIONS:   1.  Stop the potassium pills.  2.  No changes to other medications.    FOLLOW-UP:  1.  Follow-up with Shaniqua Ram in 6 months.    If you have any questions or concerns, please contact my nurses at [phone].

## 2021-05-21 NOTE — LETTER
5/21/2021    Christian Davidson MD, MD  [address] [facility] [address]  [facility] [address]    RE: Justyn Olivas       Dear Colleague,    I had the pleasure of seeing Justyn Olivas in the [facility].    Clinic visit note dictated. Dictation reference number - [id number]        REVIEW OF SYSTEMS:  A comprehensive 10-point review of systems was completed and the pertinent positives are documented in the history of present illness.    Skin:  Negative     Eyes:  Positive for glasses  ENT:  Positive for hearing loss  Respiratory:  Positive for dyspnea on exertion  Cardiovascular:  Negative Positive for;edema;fatigue  Gastroenterology: Positive for reflux  Genitourinary:  Negative    Musculoskeletal:  Positive for    Neurologic:  Positive for numbness or tingling of feet  Psychiatric:  Negative    Heme/Lymph/Imm:  Positive for chills  Endocrine:  Positive for diabetes    CURRENT MEDICATIONS:  Current Outpatient Medications   Medication Sig Dispense Refill     albuterol (PROAIR HFA) 108 (90 Base) MCG/ACT inhaler INHALE 2 PUFFS BY MOUTH FOUR TIMES DAILY AS NEEDED 8.5 g 3     atorvastatin (LIPITOR) 20 MG tablet Take 1 tablet (20 mg) by mouth daily 90 tablet 3     blood glucose (STEVE CONTOUR) test strip TESTING FOUR TIMES DAILY OR AS DIRECTED 400 strip 0     carvedilol (COREG) 6.25 MG tablet Take 1 tablet (6.25 mg) by mouth 2 times daily (with meals) 180 tablet 3     clopidogrel (PLAVIX) 75 MG tablet Take 1 tablet (75 mg) by mouth daily 90 tablet 3     cyclobenzaprine (FLEXERIL) 10 MG tablet 1/2 po bid prn & 1po qhs (Patient taking differently: Take 5-10 mg by mouth 3 times daily as needed 1/2 po bid prn & 1po qhs) 30 tablet 0     diphenhydrAMINE-acetaminophen (TYLENOL PM)  MG tablet Take 2 tablets by mouth nightly as needed for sleep       dorzolamide-timolol (COSOPT) 2-0.5 % ophthalmic solution Place 1 drop into both eyes 2 times daily       fluticasone  (FLONASE) 50 MCG/ACT nasal spray Spray 2 sprays into both nostrils daily       folic acid-vit B6-vit B12 (FOLGARD) 0.8-10-0.115 MG TABS per tablet Take 1 tablet by mouth daily Continue per Nephrology recommendation 30 tablet 0     insulin glargine (LANTUS SOLOSTAR) 100 UNIT/ML pen INJECT 50 UNITS UNDER THE SKIN AT BEDTIME 45 mL 3     insulin lispro (HUMALOG KWIKPEN) 100 UNIT/ML (1 unit dial) KWIKPEN INJECT UP TO 55 UNITS UNDER THE SKIN ONCE DAILY AS DIRECTED (Patient taking differently: Inject Subcutaneous 3 times daily (before meals) Sliding scale with meals) 45 mL 1     insulin pen needle (BD FERCHO U/F) 32G X 4 MM miscellaneous USE AS DIRECTED UP TO FOUR TIMES DAILY 400 each 3     order for DME Hip steroid injectoion 1 Units 0     pantoprazole (PROTONIX) 40 MG EC tablet TAKE 1 TABLET(40 MG) BY MOUTH TWICE DAILY (Patient taking differently: Take 40 mg by mouth At Bedtime TAKE 1 TABLET(40 MG) BY MOUTH TWICE DAILY) 180 tablet 2     torsemide (DEMADEX) 10 MG tablet Take 1 tablet (10 mg) by mouth daily       traZODone (DESYREL) 100 MG tablet TAKE 1 TABLET(100 MG) BY MOUTH AT BEDTIME 90 tablet 3     vitamin B-12 (CYANOCOBALAMIN) 1000 MCG tablet Take 1 tablet (1,000 mcg) by mouth daily Continue unitl hematology evaluation as borderline low vitamin b12. 90 tablet 3         ALLERGIES:  No Known Allergies    PAST MEDICAL HISTORY:    Past Medical History:   Diagnosis Date     Cerebral infarction (H)      Diabetes (H)      Hyperlipemia        PAST SURGICAL HISTORY:    Past Surgical History:   Procedure Laterality Date     BONE MARROW BIOPSY, BONE SPECIMEN, NEEDLE/TROCAR N/A 5/14/2021    Procedure: BIOPSY, BONE MARROW;  Surgeon: Juanjo Hoffman MD;  Location:  GI     CV PERICARDIOCENTESIS N/A 12/21/2020    Procedure: Pericardiocentesis;  Surgeon: Chas Chambers MD;  Location:  HEART CARDIAC CATH LAB     ESOPHAGOSCOPY, GASTROSCOPY, DUODENOSCOPY (EGD), COMBINED N/A 3/27/2021    Procedure: Esophagogastroduodenoscopy,  "With Biopsy;  Surgeon: Luc Nash MD;  Location: [facility]       FAMILY HISTORY:    Family History   Problem Relation Age of Onset     Family History Negative Mother      Family History Negative Father        SOCIAL HISTORY:    Social History     Socioeconomic History     Marital status:      Spouse name: Cecile     Number of children: None     Years of education: None     Highest education level: None   Occupational History     None   Social Needs     Financial resource strain: None     Food insecurity     Worry: None     Inability: None     Transportation needs     Medical: None     Non-medical: None   Tobacco Use     Smoking status: Former Smoker     Packs/day: 2.00     Years: 11.00     Pack years: 22.00     Types: Cigarettes     Start date:      Quit date:      Years since quittin.4     Smokeless tobacco: Never Used   Substance and Sexual Activity     Alcohol use: No     Alcohol/week: 0.0 standard drinks     Drug use: No     Sexual activity: Yes     Partners: Female     Birth control/protection: None   Lifestyle     Physical activity     Days per week: None     Minutes per session: None     Stress: None   Relationships     Social connections     Talks on phone: None     Gets together: None     Attends Anabaptism service: None     Active member of club or organization: None     Attends meetings of clubs or organizations: None     Relationship status: None     Intimate partner violence     Fear of current or ex partner: None     Emotionally abused: None     Physically abused: None     Forced sexual activity: None   Other Topics Concern     Parent/sibling w/ CABG, MI or angioplasty before 65F 55M? Not Asked   Social History Narrative     None       PHYSICAL EXAM:    Vitals: /81   Pulse 88   Ht 1.816 m (5' 11.5\")   Wt 98 kg (216 lb)   SpO2 97%   BMI 29.71 kg/m    Wt Readings from Last 5 Encounters:   21 98 kg (216 lb)   21 97.1 kg (214 lb)   21 100.7 kg (222 lb) "   04/14/21 99.7 kg (219 lb 11.2 oz)   04/05/21 96.1 kg (211 lb 12.8 oz)             Encounter Diagnoses   Name Primary?     Chronic heart failure with preserved ejection fraction (H) Yes     Type 2 diabetes mellitus without complication, with long-term current use of insulin (H)      Benign essential hypertension      Stage 4 chronic kidney disease (H)      Anemia in other chronic diseases classified elsewhere        Orders Placed This Encounter   Procedures     Follow-Up with [facility] - [facility]NA visit     Thank you for allowing me to participate in the care of your patient.      Sincerely,     Cielo Rosenbaum MD     [facility]    cc:   TERRANCE JamesC  [address]  [place]  [place] [address]

## 2021-05-21 NOTE — PROGRESS NOTES
Clinic visit note dictated. Dictation reference number - [id number]        REVIEW OF SYSTEMS:  A comprehensive 10-point review of systems was completed and the pertinent positives are documented in the history of present illness.    Skin:  Negative     Eyes:  Positive for glasses  ENT:  Positive for hearing loss  Respiratory:  Positive for dyspnea on exertion  Cardiovascular:  Negative Positive for;edema;fatigue  Gastroenterology: Positive for reflux  Genitourinary:  Negative    Musculoskeletal:  Positive for    Neurologic:  Positive for numbness or tingling of feet  Psychiatric:  Negative    Heme/Lymph/Imm:  Positive for chills  Endocrine:  Positive for diabetes    CURRENT MEDICATIONS:  Current Outpatient Medications   Medication Sig Dispense Refill     albuterol (PROAIR HFA) 108 (90 Base) MCG/ACT inhaler INHALE 2 PUFFS BY MOUTH FOUR TIMES DAILY AS NEEDED 8.5 g 3     atorvastatin (LIPITOR) 20 MG tablet Take 1 tablet (20 mg) by mouth daily 90 tablet 3     blood glucose (STEVE CONTOUR) test strip TESTING FOUR TIMES DAILY OR AS DIRECTED 400 strip 0     carvedilol (COREG) 6.25 MG tablet Take 1 tablet (6.25 mg) by mouth 2 times daily (with meals) 180 tablet 3     clopidogrel (PLAVIX) 75 MG tablet Take 1 tablet (75 mg) by mouth daily 90 tablet 3     cyclobenzaprine (FLEXERIL) 10 MG tablet 1/2 po bid prn & 1po qhs (Patient taking differently: Take 5-10 mg by mouth 3 times daily as needed 1/2 po bid prn & 1po qhs) 30 tablet 0     diphenhydrAMINE-acetaminophen (TYLENOL PM)  MG tablet Take 2 tablets by mouth nightly as needed for sleep       dorzolamide-timolol (COSOPT) 2-0.5 % ophthalmic solution Place 1 drop into both eyes 2 times daily       fluticasone (FLONASE) 50 MCG/ACT nasal spray Spray 2 sprays into both nostrils daily       folic acid-vit B6-vit B12 (FOLGARD) 0.8-10-0.115 MG TABS per tablet Take 1 tablet by mouth daily Continue per Nephrology recommendation 30 tablet 0     insulin glargine (LANTUS SOLOSTAR) 100  UNIT/ML pen INJECT 50 UNITS UNDER THE SKIN AT BEDTIME 45 mL 3     insulin lispro (HUMALOG KWIKPEN) 100 UNIT/ML (1 unit dial) KWIKPEN INJECT UP TO 55 UNITS UNDER THE SKIN ONCE DAILY AS DIRECTED (Patient taking differently: Inject Subcutaneous 3 times daily (before meals) Sliding scale with meals) 45 mL 1     insulin pen needle (BD FERCHO U/F) 32G X 4 MM miscellaneous USE AS DIRECTED UP TO FOUR TIMES DAILY 400 each 3     order for DME Hip steroid injectoion 1 Units 0     pantoprazole (PROTONIX) 40 MG EC tablet TAKE 1 TABLET(40 MG) BY MOUTH TWICE DAILY (Patient taking differently: Take 40 mg by mouth At Bedtime TAKE 1 TABLET(40 MG) BY MOUTH TWICE DAILY) 180 tablet 2     torsemide (DEMADEX) 10 MG tablet Take 1 tablet (10 mg) by mouth daily       traZODone (DESYREL) 100 MG tablet TAKE 1 TABLET(100 MG) BY MOUTH AT BEDTIME 90 tablet 3     vitamin B-12 (CYANOCOBALAMIN) 1000 MCG tablet Take 1 tablet (1,000 mcg) by mouth daily Continue unitl hematology evaluation as borderline low vitamin b12. 90 tablet 3         ALLERGIES:  No Known Allergies    PAST MEDICAL HISTORY:    Past Medical History:   Diagnosis Date     Cerebral infarction (H)      Diabetes (H)      Hyperlipemia        PAST SURGICAL HISTORY:    Past Surgical History:   Procedure Laterality Date     BONE MARROW BIOPSY, BONE SPECIMEN, NEEDLE/TROCAR N/A 5/14/2021    Procedure: BIOPSY, BONE MARROW;  Surgeon: Juanjo Hoffman MD;  Location:  GI     CV PERICARDIOCENTESIS N/A 12/21/2020    Procedure: Pericardiocentesis;  Surgeon: Chas Chambers MD;  Location:  [facility]     ESOPHAGOSCOPY, GASTROSCOPY, DUODENOSCOPY (EGD), COMBINED N/A 3/27/2021    Procedure: Esophagogastroduodenoscopy, With Biopsy;  Surgeon: Luc Nash MD;  Location:  GI       FAMILY HISTORY:    Family History   Problem Relation Age of Onset     Family History Negative Mother      Family History Negative Father        SOCIAL HISTORY:    Social History     Socioeconomic History  "    Marital status:      Spouse name: Cecile     Number of children: None     Years of education: None     Highest education level: None   Occupational History     None   Social Needs     Financial resource strain: None     Food insecurity     Worry: None     Inability: None     Transportation needs     Medical: None     Non-medical: None   Tobacco Use     Smoking status: Former Smoker     Packs/day: 2.00     Years: 11.00     Pack years: 22.00     Types: Cigarettes     Start date:      Quit date:      Years since quittin.4     Smokeless tobacco: Never Used   Substance and Sexual Activity     Alcohol use: No     Alcohol/week: 0.0 standard drinks     Drug use: No     Sexual activity: Yes     Partners: Female     Birth control/protection: None   Lifestyle     Physical activity     Days per week: None     Minutes per session: None     Stress: None   Relationships     Social connections     Talks on phone: None     Gets together: None     Attends [employer] service: None     Active member of club or organization: None     Attends meetings of clubs or organizations: None     Relationship status: None     Intimate partner violence     Fear of current or ex partner: None     Emotionally abused: None     Physically abused: None     Forced sexual activity: None   Other Topics Concern     Parent/sibling w/ CABG, MI or angioplasty before 65F 55M? Not Asked   Social History Narrative     None       PHYSICAL EXAM:    Vitals: /81   Pulse 88   Ht 1.816 m (5' 11.5\")   Wt 98 kg (216 lb)   SpO2 97%   BMI 29.71 kg/m    Wt Readings from Last 5 Encounters:   21 98 kg (216 lb)   21 97.1 kg (214 lb)   21 100.7 kg (222 lb)   21 99.7 kg (219 lb 11.2 oz)   21 96.1 kg (211 lb 12.8 oz)             Encounter Diagnoses   Name Primary?     Chronic heart failure with preserved ejection fraction (H) Yes     Type 2 diabetes mellitus without complication, with long-term current use of insulin " (H)      Benign essential hypertension      Stage 4 chronic kidney disease (H)      Anemia in other chronic diseases classified elsewhere        Orders Placed This Encounter   Procedures     Follow-Up with [facility] - SHAHANA visit

## 2021-05-24 ENCOUNTER — PATIENT OUTREACH (OUTPATIENT)
Dept: CARE COORDINATION | Facility: CLINIC | Age: 81
End: 2021-05-24

## 2021-05-24 ASSESSMENT — ACTIVITIES OF DAILY LIVING (ADL): DEPENDENT_IADLS:: TRANSPORTATION

## 2021-05-24 NOTE — PROGRESS NOTES
Clinic Care Coordination Contact  [facility]/Voicemail    Referral Source: IP Report  Clinical Data: Care Coordinator Outreach  CC RN outreach to get updates on patient status, assess goal progress, and provide support and additional resources as needed.    Outreach attempted x 1.  Left message on patient's voicemail with call back information and requested return call.    Plan: Care Coordinator will try to reach patient again in 3-5 business days.    Jerel Leija RN  Clinic Care Coordinator  [facility] [place], [place], [facility]  Ph: [phone]

## 2021-05-27 ENCOUNTER — ALLIED HEALTH/NURSE VISIT (OUTPATIENT)
Dept: INFUSION THERAPY | Facility: CLINIC | Age: 81
End: 2021-05-27
Attending: INTERNAL MEDICINE
Payer: MEDICARE

## 2021-05-27 ENCOUNTER — HOSPITAL ENCOUNTER (OUTPATIENT)
Facility: CLINIC | Age: 81
Setting detail: SPECIMEN
Discharge: HOME | End: 2021-05-27
Attending: INTERNAL MEDICINE | Admitting: INTERNAL MEDICINE
Payer: MEDICARE

## 2021-05-27 VITALS — RESPIRATION RATE: 16 BRPM | DIASTOLIC BLOOD PRESSURE: 74 MMHG | HEART RATE: 81 BPM | SYSTOLIC BLOOD PRESSURE: 140 MMHG

## 2021-05-27 DIAGNOSIS — D63.1 ANEMIA DUE TO STAGE 3 CHRONIC KIDNEY DISEASE, UNSPECIFIED WHETHER STAGE 3A OR 3B CKD (H): ICD-10-CM

## 2021-05-27 DIAGNOSIS — N18.30 ANEMIA DUE TO STAGE 3 CHRONIC KIDNEY DISEASE, UNSPECIFIED WHETHER STAGE 3A OR 3B CKD (H): ICD-10-CM

## 2021-05-27 DIAGNOSIS — D63.1 ANEMIA DUE TO STAGE 3 CHRONIC KIDNEY DISEASE, UNSPECIFIED WHETHER STAGE 3A OR 3B CKD (H): Primary | ICD-10-CM

## 2021-05-27 DIAGNOSIS — N18.30 ANEMIA DUE TO STAGE 3 CHRONIC KIDNEY DISEASE, UNSPECIFIED WHETHER STAGE 3A OR 3B CKD (H): Primary | ICD-10-CM

## 2021-05-27 LAB
BASOPHILS # BLD AUTO: 0 10E9/L (ref 0–0.2)
BASOPHILS NFR BLD AUTO: 0.5 %
DIFFERENTIAL METHOD BLD: ABNORMAL
EOSINOPHIL # BLD AUTO: 0.3 10E9/L (ref 0–0.7)
EOSINOPHIL NFR BLD AUTO: 3.1 %
ERYTHROCYTE [DISTWIDTH] IN BLOOD BY AUTOMATED COUNT: 14.1 % (ref 10–15)
FERRITIN SERPL-MCNC: 30 NG/ML (ref 26–388)
HCT VFR BLD AUTO: 31.6 % (ref 40–53)
HGB BLD-MCNC: 9.6 G/DL (ref 13.3–17.7)
IMM GRANULOCYTES # BLD: 0 10E9/L (ref 0–0.4)
IMM GRANULOCYTES NFR BLD: 0.2 %
IRON SATN MFR SERPL: 22 % (ref 15–46)
IRON SERPL-MCNC: 82 UG/DL (ref 35–180)
LYMPHOCYTES # BLD AUTO: 0.9 10E9/L (ref 0.8–5.3)
LYMPHOCYTES NFR BLD AUTO: 11 %
MCH RBC QN AUTO: 26 PG (ref 26.5–33)
MCHC RBC AUTO-ENTMCNC: 30.4 G/DL (ref 31.5–36.5)
MCV RBC AUTO: 86 FL (ref 78–100)
MONOCYTES # BLD AUTO: 0.8 10E9/L (ref 0–1.3)
MONOCYTES NFR BLD AUTO: 10.4 %
NEUTROPHILS # BLD AUTO: 6.1 10E9/L (ref 1.6–8.3)
NEUTROPHILS NFR BLD AUTO: 74.8 %
NRBC # BLD AUTO: 0 10*3/UL
NRBC BLD AUTO-RTO: 0 /100
PLATELET # BLD AUTO: 176 10E9/L (ref 150–450)
RBC # BLD AUTO: 3.69 10E12/L (ref 4.4–5.9)
TIBC SERPL-MCNC: 374 UG/DL (ref 240–430)
VIT B12 SERPL-MCNC: 1312 PG/ML (ref 193–986)
WBC # BLD AUTO: 8.1 10E9/L (ref 4–11)

## 2021-05-27 PROCEDURE — 82607 VITAMIN B-12: CPT | Performed by: INTERNAL MEDICINE

## 2021-05-27 PROCEDURE — 83540 ASSAY OF IRON: CPT | Performed by: INTERNAL MEDICINE

## 2021-05-27 PROCEDURE — 36415 COLL VENOUS BLD VENIPUNCTURE: CPT

## 2021-05-27 PROCEDURE — 82728 ASSAY OF FERRITIN: CPT | Performed by: INTERNAL MEDICINE

## 2021-05-27 PROCEDURE — 250N000011 HC RX IP 250 OP 636: Performed by: INTERNAL MEDICINE

## 2021-05-27 PROCEDURE — 96372 THER/PROPH/DIAG INJ SC/IM: CPT | Performed by: INTERNAL MEDICINE

## 2021-05-27 PROCEDURE — 85025 COMPLETE CBC W/AUTO DIFF WBC: CPT | Performed by: INTERNAL MEDICINE

## 2021-05-27 PROCEDURE — 83550 IRON BINDING TEST: CPT | Performed by: INTERNAL MEDICINE

## 2021-05-27 RX ORDER — DIPHENHYDRAMINE HYDROCHLORIDE 50 MG/ML
50 INJECTION INTRAMUSCULAR; INTRAVENOUS
Status: CANCELLED
Start: 2021-05-27

## 2021-05-27 RX ORDER — MEPERIDINE HYDROCHLORIDE 25 MG/ML
25 INJECTION INTRAMUSCULAR; INTRAVENOUS; SUBCUTANEOUS EVERY 30 MIN PRN
Status: CANCELLED | OUTPATIENT
Start: 2021-05-27

## 2021-05-27 RX ORDER — NALOXONE HYDROCHLORIDE 0.4 MG/ML
0.2 INJECTION, SOLUTION INTRAMUSCULAR; INTRAVENOUS; SUBCUTANEOUS
Status: CANCELLED | OUTPATIENT
Start: 2021-05-27

## 2021-05-27 RX ORDER — METHYLPREDNISOLONE SODIUM SUCCINATE 125 MG/2ML
125 INJECTION, POWDER, LYOPHILIZED, FOR SOLUTION INTRAMUSCULAR; INTRAVENOUS
Status: CANCELLED
Start: 2021-05-27

## 2021-05-27 RX ORDER — ALBUTEROL SULFATE 5 MG/ML
2.5 SOLUTION RESPIRATORY (INHALATION)
Status: CANCELLED | OUTPATIENT
Start: 2021-05-27

## 2021-05-27 RX ORDER — EPINEPHRINE 1 MG/ML
0.3 INJECTION, SOLUTION INTRAMUSCULAR; SUBCUTANEOUS EVERY 5 MIN PRN
Status: CANCELLED | OUTPATIENT
Start: 2021-05-27

## 2021-05-27 RX ORDER — ALBUTEROL SULFATE 90 UG/1
1-2 AEROSOL, METERED RESPIRATORY (INHALATION)
Status: CANCELLED
Start: 2021-05-27

## 2021-05-27 RX ADMIN — DARBEPOETIN ALFA 40 MCG: 40 INJECTION, SOLUTION INTRAVENOUS; SUBCUTANEOUS at 12:06

## 2021-05-27 ASSESSMENT — PAIN SCALES - GENERAL: PAINLEVEL: NO PAIN (0)

## 2021-05-27 NOTE — PROGRESS NOTES
Infusion Nursing Note:  Justyn Olivas presents today for aranesp.    Patient seen by provider today: No   present during visit today: Not Applicable.    Note: N/A.    Intravenous Access:  No Intravenous access/labs at this visit.    Treatment Conditions:  Lab Results   Component Value Date    HGB 9.6 05/27/2021     Lab Results   Component Value Date    WBC 8.1 05/27/2021      Lab Results   Component Value Date    ANEU 6.1 05/27/2021     Lab Results   Component Value Date     05/27/2021      Results reviewed, labs MET treatment parameters, ok to proceed with treatment.      Post Infusion Assessment:  Patient tolerated injection without incident.       Discharge Plan:   Patient declined prescription refills.  Discharge instructions reviewed with: Patient.  Patient and/or family verbalized understanding of discharge instructions and all questions answered.  AVS to patient via [employer].  Patient will return 6/23/21 for next appointment.   Patient discharged in stable condition accompanied by: self.  Departure Mode: Ambulatory.      Deepthi Haley RN

## 2021-05-27 NOTE — PROGRESS NOTES
Medical Assistant Note:  Justyn Olivas presents today for lab draw.    Patient seen by provider today: No.   present during visit today: Not Applicable.    Concerns: No Concerns.    Procedure:  Lab draw site: RAC, Needle type: Butterfly, Gauge: 23.    Post Assessment:  Labs drawn without difficulty: Yes.    Discharge Plan:  Departure Mode: Ambulatory.    Face to Face Time: 4 min.    Shari Schoenberger, CMA

## 2021-06-02 ENCOUNTER — HOSPITAL ENCOUNTER (INPATIENT)
Facility: CLINIC | Age: 81
LOS: 2 days | Discharge: HOME | DRG: 638 | End: 2021-06-04
Attending: EMERGENCY MEDICINE | Admitting: STUDENT IN AN ORGANIZED HEALTH CARE EDUCATION/TRAINING PROGRAM
Payer: MEDICARE

## 2021-06-02 ENCOUNTER — MYC MEDICAL ADVICE (OUTPATIENT)
Dept: FAMILY MEDICINE | Facility: CLINIC | Age: 81
End: 2021-06-02

## 2021-06-02 DIAGNOSIS — R73.9 HYPERGLYCEMIA: ICD-10-CM

## 2021-06-02 DIAGNOSIS — E11.9 TYPE 2 DIABETES MELLITUS WITHOUT COMPLICATION, WITH LONG-TERM CURRENT USE OF INSULIN (H): ICD-10-CM

## 2021-06-02 DIAGNOSIS — E87.6 HYPOKALEMIA: ICD-10-CM

## 2021-06-02 DIAGNOSIS — Z79.4 TYPE 2 DIABETES MELLITUS WITH STAGE 3B CHRONIC KIDNEY DISEASE, WITH LONG-TERM CURRENT USE OF INSULIN (H): Primary | ICD-10-CM

## 2021-06-02 DIAGNOSIS — Z79.4 TYPE 2 DIABETES MELLITUS WITHOUT COMPLICATION, WITH LONG-TERM CURRENT USE OF INSULIN (H): ICD-10-CM

## 2021-06-02 DIAGNOSIS — N18.32 TYPE 2 DIABETES MELLITUS WITH STAGE 3B CHRONIC KIDNEY DISEASE, WITH LONG-TERM CURRENT USE OF INSULIN (H): Primary | ICD-10-CM

## 2021-06-02 DIAGNOSIS — E11.22 TYPE 2 DIABETES MELLITUS WITH STAGE 3B CHRONIC KIDNEY DISEASE, WITH LONG-TERM CURRENT USE OF INSULIN (H): Primary | ICD-10-CM

## 2021-06-02 LAB
ALBUMIN SERPL-MCNC: 3.6 G/DL (ref 3.4–5)
ALP SERPL-CCNC: 118 U/L (ref 40–150)
ALT SERPL W P-5'-P-CCNC: 24 U/L (ref 0–70)
ANION GAP SERPL CALCULATED.3IONS-SCNC: 6 MMOL/L (ref 3–14)
AST SERPL W P-5'-P-CCNC: 14 U/L (ref 0–45)
BASOPHILS # BLD AUTO: 0.1 10E9/L (ref 0–0.2)
BASOPHILS NFR BLD AUTO: 0.7 %
BILIRUB SERPL-MCNC: 0.6 MG/DL (ref 0.2–1.3)
BUN SERPL-MCNC: 56 MG/DL (ref 7–30)
CALCIUM SERPL-MCNC: 8.8 MG/DL (ref 8.5–10.1)
CHLORIDE SERPL-SCNC: 87 MMOL/L (ref 94–109)
CO2 SERPL-SCNC: 39 MMOL/L (ref 20–32)
CREAT SERPL-MCNC: 3.24 MG/DL (ref 0.66–1.25)
DIFFERENTIAL METHOD BLD: ABNORMAL
EOSINOPHIL # BLD AUTO: 0.2 10E9/L (ref 0–0.7)
EOSINOPHIL NFR BLD AUTO: 3.2 %
ERYTHROCYTE [DISTWIDTH] IN BLOOD BY AUTOMATED COUNT: 14.3 % (ref 10–15)
GFR SERPL CREATININE-BSD FRML MDRD: 17 ML/MIN/{1.73_M2}
GLUCOSE SERPL-MCNC: 650 MG/DL (ref 70–99)
HCT VFR BLD AUTO: 28.6 % (ref 40–53)
HGB BLD-MCNC: 9 G/DL (ref 13.3–17.7)
IMM GRANULOCYTES # BLD: 0 10E9/L (ref 0–0.4)
IMM GRANULOCYTES NFR BLD: 0.4 %
LYMPHOCYTES # BLD AUTO: 0.9 10E9/L (ref 0.8–5.3)
LYMPHOCYTES NFR BLD AUTO: 12.4 %
MCH RBC QN AUTO: 26.5 PG (ref 26.5–33)
MCHC RBC AUTO-ENTMCNC: 31.5 G/DL (ref 31.5–36.5)
MCV RBC AUTO: 84 FL (ref 78–100)
MONOCYTES # BLD AUTO: 0.8 10E9/L (ref 0–1.3)
MONOCYTES NFR BLD AUTO: 12.2 %
NEUTROPHILS # BLD AUTO: 4.9 10E9/L (ref 1.6–8.3)
NEUTROPHILS NFR BLD AUTO: 71.1 %
NRBC # BLD AUTO: 0 10*3/UL
NRBC BLD AUTO-RTO: 0 /100
PLATELET # BLD AUTO: 204 10E9/L (ref 150–450)
POTASSIUM SERPL-SCNC: 2.4 MMOL/L (ref 3.4–5.3)
PROT SERPL-MCNC: 7.7 G/DL (ref 6.8–8.8)
RBC # BLD AUTO: 3.39 10E12/L (ref 4.4–5.9)
SODIUM SERPL-SCNC: 132 MMOL/L (ref 133–144)
TROPONIN I SERPL-MCNC: 0.03 UG/L (ref 0–0.04)
WBC # BLD AUTO: 6.9 10E9/L (ref 4–11)

## 2021-06-02 PROCEDURE — 84484 ASSAY OF TROPONIN QUANT: CPT | Performed by: EMERGENCY MEDICINE

## 2021-06-02 PROCEDURE — 85025 COMPLETE CBC W/AUTO DIFF WBC: CPT | Performed by: EMERGENCY MEDICINE

## 2021-06-02 PROCEDURE — 84132 ASSAY OF SERUM POTASSIUM: CPT | Performed by: EMERGENCY MEDICINE

## 2021-06-02 PROCEDURE — 80053 COMPREHEN METABOLIC PANEL: CPT | Performed by: EMERGENCY MEDICINE

## 2021-06-02 PROCEDURE — 99285 EMERGENCY DEPT VISIT HI MDM: CPT

## 2021-06-02 PROCEDURE — C9803 HOPD COVID-19 SPEC COLLECT: HCPCS

## 2021-06-02 PROCEDURE — 96365 THER/PROPH/DIAG IV INF INIT: CPT

## 2021-06-02 PROCEDURE — 120N000001 HC R&B MED SURG/OB

## 2021-06-02 PROCEDURE — 99223 1ST HOSP IP/OBS HIGH 75: CPT | Mod: AI | Performed by: STUDENT IN AN ORGANIZED HEALTH CARE EDUCATION/TRAINING PROGRAM

## 2021-06-02 PROCEDURE — 250N000011 HC RX IP 250 OP 636: Performed by: EMERGENCY MEDICINE

## 2021-06-02 PROCEDURE — 87635 SARS-COV-2 COVID-19 AMP PRB: CPT | Performed by: EMERGENCY MEDICINE

## 2021-06-02 RX ORDER — POTASSIUM CHLORIDE 7.45 MG/ML
10 INJECTION INTRAVENOUS ONCE
Status: COMPLETED | OUTPATIENT
Start: 2021-06-02 | End: 2021-06-03

## 2021-06-02 RX ORDER — POTASSIUM CHLORIDE 7.45 MG/ML
10 INJECTION INTRAVENOUS ONCE
Status: COMPLETED | OUTPATIENT
Start: 2021-06-02 | End: 2021-06-02

## 2021-06-02 RX ORDER — CYCLOBENZAPRINE HCL 10 MG
10 TABLET ORAL
COMMUNITY
End: 2021-01-01

## 2021-06-02 RX ADMIN — POTASSIUM CHLORIDE 10 MEQ: 7.46 INJECTION, SOLUTION INTRAVENOUS at 22:22

## 2021-06-02 ASSESSMENT — ENCOUNTER SYMPTOMS
DIARRHEA: 0
VOMITING: 0
COUGH: 0
ABDOMINAL PAIN: 0
ABDOMINAL DISTENTION: 0
FATIGUE: 1
NAUSEA: 0
SHORTNESS OF BREATH: 1

## 2021-06-02 ASSESSMENT — MIFFLIN-ST. JEOR: SCORE: 1672.87

## 2021-06-03 ENCOUNTER — PATIENT OUTREACH (OUTPATIENT)
Dept: CARE COORDINATION | Facility: CLINIC | Age: 81
End: 2021-06-03

## 2021-06-03 LAB
ALBUMIN UR-MCNC: 10 MG/DL
ANION GAP SERPL CALCULATED.3IONS-SCNC: 4 MMOL/L (ref 3–14)
APPEARANCE UR: CLEAR
BILIRUB UR QL STRIP: NEGATIVE
BUN SERPL-MCNC: 53 MG/DL (ref 7–30)
CALCIUM SERPL-MCNC: 8.7 MG/DL (ref 8.5–10.1)
CHLORIDE SERPL-SCNC: 96 MMOL/L (ref 94–109)
CO2 SERPL-SCNC: 37 MMOL/L (ref 20–32)
COLOR UR AUTO: ABNORMAL
CREAT SERPL-MCNC: 2.83 MG/DL (ref 0.66–1.25)
ERYTHROCYTE [DISTWIDTH] IN BLOOD BY AUTOMATED COUNT: 14.2 % (ref 10–15)
GFR SERPL CREATININE-BSD FRML MDRD: 20 ML/MIN/{1.73_M2}
GLUCOSE BLDC GLUCOMTR-MCNC: 338 MG/DL (ref 70–99)
GLUCOSE BLDC GLUCOMTR-MCNC: 359 MG/DL (ref 70–99)
GLUCOSE BLDC GLUCOMTR-MCNC: 372 MG/DL (ref 70–99)
GLUCOSE BLDC GLUCOMTR-MCNC: 470 MG/DL (ref 70–99)
GLUCOSE BLDC GLUCOMTR-MCNC: 485 MG/DL (ref 70–99)
GLUCOSE SERPL-MCNC: 412 MG/DL (ref 70–99)
GLUCOSE UR STRIP-MCNC: >1000 MG/DL
HCT VFR BLD AUTO: 27.3 % (ref 40–53)
HGB BLD-MCNC: 8.7 G/DL (ref 13.3–17.7)
HGB UR QL STRIP: NEGATIVE
KETONES UR STRIP-MCNC: NEGATIVE MG/DL
LABORATORY COMMENT REPORT: NORMAL
LEUKOCYTE ESTERASE UR QL STRIP: NEGATIVE
MAGNESIUM SERPL-MCNC: 3.2 MG/DL (ref 1.6–2.3)
MCH RBC QN AUTO: 26.6 PG (ref 26.5–33)
MCHC RBC AUTO-ENTMCNC: 31.9 G/DL (ref 31.5–36.5)
MCV RBC AUTO: 84 FL (ref 78–100)
MUCOUS THREADS #/AREA URNS LPF: PRESENT /LPF
NITRATE UR QL: NEGATIVE
PH UR STRIP: 6.5 PH (ref 5–7)
PHOSPHATE SERPL-MCNC: 3 MG/DL (ref 2.5–4.5)
PLATELET # BLD AUTO: 170 10E9/L (ref 150–450)
POTASSIUM SERPL-SCNC: 2.3 MMOL/L (ref 3.4–5.3)
POTASSIUM SERPL-SCNC: 2.5 MMOL/L (ref 3.4–5.3)
POTASSIUM SERPL-SCNC: 2.7 MMOL/L (ref 3.4–5.3)
POTASSIUM SERPL-SCNC: 2.8 MMOL/L (ref 3.4–5.3)
POTASSIUM SERPL-SCNC: 3 MMOL/L (ref 3.4–5.3)
POTASSIUM SERPL-SCNC: 3.1 MMOL/L (ref 3.4–5.3)
RBC # BLD AUTO: 3.27 10E12/L (ref 4.4–5.9)
RBC #/AREA URNS AUTO: 0 /HPF (ref 0–2)
SARS-COV-2 RNA RESP QL NAA+PROBE: NEGATIVE
SODIUM SERPL-SCNC: 137 MMOL/L (ref 133–144)
SOURCE: ABNORMAL
SP GR UR STRIP: 1.01 (ref 1–1.03)
SPECIMEN SOURCE: NORMAL
SQUAMOUS #/AREA URNS AUTO: <1 /HPF (ref 0–1)
UROBILINOGEN UR STRIP-MCNC: 0 MG/DL (ref 0–2)
WBC # BLD AUTO: 7.8 10E9/L (ref 4–11)
WBC #/AREA URNS AUTO: <1 /HPF (ref 0–5)

## 2021-06-03 PROCEDURE — 96367 TX/PROPH/DG ADDL SEQ IV INF: CPT

## 2021-06-03 PROCEDURE — 250N000013 HC RX MED GY IP 250 OP 250 PS 637: Performed by: STUDENT IN AN ORGANIZED HEALTH CARE EDUCATION/TRAINING PROGRAM

## 2021-06-03 PROCEDURE — 999N000127 HC STATISTIC PERIPHERAL IV START W US GUIDANCE

## 2021-06-03 PROCEDURE — 81001 URINALYSIS AUTO W/SCOPE: CPT | Performed by: STUDENT IN AN ORGANIZED HEALTH CARE EDUCATION/TRAINING PROGRAM

## 2021-06-03 PROCEDURE — 250N000012 HC RX MED GY IP 250 OP 636 PS 637: Performed by: STUDENT IN AN ORGANIZED HEALTH CARE EDUCATION/TRAINING PROGRAM

## 2021-06-03 PROCEDURE — 999N000040 HC STATISTIC CONSULT NO CHARGE VASC ACCESS

## 2021-06-03 PROCEDURE — 258N000003 HC RX IP 258 OP 636: Performed by: EMERGENCY MEDICINE

## 2021-06-03 PROCEDURE — 120N000001 HC R&B MED SURG/OB

## 2021-06-03 PROCEDURE — 84100 ASSAY OF PHOSPHORUS: CPT | Performed by: STUDENT IN AN ORGANIZED HEALTH CARE EDUCATION/TRAINING PROGRAM

## 2021-06-03 PROCEDURE — 250N000012 HC RX MED GY IP 250 OP 636 PS 637: Performed by: HOSPITALIST

## 2021-06-03 PROCEDURE — 80048 BASIC METABOLIC PNL TOTAL CA: CPT | Performed by: STUDENT IN AN ORGANIZED HEALTH CARE EDUCATION/TRAINING PROGRAM

## 2021-06-03 PROCEDURE — 258N000003 HC RX IP 258 OP 636: Performed by: STUDENT IN AN ORGANIZED HEALTH CARE EDUCATION/TRAINING PROGRAM

## 2021-06-03 PROCEDURE — 85027 COMPLETE CBC AUTOMATED: CPT | Performed by: STUDENT IN AN ORGANIZED HEALTH CARE EDUCATION/TRAINING PROGRAM

## 2021-06-03 PROCEDURE — 36415 COLL VENOUS BLD VENIPUNCTURE: CPT | Performed by: STUDENT IN AN ORGANIZED HEALTH CARE EDUCATION/TRAINING PROGRAM

## 2021-06-03 PROCEDURE — 84132 ASSAY OF SERUM POTASSIUM: CPT | Performed by: HOSPITALIST

## 2021-06-03 PROCEDURE — 250N000011 HC RX IP 250 OP 636: Performed by: STUDENT IN AN ORGANIZED HEALTH CARE EDUCATION/TRAINING PROGRAM

## 2021-06-03 PROCEDURE — 99233 SBSQ HOSP IP/OBS HIGH 50: CPT | Performed by: HOSPITALIST

## 2021-06-03 PROCEDURE — 36415 COLL VENOUS BLD VENIPUNCTURE: CPT | Performed by: HOSPITALIST

## 2021-06-03 PROCEDURE — 250N000011 HC RX IP 250 OP 636: Performed by: EMERGENCY MEDICINE

## 2021-06-03 PROCEDURE — 83735 ASSAY OF MAGNESIUM: CPT | Performed by: STUDENT IN AN ORGANIZED HEALTH CARE EDUCATION/TRAINING PROGRAM

## 2021-06-03 PROCEDURE — 84132 ASSAY OF SERUM POTASSIUM: CPT | Performed by: STUDENT IN AN ORGANIZED HEALTH CARE EDUCATION/TRAINING PROGRAM

## 2021-06-03 PROCEDURE — 999N001017 HC STATISTIC GLUCOSE BY METER IP

## 2021-06-03 PROCEDURE — 250N000013 HC RX MED GY IP 250 OP 250 PS 637: Performed by: HOSPITALIST

## 2021-06-03 RX ORDER — NICOTINE POLACRILEX 4 MG
15-30 LOZENGE BUCCAL
Status: DISCONTINUED | OUTPATIENT
Start: 2021-06-03 | End: 2021-06-04 | Stop reason: HOSPADM

## 2021-06-03 RX ORDER — LIDOCAINE 40 MG/G
CREAM TOPICAL
Status: DISCONTINUED | OUTPATIENT
Start: 2021-06-03 | End: 2021-06-04 | Stop reason: HOSPADM

## 2021-06-03 RX ORDER — CARVEDILOL 3.12 MG/1
6.25 TABLET ORAL 2 TIMES DAILY WITH MEALS
Status: DISCONTINUED | OUTPATIENT
Start: 2021-06-03 | End: 2021-06-04 | Stop reason: HOSPADM

## 2021-06-03 RX ORDER — ACETAMINOPHEN 325 MG/1
650 TABLET ORAL EVERY 4 HOURS PRN
Status: DISCONTINUED | OUTPATIENT
Start: 2021-06-03 | End: 2021-06-04 | Stop reason: HOSPADM

## 2021-06-03 RX ORDER — POTASSIUM CHLORIDE 1500 MG/1
20 TABLET, EXTENDED RELEASE ORAL ONCE
Status: COMPLETED | OUTPATIENT
Start: 2021-06-03 | End: 2021-06-03

## 2021-06-03 RX ORDER — POTASSIUM CHLORIDE 1500 MG/1
40 TABLET, EXTENDED RELEASE ORAL ONCE
Status: COMPLETED | OUTPATIENT
Start: 2021-06-03 | End: 2021-06-03

## 2021-06-03 RX ORDER — CLOPIDOGREL BISULFATE 75 MG/1
75 TABLET ORAL DAILY
Status: DISCONTINUED | OUTPATIENT
Start: 2021-06-03 | End: 2021-06-04 | Stop reason: HOSPADM

## 2021-06-03 RX ORDER — SODIUM CHLORIDE AND POTASSIUM CHLORIDE 150; 450 MG/100ML; MG/100ML
INJECTION, SOLUTION INTRAVENOUS CONTINUOUS
Status: DISCONTINUED | OUTPATIENT
Start: 2021-06-03 | End: 2021-06-03

## 2021-06-03 RX ORDER — ONDANSETRON 2 MG/ML
4 INJECTION INTRAMUSCULAR; INTRAVENOUS EVERY 6 HOURS PRN
Status: DISCONTINUED | OUTPATIENT
Start: 2021-06-03 | End: 2021-06-04 | Stop reason: HOSPADM

## 2021-06-03 RX ORDER — TRAZODONE HYDROCHLORIDE 50 MG/1
100 TABLET, FILM COATED ORAL AT BEDTIME
Status: DISCONTINUED | OUTPATIENT
Start: 2021-06-03 | End: 2021-06-04 | Stop reason: HOSPADM

## 2021-06-03 RX ORDER — ONDANSETRON 4 MG/1
4 TABLET, ORALLY DISINTEGRATING ORAL EVERY 6 HOURS PRN
Status: DISCONTINUED | OUTPATIENT
Start: 2021-06-03 | End: 2021-06-04 | Stop reason: HOSPADM

## 2021-06-03 RX ORDER — SODIUM CHLORIDE 9 MG/ML
INJECTION, SOLUTION INTRAVENOUS CONTINUOUS
Status: DISCONTINUED | OUTPATIENT
Start: 2021-06-03 | End: 2021-06-03

## 2021-06-03 RX ORDER — DEXTROSE MONOHYDRATE 25 G/50ML
25-50 INJECTION, SOLUTION INTRAVENOUS
Status: DISCONTINUED | OUTPATIENT
Start: 2021-06-03 | End: 2021-06-04 | Stop reason: HOSPADM

## 2021-06-03 RX ORDER — ATORVASTATIN CALCIUM 10 MG/1
20 TABLET, FILM COATED ORAL DAILY
Status: DISCONTINUED | OUTPATIENT
Start: 2021-06-03 | End: 2021-06-04 | Stop reason: HOSPADM

## 2021-06-03 RX ADMIN — POTASSIUM CHLORIDE AND SODIUM CHLORIDE: 450; 150 INJECTION, SOLUTION INTRAVENOUS at 04:35

## 2021-06-03 RX ADMIN — POTASSIUM CHLORIDE 10 MEQ: 7.46 INJECTION, SOLUTION INTRAVENOUS at 00:26

## 2021-06-03 RX ADMIN — CLOPIDOGREL BISULFATE 75 MG: 75 TABLET ORAL at 09:00

## 2021-06-03 RX ADMIN — SODIUM CHLORIDE 1000 ML: 9 INJECTION, SOLUTION INTRAVENOUS at 02:37

## 2021-06-03 RX ADMIN — INSULIN GLARGINE 20 UNITS: 100 INJECTION, SOLUTION SUBCUTANEOUS at 09:06

## 2021-06-03 RX ADMIN — CARVEDILOL 6.25 MG: 3.12 TABLET, FILM COATED ORAL at 17:48

## 2021-06-03 RX ADMIN — TRAZODONE HYDROCHLORIDE 100 MG: 50 TABLET ORAL at 21:26

## 2021-06-03 RX ADMIN — POTASSIUM CHLORIDE 40 MEQ: 1500 TABLET, EXTENDED RELEASE ORAL at 17:48

## 2021-06-03 RX ADMIN — POTASSIUM CHLORIDE 40 MEQ: 1500 TABLET, EXTENDED RELEASE ORAL at 09:01

## 2021-06-03 RX ADMIN — SODIUM CHLORIDE 500 ML: 9 INJECTION, SOLUTION INTRAVENOUS at 01:11

## 2021-06-03 RX ADMIN — POTASSIUM CHLORIDE 40 MEQ: 1500 TABLET, EXTENDED RELEASE ORAL at 12:46

## 2021-06-03 RX ADMIN — CARVEDILOL 6.25 MG: 3.12 TABLET, FILM COATED ORAL at 09:01

## 2021-06-03 RX ADMIN — POTASSIUM CHLORIDE 40 MEQ: 1500 TABLET, EXTENDED RELEASE ORAL at 03:08

## 2021-06-03 RX ADMIN — ATORVASTATIN CALCIUM 20 MG: 10 TABLET, FILM COATED ORAL at 09:00

## 2021-06-03 RX ADMIN — POTASSIUM CHLORIDE 20 MEQ: 1500 TABLET, EXTENDED RELEASE ORAL at 22:33

## 2021-06-03 RX ADMIN — INSULIN GLARGINE 50 UNITS: 100 INJECTION, SOLUTION SUBCUTANEOUS at 21:26

## 2021-06-03 ASSESSMENT — ENCOUNTER SYMPTOMS: POLYDIPSIA: 1

## 2021-06-03 ASSESSMENT — ACTIVITIES OF DAILY LIVING (ADL)
DEPENDENT_IADLS:: TRANSPORTATION
ADLS_ACUITY_SCORE: 15
DEPENDENT_IADLS:: INDEPENDENT

## 2021-06-03 ASSESSMENT — MIFFLIN-ST. JEOR: SCORE: 1665.16

## 2021-06-03 NOTE — ED PROVIDER NOTES
History   Chief Complaint:  Hyperglycemia     HPI   Justyn Olivas is a 80 year old male with history of diabetes, congestive heart failure, cerebral infarction, and chronic kidney disease who presents with hyperglycemia. The patient has been waking up the last few days with his blood sugar in the 300's but has been able to get it down throughout the day. He also reports experiencing shortness of breath the last few days as well. The patient woke up today with his blood sugar in the 300's again but was unable to control it and it went up into the 600's today. He reports fatigue and increased thirst. He denies cough, vomiting, nausea, diarrhea, chest pain, abdominal problems or skin rash.  Patient states he was taken off his potassium pills about 3 weeks ago and that he has a kidney appointment on the 11th.       Review of Systems   Constitutional: Positive for fatigue.   Respiratory: Positive for shortness of breath. Negative for cough.    Gastrointestinal: Negative for abdominal distention, abdominal pain, diarrhea, nausea and vomiting.   Endocrine: Positive for polydipsia.   Skin: Negative for rash.     10 systems reviewed and negative except as above and in HPI.      Allergies  No Known Allergies    Medications:  Trazodone Hydrochloride  Ferrous sulfate   Avapro  Glucophage  K-Tab  Albuterol  Lipitor  Coreg  Plavix  Folgard  Lantus Solostar  Humalog Kwikpen  Prtonix  Demadex  Desyrel  Oneyda Contour  BD Katelyn U/F  Hydrochlorothiazide    Past Medical History:    Cerebral Infarction  Type 2 Diabetes  Hyperlipidemia  Anemia  CKD  CVA  Osteoarthritis  Hypertension   Congestive Heart Failure    Past Surgical History:    Bone Marrow Biopsy  EGD  CV Pericardiocentesis    Social History:  The patient presents with his wife.   The patient states he has been eating healthier.    Physical Exam     Patient Vitals for the past 24 hrs:   BP Temp Temp src Pulse Resp SpO2 Height Weight   06/02/21 2345 (!) 148/95 -- -- 87 13  95 % -- --   06/02/21 2300 (!) 148/99 -- -- 89 9 97 % -- --   06/02/21 2230 136/88 -- -- 81 8 97 % -- --   06/02/21 2200 (!) 160/93 -- -- 92 11 98 % -- --   06/02/21 2104 -- -- -- -- -- 97 % -- --   06/02/21 2037 (!) 160/83 97.7  F (36.5  C) Temporal 91 18 -- 1.829 m (6') 93 kg (205 lb)       Physical Exam  General: Resting on the gurney, appears  uncomfortable  Head:  The scalp, face, and head appear normal  Mouth/Throat: Mucus membranes are moist  CV:  Regular rate    Normal S1 and S2  No pathological murmur   Resp:  Breath sounds clear and equal bilaterally    Non-labored, no retractions or accessory muscle use    No coarseness    No wheezing   GI:  Abdomen is soft, no rigidity    No tenderness to palpation  MS:  Normal motor assessment of all extremities.    Good capillary refill noted.      Skin:   No rash or lesions noted.  Neuro:   Speech is normal and fluent. No apparent deficit.  Psych: Awake. Alert.  Normal affect.      Appropriate interactions.    Emergency Department Course     Laboratory:  CBC: WBC: 6.9, HGB: 9.0 (L), PLT: 204  CMP: Glucose 650 (HH), Sodium: 132 (L), Potassium: 2.4 (LL), Chloride: 87 (L), Carbon Dioxide: 39 (H), Urea Nitrogen: 56 (H), GFR: 17 (L), o/w WNL (Creatinine: 3.24 (H))    Troponin (Collected 2102): 0.032    Potassium: Pending    Asymptomatic COVID-19 PCR: Pending      Emergency Department Course:    Reviewed:  I reviewed nursing notes, vitals, past medical history and care everywhere    Assessments:  2200 I obtained history and examined the patient as noted above.     Consults:   2222 I spoke with Dr. Rahman of the Hospitalist service, regarding patient's presentation, findings, and plan of care.     Interventions:  2222 Potassium chloride 10 mEq IV    Disposition:  The patient was admitted to the hospital under the care of Dr. Rahman.       Impression & Plan     Medical Decision Making:    Justyn Olivas is a 80 year old male who presents for evaluation of elevated blood  sugar despite his regular use of insulin.  By blood work there is no signs of DKA, no clinical features to suggest hyperosmolar issues. Blood work did demonstrate hypokalemia as well.  Is being replaced IV prior to treating his high blood sugar.  IV fluids given as well.  The patient will be admitted to the hospital for further evaluation and management.      Covid-19  Justyn Olivas was evaluated during a global COVID-19 pandemic, which necessitated consideration that the patient might be at risk for infection with the SARS-CoV-2 virus that causes COVID-19.   Applicable protocols for evaluation were followed during the patient's care.   COVID-19 was considered as part of the patient's evaluation. The plan for testing is:  a test was obtained during this visit.      Diagnosis:    ICD-10-CM    1. Hyperglycemia  R73.9 Asymptomatic SARS-CoV-2 COVID-19 Virus (Coronavirus) by PCR     Potassium   2. Hypokalemia  E87.6        Scribe Disclosure:  I, Mynor Frias, am serving as a scribe at 10:03 PM on 6/2/2021 to document services personally performed by Kate Alcantara MD based on my observations and the provider's statements to me.     Scribe Disclosure:  I, Karina Ellison, am serving as a scribe at 11:45 PM on 6/2/2021 to document services personally performed by Kate Alcantara MD based on my observations and the provider's statements to me.       Kate Alcantara MD  06/03/21 0030

## 2021-06-03 NOTE — PROVIDER NOTIFICATION
MD Notification    Notified Person: MD    Notified Person Name: Dr. Rahman    Notification Date/Time: 6/3/21 @0305    Notification Interaction:Amcom, phone    Purpose of Notification: Critical potassium of 2.3    Orders Received: replace per protocol, recheck K in 4 hours.(0700) Start IVF w/ K.    Comments:Do not correct 470 BG until potassium corrected. Once K resulted, recheck BG and give morning insulin.  Start IVF w/ K after bolus.

## 2021-06-03 NOTE — PHARMACY-ADMISSION MEDICATION HISTORY
Pharmacy Medication History  Admission medication history interview status for the 6/2/2021  admission is complete. See EPIC admission navigator for prior to admission medications     Location of Interview: Patient room  Medication history sources: Patient, Surescripts and Patient's home med list    Significant changes made to the medication list:  -changed cyclobenzaprine to at bedtime only - not taking during the day anymore.  -Could not confirm strength of torsemide pill he is taking. His med list has 10mg daily. 10mg tablet last filled [facility] 1/22/21 #90 for 90 day supply. 20mg tablet last filled 2/16/21 & 5/25/21 #180 for 90 day supply. Cardiology note 5/21/21 indicated dose is 10mg daily. He does think he is taking once daily.    In the past week, patient estimated taking medication this percent of the time: greater than 90%    Additional medication history information:   -He reports Humalog on sliding scale 5-15 units before each meal.  -Could not confirm if he is taking hydrochlorothiazide, it was not on his medication list but per [facility] 25mg #90 for 90 day supply filled 11/13/20, 2/14/21, 5/25/21. It appears to have been stopped at hospital discharge Dec 2020 but unclear if restarted at some point or if he is still taking.  -Per cardiology note 5/21/21 he was instructed to stop potassium supplementation.    Medication reconciliation completed by provider prior to medication history? No    Time spent in this activity: 20 min    Prior to Admission medications    Medication Sig Last Dose Taking? Auth Provider   albuterol (PROAIR HFA) 108 (90 Base) MCG/ACT inhaler INHALE 2 PUFFS BY MOUTH FOUR TIMES DAILY AS NEEDED prn Yes Christian Davidson MD   atorvastatin (LIPITOR) 20 MG tablet Take 1 tablet (20 mg) by mouth daily 6/2/2021 at am Yes Christian Davidson MD   carvedilol (COREG) 6.25 MG tablet Take 1 tablet (6.25 mg) by mouth 2 times daily (with meals) 6/2/2021 at am Yes Kelly Sen  ANTONIETTA Merrill   clopidogrel (PLAVIX) 75 MG tablet Take 1 tablet (75 mg) by mouth daily 6/2/2021 at am Yes Christian Davidson MD   cyclobenzaprine (FLEXERIL) 10 MG tablet Take 10 mg by mouth At Bedtime 6/1/2021 at pm Yes Unknown, Entered By History   diphenhydrAMINE-acetaminophen (TYLENOL PM)  MG tablet Take 2 tablets by mouth nightly as needed for sleep prn Yes Unknown, Entered By History   dorzolamide-timolol (COSOPT) 2-0.5 % ophthalmic solution Place 1 drop into both eyes 2 times daily 6/2/2021 at am Yes Unknown, Entered By History   fluticasone (FLONASE) 50 MCG/ACT nasal spray Spray 2 sprays into both nostrils daily 6/2/2021 at am Yes Unknown, Entered By History   folic acid-vit B6-vit B12 (FOLGARD) 0.8-10-0.115 MG TABS per tablet Take 1 tablet by mouth daily Continue per Nephrology recommendation 6/2/2021 at am Yes Amador Almanza MD   insulin glargine (LANTUS SOLOSTAR) 100 UNIT/ML pen INJECT 50 UNITS UNDER THE SKIN AT BEDTIME 6/1/2021 at pm Yes Christian Davidson MD   insulin lispro (HUMALOG KWIKPEN) 100 UNIT/ML (1 unit dial) KWIKPEN INJECT UP TO 55 UNITS UNDER THE SKIN ONCE DAILY AS DIRECTED  Patient taking differently: Inject 5-15 Units Subcutaneous 3 times daily (before meals) Sliding scale with meals 6/2/2021 Yes Bandar Greenberg MD   pantoprazole (PROTONIX) 40 MG EC tablet TAKE 1 TABLET(40 MG) BY MOUTH TWICE DAILY  Patient taking differently: Take 40 mg by mouth 2 times daily TAKE 1 TABLET(40 MG) BY MOUTH TWICE DAILY 6/2/2021 at am Yes Bandar Greenberg MD   torsemide (DEMADEX) 10 MG tablet Take by mouth daily  6/2/2021 at am Yes Shaniqua Ram PA-C   traZODone (DESYREL) 100 MG tablet TAKE 1 TABLET(100 MG) BY MOUTH AT BEDTIME 6/1/2021 at pm Yes Bandar Greenberg MD   vitamin B-12 (CYANOCOBALAMIN) 1000 MCG tablet Take 1 tablet (1,000 mcg) by mouth daily Continue unitl hematology evaluation as borderline low vitamin b12. 6/2/2021 at am Yes Kristine Nash MD   blood glucose (STEVE  CONTOUR) test strip TESTING FOUR TIMES DAILY OR AS DIRECTED   Bandar Greenberg MD   insulin pen needle (BD FERCHO U/F) 32G X 4 MM miscellaneous USE AS DIRECTED UP TO FOUR TIMES DAILY   Bandar Greenberg MD   order for DME Hip steroid injectoion   Bandar Greenberg MD       The information provided in this note is only as accurate as the sources available at the time of update(s)

## 2021-06-03 NOTE — PROGRESS NOTES
[facility]    Hospitalist Progress Note      Assessment & Plan   Justyn Olivas is a 80 year old male with past medical history significant for type 2 diabetes, CKD stage IV, hypertension, chronic anemia, ischemic cardiomyopathy admitted on 6/2/2021 with hyperglycemia and hypokalemia.     Type II DM   Hyperglycemia  PTA regimen includes Lantus 50 units every evening, and lispro sliding scale with meals.  He has had fairly good control of his diabetes recently with a hemoglobin A1c of 6.0% on 3/27/2021.  The patient presents with blood sugars in the 600 range.  No evidence of DKA or HHS. He reports that it is very out of the ordinary for his blood sugars to be this elevated.  He does note that he recently started a diet that consists mainly of fruit and fruit juices about 2 weeks ago and has had elevated BGs in 300s instead of low 100s for at least the last week  - Blood sugars improved to ~400 with IVF   - Given 20 units lantus AM 6/3 (missed lantus on night of admit) then resume PTA lantus 50 units at bedtime 6/3  - switch medium resistance to high resistance sliding scale insulin (closer to what he takes at home--reports up to 15 units at a time at meals)  - Initially gave 5 units novolog with meals switch to 7 units, hold for BG <160  - Continue IVF to complete current bag, then discontinue  - Discussed high sugar/carbohydrate content of fruit and fruit juices. Recommended limiting intake of this.   - Nutrition consult appreciated     CASS on CKD IV--improved  Dehydration   Creatinine was elevated to 3.24 on admit.  His recent baseline appears to be around 2.3-2.5.  Suspect this is likely prerenal in the setting of dehydration from osmotic diuresis. The patient also takes torsemide 10 mg daily (although he picked up a prescription of hydrochlorothiazide, he has not taken any of this and does not plan to).  - Continue IVF for few more hours then monitor off  - Hold prior to admission  torsemide  - BMP in AM     Hypokalemia  The patient's potassium was 2.4 on admission.  He reports previously taking a potassium supplement but this was discontinued due to his worsening renal function.  - Continue Potassium replacement per high protocol  - once BGs controlled, K should start to improve more quickly     Hypertension  -Resume PTA carvedilol 6.25 twice daily     HFpEF   Echocardiogram on 3/27/2021 showed an ejection fraction of 50 to 55% with severe inferior wall hypokinesis this is unchanged compared to prior study.  Patient does not appear to be in acute exacerbation in fact seems volume down as noted above. Pt does have some pitting edema in his legs bilaterally, but he reports that they are currently much less swollen than normal.   - Continue PTA Coreg 6.25 mg twice daily  - Hold prior to admission torsemide     Chronic iron deficiency anemia    Baseline hemoglobin is around 8-9, stable  -Continue to monitor     Hx of basilar CVA   Continue PTA Lipitor and Plavix.     Obesity   Body mass index is 27.57 kg/m .    DVT Prophylaxis: Pneumatic Compression Devices  Code Status: Full Code  Expected discharge: Tomorrow, recommended to prior living arrangement once BGs controlled    Radha Gonzalez, DO  Text Page (7am - 6pm)    Interval History   Patient seen and examined. He is doing well, disappointed to here he isn't going home today, but understands as his potassium is still low and his BGs are still high. No chest pain, shortness of breath. Reports his swelling in his legs is really good. Discussed his recent diet. Updated his wife and clarified medications via phone.   Spent>35 minutes discussing care plan with patient, providing education regarding DM and K and updating wife via phone.    -Data reviewed today: I reviewed all new labs and imaging results over the last 24 hours. I personally reviewed no images or EKG's today.    Physical Exam   Temp: 98.2  F (36.8  C) Temp src: Oral BP: 135/70 Pulse:  88   Resp: 18 SpO2: 97 % O2 Device: None (Room air)    Vitals:    06/02/21 2037 06/03/21 0154   Weight: 93 kg (205 lb) 92.2 kg (203 lb 4.8 oz)     Vital Signs with Ranges  Temp:  [97.7  F (36.5  C)-98.4  F (36.9  C)] 98.2  F (36.8  C)  Pulse:  [81-92] 88  Resp:  [8-18] 18  BP: (121-160)/(70-99) 135/70  SpO2:  [93 %-98 %] 97 %  I/O last 3 completed shifts:  In: -   Out: 300 [Urine:300]    Constitutional: Awake, alert, cooperative, no apparent distress  Respiratory: Clear to auscultation bilaterally, no crackles or wheezing  Cardiovascular: Regular rate and rhythm, normal S1 and S2, and no murmur noted  GI: Normal bowel sounds, soft, non-distended, non-tender  Skin/Integumen: No rashes, no cyanosis, +1 bilateral lower extremity edema  Other:     Medications     0.45% sodium chloride + KCl 20 mEq/L 100 mL/hr at 06/03/21 0435       atorvastatin  20 mg Oral Daily     carvedilol  6.25 mg Oral BID w/meals     clopidogrel  75 mg Oral Daily     insulin aspart  1-7 Units Subcutaneous TID AC     insulin aspart  1-5 Units Subcutaneous At Bedtime     insulin aspart  5 Units Subcutaneous TID w/meals     insulin glargine  50 Units Subcutaneous At Bedtime     sodium chloride (PF)  3 mL Intracatheter Q8H     traZODone  100 mg Oral At Bedtime       Data   Recent Labs   Lab 06/03/21  0758 06/03/21  0230 06/02/21  2330 06/02/21  2102 05/27/21  1107 05/27/21  1107   WBC 7.8  --   --  6.9  --  8.1   HGB 8.7*  --   --  9.0*  --  9.6*   MCV 84  --   --  84  --  86     --   --  204  --  176     --   --  132*  --   --    POTASSIUM 2.5* 2.3* 2.7* 2.4*   < >  --    CHLORIDE 96  --   --  87*  --   --    CO2 37*  --   --  39*  --   --    BUN 53*  --   --  56*  --   --    CR 2.83*  --   --  3.24*  --   --    ANIONGAP 4  --   --  6  --   --    VANESA 8.7  --   --  8.8  --   --    *  --   --  650*  --   --    ALBUMIN  --   --   --  3.6  --   --    PROTTOTAL  --   --   --  7.7  --   --    BILITOTAL  --   --   --  0.6  --   --     ALKPHOS  --   --   --  118  --   --    ALT  --   --   --  24  --   --    AST  --   --   --  14  --   --    TROPI  --   --   --  0.032  --   --     < > = values in this interval not displayed.       No results found for this or any previous visit (from the past 24 hour(s)).

## 2021-06-03 NOTE — PLAN OF CARE
Admission    Patient arrives to room 605-2 via cart from ED.  Care plan note:    Summary: Hyperglycemia, Hypokalemia  DATE & TIME: 6/3/21 New admission-0730  Cognitive Concerns/ Orientation : A&O x4  BEHAVIOR & AGGRESSION TOOL COLOR: Green   ABNL VS/O2: VSS ex /86, on RA.    MOBILITY: SBA  PAIN MANAGMENT: Denies  DIET: Mod carb  BOWEL/BLADDER: Continent, up to BR.  ABNL LAB/BG: ; K+-2.3, replaced, recheck for 0700. Creat 3.24; Hgb 9.0   DRAIN/DEVICES: PIV infusing 1/2NS w/20K @100/hr.  TELEMETRY RHYTHM: NSR  SKIN: WDL, intact  TESTS/PROCEDURES: N/A  D/C DAY/GOALS/PLACE: 2 - 3 days, recommended to prior living arrangement once renal function improved and safe disposition plan/ TCU bed available.  OTHER IMPORTANT INFO: MARKSJOAQUINA CABA clear.  Nutrition consulted.  BLE numbness. 1000ml bolus given. Plan to correct potassium before giving insulin for BG.  If K recheck at 0700 not critical value, ok to check blood sugar and give insulin accordingly.     Inpatient nursing criteria listed below were met:    PCD's Documented: Yes  Skin issues/needs documented :NA  Isolation education started/completed NA  Patient allergies verified with patient: Yes  Verified completion of [place] Risk Assessment Tool:  Yes  Verified completion of Guardianship screening tool: Yes  Fall Prevention: Care plan updated, Education given and documented NA  Care Plan initiated: Yes  Home medications documented in belongings flowsheet: NA  Patient belongings documented in belongings flowsheet: Yes  Reminder note (belongings/ medications) placed in discharge instructions:NA  Admission profile/ required documentation complete: Yes  Bedside Report Letter given and explained to patient NA  Visitor Designated? No  If patient is a 72 hour hold/Commitment are belongings removed from room and locked up? NA

## 2021-06-03 NOTE — ED TRIAGE NOTES
Pt. having gradual increase in blood sugar all day. 300's this am and now 600. feeling fatigue and having increased thirst. Intermittant SOB feeling with exersion.

## 2021-06-03 NOTE — PROGRESS NOTES
Clinic Care Coordination Contact  Ambulatory Care Coordination to Inpatient Care Management   Hand-In Communication    Date:  Kendra 3, 2021  Name: Justyn Olivas is enrolled in Ambulatory Care Coordination program and I am the Lead Care Coordinator.  CC Contact Information: Epic InSmartCellset + phone  Payor Source: Payor: MEDICARE / Plan: MEDICARE / Product Type: Medicare /   Current services in place: DME, [facility]   Please see the CC Snaphot and Care Management Flowsheets for specific details of this Justyn Olivas care plan.   Additional details/specific concerns r/t this admission:    Readmission Risk 55%    I will follow this admission in Epic. Please feel free to contact me with questions or for further collaboration in discharge planning.    Jerel Leija RN  Clinic Care Coordinator  [facility]  [facility]  Ph: [phone]

## 2021-06-03 NOTE — TELEPHONE ENCOUNTER
Clinic Care Coordination Contact    Patient currently admitted; will monitor for discharge.    Jerel Leija RN  Clinic Care Coordinator  [facility]  [facility] [place]  Ph: [phone]

## 2021-06-03 NOTE — H&P
[facility]    History and Physical - Hospitalist Service       Date of Admission:  6/2/2021    Assessment & Plan   Justyn Olivas is a 80 year old male with past medical history significant for type 2 diabetes, CKD stage IV, hypertension, chronic anemia, ischemic cardiomyopathy admitted on 6/2/2021 with hyperglycemia and hypokalemia.     Type II DM   Hyperglycemia  PTA regimen includes Lantus 50 units every evening, and lispro sliding scale with meals.  He has had fairly good control of his diabetes recently with a hemoglobin A1c of 6.0% on 3/27/2021.  The patient presents with blood sugars in the 600 range.  No evidence of DKA or HHS. He reports that it is very out of the ordinary for his blood sugars to be this elevated.  He does note that he recently started a diet that consists mainly of fruit and fruit juices about 2 weeks ago.  - Blood sugars improved to ~400 with IVF  - Resume PTA lantus when K is improved   - MDSSI   - Continue IVF   - Discussed high sugar/carbohydrate content of fruit and fruit juices. Recommended limiting intake of this.   - Nutrition consult     CASS on CKD IV  Dehydration   Creatinine is elevated to 3.24.  His recent baseline appears to be around 2.3-2.5.  Suspect this is likely prerenal in the setting of dehydration from osmotic diuresis. The patient also takes torsemide 10 mg daily and there is a note by pharmacy that he may have been continuing to take his hydrochlorothiazide even though this medication was discontinued the last time he was hospitalized (this medication was filled on 5/25/21) it appears to have been discontinued in Dec 2020. He also is supposed to be taking 10mg torsemide, but had 20mg tablets filled on 5/25/21.   - Continue IVF  - Hold prior to admission torsemide    Hypokalemia  The patient's potassium was 2.4 on admission.  He reports previously taking a potassium supplement but this was discontinued due to his worsening renal  function.  - Continue Potassium replacement, monitor K+    Hypertension  -Resume prior to admission carvedilol 6.25 twice daily    HFpEF   Echocardiogram on 3/27/2021 showed an ejection fraction of 50 to 55% with severe inferior wall hypokinesis this is unchanged compared to prior study.  Patient does not appear to be in acute exacerbation in fact seems volume down as noted above. Pt does have some pitting edema in his legs bilaterally, but he reports that they are currently much less swollen than normal.   - Continue prior to admission Coreg 6.25 mg twice daily  - Hold prior to admission torsemide  - Monitor respiratory status closely given fluid administration.    Chronic iron deficiency anemia    Baseline hemoglobin is around 8-9, stable  -Continue to monitor    Hx of basilar CVA   Continue prior to admission Lipitor and Plavix.    Obesity   Body mass index is 27.57 kg/m .      Diet: Moderate Consistent CHO Diet  DVT Prophylaxis: Pneumatic Compression Devices  Glasgow Catheter: not present  Code Status: Full Code         Disposition Plan   Expected discharge: 2 - 3 days, recommended to prior living arrangement once renal function improved and safe disposition plan/ TCU bed available.  Entered: Marizol Rahman MD 06/02/2021, 10:21 PM     The patient's care was discussed with the Bedside Nurse and Patient.    Marizol Rahman MD  [facility]  Contact information available via [facility] Paging/Directory      ______________________________________________________________________    Chief Complaint   Elevated Blood sugars     History is obtained from the patient    History of Present Illness   Justyn Olivas is a 80 year old male who presented to the emergency department today with elevated blood sugars.  He has longstanding history of type 2 diabetes that is managed with insulin.  He reports that generally he has fairly good control of his blood sugars he notes that his morning blood  sugars are usually around 100 or less.  His last hemoglobin A1c checked in March 2021 was 6.0%.  Over the past couple of weeks he has noticed that his blood sugars have been statically increasing.  This morning his blood sugars were in the 300 range.  He has noted increased fatigue with increased thirst and urination.    He notes that he was instructed to consume a low-sodium diet and as of this has started of diet that consists mainly of fruit and fruit juices. He has been doing this for about the past 2 weeks.    He is now feeling a little bit better after receiving some fluids in the emergency department.    The patient is unsure about what medications he is actually taking.  The the pharmacy note from admission on 6/2 and above regarding dosing of his diuretics.    Review of Systems    The 10 point Review of Systems is negative other than noted in the HPI or here.     Past Medical History    I have reviewed this patient's medical history and updated it with pertinent information if needed.   Past Medical History:   Diagnosis Date     Cerebral infarction (H)      Diabetes (H)      Hyperlipemia        Past Surgical History   I have reviewed this patient's surgical history and updated it with pertinent information if needed.  Past Surgical History:   Procedure Laterality Date     BONE MARROW BIOPSY, BONE SPECIMEN, NEEDLE/TROCAR N/A 5/14/2021    Procedure: BIOPSY, BONE MARROW;  Surgeon: Juanjo Hoffman MD;  Location:  GI     CV PERICARDIOCENTESIS N/A 12/21/2020    Procedure: Pericardiocentesis;  Surgeon: Chas Chambers MD;  Location:  HEART [facility]ARDI[facility] CATH LAB     ESOPHAGOSCOPY, GASTROSCOPY, DUODENOSCOPY (EGD), COMBINED N/A 3/27/2021    Procedure: Esophagogastroduodenoscopy, With Biopsy;  Surgeon: Luc Nash MD;  Location:  GI       Social History   I have reviewed this patient's social history and updated it with pertinent information if needed.  Social History     Tobacco Use     Smoking status:  Former Smoker     Packs/day: 2.00     Years: 11.00     Pack years: 22.00     Types: Cigarettes     Start date:      Quit date:      Years since quittin.4     Smokeless tobacco: Never Used   Substance Use Topics     Alcohol use: No     Alcohol/week: 0.0 standard drinks     Drug use: No       Family History   I have reviewed this patient's family history and updated it with pertinent information if needed.  Family History   Problem Relation Age of Onset     Family History Negative Mother      Family History Negative Father        Prior to Admission Medications   Prior to Admission Medications   Prescriptions Last Dose Informant Patient Reported? Taking?   albuterol (PROAIR HFA) 108 (90 Base) MCG/ACT inhaler   No No   Sig: INHALE 2 PUFFS BY MOUTH FOUR TIMES DAILY AS NEEDED   atorvastatin (LIPITOR) 20 MG tablet   No No   Sig: Take 1 tablet (20 mg) by mouth daily   blood glucose (STEVE CONTOUR) test strip  Pharmacy No No   Sig: TESTING FOUR TIMES DAILY OR AS DIRECTED   carvedilol (COREG) 6.25 MG tablet   No No   Sig: Take 1 tablet (6.25 mg) by mouth 2 times daily (with meals)   clopidogrel (PLAVIX) 75 MG tablet   No No   Sig: Take 1 tablet (75 mg) by mouth daily   cyclobenzaprine (FLEXERIL) 10 MG tablet   No No   Si/2 po bid prn & 1po qhs   Patient taking differently: Take 5-10 mg by mouth 3 times daily as needed 1/2 po bid prn & 1po qhs   diphenhydrAMINE-acetaminophen (TYLENOL PM)  MG tablet   Yes No   Sig: Take 2 tablets by mouth nightly as needed for sleep   dorzolamide-timolol (COSOPT) 2-0.5 % ophthalmic solution  Pharmacy Yes No   Sig: Place 1 drop into both eyes 2 times daily   fluticasone (FLONASE) 50 MCG/ACT nasal spray   Yes No   Sig: Spray 2 sprays into both nostrils daily   folic acid-vit B6-vit B12 (FOLGARD) 0.8-10-0.115 MG TABS per tablet   No No   Sig: Take 1 tablet by mouth daily Continue per Nephrology recommendation   insulin glargine (LANTUS SOLOSTAR) 100 UNIT/ML pen   Yes No    Sig: INJECT 50 UNITS UNDER THE SKIN AT BEDTIME   insulin lispro (HUMALOG KWIKPEN) 100 UNIT/ML (1 unit dial) KWIKPEN  Pharmacy No No   Sig: INJECT UP TO 55 UNITS UNDER THE SKIN ONCE DAILY AS DIRECTED   Patient taking differently: Inject Subcutaneous 3 times daily (before meals) Sliding scale with meals   insulin pen needle (BD FERCHO U/F) 32G X 4 MM miscellaneous  Pharmacy No No   Sig: USE AS DIRECTED UP TO FOUR TIMES DAILY   order for DME  Pharmacy No No   Sig: Hip steroid injectoion   pantoprazole (PROTONIX) 40 MG EC tablet   No No   Sig: TAKE 1 TABLET(40 MG) BY MOUTH TWICE DAILY   Patient taking differently: Take 40 mg by mouth At Bedtime TAKE 1 TABLET(40 MG) BY MOUTH TWICE DAILY   torsemide (DEMADEX) 10 MG tablet   Yes No   Sig: Take 1 tablet (10 mg) by mouth daily   traZODone (DESYREL) 100 MG tablet   No No   Sig: TAKE 1 TABLET(100 MG) BY MOUTH AT BEDTIME   vitamin B-12 (CYANOCOBALAMIN) 1000 MCG tablet   No No   Sig: Take 1 tablet (1,000 mcg) by mouth daily Continue unitl hematology evaluation as borderline low vitamin b12.      Facility-Administered Medications: None     Allergies   No Known Allergies    Physical Exam   Vital Signs: Temp: 97.7  F (36.5  C) Temp src: Temporal BP: (!) 160/93 Pulse: 92   Resp: 11 SpO2: 98 % O2 Device: None (Room air)    Weight: 205 lbs 0 oz    Constitutional: Awake, alert, cooperative, no apparent distress.  Eyes: Conjunctiva and pupils examined and normal.  HEENT: Moist mucous membranes, normal dentition.  Respiratory: Clear to auscultation bilaterally, no crackles or wheezing.  Cardiovascular: Regular rate and rhythm, normal S1 and S2, and no murmur noted.  GI: Soft, non-distended, non-tender, normal bowel sounds.  Skin: No rashes, no cyanosis, 1+ ankle edema bilaterally  Musculoskeletal: No joint swelling, erythema or tenderness.  Neurologic: Cranial nerves 2-12 intact, normal strength and sensation.  Psychiatric: Alert, oriented to person, place and time, no obvious anxiety or  depression.      Data   Data reviewed today: I reviewed all medications, new labs and imaging results over the last 24 hours.     Recent Labs   Lab 06/03/21  0230 06/02/21  2330 06/02/21  2102 05/27/21  1107   WBC  --   --  6.9 8.1   HGB  --   --  9.0* 9.6*   MCV  --   --  84 86   PLT  --   --  204 176   NA  --   --  132*  --    POTASSIUM 2.3* 2.7* 2.4*  --    CHLORIDE  --   --  87*  --    CO2  --   --  39*  --    BUN  --   --  56*  --    CR  --   --  3.24*  --    ANIONGAP  --   --  6  --    VANESA  --   --  8.8  --    GLC  --   --  650*  --    ALBUMIN  --   --  3.6  --    PROTTOTAL  --   --  7.7  --    BILITOTAL  --   --  0.6  --    ALKPHOS  --   --  118  --    ALT  --   --  24  --    AST  --   --  14  --    TROPI  --   --  0.032  --      No results found for this or any previous visit (from the past 24 hour(s)).

## 2021-06-03 NOTE — CONSULTS
"CLINICAL NUTRITION SERVICES  -  ASSESSMENT NOTE    Recommendations Ordered by Registered Dietitian (RD):   Nutrition education: Provided education on :    Assessed learning needs, learning preferences, and willingness to learn    Nutrition Education (Content):  a) Provided handout   a. Carbohydrate Counting  b. Tips for Low Na Diet  c. Label Reading  d. Low Na Foods/Drinks  e. Seasoning Your Food Without Salt  f. Low Na Recipe Booklet    b) Discussed   a. Creating balanced meals  b. Limiting fruit to ~2 servings per day  c. Avoiding Juice  d. Rationale for fruit limitation and for 2g Na restriction     Malnutrition:   % Weight Loss:  Weight loss does not meet criteria for malnutrition - r/t diet changes   % Intake:  Does not meet criteria   Subcutaneous Fat Loss:  None observed  Muscle Loss:  None observed  Fluid Retention:  None noted    Malnutrition Diagnosis: Patient does not meet two of the above criteria necessary for diagnosing malnutrition     REASON FOR ASSESSMENT  Justyn Olivas is a 80 year old male seen by Registered Dietitian for Provider Order - Nutrition Education - diabetes, nutrition education   Nutrition Admission Risk Screen Received -   Have you recently lost weight without trying in the last 6 months ? - \"yes 14-23 pounds\"  Have you been eating poorly due to a decreased appetite ?- \"yes\"    NUTRITION HISTORY  - Information obtained from chart review and patient.   - H/o diabetes, CHF, cerebral infarction, CKD. Presented with hyperglycemia.   - Recently was educated on a low sodium diet by PCP, and cut out many of the high sodium foods in his diet. \"Bobbi had my last hot dog. That was a sad day\". He reports that over the past 2-3 weeks he has been eating 75% fruit and fruit juice.   - Diet recall prior to 2g Na diet education  Breakfast: toast, PB, Jelly  Lunch: Hot dogs, chips  Dinner: chicken/steak/hamburger w/ pasta, corn, green beans.     - Patient has had diabetes for >20 years and states " "that he eats on a consistent schedule, but never really focused on the portion size of CHO-dense foods.   - Has lost wt in the past couple weeks, not r/t lack of appetite but related to diet changes.     CURRENT NUTRITION ORDERS  Diet Order:     Moderate Consistent Carbohydrate     Current Intake/Tolerance:  Had eggs, mini wheats, and pineapple for breakfast.       NUTRITION FOCUSED PHYSICAL ASSESSMENT FOR DIAGNOSING MALNUTRITION)  Yes           Observed:    No nutrition-related physical findings observed    Obtained from Chart/Interdisciplinary Team:  Last BM 6/2  Adam - Nutrition 3; Total 21    ANTHROPOMETRICS  Height: 6' 0\"  Weight: 203 lbs 4.8 oz (92.2 kg)   Body mass index is 27.57 kg/m .  Weight Status:  Overweight BMI 25-29.9  IBW: 80.9 kg   % IBW: 114%  Weight History: 6% weight loss indicated in the past couple weeks - to some extent intentional and related to diet changes.   Wt Readings from Last 10 Encounters:   06/03/21 92.2 kg (203 lb 4.8 oz)   05/21/21 98 kg (216 lb)   05/14/21 97.1 kg (214 lb)   05/03/21 100.7 kg (222 lb)   04/14/21 99.7 kg (219 lb 11.2 oz)   04/05/21 96.1 kg (211 lb 12.8 oz)   04/01/21 99.5 kg (219 lb 5.7 oz)   03/26/21 103 kg (227 lb 1.6 oz)   03/03/21 95.7 kg (211 lb)   02/12/21 102.1 kg (225 lb)       LABS  Labs reviewed  K - 2.5 (L)  Lab Results   Component Value Date    A1C 6.0 03/27/2021    A1C 7.0 12/01/2020    A1C 7.3 09/14/2020    A1C 8.1 07/21/2020    A1C 8.3 02/10/2020     Recent Labs   Lab 06/03/21  0758 06/03/21  0751 06/03/21  0240 06/02/21 2102   *  --   --  650*   BGM  --  372* 470*  --        MEDICATIONS  Medications reviewed  Medium sliding scale insulin   Lantus 50 units q HS    ASSESSED NUTRITION NEEDS PER APPROVED PRACTICE GUIDELINES:  Dosing Weight 92.2 kg   Estimated Energy Needs: 6668-7890 kcals (25-30 Kcal/Kg)  Justification: maintenance  Estimated Protein Needs:  grams protein (1-1.2 g pro/Kg)  Justification: maintenance  Estimated Fluid " Needs: 1 mL/kcal  Justification: maintenance    MALNUTRITION:  % Weight Loss:  Weight loss does not meet criteria for malnutrition - r/t diet changes   % Intake:  Does not meet criteria   Subcutaneous Fat Loss:  None observed  Muscle Loss:  None observed  Fluid Retention:  None noted    Malnutrition Diagnosis: Patient does not meet two of the above criteria necessary for diagnosing malnutrition    NUTRITION DIAGNOSIS:  Food and nutrition-related knowledge deficit related to CHO content of fruit as evidenced by pt w/ diabetes reports that 75% of his intakes for the past 2-3 weeks have been fruit or fruit juice.       NUTRITION INTERVENTIONS  Recommendations / Nutrition Prescription  Moderate Consistent CHO diet      Implementation  Nutrition education: Provided education on :    Assessed learning needs, learning preferences, and willingness to learn    Nutrition Education (Content):  c) Provided handout   a. Carbohydrate Counting  b. Tips for Low Na Diet  c. Label Reading  d. Low Na Foods/Drinks  e. Seasoning Your Food Without Salt  f. Low Na Recipe Booklet    d) Discussed   a. Creating balanced meals  b. Limiting fruit to ~2 servings per day  c. Avoiding Juice  d. Rationale for fruit limitation and for 2g Na restriction    Nutrition Education (Application):  a) Discussed eating habits and recommended alternative food choices    Patient verbalizes understanding of diet    Anticipate good compliance    Diet Education - refer to Education Flowsheet      Nutrition Goals  Pt to list 3 foods high in CHO and 3 foods high in Sodium.       MONITORING AND EVALUATION:  Progress towards goals will be monitored and evaluated per protocol and Practice Guidelines    Roxy Colvin RD,   [facility] [place], 65, 54, [facility]   Pager: [phone]  Weekend Pager: [phone]

## 2021-06-03 NOTE — ED NOTES
[facility]  ED Nurse Handoff Report    ED Chief complaint: Hyperglycemia (Pt. having gradual increase in blood sugar all day. 300's this am and now 600. feeling fatigue and having increased thirst. Intermittant SOB feeling with exersion. )      ED Diagnosis:   Final diagnoses:   Hyperglycemia   Hypokalemia       Code Status: Full Code in ED, to be addressed by admitting provider    Allergies: No Known Allergies    Patient Story: arrived to ED via triage with wife after noting BG increasing throughout day despite best efforts at home. Feels SOB x3 days.   Focused Assessment:  Hypertensive to 's, otherwise VSS on R/A. Respiratory exam exhibits MARKS x3 days, denies cough. Cardiac exam WNL, tele NSR. Neuro exam WNL. Plan to admit for hypokalemia and hyperglycemia.    Treatments and/or interventions provided: 10 meq potassium chloride IV x1  Patient's response to treatments and/or interventions: NA    To be done/followed up on inpatient unit:  NA    Does this patient have any cognitive concerns?: NA    Activity level - Baseline/Home:  Independent  Activity Level - Current:   Independent    Patient's Preferred language: English   Needed?: No    Isolation: None  Infection: Not Applicable  Patient tested for COVID 19 prior to admission: YES  Bariatric?: No    Vital Signs:   Vitals:    06/02/21 2037 06/02/21 2104 06/02/21 2200   BP: (!) 160/83  (!) 160/93   Pulse: 91  92   Resp: 18  11   Temp: 97.7  F (36.5  C)     TempSrc: Temporal     SpO2:  97% 98%   Weight: 93 kg (205 lb)     Height: 1.829 m (6')         Cardiac Rhythm:     Was the PSS-3 completed:   Yes  What interventions are required if any?               Family Comments: spouse at bedside in ED  OBS brochure/video discussed/provided to patient/family: N/A              Name of person given brochure if not patient: NA              Relationship to patient: NA    For the majority of the shift this patient's behavior was Green.    Behavioral interventions performed were NA.    ED NURSE PHONE NUMBER: [phone]

## 2021-06-03 NOTE — UTILIZATION REVIEW
Return to School    4/8/2021      RE:    John Merlino   [address]  [place] [address]      This is to certify that Jones was seen in the clinic today and can return to school on 4/9/21.    Restrictions: none        Signature: __________________________________________________, 4/8/2021              Tony Kelley, [facility] HospitalDOLORESStar [facility] [place], [place]  [address] [place] [id number] [phone]       Admission Status; Secondary Review Determination       Under the authority of the Utilization Management Committee, the utilization review process indicated a secondary review on the above patient. The review outcome is based on review of the medical records, discussions with staff, and applying clinical experience noted on the date of the review.     (x) Inpatient Status Appropriate - This patient's medical care is consistent with medical management for inpatient care and reasonable inpatient medical practice.     RATIONALE FOR DETERMINATION   80 year old male with past medical history significant for type 2 diabetes, CKD stage IV, hypertension, chronic anemia, ischemic cardiomyopathy admitted on 6/2/2021 with hyperglycemia and hypokalemia. The patient presents with blood sugars in the 600 range.  Also had evidence of CASS on CKD IV, dehydration , creatinine is elevated to 3.24.    This is a conditional review for a Medicare patient, at the time of this review patient is anticipated to require at least 1 more night of hospital care; however if plan changes and discharges before 2 midnights please send for post discharge review.  This document was produced using voice recognition software       The information on this document is developed by the utilization review team in order for the business office to ensure compliance. This only denotes the appropriateness of proper admission status and does not reflect the quality of care rendered.   The definitions of Inpatient Status and Observation Status used in making the determination above are those provided in the CMS Coverage Manual, Chapter 1 and Chapter 6, section 70.4.   Sincerely,   PEBBLES OWEN MD   System Medical Director   Utilization Management   [facility].

## 2021-06-03 NOTE — CONSULTS
Care Management Initial Consult    General Information  Assessment completed with: Patient, Spouse or significant other,    Type of CM/SW Visit: Initial Assessment    Primary Care Provider verified and updated as needed: Yes   Readmission within the last 30 days: no previous admission in last 30 days      Reason for Consult: discharge planning  Advance Care Planning: Advance Care Planning Reviewed: no concerns identified          Communication Assessment  Patient's communication style: spoken language (English or Bilingual)    Hearing Difficulty or Deaf: yes   Wear Glasses or Blind: yes    Cognitive  Cognitive/Neuro/Behavioral: WDL                      Living Environment:   People in home: spouse     Current living Arrangements: house      Able to return to prior arrangements: yes       Family/Social Support:  Care provided by: self  Provides care for: no one  Marital Status:   Wife          Description of Support System:           Current Resources:   Patient receiving home care services: No     Community Resources: None  Equipment currently used at home: (walking poles)  Supplies currently used at home: Diabetic Supplies    Employment/Financial:  Employment Status: retired        Financial Concerns: No concerns identified           Lifestyle & Psychosocial Needs:        Socioeconomic History     Marital status:      Spouse name: Cecile     Number of children: Not on file     Years of education: Not on file     Highest education level: Not on file     Tobacco Use     Smoking status: Former Smoker     Packs/day: 2.00     Years: 11.00     Pack years: 22.00     Types: Cigarettes     Start date:      Quit date:      Years since quittin.4     Smokeless tobacco: Never Used   Substance and Sexual Activity     Alcohol use: No     Alcohol/week: 0.0 standard drinks     Drug use: No     Sexual activity: Yes     Partners: Female     Birth control/protection: None       Functional Status:  Prior to  admission patient needed assistance:   Dependent ADLs:: Independent  Dependent IADLs:: Independent       Mental Health Status:  Mental Health Status: No Current Concerns       Chemical Dependency Status:  Chemical Dependency Status: No Current Concerns             Values/Beliefs:  Spiritual, Cultural Beliefs, Episcopal Practices, Values that affect care:    No             Additional Information:  Planning tentative discharge home tomorrow.    Pt feels he is close to his baseline physically.  He is worried that his blood sugars are still pretty high.  Encouraged activity.  He sees Dr Greene and asked if Nephrology would be seeing him here.  He has an appointment with Dr Greene scheduled for next week.  Hopefully labs will stabilize by AM.  Pt also see Dr Nash, Hematology and Dr Rosenbaum Cardiology.  Discussed diabetes management.  Pt does not feel he needs anymore education. His Spouse noted they were concentrating in watching the sodium content in their diet and felt they were doing a good job.  They did not correlate high fruit intake with increasing blood sugars.  Have requested PCP follow-up appointment, awaiting scheduling.    Franny Negron RN   Inpatient Care Management  [phone]

## 2021-06-03 NOTE — PROGRESS NOTES
RECEIVING UNIT ED HANDOFF REVIEW    ED Nurse Handoff Report was reviewed by: Jeaneth Yoder RN on Kendra 3, 2021 at 1:24 AM

## 2021-06-03 NOTE — PLAN OF CARE
Summary: Hyperglycemia, Hypokalemia  DATE & TIME: 6/3/21 7615-2106  Cognitive Concerns/ Orientation : A&O x4  BEHAVIOR & AGGRESSION TOOL COLOR: Green   ABNL VS/O2: VSS on RA.    MOBILITY: SBA  PAIN MANAGMENT: Denies  DIET: Mod carb, great PO  BOWEL/BLADDER: Continent, up to BR.  ABNL LAB/BG: /485/338 K+-2.5, replaced, recheck 2.8, replaced and recheck 3.0 replaced, recheck pending 2145. Creat 3.24->2.83; Hgb 9.0->8.7   DRAIN/DEVICES: PIV SL  TELEMETRY RHYTHM: NSR  SKIN: WDL, intact  TESTS/PROCEDURES: N/A  D/C DAY/GOALS/PLACE: 1 - 2 days, recommended to prior living arrangement once renal function, K, BG improved   OTHER IMPORTANT INFO: JOAQUINA MARKS clear.  Nutrition consulted.  BLE numbness.

## 2021-06-04 VITALS
RESPIRATION RATE: 18 BRPM | HEART RATE: 86 BPM | TEMPERATURE: 97.7 F | HEIGHT: 72 IN | BODY MASS INDEX: 27.54 KG/M2 | WEIGHT: 203.3 LBS | SYSTOLIC BLOOD PRESSURE: 130 MMHG | DIASTOLIC BLOOD PRESSURE: 74 MMHG | OXYGEN SATURATION: 98 %

## 2021-06-04 LAB
ANION GAP SERPL CALCULATED.3IONS-SCNC: 4 MMOL/L (ref 3–14)
BUN SERPL-MCNC: 48 MG/DL (ref 7–30)
CALCIUM SERPL-MCNC: 9 MG/DL (ref 8.5–10.1)
CHLORIDE SERPL-SCNC: 100 MMOL/L (ref 94–109)
CO2 SERPL-SCNC: 35 MMOL/L (ref 20–32)
COPATH REPORT: NORMAL
CREAT SERPL-MCNC: 2.48 MG/DL (ref 0.66–1.25)
ERYTHROCYTE [DISTWIDTH] IN BLOOD BY AUTOMATED COUNT: 14.3 % (ref 10–15)
GFR SERPL CREATININE-BSD FRML MDRD: 23 ML/MIN/{1.73_M2}
GLUCOSE BLDC GLUCOMTR-MCNC: 227 MG/DL (ref 70–99)
GLUCOSE BLDC GLUCOMTR-MCNC: 227 MG/DL (ref 70–99)
GLUCOSE BLDC GLUCOMTR-MCNC: 358 MG/DL (ref 70–99)
GLUCOSE SERPL-MCNC: 255 MG/DL (ref 70–99)
HCT VFR BLD AUTO: 27.6 % (ref 40–53)
HGB BLD-MCNC: 8.6 G/DL (ref 13.3–17.7)
MAGNESIUM SERPL-MCNC: 3.2 MG/DL (ref 1.6–2.3)
MCH RBC QN AUTO: 26.4 PG (ref 26.5–33)
MCHC RBC AUTO-ENTMCNC: 31.2 G/DL (ref 31.5–36.5)
MCV RBC AUTO: 85 FL (ref 78–100)
PHOSPHATE SERPL-MCNC: 2.9 MG/DL (ref 2.5–4.5)
PLATELET # BLD AUTO: 171 10E9/L (ref 150–450)
POTASSIUM SERPL-SCNC: 3.2 MMOL/L (ref 3.4–5.3)
POTASSIUM SERPL-SCNC: 3.3 MMOL/L (ref 3.4–5.3)
POTASSIUM SERPL-SCNC: 3.5 MMOL/L (ref 3.4–5.3)
RBC # BLD AUTO: 3.26 10E12/L (ref 4.4–5.9)
SODIUM SERPL-SCNC: 139 MMOL/L (ref 133–144)
WBC # BLD AUTO: 6.8 10E9/L (ref 4–11)

## 2021-06-04 PROCEDURE — 80048 BASIC METABOLIC PNL TOTAL CA: CPT | Performed by: HOSPITALIST

## 2021-06-04 PROCEDURE — 250N000013 HC RX MED GY IP 250 OP 250 PS 637: Performed by: HOSPITALIST

## 2021-06-04 PROCEDURE — 99239 HOSP IP/OBS DSCHRG MGMT >30: CPT | Performed by: HOSPITALIST

## 2021-06-04 PROCEDURE — 84100 ASSAY OF PHOSPHORUS: CPT | Performed by: HOSPITALIST

## 2021-06-04 PROCEDURE — 83735 ASSAY OF MAGNESIUM: CPT | Performed by: HOSPITALIST

## 2021-06-04 PROCEDURE — 36415 COLL VENOUS BLD VENIPUNCTURE: CPT | Performed by: HOSPITALIST

## 2021-06-04 PROCEDURE — 84132 ASSAY OF SERUM POTASSIUM: CPT | Performed by: HOSPITALIST

## 2021-06-04 PROCEDURE — 85027 COMPLETE CBC AUTOMATED: CPT | Performed by: HOSPITALIST

## 2021-06-04 PROCEDURE — 250N000013 HC RX MED GY IP 250 OP 250 PS 637: Performed by: STUDENT IN AN ORGANIZED HEALTH CARE EDUCATION/TRAINING PROGRAM

## 2021-06-04 PROCEDURE — 999N001017 HC STATISTIC GLUCOSE BY METER IP

## 2021-06-04 RX ORDER — POTASSIUM CHLORIDE 1500 MG/1
20 TABLET, EXTENDED RELEASE ORAL DAILY
Status: ON HOLD
Start: 2021-06-04 | End: 2022-01-01

## 2021-06-04 RX ORDER — POTASSIUM CHLORIDE 1500 MG/1
40 TABLET, EXTENDED RELEASE ORAL ONCE
Status: COMPLETED | OUTPATIENT
Start: 2021-06-04 | End: 2021-06-04

## 2021-06-04 RX ORDER — POTASSIUM CHLORIDE 1500 MG/1
20 TABLET, EXTENDED RELEASE ORAL ONCE
Status: COMPLETED | OUTPATIENT
Start: 2021-06-04 | End: 2021-06-04

## 2021-06-04 RX ADMIN — POTASSIUM CHLORIDE 40 MEQ: 1500 TABLET, EXTENDED RELEASE ORAL at 14:25

## 2021-06-04 RX ADMIN — POTASSIUM CHLORIDE 20 MEQ: 1500 TABLET, EXTENDED RELEASE ORAL at 08:58

## 2021-06-04 RX ADMIN — POTASSIUM CHLORIDE 20 MEQ: 1500 TABLET, EXTENDED RELEASE ORAL at 03:15

## 2021-06-04 RX ADMIN — CLOPIDOGREL BISULFATE 75 MG: 75 TABLET ORAL at 08:11

## 2021-06-04 RX ADMIN — ATORVASTATIN CALCIUM 20 MG: 10 TABLET, FILM COATED ORAL at 08:11

## 2021-06-04 RX ADMIN — CARVEDILOL 6.25 MG: 3.12 TABLET, FILM COATED ORAL at 08:10

## 2021-06-04 ASSESSMENT — ACTIVITIES OF DAILY LIVING (ADL)
ADLS_ACUITY_SCORE: 15
ADLS_ACUITY_SCORE: 13
ADLS_ACUITY_SCORE: 15
ADLS_ACUITY_SCORE: 15

## 2021-06-04 NOTE — PLAN OF CARE
Summary: Hyperglycemia, Hypokalemia  DATE & TIME: 6/4/21 6203-6532  Cognitive Concerns/ Orientation : A&O x4, calm and cooperative   BEHAVIOR & AGGRESSION TOOL COLOR: Green   ABNL VS/O2: VSS on RA.    MOBILITY: Independent.  PAIN MANAGMENT: Denies  DIET: Mod carb  ABNL LABS: , 358, K 3.3 (replaced) recheck 3.5, Creat 2.48, Mg 3.2, Hgb 8.6  BOWEL/BLADDER: Continent  DRAIN/DEVICES: IV removed (discharging)  TELEMETRY RHYTHM: Discontinued   SKIN: Pale, bruised, scabs on R side of lower leg  TESTS/PROCEDURES: N/A  D/C DAY/GOALS/PLACE: Discharge home today aft K check @1300. LS diminished, BS active x4.

## 2021-06-04 NOTE — PROGRESS NOTES
Discharge    Patient discharged to home via car with wife    Listed belongings gathered and returned to patient. Yes  Belongings returned to patient from security/pharmacy No  Care Plan and Patient education resolved: Yes  Prescriptions if needed, hard copies sent with patient  NA  Home and hospital acquired medications returned to patient: Yes  Medication Bin checked and emptied on discharge Yes  Follow up appointment made for patient: No

## 2021-06-04 NOTE — TELEPHONE ENCOUNTER
Pt is in hospital for diabetes.  Will expect change to insulin dose most likely.    Lucero Baca, RN  [facility] RN Triage Team

## 2021-06-04 NOTE — DISCHARGE SUMMARY
[facility]    Discharge Summary  Hospitalist    Date of Admission:  6/2/2021  Date of Discharge:  6/4/2021  Discharging Provider: Radha Gonzalez DO  Date of Service (when I saw the patient): 06/04/21    Discharge Diagnoses   Type II DM   Hyperglycemia  CASS on CKD IV  Dehydration   Hypokalemia  Hypertension  HFpEF   Chronic iron deficiency anemia    Hx of basilar CVA   Obesity     History of Present Illness   Justyn Olivas is an 80 year old male with past medical history significant for type 2 diabetes, CKD stage IV, hypertension, chronic anemia, ischemic cardiomyopathy admitted on 6/2/2021 with hyperglycemia and hypokalemia.     Hospital Course   Justyn Olivas was admitted on 6/2/2021.  The following problems were addressed during his hospitalization:    Type II DM   Hyperglycemia  PTA regimen includes Lantus 50 units every evening, and lispro sliding scale with meals.  He has had fairly good control of his diabetes recently with a hemoglobin A1c of 6.0% on 3/27/2021.  The patient presents with blood sugars in the 600 range.  No evidence of DKA or HHS. He reports that it is very out of the ordinary for his blood sugars to be this elevated.  He does note that he recently started a diet that consists mainly of fruit and fruit juices about 2 weeks ago and has had elevated BGs in 300s instead of low 100s for at least the last week  - Blood sugars improved to ~400 with IVF initially. Received 20 units Lantus in AM of 6/3, with resumption of his home PTA lantus 50 units at night, and his home humalog TID dosing (up to 15 units per meal)  - Discussed high sugar/carbohydrate content of fruit and fruit juices. Recommended limiting intake of this.   - Nutrition consult appreciated  - On discharge:     -- resume lantus 50 units at bedtime     -- add 15 units in AM     -- resume home humalog sliding scale with instructions to add 1 unit per sliding scale range for BGs if they remain >200      -- also given instructions in how to reduce his lantus if he starts to have low BGs  - follow up with PCP within one week     CASS on CKD IV--improved  Dehydration   Creatinine was elevated to 3.24 on admit.  His recent baseline appears to be around 2.3-2.5.  Suspect this is likely prerenal in the setting of dehydration from osmotic diuresis. The patient also takes torsemide 10 mg daily (although he picked up a prescription of hydrochlorothiazide, he has not taken any of this and does not plan to). Cr improved with IVF  - Cr improved to his baseline 2.48 at time of discharge  - BMP in one week at his scheduled follow up with Dr Greene (6/11)  - resume PTA torsemide on 6/5     Hypokalemia  The patient's potassium was 2.4 on admission.  He reports previously taking a potassium supplement but this was discontinued due to his worsening renal function. Repleted up to 3.5 at time of discharge  - add back 20meq KCl daily on discharge, starting 6/5 (he has this medication at home)  - follow up with Dr Greene in one week     Hypertension  -Resumed PTA carvedilol 6.25 twice daily     HFpEF   Echocardiogram on 3/27/2021 showed an ejection fraction of 50 to 55% with severe inferior wall hypokinesis this is unchanged compared to prior study.  Patient does not appear to be in acute exacerbation in fact seems volume down as noted above. Pt does have some pitting edema in his legs bilaterally, but he reports that they are currently much less swollen than normal.   - Continue PTA Coreg 6.25 mg twice daily  - Resume PTA torsemide on 6/5     Chronic iron deficiency anemia    Baseline hemoglobin is around 8-9, stable  - follow up bone marrow biopsy with oncology     Hx of basilar CVA   Continue PTA Lipitor and Plavix.     Obesity   Body mass index is 27.57 kg/m .    Radha Gonzalez, DO    Significant Results and Procedures   See above    Pending Results   These results will be followed up by Oncology  Unresulted Labs Ordered in the Past  30 Days of this Admission     Date and Time Order Name Status Description    5/14/2021 1713 CHROMOSOME BONE MARROW In process     5/14/2021 1255 Leukemia Lymphoma Evaluation Non CSF In process           Code Status   Full Code       Primary Care Physician   Christian Davidson MD    Physical Exam   Temp: 97.7  F (36.5  C) Temp src: Oral BP: 130/74 Pulse: 86   Resp: 18 SpO2: 98 % O2 Device: None (Room air)    Vitals:    06/02/21 2037 06/03/21 0154   Weight: 93 kg (205 lb) 92.2 kg (203 lb 4.8 oz)     Vital Signs with Ranges  Temp:  [97.7  F (36.5  C)-98.3  F (36.8  C)] 97.7  F (36.5  C)  Pulse:  [78-86] 86  Resp:  [16-18] 18  BP: (111-130)/(63-81) 130/74  SpO2:  [95 %-100 %] 98 %  No intake/output data recorded.    Patient seen and examined on day of discharge. He is doing well, tolerating diet. Understands how to adjust his diet better (less fruit at a minimum). Spent a while discussing his blood sugars, the function/duration of his different insulins and how to adjust at home to maintain BGs within range. Discussed his potassium supplement as well. Updated his primary nephrologist Dr Greene of upcoming visit and changes in meds. Stable for discharge to home.    Constitutional: Awake, alert, cooperative, no apparent distress.  Eyes: Conjunctiva and pupils examined and normal.  HEENT: Moist mucous membranes, normal dentition.  Respiratory: Clear to auscultation bilaterally, no crackles or wheezing.  Cardiovascular: Regular rate and rhythm, normal S1 and S2, and no murmur noted.  GI: Soft, non-distended, non-tender, normal bowel sounds.  Lymph/Hematologic: No anterior cervical or supraclavicular adenopathy.  Skin: No rashes, no cyanosis, +1 bilateral lower extremity edema.  Musculoskeletal: No joint swelling, erythema or tenderness.  Neurologic: Cranial nerves 2-12 intact, normal strength and sensation.  Psychiatric: Alert, oriented to person, place and time, no obvious anxiety or depression.    Discharge  Disposition   Discharged to home  Condition at discharge: Stable    Consultations This Hospital Stay   NUTRITION SERVICES ADULT IP CONSULT  CARE MANAGEMENT / SOCIAL WORK IP CONSULT    Time Spent on this Encounter   IRadha DO, personally saw the patient today and spent greater than 30 minutes discharging this patient.    Discharge Orders      Basic metabolic panel     Reason for your hospital stay    Low potassium and high blood sugars     Follow-up and recommended labs and tests     Follow up with primary care provider, Christian Davidson MD, as scheduled for you on Monday 7/7/21 at 1:30 PM, to evaluate medication change and for hospital follow- up.  The following labs/tests are recommended: BMP in 6 days.      Follow up with Dr Greene (Nephrology) on 6/11 as previously scheduled     Activity    Your activity upon discharge: activity as tolerated     Monitor and record    blood glucose 4 times a day, before meals and at bedtime     Discharge Instructions    1. You will resume taking potassium supplement daily. 20meq daily. You will have re-check on your potassium with your labs in 6 days.    2. Restart your torsemide on 6/5    3. Your blood sugars remained elevated on your home regimen of insulin. You were instructed to add 15 units Lantus in the AM.  Based on your blood sugars, you may need to decrease your lantus. If you notice that you are having low blood sugars in the morning <90, you should decrease your morning lantus to 7 units. If your blood sugar is still low at bedtime, reduce your evening dose of lantus by 7 units to 43 units. If you wake up the next morning and your blood sugars are still low (<90) then stop your morning Lantus completely.  If your blood sugars remain elevated, over 200, with the addition of Lantus in the morning, you should increase your sliding scale by one unit of insulin for each range. So if you check your blood sugar and are supposed to take 7 units, add one  additional unit and take 8 units for that blood sugar.  If blood sugars remain elevated with Lantus in AM and addition of 1 unit per each range for sliding scale for blood sugars, then add one more additional unit of humalog for that sliding scale reference range. Do this until your blood sugars are regularly under 200.     Full Code     Diet    Follow this diet upon discharge: Moderate Consistent CHO Diet     Discharge Medications   Discharge Medication List as of 6/4/2021  2:30 PM      START taking these medications    Details   !! insulin glargine (LANTUS SOLOSTAR) 100 UNIT/ML pen Inject 15 Units Subcutaneous every morning Lantus Solostar Pen, Disp-15 mL, R-0, No Print OutFuture refills by PCP Dr. Christian Davidson MD with phone number [phone].      potassium chloride ER (K-TAB) 20 MEQ CR tablet Take 1 tablet (20 mEq) by mouth daily, No Print Out       !! - Potential duplicate medications found. Please discuss with provider.      CONTINUE these medications which have NOT CHANGED    Details   albuterol (PROAIR HFA) 108 (90 Base) MCG/ACT inhaler INHALE 2 PUFFS BY MOUTH FOUR TIMES DAILY AS NEEDED, Disp-8.5 g, R-3, E-Prescribe      atorvastatin (LIPITOR) 20 MG tablet Take 1 tablet (20 mg) by mouth daily, Disp-90 tablet, R-3, E-Prescribe      blood glucose (STEVE CONTOUR) test strip TESTING FOUR TIMES DAILY OR AS DIRECTED, Disp-400 strip, R-0, E-Prescribe      carvedilol (COREG) 6.25 MG tablet Take 1 tablet (6.25 mg) by mouth 2 times daily (with meals), Disp-180 tablet, R-3, E-Prescribe      clopidogrel (PLAVIX) 75 MG tablet Take 1 tablet (75 mg) by mouth daily, Disp-90 tablet, R-3, E-Prescribe      cyclobenzaprine (FLEXERIL) 10 MG tablet Take 10 mg by mouth At Bedtime, Historical      diphenhydrAMINE-acetaminophen (TYLENOL PM)  MG tablet Take 2 tablets by mouth nightly as needed for sleep, Historical      dorzolamide-timolol (COSOPT) 2-0.5 % ophthalmic solution Place 1 drop into both eyes 2 times  daily, Historical      fluticasone (FLONASE) 50 MCG/ACT nasal spray Spray 2 sprays into both nostrils daily, Historical      folic acid-vit B6-vit B12 (FOLGARD) 0.8-10-0.115 MG TABS per tablet Take 1 tablet by mouth daily Continue per Nephrology recommendation, Disp-30 tablet, R-0, E-Prescribe      !! insulin glargine (LANTUS SOLOSTAR) 100 UNIT/ML pen INJECT 50 UNITS UNDER THE SKIN AT BEDTIME, Disp-45 mL, R-3, HistoricalIf Lantus is not covered by insurance, may substitute Basaglar at same dose and frequency.        insulin lispro (HUMALOG KWIKPEN) 100 UNIT/ML (1 unit dial) KWIKPEN INJECT UP TO 55 UNITS UNDER THE SKIN ONCE DAILY AS DIRECTED, Disp-45 mL, R-1, E-Prescribe      insulin pen needle (BD FERCHO U/F) 32G X 4 MM miscellaneous USE AS DIRECTED UP TO FOUR TIMES DAILYDisp-400 each, [id number]      order for DME Hip steroid injectoionDisp-1 Units, R-0, Local Print      pantoprazole (PROTONIX) 40 MG EC tablet TAKE 1 TABLET(40 MG) BY MOUTH TWICE DAILY, Disp-180 tablet, R-2, E-Prescribe      torsemide (DEMADEX) 10 MG tablet Take by mouth daily , Historical      traZODone (DESYREL) 100 MG tablet TAKE 1 TABLET(100 MG) BY MOUTH AT BEDTIME, Disp-90 tablet, R-3, E-Prescribe      vitamin B-12 (CYANOCOBALAMIN) 1000 MCG tablet Take 1 tablet (1,000 mcg) by mouth daily Continue unitl hematology evaluation as borderline low vitamin b12., Disp-90 tablet, R-3, E-Prescribe       !! - Potential duplicate medications found. Please discuss with provider.        Allergies   No Known Allergies  Data   Most Recent 3 CBC's:  Recent Labs   Lab Test 06/04/21  0753 06/03/21  0758 06/02/21  2102   WBC 6.8 7.8 6.9   HGB 8.6* 8.7* 9.0*   MCV 85 84 84    170 204      Most Recent 3 BMP's:  Recent Labs   Lab Test 06/04/21  1302 06/04/21  0753 06/04/21  0214 06/03/21  0758 06/03/21 0758 06/02/21 2102 06/02/21 2102   NA  --  139  --   --  137  --  132*   POTASSIUM 3.5 3.3* 3.2*   < > 2.5*   < > 2.4*   CHLORIDE  --  100  --   --  96  --   87*   CO2  --  35*  --   --  37*  --  39*   BUN  --  48*  --   --  53*  --  56*   CR  --  2.48*  --   --  2.83*  --  3.24*   ANIONGAP  --  4  --   --  4  --  6   VANESA  --  9.0  --   --  8.7  --  8.8   GLC  --  255*  --   --  412*  --  650*    < > = values in this interval not displayed.     Most Recent 2 LFT's:  Recent Labs   Lab Test 21  0813   AST 14 25   ALT 24 33   ALKPHOS 118 102   BILITOTAL 0.6 0.4     Most Recent INR's and Anticoagulation Dosing History:  Anticoagulation Dose History     Recent Dosing and Labs Latest Ref Rng & Units 2020    INR 0.86 - 1.14 0.99        Most Recent 3 Troponin's:  Recent Labs   Lab Test 21  21021  1326 21  1225   TROPI 0.032 0.022 <0.015     Most Recent Cholesterol Panel:  Recent Labs   Lab Test 21  0430   CHOL 107   LDL 54   HDL 41   TRIG 61     Most Recent 6 Bacteria Isolates From Any Culture (See EPIC Reports for Culture Details):  Recent Labs   Lab Test 20  1450 20  1802 20  1615   CULT No growth No growth No growth     Most Recent TSH, T4 and A1c Labs:  Recent Labs   Lab Test 21  0430 21  0610   TSH 1.09  --    A1C  --  6.0*     Results for orders placed or performed during the hospital encounter of 21   XR Chest 2 Views    Narrative    XR CHEST 2 VW  3/26/2021 1:50 PM       INDICATION: dyspnea  COMPARISON: 2/3/2021       Impression    IMPRESSION: Mild cardiomegaly with normal pulmonary vascularity. The  lungs and pleural spaces are clear. No acute findings.    RAMOS PAYAN MD   Echo Limited    Narrative    [id number]  [id number]  [id number]  [id number]^PAUL^SANTHOSH^JENNIFER     [facility]  Echocardiography Laboratory  [facility] [address] [place] [address]     Name: LISA CAMARA  MRN: [id number]  : 1940  Study Date: 2021 12:25 PM  Age: 80 yrs  Gender: Male  Patient Location: [facility]  Reason For Study: CHF  Ordering Physician: SANTHOSH MILLER  Physician: ALBINO DORSEY  Performed By: Franklin Whittington RDCS     BSA: 2.2 m2  Height: 72 in  Weight: 221 lb  HR: 86  BP: 134/74 mmHg  ______________________________________________________________________________  Procedure  Limited Portable Echo Adult.  ______________________________________________________________________________  Interpretation Summary     The visual ejection fraction is estimated at 50-55%.  There is severe inferior wall hypokinesis.  Compared to prior study, there is no significant change.  ______________________________________________________________________________  Left Ventricle  The left ventricle is normal in size. There is normal left ventricular wall  thickness. The visual ejection fraction is estimated at 50-55%. Left  ventricular diastolic function is abnormal. There is severe inferior wall  hypokinesis. No evidence of left ventricular mass or tumors.     Right Ventricle  The right ventricle is normal in structure, function and size.     Atria  The left atrium is borderline dilated. Right atrial size is normal.     Mitral Valve  The mitral valve leaflets appear normal. There is no evidence of stenosis,  fluttering, or prolapse. There is mild (1+) mitral regurgitation.     Tricuspid Valve  Normal tricuspid valve.     Aortic Valve  Normal tricuspid aortic valve.     Pulmonic Valve  Normal pulmonic valve.     Vessels  The aortic root is normal size. The ascending aorta is Borderline dilated. The  inferior vena cava is normal.     Pericardium  There is no pericardial effusion.     Rhythm  Sinus rhythm was noted.  ______________________________________________________________________________  MMode/2D Measurements & Calculations  IVSd: 1.1 cm     LVIDd: 5.2 cm  LVIDs: 3.7 cm  LVPWd: 1.1 cm  FS: 27.5 %  LV mass(C)d: 217.6 grams  LV mass(C)dI: 97.9 grams/m2  Ao root diam: 3.4 cm  LA dimension: 4.7 cm  asc Aorta Diam: 3.8 cm  LA/Ao: 1.4  LA Volume (BP): 78.0 ml  LA Volume Index (BP): 35.1  ml/m2  RWT: 0.42     Doppler Measurements & Calculations  MV E max dayna: 106.0 cm/sec  MV A max dayna: 117.4 cm/sec  MV E/A: 0.90  MV dec time: 0.17 sec  PA V2 max: 95.0 cm/sec  PA max PG: 3.6 mmHg  PA mean P.1 mmHg  PA V2 VTI: 19.7 cm  PA acc time: 0.12 sec  E/E' av.6  Lateral E/e': 18.7  Medial E/e': 18.4     ______________________________________________________________________________  Report approved by: Oneida Cooper 2021 03:07 PM

## 2021-06-04 NOTE — PLAN OF CARE
Alert and oriented x4. AVSS on room air. Bowels active, +flatus. Up independent in room. Voiding spontaneously. K+ replaced this evening, recheck at 0215. Blood sugars corrected. Continue to monitor.

## 2021-06-04 NOTE — PLAN OF CARE
Summary: Hyperglycemia, Hypokalemia  DATE & TIME: 6/3/21 3034-7359  Cognitive Concerns/ Orientation : A&O x4  BEHAVIOR & AGGRESSION TOOL COLOR: Green   ABNL VS/O2: VSS on RA.    MOBILITY: Independent.  PAIN MANAGMENT: Denies  DIET: Mod carb  ABNL LABS: , K 3.2 replaced, recheck 0730  BOWEL/BLADDER: Continent  DRAIN/DEVICES: PIV SL  TELEMETRY RHYTHM: NSR  SKIN: WDL, intact  TESTS/PROCEDURES: N/A  D/C DAY/GOALS/PLACE: 1 - 2 days, recommended to prior living arrangement once renal function, K, BG improved

## 2021-06-07 NOTE — PATIENT INSTRUCTIONS
(E11.21,  N18.32,  Z79.4) Type 2 diabetes mellitus with stage 3b chronic kidney disease, with long-term current use of insulin (H)  (primary encounter diagnosis)  Comment: We will check labs today and continue Lantus 50 units at night and stop AM Lantus.  Continue with meal insulin.    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)  Comment: Recheck labs today and follow up in nephrology later this   Plan: CBC with platelets, Basic metabolic panel  (Ca,        Cl, CO2, Creat, Gluc, K, Na, BUN), Walker Order        for DME - ONLY FOR DME            (D50.9) Iron deficiency anemia, unspecified iron deficiency anemia type  Comment: Plan to follow up in hematology  Plan: Walker Order for DME - ONLY FOR DME            (I50.32) Chronic diastolic heart failure with preserved ejection fraction (H)  Comment: follow up in cardiology and continue torsemide and potassium  Plan: Walker Order for DME - ONLY FOR DME

## 2021-06-07 NOTE — TELEPHONE ENCOUNTER
Type 2 Diabetes Mellitus With Stage 3b Chronic Kidney Disease, With Long-Term Current Use Of Insulin (H), Shanice,FRI 04-JUN-2021 ,ed/ip 0 / 3    [phone] (home)

## 2021-06-07 NOTE — PROGRESS NOTES
SUBJECTIVE:                                                      Patient presents for Hospital Followup Visit:    Hospital:  [facility]      Date of Admission: 06/03  Date of Discharge: 06/04  Transitional Care Management Phone Call:   Reason(s) for Admission: acute kidney injury and diabetes mellitus             Problems taking medications regularly:  None       Medication changes since discharge: insulin adjustment       Problems adhering to non-medication therapy:  None    Summary of hospitalization:  [facility] discharge summary reviewed  Diagnostic Tests/Treatments reviewed.  Follow up needed: nephrology, cardiology   Other Healthcare Providers Involved in Patient s Care:         None  Update since discharge: improved.       Acute on chronic kidney disease    Justyn Olivas was recently mated to [facility] for acute on chronic kidney disease with an elevation of his creatinine to 3.24 where his baseline creatinine is usually between 2.3 and 2.5.  He was felt that this was likely prerenal in the setting of dehydration from osmotic diuresis secondary to hyperglycemia.  His creatinine did improve with IV fluids during his hospitalization and he is scheduled with to follow-up with nephrology on June 11 with Dr. Groves.  He has resumed his prior to mission torsemide    Type 2 diabetes mellitus    Upon admission to the hospital he is noted to have blood sugars in the 600 range.  It was felt that this was directly related to a recent change in his diet and when she started eating more juices.  Ultimately there was really no identifiable cause for his hyperglycemia and he was recommended to continue with his home regimen of Lantus 50 units at bedtime and resume his with meal insulin.  This AM his blood glucose was <100.  He has     Hypokalemia   He was noted to be hypokalemic upon admission.  He had previously been on potassium replacement but this was stopped due to  concerns for worsening renal function.  Ultimately he was felt to benefit from being on potassium is recommended to resume this at 20 mill equivalents daily upon discharge    HFpEF   He was not felt to be in any acute exacerbation of heart failure, although he did have some pitting edema in his legs.  He is recommended continue on his carvedilol 6.25 mg twice daily and resume his torsemide upon discharge    Iron deficiency anemia   There was no change in his hemoglobin during the hospitalization.      ROS:   ROS: 10 point ROS neg other than the symptoms noted above in the HPI.      OBJECTIVE:                                                    BP (!) 146/82 (BP Location: Right arm, Patient Position: Sitting, Cuff Size: Adult Regular)   Pulse 81   Temp 97.7  F (36.5  C) (Temporal)   Ht 1.829 m (6')   Wt 95.7 kg (211 lb)   SpO2 100%   BMI 28.62 kg/m    GEN: Alert oriented x3 NAD  HEENT: atraumatic, normocephalic, neck supple, no thyromegaly, no cervical adenopathy  CV: RRR no murmurs  PULM; CTA no wheezes or crackles  ABD: soft, nontender nondistended  EXT: warm well perfused 1+ edema  NEURO; no focal deficits   PSYCH; mood good, affect mood congruent        ASSESSMENT/PLAN:                                                      Patient Instructions   (E11.21,  N18.32,  Z79.4) Type 2 diabetes mellitus with stage 3b chronic kidney disease, with long-term current use of insulin (H)  (primary encounter diagnosis)  Comment: We will check labs today and continue Lantus 50 units at night and stop AM Lantus.  Continue with meal insulin.    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)  Comment: Recheck labs today and follow up in nephrology later this   Plan: CBC with platelets, Basic metabolic panel  (Ca,        Cl, CO2, Creat, Gluc, K, Na, BUN), Walker  Order        for DME - ONLY FOR DME            (D50.9) Iron deficiency anemia, unspecified iron deficiency anemia type  Comment: Plan to follow up in hematology  Plan: Walker Order for DME - ONLY FOR DME            (I50.32) Chronic diastolic heart failure with preserved ejection fraction (H)  Comment: follow up in cardiology and continue torsemide and potassium  Plan: Walker Order for DME - ONLY FOR DME                  Post Discharge Medication Reconciliation: discharge medications reconciled, continue medications without change.  Issues to address:   Plan of care communicated with patient      Type of Medical Decision Making Face-to-Face Visit within 7 Days Face-to-Face Visit within 14 days   Moderate Complexity [phone]   High Complexity [phone]

## 2021-06-08 NOTE — TELEPHONE ENCOUNTER
Patient seen yesterday post hospitalization.    Please verify current dose of Humalog.  Refill as appropriate.    Diamante Bond RN  [facility]

## 2021-06-09 NOTE — LETTER
[facility] CARE COORDINATION  [facility]  [address] [place] [address]    Kendra 10, 2021        Justyn Olivas  [address] [place] [address]          Dear Uyen Alejandra is an updated Complex Care Plan for your continued enrollment in Care Coordination. Please let us know if you have additional questions, concerns, or goals that we can assist with.    Sincerely,    Jerel Leija, RN  Clinic Care Coordinator  [facility]  [place] [facility]  Ph: [phone]            [facility], later [facility]  Complex Care Plan  About Me:    Patient Name:  Justyn Olivas    YOB: 1940  Age:         80 year old   [place] MRN:    [id number] Telephone Information:  Home Phone [phone]   Mobile [phone]       Address:  [address] [place] [address] Email address:  [email or web address]      Emergency Contact(s)    Name Relationship Lgl Grd Work Phone Home Phone Mobile Phone   1. EBEN OLIVAS* Spouse   [phone]   2. LINH OLIVAS* Son   [phone]           Primary language:  English     needed? No   [place] [facility]:  [phone] op. 1  Other communication barriers: Hearing aides  Preferred Method of Communication:  Mail  Current living arrangement: I live in a private home with family  Mobility Status/ Medical Equipment: Independent    Health Maintenance  Health Maintenance Reviewed: Due/Overdue   Health Maintenance Due   Topic Date Due     HF ACTION PLAN  Never done     COPD ACTION PLAN  Never done     Pneumococcal Vaccine: 65+ Years (1 of 2 - PPSV23) 04/23/2015     MEDICARE ANNUAL WELLNESS VISIT  02/10/2021     MICROALBUMIN  02/10/2021     DIABETIC FOOT EXAM  02/10/2021     My Access Plan  Medical Emergency 911   Primary Clinic Line [facility] - [phone]   24 Hour Appointment Line [phone] or  [phone]) (toll-free)   56  Hour Nurse Line [phone] (toll-free)   Preferred Urgent Care [facility] [place], [phone]   Preferred Hospital [facility] [place]  [phone]   [id number] [place] [place] - [address] AT [facility] [address]     Behavioral Health Crisis Line The National Suicide Prevention Lifeline at 1-574.388.9254 or 911     My Care Team Members  Patient Care Team       Relationship Specialty Notifications Start End    Christian Davidson MD PCP - General Internal Medicine  2/9/21     Phone: [phone] Pager: [phone] Fax: [phone] [address] [place] [address]    Jerel Leija, RN Lead Care Coordinator Primary Care - CC Admissions 12/24/20     Phone: [phone]         Esteban Ruiz PA-C Assigned Heart and Vascular Provider   3/17/21     Phone: [phone] Fax: [phone] [address] [place] [address]    Shaniqua Ram PA-C Physician Assistant Cardiovascular Disease  4/14/21     Phone: [phone] Pager: [phone] Fax: [phone] [address] [place] [address]    Brien Greene MD MD Nephrology  4/15/21     Phone: [phone] Pager: [phone] Fax: [phone]        [facility] [address] [place] [address]    Juanita Moore APRN CNP Nurse Practitioner Nephrology  4/15/21     Phone: [phone] Fax: [phone]         [facility] [address] [place] [address]    Kristine Nash MD Assigned Cancer Care Provider   5/9/21     Phone: [phone] Fax: [phone] [address] [place] [address]    Bandar Greenberg MD Assigned PCP   5/27/21     Phone: [phone] Pager: [phone] Fax: [phone] [address] [place] [address]            My Care Plans  Self Management and Treatment Plan  Goals and (Comments)  Goals        Patient Stated       1. Improve chronic symptoms (pt-stated)      Goal Statement: I will verbalize an increase in my strength and mobility and correct knowledge of adequate management of my chronic health conditions to maintain my health and wellness in preventing hospital readmission.   Date Goal set: 12/24/20; Updated/modified 04/02/2021  Barriers: Multiple health concerns.  Strengths: Motivated and engaged in care coordination.   Date to Achieve By: 9/1/2021  Patient expressed understanding of goal: Yes    Action steps to achieve this goal:  1. I will work with Physical Therapy to build my strength and mobility.   2. I will follow up with my providers as scheduled/recommended. (Primary Care Provider, [facility], sleep studies TBD, Dr. Ariana PARSON, [facility] hematology/oncology, nephrology)   3. I will take my medications as prescribed.   4. I will continue monitoring my blood sugars, blood pressures, weight daily as recommended.   5. I will use my CHF action plan to monitor/manage my symptoms.   6. I will follow care team recommendations of fluid restriction, diabetic and cardiac diet.   7. I will use my incentive spirometer as directed and recommended by my care team.   8. I will continue to work with care coordination for any additional resources and support.  9. I will contact my care team with questions, concerns, support needs. I will use the clinic as a resource and I understand I can contact my clinic with 24/7 after hours services available. Care Coordinator will remain available as needed.          Action Plans on File: none  Advance Care Plans/Directives Type: none on file    Honoring Choices    Advance Care Planning and Health Care Directives  When it comes to decisions about your health care, it s important that your voice is heard. You may not always be able to speak for yourself.    We encourage you to have discussions with your loved ones, cultural or spiritual leaders and health care providers about your goals, values, beliefs  and choices.    We are a part of [employer] [place] , supporting and promoting the benefits of advance care planning conversations.    Our goals are to:    Help you make informed decisions about your healthcare choices and ensure that those choices are honored    Offer advance care planning discussions with trained staff    Ensure your choices are clearly defined, documented and available in your medical record    Translate your choices into medical orders as needed    Why is Advance Care Planning important?    Know what your health care choices are and decide what is right for you    An unexpected illness or injury could leave you unable to participate in important treatment decisions    When choices are left to others to decide that responsibility can be difficult and stressful    By discussing and outlining your choices, your voice is heard in the care you want to receive    How can I learn more?  We offer free classes at multiple locations, days and times. Our trained facilitators will provide information and guide you through a Health Care Directive document. They can also review, notarize and add your document to your medical record.    Call [employer] at [phone] or toll free at [phone] for assistance.       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)     Anemia due to blood loss, acute     Iron  deficiency anemia due to chronic blood loss     MAIRA (iron deficiency anemia)     Anemia, iron deficiency     Iron deficiency     Anemia, unspecified type     Type 2 diabetes mellitus with stage 3b chronic kidney disease, with long-term current use of insulin (H)     Anemia due to stage 3 chronic kidney disease, unspecified whether stage 3a or 3b CKD     Hypokalemia     Hyperglycemia     Chronic diastolic heart failure with preserved ejection fraction (H)      Current Medications:  Current Outpatient Medications   Medication Instructions     albuterol (PROAIR HFA) 108 (90 Base) MCG/ACT inhaler INHALE 2 PUFFS BY MOUTH FOUR TIMES DAILY AS NEEDED     atorvastatin (LIPITOR) 20 mg, Oral, DAILY     blood glucose (STEVE CONTOUR) test strip TESTING FOUR TIMES DAILY OR AS DIRECTED     carvedilol (COREG) 6.25 mg, Oral, 2 TIMES DAILY WITH MEALS     clopidogrel (PLAVIX) 75 mg, Oral, DAILY     cyclobenzaprine (FLEXERIL) 10 mg, Oral, AT BEDTIME     diphenhydrAMINE-acetaminophen (TYLENOL PM)  MG tablet 2 tablets, Oral, AT BEDTIME PRN     dorzolamide-timolol (COSOPT) 2-0.5 % ophthalmic solution 1 drop, Both Eyes, 2 TIMES DAILY     fluticasone (FLONASE) 50 MCG/ACT nasal spray 2 sprays, Both Nostrils, DAILY     folic acid-vit B6-vit B12 (FOLGARD) 0.8-10-0.115 MG TABS per tablet 1 tablet, Oral, DAILY, Continue per Nephrology recommendation     insulin glargine (LANTUS SOLOSTAR) 100 UNIT/ML pen INJECT 50 UNITS UNDER THE SKIN AT BEDTIME     insulin glargine (LANTUS SOLOSTAR) 15 Units, Subcutaneous, EVERY MORNING, Lantus Solostar Pen     insulin lispro (HUMALOG KWIKPEN) 5-15 Units, Subcutaneous, 3 TIMES DAILY BEFORE MEALS, Sliding scale with meals     insulin pen needle (BD FERCHO U/F) 32G X 4 MM miscellaneous USE AS DIRECTED UP TO FOUR TIMES DAILY     order for DME Hip steroid injectoion     pantoprazole (PROTONIX) 40 MG EC tablet TAKE 1 TABLET(40 MG) BY MOUTH TWICE DAILY     potassium chloride ER (K-TAB) 20 MEQ CR tablet 20 mEq,  Oral, DAILY     torsemide (DEMADEX) 10 MG tablet Oral, DAILY     traZODone (DESYREL) 100 MG tablet TAKE 1 TABLET(100 MG) BY MOUTH AT BEDTIME     vitamin B-12 (CYANOCOBALAMIN) 1,000 mcg, Oral, DAILY, Continue unitl hematology evaluation as borderline low vitamin b12.     Care Coordination Start Date: 12/24/2020   Frequency of Care Coordination: Monthly and as needed   Form Last Updated: 06/10/2021

## 2021-06-13 NOTE — RESULT ENCOUNTER NOTE
Rickie Alejandra,    I have had the opportunity to review your recent results and an interpretation is as follows:  Noted that you recently follow-up with nephrology.  Please continue with the potassium replacement and we should recheck this again at your next follow-up appointment    Sincerely,  Christian Davidson MD

## 2021-06-17 NOTE — PROGRESS NOTES
Medical Assistant Note:  Justyn Olivas presents today for blood draw.    Patient seen by provider today: No.   present during visit today: Not Applicable.    Concerns: No Concerns.    Procedure:  Lab draw site: lac, Needle type: bf, Gauge: 23.    Post Assessment:  Labs drawn without difficulty: Yes.    Discharge Plan:  Departure Mode: Ambulatory.    Face to Face Time: 5 min  .    Alicia Alex, CMA

## 2021-06-17 NOTE — RESULT ENCOUNTER NOTE
Rickie Alejandra,    I have had the opportunity to review your recent results and an interpretation is as follows:  Your follow-up renal function labs show slightly increased creatinine as compared to 2 weeks ago, and I would recommend follow up in nephrology as we discussed    Avoid all nonsteroidal anti-inflammatory such as ibuprofen, Aleve, etc. and continue to drink fluids up to 64 ounces of fluids per day.  Continue your potassium replacement - consider increasing after discussion in nephrology    I would also recommend he follow-up with Dr. Lane in gastroenterology given persistent decline in both hemoglobin and ferritin as we discussed     Sincerely,  Christian Davidson MD

## 2021-06-17 NOTE — PROGRESS NOTES
Nursing Note:  Justyn Olivas presents today for Aranesp.    Patient seen by provider today: No   present during visit today: Not Applicable.    Note: Patient disappointed that his Hgb is down to 8.4.  He reports feeling anemic with increased fatigue and sob. Patient asking if dose can be increased as he is not feeling improvement.  Message sent to Dr. Nash and Krista Elkins RN to readdress.     Patient tolerated one Aranesp to right arm without issue.    Intravenous Access:  No Intravenous access/labs at this visit.    Discharge Plan:   Patient was sent to home in stable condition.    Ashley Hinson RN

## 2021-06-18 NOTE — TELEPHONE ENCOUNTER
Based on his labs today, his hemoglobin drop is most likely from iron deficiency -- therefore, I would recommend he start on ferrous sulfate 1 tab orally daily -- if he does not tolerate, then we can have him come in for 2 doses of Injectafer, 1 week apart.       Since he has evidence of iron deficiency, I would recommend first replenishing his iron stores before increasing his Aranesp dose.       Krista -- could you please check with pharmacy if Aranesp could be covered given a week early?  There are different dosing schedules for it so it is possible.     Thank you all!     Called Kamran, he  is aware to start Ferrous sulfate 325 mg daily. He will get it over the counter. He is aware that his Iron levels are low. Writer will follow up with him in 1 week to see if his energy level is better with oral Iron. Krista Elkins RN,BSN,OCN

## 2021-06-24 NOTE — PROGRESS NOTES
Infusion Nursing Note:  Justyn Olivas presents today for Injectafer 1/2.    Patient seen by provider today: No   present during visit today: Not Applicable.    Note: pt states he needs labs drawn today but based on Helga Santos RN's telephone encounters note on 6/22, labs need to be drawn on 7/1.  In basket message sent to Dr. Davidson to obtain the orders.    Intravenous Access:  Peripheral IV placed.    Treatment Conditions:  Not Applicable.      Post Infusion Assessment:  Patient tolerated infusion without incident.  Patient observed for 30 minutes post Injectafer per protocol.  Blood return noted pre and post infusion.  Site patent and intact, free from redness, edema or discomfort.  No evidence of extravasations.  Access discontinued per protocol.       Discharge Plan:   Discharge instructions reviewed with: Patient.  Patient and/or family verbalized understanding of discharge instructions and all questions answered.  [facility]  Patient will return 7/1/21 for next appointment.   Patient discharged in stable condition accompanied by: self.  Departure Mode: Ambulatory with cane.      Tod Jo RN

## 2021-06-25 NOTE — PROGRESS NOTES
Clinic Care Coordination Contact  [facility]/Voicemail    Referral Source: IP Report  Clinical Data: Care Coordinator Outreach  CC RN outreach to get updates on patient status, assess goal progress, and provide support and additional resources as needed.    Outreach attempted x 1.  Left message on patient's voicemail with call back information and requested return call.    Plan: Care Coordinator will try to reach patient again in 3-5 business days.    Jerel Leija RN  Clinic Care Coordinator  [facility] [place]  Ph: [phone]

## 2021-06-30 NOTE — PROGRESS NOTES
Subjective     Justyn Olivas is a 79 year old male who presents to clinic today for the following health issues:    HPI   Patient presents to clinic to review decreased CBC and iron levels from previous wellness visit on 02/10/2020. The pt  The pt reports that he has been more fatigued than usual. He notes that he has been experiencing paresthesias of his feet. He denies experiencing melena.     The pt reports that his blood/glucose levels has been under very good control. He states that his levels are typically 80-90 mg/dL. He attributes this to being more disciplined in taking his Humalog during lunch every day.     Patient Active Problem List   Diagnosis     Shoulder pain     Insomnia, unspecified type     Type 2 diabetes mellitus without complication, with long-term current use of insulin (H)     Benign essential hypertension     Hyperlipidemia LDL goal <100     Non-seasonal allergic rhinitis due to pollen     Primary osteoarthritis of both hips     Primary osteoarthritis involving multiple joints     Chronic non-seasonal allergic rhinitis, unspecified trigger     Cerebrovascular accident (CVA) due to embolism of cerebral artery (H)     CKD (chronic kidney disease) stage 3, GFR 30-59 ml/min (H)     Anemia due to blood loss, acute     Simple chronic bronchitis (H)     No past surgical history on file.    Social History     Tobacco Use     Smoking status: Never Smoker     Smokeless tobacco: Never Used   Substance Use Topics     Alcohol use: No     Alcohol/week: 0.0 standard drinks     Family History   Problem Relation Age of Onset     Family History Negative Mother      Family History Negative Father          Current Outpatient Medications   Medication Sig Dispense Refill     ASPIRIN PO Take 325 mg by mouth       atorvastatin (LIPITOR) 20 MG tablet Take 1 tablet (20 mg) by mouth daily 90 tablet 1     benzonatate (TESSALON) 200 MG capsule Take 1 capsule (200 mg) by mouth 3 times daily as needed for cough 12  capsule 1     blood glucose (STEVE CONTOUR) test strip TESTING FOUR TIMES DAILY OR AS DIRECTED 400 strip 1     clopidogrel (PLAVIX) 75 MG tablet Take 1 tablet (75 mg) by mouth daily 90 tablet 3     fluticasone (FLONASE) 50 MCG/ACT nasal spray Spray 1 spray into both nostrils 2 times daily as needed for rhinitis or allergies 3 Bottle 3     FLUZONE HIGH-DOSE 0.5 ML injection ADM 0.5ML IM UTD  0     Glucose Blood (STEVE CONTOUR TEST VI)        insulin glargine (LANTUS SOLOSTAR) 100 UNIT/ML pen INJECT 50 UNITS UNDER THE SKIN AT BEDTIME 45 mL 3     insulin lispro (HUMALOG KWIKPEN) 100 UNIT/ML (1 unit dial) pen ADMINISTER UP TO 55 UNITS UNDER THE SKIN EVERY DAY AS DIRECTED 45 mL 0     insulin pen needle (BD FERCHO U/F) 32G X 4 MM miscellaneous USE AS DIRECTED UP TO FOUR TIMES DAILY 400 each 1     irbesartan (AVAPRO) 150 MG tablet TAKE 1 TABLET(150 MG) BY MOUTH AT BEDTIME 90 tablet 3     LANTUS SOLOSTAR 100 UNIT/ML soln ADMINISTER 50 UNITS UNDER THE SKIN AT BEDTIME AS DIRECTED 45 mL 0     metFORMIN (GLUCOPHAGE) 1000 MG tablet TAKE 1 TABLET(1000 MG) BY MOUTH TWICE DAILY WITH MEALS 180 tablet 3     order for DME Hip steroid injectoion 1 Units 0     SHINGRIX injection ADM 0.5ML IM UTD  0     traZODone (DESYREL) 100 MG tablet TAKE 1 TABLET(100 MG) BY MOUTH AT BEDTIME 90 tablet 1     VENTOLIN  (90 BASE) MCG/ACT Inhaler INL 2 PFS PO QID PRN 1 Inhaler 3     No Known Allergies    Reviewed and updated as needed this visit by Provider         Review of Systems   ROS COMP: Constitutional, HEENT, cardiovascular, pulmonary, gi and gu systems are negative, except as otherwise noted.      Objective    /76 (BP Location: Left arm, Patient Position: Sitting, Cuff Size: Adult Regular)   Pulse 84   Temp 97.5  F (36.4  C) (Oral)   Ht 1.829 m (6')   Wt 96.6 kg (213 lb)   SpO2 99%   BMI 28.89 kg/m    Body mass index is 28.89 kg/m .     BP Recheck   136/76    Physical Exam   Neck was supple without adenopathy or thyromegaly his  carotids were normal without bruits  Chest clear to auscultation and percussion  Cardiovascular S1 and S2 are physiologic without murmurs or gallops  Abdomen distended, bowel sounds were normal.  There is no palpable mass or organomegaly  Extremities nontender without any edema  Pulses pedal pulses are as described otherwise his pulses are bilaterally symmetrical throughout without bruits  Skin without significant abnormality      Diagnostic Test Results:  Labs reviewed in Epic  No results found for this or any previous visit (from the past 24 hour(s)).        Assessment & Plan     CKD (chronic kidney disease) stage 3, GFR 30-59 ml/min (H)  Continue to follow closely and avoid prerenal azotemia and nephrotoxic drugs    Type 2 diabetes mellitus without complication, with long-term current use of insulin (H)  Under good control with current therapies     Cerebrovascular accident (CVA) due to embolism of cerebral artery (H)  Continue to follow closely in moderate his risk factors as best possible    Simple chronic bronchitis (H)      Paroxysmal atrial fibrillation (H)  No symptoms of recurrence.    Anemia due to blood loss, acute  Suspect decreased CBC and iron levels are attributed to taking Plavix and Aspirin. F/u to rule out GI pathology. The pt's Hgb levels have increased slightly, but are still below the normal limit. Recommended that the pt start taking ferrous sulfate every day to supplement his iron levels. Pt should continue with taking 1 baby aspirin daily. F/u in 1 month.   - CBC with platelets  - Iron and iron binding capacity    Benign essential hypertension  Well controlled with current therapies.     Primary osteoarthritis involving multiple joints             FUTURE APPOINTMENTS:       - Follow-up visit in 1 month     Return in about 1 month (around 3/25/2020) for Follow Up.    The information in this document, created by the medical scribe for me, accurately reflects the services I personally performed  and the decisions made by me. I have reviewed and approved this document for accuracy prior to leaving the patient care area.  February 25, 2020 11:47 AM    Bandar Greenberg MD  [facility]     none known

## 2021-07-01 NOTE — PROGRESS NOTES
Infusion Nursing Note:  Justyn FRIAS Deisy presents today for injectafer 1/2  Patient seen by provider today: No   present during visit today: Not Applicable.    Note: Appointments need to be rescheduled (aranesp) and 2nd injectafer dose needs to be scheduled; message sent to schedulers. Patient will watch [employer] for appointments and call if needed. Hemoglobin 7.2 today; Dr. Nash notified regarding getting aranesp injection a week early due to traveling on Saturday for a week and feeling weaker. Dr. Nash ok'd dose and pharmacy approved dose early also. Patient scheduled for 7/2.    Intravenous Access:  Labs drawn without difficulty.  Peripheral IV placed.    Treatment Conditions:  Not Applicable.      Post Infusion Assessment:  Patient tolerated infusion without incident.  Patient observed for 30 minutes post injectafer per protocol.  Site patent and intact, free from redness, edema or discomfort.  No evidence of extravasations.  Access discontinued per protocol.       Discharge Plan:   Copy of AVS reviewed with patient and/or family.  Patient will return 7/2 for next appointment.  Patient discharged in stable condition accompanied by: self.  Departure Mode: Ambulatory with cane.      Zay Rosales, RN

## 2021-07-01 NOTE — PROGRESS NOTES
"Clinic Care Coordination Contact    Follow Up Progress Note      Assessment:    Patient reported to be doing \"about the same\" since last CC RN outreach.  He expressed that \"days all seem to be the same\" in terms of how he's feeling.  He stated that he keeps waiting to turn a corner but that day never seems to come.  He reported that he feels okay but just generally not as well as he once did.    Because of the above, patient noted he can tend to feel down.  He does not have thoughts of harming himself or others and is deeply rooted in his dariel which he noted helps him to have strength.  He used to be a  and still leans on his spirituality to get through hard times.  He also noted that he continues being part of a book club with a group of friends which he finds to be very helpful, as well, to his mental health and overall spirit.    Patient noted his overall inquiry about what the plan will be long-term if his anemia doesn't improve after his next scheduled infusion he has today, which is the last one currently scheduled.  He wondered if they would just continue doing more infusions or if there were other options to explore.  He noted that he completed a capsule study this week to evaluate if there's any bleeding in his GI tract and again wondered what would be the plan if there was not bleeding found and therefore no definitive cause of his anemia.    Patient reported that he did use his albuterol inhaler yesterday due to some shortness of breath which is something he doesn't experience very often.  He noted the inhaler did help and he hasn't had any recurrence of the shortness of breath today.  CC RN reviewed CHF symptoms with patient (see below); aside from shortness of breath which patient noted being at its baseline, patient noted to be doing well from that regard.  He feels it's likely due to his anemia and plans to continue monitoring.    Patient updated that he will be leaving for family " vacation on Saturday; he and his family are spending a week in [place], [place], which is a place they've gone many times before.  He is very much looking forward to time away with his family.    Patient expressed desire to continue working on improving his leg strength as his overarching goal.  He continues doing physical therapy exercises twice per week with a  who, he noted, is nearly done with her doctorate for PT so he feels comfortable in her abilities and plans to continue seeing her at this time.  He is aware of other outpatient or home-based PT options should he need additional support with this and will notify PCP/CC if he would like any referrals.      Home scale available: Yes    Home scale weight this mornin lbs    Following a low sodium diet: Yes        Heart Failure Zones sheet on refrigerator or available: Yes    What Heart Failure Zone currently in: Green    Any increased SOB since last contacted: as noted above; currently no increased SOB per patient report    Any increased edema since last contacted: No    Is your activity more limited since last contact: No    Have you had any chest pain since last contact: No  Goals addressed this encounter:   Goals Addressed                 This Visit's Progress       Patient Stated      1. Improve chronic symptoms (pt-stated)   70%     Goal Statement: I will verbalize an increase in my strength and mobility and correct knowledge of adequate management of my chronic health conditions to maintain my health and wellness in preventing hospital readmission.   Date Goal set: 20; Updated/modified 2021  Barriers: Multiple health concerns.  Strengths: Motivated and engaged in care coordination.   Date to Achieve By: 2021  Patient expressed understanding of goal: Yes    Action steps to achieve this goal:  1. I will work with Physical Therapy to build my strength and mobility.   2. I will follow up with my providers as  scheduled/recommended. (Primary Care Provider, [facility], sleep studies TBD, Dr. Ariana PARSON, [facility] hematology/oncology, nephrology)   3. I will take my medications as prescribed.   4. I will continue monitoring my blood sugars, blood pressures, weight daily as recommended.  5. I will use my CHF action plan to monitor/manage my symptoms.   6. I will follow care team recommendations of fluid restriction, diabetic and cardiac diet.   7. I will use my incentive spirometer as directed and recommended by my care team.   8. I will continue to work with care coordination for any additional resources and support.  9. I will contact my care team with questions, concerns, support needs. I will use the clinic as a resource and I understand I can contact my clinic with 24/7 after hours services available. Care Coordinator will remain available as needed.          Intervention/Education provided during outreach:    CC RN discussed with patient his inquiries in regards to next steps in his plan of care and encouraged him to continue close follow-up with his care teams.  PRASHANT RN also reviewed with patient resource of palliative care and discussed how this could be helpful.  Patient stated understanding and will think on this pending how his upcoming testing and appointments go.  Patient also indicated that he will be scheduling a PCP appointment for when he is back from his trip [place].    CC RN encouraged patient to continue closely monitoring how he is feeling and to promptly notify clinic if he experiences new, worsening, or persistent symptoms; he agreed to do so.    CC RN reviewed patient's goal as above; patient indicated he would like to continue working toward this goal and action steps as outlined.  He denied additional needs at this time.    CC RN encouraged patient to have idea of a nearby medical center based on where he and his family will be traveling should he have any medical needs while they are  out-of-state; patient agreed to do so.     Outreach Frequency: monthly    Plan:    Patient will continue with plan of care as discussed above.  He will request follow-up PCP appointment as reviewed.    Patient will promptly notify clinic if any new, worsening, or persistent symptoms as discussed.    Patient agreed to contact CC RN with additional questions, concerns, or needs.    CC RN will outreach to patient in approximately 2 weeks to get updates on patient status, assess goal progress, and offer additional support and resources as indicated.    Jerel Leija RN  Clinic Care Coordinator  [facility]  Ph: [phone]

## 2021-07-02 NOTE — TELEPHONE ENCOUNTER
Please see patient's mychart:    Patient reports hgb 7.2. Patient was seen on 7/1/21 at infusion center to chiki injectafer.     Hemoglobin   Date Value Ref Range Status   07/01/2021 7.2 (L) 13.3 - 17.7 g/dL Final   06/17/2021 8.4 (L) 13.3 - 17.7 g/dL Final       Please reply back to patient or advise if triage follow up needed.    Sarah Kenyon RN  [facility]

## 2021-07-02 NOTE — PROGRESS NOTES
Kamran is a 80 year old who is being evaluated via a billable telephone visit.      What phone number would you like to be contacted at? [phone]  How would you like to obtain your AVS? Tammie Brown   Kamran is a 80 year old who presents for the following health issues     HPI       Chief Complaint   Patient presents with     Follow Up     Anemia   Justyn Olivas has been feeling better today following iron infusion yesterday and Aranesp today.  He has now completed infusion.  Last hemoglobin was still 7.2 which coincides with a feeling of weakness, but feeling a bit stronger since getting the Aranesp today.   He is hopeful that he will continue to improve with this treatment.  He has not seen any bleeding in his stool.  His last colonoscopy was in March at which time he was treated for a nonbleeding colonic angiodysplastic lesion.    Review of Systems   Constitutional, HEENT, cardiovascular, pulmonary, GI, , musculoskeletal, neuro, skin, endocrine and psych systems are negative, except as otherwise noted.      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

## 2021-07-02 NOTE — TELEPHONE ENCOUNTER
TC: Can you please call patient to schedule virtual visit today.    Sarah Kenyon RN  [facility]

## 2021-07-02 NOTE — PROGRESS NOTES
Infusion Nursing Note:  Justyn Olivas presents today for aranesp.    Patient seen by provider today: Yes: Dr. Davidson this afternoon   present during visit today: Not Applicable.    Note: Dr. Nash approved patient to receive aranesp one week early due to hemoglobin 7.2 7/1 and patient leaving for a week vacation to [place] tomorrow.     Intravenous Access:  No Intravenous access/labs at this visit.    Treatment Conditions:  Lab Results   Component Value Date    HGB 7.2 07/01/2021     Lab Results   Component Value Date    WBC 7.0 06/17/2021      Lab Results   Component Value Date    ANEU 4.8 06/17/2021     Lab Results   Component Value Date     06/17/2021      Results reviewed, labs MET treatment parameters, ok to proceed with treatment.      Post Infusion Assessment:  Patient tolerated injection without incident.  Site patent and intact, free from redness, edema or discomfort.       Discharge Plan:   AVS to patient via MYCHART.  Patient will return as prev scheduled for next appointment.   Patient discharged in stable condition accompanied by: self.  Departure Mode: Ambulatory with cane.      Zay Rosales RN

## 2021-07-16 NOTE — PROGRESS NOTES
Clinic Care Coordination Contact    Follow Up Progress Note      Assessment:     Patient states he had a wonderful trip to [place], [place] with his family (son lives in [place]). Patient denies any health complaints while in [place]. Patient states he felt the best he has felt in quite some time while in [place].     Patient returned one week ago. Since then patient complains of pain in ribs from chronic sebaceous cysts. Patient was seen by Dermatology (outside of [facility]) several weeks ago, and surgery was not recommended. Patient is very frustrated by this pain. Discussed keeping cysts clean and applying warm compresses twice a day for 10-15 minutes to see if that will alleviate some discomfort. Patient verbalized understanding. Patient questions if PCP would be willing to prescribe lidocaine patches for pain relief. RN CC will route chart to PCP with request.     Patient had virtual visit with PCP 7/2/21, anemia plan is to continue iron infusions and Aranesp injections. Patient states since returning home from vacation chronic fatigue is about the same. Patient has HGB recheck today. HGB has improved to 9.5 from 7.2 2 weeks ago. Reviewed result with patient. Patient states HGB goal is 10. Patient denies any bloody stool.      Patient is using albuterol inhaler daily, and denies any chest pain, increase SOB or increases in weight. Patient continues to weigh himself daily. Weight fluctuating between 203 -204 lbs.       Goals addressed this encounter:   Goals Addressed                    This Visit's Progress       1. Improve chronic symptoms (pt-stated)   80%      Goal Statement: I will verbalize an increase in my strength and mobility and correct knowledge of adequate management of my chronic health conditions to maintain my health and wellness in preventing hospital readmission.   Date Goal set: 12/24/20; Updated/modified 04/02/2021  Barriers: Multiple health concerns.  Strengths: Motivated and engaged in care  coordination.   Date to Achieve By: 9/1/2021  Patient expressed understanding of goal: Yes    Action steps to achieve this goal:  1. I will work with Physical Therapy to build my strength and mobility.   2. I will follow up with my providers as scheduled/recommended. (Primary Care Provider, [facility], sleep studies TBD, Dr. Ariana PARSON, [facility] hematology/oncology, nephrology)   3. I will take my medications as prescribed.   4. I will continue monitoring my blood sugars, blood pressures, weight daily as recommended.  5. I will use my CHF action plan to monitor/manage my symptoms.   6. I will follow care team recommendations of fluid restriction, diabetic and cardiac diet.   7. I will use my incentive spirometer as directed and recommended by my care team.   8. I will continue to work with care coordination for any additional resources and support.  9. I will contact my care team with questions, concerns, support needs. I will use the clinic as a resource and I understand I can contact my clinic with 24/7 after hours services available. Care Coordinator will remain available as needed.               Intervention/Education provided during outreach: Chronic disease management.      Outreach Frequency: 2 weeks    Plan: RN CC will send contact information over [employer]. RN Care Coordinator will follow up in two weeks.     Hellen Chaidez RN Care Coordinator  [facility]  Email: [email or web address]  Phone: [phone]

## 2021-07-16 NOTE — LETTER
[facility] CARE COORDINATION  [address]  [place] [address]    July 16, 2021    Justyn Olivas  [address] [place] [address]      Dear Kamran,      Please feel free to contact me with any questions or concerns. We are focused on providing you with the highest-quality healthcare experience possible and that all starts with you.     Sincerely,     Hellen Chaidez RN Care Coordinator  [facility]  Email: [email or web address]  Phone: [phone]

## 2021-07-16 NOTE — LETTER
[facility] CARE COORDINATION  [address]  [place] [address]    July 16, 2021    Justyn Olivas  [address] [place] [address]      Dear Kamran,      Please feel free to contact me with any questions or concerns. We are focused on providing you with the highest-quality healthcare experience possible and that all starts with you.     Sincerely,     Hellen Chaidez RN Care Coordinator  [facility]  Email: [email or web address]  Phone: [phone]

## 2021-07-19 NOTE — PROGRESS NOTES
Clinic Care Coordination Contact    Follow Up Progress Note      Assessment: RN CC updated patient on PCP advice to try Salonpas OTC patches for rib pain verse Lidocaine patches. Patient verbalized understanding. No additional questions or concerns at this time.       Goals addressed this encounter:   Goals Addressed                    This Visit's Progress       1. Improve chronic symptoms (pt-stated)   80%      Goal Statement: I will verbalize an increase in my strength and mobility and correct knowledge of adequate management of my chronic health conditions to maintain my health and wellness in preventing hospital readmission.   Date Goal set: 12/24/20; Updated/modified 04/02/2021  Barriers: Multiple health concerns.  Strengths: Motivated and engaged in care coordination.   Date to Achieve By: 9/1/2021  Patient expressed understanding of goal: Yes    Action steps to achieve this goal:  1. I will work with Physical Therapy to build my strength and mobility.   2. I will follow up with my providers as scheduled/recommended. (Primary Care Provider, [facility], sleep studies TBD, Dr. Ariana PARSON, [facility] hematology/oncology, nephrology)   3. I will take my medications as prescribed.   4. I will continue monitoring my blood sugars, blood pressures, weight daily as recommended.  5. I will use my CHF action plan to monitor/manage my symptoms.   6. I will follow care team recommendations of fluid restriction, diabetic and cardiac diet.   7. I will use my incentive spirometer as directed and recommended by my care team.   8. I will continue to work with care coordination for any additional resources and support.  9. I will contact my care team with questions, concerns, support needs. I will use the clinic as a resource and I understand I can contact my clinic with 24/7 after hours services available. Care Coordinator will remain available as needed.               Intervention/Education provided during outreach: Chronic disease  management     Outreach Frequency: 2 weeks    Plan: RN Care Coordinator will follow up in two weeks.     Hellen Chaidez RN Care Coordinator  [facility]  Email: [email or web address]  Phone: [phone]

## 2021-07-20 NOTE — RESULT ENCOUNTER NOTE
Rickie Alejandra,    I have had the opportunity to review your recent results and an interpretation is as follows:  Your rib x-ray shows no evidence of any fracture or any bony abnormalities.    Sincerely,  Christian Davidson MD

## 2021-07-20 NOTE — PATIENT INSTRUCTIONS
(R07.81) Rib pain on right side  (primary encounter diagnosis)  Comment: We will start with X-ray of the right ribs and thoracic spine and consider additional imaging pending results.  Pain control with acetaminophen and use of lidocaine patches  Plan: XR Ribs & Chest Right G/E 3 Views, XR Thoracic         Spine 2 Views

## 2021-07-20 NOTE — PROGRESS NOTES
[facility]  CLINIC PROGRESS NOTE    Subjective:  Right side rib pain   Justyn Olivas presents to the clinic today for right sided rib pain.   Recently traveled to [place] by plane.  No pain while traveling.  Pain started about 5 days ago.  Described as a sharp side ache like a knife.   Has been putting some over the counter topical analgesic on the side which helps.      Past medical history, medications, allergies, social history, family history reviewed and updated in EPIC as of 7/20/2021 .    ROS  CONSTITUTIONAL: no fatigue, no unexpected change in weight  SKIN: no worrisome rashes, no worrisome moles, no worrisome lesions  EYES: no acute vision problems or changes  ENT: no ear problems, no mouth problems, no throat problems  RESP: no significant cough, no shortness of breath  CV: no chest pain, no palpitations, no new or worsening peripheral edema  GI: no nausea, no vomiting, no constipation, no diarrhea  : no frequency, no dysuria, no hematuria  MS: as above   PSYCHIATRIC: no changes in mood or affect      Objective:  Vitals  /79 (BP Location: Left arm, Patient Position: Sitting, Cuff Size: Adult Regular)   Pulse 88   Temp 97.7  F (36.5  C) (Temporal)   Ht 1.829 m (6')   Wt 91.6 kg (202 lb)   SpO2 98%   BMI 27.40 kg/m    GEN: Alert Oriented x3 NAD  HEENT: Atraumatic, normocephalic, neck supple, no thyromegaly, negative cervical adenopathy  TM: TM bilaterally pearly and grey with normal light reflex  CV: RRR no murmurs or rubs  PULM: CTA no wheezes or crackles  CHEST: pain with palpation along the right rib cage   ABD: Soft, nontender nondistended  SKIN: No visible skin lesion or ulcerations  EXT:  No edema bilateral lower extremities  NEURO: Gait and station normal, No focal neurologic deficits  PSYCH: Mood good, affect mood congruent    No images are attached to the encounter.    No results found for this or any previous visit (from the past 24  hour(s)).    Assessment/Plan:  Patient Instructions   (R07.81) Rib pain on right side  (primary encounter diagnosis)  Comment: We will start with X-ray of the right ribs and thoracic spine and consider additional imaging pending results.  Pain control with acetaminophen and use of lidocaine patches  Plan: XR Ribs & Chest Right G/E 3 Views, XR Thoracic         Spine 2 Views               Follow up in 3 days    Disclaimer: This note consists of symbols derived from keyboarding, dictation and/or voice recognition software. As a result, there may be errors in the script that have gone undetected. Please consider this when interpreting information found in this chart.    Christian Davidson MD  [phone]

## 2021-07-20 NOTE — RESULT ENCOUNTER NOTE
Rickie Alejandra,    I have had the opportunity to review your recent results and an interpretation is as follows:  Your thoracic spine x-ray also shows no concerning findings.  I recommend we planned to consider follow-up imaging including a CT scan.  I will  reach out to you tomorrow morning to review    Sincerely,  Christian Davidson MD

## 2021-07-21 NOTE — TELEPHONE ENCOUNTER
I called and spoke with Kamran.  I discussed further imaging including a CT of his chest which I ordered.  Could we please help him to schedule that CT scan?      I also discussed with him the risks and benefits of opiate pain management and he was agreeable to a low-dose of oxycodone to take at nighttime to help with his pain.  I counseled on the side effects of sedation respiratory depression and constipation amongst other side effects and he will monitor closely.

## 2021-07-22 NOTE — TELEPHONE ENCOUNTER
Patient called and given number for [facility] Scheduling. Patient will give them a call later on to get CT scheduled.    Prudencio Newman CMA on 7/22/2021 at 9:23 AM

## 2021-07-22 NOTE — LETTER
July 23, 2021      Justyn Olivas  [address] [place] [address]        Dear ,    Your CT results show:    IMPRESSION: Stable pulmonary nodules since November 26, 2020. Consider   1 additional follow-up and 18 months to confirm 2 years of stability.       Resulted Orders   CT Chest w/o Contrast    Narrative    CT CHEST WITHOUT CONTRAST 7/22/2021 5:04 PM     HISTORY: Chest pain or SOB, pleurisy or effusion suspected; rib pain,  right sided; Rib pain on right side    COMPARISON: December 20, 2020    TECHNIQUE: Volumetric helical acquisition of CT images of the chest  from the clavicles to the kidneys were acquired without IV contrast.  Radiation dose for this scan was reduced using automated exposure  control, adjustment of the mA and/or kV according to patient size, or  iterative reconstruction technique.    FINDINGS:  Stable 7 mm nodule in the posterior right upper lobe image  67 series 5. Additional tiny nodule image 71 posterior lateral right  upper lobe is stable. Probable mucus plugging image 97 right upper  lobe again noted slightly more prominent than previous. Subpleural  nodule right lower lobe image 145 posterolaterally also stable. No new  nodules. No acute findings in the visualized upper abdomen. No frankly  destructive bony lesions.      Impression    IMPRESSION: Stable pulmonary nodules since November 26, 2020. Consider  1 additional follow-up and 18 months to confirm 2 years of stability.    WAYLON MCRAE MD         SYSTEM ID:  [id number]       If you have any questions or concerns, please call the clinic at the number listed above.       Sincerely,      Christian Davidson MD        piero

## 2021-07-23 NOTE — TELEPHONE ENCOUNTER
Please review MyChart message from patient.    Prudencio Newman, [facility] on 7/23/2021 at 1:35 PM

## 2021-07-26 NOTE — TELEPHONE ENCOUNTER
I would advise him to continue with the pain medications as needed, but try to taper off oxycodone if not needed.  Could we schedule a virtual visit Friday to review?    Christian Davidson MD, MD

## 2021-07-26 NOTE — TELEPHONE ENCOUNTER
Result note has been seen by patient via MyChart.    Prudencio Newman, [facility] on 7/26/2021 at 7:27 AM

## 2021-07-26 NOTE — TELEPHONE ENCOUNTER
Can we call Kamran and see how he's dong with his right side rib pain?  His CT did not show any concerning findings with regards to rib fracture or bone changes    Christian Davidson MD, MD

## 2021-07-26 NOTE — TELEPHONE ENCOUNTER
Called patient - discussed below message from PCP     Is on Oxycodone for pain     Was 8-9/10, now is 3-4/10     Oxycodone is working for pain, but does make him sleepy     States his main concern is that he has been very unstable walking - has not improved at all - ongoing 8 months. Pt hoping this is not a permanent condition - balance is poor, states he's discussed this previously with PCP but does not see anyone for this currently. Doesn't have vertigo symptoms. But has to stand for a while before walking to keep his balance. Uses a can to help with walking. Really no strength in his legs      Miguelina FLOOD RN

## 2021-07-28 NOTE — TELEPHONE ENCOUNTER
Pt was called to schedule virtual visit following my chart message. Reports being weak and lethargic and has new twitching on right hand. Notes these are chronic symptoms except when the hand twitching. He declines to see other providers. Virtual visit was scheduled 07/30. Please advice. Should appt be changed to OV ?    Reason for Disposition    Weakness of the face, arm or leg on one side of the body    Additional Information    Negative: Severe difficulty breathing (e.g., struggling for each breath, speaks in single words)    Negative: Shock suspected (e.g., cold/pale/clammy skin, too weak to stand, low BP, rapid pulse)    Negative: Difficult to awaken or acting confused (e.g., disoriented, slurred speech)    Negative: Fainted > 15 minutes ago and still feels too weak or dizzy to stand    Negative: SEVERE weakness (i.e., unable to walk or barely able to walk, requires support) and new onset or worsening    Negative: Sounds like a life-threatening emergency to the triager    Negative: Difficult to awaken or acting confused (e.g., disoriented, slurred speech)    Negative: New neurologic deficit that is present NOW, sudden onset of ANY of the following: * Weakness of the face, arm, or leg on one side of the body* Numbness of the face, arm, or leg on one side of the body* Loss of speech or garbled speech    Negative: Sounds like a life-threatening emergency to the triager    Negative: Confusion, disorientation, or hallucinations is the main symptom    Negative: Dizziness is the main symptom    Negative: Followed a head injury within last 3 days    Negative: Headache (with neurologic deficit)    Negative: Unable to urinate (or only a few drops) and bladder feels very full    Negative: Loss of control of bowel or bladder (i.e., incontinence) of new onset    Negative: Back pain with numbness (loss of sensation) in groin or rectal area    Protocols used: WEAKNESS (GENERALIZED) AND FATIGUE-A-OH, NEUROLOGIC  DEFICIT-A-OH

## 2021-07-28 NOTE — TELEPHONE ENCOUNTER
Called patient to verify they were okay with having virtual visit switched to OV. Patient agreeable.     Writer changed appointment for 7/30/21 at 3pm to office visit.     Sarah Kenyon RN  [facility]

## 2021-07-29 NOTE — PROGRESS NOTES
Teams communication to Krista Elkins today:    Patient Justyn Limon needs appts and clarification of injectafer and aranesp orders. He is not certain when either one should be completed.  I gave him aranesp today. It's not clear regarding injectafer.  Please give him a call.  MRN [id number]   39  Jose L Monterroso RN

## 2021-07-29 NOTE — TELEPHONE ENCOUNTER
Called Kamran regarding his plan of care per infusion nurse. Explained to Kamran that he has his labs checked every 3 weeks with possible arnaep. If Hgb over 10 then Arnesp is held per insurance guidelines. Informed kamran his Iron levels that were drawn today. He will discuss further with Dr. Davidson. Message will be sent to scheduling to schedule kamran for every 3 week labs with possible aranesp. Krista Elkins RN,BSN,OCN

## 2021-07-29 NOTE — PROGRESS NOTES
Infusion Nursing Note:  [facility]    Patient seen by provider today: No   present during visit today: Not Applicable.    Note: N/A.  Intravenous Access:  No Intravenous access/labs at this visit.    Treatment Conditions:  Results reviewed, labs MET treatment parameters, ok to proceed with treatment.      Post Infusion Assessment:  Patient tolerated injection without incident.  Site patent and intact, free from redness, edema or discomfort.       Discharge Plan:   Discharge instructions reviewed with: Patient.  Patient and/or family verbalized understanding of discharge instructions and all questions answered.  AVS to patient via [employer]van[employer].  Patient will return  for next appointment- SEE NOTE ABOVE  Patient discharged in stable condition accompanied by: self.  Departure Mode: Ambulatory.      Jose L Monterroso RN

## 2021-07-29 NOTE — TELEPHONE ENCOUNTER
Patient should have refills on file. Last script sent on 1/13/21 for 90 days and 2 refills.     Sarah Kenyon RN  [facility]

## 2021-08-02 NOTE — TELEPHONE ENCOUNTER
Called patient - he said he is at a restaurant and to call him back 1.5 hours or so     *also see other encounter pt put RLQ pain in his MyChart but when I spoke with him earlier he confirmed it was rib pain, not lower abd pain     *IF he is having RLQ pain needs to go to ER (see PCP's message in other encounter)     Miguelina FLOOD RN

## 2021-08-03 PROBLEM — K80.50 BILIARY COLIC: Status: ACTIVE | Noted: 2021-01-01

## 2021-08-03 NOTE — LETTER
August 18, 2021      Justyn Olivas  [address] [place] [address]        Dear ,    We are writing to inform you of your test results.    My name is Dr. Munguia and I am covering for Dr. Davidson who is out of the office this week.         The nuclear medicine ultrasound scan is abnormal.  It shows problems with the function of the gallbladder which can contribute to abdominal pain.  Given these findings, I would recommend consultation with a general surgeon to discuss whether removal of the gallbladder could help address your symptoms.  I believe a Dr. Coates consulted with you in the hospital.  He would be a fine choice with whom to discuss these findings.  You can schedule an appointment with him by calling .       Resulted Orders   NM Hepatobiliary Scan w GB EF    Narrative    EXAM: NM HEPATOBILIARY SCAN WITH GB EF  LOCATION: [facility]  DATE/TIME: 8/4/2021 11:32 AM    INDICATION: Abdominal pain, upper, chronic, assess gallbladder motility  COMPARISON: Ultrasound from 08/03/2021 is reviewed.  TECHNIQUE: 5.0 mCi of technetium-99m mebrofenin, IV. Anterior planar imaging of the abdomen. 1.9 mcg of cholecystokinin analog, IV. Gallbladder imaging for 30-60 minutes.    FINDINGS: Normal radionuclide activity in liver, gallbladder, bile ducts and small bowel. No evidence of cystic or common duct obstruction or intrinsic liver disease. Gallbladder ejection fraction measures 10%, which is below the normal range of 35% or   greater.      Impression    IMPRESSION:   Findings consistent with biliary dyskinesia or chronic cholecystitis       If you have any questions or concerns, please call the clinic at the number listed above.       Sincerely,      Ethan Munguia MD

## 2021-08-03 NOTE — TELEPHONE ENCOUNTER
"Called patient to follow up     States \"I don't know if it's lower abdomen pain\"     Asked pt to describe the location. States the pain is on the right close to hipbone now     Every minute becomes very sharp     Advised pt go directly to ED. Pt agrees and states \"that's where I will go\"     Miguelina FLOOD RN    "

## 2021-08-03 NOTE — ED TRIAGE NOTES
Pt presents with abdominal pain for the past few weeks, pt states it has been constant but does have moments where it gets more severe. Pt reports he has been seen by PCP whom did xrays and CTs and those were negative. Pain is on entire R side. Pt denies n/v/d

## 2021-08-03 NOTE — ED PROVIDER NOTES
History   Chief Complaint:  Abdominal Pain     The history is provided by the patient.      Justyn Olivas is a 80 year old male on Plavix with history of CVA, diabetes mellitus, CHF, chronic kidney disease and hyperlipidemia who presents with abdominal pain. Kamran reports that he has had constant right upper abdominal pain for the past month. The pain has been intermittently sharp and has been sharp more often recently. He endorses that he is constipated as well and his last bowel movement was 2 days ago. He saw his primary care doctor on 07.20.21 where they took two xrays (results below) and had a CT taken on 07.22.21 (results below). He denies fever, diarrhea, nausea, vomiting, dysuria, frequency, hematuria, urgency, back pain and rash.     Imaging 07.22.21  CT Chest w/o IV contrast:   Stable pulmonary nodules since November 26, 2020. Consider  1 additional follow-up and 18 months to confirm 2 years of stability, as per radiology.    Imaging 07.20.21  XR Ribs and Chest Right G/E 3 views:  Heart size is within normal limits. Pulmonary vasculature  is normal. Lungs are clear. No evidence of pneumothorax. No definite  rib fracture, as per radiology.     XR Thoracic Spine 2 views:  Normal alignment of the thoracic vertebrae. Vertebral body  heights normal. No evidence for fracture. Probable degenerative  endplate spurring at T4-T5 and T5-T6. No other significant  degenerative change, as per radiology.     Review of Systems   Constitutional: Negative for fever.   Gastrointestinal: Positive for abdominal pain and constipation. Negative for diarrhea, nausea and vomiting.   Genitourinary: Negative for dysuria, frequency, hematuria and urgency.   Musculoskeletal: Negative for back pain.   Skin: Negative for rash.   All other systems reviewed and are negative.    Allergies:  No Known Allergies    Medications:  Hydrodiuril  Albuterol inhaler   Lipitor  Coreg   Plavix   Flexeril   Lantus solostar  Humalog  blayne  Protonix   Demadex   Desyrel     Past Medical History:    CVA  DM  Hyperlipidemia   CHF  CKD    Past Surgical History:    Bone marrow biopsy   Pericardiocentesis     Social History:  Presents with a female associate    Physical Exam     Patient Vitals for the past 24 hrs:   BP Temp Temp src Pulse Resp SpO2 Height Weight   08/03/21 2125 -- -- -- 85 (!) 6 98 % -- --   08/03/21 1948 -- -- -- 81 11 99 % -- --   08/03/21 1932 127/89 -- -- 82 25 -- -- --   08/03/21 1856 -- -- -- 85 11 100 % -- --   08/03/21 1844 (!) 153/88 -- -- 93 -- -- -- --   08/03/21 1733 126/69 97.4  F (36.3  C) Temporal 97 14 95 % 1.829 m (6') 89.8 kg (198 lb)       Physical Exam  General: Well-nourished, appears to be in pain   eyes: PERRL, conjunctivae pink no scleral icterus or conjunctival injection  ENT:  Moist mucus membranes, posterior oropharynx clear without erythema or exudates  Respiratory:  Lungs clear to auscultation bilaterally, no crackles/rubs/wheezes.  Good air movement  CV: Normal rate and rhythm, no murmurs/rubs/gallops  GI:  Abdomen soft and non-distended.  Normoactive BS.  +moderate RUQ tenderness,  No guarding or rebound  Skin: Warm, dry.  No rashes or petechiae. No zoster rash  Musculoskeletal: No peripheral edema or calf tenderness  Neuro: Alert and oriented to person/place/time  Psychiatric: Normal affect      Emergency Department Course   ECG  ECG taken at 2000, ECG read at 2005  Sinus rhythm with 1st degree AV block  Possible inferior infarct, age undetermined   Rate 86 bpm. PA interval 212 ms. QRS duration 94 ms. QT/QTc 416/497 ms. P-R-T axes 48 21 112.     Imaging:  CT Ab/Pelv w/ IV contrast:   1.  No ureter stone or hydronephrosis.   2.  Small nonobstructing stone within the left kidney.   3.  Prominent stool within the colon and rectum.   4.  Colonic diverticulosis, as per radiology.    US Abdomen Limited:  1.  Positive sonographic Elise's sign, but no gallstones are seen.   Gallbladder is distended. No  biliary dilatation, as per radiology.     Laboratory:  CBC: WBC 6.5, HGB 10.1 (L),    CMP: Glucose 353 (H), Potassium: 3.0 (L), Carbon Dioxide: 37 (H), Anion Gap: 2 (L), Urea Nitrogen: 54 (H), GFR: 18 (L), Creatinine: 3.03 (H) o/w WNL   Lipase: 50 (L)    iStat Gases (lactate) venous, POCT: Lact 0.9, Bicarb 35 (H), O2 Sat 26 (L), pCO2 57 (H), pH 7.40, pO2 18 (L)    UA: Glucose: 500, o/w Negative    Asymptomatic Covid: Pending    Emergency Department Course:    Reviewed:  I reviewed nursing notes, vitals, past medical history and care everywhere    Assessments:  1853 I obtained history and examined the patient as noted above.   2009 I rechecked the patient and explained findings. We discussed obtaining an ultrasound.   2115    I rechecked the patient and discussed admission.     Consults:   2113 I consulted Dr. Cardenas of the hospital regarding admission.     Interventions:  1933 Morphine, 4 mg, IV    Disposition:  The patient was admitted to the hospital under the care of Dr. Cardenas.     Impression & Plan   Medical Decision Making:  Justyn Olivas is a 80 year old gentleman who has been having intermittent right upper quandrant pain for a month or so. The pain is worse today, despite taking opiate pain medications as an outpatient. Previously he had felt the pain was more in his chest and he had a CT chest to evaluate this. This was unremarkable. I see no zoster rash. He is quite tender on exam in his right upper quadrant. Laboratory studies show chronic anemia and some mild hypokalemia. His bilirubin labs are reassuring and he has no sign of urinary tract infection. He has some hyperglycemia, but does not appear to be in DKA. He went for CT scan and this showed that he has prominent stool, as well as a distended gallbladder. Right upper quadrant ultrasound was obtained and shows positive sonographic Elise's sign, as well as distention without signs of gall bladder wall thickening or biliary  dilatation. I don't think that he has a calculus cholecystitis, but I am concerned that his pain is secondary to poor gallbladder emptying and that this does indeed represents renal colic. Given his age and comorbidities, we will admit him to the observation unit for fluid hydration, pain control and urgent surgical consultation. Dr. Cardenas graciously agreed to admit the patient and the patient was in agreement with the plan as well.     Covid-19  Justyn Olivas was evaluated during a global COVID-19 pandemic, which necessitated consideration that the patient might be at risk for infection with the SARS-CoV-2 virus that causes COVID-19.   Applicable protocols for evaluation were followed during the patient's care.   COVID-19 was considered as part of the patient's evaluation. The plan for testing is:  a test was obtained during this visit.    Diagnosis:    ICD-10-CM    1. Biliary colic  K80.50      Scribe Disclosure:  I, Breann Roberts, am serving as a scribe at 6:36 PM on 8/3/2021 to document services personally performed by Esthela Morris MD based on my observations and the provider's statements to me.            Esthela Morris MD  08/04/21 6620

## 2021-08-04 NOTE — PLAN OF CARE
RECEIVING UNIT ED HANDOFF REVIEW    ED Nurse Handoff Report was reviewed by: Fausto Jeffrey RN on August 3, 2021 at 11:20 PM

## 2021-08-04 NOTE — ED NOTES
[facility]  ED Nurse Handoff Report    ED Chief complaint: Abdominal Pain      ED Diagnosis:   Final diagnoses:   Biliary colic       Code Status: hospitalist to address    Allergies:   Allergies   Allergen Reactions     No Known Allergies        Patient Story: patient presents to ED with reports of 1 month of URQ and LRQ abdominal pain. Pain has been getting worse. Patient denies any n/v but reports constipation. CT scan negative, showing large amount of stool. Ultrasound of gallbladder showing:   IMPRESSION:  1.  Positive sonographic Elise's sign, but no gallstones are seen.  Gallbladder is distended. No biliary dilatation.    Patient's pain has been decreased with IV morphine in ED. Pt uses cane to ambulate when not at home. Lives with wife at home. A&OX4, [place]- has bi lateral hearing aids.  Focused Assessment:  See above    Treatments and/or interventions provided: see MAR  Patient's response to treatments and/or interventions:     To be done/followed up on inpatient unit:  none at this time    Does this patient have any cognitive concerns?: N/A    Activity level - Baseline/Home:  Cane  Activity Level - Current:   Stand with Assist    Patient's Preferred language: English   Needed?: No    Isolation: None  Infection: Not Applicable  Patient tested for COVID 19 prior to admission: YES  Bariatric?: No    Vital Signs:   Vitals:    08/03/21 1856 08/03/21 1932 08/03/21 1948 08/03/21 2125   BP:  127/89     Pulse: 85 82 81 85   Resp: 11 25 11 (!) 6   Temp:       TempSrc:       SpO2: 100%  99% 98%   Weight:       Height:           Cardiac Rhythm:Cardiac Rhythm: Normal sinus rhythm    Was the PSS-3 completed:   Yes  What interventions are required if any?               Family Comments: wife aware of plan of care- she went home  OBS brochure/video discussed/provided to patient/family: Yes              Name of person given brochure if not patient:               Relationship to patient:     For  the majority of the shift this patient's behavior was Green.   Behavioral interventions performed were .    ED NURSE PHONE NUMBER: [phone]

## 2021-08-04 NOTE — PLAN OF CARE
A&Ox4. VSS. Tele- SR. Reports 5/10 RUQ abdominal pain, decreased with IV dilaudid. K+ 2.6, replaced with 20 mEq, recheck 2.6, replaced with 40 mEq PO + 20 mEq IV K+. Recheck at 15:30. Pt anxious to discharge.

## 2021-08-04 NOTE — PLAN OF CARE
Observation goals PRIOR TO DISCHARGE    Comments:   -diagnostic tests and consults completed and resulted Not Met  -vital signs normal or at patient baseline Met  -tolerating oral intake to maintain hydration NPO  -adequate pain control on oral analgesics Met  -returns to baseline functional status Partially Met   Nurse to notify provider when observation goals have been met and patient is ready for discharge.  Patient is alert and oriented x4. VSS on room air. Infusing NS @ 100 ml/hr. Up SBA. NPO status ex meds and ice chips. Blood glucose @ 0200 is 177. Blood sugar @ 0400 is 143 1 unit of insulin given. Blood sugar @ 0600 is 112. For PT and Surgery IP consult. Continue to monitor.

## 2021-08-04 NOTE — PROGRESS NOTES
Clinic Care Coordination Contact  Ambulatory Care Coordination to Inpatient Care Management   Hand-In Communication    Date:  August 4, 2021  Name: Justyn Olivas is enrolled in Ambulatory Care Coordination program and I am the Lead Care Coordinator.  CC Contact Information: Epic InCC videoet + phone  Payor Source: Payor: MEDICARE / Plan: MEDICARE / Product Type: Medicare /   Current services in place:     Please see the CC Snaphot and Care Management Flowsheets for specific  details of this Justyn Olivas care plan.   Additional details/specific concerns r/t this admission:    No additional concerns at this time .    I will follow this admission in Epic. Please feel free to contact me with questions or for further collaboration in discharge planning.    Hellen Chaidez RN Care Coordinator  [facility]  Email: [email or web address]  Phone: [phone]

## 2021-08-04 NOTE — PHARMACY-ADMISSION MEDICATION HISTORY
Pharmacy Medication History  Admission medication history interview status for the 8/3/2021  admission is complete. See EPIC admission navigator for prior to admission medications     Location of Interview: Phone  Medication history sources: Patient, Patient's family/friend (Wife Cecile) and Surescripts    Significant changes made to the medication list:  Removed: Tylenol PM, hydrochlorothiazide, lidocaine patch  Changed: Flexeril to PRN, Lantus instructions, Percocet from 1 tablet to 0.5 tablets PRN, torsemide instructions    In the past week, patient estimated taking medication this percent of the time: greater than 90%    Additional medication history information:   Patient was prescribed hydrochlorothiazide by a different doctor than who prescribed torsemide. Wife thought it was a mistake to take two diuretics so they have not been giving the patient the hydrochlorothiazide.    Medication reconciliation completed by provider prior to medication history? Yes    Time spent in this activity: 15 mins    Prior to Admission medications    Medication Sig Last Dose Taking? Auth Provider   albuterol (PROAIR HFA) 108 (90 Base) MCG/ACT inhaler INHALE 2 PUFFS BY MOUTH FOUR TIMES DAILY AS NEEDED  at PRN Yes Christian Davidson MD   atorvastatin (LIPITOR) 20 MG tablet Take 1 tablet (20 mg) by mouth daily 8/3/2021 at AM Yes Christian Davidson MD   carvedilol (COREG) 6.25 MG tablet Take 1 tablet (6.25 mg) by mouth 2 times daily (with meals) 8/3/2021 at AM Yes Kelly Sen PA-C   clopidogrel (PLAVIX) 75 MG tablet Take 1 tablet (75 mg) by mouth daily 8/3/2021 at AM Yes Christian Davidson MD   cyclobenzaprine (FLEXERIL) 10 MG tablet Take 10 mg by mouth nightly as needed for muscle spasms   at PRN Yes Unknown, Entered By History   dorzolamide-timolol (COSOPT) 2-0.5 % ophthalmic solution Place 1 drop into both eyes daily  8/3/2021 at AM Yes Unknown, Entered By History   fluticasone (FLONASE) 50 MCG/ACT  nasal spray Spray 2 sprays into both nostrils daily 8/3/2021 at AM Yes Unknown, Entered By History   folic acid-vit B6-vit B12 (FOLGARD) 0.8-10-0.115 MG TABS per tablet Take 1 tablet by mouth daily Continue per Nephrology recommendation 8/3/2021 at AM Yes Amador Almanza MD   insulin glargine (LANTUS SOLOSTAR) 100 UNIT/ML pen Inject 50 Units Subcutaneous At Bedtime  8/2/2021 at Unknown time Yes Christian Davidson MD   insulin lispro (HUMALOG KWIKPEN) 100 UNIT/ML (1 unit dial) KWIKPEN Inject 5-15 Units Subcutaneous 3 times daily (before meals) Sliding scale with meals 8/3/2021 at 1200 Yes Christian Davidson MD   oxyCODONE-acetaminophen (PERCOCET) 5-325 MG tablet Take 0.5 tablets by mouth nightly as needed for severe pain  8/3/2021 at Unknown time Yes Unknown, Entered By History   pantoprazole (PROTONIX) 40 MG EC tablet TAKE 1 TABLET(40 MG) BY MOUTH TWICE DAILY 8/3/2021 at AM Yes Christian Davidson MD   potassium chloride ER (K-TAB) 20 MEQ CR tablet Take 1 tablet (20 mEq) by mouth daily 8/3/2021 at AM Yes Radha Gonzalez DO   torsemide (DEMADEX) 20 MG tablet Take 10 mg by mouth daily  8/3/2021 at AM Yes Shaniqua Ram PA-C   traZODone (DESYREL) 100 MG tablet TAKE 1 TABLET(100 MG) BY MOUTH AT BEDTIME 8/2/2021 at PM Yes Bandar Greenberg MD   vitamin B-12 (CYANOCOBALAMIN) 1000 MCG tablet Take 1 tablet (1,000 mcg) by mouth daily Continue unitl hematology evaluation as borderline low vitamin b12. 8/3/2021 at AM Yes Kristine Nash MD   blood glucose (STEVE CONTOUR) test strip TESTING FOUR TIMES DAILY OR AS DIRECTED   Christian Davidson MD   insulin pen needle (BD FERCHO U/F) 32G X 4 MM miscellaneous USE AS DIRECTED UP TO FOUR TIMES DAILY Unknown at Unknown time  Bandar Greenberg MD   order for DME Hip steroid injectoion   Bandar Greenberg MD       The information provided in this note is only as accurate as the sources available at the time of update(s)     Shaniqua South,  PharmD

## 2021-08-04 NOTE — PROVIDER NOTIFICATION
MD Notification    Notified Person: MD    Notified Person Name: Cami    Notification Date/Time:08/04/21 1043    Notification Interaction:web paged    Purpose of Notification:k+ level still at a critical level of 2.6        Comments:  Patient already on potassium protocol.   Bedside RN updated.

## 2021-08-04 NOTE — PROVIDER NOTIFICATION
MD Notification    Notified Person: MD    Notified Person Name: Alexa Manley    Notification Date/Time: 8/4 @0650    Notification Interaction: [employer] web    Purpose of Notification:FYI 0600 Potassium level is 2.6.       Orders Received:    Comments:

## 2021-08-04 NOTE — PROGRESS NOTES
Xcoverage: paged by RN regarding K level 2.6; he has CKD stage IV, KCl 20 mEqX1 now, repeat K at 9 am.    Kimi Manley MD

## 2021-08-04 NOTE — H&P
[facility]    History and Physical  Hospitalist       Date of Admission:  8/3/2021  Date of Service (when I saw the patient): 08/03/21    Assessment & Plan   Justyn Olivas is a 80 year old male who presents with abdominal pain    Possible acalculous cholecystitis  Reports constant RUQ pain x 1 month. Intermittently sharp. No fevers, n/v. Afebrile here, vitals stable. LFT's normal. Lipase 50. CT abdomen with stool burden. L kidney nonobstructing stone. US positive sonographic Elise's sign but no stones seen. Distended gallbladder, no biliary dilatation.   - NPO, IV fluids  - prn IV hydromorphone, po acetaminophen for pain  - general surgery consult  - no abx indicated for now  - IV PPI    Hypokalemia  K 3.0 on presentation   - judicious replacement with CKD    CKD stage IV  Follows with Dr. Greene with nephrology. Baseline creatinine variable, appears to be in upper 2's to low 3's. Creatinine on admission at 3.03.   - avoid nephrotoxins  - IV fluids   - monitor creatinine for now    HTN  HFpEF  [carvedilol 6.25 mg BID, torsemide 10 mg daily]  Echocardiogram 3/27/2021 with EF 50 to 55% with severe inferior wall hypokinesis  unchanged compared to prior study.  - resume carvedilol with med rec  - hold torsemide for now    DM II  [reportedly 50 units at HS, humalog sliding scale insulin]  Most recent A1C at 6.0%.   - q4 hour BS checks  - halve home lantus to 25 units at HS while NPO  - sliding scale insulin    Hx basilar CVA  [reportedly atorvastatin 20 mg daily, Plavix 75 mg daily]  - hold meds for now    Anemia, multifactorial (CKD, MAIRA)  Receives iron and aranesp, as per nephrology. Baseline creatinine recent appears to be ~10, at 10.1 on presentation.   - monitor    GERD  [reportedly pantoprazole 40 mg BID]  - pantoprazole 40 mg IV BID    COVID-19 pending on admission    DVT Prophylaxis: Pneumatic Compression Devices  Code Status: Full Code    Disposition: Expected discharge in 1-2  "days     Yasmany Cardenas MD  [phone] (P)  Text Page     Primary Care Physician   Christian Davidson MD    Chief Complaint   Abdominal pain    History is obtained from the patient and medical records    History of Present Illness   Justyn Olivas is a 80 year old male who presents with right upper quadrant abdominal pain.  Patient reports of the last 4 to 6 weeks has had pretty significant pain in his right upper quadrant area.  He states the pain comes in waves.  It generally is steady but then he has episodes of severe sharp pain \"like I broke a rib \".  He denies any trauma to the area.  He states he has been difficult to sleep secondary to this pain.  He denies fevers or chills.  Denies nausea or vomiting.  He does not note any relationship to food.  The pain does not radiate but generally just stays in the right upper quadrant and right flank area.  Denies any chest pain.  Denies any acute or new shortness of breath but does have some shortness of breath with his anemia.  He also reports that he has bad balance and clumsiness with walking as a recently.  He also has noted some constipation which has been trying to treat recently.    Past Medical History    I have reviewed this patient's medical history and updated it with pertinent information if needed.   Past Medical History:   Diagnosis Date     Cerebral infarction (H)      Diabetes (H)      Hyperlipemia        Past Surgical History   I have reviewed this patient's surgical history and updated it with pertinent information if needed.  Past Surgical History:   Procedure Laterality Date     BONE MARROW BIOPSY, BONE SPECIMEN, NEEDLE/TROCAR N/A 5/14/2021    Procedure: BIOPSY, BONE MARROW;  Surgeon: Juanjo Hoffman MD;  Location:  GI     CV PERICARDIOCENTESIS N/A 12/21/2020    Procedure: Pericardiocentesis;  Surgeon: Chas Chambers MD;  Location:  [facility]ATH [facility]     ESOPHAGOSCOPY, GASTROSCOPY, DUODENOSCOPY (EGD), COMBINED N/A " 3/27/2021    Procedure: Esophagogastroduodenoscopy, With Biopsy;  Surgeon: Luc Nash MD;  Location:  GI       Prior to Admission Medications   Prior to Admission Medications   Prescriptions Last Dose Informant Patient Reported? Taking?   Lidocaine (LIDOCARE) 4 % Patch   No No   Sig: Place 1 patch onto the skin every 24 hours To prevent lidocaine toxicity, patient should be patch free for 12 hrs daily.   albuterol (PROAIR HFA) 108 (90 Base) MCG/ACT inhaler   No No   Sig: INHALE 2 PUFFS BY MOUTH FOUR TIMES DAILY AS NEEDED   atorvastatin (LIPITOR) 20 MG tablet   No No   Sig: Take 1 tablet (20 mg) by mouth daily   blood glucose (STEVE CONTOUR) test strip   No No   Sig: TESTING FOUR TIMES DAILY OR AS DIRECTED   carvedilol (COREG) 6.25 MG tablet   No No   Sig: Take 1 tablet (6.25 mg) by mouth 2 times daily (with meals)   clopidogrel (PLAVIX) 75 MG tablet   No No   Sig: Take 1 tablet (75 mg) by mouth daily   cyclobenzaprine (FLEXERIL) 10 MG tablet   Yes No   Sig: Take 10 mg by mouth At Bedtime   diphenhydrAMINE-acetaminophen (TYLENOL PM)  MG tablet   Yes No   Sig: Take 2 tablets by mouth nightly as needed for sleep   dorzolamide-timolol (COSOPT) 2-0.5 % ophthalmic solution  Pharmacy Yes No   Sig: Place 1 drop into both eyes 2 times daily   fluticasone (FLONASE) 50 MCG/ACT nasal spray   Yes No   Sig: Spray 2 sprays into both nostrils daily   folic acid-vit B6-vit B12 (FOLGARD) 0.8-10-0.115 MG TABS per tablet   No No   Sig: Take 1 tablet by mouth daily Continue per Nephrology recommendation   insulin glargine (LANTUS SOLOSTAR) 100 UNIT/ML pen   Yes No   Sig: INJECT 50 UNITS UNDER THE SKIN AT BEDTIME   insulin glargine (LANTUS SOLOSTAR) 100 UNIT/ML pen   No No   Sig: Inject 15 Units Subcutaneous every morning Lantus Solostar Pen   insulin lispro (HUMALOG KWIKPEN) 100 UNIT/ML (1 unit dial) KWIKPEN   No No   Sig: Inject 5-15 Units Subcutaneous 3 times daily (before meals) Sliding scale with meals   insulin pen  needle (BD FERCHO U/F) 32G X 4 MM miscellaneous  Pharmacy No No   Sig: USE AS DIRECTED UP TO FOUR TIMES DAILY   order for DME  Pharmacy No No   Sig: Hip steroid injectoion   pantoprazole (PROTONIX) 40 MG EC tablet   No No   Sig: TAKE 1 TABLET(40 MG) BY MOUTH TWICE DAILY   potassium chloride ER (K-TAB) 20 MEQ CR tablet   No No   Sig: Take 1 tablet (20 mEq) by mouth daily   torsemide (DEMADEX) 10 MG tablet   Yes No   Sig: Take by mouth daily    traZODone (DESYREL) 100 MG tablet   No No   Sig: TAKE 1 TABLET(100 MG) BY MOUTH AT BEDTIME   vitamin B-12 (CYANOCOBALAMIN) 1000 MCG tablet   No No   Sig: Take 1 tablet (1,000 mcg) by mouth daily Continue unitl hematology evaluation as borderline low vitamin b12.      Facility-Administered Medications: None     Allergies   Allergies   Allergen Reactions     No Known Allergies        Social History   I have reviewed this patient's social history and updated it with pertinent information if needed. Justyn FRIAS Deisy  reports that he quit smoking about 45 years ago. His smoking use included cigarettes. He started smoking about 56 years ago. He has a 22.00 pack-year smoking history. He has never used smokeless tobacco. He reports that he does not drink alcohol and does not use drugs.    Family History   I have reviewed this patient's family history and updated it with pertinent information if needed.   Family History   Problem Relation Age of Onset     Family History Negative Mother      Family History Negative Father        Review of Systems   The 10 point Review of Systems is negative other than noted in the HPI or here.     Physical Exam   Temp: 97.4  F (36.3  C) Temp src: Temporal BP: 127/89 Pulse: 81   Resp: 11 SpO2: 99 %      Vital Signs with Ranges  198 lbs 0 oz    Constitutional: alert, oriented and in no acute distress  Eyes: EOMI, PERRL  HEENT: OP clear  Respiratory: CTA B without w/c  Cardiovascular: RRR no murmur. no edema.  GI: soft, tender in RUQ to light touch,  remainder of belly stable. BS present  Lymph/Hematologic: no cervical LAD  Genitourinary: deferred  Skin: no rashes or lesions grossly  Musculoskeletal: no deformities or arthritis  Neurologic: CN II-XII, ARRIOLA  Psychiatric: mood and affect wnl    Data   Data reviewed today:  I personally reviewed the abdominal CT image(s) showing constipation and the US image(s) showing distended gallbladder, positive Elise's.  Recent Labs   Lab 08/03/21  1736 07/29/21  1139   WBC 6.5  --    HGB 10.1* 9.9*   MCV 88  --      --      --    POTASSIUM 3.0*  --    CHLORIDE 97  --    CO2 37*  --    BUN 54*  --    CR 3.03*  --    ANIONGAP 2*  --    VANESA 8.9  --    *  --    ALBUMIN 3.8  --    PROTTOTAL 7.6  --    BILITOTAL 0.4  --    ALKPHOS 109  --    ALT 24  --    AST 13  --    LIPASE 50*  --        Recent Results (from the past 24 hour(s))   CT Abdomen Pelvis w/o Contrast    Narrative    CT ABDOMEN AND PELVIS WITHOUT CONTRAST 8/3/2021 7:23 PM    CLINICAL HISTORY: Flank pain, kidney stone suspected  TECHNIQUE: CT scan of the abdomen and pelvis was performed without IV  contrast. Multiplanar reformats were obtained. Dose reduction  techniques were used.  CONTRAST: None.    COMPARISON: None.    FINDINGS:   LOWER CHEST: Normal.    HEPATOBILIARY: No acute liver abnormality. Mildly distended  gallbladder.    PANCREAS: Normal.    SPLEEN: Normal.    ADRENAL GLANDS: Small nodular thickening of the left adrenal. These  are commonly adenomas. Unremarkable right adrenal.    KIDNEYS/BLADDER: No ureter stone or hydronephrosis. Nonobstructing  stone lower left kidney is 0.3 cm series 3 image 90. No acute renal or  bladder abnormality.    BOWEL: No obstruction. Prominent stool throughout the colon. Rectal  distention with stool as well. Normal appendix. No acute inflammation.  Distal colonic diverticula. No convincing focal diverticulitis.    LYMPH NODES: Normal.    VASCULATURE: Scattered vascular calcifications.    PELVIC ORGANS:  Normal.    OTHER: None.    MUSCULOSKELETAL: Spine mild degenerative changes. Bilateral hip DJD.      Impression    IMPRESSION:   1.  No ureter stone or hydronephrosis.  2.  Small nonobstructing stone within the left kidney.  3.  Prominent stool within the colon and rectum.  4.  Colonic diverticulosis.   Abdomen US, limited (RUQ only)    Narrative    US ABDOMEN LIMITED 8/3/2021 8:50 PM    CLINICAL HISTORY: Right upper quadrant pain.    TECHNIQUE: Limited abdominal ultrasound.    COMPARISON: CT abdomen and pelvis 8/3/2021.    FINDINGS:    GALLBLADDER: Distended gallbladder. No gallstones. Positive  sonographic Elise's sign. No gallbladder wall thickening.    BILE DUCTS: There is no biliary dilatation. The common duct measures  mm.    LIVER: Normal where seen.    RIGHT KIDNEY: No hydronephrosis.    PANCREAS: The pancreas is largely obscured by overlying gas.    No ascites.      Impression    IMPRESSION:  1.  Positive sonographic Elise's sign, but no gallstones are seen.  Gallbladder is distended. No biliary dilatation.

## 2021-08-04 NOTE — PLAN OF CARE
PT: Orders received and chart reviewed. Per RN, pt performing all functional transfers and ambulation w/ IND. PT will complete orders.

## 2021-08-04 NOTE — UTILIZATION REVIEW
Admission Status; Secondary Review Determination    Under the authority of the Utilization Management Committee, the utilization review process indicated a secondary review on the above patient. The review outcome is based on review of the medical records, discussions with staff, and applying clinical experience noted on the date of the review.    (x) Inpatient Status Appropriate - This patient's medical care is consistent with medical management for inpatient care and reasonable inpatient medical practice.    RATIONALE FOR DETERMINATION:  80-year-old male with significant comorbidities of prior stroke on Plavix, diabetes, heart failure, chronic kidney disease who presents to the hospital with persistent worsening right upper quadrant abdominal pain for several weeks.  X-rays of the ribs, chest and CT of the chest were nondiagnostic for etiology of pain.  Patient also notes recent constipation.  In the emergency room CT imaging of the abdomen revealed prominent stool and ultrasound revealed positive sonographic Elise sign with distended gallbladder and no stones noted.  Due to the persistence unrelenting nature of patient's pain and need to rule out gallbladder etiology, patient will require a HIDA scan as well as active management of severe persistent hypokalemia.  Patient expected require greater than 2 nights in the hospital appropriate for inpatient management.    At the time of admission with the information available to the attending physician more than 2 nights Hospital complex care was anticipated, based on patient risk of adverse outcome if treated as outpatient and complex care required. Inpatient admission is appropriate based on the Medicare guidelines.    This document was produced using voice recognition software    The information on this document is developed by the utilization review team in order for the business office to ensure compliance. This only denotes the appropriateness of proper  admission status and does not reflect the quality of care rendered.    The definitions of Inpatient Status and Observation Status used in making the determination above are those provided in the CMS Coverage Manual, Chapter 1 and Chapter 6, section 70.4.    Sincerely,    Bandar Buenrostro MD  Utilization Review  Physician Advisor  [facility].

## 2021-08-04 NOTE — PROGRESS NOTES
"[facility]  Hospitalist Progress Note   08/04/2021          Assessment and Plan:       Mr Olivas is a 80-year-old male with CHFpEF, h/o pericardial effusion, DM, HTN, DLD, h/o basilar CVA, Iron deficiency anemia admitted with right upper quadrant abdominal pain.    Right upper quadrant pain -etiology not established.  Distended gallbladder with positive Elise sign.  Patient presented with right upper quadrant abdominal pain ongoing for 4 to 6 weeks.  Episodes of severe sharp pain like \" I broke a rib\".  Denies trauma to the area.  Afebrile here, vitals stable. LFT's normal. Lipase 50.    No leukocytosis.  CRP 3.6, CK 48.  Lactic acid 0.9.  X-ray right ribs and chest no acute pathology.  CT abdomen No ureter stone or hydronephrosis.  US abdomen Positive sonographic Elise's sign, but no gallstones are seen.  Gallbladder is distended. No biliary dilatation.  General surgery recommended a HIDA scan to definitely rule out gallbladder as a potential source but symptoms more related to his ribs at this point.   Unfortunately HIDA scan could not be done in the hospital.  Await general surgery follow up.    Addendum 4:30 PM.  Again discussed findings with patient, appears quite frustrated that symptoms have been going on for a few weeks and does not have answers at this time.  X-ray review no fractures, no acute pathology,   Await general surgery follow up  Consider GI evaluation tomorrow after surgical input.  Discussed with patient, reluctant but agreed to be monitored overnight.    Constipation.  CT with prominent stool within the colon and rectum.  Bowel meds ordered.    Incidental small nonobstructing left renal stone.  Ct with Small nonobstructing stone within the left kidney.  Follow-up as outpatient if symptomatic.    Severe hypokalemia-diuretic use, dilutional.  Potassium 3.0 on presentation, with IV fluids potassium dropped to 2.6.  Received aggressive potassium replacement and potassium " improved to 3.6.  Monitor BMP in 5 days or earlier if symptomatic.  Continue PTA oral potassium replacement.    Chronic CHFpEF EF 50 to 55%  Hypertension, Dyslipidemia  H/o pericardial effusion (s/p pericardiocentesis in December 2020 -unclear reasons)  Echo 3/2021 with EF 50-55%, noted severe inferior wall hypokinesis  PTA torsemide on hold during admission, restart at time of discharge.  Continue Plavix 75 mg oral daily.  Continue PTA Coreg 6.25 mg twice daily.  Continue Lipitor 20 mg oral daily.  Low-salt diet.  Fluid restriction 1500 -2000 mL/day.  Follow-up in cardiology clinic per schedule.     Anemia of chronic disease with iron deficiency, requiring blood transfusion in the past.  Receives iron and aranesp, as per nephrology.   Baseline hemoglobin recent appears to be ~10, at 10.1 on presentation.   Monitor hemoglobin levels in 5 days.   Transfuse if hemoglobin less than 7 or symptomatic.     History of nonbleeding angiectasia's in the duodenum.  History of AVM.  Continue oral Protonix    CKD stage III  Baseline creatinine since 3/2021 around 2.4 to 2.6  Nephrology follow-up in renal clinic in 2 weeks.  Avoid NSAIDs, nephrotoxins  Diuresis as above; monitor BMP     H/o basilar CVA (2 yrs ago)   Continue PTA Plavix, continue PTA Lipitor.     Type 2 diabetes mellitus with a hemoglobin A1c of 6.0.  Reportedly 50 units at HS, humalog sliding scale insulin  Most recent A1C at 6.0%.   Continued insulin Lantus at half his home dose during hospitalization with insulin sliding scale.    Orders Placed This Encounter      Low Fat Diet      DVT Prophylaxis: SCD, ambulate.  Code Status: Full Code  Disposition: Expected discharge pending clinical improvement.    Discussed with patient, his wife by the bedside, bedside RN-multiple times through the day.  Total time greater than 50 minutes.  More than 60% of time spent in direct patient care, care coordination, patient counseling, and formalizing plan of care.     Amador  MD Cami        Interval History:      Patient lying in bed.  Continues to complain of abdominal pain, worse on the right side.  Denies any chest pain or palpitation.  No nausea vomiting.  Continues to be n.p.o.  Denies any new back pain         Physical Exam:        Physical Exam   Temp:  [97.4  F (36.3  C)-98  F (36.7  C)] 97.4  F (36.3  C)  Pulse:  [81-97] 81  Resp:  [6-25] 16  BP: (109-153)/(56-89) 123/70  SpO2:  [95 %-100 %] 98 %      Admission Weight: 89.8 kg (198 lb)  Current Weight: 89.4 kg (197 lb 1.5 oz)    PHYSICAL EXAM  GENERAL: Patient is in no distress. Alert and oriented.  HEART: Regular rate and rhythm. S1S2. No murmurs  LUNGS: Clear to auscultation bilaterally. No expiratory wheeze.  Respirations unlabored  ABDOMEN: Soft, right upper quadrant tenderness, bowel sounds heard   No guarding or rigidity.  NEURO: Moving all extremities, no focal weakness.  EXTREMITIES: No pedal edema.  SKIN: Warm, dry. No rash or bruising.  PSYCHIATRY appears slightly frustrated, adamant on discharging home today.       Medications:          carvedilol  6.25 mg Oral BID w/meals     insulin aspart  1-6 Units Subcutaneous Q4H     insulin glargine  25 Units Subcutaneous At Bedtime     [START ON 8/5/2021] pantoprazole  40 mg Oral QAM AC     polyethylene glycol  17 g Oral BID     polyethylene glycol  17 g Oral Daily     potassium chloride  10 mEq Intravenous Once     potassium chloride  10 mEq Intravenous Once     technetium Tc 99m mebrofenin  5 millicurie Intravenous Once     traZODone  100 mg Oral At Bedtime     acetaminophen **OR** acetaminophen, bisacodyl, glucose **OR** dextrose **OR** glucagon, HYDROmorphone, magnesium hydroxide, melatonin, naloxone **OR** naloxone **OR** naloxone **OR** naloxone, ondansetron **OR** ondansetron, sodium phosphate         Data:      All new lab and imaging data was reviewed.

## 2021-08-04 NOTE — CONSULTS
[facility]  General Surgery Consultation         Len Coates MD    Justyn Olivas MRN# [id number]   YOB: 1940 Age: 80 year old      Date of Admission:  8/3/2021  Date of Consult: 8/4/2021         Assessment and Plan:   Patient is a 80 year old male with right sided pain.    PLAN:  I have personally reviewed all imaging studies.  CT/ultrasound do not show an obvious etiology for his symptoms.  There is slight distention of the gallbladder but there is no stones or signs of cholecystitis.  He has a point tender area on his rib but no obvious abnormalities are seen on imaging in this area as well.  His symptoms do not fit the classic biliary colic as they have been present for over 2 months and are constant in nature.  I would recommend an HIDA scan to definitively rule out the gallbladder as a potential source but his symptoms seem more related to his rib at this point. I will await the HIDA scan results.         Chief Complaint:     Chief Complaint   Patient presents with     Abdominal Pain          History of Present Illness:   Patient is a 80 year old male who I was asked to see by Dr. Grande  for evaluation of right-sided pain.  This has been present for over 2 months.  It is dull in nature.  It is present 24 hours a day.  The pain is sharp at times.  The pain is worse when he goes to bed and is relieved with movement.  He denies any changes with oral intake.  He has been tolerating a regular diet and having regular bowel movements.  No nausea vomiting.  He has had a CT and ultrasound without obvious findings.  He has had multiple medical issues and has required multiple hospitalizations over the last year.  Patient denies fevers, chills, nausea, vomiting, jaundice, changes in stool or urine, headache, SOB, chest pain.          Physical Exam:   Blood pressure 123/70, pulse 81, temperature 97.4  F (36.3  C), temperature source Oral, resp. rate 16, height 1.829 m (6'),  weight 89.4 kg (197 lb 1.5 oz), SpO2 98 %.  197 lbs 1.46 oz  General: Generally appears well.  Psych: Alert and Oriented.  Normal affect  Neurological: grossly intact  Eyes: Sclera clear  Respiratory:  Lungs with good air excursion  Cardiovascular:  Normal peripheral pulses  GI: Abdomen Soft. Area on top of rib tender. No obvious masses palpated in this area. No significant RUQ tenderness  Lymphatic/Hematologic/Immune:  No obvious lymphadenopathy.  Integumentary:  No rashes       Past Medical History:     Past Medical History:   Diagnosis Date     Cerebral infarction (H)      Diabetes (H)      Hyperlipemia           Past Surgical History:     Past Surgical History:   Procedure Laterality Date     BONE MARROW BIOPSY, BONE SPECIMEN, NEEDLE/TROCAR N/A 5/14/2021    Procedure: BIOPSY, BONE MARROW;  Surgeon: Juanjo Hoffman MD;  Location:  GI     CV PERICARDIOCENTESIS N/A 12/21/2020    Procedure: Pericardiocentesis;  Surgeon: Chas Chambers MD;  Location:  HEART CARDIAC CATH LAB     ESOPHAGOSCOPY, GASTROSCOPY, DUODENOSCOPY (EGD), COMBINED N/A 3/27/2021    Procedure: Esophagogastroduodenoscopy, With Biopsy;  Surgeon: Luc Nash MD;  Location:  GI          Current Medications:           carvedilol  6.25 mg Oral BID w/meals     insulin aspart  1-6 Units Subcutaneous Q4H     insulin glargine  25 Units Subcutaneous At Bedtime     pantoprazole (PROTONIX) IV  40 mg Intravenous BID     polyethylene glycol  17 g Oral Daily     traZODone  100 mg Oral At Bedtime     acetaminophen **OR** acetaminophen, glucose **OR** dextrose **OR** glucagon, HYDROmorphone, magnesium hydroxide, melatonin, naloxone **OR** naloxone **OR** naloxone **OR** naloxone, ondansetron **OR** ondansetron, sodium phosphate       Home Medications:     Prior to Admission medications    Medication Sig Last Dose Taking? Auth Provider   albuterol (PROAIR HFA) 108 (90 Base) MCG/ACT inhaler INHALE 2 PUFFS BY MOUTH FOUR TIMES DAILY AS NEEDED  Yes  Christian Davidson MD   atorvastatin (LIPITOR) 20 MG tablet Take 1 tablet (20 mg) by mouth daily 8/3/2021 at Unknown time Yes Christian Davidson MD   carvedilol (COREG) 6.25 MG tablet Take 1 tablet (6.25 mg) by mouth 2 times daily (with meals) 8/3/2021 at Unknown time Yes Kelly Sen PA-C   clopidogrel (PLAVIX) 75 MG tablet Take 1 tablet (75 mg) by mouth daily 8/3/2021 at Unknown time Yes Christian Davidson MD   dorzolamide-timolol (COSOPT) 2-0.5 % ophthalmic solution Place 1 drop into both eyes daily  8/3/2021 at Unknown time Yes Unknown, Entered By History   fluticasone (FLONASE) 50 MCG/ACT nasal spray Spray 2 sprays into both nostrils daily 8/3/2021 at Unknown time Yes Unknown, Entered By History   folic acid-vit B6-vit B12 (FOLGARD) 0.8-10-0.115 MG TABS per tablet Take 1 tablet by mouth daily Continue per Nephrology recommendation 8/3/2021 at Unknown time Yes Amador Almanza MD   insulin glargine (LANTUS SOLOSTAR) 100 UNIT/ML pen INJECT 50 UNITS UNDER THE SKIN AT BEDTIME 8/2/2021 at Unknown time Yes Christian Davidson MD   insulin lispro (HUMALOG KWIKPEN) 100 UNIT/ML (1 unit dial) KWIKPEN Inject 5-15 Units Subcutaneous 3 times daily (before meals) Sliding scale with meals  Yes Christian Davidson MD   traZODone (DESYREL) 100 MG tablet TAKE 1 TABLET(100 MG) BY MOUTH AT BEDTIME 8/2/2021 at Unknown time Yes Bandar Greenberg MD   vitamin B-12 (CYANOCOBALAMIN) 1000 MCG tablet Take 1 tablet (1,000 mcg) by mouth daily Continue unitl hematology evaluation as borderline low vitamin b12. 8/3/2021 at Unknown time Yes Kristine Nash MD   blood glucose (STEVE CONTOUR) test strip TESTING FOUR TIMES DAILY OR AS DIRECTED   Christian Davidson MD   cyclobenzaprine (FLEXERIL) 10 MG tablet Take 10 mg by mouth At Bedtime Unknown at Unknown time  Unknown, Entered By History   diphenhydrAMINE-acetaminophen (TYLENOL PM)  MG tablet Take 2 tablets by mouth nightly as needed for  sleep Unknown at Unknown time  Unknown, Entered By History   hydrochlorothiazide (HYDRODIURIL) 25 MG tablet Take 25 mg by mouth daily Unknown at Unknown time  Unknown, Entered By History   insulin glargine (LANTUS SOLOSTAR) 100 UNIT/ML pen Inject 15 Units Subcutaneous every morning Lantus Solostar Pen  Patient not taking: Reported on 8/3/2021 Not Taking at Unknown time  Radha Gonzalez DO   insulin pen needle (BD FERCHO U/F) 32G X 4 MM miscellaneous USE AS DIRECTED UP TO FOUR TIMES DAILY Unknown at Unknown time  Bandar Greenberg MD   Lidocaine (LIDOCARE) 4 % Patch Place 1 patch onto the skin every 24 hours To prevent lidocaine toxicity, patient should be patch free for 12 hrs daily.   Christian Davidson MD   order for DME Hip steroid injectoion   Bandar Greenberg MD   oxyCODONE-acetaminophen (PERCOCET) 5-325 MG tablet Take 1 tablet by mouth nightly as needed for severe pain Unknown at Unknown time  Unknown, Entered By History   pantoprazole (PROTONIX) 40 MG EC tablet TAKE 1 TABLET(40 MG) BY MOUTH TWICE DAILY Unknown at Unknown time  Christian Davidson MD   potassium chloride ER (K-TAB) 20 MEQ CR tablet Take 1 tablet (20 mEq) by mouth daily Unknown at Unknown time  Radha Gonzalez DO   torsemide (DEMADEX) 10 MG tablet Take by mouth daily  Unknown at Unknown time  Shaniqua Ram PA-C          Allergies:     Allergies   Allergen Reactions     No Known Allergies           Family History:     Family History   Problem Relation Age of Onset     Family History Negative Mother      Family History Negative Father          Social History:   Justyn Olivas  reports that he quit smoking about 45 years ago. His smoking use included cigarettes. He started smoking about 56 years ago. He has a 22.00 pack-year smoking history. He has never used smokeless tobacco. He reports that he does not drink alcohol and does not use drugs.        Review of Systems:   The 12 point Review of Systems is  negative other than noted in the HPI.       Labs/Imaging   All new lab and imaging data was reviewed.   Len Coates M.D.  [facility]

## 2021-08-04 NOTE — PROVIDER NOTIFICATION
MD Notification    Notified Person: MD    Notified Person Name: Dr. Coates    Notification Date/Time: 12:00 PM    Notification Interaction: phone    Purpose of Notification: Pt unable to have HIDA scan (received narcotics) and can only be done on Friday due to shortage of medication.     Orders Received: Surgery is signing off and cleared pt for discharge.     Comments: Dr. Almanza updated and diet order placed. Plan to re-check potassium this afternoon.

## 2021-08-04 NOTE — TELEPHONE ENCOUNTER
Called attempted. Per chart review, pt is at ED. Left voice message asking pt to call triage back. On call back, please get symptom update.

## 2021-08-05 NOTE — PROGRESS NOTES
Surgery    Patient sitting in chair.  Pain is stable.  Only requiring Tylenol for management.  Tolerated breakfast.  No nausea vomiting.  No fevers or chills.    Abdomen-soft without distention.  Tenderness along upper rib cage and right hip area.  No specific abdominal tenderness.    A/P  Patient remains stable with pain.  I am still not convinced cholecystectomy would provide any symptomatic relief as it has been distended dating back to last year and there are no obvious signs of cholecystitis or stones at this time.  I do not think the patient needs to stay inpatient for further work-up but would recommend getting a HIDA scan as an outpatient for further evaluation. He is comfortable with this plan.  If HIDA is positive, there would be a stronger indication to go forward with exploration and cholecystectomy.  I will continue to follow patient as an outpatient to assure that he has a complete work-up to manage all of his symptoms.    Len Coates M.D.  [facility]  [phone]

## 2021-08-05 NOTE — PROGRESS NOTES
Pt AOx4. VSS on RA. SBA; GB/walker. Treating pain with tylenol. Continent and voiding in the bathroom. BS checks q4hr. Tolerating mod carb diet. NS + KCL @ 50 mL/hr. Tele; NSR. Pt unable to have HIDA scan because of narcotic use; can only be done on Friday. Plan is to re consult surgery and possibly GI. Discharge pending clinical improvement; continue to monitor.

## 2021-08-05 NOTE — DISCHARGE SUMMARY
"Discharge Summary  Hospitalist    Date of Admission:  8/3/2021  Date of Discharge:  8/5/2021  Discharging Provider: Amador Almanza MD    Primary Care Physician   Christian Davidson MD  Primary Care Provider Phone Number: [phone]  Primary Care Provider Fax Number: [phone]    PRINCIPAL DIAGNOSIS  Right upper quadrant pain -etiology not established.  Distended gallbladder with positive Elise sign.  Severe hypokalemia-diuretic use, dilutional.  Constipation.  Incidental small nonobstructing left renal stone.      Past Medical History:   Diagnosis Date     Cerebral infarction (H)      Diabetes (H)      Hyperlipemia        History of Present Illness   Justyn Olivas is an 80 year old male who presented with  right upper quadrant abdominal pain.    Hospital Course   Mr Olivas is a 80-year-old male with CHFpEF, h/o pericardial effusion, DM, HTN, DLD, h/o basilar CVA, Iron deficiency anemia admitted with right upper quadrant abdominal pain.     Right upper quadrant pain -etiology not established.  Distended gallbladder with positive Elise sign.  Patient presented with right upper quadrant abdominal pain ongoing for 4 to 6 weeks.  Episodes of severe sharp pain like \" I broke a rib\".  Denies trauma to the area.  Afebrile here, vitals stable. LFT's normal. Lipase 50.    No leukocytosis.  CRP 3.6, CK 48.  Lactic acid 0.9.  X-ray right ribs and chest no acute pathology.  CT abdomen No ureter stone or hydronephrosis.  US abdomen Positive sonographic Elise's sign, but no gallstones are seen. Gallbladder is distended. No biliary dilatation.  Telemetry monitoring no acute events.  General surgery followed, not convinced cholecystectomy would provide any symptomatic relief as gallbladder had been distended dating back to last year. Unfortunately HIDA scan could not be done in the hospital.  Recommend HIDA scan as outpatient for further evaluation, follow-up with general surgery as outpatient.  Low-fat diet " as tolerated.  If symptoms ongoing consider gastroenterology evaluation as outpatient.    Constipation.  CT with prominent stool within the colon and rectum.  Bowel meds as needed recommended.     Incidental small nonobstructing left renal stone.  Ct with small nonobstructing stone within the left kidney.  Follow-up as outpatient if symptomatic.     Severe hypokalemia-diuretic use, dilutional -corrected  Potassium 3.0 on presentation, with IV fluids potassium dropped to 2.6.  Received aggressive potassium replacement and potassium improved to 3.6.  Monitor BMP in 7 days or earlier if symptomatic.  Continue PTA oral potassium replacement.     Chronic CHFpEF EF 50 to 55%  Hypertension, Dyslipidemia  H/o pericardial effusion (s/p pericardiocentesis in December 2020 -unclear reasons)  Echo 3/2021 with EF 50-55%, noted severe inferior wall hypokinesis  PTA torsemide on hold during admission, restarted at time of discharge.  Continue Plavix 75 mg oral daily.  Continue PTA Coreg 6.25 mg twice daily.  Continue Lipitor 20 mg oral daily.  Low-salt diet.  Fluid restriction 2000 mL/day.  Follow-up in cardiology clinic per schedule.    CKD stage III  Baseline creatinine since 3/2021 around 2.4 to 2.6  Avoid NSAIDs, nephrotoxins  Diuresis as above; monitor BMP periodically.  Follow up Nephrology clinic per schedule.     H/o basilar CVA (2 yrs ago)   Continue PTA Plavix, continue PTA Lipitor.     Type 2 diabetes mellitus with a hemoglobin A1c of 6.0.  Reportedly 50 units at HS, humalog sliding scale insulin  Most recent A1C at 6.0%.   Continued insulin Lantus at half his home dose during hospitalization with insulin sliding scale.  Continued home dose on discharge.    Anemia of chronic disease with iron deficiency, requiring blood transfusion in the past.  Receives iron and aranesp, as per nephrology.   Baseline hemoglobin recent appears to be ~10, at 10.1 on presentation.   Monitor hemoglobin levels in 7 days.      History of  nonbleeding angiectasia's in the duodenum.  History of AVM.  Continue oral Protonix    Amador Almanza MD.    Pending Results   Unresulted Labs Ordered in the Past 30 Days of this Admission     No orders found from 7/4/2021 to 8/4/2021.             Physical Exam   Vitals:    08/03/21 1733 08/03/21 2357 08/05/21 0601   Weight: 89.8 kg (198 lb) 89.4 kg (197 lb 1.5 oz) 94 kg (207 lb 3.7 oz)     Vital Signs with Ranges  Temp:  [97  F (36.1  C)-98.1  F (36.7  C)] 98.1  F (36.7  C)  Pulse:  [79-90] 81  Resp:  [16-18] 17  BP: (101-134)/(56-75) 118/66  SpO2:  [95 %-98 %] 95 %  I/O last 3 completed shifts:  In: 780 [P.O.:780]  Out: -   PHYSICAL EXAM  GENERAL: Patient is in no distress. Alert and oriented.  HEART: Regular rate and rhythm. S1S2. No murmurs  LUNGS: Clear to auscultation bilaterally. No expiratory wheeze.  Respirations unlabored  ABDOMEN: Soft, right upper quadrant tenderness, bowel sounds heard   No guarding or rigidity.  NEURO: Moving all extremities, no focal weakness.  EXTREMITIES: No pedal edema.  SKIN: Warm, dry. No rash or bruising.    )Consultations This Hospital Stay   SURGERY GENERAL IP CONSULT  PHYSICAL THERAPY ADULT IP CONSULT    Time Spent on this Encounter   I, Amador Almanza MD, personally saw the patient today and spent greater than 30 minutes discharging this patient.. Discussed with patient, bedside RN    Discharge Disposition   Discharged to home  Condition at discharge: Stable    Discharge Orders      Reason for your hospital stay    You were admitted to the hospital with right upper quadrant pain, etiology not established.  Noted to have distended gallbladder, followed by general surgery.     Activity    Your activity upon discharge: activity as tolerated     Discharge Instructions    Noted to have incidental small nonobstructing stone within the left kidney, follow-up if symptomatic.  Consider bowel meds as needed to prevent constipation.  Avoid nephrotoxic drugs.  Avoid  NSAIDs.  Monitor home blood pressures, heart rate, blood sugars as able to and review on provider visit and optimize therapy.     Follow-up and recommended labs and tests     Follow-up with PCP in 1 week or earlier if symptomatic.  Monitor CBC, CMP on PCP visit.  HIDA scan as outpatient. Follow-up with general surgery as outpatient.  If symptoms ongoing can consider GI referral on PCP visit.     Diet    Low salt, fat diet as tolerated.  Fluid restriction to 2000 mL/day.       Discharge Medications   Current Discharge Medication List      CONTINUE these medications which have NOT CHANGED    Details   albuterol (PROAIR HFA) 108 (90 Base) MCG/ACT inhaler INHALE 2 PUFFS BY MOUTH FOUR TIMES DAILY AS NEEDED  Qty: 8.5 g, Refills: 3    Associated Diagnoses: Shortness of breath      atorvastatin (LIPITOR) 20 MG tablet Take 1 tablet (20 mg) by mouth daily  Qty: 90 tablet, Refills: 3    Associated Diagnoses: Hyperlipidemia LDL goal <100      carvedilol (COREG) 6.25 MG tablet Take 1 tablet (6.25 mg) by mouth 2 times daily (with meals)  Qty: 180 tablet, Refills: 3    Associated Diagnoses: Pericardial effusion      clopidogrel (PLAVIX) 75 MG tablet Take 1 tablet (75 mg) by mouth daily  Qty: 90 tablet, Refills: 3    Associated Diagnoses: Cerebrovascular accident (CVA) due to embolism of cerebral artery (H)      cyclobenzaprine (FLEXERIL) 10 MG tablet Take 10 mg by mouth nightly as needed for muscle spasms       dorzolamide-timolol (COSOPT) 2-0.5 % ophthalmic solution Place 1 drop into both eyes daily       fluticasone (FLONASE) 50 MCG/ACT nasal spray Spray 2 sprays into both nostrils daily      folic acid-vit B6-vit B12 (FOLGARD) 0.8-10-0.115 MG TABS per tablet Take 1 tablet by mouth daily Continue per Nephrology recommendation  Qty: 30 tablet, Refills: 0    Associated Diagnoses: Anemia, unspecified type      insulin glargine (LANTUS SOLOSTAR) 100 UNIT/ML pen Inject 50 Units Subcutaneous At Bedtime   Qty: 45 mL, Refills: 3     Comments: If Lantus is not covered by insurance, may substitute Basaglar at same dose and frequency.    Associated Diagnoses: Type 2 diabetes mellitus without complication, with long-term current use of insulin (H)      insulin lispro (HUMALOG KWIKPEN) 100 UNIT/ML (1 unit dial) KWIKPEN Inject 5-15 Units Subcutaneous 3 times daily (before meals) Sliding scale with meals  Qty: 45 mL, Refills: 3    Associated Diagnoses: Type 2 diabetes mellitus without complication, with long-term current use of insulin (H)      pantoprazole (PROTONIX) 40 MG EC tablet TAKE 1 TABLET(40 MG) BY MOUTH TWICE DAILY  Qty: 180 tablet, Refills: 2    Associated Diagnoses: Acute superficial gastritis with hemorrhage      potassium chloride ER (K-TAB) 20 MEQ CR tablet Take 1 tablet (20 mEq) by mouth daily    Associated Diagnoses: Hypokalemia      torsemide (DEMADEX) 20 MG tablet Take 10 mg by mouth daily   Qty:        traZODone (DESYREL) 100 MG tablet TAKE 1 TABLET(100 MG) BY MOUTH AT BEDTIME  Qty: 90 tablet, Refills: 3    Associated Diagnoses: Insomnia, unspecified type      vitamin B-12 (CYANOCOBALAMIN) 1000 MCG tablet Take 1 tablet (1,000 mcg) by mouth daily Continue unitl hematology evaluation as borderline low vitamin b12.  Qty: 90 tablet, Refills: 3    Associated Diagnoses: Other dietary vitamin B12 deficiency anemia      blood glucose (STEVE CONTOUR) test strip TESTING FOUR TIMES DAILY OR AS DIRECTED  Qty: 400 strip, Refills: 0    Associated Diagnoses: Type 2 diabetes mellitus without complication, with long-term current use of insulin (H)      insulin pen needle (BD FERCHO U/F) 32G X 4 MM miscellaneous USE AS DIRECTED UP TO FOUR TIMES DAILY  Qty: 400 each, Refills: 3    Associated Diagnoses: Type 2 diabetes mellitus without complication, with long-term current use of insulin (H)      order for DME Hip steroid injectoion  Qty: 1 Units, Refills: 0    Associated Diagnoses: Primary osteoarthritis of hip, unspecified laterality         STOP  taking these medications       oxyCODONE-acetaminophen (PERCOCET) 5-325 MG tablet Comments:   Reason for Stopping:             Allergies   Allergies   Allergen Reactions     No Known Allergies        DATA  Most Recent 3 CBC's:Recent Labs   Lab Test 08/05/21  0932 08/04/21  0601 08/03/21  1736 07/01/21  1500 06/17/21  1137   WBC  --  6.8 6.5  --  7.0   HGB 9.2* 9.4* 10.1*   < > 8.4*   MCV  --  88 88  --  83   PLT  --  178 216  --  207    < > = values in this interval not displayed.      Most Recent 3 BMP's:  Recent Labs   Lab Test 08/05/21  0932 08/05/21  0931 08/05/21  0558 08/04/21  1533 08/04/21  1008 08/04/21  0601 08/04/21  0601 08/03/21  1736     --   --   --   --   --  141 136   POTASSIUM 3.6  --   --  3.6 2.6*  --  2.6* 3.0*   CHLORIDE 103  --   --   --   --   --  102 97   CO2 34*  --   --   --   --   --  36* 37*   BUN 48*  --   --   --   --   --  50* 54*   CR 2.54*  --   --   --   --   --  2.84* 3.03*   ANIONGAP <1*  --   --   --   --   --  3 2*   VANESA 8.1*  --   --   --   --   --  8.5 8.9   * 249* 207*  --   --    < > 115* 353*    < > = values in this interval not displayed.     Most Recent 2 LFT's:  Recent Labs   Lab Test 08/04/21  0601 08/03/21  1736   AST 12 13   ALT 20 24   ALKPHOS 93 109   BILITOTAL 0.4 0.4     Most Recent TSH, T4 and A1c Labs:  Recent Labs   Lab Test 03/29/21  0430 03/27/21  0610   TSH 1.09  --    A1C  --  6.0*     Results for orders placed or performed during the hospital encounter of 08/03/21   CT Abdomen Pelvis w/o Contrast    Narrative    CT ABDOMEN AND PELVIS WITHOUT CONTRAST 8/3/2021 7:23 PM    CLINICAL HISTORY: Flank pain, kidney stone suspected  TECHNIQUE: CT scan of the abdomen and pelvis was performed without IV  contrast. Multiplanar reformats were obtained. Dose reduction  techniques were used.  CONTRAST: None.    COMPARISON: None.    FINDINGS:   LOWER CHEST: Normal.    HEPATOBILIARY: No acute liver abnormality. Mildly distended  gallbladder.    PANCREAS:  Normal.    SPLEEN: Normal.    ADRENAL GLANDS: Small nodular thickening of the left adrenal. These  are commonly adenomas. Unremarkable right adrenal.    KIDNEYS/BLADDER: No ureter stone or hydronephrosis. Nonobstructing  stone lower left kidney is 0.3 cm series 3 image 90. No acute renal or  bladder abnormality.    BOWEL: No obstruction. Prominent stool throughout the colon. Rectal  distention with stool as well. Normal appendix. No acute inflammation.  Distal colonic diverticula. No convincing focal diverticulitis.    LYMPH NODES: Normal.    VASCULATURE: Scattered vascular calcifications.    PELVIC ORGANS: Normal.    OTHER: None.    MUSCULOSKELETAL: Spine mild degenerative changes. Bilateral hip DJD.      Impression    IMPRESSION:   1.  No ureter stone or hydronephrosis.  2.  Small nonobstructing stone within the left kidney.  3.  Prominent stool within the colon and rectum.  4.  Colonic diverticulosis.    RASHARD MORILLO MD         SYSTEM ID:  [email or web address]   Abdomen US, limited (RUQ only)    Narrative    US ABDOMEN LIMITED 8/3/2021 8:50 PM    CLINICAL HISTORY: Right upper quadrant pain.    TECHNIQUE: Limited abdominal ultrasound.    COMPARISON: CT abdomen and pelvis 8/3/2021.    FINDINGS:    GALLBLADDER: Distended gallbladder. No gallstones. Positive  sonographic Elise's sign. No gallbladder wall thickening.    BILE DUCTS: There is no biliary dilatation. The common duct is  obscured for measurement.    LIVER: Normal where seen.    RIGHT KIDNEY: No hydronephrosis.    PANCREAS: The pancreas is largely obscured by overlying gas.    No ascites.      Impression    IMPRESSION:  1.  Positive sonographic Elise's sign, but no gallstones are seen.  Gallbladder is distended. No biliary dilatation.    RASHARD MORILLO MD         SYSTEM ID:  [id number]   XR Ribs & Chest Right G/E 3 Views    Narrative    RIGHT RIBS AND CHEST, THREE VIEWS  8/4/2021 12:42 PM     HISTORY: Right rib pain.    COMPARISON: Chest x-ray from 7/20/2021.      Impression     IMPRESSION: Heart size is normal. Pulmonary vasculature is normal.  Lungs are clear. No pneumothorax or pleural fluid. No definite  evidence of acute rib fracture.    JESSICA ADAMSON MD         SYSTEM ID:  [id number]

## 2021-08-05 NOTE — PLAN OF CARE
Patient Ax4, VSS on room air, lungs clear.  C/o pain 7-8/10 in RUQ. Tylenol given with slight relief to 6/10.  Offered cold/warm pack-didn't try.  Passing flatus, reports no BM for several days, given milk of magnesia and scheduled miralax. Voids in bathroom, I&O, have not measured this shift. Passing flatus.  Tolerating low fat diet well. Glucose checks 189, 246. K+ 2.6-replaced, now 3.6.  Plan is to re consult surgery and possibly GI.

## 2021-08-05 NOTE — PLAN OF CARE
AOx4. VSS on RA. Tele NSR. Up SBA GB/walker. Adequate voiding. , corrected. PIV removed. Pt reports pain, declined pain meds.    Patient has been discharged August 5, 2021 12:14 PM. Discharge instructions and education reviewed, medication schedule and follow up appts discussed. Pt had no questions. All belongings sent with pt.

## 2021-08-06 NOTE — TELEPHONE ENCOUNTER
Surgery    Called patient for follow-up after discharge from hospital yesterday.  He is actually feeling much better.  He tolerated breakfast.  He states he does not have his severe chronic pain today.  Only describes as a very slight annoyance.  I still have a low suspicion for biliary etiology with his pain, but I do feel he should have an HIDA scan.  Unfortunately, due to a nuc med shortage this cannot be done until 8/18.  I described this to the patient and he said that is fine as he is feeling better today.  Advised him to call the office if his symptoms worsen prior to getting this performed so we can come up additional work-up or surgical plan.  He agrees.

## 2021-08-06 NOTE — TELEPHONE ENCOUNTER
Reason for Call:  INF Appointment, Requested Provider:  PCP only    PCP: Christian Davidson    Reason for visit: INF - [facility] - 8/3 thru 8/5 - upper right quadrant abdominal pain  - f/u within 1 week     Duration of symptoms: Unknown    Have you been treated for this in the past? Unknown    Additional comments: Please call the patient to assist in scheduling this appointment, thank you.    Can we leave a detailed message on this number? YES    Phone number patient can be reached at: Home number on file [phone] (home)    Best Time: Anytime    Call taken on 8/6/2021 at 10:27 AM by Jade Enriquez

## 2021-08-06 NOTE — PROGRESS NOTES
Clinic Care Coordination Contact    Clinic Care Coordination Contact  OUTREACH    Referral Information:  Referral Source: IP Report    Primary Diagnosis: Other (include Comment box) (hyperglycemia, hypokalemia)    Chief Complaint   Patient presents with     Clinic Care Coordination - Follow-up     Clinic Care Coordination - Post Hospital        [place] Utilization: [facility] 8/3 -8/5/21 for upper right quadrant abdominal pain  Clinic Utilization  Difficulty keeping appointments:: No  Compliance Concerns: No  No-Show Concerns: No  No PCP office visit in Past Year: No  Utilization    Hospital Admissions  5             ED Visits  6             No Show Count (past year)  1                Current as of: 8/6/2021 10:29 AM              Clinical Concerns:  Current Medical Concerns:    Patient Active Problem List   Diagnosis     Shoulder pain     Insomnia, unspecified type     Type 2 diabetes mellitus without complication, with long-term current use of insulin (H)     Benign essential hypertension     Hyperlipidemia LDL goal <100     Non-seasonal allergic rhinitis due to pollen     Primary osteoarthritis of both hips     Primary osteoarthritis involving multiple joints     Chronic non-seasonal allergic rhinitis, unspecified trigger     Cerebrovascular accident (CVA) due to embolism of cerebral artery (H)     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)     Anemia due to blood loss, acute     Iron deficiency anemia due to chronic blood loss     MAIRA (iron deficiency anemia)     Anemia, iron deficiency     Iron deficiency     Anemia, unspecified type     Type 2 diabetes mellitus with stage 3b chronic kidney disease, with long-term current use of insulin (H)     Anemia due to stage 3 chronic kidney disease, unspecified whether stage 3a or 3b CKD     Hypokalemia     Hyperglycemia     Chronic diastolic heart failure with preserved ejection fraction (H)     Biliary colic         Current Behavioral Concerns: See above     Education Provided to patient: RN CC role and reason for call   Pain  Pain (GOAL):: Yes  Type: Acute (<3mo)  Location of chronic pain:: right upper quadrant abdominal pain ongoing for 4 to 6 weeks.  Radiating: No  Progression: Unchanged  Description of pain: Burning, Gnawing, Nagging, Sharp, Shooting, Stabbing  Chronic pain severity:: 9 (9/10 with activity, 2/10 at rest)  Limitation of routine activities due to chronic pain:: Yes  Description: Able to do moderate activities  Alleviating Factors: Rest  Aggravating Factors: Activity  Health Maintenance Reviewed: Due/Overdue   Health Maintenance Due   Topic Date Due     HF ACTION PLAN  Never done     COPD ACTION PLAN  Never done     Pneumococcal Vaccine: 65+ Years (1 of 2 - PPSV23) 04/23/2015     MEDICARE ANNUAL WELLNESS VISIT  02/10/2021     MICROALBUMIN  02/10/2021     DIABETIC FOOT EXAM  02/10/2021       Clinical Pathway: Clinic Care Coordination CHF Assessment    Symptom Review:   Heart Failure Symptoms  Shortness of breath:: Yes  Shortness of breath symptom course:: Improved  Waking up at night short of breath?: No  Prop up on pillows or sleep in chair to breathe easier? : No  Trouble walking or talking while short of breath?: No  Wheezing or noisy breathing?: No  Cough: No  Increased sputum: No  Fever: No  Chest pain: : No  Heartbeat: Regular  Dizzy or Lightheaded: No  Checking weight daily? : Yes  Weight?: Unchanged  Today's Weight?: 92.5 kg (203 lb 14.8 oz)  Weight increase more than 2 lbs in 24 hours?: No  Weight Increase more than 5 lbs in 1 week? : No  Does the patient have understanding of Diuretic self-management?: Yes  Diet:: Fluid restrict  Appetite:: Normal  Swelling: : Lower legs, Feet, Ankles (R>L)  Bloating:: None  Urination:: Normal  Fatigue: Sometimes  Weakness (Heaviness in limbs):: Yes  What Heart Failure zone are you currently in?: Green  Overall your CHF symptoms are (GOAL):: Improving  How confident are you with the plan we have identified?:  10- Completely confident         Medication Management:  Medication review status: Medications reviewed and no changes reported per patient.        Current Outpatient Medications   Medication Instructions     albuterol (PROAIR HFA) 108 (90 Base) MCG/ACT inhaler INHALE 2 PUFFS BY MOUTH FOUR TIMES DAILY AS NEEDED     atorvastatin (LIPITOR) 20 mg, Oral, DAILY     blood glucose (STEVE CONTOUR) test strip TESTING FOUR TIMES DAILY OR AS DIRECTED     carvedilol (COREG) 6.25 mg, Oral, 2 TIMES DAILY WITH MEALS     clopidogrel (PLAVIX) 75 mg, Oral, DAILY     cyclobenzaprine (FLEXERIL) 10 mg, Oral, AT BEDTIME PRN     dorzolamide-timolol (COSOPT) 2-0.5 % ophthalmic solution 1 drop, Both Eyes, DAILY     fluticasone (FLONASE) 50 MCG/ACT nasal spray 2 sprays, Both Nostrils, DAILY     folic acid-vit B6-vit B12 (FOLGARD) 0.8-10-0.115 MG TABS per tablet 1 tablet, Oral, DAILY, Continue per Nephrology recommendation     insulin glargine (LANTUS SOLOSTAR) 50 Units, Subcutaneous, AT BEDTIME     insulin lispro (HUMALOG KWIKPEN) 5-15 Units, Subcutaneous, 3 TIMES DAILY BEFORE MEALS, Sliding scale with meals     insulin pen needle (BD FERCHO U/F) 32G X 4 MM miscellaneous USE AS DIRECTED UP TO FOUR TIMES DAILY     order for DME Hip steroid injectoion     pantoprazole (PROTONIX) 40 MG EC tablet TAKE 1 TABLET(40 MG) BY MOUTH TWICE DAILY     potassium chloride ER (K-TAB) 20 MEQ CR tablet 20 mEq, Oral, DAILY     torsemide (DEMADEX) 10 mg, Oral, DAILY     traZODone (DESYREL) 100 MG tablet TAKE 1 TABLET(100 MG) BY MOUTH AT BEDTIME     vitamin B-12 (CYANOCOBALAMIN) 1,000 mcg, Oral, DAILY, Continue unitl hematology evaluation as borderline low vitamin b12.          Functional Status:  Dependent ADLs:: Independent (Patient is use cane outside of home for long distance)  Dependent IADLs:: Transportation  Bed or wheelchair confined:: No  Mobility Status: Independent    Living Situation:  Current living arrangement:: I live in a private home with  family  Type of residence:: Private home [facility]    Lifestyle & Psychosocial Needs:    Social Determinants of Health     Tobacco Use: Medium Risk     Smoking Tobacco Use: Former Smoker     Smokeless Tobacco Use: Never Used   Alcohol Use:      Frequency of Alcohol Consumption:      Average Number of Drinks:      Frequency of Binge Drinking:    Financial Resource Strain:      Difficulty of Paying Living Expenses:    Food Insecurity:      Worried About Running Out of Food in the Last Year:      Ran Out of Food in the Last Year:    Transportation Needs:      Lack of Transportation (Medical):      Lack of Transportation (Non-Medical):    Physical Activity:      Days of Exercise per Week:      Minutes of Exercise per Session:    Stress:      Feeling of Stress :    Social Connections:      Frequency of Communication with Friends and Family:      Frequency of Social Gatherings with Friends and Family:      Attends Orthodox Services:      Active Member of Clubs or Organizations:      Attends Club or Organization Meetings:      Marital Status:    Intimate Partner Violence:      Fear of Current or Ex-Partner:      Emotionally Abused:      Physically Abused:      Sexually Abused:    Depression: Not at risk     PHQ-2 Score: 0   Housing Stability:      Unable to Pay for Housing in the Last Year:      Number of Places Lived in the Last Year:      Unstable Housing in the Last Year:      Diet:: Diabetic diet, No added salt  Inadequate nutrition (GOAL):: No  Tube Feeding: No  Inadequate activity/exercise (GOAL):: No  Significant changes in sleep pattern (GOAL): No  Transportation means:: Accessible car     Orthodox or spiritual beliefs that impact treatment:: No  Mental health DX:: No  Mental health management concern (GOAL):: No  Informal Support system:: Family, Spouse     Patient states his health status is uncharged since hospitalization, unknown etiology of upper right quadrant pain. US abdomen Positive sonographic Elise's  sign, but no gallstones are seen. Gallbladder is distended. No biliary dilatation.Telemetry monitoring no acute events.General surgery followed, not convinced cholecystectomy would provide any symptomatic relief as gallbladder had been distended dating back to last year. Unfortunately HIDA scan could not be done in the hospital.     Patient is scheduled for Hepatobiliary scan on 8/18/21, patient will follow up with general surgeon after scan and review treatment options at that time. New care plan created.     Reviewed AVS, medication list, HM due and recommendation to follow up with PCP in one week. RN CC spoke with scheduling at patient's request, unfortunately PCP does not have any sooner openings to his schedule before previously scheduled 8/23/21 PCP follow up. Patient refuses to be seen by another PCP.  will send message to PCP to see if he can make exception to schedule to accomodates patient. Clinic will call patient directly.     RN CC relayed above information to patient, Kamran verbalized understanding.     Resources and Interventions:  Current Resources:   Skilled Home Care Services: Skilled Nursing, Physicial Therapy  Community Resources: [facility], [facility]  Supplies used at home:: Hearing Aid Batteries, Diabetic Supplies  Equipment Currently Used at Home: cane, straight, glucometer, walker, rolling  Employment Status: retired         Advance Care Plan/Directive  Advanced Care Plans/Directives on file:: In process  Advanced Care Plan/Directive Status: In Process (has, just not on file)    Referrals Placed: None     Goals:   Goals        General     1. Improve chronic symptoms (pt-stated)      Notes - Note edited  7/1/2021 11:46 AM by Jerel Leija, RN     Goal Statement: I will verbalize an increase in my strength and mobility and correct knowledge of adequate management of my chronic health conditions to maintain my health and wellness in preventing hospital readmission.   Date Goal set:  12/24/20; Updated/modified 04/02/2021  Barriers: Multiple health concerns.  Strengths: Motivated and engaged in care coordination.   Date to Achieve By: 9/1/2021  Patient expressed understanding of goal: Yes    Action steps to achieve this goal:  1. I will work with Physical Therapy to build my strength and mobility.   2. I will follow up with my providers as scheduled/recommended. (Primary Care Provider, [facility], sleep studies TBD, Dr. Ariana PARSON, [facility] hematology/oncology, nephrology)   3. I will take my medications as prescribed.   4. I will continue monitoring my blood sugars, blood pressures, weight daily as recommended.  5. I will use my CHF action plan to monitor/manage my symptoms.   6. I will follow care team recommendations of fluid restriction, diabetic and cardiac diet.   7. I will use my incentive spirometer as directed and recommended by my care team.   8. I will continue to work with care coordination for any additional resources and support.  9. I will contact my care team with questions, concerns, support needs. I will use the clinic as a resource and I understand I can contact my clinic with 24/7 after hours services available. Care Coordinator will remain available as needed.         1. Pain Management (pt-stated)      Notes - Note edited  8/6/2021 10:30 AM by Namita Chaidez RN     Goal Statement: I would like to address and understand the treatment plan for my upper right quadrant abdominal pain.   Date Goal set: 8/6/21  Barriers: Unknown etiology   Strengths: Patient is motivated  Date to Achieve By: 12/31/21  Patient expressed understanding of goal: yes  Action steps to achieve this goal:  1. I will have Hepatobiliary scan on 8/18/21  2. I will follow up with my general surgery after scan  3. I will follow up with PCP 8/23/21 or sooner if he is able to make an ecception to his schedule  4.               Patient/Caregiver understanding: Patient reports understanding and denies any additional  questions or concerns at this times. RN CC engaged in AIDET communication during encounter.      Outreach Frequency: 2 weeks  Future Appointments              [facility]    In 1 week  PIV LAB [facility]    In 1 week  FAST TRACK LAB [facility]    In 2 weeks Christian Davidson MD [facility],     In 8 months Kristine Nash MD [facility]          Plan: RN CC will send updated care plan via my chart. Rn CC will follow up with patient in 2 weeks.     Hellen Chaidez RN Care Coordinator  [facility]  Email: [email or web address]  Phone: [phone]

## 2021-08-06 NOTE — LETTER
[facility]ARE [facility]IN[facility]  [address]  [place] [address]    August 6, 2021        Justyn Olivas  [address] [place] [address]          Dear Kamran,     [facility] is an updated Complex Care Plan for your continued enrollment in Care Coordination. Please let us know if you have additional questions, concerns, or goals that we can assist with.    Sincerely,    Hellen Chaidez RN Care Coordinator  [facility]  Email: [email or web address]  Phone: [phone]          [facility]  Complex Care Plan  About Me:    Patient Name:  Justyn Olivas    YOB: 1940  Age:         80 year old   [place] MRN:    [id number] Telephone Information:  Home Phone [phone]   Mobile [phone]       Address:  [address] [place] [address] Email address:  [email or web address]      Emergency Contact(s)    Name Relationship Lgl Grd Work Phone Home Phone Mobile Phone   1. EBEN OLIVAS* Spouse   [phone]   2. LINH OLIVAS* Son   [phone]           Primary language:  English     needed? No   [facility] [employer]:  [phone] op. 1  Other communication barriers: Hearing aides  Preferred Method of Communication:  Mail  Current living arrangement: I live in a private home with family  Mobility Status/ Medical Equipment: Independent    Health Maintenance  Health Maintenance Reviewed: Due/Overdue   Health Maintenance Due   Topic Date Due     HF ACTION PLAN  Never done     COPD ACTION PLAN  Never done     Pneumococcal Vaccine: 65+ Years (1 of 2 - PPSV23) 04/23/2015     MEDICARE ANNUAL WELLNESS VISIT  02/10/2021     MICROALBUMIN  02/10/2021     DIABETIC FOOT EXAM  02/10/2021         My Access Plan  Medical Emergency 911   Primary Clinic Line [facility] - [phone]   24 Hour Appointment Line [phone] or  [phone]) (toll-free)   24 Hour Nurse  Line [phone] (toll-free)   Preferred Urgent Care [facility] [place], [phone]   Preferred Hospital [facility] [place]  [phone]   [id number] [place] [place] - [address] AT [facility] [place] [address]     Behavioral Health Crisis Line The National Suicide Prevention Lifeline at 1-679.558.4599 or 913             My Care Team Members  Patient Care Team       Relationship Specialty Notifications Start End    Christian Davidson MD PCP - General Internal Medicine  2/9/21     Phone: [phone] Pager: [phone] Fax: [phone] [address] [place] [address]    Esteban Ruiz PA-C Assigned Heart and Vascular Provider   3/17/21     Phone: [phone] Fax: [phone] [address] [place] [address]    Shaniqua Ram PA-C Physician Assistant Cardiovascular Disease  4/14/21     Phone: [phone] Pager: [phone] Fax: [phone] [address] [place] [address]    Brien Greene MD MD Nephrology  4/15/21     Phone: [phone] Pager: [phone] Fax: [phone]        [facility] [address] [place] [address]    Juanita Moore APRN CNP Nurse Practitioner Nephrology  4/15/21     Phone: [phone] Fax: [phone]         [facility] [address] [place] [address]    Kristine Nash MD Assigned Cancer Care Provider   5/9/21     Phone: [phone] Fax: [phone] [address] [place] [address]    Namita Chaidez, RN Lead Care Coordinator  Admissions 7/3/21     Christian Davidson MD Assigned PCP   7/18/21     Phone: [phone] Pager: [phone] Fax: [phone] [address] [facility] [address] [facility] [address]            My Care Plans  Self Management and Treatment Plan  Goals and (Comments)  Goals        General     1. Improve chronic symptoms (pt-stated)      Notes -  Note edited  7/1/2021 11:46 AM by Jerel Leija, RN     Goal Statement: I will verbalize an increase in my strength and mobility and correct knowledge of adequate management of my chronic health conditions to maintain my health and wellness in preventing hospital readmission.   Date Goal set: 12/24/20; Updated/modified 04/02/2021  Barriers: Multiple health concerns.  Strengths: Motivated and engaged in care coordination.   Date to Achieve By: 9/1/2021  Patient expressed understanding of goal: Yes    Action steps to achieve this goal:  1. I will work with Physical Therapy to build my strength and mobility.   2. I will follow up with my providers as scheduled/recommended. (Primary Care Provider, [facility], sleep studies TBD, Dr. Ariana PARSON, [facility] hematology/oncology, nephrology)   3. I will take my medications as prescribed.   4. I will continue monitoring my blood sugars, blood pressures, weight daily as recommended.  5. I will use my CHF action plan to monitor/manage my symptoms.   6. I will follow care team recommendations of fluid restriction, diabetic and cardiac diet.   7. I will use my incentive spirometer as directed and recommended by my care team.   8. I will continue to work with care coordination for any additional resources and support.  9. I will contact my care team with questions, concerns, support needs. I will use the clinic as a resource and I understand I can contact my clinic with 24/7 after hours services available. Care Coordinator will remain available as needed.         1. Pain Management (pt-stated)      Notes - Note edited  8/6/2021 10:30 AM by Namita Chaidez RN     Goal Statement: I would like to address and understand the treatment plan for my upper right quadrant abdominal pain.   Date Goal set: 8/6/21  Barriers: Unknown etiology   Strengths: Patient is motivated  Date to Achieve By: 12/31/21  Patient expressed understanding of goal: yes  Action steps to achieve this goal:  1. I will have  Hepatobiliary scan on 8/18/21  2. I will follow up with my general surgery after scan  3. I will follow up with PCP 8/23/21 or sooner if he is able to make an ecception to his schedule  4.                Action Plans on File:                       Advance Care Plans/Directives Type:        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)     Anemia due to blood loss, acute     Iron deficiency anemia due to chronic blood loss     MAIRA (iron deficiency anemia)     Anemia, iron deficiency     Iron deficiency     Anemia, unspecified type     Type 2 diabetes mellitus with stage 3b chronic kidney disease, with long-term current use of insulin (H)     Anemia due to stage 3 chronic kidney disease, unspecified whether stage 3a or 3b CKD     Hypokalemia     Hyperglycemia     Chronic diastolic heart failure with preserved ejection fraction (H)     Biliary colic      Current Medications and Allergies:    Current Outpatient Medications   Medication Instructions     albuterol (PROAIR HFA) 108 (90 Base) MCG/ACT inhaler INHALE 2 PUFFS BY MOUTH FOUR TIMES DAILY AS NEEDED     atorvastatin (LIPITOR) 20 mg, Oral, DAILY     blood glucose (STEVE CONTOUR) test strip TESTING FOUR TIMES DAILY OR AS DIRECTED     carvedilol (COREG) 6.25 mg, Oral, 2 TIMES DAILY WITH MEALS     clopidogrel (PLAVIX) 75 mg, Oral, DAILY     cyclobenzaprine (FLEXERIL) 10 mg, Oral, AT BEDTIME PRN      dorzolamide-timolol (COSOPT) 2-0.5 % ophthalmic solution 1 drop, Both Eyes, DAILY     fluticasone (FLONASE) 50 MCG/ACT nasal spray 2 sprays, Both Nostrils, DAILY     folic acid-vit B6-vit B12 (FOLGARD) 0.8-10-0.115 MG TABS per tablet 1 tablet, Oral, DAILY, Continue per Nephrology recommendation     insulin glargine (LANTUS SOLOSTAR) 50 Units, Subcutaneous, AT BEDTIME     insulin lispro (HUMALOG KWIKPEN) 5-15 Units, Subcutaneous, 3 TIMES DAILY BEFORE MEALS, Sliding scale with meals     insulin pen needle (BD FERCHO U/F) 32G X 4 MM miscellaneous USE AS DIRECTED UP TO FOUR TIMES DAILY     order for DME Hip steroid injectoion     pantoprazole (PROTONIX) 40 MG EC tablet TAKE 1 TABLET(40 MG) BY MOUTH TWICE DAILY     potassium chloride ER (K-TAB) 20 MEQ CR tablet 20 mEq, Oral, DAILY     torsemide (DEMADEX) 10 mg, Oral, DAILY     traZODone (DESYREL) 100 MG tablet TAKE 1 TABLET(100 MG) BY MOUTH AT BEDTIME     vitamin B-12 (CYANOCOBALAMIN) 1,000 mcg, Oral, DAILY, Continue unitl hematology evaluation as borderline low vitamin b12.         Care Coordination Start Date: 12/24/2020   Frequency of Care Coordination: 2 weeks   Form Last Updated: 08/06/2021

## 2021-08-06 NOTE — PROGRESS NOTES
Clinic Care Coordination Contact  Background: Care Coordination referral placed from [facility] discharge report for reason of patient meeting criteria for a TCM outreach call by [facility] [place] team.    Assessment: Upon chart review, [facility] Team member will cancel/close the referral for TCM outreach due to reason below:    Patient is actively enrolled and followed within ambulatory care coordination.     Plan: Care Coordination referral for TCM outreach canceled to minimize duplicative efforts.    Ashleigh Bravo RN  [facility] [place], [facility]

## 2021-08-10 NOTE — PROGRESS NOTES
Stephanie Andrew is a 80 year old who presents for the following health issues     HPI       Hospital Follow-up Visit:    Hospital/Nursing Home/IP Rehab Facility: [facility]  Discharge Summary  Hospitalist     Date of Admission:  8/3/2021  Date of Discharge:  8/5/2021  Discharging Provider: Amador Almanza MD        Primary Care Physician      Christian Davidson MD  Primary Care Provider Phone Number: [phone]  Primary Care Provider Fax Number: [phone]     PRINCIPAL DIAGNOSIS  Right upper quadrant pain -etiology not established.  Distended gallbladder with positive Elise sign.  Severe hypokalemia-diuretic use, dilutional.  Constipation.  Incidental small nonobstructing left renal stone.        Past Medical History   Past Medical History:   Diagnosis Date     Cerebral infarction (H)       Diabetes (H)       Hyperlipemia                    History of Present Illness     Justyn Olivas is an 80 year old male who presented with  right upper quadrant abdominal pain.           Was your hospitalization related to COVID-19? No   Problems taking medications regularly:  None  Medication changes since discharge: None  Problems adhering to non-medication therapy:  None    Summary of hospitalization:  [facility] discharge summary reviewed  Diagnostic Tests/Treatments reviewed.  Follow up needed: none  Other Healthcare Providers Involved in Patient s Care:         None  Update since discharge: improved.       Post Discharge Medication Reconciliation: discharge medications reconciled, continue medications without change.  Plan of care communicated with patient              Right upper quadrant pain -etiology not established.  Distended gallbladder with positive Elise sign.   Justyn Olivas was recently mated to [facility] for right upper quadrant abdominal pain.  Unfortunately no etiology was established.  He did have labs monitored which showed normal  liver function tests and lipase levels.  His white blood cell count remained stable.  His CRP also remained within normal limits as did the CK level and lactic acid.  Previous x-ray showed no fracture of the right-sided ribs.  CT of his abdomen showed no evidence of any hydroureter or hydronephrosis.  Ultrasound of his abdomen showed a positive sonographic Elise sign, but there was no evidence of any gallstones.  Gallstones was mildly distended but there was no biliary dilation.  General surgery was consulted and were not convinced that a cholecystectomy would provide him any benefit a HIDA scan was recommended to be performed as an outpatient with follow-up in general surgery after results.  He was also recommended to continue low-fat diet     Constipation.   There was evidence of prominent stool within the colon on the CT scan.  He was recommended to continue on his stool softeners      Hypokalemia   His potassium was 3.0 upon admission.  He was given IV fluids and his potassium reduced to 2.6.  Received IV potassium replacement and was recommended to recheck his metabolic panel today with continued oral potassium replacement identification     CKD stage III   His creatinine was monitored while in the hospital remained stable.  He will follow-up with nephrology in outpatient        Amador Almanza MD.    Review of Systems   Constitutional, HEENT, cardiovascular, pulmonary, gi and gu systems are negative, except as otherwise noted.      Objective    /69 (BP Location: Left arm, Patient Position: Chair, Cuff Size: Adult Large)   Pulse 96   Temp 97.1  F (36.2  C) (Temporal)   Ht 1.829 m (6')   Wt 93.9 kg (207 lb)   SpO2 97%   BMI 28.07 kg/m    Body mass index is 28.07 kg/m .  Physical Exam   GENERAL: healthy, alert and no distress  EYES: Eyes grossly normal to inspection   NECK: no adenopathy, no asymmetry   RESP: lungs clear to auscultation - no rales, rhonchi or wheezes  CV: regular rate and rhythm,    no murmur   ABDOMEN: soft, nontender,    MS: + right side tenderness over lower rib cage with nodule  SKIN: no suspicious lesions or rashes   NEURO: Normal strength and tone,   PSYCH: mentation appears normal, affect normal/bright    No results found for any visits on 08/10/21.    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

## 2021-08-10 NOTE — PATIENT INSTRUCTIONS
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

## 2021-08-11 NOTE — RESULT ENCOUNTER NOTE
Rickie Alejandra,    I have had the opportunity to review your recent results and an interpretation is as follows:  Your follow-up labs show stable chronic kidney disease with slightly low potassium.  Continue potassium replacement  Your hemoglobin also remained stable at 9.7, and I would recommend that you continue on iron replacement as well    Sincerely,  Christian Davidson MD

## 2021-08-18 PROBLEM — R10.11 RIGHT UPPER QUADRANT ABDOMINAL PAIN: Status: ACTIVE | Noted: 2021-01-01

## 2021-08-18 NOTE — PROGRESS NOTES
HIDA scan reviewed showing biliary dyskinesia. Still has ongoing RUQ pain. Would recommend going forward with laparoscopic cholecystectomy. I advised there patient he may still have pain after the procedure but it is indicated with above findings.

## 2021-08-18 NOTE — TELEPHONE ENCOUNTER
Name of caller: Patient    Reason for Call:  Patient request to speak with Dr. Coates regarding his NM Hepatobiliary scan done today. Patient saw Dr. Coates in the hospital 8/3/21.     Patient did schedule a consult visit for next Tuesday, 8/24/21, but would like to speak with Dr. Coates before that to review next steps.     Surgeon:  Dr. Coates    Recent Surgery:  No    If yes, when & what type:  N/A      Best phone number to reach pt at is: [phone]  Ok to leave a message with medical info? Yes.

## 2021-08-18 NOTE — TELEPHONE ENCOUNTER
Type of surgery: laparoscopic cholecystectomy  Location of surgery: [facility]  Date and time of surgery: 9/2/21 10:45am  Surgeon: Dr Coates  Pre-Op Appt Date: pt to schedule  Post-Op Appt Date: pt to schedule   Packet sent out: Yes  Pre-cert/Authorization completed:  Not Applicable  Date: 8/18/21

## 2021-08-18 NOTE — RESULT ENCOUNTER NOTE
The following letter pertains to your most recent diagnostic tests:    My name is Dr. Munguia and I am covering for Dr. Davidson who is out of the office this week.      The nuclear medicine ultrasound scan is abnormal.  It shows problems with the function of the gallbladder which can contribute to abdominal pain.  Given these findings, I would recommend consultation with a general surgeon to discuss whether removal of the gallbladder could help address your symptoms.  I believe a Dr. Coates consulted with you in the hospital.  He would be a fine choice with whom to discuss these findings.  You can schedule an appointment with him by calling .        Sincerely,    Dr. Munguia

## 2021-08-19 NOTE — PROGRESS NOTES
Infusion Nursing Note:  Justyn Olivas presents today for aranesp.    Patient seen by provider today: No   present during visit today: Not Applicable.    Note: Pt reports abd pain today 6/10, pt has know gallbladder issues and is scheduled for surgery in Sept.    Intravenous Access:  No Intravenous access/labs at this visit.    Treatment Conditions:  Lab Results   Component Value Date    HGB 8.8 08/19/2021    HGB 7.2 07/01/2021     Lab Results   Component Value Date    WBC 7.0 08/10/2021    WBC 7.0 06/17/2021      Lab Results   Component Value Date    ANEU 4.8 06/17/2021     Lab Results   Component Value Date     08/10/2021     06/17/2021      Results reviewed, labs MET treatment parameters, ok to proceed with treatment.      Post Infusion Assessment:  Patient tolerated injection without incident.       Discharge Plan:   Patient declined prescription refills.  Discharge instructions reviewed with: Patient.  Patient and/or family verbalized understanding of discharge instructions and all questions answered.  AVS to patient via [employer].  Patient will return pt will schedule at  for next appointment- post surgery around labour day.   Patient discharged in stable condition accompanied by: self.  Departure Mode: Ambulatory.      Deepthi Haley RN

## 2021-08-19 NOTE — PROGRESS NOTES
Medical Assistant Note:  Justyn Olivas presents today for lab draw.    Patient seen by provider today: No.   present during visit today: Not Applicable.    Concerns: No Concerns.    Procedure:  Lab draw site: LAC, Needle type: Butterfly, Gauge: 23.    Post Assessment:  Labs drawn without difficulty: Yes.    Discharge Plan:  Departure Mode: Ambulatory.    Face to Face Time: 4 min.    Shari Schoenberger, CMA

## 2021-08-20 NOTE — PROGRESS NOTES
Clinic Care Coordination Contact    Follow Up Progress Note      Assessment: Kamran states that he completed his HIDA scan on 8/18/21 and has spoken with his general surgeon as well as PCP. Both are in agreement to have patient proceed with laparoscopic cholecystectomy. Surgery is scheduled for 9/2/2021. Patient states he is excited about this procedure and is hopeful it will relieve his right upper quadrant abdominal pain.     No new need for support or resources identified.     Care Gaps:    Health Maintenance Due   Topic Date Due     HF ACTION PLAN  Never done     COPD ACTION PLAN  Never done     Pneumococcal Vaccine: 65+ Years (1 of 2 - PPSV23) 04/23/2015     MEDICARE ANNUAL WELLNESS VISIT  02/10/2021     MICROALBUMIN  02/10/2021     DIABETIC FOOT EXAM  02/10/2021       Postponed to patient would like to complete scheduled 9/2/21 surgery before addressing HM due.     Goals addressed this encounter:   Goals Addressed                    This Visit's Progress       1. Improve chronic symptoms (pt-stated)   80%      Goal Statement: I will verbalize an increase in my strength and mobility and correct knowledge of adequate management of my chronic health conditions to maintain my health and wellness in preventing hospital readmission.   Date Goal set: 12/24/20; Updated/modified 04/02/2021  Barriers: Multiple health concerns.  Strengths: Motivated and engaged in care coordination.   Date to Achieve By: 9/1/2021  Patient expressed understanding of goal: Yes    Action steps to achieve this goal:  1. I will work with Physical Therapy to build my strength and mobility.   2. I will follow up with my providers as scheduled/recommended. (Primary Care Provider, [facility], sleep studies TBD, Dr. Ariana PARSON, [facility] hematology/oncology, nephrology)   3. I will take my medications as prescribed.   4. I will continue monitoring my blood sugars, blood pressures, weight daily as recommended.  5. I will use my CHF action plan to monitor/manage  my symptoms.   6. I will follow care team recommendations of fluid restriction, diabetic and cardiac diet.   7. I will use my incentive spirometer as directed and recommended by my care team.   8. I will continue to work with care coordination for any additional resources and support.  9. I will contact my care team with questions, concerns, support needs. I will use the clinic as a resource and I understand I can contact my clinic with 24/7 after hours services available. Care Coordinator will remain available as needed.           2. Pain Management (pt-stated)   10%      Goal Statement: I would like to address and understand the treatment plan for my upper right quadrant abdominal pain.   Date Goal set: 8/6/21  Barriers: Unknown etiology   Strengths: Patient is motivated  Date to Achieve By: 12/31/21  Patient expressed understanding of goal: yes  Action steps to achieve this goal:  1. I will have Hepatobiliary scan on 8/18/21  2. I will follow up with my general surgery after scan  3. I will follow up with PCP 8/23/21 or sooner if he is able to make an ecception to his schedule  4. I will continue to work with care coordination for any additional resources and support              Intervention/Education provided during outreach: Chronic disease management and support     Outreach Frequency: 2 weeks    Plan:   RN Care Coordinator will follow up in two weeks after patient's surgery.     Hellen Chaidez RN Care Coordinator  [facility]  Email: [email or web address]  Phone: [phone]

## 2021-08-23 NOTE — PROGRESS NOTES
[address], [address]  [facility] [address]  Phone: [phone]  Primary Provider: Albino Davidson  Pre-op Performing Provider: ALBINO DAVIDSON      PREOPERATIVE EVALUATION:  Today's date: 8/23/2021    Justyn Olivas is a 80 year old male who presents for a preoperative evaluation.    Surgical Information:  General Information    Date: 9/2/2021 Time: 10:45 AM Status: Scheduled   Location:  OR Room: [place] [address] Service: General   Patient class: Same Day Surgery Case classification:          Panel Information    Panel 1    Provider Role   Len Coates MD Primary    Procedure Laterality Anesthesia   LAPAROSCOPIC CHOLECYSTECTOMY N/A General               Where patient plans to recover: At home with family  Fax number for surgical facility: Note does not need to be faxed, will be available electronically in Epic.    Type of Anesthesia Anticipated: General        Subjective     HPI related to upcoming procedure: Cholecystitis    Preop Questions 8/23/2021   1. Have you ever had a heart attack or stroke? YES - history of stroke    2. Have you ever had surgery on your heart or blood vessels, such as a stent placement, a coronary artery bypass, or surgery on an artery in your head, neck, heart, or legs? No   3. Do you have chest pain with activity? No   4. Do you have a history of  heart failure? No   5. Do you currently have a cold, bronchitis or symptoms of other infection? No   6. Do you have a cough, shortness of breath, or wheezing? YES - occasional shortness of breath due to ongoing anemia and congestive heart failure    7. Do you or anyone in your family have previous history of blood clots? No   8. Do you or does anyone in your family have a serious bleeding problem such as prolonged bleeding following surgeries or cuts? No   9. Have you ever had problems with anemia or been told to take iron pills? YES - taking oral iron    10. Have you had any  abnormal blood loss such as black, tarry or bloody stools? No   11. Have you ever had a blood transfusion? YES - recently treated for anemia   11a. Have you ever had a transfusion reaction? No   12. Are you willing to have a blood transfusion if it is medically needed before, during, or after your surgery? Yes   13. Have you or any of your relatives ever had problems with anesthesia? No   14. Do you have sleep apnea, excessive snoring or daytime drowsiness? No   15. Do you have any artifical heart valves or other implanted medical devices like a pacemaker, defibrillator, or continuous glucose monitor? No   16. Do you have artificial joints? No   17. Are you allergic to latex? No       Health Care Directive:  Patient does not have a Health Care Directive or Living Will: Patient states has Advance Directive and will bring in a copy to clinic.    Preoperative Review of :   reviewed - controlled substances reflected in medication list.      Status of Chronic Conditions:  See problem list for active medical problems.  Problems all longstanding and stable, except as noted/documented.  See ROS for pertinent symptoms related to these conditions.      Review of Systems  CONSTITUTIONAL: NEGATIVE for fever, chills, change in weight  INTEGUMENTARY/SKIN: NEGATIVE for worrisome rashes, moles or lesions  EYES: NEGATIVE for vision changes or irritation  ENT/MOUTH: NEGATIVE for ear, mouth and throat problems  RESP: NEGATIVE for significant cough or SOB  CV: NEGATIVE for chest pain, palpitations or peripheral edema  GI: NEGATIVE for nausea, abdominal pain, heartburn, or change in bowel habits  : NEGATIVE for frequency, dysuria, or hematuria  MUSCULOSKELETAL: NEGATIVE for significant arthralgias or myalgia  NEURO: NEGATIVE for weakness, dizziness or paresthesias  ENDOCRINE: NEGATIVE for temperature intolerance, skin/hair changes  HEME: NEGATIVE for bleeding problems  PSYCHIATRIC: NEGATIVE for changes in mood or  affect    Patient Active Problem List    Diagnosis Date Noted     Right upper quadrant abdominal pain 08/18/2021     Priority: Medium     Added automatically from request for surgery [id number]       Biliary colic 08/03/2021     Priority: Medium     Chronic diastolic heart failure with preserved ejection fraction (H) 06/07/2021     Priority: Medium     Hypokalemia 06/02/2021     Priority: Medium     Hyperglycemia 06/02/2021     Priority: Medium     Anemia due to stage 3 chronic kidney disease, unspecified whether stage 3a or 3b CKD 05/20/2021     Priority: Medium     Type 2 diabetes mellitus with stage 3b chronic kidney disease, with long-term current use of insulin (H) 04/05/2021     Priority: Medium     Anemia, unspecified type 03/26/2021     Priority: Medium     Iron deficiency anemia due to chronic blood loss 12/18/2020     Priority: Medium     MAIRA (iron deficiency anemia) 12/18/2020     Priority: Medium     Anemia, iron deficiency 12/18/2020     Priority: Medium     Iron deficiency 12/18/2020     Priority: Medium     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) 02/25/2020     Priority: Medium     Anemia due to blood loss, acute 02/25/2020     Priority: Medium     Cerebrovascular accident (CVA) due to embolism of cerebral artery (H) 04/18/2019     Priority: Medium     Chronic non-seasonal allergic rhinitis, unspecified trigger 02/08/2018     Priority: Medium     Primary osteoarthritis involving multiple joints 02/01/2018     Priority: Medium     Primary osteoarthritis of both hips 10/10/2017     Priority: Medium     Non-seasonal allergic rhinitis due to pollen 05/16/2017     Priority: Medium     Type 2 diabetes mellitus without complication, with long-term current use of insulin (H) 10/25/2016     Priority: Medium     Benign essential hypertension 10/25/2016     Priority: Medium     Hyperlipidemia LDL goal <100 10/25/2016     Priority: Medium     Insomnia, unspecified type 08/24/2016     Priority: Medium      Shoulder pain 02/04/2013     Priority: Medium      Past Medical History:   Diagnosis Date     Cerebral infarction (H)      Diabetes (H)      Hyperlipemia      Past Surgical History:   Procedure Laterality Date     BONE MARROW BIOPSY, BONE SPECIMEN, NEEDLE/TROCAR N/A 5/14/2021    Procedure: BIOPSY, BONE MARROW;  Surgeon: Juanjo Hoffman MD;  Location:  GI     CV PERICARDIOCENTESIS N/A 12/21/2020    Procedure: Pericardiocentesis;  Surgeon: Chas Chambers MD;  Location:  HE[facility]ARD[facility] CATH LAB     ESOPHAGOSCOPY, GASTROSCOPY, DUODENOSCOPY (EGD), COMBINED N/A 3/27/2021    Procedure: Esophagogastroduodenoscopy, With Biopsy;  Surgeon: Luc Nash MD;  Location:  GI     Current Outpatient Medications   Medication Sig Dispense Refill     albuterol (PROAIR HFA) 108 (90 Base) MCG/ACT inhaler INHALE 2 PUFFS BY MOUTH FOUR TIMES DAILY AS NEEDED 8.5 g 3     atorvastatin (LIPITOR) 20 MG tablet Take 1 tablet (20 mg) by mouth daily 90 tablet 3     blood glucose (STEVE CONTOUR) test strip TESTING FOUR TIMES DAILY OR AS DIRECTED 400 strip 0     carvedilol (COREG) 6.25 MG tablet Take 1 tablet (6.25 mg) by mouth 2 times daily (with meals) 180 tablet 3     clopidogrel (PLAVIX) 75 MG tablet Take 1 tablet (75 mg) by mouth daily 90 tablet 3     cyclobenzaprine (FLEXERIL) 10 MG tablet Take 10 mg by mouth nightly as needed for muscle spasms        dorzolamide-timolol (COSOPT) 2-0.5 % ophthalmic solution Place 1 drop into both eyes daily        Ferrous Sulfate 324 (65 Fe) MG TBEC Take 324 mg by mouth daily       fluticasone (FLONASE) 50 MCG/ACT nasal spray Spray 2 sprays into both nostrils daily       folic acid-vit B6-vit B12 (FOLGARD) 0.8-10-0.115 MG TABS per tablet Take 1 tablet by mouth daily Continue per Nephrology recommendation 30 tablet 0     insulin glargine (LANTUS SOLOSTAR) 100 UNIT/ML pen Inject 50 Units Subcutaneous At Bedtime  45 mL 3     insulin lispro (HUMALOG KWIKPEN) 100 UNIT/ML (1 unit dial) KWIKPEN Inject  5-15 Units Subcutaneous 3 times daily (before meals) Sliding scale with meals 45 mL 3     insulin pen needle (BD FERCHO U/F) 32G X 4 MM miscellaneous USE AS DIRECTED UP TO FOUR TIMES DAILY 400 each 3     order for DME Hip steroid injectoion 1 Units 0     oxyCODONE-acetaminophen (PERCOCET) 5-325 MG tablet Take 1 tablet by mouth at bedtime as needed, may repeat once for pain 12 tablet 0     pantoprazole (PROTONIX) 40 MG EC tablet TAKE 1 TABLET(40 MG) BY MOUTH TWICE DAILY 180 tablet 2     potassium chloride ER (K-TAB) 20 MEQ CR tablet Take 1 tablet (20 mEq) by mouth daily       torsemide (DEMADEX) 20 MG tablet Take 10 mg by mouth daily        traZODone (DESYREL) 100 MG tablet TAKE 1 TABLET(100 MG) BY MOUTH AT BEDTIME 90 tablet 3     vitamin B-12 (CYANOCOBALAMIN) 1000 MCG tablet Take 1 tablet (1,000 mcg) by mouth daily Continue unitl hematology evaluation as borderline low vitamin b12. 90 tablet 3       Allergies   Allergen Reactions     No Known Allergies         Social History     Tobacco Use     Smoking status: Former Smoker     Packs/day: 2.00     Years: 11.00     Pack years: 22.00     Types: Cigarettes     Start date:      Quit date:      Years since quittin.6     Smokeless tobacco: Never Used   Substance Use Topics     Alcohol use: No     Alcohol/week: 0.0 standard drinks     Family History   Problem Relation Age of Onset     Family History Negative Mother      Family History Negative Father      History   Drug Use No         Objective     /68 (BP Location: Right arm, Patient Position: Sitting, Cuff Size: Adult Regular)   Pulse 96   Temp 98  F (36.7  C) (Tympanic)   Resp 16   Ht 1.829 m (6')   Wt 94.2 kg (207 lb 9.6 oz)   SpO2 97%   BMI 28.16 kg/m      Physical Exam    GENERAL APPEARANCE: healthy, alert and no distress     EYES: EOMI,  PERRL     NECK: no adenopathy, no asymmetry, masses      RESP: lungs clear to auscultation - no rales, rhonchi or wheezes     CV: regular rates and rhythm,  normal S1 S2, no S3 or S4 and no murmur, click or rub     ABDOMEN:  + right upper quadrant abdominal pain      MS: extremities normal- no gross deformities noted, no evidence of inflammation in joints, FROM in all extremities.     SKIN: no suspicious lesions or rashes     NEURO: Normal strength and tone, sensory exam grossly normal, mentation intact and speech normal     PSYCH: mentation appears normal. and affect normal/bright     LYMPHATICS: No cervical adenopathy    Recent Labs   Lab Test 08/19/21  1145 08/10/21  0900 08/05/21  0932 08/04/21  0601 03/27/21  1543 03/27/21  0610 12/01/20  1224 09/14/20  1451 09/04/20  1338   HGB 8.8* 9.7* 9.2* 9.4*   < > 7.2* 10.2*   < > 10.4*   PLT  --  168  --  178   < > 160 295   < > 275   INR  --   --   --   --   --   --   --   --  0.99   NA  --  141 137 141   < > 144 139   < > 143   POTASSIUM  --  3.2* 3.6 2.6*   < > 2.8* 3.3*   < > 4.1   CR  --  2.69* 2.54* 2.84*   < > 2.64* 1.59*   < > 1.31*   A1C  --   --   --   --   --  6.0* 7.0*  --   --     < > = values in this interval not displayed.        Diagnostics:  Recent Results (from the past 24 hour(s))   CBC with platelets    Collection Time: 08/23/21  3:29 PM   Result Value Ref Range    WBC Count 6.1 4.0 - 11.0 10e3/uL    RBC Count 3.36 (L) 4.40 - 5.90 10e6/uL    Hemoglobin 9.6 (L) 13.3 - 17.7 g/dL    Hematocrit 30.7 (L) 40.0 - 53.0 %    MCV 91 78 - 100 fL    MCH 28.6 26.5 - 33.0 pg    MCHC 31.3 (L) 31.5 - 36.5 g/dL    RDW 15.5 (H) 10.0 - 15.0 %    Platelet Count 182 150 - 450 10e3/uL      EKG: appears normal, NSR, no dynamic changes concerning for ischemia, no LVH by voltage criteria, unchanged from previous tracings     Patient Instructions   (Z01.818) Preop general physical exam  (primary encounter diagnosis)  Comment: you are medically optimized for your upcoming surgery pending EKG and labs.  Hold Plavix for 1 week leading up to surgery.  Hold torsemide on the day of the surgery.  Reduce Lantus insulin to 25 units the  night prior to the surgery.  Continue with all medications as you are otherwise taking  Plan: EKG 12-lead complete w/read - Clinics, CBC with        platelets, Comprehensive metabolic panel (BMP +        Alb, Alk Phos, ALT, AST, Total. Bili, TP)            (I63.40) Cerebrovascular accident (CVA) due to embolism of cerebral artery (H)  Comment: Noted history.  Holding Plavix for upcoming surgery  Plan:     (R10.11) Right upper quadrant abdominal pain  Comment: secondary to history of gallbladder dysfunction   Plan:     (R07.81) Rib pain on right side  Comment: OK to continue oxycodone as needed   Plan: oxyCODONE-acetaminophen (PERCOCET) 5-325 MG         tablet            (D50.0) Iron deficiency anemia due to chronic blood loss  Comment: Recheck iron studies and hemoglobin and continue daily oral iron   Plan:     (E11.9,  Z79.4) Type 2 diabetes mellitus without complication, with long-term current use of insulin (H)  Comment: We will continue with current insulin - reduce lantus to 25 units on the night   Plan:     (E78.5) Hyperlipidemia LDL goal <100  Comment: Continue atorvastatin   Plan:     (E11.22,  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 Bili, TP)                  Revised Cardiac Risk Index (RCRI):  The patient has the following serious cardiovascular risks for perioperative complications:   - Congestive Heart Failure (pulmonary edema, PND, s3 ari, CXR with pulmonary congestion, basilar rales) = 1 point   - Diabetes Mellitus (on Insulin) = 1 point   - Serum Creatinine >2.0 mg/dl = 1 point     RCRI Interpretation: 3 points: Class IV (high risk - >11% complication rate)     Estimated Functional  Capacity: Performs 4 METS exercise without symptoms (e.g., light housework, stairs, 4 mph walk, 7 mph bike, slow step dance)           Signed Electronically by: Christian Davidson MD, MD  Copy of this evaluation report is provided to requesting physician.

## 2021-08-23 NOTE — H&P (VIEW-ONLY)
[address], [address]  [facility] [address]  Phone: [phone]  Primary Provider: Albino Davidson  Pre-op Performing Provider: ALBINO DAVIDSON      PREOPERATIVE EVALUATION:  Today's date: 8/23/2021    Justyn Olivas is a 80 year old male who presents for a preoperative evaluation.    Surgical Information:  General Information    Date: 9/2/2021 Time: 10:45 AM Status: Scheduled   Location:  OR Room: [place] [address] Service: General   Patient class: Same Day Surgery Case classification:          Panel Information    Panel 1    Provider Role   Len Coates MD Primary    Procedure Laterality Anesthesia   LAPAROSCOPIC CHOLECYSTECTOMY N/A General               Where patient plans to recover: At home with family  Fax number for surgical facility: Note does not need to be faxed, will be available electronically in Epic.    Type of Anesthesia Anticipated: General        Subjective     HPI related to upcoming procedure: Cholecystitis    Preop Questions 8/23/2021   1. Have you ever had a heart attack or stroke? YES - history of stroke    2. Have you ever had surgery on your heart or blood vessels, such as a stent placement, a coronary artery bypass, or surgery on an artery in your head, neck, heart, or legs? No   3. Do you have chest pain with activity? No   4. Do you have a history of  heart failure? No   5. Do you currently have a cold, bronchitis or symptoms of other infection? No   6. Do you have a cough, shortness of breath, or wheezing? YES - occasional shortness of breath due to ongoing anemia and congestive heart failure    7. Do you or anyone in your family have previous history of blood clots? No   8. Do you or does anyone in your family have a serious bleeding problem such as prolonged bleeding following surgeries or cuts? No   9. Have you ever had problems with anemia or been told to take iron pills? YES - taking oral iron    10. Have you had any  abnormal blood loss such as black, tarry or bloody stools? No   11. Have you ever had a blood transfusion? YES - recently treated for anemia   11a. Have you ever had a transfusion reaction? No   12. Are you willing to have a blood transfusion if it is medically needed before, during, or after your surgery? Yes   13. Have you or any of your relatives ever had problems with anesthesia? No   14. Do you have sleep apnea, excessive snoring or daytime drowsiness? No   15. Do you have any artifical heart valves or other implanted medical devices like a pacemaker, defibrillator, or continuous glucose monitor? No   16. Do you have artificial joints? No   17. Are you allergic to latex? No       Health Care Directive:  Patient does not have a Health Care Directive or Living Will: Patient states has Advance Directive and will bring in a copy to clinic.    Preoperative Review of :   reviewed - controlled substances reflected in medication list.      Status of Chronic Conditions:  See problem list for active medical problems.  Problems all longstanding and stable, except as noted/documented.  See ROS for pertinent symptoms related to these conditions.      Review of Systems  CONSTITUTIONAL: NEGATIVE for fever, chills, change in weight  INTEGUMENTARY/SKIN: NEGATIVE for worrisome rashes, moles or lesions  EYES: NEGATIVE for vision changes or irritation  ENT/MOUTH: NEGATIVE for ear, mouth and throat problems  RESP: NEGATIVE for significant cough or SOB  CV: NEGATIVE for chest pain, palpitations or peripheral edema  GI: NEGATIVE for nausea, abdominal pain, heartburn, or change in bowel habits  : NEGATIVE for frequency, dysuria, or hematuria  MUSCULOSKELETAL: NEGATIVE for significant arthralgias or myalgia  NEURO: NEGATIVE for weakness, dizziness or paresthesias  ENDOCRINE: NEGATIVE for temperature intolerance, skin/hair changes  HEME: NEGATIVE for bleeding problems  PSYCHIATRIC: NEGATIVE for changes in mood or  affect    Patient Active Problem List    Diagnosis Date Noted     Right upper quadrant abdominal pain 08/18/2021     Priority: Medium     Added automatically from request for surgery [id number]       Biliary colic 08/03/2021     Priority: Medium     Chronic diastolic heart failure with preserved ejection fraction (H) 06/07/2021     Priority: Medium     Hypokalemia 06/02/2021     Priority: Medium     Hyperglycemia 06/02/2021     Priority: Medium     Anemia due to stage 3 chronic kidney disease, unspecified whether stage 3a or 3b CKD 05/20/2021     Priority: Medium     Type 2 diabetes mellitus with stage 3b chronic kidney disease, with long-term current use of insulin (H) 04/05/2021     Priority: Medium     Anemia, unspecified type 03/26/2021     Priority: Medium     Iron deficiency anemia due to chronic blood loss 12/18/2020     Priority: Medium     MAIRA (iron deficiency anemia) 12/18/2020     Priority: Medium     Anemia, iron deficiency 12/18/2020     Priority: Medium     Iron deficiency 12/18/2020     Priority: Medium     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) 02/25/2020     Priority: Medium     Anemia due to blood loss, acute 02/25/2020     Priority: Medium     Cerebrovascular accident (CVA) due to embolism of cerebral artery (H) 04/18/2019     Priority: Medium     Chronic non-seasonal allergic rhinitis, unspecified trigger 02/08/2018     Priority: Medium     Primary osteoarthritis involving multiple joints 02/01/2018     Priority: Medium     Primary osteoarthritis of both hips 10/10/2017     Priority: Medium     Non-seasonal allergic rhinitis due to pollen 05/16/2017     Priority: Medium     Type 2 diabetes mellitus without complication, with long-term current use of insulin (H) 10/25/2016     Priority: Medium     Benign essential hypertension 10/25/2016     Priority: Medium     Hyperlipidemia LDL goal <100 10/25/2016     Priority: Medium     Insomnia, unspecified type 08/24/2016     Priority: Medium      Shoulder pain 02/04/2013     Priority: Medium      Past Medical History:   Diagnosis Date     Cerebral infarction (H)      Diabetes (H)      Hyperlipemia      Past Surgical History:   Procedure Laterality Date     BONE MARROW BIOPSY, BONE SPECIMEN, NEEDLE/TROCAR N/A 5/14/2021    Procedure: BIOPSY, BONE MARROW;  Surgeon: Juanjo Hoffman MD;  Location:  GI     CV PERICARDIOCENTESIS N/A 12/21/2020    Procedure: Pericardiocentesis;  Surgeon: Chas Chambers MD;  Location:  HEART CARDI[facility] CATH LAB     ESOPHAGOSCOPY, GASTROSCOPY, DUODENOSCOPY (EGD), COMBINED N/A 3/27/2021    Procedure: Esophagogastroduodenoscopy, With Biopsy;  Surgeon: Luc Nash MD;  Location:  GI     Current Outpatient Medications   Medication Sig Dispense Refill     albuterol (PROAIR HFA) 108 (90 Base) MCG/ACT inhaler INHALE 2 PUFFS BY MOUTH FOUR TIMES DAILY AS NEEDED 8.5 g 3     atorvastatin (LIPITOR) 20 MG tablet Take 1 tablet (20 mg) by mouth daily 90 tablet 3     blood glucose (STEVE CONTOUR) test strip TESTING FOUR TIMES DAILY OR AS DIRECTED 400 strip 0     carvedilol (COREG) 6.25 MG tablet Take 1 tablet (6.25 mg) by mouth 2 times daily (with meals) 180 tablet 3     clopidogrel (PLAVIX) 75 MG tablet Take 1 tablet (75 mg) by mouth daily 90 tablet 3     cyclobenzaprine (FLEXERIL) 10 MG tablet Take 10 mg by mouth nightly as needed for muscle spasms        dorzolamide-timolol (COSOPT) 2-0.5 % ophthalmic solution Place 1 drop into both eyes daily        Ferrous Sulfate 324 (65 Fe) MG TBEC Take 324 mg by mouth daily       fluticasone (FLONASE) 50 MCG/ACT nasal spray Spray 2 sprays into both nostrils daily       folic acid-vit B6-vit B12 (FOLGARD) 0.8-10-0.115 MG TABS per tablet Take 1 tablet by mouth daily Continue per Nephrology recommendation 30 tablet 0     insulin glargine (LANTUS SOLOSTAR) 100 UNIT/ML pen Inject 50 Units Subcutaneous At Bedtime  45 mL 3     insulin lispro (HUMALOG KWIKPEN) 100 UNIT/ML (1 unit dial) KWIKPEN Inject  5-15 Units Subcutaneous 3 times daily (before meals) Sliding scale with meals 45 mL 3     insulin pen needle (BD FERCHO U/F) 32G X 4 MM miscellaneous USE AS DIRECTED UP TO FOUR TIMES DAILY 400 each 3     order for DME Hip steroid injectoion 1 Units 0     oxyCODONE-acetaminophen (PERCOCET) 5-325 MG tablet Take 1 tablet by mouth at bedtime as needed, may repeat once for pain 12 tablet 0     pantoprazole (PROTONIX) 40 MG EC tablet TAKE 1 TABLET(40 MG) BY MOUTH TWICE DAILY 180 tablet 2     potassium chloride ER (K-TAB) 20 MEQ CR tablet Take 1 tablet (20 mEq) by mouth daily       torsemide (DEMADEX) 20 MG tablet Take 10 mg by mouth daily        traZODone (DESYREL) 100 MG tablet TAKE 1 TABLET(100 MG) BY MOUTH AT BEDTIME 90 tablet 3     vitamin B-12 (CYANOCOBALAMIN) 1000 MCG tablet Take 1 tablet (1,000 mcg) by mouth daily Continue unitl hematology evaluation as borderline low vitamin b12. 90 tablet 3       Allergies   Allergen Reactions     No Known Allergies         Social History     Tobacco Use     Smoking status: Former Smoker     Packs/day: 2.00     Years: 11.00     Pack years: 22.00     Types: Cigarettes     Start date:      Quit date:      Years since quittin.6     Smokeless tobacco: Never Used   Substance Use Topics     Alcohol use: No     Alcohol/week: 0.0 standard drinks     Family History   Problem Relation Age of Onset     Family History Negative Mother      Family History Negative Father      History   Drug Use No         Objective     /68 (BP Location: Right arm, Patient Position: Sitting, Cuff Size: Adult Regular)   Pulse 96   Temp 98  F (36.7  C) (Tympanic)   Resp 16   Ht 1.829 m (6')   Wt 94.2 kg (207 lb 9.6 oz)   SpO2 97%   BMI 28.16 kg/m      Physical Exam    GENERAL APPEARANCE: healthy, alert and no distress     EYES: EOMI,  PERRL     NECK: no adenopathy, no asymmetry, masses      RESP: lungs clear to auscultation - no rales, rhonchi or wheezes     CV: regular rates and rhythm,  normal S1 S2, no S3 or S4 and no murmur, click or rub     ABDOMEN:  + right upper quadrant abdominal pain      MS: extremities normal- no gross deformities noted, no evidence of inflammation in joints, FROM in all extremities.     SKIN: no suspicious lesions or rashes     NEURO: Normal strength and tone, sensory exam grossly normal, mentation intact and speech normal     PSYCH: mentation appears normal. and affect normal/bright     LYMPHATICS: No cervical adenopathy    Recent Labs   Lab Test 08/19/21  1145 08/10/21  0900 08/05/21  0932 08/04/21  0601 03/27/21  1543 03/27/21  0610 12/01/20  1224 09/14/20  1451 09/04/20  1338   HGB 8.8* 9.7* 9.2* 9.4*   < > 7.2* 10.2*   < > 10.4*   PLT  --  168  --  178   < > 160 295   < > 275   INR  --   --   --   --   --   --   --   --  0.99   NA  --  141 137 141   < > 144 139   < > 143   POTASSIUM  --  3.2* 3.6 2.6*   < > 2.8* 3.3*   < > 4.1   CR  --  2.69* 2.54* 2.84*   < > 2.64* 1.59*   < > 1.31*   A1C  --   --   --   --   --  6.0* 7.0*  --   --     < > = values in this interval not displayed.        Diagnostics:  Recent Results (from the past 24 hour(s))   CBC with platelets    Collection Time: 08/23/21  3:29 PM   Result Value Ref Range    WBC Count 6.1 4.0 - 11.0 10e3/uL    RBC Count 3.36 (L) 4.40 - 5.90 10e6/uL    Hemoglobin 9.6 (L) 13.3 - 17.7 g/dL    Hematocrit 30.7 (L) 40.0 - 53.0 %    MCV 91 78 - 100 fL    MCH 28.6 26.5 - 33.0 pg    MCHC 31.3 (L) 31.5 - 36.5 g/dL    RDW 15.5 (H) 10.0 - 15.0 %    Platelet Count 182 150 - 450 10e3/uL      EKG: appears normal, NSR, no dynamic changes concerning for ischemia, no LVH by voltage criteria, unchanged from previous tracings     Patient Instructions   (Z01.818) Preop general physical exam  (primary encounter diagnosis)  Comment: you are medically optimized for your upcoming surgery pending EKG and labs.  Hold Plavix for 1 week leading up to surgery.  Hold torsemide on the day of the surgery.  Reduce Lantus insulin to 25 units the  night prior to the surgery.  Continue with all medications as you are otherwise taking  Plan: EKG 12-lead complete w/read - Clinics, CBC with        platelets, Comprehensive metabolic panel (BMP +        Alb, Alk Phos, ALT, AST, Total. Bili, TP)            (I63.40) Cerebrovascular accident (CVA) due to embolism of cerebral artery (H)  Comment: Noted history.  Holding Plavix for upcoming surgery  Plan:     (R10.11) Right upper quadrant abdominal pain  Comment: secondary to history of gallbladder dysfunction   Plan:     (R07.81) Rib pain on right side  Comment: OK to continue oxycodone as needed   Plan: oxyCODONE-acetaminophen (PERCOCET) 5-325 MG         tablet            (D50.0) Iron deficiency anemia due to chronic blood loss  Comment: Recheck iron studies and hemoglobin and continue daily oral iron   Plan:     (E11.9,  Z79.4) Type 2 diabetes mellitus without complication, with long-term current use of insulin (H)  Comment: We will continue with current insulin - reduce lantus to 25 units on the night   Plan:     (E78.5) Hyperlipidemia LDL goal <100  Comment: Continue atorvastatin   Plan:     (E11.22,  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 Bili, TP)                  Revised Cardiac Risk Index (RCRI):  The patient has the following serious cardiovascular risks for perioperative complications:   - Congestive Heart Failure (pulmonary edema, PND, s3 ari, CXR with pulmonary congestion, basilar rales) = 1 point   - Diabetes Mellitus (on Insulin) = 1 point   - Serum Creatinine >2.0 mg/dl = 1 point     RCRI Interpretation: 3 points: Class IV (high risk - >11% complication rate)     Estimated Functional  Capacity: Performs 4 METS exercise without symptoms (e.g., light housework, stairs, 4 mph walk, 7 mph bike, slow step dance)           Signed Electronically by: Christian Davidson MD, MD  Copy of this evaluation report is provided to requesting physician.

## 2021-08-23 NOTE — PATIENT INSTRUCTIONS
(Z01.818) Preop general physical exam  (primary encounter diagnosis)  Comment: you are medically optimized for your upcoming surgery pending EKG and labs.  Hold Plavix for 1 week leading up to surgery.  Hold torsemide on the day of the surgery.  Reduce Lantus insulin to 25 units the night prior to the surgery.  Continue with all medications as you are otherwise taking  Plan: EKG 12-lead complete w/read - Clinics, CBC with        platelets, Comprehensive metabolic panel (BMP +        Alb, Alk Phos, ALT, AST, Total. Bili, TP)            (I63.40) Cerebrovascular accident (CVA) due to embolism of cerebral artery (H)  Comment: Noted history.  Holding Plavix for upcoming surgery  Plan:     (R10.11) Right upper quadrant abdominal pain  Comment: secondary to history of gallbladder dysfunction   Plan:     (R07.81) Rib pain on right side  Comment: OK to continue oxycodone as needed   Plan: oxyCODONE-acetaminophen (PERCOCET) 5-325 MG         tablet            (D50.0) Iron deficiency anemia due to chronic blood loss  Comment: Recheck iron studies and hemoglobin and continue daily oral iron   Plan:     (E11.9,  Z79.4) Type 2 diabetes mellitus without complication, with long-term current use of insulin (H)  Comment: We will continue with current insulin - reduce lantus to 25 units on the night   Plan:     (E78.5) Hyperlipidemia LDL goal <100  Comment: Continue atorvastatin   Plan:     (E11.22,  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 Bili, TP)

## 2021-08-24 NOTE — TELEPHONE ENCOUNTER
Can we call Justyn Olivas and let him know that     Rickie Alejandra,    I have had the opportunity to review your recent results and an interpretation is as follows:  Your labs show stable renal liver function preoperatively surgery  Your hemoglobin remains low but improved, and your iron levels returned low.  I recommend we try to arrange for an iron infusion if we are able to schedule this prior to your surgery    Sincerely,  Christian Davidson MD       Can we help him to schedule an iron infusion?

## 2021-08-24 NOTE — TELEPHONE ENCOUNTER
Called patient, reviewed information below and gave patient number to schedule at infusion center: [phone]/ [phone]  Orders for iron infusion already placed by Dr. Kristine Nash, back on 7/1/21.    Sarah Kenyon RN  [facility]

## 2021-08-24 NOTE — RESULT ENCOUNTER NOTE
Rickie Alejandra,    I have had the opportunity to review your recent results and an interpretation is as follows:  Your labs show stable renal liver function preoperatively surgery  Your hemoglobin remains low but improved, and your iron levels returned low.  I recommend we try to arrange for an iron infusion if we are able to schedule this prior to your surgery    Sincerely,  Christian Davidson MD

## 2021-08-30 NOTE — PROGRESS NOTES
Patient seen in clinic for asymptomatic Pre-Surgery COVID test.    Patient swabbed via Nasopharyngeal Swab.  Specimen brought to hospital lab for  .    Patient educated to self-quarantine from now until surgery.      Danica Hill RN-BSN

## 2021-08-31 NOTE — PROGRESS NOTES
Infusion Nursing Note:  Justyn Olivas presents today for Injectafer.    Patient seen by provider today: No   present during visit today: Not Applicable.    Note: Patient states he tolerated previous injectafer infusion without issues. Reports ongoing RUQ pain for which he was recently hospitalized. Cholescystectomy scheduled for Thursday, 9/2. Also reports anxiety and worry re: upcoming surgery. BPs elevated today, 168/98 prior to discharge. Also rates pain at 6-7/10. Patient confirmed he did take his BP meds this morning. Informed patient pain and anxiety can raise BP. Patient states he has a cuff to measure BP at home, and will call his PCP if remains elevated. Denies headache or any other new symptoms.     Intravenous Access:  Peripheral IV placed.    Treatment Conditions:  Not Applicable.      Post Infusion Assessment:  Patient tolerated infusion without incident.  Patient observed for 30 minutes post injectafer per protocol.  Blood return noted pre and post infusion.  Site patent and intact, free from redness, edema or discomfort.  No evidence of extravasations.  Access discontinued per protocol.       Discharge Plan:   Discharge instructions reviewed with: Patient.  Patient and/or family verbalized understanding of discharge instructions and all questions answered.  AVS to patient via [employer].  Patient will return 9/8/21 for next appointment.   Patient discharged in stable condition accompanied by: self.  Departure Mode: Ambulatory.      Felicia Ryan RN

## 2021-09-02 NOTE — DISCHARGE INSTRUCTIONS
[facility] - [facility]  Discharge Instructions: Post-Operative Laparoscopic Cholecystectomy    ACTIVITY    Expect to feel tired after your surgery.  This will gradually resolve.      Take frequent, short walks and increase your activity gradually.      Avoid strenuous physical activity or heavy lifting greater than 15-20 lbs. for 2-3 weeks.  You may climb stairs.    You may drive without restrictions when you are not using any prescription pain medication and feel comfortable in a car.    You may return to work/school when you are comfortable without any prescription pain medication.    WOUND CARE    You may remove your outer dressing or Band-Aids and shower 48 hours after the surgery.  Pat your incisions dry and leave them open to air.  Re-apply dressing (Band-Aids or gauze/tape) as needed for comfort or drainage.    You may have steri-strips (looks like white tape) on your incision.  You may peel off the steri-strips 2 weeks after your surgery if they have not peeled off on their own.     Do not soak your incisions in a tub or pool for 2 weeks.     Do not apply any lotions, creams, or ointments to your incisions.    A ridge under your incisions is normal and will gradually resolve.    DIET    Start with liquids, then gradually resume your regular diet as tolerated.  Avoid heavy, spicy, and greasy meals for 2-3 days.    Drink plenty of fluids to stay hydrated.    It is not uncommon to experience some loose stools or diarrhea after surgery.  This is your body's way of adapting to the bile which will slowly drain into your intestine.  A low fat diet may help with this.  This should improve over 1-2 months.    PAIN    Expect some tenderness and discomfort at the incision sites.  Use the prescribed pain medication at your discretion.  Expect gradual resolution of your pain over several days.    You may take ibuprofen with food (unless you have been told not to) instead of or in addition to your  prescribed pain medication.  If you are taking Percocet, do not take any additional acetaminophen/Tylenol.    Do not drink alcohol or drive while you are taking pain medications.    You may apply ice to your incisions in 20 minute intervals as needed for the next 48 hours.  After that time, consider switching to heat if you prefer.    EXPECTATIONS    Pain medications can cause constipation.  Limit use when possible.  Take the miralax daily for constipation. You may also take something stronger (milk of magnesia or dulcolax suppository) as needed. You may discontinue these medications once you are having regular bowel movements and/or are no longer taking your narcotic pain medication.      You may have shoulder or upper back discomfort due to the gas used in surgery.  This is temporary and should resolve in 48-72 hours.  Short, frequent walks may help with this.    If you are unable to urinate, or feel as though you are not emptying your bladder adequately, we recommend you call our office and/or seek care at an ER or Urgent Care facility if after hours.    FOLLOW UP    Our office will contact you in approximately 2 weeks to check on your progress and answer any questions you may have.  If you are doing well, you will not need to return for a follow up appointment.  If any concerns are identified over the phone, we will help you make an appointment to see a provider.     If you have not received a phone call, have any questions or concerns, or would like to be seen, please call us at [phone] and ask to speak with our nurse.  We are located at [address] [place] [address].    CALL OUR OFFICE -[phone] IF YOU HAVE:     Chills or fever above 101 F.    Increased redness, warmth, or drainage at your incisions.    Significant bleeding.    Pain not relieved by your pain medication or rest.    Increasing pain after the first 48 hours.    Any other concerns or questions.                   Revised September 2020       Same Day Surgery Discharge Instructions for  Sedation and General Anesthesia       It's not unusual to feel dizzy, light-headed or faint for up to 24 hours after surgery or while taking pain medication.  If you have these symptoms: sit for a few minutes before standing and have someone assist you when you get up to walk or use the bathroom.      You should rest and relax for the next 24 hours. We recommend you make arrangements to have an adult stay with you for at least 24 hours after your discharge.  Avoid hazardous and strenuous activity.      DO NOT DRIVE any vehicle or operate mechanical equipment for 24 hours following the end of your surgery.  Even though you may feel normal, your reactions may be affected by the medication you have received.      Do not drink alcoholic beverages for 24 hours following surgery.       Slowly progress to your regular diet as you feel able. It's not unusual to feel nauseated and/or vomit after receiving anesthesia.  If you develop these symptoms, drink clear liquids (apple juice, ginger ale, broth, 7-up, etc. ) until you feel better.  If your nausea and vomiting persists for 24 hours, please notify your surgeon.        All narcotic pain medications, along with inactivity and anesthesia, can cause constipation. Drinking plenty of liquids and increasing fiber intake will help.      For any questions of a medical nature, call your surgeon.      Do not make important decisions for 24 hours.      If you had general anesthesia, you may have a sore throat for a couple of days related to the breathing tube used during surgery.  You may use Cepacol lozenges to help with this discomfort.  If it worsens or if you develop a fever, contact your surgeon.       If you feel your pain is not well managed with the pain medications prescribed by your surgeon, please contact your surgeon's office to let them know so they can address your concerns.       CoVid 19  Information    We want to give you information regarding Covid. Please consult your primary care provider with any questions you might have.     Patient who have symptoms (cough, fever, or shortness of breath), need to isolate for 7 days from when symptoms started OR 72 hours after fever resolves (without fever reducing medications) AND improvement of respiratory symptoms (whichever is longer).      Isolate yourself at home (in own room/own bathroom if possible)    Do Not allow any visitors    Do Not go to work or school    Do Not go to [employer],  centers, shopping, or other public places.    Do Not shake hands.    Avoid close and intimate contact with others (hugging, kissing).    Follow CDC recommendations for household cleaning of frequently touched services.     After the initial 7 days, continue to isolate yourself from household members as much as possible. To continue decrease the risk of community spread and exposure, you and any members of your household should limit activities in public for 14 days after starting home isolation.     You can reference the following CDC link for helpful home isolation/care tips:  https://www.cdc.gov/coronavirus/2019-ncov/downloads/10Things.pdf    Protect Others:    Cover Your Mouth and Nose with a mask, disposable tissue or wash cloth to avoid spreading germs to others.    Wash your hands and face frequently with soap and water    Call Your Primary Doctor If: Breathing difficulty develops or you become worse.    For more information about COVID19 and options for caring for yourself at home, please visit the CDC website at https://www.cdc.gov/coronavirus/2019-ncov/about/steps-when-sick.html  For more options for care at [facility], please visit our website at [email or web address]    *** You can resume your Plavix tomorrow 9/3/21.    Discharge Instructions: Using an   Incentive Spirometer    An incentive spirometer is a device that  helps you do deep breathing exercises. These exercises will help you breathe better and improve the function of your lungs. The incentive spirometer provides a way for you to take an active part in your recovery.  Follow these steps to use your incentive spirometer:  Inhale normally. Relax and breathe out.  Place your lips tightly around the mouthpiece.  Make sure the device is upright and not tilted.  Breathe in slowly and deeply. Fill your lungs with as much air as you can.   Hold your breath long enough to keep the disk raised for at least 3 seconds while keeping the bead on the right side between the two arrows.   Perform this exercise 10 times every hour while you re awake or as often as your doctor instructs.  If you were also taught coughing exercises, perform them regularly as instructed.        **If you have questions or concerns about your procedure,   call Dr. Coates at [phone]**

## 2021-09-02 NOTE — OR NURSING
Assume patient care for Justyn Olivas at 1:10 PM.  Verbal report was  received from Barbara KRISHNAN.

## 2021-09-02 NOTE — OR NURSING
Notified RAFA Mcintyre, pt unable to void.  Bladder scan at 1525 was 252ml.  Pt tolerating PO.  Give pt more time.  If by 1800 pt hasn't voided and bladder scan greater than 300ml,  Order to straight cath pt and send home without will catheter.  Instruct pt to go to ER if unable to void at home.

## 2021-09-02 NOTE — ANESTHESIA CARE TRANSFER NOTE
Patient: Justyn Olivas    Procedure(s):  LAPAROSCOPIC CHOLECYSTECTOMY    Diagnosis: Right upper quadrant abdominal pain [R10.11]  Diagnosis Additional Information: No value filed.    Anesthesia Type:   General     Note:    Oropharynx: oropharynx clear of all foreign objects  Level of Consciousness: drowsy  Oxygen Supplementation: face mask  Level of Supplemental Oxygen (L/min / FiO2): 6  Independent Airway: airway patency satisfactory and stable  Dentition: dentition unchanged  Vital Signs Stable: post-procedure vital signs reviewed and stable  Report to RN Given: handoff report given  Patient transferred to: PACU    Handoff Report: Identifed the Patient, Identified the Reponsible Provider, Reviewed the pertinent medical history, Discussed the surgical course, Reviewed Intra-OP anesthesia mangement and issues during anesthesia, Set expectations for post-procedure period and Allowed opportunity for questions and acknowledgement of understanding      Vitals:  Vitals Value Taken Time   /98 09/02/21 1226   Temp 36.2  C (97.2  F) 09/02/21 1226   Pulse 95 09/02/21 1228   Resp 17 09/02/21 1228   SpO2 99 % 09/02/21 1228   Vitals shown include unvalidated device data.    Electronically Signed By: BARI Wood CRNA  September 2, 2021  12:29 PM

## 2021-09-02 NOTE — OP NOTE
General Surgery Operative Note    PREOPERATIVE DIAGNOSIS:  Symptomatic cholelithiasis.    POSTOPERATIVE DIAGNOSIS:  Cholecystitis.    PROCEDURE:  Laparoscopic Cholecystectomy    SURGEON:  Len Coates M.D.    ASSISTANT:  Miguelina Duffy PA-C    ANESTHESIA:  General.    BLOOD LOSS: 15 cc    FINDINGS: Distended and chronically inflamed gallbladder    INDICATIONS:   Mr. Olivas presented with RUQ pain and is now presenting for laparoscopic cholecystectomy. I explained the risks, benefits, complications including but not limited to bleeding, infection, possible need to open, possible postop hematoma, seroma, bowel, bladder or bile duct injury, MI, PE, and if any of these occurred the patient would require additional procedures. The patient agreed and did sign consent.    DETAILS OF PROCEDURE: The patient was brought to the operating room per Anesthesia, placed in supine position, and intubated without difficulty. A Surgical timeout was then performed, verifying the correct surgeon, site, procedure, and patient and all in the room were in agreement. 1% lidocaine and 0.25% were injected into infraumbilical area. A skin incision was made and was carried down to the anterior fascia. The fascia was grasped with 2 Kocher's and sharply incised. An open Javier technique was used to get intra-abdominal cavity. The camera was inserted and revealed no trocar injuries. Local was injected to the upper abdomen and 3  5's were placed in the subxiphoid and right upper quadrant under direct visualization. The gallbladder was retracted superiorly and laterally. The gallbladder was distended and chronically inflamed. It tore and had some bile spillage with minimal manipulation during the initial steps of the operation. It was suctioned and irrigated until clear. Cautery was used to take down the peritoneal attachments. I was able to delineate the artery and duct and got under the undersurface of the gallbladder to help declinate the  duct and artery further. This was taken up high to confirm the critical view on multiple views. The dissection was challenging due to the chronic inflammation. Once I was comfortable that there was 1 duct and 1 artery going straight into the gallbladder, I clipped the duct 2 times proximally and 1 distally. This was cut with scissors. The artery was done in a similar manner. The gallbladder was taken off the liver bed with cautery. There was oozing along the liver bed that was stopped with cautery. Surgicel powder was applied to the wound bed due to anticoagulation use.   The gallbladder was then placed in an Endo catch bag and removed through the umbilical trocar site. The camera was reinserted which showed no bleeding or bile spillage. Copious irrigation was used until clear. The wound bed was inspected multiple times showing that it was completely dry and that the clips were in place. The omentum was packed into the perihepatic fossa. All gas was expelled and the trocars were taken out under direct visualization. The fascia closed with 0 Vicryl in figure-of-eight fashion. Marcaine 0.25% were injected to all the wounds. The skin was closed with a 4-0 Monocryl in a subcuticular fashion. Steri-strips and sterile dressings were applied. The patient tolerated the procedure well. There were no complications. They were awoken from anesthesia and transferred to PACU in stable condition. All sponge, instrument and needle counts were correct. The physician assistant was medically necessary to assist in prepping, positioning, camera operation, retraction/exposure, and closure of the port sites.     NADEEM ZAMAN MD     Please route or send letter to:  Primary Care Provider (PCP) and Referring Provider

## 2021-09-02 NOTE — OR NURSING
Pt attempted to void with no result. Pt tolerating PO. Pt just wanted to go home.  Pt straight cath for 400ml and instructed to go to an ER if unable to void at home.  Wife harsha updated.

## 2021-09-02 NOTE — ANESTHESIA PREPROCEDURE EVALUATION
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)      Cerebral infarction (H)      Chronic diastolic heart failure (H)      Diabetes (H)      [place] (hard of hearing)      Hyperlipemia      Hypokalemia      Renal disease       Past Surgical History:   Procedure Laterality Date     BONE MARROW BIOPSY, BONE SPECIMEN, NEEDLE/TROCAR N/A 2021    Procedure: BIOPSY, BONE MARROW;  Surgeon: Juanjo Hoffman MD;  Location:  GI     CV PERICARDIOCENTESIS N/A 2020    Procedure: Pericardiocentesis;  Surgeon: Chas Chambers MD;  Location:  HE[facility] CARDI[facility] CATH LAB     ENT SURGERY       ESOPHAGOSCOPY, GASTROSCOPY, DUODENOSCOPY (EGD), COMBINED N/A 3/27/2021    Procedure: Esophagogastroduodenoscopy, With Biopsy;  Surgeon: Luc Nash MD;  Location:  GI     SOFT TISSUE SURGERY      skin cancer surgery on nose      Allergies   Allergen Reactions     No Known Allergies       Social History     Tobacco Use     Smoking status: Former Smoker     Packs/day: 2.00     Years: 11.00     Pack years: 22.00     Types: Cigarettes     Start date:      Quit date:      Years since quittin.7     Smokeless tobacco: Never Used   Substance Use Topics     Alcohol use: No     Alcohol/week: 0.0 standard drinks      Wt Readings from Last 1 Encounters:   21 97.3 kg (214 lb 6.4 oz)        Prior to Admission medications    Medication Sig Start Date End Date Taking? Authorizing Provider   albuterol (PROAIR HFA) 108 (90 Base) MCG/ACT inhaler INHALE 2 PUFFS BY MOUTH FOUR TIMES DAILY AS NEEDED 21  Yes Christian Davidson MD   atorvastatin (LIPITOR) 20  MG tablet Take 1 tablet (20 mg) by mouth daily 4/28/21  Yes Christian Davidson MD   carvedilol (COREG) 6.25 MG tablet Take 1 tablet (6.25 mg) by mouth 2 times daily (with meals) 1/15/21  Yes Kelly Sen PA-C   dorzolamide-timolol (COSOPT) 2-0.5 % ophthalmic solution Place 1 drop into both eyes daily    Yes Unknown, Entered By History   Ferrous Sulfate 324 (65 Fe) MG TBEC Take 324 mg by mouth daily   Yes Reported, Patient   fluticasone (FLONASE) 50 MCG/ACT nasal spray Spray 2 sprays into both nostrils daily   Yes Unknown, Entered By History   folic acid-vit B6-vit B12 (FOLGARD) 0.8-10-0.115 MG TABS per tablet Take 1 tablet by mouth daily Continue per Nephrology recommendation 4/2/21  Yes Amador Almanza MD   insulin glargine (LANTUS SOLOSTAR) 100 UNIT/ML pen Inject 50 Units Subcutaneous At Bedtime  4/14/21  Yes Christian Davidson MD   insulin lispro (HUMALOG KWIKPEN) 100 UNIT/ML (1 unit dial) KWIKPEN Inject 5-15 Units Subcutaneous 3 times daily (before meals) Sliding scale with meals 6/9/21  Yes Christian Davidson MD   pantoprazole (PROTONIX) 40 MG EC tablet TAKE 1 TABLET(40 MG) BY MOUTH TWICE DAILY 8/3/21  Yes Christian Davidson MD   potassium chloride ER (K-TAB) 20 MEQ CR tablet Take 1 tablet (20 mEq) by mouth daily 6/4/21  Yes Radha Gonzalez DO   torsemide (DEMADEX) 20 MG tablet Take 10 mg by mouth daily  4/14/21  Yes Shaniqua Ram PA-C   traZODone (DESYREL) 100 MG tablet TAKE 1 TABLET(100 MG) BY MOUTH AT BEDTIME 1/29/21  Yes Bandar Greenberg MD   vitamin B-12 (CYANOCOBALAMIN) 1000 MCG tablet Take 1 tablet (1,000 mcg) by mouth daily Continue unitl hematology evaluation as borderline low vitamin b12. 4/28/21  Yes Kristine Nash MD   blood glucose (STEVE CONTOUR) test strip TESTING FOUR TIMES DAILY OR AS DIRECTED 6/4/21   Christian Davidson MD   clopidogrel (PLAVIX) 75 MG tablet Take 1 tablet (75 mg) by mouth daily 4/28/21   Christian Davidson,  MD   insulin pen needle (BD FERCHO U/F) 32G X 4 MM miscellaneous USE AS DIRECTED UP TO FOUR TIMES DAILY 6/8/20   Bandar Greenberg MD   order for DME Hip steroid injectoion 10/12/17   Bandar Greenberg MD   oxyCODONE-acetaminophen (PERCOCET) 5-325 MG tablet Take 1 tablet by mouth at bedtime as needed, may repeat once for pain 8/23/21   Christian Davidson MD     Recent Labs   Lab Test 03/26/21  1326   ABO A   RH Pos     Recent Results (from the past 744 hour(s))   CT Abdomen Pelvis w/o Contrast    Narrative    CT ABDOMEN AND PELVIS WITHOUT CONTRAST 8/3/2021 7:23 PM    CLINICAL HISTORY: Flank pain, kidney stone suspected  TECHNIQUE: CT scan of the abdomen and pelvis was performed without IV  contrast. Multiplanar reformats were obtained. Dose reduction  techniques were used.  CONTRAST: None.    COMPARISON: None.    FINDINGS:   LOWER CHEST: Normal.    HEPATOBILIARY: No acute liver abnormality. Mildly distended  gallbladder.    PANCREAS: Normal.    SPLEEN: Normal.    ADRENAL GLANDS: Small nodular thickening of the left adrenal. These  are commonly adenomas. Unremarkable right adrenal.    KIDNEYS/BLADDER: No ureter stone or hydronephrosis. Nonobstructing  stone lower left kidney is 0.3 cm series 3 image 90. No acute renal or  bladder abnormality.    BOWEL: No obstruction. Prominent stool throughout the colon. Rectal  distention with stool as well. Normal appendix. No acute inflammation.  Distal colonic diverticula. No convincing focal diverticulitis.    LYMPH NODES: Normal.    VASCULATURE: Scattered vascular calcifications.    PELVIC ORGANS: Normal.    OTHER: None.    MUSCULOSKELETAL: Spine mild degenerative changes. Bilateral hip DJD.      Impression    IMPRESSION:   1.  No ureter stone or hydronephrosis.  2.  Small nonobstructing stone within the left kidney.  3.  Prominent stool within the colon and rectum.  4.  Colonic diverticulosis.    RASHARD MORILLO MD         SYSTEM ID:  [email or web address]   Abdomen US, limited (RUQ only)     Narrative    US ABDOMEN LIMITED 8/3/2021 8:50 PM    CLINICAL HISTORY: Right upper quadrant pain.    TECHNIQUE: Limited abdominal ultrasound.    COMPARISON: CT abdomen and pelvis 8/3/2021.    FINDINGS:    GALLBLADDER: Distended gallbladder. No gallstones. Positive  sonographic Elise's sign. No gallbladder wall thickening.    BILE DUCTS: There is no biliary dilatation. The common duct is  obscured for measurement.    LIVER: Normal where seen.    RIGHT KIDNEY: No hydronephrosis.    PANCREAS: The pancreas is largely obscured by overlying gas.    No ascites.      Impression    IMPRESSION:  1.  Positive sonographic Elise's sign, but no gallstones are seen.  Gallbladder is distended. No biliary dilatation.    RASHARD MORILLO MD         SYSTEM ID:  [email or web address]   XR Ribs & Chest Right G/E 3 Views    Narrative    RIGHT RIBS AND CHEST, THREE VIEWS  8/4/2021 12:42 PM     HISTORY: Right rib pain.    COMPARISON: Chest x-ray from 7/20/2021.      Impression    IMPRESSION: Heart size is normal. Pulmonary vasculature is normal.  Lungs are clear. No pneumothorax or pleural fluid. No definite  evidence of acute rib fracture.    JESSICA ADAMSON MD         SYSTEM ID:  [id number]   US Abdomen Limited    Narrative    US ABDOMEN LIMITED 8/12/2021 1:20 PM    CLINICAL HISTORY: right rib pain w overlying lipomatous mass; Rib pain  on right side; Benign neoplasm of other specified sites  TECHNIQUE: Limited abdominal ultrasound.    COMPARISON: None.      Impression    IMPRESSION:  1.  No abnormality identified at the site of patient's pain/lump in  the right lateral abdominal wall.    RAMOS PAYAN MD         SYSTEM ID:  [id number]   NM Hepatobiliary Scan w GB EF    Narrative    EXAM: NM HEPATOBILIARY SCAN WITH GB EF  LOCATION: [facility]  DATE/TIME: 8/4/2021 11:32 AM    INDICATION: Abdominal pain, upper, chronic, assess gallbladder motility  COMPARISON: Ultrasound from 08/03/2021 is reviewed.  TECHNIQUE: 5.0 mCi of technetium-99m  mebrofenin, IV. Anterior planar imaging of the abdomen. 1.9 mcg of cholecystokinin analog, IV. Gallbladder imaging for 30-60 minutes.    FINDINGS: Normal radionuclide activity in liver, gallbladder, bile ducts and small bowel. No evidence of cystic or common duct obstruction or intrinsic liver disease. Gallbladder ejection fraction measures 10%, which is below the normal range of 35% or   greater.      Impression    IMPRESSION:   Findings consistent with biliary dyskinesia or chronic cholecystitis       Anesthesia Evaluation   Pt has had prior anesthetic.     No history of anesthetic complications       ROS/MED HX  ENT/Pulmonary:    (-) tobacco use, asthma, COPD, sleep apnea and recent URI   Neurologic:     (+) CVA, date: 2019, without deficits,  (-) no seizures   Cardiovascular:     (+) Dyslipidemia hypertension-----Taking blood thinners CHF Last EF: 45% pulmonary hypertension (mild), Previous cardiac testing   Echo: Date: Results:  Left Ventricle  The left ventricle is mildly dilated. There is mild eccentric left ventricular  hypertrophy. Grade II or moderate diastolic dysfunction. Diastolic Doppler  findings (E/E' ratio and/or other parameters) suggest left ventricular filling  pressures are increased. Left ventricular systolic function is mildly reduced.  The visual ejection fraction is estimated at 45-50%. Mid and basal inferior  and inferolateral hypokinesis.     Right Ventricle  The right ventricle is normal size. The right ventricular systolic function is  normal.     Atria  The left atrium is mildly dilated. The right atrium is mildly dilated.     Mitral Valve  The mitral valve leaflets are mildly thickened. There is mild (1+) mitral  regurgitation.     Tricuspid Valve  There is mild (1+) tricuspid regurgitation. The right ventricular systolic  pressure is approximated at 35.6 mmHg plus the right atrial pressure. Right  ventricular systolic pressure is elevated, consistent with mild pulmonary  hypertension.  IVC diameter and respiratory changes fall into an intermediate  range suggesting an RA pressure of 8 mmHg.        Aortic Valve  There is mild trileaflet aortic sclerosis. No aortic stenosis is present.  Stress Test: Date: Results:    ECG Reviewed: Date: Results:  NSR, borderline 1st degree AVB, nonspecific ST/T changes  Cath: Date: Results:   (-) syncope, arrhythmias and irregular heartbeat/palpitations   METS/Exercise Tolerance:     Hematologic:     (+) anemia,     Musculoskeletal:       GI/Hepatic:     (+) GERD, Asymptomatic on medication,  (-) liver disease   Renal/Genitourinary:     (+) renal disease, type: CRI,     Endo:     (+) type II DM, Using insulin,     Psychiatric/Substance Use:       Infectious Disease:       Malignancy:       Other:            Physical Exam    Airway        Mallampati: II   TM distance: > 3 FB   Neck ROM: full   Mouth opening: > 3 cm    Respiratory Devices and Support         Dental  no notable dental history         Cardiovascular   cardiovascular exam normal          Pulmonary   pulmonary exam normal                OUTSIDE LABS:  CBC:   Lab Results   Component Value Date    WBC 6.1 08/23/2021    WBC 7.0 08/10/2021    HGB 9.6 (L) 08/23/2021    HGB 8.8 (L) 08/19/2021    HCT 30.7 (L) 08/23/2021    HCT 28.5 (L) 08/19/2021     08/23/2021     08/10/2021     BMP:   Lab Results   Component Value Date     08/23/2021     08/10/2021    POTASSIUM 4.0 09/02/2021    POTASSIUM 4.6 08/23/2021    CHLORIDE 106 08/23/2021    CHLORIDE 103 08/10/2021    CO2 31 08/23/2021    CO2 33 (H) 08/10/2021    BUN 36 (H) 08/23/2021    BUN 38 (H) 08/10/2021    CR 2.47 (H) 08/23/2021    CR 2.69 (H) 08/10/2021     (H) 09/02/2021     (H) 08/23/2021     COAGS:   Lab Results   Component Value Date    PTT 29 09/04/2020    INR 0.99 09/04/2020     POC:   Lab Results   Component Value Date     (H) 06/04/2021     HEPATIC:   Lab Results   Component Value Date    ALBUMIN 3.6  08/23/2021    PROTTOTAL 7.1 08/23/2021    ALT 21 08/23/2021    AST 19 08/23/2021    ALKPHOS 109 08/23/2021    BILITOTAL 0.4 08/23/2021     OTHER:   Lab Results   Component Value Date    PH 7.40 08/03/2021    LACT 0.9 08/03/2021    A1C 6.0 (H) 03/27/2021    VANESA 9.1 08/23/2021    PHOS 2.9 06/04/2021    MAG 3.2 (H) 06/04/2021    LIPASE 50 (L) 08/03/2021    TSH 1.09 03/29/2021    CRP 3.6 08/04/2021       Anesthesia Plan    ASA Status:  3   NPO Status:  NPO Appropriate    Anesthesia Type: General.     - Airway: ETT   Induction: Propofol, Intravenous.   Maintenance: Balanced.   Techniques and Equipment:       - Drips/Meds: Dexmed. bolus     Consents    Anesthesia Plan(s) and associated risks, benefits, and realistic alternatives discussed. Questions answered and patient/representative(s) expressed understanding.     - Discussed with:  Patient         Postoperative Care    Pain management: Multi-modal analgesia.   PONV prophylaxis: Ondansetron (or other 5HT-3), Dexamethasone or Solumedrol     Comments:                Aldair Chu MD

## 2021-09-02 NOTE — ANESTHESIA PROCEDURE NOTES
Airway       Patient location during procedure: OR       Procedure Start/Stop Times: 9/2/2021 11:13 AM  Staff -        Performed By: CRNAIndications and Patient Condition       Indications for airway management: tim-procedural         Mask difficulty assessment: 2 - vent by mask + OA or adjuvant +/- NMBA    Final Airway Details       Final airway type: endotracheal airway       Successful airway: ETT - single  Endotracheal Airway Details        ETT size (mm): 8.0       Cuffed: yes       Cuff volume (mL): 8       Successful intubation technique: direct laryngoscopy       DL Blade Type: Grimm 2       Grade View of Cords: 1       Adjucts: stylet       Position: Right       Measured from: gums/teeth       Secured at (cm): 23       Bite block used: Oral Airway    Post intubation assessment        Placement verified by: capnometry, equal breath sounds and chest rise        Number of attempts at approach: 1       Number of other approaches attempted: 0       Secured with: pink tape       Ease of procedure: easy       Dentition: Intact and Unchanged

## 2021-09-03 NOTE — ANESTHESIA POSTPROCEDURE EVALUATION
Patient: Justyn Olivas    Procedure(s):  LAPAROSCOPIC CHOLECYSTECTOMY    Diagnosis:Right upper quadrant abdominal pain [R10.11]  Diagnosis Additional Information: No value filed.    Anesthesia Type:  General    Note:  Disposition: Outpatient   Postop Pain Control: Uneventful            Sign Out: Well controlled pain   PONV: No   Neuro/Psych: Uneventful            Sign Out: Acceptable/Baseline neuro status   Airway/Respiratory: Uneventful            Sign Out: Acceptable/Baseline resp. status   CV/Hemodynamics: Uneventful            Sign Out: Acceptable CV status   Other NRE: NONE   DID A NON-ROUTINE EVENT OCCUR? No    Event details/Postop Comments:  Pt doing well prior to discharge home.             Last vitals:  Vitals Value Taken Time   /85 09/02/21 1415   Temp 36.3  C (97.3  F) 09/02/21 1415   Pulse 81 09/02/21 1422   Resp 14 09/02/21 1422   SpO2 93 % 09/02/21 1421   Vitals shown include unvalidated device data.    Electronically Signed By: Aldair Chu MD  September 2, 2021  10:22 PM

## 2021-09-03 NOTE — PROGRESS NOTES
Clinic Care Coordination Contact    Follow Up Progress Note      Assessment: Patient completed outpatient laparascopic cholecystectomy yesterday 9/2/21 at [facility] without complications.    Patient states that he is feeling well at home, he has generalized fatigue from the procedure. Patient also complaints urination pain that is manageable at this time. Patient states he had difficulty urinating post procedure yesterday and a straight catheter was used to empty his bladder before discharging home. Patient believes urination pain is related to the catheter that was used yesterday.     Reviewed AVS with new medications Percocet and Miralax. Patient confirmed he has both and denies any medication questions or concerns at this time.     Care Gaps:    Health Maintenance Due   Topic Date Due     HF ACTION PLAN  Never done     COPD ACTION PLAN  Never done     Pneumococcal Vaccine: 65+ Years (1 of 2 - PPSV23) 04/23/2015     MEDICARE ANNUAL WELLNESS VISIT  02/10/2021     MICROALBUMIN  02/10/2021     DIABETIC FOOT EXAM  02/10/2021     INFLUENZA VACCINE (1) 09/01/2021       Postponed to patient would like to focus of recovering from procedure before addressing.      Goals addressed this encounter:   Goals Addressed                    This Visit's Progress       1. Improve chronic symptoms (pt-stated)   80%      Goal Statement: I will verbalize an increase in my strength and mobility and correct knowledge of adequate management of my chronic health conditions to maintain my health and wellness in preventing hospital readmission.   Date Goal set: 12/24/20; Updated/modified 04/02/2021  Barriers: Multiple health concerns.  Strengths: Motivated and engaged in care coordination.   Date to Achieve By: 9/1/2021  Patient expressed understanding of goal: Yes    Action steps to achieve this goal:  1. I will work with Physical Therapy to build my strength and mobility.   2. I will follow up with my providers as  scheduled/recommended. (Primary Care Provider, [facility], sleep studies TBD, Dr. Ariana PARSON, [facility] hematology/oncology, nephrology)   3. I will take my medications as prescribed.   4. I will continue monitoring my blood sugars, blood pressures, weight daily as recommended.  5. I will use my CHF action plan to monitor/manage my symptoms.   6. I will follow care team recommendations of fluid restriction, diabetic and cardiac diet.   7. I will use my incentive spirometer as directed and recommended by my care team.   8. I will continue to work with care coordination for any additional resources and support.  9. I will contact my care team with questions, concerns, support needs. I will use the clinic as a resource and I understand I can contact my clinic with 24/7 after hours services available. Care Coordinator will remain available as needed.           2. Pain Management (pt-stated)   50%      Goal Statement: I would like to address and understand the treatment plan for my upper right quadrant abdominal pain.   Date Goal set: 8/6/21  Barriers: Unknown etiology   Strengths: Patient is motivated  Date to Achieve By: 12/31/21  Patient expressed understanding of goal: yes  Action steps to achieve this goal:  1. I will have Hepatobiliary scan on 8/18/21  2. I will follow up with my general surgery after scan  3. I will follow up with PCP 8/23/21 or sooner if he is able to make an ecception to his schedule  4. I will continue to work with care coordination for any additional resources and support              Intervention/Education provided during outreach: Chronic disease management and support     Outreach Frequency: 2 weeks    Plan: RN Care Coordinator will follow up in two weeks.     Hellen Chaidez RN Care Coordinator  [facility]  Email: [email or web address]  Phone: [phone]

## 2021-09-08 NOTE — PROGRESS NOTES
Infusion Nursing Note:  Justyn Olivas presents today for aranesp.    Patient seen by provider today: No   present during visit today: Not Applicable.    Note: N/A.      Intravenous Access:  No Intravenous access/labs at this visit.    Treatment Conditions:  Lab Results   Component Value Date    HGB 9.6 (L) 09/08/2021    WBC 6.1 08/23/2021    ANEU 4.8 06/17/2021    ANEUTAUTO 4.3 08/03/2021     08/23/2021      Results reviewed, labs MET treatment parameters, ok to proceed with treatment.      Post Infusion Assessment:  Patient tolerated injection without incident.       Discharge Plan:   Patient and/or family verbalized understanding of discharge instructions and all questions answered.  AVS to patient via [employer].  Patient will return 4/21 for next appointment.   Patient discharged in stable condition accompanied by: self.  Departure Mode: Ambulatory.      Shaniqua Rosa RN

## 2021-09-08 NOTE — PROGRESS NOTES
Medical Assistant Note:  Justyn Olivas presents today for blood draw.    Patient seen by provider today: No.   present during visit today: Not Applicable.    Concerns: No Concerns.    Procedure:  Lab draw site: lac, Needle type: bf, Gauge: 23.    Post Assessment:  Labs drawn without difficulty: Yes.    Discharge Plan:  Departure Mode: Ambulatory.    Face to Face Time: 5 min.    Alicia Alex, CMA

## 2021-09-09 NOTE — TELEPHONE ENCOUNTER
Procedure:  Laparoscopic cholecystectomy    Date:  09/02/2021    Surgeon: Jaxon    Patient reporting RUQ pain that is getting worse. Worse than even prior to surgery.  Deeper pain. Unable to describe in much detail or answer specific questions.     The pain is constant. He has been unable to sleep for two nights.  He did finally take an oxycodone this morning and was able to sleep for about four hours    Will ask Dr. Coates to please call patient to discuss further    Danica Hill, RN-BSN     95

## 2021-09-10 NOTE — TELEPHONE ENCOUNTER
Patient had a lap justin 9/2/21 NATO is now  looking for a  Refill of Oxycodone, patient uses [facility] on [address] in [place]  Please let him know if approved    Phone: [phone]

## 2021-09-10 NOTE — TELEPHONE ENCOUNTER
Per Dr. Coates:    I think we should get labs and Ct. He has had chronic pain but I think he would benefit from imaging.      Labs: CBC, CMP, Lipase    CT of abd/pelvis    Patient notified    Will call him back when scheduled    Danica Hill RN-BSN

## 2021-09-13 NOTE — TELEPHONE ENCOUNTER
Called patient to discuss CT findings and possible recommendation to proceed with HIDA scan to determine if bile present    Informed him that Jaxon is out of the office today, but will be back tomorrow    Will call patient back tomorrow after discussing with Dr. Coates    He is in agreement to this plan  Danica Hill RN-BSN

## 2021-09-13 NOTE — TELEPHONE ENCOUNTER
Name of caller: Patient    Reason for Call:  Patient had CT abd/pelvis yesterday.  He can see a report on My Chart, but would like to discuss results    Surgeon:  Dr. Coates    Recent Surgery:  No    If yes, when & what type:  N/A      Best phone number to reach pt at is: [phone]  Ok to leave a message with medical info? Yes.    Pharmacy preferred (if calling for a refill): N/A

## 2021-09-14 NOTE — TELEPHONE ENCOUNTER
Spoke to patient regarding CT and lab findings.  CT shows some fluid in the gallbladder fossa.  I suspect this could be a small hematoma as well as the Surgicel powder that was used.  I cannot definitively rule out a bile leak without any HIDA scan so we will place an order for this.  The patient does feel greatly improved today.  His pain is subsiding.  He is tolerating more oral intake without nausea.  He denies fevers or chills.  His major complaints are constipation and fluid retention.  He feels he has gained significant amount of weight and is retaining fluid.  I discussed multiple options for treatment and further work-up with the patient.  At this point we will 1-obtain HIDA scan to rule out bile leak, 2-he will make an appointment with PCP regarding fluid retention, 3-he will take cathartics to try to relieve constipation.  I advised him to go to the emergency room if his symptoms worsen.  He will touch base with our office later in the week for an update.

## 2021-09-16 NOTE — TELEPHONE ENCOUNTER
Spoke to patient regarding HIDA results. Shows no bile leak. I suspect the fluid related to a small amount of blood and anticoagulant that was used. I was expect him to improve over the next week or so. He is comfortable with observation for now. He will call if any changes.

## 2021-09-21 NOTE — PROGRESS NOTES
[facility]  CLINIC PROGRESS NOTE    Subjective:  Right upper quadrant abdominal pain   Justyn Olivas continues to have right upper quadrant abdominal pain since following the surgery.  He did have successful resection of the gallbladder.  He is taking miralax to help with constipation.  He is having bowel movements every 2-3 days.  He is still struggling with abdominal distension and bloating.   Insomnia   Has noticed that he is not sleeping well.  He notes that trazodone was initially prescribed in 2016, but notes that this medication does not help much.   His real concerns started about 10 months ago following hospitalization last November.  He has felt more concerned.  He has not spoken a therapist.       Past medical history, medications, allergies, social history, family history reviewed and updated in EPIC as of 9/21/2021 .    ROS  CONSTITUTIONAL: + fatigue, no unexpected change in weight  SKIN: no worrisome rashes, no worrisome moles, no worrisome lesions  EYES: no acute vision problems or changes  ENT: no ear problems, no mouth problems, no throat problems  RESP: no significant cough, no shortness of breath  CV: no chest pain, no palpitations, + worsening peripheral edema  GI: as above   : no frequency, no dysuria, no hematuria  MS: no claudication, no myalgias, no joint aches  PSYCHIATRIC: no changes in mood or affect      Objective:  Vitals  BP (!) 174/82 (BP Location: Left arm, Patient Position: Sitting, Cuff Size: Adult Regular)   Pulse 89   Temp 97.3  F (36.3  C) (Temporal)   Resp 20   Wt 98 kg (216 lb)   SpO2 98%   BMI 29.29 kg/m    GEN: Alert Oriented x3 NAD  HEENT: Atraumatic, normocephalic   CV: RRR no murmurs or rubs  PULM: CTA no wheezes or crackles  ABD: Soft, + slightly distended, + right sided upper and lower abdominal tenderness with palpation  SKIN: No visible skin lesion or ulcerations  EXT: 2+ edema bilateral lower extremities  NEURO: Gait and station deferred, No  focal neurologic deficits  PSYCH: Mood good, affect mood congruent    No images are attached to the encounter.    Results for orders placed or performed in visit on 09/21/21 (from the past 24 hour(s))   CBC with platelets   Result Value Ref Range    WBC Count 6.0 4.0 - 11.0 10e3/uL    RBC Count 3.73 (L) 4.40 - 5.90 10e6/uL    Hemoglobin 10.2 (L) 13.3 - 17.7 g/dL    Hematocrit 33.1 (L) 40.0 - 53.0 %    MCV 89 78 - 100 fL    MCH 27.3 26.5 - 33.0 pg    MCHC 30.8 (L) 31.5 - 36.5 g/dL    RDW 15.3 (H) 10.0 - 15.0 %    Platelet Count 198 150 - 450 10e3/uL       Assessment/Plan:  Patient Instructions   (K59.00) Constipation, unspecified constipation type  (primary encounter diagnosis)  Comment: I would recommend starting a twice per day dosing schedule of a stool softener.  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  Also recommend consult in medical therapeutic management   Plan: sertraline (ZOLOFT) 25 MG tablet            (F32.0) Mild major depression (H)  Comment: we will start a low dose sertraline in addition to continued use of trazodone.    Plan: sertraline (ZOLOFT) 25 MG tablet            CHF  Comment; keep sodium low, and continue on torsemide at current dose of 10 mg daily        Follow up in 1 month    Disclaimer: This note consists of symbols derived from keyboarding, dictation and/or voice recognition  software. As a result, there may be errors in the script that have gone undetected. Please consider this when interpreting information found in this chart.    Christian Davidson MD  [phone]

## 2021-09-21 NOTE — RESULT ENCOUNTER NOTE
Rickie Alejandra,    I have had the opportunity to review your recent results and an interpretation is as follows:  Your blood counts all returned stable    Sincerely,  Christian Davidson MD

## 2021-09-21 NOTE — PROGRESS NOTES
Patient received Flu shot and Pneumovax 23.    Prior to immunization administration, verified patients identity using patient s name and date of birth. Please see Immunization Activity for additional information.     Screening Questionnaire for Adult Immunization    Are you sick today?   No   Do you have allergies to medications, food, a vaccine component or latex?   No   Have you ever had a serious reaction after receiving a vaccination?   No   Do you have a long-term health problem with heart, lung, kidney, or metabolic disease (e.g., diabetes), asthma, a blood disorder, no spleen, complement component deficiency, a cochlear implant, or a spinal fluid leak?  Are you on long-term aspirin therapy?   No   Do you have cancer, leukemia, HIV/AIDS, or any other immune system problem?   No   Do you have a parent, brother, or sister with an immune system problem?   No   In the past 3 months, have you taken medications that affect  your immune system, such as prednisone, other steroids, or anticancer drugs; drugs for the treatment of rheumatoid arthritis, Crohn s disease, or psoriasis; or have you had radiation treatments?   No   Have you had a seizure, or a brain or other nervous system problem?   No   During the past year, have you received a transfusion of blood or blood    products, or been given immune (gamma) globulin or antiviral drug?   No   For women: Are you pregnant or is there a chance you could become       pregnant during the next month?   No   Have you received any vaccinations in the past 4 weeks?   No     Immunization questionnaire answers were all negative.        Per orders of Dr. Davidson, injection of Flu Shot and Pneumovax 23 given by Holly Pinto CMA. Patient instructed to remain in clinic for 15 minutes afterwards, and to report any adverse reaction to me immediately.       Screening performed by Holly Pinto CMA on 9/21/2021 at 2:13 PM.

## 2021-09-21 NOTE — PATIENT INSTRUCTIONS
(K59.00) Constipation, unspecified constipation type  (primary encounter diagnosis)  Comment: I would recommend starting a twice per day dosing schedule of a stool softener.  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  Also recommend consult in medical therapeutic management   Plan: sertraline (ZOLOFT) 25 MG tablet            (F32.0) Mild major depression (H)  Comment: we will start a low dose sertraline in addition to continued use of trazodone.    Plan: sertraline (ZOLOFT) 25 MG tablet            CHF  Comment; keep sodium low, and continue on torsemide at current dose of 10 mg daily

## 2021-09-22 NOTE — RESULT ENCOUNTER NOTE
Rickie Alejandra,    I have had the opportunity to review your recent results and an interpretation is as follows:  Your follow-up labs show improved renal function with a reduction in your creatinine down to 2.07 and stable liver function test.  Your hemoglobin is also stable    Sincerely,  Christian Davidson MD

## 2021-09-30 NOTE — PROGRESS NOTES
"Clinic Care Coordination Contact    Follow Up Progress Note      Assessment: RN CC spoke with patient, he feels that he is recovering slowly from cholecystectomy and still has mild to moderate abdominal pain intermittently. Patient states that he was feeling a bit down mentally with the slow recovery process. His children and grand children are coming over for dinner to celebrate his birthday today, and he is trying to focus on all the positive things he has in life.     Patient states he gained 10-12 lbs with cholecystectomy procedure and is working to lose that weight to portion control. Patient denies any edema or SOB.     Patient states he fell yesterday on his front steps retrieving flowers he had ordered for his wife (it was their 42 wedding anniversary). Patient states he hit the back of his head and \"it bled a little bit\". Patient denies any lose of consciousness, vision changes, dizziness or other injury sustained. Patient did not seek medical attention and would not like to do so at this time.     Patient will meet with MTM next week 10/7/21 and PCP 10/26/21.     Care Gaps:    Health Maintenance Due   Topic Date Due     HF ACTION PLAN  Never done     COPD ACTION PLAN  Never done     DEPRESSION ACTION PLAN  Never done     PHQ-9  Never done     MEDICARE ANNUAL WELLNESS VISIT  02/10/2021     MICROALBUMIN  02/10/2021     DIABETIC FOOT EXAM  02/10/2021     A1C  09/27/2021       Scheduled to be addressed during PCP follow up on 10/26/21.       Goals addressed this encounter:   Goals Addressed                    This Visit's Progress       1. Improve chronic symptoms (pt-stated)   90%      Goal Statement: I will verbalize an increase in my strength and mobility and correct knowledge of adequate management of my chronic health conditions to maintain my health and wellness in preventing hospital readmission.   Date Goal set: 12/24/20; Updated/modified 04/02/2021  Barriers: Multiple health concerns.  Strengths: " Motivated and engaged in care coordination.   Date to Achieve By: 9/1/2021  Patient expressed understanding of goal: Yes    Action steps to achieve this goal:  1. I will work with Physical Therapy to build my strength and mobility.   2. I will follow up with my providers as scheduled/recommended. (Primary Care Provider, [facility], sleep studies TBD, Dr. Ariana PARSON, [facility] hematology/oncology, nephrology)   3. I will take my medications as prescribed.   4. I will continue monitoring my blood sugars, blood pressures, weight daily as recommended.  5. I will use my CHF action plan to monitor/manage my symptoms.   6. I will follow care team recommendations of fluid restriction, diabetic and cardiac diet.   7. I will use my incentive spirometer as directed and recommended by my care team.   8. I will continue to work with care coordination for any additional resources and support.  9. I will contact my care team with questions, concerns, support needs. I will use the clinic as a resource and I understand I can contact my clinic with 24/7 after hours services available. Care Coordinator will remain available as needed.           2. Pain Management (pt-stated)   60%      Goal Statement: I would like to address and understand the treatment plan for my upper right quadrant abdominal pain.   Date Goal set: 8/6/21  Barriers: Unknown etiology   Strengths: Patient is motivated  Date to Achieve By: 12/31/21  Patient expressed understanding of goal: yes  Action steps to achieve this goal:  1. I will have Hepatobiliary scan on 8/18/21  2. I will follow up with my general surgery after scan  3. I will follow up with PCP 8/23/21 or sooner if he is able to make an ecception to his schedule  4. I will continue to work with care coordination for any additional resources and support              Intervention/Education provided during outreach: Chronic disease management and support.      Outreach Frequency: Monthly     Plan: RN Care Coordinator  will follow up in one month.     Hellen Chaidez RN Care Coordinator  [facility]  Email: [email or web address]  Phone: [phone]

## 2021-10-05 NOTE — TELEPHONE ENCOUNTER
Unfortunately, patient was scheduled for an in clinic MTM visit this Thursday, 10/7 and I will be remote that day.  Called patient to re-schedule or change to virtual - he elected to re-schedule for 10/18.  He requests that his Hgb is re-checked in the next few days.  Lab appt scheduled for tomorrow.  Routing order to primary care physician for signature since I have not yet met with him.  Ok to close encounter once signed (if in agreement).    Geovanna Lowery, PharmD, [facility]  Medication Therapy Management Provider  Pager: [phone]

## 2021-10-07 NOTE — PROGRESS NOTES
Infusion Nursing Note:  Justyn Olivas presents today for Aranesp.    Patient seen by provider today: No   present during visit today: Not Applicable.    Note: pt to see Dr Davidson on 10/26, and will have labs at that time. Will schedule Aranesp injection as needed..      Intravenous Access:  No Intravenous access/labs at this visit.    Treatment Conditions:  Lab Results   Component Value Date    HGB 8.2 (L) 10/06/2021    WBC 6.0 09/21/2021    ANEU 4.8 06/17/2021    ANEUTAUTO 4.3 08/03/2021     09/21/2021      Results reviewed, labs MET treatment parameters, ok to proceed with treatment.      Post Infusion Assessment:  Patient tolerated injection without incident.       Discharge Plan:   Discharge instructions reviewed with: Patient.  Patient and/or family verbalized understanding of discharge instructions and all questions answered.  Patient discharged in stable condition accompanied by: self.  Departure Mode: Ambulatory.      Brenda Garcia RN

## 2021-10-07 NOTE — RESULT ENCOUNTER NOTE
Rickie Alejandra,    I have had the opportunity to review your recent results and an interpretation is as follows:  Your follow-up hemoglobin is a bit low again at 8.2.  I would recommend we try to reach out to discuss with hematology      Sincerely,  Christian Davidson MD

## 2021-10-13 NOTE — ED NOTES
Bed: ED21  Expected date:   Expected time:   Means of arrival:   Comments:  Jojo 1 81M fall head injury eta 0790

## 2021-10-13 NOTE — ED PROVIDER NOTES
History   Chief Complaint:  Fall       HPI   Justyn Olivas is a 81 year old male anticoagulated on Plavix with history of type 2 diabetes, stage 4 CKD, chronic diastolic heart failure, and osteoarthritis who presents with a head laceration and pain in left wrist. The patient reports that he fell out of bed last night and hit his head and left hand, perhaps hyperextending. He presents with a right temporal laceration and his left wrist in a splint. He says his right hand is also in pain. His last tetanus shot was in 2013.    Review of Systems   Musculoskeletal: Positive for arthralgias (Both hands).   Skin: Positive for wound (Right temporal laceration).   All other systems reviewed and are negative.        Allergies:  The patient has no known allergies.     Medications:  Albuterol  Lipitor  Coreg  Plavix  Flonase  Insulin  Protonix  Zoloft  Demadex  Trazodone  Metformin  Aspirin  Mycelex  Flexeril  Klor-con  Zocor  Plavix  Avapro    Past Medical History:     Anemia  Biliary disorder  Cerebral artery occlusion with cerebral infarction  Chronic diastolic heart failure  Diabetes  Hyperlipidemia  Hypokalemia  Hypertension   Renal disease  Insomnia  Type 2 diabetes  Osteoarthritis  CKD stage 4  Biliary colic    Past Surgical History:    Skin cancer surgery on nose  Pericardiocentesis    Family History:    The patient denies past family history.    Social History:  The patient presents alone. He is a former smoker.    Physical Exam     Patient Vitals for the past 24 hrs:   BP Temp Temp src Pulse Resp SpO2 Weight   10/13/21 0732 (!) 168/90 97.8  F (36.6  C) Oral 93 16 98 % --   10/13/21 0731 -- -- -- -- -- -- 95.3 kg (210 lb)       Physical Exam  Vitals: reviewed by me  General: Pt seen on [facility], pleasant, cooperative, and alert to conversation  Eyes: Tracking well, clear conjunctiva BL  ENT: MMM, midline trachea.  No C-spine tenderness.  Full range of motion to neck.  Does have a skin tear over the right  temporal area and an associated hematoma.  Lungs: No tachypnea, no accessory muscle use. No respiratory distress.   CV: Rate as above  Abd: Soft, non tender, no guarding, no rebound. Non distended  MSK: no joint effusion.  Left wrist is tender to the dorsum of the hand and ulnar aspect of the wrist.  Full range of motion to wrist however, no skin breaks, no evidence of trauma, no deformities.  Right upper extremity and bilateral lower extremities are with sensation intact light touch and 5 out of 5 motor throughout.  No pain to the anatomical snuffbox on the left side.  Skin: No rash  Neuro: Clear speech and no facial droop.  Moving all extremity spontaneously, following all commands.  Ambulatory with strong gait, uses a cane at baseline, used a walker here.  Psych: Not RIS, no e/o AH/VH      Emergency Department Course   ECG  ECG obtained at 0855, ECG read at 0900  Normal sinus rhythm  Inferior infarct, age undetermined  Abnormal ECG   Rate 91 bpm. NJ interval 176 ms. QRS duration 90 ms. QT/QTc 376/762 ms. P-R-T axes 37 17  117.     Imaging:  XR Wrist Left G/E 3 Views:  Unremarkable exam.   Report per radiology     Radius/Ulna XR, PA & LAT, left  Unremarkable exam  As read by Radiology.    CT Head w/o Contrast  Moderate-sized right temporal scalp hematoma. Diffuse   cerebral volume loss and cerebral white matter changes consistent with   chronic small vessel ischemic disease. No evidence for acute   intracranial pathology.   As read by Radiology.    Laboratory:  CBC: WBC 6.7, HGB 7.8 (L),      CMP: Chloride 111 (H), Creatinine 2.16 (H), Calcium 8.2 (L), Glucose 208 (H), Alkaline Phosphatase 163 (H), o/w WNL    Troponin (Collected 0808): <0.015    Emergency Department Course:  Reviewed:  I reviewed nursing notes, vitals, past medical history, Care Everywhere and [facility]    Assessments:  0756 I obtained history and examined the patient as noted above.   0926 I rechecked the patient and explained findings.    1011 I rechecked the patient. He is requested a wheelchair.    Interventions:  0809 Morphine, 2 mg, IV  0809 Zofran, 4 mg, IV    Disposition:  The patient was discharged to home.     Impression & Plan     CMS Diagnoses: None    Medical Decision Making:  This is a pleasant 81-year-old man who presents to the Emergency Room with a mechanical fall off of his bed. He did hit the side of his head, but thankfully there is no laceration there, only a small skin tear, this was dealt with with basic wound care. He also has some left wrist pain, but thankfully the Xray is normal, and he has full range of motion to his wrist. He has no pain to the anatomical snuff box and I do not think he has an old scaphoid fracture. Most of his pain is actually overr the dorsum of his hand near the metacarpal bones 4 and 5, but again unremarkable on the Xray. No swelling or deformity noted on his hand, is ambulatory with strong and steady gait, and is at baseline per wife. Will plan for discharge home with instructions to follow up with Dr. Jason as he does have CKD which does have consequential anemia which is getting slightly worse. I've asked them to follow up for this reason and for a routine follow-up from his mechanical fall in 48 hours and they feel comfortable doing this. Red flags for him to come back to the ER were discussed.       Diagnosis:    ICD-10-CM    1. Fall, initial encounter  W19.XXXA    2. Traumatic injury of head, initial encounter  S09.90XA        Discharge Medications:  New Prescriptions    No medications on file       Scribe Disclosure:  I, Mohini Campos, am serving as a scribe at 7:38 AM on 10/13/2021 to document services personally performed by Jone Barnard based on my observations and the provider's statements to me.              Jone Barnard MD  10/13/21 0930

## 2021-10-13 NOTE — ED TRIAGE NOTES
Went to roll out of bed, hit head on night stand, right temple area, small lac, bleeding controlled; fell out of bed, 3ft drop onto carpet c/o left hand, wrist, arm pain; no LOC

## 2021-10-13 NOTE — DISCHARGE INSTRUCTIONS
As we discussed, the CT scan of your head and neck are normal, and your fall seems to have been the result of rolling over in bed, with no evidence of fainting or other neurologic findings.  Your x-rays are normal, please take Tylenol to help with your pain.  Is extremely important you have a conversation with your regular doctor, and that you are seen in the next 48 hours to double check your hemoglobin, and to make sure you are otherwise doing well.  A virtual visit or speaking over the phone is sufficient.  Come back to the ER with any other concerns.

## 2021-10-14 NOTE — PROGRESS NOTES
Clinic Care Coordination Contact    Follow Up Progress Note      Assessment: RN CC spoke with patient, he states he is feeling very weak today and that his pain in left arm, head and neck post fall is not managed at this time. Patient is concerned about his two recent falls and drop in HGB 10/6/21: 8.2, 10/13/21: 7.8.     Patient states he is only taking tylenol for pain, however he has some oxycodone pills left over from September surgery and is questioning if he can take those. RN CC advised patient that he should speak with PCP before taking. Patient verbalized understanding. RN CC assisted patient in scheduling phone call with PCP for tomorrow as he is feeling too weak to be seen in person. No new need for support or resources identfied.     Care Gaps:    Health Maintenance Due   Topic Date Due     HF ACTION PLAN  Never done     COPD ACTION PLAN  Never done     DEPRESSION ACTION PLAN  Never done     PHQ-9  Never done     MEDICARE ANNUAL WELLNESS VISIT  02/10/2021     MICROALBUMIN  02/10/2021     DIABETIC FOOT EXAM  02/10/2021     A1C  09/27/2021       Postponed to patient would like to focus on current care plan goal before addressing.      Goals addressed this encounter:   Goals Addressed                    This Visit's Progress       1. Improve chronic symptoms (pt-stated)   90%      Goal Statement: I will verbalize an increase in my strength and mobility and correct knowledge of adequate management of my chronic health conditions to maintain my health and wellness in preventing hospital readmission.   Date Goal set: 12/24/20; Updated/modified 04/02/2021  Barriers: Multiple health concerns.  Strengths: Motivated and engaged in care coordination.   Date to Achieve By: 9/1/2021  Patient expressed understanding of goal: Yes    Action steps to achieve this goal:  1. I will work with Physical Therapy to build my strength and mobility.   2. I will follow up with my providers as scheduled/recommended. (Primary Care  Provider, [facility], sleep studies TBD, Dr. Ariana PARSON, [facility] hematology/oncology, nephrology)   3. I will take my medications as prescribed.   4. I will continue monitoring my blood sugars, blood pressures, weight daily as recommended.  5. I will use my CHF action plan to monitor/manage my symptoms.   6. I will follow care team recommendations of fluid restriction, diabetic and cardiac diet.   7. I will use my incentive spirometer as directed and recommended by my care team.   8. I will continue to work with care coordination for any additional resources and support.  9. I will contact my care team with questions, concerns, support needs. I will use the clinic as a resource and I understand I can contact my clinic with 24/7 after hours services available. Care Coordinator will remain available as needed.           2. Pain Management (pt-stated)   60%      Goal Statement: I would like to address and understand the treatment plan for my upper right quadrant abdominal pain.   Date Goal set: 8/6/21  Barriers: Unknown etiology   Strengths: Patient is motivated  Date to Achieve By: 12/31/21  Patient expressed understanding of goal: yes  Action steps to achieve this goal:  1. I will have Hepatobiliary scan on 8/18/21  2. I will follow up with my general surgery after scan  3. I will follow up with PCP 8/23/21 or sooner if he is able to make an ecception to his schedule  4. I will continue to work with care coordination for any additional resources and support              Intervention/Education provided during outreach: Chronic disease management and support.      Outreach Frequency: 2 weeks    Plan: RN Care Coordinator will follow up in two weeks given decline in health status.     Hellen Chaidez RN Care Coordinator  [facility]  Email: [email or web address]  Phone: [phone]

## 2021-10-14 NOTE — TELEPHONE ENCOUNTER
Only allowed to take tylenol due to kidneys.  Continued Head pain from fall. Taking OTC meds for pain    Asking if he can take more now.  Max tyenol is 4000mg per day.    Taking some old oxyontin.  Needs to include this in the total tylenol he is taking.    Nurse cannot advise him to take old medication.    He will stick to the 4000mg per day tyenol.    Hannah Rivas RN  [facility] Nurse Advisor

## 2021-10-15 NOTE — PROGRESS NOTES
Kamran is a 81 year old who is being evaluated via a billable telephone visit.      What phone number would you like to be contacted at? [phone]  How would you like to obtain your AVS? Tammie    Assessment & Plan   Fall, subsequent encounter  Pain in both hands, neck, shoulders    - oxyCODONE-acetaminophen (PERCOCET) 5-325 MG tablet  Dispense: 12 tablet; Refill: 0    Anemia, unspecified type  He is wondering if he should take more iron and that would be fine.  He also gets infusions through nephrology and recommended he follow-up with them as planned  - Ferrous Sulfate 324 (65 Fe) MG TBEC    Type 2 diabetes mellitus with stage 3b chronic kidney disease, with long-term current use of insulin (H)  Controlled - continue medication.    Lab Results   Component Value Date    A1C 6.0 (H) 03/27/2021        BMI:   Estimated body mass index is 28.48 kg/m  as calculated from the following:    Height as of 9/2/21: 1.829 m (6').    Weight as of 10/13/21: 95.3 kg (210 lb).         Return in about 1 week (around 10/22/2021) for with pcp as planned and sooner if needed.      Norman Camacho MD      [address] [address]  [facility]   Office: [phone]       Subjective   Kamran is a 81 year old who presents for the following health issues     HPI     ED/UC Followup:    Facility:  [facility]  Date of visit: 10/13/2021  Reason for visit: Fall out of bed (while dreaming that he was hitting a golf shot) - head injury- fell on bed and hit head - EMT to ED and head CT was normal except hematoma.  And also bilateral wrist and hand pains  -  xrays were normal     Current Status: Head neck and shoulders are painful still - both hands hurt - swollen and bruised and he can not use them. Already had a low hemoglobin from a fall in September and hit the back of head on cement (bending down to get flowers on his front step)  hemoglobin- patient feel the low hemoglobin is from  the head injuries-     Patient can only take tylenol due to being on anticoagulation and it is not helping- patient does have some oxycodone & would like to know if he could take that for the pain (yes).    Having some balance issues since the fall.  No headaches, photo or phonophobia.  No lightheadedness or chest pains. Head laceration is not bleeding.     DM - am glucose has been 75- 110 lately    Review of Systems         Objective         Vitals:  No vitals were obtained today due to virtual visit.    Physical Exam   healthy, alert and no distress  PSYCH: Alert and oriented times 3; coherent speech, normal   rate and volume, able to articulate logical thoughts, able   to abstract reason, no tangential thoughts, no hallucinations   or delusions  His affect is normal  RESP: No cough, no audible wheezing, able to talk in full sentences  Remainder of exam unable to be completed due to telephone visits  15        Phone call duration: 17 minutes

## 2021-10-19 NOTE — TELEPHONE ENCOUNTER
Lab Results   Component Value Date    A1C 6.0 03/27/2021    A1C 7.0 12/01/2020    A1C 7.3 09/14/2020    A1C 8.1 07/21/2020    A1C 8.3 02/10/2020     Please refill as appropriate.  Thank you,    Diamante Bond RN  [facility]

## 2021-10-19 NOTE — TELEPHONE ENCOUNTER
Duplicate. Sent today's request (10/19) to PCP for refill.    Refused. Message sent to pharmacy.    Diamante Bond RN  [facility]

## 2021-10-21 NOTE — TELEPHONE ENCOUNTER
Prescription approved per [facility] Refill Protocol.  Gabriela Amaral RN  [facility]

## 2021-10-25 NOTE — TELEPHONE ENCOUNTER
Routing refill request to provider for review/approval because:  Failed protocol    Stephenie GONGORA RN  EP Triage

## 2021-10-26 NOTE — PATIENT INSTRUCTIONS
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

## 2021-10-26 NOTE — PROGRESS NOTES
[facility]  CLINIC PROGRESS NOTE    Subjective:  Fall with head laceration   Justyn Olivas was seen in the emergency room for recent fall.  Hands were swollen, but improved.  Cognition is improved.  Still having some neck and shoulder stiffness.   No difficulty with coordination.  He hasn't been very limited with fear of falling.  He does have a walker, but not using it.  He is confident in use of cane.  He did walk in the clinic today, and daughter who is here notes that at times he needs cane and at other times not needing cane.  He would like to be able to walk with use of hiking poles without a cane and be able to navigate a flight of steps.  Anemia   He did received aranesp infusion.  Has contgineud on oral iron.  Feeling still weak.     Past medical history, medications, allergies, social history, family history reviewed and updated in EPIC as of 10/26/2021 .    ROS  CONSTITUTIONAL: no fatigue, no unexpected change in weight  SKIN: no worrisome rashes  EYES: no acute vision problems or changes  ENT: no ear problems, no mouth problems, no throat problems  RESP: occasional  cough, slight shortness of breath  CV: no chest pain, no palpitations  GI: no nausea, no vomiting  : no frequency, no dysuria, no hematuria  MS: as above   PSYCHIATRIC: no changes in mood or affect      Objective:  Vitals  /70   Pulse 104   Temp 97.3  F (36.3  C)   Resp 20   Wt 96.4 kg (212 lb 8 oz)   SpO2 99%   BMI 28.82 kg/m    GEN: Alert Oriented x3 NAD  HEENT: Atraumatic, normocephalic, neck supple, no thyromegaly, negative cervical adenopathy  CV: RRR no murmurs or rubs  PULM: CTA no wheezes or crackles  ABD: Soft, nontender nondistended,  SKIN: No visible skin lesion or ulcerations  EXT: 2+ edema bilateral lower extremities  NEURO: Gait stable - not using cane  PSYCH: Mood good, affect mood congruent    No images are attached to the encounter.    Recent Results (from the past 240 hour(s))   CBC with  platelets    Collection Time: 10/26/21  1:55 PM   Result Value Ref Range    WBC Count 7.7 4.0 - 11.0 10e3/uL    RBC Count 3.23 (L) 4.40 - 5.90 10e6/uL    Hemoglobin 7.8 (LL) 13.3 - 17.7 g/dL    Hematocrit 26.7 (L) 40.0 - 53.0 %    MCV 83 78 - 100 fL    MCH 24.1 (L) 26.5 - 33.0 pg    MCHC 29.2 (L) 31.5 - 36.5 g/dL    RDW 16.2 (H) 10.0 - 15.0 %    Platelet Count 204 150 - 450 10e3/uL   Ferritin    Collection Time: 10/26/21  1:55 PM   Result Value Ref Range    Ferritin 14 (L) 26 - 388 ng/mL   Iron and iron binding capacity    Collection Time: 10/26/21  1:55 PM   Result Value Ref Range    Iron 20 (L) 35 - 180 ug/dL    Iron Binding Capacity 404 240 - 430 ug/dL    Iron Sat Index 5 (L) 15 - 46 %   Basic metabolic panel  (Ca, Cl, CO2, Creat, Gluc, K, Na, BUN)    Collection Time: 10/26/21  1:55 PM   Result Value Ref Range    Sodium 143 133 - 144 mmol/L    Potassium 4.7 3.4 - 5.3 mmol/L    Chloride 113 (H) 94 - 109 mmol/L    Carbon Dioxide (CO2) 23 20 - 32 mmol/L    Anion Gap 7 3 - 14 mmol/L    Urea Nitrogen 32 (H) 7 - 30 mg/dL    Creatinine 2.17 (H) 0.66 - 1.25 mg/dL    Calcium 8.9 8.5 - 10.1 mg/dL    Glucose 96 70 - 99 mg/dL    GFR Estimate 27 (L) >60 mL/min/1.73m2   HEMOGLOBIN A1C    Collection Time: 10/26/21  1:55 PM   Result Value Ref Range    Hemoglobin A1C 6.2 (H) 0.0 - 5.6 %         Assessment/Plan:  Patient Instructions   (D50.0) Iron deficiency anemia due to chronic blood loss  (primary encounter diagnosis)  Comment: We will recheck hemoglobin and iron studies today  Plan: CBC with platelets, Ferritin, Iron and iron         binding capacity, Basic metabolic panel  (Ca,         Cl, CO2, Creat, Gluc, K, Na, BUN)            (E11.22,  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)  Comment: check labs today  Plan: CBC with platelets, Ferritin, Iron and iron         binding capacity, Basic metabolic panel  (Ca,         Cl, CO2, Creat, Gluc, K, Na, BUN)            (I63.40) Cerebrovascular accident (CVA) due to  embolism of cerebral artery (H)  Comment: balance is main concern and we will refer for balance therapy  Plan: Physical Therapy Referral            (M16.0) Primary osteoarthritis of both hips  Comment: as above - also refer to review hip pain  Plan: Physical Therapy Referral            (R26.89) Loss of balance  Comment: as above   Plan: Physical Therapy Referral               Follow up in 3 months     Disclaimer: This note consists of symbols derived from keyboarding, dictation and/or voice recognition software. As a result, there may be errors in the script that have gone undetected. Please consider this when interpreting information found in this chart.    Christian Davidson MD  [phone]

## 2021-10-27 NOTE — RESULT ENCOUNTER NOTE
Rickie Alejandra,    I have had the opportunity to review your recent results and an interpretation is as follows:  Your follow-up labs again show persistent iron deficiency anemia and I think an iron infusion would be beneficial.  We can try to set this up through the infusion center    Sincerely,  Christian Davidson MD

## 2021-10-27 NOTE — TELEPHONE ENCOUNTER
Can we call Justyn Olivas and let him know that     Rickie Alejandra,    I have had the opportunity to review your recent results and an interpretation is as follows:  Your follow-up labs again show persistent iron deficiency anemia and I think an iron infusion would be beneficial.  We can try to set this up through the infusion center    Sincerely,  Christian Davidson MD

## 2021-10-28 NOTE — RESULT ENCOUNTER NOTE
Dear Kamran,    Here is a summary of your recent test results:  -A1C (test of diabetes control the last 2-3 months) is at your goal. Please continue with your current plan. Also, you should make an appointment to see me and recheck your A1C test in 6 months.            Thank you very much for trusting me and [facility].     Have a peaceful day.    Healthy regards,  Norman Camacho MD

## 2021-10-28 NOTE — TELEPHONE ENCOUNTER
PCP: Called patient, agreeable to iron infusions pended injectafer admit/therapy plan. Please review and sig and route back to triage to follow up with infusion clinic.     Callback: [phone]- okay to leave detailed VM.    Sarah Kenyon RN  [facility]

## 2021-11-01 NOTE — PROGRESS NOTES
Clinic Care Coordination Contact  [facility]/Voicemail    Referral Source: IP Report  Clinical Data: Care Coordinator Outreach  Outreach attempted x 1.  Left message on patient's voicemail with call back information and requested return call.  Plan: RN Care Coordinator will try to reach patient again in 10 business days.    Hellen Chaidez RN Care Coordinator  [facility]  Email: [email or web address]  Phone: [phone]        
Yes

## 2021-11-01 NOTE — TELEPHONE ENCOUNTER
Pt needs PCR COVID test in order to have iron infusion 11/04/2021.     Future order pended, please sign if agree with order and send message back to triage to follow up with pt.       Ok to leave a detailed voice message at [phone].

## 2021-11-01 NOTE — TELEPHONE ENCOUNTER
Patient has infusion scheduled:  NOV 4 2021 12:30 PM - Infusion Visit  [facility] - Infusion Nurse; Lab 2 Roe Kenyon RN  [facility]

## 2021-11-02 NOTE — TELEPHONE ENCOUNTER
Called patient, notified patient that order was placed for PCR. Gave them number [phone] to schedule covid test.    Sarah Kenyon RN  [facility]

## 2021-11-04 NOTE — PROGRESS NOTES
Infusion Nursing Note:  Justyn Olivas presents today for Injectafer.    Patient seen by provider today: No   present during visit today: Not Applicable.    Note:  Please warm up arms before attempting to place an IV. Difficult IV start.      Intravenous Access:  Peripheral IV placed.    Treatment Conditions:  Not Applicable.      Post Infusion Assessment:  Patient tolerated infusion without incident.  Site patent and intact, free from redness, edema or discomfort.  No evidence of extravasations.  Access discontinued per protocol.       Discharge Plan:   Discharge instructions reviewed with: Patient.  Patient and/or family verbalized understanding of discharge instructions and all questions answered.  AVS to patient via [employer].  Patient will return 11/11/21 for next appointment.   Patient discharged in stable condition accompanied by: self.  Departure Mode: Ambulatory.      Minerva Beltrán RN

## 2021-11-11 NOTE — PROGRESS NOTES
Infusion Nursing Note:  Justyn Olivas presents today for injectafer 2/2.    Patient seen by provider today: No   present during visit today: Not Applicable.    Note: Patient asked about his aranesp orders. He is overdue and scheduled for the next two, due 3 weeks apart.      Intravenous Access:  Peripheral IV placed.    Treatment Conditions:  Not Applicable.      Post Infusion Assessment:  Patient tolerated infusion without incident.  Patient observed for 30 minutes post injectafer per protocol.  Site patent and intact, free from redness, edema or discomfort.  No evidence of extravasations.  Access discontinued per protocol.       Discharge Plan:   AVS to patient via MYCHART.  Patient will return as [facility] for next appointment.   Patient discharged in stable condition accompanied by: self.  Departure Mode: Ambulatory with cane.      Zay Rosales RN

## 2021-11-12 NOTE — PROGRESS NOTES
Clinic Care Coordination Contact    Follow Up Progress Note      Assessment: Patient states that he is concerned about his blood glucose levels as they have progressively lower over the last few weeks. Patient states that he administers 50 units of Lantus at bedtime and his BG has been 42-60 in the morning when he wakes up. Patient is also following sliding scale for Humalog 5-15 units 3x a day before meals. Patient states that he is symptomatic during episodes of hypoglycemia, and will take glucose tabs as his only intervention. Patient states that after meals his BG has been 69-90. His highest BG over the past few weeks has been 100. Patient is wondering if he should decrease his Lantus and possibly Humalog doses? Patient is concerned about going into the weekend RN CC will route chart with high priority to PCP to further advise the patient. Patient verbalized understanding.     No other new need for additional support or resources identified at this time.     Care Gaps:    Health Maintenance Due   Topic Date Due     HF ACTION PLAN  Never done     COPD ACTION PLAN  Never done     DEPRESSION ACTION PLAN  Never done     PHQ-9  Never done     MICROALBUMIN  05/10/2020     MEDICARE ANNUAL WELLNESS VISIT  02/10/2021     DIABETIC FOOT EXAM  02/10/2021       Scheduled to continued to be addressed during on going PCP follow up appointments.       Goals addressed this encounter:   Goals Addressed                    This Visit's Progress       1. Improve chronic symptoms (pt-stated)   90%      Goal Statement: I will verbalize an increase in my strength and mobility and correct knowledge of adequate management of my chronic health conditions to maintain my health and wellness in preventing hospital readmission.   Date Goal set: 12/24/20; Updated/modified 04/02/2021  Barriers: Multiple health concerns.  Strengths: Motivated and engaged in care coordination.   Date to Achieve By: 9/1/2021  Patient expressed understanding of  goal: Yes    Action steps to achieve this goal:  1. I will work with Physical Therapy to build my strength and mobility.   2. I will follow up with my providers as scheduled/recommended. (Primary Care Provider, [facility], sleep studies TBD, Dr. Ariana PARSON, [facility] hematology/oncology, nephrology)   3. I will take my medications as prescribed.   4. I will continue monitoring my blood sugars, blood pressures, weight daily as recommended.  5. I will use my CHF action plan to monitor/manage my symptoms.   6. I will follow care team recommendations of fluid restriction, diabetic and cardiac diet.   7. I will use my incentive spirometer as directed and recommended by my care team.   8. I will continue to work with care coordination for any additional resources and support.  9. I will contact my care team with questions, concerns, support needs. I will use the clinic as a resource and I understand I can contact my clinic with 24/7 after hours services available. Care Coordinator will remain available as needed.           2. Pain Management (pt-stated)   60%      Goal Statement: I would like to address and understand the treatment plan for my upper right quadrant abdominal pain.   Date Goal set: 8/6/21  Barriers: Unknown etiology   Strengths: Patient is motivated  Date to Achieve By: 12/31/21  Patient expressed understanding of goal: yes  Action steps to achieve this goal:  1. I will have Hepatobiliary scan on 8/18/21  2. I will follow up with my general surgery after scan  3. I will follow up with PCP 8/23/21 or sooner if he is able to make an ecception to his schedule  4. I will continue to work with care coordination for any additional resources and support              Intervention/Education provided during outreach: Chronic disease management and support     Outreach Frequency: 2 weeks    Plan: RN Care Coordinator will follow up in 2 weeks.     Hellen Chaidez RN Care Coordinator  [facility]  [employer]  Email: [email or web address]  Phone: [phone]

## 2021-11-12 NOTE — TELEPHONE ENCOUNTER
Patient is wondering if he should decrease his Lantus and possibly Humalog doses?       Yes, please notify pt that his Lantus insulin medication dose has been decreased from 50 to 20 units at bedtime.     Pt was called with Lantus dose change from 50 to 20 units at night. As advice on doctor Digna's message above.  He asked what he should do about Humalog? Should he continue with current dose?     HUMALOG KWIKPEN) 100 UNIT/ML (1 unit dial) KWIKPENSig - Route: Inject 5-15 Units Subcutaneous 3 times daily

## 2021-11-12 NOTE — TELEPHONE ENCOUNTER
Left message to return call to Triage RN as soon as possible.    Sarah Kenyon, RN  [facility]

## 2021-11-12 NOTE — TELEPHONE ENCOUNTER
Triage called doctor Digna. He advised pt hold Humalog over the weekend and call clinic back Monday with BS update. Pt verbalized agreement with plan.     Pt is aware he is to decrease his Lantus dose from 50 to 20 units at night.

## 2021-11-12 NOTE — TELEPHONE ENCOUNTER
Meagan Sawyer MD Berry, Abigail R, RN; [facility] Triage 35 minutes ago (2:22 PM)     X    Patient is wondering if he should decrease his Lantus and possibly Humalog doses?       Yes, please notify pt that his Lantus insulin medication dose has been decreased from 50 to 20 units at bedtime.           Namita Chaidez RN [facility], Christian Becerril MD 52 minutes ago (2:05 PM)     AB    Hi there,     Patient states that he is concerned about his blood glucose levels as they have progressively lower over the last few weeks. Patient states that he administers 50 units of Lantus at bedtime and his BG has been 42-60 in the morning when he wakes up. Patient is also following sliding scale for Humalog 5-15 units 3x a day before meals. Patient states that he is symptomatic during episodes of hypoglycemia, and will take glucose tabs as his only intervention. Patient states that after meals his BG has been 69-90. His highest BG over the past few weeks has been 100. Patient is wondering if he should decrease his Lantus and possibly Humalog doses? Patient is concerned about going into the weekend and requests a call back this afternoon if possible.     Thank you!     Hellen Chaidez RN Care Coordinator

## 2021-11-16 NOTE — PROGRESS NOTES
Medical Assistant Note:  Justyn Olivas presents today for blood draw.    Patient seen by provider today: No.   present during visit today: Not Applicable.    Concerns: No Concerns.    Procedure:  Lab draw site: lac, Needle type: bf, Gauge: 21.    Post Assessment:  Labs drawn without difficulty: Yes.    Discharge Plan:  Departure Mode: Ambulatory.    Face to Face Time: 5 min.    Alicia Alex, CMA

## 2021-11-16 NOTE — PROGRESS NOTES
Infusion Nursing Note:  Justyn Olivas presents today for aranesp.    Patient seen by provider today: No   present during visit today: Not Applicable.    Note: N/A.      Intravenous Access:  No Intravenous access/labs at this visit.    Treatment Conditions:  Lab Results   Component Value Date    HGB 7.8 (L) 11/16/2021    WBC 7.7 10/26/2021    ANEU 4.8 06/17/2021    ANEUTAUTO 5.1 10/13/2021     10/26/2021      Results reviewed, labs MET treatment parameters, ok to proceed with treatment.      Post Infusion Assessment:  Patient tolerated injection without incident.  Site patent and intact, free from redness, edema or discomfort.       Discharge Plan:   Discharge instructions reviewed with: Patient.  Patient and/or family verbalized understanding of discharge instructions and all questions answered.  AVS to patient via [employer].  Patient will return in 3 weeks for next appointment.   Patient discharged in stable condition accompanied by: self.  Departure Mode: Ambulatory.      Lizzeth Hogan RN

## 2021-11-29 NOTE — PROGRESS NOTES
Clinic Care Coordination Contact    Follow Up Progress Note      Assessment: RN CC called and spoke with patient. Kamran states that he has had two iron infusions and two Aranesp injections and has not noted much of a change in chronic anemia symptoms (fatgiue). Patient expressed feelings frustration. Discussed chronic anemia management and how it can take time to notice improvement.     Patient states that since last RN CC outreach his Lantus dose has been decreased to 20 units. Kamran states that this has improved his BG levels and average readings are 110-115. Patient denies any episodes of hypoglycemia.     Patient still notes issues with his balance and will be starting out patient PT this week. Patient states that his weight is stable, and denies any chest pain, or SOB with activity like climbing the stairs.     No need need for additional support or resources identified.           Care Gaps:    Health Maintenance Due   Topic Date Due     HF ACTION PLAN  Never done     COPD ACTION PLAN  Never done     DEPRESSION ACTION PLAN  Never done     PHQ-9  Never done     MICROALBUMIN  05/10/2020     MEDICARE ANNUAL WELLNESS VISIT  02/10/2021     DIABETIC FOOT EXAM  02/10/2021       Scheduled to be continued to be addressed at on going PCP f/u      Goals addressed this encounter:   Goals Addressed                    This Visit's Progress       1. Improve chronic symptoms (pt-stated)   90%      Goal Statement: I will verbalize an increase in my strength and mobility and correct knowledge of adequate management of my chronic health conditions to maintain my health and wellness in preventing hospital readmission.   Date Goal set: 12/24/20; Updated/modified 04/02/2021  Barriers: Multiple health concerns.  Strengths: Motivated and engaged in care coordination.   Date to Achieve By: 9/1/2021  Patient expressed understanding of goal: Yes    Action steps to achieve this goal:  1. I will work with Physical Therapy to build my strength  and mobility.   2. I will follow up with my providers as scheduled/recommended. (Primary Care Provider, [facility], sleep studies TBD, Dr. Ariana PARSON, [facility] hematology/oncology, nephrology)   3. I will take my medications as prescribed.   4. I will continue monitoring my blood sugars, blood pressures, weight daily as recommended.  5. I will use my CHF action plan to monitor/manage my symptoms.   6. I will follow care team recommendations of fluid restriction, diabetic and cardiac diet.   7. I will use my incentive spirometer as directed and recommended by my care team.   8. I will continue to work with care coordination for any additional resources and support.  9. I will contact my care team with questions, concerns, support needs. I will use the clinic as a resource and I understand I can contact my clinic with 24/7 after hours services available. Care Coordinator will remain available as needed.           2. Pain Management (pt-stated)   70%      Goal Statement: I would like to address and understand the treatment plan for my upper right quadrant abdominal pain.   Date Goal set: 8/6/21  Barriers: Unknown etiology   Strengths: Patient is motivated  Date to Achieve By: 12/31/21  Patient expressed understanding of goal: yes  Action steps to achieve this goal:  1. I will have Hepatobiliary scan on 8/18/21  2. I will follow up with my general surgery after scan  3. I will follow up with PCP 8/23/21 or sooner if he is able to make an ecception to his schedule  4. I will continue to work with care coordination for any additional resources and support              Intervention/Education provided during outreach: Chronic disease management and support.      Outreach Frequency: 2 weeks    Plan: RN Care Coordinator will follow up in two weeks.     Hellen Chaidez RN Care Coordinator  [facility]  Email: [email or web address]  Phone: [phone]

## 2021-12-06 NOTE — TELEPHONE ENCOUNTER
Please see patient's mychart:    Requesting a name/recommendation for general surgeon.     Please reply back to patient, route to triage follow up, or route to team coordinators to have patient schedule an appointment.     Sarah Kenyon RN  [facility]

## 2021-12-07 NOTE — TELEPHONE ENCOUNTER
Will call patient to schedule office visit in order to discuss his concerns    Danica Hill RN-BSN

## 2021-12-07 NOTE — PROGRESS NOTES
Infusion Nursing Note:  Justyn Olivas presents today for aranesp.    Patient seen by provider today: No   present during visit today: Not Applicable.    Note: Patient continues with fatigue and overall weakness. States no significant change recently but discouraged that he is not feeling stronger yet. Hgb improved today to 8.3 from 7.8 previously. Encouraged patient to follow up with his provider if no improvement in symptoms, verbalized understanding.    Intravenous Access:  No Intravenous access/labs at this visit.    Treatment Conditions:  Lab Results   Component Value Date    HGB 8.3 (L) 12/07/2021    WBC 7.7 10/26/2021    ANEU 4.8 06/17/2021    ANEUTAUTO 5.1 10/13/2021     10/26/2021      Results reviewed, labs MET treatment parameters, ok to proceed with treatment.      Post Infusion Assessment:  Patient tolerated injection without incident.  Site patent and intact, free from redness, edema or discomfort.       Discharge Plan:   Discharge instructions reviewed with: Patient.  Patient and/or family verbalized understanding of discharge instructions and all questions answered.  AVS to patient via [email or web address].  Patient will return 12/28/21 for next appointment.   Patient discharged in stable condition accompanied by: self.  Departure Mode: Ambulatory.      Felicia Ryan RN

## 2021-12-07 NOTE — TELEPHONE ENCOUNTER
Patient had lap justin 9/2/21 JDS and is still having quite a bit of pain since surgery that has stayed pretty much the same.  When laying on his side it hurts a lot    Phone: [phone]  Message ok

## 2021-12-08 NOTE — TELEPHONE ENCOUNTER
Patient concerned that he is not doing well. He is in infusion therapy. Legs are week and balance is off. He is having PT today.     Patient feels his is anemia is giving him his symptoms.     Patient requesting  appointment with you and wants to only see you. in person .       No available appointments  until  Friday. Can you do a phone call with him on Friday or would you prefer to see him in person.     Gabriela Amaral RN  -[facility]

## 2021-12-09 NOTE — PROGRESS NOTES
12/08/21 1118   Quick Adds   Quick Adds Certification   Type of Visit Initial OP PT Evaluation   General Information   Start of Care Date 12/08/21   Referring Physician Dr. 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  He indicates a mechanical fall with head injury on Sept. 29th 2021, also fell again about a month afterward (fell out of bed).  Has undergone recent iron transfusions.  PMH including iron deficiency anemia, CKD, DMII, CVA with balance impairment, had gall bladder removed 9/2/21.  He indicates more significant decline in balance and confidence since recent falls, now using SEC recently.     Prior level of function comment Earlier this summer, was able to walk several days/wk outside using trekking poles, used to walk his dog but not doing that currently.     Current Community Support   (spouse)   Patient role/Employment history Retired   [facility]   Home/Community Accessibility Comments 2 GILMAR with railing, walk in shower, railing and SEC on stairs   Current Assistive Devices Standard Cane;Walking Poles   Assistive Devices Comments SEC, trekking poles   Patient/Family Goals Statement Improve balance/stability, reduce risk for falls, patient has not golfed for past two years, would like to possibly return to golfing   Fall Risk Screen   Fall screen completed by PT   Have you fallen 2 or more times in the past year? Yes   Have  you fallen and had an injury in the past year? Yes   Timed Up and Go score (seconds) 20.28   Is patient a fall risk? Yes;Department fall risk interventions implemented   Fall screen comments fall risk per TUG and + fall hx   Abuse Screen (yes response referral indicated)   Feels Unsafe at Home or Work/School no   Feels Threatened by Someone no   Does Anyone Try to Keep You From Having Contact with Others or Doing Things Outside Your Home? no   Physical Signs of Abuse Present no   Pain   Patient currently in pain Yes   Pain location R abdomen s/p gall bladder surgery   Pain rating 1/10   Vitals Signs   Heart Rate 88   SpO2 98   Blood Pressure 108/50   Cognitive Status Examination   Orientation orientation to person, place and time   Integumentary   Integumentary Comments mild edema noted in L>R lower leg/ankle region   Posture   Posture Forward head position;Protracted shoulders   Range of Motion (ROM)   ROM Comment WFL all extremities, although tightness noted B hamstrings, end range PROM DF, reduced AROM trunk rotation and extension   Strength   Strength Comments Functional weakness and deconditioning noted.  Grossly 4+/5 B ankle DF, 4+ to 5/5 B knee ext, 3+ to 4-/5 seated hip flexion, losing balance posteriorly due to core weakness.  Sidelying hip ABD 3+/5.  Unable to stand from chair without UE support.   Bed Mobility   Bed Mobility Comments Independent, but difficulty with transitions due to core weakness   Transfer Skills   Transfer Comments Modified independent sit <> stand transitions with UE support on arm rests, tendency for posterior lean/LOB.   Gait   Gait Comments Independent ambulation but slowed speed, path deviations, reduced push off and foot clearance bilaterally, SEC.  Unable to effectively change speeds.   Gait Special Tests   Gait Special Tests 25 FOOT TIMED WALK   Gait Special Tests 25 Foot Timed Walk   Seconds 13.9   Comments 0.55 m/sec self selected gait speed with SEC. Appears to ambulate  slightly faster with use of B trekking poles.   Balance   Balance Comments Impaired static standing balance and gait stability, compensating with SEC currently but does have trekking poles at home.  Appears more stable with B support from trekking poles.  Tendency for posterior lean/sway with standing activities, reduced ankle strategy.   Balance Special Tests   Balance Special Tests Timed up and go;Sit to stand reps;Modified CTSIB Conditions   Balance Special Tests Timed Up and Go   Seconds 20.28 Seconds   Comments SEC, mild LOB, staggers but self corrects   Balance Special Tests Modified CTSIB Conditions   Condition 1, seconds 30 Seconds  (difficulty attaining position, moderate sway)   Condition 2, seconds 4 Seconds   Balance Special Tests Sit to Stand Reps in 30 Seconds   Reps in 30 seconds 0   Height 17   Comments 0 reps no UE support, 6 reps with UE support, limited by fatigue and SOB   Sensory Examination   Sensory Perception Comments pt reports mild numbness in his feet, generally intact sensation    Planned Therapy Interventions   Planned Therapy Interventions balance training;gait training;neuromuscular re-education;ROM;strengthening;stretching;transfer training;manual therapy   Clinical Impression   Criteria for Skilled Therapeutic Interventions Met yes, treatment indicated   PT Diagnosis Balance impairment, deconditioning, and mobility impairment.   Influenced by the following impairments Deconditioning/weakness, impaired balance, impaired posture, reduced ROM, swelling in LEs   Functional limitations due to impairments Impaired safety and tolerance with functional transfers, bed mobility, stairs, gait, elevated fall risk, ADLs, reduced leisure and activity tolerance   Clinical Presentation Evolving/Changing   Clinical Presentation Rationale multiple body systems involved, age, PMH, fall risk   Clinical Decision Making (Complexity) Moderate complexity   Therapy Frequency 1 time/week   Predicted Duration  of Therapy Intervention (days/wks) 12 weeks   Risk & Benefits of therapy have been explained Yes   Patient, Family & other staff in agreement with plan of care Yes   Education Assessment   Barriers to Learning No barriers   GOALS   PT Eval Goals 1;2;3;4;5   Goal 1   Goal Identifier 30'' STS Strength   Goal Description Pt will demonstrate ability to perform 5 consecutive reps STS from chair without UE support indicating improving strength, balance, and reduced fall risk (baseline 0 reps).   Target Date 03/08/22   Goal 2   Goal Identifier TUG   Goal Description Pt will demonstrate TUG in < 13.5'' indicating improving strength, gait stability, and reduced fall risk (baseline 20.28'').   Target Date 03/08/22   Goal 3   Goal Identifier Gait Speed   Goal Description Pt will demonstrate self selected gait speed >0.9 m/sec indicating improving strength, gait stability and reduced fall risk (baseline 0.55 m/sec).   Target Date 03/08/22   Goal 4   Goal Identifier 6MWT   Goal Description Pt will demonstrate ability to ambulate > 890ft during 6MWT without rest break indicating improving functional endurance and ability to navigate community environments (baseline 295', 1:49).   Target Date 03/08/22   Goal 5   Goal Identifier Functional Balance   Goal Description Pt will demonstrate ability to safely reach to floor/cupboards, lifting and carrying items up to 10 lbs without LOB for improved safety with ADLs, household tasks, and improved QOL.   Target Date 03/08/22   Total Evaluation Time   PT Eval, Moderate Complexity Minutes (99226) 40   Therapy Certification   Certification date from 12/08/21   Certification date to 03/08/22   Medical Diagnosis Cerebrovascular accident (CVA) due to embolism of cerebral artery (H) I63.40, Primary osteoarthritis of both hips M16.0, Loss of balance R26.89

## 2021-12-09 NOTE — PROGRESS NOTES
[facility]                                                                                   OUTPATIENT PHYSICAL THERAPY FUNCTIONAL EVALUATION  PLAN OF TREATMENT FOR OUTPATIENT REHABILITATION  (COMPLETE FOR INITIAL CLAIMS ONLY)  Patient's Last Name, First Name, M.I.  YOB: 1940  Justyn Olivas     Provider's Name   [facility]   Medical Record No.  [id number]     Start of Care Date:  12/08/21   Onset Date:  10/26/21 (date of order used)   Type:     _X__PT   ____OT  ____SLP Medical Diagnosis:  Cerebrovascular accident (CVA) due to embolism of cerebral artery (H) I63.40, Primary osteoarthritis of both hips M16.0, Loss of balance R26.89      PT Diagnosis:  Balance impairment, deconditioning, and mobility impairment. Visits from SOC:  1                              __________________________________________________________________________________  Plan of Treatment/Functional Goals:  balance training,gait training,neuromuscular re-education,ROM,strengthening,stretching,transfer training,manual therapy           GOALS  30'' STS Strength  Pt will demonstrate ability to perform 5 consecutive reps STS from chair without UE support indicating improving strength, balance, and reduced fall risk (baseline 0 reps).  03/08/22    TUG  Pt will demonstrate TUG in < 13.5'' indicating improving strength, gait stability, and reduced fall risk (baseline 20.28'').  03/08/22    Gait Speed  Pt will demonstrate self selected gait speed >0.9 m/sec indicating improving strength, gait stability and reduced fall risk (baseline 0.55 m/sec).  03/08/22    6MWT  Pt will demonstrate ability to ambulate > 890ft during 6MWT without rest break indicating improving functional endurance and ability to navigate community environments (baseline 295', 1:49).  03/08/22    Functional Balance  Pt will  demonstrate ability to safely reach to floor/cupboards, lifting and carrying items up to 10 lbs without LOB for improved safety with ADLs, household tasks, and improved QOL.  03/08/22                                     Therapy Frequency:  1 time/week   Predicted Duration of Therapy Intervention:  12 weeks    Bishop Hernández, PT                                    I CERTIFY THE NEED FOR THESE SERVICES FURNISHED UNDER        THIS PLAN OF TREATMENT AND WHILE UNDER MY CARE     (Physician co-signature of this document indicates review and certification of the therapy plan).                Certification Date From:  12/08/21   Certification Date To:  03/08/22    Referring Provider:  Dr. Davidson    Initial Assessment  See Epic Evaluation- Start of Care Date: 12/08/21

## 2021-12-14 NOTE — PROGRESS NOTES
Clinic Care Coordination Contact    Follow Up Progress Note      Assessment: Patient states that his abdominal pain is his main health concern at this time. Patient complains of increased pain when he is laying in bed, particularly on his side. Patient is following up with surgeon's office later today to address further.     Patient states that his BS have been between 90 - 120, and continue to remain stable with the decrease in lantus in November.    Patient states he would like improve his strength and mobility. Kamran started outpatient PT, and will attend weekly for the next 12 weeks.    Patient states that his weight has also been stable around 205 lbs. Patient states that he only become SOB when he climbs the stairs. Denies any new need for additional support or resources.     Care Gaps:    Health Maintenance Due   Topic Date Due     HF ACTION PLAN  Never done     COPD ACTION PLAN  Never done     DEPRESSION ACTION PLAN  Never done     PHQ-9  Never done     MICROALBUMIN  05/10/2020     MEDICARE ANNUAL WELLNESS VISIT  02/10/2021     DIABETIC FOOT EXAM  02/10/2021       Scheduled to continued to be addressed at on going PCP follow up appointments.       Goals addressed this encounter:   Goals Addressed                    This Visit's Progress       1. Improve chronic symptoms (pt-stated)   90%      Goal Statement: I will verbalize an increase in my strength and mobility and correct knowledge of adequate management of my chronic health conditions to maintain my health and wellness in preventing hospital readmission.   Date Goal set: 12/24/20; Updated/modified: 12/14/21  Barriers: Multiple health concerns.  Strengths: Motivated and engaged in care coordination.   Date to Achieve By: Updated 6/1/22  Patient expressed understanding of goal: Yes    Action steps to achieve this goal:  1. I will work with Physical Therapy to build my strength and mobility.   2. I will follow up with my providers as scheduled/recommended.  (Primary Care Provider, [facility], sleep studies TBD, Dr. Ariana PARSON, [facility] hematology/oncology, nephrology)   3. I will take my medications as prescribed.   4. I will continue monitoring my blood sugars, blood pressures, weight daily as recommended.  5. I will use my CHF action plan to monitor/manage my symptoms.   6. I will follow care team recommendations of fluid restriction, diabetic and cardiac diet.   7. I will use my incentive spirometer as directed and recommended by my care team.   8. I will continue to work with care coordination for any additional resources and support.  9. I will contact my care team with questions, concerns, support needs. I will use the clinic as a resource and I understand I can contact my clinic with 24/7 after hours services available. Care Coordinator will remain available as needed.           2. Pain Management (pt-stated)   70%      Goal Statement: I would like to address and understand the treatment plan for my upper right quadrant abdominal pain.   Date Goal set: 8/6/21  Barriers: Unknown etiology   Strengths: Patient is motivated  Date to Achieve By: Updated: 6/1/22  Patient expressed understanding of goal: yes  Action steps to achieve this goal:  1. I will have Hepatobiliary scan on 8/18/21  2. I will follow up with my general surgery after scan  3. I will follow up with PCP 8/23/21 or sooner if he is able to make an ecception to his schedule  4. I will continue to work with care coordination for any additional resources and support              Intervention/Education provided during outreach: Chronic disease management and support.      Outreach Frequency: 2 weeks    Plan: RN CC will send updated complex care plan via the mail.  RN Care Coordinator will follow up in two weeks.     Hellen Chaidez RN Care Coordinator  [facility]  Email: [email or web address]  Phone: [phone]

## 2021-12-14 NOTE — LETTER
December 16, 2021          No referring provider defined for this encounter.      RE:   Justyn Olivas 1940      Dear Colleague,    Thank you for referring your patient, Justyn Olivas, to [facility] at [facility]. Please see a copy of my visit note below.    Surgery Note     Justyn Olivas presents with ongoing right sided abdominal pain.  He is well-known to me as I initially met him in the hospital when he was complaining of right sided rib pain.  He had an extensive work-up with CT chest/abdomen/pelvis which failed to reveal any abnormalities.  An ultrasound was inconclusive so an HIDA scan was performed showing likely biliary dyskinesia.  He underwent a uneventful laparoscopic cholecystectomy which did show chronic changes.  He initially did well but states his symptoms never went away.  He states this is slightly different than the presenting pain.  He states it can be worse with oral intake.  It hurts the most when he is sleeping or moving around bed.  He has multiple other issues including iron deficiency anemia and instability which she is currently working on.     Abdomen- Soft. Mild TTP in LLQ. No hernia. No masses.     A/P  Justyn presents with ongoing abdominal pain.  He did have a slight decrease in his preoperative discomfort but given the extent of his pain and potential that it was not all related to his gallbladder I feel he should have a further work-up.  I would recommend a CT abdomen/pelvis to assure no other abnormalities are seen either as a complication from surgery.  If the CT is normal I discussed physical therapy or a referral to pain medicine given the longevity of his symptoms.  He is in agreement.    Again, thank you for allowing me to participate in the care of your patient.      Sincerely,      Len Coates MD

## 2021-12-14 NOTE — LETTER
[place] CARE COORDINATION  [address]  [facility] [address]    December 14, 2021        Justyn Olivas  [address] [place] [address]          Dear Kamran,     Attached is an updated Complex Care Plan for your continued enrollment in Care Coordination. Please let us know if you have additional questions, concerns, or goals that we can assist with.    Sincerely,    Hellen Chaidez RN Care Coordinator  [facility]  Email: [email or web address]  Phone: [phone]      [facility]  Patient Centered Plan of Care  About Me:        Patient Name:  Justyn Olivas    YOB: 1940  Age:         81 year old   [place] MRN:    [id number] Telephone Information:  Home Phone [phone]   Mobile [phone]       Address:  [address] [place] [address] Email address:  [email or web address]      Emergency Contact(s)    Name Relationship Lgl Grd Work Phone Home Phone Mobile Phone   1. EBEN OLIVAS* Spouse   [phone]   2. LINH OLIVAS* Son   [phone]   3. OMAR MELISSA Daughter    [phone]           Primary language:  English     needed? No   [place] [facility]:  [phone] op. 1  Other communication barriers:Hearing aides    Preferred Method of Communication:  Mail  Current living arrangement: I live in a private home with family    Mobility Status/ Medical Equipment: Independent    Health Maintenance  Health Maintenance Reviewed: Due/Overdue   Health Maintenance Due   Topic Date Due     HF ACTION PLAN  Never done     COPD ACTION PLAN  Never done     DEPRESSION ACTION PLAN  Never done     PHQ-9  Never done     MICROALBUMIN  05/10/2020     MEDICARE ANNUAL WELLNESS VISIT  02/10/2021     DIABETIC FOOT EXAM  02/10/2021     My Access Plan  Medical Emergency 911   Primary Clinic Line [facility] - [phone]   24 Hour Appointment Line [phone]  or  [phone]) (toll-free)   24 Hour Nurse Line [phone] (toll-free)   Preferred Urgent Care [facility], [phone]     Preferred Hospital [facility] [place]  [phone]     Preferred Pharmacy [id number] - [place], [place] - [address] AT [facility] [address]     Behavioral Health Crisis Line The National Suicide Prevention Lifeline at 1-846.582.8119 or 912             My Care Team Members  Patient Care Team       Relationship Specialty Notifications Start End    Christian Davidson MD PCP - General Internal Medicine  2/9/21     Phone: [phone] Pager: [phone] Fax: [phone] [address] [place] [address]    Esteban Ruiz PA-C Assigned Heart and Vascular Provider   3/17/21     Phone: [phone] Fax: [phone] [address] [place] [address]    Shaniqua Ram PA-C Physician Assistant Cardiovascular Disease  4/14/21     Phone: [phone] Pager: [phone] Fax: [phone] [address] [place] [address]    Brien Greene MD MD Nephrology  4/15/21     Phone: [phone] Pager: [phone] Fax: [phone]        [facility] [address] [place] [address]    Juanita Moore APRN CNP Nurse Practitioner Nephrology  4/15/21     Phone: [phone] Fax: [phone]         [facility] [address] [place] [address]    Kristine Nash MD Assigned Cancer Care Provider   5/9/21     Phone: [phone] Fax: [phone] [address] [place] [address]    Namita Chaidez, RN Lead Care Coordinator  Admissions 7/3/21     Christian Davidson MD Assigned PCP   7/18/21     Phone: [phone] Pager: [phone] Fax: [phone] [address] [place] [address]    Geovanna Lowery, PharmD Pharmacist Pharmacist  10/4/21     Phone: [phone] Fax: [phone]          [address] [facility] [address] [facility] [address]              My Care Plans  Goals        1. Improve chronic symptoms (pt-stated)       Goal Statement: I will verbalize an increase in my strength and mobility and correct knowledge of adequate management of my chronic health conditions to maintain my health and wellness in preventing hospital readmission.   Date Goal set: 12/24/20; Updated/modified: 12/14/21  Barriers: Multiple health concerns.  Strengths: Motivated and engaged in care coordination.   Date to Achieve By: Updated 6/1/22  Patient expressed understanding of goal: Yes    Action steps to achieve this goal:  1. I will work with Physical Therapy to build my strength and mobility.   2. I will follow up with my providers as scheduled/recommended. (Primary Care Provider, [facility], sleep studies TBD, Dr. Ariana PARSON, [facility] hematology/oncology, nephrology)   3. I will take my medications as prescribed.   4. I will continue monitoring my blood sugars, blood pressures, weight daily as recommended.  5. I will use my CHF action plan to monitor/manage my symptoms.   6. I will follow care team recommendations of fluid restriction, diabetic and cardiac diet.   7. I will use my incentive spirometer as directed and recommended by my care team.   8. I will continue to work with care coordination for any additional resources and support.  9. I will contact my care team with questions, concerns, support needs. I will use the clinic as a resource and I understand I can contact my clinic with 24/7 after hours services available. Care Coordinator will remain available as needed.           2. Pain Management (pt-stated)       Goal Statement: I would like to address and understand the treatment plan for my upper right quadrant abdominal pain.   Date Goal set: 8/6/21  Barriers: Unknown etiology   Strengths: Patient is motivated  Date to Achieve By: Updated: 6/1/22  Patient expressed understanding of goal: yes  Action steps to achieve  this goal:  1. I will have Hepatobiliary scan on 8/18/21  2. I will follow up with my general surgery after scan  3. I will follow up with PCP 8/23/21 or sooner if he is able to make an ecception to his schedule  4. I will continue to work with care coordination for any additional resources and support                Advance Care Plans/Directives Type:   No data recorded    Current Medications and Allergies:   Current Outpatient Medications   Medication Instructions     albuterol (PROAIR HFA) 108 (90 Base) MCG/ACT inhaler INHALE 2 PUFFS BY MOUTH FOUR TIMES DAILY AS NEEDED     atorvastatin (LIPITOR) 20 mg, Oral, DAILY     blood glucose (STEVE CONTOUR) test strip TESTING FOUR TIMES DAILY OR AS DIRECTED     carvedilol (COREG) 6.25 mg, Oral, 2 TIMES DAILY WITH MEALS     clopidogrel (PLAVIX) 75 mg, Oral, DAILY     dorzolamide-timolol (COSOPT) 2-0.5 % ophthalmic solution 1 drop, Both Eyes, DAILY     Ferrous Sulfate 324 mg, Oral, 2 TIMES DAILY     fluticasone (FLONASE) 50 MCG/ACT nasal spray 2 sprays, Both Nostrils, DAILY     folic acid-vit B6-vit B12 (FOLGARD) 0.8-10-0.115 MG TABS per tablet 1 tablet, Oral, DAILY, Continue per Nephrology recommendation     insulin glargine (LANTUS SOLOSTAR) 100 UNIT/ML pen ADMINISTER 20 UNITS UNDER THE SKIN AT BEDTIME     insulin lispro (HUMALOG KWIKPEN) 5-15 Units, Subcutaneous, 3 TIMES DAILY BEFORE MEALS, Sliding scale with meals     insulin pen needle (BD PEN NEEDLE FERCHO 2ND GEN) 32G X 4 MM miscellaneous USE AS DIRECTED UP TO FOUR TIMES DAILY     LANTUS SOLOSTAR 100 UNIT/ML soln ADMINISTER 50 UNITS UNDER THE SKIN AT BEDTIME     LANTUS SOLOSTAR 100 UNIT/ML soln ADMINISTER 50 UNITS UNDER THE SKIN AT BEDTIME     LANTUS SOLOSTAR 100 UNIT/ML soln ADMINISTER 50 UNITS UNDER THE SKIN AT BEDTIME     oxyCODONE-acetaminophen (PERCOCET) 5-325 MG tablet 1 tablet, Oral, EVERY 6 HOURS PRN     pantoprazole (PROTONIX) 40 MG EC tablet TAKE 1 TABLET(40 MG) BY MOUTH TWICE DAILY     polyethylene glycol  (MIRALAX) 17 GM/Dose powder 1 capful, Oral, DAILY, For constipation after surgery     potassium chloride ER (K-TAB) 20 MEQ CR tablet 20 mEq, Oral, DAILY     SENNA-docusate sodium (SENNA S) 8.6-50 MG tablet 1 tablet, Oral, 2 TIMES DAILY     sertraline (ZOLOFT) 25 mg, Oral, AT BEDTIME     torsemide (DEMADEX) 10 mg, Oral, DAILY     traZODone (DESYREL) 100 MG tablet TAKE 1 TABLET(100 MG) BY MOUTH AT BEDTIME     vitamin B-12 (CYANOCOBALAMIN) 1,000 mcg, Oral, DAILY, Continue unitl hematology evaluation as borderline low vitamin b12.         Care Coordination Start Date: 12/24/2020   Frequency of Care Coordination: 2 weeks     Form Last Updated: 12/14/2021

## 2021-12-14 NOTE — PROGRESS NOTES
Surgery Note    Justyn Olivas presents with ongoing right sided abdominal pain.  He is well-known to me as I initially met him in the hospital when he was complaining of right sided rib pain.  He had an extensive work-up with CT chest/abdomen/pelvis which failed to reveal any abnormalities.  An ultrasound was inconclusive so an HIDA scan was performed showing likely biliary dyskinesia.  He underwent a uneventful laparoscopic cholecystectomy which did show chronic changes.  He initially did well but states his symptoms never went away.  He states this is slightly different than the presenting pain.  He states it can be worse with oral intake.  It hurts the most when he is sleeping or moving around bed.  He has multiple other issues including iron deficiency anemia and instability which she is currently working on.    Abdomen- Soft. Mild TTP in LLQ. No hernia. No masses.    A/P  Justyn presents with ongoing abdominal pain.  He did have a slight decrease in his preoperative discomfort but given the extent of his pain and potential that it was not all related to his gallbladder I feel he should have a further work-up.  I would recommend a CT abdomen/pelvis to assure no other abnormalities are seen either as a complication from surgery.  If the CT is normal I discussed physical therapy or a referral to pain medicine given the longevity of his symptoms.  He is in agreement.    Thank you for the opportunity to help in his care.    Len Coates M.D.  [facility]  [phone]    Please route or send letter to:  Primary Care Provider (PCP) and Referring Provider

## 2021-12-24 NOTE — TELEPHONE ENCOUNTER
"See triage encounter 12/24/21.  Patient spoke to surgeon Dr Coates at 1010am and was advised to call 911.  Per chart review, patient has not gone to the ER. Writer called patient to reiterate recommendation, patient states\"im not going to do that\", \"im not out of breath now\".    See triage encounter on 12/24/21      Sarah Kenyon RN  [facility]    "

## 2021-12-24 NOTE — TELEPHONE ENCOUNTER
Spoke to patient regarding CT findings. No obvious etiology of his pain identified. He stated he feels weak and tired today. Sounded slightly SOB and feels it is difficult to stand up due to the weakness.  I strongly encouraged patient to go the ER for further evaluation. I instructed him to call 911 if he was unable to get there with his wife.  He agreed to go in for an evaluation.     Len Coates M.D.  [facility]  [phone]

## 2021-12-24 NOTE — TELEPHONE ENCOUNTER
"See triage encounter 12/24/21.  Patient spoke to surgeon Dr. Dr Coates at 1010am and was advised to call 911, Per chart review, patient has not goneto the ER  Writer called patient to reiterate ER recommendation, patient states \"im not going to do that\", states \"im not out of breath now\".     This morning was different, I got up and had my cane and I went to the bathroom, and but I felt that my legs were weak. Coming out of the bathroom, I was having real problems.  Writer asked to clarify, states they had issues with standing up , wife helped patient to standup and get to the bed.   Writer asked if patient has gotten around since, patient did not answer, states they will try to stand now while on the phone. Writer advised that patient should have wife near by to make sure patient doesn't fall.   \"I got up out of the chair, took 4-5 steps\", \"im still a bit weak, and its worse then yesterday\" in the calf, bilateral legs.     \"patient states I want want to talk to my wife\" before going to ER. Writer asked if writer can speak to patients wife, writer spoke to Peg, reviewed recommendation that Dr Coates and writer advised ER and if patient unable to walk need to call 911, due to risk for fall and patient on blood thinners. Patient's wife expressed verbal understanding.     Sarah Kenyon RN  [facility]    Reason for Disposition    SEVERE weakness (i.e., unable to walk or barely able to walk, requires support) and new onset or worsening    Additional Information    Negative: Severe difficulty breathing (e.g., struggling for each breath, speaks in single words)    Negative: Shock suspected (e.g., cold/pale/clammy skin, too weak to stand, low BP, rapid pulse)    Negative: Difficult to awaken or acting confused (e.g., disoriented, slurred speech)    Negative: Fainted > 15 minutes ago and still feels too weak or dizzy to stand    Protocols used: WEAKNESS (GENERALIZED) AND FATIGUE-A-OH      "

## 2021-12-28 NOTE — PROGRESS NOTES
Medical Assistant Note:  Justyn Olivas presents today for lab draw.    Patient seen by provider today: No.   present during visit today: Not Applicable.    Concerns: No Concerns.    Procedure:  Lab draw site: LAC, Needle type: BF, Gauge: 21. Gauze and coban applied    Post Assessment:  Labs drawn without difficulty: Yes.    Discharge Plan:  Departure Mode: Ambulatory.    Face to Face Time: 5.    Kathie Alas CMA

## 2021-12-28 NOTE — PROGRESS NOTES
Clinic Care Coordination Contact  [facility]/Voicemail     Clinical Data: Care Coordinator Outreach  Outreach attempted x 1.  Left message on patient's voicemail with call back information and requested return call.  Plan: Care Coordinator will try to reach patient again in 10 business days.     Per chart review, patient had a telephone interaction with Gen Surgery for extreme weakness. It was recommended he go to the ED. He did not follow through. He was in the oncology clinic today for a scheduled blood draw.     Jenise Li RN, Casual Care Coordinator  [facility] Care Coordination  [phone]

## 2021-12-28 NOTE — PROGRESS NOTES
Infusion Nursing Note:  Justyn Olivas presents today for Aranesp.    Patient seen by provider today: No   present during visit today: Not Applicable.    Note: N/A.      Intravenous Access:  No Intravenous access/labs at this visit.    Treatment Conditions:  Lab Results   Component Value Date    HGB 8.2 (L) 12/28/2021    WBC 7.7 10/26/2021    ANEU 4.8 06/17/2021    ANEUTAUTO 5.1 10/13/2021     10/26/2021      Results reviewed, labs MET treatment parameters, ok to proceed with treatment.      Post Infusion Assessment:  Patient tolerated injection without incident.  Site patent and intact, free from redness, edema or discomfort.       Discharge Plan:   Discharge instructions reviewed with: Patient.  Patient and/or family verbalized understanding of discharge instructions and all questions answered.  AVS to patient via [employer].  Patient will return 1/18/21 for next appointment.   Patient discharged in stable condition accompanied by: self.  Departure Mode: Ambulatory with cane.      Tod Jo RN

## 2022-01-01 ENCOUNTER — PATIENT OUTREACH (OUTPATIENT)
Dept: ONCOLOGY | Facility: CLINIC | Age: 82
End: 2022-01-01

## 2022-01-01 ENCOUNTER — APPOINTMENT (OUTPATIENT)
Dept: CT IMAGING | Facility: CLINIC | Age: 82
DRG: 291 | End: 2022-01-01
Attending: EMERGENCY MEDICINE
Payer: MEDICARE

## 2022-01-01 ENCOUNTER — DOCUMENTATION ONLY (OUTPATIENT)
Dept: INFUSION THERAPY | Facility: CLINIC | Age: 82
End: 2022-01-01
Payer: MEDICARE

## 2022-01-01 ENCOUNTER — APPOINTMENT (OUTPATIENT)
Dept: OCCUPATIONAL THERAPY | Facility: CLINIC | Age: 82
DRG: 291 | End: 2022-01-01
Attending: INTERNAL MEDICINE
Payer: MEDICARE

## 2022-01-01 ENCOUNTER — PATIENT OUTREACH (OUTPATIENT)
Dept: NURSING | Facility: CLINIC | Age: 82
End: 2022-01-01
Payer: MEDICARE

## 2022-01-01 ENCOUNTER — HOSPITAL ENCOUNTER (INPATIENT)
Facility: CLINIC | Age: 82
LOS: 20 days | Discharge: HOSPICE | DRG: 291 | End: 2022-05-30
Attending: EMERGENCY MEDICINE | Admitting: INTERNAL MEDICINE
Payer: MEDICARE

## 2022-01-01 ENCOUNTER — PATIENT OUTREACH (OUTPATIENT)
Dept: CARE COORDINATION | Facility: CLINIC | Age: 82
End: 2022-01-01
Payer: MEDICARE

## 2022-01-01 ENCOUNTER — ALLIED HEALTH/NURSE VISIT (OUTPATIENT)
Dept: INFUSION THERAPY | Facility: CLINIC | Age: 82
End: 2022-01-01
Attending: SURGERY
Payer: MEDICARE

## 2022-01-01 ENCOUNTER — TELEPHONE (OUTPATIENT)
Dept: ONCOLOGY | Facility: CLINIC | Age: 82
End: 2022-01-01

## 2022-01-01 ENCOUNTER — HOSPITAL ENCOUNTER (OUTPATIENT)
Dept: PHYSICAL THERAPY | Facility: CLINIC | Age: 82
Setting detail: THERAPIES SERIES
End: 2022-01-20
Attending: INTERNAL MEDICINE
Payer: MEDICARE

## 2022-01-01 ENCOUNTER — LAB (OUTPATIENT)
Dept: INFUSION THERAPY | Facility: CLINIC | Age: 82
End: 2022-01-01
Attending: INTERNAL MEDICINE
Payer: MEDICARE

## 2022-01-01 ENCOUNTER — APPOINTMENT (OUTPATIENT)
Dept: OCCUPATIONAL THERAPY | Facility: CLINIC | Age: 82
DRG: 291 | End: 2022-01-01
Payer: MEDICARE

## 2022-01-01 ENCOUNTER — MYC MEDICAL ADVICE (OUTPATIENT)
Dept: FAMILY MEDICINE | Facility: CLINIC | Age: 82
End: 2022-01-01

## 2022-01-01 ENCOUNTER — APPOINTMENT (OUTPATIENT)
Dept: CARDIOLOGY | Facility: CLINIC | Age: 82
DRG: 811 | End: 2022-01-01
Attending: HOSPITALIST
Payer: MEDICARE

## 2022-01-01 ENCOUNTER — LAB (OUTPATIENT)
Dept: LAB | Facility: CLINIC | Age: 82
End: 2022-01-01
Payer: MEDICARE

## 2022-01-01 ENCOUNTER — HOSPITAL ENCOUNTER (OUTPATIENT)
Facility: CLINIC | Age: 82
Discharge: HOME | End: 2022-05-03
Admitting: INTERNAL MEDICINE
Payer: MEDICARE

## 2022-01-01 ENCOUNTER — APPOINTMENT (OUTPATIENT)
Dept: GENERAL RADIOLOGY | Facility: CLINIC | Age: 82
DRG: 811 | End: 2022-01-01
Attending: EMERGENCY MEDICINE
Payer: MEDICARE

## 2022-01-01 ENCOUNTER — OFFICE VISIT (OUTPATIENT)
Dept: CARDIOLOGY | Facility: CLINIC | Age: 82
End: 2022-01-01
Payer: MEDICARE

## 2022-01-01 ENCOUNTER — TRANSFERRED RECORDS (OUTPATIENT)
Dept: MULTI SPECIALTY CLINIC | Facility: CLINIC | Age: 82
End: 2022-01-01

## 2022-01-01 ENCOUNTER — OFFICE VISIT (OUTPATIENT)
Dept: FAMILY MEDICINE | Facility: CLINIC | Age: 82
End: 2022-01-01
Payer: MEDICARE

## 2022-01-01 ENCOUNTER — NURSE TRIAGE (OUTPATIENT)
Dept: FAMILY MEDICINE | Facility: CLINIC | Age: 82
End: 2022-01-01
Payer: MEDICARE

## 2022-01-01 ENCOUNTER — DOCUMENTATION ONLY (OUTPATIENT)
Dept: CARDIOLOGY | Facility: CLINIC | Age: 82
End: 2022-01-01
Payer: MEDICARE

## 2022-01-01 ENCOUNTER — PATIENT OUTREACH (OUTPATIENT)
Dept: CARE COORDINATION | Facility: CLINIC | Age: 82
End: 2022-01-01

## 2022-01-01 ENCOUNTER — MYC MEDICAL ADVICE (OUTPATIENT)
Dept: CARDIOLOGY | Facility: CLINIC | Age: 82
End: 2022-01-01

## 2022-01-01 ENCOUNTER — INFUSION THERAPY VISIT (OUTPATIENT)
Dept: INFUSION THERAPY | Facility: CLINIC | Age: 82
End: 2022-01-01
Attending: NURSE PRACTITIONER
Payer: MEDICARE

## 2022-01-01 ENCOUNTER — APPOINTMENT (OUTPATIENT)
Dept: GENERAL RADIOLOGY | Facility: CLINIC | Age: 82
DRG: 291 | End: 2022-01-01
Attending: EMERGENCY MEDICINE
Payer: MEDICARE

## 2022-01-01 ENCOUNTER — APPOINTMENT (OUTPATIENT)
Dept: LAB | Facility: CLINIC | Age: 82
End: 2022-01-01
Payer: MEDICARE

## 2022-01-01 ENCOUNTER — TELEPHONE (OUTPATIENT)
Dept: SPIRITUAL SERVICES | Facility: CLINIC | Age: 82
End: 2022-01-01
Payer: MEDICARE

## 2022-01-01 ENCOUNTER — CARE COORDINATION (OUTPATIENT)
Dept: CARDIOLOGY | Facility: CLINIC | Age: 82
End: 2022-01-01

## 2022-01-01 ENCOUNTER — TRANSFERRED RECORDS (OUTPATIENT)
Dept: HEALTH INFORMATION MANAGEMENT | Facility: CLINIC | Age: 82
End: 2022-01-01

## 2022-01-01 ENCOUNTER — HOSPITAL ENCOUNTER (OUTPATIENT)
Dept: PHYSICAL THERAPY | Facility: CLINIC | Age: 82
Setting detail: THERAPIES SERIES
End: 2022-01-11
Attending: INTERNAL MEDICINE
Payer: MEDICARE

## 2022-01-01 ENCOUNTER — MEDICAL CORRESPONDENCE (OUTPATIENT)
Dept: HEALTH INFORMATION MANAGEMENT | Facility: CLINIC | Age: 82
End: 2022-01-01

## 2022-01-01 ENCOUNTER — APPOINTMENT (OUTPATIENT)
Dept: PHYSICAL THERAPY | Facility: CLINIC | Age: 82
DRG: 291 | End: 2022-01-01
Payer: MEDICARE

## 2022-01-01 ENCOUNTER — APPOINTMENT (OUTPATIENT)
Dept: ULTRASOUND IMAGING | Facility: CLINIC | Age: 82
DRG: 291 | End: 2022-01-01
Attending: INTERNAL MEDICINE
Payer: MEDICARE

## 2022-01-01 ENCOUNTER — TELEPHONE (OUTPATIENT)
Dept: CARDIOLOGY | Facility: CLINIC | Age: 82
End: 2022-01-01
Payer: MEDICARE

## 2022-01-01 ENCOUNTER — HOSPITAL ENCOUNTER (OUTPATIENT)
Dept: PHYSICAL THERAPY | Facility: CLINIC | Age: 82
Setting detail: THERAPIES SERIES
Discharge: HOME | End: 2022-03-10
Attending: INTERNAL MEDICINE
Payer: MEDICARE

## 2022-01-01 ENCOUNTER — TRANSCRIBE ORDERS (OUTPATIENT)
Dept: PHARMACY | Facility: CLINIC | Age: 82
End: 2022-01-01
Payer: MEDICARE

## 2022-01-01 ENCOUNTER — HOSPITAL ENCOUNTER (INPATIENT)
Facility: CLINIC | Age: 82
LOS: 2 days | Discharge: HOME | DRG: 291 | End: 2022-02-27
Attending: EMERGENCY MEDICINE | Admitting: STUDENT IN AN ORGANIZED HEALTH CARE EDUCATION/TRAINING PROGRAM
Payer: MEDICARE

## 2022-01-01 ENCOUNTER — TRANSFERRED RECORDS (OUTPATIENT)
Dept: HEALTH INFORMATION MANAGEMENT | Facility: CLINIC | Age: 82
End: 2022-01-01
Payer: MEDICARE

## 2022-01-01 ENCOUNTER — VIRTUAL VISIT (OUTPATIENT)
Dept: FAMILY MEDICINE | Facility: CLINIC | Age: 82
End: 2022-01-01
Payer: MEDICARE

## 2022-01-01 ENCOUNTER — MYC MEDICAL ADVICE (OUTPATIENT)
Dept: FAMILY MEDICINE | Facility: CLINIC | Age: 82
End: 2022-01-01
Payer: MEDICARE

## 2022-01-01 ENCOUNTER — MYC MEDICAL ADVICE (OUTPATIENT)
Dept: CARDIOLOGY | Facility: CLINIC | Age: 82
End: 2022-01-01
Payer: MEDICARE

## 2022-01-01 ENCOUNTER — DOCUMENTATION ONLY (OUTPATIENT)
Dept: CARDIOLOGY | Facility: CLINIC | Age: 82
End: 2022-01-01

## 2022-01-01 ENCOUNTER — APPOINTMENT (OUTPATIENT)
Dept: ULTRASOUND IMAGING | Facility: CLINIC | Age: 82
DRG: 291 | End: 2022-01-01
Attending: HOSPITALIST
Payer: MEDICARE

## 2022-01-01 ENCOUNTER — ONCOLOGY VISIT (OUTPATIENT)
Dept: ONCOLOGY | Facility: CLINIC | Age: 82
End: 2022-01-01
Attending: INTERNAL MEDICINE
Payer: MEDICARE

## 2022-01-01 ENCOUNTER — APPOINTMENT (OUTPATIENT)
Dept: PHYSICAL THERAPY | Facility: CLINIC | Age: 82
DRG: 291 | End: 2022-01-01
Attending: INTERNAL MEDICINE
Payer: MEDICARE

## 2022-01-01 ENCOUNTER — TELEPHONE (OUTPATIENT)
Dept: ONCOLOGY | Facility: CLINIC | Age: 82
End: 2022-01-01
Payer: MEDICARE

## 2022-01-01 ENCOUNTER — APPOINTMENT (OUTPATIENT)
Dept: CARDIOLOGY | Facility: CLINIC | Age: 82
DRG: 291 | End: 2022-01-01
Attending: NURSE PRACTITIONER
Payer: MEDICARE

## 2022-01-01 ENCOUNTER — HOSPITAL ENCOUNTER (INPATIENT)
Facility: CLINIC | Age: 82
LOS: 1 days | Discharge: HOME | DRG: 811 | End: 2022-03-23
Attending: EMERGENCY MEDICINE | Admitting: HOSPITALIST
Payer: MEDICARE

## 2022-01-01 ENCOUNTER — MEDICAL CORRESPONDENCE (OUTPATIENT)
Dept: HEALTH INFORMATION MANAGEMENT | Facility: CLINIC | Age: 82
End: 2022-01-01
Payer: MEDICARE

## 2022-01-01 VITALS
TEMPERATURE: 98.4 F | HEART RATE: 91 BPM | SYSTOLIC BLOOD PRESSURE: 153 MMHG | OXYGEN SATURATION: 99 % | DIASTOLIC BLOOD PRESSURE: 86 MMHG | RESPIRATION RATE: 18 BRPM

## 2022-01-01 VITALS
SYSTOLIC BLOOD PRESSURE: 108 MMHG | RESPIRATION RATE: 16 BRPM | HEART RATE: 76 BPM | TEMPERATURE: 98.2 F | HEIGHT: 72 IN | BODY MASS INDEX: 27.59 KG/M2 | WEIGHT: 203.7 LBS | DIASTOLIC BLOOD PRESSURE: 55 MMHG | OXYGEN SATURATION: 97 %

## 2022-01-01 VITALS
DIASTOLIC BLOOD PRESSURE: 73 MMHG | HEART RATE: 85 BPM | RESPIRATION RATE: 18 BRPM | SYSTOLIC BLOOD PRESSURE: 130 MMHG | OXYGEN SATURATION: 100 % | TEMPERATURE: 97.9 F

## 2022-01-01 VITALS
BODY MASS INDEX: 26.85 KG/M2 | SYSTOLIC BLOOD PRESSURE: 109 MMHG | HEIGHT: 72 IN | HEART RATE: 71 BPM | DIASTOLIC BLOOD PRESSURE: 68 MMHG | OXYGEN SATURATION: 96 % | TEMPERATURE: 97.7 F | RESPIRATION RATE: 16 BRPM

## 2022-01-01 VITALS — OXYGEN SATURATION: 100 % | SYSTOLIC BLOOD PRESSURE: 118 MMHG | HEART RATE: 87 BPM | DIASTOLIC BLOOD PRESSURE: 75 MMHG

## 2022-01-01 VITALS
RESPIRATION RATE: 16 BRPM | DIASTOLIC BLOOD PRESSURE: 60 MMHG | SYSTOLIC BLOOD PRESSURE: 112 MMHG | BODY MASS INDEX: 26.85 KG/M2 | WEIGHT: 198 LBS | OXYGEN SATURATION: 99 % | HEART RATE: 72 BPM

## 2022-01-01 VITALS
BODY MASS INDEX: 28.35 KG/M2 | DIASTOLIC BLOOD PRESSURE: 73 MMHG | SYSTOLIC BLOOD PRESSURE: 118 MMHG | HEART RATE: 68 BPM | TEMPERATURE: 97 F | RESPIRATION RATE: 18 BRPM | OXYGEN SATURATION: 100 % | WEIGHT: 209.3 LBS | HEIGHT: 72 IN

## 2022-01-01 VITALS
RESPIRATION RATE: 18 BRPM | SYSTOLIC BLOOD PRESSURE: 145 MMHG | TEMPERATURE: 98.6 F | DIASTOLIC BLOOD PRESSURE: 71 MMHG | HEART RATE: 98 BPM

## 2022-01-01 VITALS
HEART RATE: 96 BPM | TEMPERATURE: 97.8 F | SYSTOLIC BLOOD PRESSURE: 155 MMHG | RESPIRATION RATE: 18 BRPM | OXYGEN SATURATION: 95 % | DIASTOLIC BLOOD PRESSURE: 82 MMHG

## 2022-01-01 VITALS
RESPIRATION RATE: 15 BRPM | TEMPERATURE: 97 F | HEART RATE: 86 BPM | SYSTOLIC BLOOD PRESSURE: 121 MMHG | OXYGEN SATURATION: 100 % | DIASTOLIC BLOOD PRESSURE: 56 MMHG

## 2022-01-01 VITALS
WEIGHT: 188.4 LBS | SYSTOLIC BLOOD PRESSURE: 96 MMHG | RESPIRATION RATE: 18 BRPM | HEART RATE: 78 BPM | DIASTOLIC BLOOD PRESSURE: 53 MMHG | TEMPERATURE: 97.8 F | BODY MASS INDEX: 25.55 KG/M2 | OXYGEN SATURATION: 92 %

## 2022-01-01 VITALS
SYSTOLIC BLOOD PRESSURE: 108 MMHG | HEIGHT: 72 IN | HEART RATE: 86 BPM | OXYGEN SATURATION: 98 % | DIASTOLIC BLOOD PRESSURE: 68 MMHG | BODY MASS INDEX: 29.12 KG/M2 | WEIGHT: 215 LBS

## 2022-01-01 VITALS
BODY MASS INDEX: 28.31 KG/M2 | DIASTOLIC BLOOD PRESSURE: 72 MMHG | HEIGHT: 72 IN | OXYGEN SATURATION: 99 % | WEIGHT: 209 LBS | SYSTOLIC BLOOD PRESSURE: 136 MMHG | HEART RATE: 94 BPM

## 2022-01-01 VITALS
SYSTOLIC BLOOD PRESSURE: 126 MMHG | BODY MASS INDEX: 26.82 KG/M2 | RESPIRATION RATE: 16 BRPM | OXYGEN SATURATION: 100 % | HEIGHT: 72 IN | TEMPERATURE: 97.4 F | DIASTOLIC BLOOD PRESSURE: 75 MMHG | HEART RATE: 90 BPM | WEIGHT: 198 LBS

## 2022-01-01 VITALS
RESPIRATION RATE: 16 BRPM | SYSTOLIC BLOOD PRESSURE: 122 MMHG | DIASTOLIC BLOOD PRESSURE: 76 MMHG | TEMPERATURE: 98.3 F | OXYGEN SATURATION: 99 % | HEART RATE: 80 BPM

## 2022-01-01 VITALS
TEMPERATURE: 97.8 F | DIASTOLIC BLOOD PRESSURE: 73 MMHG | SYSTOLIC BLOOD PRESSURE: 131 MMHG | RESPIRATION RATE: 18 BRPM | OXYGEN SATURATION: 99 % | HEART RATE: 94 BPM

## 2022-01-01 VITALS
RESPIRATION RATE: 20 BRPM | OXYGEN SATURATION: 99 % | DIASTOLIC BLOOD PRESSURE: 78 MMHG | TEMPERATURE: 97.3 F | HEART RATE: 84 BPM | SYSTOLIC BLOOD PRESSURE: 127 MMHG | WEIGHT: 211.2 LBS | BODY MASS INDEX: 28.64 KG/M2

## 2022-01-01 VITALS
WEIGHT: 208 LBS | OXYGEN SATURATION: 99 % | HEIGHT: 72 IN | RESPIRATION RATE: 16 BRPM | BODY MASS INDEX: 28.17 KG/M2 | DIASTOLIC BLOOD PRESSURE: 77 MMHG | HEART RATE: 68 BPM | TEMPERATURE: 97.7 F | SYSTOLIC BLOOD PRESSURE: 136 MMHG

## 2022-01-01 VITALS
RESPIRATION RATE: 16 BRPM | DIASTOLIC BLOOD PRESSURE: 77 MMHG | SYSTOLIC BLOOD PRESSURE: 125 MMHG | OXYGEN SATURATION: 96 % | TEMPERATURE: 98.9 F | HEART RATE: 83 BPM

## 2022-01-01 VITALS
SYSTOLIC BLOOD PRESSURE: 120 MMHG | OXYGEN SATURATION: 100 % | RESPIRATION RATE: 18 BRPM | DIASTOLIC BLOOD PRESSURE: 74 MMHG | HEART RATE: 77 BPM | TEMPERATURE: 97.6 F

## 2022-01-01 VITALS
DIASTOLIC BLOOD PRESSURE: 69 MMHG | HEART RATE: 79 BPM | RESPIRATION RATE: 18 BRPM | SYSTOLIC BLOOD PRESSURE: 118 MMHG | OXYGEN SATURATION: 100 % | TEMPERATURE: 98 F

## 2022-01-01 VITALS
SYSTOLIC BLOOD PRESSURE: 119 MMHG | TEMPERATURE: 97.6 F | HEART RATE: 87 BPM | RESPIRATION RATE: 18 BRPM | DIASTOLIC BLOOD PRESSURE: 65 MMHG | OXYGEN SATURATION: 97 %

## 2022-01-01 DIAGNOSIS — N18.30 ANEMIA DUE TO STAGE 3 CHRONIC KIDNEY DISEASE, UNSPECIFIED WHETHER STAGE 3A OR 3B CKD (H): Primary | ICD-10-CM

## 2022-01-01 DIAGNOSIS — N18.32 TYPE 2 DIABETES MELLITUS WITH STAGE 3B CHRONIC KIDNEY DISEASE, WITH LONG-TERM CURRENT USE OF INSULIN (H): ICD-10-CM

## 2022-01-01 DIAGNOSIS — F32.0 MILD MAJOR DEPRESSION (H): ICD-10-CM

## 2022-01-01 DIAGNOSIS — I31.39 PERICARDIAL EFFUSION: ICD-10-CM

## 2022-01-01 DIAGNOSIS — D50.8 OTHER IRON DEFICIENCY ANEMIA: ICD-10-CM

## 2022-01-01 DIAGNOSIS — N18.4 CKD (CHRONIC KIDNEY DISEASE) STAGE 4, GFR 15-29 ML/MIN (H): ICD-10-CM

## 2022-01-01 DIAGNOSIS — N18.30 ANEMIA DUE TO STAGE 3 CHRONIC KIDNEY DISEASE, UNSPECIFIED WHETHER STAGE 3A OR 3B CKD (H): ICD-10-CM

## 2022-01-01 DIAGNOSIS — N18.4 CKD STAGE 4 DUE TO TYPE 2 DIABETES MELLITUS (H): ICD-10-CM

## 2022-01-01 DIAGNOSIS — I50.32 CHRONIC DIASTOLIC HEART FAILURE WITH PRESERVED EJECTION FRACTION (H): ICD-10-CM

## 2022-01-01 DIAGNOSIS — E11.22 TYPE 2 DIABETES MELLITUS WITH STAGE 3B CHRONIC KIDNEY DISEASE, WITH LONG-TERM CURRENT USE OF INSULIN (H): ICD-10-CM

## 2022-01-01 DIAGNOSIS — E11.22 CKD STAGE 4 DUE TO TYPE 2 DIABETES MELLITUS (H): Primary | ICD-10-CM

## 2022-01-01 DIAGNOSIS — D63.1 ANEMIA DUE TO STAGE 3 CHRONIC KIDNEY DISEASE, UNSPECIFIED WHETHER STAGE 3A OR 3B CKD (H): ICD-10-CM

## 2022-01-01 DIAGNOSIS — E11.22 CKD STAGE 4 DUE TO TYPE 2 DIABETES MELLITUS (H): ICD-10-CM

## 2022-01-01 DIAGNOSIS — I50.9 CONGESTIVE HEART FAILURE, UNSPECIFIED HF CHRONICITY, UNSPECIFIED HEART FAILURE TYPE (H): ICD-10-CM

## 2022-01-01 DIAGNOSIS — I50.33 ACUTE ON CHRONIC DIASTOLIC HEART FAILURE (H): ICD-10-CM

## 2022-01-01 DIAGNOSIS — D64.9 TRANSFUSION-DEPENDENT ANEMIA: ICD-10-CM

## 2022-01-01 DIAGNOSIS — N18.4 CKD (CHRONIC KIDNEY DISEASE) STAGE 4, GFR 15-29 ML/MIN (H): Primary | ICD-10-CM

## 2022-01-01 DIAGNOSIS — D63.1 ANEMIA DUE TO STAGE 3 CHRONIC KIDNEY DISEASE, UNSPECIFIED WHETHER STAGE 3A OR 3B CKD (H): Primary | ICD-10-CM

## 2022-01-01 DIAGNOSIS — D63.8 ANEMIA IN OTHER CHRONIC DISEASES CLASSIFIED ELSEWHERE: Primary | ICD-10-CM

## 2022-01-01 DIAGNOSIS — Z79.4 TYPE 2 DIABETES MELLITUS WITH STAGE 3B CHRONIC KIDNEY DISEASE, WITH LONG-TERM CURRENT USE OF INSULIN (H): ICD-10-CM

## 2022-01-01 DIAGNOSIS — R59.0 MEDIASTINAL LYMPHADENOPATHY: ICD-10-CM

## 2022-01-01 DIAGNOSIS — D63.8 ANEMIA IN OTHER CHRONIC DISEASES CLASSIFIED ELSEWHERE: ICD-10-CM

## 2022-01-01 DIAGNOSIS — N19 RENAL FAILURE, UNSPECIFIED CHRONICITY: ICD-10-CM

## 2022-01-01 DIAGNOSIS — I50.33 ACUTE ON CHRONIC DIASTOLIC HEART FAILURE (H): Primary | ICD-10-CM

## 2022-01-01 DIAGNOSIS — D64.9 ANEMIA, UNSPECIFIED TYPE: ICD-10-CM

## 2022-01-01 DIAGNOSIS — Z51.5 END OF LIFE CARE: Primary | ICD-10-CM

## 2022-01-01 DIAGNOSIS — I50.33 HEART FAILURE, DIASTOLIC, WITH ACUTE DECOMPENSATION (H): Primary | ICD-10-CM

## 2022-01-01 DIAGNOSIS — I63.40 CEREBROVASCULAR ACCIDENT (CVA) DUE TO EMBOLISM OF CEREBRAL ARTERY (H): Primary | ICD-10-CM

## 2022-01-01 DIAGNOSIS — Z79.4 TYPE 2 DIABETES MELLITUS WITHOUT COMPLICATION, WITH LONG-TERM CURRENT USE OF INSULIN (H): ICD-10-CM

## 2022-01-01 DIAGNOSIS — G47.00 INSOMNIA, UNSPECIFIED TYPE: ICD-10-CM

## 2022-01-01 DIAGNOSIS — I63.40 CEREBROVASCULAR ACCIDENT (CVA) DUE TO EMBOLISM OF CEREBRAL ARTERY (H): ICD-10-CM

## 2022-01-01 DIAGNOSIS — D62 ANEMIA DUE TO ACUTE BLOOD LOSS: ICD-10-CM

## 2022-01-01 DIAGNOSIS — J30.89 CHRONIC NON-SEASONAL ALLERGIC RHINITIS: ICD-10-CM

## 2022-01-01 DIAGNOSIS — E78.5 HYPERLIPIDEMIA LDL GOAL <100: ICD-10-CM

## 2022-01-01 DIAGNOSIS — E11.9 TYPE 2 DIABETES MELLITUS WITHOUT COMPLICATION, WITH LONG-TERM CURRENT USE OF INSULIN (H): ICD-10-CM

## 2022-01-01 DIAGNOSIS — D62 ANEMIA DUE TO BLOOD LOSS, ACUTE: ICD-10-CM

## 2022-01-01 DIAGNOSIS — E61.1 IRON DEFICIENCY: Primary | ICD-10-CM

## 2022-01-01 DIAGNOSIS — D50.9 IRON DEFICIENCY ANEMIA, UNSPECIFIED IRON DEFICIENCY ANEMIA TYPE: Primary | ICD-10-CM

## 2022-01-01 DIAGNOSIS — J30.89 CHRONIC NON-SEASONAL ALLERGIC RHINITIS: Primary | ICD-10-CM

## 2022-01-01 DIAGNOSIS — R06.02 SHORTNESS OF BREATH: ICD-10-CM

## 2022-01-01 DIAGNOSIS — Z13.220 SCREENING FOR HYPERLIPIDEMIA: ICD-10-CM

## 2022-01-01 DIAGNOSIS — R33.9 URINARY RETENTION WITH INCOMPLETE BLADDER EMPTYING: Primary | ICD-10-CM

## 2022-01-01 DIAGNOSIS — N18.9 CHRONIC KIDNEY DISEASE, UNSPECIFIED CKD STAGE: ICD-10-CM

## 2022-01-01 DIAGNOSIS — R60.1 ANASARCA: ICD-10-CM

## 2022-01-01 DIAGNOSIS — K29.01 ACUTE SUPERFICIAL GASTRITIS WITH HEMORRHAGE: ICD-10-CM

## 2022-01-01 DIAGNOSIS — D51.3 OTHER DIETARY VITAMIN B12 DEFICIENCY ANEMIA: ICD-10-CM

## 2022-01-01 DIAGNOSIS — D50.9 IRON DEFICIENCY ANEMIA, UNSPECIFIED: ICD-10-CM

## 2022-01-01 DIAGNOSIS — R53.81 PHYSICAL DECONDITIONING: ICD-10-CM

## 2022-01-01 DIAGNOSIS — I50.32 CHRONIC DIASTOLIC HEART FAILURE WITH PRESERVED EJECTION FRACTION (H): Primary | ICD-10-CM

## 2022-01-01 DIAGNOSIS — N18.4 CKD STAGE 4 DUE TO TYPE 2 DIABETES MELLITUS (H): Primary | ICD-10-CM

## 2022-01-01 DIAGNOSIS — D50.9 IRON DEFICIENCY ANEMIA, UNSPECIFIED: Primary | ICD-10-CM

## 2022-01-01 DIAGNOSIS — Z09 HOSPITAL DISCHARGE FOLLOW-UP: Primary | ICD-10-CM

## 2022-01-01 DIAGNOSIS — R54 FRAILTY SYNDROME IN GERIATRIC PATIENT: ICD-10-CM

## 2022-01-01 LAB
% LINING CELLS, BODY FLUID: 3 %
ABO/RH(D): NORMAL
ALBUMIN BODY FLUID SOURCE: NORMAL
ALBUMIN FLD-MCNC: 1.4 G/DL
ALBUMIN SERPL-MCNC: 2.1 G/DL (ref 3.4–5)
ALBUMIN SERPL-MCNC: 2.3 G/DL (ref 3.4–5)
ALBUMIN SERPL-MCNC: 2.4 G/DL (ref 3.4–5)
ALBUMIN SERPL-MCNC: 2.6 G/DL (ref 3.4–5)
ALBUMIN SERPL-MCNC: 2.9 G/DL (ref 3.4–5)
ALBUMIN SERPL-MCNC: 2.9 G/DL (ref 3.4–5)
ALBUMIN SERPL-MCNC: 3.2 G/DL (ref 3.4–5)
ALBUMIN SERPL-MCNC: 3.2 G/DL (ref 3.4–5)
ALBUMIN SERPL-MCNC: 3.3 G/DL (ref 3.4–5)
ALBUMIN SERPL-MCNC: 3.4 G/DL (ref 3.4–5)
ALBUMIN SERPL-MCNC: 3.5 G/DL (ref 3.4–5)
ALBUMIN SERPL-MCNC: 3.5 G/DL (ref 3.4–5)
ALBUMIN UR-MCNC: 30 MG/DL
ALBUMIN UR-MCNC: NEGATIVE MG/DL
ALP SERPL-CCNC: 120 U/L (ref 40–150)
ALP SERPL-CCNC: 127 U/L (ref 40–150)
ALP SERPL-CCNC: 165 U/L (ref 40–150)
ALP SERPL-CCNC: 185 U/L (ref 40–150)
ALT SERPL W P-5'-P-CCNC: 17 U/L (ref 0–70)
ALT SERPL W P-5'-P-CCNC: 23 U/L (ref 0–70)
ALT SERPL W P-5'-P-CCNC: 25 U/L (ref 0–70)
ALT SERPL W P-5'-P-CCNC: 65 U/L (ref 0–70)
ANION GAP SERPL CALCULATED.3IONS-SCNC: 3 MMOL/L (ref 3–14)
ANION GAP SERPL CALCULATED.3IONS-SCNC: 4 MMOL/L (ref 3–14)
ANION GAP SERPL CALCULATED.3IONS-SCNC: 4 MMOL/L (ref 3–14)
ANION GAP SERPL CALCULATED.3IONS-SCNC: 5 MMOL/L (ref 3–14)
ANION GAP SERPL CALCULATED.3IONS-SCNC: 6 MMOL/L (ref 3–14)
ANION GAP SERPL CALCULATED.3IONS-SCNC: 7 MMOL/L (ref 3–14)
ANION GAP SERPL CALCULATED.3IONS-SCNC: 8 MMOL/L (ref 3–14)
ANION GAP SERPL CALCULATED.3IONS-SCNC: 9 MMOL/L (ref 3–14)
ANION GAP SERPL CALCULATED.3IONS-SCNC: <1 MMOL/L (ref 3–14)
ANTIBODY SCREEN: NEGATIVE
APPEARANCE FLD: ABNORMAL
APPEARANCE UR: CLEAR
APPEARANCE UR: CLEAR
AST SERPL W P-5'-P-CCNC: 17 U/L (ref 0–45)
AST SERPL W P-5'-P-CCNC: 17 U/L (ref 0–45)
AST SERPL W P-5'-P-CCNC: 20 U/L (ref 0–45)
AST SERPL W P-5'-P-CCNC: 77 U/L (ref 0–45)
ATRIAL RATE - MUSE: 91 BPM
BACTERIA #/AREA URNS HPF: ABNORMAL /HPF
BASOPHILS # BLD AUTO: 0 10E3/UL (ref 0–0.2)
BASOPHILS # BLD AUTO: 0.1 10E3/UL (ref 0–0.2)
BASOPHILS NFR BLD AUTO: 0 %
BASOPHILS NFR BLD AUTO: 0 %
BASOPHILS NFR BLD AUTO: 1 %
BILIRUB DIRECT SERPL-MCNC: 0.2 MG/DL (ref 0–0.2)
BILIRUB DIRECT SERPL-MCNC: 0.4 MG/DL (ref 0–0.2)
BILIRUB SERPL-MCNC: 0.5 MG/DL (ref 0.2–1.3)
BILIRUB SERPL-MCNC: 0.6 MG/DL (ref 0.2–1.3)
BILIRUB SERPL-MCNC: 0.9 MG/DL (ref 0.2–1.3)
BILIRUB SERPL-MCNC: 1.1 MG/DL (ref 0.2–1.3)
BILIRUB UR QL STRIP: NEGATIVE
BILIRUB UR QL STRIP: NEGATIVE
BLD PROD TYP BPU: NORMAL
BLOOD COMPONENT TYPE: NORMAL
BUN SERPL-MCNC: 101 MG/DL (ref 7–30)
BUN SERPL-MCNC: 115 MG/DL (ref 7–30)
BUN SERPL-MCNC: 120 MG/DL (ref 7–30)
BUN SERPL-MCNC: 124 MG/DL (ref 7–30)
BUN SERPL-MCNC: 133 MG/DL (ref 7–30)
BUN SERPL-MCNC: 28 MG/DL (ref 7–30)
BUN SERPL-MCNC: 44 MG/DL (ref 7–30)
BUN SERPL-MCNC: 46 MG/DL (ref 7–30)
BUN SERPL-MCNC: 46 MG/DL (ref 7–30)
BUN SERPL-MCNC: 47 MG/DL (ref 7–30)
BUN SERPL-MCNC: 51 MG/DL (ref 7–30)
BUN SERPL-MCNC: 52 MG/DL (ref 7–30)
BUN SERPL-MCNC: 53 MG/DL (ref 7–30)
BUN SERPL-MCNC: 55 MG/DL (ref 7–30)
BUN SERPL-MCNC: 57 MG/DL (ref 7–30)
BUN SERPL-MCNC: 66 MG/DL (ref 7–30)
BUN SERPL-MCNC: 67 MG/DL (ref 7–30)
BUN SERPL-MCNC: 69 MG/DL (ref 7–30)
BUN SERPL-MCNC: 70 MG/DL (ref 7–30)
BUN SERPL-MCNC: 72 MG/DL (ref 7–30)
BUN SERPL-MCNC: 73 MG/DL (ref 7–30)
BUN SERPL-MCNC: 75 MG/DL (ref 7–30)
BUN SERPL-MCNC: 75 MG/DL (ref 7–30)
BUN SERPL-MCNC: 76 MG/DL (ref 7–30)
BUN SERPL-MCNC: 77 MG/DL (ref 7–30)
BUN SERPL-MCNC: 84 MG/DL (ref 7–30)
BUN SERPL-MCNC: 94 MG/DL (ref 7–30)
CALCIUM SERPL-MCNC: 7.4 MG/DL (ref 8.5–10.1)
CALCIUM SERPL-MCNC: 7.5 MG/DL (ref 8.5–10.1)
CALCIUM SERPL-MCNC: 7.5 MG/DL (ref 8.5–10.1)
CALCIUM SERPL-MCNC: 7.7 MG/DL (ref 8.5–10.1)
CALCIUM SERPL-MCNC: 7.8 MG/DL (ref 8.5–10.1)
CALCIUM SERPL-MCNC: 8 MG/DL (ref 8.5–10.1)
CALCIUM SERPL-MCNC: 8 MG/DL (ref 8.5–10.1)
CALCIUM SERPL-MCNC: 8.1 MG/DL (ref 8.5–10.1)
CALCIUM SERPL-MCNC: 8.2 MG/DL (ref 8.5–10.1)
CALCIUM SERPL-MCNC: 8.2 MG/DL (ref 8.5–10.1)
CALCIUM SERPL-MCNC: 8.3 MG/DL (ref 8.5–10.1)
CALCIUM SERPL-MCNC: 8.4 MG/DL (ref 8.5–10.1)
CALCIUM SERPL-MCNC: 8.5 MG/DL (ref 8.5–10.1)
CALCIUM SERPL-MCNC: 8.6 MG/DL (ref 8.5–10.1)
CALCIUM SERPL-MCNC: 8.7 MG/DL (ref 8.5–10.1)
CALCIUM SERPL-MCNC: 8.8 MG/DL (ref 8.5–10.1)
CELL COUNT BODY FLUID SOURCE: ABNORMAL
CHLORIDE BLD-SCNC: 101 MMOL/L (ref 94–109)
CHLORIDE BLD-SCNC: 103 MMOL/L (ref 94–109)
CHLORIDE BLD-SCNC: 104 MMOL/L (ref 94–109)
CHLORIDE BLD-SCNC: 104 MMOL/L (ref 94–109)
CHLORIDE BLD-SCNC: 105 MMOL/L (ref 94–109)
CHLORIDE BLD-SCNC: 106 MMOL/L (ref 94–109)
CHLORIDE BLD-SCNC: 107 MMOL/L (ref 94–109)
CHLORIDE BLD-SCNC: 107 MMOL/L (ref 94–109)
CHLORIDE BLD-SCNC: 108 MMOL/L (ref 94–109)
CHLORIDE BLD-SCNC: 108 MMOL/L (ref 94–109)
CHLORIDE BLD-SCNC: 109 MMOL/L (ref 94–109)
CHLORIDE BLD-SCNC: 111 MMOL/L (ref 94–109)
CHLORIDE BLD-SCNC: 111 MMOL/L (ref 94–109)
CHLORIDE BLD-SCNC: 112 MMOL/L (ref 94–109)
CHLORIDE BLD-SCNC: 94 MMOL/L (ref 94–109)
CHLORIDE BLD-SCNC: 95 MMOL/L (ref 94–109)
CHLORIDE BLD-SCNC: 95 MMOL/L (ref 94–109)
CHLORIDE BLD-SCNC: 96 MMOL/L (ref 94–109)
CHLORIDE BLD-SCNC: 97 MMOL/L (ref 94–109)
CHLORIDE BLD-SCNC: 98 MMOL/L (ref 94–109)
CHLORIDE BLD-SCNC: 99 MMOL/L (ref 94–109)
CHOLEST SERPL-MCNC: 77 MG/DL
CO2 SERPL-SCNC: 22 MMOL/L (ref 20–32)
CO2 SERPL-SCNC: 23 MMOL/L (ref 20–32)
CO2 SERPL-SCNC: 23 MMOL/L (ref 20–32)
CO2 SERPL-SCNC: 24 MMOL/L (ref 20–32)
CO2 SERPL-SCNC: 24 MMOL/L (ref 20–32)
CO2 SERPL-SCNC: 25 MMOL/L (ref 20–32)
CO2 SERPL-SCNC: 26 MMOL/L (ref 20–32)
CO2 SERPL-SCNC: 27 MMOL/L (ref 20–32)
CO2 SERPL-SCNC: 28 MMOL/L (ref 20–32)
CO2 SERPL-SCNC: 29 MMOL/L (ref 20–32)
CO2 SERPL-SCNC: 30 MMOL/L (ref 20–32)
CO2 SERPL-SCNC: 31 MMOL/L (ref 20–32)
CO2 SERPL-SCNC: 31 MMOL/L (ref 20–32)
CO2 SERPL-SCNC: 32 MMOL/L (ref 20–32)
CO2 SERPL-SCNC: 32 MMOL/L (ref 20–32)
CO2 SERPL-SCNC: 33 MMOL/L (ref 20–32)
CODING SYSTEM: NORMAL
COLONOSCOPY: NORMAL
COLOR FLD: YELLOW
COLOR UR AUTO: ABNORMAL
COLOR UR AUTO: YELLOW
CREAT SERPL-MCNC: 1.91 MG/DL (ref 0.66–1.25)
CREAT SERPL-MCNC: 2.19 MG/DL (ref 0.66–1.25)
CREAT SERPL-MCNC: 2.19 MG/DL (ref 0.66–1.25)
CREAT SERPL-MCNC: 2.24 MG/DL (ref 0.66–1.25)
CREAT SERPL-MCNC: 2.3 MG/DL (ref 0.66–1.25)
CREAT SERPL-MCNC: 2.33 MG/DL (ref 0.66–1.25)
CREAT SERPL-MCNC: 2.33 MG/DL (ref 0.66–1.25)
CREAT SERPL-MCNC: 2.39 MG/DL (ref 0.66–1.25)
CREAT SERPL-MCNC: 2.39 MG/DL (ref 0.66–1.25)
CREAT SERPL-MCNC: 2.4 MG/DL (ref 0.66–1.25)
CREAT SERPL-MCNC: 2.4 MG/DL (ref 0.66–1.25)
CREAT SERPL-MCNC: 2.42 MG/DL (ref 0.66–1.25)
CREAT SERPL-MCNC: 2.43 MG/DL (ref 0.66–1.25)
CREAT SERPL-MCNC: 2.45 MG/DL (ref 0.66–1.25)
CREAT SERPL-MCNC: 2.5 MG/DL (ref 0.66–1.25)
CREAT SERPL-MCNC: 2.5 MG/DL (ref 0.66–1.25)
CREAT SERPL-MCNC: 2.51 MG/DL (ref 0.66–1.25)
CREAT SERPL-MCNC: 2.55 MG/DL (ref 0.66–1.25)
CREAT SERPL-MCNC: 2.55 MG/DL (ref 0.66–1.25)
CREAT SERPL-MCNC: 2.57 MG/DL (ref 0.66–1.25)
CREAT SERPL-MCNC: 2.57 MG/DL (ref 0.66–1.25)
CREAT SERPL-MCNC: 2.62 MG/DL (ref 0.66–1.25)
CREAT SERPL-MCNC: 2.67 MG/DL (ref 0.66–1.25)
CREAT SERPL-MCNC: 2.67 MG/DL (ref 0.66–1.25)
CREAT SERPL-MCNC: 2.71 MG/DL (ref 0.66–1.25)
CREAT SERPL-MCNC: 2.73 MG/DL (ref 0.66–1.25)
CREAT SERPL-MCNC: 2.88 MG/DL (ref 0.66–1.25)
CREAT SERPL-MCNC: 3.06 MG/DL (ref 0.66–1.25)
CREAT SERPL-MCNC: 3.06 MG/DL (ref 0.66–1.25)
CREAT UR-MCNC: 20 MG/DL
CREAT UR-MCNC: 45 MG/DL
CROSSMATCH: NORMAL
CRP SERPL-MCNC: 34.8 MG/L (ref 0–8)
D DIMER PPP FEU-MCNC: 2.87 UG/ML FEU (ref 0–0.5)
DIASTOLIC BLOOD PRESSURE - MUSE: NORMAL MMHG
ELLIPTOCYTES BLD QL SMEAR: SLIGHT
EOSINOPHIL # BLD AUTO: 0.5 10E3/UL (ref 0–0.7)
EOSINOPHIL # BLD AUTO: 0.5 10E3/UL (ref 0–0.7)
EOSINOPHIL # BLD AUTO: 0.6 10E3/UL (ref 0–0.7)
EOSINOPHIL # BLD AUTO: 0.7 10E3/UL (ref 0–0.7)
EOSINOPHIL # BLD AUTO: 0.8 10E3/UL (ref 0–0.7)
EOSINOPHIL # BLD AUTO: 1 10E3/UL (ref 0–0.7)
EOSINOPHIL # BLD AUTO: 1.2 10E3/UL (ref 0–0.7)
EOSINOPHIL NFR BLD AUTO: 11 %
EOSINOPHIL NFR BLD AUTO: 12 %
EOSINOPHIL NFR BLD AUTO: 12 %
EOSINOPHIL NFR BLD AUTO: 15 %
EOSINOPHIL NFR BLD AUTO: 6 %
EOSINOPHIL NFR BLD AUTO: 6 %
EOSINOPHIL NFR BLD AUTO: 9 %
ERYTHROCYTE [DISTWIDTH] IN BLOOD BY AUTOMATED COUNT: 17.5 % (ref 10–15)
ERYTHROCYTE [DISTWIDTH] IN BLOOD BY AUTOMATED COUNT: 18.6 % (ref 10–15)
ERYTHROCYTE [DISTWIDTH] IN BLOOD BY AUTOMATED COUNT: 18.7 % (ref 10–15)
ERYTHROCYTE [DISTWIDTH] IN BLOOD BY AUTOMATED COUNT: 18.8 % (ref 10–15)
ERYTHROCYTE [DISTWIDTH] IN BLOOD BY AUTOMATED COUNT: 18.9 % (ref 10–15)
ERYTHROCYTE [DISTWIDTH] IN BLOOD BY AUTOMATED COUNT: 18.9 % (ref 10–15)
ERYTHROCYTE [DISTWIDTH] IN BLOOD BY AUTOMATED COUNT: 19.1 % (ref 10–15)
ERYTHROCYTE [DISTWIDTH] IN BLOOD BY AUTOMATED COUNT: 19.4 % (ref 10–15)
ERYTHROCYTE [DISTWIDTH] IN BLOOD BY AUTOMATED COUNT: 19.5 % (ref 10–15)
ERYTHROCYTE [DISTWIDTH] IN BLOOD BY AUTOMATED COUNT: 19.5 % (ref 10–15)
ERYTHROCYTE [DISTWIDTH] IN BLOOD BY AUTOMATED COUNT: 19.9 % (ref 10–15)
ERYTHROCYTE [DISTWIDTH] IN BLOOD BY AUTOMATED COUNT: 20.5 % (ref 10–15)
ERYTHROCYTE [DISTWIDTH] IN BLOOD BY AUTOMATED COUNT: 20.8 % (ref 10–15)
ERYTHROCYTE [DISTWIDTH] IN BLOOD BY AUTOMATED COUNT: 20.9 % (ref 10–15)
ERYTHROCYTE [DISTWIDTH] IN BLOOD BY AUTOMATED COUNT: 21.6 % (ref 10–15)
ERYTHROCYTE [DISTWIDTH] IN BLOOD BY AUTOMATED COUNT: 22.2 % (ref 10–15)
ERYTHROCYTE [DISTWIDTH] IN BLOOD BY AUTOMATED COUNT: 22.4 % (ref 10–15)
ERYTHROCYTE [DISTWIDTH] IN BLOOD BY AUTOMATED COUNT: 26.3 % (ref 10–15)
ERYTHROCYTE [DISTWIDTH] IN BLOOD BY AUTOMATED COUNT: 28.9 % (ref 10–15)
ERYTHROCYTE [DISTWIDTH] IN BLOOD BY AUTOMATED COUNT: ABNORMAL %
FASTING STATUS PATIENT QL REPORTED: NO
FERRITIN SERPL-MCNC: 10 NG/ML (ref 26–388)
FERRITIN SERPL-MCNC: 127 NG/ML (ref 26–388)
FERRITIN SERPL-MCNC: 143 NG/ML (ref 26–388)
FLUAV RNA SPEC QL NAA+PROBE: NEGATIVE
FLUBV RNA RESP QL NAA+PROBE: NEGATIVE
FOLATE SERPL-MCNC: 16.6 NG/ML
FRAGMENTS BLD QL SMEAR: SLIGHT
GFR SERPL CREATININE-BSD FRML MDRD: 20 ML/MIN/1.73M2
GFR SERPL CREATININE-BSD FRML MDRD: 20 ML/MIN/1.73M2
GFR SERPL CREATININE-BSD FRML MDRD: 21 ML/MIN/1.73M2
GFR SERPL CREATININE-BSD FRML MDRD: 23 ML/MIN/1.73M2
GFR SERPL CREATININE-BSD FRML MDRD: 24 ML/MIN/1.73M2
GFR SERPL CREATININE-BSD FRML MDRD: 25 ML/MIN/1.73M2
GFR SERPL CREATININE-BSD FRML MDRD: 26 ML/MIN/1.73M2
GFR SERPL CREATININE-BSD FRML MDRD: 27 ML/MIN/1.73M2
GFR SERPL CREATININE-BSD FRML MDRD: 27 ML/MIN/1.73M2
GFR SERPL CREATININE-BSD FRML MDRD: 28 ML/MIN/1.73M2
GFR SERPL CREATININE-BSD FRML MDRD: 29 ML/MIN/1.73M2
GFR SERPL CREATININE-BSD FRML MDRD: 35 ML/MIN/1.73M2
GLUCOSE BLD-MCNC: 102 MG/DL (ref 70–99)
GLUCOSE BLD-MCNC: 123 MG/DL (ref 70–99)
GLUCOSE BLD-MCNC: 128 MG/DL (ref 70–99)
GLUCOSE BLD-MCNC: 149 MG/DL (ref 70–99)
GLUCOSE BLD-MCNC: 158 MG/DL (ref 70–99)
GLUCOSE BLD-MCNC: 159 MG/DL (ref 70–99)
GLUCOSE BLD-MCNC: 167 MG/DL (ref 70–99)
GLUCOSE BLD-MCNC: 171 MG/DL (ref 70–99)
GLUCOSE BLD-MCNC: 172 MG/DL (ref 70–99)
GLUCOSE BLD-MCNC: 179 MG/DL (ref 70–99)
GLUCOSE BLD-MCNC: 192 MG/DL (ref 70–99)
GLUCOSE BLD-MCNC: 201 MG/DL (ref 70–99)
GLUCOSE BLD-MCNC: 207 MG/DL (ref 70–99)
GLUCOSE BLD-MCNC: 211 MG/DL (ref 70–99)
GLUCOSE BLD-MCNC: 215 MG/DL (ref 70–99)
GLUCOSE BLD-MCNC: 219 MG/DL (ref 70–99)
GLUCOSE BLD-MCNC: 229 MG/DL (ref 70–99)
GLUCOSE BLD-MCNC: 230 MG/DL (ref 70–99)
GLUCOSE BLD-MCNC: 244 MG/DL (ref 70–99)
GLUCOSE BLD-MCNC: 257 MG/DL (ref 70–99)
GLUCOSE BLD-MCNC: 265 MG/DL (ref 70–99)
GLUCOSE BLD-MCNC: 292 MG/DL (ref 70–99)
GLUCOSE BLD-MCNC: 297 MG/DL (ref 70–99)
GLUCOSE BLD-MCNC: 300 MG/DL (ref 70–99)
GLUCOSE BLD-MCNC: 302 MG/DL (ref 70–99)
GLUCOSE BLD-MCNC: 55 MG/DL (ref 70–99)
GLUCOSE BLD-MCNC: 59 MG/DL (ref 70–99)
GLUCOSE BLD-MCNC: 60 MG/DL (ref 70–99)
GLUCOSE BLD-MCNC: 83 MG/DL (ref 70–99)
GLUCOSE BLDC GLUCOMTR-MCNC: 110 MG/DL (ref 70–99)
GLUCOSE BLDC GLUCOMTR-MCNC: 110 MG/DL (ref 70–99)
GLUCOSE BLDC GLUCOMTR-MCNC: 119 MG/DL (ref 70–99)
GLUCOSE BLDC GLUCOMTR-MCNC: 129 MG/DL (ref 70–99)
GLUCOSE BLDC GLUCOMTR-MCNC: 130 MG/DL (ref 70–99)
GLUCOSE BLDC GLUCOMTR-MCNC: 137 MG/DL (ref 70–99)
GLUCOSE BLDC GLUCOMTR-MCNC: 137 MG/DL (ref 70–99)
GLUCOSE BLDC GLUCOMTR-MCNC: 138 MG/DL (ref 70–99)
GLUCOSE BLDC GLUCOMTR-MCNC: 139 MG/DL (ref 70–99)
GLUCOSE BLDC GLUCOMTR-MCNC: 142 MG/DL (ref 70–99)
GLUCOSE BLDC GLUCOMTR-MCNC: 147 MG/DL (ref 70–99)
GLUCOSE BLDC GLUCOMTR-MCNC: 150 MG/DL (ref 70–99)
GLUCOSE BLDC GLUCOMTR-MCNC: 150 MG/DL (ref 70–99)
GLUCOSE BLDC GLUCOMTR-MCNC: 152 MG/DL (ref 70–99)
GLUCOSE BLDC GLUCOMTR-MCNC: 155 MG/DL (ref 70–99)
GLUCOSE BLDC GLUCOMTR-MCNC: 158 MG/DL (ref 70–99)
GLUCOSE BLDC GLUCOMTR-MCNC: 160 MG/DL (ref 70–99)
GLUCOSE BLDC GLUCOMTR-MCNC: 161 MG/DL (ref 70–99)
GLUCOSE BLDC GLUCOMTR-MCNC: 164 MG/DL (ref 70–99)
GLUCOSE BLDC GLUCOMTR-MCNC: 165 MG/DL (ref 70–99)
GLUCOSE BLDC GLUCOMTR-MCNC: 167 MG/DL (ref 70–99)
GLUCOSE BLDC GLUCOMTR-MCNC: 169 MG/DL (ref 70–99)
GLUCOSE BLDC GLUCOMTR-MCNC: 171 MG/DL (ref 70–99)
GLUCOSE BLDC GLUCOMTR-MCNC: 173 MG/DL (ref 70–99)
GLUCOSE BLDC GLUCOMTR-MCNC: 177 MG/DL (ref 70–99)
GLUCOSE BLDC GLUCOMTR-MCNC: 181 MG/DL (ref 70–99)
GLUCOSE BLDC GLUCOMTR-MCNC: 182 MG/DL (ref 70–99)
GLUCOSE BLDC GLUCOMTR-MCNC: 184 MG/DL (ref 70–99)
GLUCOSE BLDC GLUCOMTR-MCNC: 186 MG/DL (ref 70–99)
GLUCOSE BLDC GLUCOMTR-MCNC: 187 MG/DL (ref 70–99)
GLUCOSE BLDC GLUCOMTR-MCNC: 189 MG/DL (ref 70–99)
GLUCOSE BLDC GLUCOMTR-MCNC: 189 MG/DL (ref 70–99)
GLUCOSE BLDC GLUCOMTR-MCNC: 191 MG/DL (ref 70–99)
GLUCOSE BLDC GLUCOMTR-MCNC: 191 MG/DL (ref 70–99)
GLUCOSE BLDC GLUCOMTR-MCNC: 192 MG/DL (ref 70–99)
GLUCOSE BLDC GLUCOMTR-MCNC: 192 MG/DL (ref 70–99)
GLUCOSE BLDC GLUCOMTR-MCNC: 194 MG/DL (ref 70–99)
GLUCOSE BLDC GLUCOMTR-MCNC: 197 MG/DL (ref 70–99)
GLUCOSE BLDC GLUCOMTR-MCNC: 198 MG/DL (ref 70–99)
GLUCOSE BLDC GLUCOMTR-MCNC: 200 MG/DL (ref 70–99)
GLUCOSE BLDC GLUCOMTR-MCNC: 200 MG/DL (ref 70–99)
GLUCOSE BLDC GLUCOMTR-MCNC: 201 MG/DL (ref 70–99)
GLUCOSE BLDC GLUCOMTR-MCNC: 203 MG/DL (ref 70–99)
GLUCOSE BLDC GLUCOMTR-MCNC: 204 MG/DL (ref 70–99)
GLUCOSE BLDC GLUCOMTR-MCNC: 205 MG/DL (ref 70–99)
GLUCOSE BLDC GLUCOMTR-MCNC: 208 MG/DL (ref 70–99)
GLUCOSE BLDC GLUCOMTR-MCNC: 209 MG/DL (ref 70–99)
GLUCOSE BLDC GLUCOMTR-MCNC: 210 MG/DL (ref 70–99)
GLUCOSE BLDC GLUCOMTR-MCNC: 211 MG/DL (ref 70–99)
GLUCOSE BLDC GLUCOMTR-MCNC: 215 MG/DL (ref 70–99)
GLUCOSE BLDC GLUCOMTR-MCNC: 220 MG/DL (ref 70–99)
GLUCOSE BLDC GLUCOMTR-MCNC: 224 MG/DL (ref 70–99)
GLUCOSE BLDC GLUCOMTR-MCNC: 224 MG/DL (ref 70–99)
GLUCOSE BLDC GLUCOMTR-MCNC: 225 MG/DL (ref 70–99)
GLUCOSE BLDC GLUCOMTR-MCNC: 228 MG/DL (ref 70–99)
GLUCOSE BLDC GLUCOMTR-MCNC: 229 MG/DL (ref 70–99)
GLUCOSE BLDC GLUCOMTR-MCNC: 230 MG/DL (ref 70–99)
GLUCOSE BLDC GLUCOMTR-MCNC: 234 MG/DL (ref 70–99)
GLUCOSE BLDC GLUCOMTR-MCNC: 241 MG/DL (ref 70–99)
GLUCOSE BLDC GLUCOMTR-MCNC: 243 MG/DL (ref 70–99)
GLUCOSE BLDC GLUCOMTR-MCNC: 243 MG/DL (ref 70–99)
GLUCOSE BLDC GLUCOMTR-MCNC: 244 MG/DL (ref 70–99)
GLUCOSE BLDC GLUCOMTR-MCNC: 249 MG/DL (ref 70–99)
GLUCOSE BLDC GLUCOMTR-MCNC: 249 MG/DL (ref 70–99)
GLUCOSE BLDC GLUCOMTR-MCNC: 257 MG/DL (ref 70–99)
GLUCOSE BLDC GLUCOMTR-MCNC: 260 MG/DL (ref 70–99)
GLUCOSE BLDC GLUCOMTR-MCNC: 266 MG/DL (ref 70–99)
GLUCOSE BLDC GLUCOMTR-MCNC: 267 MG/DL (ref 70–99)
GLUCOSE BLDC GLUCOMTR-MCNC: 272 MG/DL (ref 70–99)
GLUCOSE BLDC GLUCOMTR-MCNC: 276 MG/DL (ref 70–99)
GLUCOSE BLDC GLUCOMTR-MCNC: 277 MG/DL (ref 70–99)
GLUCOSE BLDC GLUCOMTR-MCNC: 277 MG/DL (ref 70–99)
GLUCOSE BLDC GLUCOMTR-MCNC: 280 MG/DL (ref 70–99)
GLUCOSE BLDC GLUCOMTR-MCNC: 284 MG/DL (ref 70–99)
GLUCOSE BLDC GLUCOMTR-MCNC: 284 MG/DL (ref 70–99)
GLUCOSE BLDC GLUCOMTR-MCNC: 285 MG/DL (ref 70–99)
GLUCOSE BLDC GLUCOMTR-MCNC: 288 MG/DL (ref 70–99)
GLUCOSE BLDC GLUCOMTR-MCNC: 295 MG/DL (ref 70–99)
GLUCOSE BLDC GLUCOMTR-MCNC: 296 MG/DL (ref 70–99)
GLUCOSE BLDC GLUCOMTR-MCNC: 297 MG/DL (ref 70–99)
GLUCOSE BLDC GLUCOMTR-MCNC: 298 MG/DL (ref 70–99)
GLUCOSE BLDC GLUCOMTR-MCNC: 299 MG/DL (ref 70–99)
GLUCOSE BLDC GLUCOMTR-MCNC: 344 MG/DL (ref 70–99)
GLUCOSE BLDC GLUCOMTR-MCNC: 344 MG/DL (ref 70–99)
GLUCOSE BLDC GLUCOMTR-MCNC: 353 MG/DL (ref 70–99)
GLUCOSE BLDC GLUCOMTR-MCNC: 354 MG/DL (ref 70–99)
GLUCOSE BLDC GLUCOMTR-MCNC: 358 MG/DL (ref 70–99)
GLUCOSE BLDC GLUCOMTR-MCNC: 358 MG/DL (ref 70–99)
GLUCOSE BLDC GLUCOMTR-MCNC: 395 MG/DL (ref 70–99)
GLUCOSE BLDC GLUCOMTR-MCNC: 403 MG/DL (ref 70–99)
GLUCOSE BLDC GLUCOMTR-MCNC: 51 MG/DL (ref 70–99)
GLUCOSE BLDC GLUCOMTR-MCNC: 70 MG/DL (ref 70–99)
GLUCOSE BLDC GLUCOMTR-MCNC: 80 MG/DL (ref 70–99)
GLUCOSE BLDC GLUCOMTR-MCNC: 84 MG/DL (ref 70–99)
GLUCOSE BLDC GLUCOMTR-MCNC: 85 MG/DL (ref 70–99)
GLUCOSE BLDC GLUCOMTR-MCNC: 91 MG/DL (ref 70–99)
GLUCOSE BLDC GLUCOMTR-MCNC: 92 MG/DL (ref 70–99)
GLUCOSE BLDC GLUCOMTR-MCNC: 92 MG/DL (ref 70–99)
GLUCOSE BLDC GLUCOMTR-MCNC: 95 MG/DL (ref 70–99)
GLUCOSE BLDC GLUCOMTR-MCNC: 99 MG/DL (ref 70–99)
GLUCOSE UR STRIP-MCNC: 500 MG/DL
GLUCOSE UR STRIP-MCNC: NEGATIVE MG/DL
HAPTOGLOB SERPL-MCNC: 176 MG/DL (ref 32–197)
HBA1C MFR BLD: 8.2 % (ref 0–5.6)
HCT VFR BLD AUTO: 20.9 % (ref 40–53)
HCT VFR BLD AUTO: 21.2 % (ref 40–53)
HCT VFR BLD AUTO: 21.3 % (ref 40–53)
HCT VFR BLD AUTO: 21.7 % (ref 40–53)
HCT VFR BLD AUTO: 21.8 % (ref 40–53)
HCT VFR BLD AUTO: 21.9 % (ref 40–53)
HCT VFR BLD AUTO: 22.1 % (ref 40–53)
HCT VFR BLD AUTO: 22.5 % (ref 40–53)
HCT VFR BLD AUTO: 22.5 % (ref 40–53)
HCT VFR BLD AUTO: 22.6 % (ref 40–53)
HCT VFR BLD AUTO: 22.8 % (ref 40–53)
HCT VFR BLD AUTO: 22.8 % (ref 40–53)
HCT VFR BLD AUTO: 23.1 % (ref 40–53)
HCT VFR BLD AUTO: 23.3 % (ref 40–53)
HCT VFR BLD AUTO: 23.6 % (ref 40–53)
HCT VFR BLD AUTO: 23.9 % (ref 40–53)
HCT VFR BLD AUTO: 25.1 % (ref 40–53)
HCT VFR BLD AUTO: 25.8 % (ref 40–53)
HCT VFR BLD AUTO: 26.1 % (ref 40–53)
HCT VFR BLD AUTO: 26.5 % (ref 40–53)
HCT VFR BLD AUTO: 27 % (ref 40–53)
HCT VFR BLD AUTO: 27.5 % (ref 40–53)
HCT VFR BLD AUTO: 27.8 % (ref 40–53)
HCT VFR BLD AUTO: 29.1 % (ref 40–53)
HDLC SERPL-MCNC: 34 MG/DL
HEMOCCULT STL QL: POSITIVE
HGB BLD-MCNC: 5.8 G/DL (ref 13.3–17.7)
HGB BLD-MCNC: 5.8 G/DL (ref 13.3–17.7)
HGB BLD-MCNC: 6.1 G/DL (ref 13.3–17.7)
HGB BLD-MCNC: 6.1 G/DL (ref 13.3–17.7)
HGB BLD-MCNC: 6.2 G/DL (ref 13.3–17.7)
HGB BLD-MCNC: 6.4 G/DL (ref 13.3–17.7)
HGB BLD-MCNC: 6.5 G/DL (ref 13.3–17.7)
HGB BLD-MCNC: 6.6 G/DL (ref 13.3–17.7)
HGB BLD-MCNC: 6.6 G/DL (ref 13.3–17.7)
HGB BLD-MCNC: 6.7 G/DL (ref 13.3–17.7)
HGB BLD-MCNC: 6.8 G/DL (ref 13.3–17.7)
HGB BLD-MCNC: 6.9 G/DL (ref 13.3–17.7)
HGB BLD-MCNC: 6.9 G/DL (ref 13.3–17.7)
HGB BLD-MCNC: 7 G/DL (ref 13.3–17.7)
HGB BLD-MCNC: 7.2 G/DL (ref 13.3–17.7)
HGB BLD-MCNC: 7.3 G/DL (ref 13.3–17.7)
HGB BLD-MCNC: 7.3 G/DL (ref 13.3–17.7)
HGB BLD-MCNC: 7.5 G/DL (ref 13.3–17.7)
HGB BLD-MCNC: 7.6 G/DL (ref 13.3–17.7)
HGB BLD-MCNC: 7.7 G/DL (ref 13.3–17.7)
HGB BLD-MCNC: 7.8 G/DL (ref 13.3–17.7)
HGB BLD-MCNC: 7.9 G/DL (ref 13.3–17.7)
HGB BLD-MCNC: 8.1 G/DL (ref 13.3–17.7)
HGB BLD-MCNC: 8.1 G/DL (ref 13.3–17.7)
HGB BLD-MCNC: 8.3 G/DL (ref 13.3–17.7)
HGB UR QL STRIP: NEGATIVE
HGB UR QL STRIP: NEGATIVE
HOLD SPECIMEN: NORMAL
IMM GRANULOCYTES # BLD: 0 10E3/UL
IMM GRANULOCYTES # BLD: 0.1 10E3/UL
IMM GRANULOCYTES NFR BLD: 0 %
IMM GRANULOCYTES NFR BLD: 1 %
INR PPP: 1.23 (ref 0.85–1.15)
INR PPP: 1.25 (ref 0.85–1.15)
INR PPP: 1.25 (ref 0.85–1.15)
INR PPP: 1.3 (ref 0.85–1.15)
INTERPRETATION ECG - MUSE: NORMAL
IRON SATN MFR SERPL: 4 % (ref 15–46)
IRON SATN MFR SERPL: 4 % (ref 15–46)
IRON SATN MFR SERPL: 9 % (ref 15–46)
IRON SERPL-MCNC: 17 UG/DL (ref 35–180)
IRON SERPL-MCNC: 20 UG/DL (ref 35–180)
IRON SERPL-MCNC: 22 UG/DL (ref 35–180)
ISSUE DATE AND TIME: NORMAL
KETONES UR STRIP-MCNC: NEGATIVE MG/DL
KETONES UR STRIP-MCNC: NEGATIVE MG/DL
LDH SERPL L TO P-CCNC: 124 U/L (ref 85–227)
LDLC SERPL CALC-MCNC: 29 MG/DL
LEUKOCYTE ESTERASE UR QL STRIP: ABNORMAL
LEUKOCYTE ESTERASE UR QL STRIP: NEGATIVE
LVEF ECHO: NORMAL
LVEF ECHO: NORMAL
LYMPHOCYTES # BLD AUTO: 0.3 10E3/UL (ref 0.8–5.3)
LYMPHOCYTES # BLD AUTO: 0.4 10E3/UL (ref 0.8–5.3)
LYMPHOCYTES # BLD AUTO: 0.5 10E3/UL (ref 0.8–5.3)
LYMPHOCYTES # BLD AUTO: 0.6 10E3/UL (ref 0.8–5.3)
LYMPHOCYTES # BLD AUTO: 0.6 10E3/UL (ref 0.8–5.3)
LYMPHOCYTES NFR BLD AUTO: 11 %
LYMPHOCYTES NFR BLD AUTO: 5 %
LYMPHOCYTES NFR BLD AUTO: 7 %
LYMPHOCYTES NFR BLD AUTO: 8 %
LYMPHOCYTES NFR BLD AUTO: 8 %
LYMPHOCYTES NFR FLD MANUAL: 2 %
MAGNESIUM SERPL-MCNC: 2.6 MG/DL (ref 1.6–2.3)
MCH RBC QN AUTO: 20 PG (ref 26.5–33)
MCH RBC QN AUTO: 20.2 PG (ref 26.5–33)
MCH RBC QN AUTO: 20.6 PG (ref 26.5–33)
MCH RBC QN AUTO: 21.4 PG (ref 26.5–33)
MCH RBC QN AUTO: 21.6 PG (ref 26.5–33)
MCH RBC QN AUTO: 21.6 PG (ref 26.5–33)
MCH RBC QN AUTO: 21.7 PG (ref 26.5–33)
MCH RBC QN AUTO: 21.7 PG (ref 26.5–33)
MCH RBC QN AUTO: 22.4 PG (ref 26.5–33)
MCH RBC QN AUTO: 23.1 PG (ref 26.5–33)
MCH RBC QN AUTO: 23.5 PG (ref 26.5–33)
MCH RBC QN AUTO: 24.2 PG (ref 26.5–33)
MCH RBC QN AUTO: 24.3 PG (ref 26.5–33)
MCH RBC QN AUTO: 24.4 PG (ref 26.5–33)
MCH RBC QN AUTO: 24.4 PG (ref 26.5–33)
MCH RBC QN AUTO: 25 PG (ref 26.5–33)
MCH RBC QN AUTO: 25.3 PG (ref 26.5–33)
MCH RBC QN AUTO: 25.8 PG (ref 26.5–33)
MCH RBC QN AUTO: 25.8 PG (ref 26.5–33)
MCH RBC QN AUTO: 26.5 PG (ref 26.5–33)
MCH RBC QN AUTO: 26.7 PG (ref 26.5–33)
MCH RBC QN AUTO: 26.8 PG (ref 26.5–33)
MCHC RBC AUTO-ENTMCNC: 27.4 G/DL (ref 31.5–36.5)
MCHC RBC AUTO-ENTMCNC: 27.6 G/DL (ref 31.5–36.5)
MCHC RBC AUTO-ENTMCNC: 27.8 G/DL (ref 31.5–36.5)
MCHC RBC AUTO-ENTMCNC: 27.9 G/DL (ref 31.5–36.5)
MCHC RBC AUTO-ENTMCNC: 28 G/DL (ref 31.5–36.5)
MCHC RBC AUTO-ENTMCNC: 28.1 G/DL (ref 31.5–36.5)
MCHC RBC AUTO-ENTMCNC: 28.3 G/DL (ref 31.5–36.5)
MCHC RBC AUTO-ENTMCNC: 28.4 G/DL (ref 31.5–36.5)
MCHC RBC AUTO-ENTMCNC: 28.5 G/DL (ref 31.5–36.5)
MCHC RBC AUTO-ENTMCNC: 28.6 G/DL (ref 31.5–36.5)
MCHC RBC AUTO-ENTMCNC: 28.8 G/DL (ref 31.5–36.5)
MCHC RBC AUTO-ENTMCNC: 28.9 G/DL (ref 31.5–36.5)
MCHC RBC AUTO-ENTMCNC: 29.1 G/DL (ref 31.5–36.5)
MCHC RBC AUTO-ENTMCNC: 29.3 G/DL (ref 31.5–36.5)
MCHC RBC AUTO-ENTMCNC: 29.3 G/DL (ref 31.5–36.5)
MCHC RBC AUTO-ENTMCNC: 29.4 G/DL (ref 31.5–36.5)
MCHC RBC AUTO-ENTMCNC: 29.5 G/DL (ref 31.5–36.5)
MCHC RBC AUTO-ENTMCNC: 30 G/DL (ref 31.5–36.5)
MCHC RBC AUTO-ENTMCNC: 30.5 G/DL (ref 31.5–36.5)
MCHC RBC AUTO-ENTMCNC: 30.5 G/DL (ref 31.5–36.5)
MCHC RBC AUTO-ENTMCNC: 31.1 G/DL (ref 31.5–36.5)
MCHC RBC AUTO-ENTMCNC: 31.2 G/DL (ref 31.5–36.5)
MCV RBC AUTO: 73 FL (ref 78–100)
MCV RBC AUTO: 73 FL (ref 78–100)
MCV RBC AUTO: 74 FL (ref 78–100)
MCV RBC AUTO: 76 FL (ref 78–100)
MCV RBC AUTO: 77 FL (ref 78–100)
MCV RBC AUTO: 82 FL (ref 78–100)
MCV RBC AUTO: 83 FL (ref 78–100)
MCV RBC AUTO: 84 FL (ref 78–100)
MCV RBC AUTO: 85 FL (ref 78–100)
MCV RBC AUTO: 86 FL (ref 78–100)
MCV RBC AUTO: 86 FL (ref 78–100)
MCV RBC AUTO: 87 FL (ref 78–100)
MCV RBC AUTO: 88 FL (ref 78–100)
MCV RBC AUTO: 88 FL (ref 78–100)
MICROALBUMIN UR-MCNC: 30 MG/L
MICROALBUMIN/CREAT UR: 150 MG/G CR (ref 0–17)
MONOCYTES # BLD AUTO: 0.7 10E3/UL (ref 0–1.3)
MONOCYTES # BLD AUTO: 0.8 10E3/UL (ref 0–1.3)
MONOCYTES # BLD AUTO: 1 10E3/UL (ref 0–1.3)
MONOCYTES # BLD AUTO: 1 10E3/UL (ref 0–1.3)
MONOCYTES # BLD AUTO: 1.1 10E3/UL (ref 0–1.3)
MONOCYTES # BLD AUTO: 1.2 10E3/UL (ref 0–1.3)
MONOCYTES # BLD AUTO: 1.2 10E3/UL (ref 0–1.3)
MONOCYTES # BLD AUTO: 1.3 10E3/UL (ref 0–1.3)
MONOCYTES # BLD AUTO: 1.6 10E3/UL (ref 0–1.3)
MONOCYTES NFR BLD AUTO: 12 %
MONOCYTES NFR BLD AUTO: 12 %
MONOCYTES NFR BLD AUTO: 13 %
MONOCYTES NFR BLD AUTO: 13 %
MONOCYTES NFR BLD AUTO: 14 %
MONOCYTES NFR BLD AUTO: 14 %
MONOCYTES NFR BLD AUTO: 15 %
MONOCYTES NFR BLD AUTO: 17 %
MONOCYTES NFR BLD AUTO: 18 %
MONOS+MACROS NFR FLD MANUAL: 41 %
MUCOUS THREADS #/AREA URNS LPF: PRESENT /LPF
NEUTROPHILS # BLD AUTO: 3.7 10E3/UL (ref 1.6–8.3)
NEUTROPHILS # BLD AUTO: 3.9 10E3/UL (ref 1.6–8.3)
NEUTROPHILS # BLD AUTO: 4.5 10E3/UL (ref 1.6–8.3)
NEUTROPHILS # BLD AUTO: 4.7 10E3/UL (ref 1.6–8.3)
NEUTROPHILS # BLD AUTO: 5 10E3/UL (ref 1.6–8.3)
NEUTROPHILS # BLD AUTO: 5.3 10E3/UL (ref 1.6–8.3)
NEUTROPHILS # BLD AUTO: 6.1 10E3/UL (ref 1.6–8.3)
NEUTROPHILS # BLD AUTO: 6.4 10E3/UL (ref 1.6–8.3)
NEUTROPHILS # BLD AUTO: 8.9 10E3/UL (ref 1.6–8.3)
NEUTROPHILS NFR BLD AUTO: 61 %
NEUTROPHILS NFR BLD AUTO: 66 %
NEUTROPHILS NFR BLD AUTO: 66 %
NEUTROPHILS NFR BLD AUTO: 67 %
NEUTROPHILS NFR BLD AUTO: 68 %
NEUTROPHILS NFR BLD AUTO: 68 %
NEUTROPHILS NFR BLD AUTO: 70 %
NEUTROPHILS NFR BLD AUTO: 71 %
NEUTROPHILS NFR BLD AUTO: 75 %
NEUTS BAND NFR FLD MANUAL: 54 %
NITRATE UR QL: NEGATIVE
NITRATE UR QL: NEGATIVE
NONHDLC SERPL-MCNC: 43 MG/DL
NRBC # BLD AUTO: 0 10E3/UL
NRBC BLD AUTO-RTO: 0 /100
NT-PROBNP SERPL-MCNC: 2059 PG/ML (ref 0–1800)
NT-PROBNP SERPL-MCNC: 2304 PG/ML (ref 0–1800)
NT-PROBNP SERPL-MCNC: 2657 PG/ML (ref 0–450)
NT-PROBNP SERPL-MCNC: 3810 PG/ML (ref 0–1800)
P AXIS - MUSE: 28 DEGREES
PATH REPORT.COMMENTS IMP SPEC: NORMAL
PATH REPORT.FINAL DX SPEC: NORMAL
PATH REPORT.FINAL DX SPEC: NORMAL
PATH REPORT.GROSS SPEC: NORMAL
PATH REPORT.MICROSCOPIC SPEC OTHER STN: NORMAL
PATH REPORT.RELEVANT HX SPEC: NORMAL
PH UR STRIP: 5 [PH] (ref 5–7)
PH UR STRIP: 6 [PH] (ref 5–7)
PHOSPHATE SERPL-MCNC: 2.5 MG/DL (ref 2.5–4.5)
PHOSPHATE SERPL-MCNC: 3 MG/DL (ref 2.5–4.5)
PHOSPHATE SERPL-MCNC: 3.1 MG/DL (ref 2.5–4.5)
PHOSPHATE SERPL-MCNC: 3.6 MG/DL (ref 2.5–4.5)
PHOSPHATE SERPL-MCNC: 3.6 MG/DL (ref 2.5–4.5)
PHOSPHATE SERPL-MCNC: 3.7 MG/DL (ref 2.5–4.5)
PHOSPHATE SERPL-MCNC: 3.8 MG/DL (ref 2.5–4.5)
PHOSPHATE SERPL-MCNC: 3.9 MG/DL (ref 2.5–4.5)
PHOTO IMAGE: NORMAL
PLAT MORPH BLD: ABNORMAL
PLATELET # BLD AUTO: 125 10E3/UL (ref 150–450)
PLATELET # BLD AUTO: 165 10E3/UL (ref 150–450)
PLATELET # BLD AUTO: 177 10E3/UL (ref 150–450)
PLATELET # BLD AUTO: 182 10E3/UL (ref 150–450)
PLATELET # BLD AUTO: 185 10E3/UL (ref 150–450)
PLATELET # BLD AUTO: 187 10E3/UL (ref 150–450)
PLATELET # BLD AUTO: 194 10E3/UL (ref 150–450)
PLATELET # BLD AUTO: 195 10E3/UL (ref 150–450)
PLATELET # BLD AUTO: 196 10E3/UL (ref 150–450)
PLATELET # BLD AUTO: 196 10E3/UL (ref 150–450)
PLATELET # BLD AUTO: 198 10E3/UL (ref 150–450)
PLATELET # BLD AUTO: 198 10E3/UL (ref 150–450)
PLATELET # BLD AUTO: 203 10E3/UL (ref 150–450)
PLATELET # BLD AUTO: 204 10E3/UL (ref 150–450)
PLATELET # BLD AUTO: 220 10E3/UL (ref 150–450)
PLATELET # BLD AUTO: 220 10E3/UL (ref 150–450)
PLATELET # BLD AUTO: 221 10E3/UL (ref 150–450)
PLATELET # BLD AUTO: 222 10E3/UL (ref 150–450)
PLATELET # BLD AUTO: 223 10E3/UL (ref 150–450)
PLATELET # BLD AUTO: 235 10E3/UL (ref 150–450)
PLATELET # BLD AUTO: 253 10E3/UL (ref 150–450)
PLATELET # BLD AUTO: 268 10E3/UL (ref 150–450)
POTASSIUM BLD-SCNC: 3.1 MMOL/L (ref 3.4–5.3)
POTASSIUM BLD-SCNC: 3.2 MMOL/L (ref 3.4–5.3)
POTASSIUM BLD-SCNC: 3.2 MMOL/L (ref 3.4–5.3)
POTASSIUM BLD-SCNC: 3.3 MMOL/L (ref 3.4–5.3)
POTASSIUM BLD-SCNC: 3.4 MMOL/L (ref 3.4–5.3)
POTASSIUM BLD-SCNC: 3.5 MMOL/L (ref 3.4–5.3)
POTASSIUM BLD-SCNC: 3.5 MMOL/L (ref 3.4–5.3)
POTASSIUM BLD-SCNC: 3.6 MMOL/L (ref 3.4–5.3)
POTASSIUM BLD-SCNC: 3.7 MMOL/L (ref 3.4–5.3)
POTASSIUM BLD-SCNC: 3.8 MMOL/L (ref 3.4–5.3)
POTASSIUM BLD-SCNC: 3.9 MMOL/L (ref 3.4–5.3)
POTASSIUM BLD-SCNC: 4.1 MMOL/L (ref 3.4–5.3)
POTASSIUM BLD-SCNC: 4.1 MMOL/L (ref 3.4–5.3)
POTASSIUM BLD-SCNC: 4.2 MMOL/L (ref 3.4–5.3)
POTASSIUM BLD-SCNC: 4.2 MMOL/L (ref 3.4–5.3)
POTASSIUM BLD-SCNC: 4.3 MMOL/L (ref 3.4–5.3)
POTASSIUM BLD-SCNC: 4.4 MMOL/L (ref 3.4–5.3)
POTASSIUM BLD-SCNC: 4.4 MMOL/L (ref 3.4–5.3)
POTASSIUM BLD-SCNC: 4.5 MMOL/L (ref 3.4–5.3)
POTASSIUM BLD-SCNC: 4.6 MMOL/L (ref 3.4–5.3)
POTASSIUM BLD-SCNC: 4.6 MMOL/L (ref 3.4–5.3)
POTASSIUM BLD-SCNC: 4.7 MMOL/L (ref 3.4–5.3)
POTASSIUM BLD-SCNC: 4.7 MMOL/L (ref 3.4–5.3)
PR INTERVAL - MUSE: 174 MS
PROT FLD-MCNC: 2.9 G/DL
PROT SERPL-MCNC: 6.3 G/DL (ref 6.8–8.8)
PROT SERPL-MCNC: 6.4 G/DL (ref 6.8–8.8)
PROT SERPL-MCNC: 6.7 G/DL (ref 6.8–8.8)
PROT SERPL-MCNC: 7 G/DL (ref 6.8–8.8)
PROT UR-MCNC: 0.16 G/L
PROT/CREAT 24H UR: 0.36 G/G CR (ref 0–0.2)
PROTEIN BODY FLUID SOURCE: NORMAL
PTH-INTACT SERPL-MCNC: 223 PG/ML
QRS DURATION - MUSE: 86 MS
QT - MUSE: 376 MS
QTC - MUSE: 462 MS
R AXIS - MUSE: 15 DEGREES
RBC # BLD AUTO: 2.5 10E6/UL (ref 4.4–5.9)
RBC # BLD AUTO: 2.52 10E6/UL (ref 4.4–5.9)
RBC # BLD AUTO: 2.55 10E6/UL (ref 4.4–5.9)
RBC # BLD AUTO: 2.64 10E6/UL (ref 4.4–5.9)
RBC # BLD AUTO: 2.69 10E6/UL (ref 4.4–5.9)
RBC # BLD AUTO: 2.72 10E6/UL (ref 4.4–5.9)
RBC # BLD AUTO: 2.73 10E6/UL (ref 4.4–5.9)
RBC # BLD AUTO: 2.76 10E6/UL (ref 4.4–5.9)
RBC # BLD AUTO: 2.81 10E6/UL (ref 4.4–5.9)
RBC # BLD AUTO: 2.87 10E6/UL (ref 4.4–5.9)
RBC # BLD AUTO: 2.9 10E6/UL (ref 4.4–5.9)
RBC # BLD AUTO: 3.03 10E6/UL (ref 4.4–5.9)
RBC # BLD AUTO: 3.07 10E6/UL (ref 4.4–5.9)
RBC # BLD AUTO: 3.08 10E6/UL (ref 4.4–5.9)
RBC # BLD AUTO: 3.39 10E6/UL (ref 4.4–5.9)
RBC # BLD AUTO: 3.48 10E6/UL (ref 4.4–5.9)
RBC # BLD AUTO: 3.52 10E6/UL (ref 4.4–5.9)
RBC # BLD AUTO: 3.53 10E6/UL (ref 4.4–5.9)
RBC # BLD AUTO: 3.59 10E6/UL (ref 4.4–5.9)
RBC # BLD AUTO: 3.6 10E6/UL (ref 4.4–5.9)
RBC #/AREA URNS AUTO: ABNORMAL /HPF
RBC MORPH BLD: ABNORMAL
RBC URINE: 2 /HPF
RETICS # AUTO: 0.1 10E6/UL (ref 0.03–0.1)
RETICS/RBC NFR AUTO: 3.5 % (ref 0.5–2)
RETINOPATHY: NORMAL
SARS-COV-2 RNA RESP QL NAA+PROBE: NEGATIVE
SODIUM SERPL-SCNC: 133 MMOL/L (ref 133–144)
SODIUM SERPL-SCNC: 135 MMOL/L (ref 133–144)
SODIUM SERPL-SCNC: 136 MMOL/L (ref 133–144)
SODIUM SERPL-SCNC: 136 MMOL/L (ref 133–144)
SODIUM SERPL-SCNC: 137 MMOL/L (ref 133–144)
SODIUM SERPL-SCNC: 138 MMOL/L (ref 133–144)
SODIUM SERPL-SCNC: 139 MMOL/L (ref 133–144)
SODIUM SERPL-SCNC: 140 MMOL/L (ref 133–144)
SODIUM SERPL-SCNC: 141 MMOL/L (ref 133–144)
SODIUM UR-SCNC: 22 MMOL/L
SP GR UR STRIP: 1.01 (ref 1–1.03)
SP GR UR STRIP: 1.01 (ref 1–1.03)
SPECIMEN EXPIRATION DATE: NORMAL
SQUAMOUS #/AREA URNS AUTO: ABNORMAL /LPF
SYSTOLIC BLOOD PRESSURE - MUSE: NORMAL MMHG
T AXIS - MUSE: 123 DEGREES
TIBC SERPL-MCNC: 214 UG/DL (ref 240–430)
TIBC SERPL-MCNC: 473 UG/DL (ref 240–430)
TIBC SERPL-MCNC: 531 UG/DL (ref 240–430)
TRIGL SERPL-MCNC: 71 MG/DL
TROPONIN I SERPL HS-MCNC: 18 NG/L
TSH SERPL DL<=0.005 MIU/L-ACNC: 2.03 MU/L (ref 0.4–4)
UNIT ABO/RH: NORMAL
UNIT NUMBER: NORMAL
UNIT STATUS: NORMAL
UNIT TYPE ISBT: 6200
UPPER GI ENDOSCOPY: NORMAL
UROBILINOGEN UR STRIP-ACNC: 1 E.U./DL
UROBILINOGEN UR STRIP-MCNC: NORMAL MG/DL
VENTRICULAR RATE- MUSE: 91 BPM
VIT B12 SERPL-MCNC: 920 PG/ML (ref 193–986)
WBC # BLD AUTO: 11.1 10E3/UL (ref 4–11)
WBC # BLD AUTO: 11.9 10E3/UL (ref 4–11)
WBC # BLD AUTO: 5.5 10E3/UL (ref 4–11)
WBC # BLD AUTO: 5.7 10E3/UL (ref 4–11)
WBC # BLD AUTO: 5.8 10E3/UL (ref 4–11)
WBC # BLD AUTO: 6 10E3/UL (ref 4–11)
WBC # BLD AUTO: 6.1 10E3/UL (ref 4–11)
WBC # BLD AUTO: 6.4 10E3/UL (ref 4–11)
WBC # BLD AUTO: 6.5 10E3/UL (ref 4–11)
WBC # BLD AUTO: 6.7 10E3/UL (ref 4–11)
WBC # BLD AUTO: 6.8 10E3/UL (ref 4–11)
WBC # BLD AUTO: 7.1 10E3/UL (ref 4–11)
WBC # BLD AUTO: 7.2 10E3/UL (ref 4–11)
WBC # BLD AUTO: 7.2 10E3/UL (ref 4–11)
WBC # BLD AUTO: 7.7 10E3/UL (ref 4–11)
WBC # BLD AUTO: 8 10E3/UL (ref 4–11)
WBC # BLD AUTO: 8 10E3/UL (ref 4–11)
WBC # BLD AUTO: 8.1 10E3/UL (ref 4–11)
WBC # BLD AUTO: 8.4 10E3/UL (ref 4–11)
WBC # BLD AUTO: 8.6 10E3/UL (ref 4–11)
WBC # BLD AUTO: 8.7 10E3/UL (ref 4–11)
WBC # BLD AUTO: 9.2 10E3/UL (ref 4–11)
WBC # FLD AUTO: 1297 /UL
WBC #/AREA URNS AUTO: ABNORMAL /HPF
WBC URINE: 12 /HPF

## 2022-01-01 PROCEDURE — 93308 TTE F-UP OR LMTD: CPT

## 2022-01-01 PROCEDURE — 83735 ASSAY OF MAGNESIUM: CPT | Performed by: HOSPITALIST

## 2022-01-01 PROCEDURE — 210N000002 HC R&B HEART CARE

## 2022-01-01 PROCEDURE — 99233 SBSQ HOSP IP/OBS HIGH 50: CPT | Performed by: INTERNAL MEDICINE

## 2022-01-01 PROCEDURE — 36415 COLL VENOUS BLD VENIPUNCTURE: CPT

## 2022-01-01 PROCEDURE — 36415 COLL VENOUS BLD VENIPUNCTURE: CPT | Performed by: INTERNAL MEDICINE

## 2022-01-01 PROCEDURE — 82248 BILIRUBIN DIRECT: CPT | Performed by: INTERNAL MEDICINE

## 2022-01-01 PROCEDURE — 83550 IRON BINDING TEST: CPT | Performed by: INTERNAL MEDICINE

## 2022-01-01 PROCEDURE — 120N000001 HC R&B MED SURG/OB

## 2022-01-01 PROCEDURE — 250N000013 HC RX MED GY IP 250 OP 250 PS 637: Performed by: INTERNAL MEDICINE

## 2022-01-01 PROCEDURE — P9016 RBC LEUKOCYTES REDUCED: HCPCS | Performed by: INTERNAL MEDICINE

## 2022-01-01 PROCEDURE — C9803 HOPD COVID-19 SPEC COLLECT: HCPCS

## 2022-01-01 PROCEDURE — 86923 COMPATIBILITY TEST ELECTRIC: CPT | Performed by: HOSPITALIST

## 2022-01-01 PROCEDURE — 83880 ASSAY OF NATRIURETIC PEPTIDE: CPT

## 2022-01-01 PROCEDURE — 0DBH8ZZ EXCISION OF CECUM, VIA NATURAL OR ARTIFICIAL OPENING ENDOSCOPIC: ICD-10-PCS | Performed by: INTERNAL MEDICINE

## 2022-01-01 PROCEDURE — 82728 ASSAY OF FERRITIN: CPT | Performed by: INTERNAL MEDICINE

## 2022-01-01 PROCEDURE — P9047 ALBUMIN (HUMAN), 25%, 50ML: HCPCS | Performed by: INTERNAL MEDICINE

## 2022-01-01 PROCEDURE — 99232 SBSQ HOSP IP/OBS MODERATE 35: CPT | Performed by: HOSPITALIST

## 2022-01-01 PROCEDURE — 0DBL8ZZ EXCISION OF TRANSVERSE COLON, VIA NATURAL OR ARTIFICIAL OPENING ENDOSCOPIC: ICD-10-PCS | Performed by: INTERNAL MEDICINE

## 2022-01-01 PROCEDURE — 36415 COLL VENOUS BLD VENIPUNCTURE: CPT | Performed by: EMERGENCY MEDICINE

## 2022-01-01 PROCEDURE — 250N000013 HC RX MED GY IP 250 OP 250 PS 637: Performed by: HOSPITALIST

## 2022-01-01 PROCEDURE — 84300 ASSAY OF URINE SODIUM: CPT | Performed by: INTERNAL MEDICINE

## 2022-01-01 PROCEDURE — 89050 BODY FLUID CELL COUNT: CPT | Performed by: INTERNAL MEDICINE

## 2022-01-01 PROCEDURE — 96372 THER/PROPH/DIAG INJ SC/IM: CPT | Performed by: INTERNAL MEDICINE

## 2022-01-01 PROCEDURE — 97530 THERAPEUTIC ACTIVITIES: CPT | Mod: GO

## 2022-01-01 PROCEDURE — 99291 CRITICAL CARE FIRST HOUR: CPT | Mod: 25

## 2022-01-01 PROCEDURE — 85027 COMPLETE CBC AUTOMATED: CPT | Performed by: HOSPITALIST

## 2022-01-01 PROCEDURE — 85018 HEMOGLOBIN: CPT | Performed by: INTERNAL MEDICINE

## 2022-01-01 PROCEDURE — 86850 RBC ANTIBODY SCREEN: CPT | Performed by: INTERNAL MEDICINE

## 2022-01-01 PROCEDURE — 97530 THERAPEUTIC ACTIVITIES: CPT | Mod: GP

## 2022-01-01 PROCEDURE — 97535 SELF CARE MNGMENT TRAINING: CPT | Mod: GO

## 2022-01-01 PROCEDURE — 80048 BASIC METABOLIC PNL TOTAL CA: CPT | Performed by: INTERNAL MEDICINE

## 2022-01-01 PROCEDURE — 97530 THERAPEUTIC ACTIVITIES: CPT | Mod: GP | Performed by: PHYSICAL THERAPIST

## 2022-01-01 PROCEDURE — 84443 ASSAY THYROID STIM HORMONE: CPT | Performed by: EMERGENCY MEDICINE

## 2022-01-01 PROCEDURE — 80048 BASIC METABOLIC PNL TOTAL CA: CPT | Performed by: STUDENT IN AN ORGANIZED HEALTH CARE EDUCATION/TRAINING PROGRAM

## 2022-01-01 PROCEDURE — 85025 COMPLETE CBC W/AUTO DIFF WBC: CPT | Performed by: EMERGENCY MEDICINE

## 2022-01-01 PROCEDURE — 80053 COMPREHEN METABOLIC PANEL: CPT | Performed by: EMERGENCY MEDICINE

## 2022-01-01 PROCEDURE — 36415 COLL VENOUS BLD VENIPUNCTURE: CPT | Performed by: HOSPITALIST

## 2022-01-01 PROCEDURE — 43235 EGD DIAGNOSTIC BRUSH WASH: CPT | Performed by: INTERNAL MEDICINE

## 2022-01-01 PROCEDURE — 85027 COMPLETE CBC AUTOMATED: CPT | Performed by: INTERNAL MEDICINE

## 2022-01-01 PROCEDURE — P9016 RBC LEUKOCYTES REDUCED: HCPCS | Performed by: HOSPITALIST

## 2022-01-01 PROCEDURE — 83880 ASSAY OF NATRIURETIC PEPTIDE: CPT | Performed by: NURSE PRACTITIONER

## 2022-01-01 PROCEDURE — 84484 ASSAY OF TROPONIN QUANT: CPT | Performed by: EMERGENCY MEDICINE

## 2022-01-01 PROCEDURE — U0003 INFECTIOUS AGENT DETECTION BY NUCLEIC ACID (DNA OR RNA); SEVERE ACUTE RESPIRATORY SYNDROME CORONAVIRUS 2 (SARS-COV-2) (CORONAVIRUS DISEASE [COVID-19]), AMPLIFIED PROBE TECHNIQUE, MAKING USE OF HIGH THROUGHPUT TECHNOLOGIES AS DESCRIBED BY CMS-2020-01-R: HCPCS | Performed by: EMERGENCY MEDICINE

## 2022-01-01 PROCEDURE — 250N000011 HC RX IP 250 OP 636: Performed by: HOSPITALIST

## 2022-01-01 PROCEDURE — 80048 BASIC METABOLIC PNL TOTAL CA: CPT | Performed by: HOSPITALIST

## 2022-01-01 PROCEDURE — 97165 OT EVAL LOW COMPLEX 30 MIN: CPT | Mod: GO

## 2022-01-01 PROCEDURE — 86901 BLOOD TYPING SEROLOGIC RH(D): CPT | Performed by: HOSPITALIST

## 2022-01-01 PROCEDURE — 93005 ELECTROCARDIOGRAM TRACING: CPT

## 2022-01-01 PROCEDURE — 99233 SBSQ HOSP IP/OBS HIGH 50: CPT | Mod: FS | Performed by: INTERNAL MEDICINE

## 2022-01-01 PROCEDURE — 250N000011 HC RX IP 250 OP 636: Performed by: INTERNAL MEDICINE

## 2022-01-01 PROCEDURE — 85018 HEMOGLOBIN: CPT | Performed by: HOSPITALIST

## 2022-01-01 PROCEDURE — 99233 SBSQ HOSP IP/OBS HIGH 50: CPT | Performed by: HOSPITALIST

## 2022-01-01 PROCEDURE — 45380 COLONOSCOPY AND BIOPSY: CPT | Performed by: INTERNAL MEDICINE

## 2022-01-01 PROCEDURE — 97110 THERAPEUTIC EXERCISES: CPT | Mod: GP

## 2022-01-01 PROCEDURE — 80069 RENAL FUNCTION PANEL: CPT | Performed by: INTERNAL MEDICINE

## 2022-01-01 PROCEDURE — 250N000009 HC RX 250: Performed by: INTERNAL MEDICINE

## 2022-01-01 PROCEDURE — 258N000003 HC RX IP 258 OP 636: Performed by: NURSE PRACTITIONER

## 2022-01-01 PROCEDURE — 82310 ASSAY OF CALCIUM: CPT

## 2022-01-01 PROCEDURE — 99232 SBSQ HOSP IP/OBS MODERATE 35: CPT | Performed by: INTERNAL MEDICINE

## 2022-01-01 PROCEDURE — 258N000001 HC RX 258: Performed by: STUDENT IN AN ORGANIZED HEALTH CARE EDUCATION/TRAINING PROGRAM

## 2022-01-01 PROCEDURE — 86901 BLOOD TYPING SEROLOGIC RH(D): CPT | Performed by: INTERNAL MEDICINE

## 2022-01-01 PROCEDURE — 0W9G3ZZ DRAINAGE OF PERITONEAL CAVITY, PERCUTANEOUS APPROACH: ICD-10-PCS | Performed by: RADIOLOGY

## 2022-01-01 PROCEDURE — 82310 ASSAY OF CALCIUM: CPT | Performed by: HOSPITALIST

## 2022-01-01 PROCEDURE — 86923 COMPATIBILITY TEST ELECTRIC: CPT | Performed by: EMERGENCY MEDICINE

## 2022-01-01 PROCEDURE — 97116 GAIT TRAINING THERAPY: CPT | Mod: GP

## 2022-01-01 PROCEDURE — 250N000012 HC RX MED GY IP 250 OP 636 PS 637: Performed by: INTERNAL MEDICINE

## 2022-01-01 PROCEDURE — 84156 ASSAY OF PROTEIN URINE: CPT | Performed by: INTERNAL MEDICINE

## 2022-01-01 PROCEDURE — 86923 COMPATIBILITY TEST ELECTRIC: CPT | Performed by: INTERNAL MEDICINE

## 2022-01-01 PROCEDURE — 36430 TRANSFUSION BLD/BLD COMPNT: CPT

## 2022-01-01 PROCEDURE — 82746 ASSAY OF FOLIC ACID SERUM: CPT | Performed by: HOSPITALIST

## 2022-01-01 PROCEDURE — G0463 HOSPITAL OUTPT CLINIC VISIT: HCPCS

## 2022-01-01 PROCEDURE — 258N000003 HC RX IP 258 OP 636: Performed by: INTERNAL MEDICINE

## 2022-01-01 PROCEDURE — P9016 RBC LEUKOCYTES REDUCED: HCPCS | Performed by: EMERGENCY MEDICINE

## 2022-01-01 PROCEDURE — 82040 ASSAY OF SERUM ALBUMIN: CPT | Performed by: INTERNAL MEDICINE

## 2022-01-01 PROCEDURE — 82272 OCCULT BLD FECES 1-3 TESTS: CPT | Performed by: EMERGENCY MEDICINE

## 2022-01-01 PROCEDURE — 85018 HEMOGLOBIN: CPT

## 2022-01-01 PROCEDURE — 83615 LACTATE (LD) (LDH) ENZYME: CPT | Performed by: HOSPITALIST

## 2022-01-01 PROCEDURE — 99213 OFFICE O/P EST LOW 20 MIN: CPT | Mod: 95 | Performed by: INTERNAL MEDICINE

## 2022-01-01 PROCEDURE — U0003 INFECTIOUS AGENT DETECTION BY NUCLEIC ACID (DNA OR RNA); SEVERE ACUTE RESPIRATORY SYNDROME CORONAVIRUS 2 (SARS-COV-2) (CORONAVIRUS DISEASE [COVID-19]), AMPLIFIED PROBE TECHNIQUE, MAKING USE OF HIGH THROUGHPUT TECHNOLOGIES AS DESCRIBED BY CMS-2020-01-R: HCPCS | Performed by: HOSPITALIST

## 2022-01-01 PROCEDURE — 84132 ASSAY OF SERUM POTASSIUM: CPT | Performed by: HOSPITALIST

## 2022-01-01 PROCEDURE — 85014 HEMATOCRIT: CPT | Performed by: INTERNAL MEDICINE

## 2022-01-01 PROCEDURE — 250N000011 HC RX IP 250 OP 636: Performed by: STUDENT IN AN ORGANIZED HEALTH CARE EDUCATION/TRAINING PROGRAM

## 2022-01-01 PROCEDURE — 272N000706 US PARACENTESIS PORTABLE

## 2022-01-01 PROCEDURE — 250N000012 HC RX MED GY IP 250 OP 636 PS 637: Performed by: HOSPITALIST

## 2022-01-01 PROCEDURE — 250N000011 HC RX IP 250 OP 636: Performed by: NURSE PRACTITIONER

## 2022-01-01 PROCEDURE — 99223 1ST HOSP IP/OBS HIGH 75: CPT | Performed by: INTERNAL MEDICINE

## 2022-01-01 PROCEDURE — 71250 CT THORAX DX C-: CPT | Mod: MG

## 2022-01-01 PROCEDURE — 99214 OFFICE O/P EST MOD 30 MIN: CPT | Performed by: PHYSICIAN ASSISTANT

## 2022-01-01 PROCEDURE — 99215 OFFICE O/P EST HI 40 MIN: CPT | Performed by: INTERNAL MEDICINE

## 2022-01-01 PROCEDURE — 84100 ASSAY OF PHOSPHORUS: CPT | Performed by: HOSPITALIST

## 2022-01-01 PROCEDURE — 76705 ECHO EXAM OF ABDOMEN: CPT

## 2022-01-01 PROCEDURE — 82310 ASSAY OF CALCIUM: CPT | Performed by: INTERNAL MEDICINE

## 2022-01-01 PROCEDURE — P9016 RBC LEUKOCYTES REDUCED: HCPCS

## 2022-01-01 PROCEDURE — 93325 DOPPLER ECHO COLOR FLOW MAPG: CPT | Mod: 26 | Performed by: INTERNAL MEDICINE

## 2022-01-01 PROCEDURE — 86850 RBC ANTIBODY SCREEN: CPT | Performed by: HOSPITALIST

## 2022-01-01 PROCEDURE — 99239 HOSP IP/OBS DSCHRG MGMT >30: CPT | Performed by: INTERNAL MEDICINE

## 2022-01-01 PROCEDURE — 999N000040 HC STATISTIC CONSULT NO CHARGE VASC ACCESS

## 2022-01-01 PROCEDURE — 99495 TRANSJ CARE MGMT MOD F2F 14D: CPT | Performed by: PHYSICIAN ASSISTANT

## 2022-01-01 PROCEDURE — 85610 PROTHROMBIN TIME: CPT | Performed by: EMERGENCY MEDICINE

## 2022-01-01 PROCEDURE — G0500 MOD SEDAT ENDO SERVICE >5YRS: HCPCS | Performed by: INTERNAL MEDICINE

## 2022-01-01 PROCEDURE — 85379 FIBRIN DEGRADATION QUANT: CPT | Performed by: EMERGENCY MEDICINE

## 2022-01-01 PROCEDURE — 250N000013 HC RX MED GY IP 250 OP 250 PS 637: Performed by: STUDENT IN AN ORGANIZED HEALTH CARE EDUCATION/TRAINING PROGRAM

## 2022-01-01 PROCEDURE — 93321 DOPPLER ECHO F-UP/LMTD STD: CPT | Mod: 26 | Performed by: INTERNAL MEDICINE

## 2022-01-01 PROCEDURE — U0005 INFEC AGEN DETEC AMPLI PROBE: HCPCS | Performed by: EMERGENCY MEDICINE

## 2022-01-01 PROCEDURE — 250N000013 HC RX MED GY IP 250 OP 250 PS 637: Performed by: EMERGENCY MEDICINE

## 2022-01-01 PROCEDURE — 250N000011 HC RX IP 250 OP 636: Mod: EC | Performed by: INTERNAL MEDICINE

## 2022-01-01 PROCEDURE — 97116 GAIT TRAINING THERAPY: CPT | Mod: GP | Performed by: PHYSICAL THERAPIST

## 2022-01-01 PROCEDURE — 97110 THERAPEUTIC EXERCISES: CPT | Mod: GO | Performed by: OCCUPATIONAL THERAPIST

## 2022-01-01 PROCEDURE — 0DBN8ZZ EXCISION OF SIGMOID COLON, VIA NATURAL OR ARTIFICIAL OPENING ENDOSCOPIC: ICD-10-PCS | Performed by: INTERNAL MEDICINE

## 2022-01-01 PROCEDURE — 85025 COMPLETE CBC W/AUTO DIFF WBC: CPT | Performed by: INTERNAL MEDICINE

## 2022-01-01 PROCEDURE — 99223 1ST HOSP IP/OBS HIGH 75: CPT | Mod: AI | Performed by: INTERNAL MEDICINE

## 2022-01-01 PROCEDURE — 96374 THER/PROPH/DIAG INJ IV PUSH: CPT

## 2022-01-01 PROCEDURE — 45385 COLONOSCOPY W/LESION REMOVAL: CPT | Performed by: INTERNAL MEDICINE

## 2022-01-01 PROCEDURE — 999N000111 HC STATISTIC OT IP EVAL DEFER

## 2022-01-01 PROCEDURE — 99285 EMERGENCY DEPT VISIT HI MDM: CPT | Mod: 25

## 2022-01-01 PROCEDURE — 96365 THER/PROPH/DIAG IV INF INIT: CPT

## 2022-01-01 PROCEDURE — 97161 PT EVAL LOW COMPLEX 20 MIN: CPT | Mod: GP | Performed by: PHYSICAL THERAPIST

## 2022-01-01 PROCEDURE — 86923 COMPATIBILITY TEST ELECTRIC: CPT

## 2022-01-01 PROCEDURE — 80051 ELECTROLYTE PANEL: CPT | Performed by: INTERNAL MEDICINE

## 2022-01-01 PROCEDURE — 97140 MANUAL THERAPY 1/> REGIONS: CPT | Mod: GP | Performed by: PHYSICAL THERAPIST

## 2022-01-01 PROCEDURE — 36415 COLL VENOUS BLD VENIPUNCTURE: CPT | Performed by: PHYSICIAN ASSISTANT

## 2022-01-01 PROCEDURE — 99214 OFFICE O/P EST MOD 30 MIN: CPT | Performed by: INTERNAL MEDICINE

## 2022-01-01 PROCEDURE — 81001 URINALYSIS AUTO W/SCOPE: CPT | Performed by: INTERNAL MEDICINE

## 2022-01-01 PROCEDURE — 88305 TISSUE EXAM BY PATHOLOGIST: CPT | Mod: 26 | Performed by: PATHOLOGY

## 2022-01-01 PROCEDURE — 83970 ASSAY OF PARATHORMONE: CPT | Performed by: INTERNAL MEDICINE

## 2022-01-01 PROCEDURE — 99495 TRANSJ CARE MGMT MOD F2F 14D: CPT | Performed by: INTERNAL MEDICINE

## 2022-01-01 PROCEDURE — 83880 ASSAY OF NATRIURETIC PEPTIDE: CPT | Performed by: EMERGENCY MEDICINE

## 2022-01-01 PROCEDURE — 85027 COMPLETE CBC AUTOMATED: CPT | Performed by: PHYSICIAN ASSISTANT

## 2022-01-01 PROCEDURE — 97110 THERAPEUTIC EXERCISES: CPT | Mod: GP | Performed by: PHYSICAL THERAPIST

## 2022-01-01 PROCEDURE — 87636 SARSCOV2 & INF A&B AMP PRB: CPT | Performed by: EMERGENCY MEDICINE

## 2022-01-01 PROCEDURE — 250N000011 HC RX IP 250 OP 636: Performed by: EMERGENCY MEDICINE

## 2022-01-01 PROCEDURE — 93306 TTE W/DOPPLER COMPLETE: CPT | Mod: 26 | Performed by: INTERNAL MEDICINE

## 2022-01-01 PROCEDURE — 999N000099 HC STATISTIC MODERATE SEDATION < 10 MIN: Performed by: INTERNAL MEDICINE

## 2022-01-01 PROCEDURE — 82607 VITAMIN B-12: CPT | Performed by: HOSPITALIST

## 2022-01-01 PROCEDURE — 250N000012 HC RX MED GY IP 250 OP 636 PS 637: Performed by: STUDENT IN AN ORGANIZED HEALTH CARE EDUCATION/TRAINING PROGRAM

## 2022-01-01 PROCEDURE — 85027 COMPLETE CBC AUTOMATED: CPT | Performed by: EMERGENCY MEDICINE

## 2022-01-01 PROCEDURE — 71045 X-RAY EXAM CHEST 1 VIEW: CPT

## 2022-01-01 PROCEDURE — 999N000128 HC STATISTIC PERIPHERAL IV START W/O US GUIDANCE

## 2022-01-01 PROCEDURE — 86901 BLOOD TYPING SEROLOGIC RH(D): CPT | Performed by: EMERGENCY MEDICINE

## 2022-01-01 PROCEDURE — 86140 C-REACTIVE PROTEIN: CPT | Performed by: INTERNAL MEDICINE

## 2022-01-01 PROCEDURE — 83010 ASSAY OF HAPTOGLOBIN QUANT: CPT | Performed by: HOSPITALIST

## 2022-01-01 PROCEDURE — 99207 PR FOOT EXAM NO CHARGE: CPT | Performed by: INTERNAL MEDICINE

## 2022-01-01 PROCEDURE — 84157 ASSAY OF PROTEIN OTHER: CPT | Performed by: INTERNAL MEDICINE

## 2022-01-01 PROCEDURE — 99232 SBSQ HOSP IP/OBS MODERATE 35: CPT | Mod: 25 | Performed by: INTERNAL MEDICINE

## 2022-01-01 PROCEDURE — 250N000009 HC RX 250: Performed by: RADIOLOGY

## 2022-01-01 PROCEDURE — 85610 PROTHROMBIN TIME: CPT | Performed by: INTERNAL MEDICINE

## 2022-01-01 PROCEDURE — 99223 1ST HOSP IP/OBS HIGH 75: CPT | Mod: AI | Performed by: STUDENT IN AN ORGANIZED HEALTH CARE EDUCATION/TRAINING PROGRAM

## 2022-01-01 PROCEDURE — 36415 COLL VENOUS BLD VENIPUNCTURE: CPT | Performed by: NURSE PRACTITIONER

## 2022-01-01 PROCEDURE — 80053 COMPREHEN METABOLIC PANEL: CPT | Performed by: INTERNAL MEDICINE

## 2022-01-01 PROCEDURE — 0DJ08ZZ INSPECTION OF UPPER INTESTINAL TRACT, VIA NATURAL OR ARTIFICIAL OPENING ENDOSCOPIC: ICD-10-PCS | Performed by: INTERNAL MEDICINE

## 2022-01-01 PROCEDURE — 82042 OTHER SOURCE ALBUMIN QUAN EA: CPT | Performed by: INTERNAL MEDICINE

## 2022-01-01 PROCEDURE — 82043 UR ALBUMIN QUANTITATIVE: CPT

## 2022-01-01 PROCEDURE — 99221 1ST HOSP IP/OBS SF/LOW 40: CPT | Performed by: INTERNAL MEDICINE

## 2022-01-01 PROCEDURE — 85045 AUTOMATED RETICULOCYTE COUNT: CPT | Performed by: INTERNAL MEDICINE

## 2022-01-01 PROCEDURE — 82040 ASSAY OF SERUM ALBUMIN: CPT | Performed by: EMERGENCY MEDICINE

## 2022-01-01 PROCEDURE — 83036 HEMOGLOBIN GLYCOSYLATED A1C: CPT | Performed by: INTERNAL MEDICINE

## 2022-01-01 PROCEDURE — 88305 TISSUE EXAM BY PATHOLOGIST: CPT | Mod: TC | Performed by: INTERNAL MEDICINE

## 2022-01-01 PROCEDURE — 82310 ASSAY OF CALCIUM: CPT | Performed by: EMERGENCY MEDICINE

## 2022-01-01 PROCEDURE — 85060 BLOOD SMEAR INTERPRETATION: CPT | Performed by: PATHOLOGY

## 2022-01-01 PROCEDURE — 86850 RBC ANTIBODY SCREEN: CPT | Performed by: EMERGENCY MEDICINE

## 2022-01-01 PROCEDURE — 93308 TTE F-UP OR LMTD: CPT | Mod: 26 | Performed by: INTERNAL MEDICINE

## 2022-01-01 PROCEDURE — 93306 TTE W/DOPPLER COMPLETE: CPT

## 2022-01-01 PROCEDURE — 36415 COLL VENOUS BLD VENIPUNCTURE: CPT | Performed by: STUDENT IN AN ORGANIZED HEALTH CARE EDUCATION/TRAINING PROGRAM

## 2022-01-01 PROCEDURE — 84100 ASSAY OF PHOSPHORUS: CPT | Performed by: INTERNAL MEDICINE

## 2022-01-01 PROCEDURE — 85018 HEMOGLOBIN: CPT | Mod: 91 | Performed by: HOSPITALIST

## 2022-01-01 PROCEDURE — 85027 COMPLETE CBC AUTOMATED: CPT | Performed by: STUDENT IN AN ORGANIZED HEALTH CARE EDUCATION/TRAINING PROGRAM

## 2022-01-01 PROCEDURE — 999N000087 HC STATISTIC IV OP-OVERFLOW

## 2022-01-01 PROCEDURE — 80048 BASIC METABOLIC PNL TOTAL CA: CPT | Performed by: PHYSICIAN ASSISTANT

## 2022-01-01 PROCEDURE — 80061 LIPID PANEL: CPT

## 2022-01-01 PROCEDURE — 84132 ASSAY OF SERUM POTASSIUM: CPT | Performed by: INTERNAL MEDICINE

## 2022-01-01 PROCEDURE — 99207 PR CDG-CODE CATEGORY CHANGED: CPT | Performed by: INTERNAL MEDICINE

## 2022-01-01 PROCEDURE — 99222 1ST HOSP IP/OBS MODERATE 55: CPT | Performed by: INTERNAL MEDICINE

## 2022-01-01 PROCEDURE — 93325 DOPPLER ECHO COLOR FLOW MAPG: CPT

## 2022-01-01 PROCEDURE — 99223 1ST HOSP IP/OBS HIGH 75: CPT | Mod: AI | Performed by: HOSPITALIST

## 2022-01-01 PROCEDURE — 99231 SBSQ HOSP IP/OBS SF/LOW 25: CPT | Performed by: INTERNAL MEDICINE

## 2022-01-01 RX ORDER — FENTANYL CITRATE 50 UG/ML
INJECTION, SOLUTION INTRAMUSCULAR; INTRAVENOUS PRN
Status: COMPLETED | OUTPATIENT
Start: 2022-01-01 | End: 2022-01-01

## 2022-01-01 RX ORDER — LANOLIN ALCOHOL/MO/W.PET/CERES
400 CREAM (GRAM) TOPICAL DAILY
Qty: 90 TABLET | Refills: 3 | Status: SHIPPED | OUTPATIENT
Start: 2022-01-01 | End: 2022-01-01

## 2022-01-01 RX ORDER — EPINEPHRINE 1 MG/ML
0.3 INJECTION, SOLUTION INTRAMUSCULAR; SUBCUTANEOUS EVERY 5 MIN PRN
Status: CANCELLED | OUTPATIENT
Start: 2022-01-01

## 2022-01-01 RX ORDER — LIDOCAINE 40 MG/G
CREAM TOPICAL
Status: DISCONTINUED | OUTPATIENT
Start: 2022-01-01 | End: 2022-01-01 | Stop reason: HOSPADM

## 2022-01-01 RX ORDER — HEPARIN SODIUM,PORCINE 10 UNIT/ML
5 VIAL (ML) INTRAVENOUS
Status: CANCELLED | OUTPATIENT
Start: 2022-01-01

## 2022-01-01 RX ORDER — NALOXONE HYDROCHLORIDE 0.4 MG/ML
0.2 INJECTION, SOLUTION INTRAMUSCULAR; INTRAVENOUS; SUBCUTANEOUS
Status: CANCELLED | OUTPATIENT
Start: 2022-01-01

## 2022-01-01 RX ORDER — POTASSIUM CHLORIDE 1500 MG/1
20 TABLET, EXTENDED RELEASE ORAL ONCE
Status: COMPLETED | OUTPATIENT
Start: 2022-01-01 | End: 2022-01-01

## 2022-01-01 RX ORDER — MEPERIDINE HYDROCHLORIDE 25 MG/ML
25 INJECTION INTRAMUSCULAR; INTRAVENOUS; SUBCUTANEOUS EVERY 30 MIN PRN
Status: CANCELLED | OUTPATIENT
Start: 2022-01-01

## 2022-01-01 RX ORDER — NALOXONE HYDROCHLORIDE 0.4 MG/ML
0.2 INJECTION, SOLUTION INTRAMUSCULAR; INTRAVENOUS; SUBCUTANEOUS
Status: DISCONTINUED | OUTPATIENT
Start: 2022-01-01 | End: 2022-01-01 | Stop reason: HOSPADM

## 2022-01-01 RX ORDER — NALOXONE HYDROCHLORIDE 0.4 MG/ML
0.4 INJECTION, SOLUTION INTRAMUSCULAR; INTRAVENOUS; SUBCUTANEOUS
Status: DISCONTINUED | OUTPATIENT
Start: 2022-01-01 | End: 2022-01-01 | Stop reason: HOSPADM

## 2022-01-01 RX ORDER — ONDANSETRON 4 MG/1
4 TABLET, ORALLY DISINTEGRATING ORAL EVERY 8 HOURS PRN
Qty: 20 TABLET | Refills: 0 | Status: SHIPPED | OUTPATIENT
Start: 2022-01-01

## 2022-01-01 RX ORDER — DEXTROSE MONOHYDRATE 25 G/50ML
25-50 INJECTION, SOLUTION INTRAVENOUS
Status: DISCONTINUED | OUTPATIENT
Start: 2022-01-01 | End: 2022-01-01 | Stop reason: HOSPADM

## 2022-01-01 RX ORDER — ONDANSETRON 2 MG/ML
4 INJECTION INTRAMUSCULAR; INTRAVENOUS EVERY 6 HOURS PRN
Status: DISCONTINUED | OUTPATIENT
Start: 2022-01-01 | End: 2022-01-01 | Stop reason: HOSPADM

## 2022-01-01 RX ORDER — DEXTROSE MONOHYDRATE 25 G/50ML
25-50 INJECTION, SOLUTION INTRAVENOUS
Status: DISCONTINUED | OUTPATIENT
Start: 2022-01-01 | End: 2022-01-01

## 2022-01-01 RX ORDER — POTASSIUM CHLORIDE 1.5 G/1.58G
20 POWDER, FOR SOLUTION ORAL DAILY
Status: DISCONTINUED | OUTPATIENT
Start: 2022-01-01 | End: 2022-01-01 | Stop reason: HOSPADM

## 2022-01-01 RX ORDER — POTASSIUM CHLORIDE 750 MG/1
10 TABLET, EXTENDED RELEASE ORAL ONCE
Status: COMPLETED | OUTPATIENT
Start: 2022-01-01 | End: 2022-01-01

## 2022-01-01 RX ORDER — BUMETANIDE 0.25 MG/ML
2 INJECTION INTRAMUSCULAR; INTRAVENOUS EVERY 8 HOURS
Status: DISCONTINUED | OUTPATIENT
Start: 2022-01-01 | End: 2022-01-01

## 2022-01-01 RX ORDER — METHYLPREDNISOLONE SODIUM SUCCINATE 125 MG/2ML
125 INJECTION, POWDER, LYOPHILIZED, FOR SOLUTION INTRAMUSCULAR; INTRAVENOUS
Status: CANCELLED
Start: 2022-01-01

## 2022-01-01 RX ORDER — FUROSEMIDE 10 MG/ML
60 INJECTION INTRAMUSCULAR; INTRAVENOUS
Status: DISCONTINUED | OUTPATIENT
Start: 2022-01-01 | End: 2022-01-01 | Stop reason: HOSPADM

## 2022-01-01 RX ORDER — ATORVASTATIN CALCIUM 20 MG/1
20 TABLET, FILM COATED ORAL DAILY
Status: DISCONTINUED | OUTPATIENT
Start: 2022-01-01 | End: 2022-01-01 | Stop reason: HOSPADM

## 2022-01-01 RX ORDER — PANTOPRAZOLE SODIUM 40 MG/1
40 TABLET, DELAYED RELEASE ORAL
Status: DISCONTINUED | OUTPATIENT
Start: 2022-01-01 | End: 2022-01-01 | Stop reason: HOSPADM

## 2022-01-01 RX ORDER — DORZOLAMIDE HYDROCHLORIDE AND TIMOLOL MALEATE 20; 5 MG/ML; MG/ML
1 SOLUTION/ DROPS OPHTHALMIC 2 TIMES DAILY
Status: DISCONTINUED | OUTPATIENT
Start: 2022-01-01 | End: 2022-01-01 | Stop reason: HOSPADM

## 2022-01-01 RX ORDER — TORSEMIDE 20 MG/1
20 TABLET ORAL DAILY
Qty: 30 TABLET | Refills: 3 | Status: ON HOLD | OUTPATIENT
Start: 2022-01-01 | End: 2022-01-01

## 2022-01-01 RX ORDER — FUROSEMIDE 10 MG/ML
80 INJECTION INTRAMUSCULAR; INTRAVENOUS EVERY 8 HOURS
Status: DISCONTINUED | OUTPATIENT
Start: 2022-01-01 | End: 2022-01-01

## 2022-01-01 RX ORDER — POLYETHYLENE GLYCOL 3350 17 G/17G
17 POWDER, FOR SOLUTION ORAL DAILY
Status: DISCONTINUED | OUTPATIENT
Start: 2022-01-01 | End: 2022-01-01

## 2022-01-01 RX ORDER — AMOXICILLIN 250 MG
1 CAPSULE ORAL 2 TIMES DAILY PRN
Status: DISCONTINUED | OUTPATIENT
Start: 2022-01-01 | End: 2022-01-01 | Stop reason: HOSPADM

## 2022-01-01 RX ORDER — INSULIN GLARGINE 100 [IU]/ML
INJECTION, SOLUTION SUBCUTANEOUS
Qty: 45 ML | Refills: 3 | Status: CANCELLED | OUTPATIENT
Start: 2022-01-01

## 2022-01-01 RX ORDER — FUROSEMIDE 10 MG/ML
80 INJECTION INTRAMUSCULAR; INTRAVENOUS EVERY 8 HOURS
Status: COMPLETED | OUTPATIENT
Start: 2022-01-01 | End: 2022-01-01

## 2022-01-01 RX ORDER — CARVEDILOL 6.25 MG/1
6.25 TABLET ORAL 2 TIMES DAILY WITH MEALS
Status: DISCONTINUED | OUTPATIENT
Start: 2022-01-01 | End: 2022-01-01 | Stop reason: HOSPADM

## 2022-01-01 RX ORDER — NALOXONE HYDROCHLORIDE 0.4 MG/ML
0.1 INJECTION, SOLUTION INTRAMUSCULAR; INTRAVENOUS; SUBCUTANEOUS
Status: DISCONTINUED | OUTPATIENT
Start: 2022-01-01 | End: 2022-01-01 | Stop reason: HOSPADM

## 2022-01-01 RX ORDER — ALBUTEROL SULFATE 90 UG/1
1-2 AEROSOL, METERED RESPIRATORY (INHALATION)
Status: CANCELLED
Start: 2022-01-01

## 2022-01-01 RX ORDER — FLUTICASONE PROPIONATE 50 MCG
SPRAY, SUSPENSION (ML) NASAL
Qty: 48 G | OUTPATIENT
Start: 2022-01-01

## 2022-01-01 RX ORDER — TAMSULOSIN HYDROCHLORIDE 0.4 MG/1
0.4 CAPSULE ORAL DAILY
Status: DISCONTINUED | OUTPATIENT
Start: 2022-01-01 | End: 2022-01-01

## 2022-01-01 RX ORDER — ALBUTEROL SULFATE 0.83 MG/ML
2.5 SOLUTION RESPIRATORY (INHALATION)
Status: CANCELLED | OUTPATIENT
Start: 2022-01-01

## 2022-01-01 RX ORDER — BUMETANIDE 1 MG/1
4 TABLET ORAL
Status: DISCONTINUED | OUTPATIENT
Start: 2022-01-01 | End: 2022-01-01

## 2022-01-01 RX ORDER — ALBUMIN (HUMAN) 12.5 G/50ML
25 SOLUTION INTRAVENOUS EVERY 12 HOURS
Status: DISCONTINUED | OUTPATIENT
Start: 2022-01-01 | End: 2022-01-01

## 2022-01-01 RX ORDER — HEPARIN SODIUM (PORCINE) LOCK FLUSH IV SOLN 100 UNIT/ML 100 UNIT/ML
5 SOLUTION INTRAVENOUS
Status: CANCELLED | OUTPATIENT
Start: 2022-01-01

## 2022-01-01 RX ORDER — ATROPINE SULFATE 10 MG/ML
2-4 SOLUTION/ DROPS OPHTHALMIC
Qty: 2 ML | Refills: 0 | Status: SHIPPED | OUTPATIENT
Start: 2022-01-01

## 2022-01-01 RX ORDER — BUMETANIDE 0.25 MG/ML
2 INJECTION INTRAMUSCULAR; INTRAVENOUS ONCE
Status: COMPLETED | OUTPATIENT
Start: 2022-01-01 | End: 2022-01-01

## 2022-01-01 RX ORDER — SENNOSIDES 8.6 MG
1 TABLET ORAL 2 TIMES DAILY PRN
Qty: 30 TABLET | Refills: 0 | Status: SHIPPED | OUTPATIENT
Start: 2022-01-01

## 2022-01-01 RX ORDER — ATORVASTATIN CALCIUM 20 MG/1
20 TABLET, FILM COATED ORAL DAILY
Status: DISCONTINUED | OUTPATIENT
Start: 2022-01-01 | End: 2022-01-01

## 2022-01-01 RX ORDER — ASPIRIN 81 MG/1
81 TABLET ORAL DAILY
Status: DISCONTINUED | OUTPATIENT
Start: 2022-01-01 | End: 2022-01-01

## 2022-01-01 RX ORDER — TRAZODONE HYDROCHLORIDE 100 MG/1
TABLET ORAL
Qty: 90 TABLET | Refills: 3 | Status: SHIPPED | OUTPATIENT
Start: 2022-01-01

## 2022-01-01 RX ORDER — TORSEMIDE 20 MG/1
80 TABLET ORAL
COMMUNITY
Start: 2022-01-01 | End: 2022-01-01 | Stop reason: DRUGHIGH

## 2022-01-01 RX ORDER — TRAZODONE HYDROCHLORIDE 50 MG/1
100 TABLET, FILM COATED ORAL AT BEDTIME
Status: DISCONTINUED | OUTPATIENT
Start: 2022-01-01 | End: 2022-01-01 | Stop reason: HOSPADM

## 2022-01-01 RX ORDER — FLUMAZENIL 0.1 MG/ML
0.2 INJECTION, SOLUTION INTRAVENOUS
Status: DISCONTINUED | OUTPATIENT
Start: 2022-01-01 | End: 2022-01-01 | Stop reason: HOSPADM

## 2022-01-01 RX ORDER — ALBUMIN (HUMAN) 12.5 G/50ML
25 SOLUTION INTRAVENOUS EVERY 8 HOURS
Status: COMPLETED | OUTPATIENT
Start: 2022-01-01 | End: 2022-01-01

## 2022-01-01 RX ORDER — POTASSIUM CHLORIDE 750 MG/1
10 TABLET, EXTENDED RELEASE ORAL 2 TIMES DAILY
Status: DISCONTINUED | OUTPATIENT
Start: 2022-01-01 | End: 2022-01-01

## 2022-01-01 RX ORDER — FUROSEMIDE 10 MG/ML
60 INJECTION INTRAMUSCULAR; INTRAVENOUS ONCE
Status: COMPLETED | OUTPATIENT
Start: 2022-01-01 | End: 2022-01-01

## 2022-01-01 RX ORDER — TRAZODONE HYDROCHLORIDE 100 MG/1
100 TABLET ORAL AT BEDTIME
Status: DISCONTINUED | OUTPATIENT
Start: 2022-01-01 | End: 2022-01-01 | Stop reason: HOSPADM

## 2022-01-01 RX ORDER — MORPHINE SULFATE 10 MG/5ML
5-10 SOLUTION ORAL
Status: DISCONTINUED | OUTPATIENT
Start: 2022-01-01 | End: 2022-01-01

## 2022-01-01 RX ORDER — DIPHENHYDRAMINE HYDROCHLORIDE 50 MG/ML
50 INJECTION INTRAMUSCULAR; INTRAVENOUS
Status: CANCELLED
Start: 2022-01-01

## 2022-01-01 RX ORDER — ATROPINE SULFATE 10 MG/ML
2 SOLUTION/ DROPS OPHTHALMIC EVERY 4 HOURS PRN
Status: DISCONTINUED | OUTPATIENT
Start: 2022-01-01 | End: 2022-01-01 | Stop reason: HOSPADM

## 2022-01-01 RX ORDER — ONDANSETRON 4 MG/1
4 TABLET, ORALLY DISINTEGRATING ORAL EVERY 6 HOURS PRN
Status: DISCONTINUED | OUTPATIENT
Start: 2022-01-01 | End: 2022-01-01 | Stop reason: HOSPADM

## 2022-01-01 RX ORDER — LIDOCAINE HYDROCHLORIDE 10 MG/ML
10 INJECTION, SOLUTION EPIDURAL; INFILTRATION; INTRACAUDAL; PERINEURAL ONCE
Status: COMPLETED | OUTPATIENT
Start: 2022-01-01 | End: 2022-01-01

## 2022-01-01 RX ORDER — METHOCARBAMOL 500 MG/1
500 TABLET, FILM COATED ORAL 4 TIMES DAILY PRN
Status: ON HOLD | COMMUNITY
End: 2022-01-01

## 2022-01-01 RX ORDER — PANTOPRAZOLE SODIUM 40 MG/1
40 TABLET, DELAYED RELEASE ORAL
Status: DISCONTINUED | OUTPATIENT
Start: 2022-01-01 | End: 2022-01-01

## 2022-01-01 RX ORDER — ACETAMINOPHEN 325 MG/1
650 TABLET ORAL EVERY 6 HOURS PRN
Status: DISCONTINUED | OUTPATIENT
Start: 2022-01-01 | End: 2022-01-01 | Stop reason: HOSPADM

## 2022-01-01 RX ORDER — HYDROMORPHONE HYDROCHLORIDE 1 MG/ML
1 SOLUTION ORAL
Status: DISCONTINUED | OUTPATIENT
Start: 2022-01-01 | End: 2022-01-01 | Stop reason: HOSPADM

## 2022-01-01 RX ORDER — CLOBETASOL PROPIONATE 0.5 MG/G
CREAM TOPICAL DAILY PRN
COMMUNITY

## 2022-01-01 RX ORDER — PROCHLORPERAZINE MALEATE 5 MG
5 TABLET ORAL EVERY 6 HOURS PRN
Status: DISCONTINUED | OUTPATIENT
Start: 2022-01-01 | End: 2022-01-01 | Stop reason: HOSPADM

## 2022-01-01 RX ORDER — FUROSEMIDE 10 MG/ML
80 INJECTION INTRAMUSCULAR; INTRAVENOUS EVERY 6 HOURS
Status: COMPLETED | OUTPATIENT
Start: 2022-01-01 | End: 2022-01-01

## 2022-01-01 RX ORDER — MORPHINE SULFATE 20 MG/ML
5-10 SOLUTION ORAL
Status: DISCONTINUED | OUTPATIENT
Start: 2022-01-01 | End: 2022-01-01

## 2022-01-01 RX ORDER — CLOPIDOGREL BISULFATE 75 MG/1
75 TABLET ORAL DAILY
Status: DISCONTINUED | OUTPATIENT
Start: 2022-01-01 | End: 2022-01-01

## 2022-01-01 RX ORDER — TORSEMIDE 10 MG/1
10 TABLET ORAL DAILY
Qty: 45 TABLET | Refills: 0 | Status: SHIPPED | OUTPATIENT
Start: 2022-01-01 | End: 2022-01-01

## 2022-01-01 RX ORDER — BISACODYL 5 MG
10 TABLET, DELAYED RELEASE (ENTERIC COATED) ORAL ONCE
Status: COMPLETED | OUTPATIENT
Start: 2022-01-01 | End: 2022-01-01

## 2022-01-01 RX ORDER — LANOLIN ALCOHOL/MO/W.PET/CERES
1000 CREAM (GRAM) TOPICAL DAILY
Status: DISCONTINUED | OUTPATIENT
Start: 2022-01-01 | End: 2022-01-01 | Stop reason: HOSPADM

## 2022-01-01 RX ORDER — TORSEMIDE 10 MG/1
10 TABLET ORAL DAILY
Qty: 90 TABLET | Refills: 0 | Status: ON HOLD | OUTPATIENT
Start: 2022-01-01 | End: 2022-01-01

## 2022-01-01 RX ORDER — TORSEMIDE 20 MG/1
20 TABLET ORAL DAILY
Qty: 30 TABLET | Refills: 0 | Status: SHIPPED | OUTPATIENT
Start: 2022-01-01 | End: 2022-01-01

## 2022-01-01 RX ORDER — HEPARIN SODIUM,PORCINE 10 UNIT/ML
5 VIAL (ML) INTRAVENOUS
Status: DISCONTINUED | OUTPATIENT
Start: 2022-01-01 | End: 2022-01-01 | Stop reason: HOSPADM

## 2022-01-01 RX ORDER — HEPARIN SODIUM (PORCINE) LOCK FLUSH IV SOLN 100 UNIT/ML 100 UNIT/ML
5 SOLUTION INTRAVENOUS
Status: DISCONTINUED | OUTPATIENT
Start: 2022-01-01 | End: 2022-01-01 | Stop reason: HOSPADM

## 2022-01-01 RX ORDER — ACETAMINOPHEN 650 MG/1
650 SUPPOSITORY RECTAL EVERY 6 HOURS PRN
Status: DISCONTINUED | OUTPATIENT
Start: 2022-01-01 | End: 2022-01-01 | Stop reason: HOSPADM

## 2022-01-01 RX ORDER — BISACODYL 10 MG
10 SUPPOSITORY, RECTAL RECTAL DAILY PRN
Status: DISCONTINUED | OUTPATIENT
Start: 2022-01-01 | End: 2022-01-01 | Stop reason: HOSPADM

## 2022-01-01 RX ORDER — LORAZEPAM 2 MG/ML
1 INJECTION INTRAMUSCULAR
Status: DISCONTINUED | OUTPATIENT
Start: 2022-01-01 | End: 2022-01-01 | Stop reason: HOSPADM

## 2022-01-01 RX ORDER — TORSEMIDE 20 MG/1
80 TABLET ORAL DAILY
Status: ON HOLD | COMMUNITY
End: 2022-01-01

## 2022-01-01 RX ORDER — BUMETANIDE 0.25 MG/ML
2 INJECTION INTRAMUSCULAR; INTRAVENOUS EVERY 12 HOURS
Status: DISCONTINUED | OUTPATIENT
Start: 2022-01-01 | End: 2022-01-01

## 2022-01-01 RX ORDER — HYDROMORPHONE HYDROCHLORIDE 1 MG/ML
.3-.5 INJECTION, SOLUTION INTRAMUSCULAR; INTRAVENOUS; SUBCUTANEOUS
Status: DISCONTINUED | OUTPATIENT
Start: 2022-01-01 | End: 2022-01-01 | Stop reason: HOSPADM

## 2022-01-01 RX ORDER — METOLAZONE 5 MG/1
10 TABLET ORAL DAILY
Status: DISCONTINUED | OUTPATIENT
Start: 2022-01-01 | End: 2022-01-01

## 2022-01-01 RX ORDER — NICOTINE POLACRILEX 4 MG
15-30 LOZENGE BUCCAL
Status: DISCONTINUED | OUTPATIENT
Start: 2022-01-01 | End: 2022-01-01 | Stop reason: HOSPADM

## 2022-01-01 RX ORDER — BUMETANIDE 1 MG/1
2 TABLET ORAL
Status: DISCONTINUED | OUTPATIENT
Start: 2022-01-01 | End: 2022-01-01 | Stop reason: HOSPADM

## 2022-01-01 RX ORDER — HALOPERIDOL 2 MG/ML
1-2 SOLUTION ORAL EVERY 6 HOURS PRN
Qty: 30 ML | Refills: 0 | Status: SHIPPED | OUTPATIENT
Start: 2022-01-01

## 2022-01-01 RX ORDER — CARVEDILOL 6.25 MG/1
6.25 TABLET ORAL 2 TIMES DAILY WITH MEALS
Qty: 180 TABLET | Refills: 3 | Status: ON HOLD | OUTPATIENT
Start: 2022-01-01 | End: 2022-01-01

## 2022-01-01 RX ORDER — SENNOSIDES 8.6 MG
1-2 TABLET ORAL 2 TIMES DAILY
Status: DISCONTINUED | OUTPATIENT
Start: 2022-01-01 | End: 2022-01-01

## 2022-01-01 RX ORDER — GABAPENTIN 100 MG/1
200 CAPSULE ORAL AT BEDTIME
Status: DISCONTINUED | OUTPATIENT
Start: 2022-01-01 | End: 2022-01-01

## 2022-01-01 RX ORDER — LORAZEPAM 1 MG/1
1 TABLET ORAL
Status: DISCONTINUED | OUTPATIENT
Start: 2022-01-01 | End: 2022-01-01 | Stop reason: HOSPADM

## 2022-01-01 RX ORDER — TORSEMIDE 20 MG/1
TABLET ORAL
Qty: 180 TABLET | Refills: 3 | Status: SHIPPED | OUTPATIENT
Start: 2022-01-01 | End: 2022-01-01

## 2022-01-01 RX ORDER — GABAPENTIN 100 MG/1
100 CAPSULE ORAL AT BEDTIME
Status: DISCONTINUED | OUTPATIENT
Start: 2022-01-01 | End: 2022-01-01 | Stop reason: HOSPADM

## 2022-01-01 RX ORDER — ASPIRIN 81 MG/1
81 TABLET ORAL DAILY
Status: ON HOLD | COMMUNITY
End: 2022-01-01

## 2022-01-01 RX ORDER — ALBUMIN (HUMAN) 12.5 G/50ML
25 SOLUTION INTRAVENOUS EVERY 8 HOURS
Status: DISCONTINUED | OUTPATIENT
Start: 2022-01-01 | End: 2022-01-01

## 2022-01-01 RX ORDER — FUROSEMIDE 10 MG/ML
60 INJECTION INTRAMUSCULAR; INTRAVENOUS EVERY 8 HOURS
Status: COMPLETED | OUTPATIENT
Start: 2022-01-01 | End: 2022-01-01

## 2022-01-01 RX ORDER — CLOBETASOL PROPIONATE 0.5 MG/G
CREAM TOPICAL DAILY PRN
Status: DISCONTINUED | OUTPATIENT
Start: 2022-01-01 | End: 2022-01-01 | Stop reason: HOSPADM

## 2022-01-01 RX ORDER — CARBOXYMETHYLCELLULOSE SODIUM 5 MG/ML
1-2 SOLUTION/ DROPS OPHTHALMIC
Status: DISCONTINUED | OUTPATIENT
Start: 2022-01-01 | End: 2022-01-01 | Stop reason: HOSPADM

## 2022-01-01 RX ORDER — CARVEDILOL 3.12 MG/1
3.12 TABLET ORAL 2 TIMES DAILY WITH MEALS
Status: DISCONTINUED | OUTPATIENT
Start: 2022-01-01 | End: 2022-01-01

## 2022-01-01 RX ORDER — CARVEDILOL 6.25 MG/1
6.25 TABLET ORAL 2 TIMES DAILY WITH MEALS
Status: DISCONTINUED | OUTPATIENT
Start: 2022-01-01 | End: 2022-01-01

## 2022-01-01 RX ORDER — FLUMAZENIL 0.1 MG/ML
0.2 INJECTION, SOLUTION INTRAVENOUS
Status: ACTIVE | OUTPATIENT
Start: 2022-01-01 | End: 2022-01-01

## 2022-01-01 RX ORDER — POTASSIUM CHLORIDE 1500 MG/1
20 TABLET, EXTENDED RELEASE ORAL 2 TIMES DAILY
Status: DISCONTINUED | OUTPATIENT
Start: 2022-01-01 | End: 2022-01-01

## 2022-01-01 RX ORDER — FLUTICASONE PROPIONATE 50 MCG
SPRAY, SUSPENSION (ML) NASAL
Qty: 16 G | Refills: 3 | Status: ON HOLD | OUTPATIENT
Start: 2022-01-01 | End: 2022-01-01

## 2022-01-01 RX ORDER — ONDANSETRON 2 MG/ML
4 INJECTION INTRAMUSCULAR; INTRAVENOUS
Status: DISCONTINUED | OUTPATIENT
Start: 2022-01-01 | End: 2022-01-01

## 2022-01-01 RX ORDER — OXYCODONE HYDROCHLORIDE 5 MG/1
5 TABLET ORAL EVERY 6 HOURS PRN
Status: DISCONTINUED | OUTPATIENT
Start: 2022-01-01 | End: 2022-01-01

## 2022-01-01 RX ORDER — AMOXICILLIN 250 MG
2 CAPSULE ORAL 2 TIMES DAILY PRN
Status: DISCONTINUED | OUTPATIENT
Start: 2022-01-01 | End: 2022-01-01 | Stop reason: HOSPADM

## 2022-01-01 RX ORDER — FLUTICASONE PROPIONATE 50 MCG
2 SPRAY, SUSPENSION (ML) NASAL DAILY
Qty: 16 G | Refills: 0 | Status: SHIPPED | OUTPATIENT
Start: 2022-01-01 | End: 2022-01-01

## 2022-01-01 RX ORDER — MORPHINE SULFATE 2 MG/ML
1-2 INJECTION, SOLUTION INTRAMUSCULAR; INTRAVENOUS
Status: DISCONTINUED | OUTPATIENT
Start: 2022-01-01 | End: 2022-01-01

## 2022-01-01 RX ORDER — BUMETANIDE 0.25 MG/ML
2 INJECTION INTRAMUSCULAR; INTRAVENOUS EVERY 8 HOURS
Status: COMPLETED | OUTPATIENT
Start: 2022-01-01 | End: 2022-01-01

## 2022-01-01 RX ORDER — PROCHLORPERAZINE 25 MG
12.5 SUPPOSITORY, RECTAL RECTAL EVERY 12 HOURS PRN
Status: DISCONTINUED | OUTPATIENT
Start: 2022-01-01 | End: 2022-01-01 | Stop reason: HOSPADM

## 2022-01-01 RX ORDER — POTASSIUM CHLORIDE 750 MG/1
10 CAPSULE, EXTENDED RELEASE ORAL 2 TIMES DAILY
Status: DISCONTINUED | OUTPATIENT
Start: 2022-01-01 | End: 2022-01-01

## 2022-01-01 RX ORDER — BUMETANIDE 0.25 MG/ML
1 INJECTION INTRAMUSCULAR; INTRAVENOUS ONCE
Status: COMPLETED | OUTPATIENT
Start: 2022-01-01 | End: 2022-01-01

## 2022-01-01 RX ORDER — CLOPIDOGREL BISULFATE 75 MG/1
TABLET ORAL
Qty: 90 TABLET | Refills: 3 | OUTPATIENT
Start: 2022-01-01 | End: 2022-01-01

## 2022-01-01 RX ORDER — ATORVASTATIN CALCIUM 20 MG/1
20 TABLET, FILM COATED ORAL DAILY
Qty: 90 TABLET | Refills: 3 | Status: ON HOLD | OUTPATIENT
Start: 2022-01-01 | End: 2022-01-01

## 2022-01-01 RX ORDER — POLYETHYLENE GLYCOL 3350 17 G/17G
238 POWDER, FOR SOLUTION ORAL ONCE
Status: COMPLETED | OUTPATIENT
Start: 2022-01-01 | End: 2022-01-01

## 2022-01-01 RX ORDER — LORAZEPAM 2 MG/ML
.5-1 CONCENTRATE ORAL EVERY 4 HOURS PRN
Qty: 30 ML | Refills: 0 | Status: SHIPPED | OUTPATIENT
Start: 2022-01-01

## 2022-01-01 RX ORDER — TRAMADOL HYDROCHLORIDE 50 MG/1
50 TABLET ORAL EVERY 6 HOURS PRN
Status: DISCONTINUED | OUTPATIENT
Start: 2022-01-01 | End: 2022-01-01

## 2022-01-01 RX ORDER — PANTOPRAZOLE SODIUM 40 MG/1
TABLET, DELAYED RELEASE ORAL
Qty: 180 TABLET | Refills: 2 | Status: ON HOLD | OUTPATIENT
Start: 2022-01-01 | End: 2022-01-01

## 2022-01-01 RX ORDER — POLYETHYLENE GLYCOL 3350 17 G/17G
17 POWDER, FOR SOLUTION ORAL DAILY PRN
Status: DISCONTINUED | OUTPATIENT
Start: 2022-01-01 | End: 2022-01-01 | Stop reason: HOSPADM

## 2022-01-01 RX ORDER — BUMETANIDE 2 MG/1
2 TABLET ORAL
Qty: 60 TABLET | Refills: 0 | Status: SHIPPED | OUTPATIENT
Start: 2022-01-01

## 2022-01-01 RX ORDER — MAGNESIUM CARB/ALUMINUM HYDROX 105-160MG
296 TABLET,CHEWABLE ORAL ONCE
Status: COMPLETED | OUTPATIENT
Start: 2022-01-01 | End: 2022-01-01

## 2022-01-01 RX ORDER — BENZOCAINE/MENTHOL 6 MG-10 MG
1 LOZENGE MUCOUS MEMBRANE
COMMUNITY

## 2022-01-01 RX ORDER — OXYCODONE HYDROCHLORIDE 5 MG/1
2.5 TABLET ORAL EVERY 6 HOURS PRN
Qty: 30 TABLET | Refills: 0 | Status: SHIPPED | OUTPATIENT
Start: 2022-01-01 | End: 2022-01-01

## 2022-01-01 RX ORDER — NICOTINE POLACRILEX 4 MG
15-30 LOZENGE BUCCAL
Status: DISCONTINUED | OUTPATIENT
Start: 2022-01-01 | End: 2022-01-01

## 2022-01-01 RX ORDER — HYDROCODONE BITARTRATE AND ACETAMINOPHEN 5; 325 MG/1; MG/1
1-2 TABLET ORAL ONCE
Status: COMPLETED | OUTPATIENT
Start: 2022-01-01 | End: 2022-01-01

## 2022-01-01 RX ADMIN — CARVEDILOL 6.25 MG: 6.25 TABLET, FILM COATED ORAL at 09:44

## 2022-01-01 RX ADMIN — ATORVASTATIN CALCIUM 20 MG: 20 TABLET, FILM COATED ORAL at 08:08

## 2022-01-01 RX ADMIN — POTASSIUM CHLORIDE 20 MEQ: 1500 TABLET, EXTENDED RELEASE ORAL at 23:05

## 2022-01-01 RX ADMIN — TAMSULOSIN HYDROCHLORIDE 0.4 MG: 0.4 CAPSULE ORAL at 21:04

## 2022-01-01 RX ADMIN — ASPIRIN 81 MG: 81 TABLET, COATED ORAL at 08:58

## 2022-01-01 RX ADMIN — INSULIN ASPART 7 UNITS: 100 INJECTION, SOLUTION INTRAVENOUS; SUBCUTANEOUS at 12:05

## 2022-01-01 RX ADMIN — INSULIN ASPART 2 UNITS: 100 INJECTION, SOLUTION INTRAVENOUS; SUBCUTANEOUS at 21:49

## 2022-01-01 RX ADMIN — INSULIN ASPART 7 UNITS: 100 INJECTION, SOLUTION INTRAVENOUS; SUBCUTANEOUS at 18:38

## 2022-01-01 RX ADMIN — POTASSIUM CHLORIDE 10 MEQ: 750 TABLET, EXTENDED RELEASE ORAL at 08:07

## 2022-01-01 RX ADMIN — DICLOFENAC SODIUM 2 G: 10 GEL TOPICAL at 15:48

## 2022-01-01 RX ADMIN — POTASSIUM CHLORIDE 10 MEQ: 750 TABLET, EXTENDED RELEASE ORAL at 09:04

## 2022-01-01 RX ADMIN — ASPIRIN 81 MG: 81 TABLET, COATED ORAL at 07:52

## 2022-01-01 RX ADMIN — SENNOSIDES 2 TABLET: 8.6 TABLET, FILM COATED ORAL at 09:04

## 2022-01-01 RX ADMIN — DICLOFENAC SODIUM 2 G: 10 GEL TOPICAL at 09:11

## 2022-01-01 RX ADMIN — INSULIN ASPART 7 UNITS: 100 INJECTION, SOLUTION INTRAVENOUS; SUBCUTANEOUS at 08:09

## 2022-01-01 RX ADMIN — INSULIN ASPART 7 UNITS: 100 INJECTION, SOLUTION INTRAVENOUS; SUBCUTANEOUS at 12:46

## 2022-01-01 RX ADMIN — Medication 2.5 MG: at 19:10

## 2022-01-01 RX ADMIN — CLOPIDOGREL BISULFATE 75 MG: 75 TABLET ORAL at 08:08

## 2022-01-01 RX ADMIN — INSULIN ASPART 3 UNITS: 100 INJECTION, SOLUTION INTRAVENOUS; SUBCUTANEOUS at 18:01

## 2022-01-01 RX ADMIN — FUROSEMIDE 15 MG/HR: 10 INJECTION, SOLUTION INTRAVENOUS at 22:23

## 2022-01-01 RX ADMIN — INSULIN ASPART 7 UNITS: 100 INJECTION, SOLUTION INTRAVENOUS; SUBCUTANEOUS at 08:01

## 2022-01-01 RX ADMIN — POTASSIUM CHLORIDE 10 MEQ: 750 TABLET, EXTENDED RELEASE ORAL at 10:25

## 2022-01-01 RX ADMIN — INSULIN ASPART 7 UNITS: 100 INJECTION, SOLUTION INTRAVENOUS; SUBCUTANEOUS at 13:15

## 2022-01-01 RX ADMIN — PANTOPRAZOLE SODIUM 40 MG: 40 TABLET, DELAYED RELEASE ORAL at 06:46

## 2022-01-01 RX ADMIN — CLOPIDOGREL BISULFATE 75 MG: 75 TABLET ORAL at 08:10

## 2022-01-01 RX ADMIN — PANTOPRAZOLE SODIUM 40 MG: 40 TABLET, DELAYED RELEASE ORAL at 05:56

## 2022-01-01 RX ADMIN — ALBUMIN HUMAN 25 G: 0.25 SOLUTION INTRAVENOUS at 14:04

## 2022-01-01 RX ADMIN — CAMPHOR AND MENTHOL: 5; 5 LOTION TOPICAL at 16:36

## 2022-01-01 RX ADMIN — Medication 1 MG: at 00:44

## 2022-01-01 RX ADMIN — POLYETHYLENE GLYCOL 3350 238 G: 17 POWDER, FOR SOLUTION ORAL at 17:19

## 2022-01-01 RX ADMIN — POTASSIUM CHLORIDE 10 MEQ: 750 TABLET, EXTENDED RELEASE ORAL at 20:34

## 2022-01-01 RX ADMIN — CLOBETASOL PROPIONATE: 0.5 CREAM TOPICAL at 02:21

## 2022-01-01 RX ADMIN — TRAZODONE HYDROCHLORIDE 100 MG: 100 TABLET ORAL at 21:47

## 2022-01-01 RX ADMIN — DICLOFENAC SODIUM 2 G: 10 GEL TOPICAL at 09:02

## 2022-01-01 RX ADMIN — DORZOLAMIDE HYDROCHLORIDE AND TIMOLOL MALEATE 1 DROP: 20; 5 SOLUTION/ DROPS OPHTHALMIC at 08:13

## 2022-01-01 RX ADMIN — MIDAZOLAM 2 MG: 1 INJECTION INTRAMUSCULAR; INTRAVENOUS at 09:17

## 2022-01-01 RX ADMIN — ASPIRIN 81 MG: 81 TABLET, COATED ORAL at 08:20

## 2022-01-01 RX ADMIN — INSULIN ASPART 7 UNITS: 100 INJECTION, SOLUTION INTRAVENOUS; SUBCUTANEOUS at 17:32

## 2022-01-01 RX ADMIN — INSULIN ASPART 2 UNITS: 100 INJECTION, SOLUTION INTRAVENOUS; SUBCUTANEOUS at 07:45

## 2022-01-01 RX ADMIN — INSULIN GLARGINE 30 UNITS: 100 INJECTION, SOLUTION SUBCUTANEOUS at 21:43

## 2022-01-01 RX ADMIN — SERTRALINE HYDROCHLORIDE 50 MG: 50 TABLET ORAL at 08:00

## 2022-01-01 RX ADMIN — POTASSIUM CHLORIDE 10 MEQ: 750 TABLET, EXTENDED RELEASE ORAL at 20:21

## 2022-01-01 RX ADMIN — BUMETANIDE 2 MG: 0.25 INJECTION INTRAMUSCULAR; INTRAVENOUS at 07:25

## 2022-01-01 RX ADMIN — DORZOLAMIDE HYDROCHLORIDE AND TIMOLOL MALEATE 1 DROP: 20; 5 SOLUTION/ DROPS OPHTHALMIC at 20:29

## 2022-01-01 RX ADMIN — INSULIN ASPART 3 UNITS: 100 INJECTION, SOLUTION INTRAVENOUS; SUBCUTANEOUS at 13:40

## 2022-01-01 RX ADMIN — POTASSIUM CHLORIDE 20 MEQ: 1500 TABLET, EXTENDED RELEASE ORAL at 04:13

## 2022-01-01 RX ADMIN — SERTRALINE HYDROCHLORIDE 50 MG: 50 TABLET ORAL at 08:19

## 2022-01-01 RX ADMIN — POTASSIUM CHLORIDE 10 MEQ: 750 TABLET, EXTENDED RELEASE ORAL at 20:19

## 2022-01-01 RX ADMIN — INSULIN GLARGINE 30 UNITS: 100 INJECTION, SOLUTION SUBCUTANEOUS at 22:07

## 2022-01-01 RX ADMIN — TAMSULOSIN HYDROCHLORIDE 0.4 MG: 0.4 CAPSULE ORAL at 21:49

## 2022-01-01 RX ADMIN — SENNOSIDES 1 TABLET: 8.6 TABLET, FILM COATED ORAL at 20:34

## 2022-01-01 RX ADMIN — Medication 2.5 MG: at 21:36

## 2022-01-01 RX ADMIN — INSULIN ASPART 7 UNITS: 100 INJECTION, SOLUTION INTRAVENOUS; SUBCUTANEOUS at 08:13

## 2022-01-01 RX ADMIN — BUMETANIDE 2 MG: 1 TABLET ORAL at 08:16

## 2022-01-01 RX ADMIN — INSULIN ASPART 7 UNITS: 100 INJECTION, SOLUTION INTRAVENOUS; SUBCUTANEOUS at 08:23

## 2022-01-01 RX ADMIN — ATORVASTATIN CALCIUM 20 MG: 20 TABLET, FILM COATED ORAL at 08:09

## 2022-01-01 RX ADMIN — INSULIN GLARGINE 30 UNITS: 100 INJECTION, SOLUTION SUBCUTANEOUS at 21:50

## 2022-01-01 RX ADMIN — BUMETANIDE 2 MG: 0.25 INJECTION INTRAMUSCULAR; INTRAVENOUS at 07:58

## 2022-01-01 RX ADMIN — ATORVASTATIN CALCIUM 20 MG: 20 TABLET, FILM COATED ORAL at 07:52

## 2022-01-01 RX ADMIN — INSULIN ASPART 4 UNITS: 100 INJECTION, SOLUTION INTRAVENOUS; SUBCUTANEOUS at 17:52

## 2022-01-01 RX ADMIN — ALBUMIN HUMAN 25 G: 0.25 SOLUTION INTRAVENOUS at 06:07

## 2022-01-01 RX ADMIN — CARVEDILOL 6.25 MG: 6.25 TABLET, FILM COATED ORAL at 17:33

## 2022-01-01 RX ADMIN — FERUMOXYTOL 510 MG: 510 INJECTION INTRAVENOUS at 12:21

## 2022-01-01 RX ADMIN — SENNOSIDES 1 TABLET: 8.6 TABLET, FILM COATED ORAL at 08:08

## 2022-01-01 RX ADMIN — INSULIN ASPART 1 UNITS: 100 INJECTION, SOLUTION INTRAVENOUS; SUBCUTANEOUS at 14:23

## 2022-01-01 RX ADMIN — BUMETANIDE 2 MG: 1 TABLET ORAL at 17:15

## 2022-01-01 RX ADMIN — DORZOLAMIDE HYDROCHLORIDE AND TIMOLOL MALEATE 1 DROP: 20; 5 SOLUTION/ DROPS OPHTHALMIC at 23:43

## 2022-01-01 RX ADMIN — POTASSIUM CHLORIDE 10 MEQ: 750 TABLET, EXTENDED RELEASE ORAL at 22:18

## 2022-01-01 RX ADMIN — INSULIN ASPART 7 UNITS: 100 INJECTION, SOLUTION INTRAVENOUS; SUBCUTANEOUS at 13:06

## 2022-01-01 RX ADMIN — GABAPENTIN 200 MG: 100 CAPSULE ORAL at 22:21

## 2022-01-01 RX ADMIN — INSULIN ASPART 7 UNITS: 100 INJECTION, SOLUTION INTRAVENOUS; SUBCUTANEOUS at 09:06

## 2022-01-01 RX ADMIN — SENNOSIDES 1 TABLET: 8.6 TABLET, FILM COATED ORAL at 20:46

## 2022-01-01 RX ADMIN — POTASSIUM CHLORIDE 10 MEQ: 750 TABLET, EXTENDED RELEASE ORAL at 08:08

## 2022-01-01 RX ADMIN — INSULIN ASPART 7 UNITS: 100 INJECTION, SOLUTION INTRAVENOUS; SUBCUTANEOUS at 17:47

## 2022-01-01 RX ADMIN — BUMETANIDE 2 MG: 1 TABLET ORAL at 10:07

## 2022-01-01 RX ADMIN — ASPIRIN 81 MG: 81 TABLET, COATED ORAL at 08:07

## 2022-01-01 RX ADMIN — Medication 5 MG: at 01:33

## 2022-01-01 RX ADMIN — FUROSEMIDE 80 MG: 10 INJECTION, SOLUTION INTRAVENOUS at 08:20

## 2022-01-01 RX ADMIN — DICLOFENAC SODIUM 2 G: 10 GEL TOPICAL at 16:44

## 2022-01-01 RX ADMIN — FERUMOXYTOL 510 MG: 510 INJECTION INTRAVENOUS at 12:35

## 2022-01-01 RX ADMIN — FUROSEMIDE 80 MG: 10 INJECTION, SOLUTION INTRAVENOUS at 16:17

## 2022-01-01 RX ADMIN — SERTRALINE HYDROCHLORIDE 50 MG: 50 TABLET ORAL at 10:07

## 2022-01-01 RX ADMIN — BUMETANIDE 0.5 MG/HR: 0.25 INJECTION INTRAMUSCULAR; INTRAVENOUS at 09:11

## 2022-01-01 RX ADMIN — SERTRALINE HYDROCHLORIDE 50 MG: 50 TABLET ORAL at 11:45

## 2022-01-01 RX ADMIN — INSULIN ASPART 4 UNITS: 100 INJECTION, SOLUTION INTRAVENOUS; SUBCUTANEOUS at 12:35

## 2022-01-01 RX ADMIN — ATORVASTATIN CALCIUM 20 MG: 20 TABLET, FILM COATED ORAL at 09:05

## 2022-01-01 RX ADMIN — CLOPIDOGREL BISULFATE 75 MG: 75 TABLET ORAL at 08:01

## 2022-01-01 RX ADMIN — PANTOPRAZOLE SODIUM 40 MG: 40 TABLET, DELAYED RELEASE ORAL at 05:58

## 2022-01-01 RX ADMIN — POLYETHYLENE GLYCOL 3350 17 G: 17 POWDER, FOR SOLUTION ORAL at 08:07

## 2022-01-01 RX ADMIN — INSULIN ASPART 7 UNITS: 100 INJECTION, SOLUTION INTRAVENOUS; SUBCUTANEOUS at 10:22

## 2022-01-01 RX ADMIN — POTASSIUM CHLORIDE 20 MEQ: 1500 TABLET, EXTENDED RELEASE ORAL at 11:05

## 2022-01-01 RX ADMIN — TRAZODONE HYDROCHLORIDE 100 MG: 50 TABLET ORAL at 21:10

## 2022-01-01 RX ADMIN — ASPIRIN 81 MG: 81 TABLET, COATED ORAL at 08:10

## 2022-01-01 RX ADMIN — INSULIN ASPART 1 UNITS: 100 INJECTION, SOLUTION INTRAVENOUS; SUBCUTANEOUS at 09:04

## 2022-01-01 RX ADMIN — DORZOLAMIDE HYDROCHLORIDE AND TIMOLOL MALEATE 1 DROP: 20; 5 SOLUTION/ DROPS OPHTHALMIC at 09:06

## 2022-01-01 RX ADMIN — INSULIN ASPART 4 UNITS: 100 INJECTION, SOLUTION INTRAVENOUS; SUBCUTANEOUS at 12:05

## 2022-01-01 RX ADMIN — MIDAZOLAM 1 MG: 1 INJECTION INTRAMUSCULAR; INTRAVENOUS at 10:38

## 2022-01-01 RX ADMIN — CARVEDILOL 6.25 MG: 6.25 TABLET, FILM COATED ORAL at 09:43

## 2022-01-01 RX ADMIN — TRAZODONE HYDROCHLORIDE 100 MG: 50 TABLET ORAL at 21:17

## 2022-01-01 RX ADMIN — CYANOCOBALAMIN TAB 1000 MCG 1000 MCG: 1000 TAB at 09:43

## 2022-01-01 RX ADMIN — INSULIN ASPART 7 UNITS: 100 INJECTION, SOLUTION INTRAVENOUS; SUBCUTANEOUS at 18:00

## 2022-01-01 RX ADMIN — SENNOSIDES 1 TABLET: 8.6 TABLET, FILM COATED ORAL at 08:12

## 2022-01-01 RX ADMIN — INSULIN ASPART 3 UNITS: 100 INJECTION, SOLUTION INTRAVENOUS; SUBCUTANEOUS at 08:01

## 2022-01-01 RX ADMIN — DORZOLAMIDE HYDROCHLORIDE AND TIMOLOL MALEATE 1 DROP: 20; 5 SOLUTION/ DROPS OPHTHALMIC at 20:37

## 2022-01-01 RX ADMIN — FUROSEMIDE 60 MG: 10 INJECTION, SOLUTION INTRAVENOUS at 01:11

## 2022-01-01 RX ADMIN — MAGNESIUM CITRATE 296 ML: 1.75 LIQUID ORAL at 03:59

## 2022-01-01 RX ADMIN — INSULIN ASPART 1 UNITS: 100 INJECTION, SOLUTION INTRAVENOUS; SUBCUTANEOUS at 17:33

## 2022-01-01 RX ADMIN — ATORVASTATIN CALCIUM 20 MG: 20 TABLET, FILM COATED ORAL at 09:45

## 2022-01-01 RX ADMIN — DORZOLAMIDE HYDROCHLORIDE AND TIMOLOL MALEATE 1 DROP: 20; 5 SOLUTION/ DROPS OPHTHALMIC at 20:48

## 2022-01-01 RX ADMIN — POTASSIUM CHLORIDE 10 MEQ: 750 TABLET, EXTENDED RELEASE ORAL at 21:39

## 2022-01-01 RX ADMIN — Medication 2.5 MG: at 02:35

## 2022-01-01 RX ADMIN — BUMETANIDE 4 MG: 1 TABLET ORAL at 08:08

## 2022-01-01 RX ADMIN — DORZOLAMIDE HYDROCHLORIDE AND TIMOLOL MALEATE 1 DROP: 20; 5 SOLUTION/ DROPS OPHTHALMIC at 20:24

## 2022-01-01 RX ADMIN — INSULIN ASPART 7 UNITS: 100 INJECTION, SOLUTION INTRAVENOUS; SUBCUTANEOUS at 17:59

## 2022-01-01 RX ADMIN — IRON SUCROSE 300 MG: 20 INJECTION, SOLUTION INTRAVENOUS at 08:09

## 2022-01-01 RX ADMIN — ALBUMIN HUMAN 25 G: 0.25 SOLUTION INTRAVENOUS at 22:43

## 2022-01-01 RX ADMIN — CLOPIDOGREL BISULFATE 75 MG: 75 TABLET ORAL at 13:08

## 2022-01-01 RX ADMIN — INSULIN ASPART 7 UNITS: 100 INJECTION, SOLUTION INTRAVENOUS; SUBCUTANEOUS at 18:20

## 2022-01-01 RX ADMIN — TRAZODONE HYDROCHLORIDE 100 MG: 100 TABLET ORAL at 21:25

## 2022-01-01 RX ADMIN — TRAZODONE HYDROCHLORIDE 100 MG: 50 TABLET ORAL at 21:04

## 2022-01-01 RX ADMIN — BUMETANIDE 0.5 MG/HR: 0.25 INJECTION INTRAMUSCULAR; INTRAVENOUS at 22:32

## 2022-01-01 RX ADMIN — ASPIRIN 81 MG: 81 TABLET, COATED ORAL at 09:04

## 2022-01-01 RX ADMIN — SERTRALINE HYDROCHLORIDE 50 MG: 50 TABLET ORAL at 08:59

## 2022-01-01 RX ADMIN — ATORVASTATIN CALCIUM 20 MG: 20 TABLET, FILM COATED ORAL at 08:07

## 2022-01-01 RX ADMIN — SODIUM CHLORIDE 250 ML: 9 INJECTION, SOLUTION INTRAVENOUS at 12:34

## 2022-01-01 RX ADMIN — INSULIN ASPART 7 UNITS: 100 INJECTION, SOLUTION INTRAVENOUS; SUBCUTANEOUS at 17:44

## 2022-01-01 RX ADMIN — DORZOLAMIDE HYDROCHLORIDE AND TIMOLOL MALEATE 1 DROP: 20; 5 SOLUTION/ DROPS OPHTHALMIC at 10:24

## 2022-01-01 RX ADMIN — INSULIN ASPART 7 UNITS: 100 INJECTION, SOLUTION INTRAVENOUS; SUBCUTANEOUS at 17:14

## 2022-01-01 RX ADMIN — METOLAZONE 10 MG: 5 TABLET ORAL at 19:14

## 2022-01-01 RX ADMIN — POTASSIUM CHLORIDE 10 MEQ: 750 TABLET, EXTENDED RELEASE ORAL at 21:10

## 2022-01-01 RX ADMIN — BISACODYL 10 MG: 5 TABLET, COATED ORAL at 13:22

## 2022-01-01 RX ADMIN — SENNOSIDES 1 TABLET: 8.6 TABLET, FILM COATED ORAL at 21:17

## 2022-01-01 RX ADMIN — Medication 5 MG: at 23:45

## 2022-01-01 RX ADMIN — GABAPENTIN 200 MG: 100 CAPSULE ORAL at 21:49

## 2022-01-01 RX ADMIN — CLOPIDOGREL BISULFATE 75 MG: 75 TABLET ORAL at 07:52

## 2022-01-01 RX ADMIN — FUROSEMIDE 60 MG: 10 INJECTION, SOLUTION INTRAVENOUS at 10:46

## 2022-01-01 RX ADMIN — SERTRALINE HYDROCHLORIDE 50 MG: 50 TABLET ORAL at 08:07

## 2022-01-01 RX ADMIN — CAMPHOR AND MENTHOL: 5; 5 LOTION TOPICAL at 11:39

## 2022-01-01 RX ADMIN — SENNOSIDES 1 TABLET: 8.6 TABLET, FILM COATED ORAL at 21:04

## 2022-01-01 RX ADMIN — ALBUMIN HUMAN 25 G: 0.25 SOLUTION INTRAVENOUS at 18:58

## 2022-01-01 RX ADMIN — DICLOFENAC SODIUM 2 G: 10 GEL TOPICAL at 03:03

## 2022-01-01 RX ADMIN — CARVEDILOL 6.25 MG: 6.25 TABLET, FILM COATED ORAL at 17:44

## 2022-01-01 RX ADMIN — POTASSIUM CHLORIDE 10 MEQ: 750 TABLET, EXTENDED RELEASE ORAL at 08:10

## 2022-01-01 RX ADMIN — DORZOLAMIDE HYDROCHLORIDE AND TIMOLOL MALEATE 1 DROP: 20; 5 SOLUTION/ DROPS OPHTHALMIC at 21:22

## 2022-01-01 RX ADMIN — GABAPENTIN 100 MG: 100 CAPSULE ORAL at 21:38

## 2022-01-01 RX ADMIN — ATORVASTATIN CALCIUM 20 MG: 20 TABLET, FILM COATED ORAL at 08:19

## 2022-01-01 RX ADMIN — DORZOLAMIDE HYDROCHLORIDE AND TIMOLOL MALEATE 1 DROP: 20; 5 SOLUTION/ DROPS OPHTHALMIC at 08:20

## 2022-01-01 RX ADMIN — ASPIRIN 81 MG: 81 TABLET, COATED ORAL at 08:08

## 2022-01-01 RX ADMIN — INSULIN GLARGINE 30 UNITS: 100 INJECTION, SOLUTION SUBCUTANEOUS at 21:26

## 2022-01-01 RX ADMIN — SENNOSIDES 1 TABLET: 8.6 TABLET, FILM COATED ORAL at 09:05

## 2022-01-01 RX ADMIN — ALBUMIN HUMAN 25 G: 0.25 SOLUTION INTRAVENOUS at 06:14

## 2022-01-01 RX ADMIN — TAMSULOSIN HYDROCHLORIDE 0.4 MG: 0.4 CAPSULE ORAL at 22:36

## 2022-01-01 RX ADMIN — SENNOSIDES AND DOCUSATE SODIUM 2 TABLET: 50; 8.6 TABLET ORAL at 21:25

## 2022-01-01 RX ADMIN — PHYTONADIONE 5 MG: 10 INJECTION, EMULSION INTRAMUSCULAR; INTRAVENOUS; SUBCUTANEOUS at 09:05

## 2022-01-01 RX ADMIN — FENTANYL CITRATE 50 MCG: 50 INJECTION, SOLUTION INTRAMUSCULAR; INTRAVENOUS at 09:17

## 2022-01-01 RX ADMIN — DORZOLAMIDE HYDROCHLORIDE AND TIMOLOL MALEATE 1 DROP: 20; 5 SOLUTION/ DROPS OPHTHALMIC at 08:08

## 2022-01-01 RX ADMIN — INSULIN ASPART 3 UNITS: 100 INJECTION, SOLUTION INTRAVENOUS; SUBCUTANEOUS at 13:48

## 2022-01-01 RX ADMIN — PANTOPRAZOLE SODIUM 40 MG: 40 TABLET, DELAYED RELEASE ORAL at 06:48

## 2022-01-01 RX ADMIN — FUROSEMIDE 10 MG/HR: 10 INJECTION, SOLUTION INTRAVENOUS at 13:36

## 2022-01-01 RX ADMIN — TRAMADOL HYDROCHLORIDE 50 MG: 50 TABLET ORAL at 03:55

## 2022-01-01 RX ADMIN — FUROSEMIDE 80 MG: 10 INJECTION, SOLUTION INTRAVENOUS at 16:29

## 2022-01-01 RX ADMIN — FENTANYL CITRATE 50 MCG: 50 INJECTION, SOLUTION INTRAMUSCULAR; INTRAVENOUS at 10:38

## 2022-01-01 RX ADMIN — INSULIN ASPART 2 UNITS: 100 INJECTION, SOLUTION INTRAVENOUS; SUBCUTANEOUS at 18:37

## 2022-01-01 RX ADMIN — GABAPENTIN 200 MG: 100 CAPSULE ORAL at 21:10

## 2022-01-01 RX ADMIN — INSULIN ASPART 2 UNITS: 100 INJECTION, SOLUTION INTRAVENOUS; SUBCUTANEOUS at 18:19

## 2022-01-01 RX ADMIN — TRAZODONE HYDROCHLORIDE 100 MG: 50 TABLET ORAL at 22:26

## 2022-01-01 RX ADMIN — CARVEDILOL 6.25 MG: 6.25 TABLET, FILM COATED ORAL at 17:21

## 2022-01-01 RX ADMIN — Medication 5 MG: at 02:34

## 2022-01-01 RX ADMIN — PHYTONADIONE 5 MG: 10 INJECTION, EMULSION INTRAMUSCULAR; INTRAVENOUS; SUBCUTANEOUS at 09:46

## 2022-01-01 RX ADMIN — IRON SUCROSE 300 MG: 20 INJECTION, SOLUTION INTRAVENOUS at 20:37

## 2022-01-01 RX ADMIN — ASPIRIN 81 MG: 81 TABLET, COATED ORAL at 08:12

## 2022-01-01 RX ADMIN — GABAPENTIN 200 MG: 100 CAPSULE ORAL at 21:05

## 2022-01-01 RX ADMIN — GABAPENTIN 200 MG: 100 CAPSULE ORAL at 21:11

## 2022-01-01 RX ADMIN — CARVEDILOL 6.25 MG: 6.25 TABLET, FILM COATED ORAL at 22:32

## 2022-01-01 RX ADMIN — ASPIRIN 81 MG: 81 TABLET, COATED ORAL at 08:00

## 2022-01-01 RX ADMIN — SERTRALINE HYDROCHLORIDE 50 MG: 50 TABLET ORAL at 08:04

## 2022-01-01 RX ADMIN — DORZOLAMIDE HYDROCHLORIDE AND TIMOLOL MALEATE 1 DROP: 20; 5 SOLUTION/ DROPS OPHTHALMIC at 10:06

## 2022-01-01 RX ADMIN — BUMETANIDE 2 MG: 0.25 INJECTION INTRAMUSCULAR; INTRAVENOUS at 08:09

## 2022-01-01 RX ADMIN — TAMSULOSIN HYDROCHLORIDE 0.4 MG: 0.4 CAPSULE ORAL at 21:22

## 2022-01-01 RX ADMIN — PANTOPRAZOLE SODIUM 40 MG: 40 TABLET, DELAYED RELEASE ORAL at 06:53

## 2022-01-01 RX ADMIN — SENNOSIDES 2 TABLET: 8.6 TABLET, FILM COATED ORAL at 21:07

## 2022-01-01 RX ADMIN — DARBEPOETIN ALFA 150 MCG: 150 INJECTION, SOLUTION INTRAVENOUS; SUBCUTANEOUS at 10:58

## 2022-01-01 RX ADMIN — POLYETHYLENE GLYCOL 3350 17 G: 17 POWDER, FOR SOLUTION ORAL at 09:05

## 2022-01-01 RX ADMIN — SENNOSIDES 2 TABLET: 8.6 TABLET, FILM COATED ORAL at 22:17

## 2022-01-01 RX ADMIN — Medication 2.5 MG: at 22:21

## 2022-01-01 RX ADMIN — DICLOFENAC SODIUM 2 G: 10 GEL TOPICAL at 10:31

## 2022-01-01 RX ADMIN — INSULIN ASPART 6 UNITS: 100 INJECTION, SOLUTION INTRAVENOUS; SUBCUTANEOUS at 13:17

## 2022-01-01 RX ADMIN — CARVEDILOL 6.25 MG: 6.25 TABLET, FILM COATED ORAL at 17:48

## 2022-01-01 RX ADMIN — DARBEPOETIN ALFA 150 MCG: 150 INJECTION, SOLUTION INTRAVENOUS; SUBCUTANEOUS at 12:58

## 2022-01-01 RX ADMIN — DARBEPOETIN ALFA 80 MCG: 40 INJECTION, SOLUTION INTRAVENOUS; SUBCUTANEOUS at 14:26

## 2022-01-01 RX ADMIN — Medication 2.5 MG: at 20:46

## 2022-01-01 RX ADMIN — INSULIN ASPART 3 UNITS: 100 INJECTION, SOLUTION INTRAVENOUS; SUBCUTANEOUS at 13:10

## 2022-01-01 RX ADMIN — DORZOLAMIDE HYDROCHLORIDE AND TIMOLOL MALEATE 1 DROP: 20; 5 SOLUTION/ DROPS OPHTHALMIC at 21:52

## 2022-01-01 RX ADMIN — Medication 5 MG: at 21:39

## 2022-01-01 RX ADMIN — INSULIN GLARGINE 20 UNITS: 100 INJECTION, SOLUTION SUBCUTANEOUS at 23:39

## 2022-01-01 RX ADMIN — INSULIN ASPART 7 UNITS: 100 INJECTION, SOLUTION INTRAVENOUS; SUBCUTANEOUS at 12:37

## 2022-01-01 RX ADMIN — PANTOPRAZOLE SODIUM 40 MG: 40 TABLET, DELAYED RELEASE ORAL at 06:26

## 2022-01-01 RX ADMIN — INSULIN ASPART 2 UNITS: 100 INJECTION, SOLUTION INTRAVENOUS; SUBCUTANEOUS at 14:31

## 2022-01-01 RX ADMIN — SERTRALINE HYDROCHLORIDE 50 MG: 50 TABLET ORAL at 08:20

## 2022-01-01 RX ADMIN — INSULIN ASPART 7 UNITS: 100 INJECTION, SOLUTION INTRAVENOUS; SUBCUTANEOUS at 18:35

## 2022-01-01 RX ADMIN — CARVEDILOL 6.25 MG: 6.25 TABLET, FILM COATED ORAL at 17:19

## 2022-01-01 RX ADMIN — PANTOPRAZOLE SODIUM 40 MG: 40 TABLET, DELAYED RELEASE ORAL at 06:30

## 2022-01-01 RX ADMIN — CAMPHOR AND MENTHOL: 5; 5 LOTION TOPICAL at 08:19

## 2022-01-01 RX ADMIN — BUMETANIDE 0.5 MG/HR: 0.25 INJECTION INTRAMUSCULAR; INTRAVENOUS at 13:47

## 2022-01-01 RX ADMIN — DICLOFENAC SODIUM 2 G: 10 GEL TOPICAL at 15:54

## 2022-01-01 RX ADMIN — DORZOLAMIDE HYDROCHLORIDE AND TIMOLOL MALEATE 1 DROP: 20; 5 SOLUTION/ DROPS OPHTHALMIC at 08:59

## 2022-01-01 RX ADMIN — INSULIN GLARGINE 18 UNITS: 100 INJECTION, SOLUTION SUBCUTANEOUS at 21:58

## 2022-01-01 RX ADMIN — SENNOSIDES 1 TABLET: 8.6 TABLET, FILM COATED ORAL at 09:45

## 2022-01-01 RX ADMIN — Medication 2.5 MG: at 11:13

## 2022-01-01 RX ADMIN — POTASSIUM CHLORIDE 10 MEQ: 750 TABLET, EXTENDED RELEASE ORAL at 13:19

## 2022-01-01 RX ADMIN — INSULIN ASPART 7 UNITS: 100 INJECTION, SOLUTION INTRAVENOUS; SUBCUTANEOUS at 08:12

## 2022-01-01 RX ADMIN — CARVEDILOL 6.25 MG: 6.25 TABLET, FILM COATED ORAL at 18:38

## 2022-01-01 RX ADMIN — DORZOLAMIDE HYDROCHLORIDE AND TIMOLOL MALEATE 1 DROP: 20; 5 SOLUTION/ DROPS OPHTHALMIC at 22:02

## 2022-01-01 RX ADMIN — DORZOLAMIDE HYDROCHLORIDE AND TIMOLOL MALEATE 1 DROP: 20; 5 SOLUTION/ DROPS OPHTHALMIC at 21:11

## 2022-01-01 RX ADMIN — FUROSEMIDE 60 MG: 10 INJECTION, SOLUTION INTRAVENOUS at 17:48

## 2022-01-01 RX ADMIN — HYDROCODONE BITARTRATE AND ACETAMINOPHEN 2 TABLET: 5; 325 TABLET ORAL at 17:13

## 2022-01-01 RX ADMIN — INSULIN ASPART 2 UNITS: 100 INJECTION, SOLUTION INTRAVENOUS; SUBCUTANEOUS at 17:45

## 2022-01-01 RX ADMIN — SODIUM CHLORIDE 250 ML: 9 INJECTION, SOLUTION INTRAVENOUS at 12:21

## 2022-01-01 RX ADMIN — GABAPENTIN 200 MG: 100 CAPSULE ORAL at 21:40

## 2022-01-01 RX ADMIN — INSULIN ASPART 2 UNITS: 100 INJECTION, SOLUTION INTRAVENOUS; SUBCUTANEOUS at 09:00

## 2022-01-01 RX ADMIN — DORZOLAMIDE HYDROCHLORIDE AND TIMOLOL MALEATE 1 DROP: 20; 5 SOLUTION/ DROPS OPHTHALMIC at 21:37

## 2022-01-01 RX ADMIN — DORZOLAMIDE HYDROCHLORIDE AND TIMOLOL MALEATE 1 DROP: 20; 5 SOLUTION/ DROPS OPHTHALMIC at 09:46

## 2022-01-01 RX ADMIN — SENNOSIDES 1 TABLET: 8.6 TABLET, FILM COATED ORAL at 08:59

## 2022-01-01 RX ADMIN — BUMETANIDE 2 MG: 0.25 INJECTION INTRAMUSCULAR; INTRAVENOUS at 15:07

## 2022-01-01 RX ADMIN — CARVEDILOL 6.25 MG: 6.25 TABLET, FILM COATED ORAL at 08:17

## 2022-01-01 RX ADMIN — FUROSEMIDE 15 MG/HR: 10 INJECTION, SOLUTION INTRAVENOUS at 04:59

## 2022-01-01 RX ADMIN — POTASSIUM CHLORIDE 20 MEQ: 1500 TABLET, EXTENDED RELEASE ORAL at 16:03

## 2022-01-01 RX ADMIN — INSULIN ASPART 7 UNITS: 100 INJECTION, SOLUTION INTRAVENOUS; SUBCUTANEOUS at 08:10

## 2022-01-01 RX ADMIN — TRAZODONE HYDROCHLORIDE 100 MG: 50 TABLET ORAL at 21:40

## 2022-01-01 RX ADMIN — ATORVASTATIN CALCIUM 20 MG: 20 TABLET, FILM COATED ORAL at 09:04

## 2022-01-01 RX ADMIN — POTASSIUM CHLORIDE 20 MEQ: 1500 TABLET, EXTENDED RELEASE ORAL at 09:58

## 2022-01-01 RX ADMIN — POTASSIUM CHLORIDE 10 MEQ: 750 TABLET, EXTENDED RELEASE ORAL at 21:05

## 2022-01-01 RX ADMIN — TAMSULOSIN HYDROCHLORIDE 0.4 MG: 0.4 CAPSULE ORAL at 21:10

## 2022-01-01 RX ADMIN — BUMETANIDE 2 MG: 0.25 INJECTION INTRAMUSCULAR; INTRAVENOUS at 16:39

## 2022-01-01 RX ADMIN — TRAZODONE HYDROCHLORIDE 100 MG: 50 TABLET ORAL at 21:23

## 2022-01-01 RX ADMIN — ATORVASTATIN CALCIUM 20 MG: 20 TABLET, FILM COATED ORAL at 08:12

## 2022-01-01 RX ADMIN — SENNOSIDES 2 TABLET: 8.6 TABLET, FILM COATED ORAL at 21:05

## 2022-01-01 RX ADMIN — POTASSIUM CHLORIDE 10 MEQ: 750 TABLET, EXTENDED RELEASE ORAL at 08:12

## 2022-01-01 RX ADMIN — SENNOSIDES 2 TABLET: 8.6 TABLET, FILM COATED ORAL at 08:08

## 2022-01-01 RX ADMIN — POLYETHYLENE GLYCOL 3350 17 G: 17 POWDER, FOR SOLUTION ORAL at 09:46

## 2022-01-01 RX ADMIN — INSULIN ASPART 9 UNITS: 100 INJECTION, SOLUTION INTRAVENOUS; SUBCUTANEOUS at 17:44

## 2022-01-01 RX ADMIN — DORZOLAMIDE HYDROCHLORIDE AND TIMOLOL MALEATE 1 DROP: 20; 5 SOLUTION/ DROPS OPHTHALMIC at 08:02

## 2022-01-01 RX ADMIN — POLYETHYLENE GLYCOL 3350 17 G: 17 POWDER, FOR SOLUTION ORAL at 08:09

## 2022-01-01 RX ADMIN — POTASSIUM CHLORIDE 10 MEQ: 750 TABLET, EXTENDED RELEASE ORAL at 15:17

## 2022-01-01 RX ADMIN — ALBUMIN HUMAN 25 G: 0.25 SOLUTION INTRAVENOUS at 15:29

## 2022-01-01 RX ADMIN — ATORVASTATIN CALCIUM 20 MG: 20 TABLET, FILM COATED ORAL at 08:03

## 2022-01-01 RX ADMIN — TAMSULOSIN HYDROCHLORIDE 0.4 MG: 0.4 CAPSULE ORAL at 21:11

## 2022-01-01 RX ADMIN — ASPIRIN 81 MG: 81 TABLET, COATED ORAL at 09:45

## 2022-01-01 RX ADMIN — SENNOSIDES 2 TABLET: 8.6 TABLET, FILM COATED ORAL at 09:05

## 2022-01-01 RX ADMIN — INSULIN ASPART 4 UNITS: 100 INJECTION, SOLUTION INTRAVENOUS; SUBCUTANEOUS at 17:13

## 2022-01-01 RX ADMIN — Medication 2.5 MG: at 15:39

## 2022-01-01 RX ADMIN — DICLOFENAC SODIUM 2 G: 10 GEL TOPICAL at 15:14

## 2022-01-01 RX ADMIN — SERTRALINE HYDROCHLORIDE 50 MG: 50 TABLET ORAL at 09:43

## 2022-01-01 RX ADMIN — TRAZODONE HYDROCHLORIDE 100 MG: 100 TABLET ORAL at 22:32

## 2022-01-01 RX ADMIN — DORZOLAMIDE HYDROCHLORIDE AND TIMOLOL MALEATE 1 DROP: 20; 5 SOLUTION/ DROPS OPHTHALMIC at 22:22

## 2022-01-01 RX ADMIN — CARVEDILOL 3.12 MG: 3.12 TABLET, FILM COATED ORAL at 09:05

## 2022-01-01 RX ADMIN — INSULIN ASPART 7 UNITS: 100 INJECTION, SOLUTION INTRAVENOUS; SUBCUTANEOUS at 13:48

## 2022-01-01 RX ADMIN — PANTOPRAZOLE SODIUM 40 MG: 40 TABLET, DELAYED RELEASE ORAL at 06:24

## 2022-01-01 RX ADMIN — INSULIN ASPART 7 UNITS: 100 INJECTION, SOLUTION INTRAVENOUS; SUBCUTANEOUS at 14:21

## 2022-01-01 RX ADMIN — Medication 5 MG: at 05:40

## 2022-01-01 RX ADMIN — TRAZODONE HYDROCHLORIDE 100 MG: 50 TABLET ORAL at 21:48

## 2022-01-01 RX ADMIN — DORZOLAMIDE HYDROCHLORIDE AND TIMOLOL MALEATE 1 DROP: 20; 5 SOLUTION/ DROPS OPHTHALMIC at 08:10

## 2022-01-01 RX ADMIN — SENNOSIDES 1 TABLET: 8.6 TABLET, FILM COATED ORAL at 20:19

## 2022-01-01 RX ADMIN — BUMETANIDE 2 MG: 0.25 INJECTION INTRAMUSCULAR; INTRAVENOUS at 07:52

## 2022-01-01 RX ADMIN — DORZOLAMIDE HYDROCHLORIDE AND TIMOLOL MALEATE 1 DROP: 20; 5 SOLUTION/ DROPS OPHTHALMIC at 08:11

## 2022-01-01 RX ADMIN — PANTOPRAZOLE SODIUM 40 MG: 40 TABLET, DELAYED RELEASE ORAL at 06:40

## 2022-01-01 RX ADMIN — POTASSIUM CHLORIDE 10 MEQ: 750 TABLET, EXTENDED RELEASE ORAL at 07:52

## 2022-01-01 RX ADMIN — INSULIN ASPART 5 UNITS: 100 INJECTION, SOLUTION INTRAVENOUS; SUBCUTANEOUS at 07:53

## 2022-01-01 RX ADMIN — BUMETANIDE 2 MG: 0.25 INJECTION INTRAMUSCULAR; INTRAVENOUS at 13:26

## 2022-01-01 RX ADMIN — TRAZODONE HYDROCHLORIDE 100 MG: 50 TABLET ORAL at 21:07

## 2022-01-01 RX ADMIN — INSULIN GLARGINE 30 UNITS: 100 INJECTION, SOLUTION SUBCUTANEOUS at 22:19

## 2022-01-01 RX ADMIN — GABAPENTIN 200 MG: 100 CAPSULE ORAL at 22:17

## 2022-01-01 RX ADMIN — TAMSULOSIN HYDROCHLORIDE 0.4 MG: 0.4 CAPSULE ORAL at 21:05

## 2022-01-01 RX ADMIN — TRAZODONE HYDROCHLORIDE 100 MG: 50 TABLET ORAL at 21:05

## 2022-01-01 RX ADMIN — SERTRALINE HYDROCHLORIDE 50 MG: 50 TABLET ORAL at 08:16

## 2022-01-01 RX ADMIN — TRAMADOL HYDROCHLORIDE 50 MG: 50 TABLET ORAL at 10:16

## 2022-01-01 RX ADMIN — INSULIN GLARGINE 15 UNITS: 100 INJECTION, SOLUTION SUBCUTANEOUS at 23:39

## 2022-01-01 RX ADMIN — BUMETANIDE 1 MG: 0.25 INJECTION, SOLUTION INTRAMUSCULAR; INTRAVENOUS at 17:13

## 2022-01-01 RX ADMIN — INSULIN ASPART 2 UNITS: 100 INJECTION, SOLUTION INTRAVENOUS; SUBCUTANEOUS at 17:34

## 2022-01-01 RX ADMIN — SERTRALINE HYDROCHLORIDE 50 MG: 50 TABLET ORAL at 08:08

## 2022-01-01 RX ADMIN — TRAZODONE HYDROCHLORIDE 100 MG: 50 TABLET ORAL at 21:11

## 2022-01-01 RX ADMIN — GABAPENTIN 200 MG: 100 CAPSULE ORAL at 21:23

## 2022-01-01 RX ADMIN — FUROSEMIDE 80 MG: 10 INJECTION, SOLUTION INTRAVENOUS at 23:32

## 2022-01-01 RX ADMIN — ONDANSETRON 4 MG: 2 INJECTION INTRAMUSCULAR; INTRAVENOUS at 13:16

## 2022-01-01 RX ADMIN — TRAMADOL HYDROCHLORIDE 50 MG: 50 TABLET ORAL at 19:54

## 2022-01-01 RX ADMIN — CLOPIDOGREL BISULFATE 75 MG: 75 TABLET ORAL at 09:05

## 2022-01-01 RX ADMIN — INSULIN ASPART 7 UNITS: 100 INJECTION, SOLUTION INTRAVENOUS; SUBCUTANEOUS at 12:50

## 2022-01-01 RX ADMIN — ALBUMIN HUMAN 25 G: 0.25 SOLUTION INTRAVENOUS at 22:21

## 2022-01-01 RX ADMIN — GABAPENTIN 200 MG: 100 CAPSULE ORAL at 22:36

## 2022-01-01 RX ADMIN — DORZOLAMIDE HYDROCHLORIDE AND TIMOLOL MALEATE 1 DROP: 20; 5 SOLUTION/ DROPS OPHTHALMIC at 22:28

## 2022-01-01 RX ADMIN — INSULIN ASPART 7 UNITS: 100 INJECTION, SOLUTION INTRAVENOUS; SUBCUTANEOUS at 14:02

## 2022-01-01 RX ADMIN — BUMETANIDE 2 MG: 0.25 INJECTION INTRAMUSCULAR; INTRAVENOUS at 22:42

## 2022-01-01 RX ADMIN — BUMETANIDE 0.5 MG/HR: 0.25 INJECTION INTRAMUSCULAR; INTRAVENOUS at 17:49

## 2022-01-01 RX ADMIN — GABAPENTIN 200 MG: 100 CAPSULE ORAL at 21:22

## 2022-01-01 RX ADMIN — POTASSIUM CHLORIDE 10 MEQ: 750 TABLET, EXTENDED RELEASE ORAL at 21:13

## 2022-01-01 RX ADMIN — CARVEDILOL 6.25 MG: 6.25 TABLET, FILM COATED ORAL at 08:08

## 2022-01-01 RX ADMIN — INSULIN ASPART 3 UNITS: 100 INJECTION, SOLUTION INTRAVENOUS; SUBCUTANEOUS at 08:07

## 2022-01-01 RX ADMIN — INSULIN ASPART 7 UNITS: 100 INJECTION, SOLUTION INTRAVENOUS; SUBCUTANEOUS at 10:00

## 2022-01-01 RX ADMIN — TRAZODONE HYDROCHLORIDE 100 MG: 50 TABLET ORAL at 21:49

## 2022-01-01 RX ADMIN — INSULIN ASPART 5 UNITS: 100 INJECTION, SOLUTION INTRAVENOUS; SUBCUTANEOUS at 17:43

## 2022-01-01 RX ADMIN — INSULIN ASPART 3 UNITS: 100 INJECTION, SOLUTION INTRAVENOUS; SUBCUTANEOUS at 13:07

## 2022-01-01 RX ADMIN — HYDROMORPHONE HYDROCHLORIDE 1 MG: 1 SOLUTION ORAL at 06:36

## 2022-01-01 RX ADMIN — TRAZODONE HYDROCHLORIDE 100 MG: 50 TABLET ORAL at 22:02

## 2022-01-01 RX ADMIN — DICLOFENAC SODIUM 2 G: 10 GEL TOPICAL at 18:00

## 2022-01-01 RX ADMIN — INSULIN ASPART 1 UNITS: 100 INJECTION, SOLUTION INTRAVENOUS; SUBCUTANEOUS at 08:10

## 2022-01-01 RX ADMIN — SERTRALINE HYDROCHLORIDE 50 MG: 50 TABLET ORAL at 09:44

## 2022-01-01 RX ADMIN — SENNOSIDES 1 TABLET: 8.6 TABLET, FILM COATED ORAL at 21:10

## 2022-01-01 RX ADMIN — ASPIRIN 81 MG: 81 TABLET, COATED ORAL at 09:06

## 2022-01-01 RX ADMIN — ALBUMIN HUMAN 25 G: 0.25 SOLUTION INTRAVENOUS at 21:42

## 2022-01-01 RX ADMIN — PANTOPRAZOLE SODIUM 40 MG: 40 TABLET, DELAYED RELEASE ORAL at 17:33

## 2022-01-01 RX ADMIN — Medication 5 MG: at 03:03

## 2022-01-01 RX ADMIN — TAMSULOSIN HYDROCHLORIDE 0.4 MG: 0.4 CAPSULE ORAL at 22:02

## 2022-01-01 RX ADMIN — DICLOFENAC SODIUM 2 G: 10 GEL TOPICAL at 22:15

## 2022-01-01 RX ADMIN — PANTOPRAZOLE SODIUM 40 MG: 40 TABLET, DELAYED RELEASE ORAL at 06:51

## 2022-01-01 RX ADMIN — TRAZODONE HYDROCHLORIDE 100 MG: 50 TABLET ORAL at 21:37

## 2022-01-01 RX ADMIN — SENNOSIDES 2 TABLET: 8.6 TABLET, FILM COATED ORAL at 08:07

## 2022-01-01 RX ADMIN — METOLAZONE 10 MG: 5 TABLET ORAL at 09:04

## 2022-01-01 RX ADMIN — INSULIN ASPART 1 UNITS: 100 INJECTION, SOLUTION INTRAVENOUS; SUBCUTANEOUS at 12:46

## 2022-01-01 RX ADMIN — PANTOPRAZOLE SODIUM 40 MG: 40 TABLET, DELAYED RELEASE ORAL at 06:37

## 2022-01-01 RX ADMIN — BUMETANIDE 2 MG: 1 TABLET ORAL at 16:25

## 2022-01-01 RX ADMIN — SERTRALINE HYDROCHLORIDE 50 MG: 50 TABLET ORAL at 09:05

## 2022-01-01 RX ADMIN — Medication 2.5 MG: at 05:06

## 2022-01-01 RX ADMIN — INSULIN GLARGINE 30 UNITS: 100 INJECTION, SOLUTION SUBCUTANEOUS at 22:37

## 2022-01-01 RX ADMIN — POLYETHYLENE GLYCOL 3350 17 G: 17 POWDER, FOR SOLUTION ORAL at 08:12

## 2022-01-01 RX ADMIN — Medication 2.5 MG: at 22:42

## 2022-01-01 RX ADMIN — ATORVASTATIN CALCIUM 20 MG: 20 TABLET, FILM COATED ORAL at 08:17

## 2022-01-01 RX ADMIN — SENNOSIDES AND DOCUSATE SODIUM 2 TABLET: 50; 8.6 TABLET ORAL at 08:17

## 2022-01-01 RX ADMIN — CARVEDILOL 6.25 MG: 6.25 TABLET, FILM COATED ORAL at 17:50

## 2022-01-01 RX ADMIN — BUMETANIDE 0.5 MG/HR: 0.25 INJECTION INTRAMUSCULAR; INTRAVENOUS at 06:10

## 2022-01-01 RX ADMIN — DEXTROSE MONOHYDRATE 50 ML: 25 INJECTION, SOLUTION INTRAVENOUS at 07:45

## 2022-01-01 RX ADMIN — CARVEDILOL 6.25 MG: 6.25 TABLET, FILM COATED ORAL at 08:07

## 2022-01-01 RX ADMIN — DORZOLAMIDE HYDROCHLORIDE AND TIMOLOL MALEATE 1 DROP: 20; 5 SOLUTION/ DROPS OPHTHALMIC at 08:19

## 2022-01-01 RX ADMIN — POTASSIUM CHLORIDE 10 MEQ: 750 TABLET, EXTENDED RELEASE ORAL at 08:19

## 2022-01-01 RX ADMIN — POTASSIUM CHLORIDE 10 MEQ: 750 TABLET, EXTENDED RELEASE ORAL at 21:22

## 2022-01-01 RX ADMIN — CARVEDILOL 6.25 MG: 6.25 TABLET, FILM COATED ORAL at 23:30

## 2022-01-01 RX ADMIN — Medication 2.5 MG: at 12:20

## 2022-01-01 RX ADMIN — LIDOCAINE HYDROCHLORIDE 10 ML: 10 INJECTION, SOLUTION EPIDURAL; INFILTRATION; INTRACAUDAL; PERINEURAL at 10:24

## 2022-01-01 RX ADMIN — SENNOSIDES 1 TABLET: 8.6 TABLET, FILM COATED ORAL at 08:09

## 2022-01-01 RX ADMIN — Medication 2.5 MG: at 11:39

## 2022-01-01 RX ADMIN — INSULIN ASPART 10 UNITS: 100 INJECTION, SOLUTION INTRAVENOUS; SUBCUTANEOUS at 18:19

## 2022-01-01 RX ADMIN — SENNOSIDES 1 TABLET: 8.6 TABLET, FILM COATED ORAL at 21:22

## 2022-01-01 RX ADMIN — POTASSIUM CHLORIDE 10 MEQ: 750 TABLET, EXTENDED RELEASE ORAL at 14:46

## 2022-01-01 RX ADMIN — PANTOPRAZOLE SODIUM 40 MG: 40 TABLET, DELAYED RELEASE ORAL at 17:19

## 2022-01-01 RX ADMIN — Medication 2.5 MG: at 01:40

## 2022-01-01 RX ADMIN — SENNOSIDES 2 TABLET: 8.6 TABLET, FILM COATED ORAL at 21:39

## 2022-01-01 RX ADMIN — INSULIN ASPART 2 UNITS: 100 INJECTION, SOLUTION INTRAVENOUS; SUBCUTANEOUS at 14:03

## 2022-01-01 RX ADMIN — POTASSIUM CHLORIDE 10 MEQ: 750 TABLET, EXTENDED RELEASE ORAL at 20:23

## 2022-01-01 RX ADMIN — ALBUMIN HUMAN 25 G: 0.25 SOLUTION INTRAVENOUS at 06:19

## 2022-01-01 RX ADMIN — Medication 2.5 MG: at 21:33

## 2022-01-01 RX ADMIN — PANTOPRAZOLE SODIUM 40 MG: 40 TABLET, DELAYED RELEASE ORAL at 05:38

## 2022-01-01 RX ADMIN — TRAMADOL HYDROCHLORIDE 50 MG: 50 TABLET ORAL at 02:11

## 2022-01-01 RX ADMIN — CARVEDILOL 6.25 MG: 6.25 TABLET, FILM COATED ORAL at 09:04

## 2022-01-01 RX ADMIN — TAMSULOSIN HYDROCHLORIDE 0.4 MG: 0.4 CAPSULE ORAL at 22:27

## 2022-01-01 RX ADMIN — ALBUMIN HUMAN 25 G: 0.25 SOLUTION INTRAVENOUS at 22:12

## 2022-01-01 RX ADMIN — ATORVASTATIN CALCIUM 20 MG: 20 TABLET, FILM COATED ORAL at 08:20

## 2022-01-01 RX ADMIN — Medication 2.5 MG: at 01:36

## 2022-01-01 RX ADMIN — POTASSIUM CHLORIDE 10 MEQ: 750 TABLET, EXTENDED RELEASE ORAL at 21:16

## 2022-01-01 RX ADMIN — Medication 2.5 MG: at 11:37

## 2022-01-01 RX ADMIN — INSULIN ASPART 7 UNITS: 100 INJECTION, SOLUTION INTRAVENOUS; SUBCUTANEOUS at 13:16

## 2022-01-01 RX ADMIN — TRAZODONE HYDROCHLORIDE 100 MG: 50 TABLET ORAL at 21:22

## 2022-01-01 RX ADMIN — INSULIN ASPART 7 UNITS: 100 INJECTION, SOLUTION INTRAVENOUS; SUBCUTANEOUS at 12:03

## 2022-01-01 RX ADMIN — POTASSIUM CHLORIDE 10 MEQ: 750 TABLET, EXTENDED RELEASE ORAL at 08:59

## 2022-01-01 RX ADMIN — INSULIN ASPART 7 UNITS: 100 INJECTION, SOLUTION INTRAVENOUS; SUBCUTANEOUS at 08:08

## 2022-01-01 RX ADMIN — SENNOSIDES 2 TABLET: 8.6 TABLET, FILM COATED ORAL at 21:11

## 2022-01-01 RX ADMIN — TRAZODONE HYDROCHLORIDE 100 MG: 50 TABLET ORAL at 23:30

## 2022-01-01 RX ADMIN — DORZOLAMIDE HYDROCHLORIDE AND TIMOLOL MALEATE 1 DROP: 20; 5 SOLUTION/ DROPS OPHTHALMIC at 08:09

## 2022-01-01 RX ADMIN — CAMPHOR AND MENTHOL: 5; 5 LOTION TOPICAL at 11:58

## 2022-01-01 RX ADMIN — CAMPHOR AND MENTHOL: 5; 5 LOTION TOPICAL at 08:23

## 2022-01-01 RX ADMIN — DORZOLAMIDE HYDROCHLORIDE AND TIMOLOL MALEATE 1 DROP: 20; 5 SOLUTION/ DROPS OPHTHALMIC at 20:20

## 2022-01-01 RX ADMIN — PHYTONADIONE 5 MG: 10 INJECTION, EMULSION INTRAMUSCULAR; INTRAVENOUS; SUBCUTANEOUS at 08:25

## 2022-01-01 RX ADMIN — POTASSIUM CHLORIDE 20 MEQ: 1.5 POWDER, FOR SOLUTION ORAL at 08:17

## 2022-01-01 RX ADMIN — TAMSULOSIN HYDROCHLORIDE 0.4 MG: 0.4 CAPSULE ORAL at 21:07

## 2022-01-01 RX ADMIN — SENNOSIDES 2 TABLET: 8.6 TABLET, FILM COATED ORAL at 08:16

## 2022-01-01 RX ADMIN — INSULIN ASPART 1 UNITS: 100 INJECTION, SOLUTION INTRAVENOUS; SUBCUTANEOUS at 09:45

## 2022-01-01 RX ADMIN — FUROSEMIDE 80 MG: 10 INJECTION, SOLUTION INTRAVENOUS at 11:49

## 2022-01-01 RX ADMIN — BUMETANIDE 2 MG: 0.25 INJECTION INTRAMUSCULAR; INTRAVENOUS at 00:14

## 2022-01-01 RX ADMIN — TRAZODONE HYDROCHLORIDE 100 MG: 50 TABLET ORAL at 22:36

## 2022-01-01 RX ADMIN — FUROSEMIDE 60 MG: 10 INJECTION, SOLUTION INTRAVENOUS at 13:13

## 2022-01-01 RX ADMIN — POTASSIUM CHLORIDE 20 MEQ: 1500 TABLET, EXTENDED RELEASE ORAL at 13:09

## 2022-01-01 RX ADMIN — INSULIN ASPART 9 UNITS: 100 INJECTION, SOLUTION INTRAVENOUS; SUBCUTANEOUS at 17:58

## 2022-01-01 RX ADMIN — INSULIN ASPART 6 UNITS: 100 INJECTION, SOLUTION INTRAVENOUS; SUBCUTANEOUS at 17:21

## 2022-01-01 RX ADMIN — INSULIN ASPART 7 UNITS: 100 INJECTION, SOLUTION INTRAVENOUS; SUBCUTANEOUS at 18:03

## 2022-01-01 RX ADMIN — INSULIN ASPART 2 UNITS: 100 INJECTION, SOLUTION INTRAVENOUS; SUBCUTANEOUS at 11:55

## 2022-01-01 RX ADMIN — GABAPENTIN 200 MG: 100 CAPSULE ORAL at 22:02

## 2022-01-01 RX ADMIN — CLOPIDOGREL BISULFATE 75 MG: 75 TABLET ORAL at 08:12

## 2022-01-01 RX ADMIN — INSULIN GLARGINE 30 UNITS: 100 INJECTION, SOLUTION SUBCUTANEOUS at 21:09

## 2022-01-01 RX ADMIN — POTASSIUM CHLORIDE 10 MEQ: 750 TABLET, EXTENDED RELEASE ORAL at 22:26

## 2022-01-01 RX ADMIN — GABAPENTIN 100 MG: 100 CAPSULE ORAL at 22:04

## 2022-01-01 RX ADMIN — BUMETANIDE 2 MG: 0.25 INJECTION INTRAMUSCULAR; INTRAVENOUS at 00:30

## 2022-01-01 RX ADMIN — INSULIN ASPART 2 UNITS: 100 INJECTION, SOLUTION INTRAVENOUS; SUBCUTANEOUS at 12:45

## 2022-01-01 RX ADMIN — CARVEDILOL 6.25 MG: 6.25 TABLET, FILM COATED ORAL at 08:19

## 2022-01-01 RX ADMIN — SENNOSIDES 2 TABLET: 8.6 TABLET, FILM COATED ORAL at 20:21

## 2022-01-01 RX ADMIN — INSULIN ASPART 7 UNITS: 100 INJECTION, SOLUTION INTRAVENOUS; SUBCUTANEOUS at 08:11

## 2022-01-01 RX ADMIN — DARBEPOETIN ALFA 80 MCG: 40 INJECTION, SOLUTION INTRAVENOUS; SUBCUTANEOUS at 14:03

## 2022-01-01 RX ADMIN — DICLOFENAC SODIUM 2 G: 10 GEL TOPICAL at 14:07

## 2022-01-01 RX ADMIN — TAMSULOSIN HYDROCHLORIDE 0.4 MG: 0.4 CAPSULE ORAL at 22:18

## 2022-01-01 RX ADMIN — INSULIN ASPART 7 UNITS: 100 INJECTION, SOLUTION INTRAVENOUS; SUBCUTANEOUS at 17:34

## 2022-01-01 RX ADMIN — Medication 2.5 MG: at 10:48

## 2022-01-01 RX ADMIN — BISACODYL 10 MG: 10 SUPPOSITORY RECTAL at 10:56

## 2022-01-01 RX ADMIN — DICLOFENAC SODIUM 2 G: 10 GEL TOPICAL at 16:29

## 2022-01-01 RX ADMIN — BUMETANIDE 2 MG: 1 TABLET ORAL at 17:37

## 2022-01-01 RX ADMIN — INSULIN ASPART 3 UNITS: 100 INJECTION, SOLUTION INTRAVENOUS; SUBCUTANEOUS at 17:46

## 2022-01-01 RX ADMIN — FUROSEMIDE 60 MG: 10 INJECTION, SOLUTION INTRAVENOUS at 10:14

## 2022-01-01 RX ADMIN — INSULIN ASPART 2 UNITS: 100 INJECTION, SOLUTION INTRAVENOUS; SUBCUTANEOUS at 08:08

## 2022-01-01 RX ADMIN — DORZOLAMIDE HYDROCHLORIDE AND TIMOLOL MALEATE 1 DROP: 20; 5 SOLUTION/ DROPS OPHTHALMIC at 21:49

## 2022-01-01 RX ADMIN — METOLAZONE 10 MG: 5 TABLET ORAL at 08:07

## 2022-01-01 RX ADMIN — PANTOPRAZOLE SODIUM 40 MG: 40 TABLET, DELAYED RELEASE ORAL at 08:17

## 2022-01-01 RX ADMIN — TAMSULOSIN HYDROCHLORIDE 0.4 MG: 0.4 CAPSULE ORAL at 21:23

## 2022-01-01 RX ADMIN — DARBEPOETIN ALFA 150 MCG: 200 INJECTION, SOLUTION INTRAVENOUS; SUBCUTANEOUS at 15:48

## 2022-01-01 RX ADMIN — POTASSIUM CHLORIDE 20 MEQ: 1500 TABLET, EXTENDED RELEASE ORAL at 09:05

## 2022-01-01 RX ADMIN — DICLOFENAC SODIUM 2 G: 10 GEL TOPICAL at 08:09

## 2022-01-01 RX ADMIN — INSULIN GLARGINE 30 UNITS: 100 INJECTION, SOLUTION SUBCUTANEOUS at 23:37

## 2022-01-01 RX ADMIN — Medication 2.5 MG: at 04:21

## 2022-01-01 RX ADMIN — POTASSIUM CHLORIDE 10 MEQ: 750 TABLET, EXTENDED RELEASE ORAL at 08:00

## 2022-01-01 RX ADMIN — CAMPHOR AND MENTHOL: 5; 5 LOTION TOPICAL at 19:06

## 2022-01-01 RX ADMIN — OXYCODONE HYDROCHLORIDE 5 MG: 5 TABLET ORAL at 08:08

## 2022-01-01 RX ADMIN — ATORVASTATIN CALCIUM 20 MG: 20 TABLET, FILM COATED ORAL at 09:43

## 2022-01-01 RX ADMIN — SERTRALINE HYDROCHLORIDE 50 MG: 50 TABLET ORAL at 08:10

## 2022-01-01 RX ADMIN — HYDROMORPHONE HYDROCHLORIDE 0.5 MG: 1 INJECTION, SOLUTION INTRAMUSCULAR; INTRAVENOUS; SUBCUTANEOUS at 10:34

## 2022-01-01 RX ADMIN — GABAPENTIN 200 MG: 100 CAPSULE ORAL at 21:04

## 2022-01-01 RX ADMIN — BUMETANIDE 2 MG: 0.25 INJECTION INTRAMUSCULAR; INTRAVENOUS at 13:13

## 2022-01-01 RX ADMIN — DARBEPOETIN ALFA 60 MCG: 60 INJECTION, SOLUTION INTRAVENOUS; SUBCUTANEOUS at 14:19

## 2022-01-01 RX ADMIN — INSULIN ASPART 7 UNITS: 100 INJECTION, SOLUTION INTRAVENOUS; SUBCUTANEOUS at 17:00

## 2022-01-01 RX ADMIN — ATORVASTATIN CALCIUM 20 MG: 20 TABLET, FILM COATED ORAL at 08:00

## 2022-01-01 RX ADMIN — PANTOPRAZOLE SODIUM 40 MG: 40 TABLET, DELAYED RELEASE ORAL at 06:58

## 2022-01-01 RX ADMIN — POTASSIUM CHLORIDE 10 MEQ: 750 TABLET, EXTENDED RELEASE ORAL at 17:53

## 2022-01-01 RX ADMIN — INSULIN ASPART 1 UNITS: 100 INJECTION, SOLUTION INTRAVENOUS; SUBCUTANEOUS at 17:32

## 2022-01-01 RX ADMIN — INSULIN ASPART 7 UNITS: 100 INJECTION, SOLUTION INTRAVENOUS; SUBCUTANEOUS at 07:53

## 2022-01-01 RX ADMIN — INSULIN ASPART 7 UNITS: 100 INJECTION, SOLUTION INTRAVENOUS; SUBCUTANEOUS at 13:10

## 2022-01-01 RX ADMIN — CAMPHOR AND MENTHOL: 5; 5 LOTION TOPICAL at 15:18

## 2022-01-01 RX ADMIN — SERTRALINE HYDROCHLORIDE 50 MG: 50 TABLET ORAL at 08:12

## 2022-01-01 RX ADMIN — TRAZODONE HYDROCHLORIDE 100 MG: 50 TABLET ORAL at 22:21

## 2022-01-01 RX ADMIN — DICLOFENAC SODIUM 2 G: 10 GEL TOPICAL at 13:36

## 2022-01-01 RX ADMIN — FUROSEMIDE 60 MG: 10 INJECTION, SOLUTION INTRAVENOUS at 17:46

## 2022-01-01 RX ADMIN — CARVEDILOL 6.25 MG: 6.25 TABLET, FILM COATED ORAL at 21:39

## 2022-01-01 RX ADMIN — BUMETANIDE 2 MG: 1 TABLET ORAL at 11:45

## 2022-01-01 RX ADMIN — PANTOPRAZOLE SODIUM 40 MG: 40 TABLET, DELAYED RELEASE ORAL at 06:39

## 2022-01-01 RX ADMIN — SERTRALINE HYDROCHLORIDE 50 MG: 50 TABLET ORAL at 09:04

## 2022-01-01 RX ADMIN — ASPIRIN 81 MG: 81 TABLET, COATED ORAL at 08:19

## 2022-01-01 RX ADMIN — DORZOLAMIDE HYDROCHLORIDE AND TIMOLOL MALEATE 1 DROP: 20; 5 SOLUTION/ DROPS OPHTHALMIC at 21:43

## 2022-01-01 RX ADMIN — CARVEDILOL 6.25 MG: 6.25 TABLET, FILM COATED ORAL at 08:04

## 2022-01-01 RX ADMIN — POLYETHYLENE GLYCOL 3350 17 G: 17 POWDER, FOR SOLUTION ORAL at 09:04

## 2022-01-01 RX ADMIN — BUMETANIDE 2 MG: 0.25 INJECTION INTRAMUSCULAR; INTRAVENOUS at 20:50

## 2022-01-01 RX ADMIN — INSULIN ASPART 4 UNITS: 100 INJECTION, SOLUTION INTRAVENOUS; SUBCUTANEOUS at 18:35

## 2022-01-01 RX ADMIN — TAMSULOSIN HYDROCHLORIDE 0.4 MG: 0.4 CAPSULE ORAL at 22:21

## 2022-01-01 RX ADMIN — PANTOPRAZOLE SODIUM 40 MG: 40 TABLET, DELAYED RELEASE ORAL at 09:04

## 2022-01-01 RX ADMIN — SENNOSIDES 1 TABLET: 8.6 TABLET, FILM COATED ORAL at 08:00

## 2022-01-01 RX ADMIN — GABAPENTIN 200 MG: 100 CAPSULE ORAL at 22:27

## 2022-01-01 RX ADMIN — ALBUMIN HUMAN 25 G: 0.25 SOLUTION INTRAVENOUS at 05:31

## 2022-01-01 RX ADMIN — DICLOFENAC SODIUM 2 G: 10 GEL TOPICAL at 17:56

## 2022-01-01 RX ADMIN — DICLOFENAC SODIUM 2 G: 10 GEL TOPICAL at 12:00

## 2022-01-01 RX ADMIN — DORZOLAMIDE HYDROCHLORIDE AND TIMOLOL MALEATE 1 DROP: 20; 5 SOLUTION/ DROPS OPHTHALMIC at 11:47

## 2022-01-01 RX ADMIN — GABAPENTIN 200 MG: 100 CAPSULE ORAL at 21:07

## 2022-01-01 RX ADMIN — INSULIN GLARGINE 30 UNITS: 100 INJECTION, SOLUTION SUBCUTANEOUS at 21:23

## 2022-01-01 RX ADMIN — INSULIN ASPART 1 UNITS: 100 INJECTION, SOLUTION INTRAVENOUS; SUBCUTANEOUS at 08:11

## 2022-01-01 RX ADMIN — INSULIN ASPART 7 UNITS: 100 INJECTION, SOLUTION INTRAVENOUS; SUBCUTANEOUS at 07:46

## 2022-01-01 RX ADMIN — INSULIN ASPART 7 UNITS: 100 INJECTION, SOLUTION INTRAVENOUS; SUBCUTANEOUS at 11:55

## 2022-01-01 RX ADMIN — CARVEDILOL 6.25 MG: 6.25 TABLET, FILM COATED ORAL at 08:59

## 2022-01-01 RX ADMIN — POTASSIUM CHLORIDE 10 MEQ: 750 TABLET, EXTENDED RELEASE ORAL at 11:09

## 2022-01-01 RX ADMIN — DORZOLAMIDE HYDROCHLORIDE AND TIMOLOL MALEATE 1 DROP: 20; 5 SOLUTION/ DROPS OPHTHALMIC at 21:47

## 2022-01-01 RX ADMIN — POTASSIUM CHLORIDE 20 MEQ: 1500 TABLET, EXTENDED RELEASE ORAL at 21:07

## 2022-01-01 RX ADMIN — DARBEPOETIN ALFA 150 MCG: 150 INJECTION, SOLUTION INTRAVENOUS; SUBCUTANEOUS at 13:45

## 2022-01-01 RX ADMIN — POTASSIUM CHLORIDE 10 MEQ: 750 TABLET, EXTENDED RELEASE ORAL at 20:46

## 2022-01-01 RX ADMIN — Medication 2.5 MG: at 03:03

## 2022-01-01 RX ADMIN — ATORVASTATIN CALCIUM 20 MG: 20 TABLET, FILM COATED ORAL at 08:59

## 2022-01-01 RX ADMIN — DORZOLAMIDE HYDROCHLORIDE AND TIMOLOL MALEATE 1 DROP: 20; 5 SOLUTION/ DROPS OPHTHALMIC at 09:44

## 2022-01-01 RX ADMIN — PANTOPRAZOLE SODIUM 40 MG: 40 TABLET, DELAYED RELEASE ORAL at 06:34

## 2022-01-01 RX ADMIN — DORZOLAMIDE HYDROCHLORIDE AND TIMOLOL MALEATE 1 DROP: 20; 5 SOLUTION/ DROPS OPHTHALMIC at 07:53

## 2022-01-01 RX ADMIN — Medication 2.5 MG: at 10:39

## 2022-01-01 RX ADMIN — INSULIN ASPART 5 UNITS: 100 INJECTION, SOLUTION INTRAVENOUS; SUBCUTANEOUS at 12:50

## 2022-01-01 RX ADMIN — Medication 2.5 MG: at 22:18

## 2022-01-01 RX ADMIN — CYANOCOBALAMIN TAB 1000 MCG 1000 MCG: 1000 TAB at 08:04

## 2022-01-01 RX ADMIN — INSULIN GLARGINE 30 UNITS: 100 INJECTION, SOLUTION SUBCUTANEOUS at 22:21

## 2022-01-01 RX ADMIN — POLYETHYLENE GLYCOL 3350 17 G: 17 POWDER, FOR SOLUTION ORAL at 08:08

## 2022-01-01 RX ADMIN — SENNOSIDES 1 TABLET: 8.6 TABLET, FILM COATED ORAL at 11:09

## 2022-01-01 RX ADMIN — DICLOFENAC SODIUM 2 G: 10 GEL TOPICAL at 08:14

## 2022-01-01 RX ADMIN — BUMETANIDE 2 MG: 0.25 INJECTION INTRAMUSCULAR; INTRAVENOUS at 16:16

## 2022-01-01 RX ADMIN — SERTRALINE HYDROCHLORIDE 50 MG: 50 TABLET ORAL at 07:52

## 2022-01-01 RX ADMIN — INSULIN ASPART 7 UNITS: 100 INJECTION, SOLUTION INTRAVENOUS; SUBCUTANEOUS at 19:09

## 2022-01-01 RX ADMIN — TRAZODONE HYDROCHLORIDE 100 MG: 50 TABLET ORAL at 22:04

## 2022-01-01 RX ADMIN — DORZOLAMIDE HYDROCHLORIDE AND TIMOLOL MALEATE 1 DROP: 20; 5 SOLUTION/ DROPS OPHTHALMIC at 08:18

## 2022-01-01 RX ADMIN — CAMPHOR AND MENTHOL: 5; 5 LOTION TOPICAL at 05:07

## 2022-01-01 RX ADMIN — ALBUMIN HUMAN 25 G: 0.25 SOLUTION INTRAVENOUS at 07:00

## 2022-01-01 RX ADMIN — CARVEDILOL 3.12 MG: 3.12 TABLET, FILM COATED ORAL at 17:58

## 2022-01-01 RX ADMIN — POTASSIUM CHLORIDE 10 MEQ: 750 TABLET, EXTENDED RELEASE ORAL at 11:49

## 2022-01-01 RX ADMIN — INSULIN ASPART 7 UNITS: 100 INJECTION, SOLUTION INTRAVENOUS; SUBCUTANEOUS at 09:05

## 2022-01-01 RX ADMIN — CAMPHOR AND MENTHOL: 5; 5 LOTION TOPICAL at 10:56

## 2022-01-01 RX ADMIN — INSULIN ASPART 1 UNITS: 100 INJECTION, SOLUTION INTRAVENOUS; SUBCUTANEOUS at 09:46

## 2022-01-01 RX ADMIN — Medication 1 TABLET: at 08:17

## 2022-01-01 RX ADMIN — Medication 5 MG: at 20:30

## 2022-01-01 RX ADMIN — POLYETHYLENE GLYCOL 3350 17 G: 17 POWDER, FOR SOLUTION ORAL at 08:17

## 2022-01-01 RX ADMIN — INSULIN ASPART 3 UNITS: 100 INJECTION, SOLUTION INTRAVENOUS; SUBCUTANEOUS at 13:47

## 2022-01-01 RX ADMIN — POTASSIUM CHLORIDE 10 MEQ: 750 TABLET, EXTENDED RELEASE ORAL at 09:45

## 2022-01-01 RX ADMIN — SENNOSIDES 1 TABLET: 8.6 TABLET, FILM COATED ORAL at 07:52

## 2022-01-01 RX ADMIN — BUMETANIDE 2 MG: 0.25 INJECTION INTRAMUSCULAR; INTRAVENOUS at 00:40

## 2022-01-01 RX ADMIN — SENNOSIDES 2 TABLET: 8.6 TABLET, FILM COATED ORAL at 08:20

## 2022-01-01 RX ADMIN — BUMETANIDE 0.5 MG/HR: 0.25 INJECTION INTRAMUSCULAR; INTRAVENOUS at 18:57

## 2022-01-01 RX ADMIN — DORZOLAMIDE HYDROCHLORIDE AND TIMOLOL MALEATE 1 DROP: 20; 5 SOLUTION/ DROPS OPHTHALMIC at 19:41

## 2022-01-01 RX ADMIN — DICLOFENAC SODIUM 2 G: 10 GEL TOPICAL at 00:33

## 2022-01-01 RX ADMIN — DICLOFENAC SODIUM 2 G: 10 GEL TOPICAL at 08:17

## 2022-01-01 RX ADMIN — TRAZODONE HYDROCHLORIDE 100 MG: 50 TABLET ORAL at 21:35

## 2022-01-01 RX ADMIN — MIDAZOLAM 1 MG: 1 INJECTION INTRAMUSCULAR; INTRAVENOUS at 10:41

## 2022-01-01 ASSESSMENT — ACTIVITIES OF DAILY LIVING (ADL)
ADLS_ACUITY_SCORE: 34
ADLS_ACUITY_SCORE: 35
ADLS_ACUITY_SCORE: 34
DEPENDENT_IADLS:: TRANSPORTATION
ADLS_ACUITY_SCORE: 39
ADLS_ACUITY_SCORE: 39
ADLS_ACUITY_SCORE: 38
ADLS_ACUITY_SCORE: 34
ADLS_ACUITY_SCORE: 37
ADLS_ACUITY_SCORE: 13
ADLS_ACUITY_SCORE: 28
ADLS_ACUITY_SCORE: 34
ADLS_ACUITY_SCORE: 37
ADLS_ACUITY_SCORE: 40
ADLS_ACUITY_SCORE: 13
ADLS_ACUITY_SCORE: 10
ADLS_ACUITY_SCORE: 41
ADLS_ACUITY_SCORE: 11
ADLS_ACUITY_SCORE: 37
ADLS_ACUITY_SCORE: 42
ADLS_ACUITY_SCORE: 13
ADLS_ACUITY_SCORE: 35
ADLS_ACUITY_SCORE: 34
ADLS_ACUITY_SCORE: 34
ADLS_ACUITY_SCORE: 36
ADLS_ACUITY_SCORE: 11
ADLS_ACUITY_SCORE: 42
ADLS_ACUITY_SCORE: 33
ADLS_ACUITY_SCORE: 34
ADLS_ACUITY_SCORE: 34
ADLS_ACUITY_SCORE: 35
ADLS_ACUITY_SCORE: 39
ADLS_ACUITY_SCORE: 38
ADLS_ACUITY_SCORE: 34
ADLS_ACUITY_SCORE: 11
ADLS_ACUITY_SCORE: 10
ADLS_ACUITY_SCORE: 35
ADLS_ACUITY_SCORE: 34
ADLS_ACUITY_SCORE: 13
DEPENDENT_IADLS:: TRANSPORTATION
ADLS_ACUITY_SCORE: 35
ADLS_ACUITY_SCORE: 42
ADLS_ACUITY_SCORE: 10
ADLS_ACUITY_SCORE: 34
ADLS_ACUITY_SCORE: 42
ADLS_ACUITY_SCORE: 28
ADLS_ACUITY_SCORE: 35
ADLS_ACUITY_SCORE: 38
ADLS_ACUITY_SCORE: 33
ADLS_ACUITY_SCORE: 34
ADLS_ACUITY_SCORE: 38
ADLS_ACUITY_SCORE: 34
ADLS_ACUITY_SCORE: 36
ADLS_ACUITY_SCORE: 33
ADLS_ACUITY_SCORE: 36
ADLS_ACUITY_SCORE: 34
ADLS_ACUITY_SCORE: 10
ADLS_ACUITY_SCORE: 13
ADLS_ACUITY_SCORE: 26
ADLS_ACUITY_SCORE: 39
ADLS_ACUITY_SCORE: 47
ADLS_ACUITY_SCORE: 39
ADLS_ACUITY_SCORE: 42
ADLS_ACUITY_SCORE: 13
ADLS_ACUITY_SCORE: 35
DEPENDENT_IADLS:: TRANSPORTATION
ADLS_ACUITY_SCORE: 35
ADLS_ACUITY_SCORE: 37
ADLS_ACUITY_SCORE: 28
ADLS_ACUITY_SCORE: 40
ADLS_ACUITY_SCORE: 38
ADLS_ACUITY_SCORE: 34
ADLS_ACUITY_SCORE: 34
ADLS_ACUITY_SCORE: 35
ADLS_ACUITY_SCORE: 35
ADLS_ACUITY_SCORE: 11
ADLS_ACUITY_SCORE: 37
ADLS_ACUITY_SCORE: 39
ADLS_ACUITY_SCORE: 33
ADLS_ACUITY_SCORE: 37
ADLS_ACUITY_SCORE: 33
ADLS_ACUITY_SCORE: 10
ADLS_ACUITY_SCORE: 33
ADLS_ACUITY_SCORE: 38
ADLS_ACUITY_SCORE: 13
ADLS_ACUITY_SCORE: 42
ADLS_ACUITY_SCORE: 38
ADLS_ACUITY_SCORE: 10
ADLS_ACUITY_SCORE: 35
ADLS_ACUITY_SCORE: 34
ADLS_ACUITY_SCORE: 10
ADLS_ACUITY_SCORE: 11
ADLS_ACUITY_SCORE: 37
ADLS_ACUITY_SCORE: 33
ADLS_ACUITY_SCORE: 10
ADLS_ACUITY_SCORE: 10
ADLS_ACUITY_SCORE: 13
ADLS_ACUITY_SCORE: 33
ADLS_ACUITY_SCORE: 36
ADLS_ACUITY_SCORE: 36
ADLS_ACUITY_SCORE: 13
ADLS_ACUITY_SCORE: 35
ADLS_ACUITY_SCORE: 34
ADLS_ACUITY_SCORE: 35
ADLS_ACUITY_SCORE: 13
ADLS_ACUITY_SCORE: 37
ADLS_ACUITY_SCORE: 26
ADLS_ACUITY_SCORE: 35
ADLS_ACUITY_SCORE: 34
ADLS_ACUITY_SCORE: 8
ADLS_ACUITY_SCORE: 41
ADLS_ACUITY_SCORE: 34
ADLS_ACUITY_SCORE: 34
ADLS_ACUITY_SCORE: 39
ADLS_ACUITY_SCORE: 33
ADLS_ACUITY_SCORE: 33
ADLS_ACUITY_SCORE: 41
ADLS_ACUITY_SCORE: 34
ADLS_ACUITY_SCORE: 39
ADLS_ACUITY_SCORE: 39
ADLS_ACUITY_SCORE: 34
ADLS_ACUITY_SCORE: 39
ADLS_ACUITY_SCORE: 11
ADLS_ACUITY_SCORE: 41
ADLS_ACUITY_SCORE: 33
ADLS_ACUITY_SCORE: 34
ADLS_ACUITY_SCORE: 35
ADLS_ACUITY_SCORE: 11
ADLS_ACUITY_SCORE: 39
ADLS_ACUITY_SCORE: 41
ADLS_ACUITY_SCORE: 13
ADLS_ACUITY_SCORE: 34
ADLS_ACUITY_SCORE: 13
ADLS_ACUITY_SCORE: 38
ADLS_ACUITY_SCORE: 35
ADLS_ACUITY_SCORE: 35
ADLS_ACUITY_SCORE: 11
ADLS_ACUITY_SCORE: 33
ADLS_ACUITY_SCORE: 13
ADLS_ACUITY_SCORE: 33
ADLS_ACUITY_SCORE: 38
ADLS_ACUITY_SCORE: 39
ADLS_ACUITY_SCORE: 11
ADLS_ACUITY_SCORE: 35
ADLS_ACUITY_SCORE: 34
ADLS_ACUITY_SCORE: 34
ADLS_ACUITY_SCORE: 13
ADLS_ACUITY_SCORE: 34
DEPENDENT_IADLS:: TRANSPORTATION
ADLS_ACUITY_SCORE: 13
ADLS_ACUITY_SCORE: 38
ADLS_ACUITY_SCORE: 34
ADLS_ACUITY_SCORE: 10
ADLS_ACUITY_SCORE: 38
ADLS_ACUITY_SCORE: 10
ADLS_ACUITY_SCORE: 11
ADLS_ACUITY_SCORE: 33
ADLS_ACUITY_SCORE: 35
ADLS_ACUITY_SCORE: 33
ADLS_ACUITY_SCORE: 13
ADLS_ACUITY_SCORE: 26
ADLS_ACUITY_SCORE: 10
ADLS_ACUITY_SCORE: 35
ADLS_ACUITY_SCORE: 34
ADLS_ACUITY_SCORE: 35
ADLS_ACUITY_SCORE: 13
ADLS_ACUITY_SCORE: 38
ADLS_ACUITY_SCORE: 13
ADLS_ACUITY_SCORE: 11
ADLS_ACUITY_SCORE: 8
ADLS_ACUITY_SCORE: 11
ADLS_ACUITY_SCORE: 39
ADLS_ACUITY_SCORE: 42
ADLS_ACUITY_SCORE: 39
ADLS_ACUITY_SCORE: 33
ADLS_ACUITY_SCORE: 34
ADLS_ACUITY_SCORE: 33
ADLS_ACUITY_SCORE: 34
ADLS_ACUITY_SCORE: 12
ADLS_ACUITY_SCORE: 34
ADLS_ACUITY_SCORE: 10
ADLS_ACUITY_SCORE: 36
ADLS_ACUITY_SCORE: 35
ADLS_ACUITY_SCORE: 35
ADLS_ACUITY_SCORE: 10
ADLS_ACUITY_SCORE: 34
ADLS_ACUITY_SCORE: 35
ADLS_ACUITY_SCORE: 35
ADLS_ACUITY_SCORE: 37
ADLS_ACUITY_SCORE: 33
ADLS_ACUITY_SCORE: 38
ADLS_ACUITY_SCORE: 36
ADLS_ACUITY_SCORE: 38
ADLS_ACUITY_SCORE: 33
ADLS_ACUITY_SCORE: 10
ADLS_ACUITY_SCORE: 11
ADLS_ACUITY_SCORE: 33
ADLS_ACUITY_SCORE: 34
ADLS_ACUITY_SCORE: 35
ADLS_ACUITY_SCORE: 35
ADLS_ACUITY_SCORE: 38
ADLS_ACUITY_SCORE: 33
ADLS_ACUITY_SCORE: 34
ADLS_ACUITY_SCORE: 12
ADLS_ACUITY_SCORE: 42
ADLS_ACUITY_SCORE: 13
ADLS_ACUITY_SCORE: 35
ADLS_ACUITY_SCORE: 13
ADLS_ACUITY_SCORE: 34
ADLS_ACUITY_SCORE: 28
ADLS_ACUITY_SCORE: 35
ADLS_ACUITY_SCORE: 38
ADLS_ACUITY_SCORE: 12
ADLS_ACUITY_SCORE: 35
DEPENDENT_IADLS:: TRANSPORTATION
ADLS_ACUITY_SCORE: 34
ADLS_ACUITY_SCORE: 34
ADLS_ACUITY_SCORE: 42
ADLS_ACUITY_SCORE: 35
ADLS_ACUITY_SCORE: 35
ADLS_ACUITY_SCORE: 34
ADLS_ACUITY_SCORE: 38
ADLS_ACUITY_SCORE: 33
ADLS_ACUITY_SCORE: 35
ADLS_ACUITY_SCORE: 35
ADLS_ACUITY_SCORE: 34
ADLS_ACUITY_SCORE: 34
ADLS_ACUITY_SCORE: 35
ADLS_ACUITY_SCORE: 28
ADLS_ACUITY_SCORE: 38
ADLS_ACUITY_SCORE: 35
ADLS_ACUITY_SCORE: 34
ADLS_ACUITY_SCORE: 34
ADLS_ACUITY_SCORE: 42
ADLS_ACUITY_SCORE: 35
ADLS_ACUITY_SCORE: 34
ADLS_ACUITY_SCORE: 35
ADLS_ACUITY_SCORE: 37
ADLS_ACUITY_SCORE: 28
ADLS_ACUITY_SCORE: 42
ADLS_ACUITY_SCORE: 38
ADLS_ACUITY_SCORE: 35
ADLS_ACUITY_SCORE: 34
ADLS_ACUITY_SCORE: 36
ADLS_ACUITY_SCORE: 33
ADLS_ACUITY_SCORE: 34
ADLS_ACUITY_SCORE: 35
ADLS_ACUITY_SCORE: 13
ADLS_ACUITY_SCORE: 8
ADLS_ACUITY_SCORE: 13
ADLS_ACUITY_SCORE: 34
ADLS_ACUITY_SCORE: 11
ADLS_ACUITY_SCORE: 42
ADLS_ACUITY_SCORE: 38
ADLS_ACUITY_SCORE: 34
ADLS_ACUITY_SCORE: 35
ADLS_ACUITY_SCORE: 38
ADLS_ACUITY_SCORE: 35
ADLS_ACUITY_SCORE: 10
ADLS_ACUITY_SCORE: 34
ADLS_ACUITY_SCORE: 33
ADLS_ACUITY_SCORE: 12
ADLS_ACUITY_SCORE: 35
ADLS_ACUITY_SCORE: 13
ADLS_ACUITY_SCORE: 33
DEPENDENT_IADLS:: TRANSPORTATION
ADLS_ACUITY_SCORE: 35
ADLS_ACUITY_SCORE: 39
ADLS_ACUITY_SCORE: 41
ADLS_ACUITY_SCORE: 34
ADLS_ACUITY_SCORE: 35
ADLS_ACUITY_SCORE: 34
ADLS_ACUITY_SCORE: 34
ADLS_ACUITY_SCORE: 38
ADLS_ACUITY_SCORE: 28
ADLS_ACUITY_SCORE: 35
ADLS_ACUITY_SCORE: 35
ADLS_ACUITY_SCORE: 40
ADLS_ACUITY_SCORE: 12
ADLS_ACUITY_SCORE: 10
ADLS_ACUITY_SCORE: 35
ADLS_ACUITY_SCORE: 33
ADLS_ACUITY_SCORE: 34
ADLS_ACUITY_SCORE: 13
ADLS_ACUITY_SCORE: 10
ADLS_ACUITY_SCORE: 13
ADLS_ACUITY_SCORE: 34
ADLS_ACUITY_SCORE: 37
ADLS_ACUITY_SCORE: 10
ADLS_ACUITY_SCORE: 34
ADLS_ACUITY_SCORE: 35
ADLS_ACUITY_SCORE: 34
ADLS_ACUITY_SCORE: 34
ADLS_ACUITY_SCORE: 33
ADLS_ACUITY_SCORE: 38
ADLS_ACUITY_SCORE: 34
ADLS_ACUITY_SCORE: 39
ADLS_ACUITY_SCORE: 10
ADLS_ACUITY_SCORE: 12
ADLS_ACUITY_SCORE: 39
ADLS_ACUITY_SCORE: 34
DEPENDENT_IADLS:: TRANSPORTATION
ADLS_ACUITY_SCORE: 33
ADLS_ACUITY_SCORE: 35
ADLS_ACUITY_SCORE: 35
ADLS_ACUITY_SCORE: 33
ADLS_ACUITY_SCORE: 35
ADLS_ACUITY_SCORE: 47
DEPENDENT_IADLS:: TRANSPORTATION
ADLS_ACUITY_SCORE: 13
ADLS_ACUITY_SCORE: 11
ADLS_ACUITY_SCORE: 38
ADLS_ACUITY_SCORE: 35
ADLS_ACUITY_SCORE: 10
ADLS_ACUITY_SCORE: 35
ADLS_ACUITY_SCORE: 34
ADLS_ACUITY_SCORE: 13
ADLS_ACUITY_SCORE: 28

## 2022-01-01 ASSESSMENT — ENCOUNTER SYMPTOMS
SHORTNESS OF BREATH: 1
FEVER: 0
VOMITING: 0
ABDOMINAL DISTENTION: 1
CONSTIPATION: 1
CONSTIPATION: 0
DIFFICULTY URINATING: 0
DIARRHEA: 0
SHORTNESS OF BREATH: 1
CHILLS: 0
UNEXPECTED WEIGHT CHANGE: 1
HEMATURIA: 0
BACK PAIN: 0
WEAKNESS: 1

## 2022-01-01 ASSESSMENT — PAIN SCALES - GENERAL
PAINLEVEL: NO PAIN (0)
PAINLEVEL: NO PAIN (0)
PAINLEVEL: MODERATE PAIN (4)
PAINLEVEL: NO PAIN (0)
PAINLEVEL: NO PAIN (0)
PAINLEVEL: EXTREME PAIN (8)
PAINLEVEL: NO PAIN (0)

## 2022-01-01 ASSESSMENT — PATIENT HEALTH QUESTIONNAIRE - PHQ9: SUM OF ALL RESPONSES TO PHQ QUESTIONS 1-9: 11

## 2022-01-01 ASSESSMENT — MIFFLIN-ST. JEOR: SCORE: 1686.02

## 2022-01-04 NOTE — TELEPHONE ENCOUNTER
Overdue to see Sylvia Ram PA-C from November 2021 in [facility] clinic- return ordered by Dr Rosenbaum at annual general visit 5/2021.  Hx heart failure with preserved ejection fraction; followed by [facility]. Patient reports feeling stable weight and no swelling.   90 day Rx refill sent - Called to patient to set up visit with Sylvia Ram next availble 2/1/22.  Comfort Fink RN 01/04/22 3:17 PM

## 2022-01-04 NOTE — TELEPHONE ENCOUNTER
Received refill request          Pt was due to follow up in [facility] in November but has not scheduled. Ash KRISHNAN

## 2022-01-04 NOTE — TELEPHONE ENCOUNTER
Routing refill request to provider for review/approval because:  Labs out of range:  cr  Cinthia JACOBSON RN, BSN

## 2022-01-13 NOTE — PROGRESS NOTES
"Clinic Care Coordination Contact    Follow Up Progress Note      Assessment:     RN CC called and spoke with patient, Kamran states that he initiated outpatient PT last week. Patient is now performing exercises everyday at home and attending outpatient PT weekly for the next 12 weeks. Patient feels that chronic abdominal pain (Cholecytectomy 9/2/2021) has improved with PT as well as he is now using an electronic massager on his \"sore muscles\" everyday. CT scan completed 12/23/21, no concerns noted from CT result. Patient completed up with GI surgeon 12/14/21.     Patient states there is no difference with his energy, patient's last HGB was 8.2 from 12/28/21. Patient feels frustrated that there is no difference with his HGB or energy level with regular iron infusions and Anaresp injections. Patient would like PCP follow up to check in for the new year and have new labs drawn.     RN CC assisted patient in scheduling PCP follow up on first available appointment: 2/16/2022.    No new need for additional support or resources identified.     Care Gaps:    Health Maintenance Due   Topic Date Due     HF ACTION PLAN  Never done     DEPRESSION ACTION PLAN  Never done     PHQ-9  Never done     MICROALBUMIN  05/10/2020     MEDICARE ANNUAL WELLNESS VISIT  02/10/2021     DIABETIC FOOT EXAM  02/10/2021     BMP  01/26/2022       Scheduled to be addressed at on going PCP follow up appointments.       Goals addressed this encounter:   Goals Addressed                    This Visit's Progress       1. Improve chronic symptoms (pt-stated)   90%      Goal Statement: I will verbalize an increase in my strength and mobility and correct knowledge of adequate management of my chronic health conditions to maintain my health and wellness in preventing hospital readmission.   Date Goal set: 12/24/20; Updated/modified: 12/14/21  Barriers: Multiple health concerns.  Strengths: Motivated and engaged in care coordination.   Date to Achieve By: " Updated 6/1/22  Patient expressed understanding of goal: Yes    Action steps to achieve this goal:  1. I will work with Physical Therapy to build my strength and mobility.   2. I will follow up with my providers as scheduled/recommended. (Primary Care Provider, [facility], sleep studies TBD, Dr. Ariana PARSON, [facility] hematology/oncology, nephrology)   3. I will take my medications as prescribed.   4. I will continue monitoring my blood sugars, blood pressures, weight daily as recommended.  5. I will use my CHF action plan to monitor/manage my symptoms.   6. I will follow care team recommendations of fluid restriction, diabetic and cardiac diet.   7. I will use my incentive spirometer as directed and recommended by my care team.   8. I will continue to work with care coordination for any additional resources and support.  9. I will contact my care team with questions, concerns, support needs. I will use the clinic as a resource and I understand I can contact my clinic with 24/7 after hours services available. Care Coordinator will remain available as needed.           2. Pain Management (pt-stated)   80%      Goal Statement: I would like to address and understand the treatment plan for my upper right quadrant abdominal pain.   Date Goal set: 8/6/21  Barriers: Unknown etiology   Strengths: Patient is motivated  Date to Achieve By: Updated: 6/1/22  Patient expressed understanding of goal: yes  Action steps to achieve this goal:  1. I will have Hepatobiliary scan on 8/18/21  2. I will follow up with my general surgery after scan  3. I will follow up with PCP 8/23/21 or sooner if he is able to make an ecception to his schedule  4. I will continue to work with care coordination for any additional resources and support              Intervention/Education provided during outreach: Chronic disease management and support.     Outreach Frequency: monthly    Plan:   RN Care Coordinator will follow up in 3-4 weeks.     Hellen Chaidez RN  Care Coordinator  [facility]  Email: [email or web address]  Phone: [phone]

## 2022-01-18 NOTE — TELEPHONE ENCOUNTER
----- Message from Kristine Nash MD sent at 1/18/2022  2:16 PM CST -----  Regarding: RE: Hgb 7.0  Rickie Carlos,    Could you please call Kamran to have his Hgb rechecked in 1 week?  If Hgb < 7 g/dL, I would recommend transfusion of 1 unit pRBCs.    Will also continue to gradually increase his Aranesp dose.    Thank you,  Kristine  ----- Message -----  From: Lizzeth Hogan RN  Sent: 1/18/2022   2:09 PM CST  To: Karina Ritter RN, Kristine Nash MD  Subject: Hgb 7.0                                          Kamran Blackwood's hgb today is 7.0. He is getting aranesp, but just wanted to put it on your radar. He said he doesn't feel any worse than normal, and he actually thought it was going to be higher than last time. I will schedule him for more aranesp, but if you want his labs checked sooner, please reach out to him. Thank you!  Lizzeth

## 2022-01-18 NOTE — PROGRESS NOTES
Infusion Nursing Note:  Justyn Olivas presents today for aranesp.    Patient seen by provider today: No   present during visit today: Not Applicable.    Note: Hemoglobin 7.0. Pt denies increased fatigue, shortness of breath, or weakness. Pt said he has actually been feeling better than he had previously, and was surprised his hemoglobin wasn't higher. Notified Dr. Nash. Aranesp given as ordered.      Intravenous Access:  No Intravenous access/labs at this visit.    Treatment Conditions:  Lab Results   Component Value Date    HGB 7.0 (L) 01/18/2022    WBC 5.5 01/18/2022    ANEU 4.8 06/17/2021    ANEUTAUTO 3.7 01/18/2022     (L) 01/18/2022      Results reviewed, labs MET treatment parameters, ok to proceed with treatment.      Post Infusion Assessment:  Patient tolerated injection without incident.  Site patent and intact, free from redness, edema or discomfort.       Discharge Plan:   Discharge instructions reviewed with: Patient.  Patient and/or family verbalized understanding of discharge instructions and all questions answered.  Copy of AVS reviewed with patient and/or family.  Patient will return in 3 weeks for next appointment.  Patient discharged in stable condition accompanied by: self.  Departure Mode: Ambulatory.      Lizzeth Hogan RN

## 2022-01-18 NOTE — PROGRESS NOTES
Medical Assistant Note:  Justyn Olivas presents today for blood draw.    Patient seen by provider today: No.   present during visit today: Not Applicable.    Concerns: No Concerns.    Procedure:  Lab draw site: LAC, Needle type: BF, Gauge: 23g.    Post Assessment:  Labs drawn without difficulty: Yes.    Discharge Plan:  Departure Mode: Ambulatory.    Face to Face Time: 5.    Quyen Butler, CMA

## 2022-01-19 NOTE — TELEPHONE ENCOUNTER
Has appointment in 4 weeks PCP.    [facility] to the [facility] protocol.  Diamante Bond RN

## 2022-01-20 NOTE — TELEPHONE ENCOUNTER
This is a duplicate medication request. Mediation script last sent on 1/19/22.     Sarah Kenyon RN  [facility]

## 2022-01-24 NOTE — TELEPHONE ENCOUNTER
Let Kamran and Cecile know that his hemoglobin came up from 7 to 7.7 so no need for blood transfusion and it is slowly moving in the right direction.  His next appt for [facility] will be 2/8/22.

## 2022-02-01 NOTE — PATIENT INSTRUCTIONS
Today's Plan:   - You can try Benadryl for the next few days for the rash, but make an appointment with Dr. Davidson as well for it.   - We will check labs today (hemoglobin and kidney function). You may need a RBC transfusion. I will let you know how these look.     If you have questions or concerns please call my nurse team at [phone].   Scheduling phone number: [phone]  For after hours urgent concerns call [phone] option 2.   Reminder: Please bring in all current medications, over the counter supplements and vitamin bottles to your next appointment.    It was a pleasure seeing you today!     Shaniqua Ram PA-C

## 2022-02-01 NOTE — PROGRESS NOTES
Cardiology Clinic Progress Note    Justyn Olivas MRN# [id number]   YOB: 1940 Age: 81 year old   Primary cardiologist: Dr. Rosenbaum         Assessment and Plan:     In summary, Justyn Olivas presents today for a routine follow up visit. He feels poorly with fatigue and exertional dyspnea, symptoms consistent with acute on chronic anemia.     1. Chronic HFpEF, maintained on low dose torsemide 10 mg daily. Reports recent weight fluctuation but today appears compensated from a volume standpoint at an overall stable weight of 209 lbs.   2. Hx of pericardial effusion, resolved, normotensive without a rub or JVD on exam.   3. Chronic MAIRA in the setting of renal disease. Denies evidence of bleeding. Followed by Heme/onc and nephrology.   4. CKD-IV, followed by neph.   5. Pruritic Rash x several weeks.    - Hemoglobin, BMP and proBNP today. Will let him know results, and ask hematology to arrange a transfusion if his Hgb is <7.0.   - If no reason for fatigued uncovered on labs, will check echo.   - Advised to follow up with PCP regarding rash.     Kamran and his wife are in agreement with this plan. It was a pleasure seeing them today.         History of Presenting Illness:      Justyn Olivas is a pleasant 81 year old patient who presents today for a follow up visit.     The patient has a history of the following -   1.  Chronic HFpEF.    2.  Presumed history of ischemic cardiomyopathy, LVEF 45-50% on prior echo, with an inferior wall motion abnormality.  EF has improved per most recent echo in March 2021. Managing conservatively, on Plavix instead of aspirin (due to GI issues).   3.  History of pericardial effusion status post pericardiocentesis in December 2020.  Cytology negative for malignancy.  This has resolved.  4.  Insulin-dependent diabetes mellitus  5.  History of CVA  6.  Hypertension  7.  Dyslipidemia  8.  Chronic iron deficiency anemia  9.  Hx of acute on chronic anemia in 3/2021  requiring transfusion  10. GI AV malformations    In brief, Kamran follows with Dr. Charles in our clinic. He was seen end of March 2021 by my colleague Esteban, who had been following him closely in attempt to diurese him with torsemide after a 20-lb wt gain was noted.  However, he developed CASS and his torsemide was held, then re-initiated at a lower dose.  When he returned to clinic, he was noted to be significantly volume overloaded and additionally was anemic with a hemoglobin of 6.8.  He was directed to the ER, where he was ultimately admitted for 5 days for IV diuresis and blood products.  Echo showed an EF of 50-55% with severe inferior wall hypokinesis.  The RV appeared normal.  There is mild MR, otherwise no significant VHD.  The IVC appeared normal.  Compared to the prior echo in February, his EF had improved, and the wall motion abnormality was stable. Nephrology was also consulted during his stay.  Aspirin, irbesartan, and Metformin were all discontinued.  He was ultimately discharged on torsemide 20 mg daily at a weight of 220 pounds.  His admit weight was 227 pounds.  Discharge creatinine was 2.5.  It appears his baseline previously was closer to 1.5.      He saw Dr. charles in May 2021, and at that time he was feeling significantly better, exercising by walking most days, and participating in twice weekly sessions with a .  His creatinine was fairly stable at 2.3 and hemoglobin had also stabilized at 9.2 after initiation of Aranesp by nephrology.  No changes to his medication regimen were recommended at the time.    Unfortunately since then, he has had multiple admissions for noncardiac issues-for hyperglycemia in June, abdominal pain with biliary colic in August, ultimately undergoing lap justin, and in October was seen in the ED for head laceration after falling out of bed.    Today, Kamran returns to clinic with his wife Cecile stating he's feeling down - more run down than usual today and more  dyspneic for the past week or so. Had to stop once walking in through the skyway today. He requests a hemoglobin be drawn today. It appears that most recently his hemoglobin has been running in the upper sevens to low eights, followed by heme/onc.  His renal function was last checked by Dr. Davidson end of October, fairly stable around 2.2. Weight is down 5 lbs from this summer, around 210 lbs. However he reports that over the past month, his weight has gone down to a low of 186 lbs at home, and subsequently back up over the past two weeks. Stable for the past week. Appetite is generally poor. Abdomen feels slightly bloated. JVP is normal on exam and lungs are clear. He has an itching rash over his chest, neck and back for the past two weeks without recent medication or detergent changes. Denies evidence of blood in stools or urine. States he really felt best when his Hgb was maintained >9.         Review of Systems:     12-pt ROS is negative except for as noted in the HPI.          Physical Exam:     Vitals: /72   Pulse 94   Ht 1.829 m (6')   Wt 94.8 kg (209 lb)   SpO2 99%   BMI 28.35 kg/m    Wt Readings from Last 10 Encounters:   02/01/22 94.8 kg (209 lb)   11/16/21 94.3 kg (208 lb)   10/26/21 96.4 kg (212 lb 8 oz)   10/13/21 95.3 kg (210 lb)   09/21/21 98 kg (216 lb)   09/02/21 97.3 kg (214 lb 6.4 oz)   08/23/21 94.2 kg (207 lb 9.6 oz)   08/10/21 93.9 kg (207 lb)   08/05/21 94 kg (207 lb 3.7 oz)   07/29/21 89.8 kg (198 lb)       Constitutional:  Patient is pleasant, alert, cooperative, and in NAD.  HEENT:  NCAT. PERRLA. EOM's intact.   Neck:  CVP appears normal. No carotid bruits.   Pulmonary: Normal respiratory effort. CTAB.   Cardiac: RRR, normal S1/S2, no S3/S4, no murmur or rub.   Abdomen:  Non-tender abdomen, no hepatosplenomegaly appreciated.   Vascular: Pulses in the upper and lower extremities are 2+ and equal bilaterally.  Extremities: 1-2+ pitting ankle edema bilaterally. No erythema,  cyanosis or tenderness appreciated.  Skin:  No rashes or lesions appreciated.   Neurological:  No gross motor or sensory deficits.   Psych: Appropriate affect.        Data:     Labs reviewed:  Recent Labs   Lab Test 10/26/21  1355 05/27/21  1107 04/14/21  0820 03/29/21  0430 03/26/21  1326 02/03/21  1225 01/21/21  1713 12/01/20  1224 11/26/20  1317 02/25/20  1126 02/10/20  1215 02/05/19  1217   LDL  --   --   --  54  --   --   --   --   --   --  79 92   HDL  --   --   --  41  --   --   --   --   --   --  48 40   NHDL  --   --   --  66  --   --   --   --   --   --  93 116   CHOL  --   --   --  107  --   --   --   --   --   --  141 156   TRIG  --   --   --  61  --   --   --   --   --   --  71 119   TSH  --   --   --  1.09  --   --   --   --  0.76  --  0.97 1.02   NTBNP  --   --  1,954*  --   --  3,783* 3,730*   < >  --   --   --   --    IRON 20*   < >  --  53   < >  --   --    < >  --    < >  --   --       < >  --  276   < >  --   --    < >  --    < >  --   --    IRONSAT 5*   < >  --  19   < >  --   --    < >  --    < >  --   --    VIRGINAI 14*   < >  --   --    < >  --   --   --   --   --   --   --     < > = values in this interval not displayed.       Lab Results   Component Value Date    WBC 5.5 01/18/2022    WBC 7.0 06/17/2021    RBC 3.03 (L) 01/18/2022    RBC 3.30 (L) 06/17/2021    HGB 7.7 (LL) 01/24/2022    HGB 7.2 (L) 07/01/2021    HCT 25.1 (L) 01/18/2022    HCT 27.3 (L) 06/17/2021    MCV 83 01/18/2022    MCV 83 06/17/2021    MCH 23.1 (L) 01/18/2022    MCH 25.5 (L) 06/17/2021    MCHC 27.9 (L) 01/18/2022    MCHC 30.8 (L) 06/17/2021    RDW 17.5 (H) 01/18/2022    RDW 14.1 06/17/2021     (L) 01/18/2022     06/17/2021       Lab Results   Component Value Date     10/26/2021     07/01/2021    POTASSIUM 4.7 10/26/2021    POTASSIUM 4.3 07/01/2021    CHLORIDE 113 (H) 10/26/2021    CHLORIDE 106 07/01/2021    CO2 23 10/26/2021    CO2 28 07/01/2021    ANIONGAP 7 10/26/2021    ANIONGAP 4  07/01/2021    GLC 96 10/26/2021     (H) 07/01/2021    BUN 32 (H) 10/26/2021    BUN 40 (H) 07/01/2021    CR 2.1 (H) 12/23/2021    CR 2.17 (H) 10/26/2021    CR 2.63 (H) 07/01/2021    GFRESTIMATED 31 (L) 12/23/2021    GFRESTIMATED 22 (L) 07/01/2021    GFRESTBLACK 25 (L) 07/01/2021    VANESA 8.9 10/26/2021    VANESA 8.1 (L) 07/01/2021      Lab Results   Component Value Date    AST 15 10/13/2021    AST 14 06/02/2021    ALT 27 10/13/2021    ALT 24 06/02/2021       Lab Results   Component Value Date    A1C 6.2 (H) 10/26/2021    A1C 6.0 (H) 03/27/2021       Lab Results   Component Value Date    INR 0.99 09/04/2020           Problem List:     Patient Active Problem List   Diagnosis     Shoulder pain     Insomnia, unspecified type     Type 2 diabetes mellitus without complication, with long-term current use of insulin (H)     Benign essential hypertension     Hyperlipidemia LDL goal <100     Non-seasonal allergic rhinitis due to pollen     Primary osteoarthritis of both hips     Primary osteoarthritis involving multiple joints     Chronic non-seasonal allergic rhinitis, unspecified trigger     Cerebrovascular accident (CVA) due to embolism of cerebral artery (H)     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)     Anemia due to blood loss, acute     Iron deficiency anemia due to chronic blood loss     MAIRA (iron deficiency anemia)     Anemia, iron deficiency     Iron deficiency     Anemia, unspecified type     Type 2 diabetes mellitus with stage 3b chronic kidney disease, with long-term current use of insulin (H)     Anemia due to stage 3 chronic kidney disease, unspecified whether stage 3a or 3b CKD (H)     Hypokalemia     Hyperglycemia     Chronic diastolic heart failure with preserved ejection fraction (H)     Biliary colic     Right upper quadrant abdominal pain           Medications:     Current Outpatient Medications   Medication Sig Dispense Refill     albuterol (PROAIR HFA) 108 (90 Base) MCG/ACT inhaler INHALE 2  PUFFS BY MOUTH FOUR TIMES DAILY AS NEEDED 8.5 g 3     atorvastatin (LIPITOR) 20 MG tablet Take 1 tablet (20 mg) by mouth daily 90 tablet 3     blood glucose (STEVE CONTOUR) test strip TESTING FOUR TIMES DAILY OR AS DIRECTED 400 strip 0     carvedilol (COREG) 6.25 MG tablet Take 1 tablet (6.25 mg) by mouth 2 times daily (with meals) 180 tablet 3     clopidogrel (PLAVIX) 75 MG tablet Take 1 tablet (75 mg) by mouth daily 90 tablet 3     dorzolamide-timolol (COSOPT) 2-0.5 % ophthalmic solution Place 1 drop into both eyes daily        Ferrous Sulfate 324 (65 Fe) MG TBEC Take 1 tablet (324 mg) by mouth 2 times daily       fluticasone (FLONASE) 50 MCG/ACT nasal spray Spray 2 sprays into both nostrils daily 16 g 0     folic acid-vit B6-vit B12 (FOLGARD) 0.8-10-0.115 MG TABS per tablet Take 1 tablet by mouth daily Continue per Nephrology recommendation 30 tablet 0     insulin glargine (LANTUS SOLOSTAR) 100 UNIT/ML pen ADMINISTER 20 UNITS UNDER THE SKIN AT BEDTIME 45 mL 3     insulin lispro (HUMALOG KWIKPEN) 100 UNIT/ML (1 unit dial) KWIKPEN Inject 5-15 Units Subcutaneous 3 times daily (before meals) Sliding scale with meals 45 mL 3     insulin pen needle (BD PEN NEEDLE FERCHO 2ND GEN) 32G X 4 MM miscellaneous USE AS DIRECTED UP TO FOUR TIMES DAILY 400 each 3     LANTUS SOLOSTAR 100 UNIT/ML soln ADMINISTER 50 UNITS UNDER THE SKIN AT BEDTIME 45 mL 3     LANTUS SOLOSTAR 100 UNIT/ML soln ADMINISTER 50 UNITS UNDER THE SKIN AT BEDTIME 45 mL 3     LANTUS SOLOSTAR 100 UNIT/ML soln ADMINISTER 50 UNITS UNDER THE SKIN AT BEDTIME 45 mL 3     oxyCODONE-acetaminophen (PERCOCET) 5-325 MG tablet Take 1 tablet by mouth every 6 hours as needed for moderate to severe pain 12 tablet 0     pantoprazole (PROTONIX) 40 MG EC tablet TAKE 1 TABLET(40 MG) BY MOUTH TWICE DAILY 180 tablet 2     polyethylene glycol (MIRALAX) 17 GM/Dose powder Take 17 g (1 capful) by mouth daily For constipation after surgery 507 g 0     potassium chloride ER (K-TAB) 20 MEQ  CR tablet Take 1 tablet (20 mEq) by mouth daily       SENNA-docusate sodium (SENNA S) 8.6-50 MG tablet Take 1 tablet by mouth 2 times daily 60 tablet 1     sertraline (ZOLOFT) 25 MG tablet Take 1 tablet (25 mg) by mouth At Bedtime 90 tablet 3     torsemide (DEMADEX) 10 MG tablet Take 1 tablet (10 mg) by mouth daily 90 tablet 0     traZODone (DESYREL) 100 MG tablet TAKE 1 TABLET(100 MG) BY MOUTH AT BEDTIME 90 tablet 3     vitamin B-12 (CYANOCOBALAMIN) 1000 MCG tablet Take 1 tablet (1,000 mcg) by mouth daily Continue unitl hematology evaluation as borderline low vitamin b12. 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)      Cerebral infarction (H)      Chronic diastolic heart failure (H)      Diabetes (H)      [place] (hard of hearing)      Hyperlipemia      Hypokalemia      Renal disease      Past Surgical History:   Procedure Laterality Date     BONE MARROW BIOPSY, BONE SPECIMEN, NEEDLE/TROCAR N/A 5/14/2021    Procedure: BIOPSY, BONE MARROW;  Surgeon: Juanjo Hoffman MD;  Location:  GI     CV PERICARDIOCENTESIS N/A 12/21/2020    Procedure: Pericardiocentesis;  Surgeon: Chas Chambers MD;  Location:  HEART [facility]ARD[facility]AC CATH LAB     ENT SURGERY       ESOPHAGOSCOPY, GASTROSCOPY, DUODENOSCOPY (EGD), COMBINED N/A 3/27/2021    Procedure: Esophagogastroduodenoscopy, With Biopsy;  Surgeon: Luc Nash MD;  Location:  GI     LAPAROSCOPIC CHOLECYSTECTOMY N/A 9/2/2021    Procedure: LAPAROSCOPIC CHOLECYSTECTOMY;  Surgeon: Len Coates MD;  Location:  OR     SOFT TISSUE SURGERY      skin cancer surgery on nose     Family History   Problem Relation Age of Onset     Family History Negative Mother      Family History Negative Father      Social History     Socioeconomic History     Marital status:      Spouse name: Cecile      Number of children: Not on file     Years of education: Not on file     Highest education level: Not on file   Occupational History     Not on file   Tobacco Use     Smoking status: Former Smoker     Packs/day: 2.00     Years: 11.00     Pack years: 22.00     Types: Cigarettes     Start date:      Quit date:      Years since quittin.1     Smokeless tobacco: Never Used   Substance and Sexual Activity     Alcohol use: No     Alcohol/week: 0.0 standard drinks     Drug use: No     Sexual activity: Yes     Partners: Female     Birth control/protection: None   Other Topics Concern     Parent/sibling w/ CABG, MI or angioplasty before 65F 55M? Not Asked   Social History Narrative     Not on file     Social Determinants of Health     Financial Resource Strain: Not on file   Food Insecurity: Not on file   Transportation Needs: Not on file   Physical Activity: Not on file   Stress: Not on file   Social Connections: Not on file   Intimate Partner Violence: Not on file   Housing Stability: Not on file           Allergies:   No known allergies      ANTONIETTA Portillo [facility]  Pager: [phone]

## 2022-02-01 NOTE — LETTER
2/1/2022    Christian Davidson MD, MD  [address]  [place] [address]    RE: Justyn Olivas       Dear Colleague,     I had the pleasure of seeing Justyn Olivas in the [facility].    Cardiology Clinic Progress Note    Justyn Olivas MRN# [id number]   YOB: 1940 Age: 81 year old   Primary cardiologist: Dr. Rosenbaum         Assessment and Plan:     In summary, Jutsyn Olivas presents today for a routine follow up visit. He feels poorly with fatigue and exertional dyspnea, symptoms consistent with acute on chronic anemia.     1. Chronic HFpEF, maintained on low dose torsemide 10 mg daily. Reports recent weight fluctuation but today appears compensated from a volume standpoint at an overall stable weight of 209 lbs.   2. Hx of pericardial effusion, resolved, normotensive without a rub or JVD on exam.   3. Chronic MAIRA in the setting of renal disease. Denies evidence of bleeding. Followed by Heme/onc and nephrology.   4. CKD-IV, followed by neph.   5. Pruritic Rash x several weeks.    - Hemoglobin, BMP and proBNP today. Will let him know results, and ask hematology to arrange a transfusion if his Hgb is <7.0.   - If no reason for fatigued uncovered on labs, will check echo.   - Advised to follow up with PCP regarding rash.     Kamran and his wife are in agreement with this plan. It was a pleasure seeing them today.         History of Presenting Illness:      Justyn Olivas is a pleasant 81 year old patient who presents today for a follow up visit.     The patient has a history of the following -   1.  Chronic HFpEF.    2.  Presumed history of ischemic cardiomyopathy, LVEF 45-50% on prior echo, with an inferior wall motion abnormality.  EF has improved per most recent echo in March 2021. Managing conservatively, on Plavix instead of aspirin (due to GI issues).   3.  History of pericardial effusion status post pericardiocentesis in December 2020.  Cytology  negative for malignancy.  This has resolved.  4.  Insulin-dependent diabetes mellitus  5.  History of CVA  6.  Hypertension  7.  Dyslipidemia  8.  Chronic iron deficiency anemia  9.  Hx of acute on chronic anemia in 3/2021 requiring transfusion  10. GI AV malformations    In brief, Kamran follows with Dr. Charles in our clinic. He was seen end of March 2021 by my colleague Esteban, who had been following him closely in attempt to diurese him with torsemide after a 20-lb wt gain was noted.  However, he developed CASS and his torsemide was held, then re-initiated at a lower dose.  When he returned to clinic, he was noted to be significantly volume overloaded and additionally was anemic with a hemoglobin of 6.8.  He was directed to the ER, where he was ultimately admitted for 5 days for IV diuresis and blood products.  Echo showed an EF of 50-55% with severe inferior wall hypokinesis.  The RV appeared normal.  There is mild MR, otherwise no significant VHD.  The IVC appeared normal.  Compared to the prior echo in February, his EF had improved, and the wall motion abnormality was stable. Nephrology was also consulted during his stay.  Aspirin, irbesartan, and Metformin were all discontinued.  He was ultimately discharged on torsemide 20 mg daily at a weight of 220 pounds.  His admit weight was 227 pounds.  Discharge creatinine was 2.5.  It appears his baseline previously was closer to 1.5.      He saw Dr. charles in May 2021, and at that time he was feeling significantly better, exercising by walking most days, and participating in twice weekly sessions with a .  His creatinine was fairly stable at 2.3 and hemoglobin had also stabilized at 9.2 after initiation of Aranesp by nephrology.  No changes to his medication regimen were recommended at the time.    Unfortunately since then, he has had multiple admissions for noncardiac issues-for hyperglycemia in June, abdominal pain with biliary colic in August, ultimately  undergoing lap justin, and in October was seen in the ED for head laceration after falling out of bed.    Today, Kamran returns to clinic with his wife Cecile stating he's feeling down - more run down than usual today and more dyspneic for the past week or so. Had to stop once walking in through the skyway today. He requests a hemoglobin be drawn today. It appears that most recently his hemoglobin has been running in the upper sevens to low eights, followed by heme/onc.  His renal function was last checked by Dr. Davidson end of October, fairly stable around 2.2. Weight is down 5 lbs from this summer, around 210 lbs. However he reports that over the past month, his weight has gone down to a low of 186 lbs at home, and subsequently back up over the past two weeks. Stable for the past week. Appetite is generally poor. Abdomen feels slightly bloated. JVP is normal on exam and lungs are clear. He has an itching rash over his chest, neck and back for the past two weeks without recent medication or detergent changes. Denies evidence of blood in stools or urine. States he really felt best when his Hgb was maintained >9.         Review of Systems:     12-pt ROS is negative except for as noted in the HPI.          Physical Exam:     Vitals: /72   Pulse 94   Ht 1.829 m (6')   Wt 94.8 kg (209 lb)   SpO2 99%   BMI 28.35 kg/m    Wt Readings from Last 10 Encounters:   02/01/22 94.8 kg (209 lb)   11/16/21 94.3 kg (208 lb)   10/26/21 96.4 kg (212 lb 8 oz)   10/13/21 95.3 kg (210 lb)   09/21/21 98 kg (216 lb)   09/02/21 97.3 kg (214 lb 6.4 oz)   08/23/21 94.2 kg (207 lb 9.6 oz)   08/10/21 93.9 kg (207 lb)   08/05/21 94 kg (207 lb 3.7 oz)   07/29/21 89.8 kg (198 lb)       Constitutional:  Patient is pleasant, alert, cooperative, and in NAD.  HEENT:  NCAT. PERRLA. EOM's intact.   Neck:  CVP appears normal. No carotid bruits.   Pulmonary: Normal respiratory effort. CTAB.   Cardiac: RRR, normal S1/S2, no S3/S4, no murmur or rub.    Abdomen:  Non-tender abdomen, no hepatosplenomegaly appreciated.   Vascular: Pulses in the upper and lower extremities are 2+ and equal bilaterally.  Extremities: 1-2+ pitting ankle edema bilaterally. No erythema, cyanosis or tenderness appreciated.  Skin:  No rashes or lesions appreciated.   Neurological:  No gross motor or sensory deficits.   Psych: Appropriate affect.        Data:     Labs reviewed:  Recent Labs   Lab Test 10/26/21  1355 05/27/21  1107 04/14/21  0820 03/29/21  0430 03/26/21  1326 02/03/21  1225 01/21/21  1713 12/01/20  1224 11/26/20  1317 02/25/20  1126 02/10/20  1215 02/05/19  1217   LDL  --   --   --  54  --   --   --   --   --   --  79 92   HDL  --   --   --  41  --   --   --   --   --   --  48 40   NHDL  --   --   --  66  --   --   --   --   --   --  93 116   CHOL  --   --   --  107  --   --   --   --   --   --  141 156   TRIG  --   --   --  61  --   --   --   --   --   --  71 119   TSH  --   --   --  1.09  --   --   --   --  0.76  --  0.97 1.02   NTBNP  --   --  1,954*  --   --  3,783* 3,730*   < >  --   --   --   --    IRON 20*   < >  --  53   < >  --   --    < >  --    < >  --   --       < >  --  276   < >  --   --    < >  --    < >  --   --    IRONSAT 5*   < >  --  19   < >  --   --    < >  --    < >  --   --    VIRGINIA 14*   < >  --   --    < >  --   --   --   --   --   --   --     < > = values in this interval not displayed.       Lab Results   Component Value Date    WBC 5.5 01/18/2022    WBC 7.0 06/17/2021    RBC 3.03 (L) 01/18/2022    RBC 3.30 (L) 06/17/2021    HGB 7.7 (LL) 01/24/2022    HGB 7.2 (L) 07/01/2021    HCT 25.1 (L) 01/18/2022    HCT 27.3 (L) 06/17/2021    MCV 83 01/18/2022    MCV 83 06/17/2021    MCH 23.1 (L) 01/18/2022    MCH 25.5 (L) 06/17/2021    MCHC 27.9 (L) 01/18/2022    MCHC 30.8 (L) 06/17/2021    RDW 17.5 (H) 01/18/2022    RDW 14.1 06/17/2021     (L) 01/18/2022     06/17/2021       Lab Results   Component Value Date     10/26/2021      07/01/2021    POTASSIUM 4.7 10/26/2021    POTASSIUM 4.3 07/01/2021    CHLORIDE 113 (H) 10/26/2021    CHLORIDE 106 07/01/2021    CO2 23 10/26/2021    CO2 28 07/01/2021    ANIONGAP 7 10/26/2021    ANIONGAP 4 07/01/2021    GLC 96 10/26/2021     (H) 07/01/2021    BUN 32 (H) 10/26/2021    BUN 40 (H) 07/01/2021    CR 2.1 (H) 12/23/2021    CR 2.17 (H) 10/26/2021    CR 2.63 (H) 07/01/2021    GFRESTIMATED 31 (L) 12/23/2021    GFRESTIMATED 22 (L) 07/01/2021    GFRESTBLACK 25 (L) 07/01/2021    VANESA 8.9 10/26/2021    VANESA 8.1 (L) 07/01/2021      Lab Results   Component Value Date    AST 15 10/13/2021    AST 14 06/02/2021    ALT 27 10/13/2021    ALT 24 06/02/2021       Lab Results   Component Value Date    A1C 6.2 (H) 10/26/2021    A1C 6.0 (H) 03/27/2021       Lab Results   Component Value Date    INR 0.99 09/04/2020           Problem List:     Patient Active Problem List   Diagnosis     Shoulder pain     Insomnia, unspecified type     Type 2 diabetes mellitus without complication, with long-term current use of insulin (H)     Benign essential hypertension     Hyperlipidemia LDL goal <100     Non-seasonal allergic rhinitis due to pollen     Primary osteoarthritis of both hips     Primary osteoarthritis involving multiple joints     Chronic non-seasonal allergic rhinitis, unspecified trigger     Cerebrovascular accident (CVA) due to embolism of cerebral artery (H)     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)     Anemia due to blood loss, acute     Iron deficiency anemia due to chronic blood loss     MAIRA (iron deficiency anemia)     Anemia, iron deficiency     Iron deficiency     Anemia, unspecified type     Type 2 diabetes mellitus with stage 3b chronic kidney disease, with long-term current use of insulin (H)     Anemia due to stage 3 chronic kidney disease, unspecified whether stage 3a or 3b CKD (H)     Hypokalemia     Hyperglycemia     Chronic diastolic heart failure with preserved ejection fraction (H)     Biliary  colic     Right upper quadrant abdominal pain           Medications:     Current Outpatient Medications   Medication Sig Dispense Refill     albuterol (PROAIR HFA) 108 (90 Base) MCG/ACT inhaler INHALE 2 PUFFS BY MOUTH FOUR TIMES DAILY AS NEEDED 8.5 g 3     atorvastatin (LIPITOR) 20 MG tablet Take 1 tablet (20 mg) by mouth daily 90 tablet 3     blood glucose (STEVE CONTOUR) test strip TESTING FOUR TIMES DAILY OR AS DIRECTED 400 strip 0     carvedilol (COREG) 6.25 MG tablet Take 1 tablet (6.25 mg) by mouth 2 times daily (with meals) 180 tablet 3     clopidogrel (PLAVIX) 75 MG tablet Take 1 tablet (75 mg) by mouth daily 90 tablet 3     dorzolamide-timolol (COSOPT) 2-0.5 % ophthalmic solution Place 1 drop into both eyes daily        Ferrous Sulfate 324 (65 Fe) MG TBEC Take 1 tablet (324 mg) by mouth 2 times daily       fluticasone (FLONASE) 50 MCG/ACT nasal spray Spray 2 sprays into both nostrils daily 16 g 0     folic acid-vit B6-vit B12 (FOLGARD) 0.8-10-0.115 MG TABS per tablet Take 1 tablet by mouth daily Continue per Nephrology recommendation 30 tablet 0     insulin glargine (LANTUS SOLOSTAR) 100 UNIT/ML pen ADMINISTER 20 UNITS UNDER THE SKIN AT BEDTIME 45 mL 3     insulin lispro (HUMALOG KWIKPEN) 100 UNIT/ML (1 unit dial) KWIKPEN Inject 5-15 Units Subcutaneous 3 times daily (before meals) Sliding scale with meals 45 mL 3     insulin pen needle (BD PEN NEEDLE FERCHO 2ND GEN) 32G X 4 MM miscellaneous USE AS DIRECTED UP TO FOUR TIMES DAILY 400 each 3     LANTUS SOLOSTAR 100 UNIT/ML soln ADMINISTER 50 UNITS UNDER THE SKIN AT BEDTIME 45 mL 3     LANTUS SOLOSTAR 100 UNIT/ML soln ADMINISTER 50 UNITS UNDER THE SKIN AT BEDTIME 45 mL 3     LANTUS SOLOSTAR 100 UNIT/ML soln ADMINISTER 50 UNITS UNDER THE SKIN AT BEDTIME 45 mL 3     oxyCODONE-acetaminophen (PERCOCET) 5-325 MG tablet Take 1 tablet by mouth every 6 hours as needed for moderate to severe pain 12 tablet 0     pantoprazole (PROTONIX) 40 MG EC tablet TAKE 1 TABLET(40  MG) BY MOUTH TWICE DAILY 180 tablet 2     polyethylene glycol (MIRALAX) 17 GM/Dose powder Take 17 g (1 capful) by mouth daily For constipation after surgery 507 g 0     potassium chloride ER (K-TAB) 20 MEQ CR tablet Take 1 tablet (20 mEq) by mouth daily       SENNA-docusate sodium (SENNA S) 8.6-50 MG tablet Take 1 tablet by mouth 2 times daily 60 tablet 1     sertraline (ZOLOFT) 25 MG tablet Take 1 tablet (25 mg) by mouth At Bedtime 90 tablet 3     torsemide (DEMADEX) 10 MG tablet Take 1 tablet (10 mg) by mouth daily 90 tablet 0     traZODone (DESYREL) 100 MG tablet TAKE 1 TABLET(100 MG) BY MOUTH AT BEDTIME 90 tablet 3     vitamin B-12 (CYANOCOBALAMIN) 1000 MCG tablet Take 1 tablet (1,000 mcg) by mouth daily Continue unitl hematology evaluation as borderline low vitamin b12. 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)      Cerebral infarction (H)      Chronic diastolic heart failure (H)      Diabetes (H)      [place] (hard of hearing)      Hyperlipemia      Hypokalemia      Renal disease      Past Surgical History:   Procedure Laterality Date     BONE MARROW BIOPSY, BONE SPECIMEN, NEEDLE/TROCAR N/A 5/14/2021    Procedure: BIOPSY, BONE MARROW;  Surgeon: Juanjo Hoffman MD;  Location:  GI     CV PERICARDIOCENTESIS N/A 12/21/2020    Procedure: Pericardiocentesis;  Surgeon: Chas Chambers MD;  Location:  HEART CARDIAC CATH LAB     ENT SURGERY       ESOPHAGOSCOPY, GASTROSCOPY, DUODENOSCOPY (EGD), COMBINED N/A 3/27/2021    Procedure: Esophagogastroduodenoscopy, With Biopsy;  Surgeon: Luc Nash MD;  Location:  GI     LAPAROSCOPIC CHOLECYSTECTOMY N/A 9/2/2021    Procedure: LAPAROSCOPIC CHOLECYSTECTOMY;  Surgeon: Len Coates MD;  Location:  OR     SOFT TISSUE SURGERY      skin cancer surgery on nose     Family  History   Problem Relation Age of Onset     Family History Negative Mother      Family History Negative Father      Social History     Socioeconomic History     Marital status:      Spouse name: Cecile     Number of children: Not on file     Years of education: Not on file     Highest education level: Not on file   Occupational History     Not on file   Tobacco Use     Smoking status: Former Smoker     Packs/day: 2.00     Years: 11.00     Pack years: 22.00     Types: Cigarettes     Start date:      Quit date:      Years since quittin.1     Smokeless tobacco: Never Used   Substance and Sexual Activity     Alcohol use: No     Alcohol/week: 0.0 standard drinks     Drug use: No     Sexual activity: Yes     Partners: Female     Birth control/protection: None   Other Topics Concern     Parent/sibling w/ CABG, MI or angioplasty before 65F 55M? Not Asked   Social History Narrative     Not on file     Social Determinants of Health     Financial Resource Strain: Not on file   Food Insecurity: Not on file   Transportation Needs: Not on file   Physical Activity: Not on file   Stress: Not on file   Social Connections: Not on file   Intimate Partner Violence: Not on file   Housing Stability: Not on file           Allergies:   No known allergies      [facility]  Pager: [phone]    Thank you for allowing me to participate in the care of your patient.      Sincerely,     [facility]  cc:   No referring provider defined for this encounter.

## 2022-02-02 NOTE — PROGRESS NOTES
Hi Dr. Nash,   I saw Kamran yesterday in the cardiology clinic and he was feeling poorly with generalized fatigue and exertional dyspnea. We checked a hemoglobin which came back at 6.9. Would you be able to arrange a transfusion for him this week?   Thanks,  Shaniqua Ram PA-C  [facility]  Pager: [phone]  Text Page  (7:30am - 4pm M-F)

## 2022-02-03 NOTE — PROGRESS NOTES
Clinic Care Coordination Contact    Follow Up Progress Note      Assessment:     RN CC called and spoke with patient. Kamran's HGB was checked at cardiology clinic on 2/1/2022 as he had complaints of generalized fatigue and exertional dyspnea. HBG came back at 6.9. Cardiology coordinated with Oncology to have blood transfusion ordered for patient. Patient will have type and cross matching lab tomorrow 2/4/22 and will have blood transfusion on Sunday 2/6/22 at the cancer center. Patient also states that he will have increased dose of aranesp.     Patient inquired what was causing his HGB to drop. Discussed how chronic kidney disease is a progressive disease and how that progression has an effect on the adrenal glands producing erythropoietin (telling the bone marrow to make new RBC). Answered numerous questions, verified education through teach back questions.      Patient states that his chronic HFpEF has been stable, as well as his weight. Kamran states that his chronic abdominal pain has improved since last RN CC outreach as well.     No new need for additional support or resources identified at this time.     Care Gaps:    Health Maintenance Due   Topic Date Due     HF ACTION PLAN  Never done     DEPRESSION ACTION PLAN  Never done     PHQ-9  Never done     MICROALBUMIN  05/10/2020     MEDICARE ANNUAL WELLNESS VISIT  02/10/2021     DIABETIC FOOT EXAM  02/10/2021     EYE EXAM  03/02/2022       Scheduled to be addressed at on going PCP follow up appointments.       Goals addressed this encounter:   Goals Addressed                    This Visit's Progress       1. Improve chronic symptoms (pt-stated)   90%      Goal Statement: I will verbalize an increase in my strength and mobility and correct knowledge of adequate management of my chronic health conditions to maintain my health and wellness in preventing hospital readmission.   Date Goal set: 12/24/20; Updated/modified: 12/14/21  Barriers: Multiple health  concerns.  Strengths: Motivated and engaged in care coordination.   Date to Achieve By: Updated 6/1/22  Patient expressed understanding of goal: Yes    Action steps to achieve this goal:  1. I will work with Physical Therapy to build my strength and mobility.   2. I will follow up with my providers as scheduled/recommended. (Primary Care Provider, [facility], sleep studies TBD, Dr. Ariana PARSON, [facility] hematology/oncology, nephrology)   3. I will take my medications as prescribed.   4. I will continue monitoring my blood sugars, blood pressures, weight daily as recommended.  5. I will use my CHF action plan to monitor/manage my symptoms.   6. I will follow care team recommendations of fluid restriction, diabetic and cardiac diet.   7. I will use my incentive spirometer as directed and recommended by my care team.   8. I will continue to work with care coordination for any additional resources and support.  9. I will contact my care team with questions, concerns, support needs. I will use the clinic as a resource and I understand I can contact my clinic with 24/7 after hours services available. Care Coordinator will remain available as needed.           2. Pain Management (pt-stated)   90%      Goal Statement: I would like to address and understand the treatment plan for my upper right quadrant abdominal pain.   Date Goal set: 8/6/21  Barriers: Unknown etiology   Strengths: Patient is motivated  Date to Achieve By: Updated: 6/1/22  Patient expressed understanding of goal: yes  Action steps to achieve this goal:  1. I will have Hepatobiliary scan on 8/18/21  2. I will follow up with my general surgery after scan  3. I will follow up with PCP 8/23/21 or sooner if he is able to make an ecception to his schedule  4. I will continue to work with care coordination for any additional resources and support              Intervention/Education provided during outreach: Chronic disease management and support     Outreach Frequency:  monthly    Plan: RN Care Coordinator will follow up in one month.     Hellen Chaidez RN Care Coordinator  [facility]  Email: [email or web address]  Phone: [phone]

## 2022-02-04 NOTE — PROGRESS NOTES
Medical Assistant Note:  Justyn Olivas presents today for Blood Draw.    Patient seen by provider today: No.   present during visit today: Not Applicable.    Concerns: No Concerns.    Procedure:  Lab draw site: LAC, Needle type: BF, Gauge: 23.    Post Assessment:  Labs drawn without difficulty: Yes.    Discharge Plan:  Departure Mode: Ambulatory.    Face to Face Time: 5 Min.    Elisa Montelongo MA         Take antibiotic as directed  Drink plenty of fluids, rest, saltwater gargles, lozenges, hot tea with honey  Tylenol or motrin for pain  If you develop a prolonged high fever, difficulty breathing, swallowing, managing secretions, decreased fluid intake, or urination, any new or concerning symptoms please return or proceed ER  Recommend following up with PCP in 3-5 days  Strep Throat in Children   WHAT YOU NEED TO KNOW:   Strep throat is a throat infection caused by bacteria  It is easily spread from person to person  DISCHARGE INSTRUCTIONS:   Call 911 for any of the following:   · Your child has trouble breathing  Return to the emergency department if:   · Your child's signs and symptoms continue for more than 5 to 7 days  · Your child is tugging at his or her ears or has ear pain  · Your child is drooling because he or she cannot swallow their spit  · Your child has blue lips or fingernails  Contact your child's healthcare provider if:   · Your child has a fever  · Your child has a rash that is itchy or swollen  · Your child's signs and symptoms get worse or do not get better, even after medicine  · You have questions or concerns about your child's condition or care  Medicines:   · Antibiotics  treat a bacterial infection  Your child should feel better within 2 to 3 days after antibiotics are started  Give your child his antibiotics until they are gone, unless your child's healthcare provider says to stop them  Your child may return to school 24 hours after he starts antibiotic medicine  · Acetaminophen  decreases pain and fever  It is available without a doctor's order  Ask how much to give your child and how often to give it  Follow directions  Acetaminophen can cause liver damage if not taken correctly  · NSAIDs , such as ibuprofen, help decrease swelling, pain, and fever  This medicine is available with or without a doctor's order   NSAIDs can cause stomach bleeding or kidney problems in certain people  If your child takes blood thinner medicine, always ask if NSAIDs are safe for him  Always read the medicine label and follow directions  Do not give these medicines to children under 10months of age without direction from your child's healthcare provider  · Do not give aspirin to children under 25years of age  Your child could develop Reye syndrome if he takes aspirin  Reye syndrome can cause life-threatening brain and liver damage  Check your child's medicine labels for aspirin, salicylates, or oil of wintergreen  · Give your child's medicine as directed  Contact your child's healthcare provider if you think the medicine is not working as expected  Tell him or her if your child is allergic to any medicine  Keep a current list of the medicines, vitamins, and herbs your child takes  Include the amounts, and when, how, and why they are taken  Bring the list or the medicines in their containers to follow-up visits  Carry your child's medicine list with you in case of an emergency  Manage your child's symptoms:   · Give your child throat lozenges or hard candy to suck on  Lozenges and hard candy can help decrease throat pain  Do not give lozenges or hard candy to children under 4 years  · Give your child plenty of liquids  Liquids will help soothe your child's throat  Ask your child's healthcare provider how much liquid to give your child each day  Give your child warm or frozen liquids  Warm liquids include hot chocolate, sweetened tea, or soups  Frozen liquids include ice pops  Do not give your child acidic drinks such as orange juice, grapefruit juice, or lemonade  Acidic drinks can make your child's throat pain worse  · Have your child gargle with salt water  If your child can gargle, give him or her ¼ of a teaspoon of salt mixed with 1 cup of warm water  Tell your child to gargle for 10 to 15 seconds  Your child can repeat this up to 4 times each day  · Use a cool mist humidifier in your child's bedroom  A cool mist humidifier increases moisture in the air  This may decrease dryness and pain in your child's throat  Prevent the spread of strep throat:   · Wash your and your child's hands often  Use soap and water or an alcohol-based hand rub  · Do not let your child share food or drinks  Replace your child's toothbrush after he has taken antibiotics for 24 hours  Follow up with your child's healthcare provider as directed:  Write down your questions so you remember to ask them during your child's visits  © 2017 [employer] Information is for End User's use only and may not be sold, redistributed or otherwise used for commercial purposes  All illustrations and images included in CareNotes® are the copyrighted property of [employer]  or Aneesh Villarreal  The above information is an  only  It is not intended as medical advice for individual conditions or treatments  Talk to your doctor, nurse or pharmacist before following any medical regimen to see if it is safe and effective for you

## 2022-02-06 NOTE — PROGRESS NOTES
Infusion Nursing Note:  Justyn Olivas presents today for 1 unit PRBC.    Patient seen by provider today: No   present during visit today: Not Applicable.    Note: reports being weak and easily tired.      Intravenous Access:  Peripheral IV placed.    Treatment Conditions:  Lab Results   Component Value Date    HGB 6.9 (LL) 02/01/2022    WBC 5.5 01/18/2022    ANEU 4.8 06/17/2021    ANEUTAUTO 3.7 01/18/2022     (L) 01/18/2022      Results reviewed, labs MET treatment parameters, ok to proceed with treatment.      Post Infusion Assessment:  Patient tolerated infusion without incident.  Blood return noted pre and post infusion.  Site patent and intact, free from redness, edema or discomfort.  No evidence of extravasations.  Access discontinued per protocol.       Discharge Plan:   Patient and/or family verbalized understanding of discharge instructions and all questions answered.  AVS to patient via [employer].  Patient will return 2/8 for next appointment.   Patient discharged in stable condition accompanied by: self.  Departure Mode: Ambulatory.      Erika Redmond RN

## 2022-02-08 NOTE — PROGRESS NOTES
Infusion Nursing Note:  Justyn FRIAS Deisy presents today for aranesp.    Patient seen by provider today: No   present during visit today: Not Applicable.    Note: Patient reports improvement in dyspnea and energy level after receiving blood transfusion on 2/6. Reports rash on back of neck which is itchy, using topical creams and will discuss with his provider if it does not improve. Denies any open areas. No other new concerns today.      Intravenous Access:  No Intravenous access/labs at this visit.    Treatment Conditions:  Lab Results   Component Value Date    HGB 7.8 (L) 02/08/2022    WBC 6.7 02/08/2022    ANEU 4.8 06/17/2021    ANEUTAUTO 4.5 02/08/2022     02/08/2022      Results reviewed, labs MET treatment parameters for aranesp, ok to proceed with treatment.      Post Infusion Assessment:  Patient tolerated injection without incident.  Site patent and intact, free from redness, edema or discomfort.       Discharge Plan:   Discharge instructions reviewed with: Patient.  Patient and/or family verbalized understanding of discharge instructions and all questions answered.  Copy of AVS reviewed with patient and/or family.  Patient will return 3/1/22 for next appointment.  Patient discharged in stable condition accompanied by: self.  Departure Mode: Ambulatory.      Felicia Ryan RN

## 2022-02-15 NOTE — TELEPHONE ENCOUNTER
Routing refill request to provider for review/approval because:  Last ordered by Yari Baca, RN

## 2022-02-16 PROBLEM — F32.0 MILD MAJOR DEPRESSION (H): Status: ACTIVE | Noted: 2022-01-01

## 2022-02-16 NOTE — PATIENT INSTRUCTIONS
(E61.1) Iron deficiency  (primary encounter diagnosis)  Comment: Recent transfusion as per hematology.  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  We will continue current dose of Lantus 20 at bedtime   Plan:     (I50.32) Chronic diastolic heart failure with preserved ejection fraction (H)  Comment: Continue low-dose torsemide grams daily.  Follow-up with cardiology.  Plan:       Depression  Comment; would recommend increasing dose of sertraline 25 to 50 mg daily

## 2022-02-17 NOTE — RESULT ENCOUNTER NOTE
Rickie Alejandra,    I have had the opportunity to review your recent results and an interpretation is as follows:  Your urinalysis shows no evidence of infection  Your hemoglobin Hemoglobin A1c is a bit high as goal is <8.0.  I would recommend we follow up to discuss increasing your insulin      Sincerely,  Christian Davidson MD

## 2022-02-21 NOTE — PROGRESS NOTES
"Kamran is a 81 year old who is being evaluated via a billable telephone visit.      What phone number would you like to be contacted at? [phone]  How would you like to obtain your AVS? 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 Patient had a urinalysis checked 5 days ago that showed glucosuria.  His A1c has increased to 8.2.  Patient on further questioning he reports he started taking Benadryl for a week now for a rash.  Advised patient to stop Benadryl as this can cause urinary retention, is anticholinergic.  Increase fluid intake and increase his Demadex to 20 mg for the next 3 days schedule follow-up   if persistent R worsening symptoms that is if he continues to have oliguria advised patient to go to the ER for further evaluation and probably get a Glasgow catheter insertion to empty his bladder.  Patient reiterated understanding all questions answered.           See Patient Instructions    Return in about 3 days (around 2/24/2022), or if symptoms worsen or fail to improve, for As needed and if symptoms worsen.    Monique Ortiz MD  [facility]    Subjective   Kamran is a 81 year old who presents for the following health issues  accompanied by his spouse.    HPI     Last week if sitting and stand up, has seconds to get to bathroom, \"little urine goes out\", feels quite  bloated, ?if has bladder infection.,    Review of Systems   Constitutional, HEENT, cardiovascular, pulmonary, gi and gu systems are negative, except as " otherwise noted.      Objective           Vitals:  No vitals were obtained today due to virtual visit.    Physical Exam   healthy, alert and no distress  PSYCH: Alert and oriented times 3; coherent speech, normal   rate and volume, able to articulate logical thoughts, able   to abstract reason, no tangential thoughts, no hallucinations   or delusions  His affect is normal  RESP: No cough, no audible wheezing, able to talk in full sentences  Remainder of exam unable to be completed due to telephone visits    Office Visit on 02/16/2022   Component Date Value Ref Range Status     Color Urine 02/16/2022 Yellow  Colorless, Straw, Light Yellow, Yellow Final     Appearance Urine 02/16/2022 Clear  Clear Final     Glucose Urine 02/16/2022 500  (A) Negative mg/dL Final     Bilirubin Urine 02/16/2022 Negative  Negative Final     Ketones Urine 02/16/2022 Negative  Negative mg/dL Final     Specific Gravity Urine 02/16/2022 1.015  1.003 - 1.035 Final     Blood Urine 02/16/2022 Negative  Negative Final     pH Urine 02/16/2022 6.0  5.0 - 7.0 Final     Protein Albumin Urine 02/16/2022 30  (A) Negative mg/dL Final     Urobilinogen Urine 02/16/2022 1.0  0.2, 1.0 E.U./dL Final     Nitrite Urine 02/16/2022 Negative  Negative Final     Leukocyte Esterase Urine 02/16/2022 Negative  Negative Final     Hemoglobin A1C 02/16/2022 8.2 (A) 0.0 - 5.6 % Final    Normal <5.7%   Prediabetes 5.7-6.4%    Diabetes 6.5% or higher     Note: Adopted from ADA consensus guidelines.     Bacteria Urine 02/16/2022 None Seen  None Seen /HPF Final     RBC Urine 02/16/2022 0-2  0-2 /HPF /HPF Final     WBC Urine 02/16/2022 0-5  0-5 /HPF /HPF Final     Squamous Epithelials Urine 02/16/2022 Few (A) None Seen /LPF Final               Phone call duration: 16 minutes

## 2022-02-25 PROBLEM — D64.9 ANEMIA, UNSPECIFIED TYPE: Status: RESOLVED | Noted: 2021-03-26 | Resolved: 2022-01-01

## 2022-02-25 PROBLEM — D50.9 ANEMIA, IRON DEFICIENCY: Status: RESOLVED | Noted: 2020-12-18 | Resolved: 2022-01-01

## 2022-02-25 PROBLEM — Z79.4 TYPE 2 DIABETES MELLITUS WITH STAGE 3B CHRONIC KIDNEY DISEASE, WITH LONG-TERM CURRENT USE OF INSULIN (H): Status: ACTIVE | Noted: 2021-04-05

## 2022-02-25 PROBLEM — E11.22 TYPE 2 DIABETES MELLITUS WITH STAGE 3B CHRONIC KIDNEY DISEASE, WITH LONG-TERM CURRENT USE OF INSULIN (H): Status: ACTIVE | Noted: 2021-04-05

## 2022-02-25 PROBLEM — D63.8 ANEMIA IN OTHER CHRONIC DISEASES CLASSIFIED ELSEWHERE: Status: ACTIVE | Noted: 2022-01-01

## 2022-02-25 PROBLEM — N18.4 CKD STAGE 4 DUE TO TYPE 2 DIABETES MELLITUS (H): Status: ACTIVE | Noted: 2020-02-25

## 2022-02-25 PROBLEM — R73.9 HYPERGLYCEMIA: Status: RESOLVED | Noted: 2021-06-02 | Resolved: 2022-01-01

## 2022-02-25 PROBLEM — I50.33 ACUTE ON CHRONIC DIASTOLIC HEART FAILURE (H): Status: ACTIVE | Noted: 2022-01-01

## 2022-02-25 PROBLEM — I63.40 CEREBROVASCULAR ACCIDENT (CVA) DUE TO EMBOLISM OF CEREBRAL ARTERY (H): Status: ACTIVE | Noted: 2019-04-18

## 2022-02-25 PROBLEM — R60.1 ANASARCA: Status: ACTIVE | Noted: 2022-01-01

## 2022-02-25 PROBLEM — D50.0 IRON DEFICIENCY ANEMIA DUE TO CHRONIC BLOOD LOSS: Status: ACTIVE | Noted: 2020-12-18

## 2022-02-25 PROBLEM — E11.22 CKD STAGE 4 DUE TO TYPE 2 DIABETES MELLITUS (H): Status: ACTIVE | Noted: 2020-02-25

## 2022-02-25 PROBLEM — E61.1 IRON DEFICIENCY: Status: RESOLVED | Noted: 2020-12-18 | Resolved: 2022-01-01

## 2022-02-25 PROBLEM — N18.32 TYPE 2 DIABETES MELLITUS WITH STAGE 3B CHRONIC KIDNEY DISEASE, WITH LONG-TERM CURRENT USE OF INSULIN (H): Status: ACTIVE | Noted: 2021-04-05

## 2022-02-25 PROBLEM — E87.6 HYPOKALEMIA: Status: RESOLVED | Noted: 2021-06-02 | Resolved: 2022-01-01

## 2022-02-25 PROBLEM — D50.9 IDA (IRON DEFICIENCY ANEMIA): Status: RESOLVED | Noted: 2020-12-18 | Resolved: 2022-01-01

## 2022-02-25 PROBLEM — R06.02 SHORTNESS OF BREATH: Status: ACTIVE | Noted: 2022-01-01

## 2022-02-25 PROBLEM — I50.32 CHRONIC DIASTOLIC HEART FAILURE WITH PRESERVED EJECTION FRACTION (H): Status: ACTIVE | Noted: 2021-01-01

## 2022-02-25 NOTE — ED PROVIDER NOTES
History   Chief Complaint:  Shortness of Breath     HPI   Justyn Olivas is a 81 year old male anticoagulated on Plavix with history of chronic diastolic heart failure and stage IV CKD who presents with shortness of breath. Per the patient, he has been having shortness of breath for the past few months, which worsen with exertion. He also endorses decreased urine, abdominal distension, and bilateral leg swelling. The patient takes torsemide, and normally takes 10 mg, but recently increased this to 20 mg. He is not on oxygen at home. He denies back pain and recent falls or injuries.    Review of Systems   Respiratory: Positive for shortness of breath.    Cardiovascular: Positive for leg swelling.   Gastrointestinal: Positive for abdominal distention.   Genitourinary: Positive for decreased urine volume.   Musculoskeletal: Negative for back pain.   All other systems reviewed and are negative.    Allergies:  The patient has no known allergies.     Medications:  Albuterol  Atorvastatin  Carvedilol  Plavix  Flonase  Insulin glargine  Insulin lispro  Protonix  Sertraline  Torsemide  Trazodone     Past Medical History:     Cerebral artery occlusion with cerebral infarction   Type II diabetes  Hyperlipidemia  Chronic diastolic heart failure  CKD, stage IV  Primary osteoarthritis   Hypertension    Past Surgical History:    Pericardiocentesis   ENT surgery  Cholecystectomy   Skin cancer surgery, nose    Social History:  The patient was roomed alone  Marital Status:     Physical Exam     Patient Vitals for the past 24 hrs:   BP Temp Temp src Pulse Resp SpO2 Height Weight   02/25/22 1830 128/88 -- -- 94 -- 100 % -- --   02/25/22 1730 137/79 -- -- 90 -- 94 % -- --   02/25/22 1700 135/82 -- -- 85 -- 96 % -- --   02/25/22 1642 -- -- -- -- -- -- 1.829 m (6') 94.3 kg (208 lb)   02/25/22 1641 138/66 97.6  F (36.4  C) Temporal 100 24 (!) 88 % 1.829 m (6') 94.3 kg (208 lb)     Physical Exam  General: Alert, appears elderly  otherwise well-developed and well-nourished. Cooperative.     In mild distress  HEENT:  Head:  Atraumatic  Ears:  External ears are normal  Mouth/Throat:  Oropharynx is without erythema or exudate and mucous membranes are moist.   Eyes:   Conjunctivae normal and EOM are normal. No scleral icterus.  CV:  Normal rate, regular rhythm, normal heart sounds and radial pulses are 2+ and symmetric.    Resp:  Breath sounds are coarse bilaterally with right crackles to base.   GI:  Abdomen is soft, no distension, no tenderness. No rebound or guarding.  No CVA tenderness bilaterally  MS:  Normal range of motion. 1+ pitting BLE edema.  Pitting edema to abdomen, anasarca.    Normal strength in all 4 extremities.     Back atraumatic.    No midline cervical, thoracic, or lumbar tenderness  Skin:  Warm and dry.  No rash or lesions noted.  Neuro: Alert. Normal strength.  GCS: 15  Psych:  Normal mood and affect.    Emergency Department Course   ECG  ECG obtained at 1655, ECG read at 1701  Normal sinus rhythm  Cannot rule out Inferior infarct, age undetermined   Cannot rule out Anferior infarct, age undetermined   Abnormal ECG  No significant change as compared to prior, dated 10/13/21.  Rate 91 bpm. CT interval 174 ms. QRS duration 86 ms. QT/QTc 376/462 ms. P-R-T axes 28 15 123.     Imaging:    Chest CT w/o contrast   Preliminary Result   IMPRESSION:    1.  New small volume partially loculated pleural fluid at the right mid to low lung identified.   2.  New finding of interstitial thickening at the right lung base and peribronchial wall thickening. Correlate with bronchitis and potentially mild interstitial edema.   3.  New mildly enlarged mediastinal lymph nodes.   4.  A few stable pulmonary nodules. See below for follow-up imaging guidelines.   5.  Coronary artery calcifications.   6.  New upper abdominal small ascites noted      Recommendations for one or multiple incidental lung nodules < 6mm:   Low-Risk Patient: No routine  follow-up.   High-Risk Patient: Optional follow-up CT at 12 months; if unchanged, no further follow-up.      *Low Risk: Minimal or absent history of smoking or other known risk factors.   *Nonsolid (ground-glass) or partly solid nodules may require longer follow-up to exclude indolent adenocarcinoma.   *Recommendations based on Guidelines for the Management of Incidental Pulmonary Nodules Detected at CT: From the Fleischner Society 2017, Radiology 2017.         XR Chest Port 1 View   Preliminary Result   IMPRESSION: New dense consolidation at the right mid to lower lung worrisome for pneumonia or other airspace disease. Some patchy airspace opacities at the left lung base also noted. Borderline prominent cardiac silhouette. Bilateral shoulder DJD.         The above imaging workup was performed.   Report per radiology    Laboratory:  Labs Ordered and Resulted from Time of ED Arrival to Time of ED Departure   D DIMER QUANTITATIVE - Abnormal       Result Value    D-Dimer Quantitative 2.87 (*)    COMPREHENSIVE METABOLIC PANEL - Abnormal    Sodium 140      Potassium 4.1      Chloride 111 (*)     Carbon Dioxide (CO2) 25      Anion Gap 4      Urea Nitrogen 46 (*)     Creatinine 2.40 (*)     Calcium 8.3 (*)     Glucose 123 (*)     Alkaline Phosphatase 165 (*)     AST 17      ALT 17      Protein Total 6.7 (*)     Albumin 3.2 (*)     Bilirubin Total 0.6      GFR Estimate 26 (*)    NT PROBNP INPATIENT - Abnormal    N terminal Pro BNP Inpatient 2,304 (*)    CBC WITH PLATELETS AND DIFFERENTIAL - Abnormal    WBC Count 6.8      RBC Count 3.07 (*)     Hemoglobin 6.2 (*)     Hematocrit 22.5 (*)     MCV 73 (*)     MCH 20.2 (*)     MCHC 27.6 (*)     RDW 19.4 (*)     Platelet Count 222      % Neutrophils 68      % Lymphocytes 7      % Monocytes 15      % Eosinophils 9      % Basophils 1      % Immature Granulocytes 0      NRBCs per 100 WBC 0      Absolute Neutrophils 4.7      Absolute Lymphocytes 0.5 (*)     Absolute Monocytes 1.0       Absolute Eosinophils 0.6      Absolute Basophils 0.1      Absolute Immature Granulocytes 0.0      Absolute NRBCs 0.0     INR - Abnormal    INR 1.23 (*)    TSH WITH FREE T4 REFLEX - Normal    TSH 2.03     TROPONIN I - Normal    Troponin I High Sensitivity 18     INFLUENZA A/B & SARS-COV2 PCR MULTIPLEX - Normal    Influenza A PCR Negative      Influenza B PCR Negative      SARS CoV2 PCR Negative     GLUCOSE BY METER - Normal    GLUCOSE BY METER POCT 92     TYPE AND SCREEN, ADULT    ABO/RH(D) A POS      Antibody Screen NEGATIVE     PREPARE RED BLOOD CELLS (UNIT)    CROSSMATCH Compatible      UNIT ABO/RH A Pos      Unit Number [id number]      Unit Status Ready      Blood Component Type Red Blood Cells      Product Code [id number]      CODING SYSTEM [id number]      UNIT TYPE ISBT 6200     PREPARE RED BLOOD CELLS (UNIT)   ABO/RH TYPE AND SCREEN        Emergency Department Course:    Reviewed:  I reviewed nursing notes, vitals and past medical history    Assessments:  1706 I obtained history and examined the patient as noted above.   1748 I rechecked the patient and explained findings.     Consults:  1743 I spoke with Dr. Osborn of the hospitalist service from [facility] regarding patient's presentation, findings, and plan of care.    Interventions:  1748: Lasix, 60 mg, IV  Red Blood Cells, 1 Unit, IV transfusion     Disposition:  The patient was admitted to the hospital under the care of Dr. Osborn.     Impression & Plan     CMS Diagnoses: None    Medical Decision Making:  Justyn Olivas is a 81 year old male with a PMH of CHF and stage IV CKD who presents for evaluation of shortness of breath.  I considered a broad differential of their dyspnea including CHF exacerbation, PE, dissection, hemothorax, pleural effusion, pneumonia, acute coronary syndrome, reactive airway disease, bronchitis, upper airway obstruction, foreign body, etc.  Given the history and exam plus laboratory findings I feel congestive heart  failure is the most likely etiology.  Chest x-ray with concern for potential infiltrate of the right lower lung.  CT imaging obtained which shows potential interstitial edema versus a loculated fluid collection.  Ultimately lower concern for infectious etiologies particularly with no active cough, fever, or leukocytosis.  Patient does appear to also have anemia of chronic disease and so was consented and transfused with 1 unit of PRBCs due to acute anemia.  The patient received IV diuretics in ED and felt improved; will start I/O's here in ED.  Will admit at this time for further cares due to anasarca, acute on chronic diastolic heart failure, shortness of breath, and anemia of chronic disease.     Diagnosis:    ICD-10-CM    1. Anemia in other chronic diseases classified elsewhere  D63.8    2. Shortness of breath  R06.02    3. Acute on chronic diastolic heart failure (H)  I50.33    4. Anasarca  R60.1        Scribe Disclosure:  Chicho COOK, am serving as a scribe at 5:01 PM on 2/25/2022 to document services personally performed by Cain Uriostegui MD based on my observations and the provider's statements to me.          Cain Uriostegui MD  02/25/22 2036

## 2022-02-25 NOTE — ED NOTES
[facility]  ED Nurse Handoff Report    ED Chief complaint: Shortness of Breath      ED Diagnosis:   Final diagnoses:   Anemia in other chronic diseases classified elsewhere   Shortness of breath   Acute on chronic diastolic heart failure (H)   Anasarca       Code Status: Full Code    Allergies:   Allergies   Allergen Reactions     No Known Allergies        Patient Story: SOB  Focused Assessment:  Pt has had Hx of low hemoglobin. Hgb of 6.2 today. Pt will get transfusion started in ED.     Treatments and/or interventions provided: lasix  Patient's response to treatments and/or interventions: good    To be done/followed up on inpatient unit:  monitor hgb    Does this patient have any cognitive concerns?: none    Activity level - Baseline/Home:  Independent  Activity Level - Current:   Independent    Patient's Preferred language: English   Needed?: No    Isolation: None  Infection: Not Applicable  Patient tested for COVID 19 prior to admission: YES  Bariatric?: No    Vital Signs:   Vitals:    02/25/22 1641 02/25/22 1642   BP: 138/66    Pulse: 100    Resp: 24    Temp: 97.6  F (36.4  C)    TempSrc: Temporal    SpO2: (!) 88%    Weight: 94.3 kg (208 lb) 94.3 kg (208 lb)   Height: 1.829 m (6') 1.829 m (6')       Cardiac Rhythm:     Was the PSS-3 completed:   Yes  What interventions are required if any?               Family Comments: wife here  OBS brochure/video discussed/provided to patient/family: N/A              Name of person given brochure if not patient: AN              Relationship to patient: NA    For the majority of the shift this patient's behavior was Green.   Behavioral interventions performed were none.    ED NURSE PHONE NUMBER: *[phone]

## 2022-02-25 NOTE — TELEPHONE ENCOUNTER
Left patient message to  call triage back for breathing issues.     Dr. Davidson, please read patient My Chart. He was just seen in clinic 2/16/2022.     Infusion therapy for anemia done last 2/8/2022.     Please advise so we can call the patient back.    Gabriela Amaral RN  [facility]

## 2022-02-25 NOTE — TELEPHONE ENCOUNTER
"Patient is SOB and he states he is retaining fluid. He is telling me he has no energy for several days.    I waited to contact us because it though \" blood was low\" and wanted to be seen int he clinic. Patient will go to the emergency room to be seen.     Reason for Disposition    [1] MODERATE difficulty breathing (e.g., speaks in phrases, SOB even at rest, pulse 100-120) AND [2] NEW-onset or WORSE than normal    History of inherited increased risk of blood clots (e.g., Factor 5 Leiden, Anti-thrombin 3, Protein C or Protein S deficiency, Prothrombin mutation)    [1] MILD difficulty breathing (e.g., minimal/no SOB at rest, SOB with walking, pulse <100) AND [2] NEW-onset or WORSE than normal    Additional Information    Negative: [1] Breathing stopped AND [2] hasn't returned    Negative: Choking on something    Negative: Severe difficulty breathing (e.g., struggling for each breath, speaks in single words)    Negative: Bluish (or gray) lips or face now    Negative: Difficult to awaken or acting confused (e.g., disoriented, slurred speech)    Negative: Passed out (i.e., lost consciousness, collapsed and was not responding)    Negative: Wheezing started suddenly after medicine, an allergic food or bee sting    Negative: Stridor    Negative: Slow, shallow and weak breathing    Negative: Sounds like a life-threatening emergency to the triager    Negative: Chest pain    Negative: [1] Wheezing (high pitched whistling sound) AND [2] previous asthma attacks or use of asthma medicines    Negative: [1] Difficulty breathing AND [2] only present when coughing    Negative: [1] Difficulty breathing AND [2] only from stuffy or runny nose    Negative: [1] Difficulty breathing AND [2] within 14 days of COVID-19 Exposure    Negative: Wheezing can be heard across the room    Negative: Drooling or spitting out saliva (because can't swallow)    Negative: History of prior \"blood clot\" in leg or lungs (i.e., deep vein thrombosis, pulmonary " "embolism)    Negative: Major surgery in the past month    Negative: Hip or leg fracture (broken bone) in past month (or had cast on leg or ankle in past month)    Negative: Illness requiring prolonged bedrest in past month (e.g., immobilization, long hospital stay)    Negative: Long-distance travel in past month (e.g., car, bus, train, plane; with trip lasting 6 or more hours)    Negative: Extra heart beats OR irregular heart beating   (i.e., \"palpitations\")    Negative: Fever > 103 F (39.4 C)    Negative: [1] Fever > 101 F (38.3 C) AND [2] age > 60    Negative: [1] Fever > 100.0 F (37.8 C) AND [2] bedridden (e.g., nursing home patient, CVA, chronic illness, recovering from surgery)    Negative: [1] Fever > 100.0 F (37.8 C) AND [2] diabetes mellitus or weak immune system (e.g., HIV positive, cancer chemo, splenectomy, organ transplant, chronic steroids)    Negative: [1] Periods where breathing stops and then resumes normally AND [2] bedridden (e.g., nursing home patient, CVA)    Negative: Pregnant or postpartum (< 1 month since delivery)    Negative: Patient sounds very sick or weak to the triager    Negative: [1] Longstanding difficulty breathing (e.g., CHF, COPD, emphysema) AND [2] WORSE than normal    Negative: [1] Longstanding difficulty breathing AND [2] not responding to usual therapy    Negative: [1] Continuous (nonstop) coughing AND [2] keeps from working or sleeping    Negative: [1] MODERATE longstanding difficulty breathing (e.g., speaks in phrases, SOB even at rest, pulse 100-120) AND [2] SAME as normal    Negative: [1] MILD longstanding difficulty breathing AND [2]  SAME as normal    Answer Assessment - Initial Assessment Questions  1. RESPIRATORY STATUS: \"Describe your breathing?\" (e.g., wheezing, shortness of breath, unable to speak, severe coughing)       SOB with rest.   2. ONSET: \"When did this breathing problem begin?\"       For a few days.   3. PATTERN \"Does the difficult breathing come and go, or " "has it been constant since it started?\"      \" Consistent  SOB and I feel like I have allot of fluid and swelling.\"   4. SEVERITY: \"How bad is your breathing?\" (e.g., mild, moderate, severe)     - MILD: No SOB at rest, mild SOB with walking, speaks normally in sentences, can lay down, no retractions, pulse < 100.     - MODERATE: SOB at rest, SOB with minimal exertion and prefers to sit, cannot lie down flat, speaks in phrases, mild retractions, audible wheezing, pulse 100-120.     - SEVERE: Very SOB at rest, speaks in single words, struggling to breathe, sitting hunched forward, retractions, pulse > 120       Moderate.   5. RECURRENT SYMPTOM: \"Have you had difficulty breathing before?\" If so, ask: \"When was the last time?\" and \"What happened that time?\"       Yes but it has always improved some.   6. CARDIAC HISTORY: \"Do you have any history of heart disease?\" (e.g., heart attack, angina, bypass surgery, angioplasty)       Yes  7. LUNG HISTORY: \"Do you have any history of lung disease?\"  (e.g., pulmonary embolus, asthma, emphysema)      No  8. CAUSE: \"What do you think is causing the breathing problem?\"      \" I thought it was my low blood. \"  9. OTHER SYMPTOMS: \"Do you have any other symptoms? (e.g., dizziness, runny nose, cough, chest pain, fever)      Swelling and fatigue.   10. PREGNANCY: \"Is there any chance you are pregnant?\" \"When was your last menstrual period?\"        No  11. TRAVEL: \"Have you traveled out of the country in the last month?\" (e.g., travel history, exposures)        No    Protocols used: BREATHING DIFFICULTY-OLEG-    Gabriela Amaral RN  [facility]     "

## 2022-02-25 NOTE — TELEPHONE ENCOUNTER
Please call patient again and get more details, be may need to be seen in the ED if worsening sob elaine if he is not urinating.     Elizabeth Muse PA-C

## 2022-02-26 NOTE — PHARMACY-ADMISSION MEDICATION HISTORY
Pharmacy Medication History  Admission medication history interview status for the 2/25/2022  admission is complete. See EPIC admission navigator for prior to admission medications     Location of Interview: Patient room  Medication history sources: Patient, Patient's family/friend and Surescripts    Significant changes made to the medication list:  1. Flagged for removal: ferrous sulfate, removed Miralax and Percocet.  2. Added: clobetasol  3. Updated:   -torsemide (previously 10 mg daily. Was instructed in past week to increase dose by one of his providers)   -Cosopt (previously 1 drop both eyes daily)   -Humalog (added sliding scale per patient)   -Senna S (previously scheduled)    In the past week, patient estimated taking medication this percent of the time: greater than 90%    Additional medication history information:   1. Carvedilol last filled April 2021 per outside records. Patient states taking as prescribed.    Medication reconciliation completed by provider prior to medication history? No    Time spent in this activity: 15 minutes    Prior to Admission medications    Medication Sig Last Dose Taking? Auth Provider   albuterol (PROAIR HFA) 108 (90 Base) MCG/ACT inhaler INHALE 2 PUFFS BY MOUTH FOUR TIMES DAILY AS NEEDED  Yes Christian Davidson MD   atorvastatin (LIPITOR) 20 MG tablet Take 1 tablet (20 mg) by mouth daily 2/25/2022 at Unknown time Yes Christian Daivdson MD   carvedilol (COREG) 6.25 MG tablet Take 1 tablet (6.25 mg) by mouth 2 times daily (with meals) 2/25/2022 at am Yes Kelly Sen PA-C   clobetasol (TEMOVATE) 0.05 % external cream Apply topically daily as needed  Yes Unknown, Entered By History   clopidogrel (PLAVIX) 75 MG tablet Take 1 tablet (75 mg) by mouth daily 2/25/2022 at Unknown time Yes Christian Davidson MD   dorzolamide-timolol (COSOPT) 2-0.5 % ophthalmic solution Place 1 drop into the right eye 2 times daily  2/25/2022 at Unknown time Yes Unknown,  Entered By History   fluticasone (FLONASE) 50 MCG/ACT nasal spray Spray 2 sprays into both nostrils daily 2/25/2022 at Unknown time Yes Christian Davidson MD   folic acid-vit B6-vit B12 (FOLGARD) 0.8-10-0.115 MG TABS per tablet Take 1 tablet by mouth daily Continue per Nephrology recommendation 2/25/2022 at Unknown time Yes Amador Almanza MD   insulin glargine (LANTUS SOLOSTAR) 100 UNIT/ML pen ADMINISTER 25 UNITS UNDER THE SKIN AT BEDTIME 2/24/2022 at Unknown time Yes Christian Davidson MD   insulin lispro (HUMALOG KWIKPEN) 100 UNIT/ML (1 unit dial) KWIKPEN Inject 5-15 Units Subcutaneous 3 times daily (before meals) Sliding scale with meals  Patient taking differently: Inject 5-15 Units Subcutaneous 3 times daily (before meals) Sliding scale with meals:  <100: 5 units  101-150: 7 units  151-250: 9 units  251-350: 12 units  >351 15 units 2/25/2022 at am Yes Christian Davidson MD   pantoprazole (PROTONIX) 40 MG EC tablet TAKE 1 TABLET(40 MG) BY MOUTH TWICE DAILY 2/25/2022 at AM Yes Christian Davidson MD   potassium chloride ER (K-TAB) 20 MEQ CR tablet Take 1 tablet (20 mEq) by mouth daily 2/25/2022 at Unknown time Yes Radha Gonzalez DO   SENNA-docusate sodium (SENNA S) 8.6-50 MG tablet Take 1 tablet by mouth 2 times daily  Patient taking differently: Take 1 tablet by mouth 2 times daily as needed   Yes Christian Davidson MD   sertraline (ZOLOFT) 50 MG tablet Take 1 tablet (50 mg) by mouth daily 2/25/2022 at Unknown time Yes Christian Davidson MD   torsemide (DEMADEX) 10 MG tablet Take 1 tablet (10 mg) by mouth daily  Patient taking differently: Take 10 mg by mouth 2 times daily  2/25/2022 at Unknown time Yes Cielo Rosenbaum MD   traZODone (DESYREL) 100 MG tablet TAKE 1 TABLET(100 MG) BY MOUTH AT BEDTIME 2/24/2022 at Unknown time Yes Christian Davidson MD   vitamin B-12 (CYANOCOBALAMIN) 1000 MCG tablet Take 1 tablet (1,000 mcg) by mouth daily Continue  unit hematology evaluation as borderline low vitamin b12. 2/25/2022 at Unknown time Yes Kristine Nash MD   blood glucose (STEVE CONTOUR) test strip TESTING FOUR TIMES DAILY OR AS DIRECTED   Christian Davidson MD   Ferrous Sulfate 324 (65 Fe) MG TBEC Take 1 tablet (324 mg) by mouth 2 times daily   Leif Camacho MD     The information provided in this note is only as accurate as the sources available at the time of update(s)     Sg AlD, PGY-1 Resident

## 2022-02-26 NOTE — PLAN OF CARE
Rcvd pt from ED for CHF exacerbation and anemia. A&Ox4, denies pain. MARKSVIOLETTE CBAA. VSS, tele SR. Started 1 unit PRBCs. Up to bathroom with cane and Ax1. Pending Cards and GI consults, ECHO.

## 2022-02-26 NOTE — PLAN OF CARE
Goal Outcome Evaluation:    Plan of Care Reviewed With: patient     Date: February 26, 2022  Shift: [id number]  Name: Justyn Olivas  Age: 81 year old  YOB: 1940    Reason for Admission: Shortness of breath [R06.02]  Acute on chronic diastolic heart failure (H) [I50.33]  Anasarca [R60.1]  Anemia in other chronic diseases classified elsewhere [D63.8]     Cognitive/Mentation: A&Ox4  Neuros/CMS: Intact    VS: Stable on RA  Cardiac: SR  GI: +BS, +Flatus, XL BM (bowel prepping)  : Voiding in BR, strict I&O's  Pulmonary: LS clear/dim  Pain: denies     Drains: None  Skin: Intact  Activity: SBA     Diet: Clears     Therapies recs: None  Discharge: Pending     Shift summary: Plan for colonoscopy tomorrow at 9am.

## 2022-02-26 NOTE — PROGRESS NOTES
RECEIVING UNIT ED HANDOFF REVIEW    ED Nurse Handoff Report was reviewed by: Krista Sheehan RN on February 25, 2022 at 8:03 PM

## 2022-02-26 NOTE — PLAN OF CARE
Goal Outcome Evaluation:  Patient alert, calls for SBA when up to BR. Completed one unit PRBC, tolerated well. Migdalia ORTA. NPO for GI consult, no s/sx of bleeding.     Plan of Care Reviewed With: patient

## 2022-02-26 NOTE — CONSULTS
"BRIEF NUTRITION ASSESSMENT      REASON FOR ASSESSMENT:  Justyn Olivas is a 81 year old male seen by Registered Dietitian for Admission Nutrition Risk Screen:  Have you recently lost weight without trying? \"unsure\"  Have you been eating poorly because of a decreased appetite? \"yes\"      CURRENT DIET AND INTAKE:  Diet:  Clear Liquids              Visited with pt and wife this afternoon  \"I would love a steak!!\"  Pt tells me that he is tolerating clear liquids - \"I can't eat later tonight, I am having a colonoscopy tomorrow\"  Pt notes that he watches his Na intake at home - wife confirms this  He states that he was eating fine PTA    ANTHROPOMETRICS:  Height: 6' 0\"  Weight:(2/26) 94 kg /  207 lbs 4.8 oz  Body mass index is 28.11 kg/m .   Weight Status: Overweight BMI 25-29.9  IBW:  80.9 kg  %IBW: 116%  Weight History:   Wt Readings from Last 10 Encounters:   02/26/22 94 kg (207 lb 4.8 oz)   02/16/22 94.3 kg (208 lb)   02/01/22 94.8 kg (209 lb)   11/16/21 94.3 kg (208 lb)   10/26/21 96.4 kg (212 lb 8 oz)   10/13/21 95.3 kg (210 lb)   09/21/21 98 kg (216 lb)   09/02/21 97.3 kg (214 lb 6.4 oz)   08/23/21 94.2 kg (207 lb 9.6 oz)   08/10/21 93.9 kg (207 lb)         LABS:  Labs noted    MALNUTRITION:  Patient does not meet two of the criteria necessary for diagnosing malnutrition.       NUTRITION INTERVENTION:  Nutrition Diagnosis:  No nutrition diagnosis at this time.    Implementation:  Nutrition Education ---> Pt states he has no diet questions at this time.    FOLLOW UP/MONITORING:   Will re-evaluate in 7 - 10 days, or sooner, if re-consulted.          "

## 2022-02-26 NOTE — PROVIDER NOTIFICATION
Paged Dr Braden to report critical hgb of 6.8, up from 6.3. Had  1 unit PRBC on nights. Pt asymptomatic.

## 2022-02-26 NOTE — H&P
[facility]    History and Physical - Hospitalist Service       Date of Admission:  2/25/2022    Assessment & Plan      Justyn Olivas is a 81 year old male admitted on 2/25/2022. He presents with SOB and generalized weakness and edema.       Acute on chronic diastolic heart failure (H)    Shortness of breath    Anasarca    Assessment: Presents with several months history of progressive dyspnea with exertion.  On admission patient's EKG was sinus rhythm, his CT chest showed new small volume partially loculated pleural fluid at the right mid to low lung identified. New finding of interstitial thickening at the right lung base and peribronchial wall thickening. Correlate with bronchitis and potentially mild interstitial edema, consistent with acute on chronic diastolic heart failure.  His proBNP is elevated at 2304, troponin is within normal notes.  Creatinine elevated at 2.40.  Will be admitted for diuresis of his decompensated heart failure.    Plan:   -Admit inpatient  -Lasix IV 60 mg twice daily  -Follow daily weights/I's and O's  -Cardiology consulted  -The echocardiogram  -Monitor on telemetry  -Continue prior to mission fatal      Iron deficiency anemia due to chronic blood loss    Anemia in other chronic diseases classified elsewhere    Assessment: Hemoglobin admission of 6.2, with baseline around 8-9.  Patient without any acute evidence of GI bleeding.  1 unit of RBCs was ordered in the emergency department.  Suspect hypervolemia causing some component of dilution, but given patient's declining hemoglobin over the past several months, do suspect possible component of some GI bleeding as well.    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    Plan:   -Recheck BMP in a.m.  - Avoid NSAIDs/nephrotoxins      Type 2 diabetes mellitus with stage 3b chronic kidney disease, with long-term current use of insulin (H)    Assessment: PTA patient is on Lantus 25 units at bedtime, with sliding scale insulin    Plan:   -Continue prior to admission Lantus, reduced dose to 15 units in the setting of reduced p.o. intake  -Sliding scale insulin as needed with hypoglycemia       Diet:   Cardiac Diet, NPO @ midnight  DVT Prophylaxis: DAPT  Glasgow Catheter: Not present  Central Lines: None  Cardiac Monitoring: None  Code Status:   FULL CODE    Clinically Significant Risk Factors Present on Admission              # Coagulation Defect: INR = 1.23 (Ref range: 0.85 - 1.15) and/or PTT = N/A on admission, will monitor for bleeding  # Platelet Defect: home medication list includes an antiplatelet medication   # Overweight: Estimated body mass index is 28.21 kg/m  as calculated from the following:    Height as of this encounter: 1.829 m (6').    Weight as of this encounter: 94.3 kg (208 lb).      Disposition Plan   Expected Discharge:    Anticipated discharge location:  Awaiting care coordination huddle  Delays:            The patient's care was discussed with the Patient and ED Provider.    David Osborn MD  Hospitalist Service  [facility]  Securely message with the Vocera Web Console (learn more here)  Text page via [employer] Paging/Directory          ______________________________________________________________________    Chief Complaint     SOB  Generalized Weakness    History is obtained from the patient    History of Present Illness     Justyn Olivas is a 81 year old male with past medical history of chronic diastolic heart failure, CKD, anemia secondary to iron deficiency, CVA, hypertension/hyperlipidemia who presents for evaluation of shortness breath.    Patient ports that for the past several months, he has had progressive dyspnea with exertion as only worsened.  He also endorses marked worsening of abdominal distention, and bilateral leg swelling.  He reports that he recently increased his torsemide to 20 mg daily, but with no improvement in his fluid overload.  He denies any chest pain, no fevers or chills.  He denies any recent blood in his stool, no night sweats.  He denies any nausea/vomiting or abdominal pain otherwise.  He denies any significant increase in salt intake.  He reports being compliant with his torsemide, and his cardiac medications.  He also notes that his urine output has been decreasing.  But he denies any dysuria, hematuria.  At this time he has no other complaints.    Review of Systems      The 10 point Review of Systems is negative other than noted in the HPI or here.     Past Medical History    I have reviewed this patient's medical history and updated it with pertinent information if needed.   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  Past Surgical History:   Procedure Laterality  Date     BONE MARROW BIOPSY, BONE SPECIMEN, NEEDLE/TROCAR N/A 2021    Procedure: BIOPSY, BONE MARROW;  Surgeon: Juanjo Hoffman MD;  Location:  GI     CV PERICARDIOCENTESIS N/A 2020    Procedure: Pericardiocentesis;  Surgeon: Chas Chambers MD;  Location:  HEART CARDIAC CATH LAB     ENT SURGERY       ESOPHAGOSCOPY, GASTROSCOPY, DUODENOSCOPY (EGD), COMBINED N/A 3/27/2021    Procedure: Esophagogastroduodenoscopy, With Biopsy;  Surgeon: Luc Nash MD;  Location:  GI     LAPAROSCOPIC CHOLECYSTECTOMY N/A 2021    Procedure: LAPAROSCOPIC CHOLECYSTECTOMY;  Surgeon: Len Coates MD;  Location:  OR     SOFT TISSUE SURGERY      skin cancer surgery on nose       Social History   I have reviewed this patient's social history and updated it with pertinent information if needed.  Social History     Tobacco Use     Smoking status: Former Smoker     Packs/day: 2.00     Years: 11.00     Pack years: 22.00     Types: Cigarettes     Start date:      Quit date:      Years since quittin.1     Smokeless tobacco: Never Used   Substance Use Topics     Alcohol use: No     Alcohol/week: 0.0 standard drinks     Drug use: No       Family History   I have reviewed this patient's family history and updated it with pertinent information if needed.  Family History   Problem Relation Age of Onset     Family History Negative Mother      Family History Negative Father        Prior to Admission Medications   Prior to Admission Medications   Prescriptions Last Dose Informant Patient Reported? Taking?   Ferrous Sulfate 324 (65 Fe) MG TBEC  Self No No   Sig: Take 1 tablet (324 mg) by mouth 2 times daily   SENNA-docusate sodium (SENNA S) 8.6-50 MG tablet  Self No Yes   Sig: Take 1 tablet by mouth 2 times daily   Patient taking differently: Take 1 tablet by mouth 2 times daily as needed    albuterol (PROAIR HFA) 108 (90 Base) MCG/ACT inhaler  Self No Yes   Sig: INHALE 2 PUFFS BY MOUTH FOUR TIMES  DAILY AS NEEDED   atorvastatin (LIPITOR) 20 MG tablet 2/25/2022 at Unknown time Self No Yes   Sig: Take 1 tablet (20 mg) by mouth daily   blood glucose (STEVE CONTOUR) test strip  Self No No   Sig: TESTING FOUR TIMES DAILY OR AS DIRECTED   carvedilol (COREG) 6.25 MG tablet 2/25/2022 at am Self No Yes   Sig: Take 1 tablet (6.25 mg) by mouth 2 times daily (with meals)   clobetasol (TEMOVATE) 0.05 % external cream  Self Yes Yes   Sig: Apply topically daily as needed   clopidogrel (PLAVIX) 75 MG tablet 2/25/2022 at Unknown time Self No Yes   Sig: Take 1 tablet (75 mg) by mouth daily   dorzolamide-timolol (COSOPT) 2-0.5 % ophthalmic solution 2/25/2022 at Unknown time Self Yes Yes   Sig: Place 1 drop into the right eye 2 times daily    fluticasone (FLONASE) 50 MCG/ACT nasal spray 2/25/2022 at Unknown time Self No Yes   Sig: Spray 2 sprays into both nostrils daily   folic acid-vit B6-vit B12 (FOLGARD) 0.8-10-0.115 MG TABS per tablet 2/25/2022 at Unknown time Self No Yes   Sig: Take 1 tablet by mouth daily Continue per Nephrology recommendation   insulin glargine (LANTUS SOLOSTAR) 100 UNIT/ML pen 2/24/2022 at Unknown time Self No Yes   Sig: ADMINISTER 25 UNITS UNDER THE SKIN AT BEDTIME   insulin lispro (HUMALOG KWIKPEN) 100 UNIT/ML (1 unit dial) KWIKPEN 2/25/2022 at am Self No Yes   Sig: Inject 5-15 Units Subcutaneous 3 times daily (before meals) Sliding scale with meals   Patient taking differently: Inject 5-15 Units Subcutaneous 3 times daily (before meals) Sliding scale with meals:  <100: 5 units  101-150: 7 units  151-250: 9 units  251-350: 12 units  >351 15 units   insulin pen needle (BD PEN NEEDLE FERCHO 2ND GEN) 32G X 4 MM miscellaneous  Self No No   Sig: USE AS DIRECTED UP TO FOUR TIMES DAILY   pantoprazole (PROTONIX) 40 MG EC tablet 2/25/2022 at AM Self No Yes   Sig: TAKE 1 TABLET(40 MG) BY MOUTH TWICE DAILY   potassium chloride ER (K-TAB) 20 MEQ CR tablet 2/25/2022 at Unknown time Self No Yes   Sig: Take 1 tablet  (20 mEq) by mouth daily   sertraline (ZOLOFT) 50 MG tablet 2/25/2022 at Unknown time Self No Yes   Sig: Take 1 tablet (50 mg) by mouth daily   torsemide (DEMADEX) 10 MG tablet 2/25/2022 at Unknown time Self No Yes   Sig: Take 1 tablet (10 mg) by mouth daily   Patient taking differently: Take 10 mg by mouth 2 times daily    traZODone (DESYREL) 100 MG tablet 2/24/2022 at Unknown time Self No Yes   Sig: TAKE 1 TABLET(100 MG) BY MOUTH AT BEDTIME   vitamin B-12 (CYANOCOBALAMIN) 1000 MCG tablet 2/25/2022 at Unknown time Self No Yes   Sig: Take 1 tablet (1,000 mcg) by mouth daily Continue unitl hematology evaluation as borderline low vitamin b12.      Facility-Administered Medications: None     Allergies   Allergies   Allergen Reactions     No Known Allergies        Physical Exam   Vital Signs: Temp: 97.6  F (36.4  C) Temp src: Temporal BP: 128/88 Pulse: 94   Resp: 24 SpO2: 100 %      Weight: 208 lbs 0 oz    Constitutional: awake, alert, cooperative, no apparent distress.   Eyes: Lids and lashes normal, pupils equal, round and reactive to light   ENT: Normocephalic, without obvious abnormality, atraumatic, sinuses nontender on palpation   Hematologic / Lymphatic: no cervical lymphadenopathy   Respiratory: CTABL   Cardiovascular: RRR with no m/r/g with bilateral 3+ pitting edema.  GI: Normal bowel sounds, soft, distended with anasarca, but otherwise nontender  Skin: normal skin color, texture, turgor   Musculoskeletal: There is no redness, warmth, or swelling of the joints. Full range of motion noted.   Neurologic: Awake, alert, oriented to name, place and time. Cranial nerves II-XII are grossly intact. Motor is 5 out of 5 bilaterally. Sensory is intact.   Neuropsychiatric: normal mood and affect      Data   Data reviewed today: I reviewed all medications, new labs and imaging results over the last 24 hours. I personally reviewed the EKG tracing showing NSR and the chest CT image(s) showing see below.    CT  CHEST  IMPRESSION:   1.  New small volume partially loculated pleural fluid at the right mid to low lung identified.  2.  New finding of interstitial thickening at the right lung base and peribronchial wall thickening. Correlate with bronchitis and potentially mild interstitial edema.  3.  New mildly enlarged mediastinal lymph nodes.  4.  A few stable pulmonary nodules. See below for follow-up imaging guidelines.  5.  Coronary artery calcifications.  6.  New upper abdominal small ascites noted

## 2022-02-26 NOTE — PROGRESS NOTES
[facility]    Hospitalist Progress Note    Brief Summary:  Justyn Olivas is a 81 year old male with past medical history of chronic diastolic heart failure, CKD, anemia secondary to iron deficiency, CVA, hypertension/hyperlipidemia, history of GI bleed secondary to AVM who presents for evaluation of shortness breath and found to have significant anemia with Hb of 6.2     Assessment & Plan   Justyn Olivas is a 81 year old male admitted on 2/25/2022. He presents with SOB and generalized weakness and edema.     Acute anemia likely Acute blood loss Anemia  H/o Chronic  Iron deficiency anemia due to chronic blood loss      Hemoglobin admission of 6.2, with baseline around 8-9.  Patient without any acute evidence of GI bleeding.  1 unit of RBCs was ordered in the emergency department, Hb improve from 6.2 to 6.6 only, history of angiodysplasia of colon and duodenum in the past.   He was started on Protonix, GI consulted EDG done today is normal, no sign of obvious bleeding  Will transfuse 1 unit of Pack RBC today, keep his Hb 7 or above at least, continue to hold Plavix for now.  Will need Colonoscopy tomorrow.          Acute on chronic diastolic heart failure (H)    Shortness of breath likely secondary to anemia.     Anasarca   Presents with several months history of progressive dyspnea with exertion.  On admission patient's EKG was sinus rhythm, his CT chest showed new small volume partially loculated pleural fluid at the right mid to low lung identified. New finding of interstitial thickening at the right lung base and peribronchial wall thickening. Correlate with bronchitis and potentially mild interstitial edema, consistent with acute on chronic diastolic heart failure.  His proBNP is elevated at 2304, troponin is within normal notes.  Creatinine elevated at 2.40( baseline creatinine 2.2-2.6).      I think his symptoms likely related to severe anemia and CHF, agree with IV lasix 60  mg bid at this time, Intake out put charting, daily weights. Echo ordered and pending at this time, overall his SOB is improve          Insomnia, unspecified type    Continue prior to admission trazodone       Benign essential hypertension    Hyperlipidemia LDL goal <100   Continue prior to admission Lipitor/Coreg       Cerebrovascular accident (CVA) due to embolism of cerebral artery (H)    He is on Plavix since then, given history of GI bleed and anemia, holding his Plavix.  Need to follow up with his Neurology and consider switching to Aspirin as out patient basis.          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 intake  -Sliding scale insulin as needed with hypoglycemia             DVT Prophylaxis: Pneumatic Compression Devices  Code Status: Full Code    Disposition: Expected discharge in 1-2 days once remain stable.     Discussed with patient, patient wife, Dr Lane and the nursing staff taking care of the patient today     Marcus Braden MD  Text Page  (7am - 6pm)    Interval History   Patient seen post EGD today but was quite sedated, patient wife on bedside, able to woke up, denies any pain, nausea or vomiting at this time.     No other significant event overnight.     -Data reviewed today: I reviewed all new labs and imaging results over the last 24 hours. I personally reviewed no images or EKG's today.    Physical Exam   Temp: 98.2  F (36.8  C) Temp src: Oral BP: 115/68 Pulse: 70   Resp: 18 SpO2: 100 %      Vitals:    02/25/22 1642 02/25/22 2153 02/26/22 0143   Weight: 94.3 kg (208 lb) 94 kg (207 lb 4.8 oz) 94 kg (207 lb 4.8  oz)     Vital Signs with Ranges  Temp:  [97.6  F (36.4  C)-98.2  F (36.8  C)] 98.2  F (36.8  C)  Pulse:  [] 70  Resp:  [8-24] 18  BP: ()/(54-93) 115/68  SpO2:  [88 %-100 %] 100 %  I/O last 3 completed shifts:  In: 246   Out: 400 [Urine:400]    Constitutional: fatigued/sedated  Eyes: Lids and lashes normal, pupils equal, round and reactive to light, extra ocular muscles intact, sclera clear, conjunctiva normal  Respiratory: No increased work of breathing, good air exchange, clear to auscultation bilaterally, no crackles or wheezing  Cardiovascular: Normal apical impulse, regular rate and rhythm, normal S1 and S2, no S3 or S4, and no murmur noted  GI: No scars, normal bowel sounds, soft, non-distended, non-tender, no masses palpated, no hepatosplenomegally  Musculoskeletal: 1+ lower extremity pitting edema present  Neurologic: no focal deficit.     Medications       atorvastatin  20 mg Oral Daily     carvedilol  6.25 mg Oral BID w/meals     cyanocobalamin  1,000 mcg Oral Daily     furosemide  60 mg Intravenous BID     insulin aspart  1-7 Units Subcutaneous TID AC     insulin aspart  1-5 Units Subcutaneous At Bedtime     insulin glargine  15 Units Subcutaneous At Bedtime     polyethylene glycol  238 g Oral Once    Followed by     [START ON 2/27/2022] magnesium citrate  296 mL Oral Once     pantoprazole  40 mg Oral BID AC     sertraline  50 mg Oral Daily     sodium chloride (PF)  3 mL Intracatheter Q8H     traZODone  100 mg Oral At Bedtime       Data   Recent Labs   Lab 02/26/22  1247 02/26/22  0612 02/26/22  0204 02/25/22 2028 02/25/22  1656   WBC  --  5.7  --   --  6.8   HGB  --  6.6*  --   --  6.2*   MCV  --  74*  --   --  73*   PLT  --  185  --   --  222   INR  --   --   --   --  1.23*   NA  --  141  --   --  140   POTASSIUM  --  3.9  --   --  4.1   CHLORIDE  --  112*  --   --  111*   CO2  --  24  --   --  25   BUN  --  46*  --   --  46*   CR  --  2.39*  --   --  2.40*   ANIONGAP  --  5  --   --  4    VANESA  --  8.1*  --   --  8.3*   GLC 99 128* 208*   < > 123*   ALBUMIN  --   --   --   --  3.2*   PROTTOTAL  --   --   --   --  6.7*   BILITOTAL  --   --   --   --  0.6   ALKPHOS  --   --   --   --  165*   ALT  --   --   --   --  17   AST  --   --   --   --  17    < > = values in this interval not displayed.       Recent Results (from the past 24 hour(s))   XR Chest Port 1 View    Narrative    EXAM: XR CHEST PORT 1 VIEW  LOCATION: [facility]  DATE/TIME: 2/25/2022 5:17 PM    INDICATION: Shortness of breath.  COMPARISON: Chest x-ray 08/04/2021.      Impression    IMPRESSION: New dense consolidation at the right mid to lower lung worrisome for pneumonia or other airspace disease. Some patchy airspace opacities at the left lung base also noted. Borderline prominent cardiac silhouette. Bilateral shoulder DJD.   Chest CT w/o contrast    Narrative    EXAM: CT CHEST W/O CONTRAST  LOCATION: [facility]  DATE/TIME: 2/25/2022 7:30 PM    INDICATION: Right lower lobe consolidation, abnormal CXR.  Shortness of breath.  COMPARISON: Chest x-ray 02/25/2022, CT chest 07/22/2021  TECHNIQUE: CT chest without IV contrast. Multiplanar reformats were obtained. Dose reduction techniques were used.  CONTRAST: None.    FINDINGS:   LUNGS AND PLEURA: Small volume partially loculated right mid to the low pleural fluid. This accounts for part of the chest x-ray abnormality. New finding of peribronchial wall thickening at the right lower lung along with increased linear opacities and   interstitial thickening at the right lung base. Stable solid nodule lateral right upper lobe that is 0.3 seen series 4 image 76. Stable solid posterior right lower lobe 0.5 cm nodule image 163. Stable posterior right upper lobe 0.5 cm nodule image 86.   Stable posterior right upper lobe 0.2 cm nodule image 90.    MEDIASTINUM/AXILLAE: There are several larger lymph nodes within the bilateral mediastinum. An example  at the left paratracheal position is now 0.7 cm, previously 0.4 cm series 3 image 55. There are other examples as well. Some pericardial trace fluid   versus thickening appears stable.    CORONARY ARTERY CALCIFICATION: Moderate.    UPPER ABDOMEN: New small ascites at the upper abdomen. Cholecystectomy.    MUSCULOSKELETAL: Spine degenerative changes.      Impression    IMPRESSION:   1.  New small volume partially loculated pleural fluid at the right mid to low lung identified.  2.  New finding of interstitial thickening at the right lung base and peribronchial wall thickening. Correlate with bronchitis and potentially mild interstitial edema.  3.  New mildly enlarged mediastinal lymph nodes.  4.  A few stable pulmonary nodules. See below for follow-up imaging guidelines.  5.  Coronary artery calcifications.  6.  New upper abdominal small ascites noted    Recommendations for one or multiple incidental lung nodules < 6mm:  Low-Risk Patient: No routine follow-up.  High-Risk Patient: Optional follow-up CT at 12 months; if unchanged, no further follow-up.    *Low Risk: Minimal or absent history of smoking or other known risk factors.  *Nonsolid (ground-glass) or partly solid nodules may require longer follow-up to exclude indolent adenocarcinoma.  *Recommendations based on Guidelines for the Management of Incidental Pulmonary Nodules Detected at CT: From the Fleischner Society 2017, Radiology 2017.

## 2022-02-26 NOTE — CONSULTS
[facility]  Gastroenterology Consultation         Justyn Olivas  [address]  [facility] [address]  81 year old male    Admission Date/Time: 2/25/2022  Primary Care Provider: Christian Davidson  Referring / Attending Physician:  Dr. David Osborn    We were asked to see the patient in consultation by Dr. David Osborn for evaluation of acute blood loss anemia.      CC: shortness of breath, weakness, and edema     HPI:  This note is done remotely. Entire history from chart review and further history and physical exam will be done by Dr. Jules Lane whom will cosign note.    Justyn Olivas is a 81 year old male who has a past medical history of chronic diastolic heart failure, CKD, anemia secondary to iron deficiency, CVA on Plavix, hypertension/hyperlipidemia who presented on 2/25/22 for evaluation of shortness breath. Reports over last few months has had progressive dyspnea with exertion. He had had abdominal distention and bilateral lower leg edema. He has recently increased torsemide and did not improve swelling. He had has no chest pain, no fevers or chills.  He denies any recent blood in his stool, no night sweats.  He denies any nausea/vomiting or abdominal pain otherwise.  He denies any significant increase in salt intake.  He reports being compliant with his torsemide, and his cardiac medications.  He also notes that his urine output has been decreasing.  But he denies any dysuria, hematuria.    EKG noted normal sinus rhythm, CT noteschest showed new small volume partially loculated pleural fluid at the right mid to low lung identified. New finding of interstitial thickening at the right lung base and peribronchial wall thickening. Correlate with bronchitis and potentially mild interstitial edema, consistent with acute on chronic diastolic heart failure.    Labs noted elevated creatinine 2.4, troponin normal, proBNP elevated at 2304. Hemoglobin 6.2 with baseline 8-9. Recheck  after transfusion 6.6.     Last EGD 03/21 noted four non bleeding angioectasias in the duodenum- treated with APC. Colonoscopy note single non bleeding colonic angiodysplastic lesion and treated with APC. Underwent capsule endoscopy as outpatient that was unremarkable in 06/21.      ROS: A comprehensive ten point review of systems was negative aside from those in mentioned in the HPI.      PAST MED HX:  I have reviewed this patient's medical history and updated it with pertinent information if needed.   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 Taking?   Ferrous Sulfate 324 (65 Fe) MG TBEC  Self No No   Sig: Take 1 tablet (324 mg) by mouth 2 times daily   SENNA-docusate sodium (SENNA S) 8.6-50 MG tablet  Self No Yes   Sig: Take 1 tablet by mouth 2 times daily   Patient taking differently: Take 1 tablet by mouth 2 times daily as needed    albuterol (PROAIR HFA) 108 (90 Base) MCG/ACT inhaler  Self No Yes   Sig: INHALE 2 PUFFS BY MOUTH FOUR TIMES DAILY AS NEEDED   atorvastatin (LIPITOR) 20 MG tablet 2/25/2022 at Unknown time Self No Yes   Sig: Take 1 tablet (20 mg) by mouth daily   blood glucose (STEVE CONTOUR) test strip  Self No No   Sig: TESTING FOUR TIMES DAILY OR AS DIRECTED   carvedilol (COREG) 6.25 MG tablet 2/25/2022 at am Self No Yes   Sig: Take 1 tablet (6.25 mg) by mouth 2 times daily (with meals)   clobetasol (TEMOVATE) 0.05 % external cream  Self Yes Yes   Sig: Apply topically daily as needed   clopidogrel (PLAVIX) 75 MG tablet 2/25/2022 at Unknown time Self No Yes   Sig: Take 1 tablet (75 mg) by  mouth daily   dorzolamide-timolol (COSOPT) 2-0.5 % ophthalmic solution 2/25/2022 at Unknown time Self Yes Yes   Sig: Place 1 drop into the right eye 2 times daily    fluticasone (FLONASE) 50 MCG/ACT nasal spray 2/25/2022 at Unknown time Self No Yes   Sig: Spray 2 sprays into both nostrils daily   folic acid-vit B6-vit B12 (FOLGARD) 0.8-10-0.115 MG TABS per tablet 2/25/2022 at Unknown time Self No Yes   Sig: Take 1 tablet by mouth daily Continue per Nephrology recommendation   insulin glargine (LANTUS SOLOSTAR) 100 UNIT/ML pen 2/24/2022 at Unknown time Self No Yes   Sig: ADMINISTER 25 UNITS UNDER THE SKIN AT BEDTIME   insulin lispro (HUMALOG KWIKPEN) 100 UNIT/ML (1 unit dial) KWIKPEN 2/25/2022 at am Self No Yes   Sig: Inject 5-15 Units Subcutaneous 3 times daily (before meals) Sliding scale with meals   Patient taking differently: Inject 5-15 Units Subcutaneous 3 times daily (before meals) Sliding scale with meals:  <100: 5 units  101-150: 7 units  151-250: 9 units  251-350: 12 units  >351 15 units   insulin pen needle (BD PEN NEEDLE FERCHO 2ND GEN) 32G X 4 MM miscellaneous  Self No No   Sig: USE AS DIRECTED UP TO FOUR TIMES DAILY   pantoprazole (PROTONIX) 40 MG EC tablet 2/25/2022 at AM Self No Yes   Sig: TAKE 1 TABLET(40 MG) BY MOUTH TWICE DAILY   potassium chloride ER (K-TAB) 20 MEQ CR tablet 2/25/2022 at Unknown time Self No Yes   Sig: Take 1 tablet (20 mEq) by mouth daily   sertraline (ZOLOFT) 50 MG tablet 2/25/2022 at Unknown time Self No Yes   Sig: Take 1 tablet (50 mg) by mouth daily   torsemide (DEMADEX) 10 MG tablet 2/25/2022 at Unknown time Self No Yes   Sig: Take 1 tablet (10 mg) by mouth daily   Patient taking differently: Take 10 mg by mouth 2 times daily    traZODone (DESYREL) 100 MG tablet 2/24/2022 at Unknown time Self No Yes   Sig: TAKE 1 TABLET(100 MG) BY MOUTH AT BEDTIME   vitamin B-12 (CYANOCOBALAMIN) 1000 MCG tablet 2/25/2022 at Unknown time Self No Yes   Sig: Take 1 tablet (1,000 mcg) by mouth  daily Continue unitl hematology evaluation as borderline low vitamin b12.      Facility-Administered Medications: None       ALLERGIES:   Allergies   Allergen Reactions     No Known Allergies        SOCIAL HISTORY:  Social History     Tobacco Use     Smoking status: Former Smoker     Packs/day: 2.00     Years: 11.00     Pack years: 22.00     Types: Cigarettes     Start date:      Quit date:      Years since quittin.1     Smokeless tobacco: Never Used   Substance Use Topics     Alcohol use: No     Alcohol/week: 0.0 standard drinks     Drug use: No       FAMILY HISTORY:  Family History   Problem Relation Age of Onset     Family History Negative Mother      Family History Negative Father        PHYSICAL EXAM:   Vital Signs with Ranges  Temp: 97.8  F (36.6  C) Temp src: Oral BP: 94/60 Pulse: 80   Resp: 18 SpO2: 95 %      I/O last 3 completed shifts:  In: 246   Out: 400 [Urine:400]  Defer to Dr. Lane's exam to be noted on cosigned note          ADDITIONAL COMMENTS:   I reviewed the patient's new clinical lab test results.   Recent Labs   Lab Test 22  0612 22  1656 22  1315 20  1317 20  1338   WBC 5.7 6.8 6.7   < > 7.3   HGB 6.6* 6.2* 7.8*   < > 10.4*   MCV 74* 73* 77*   < > 80    222 203   < > 275   INR  --  1.23*  --   --  0.99    < > = values in this interval not displayed.     Recent Labs   Lab Test 22  0612 22  1656 22  1656   POTASSIUM 3.9 4.1 3.7   CHLORIDE 112* 111* 108   CO2 24 25 28   BUN 46* 46* 28   ANIONGAP 5 4 3     Recent Labs   Lab Test 22  1656 22  1626 10/13/21  0808 21  1428 09/10/21  1201 21  0601 21  1841 21  1736 21  0400   ALBUMIN 3.2*  --  3.2* 3.4 3.0*   < >  --  3.8  --    BILITOTAL 0.6  --  0.4 0.5 0.5   < >  --  0.4  --    ALT 17  --  27 21 21   < >  --  24  --    AST 17  --  15 19 19   < >  --  13  --    PROTEIN  --  30 *  --   --   --   --  Negative  --  10*   LIPASE  --   --   --    --  35*  --   --  50*  --     < > = values in this interval not displayed.       I reviewed the patient's new imaging results.        CONSULTATION ASSESSMENT AND PLAN:  Justyn Olivas is a 81 year old male whom presented to ED with c/o dyspnea and weakness on 2/25/22 and noted to have acute on chronic anemia, and acute on chronic heart failure.    Principal Problem:  Acute on chronic anemia  Gastrointestinal Angioectasias  Hemoglobin on presentation 6.2 with minimal improvement with transfusion to 6.6  Has h/o acute blood loss anemia and has known angioectasias noted on EGD and colonoscopy from 03/21  Has been taking Plavix and has h/o CVA. Currently held  Concern for recurrent GI bleed from angioectasias vs PUD vs lower GI bleed    -- NPO except meds  -- EGD this a.m.  -- Hold Plavix  -- Continue pantoprazole BID  -- Serial hemoglobin and transfuse for hemoglobin of 7.0 or less    Acute on chronic diastolic heart failure (H)  Anasarca  Started on Lasix  Echo ordered  On room air          [facility].  Office: [phone]  Cell : [phone] (Dr. Lane)  Cell: [phone] (Jackie Monroy PA-C)

## 2022-02-27 NOTE — PROGRESS NOTES
[facility]    Hospitalist Progress Note       Date of Admission:  2/25/2022    Discharging Provider: Sundeep Keen MD      Discharge Diagnoses   GI bleed, suspect small bowel bleeding  Acute on chronic blood loss anemia  Acute on chronic diastolic heart failure exacerbation  DM2  Hx CVA  CKD4    Follow-ups Needed After Discharge   Follow-up Appointments     Follow-up and recommended labs and tests       - Follow up with primary care provider, Christian Davidson MD,   within 7 days for hospital follow- up. Check BMP and CBC in one week  - Follow up with Nephrology infusion clinic. Check Hemoglobin in 3-4 days  - Follow up with gastroenterology in one week for further evaluation of   your bleeding.  - Follow up with Neurology in two weeks or less to discuss starting a new   blood thinner  - Follow up with Cardiology in a month           Hospital Course   Justyn Olivas is a 81 year old male with PMH of chronic diastolic heart failure, CKD4, chronic iron deficiency anemia, hx CVA, HTN, HLD, hx of GI bleed, who was admitted on 2/25/2022 with shortness of breath and acute on chronic diastolic heart failure exacerbation.   Presents with several months history of progressive dyspnea with exertion. On admission patient's EKG was sinus rhythm, his CT chest showed new small volume partially loculated pleural fluid at the right mid to low lung identified. New finding of interstitial thickening at the right lung base and peribronchial wall thickening. Correlate with bronchitis and potentially mild interstitial edema, consistent with acute on chronic diastolic heart failure.  His proBNP is elevated at 2304, troponin is within normal notes.  Creatinine elevated at 2.40( baseline creatinine 2.2-2.6).     Patient received IV lasix with improvement in dyspnea. Also had workup for GI bleed, see below, received pRBC which also likely reduced his dyspnea. Cardiology was consulted, who assisted  with heart failure management, diuresis. At discharge, patient reports improved dyspnea, off oxygen. Follow up with outpatient Cardiology for ongoing management.  - Appreciate Cardiology consultation   - Echocardiogram ordered but not done yet  - Continue Lasix IV  - Discharge Torsemide increased from 10mg daily to 20mg daily at discharge  - Continue Cardiac diet, daily weights    Acute blood loss anemia, suspect small bowel bleeding  H/o Chronic iron deficiency anemia due to chronic blood loss  Hemoglobin admission of 6.2, with baseline around 8-9. Patient without any acute evidence of GI bleeding. 1 unit of RBCs was ordered in the emergency department, Hb improve from 6.2 to 6.6 only, history of angiodysplasia of colon and duodenum in the past.    He was started on Protonix, GI consulted. EGD 2/26 was without sign of obvious bleeding. Colonoscopy on 2/27 noted poor prep but no evidence of bleeding, they suspect possibly bleeding from the small bowels. His hemoglobin is stable at 7.6 at discharge.  - Plavix was stopped in the setting of acute anemia and occult GI bleeding. Reportedly has had bleeding issues with aspirin as well. Recommend ongoing outpatient short term GI workup and follow up with Neurology for management of CVA prophylaxis.  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       Sundeep Keen MD  [facility]  ______________________________________________________________________    Physical Exam   Vital Signs: Temp: 97  F (36.1  C) Temp src: Oral BP: 118/73 Pulse: 68   Resp: 18 SpO2: 100 % O2 Device: Nasal cannula Oxygen Delivery: 2 LPM  Weight: 209 lbs 4.8 oz    Constitutional: Male appears tired  Eyes: Nonicteric, normal ocular movements  HEENT: Normocephalic, atraumatic, oral mucosa moist  Respiratory: CTAB, no wheezing or crackles  Cardiovascular: RRR, normal S1/2, no m/r/g  GI: No organomegaly, normoactive bowel sounds, nontender, nondistended  Skin: No rashes  Vascular: 1-2+ BLE pitting edema  Musculoskeletal: Moves all extremities  Neurologic: A&Ox3  Psychiatric: Appropriate affect and mood       Primary Care Physician   Christian Davidson MD    Discharge Disposition   Discharged to home  Condition at discharge: Stable    Significant Results and Procedures   Most Recent 3 CBC's:Recent Labs   Lab Test 02/27/22  0613 02/26/22  2004 02/26/22 0612 02/25/22  1656   WBC 6.4  --  5.7 6.8   HGB 7.6* 8.1* 6.6* 6.2*   MCV 74*  --  74* 73*     --  185 222     Most Recent 3 BMP's:Recent Labs   Lab Test 02/27/22  1233 02/27/22  0818 02/27/22  0741 02/27/22  0613 02/26/22  1247 02/26/22  0612 02/25/22 2028 02/25/22  1656   NA  --   --   --  140  --  141  --  140   POTASSIUM  --   --   --  3.6  --  3.9  --  4.1   CHLORIDE  --   --   --  111*  --  112*  --  111*   CO2  --   --   --  26  --  24  --  25   BUN  --   --   --  44*  --  46*  --  46*   CR  --   --   --  2.33*  --  2.39*  --  2.40*   ANIONGAP  --   --   --  3  --  5  --  4   VANESA  --   --   --  8.1*  --  8.1*  --  8.3*    GLC 80 119* 51* 55*   < > 128*   < > 123*    < > = values in this interval not displayed.     Most Recent 2 LFT's:Recent Labs   Lab Test 02/25/22  1656 10/13/21  0808   AST 17 15   ALT 17 27   ALKPHOS 165* 163*   BILITOTAL 0.6 0.4   ,   Results for orders placed or performed during the hospital encounter of 02/25/22   XR Chest Port 1 View    Narrative    EXAM: XR CHEST PORT 1 VIEW  LOCATION: [facility]  DATE/TIME: 2/25/2022 5:17 PM    INDICATION: Shortness of breath.  COMPARISON: Chest x-ray 08/04/2021.      Impression    IMPRESSION: New dense consolidation at the right mid to lower lung worrisome for pneumonia or other airspace disease. Some patchy airspace opacities at the left lung base also noted. Borderline prominent cardiac silhouette. Bilateral shoulder DJD.   Chest CT w/o contrast    Narrative    EXAM: CT CHEST W/O CONTRAST  LOCATION: [facility]  DATE/TIME: 2/25/2022 7:30 PM    INDICATION: Right lower lobe consolidation, abnormal CXR.  Shortness of breath.  COMPARISON: Chest x-ray 02/25/2022, CT chest 07/22/2021  TECHNIQUE: CT chest without IV contrast. Multiplanar reformats were obtained. Dose reduction techniques were used.  CONTRAST: None.    FINDINGS:   LUNGS AND PLEURA: Small volume partially loculated right mid to the low pleural fluid. This accounts for part of the chest x-ray abnormality. New finding of peribronchial wall thickening at the right lower lung along with increased linear opacities and   interstitial thickening at the right lung base. Stable solid nodule lateral right upper lobe that is 0.3 seen series 4 image 76. Stable solid posterior right lower lobe 0.5 cm nodule image 163. Stable posterior right upper lobe 0.5 cm nodule image 86.   Stable posterior right upper lobe 0.2 cm nodule image 90.    MEDIASTINUM/AXILLAE: There are several larger lymph nodes within the bilateral mediastinum. An example at the left paratracheal position is  now 0.7 cm, previously 0.4 cm series 3 image 55. There are other examples as well. Some pericardial trace fluid   versus thickening appears stable.    CORONARY ARTERY CALCIFICATION: Moderate.    UPPER ABDOMEN: New small ascites at the upper abdomen. Cholecystectomy.    MUSCULOSKELETAL: Spine degenerative changes.      Impression    IMPRESSION:   1.  New small volume partially loculated pleural fluid at the right mid to low lung identified.  2.  New finding of interstitial thickening at the right lung base and peribronchial wall thickening. Correlate with bronchitis and potentially mild interstitial edema.  3.  New mildly enlarged mediastinal lymph nodes.  4.  A few stable pulmonary nodules. See below for follow-up imaging guidelines.  5.  Coronary artery calcifications.  6.  New upper abdominal small ascites noted    Recommendations for one or multiple incidental lung nodules < 6mm:  Low-Risk Patient: No routine follow-up.  High-Risk Patient: Optional follow-up CT at 12 months; if unchanged, no further follow-up.    *Low Risk: Minimal or absent history of smoking or other known risk factors.  *Nonsolid (ground-glass) or partly solid nodules may require longer follow-up to exclude indolent adenocarcinoma.  *Recommendations based on Guidelines for the Management of Incidental Pulmonary Nodules Detected at CT: From the Fleischner Society 2017, Radiology 2017.           Discharge Orders      Reason for your hospital stay    You were hospitalized for a gastrointestinal bleed and a congestive heart failure exacerbation.     Follow-up and recommended labs and tests     - Follow up with primary care provider, Christian Davidson MD, within 7 days for hospital follow- up. Check BMP and CBC in one week  - Follow up with Nephrology infusion clinic. Check Hemoglobin in 3-4 days  - Follow up with gastroenterology in one week for further evaluation of your bleeding.  - Follow up with Neurology in two weeks or less to discuss  starting a new blood thinner  - Follow up with Cardiology in a month     Activity    Your activity upon discharge: activity as tolerated     Diet    Follow this diet upon discharge: Low Saturated Fat Na <2400mg Diet     Discharge Medications   Current Discharge Medication List      CONTINUE these medications which have CHANGED    Details   torsemide (DEMADEX) 20 MG tablet Take 1 tablet (20 mg) by mouth daily  Qty: 30 tablet, Refills: 0    Associated Diagnoses: Chronic diastolic heart failure with preserved ejection fraction (H)         CONTINUE these medications which have NOT CHANGED    Details   albuterol (PROAIR HFA) 108 (90 Base) MCG/ACT inhaler INHALE 2 PUFFS BY MOUTH FOUR TIMES DAILY AS NEEDED  Qty: 8.5 g, Refills: 3    Associated Diagnoses: Shortness of breath      atorvastatin (LIPITOR) 20 MG tablet Take 1 tablet (20 mg) by mouth daily  Qty: 90 tablet, Refills: 3    Associated Diagnoses: Hyperlipidemia LDL goal <100      carvedilol (COREG) 6.25 MG tablet Take 1 tablet (6.25 mg) by mouth 2 times daily (with meals)  Qty: 180 tablet, Refills: 3    Associated Diagnoses: Pericardial effusion      clobetasol (TEMOVATE) 0.05 % external cream Apply topically daily as needed      clopidogrel (PLAVIX) 75 MG tablet Take 1 tablet (75 mg) by mouth daily  Qty: 90 tablet, Refills: 3    Associated Diagnoses: Cerebrovascular accident (CVA) due to embolism of cerebral artery (H)      dorzolamide-timolol (COSOPT) 2-0.5 % ophthalmic solution Place 1 drop into the right eye 2 times daily       fluticasone (FLONASE) 50 MCG/ACT nasal spray Spray 2 sprays into both nostrils daily  Qty: 16 g, Refills: 0    Associated Diagnoses: Chronic non-seasonal allergic rhinitis      folic acid-vit B6-vit B12 (FOLGARD) 0.8-10-0.115 MG TABS per tablet Take 1 tablet by mouth daily Continue per Nephrology recommendation  Qty: 30 tablet, Refills: 0    Associated Diagnoses: Anemia, unspecified type      insulin glargine (LANTUS SOLOSTAR) 100 UNIT/ML  pen ADMINISTER 25 UNITS UNDER THE SKIN AT BEDTIME  Qty: 45 mL, Refills: 3    Comments: If Lantus is not covered by insurance, may substitute Basaglar or Semglee or other insulin glargine product per insurance preference at same dose and frequency.    Associated Diagnoses: Type 2 diabetes mellitus without complication, with long-term current use of insulin (H)      insulin lispro (HUMALOG KWIKPEN) 100 UNIT/ML (1 unit dial) KWIKPEN Inject 5-15 Units Subcutaneous 3 times daily (before meals) Sliding scale with meals  Qty: 45 mL, Refills: 3    Associated Diagnoses: Type 2 diabetes mellitus without complication, with long-term current use of insulin (H)      pantoprazole (PROTONIX) 40 MG EC tablet TAKE 1 TABLET(40 MG) BY MOUTH TWICE DAILY  Qty: 180 tablet, Refills: 2    Associated Diagnoses: Acute superficial gastritis with hemorrhage      potassium chloride ER (K-TAB) 20 MEQ CR tablet Take 1 tablet (20 mEq) by mouth daily    Associated Diagnoses: Hypokalemia      SENNA-docusate sodium (SENNA S) 8.6-50 MG tablet Take 1 tablet by mouth 2 times daily  Qty: 60 tablet, Refills: 1    Associated Diagnoses: Constipation, unspecified constipation type      sertraline (ZOLOFT) 50 MG tablet Take 1 tablet (50 mg) by mouth daily  Qty: 90 tablet, Refills: 3    Comments: For Profile Only - patient will call to fill - Dose increase  Associated Diagnoses: Mild major depression (H); Insomnia, unspecified type      traZODone (DESYREL) 100 MG tablet TAKE 1 TABLET(100 MG) BY MOUTH AT BEDTIME  Qty: 90 tablet, Refills: 3    Associated Diagnoses: Insomnia, unspecified type      vitamin B-12 (CYANOCOBALAMIN) 1000 MCG tablet Take 1 tablet (1,000 mcg) by mouth daily Continue unitl hematology evaluation as borderline low vitamin b12.  Qty: 90 tablet, Refills: 3    Associated Diagnoses: Other dietary vitamin B12 deficiency anemia      blood glucose (STEVE CONTOUR) test strip TESTING FOUR TIMES DAILY OR AS DIRECTED  Qty: 400 strip, Refills: 0     Associated Diagnoses: Type 2 diabetes mellitus without complication, with long-term current use of insulin (H)      Ferrous Sulfate 324 (65 Fe) MG TBEC Take 1 tablet (324 mg) by mouth 2 times daily    Associated Diagnoses: Anemia, unspecified type      insulin pen needle (BD PEN NEEDLE FERCHO 2ND GEN) 32G X 4 MM miscellaneous USE AS DIRECTED UP TO FOUR TIMES DAILY  Qty: 400 each, Refills: 3    Associated Diagnoses: Type 2 diabetes mellitus without complication, with long-term current use of insulin (H)           Allergies   Allergies   Allergen Reactions     No Known Allergies

## 2022-02-27 NOTE — PROGRESS NOTES
hospitalist cross cover  Called on BS.  range.   Pt requests 25units at bedtime lantus. Not the 15 units ordered.   BS 99 this am.    Will go with lantus 20 units at bedtime for now and reeval tomorrow.   Pt normally on 25units at bedtime lantus.   On clears.   Joo Lama MD

## 2022-02-27 NOTE — CONSULTS
[facility]    Cardiology Consultation     Date of Admission:  2/25/2022    Assessment & Plan   Justyn Olivas is a 81 year old male who was admitted on 2/25/2022. I was asked to see the patient for congestive heart failure.    The patient was admitted with weakness and dyspnea with exertion.  The patient has a history of CKD stage IV, type 2 diabetes, chronic GI bleeding, hypertension, hyperlipidemia, prior stroke.    The patient was found to have an elevated BNP of 2657, creatinine was elevated at 1.91, troponin was negative.    CT scan of the chest was notable for a loculated pleural effusion at the right midlung, there is also changes consistent with interstitial edema.    The patient had an echocardiogram and early 2021 noted an LVEF of 50 to 55% with inferior wall hypokinesis.    The patient also has a history of pericardial effusion in 2020 requiring a drain placement.    The patient is struggled with chronic anemia and has been followed by hematology.  This is in part thought to be due to his kidney disease.    Currently the patient has had some diuresis and feels mildly improved.  He is scheduled for gastrointestinal studies tomorrow.    Problems:  Heart failure preserved ejection fraction, chronic  Prior history of pericardial effusion, etiology somewhat unclear  Chronic anemia  Chronic GI bleeding  Stage IV CKD  Type 2 diabetes  Hypertension  Hyperlipidemia  Prior CVA    Plan:  Continue carvedilol 6.25 mg twice daily  Agree with Lasix 60 mg twice daily for now, obtain daily electrolytes  Weigh the patient daily  Fluid restriction to less than 2.5 L/day  Low-salt diet    The patient has a inferior regional wall motion abnormality on his prior echocardiogram.  He also had a history of a pericardial effusion with EKG changes that had ST elevation (which could be pericardial in nature).  He has severe coronary calcification seen on CT scan as well.  It does not appear that he has  had a coronary evaluation from the best I can tell.  We will await the results of his echocardiogram and make additional comments on this.  The patient does not have angina and we would have to contemplate closely whether a coronary angiogram or stress test would be beneficial.  This most likely would be done as an outpatient.      Garo Cavanaugh MD     Code Status    Full Code    Reason for Consult   Reason for consult: Heart failure    Primary Care Physician   Chrisitan Davidson MD    Chief Complaint   Heart failure    History is obtained from the patient    History of Present Illness   Justyn Olivas is a 81 year old male who was admitted on 2/25/2022. I was asked to see the patient for congestive heart failure.    The patient was admitted with weakness and dyspnea with exertion.  The patient has a history of CKD stage IV, type 2 diabetes, chronic GI bleeding, hypertension, hyperlipidemia, prior stroke.    The patient was found to have an elevated BNP of 2657, creatinine was elevated at 1.91, troponin was negative.    CT scan of the chest was notable for a loculated pleural effusion at the right midlung, there is also changes consistent with interstitial edema.    The patient had an echocardiogram and early 2021 noted an LVEF of 50 to 55% with inferior wall hypokinesis.    The patient also has a history of pericardial effusion in 2020 requiring a drain placement.    The patient is struggled with chronic anemia and has been followed by hematology.  This is in part thought to be due to his kidney disease.    Currently the patient has had some diuresis and feels mildly improved.  He is scheduled for gastrointestinal studies tomorrow.    Past Medical History   I have reviewed this patient's medical history and updated it with pertinent information if needed.   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  Past Surgical History:   Procedure Laterality Date     BONE MARROW BIOPSY, BONE SPECIMEN, NEEDLE/TROCAR N/A 5/14/2021    Procedure: BIOPSY, BONE MARROW;  Surgeon: Juanjo Hoffman MD;  Location:  GI     CV PERICARDIOCENTESIS N/A 12/21/2020    Procedure: Pericardiocentesis;  Surgeon: Chas Chambers MD;  Location:  HEART CARDIAC CATH LAB     ENT SURGERY       ESOPHAGOSCOPY, GASTROSCOPY, DUODENOSCOPY (EGD), COMBINED N/A 3/27/2021    Procedure: Esophagogastroduodenoscopy, With Biopsy;  Surgeon: Luc Nash MD;  Location:  GI     LAPAROSCOPIC CHOLECYSTECTOMY N/A 9/2/2021    Procedure: LAPAROSCOPIC CHOLECYSTECTOMY;  Surgeon: Len Coates MD;  Location:  OR     SOFT TISSUE SURGERY      skin cancer surgery on nose       Prior to Admission Medications   Prior to Admission Medications   Prescriptions Last Dose Informant Patient Reported? Taking?   Ferrous Sulfate 324 (65 Fe) MG TBEC  Self No No   Sig: Take 1 tablet (324 mg) by mouth 2 times daily   SENNA-docusate sodium (SENNA S) 8.6-50 MG tablet  Self No Yes   Sig: Take 1 tablet by mouth 2 times daily   Patient taking differently: Take 1 tablet by mouth 2 times daily as needed    albuterol (PROAIR HFA) 108 (90 Base) MCG/ACT inhaler  Self No Yes   Sig: INHALE 2 PUFFS BY MOUTH FOUR TIMES DAILY AS NEEDED   atorvastatin (LIPITOR) 20 MG tablet 2/25/2022 at Unknown time Self No Yes   Sig: Take 1 tablet (20 mg) by mouth daily   blood glucose (STEVE CONTOUR) test strip  Self No No   Sig: TESTING FOUR TIMES DAILY OR AS DIRECTED   carvedilol (COREG) 6.25 MG tablet 2/25/2022 at am Self No Yes   Sig: Take 1 tablet (6.25 mg)  by mouth 2 times daily (with meals)   clobetasol (TEMOVATE) 0.05 % external cream  Self Yes Yes   Sig: Apply topically daily as needed   clopidogrel (PLAVIX) 75 MG tablet 2/25/2022 at Unknown time Self No Yes   Sig: Take 1 tablet (75 mg) by mouth daily   dorzolamide-timolol (COSOPT) 2-0.5 % ophthalmic solution 2/25/2022 at Unknown time Self Yes Yes   Sig: Place 1 drop into the right eye 2 times daily    fluticasone (FLONASE) 50 MCG/ACT nasal spray 2/25/2022 at Unknown time Self No Yes   Sig: Spray 2 sprays into both nostrils daily   folic acid-vit B6-vit B12 (FOLGARD) 0.8-10-0.115 MG TABS per tablet 2/25/2022 at Unknown time Self No Yes   Sig: Take 1 tablet by mouth daily Continue per Nephrology recommendation   insulin glargine (LANTUS SOLOSTAR) 100 UNIT/ML pen 2/24/2022 at Unknown time Self No Yes   Sig: ADMINISTER 25 UNITS UNDER THE SKIN AT BEDTIME   insulin lispro (HUMALOG KWIKPEN) 100 UNIT/ML (1 unit dial) KWIKPEN 2/25/2022 at am Self No Yes   Sig: Inject 5-15 Units Subcutaneous 3 times daily (before meals) Sliding scale with meals   Patient taking differently: Inject 5-15 Units Subcutaneous 3 times daily (before meals) Sliding scale with meals:  <100: 5 units  101-150: 7 units  151-250: 9 units  251-350: 12 units  >351 15 units   insulin pen needle (BD PEN NEEDLE FERHCO 2ND GEN) 32G X 4 MM miscellaneous  Self No No   Sig: USE AS DIRECTED UP TO FOUR TIMES DAILY   pantoprazole (PROTONIX) 40 MG EC tablet 2/25/2022 at AM Self No Yes   Sig: TAKE 1 TABLET(40 MG) BY MOUTH TWICE DAILY   potassium chloride ER (K-TAB) 20 MEQ CR tablet 2/25/2022 at Unknown time Self No Yes   Sig: Take 1 tablet (20 mEq) by mouth daily   sertraline (ZOLOFT) 50 MG tablet 2/25/2022 at Unknown time Self No Yes   Sig: Take 1 tablet (50 mg) by mouth daily   torsemide (DEMADEX) 10 MG tablet 2/25/2022 at Unknown time Self No Yes   Sig: Take 1 tablet (10 mg) by mouth daily   Patient taking differently: Take 10 mg by mouth 2 times daily    traZODone  (DESYREL) 100 MG tablet 2/24/2022 at Unknown time Self No Yes   Sig: TAKE 1 TABLET(100 MG) BY MOUTH AT BEDTIME   vitamin B-12 (CYANOCOBALAMIN) 1000 MCG tablet 2/25/2022 at Unknown time Self No Yes   Sig: Take 1 tablet (1,000 mcg) by mouth daily Continue unitl hematology evaluation as borderline low vitamin b12.      Facility-Administered Medications: None     Allergies   Allergies   Allergen Reactions     No Known Allergies        Social History   I have reviewed this patient's social history and updated it with pertinent information if needed. Justyn Olivas  reports that he quit smoking about 46 years ago. His smoking use included cigarettes. He started smoking about 57 years ago. He has a 22.00 pack-year smoking history. He has never used smokeless tobacco. He reports that he does not drink alcohol and does not use drugs.    Family History   I have reviewed this patient's family history and updated it with pertinent information if needed.   Family History   Problem Relation Age of Onset     Family History Negative Mother      Family History Negative Father        Review of Systems   The 1 point Review of Systems is negative other than noted in the HPI or here.     Physical Exam   Temp: 97  F (36.1  C) Temp src: Oral BP: 120/73 Pulse: 73   Resp: 17 SpO2: 96 % O2 Device: None (Room air)    Vital Signs with Ranges  Temp:  [97  F (36.1  C)-98.2  F (36.8  C)] 97  F (36.1  C)  Pulse:  [63-79] 73  Resp:  [8-20] 17  BP: ()/(61-73) 120/73  SpO2:  [93 %-100 %] 96 %  209 lbs 4.8 oz    GENERAL APPEARANCE: Alert and oriented x3. No acute distress.  SKIN: Inspection of the skin reveals no rashes, ulcerations, warm, dry  NECK: Supple and symmetric.   Elevated JVP  LUNGS: Crackles in the bases  CARDIOVASCULAR: S1, S2, regular rate and rhythm without any murmurs, gallops, rubs.   ABDOMEN: Soft, non-tender, non-distended with normal bowel sounds.  No ascites noted.  EXTREMITIES: 2+ edema.  NEUROLOGIC:  Normal mood and  affect.  Sensation to touch was normal.      Data   Results for orders placed or performed during the hospital encounter of 02/25/22 (from the past 24 hour(s))   UPPER GI ENDOSCOPY   Result Value Ref Range    Upper GI Endoscopy       [facility]  [address] [place]  [address]  _______________________________________________________________________________  Patient Name: Justyn Olivas        Procedure Date: 2/26/2022 9:29 AM  MRN: [id number]                       Account Number: [id number]  YOB: 1940              Admit Type: Inpatient  Age: 81                               Room: 2  Note Status: Finalized                Attending MD: Jules Lane MD  Instrument Name: 404 GIF- Gastroscope   _______________________________________________________________________________     Procedure:                Upper GI endoscopy  Indications:              Iron deficiency anemia, H/O small bowel AVMs  Providers:                Jules Lane MD, Bee Becerril RN, Shaniqua Hill RN  Referring MD:               Medicines:                Fentanyl 50 micrograms IV, Midazolam 2 mg IV,                             Cetacaine spray  Complications:             No immediate complications.  _______________________________________________________________________________  Procedure:                Pre-Anesthesia Assessment:                            - Prior to the procedure, a History and Physical                             was performed, and patient medications and                             allergies were reviewed. The patient is competent.                             The risks and benefits of the procedure and the                             sedation options and risks were discussed with the                             patient. All questions were answered and informed                             consent was obtained. Patient identification and                              proposed procedure were verified by the physician                             in the pre-procedure area. Mental Status                             Examination: normal. Airway Examination: normal                             oropharyngeal airway and neck mobility. Respira tory                             Examination: clear to auscultation. CV Examination:                             normal. Prophylactic Antibiotics: The patient does                             not require prophylactic antibiotics. Prior                             Anticoagulants: The patient has taken no                             anticoagulant or antiplatelet agents. After                             reviewing the risks and benefits, the patient was                             deemed in satisfactory condition to undergo the                             procedure. The anesthesia plan was to use moderate                             sedation / analgesia (conscious sedation).                             Immediately prior to administration of medications,                             the patient was re-assessed for adequacy to receive                             sedatives. The heart rate, respiratory rate, oxygen                             saturations, blood pressure, adequacy of pulmonary                              ventilation, and response to care were monitored                             throughout the procedure. The physical status of                             the patient was re-assessed after the procedure.                            After obtaining informed consent, the endoscope was                             passed under direct vision. Throughout the                             procedure, the patient's blood pressure, pulse, and                             oxygen saturations were monitored continuously. The                             Endoscope was introduced through the mouth, and                             advanced to the  third part of duodenum. The upper                             GI endoscopy was accomplished without difficulty.                             The patient tolerated the procedure well.                                                                                   Findings:       The examined esophagus was normal.       The entire examine d stomach was normal.       The cardia and gastric fundus were normal on retroflexion.       The duodenal bulb, first portion of the duodenum and second portion of        the duodenum were normal.                                                                                   Impression:               - Normal esophagus.                            - Normal stomach.                            - Normal duodenal bulb, first portion of the                             duodenum and second portion of the duodenum.                            - No specimens collected.  Recommendation:           - Perform a colonoscopy.                            - Clear liquid diet.                            - Return patient to hospital [facility] for ongoing care.                                                                                   Procedure Code(s):       --- Professional ---       31347, Esophagogastroduodenoscopy, flexible, transoral; diagnostic,        including collection of specimen(s) by brush ing or washing, when        performed (separate procedure)  Diagnosis Code(s):       --- Professional ---       D50.9, Iron deficiency anemia, unspecified    CPT copyright 2020 [employer]. All rights reserved.    The codes documented in this report are preliminary and upon  review may   be revised to meet current compliance requirements.    Electronically Signed by Jules Ames  ___________________  Jules Lane MD  2/26/2022 7:26:08 PM  I was physically present for the entire viewing portion of the exam.  Jules Lane MD  Number of Addenda: 0    Note Initiated On:  2/26/2022 9:29 AM  Total Procedure Duration: 0 hours 2 minutes 50 seconds   Scope In: 10:40:29 AM  Scope Out: 10:43:19 AM     Glucose by meter   Result Value Ref Range    GLUCOSE BY METER POCT 99 70 - 99 mg/dL   Glucose by meter   Result Value Ref Range    GLUCOSE BY METER POCT 137 (H) 70 - 99 mg/dL   Hemoglobin   Result Value Ref Range    Hemoglobin 8.1 (L) 13.3 - 17.7 g/dL   Glucose by meter   Result Value Ref Range    GLUCOSE BY METER POCT 272 (H) 70 - 99 mg/dL   Glucose by meter   Result Value Ref Range    GLUCOSE BY METER POCT 158 (H) 70 - 99 mg/dL   CBC with Platelets & Differential    Narrative    The following orders were created for panel order CBC with Platelets & Differential.  Procedure                               Abnormality         Status                     ---------                               -----------         ------                     CBC with platelets and d...[[id number]]  Abnormal            Final result                 Please view results for these tests on the individual orders.   Renal panel   Result Value Ref Range    Sodium 140 133 - 144 mmol/L    Potassium 3.6 3.4 - 5.3 mmol/L    Chloride 111 (H) 94 - 109 mmol/L    Carbon Dioxide (CO2) 26 20 - 32 mmol/L    Anion Gap 3 3 - 14 mmol/L    Urea Nitrogen 44 (H) 7 - 30 mg/dL    Creatinine 2.33 (H) 0.66 - 1.25 mg/dL    Calcium 8.1 (L) 8.5 - 10.1 mg/dL    Glucose 55 (L) 70 - 99 mg/dL    Albumin 2.9 (L) 3.4 - 5.0 g/dL    Phosphorus 3.9 2.5 - 4.5 mg/dL    GFR Estimate 27 (L) >60 mL/min/1.73m2   CBC with platelets and differential   Result Value Ref Range    WBC Count 6.4 4.0 - 11.0 10e3/uL    RBC Count 3.52 (L) 4.40 - 5.90 10e6/uL    Hemoglobin 7.6 (L) 13.3 - 17.7 g/dL    Hematocrit 26.1 (L) 40.0 - 53.0 %    MCV 74 (L) 78 - 100 fL    MCH 21.6 (L) 26.5 - 33.0 pg    MCHC 29.1 (L) 31.5 - 36.5 g/dL    RDW 20.5 (H) 10.0 - 15.0 %    Platelet Count 177 150 - 450 10e3/uL    % Neutrophils 61 %    % Lymphocytes 8 %    % Monocytes 18 %    % Eosinophils 12  %    % Basophils 1 %    % Immature Granulocytes 0 %    NRBCs per 100 WBC 0 <1 /100    Absolute Neutrophils 3.9 1.6 - 8.3 10e3/uL    Absolute Lymphocytes 0.5 (L) 0.8 - 5.3 10e3/uL    Absolute Monocytes 1.1 0.0 - 1.3 10e3/uL    Absolute Eosinophils 0.8 (H) 0.0 - 0.7 10e3/uL    Absolute Basophils 0.0 0.0 - 0.2 10e3/uL    Absolute Immature Granulocytes 0.0 <=0.4 10e3/uL    Absolute NRBCs 0.0 10e3/uL   Glucose by meter   Result Value Ref Range    GLUCOSE BY METER POCT 51 (L) 70 - 99 mg/dL   Glucose by meter   Result Value Ref Range    GLUCOSE BY METER POCT 119 (H) 70 - 99 mg/dL

## 2022-02-27 NOTE — PLAN OF CARE
A/O x4, VSS on RA,  Tele SR. denies pain. SBA, on clears. Bowel prep done, Scheduled for colonoscopy tomorrow

## 2022-02-27 NOTE — DISCHARGE INSTRUCTIONS
[facility]   [address] [place]  [phone]

## 2022-02-27 NOTE — PROGRESS NOTES
Pt is A&Ox4, VSS on RA. Pain in shoulder, applied hot pack, helpful. Colonoscopy today. BG this AM 51, gave IV dextrose. Pt able to discharge today. Went over discharge instructions with Pt and Wife ([place]). All questions answered and understood. Pt left with all belongings.

## 2022-02-27 NOTE — DISCHARGE SUMMARY
[facility]    Hospitalist Discharge Summary       Date of Admission:  2/25/2022  Date of Discharge:  2/27/2022  Discharging Provider: Sundeep Keen MD      Discharge Diagnoses   Acute on chronic diastolic heart failure exacerbation  GI bleed, suspect small bowel bleeding  Acute on chronic blood loss anemia  DM2  Hx CVA  CKD4    Follow-ups Needed After Discharge   Follow-up Appointments     Follow-up and recommended labs and tests       - Follow up with primary care provider, Christian Davidson MD,   within 7 days for hospital follow- up. Check BMP and CBC in one week  - Follow up with Nephrology infusion clinic. Check Hemoglobin in 3-4 days  - Follow up with gastroenterology in one week for further evaluation of   your bleeding.  - Follow up with Neurology in two weeks or less to discuss starting a new   blood thinner  - Follow up with Cardiology within a month for further management and   evaluation of your heart failure           Hospital Course   Justyn Olivas is a 81 year old male with PMH of chronic diastolic heart failure, CKD4, chronic iron deficiency anemia, hx CVA, HTN, HLD, hx of GI bleed, who was admitted on 2/25/2022 with shortness of breath and acute on chronic diastolic heart failure exacerbation.   Presents with several months history of progressive dyspnea with exertion. On admission patient's EKG was sinus rhythm, his CT chest showed new small volume partially loculated pleural fluid at the right mid to low lung identified. New finding of interstitial thickening at the right lung base and peribronchial wall thickening. Correlate with bronchitis and potentially mild interstitial edema, consistent with acute on chronic diastolic heart failure.  His proBNP is elevated at 2304, troponin is within normal notes.  Creatinine elevated at 2.40( baseline creatinine 2.2-2.6).     Patient received IV lasix with improvement in dyspnea. Also had workup for GI bleed, see  below, received pRBC which also likely reduced his dyspnea. Cardiology was consulted, who assisted with heart failure management, diuresis. At discharge, patient reports improved dyspnea, off oxygen.  - Follow up with outpatient Cardiology for ongoing management. Per inpatient cardiology, patient will need an outpatient echocardiogram followed by an appointment to discuss the benefits./risks of angiogram or outpatient stress testing to determine the etiology of his heart failure.  - Torsemide increased from 10mg daily to 20mg daily at discharge  - Cardiac diet, check daily weights, increase torsemide with 3lb weight gain (provided in instructions)    Acute blood loss anemia, suspect small bowel bleeding  H/o Chronic iron deficiency anemia due to chronic blood loss  Hemoglobin admission of 6.2, with baseline around 8-9. Patient without any acute evidence of GI bleeding. 1 unit of RBCs was ordered in the emergency department, Hb improve from 6.2 to 6.6 only, history of angiodysplasia of colon and duodenum in the past.    He was started on Protonix, GI consulted. EGD 2/26 was without sign of obvious bleeding. Colonoscopy on 2/27 noted poor prep but no evidence of bleeding, they suspect possibly bleeding from the small bowels. His hemoglobin is stable at 7.6 at discharge.  - Plavix was stopped in the setting of acute anemia and occult GI bleeding. Reportedly has had bleeding issues with aspirin as well. Recommend ongoing outpatient short term GI workup and follow up with Neurology for management of CVA prophylaxis.  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  - See above for discussion regarding antiplatelet agents     CKD stage 4  due to type 2 diabetes mellitus (H)  Creatinine on admission of 2.40, with baseline around 2.2-2.6 range,   - Avoid NSAIDs/nephrotoxins       Type 2 diabetes mellitus with stage 3b chronic kidney disease, with long-term current use of insulin (H)  - Resume PTA diabetic regimen at discharge    Consultations This Hospital Stay   CARDIOLOGY IP CONSULT  GASTROENTEROLOGY IP CONSULT    Code Status   Full Code    Time Spent on this Encounter   I, Sundeep Keen, personally saw the patient today and spent approximately 35 minutes discharging this patient.       Sundeep Keen MD  [facility]  ______________________________________________________________________    Physical Exam   Vital Signs: Temp: 97  F (36.1  C) Temp src: Oral BP: 118/73 Pulse: 68   Resp: 18 SpO2: 100 % O2 Device: Nasal cannula Oxygen Delivery: 2 LPM  Weight: 209 lbs 4.8 oz    Constitutional: Male appears tired  Eyes: Nonicteric, normal ocular movements  HEENT: Normocephalic, atraumatic, oral mucosa moist  Respiratory: CTAB, no wheezing or crackles  Cardiovascular: RRR, normal S1/2, no m/r/g  GI: No organomegaly, normoactive bowel sounds, nontender, nondistended  Skin: No rashes  Vascular: 1-2+ BLE pitting edema  Musculoskeletal: Moves all extremities  Neurologic: A&Ox3  Psychiatric: Appropriate affect and mood       Primary Care Physician   Christian Davidson MD    Discharge Disposition   Discharged to home  Condition at discharge: Stable    Significant Results and Procedures   Most Recent 3 CBC's:  Recent Labs   Lab Test 02/27/22  0613 02/26/22  2004 02/26/22  0612 02/25/22  1656   WBC 6.4  --  5.7 6.8   HGB 7.6* 8.1* 6.6* 6.2*   MCV 74*  --  74* 73*     --  185 222     Most Recent 3 BMP's:  Recent Labs   Lab Test 02/27/22  1233 02/27/22  0818 02/27/22  0741 02/27/22  0613 02/26/22  1247 02/26/22  0612 02/25/22 2028 02/25/22  1656   NA  --   --   --  140  --  141  --  140   POTASSIUM  --   --   --  3.6  --   3.9  --  4.1   CHLORIDE  --   --   --  111*  --  112*  --  111*   CO2  --   --   --  26  --  24  --  25   BUN  --   --   --  44*  --  46*  --  46*   CR  --   --   --  2.33*  --  2.39*  --  2.40*   ANIONGAP  --   --   --  3  --  5  --  4   VANESA  --   --   --  8.1*  --  8.1*  --  8.3*   GLC 80 119* 51* 55*   < > 128*   < > 123*    < > = values in this interval not displayed.     Most Recent 2 LFT's:  Recent Labs   Lab Test 02/25/22  1656 10/13/21  0808   AST 17 15   ALT 17 27   ALKPHOS 165* 163*   BILITOTAL 0.6 0.4   ,   Results for orders placed or performed during the hospital encounter of 02/25/22   XR Chest Port 1 View    Narrative    EXAM: XR CHEST PORT 1 VIEW  LOCATION: [facility]  DATE/TIME: 2/25/2022 5:17 PM    INDICATION: Shortness of breath.  COMPARISON: Chest x-ray 08/04/2021.      Impression    IMPRESSION: New dense consolidation at the right mid to lower lung worrisome for pneumonia or other airspace disease. Some patchy airspace opacities at the left lung base also noted. Borderline prominent cardiac silhouette. Bilateral shoulder DJD.   Chest CT w/o contrast    Narrative    EXAM: CT CHEST W/O CONTRAST  LOCATION: [facility]  DATE/TIME: 2/25/2022 7:30 PM    INDICATION: Right lower lobe consolidation, abnormal CXR.  Shortness of breath.  COMPARISON: Chest x-ray 02/25/2022, CT chest 07/22/2021  TECHNIQUE: CT chest without IV contrast. Multiplanar reformats were obtained. Dose reduction techniques were used.  CONTRAST: None.    FINDINGS:   LUNGS AND PLEURA: Small volume partially loculated right mid to the low pleural fluid. This accounts for part of the chest x-ray abnormality. New finding of peribronchial wall thickening at the right lower lung along with increased linear opacities and   interstitial thickening at the right lung base. Stable solid nodule lateral right upper lobe that is 0.3 seen series 4 image 76. Stable solid posterior right lower lobe  0.5 cm nodule image 163. Stable posterior right upper lobe 0.5 cm nodule image 86.   Stable posterior right upper lobe 0.2 cm nodule image 90.    MEDIASTINUM/AXILLAE: There are several larger lymph nodes within the bilateral mediastinum. An example at the left paratracheal position is now 0.7 cm, previously 0.4 cm series 3 image 55. There are other examples as well. Some pericardial trace fluid   versus thickening appears stable.    CORONARY ARTERY CALCIFICATION: Moderate.    UPPER ABDOMEN: New small ascites at the upper abdomen. Cholecystectomy.    MUSCULOSKELETAL: Spine degenerative changes.      Impression    IMPRESSION:   1.  New small volume partially loculated pleural fluid at the right mid to low lung identified.  2.  New finding of interstitial thickening at the right lung base and peribronchial wall thickening. Correlate with bronchitis and potentially mild interstitial edema.  3.  New mildly enlarged mediastinal lymph nodes.  4.  A few stable pulmonary nodules. See below for follow-up imaging guidelines.  5.  Coronary artery calcifications.  6.  New upper abdominal small ascites noted    Recommendations for one or multiple incidental lung nodules < 6mm:  Low-Risk Patient: No routine follow-up.  High-Risk Patient: Optional follow-up CT at 12 months; if unchanged, no further follow-up.    *Low Risk: Minimal or absent history of smoking or other known risk factors.  *Nonsolid (ground-glass) or partly solid nodules may require longer follow-up to exclude indolent adenocarcinoma.  *Recommendations based on Guidelines for the Management of Incidental Pulmonary Nodules Detected at CT: From the Fleischner Society 2017, Radiology 2017.           Discharge Orders      Reason for your hospital stay    You were hospitalized for a gastrointestinal bleed and a congestive heart failure exacerbation.     Activity    Your activity upon discharge: activity as tolerated     Monitor and record    weight every day      Discharge Instructions    If you gain 3lbs or more in 2 days, then increase torsemide 20mg to twice a day until your weight drops back down. If you developed increasing dyspnea or if your weight does not improve, please call your cardiology clinic.     Follow-up and recommended labs and tests     - Follow up with primary care provider, Christian Davidson MD, within 7 days for hospital follow- up. Check BMP and CBC in one week  - Follow up with Nephrology infusion clinic. Check Hemoglobin in 3-4 days  - Follow up with gastroenterology in one week for further evaluation of your bleeding.  - Follow up with Neurology in two weeks or less to discuss starting a new blood thinner  - Follow up with Cardiology within a month for further management and evaluation of your heart failure     Echocardiogram Complete    Administration of IV contrast will be tailored to this examination per the appropriate written protocol listed in the Echocardiography department Protocol Book, or by the supervising Cardiologist. This may result in an order change.    Use of contrast is at the discretion of the supervising Cardiologist.     Diet    Follow this diet upon discharge: Low Saturated Fat Na <2400mg Diet     Discharge Medications   Current Discharge Medication List      CONTINUE these medications which have CHANGED    Details   torsemide (DEMADEX) 20 MG tablet Take 1 tablet (20 mg) by mouth daily  Qty: 30 tablet, Refills: 0    Associated Diagnoses: Chronic diastolic heart failure with preserved ejection fraction (H)         CONTINUE these medications which have NOT CHANGED    Details   albuterol (PROAIR HFA) 108 (90 Base) MCG/ACT inhaler INHALE 2 PUFFS BY MOUTH FOUR TIMES DAILY AS NEEDED  Qty: 8.5 g, Refills: 3    Associated Diagnoses: Shortness of breath      atorvastatin (LIPITOR) 20 MG tablet Take 1 tablet (20 mg) by mouth daily  Qty: 90 tablet, Refills: 3    Associated Diagnoses: Hyperlipidemia LDL goal <100      carvedilol  (COREG) 6.25 MG tablet Take 1 tablet (6.25 mg) by mouth 2 times daily (with meals)  Qty: 180 tablet, Refills: 3    Associated Diagnoses: Pericardial effusion      clobetasol (TEMOVATE) 0.05 % external cream Apply topically daily as needed      clopidogrel (PLAVIX) 75 MG tablet Take 1 tablet (75 mg) by mouth daily  Qty: 90 tablet, Refills: 3    Associated Diagnoses: Cerebrovascular accident (CVA) due to embolism of cerebral artery (H)      dorzolamide-timolol (COSOPT) 2-0.5 % ophthalmic solution Place 1 drop into the right eye 2 times daily       fluticasone (FLONASE) 50 MCG/ACT nasal spray Spray 2 sprays into both nostrils daily  Qty: 16 g, Refills: 0    Associated Diagnoses: Chronic non-seasonal allergic rhinitis      folic acid-vit B6-vit B12 (FOLGARD) 0.8-10-0.115 MG TABS per tablet Take 1 tablet by mouth daily Continue per Nephrology recommendation  Qty: 30 tablet, Refills: 0    Associated Diagnoses: Anemia, unspecified type      insulin glargine (LANTUS SOLOSTAR) 100 UNIT/ML pen ADMINISTER 25 UNITS UNDER THE SKIN AT BEDTIME  Qty: 45 mL, Refills: 3    Comments: If Lantus is not covered by insurance, may substitute Basaglar or Semglee or other insulin glargine product per insurance preference at same dose and frequency.    Associated Diagnoses: Type 2 diabetes mellitus without complication, with long-term current use of insulin (H)      insulin lispro (HUMALOG KWIKPEN) 100 UNIT/ML (1 unit dial) KWIKPEN Inject 5-15 Units Subcutaneous 3 times daily (before meals) Sliding scale with meals  Qty: 45 mL, Refills: 3    Associated Diagnoses: Type 2 diabetes mellitus without complication, with long-term current use of insulin (H)      pantoprazole (PROTONIX) 40 MG EC tablet TAKE 1 TABLET(40 MG) BY MOUTH TWICE DAILY  Qty: 180 tablet, Refills: 2    Associated Diagnoses: Acute superficial gastritis with hemorrhage      potassium chloride ER (K-TAB) 20 MEQ CR tablet Take 1 tablet (20 mEq) by mouth daily    Associated  Diagnoses: Hypokalemia      SENNA-docusate sodium (SENNA S) 8.6-50 MG tablet Take 1 tablet by mouth 2 times daily  Qty: 60 tablet, Refills: 1    Associated Diagnoses: Constipation, unspecified constipation type      sertraline (ZOLOFT) 50 MG tablet Take 1 tablet (50 mg) by mouth daily  Qty: 90 tablet, Refills: 3    Comments: For Profile Only - patient will call to fill - Dose increase  Associated Diagnoses: Mild major depression (H); Insomnia, unspecified type      traZODone (DESYREL) 100 MG tablet TAKE 1 TABLET(100 MG) BY MOUTH AT BEDTIME  Qty: 90 tablet, Refills: 3    Associated Diagnoses: Insomnia, unspecified type      vitamin B-12 (CYANOCOBALAMIN) 1000 MCG tablet Take 1 tablet (1,000 mcg) by mouth daily Continue unitl hematology evaluation as borderline low vitamin b12.  Qty: 90 tablet, Refills: 3    Associated Diagnoses: Other dietary vitamin B12 deficiency anemia      blood glucose (STEVE CONTOUR) test strip TESTING FOUR TIMES DAILY OR AS DIRECTED  Qty: 400 strip, Refills: 0    Associated Diagnoses: Type 2 diabetes mellitus without complication, with long-term current use of insulin (H)      Ferrous Sulfate 324 (65 Fe) MG TBEC Take 1 tablet (324 mg) by mouth 2 times daily    Associated Diagnoses: Anemia, unspecified type      insulin pen needle (BD PEN NEEDLE FERCHO 2ND GEN) 32G X 4 MM miscellaneous USE AS DIRECTED UP TO FOUR TIMES DAILY  Qty: 400 each, Refills: 3    Associated Diagnoses: Type 2 diabetes mellitus without complication, with long-term current use of insulin (H)           Allergies   Allergies   Allergen Reactions     No Known Allergies

## 2022-02-27 NOTE — PROGRESS NOTES
[facility]    Cardiology Consultation     Date of Admission:  2/25/2022    Assessment & Plan   Justyn Olivas is a 81 year old male who was admitted on 2/25/2022. I was asked to see the patient for congestive heart failure.    The patient was admitted with weakness and dyspnea with exertion.  The patient has a history of CKD stage IV, type 2 diabetes, chronic GI bleeding, hypertension, hyperlipidemia, prior stroke.    The patient was found to have an elevated BNP of 2657, creatinine was elevated at 1.91, troponin was negative.    CT scan of the chest was notable for a loculated pleural effusion at the right midlung, there is also changes consistent with interstitial edema.    The patient had an echocardiogram and early 2021 noted an LVEF of 50 to 55% with inferior wall hypokinesis.    The patient also has a history of pericardial effusion in 2020 requiring a drain placement.    The patient is struggled with chronic anemia and has been followed by hematology.  This is in part thought to be due to his kidney disease.    Currently the patient has had some diuresis and feels mildly improved.  He is scheduled for gastrointestinal studies tomorrow.    Problems:  Heart failure preserved ejection fraction, chronic  Prior history of pericardial effusion, etiology somewhat unclear  Chronic anemia  Chronic GI bleeding  Stage IV CKD  Type 2 diabetes  Hypertension  Hyperlipidemia  Prior CVA    Plan:  Patient wants to discharge today.  He feels back to his baseline.  This is reasonable.  We will discharge him on his home diuretic dose with instructions to increase for 1-2 days if he starts retaining water or his weight is increasing.    Continue carvedilol 6.25 mg twice daily  Agree with Lasix 60 mg twice daily for now, obtain daily electrolytes  Weigh the patient daily  Fluid restriction to less than 2.5 L/day  Low-salt diet    The patient has a inferior regional wall motion abnormality on his prior  echocardiogram.  He also had a history of a pericardial effusion with EKG changes that had ST elevation (which could be pericardial in nature).  He has severe coronary calcification seen on CT scan as well.  It does not appear that he has had a coronary evaluation from the best I can tell.      The echocardiogram could not be completed as inpatient (patient wants to discharge now).  Recommend outpatient Echo and f/u with Dr. Rosenbaum.  He will discuss possible CAD as a contributor to his CHF.  However, I did discuss with the patient that if he has an ischemic eval. With a stress test this would possible prompt an angiogram.  An angiogram would carry signficant renal risk for this patient.  It might be reasonable to do an outpatient stress and only discuss angiogram if he has high risk features and medically manage other ischemic changes.     Garo Cavanaugh MD     Physical Exam   Temp: 97  F (36.1  C) Temp src: Oral BP: 118/73 Pulse: 68   Resp: 18 SpO2: 100 % O2 Device: Nasal cannula Oxygen Delivery: 2 LPM  Vital Signs with Ranges  Temp:  [97  F (36.1  C)-97.4  F (36.3  C)] 97  F (36.1  C)  Pulse:  [63-79] 68  Resp:  [8-44] 18  BP: ()/(61-92) 118/73  SpO2:  [85 %-100 %] 100 %  209 lbs 4.8 oz    GENERAL APPEARANCE: Alert and oriented x3. No acute distress.  SKIN: Inspection of the skin reveals no rashes, ulcerations, warm, dry  NECK: Supple and symmetric.   Normal JVP  LUNGS: clear  CARDIOVASCULAR: S1, S2, regular rate and rhythm without any murmurs, gallops, rubs.   ABDOMEN: Soft, non-tender, non-distended with normal bowel sounds.  No ascites noted.  EXTREMITIES: trace edema.  NEUROLOGIC:  Normal mood and affect.  Sensation to touch was normal.      Data   Results for orders placed or performed during the hospital encounter of 02/25/22 (from the past 24 hour(s))   Glucose by meter   Result Value Ref Range    GLUCOSE BY METER POCT 137 (H) 70 - 99 mg/dL   Hemoglobin   Result Value Ref Range    Hemoglobin 8.1  (L) 13.3 - 17.7 g/dL   Glucose by meter   Result Value Ref Range    GLUCOSE BY METER POCT 272 (H) 70 - 99 mg/dL   Glucose by meter   Result Value Ref Range    GLUCOSE BY METER POCT 158 (H) 70 - 99 mg/dL   CBC with Platelets & Differential    Narrative    The following orders were created for panel order CBC with Platelets & Differential.  Procedure                               Abnormality         Status                     ---------                               -----------         ------                     CBC with platelets and d...[[id number]]  Abnormal            Final result                 Please view results for these tests on the individual orders.   Renal panel   Result Value Ref Range    Sodium 140 133 - 144 mmol/L    Potassium 3.6 3.4 - 5.3 mmol/L    Chloride 111 (H) 94 - 109 mmol/L    Carbon Dioxide (CO2) 26 20 - 32 mmol/L    Anion Gap 3 3 - 14 mmol/L    Urea Nitrogen 44 (H) 7 - 30 mg/dL    Creatinine 2.33 (H) 0.66 - 1.25 mg/dL    Calcium 8.1 (L) 8.5 - 10.1 mg/dL    Glucose 55 (L) 70 - 99 mg/dL    Albumin 2.9 (L) 3.4 - 5.0 g/dL    Phosphorus 3.9 2.5 - 4.5 mg/dL    GFR Estimate 27 (L) >60 mL/min/1.73m2   CBC with platelets and differential   Result Value Ref Range    WBC Count 6.4 4.0 - 11.0 10e3/uL    RBC Count 3.52 (L) 4.40 - 5.90 10e6/uL    Hemoglobin 7.6 (L) 13.3 - 17.7 g/dL    Hematocrit 26.1 (L) 40.0 - 53.0 %    MCV 74 (L) 78 - 100 fL    MCH 21.6 (L) 26.5 - 33.0 pg    MCHC 29.1 (L) 31.5 - 36.5 g/dL    RDW 20.5 (H) 10.0 - 15.0 %    Platelet Count 177 150 - 450 10e3/uL    % Neutrophils 61 %    % Lymphocytes 8 %    % Monocytes 18 %    % Eosinophils 12 %    % Basophils 1 %    % Immature Granulocytes 0 %    NRBCs per 100 WBC 0 <1 /100    Absolute Neutrophils 3.9 1.6 - 8.3 10e3/uL    Absolute Lymphocytes 0.5 (L) 0.8 - 5.3 10e3/uL    Absolute Monocytes 1.1 0.0 - 1.3 10e3/uL    Absolute Eosinophils 0.8 (H) 0.0 - 0.7 10e3/uL    Absolute Basophils 0.0 0.0 - 0.2 10e3/uL    Absolute Immature Granulocytes 0.0  <=0.4 10e3/uL    Absolute NRBCs 0.0 10e3/uL   Glucose by meter   Result Value Ref Range    GLUCOSE BY METER POCT 51 (L) 70 - 99 mg/dL   Glucose by meter   Result Value Ref Range    GLUCOSE BY METER POCT 119 (H) 70 - 99 mg/dL   Glucose by meter   Result Value Ref Range    GLUCOSE BY METER POCT 80 70 - 99 mg/dL

## 2022-02-27 NOTE — PROGRESS NOTES
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  Patient's hemoglobin is 7.6 this morning was up to 8.1 after transfusion.  Patient's colonoscopy this morning showed normal stool in the colon no signs of active bleeding no AVMs seen in the colon patient has large amount of stool throughout the colon 3 polyps were found in the colon one in the cecum 2 in the transverse colon those were removed with cold snare.  I will recommend the patient to be restarted on regular diet hold on Plavix for next 3 days.  I will recommend further evaluation regarding long-term need for anticoagulation I suspect patient's anemia is multifactorial from anemia of chronic disease and possible small bowel AVMs and chronic kidney disease.              Anemia in other chronic diseases classified elsewhere  Shortness of breath  Acute on chronic diastolic heart failure (H)  Anasarca      Interval History:     doing well; no cp, sob, n/v/d, or abd pain.              Review of Systems:     C: NEGATIVE for fever, chills, change in weight  E/M: NEGATIVE for ear, mouth and  throat problems  R: NEGATIVE for significant cough or SOB  CV: NEGATIVE for chest pain, palpitations or peripheral edema             Medications:   I have reviewed this patient's current medications    atorvastatin  20 mg Oral Daily     carvedilol  6.25 mg Oral BID w/meals     cyanocobalamin  1,000 mcg Oral Daily     furosemide  60 mg Intravenous BID     insulin aspart  1-7 Units Subcutaneous TID AC     insulin aspart  1-5 Units Subcutaneous At Bedtime     insulin glargine  20 Units Subcutaneous At Bedtime     pantoprazole  40 mg Oral BID AC     sertraline  50 mg Oral Daily     sodium chloride (PF)  3 mL Intracatheter Q8H     traZODone  100 mg Oral At Bedtime                  Physical Exam:   Vitals were reviewed  Vital Signs with Ranges  Temp:  [97  F (36.1  C)-98.2  F (36.8  C)] 97  F (36.1  C)  Pulse:  [63-79] 67  Resp:  [8-44] 18  BP: ()/(61-92) 111/65  SpO2:  [85 %-100 %] 97 %  I/O last 3 completed shifts:  In: 440.83   Out: 900 [Urine:900]  Constitutional: healthy, alert and no distress   Cardiovascular: negative, PMI normal. No lifts, heaves, or thrills. RRR. No murmurs, clicks gallops or rub  Respiratory: negative, Percussion normal. Good diaphragmatic excursion. Lungs clear  Neck: Neck supple. No adenopathy. Thyroid symmetric, normal size,, Carotids without bruits.  Abdomen: Abdomen soft, non-tender. BS normal. No masses, organomegaly  SKIN: no suspicious lesions or rashes           Data:   I reviewed the patient's new clinical lab test results.   Recent Labs   Lab Test 02/27/22  0613 02/26/22 2004 02/26/22  0612 02/25/22  1656 11/26/20  1317 09/04/20  1338   WBC 6.4  --  5.7 6.8   < > 7.3   HGB 7.6* 8.1* 6.6* 6.2*   < > 10.4*   MCV 74*  --  74* 73*   < > 80     --  185 222   < > 275   INR  --   --   --  1.23*  --  0.99    < > = values in this interval not displayed.     Recent Labs   Lab Test 02/27/22  0613 02/26/22  0612 02/25/22  1656   POTASSIUM 3.6 3.9 4.1   CHLORIDE 111* 112* 111*   CO2  26 24 25   BUN 44* 46* 46*   ANIONGAP 3 5 4     Recent Labs   Lab Test 02/27/22  0613 02/25/22  1656 02/16/22  1626 10/13/21  0808 09/21/21  1428 09/10/21  1201 08/04/21  0601 08/03/21  1841 08/03/21  1736 06/03/21  0400   ALBUMIN 2.9* 3.2*  --  3.2* 3.4 3.0*   < >  --  3.8  --    BILITOTAL  --  0.6  --  0.4 0.5 0.5   < >  --  0.4  --    ALT  --  17  --  27 21 21   < >  --  24  --    AST  --  17  --  15 19 19   < >  --  13  --    PROTEIN  --   --  30 *  --   --   --   --  Negative  --  10*   LIPASE  --   --   --   --   --  35*  --   --  50*  --     < > = values in this interval not displayed.       I reviewed the patient's new imaging results.    All laboratory data reviewed  All imaging studies reviewed by me.    Jules Lane MD,  2/27/2022  [facility]  Office : [phone]  Cell: [phone]      [facility]  Ph: [phone] Fax: [phone]

## 2022-02-28 NOTE — PROGRESS NOTES
Clinic Care Coordination Contact  [facility]: Post-Discharge Note  SITUATION                                                      Admission:    Admission Date: 02/25/22   Reason for Admission: Worsening SOB  Discharge:   Discharge Date: 02/27/22  Discharge Diagnosis: Acute on chronic diastolic heart failure exacerbationGI bleed, suspect small bowel bleedingAcute on chronic blood loss [id number]    BACKGROUND                                                      Per hospital discharge summary and inpatient provider notes:    Justyn Olivas is a 81 year old male with PMH of chronic diastolic heart failure, CKD4, chronic iron deficiency anemia, hx CVA, HTN, HLD, hx of GI bleed, who was admitted on 2/25/2022 with shortness of breath and acute on chronic diastolic heart failure exacerbation.                Presents with several months history of progressive dyspnea with exertion. On admission patient's EKG was sinus rhythm, his CT chest showed new small volume partially loculated pleural fluid at the right mid to low lung identified. New finding of interstitial thickening at the right lung base and peribronchial wall thickening. Correlate with bronchitis and potentially mild interstitial edema, consistent with acute on chronic diastolic heart failure.  His proBNP is elevated at 2304, troponin is within normal notes.  Creatinine elevated at 2.40( baseline creatinine 2.2-2.6).                  Patient received IV lasix with improvement in dyspnea. Also had workup for GI bleed, see below, received pRBC which also likely reduced his dyspnea. Cardiology was consulted, who assisted with heart failure management, diuresis. At discharge, patient reports improved dyspnea, off oxygen.  - Follow up with outpatient Cardiology for ongoing management. Per inpatient cardiology, patient will need an outpatient echocardiogram followed by an appointment to discuss the benefits./risks of angiogram or outpatient stress  testing to determine the etiology of his heart failure.  - Torsemide increased from 10mg daily to 20mg daily at discharge  - Cardiac diet, check daily weights, increase torsemide with 3lb weight gain (provided in instructions)     Acute blood loss anemia, suspect small bowel bleeding  H/o Chronic iron deficiency anemia due to chronic blood loss  Hemoglobin admission of 6.2, with baseline around 8-9. Patient without any acute evidence of GI bleeding. 1 unit of RBCs was ordered in the emergency department, Hb improve from 6.2 to 6.6 only, history of angiodysplasia of colon and duodenum in the past.                 He was started on Protonix, GI consulted. EGD 2/26 was without sign of obvious bleeding. Colonoscopy on 2/27 noted poor prep but no evidence of bleeding, they suspect possibly bleeding from the small bowels. His hemoglobin is stable at 7.6 at discharge.  - Plavix was stopped in the setting of acute anemia and occult GI bleeding. Reportedly has had bleeding issues with aspirin as well. Recommend ongoing outpatient short term GI workup and follow up with Neurology for management of CVA prophylaxis.  - Outpatient Recheck Hgb in ~3 days  - Follow up with GI for capsule endoscopy    ASSESSMENT      Enrollment  Primary Care Care Coordination Status: Enrolled  Clinical Pathway Name: None  Outreach Frequency: monthly    Discharge Assessment  How are you doing now that you are home?: RN CC called and spoke with patient. Kamran states that he can breath much better since hospital discharge. Kamran feels that he is improving. Reviewed AVS. Medication list and change to torsemide. Reviewed up coming appointments. Reviewed discharge instruction for Low Saturated Fat Na<2400mg Diet. And CHF Discharge Instructions: If you gain 3lbs or more in 2 days, then increase [id number] to twice a day until your weight drops back down. Ifyou developed increasing dyspnea or if your weight does notimprove, please call your cardiology  clinic.Monitor and recordweight every day. Patient verbalized understanding. No new need identified for additional resources and support.  How are your symptoms? (Red Flag symptoms escalate to triage hotline per guidelines): Improved  Do you feel your condition is stable enough to be safe at home until your provider visit?: Yes  Does the patient have their discharge instructions? : Yes  Does the patient have questions regarding their discharge instructions? : No  Were you started on any new medications or were there changes to any of your previous medications? : Yes  Does the patient have all of their medications?: Yes  Do you have questions regarding any of your medications? : No  Do you have all of your needed medical supplies or equipment (DME)?  (i.e. oxygen tank, CPAP, cane, etc.): Yes  Discharge follow-up appointment scheduled within 14 calendar days? : Yes  Discharge Follow Up Appointment Date: 03/04/22  Discharge Follow Up Appointment Scheduled with?: Primary Care Provider              Care Management       Care Mgmt General Assessment  Referral  Referral Source: IP Report  Health Care Home/Utilization  Preferred Hospital: [facility]  [phone]  Preferred Urgent Care: [facility] [phone]  Living Situation  Current living arrangement:: I live in a private home with family  Type of residence:: Private home - stairs  Resources  Patient receiving home care services:: No (Just ordered in the hospital, has not been evaluated yet)  Skilled Home Care Services: Skilled Nursing;Physicial Therapy  Community Resources: DME;[facility]  Supplies Currently Used at Home: Hearing Aid Batteries;Diabetic Supplies  Equipment Currently Used at Home: cane, straight;glucometer;walker, rolling  Referrals Placed: None  Employment Status: retired  Psychosocial  Rastafarian or spiritual beliefs that impact treatment:: No  Mental health DX:: No  Mental health management concern  (GOAL):: No  Informal Support system:: Family;Spouse  Functional Status  Dependent ADLs:: Independent (Patient is use cane outside of home for long distance)  Dependent IADLs:: Transportation  Bed or wheelchair confined:: No  Mobility Status: Independent  Advance Care Plan/Directive  Advanced Care Plans/Directives on file:: In process  Advanced Care Plan/Directive Status: In Process (has, just not on file)    PLAN                                                      Outpatient Plan:  RN CC will remain available as needed. RN CC will reach out to patient in one month.     Future Appointments   Date Time Provider Department Center   3/1/2022  1:30 PM  PIV LAB [facility]   3/1/2022  2:15 PM  FAST TRACK LAB [facility]   3/3/2022 12:45 PM Betty, Bishop, PT EDPT [address]   3/4/2022  1:00 PM Christian Davidson MD [facility]   3/10/2022 12:45 PM [place], Bishop, PT [facility] [address]   3/17/2022 12:45 PM [place], Bishop, PT [facility] [address]   3/24/2022 12:45 PM [place], Bishop, PT [facility] [address]   3/31/2022 12:45 PM [place], Bishop, PT [facility] [address]   4/7/2022 12:45 PM [place], Bishop, PT [facility] [address]   4/14/2022 12:45 PM Annamarie Boyd R, PT [facility] [address]   4/20/2022 10:40 AM Kristine Nash MD [facility]   4/21/2022 12:45 PM Betty, Bishop, PT [facility]PT [address]   4/28/2022 12:45 PM Betty, Bishop, PT [facility]PT [address]   5/10/2022 11:45 AM Cielo Rosenbaum MD [facility]         For any urgent concerns, please contact our 24 hour nurse triage line: [phone] ([phone])         Namita Chaidez RN

## 2022-03-01 NOTE — PROGRESS NOTES
Infusion Nursing Note:  [facility]    Patient seen by provider today: No   present during visit today: Not Applicable.    Note: Patient continues with generalized fatigue, weakness and SOB on exertion. Denies dizziness. Following with dermatology for ongoing rash on arms and legs. States he sees PCP on 3/4 to discuss ongoing concerns.    Intravenous Access:  No Intravenous access/labs at this visit.    Treatment Conditions:  Lab Results   Component Value Date    HGB 7.8 (L) 03/01/2022    WBC 6.4 02/27/2022    ANEU 4.8 06/17/2021    ANEUTAUTO 3.9 02/27/2022     02/27/2022      Results reviewed, labs MET treatment parameters, ok to proceed with treatment.      Post Infusion Assessment:  Patient tolerated injection without incident.  Site patent and intact, free from redness, edema or discomfort.       Discharge Plan:   Patient discharged in stable condition accompanied by: self.  Departure Mode: Ambulatory with cane.      Felicia Ryan RN

## 2022-03-04 NOTE — Clinical Note
Please abstract the following data from this visit with this patient into the appropriate field in Epic:    Tests that can be patient reported without a hard copy:    Eye exam with ophthalmology on this date: 2/28/2022 at [facility]     Other Tests found in the patient's chart through Chart Review/Care Everywhere:    {Abstract Quality List (Optional):554406}    Note to Abstraction: If this section is blank, no results were found via Chart Review/Care Everywhere.

## 2022-03-04 NOTE — TELEPHONE ENCOUNTER
My chart message from patient:  Kamran Olivas [facility] Heart Team 2  Doctor at the hospital said I should have a echocardiogram. Can you set that up for me?     Per Discharge note 2/25/2022:  - Follow up with outpatient Cardiology for ongoing management. Per inpatient cardiology, patient will need an outpatient echocardiogram followed by an appointment to discuss the benefits./risks of angiogram or outpatient stress testing to determine the etiology of his heart failure.    Echo order was placed at [facility] by Dr. Keen.      Reply to patient:    [place], Mr. Olivas,    The order for the follow up echo was placed by Dr. Keen at discharge. You can call [phone] to set up the image test and a visit with either SHAHANA Ram or Dr. Rosenbaum- whichever provider can see you the earliest.    Thank you for checking in.    Team 2 RNs  [phone]

## 2022-03-04 NOTE — PROGRESS NOTES
Subjective   Kamran is a 81 year old who presents for the following health issues   HPI     Post Discharge Outreach 2/28/2022   Admission Date 2/25/2022   Reason for Admission Worsening SOB   Discharge Date 2/27/2022   Discharge Diagnosis Acute on chronic diastolic heart failure exacerbationGI bleed, suspect small bowel bleedingAcute on chronic blood loss [id number]   How are you doing now that you are home? RN CC called and spoke with patient. Kamran states that he can breath much better since hospital discharge. Kamran feels that he is improving. Reviewed AVS. Medication list and change to torsemide. Reviewed up coming appointments. Reviewed discharge instruction for Low Saturated Fat Na<2400mg Diet. And CHF Discharge Instructions: If you gain 3lbs or more in 2 days, then increase [id number] to twice a day until your weight drops back down. Ifyou developed increasing dyspnea or if your weight does notimprove, please call your cardiology clinic.Monitor and recordweight every day. Patient verbalized understanding. No new need identified for additional resources and support.   How are your symptoms? (Red Flag symptoms escalate to triage hotline per guidelines) Improved   Do you feel your condition is stable enough to be safe at home until your provider visit? Yes   Does the patient have their discharge instructions?  Yes   Does the patient have questions regarding their discharge instructions?  No   Were you started on any new medications or were there changes to any of your previous medications?  Yes   Does the patient have all of their medications? Yes   Do you have questions regarding any of your medications?  No   Do you have all of your needed medical supplies or equipment (DME)?  (i.e. oxygen tank, CPAP, cane, etc.) Yes   Discharge follow-up appointment scheduled within 14 calendar days?  Yes   Discharge Follow Up Appointment Date 3/4/2022   Discharge Follow Up Appointment Scheduled with? Primary Care  Provider     Hospital Follow-up Visit:    Hospital/Nursing Home/IP Rehab Facility: [facility]  Date of Admission: 2/25/2022  Date of Discharge: 2/27/2022  Reason(s) for Admission:     Acute on chronic diastolic heart failure exacerbation  GI bleed, suspect small bowel bleeding  Acute on chronic blood loss anemia  DM2  Hx CVA  CKD4        Was your hospitalization related to COVID-19? No   Problems taking medications regularly:  None  Medication changes since discharge: Yes - stopped taking Plavix   Problems adhering to non-medication therapy:  None    Summary of hospitalization:  [facility] discharge summary reviewed  Diagnostic Tests/Treatments reviewed.  Follow up needed: cardiology, nephrology  Other Healthcare Providers Involved in Patient s Care:         None  Update since discharge: stable. Post Discharge Medication Reconciliation: discharge medications reconciled, continue medications without change.  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   Chronic diastolic heart failure with preserved ejection fraction (H)   Justyn FRIAS Deisy has been following closely with cardiology and has increased his dose of torsemide to 20 mg twice per day.  His weight was up to 214 pounds at highest since discharge, and is now down to 211.  His normal weight is about 200.  He has had difficulty with avoiding sodium.  He is worried that he may need to consider hemodialysis.    Anemia due to stage 3 chronic kidney disease, unspecified whether stage 3a or 3b CKD (H)   Received 1 unit packed red blood cells while in the hospital.  He notes that breathing has improved.  Has stopped Plavix due to concern for GI bleed.  He has yet to do capsule endoscopy, but intends  to follow up in gastroenterology    Review of Systems   Constitutional, HEENT, cardiovascular, pulmonary - CT showed findings of mediastinal lymphadenopathy, GI, , musculoskeletal - walking is not very good, neuro, skin, endocrine and psych systems are negative, except as otherwise noted.      Objective    /78 (BP Location: Left arm, Patient Position: Sitting, Cuff Size: Adult Regular)   Pulse 84   Temp 97.3  F (36.3  C) (Temporal)   Resp 20   Wt 95.8 kg (211 lb 3.2 oz)   SpO2 99%   BMI 28.64 kg/m    Body mass index is 28.64 kg/m .  Physical Exam   GENERAL: healthy, alert and no distress  EYES: Eyes grossly normal to inspection,   NECK: no adenopathy, no asymmetry, masses   RESP: right lung decreased lung sounds base  CV: regular rate and rhythm, normal S1 S2, no S3 or S4, no murmur, 2+ edema bilateral  ABDOMEN: + abdominal distension, soft, nontender, no hepatosplenomegaly   MS: no gross musculoskeletal defects noted, 2+ edema  SKIN: no suspicious lesions or rashes  NEURO: Normal strength and tone   PSYCH: mentation appears depressed, affect mood congruent    Recent Results (from the past 240 hour(s))   EKG 12 lead    Collection Time: 02/25/22  4:55 PM   Result Value Ref Range    Systolic Blood Pressure  mmHg    Diastolic Blood Pressure  mmHg    Ventricular Rate 91 BPM    Atrial Rate 91 BPM    MT Interval 174 ms    QRS Duration 86 ms     ms    QTc 462 ms    P Axis 28 degrees    R AXIS 15 degrees    T Axis 123 degrees    Interpretation ECG       Sinus rhythm  Cannot rule out Inferior infarct (cited on or before 03-AUG-2021)  Cannot rule out Anterior infarct , age undetermined  Abnormal ECG  When compared with ECG of 13-OCT-2021 08:55,  No significant change was found  Confirmed by GENERATED REPORT, COMPUTER ([id number]),  Aasen, Bradley ([id number]) on 2/25/2022 4:59:33 PM     Extra Blue Top Tube    Collection Time: 02/25/22  4:56 PM   Result Value Ref Range    Hold Specimen JIC    Extra Red Top Tube     Collection Time: 02/25/22  4:56 PM   Result Value Ref Range    Hold Specimen JIC    Extra Green Top (Lithium Heparin) Tube    Collection Time: 02/25/22  4:56 PM   Result Value Ref Range    Hold Specimen JI    Extra Blood Bank Purple Top Tube    Collection Time: 02/25/22  4:56 PM   Result Value Ref Range    Hold Specimen JIC    D dimer quantitative    Collection Time: 02/25/22  4:56 PM   Result Value Ref Range    D-Dimer Quantitative 2.87 (H) 0.00 - 0.50 ug/mL FEU   Comprehensive metabolic panel    Collection Time: 02/25/22  4:56 PM   Result Value Ref Range    Sodium 140 133 - 144 mmol/L    Potassium 4.1 3.4 - 5.3 mmol/L    Chloride 111 (H) 94 - 109 mmol/L    Carbon Dioxide (CO2) 25 20 - 32 mmol/L    Anion Gap 4 3 - 14 mmol/L    Urea Nitrogen 46 (H) 7 - 30 mg/dL    Creatinine 2.40 (H) 0.66 - 1.25 mg/dL    Calcium 8.3 (L) 8.5 - 10.1 mg/dL    Glucose 123 (H) 70 - 99 mg/dL    Alkaline Phosphatase 165 (H) 40 - 150 U/L    AST 17 0 - 45 U/L    ALT 17 0 - 70 U/L    Protein Total 6.7 (L) 6.8 - 8.8 g/dL    Albumin 3.2 (L) 3.4 - 5.0 g/dL    Bilirubin Total 0.6 0.2 - 1.3 mg/dL    GFR Estimate 26 (L) >60 mL/min/1.73m2   Nt probnp inpatient (BNP)    Collection Time: 02/25/22  4:56 PM   Result Value Ref Range    N terminal Pro BNP Inpatient 2,304 (H) 0-1,800 pg/mL   TSH with free T4 reflex    Collection Time: 02/25/22  4:56 PM   Result Value Ref Range    TSH 2.03 0.40 - 4.00 mU/L   Troponin I    Collection Time: 02/25/22  4:56 PM   Result Value Ref Range    Troponin I High Sensitivity 18 <79 ng/L   CBC with platelets and differential    Collection Time: 02/25/22  4:56 PM   Result Value Ref Range    WBC Count 6.8 4.0 - 11.0 10e3/uL    RBC Count 3.07 (L) 4.40 - 5.90 10e6/uL    Hemoglobin 6.2 (LL) 13.3 - 17.7 g/dL    Hematocrit 22.5 (L) 40.0 - 53.0 %    MCV 73 (L) 78 - 100 fL    MCH 20.2 (L) 26.5 - 33.0 pg    MCHC 27.6 (L) 31.5 - 36.5 g/dL    RDW 19.4 (H) 10.0 - 15.0 %    Platelet Count 222 150 - 450 10e3/uL    % Neutrophils 68 %     % Lymphocytes 7 %    % Monocytes 15 %    % Eosinophils 9 %    % Basophils 1 %    % Immature Granulocytes 0 %    NRBCs per 100 WBC 0 <1 /100    Absolute Neutrophils 4.7 1.6 - 8.3 10e3/uL    Absolute Lymphocytes 0.5 (L) 0.8 - 5.3 10e3/uL    Absolute Monocytes 1.0 0.0 - 1.3 10e3/uL    Absolute Eosinophils 0.6 0.0 - 0.7 10e3/uL    Absolute Basophils 0.1 0.0 - 0.2 10e3/uL    Absolute Immature Granulocytes 0.0 <=0.4 10e3/uL    Absolute NRBCs 0.0 10e3/uL   INR    Collection Time: 02/25/22  4:56 PM   Result Value Ref Range    INR 1.23 (H) 0.85 - 1.15   Adult Type and Screen    Collection Time: 02/25/22  4:56 PM   Result Value Ref Range    ABO/RH(D) A POS     Antibody Screen NEGATIVE    Symptomatic; Unknown Influenza A/B & SARS-CoV2 (COVID-19) Virus PCR Multiplex Nasopharyngeal    Collection Time: 02/25/22  5:01 PM    Specimen: Nasopharyngeal; Swab   Result Value Ref Range    Influenza A PCR Negative Negative    Influenza B PCR Negative Negative    SARS CoV2 PCR Negative Negative   Prepare red blood cells (unit)    Collection Time: 02/25/22  5:36 PM   Result Value Ref Range    CROSSMATCH Compatible     UNIT ABO/RH A Pos     Unit Number [id number]     Unit Status Transfused     Blood Component Type Red Blood Cells     Product Code [id number]     CODING SYSTEM [id number]     UNIT TYPE ISBT 6200     ISSUE DATE AND TIME [id number]    Glucose by meter    Collection Time: 02/25/22  8:28 PM   Result Value Ref Range    GLUCOSE BY METER POCT 92 70 - 99 mg/dL   Glucose by meter    Collection Time: 02/25/22 10:19 PM   Result Value Ref Range    GLUCOSE BY METER POCT 184 (H) 70 - 99 mg/dL   Glucose by meter    Collection Time: 02/26/22  2:04 AM   Result Value Ref Range    GLUCOSE BY METER POCT 208 (H) 70 - 99 mg/dL   Basic metabolic panel    Collection Time: 02/26/22  6:12 AM   Result Value Ref Range    Sodium 141 133 - 144 mmol/L    Potassium 3.9 3.4 - 5.3 mmol/L    Chloride 112 (H) 94 - 109 mmol/L    Carbon Dioxide (CO2) 24 20 -  32 mmol/L    Anion Gap 5 3 - 14 mmol/L    Urea Nitrogen 46 (H) 7 - 30 mg/dL    Creatinine 2.39 (H) 0.66 - 1.25 mg/dL    Calcium 8.1 (L) 8.5 - 10.1 mg/dL    Glucose 128 (H) 70 - 99 mg/dL    GFR Estimate 26 (L) >60 mL/min/1.73m2   CBC with platelets    Collection Time: 02/26/22  6:12 AM   Result Value Ref Range    WBC Count 5.7 4.0 - 11.0 10e3/uL    RBC Count 3.08 (L) 4.40 - 5.90 10e6/uL    Hemoglobin 6.6 (LL) 13.3 - 17.7 g/dL    Hematocrit 22.8 (L) 40.0 - 53.0 %    MCV 74 (L) 78 - 100 fL    MCH 21.4 (L) 26.5 - 33.0 pg    MCHC 28.9 (L) 31.5 - 36.5 g/dL    RDW 20.8 (H) 10.0 - 15.0 %    Platelet Count 185 150 - 450 10e3/uL   Prepare red blood cells (unit)    Collection Time: 02/26/22  7:45 AM   Result Value Ref Range    CROSSMATCH Compatible     UNIT ABO/RH A Pos     Unit Number [id number]     Unit Status Transfused     Blood Component Type Red Blood Cells     Product Code [id number]     CODING SYSTEM [id number]     UNIT TYPE ISBT 6200     ISSUE DATE AND TIME [id number]    UPPER GI ENDOSCOPY    Collection Time: 02/26/22  9:29 AM   Result Value Ref Range    Upper GI Endoscopy       [place] [address] [address] [place]  [address]  _______________________________________________________________________________  Patient Name: Justyn Olivas        Procedure Date: 2/26/2022 9:29 AM  MRN: [id number]                       Account Number: [id number]  YOB: 1940              Admit Type: Inpatient  Age: 81                               Room: 2  Note Status: Finalized                Attending MD: Jules Lane MD  Instrument Name: 404 GIF- Gastroscope   _______________________________________________________________________________     Procedure:                Upper GI endoscopy  Indications:              Iron deficiency anemia, H/O small bowel AVMs  Providers:                Jules Lane MD, Bee Becerril, RN, Shaniqua Hill RN  Referring MD:                Medicines:                Fentanyl 50 micrograms IV, Midazolam 2 mg IV,                             Cetacaine spray  Complications:             No immediate complications.  _______________________________________________________________________________  Procedure:                Pre-Anesthesia Assessment:                            - Prior to the procedure, a History and Physical                             was performed, and patient medications and                             allergies were reviewed. The patient is competent.                             The risks and benefits of the procedure and the                             sedation options and risks were discussed with the                             patient. All questions were answered and informed                             consent was obtained. Patient identification and                             proposed procedure were verified by the physician                             in the pre-procedure area. Mental Status                             Examination: normal. Airway Examination: normal                             oropharyngeal airway and neck mobility. Respira tory                             Examination: clear to auscultation. CV Examination:                             normal. Prophylactic Antibiotics: The patient does                             not require prophylactic antibiotics. Prior                             Anticoagulants: The patient has taken no                             anticoagulant or antiplatelet agents. After                             reviewing the risks and benefits, the patient was                             deemed in satisfactory condition to undergo the                             procedure. The anesthesia plan was to use moderate                             sedation / analgesia (conscious sedation).                             Immediately prior to administration of medications,                             the patient  was re-assessed for adequacy to receive                             sedatives. The heart rate, respiratory rate, oxygen                             saturations, blood pressure, adequacy of pulmonary                              ventilation, and response to care were monitored                             throughout the procedure. The physical status of                             the patient was re-assessed after the procedure.                            After obtaining informed consent, the endoscope was                             passed under direct vision. Throughout the                             procedure, the patient's blood pressure, pulse, and                             oxygen saturations were monitored continuously. The                             Endoscope was introduced through the mouth, and                             advanced to the third part of duodenum. The upper                             GI endoscopy was accomplished without difficulty.                             The patient tolerated the procedure well.                                                                                   Findings:       The examined esophagus was normal.       The entire examine d stomach was normal.       The cardia and gastric fundus were normal on retroflexion.       The duodenal bulb, first portion of the duodenum and second portion of        the duodenum were normal.                                                                                   Impression:               - Normal esophagus.                            - Normal stomach.                            - Normal duodenal bulb, first portion of the                             duodenum and second portion of the duodenum.                            - No specimens collected.  Recommendation:           - Perform a colonoscopy.                            - Clear liquid diet.                            - Return patient to hospital [facility] for ongoing care.                                                                                    Procedure Code(s):       --- Professional ---       20065, Esophagogastroduodenoscopy, flexible, transoral; diagnostic,        including collection of specimen(s) by brush ing or washing, when        performed (separate procedure)  Diagnosis Code(s):       --- Professional ---       D50.9, Iron deficiency anemia, unspecified    CPT copyright 2020 [employer]. All rights reserved.    The codes documented in this report are preliminary and upon  review may   be revised to meet current compliance requirements.    Electronically Signed by Jules Ames  ___________________  Jules Lane MD  2/26/2022 7:26:08 PM  I was physically present for the entire viewing portion of the exam.  Jules Lane MD  Number of Addenda: 0    Note Initiated On: 2/26/2022 9:29 AM  Total Procedure Duration: 0 hours 2 minutes 50 seconds   Scope In: 10:40:29 AM  Scope Out: 10:43:19 AM     Glucose by meter    Collection Time: 02/26/22 12:47 PM   Result Value Ref Range    GLUCOSE BY METER POCT 99 70 - 99 mg/dL   Glucose by meter    Collection Time: 02/26/22  5:16 PM   Result Value Ref Range    GLUCOSE BY METER POCT 137 (H) 70 - 99 mg/dL   Hemoglobin    Collection Time: 02/26/22  8:04 PM   Result Value Ref Range    Hemoglobin 8.1 (L) 13.3 - 17.7 g/dL   Glucose by meter    Collection Time: 02/26/22  9:45 PM   Result Value Ref Range    GLUCOSE BY METER POCT 272 (H) 70 - 99 mg/dL   Glucose by meter    Collection Time: 02/27/22  2:29 AM   Result Value Ref Range    GLUCOSE BY METER POCT 158 (H) 70 - 99 mg/dL   Renal panel    Collection Time: 02/27/22  6:13 AM   Result Value Ref Range    Sodium 140 133 - 144 mmol/L    Potassium 3.6 3.4 - 5.3 mmol/L    Chloride 111 (H) 94 - 109 mmol/L    Carbon Dioxide (CO2) 26 20 - 32 mmol/L    Anion Gap 3 3 - 14 mmol/L    Urea Nitrogen 44 (H) 7 - 30 mg/dL    Creatinine 2.33 (H) 0.66 - 1.25 mg/dL    Calcium 8.1 (L)  8.5 - 10.1 mg/dL    Glucose 55 (L) 70 - 99 mg/dL    Albumin 2.9 (L) 3.4 - 5.0 g/dL    Phosphorus 3.9 2.5 - 4.5 mg/dL    GFR Estimate 27 (L) >60 mL/min/1.73m2   CBC with platelets and differential    Collection Time: 02/27/22  6:13 AM   Result Value Ref Range    WBC Count 6.4 4.0 - 11.0 10e3/uL    RBC Count 3.52 (L) 4.40 - 5.90 10e6/uL    Hemoglobin 7.6 (L) 13.3 - 17.7 g/dL    Hematocrit 26.1 (L) 40.0 - 53.0 %    MCV 74 (L) 78 - 100 fL    MCH 21.6 (L) 26.5 - 33.0 pg    MCHC 29.1 (L) 31.5 - 36.5 g/dL    RDW 20.5 (H) 10.0 - 15.0 %    Platelet Count 177 150 - 450 10e3/uL    % Neutrophils 61 %    % Lymphocytes 8 %    % Monocytes 18 %    % Eosinophils 12 %    % Basophils 1 %    % Immature Granulocytes 0 %    NRBCs per 100 WBC 0 <1 /100    Absolute Neutrophils 3.9 1.6 - 8.3 10e3/uL    Absolute Lymphocytes 0.5 (L) 0.8 - 5.3 10e3/uL    Absolute Monocytes 1.1 0.0 - 1.3 10e3/uL    Absolute Eosinophils 0.8 (H) 0.0 - 0.7 10e3/uL    Absolute Basophils 0.0 0.0 - 0.2 10e3/uL    Absolute Immature Granulocytes 0.0 <=0.4 10e3/uL    Absolute NRBCs 0.0 10e3/uL   Glucose by meter    Collection Time: 02/27/22  7:41 AM   Result Value Ref Range    GLUCOSE BY METER POCT 51 (L) 70 - 99 mg/dL   Glucose by meter    Collection Time: 02/27/22  8:18 AM   Result Value Ref Range    GLUCOSE BY METER POCT 119 (H) 70 - 99 mg/dL   COLONOSCOPY    Collection Time: 02/27/22  8:37 AM   Result Value Ref Range    COLONOSCOPY       [place] [address] [place]  [address]  _______________________________________________________________________________  Patient Name: Justyn Olivas        Procedure Date: 2/27/2022 8:37 AM  MRN: [id number]                       Account Number: [id number]  YOB: 1940              Admit Type: Inpatient  Age: 81                               Room: 2  Note Status: Finalized                Attending MD: Jules Lane MD  Instrument Name: [id number] Colonoscope    _______________________________________________________________________________     Procedure:                Colonoscopy  Indications:              Iron deficiency anemia  Providers:                Jules Lane MD, Bee Becerril RN, Tea Avila RN  Referring MD:               Medicines:                Fentanyl 50 micrograms IV, Midazolam 2 mg IV  Complications:            No immediate complications.  _________________________________________ ______________________________________  Procedure:                Pre-Anesthesia Assessment:                            - Prior to the procedure, a History and Physical                             was performed, and patient medications and                             allergies were reviewed. The patient is competent.                             The risks and benefits of the procedure and the                             sedation options and risks were discussed with the                             patient. All questions were answered and informed                             consent was obtained. Patient identification and                             proposed procedure were verified by the physician                             in the pre-procedure area. Mental Status                             Examination: alert and oriented. Airway                             Examination: normal oropharyngeal airway and neck                             mobility. Respiratory Examination: clear to                              auscultation. CV Examination: normal. Prophylactic                             Antibiotics: The patient does not require                             prophylactic antibiotics. Prior Anticoagulants: The                             patient has taken no anticoagulant or antiplatelet                             agents. ASA Grade Assessment: II - A patient with                             mild systemic disease. After reviewing the risks                              and benefits, the patient was deemed in                             satisfactory condition to undergo the procedure.                             The anesthesia plan was to use moderate sedation /                             analgesia (conscious sedation). Immediately prior                             to administration of medications, the patient was                             re-assessed for adequacy to receive sedatives. The                             heart rate, respiratory rate, oxygen saturations,                              blood pressure, adequacy of pulmonary ventilation,                             and response to care were monitored throughout the                             procedure. The physical status of the patient was                             re-assessed after the procedure.                            After obtaining informed consent, the colonoscope                             was passed under direct vision. Throughout the                             procedure, the patient's blood pressure, pulse, and                             oxygen saturations were monitored continuously. The                             Colonoscope was introduced through the anus and                             advanced to the terminal ileum. The ileocecal                             valve, appendiceal orifice, and rectum were                             photographed.                                                                                   Findings:       The perianal and digital rectal exa minations were normal.       The terminal ileum appeared normal.       A 5 mm polyp was found in the cecum. The polyp was sessile. The polyp        was removed with a cold biopsy forceps. Resection and retrieval were        complete.       Two sessile polyps were found in the transverse colon. The polyps were 8        mm in size. These polyps were removed with a cold snare. Resection and         retrieval were complete.       Multiple small-mouthed diverticula were found in the sigmoid colon,        descending colon and transverse colon.       Stool was found in the entire colon, making visualization difficult.        Lavage of the area was performed using copious amounts of sterile water,        resulting in clearance with good visualization.       Internal Hemorrhoids seen on retroflexion                                                                                   Moderate Sedation:       Moderate (conscious) sedation was administered by the endoscopy nurse        and supervised  by the endoscopist. The following parameters were        monitored: oxygen saturation, heart rate, blood pressure, and response        to care. Total physician intraservice time was 15 minutes.  Impression:               - The examined portion of the ileum was normal.                            - One 5 mm polyp in the cecum, removed with a cold                             biopsy forceps. Resected and retrieved.                            - Two 8 mm polyps in the transverse colon, removed                             with a cold snare. Resected and retrieved.                            - Diverticulosis in the sigmoid colon, in the                             descending colon and in the transverse colon.                            - Stool in the entire examined colon.  Recommendation:           - Return patient to hospital [facility] for ongoing care.                            - Resume regular diet.                            - To visualize the small bowel, perform video                              capsule endoscopy.                                                                                   Procedure Code(s):       --- Professional ---       04628, Colonoscopy, flexible; with removal of tumor(s), polyp(s), or        other lesion(s) by snare technique       91821, 59, Colonoscopy, flexible; with biopsy, single or multiple        , Moderate sedation services provided by the same physician or        other qualified health care professional performing a gastrointestinal        endoscopic service that sedation supports, requiring the presence of an        independent trained observer to assist in the monitoring of the        patient's level of consciousness and physiological status; initial 15        minutes of intra-service time; patient age 5 years or older (additional        time may be reported with 01244, as appropriate)  Diagnosis Code(s):       --- Professional ---       K63.5, Polyp of colon       D50.9, Iron deficiency anemia, unspecified       K57.30, Divert iculosis of large intestine without perforation or abscess        without bleeding    CPT copyright 2020 [employer]. All rights reserved.    The codes documented in this report are preliminary and upon  review may   be revised to meet current compliance requirements.    Electronically Signed by Jules Ames  ___________________  Jules Lane MD  2/28/2022 9:34:01 PM  I was physically present for the entire viewing portion of the exam.  Jules Lane MD  Number of Addenda: 0    Note Initiated On: 2/27/2022 8:37 AM  Scope Withdrawal Time: 0 hours 8 minutes 40 seconds   Total Procedure Duration: 0 hours 12 minutes 35 seconds   Scope In: 9:20:21 AM  Scope Out: 9:32:56 AM     Surgical Pathology Exam    Collection Time: 02/27/22  9:29 AM   Result Value Ref Range    Case Report       Surgical Pathology Report                         Case: [id number]                                  Authorizing Provider:  Jules Lane MD  Collected:           02/27/2022 09:29 AM          Ordering Location:     [facility]          Received:            02/28/2022 07:05 AM                                 [facility]                                                          Pathologist:           Kirill Jenkins MD PhD                                                       Specimens:   A) - Large Intestine, Colon, Cecum, Cecal polyp                                                     B) - Large Intestine, Colon, Transverse, 2 polyp                                           Final Diagnosis       A(1).  Colon, Cecum, polyp, polypectomy:  -Tubular adenoma  -Negative for high-grade dysplasia and malignancy.      B(2). Colon, Transverse, polyps x2, polypectomy:  -Tubular adenomas, fragments.  -Negative for high-grade dysplasia and malignancy.          Clinical Information       Procedure:  COLONOSCOPY, FLEXIBLE, WITH LESION REMOVAL USING SNARE  COLONOSCOPY, WITH POLYPECTOMY AND BIOPSY  Pre-op Diagnosis: D64.9 - Anemia, unspecified type [ICD-10-CM]  Post-op Diagnosis: D64.9 - Anemia, unspecified type [ICD-10-CM]      Gross Description       A(1). Large Intestine, Colon, Cecum, Cecal polyp:  The specimen is received in formalin, labeled with the patient's name, medical record number and other identifying information and designated  cecal polyp . It consists of a single tan soft tissue fragment measuring 0.4 cm in greatest dimension. Entirely submitted in one cassette.      B(2). Large Intestine, Colon, Transverse, 2 polyp:  The specimen is received in formalin, labeled with the patient's name, medical record number and other identifying information and designated  transverse colon, 2 polyp . It consists of 6 tan soft tissue fragments admixed with intestinal debris ranging from 0.3-0.7 cm in greatest dimension. Entirely submitted in one cassette.    (Ramonita Rodríguez MD)        Microscopic Description       Microscopic examination was performed.      Performing Labs       The technical component of this testing was completed at [facility]      Case Images     Glucose by meter    Collection Time: 02/27/22 12:33 PM   Result Value Ref Range    GLUCOSE BY METER POCT 80 70 - 99 mg/dL   Hemoglobin    Collection Time:  03/01/22  1:37 PM   Result Value Ref Range    Hemoglobin 7.8 (L) 13.3 - 17.7 g/dL   Hematocrit    Collection Time: 03/01/22  1:37 PM   Result Value Ref Range    Hematocrit 27.0 (L) 40.0 - 53.0 %   Basic metabolic panel  (Ca, Cl, CO2, Creat, Gluc, K, Na, BUN)    Collection Time: 03/04/22  1:58 PM   Result Value Ref Range    Sodium 139 133 - 144 mmol/L    Potassium 4.7 3.4 - 5.3 mmol/L    Chloride 106 94 - 109 mmol/L    Carbon Dioxide (CO2) 26 20 - 32 mmol/L    Anion Gap 7 3 - 14 mmol/L    Urea Nitrogen 47 (H) 7 - 30 mg/dL    Creatinine 2.40 (H) 0.66 - 1.25 mg/dL    Calcium 8.5 8.5 - 10.1 mg/dL    Glucose 207 (H) 70 - 99 mg/dL    GFR Estimate 26 (L) >60 mL/min/1.73m2   CBC with platelets    Collection Time: 03/04/22  1:58 PM   Result Value Ref Range    WBC Count 7.2 4.0 - 11.0 10e3/uL    RBC Count 3.60 (L) 4.40 - 5.90 10e6/uL    Hemoglobin 7.8 (LL) 13.3 - 17.7 g/dL    Hematocrit 27.5 (L) 40.0 - 53.0 %    MCV 76 (L) 78 - 100 fL    MCH 21.7 (L) 26.5 - 33.0 pg    MCHC 28.4 (L) 31.5 - 36.5 g/dL    RDW 22.4 (H) 10.0 - 15.0 %    Platelet Count 196 150 - 450 10e3/uL         Patient Instructions   Congestive heart failure    Plan to follow-up with cardiology.  We will check labs today.  Continue on higher dose of torsemide.  Plan to lab follow up in cardiology for echocardiogram [facility] - [phone]     Acute blood loss anemia, suspect small bowel bleeding  H/o Chronic iron deficiency anemia due to chronic blood loss   Noted that your hemoglobin is now stable.  We will continue to monitor and recheck today.  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

## 2022-03-06 PROBLEM — R59.0 MEDIASTINAL LYMPHADENOPATHY: Status: ACTIVE | Noted: 2022-01-01

## 2022-03-06 NOTE — RESULT ENCOUNTER NOTE
Rickie Alejandra,    I have had the opportunity to review your recent results and an interpretation is as follows:  Your complete blood counts all returned stable, and recheck in 2 weeks is recommended    Your kidney function is also stable and continue monitoring with cardiology and nephrology as recommended as well    Sincerely,  Christian Davidson MD

## 2022-03-07 NOTE — TELEPHONE ENCOUNTER
Routing refill request to provider for review/approval because:  Labs out of range:  Kenrick GONGORA RN  EP Triage

## 2022-03-15 NOTE — TELEPHONE ENCOUNTER
Please see mychart message from patient and complete the disability parking certificate form if appropriate.  (Grab one from [facility] or [employer] in [place])    Tawnya Newman MA

## 2022-03-15 NOTE — TELEPHONE ENCOUNTER
MA please address, patient requesting handicap parking sticker.     Sarah Kenyon RN  [facility]

## 2022-03-18 NOTE — TELEPHONE ENCOUNTER
I Spoke to Pt.  Form mailed to Pt for him to complete his portion and sign.  Copy of form sent to [facility] and placed on accordion file-[place]

## 2022-03-22 PROBLEM — D64.9 ANEMIA, UNSPECIFIED TYPE: Status: ACTIVE | Noted: 2022-01-01

## 2022-03-22 PROBLEM — N18.9 CHRONIC KIDNEY DISEASE, UNSPECIFIED CKD STAGE: Status: ACTIVE | Noted: 2022-01-01

## 2022-03-22 NOTE — PROGRESS NOTES
RECEIVING UNIT ED HANDOFF REVIEW    ED Nurse Handoff Report was reviewed by: Tahir Palma RN on March 22, 2022 at 2:56 PM

## 2022-03-22 NOTE — RESULT ENCOUNTER NOTE
Rickie Alejandra,    I have had the opportunity to review your recent results and an interpretation is as follows:  Severe decline in hemoglobin - note that you have gone to emergency department for evaluation     Sincerely,  Christian Davidson MD

## 2022-03-22 NOTE — ED PROVIDER NOTES
History   Chief Complaint:  Abnormal Labs       The history is provided by the patient.      Justyn Olivas is a 81 year old male with history of anemia, type II diabetes mellitus, hypertension and stage 4 chronic kidney disease who presents with low hemoglobin result (5.8) from his infusion clinic. He notes of being weak and losing weight recently. His hemoglobin was last checked on 02/26/22 and was 8.1. He notes of recently being constipated and is unsure of recent blood in stool. Notes of recent colonoscopy and upper GI endoscopy, results below. No coughing up blood. Denies vomiting and hematuria. He notes of itchiness of his back. He recently had a rash there which dermatology examined and noted was most likely a reaction to medication.     Results of Upper GI Endoscopy on 02/26/22:  - Normal esophagus.   - Normal stomach.  - Normal duodenal bulb, first portion of the duodenum and second portion of the duodenum.   - No specimens collected.    Results of Colonoscopy on 02/27/22:  - The examined portion of the ileum was normal.   - One 5 mm polyp in the cecum, removed with a cold biopsy forceps. Resected and retrieved.   - Two 8 mm polyps in the transverse colon, removed with a cold snare. Resected and retrieved.   - Diverticulosis in the sigmoid colon, in the descending colon and in the transverse colon.   - Stool in the entire examined colon.    Review of Systems   Constitutional: Positive for unexpected weight change.   Gastrointestinal: Positive for constipation. Negative for vomiting.   Genitourinary: Negative for hematuria.   Neurological: Positive for weakness.   All other systems reviewed and are negative.    Allergies:  The patient has no known allergies.     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    Physical Exam     Patient Vitals for the past 24 hrs:   BP Temp Temp src Pulse Resp SpO2 Height Weight   03/22/22 1513 118/63 98.5  F (36.9  C) -- 83 16 -- -- --   03/22/22 1142 123/58 97.1  F (36.2  C) Temporal 83 18 100 % 1.829 m (6') 92.5 kg (204 lb)       Physical Exam  VS: Reviewed per above  HENT: normal speech  EYES: sclera anicteric  CV: Rate as noted, regular rhythm.   RESP: Effort normal. Breath sounds are normal bilaterally.  GI: no tenderness/rebound/guarding, not distended.  Rectal exam: Formed stool, no melena or hematochezia.  NEURO: Alert, moving all extremities  MSK: No deformity of the extremities  SKIN: Warm and dry    Emergency Department Course     Imaging:  XR Chest Port 1 View    (Results Pending)     Report per radiology    Laboratory:  Labs Ordered and Resulted from Time of ED Arrival to Time of ED Departure   BASIC METABOLIC PANEL - Abnormal       Result Value    Sodium 137      Potassium 4.2      Chloride 104      Carbon Dioxide (CO2) 30      Anion Gap 3      Urea Nitrogen 57 (*)     Creatinine 2.57 (*)     Calcium 8.5      Glucose 179 (*)     GFR Estimate 24 (*)    INR - Abnormal    INR 1.25 (*)    OCCULT BLOOD STOOL - Abnormal    Occult Blood Positive (*)    CBC WITH PLATELETS AND DIFFERENTIAL - Abnormal    WBC Count 8.0      RBC Count 2.90 (*)     Hemoglobin 5.8 (*)     Hematocrit 21.2 (*)     MCV 73 (*)     MCH 20.0 (*)     MCHC 27.4 (*)     RDW 20.8 (*)      Platelet Count 221      % Neutrophils 66      % Lymphocytes 5      % Monocytes 12      % Eosinophils 15      % Basophils 1      % Immature Granulocytes 1      NRBCs per 100 WBC 0      Absolute Neutrophils 5.3      Absolute Lymphocytes 0.4 (*)     Absolute Monocytes 1.0      Absolute Eosinophils 1.2 (*)     Absolute Basophils 0.0      Absolute Immature Granulocytes 0.0      Absolute NRBCs 0.0     NT PROBNP INPATIENT - Abnormal    N terminal Pro BNP Inpatient 2,059 (*)    COVID-19 VIRUS (CORONAVIRUS) BY PCR - Normal    SARS CoV2 PCR Negative     PREPARE RED BLOOD CELLS (UNIT)    CROSSMATCH Compatible      UNIT ABO/RH A Pos      Unit Number [id number]      Unit Status Ready      Blood Component Type Red Blood Cells      Product Code J6510X35      CODING SYSTEM YECT446      UNIT TYPE ISBT 6200     PREPARE RED BLOOD CELLS (UNIT)    CROSSMATCH Compatible      UNIT ABO/RH A Pos      Unit Number [id number]      Unit Status Issued      Blood Component Type Red Blood Cells      Product Code R9291F29      CODING SYSTEM HAUN108      UNIT TYPE ISBT 6200      ISSUE DATE AND TIME [id number]     PREPARE RED BLOOD CELLS (UNIT)   TRANSFUSE RED BLOOD CELLS (UNIT)        Emergency Department Course:       Reviewed:  I reviewed nursing notes, vitals, past medical history and Care Everywhere    Assessments:  1234 I obtained history and examined the patient as noted above.     1306 I rechecked the patient.    Consults:  1356 I spoke to Dr. Gonzalez, hospitalist, who accepts the patient.     Interventions:  Medications   furosemide (LASIX) injection 60 mg (has no administration in time range)       Disposition:  The patient was admitted to the hospital under the care of Dr. Gonzalez.     Impression & Plan         Medical Decision Making:  Patient presents to the ER for evaluation of acute on chronic anemia.  On arrival vital signs are reassuring.  Hemoglobin today is 5.8.  He has been feeling generally weak.  He was recently admitted 1  month ago with anemia requiring transfusion and had both upper endoscopy as well as colonoscopy without definitive source of blood loss.  Today Hemoccult testing is positive but there is no melena or hematochezia or signs of brisk GI bleed.  There wa concern from recent hospitalization that he could have small bowel source of blood loss in conjunction with anemia due to CKD.  After obtaining verbal and written consent, 2 units of packed red cells were ordered for transfusion.  He was admitted to the hospitalist service for further management.  After discussion with hospitalist team, BNP and chest x-ray was ordered due to history of chronic diastolic heart failure.  These studies were pending while under my care.      Diagnosis:    ICD-10-CM    1. Chronic kidney disease, unspecified CKD stage  N18.9    2. Anemia, unspecified type  D64.9        Scribe Disclosure:  I, Jackie Bowman, am serving as a scribe at 12:17 PM on 3/22/2022 to document services personally performed by Donavon Rosales MD based on my observations and the provider's statements to me.            Donavon Rosales MD  03/22/22 6319

## 2022-03-22 NOTE — H&P
[facility]    History and Physical  Hospitalist       Date of Admission:  3/22/2022    Assessment & Plan   Justyn Olivas is a 81 year old male who presents with abnormal labs, found to have hgb 5.8 after being resumed on plavix.    Acute blood loss anemia, suspect small bowel bleeding  H/o Chronic iron deficiency anemia due to chronic blood loss  Hgb 5.8 on admit. Resumed on his plavix 3/4. Baseline hgb 8-9 at goal. Aranesp was recently ordered for increased dose by Dr Greene and he was also ordered for 2 infusions of iron. history of angiodysplasia of colon and duodenum in the past. Suspect his bleeding is multifactorial with CKD and likely some small bowel bleed. He was unable to follow up for capsule study outpatient  EGD 2/26 was without sign of obvious bleeding.   Colonoscopy 2/27 noted poor prep but no evidence of bleeding, they suspect possibly bleeding from the small bowels  - continue protonix BID  - reported to have issues with aspirin and bleeding in past as well  - stop plavix (evidently he having ongoing bleed given that he has been resumed for last 2.5 weeks)  - Dr Lane will set up outpatient capsule study on Thurs/Fri. He will not formally be seeing patient this admission unless something changes  - Given 2 units PRBCs in ED  - q8h BG checks  - received 150mcg aranesp as outpatient 3/22  - bowel regimen available for harder stools    Heart failure preserved EF, chronic  Noted to have lower extremity edema and possible abdominal ascites. Due for echo during last admit, but discharged prior to completion. Dr Greene has adjusted torsemide outpatient with 80mg and 40mg on alternating days. BNP 2059. CXR shows improving right mid/lower lung patchy consolidation. Mild cardiomegaly, bilateral vascular congestion.  - check echocardiogram this admit  -check daily weights, I/Os  - 60mg IV lasix q8h x3 doses (first given after unit PRBCs completed), then likely resume torsemide  on discharge    Insomnia, unspecified type   Continue prior to admission trazodone     Benign essential hypertension  Hyperlipidemia LDL goal <100   Continue prior to admission Lipitor/Coreg     Cerebrovascular accident (CVA) due to embolism of cerebral artery  Noted (this is why he is on plavix). Will need to discuss stroke risk with remaining off antiplatelet vs ongoing bleeding risk while being on antiplatelets. It appears he benefits of stopping plavix outweigh risk of stroke at this time (given frequent admits with ongoing bleeding requiring transfusion at this time)     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)  Creatinine on admission of 2.57, with baseline around 2.2-2.6 range. He follows with Dr Greene outpatient     Type 2 diabetes mellitus with stage 3b chronic kidney disease, with long-term current use of insulin (H)  PTA 25u lantus at bedtime, sliding scale.  - Continue lantus 18 units at bedtime  - high resistance sliding scale insulin    Skin rash, chronic  Follow up with dermatology as scheduled 3/24    Clinically Significant Risk Factors Present on Admission               # Coagulation Defect: INR = 1.25 (Ref range: 0.85 - 1.15) and/or PTT = N/A on admission, will monitor for bleeding  # Platelet Defect: home medication list includes an antiplatelet medication   # Overweight: Estimated body mass index is 27.67 kg/m  as calculated from the following:    Height as of this encounter: 1.829 m (6').    Weight as of this encounter: 92.5 kg (204 lb).        DVT Prophylaxis: Pneumatic Compression Devices  Code Status: Full Code  Expected Discharge:  1-3 days   Anticipated discharge location:  Awaiting care coordination huddle  Delays:     await hgb stable      Radha Gonzalez DO    Primary Care Physician   Christian Davidson MD    Chief Complaint   Abnormal labs    History is obtained from the patient, prior record review    History of Present Illness   Justyn Olivas is a 81 year old male  who presents with fatigue, found to have abnormal labs. Admitted 2/25-2/27 underwent upper and lower endoscopy without evidence of active bleed. His plavix was held for a week total and was resumed 3 days after discharge. Since then he has had ongoing drop in Hgb and ongoing fatigue. He was due for echocardiogram follow up and outpatient capsule study, but did not complete either in the last 2.5 weeks. He denies change in his stools other than maybe some firmer stools in last few days that appeared darker. No blood or abdominal pain. Discussed at length stroke risk/stopping antiplatelets/bleeding risk. Reviewed case with Dr Lane. He reports that he has been adjusting diuretics with Dr Greene and seeing good improvement in his lower extremity edema.  Denies fevers, chills, chest pain, abdominal pain, nausea, vomiting.    Past Medical History    I have reviewed this patient's medical history and updated it with pertinent information if needed.   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  Past Surgical History:   Procedure Laterality Date     BONE MARROW BIOPSY, BONE SPECIMEN, NEEDLE/TROCAR N/A 5/14/2021    Procedure: BIOPSY, BONE MARROW;  Surgeon: Juanjo Hoffman MD;  Location: [facility]     COLONOSCOPY N/A 2/27/2022    Procedure: COLONOSCOPY, FLEXIBLE, WITH LESION REMOVAL USING SNARE;  Surgeon: Jules Lane MD;  Location: [facility]     COLONOSCOPY N/A 2/27/2022    Procedure: COLONOSCOPY, WITH POLYPECTOMY AND BIOPSY;  Surgeon: Jules Lane MD;  Location:  GI     CV  PERICARDIOCENTESIS N/A 12/21/2020    Procedure: Pericardiocentesis;  Surgeon: Chas Chambers MD;  Location:  HE[facility]ARD[facility]ATH LAB     ENT SURGERY       ESOPHAGOSCOPY, GASTROSCOPY, DUODENOSCOPY (EGD), COMBINED N/A 3/27/2021    Procedure: Esophagogastroduodenoscopy, With Biopsy;  Surgeon: Luc Nash MD;  Location:  GI     ESOPHAGOSCOPY, GASTROSCOPY, DUODENOSCOPY (EGD), COMBINED N/A 2/26/2022    Procedure: ESOPHAGOGASTRODUODENOSCOPY (EGD);  Surgeon: Jules Lane MD;  Location:  GI     LAPAROSCOPIC CHOLECYSTECTOMY N/A 9/2/2021    Procedure: LAPAROSCOPIC CHOLECYSTECTOMY;  Surgeon: Len Coates MD;  Location:  OR     SOFT TISSUE SURGERY      skin cancer surgery on nose       Prior to Admission Medications   Prior to Admission Medications   Prescriptions Last Dose Informant Patient Reported? Taking?   Ferrous Sulfate 324 (65 Fe) MG TBEC not taking Self No No   Sig: Take 1 tablet (324 mg) by mouth 2 times daily   SENNA-docusate sodium (SENNA S) 8.6-50 MG tablet prn Self No Yes   Sig: Take 1 tablet by mouth 2 times daily   Patient taking differently: Take 1 tablet by mouth nightly as needed    albuterol (PROAIR HFA) 108 (90 Base) MCG/ACT inhaler 3/22/2022 at Unknown time Self No Yes   Sig: INHALE 2 PUFFS BY MOUTH FOUR TIMES DAILY AS NEEDED   atorvastatin (LIPITOR) 20 MG tablet 3/22/2022 at am Self No Yes   Sig: Take 1 tablet (20 mg) by mouth daily   blood glucose (STEVE CONTOUR) test strip  Self No No   Sig: TESTING FOUR TIMES DAILY OR AS DIRECTED   carvedilol (COREG) 6.25 MG tablet 3/22/2022 at am Self No Yes   Sig: Take 1 tablet (6.25 mg) by mouth 2 times daily (with meals)   clobetasol (TEMOVATE) 0.05 % external cream prn Self Yes Yes   Sig: Apply topically daily as needed   clopidogrel (PLAVIX) 75 MG tablet 3/22/2022 at am Self No Yes   Sig: Take 1 tablet (75 mg) by mouth daily   dorzolamide-timolol (COSOPT) 2-0.5 % ophthalmic solution 3/22/2022 at am Self Yes Yes   Sig: Place 1  drop into the right eye 2 times daily    fluticasone (FLONASE) 50 MCG/ACT nasal spray 3/22/2022 at am Self No Yes   Sig: Spray 2 sprays into both nostrils daily   folic acid-vit B6-vit B12 (FOLGARD) 0.8-10-0.115 MG TABS per tablet 3/22/2022 at am Self No Yes   Sig: Take 1 tablet by mouth daily Continue per Nephrology recommendation   hydrocortisone (CORTAID) 1 % external cream   Yes Yes   Sig: Apply 1 g topically At Bedtime For itching on BACK   insulin glargine (LANTUS SOLOSTAR) 100 UNIT/ML pen 3/21/2022 at 9 u am Self No Yes   Sig: ADMINISTER 25 UNITS UNDER THE SKIN AT BEDTIME   insulin lispro (HUMALOG KWIKPEN) 100 UNIT/ML (1 unit dial) KWIKPEN 3/22/2022 at am Self No Yes   Sig: Inject 5-15 Units Subcutaneous 3 times daily (before meals) Sliding scale with meals   Patient taking differently: Inject 5-15 Units Subcutaneous 3 times daily (before meals) Sliding scale with meals:  <100: 5 units  101-150: 7 units  151-250: 9 units  251-350: 12 units  >351 15 units   insulin pen needle (BD PEN NEEDLE FERCHO 2ND GEN) 32G X 4 MM miscellaneous  Self No No   Sig: USE AS DIRECTED UP TO FOUR TIMES DAILY   pantoprazole (PROTONIX) 40 MG EC tablet 3/22/2022 at Unknown time Self No Yes   Sig: TAKE 1 TABLET(40 MG) BY MOUTH TWICE DAILY   potassium chloride ER (K-TAB) 20 MEQ CR tablet 3/22/2022 at Unknown time Self No Yes   Sig: Take 1 tablet (20 mEq) by mouth daily   sertraline (ZOLOFT) 50 MG tablet 3/21/2022 at pm Self No Yes   Sig: Take 1 tablet (50 mg) by mouth daily   torsemide (DEMADEX) 20 MG tablet 3/21/2022 at 80mg  No Yes   Sig: Take 1 tablet (20 mg) by mouth daily   Patient taking differently: Take by mouth See Admin Instructions Take 40mg alternating every other day with 80mg.   traZODone (DESYREL) 100 MG tablet 3/21/2022 at hs Self No Yes   Sig: TAKE 1 TABLET(100 MG) BY MOUTH AT BEDTIME   vitamin B-12 (CYANOCOBALAMIN) 1000 MCG tablet 3/22/2022 at am Self No Yes   Sig: Take 1 tablet (1,000 mcg) by mouth daily Continue unitl  hematology evaluation as borderline low vitamin b12.      Facility-Administered Medications: None     Allergies   Allergies   Allergen Reactions     No Known Allergies        Social History   I have reviewed this patient's social history and updated it with pertinent information if needed. Justyn Olivas  reports that he quit smoking about 46 years ago. His smoking use included cigarettes. He started smoking about 57 years ago. He has a 22.00 pack-year smoking history. He has never used smokeless tobacco. He reports that he does not drink alcohol and does not use drugs.    Family History   I have reviewed this patient's family history and updated it with pertinent information if needed.   Family History   Problem Relation Age of Onset     Family History Negative Mother      Family History Negative Father        Review of Systems   The 10 point Review of Systems is negative other than noted in the HPI    Physical Exam   Temp: 98.5  F (36.9  C) Temp src: Temporal BP: 126/74 Pulse: 83   Resp: 16 SpO2: (!) 89 %      Vital Signs with Ranges  Temp:  [97.1  F (36.2  C)-98.5  F (36.9  C)] 98.5  F (36.9  C)  Pulse:  [83-85] 83  Resp:  [16-18] 16  BP: (118-130)/(58-74) 126/74  SpO2:  [89 %-100 %] 89 %  204 lbs 0 oz    Constitutional: Awake, alert, cooperative, no apparent distress.  Eyes: Conjunctiva and pupils examined and normal.  HEENT: Moist mucous membranes, normal dentition.  Respiratory: Clear to auscultation bilaterally, no crackles or wheezing.  Cardiovascular: Regular rate and rhythm, normal S1 and S2, and no murmur noted.  GI: Soft, protuberant abdomen, non-tender, normal bowel sounds.  Lymph/Hematologic: No anterior cervical or supraclavicular adenopathy.  Skin: No cyanosis, +1 lower extremity edema. Scattered small red spots on skin upper and lower extremities.  Musculoskeletal: No joint swelling, erythema or tenderness.  Neurologic: Cranial nerves 2-12 intact, normal strength and sensation.  Psychiatric:  Alert, oriented to person, place and time, no obvious anxiety or depression.    Data   Data reviewed today:  I personally reviewed CXR shows improving right mid/lower lung patchy consolidation. Mild cardiomegaly, bilateral vascular congestion.  Recent Labs   Lab 03/22/22  1313 03/22/22  1008   WBC 8.0 8.1   HGB 5.8* 5.8*   MCV 73* 74*    223   INR 1.25*  --     138   POTASSIUM 4.2 4.5   CHLORIDE 104 104   CO2 30 26   BUN 57* 55*   CR 2.57* 2.51*   ANIONGAP 3 8   VANESA 8.5 8.2*   * 257*   ALBUMIN  --  3.4       Recent Results (from the past 24 hour(s))   XR Chest Port 1 View    Narrative    CHEST ONE VIEW PORTABLE   3/22/2022 3:46 PM     HISTORY: History of CHF, anemia.    COMPARISON: 2/25/2022.      Impression    IMPRESSION: Patchy consolidation at the right mid to low lung appears  improved compared to 2/25/2022. No new airspace disease identified.  Mild cardiomegaly appears stable. Bilateral vascular congestion  appears stable.

## 2022-03-22 NOTE — PROGRESS NOTES
Infusion Nursing Note:  Justyn Olivas presents today for Aranesp.    Patient seen by provider today: No   present during visit today: Not Applicable.    Note: Hgb 5.8 today, pt sent to ER for transfusion given h/o CHF. Aranesp given. Type and screening added on to earlier lab.        Intravenous Access:  No Intravenous access/labs at this visit.    Treatment Conditions:  Lab Results   Component Value Date    HGB 5.8 (LL) 03/22/2022    WBC 8.1 03/22/2022    ANEU 4.8 06/17/2021    ANEUTAUTO 3.9 02/27/2022     03/22/2022      Results reviewed, labs MET treatment parameters, ok to proceed with treatment.      Post Infusion Assessment:  Patient tolerated injection without incident.  Site patent and intact, free from redness, edema or discomfort.       Discharge Plan:   Discharge instructions reviewed with: Patient.  Patient and/or family verbalized understanding of discharge instructions and all questions answered.  AVS to patient via [employer].     Patient discharged in stable condition accompanied by: self.  Departure Mode: Wheelchair.      Tod Jo RN

## 2022-03-22 NOTE — ED TRIAGE NOTES
[facility]  ED Arrival Note    Arrives through triage. ABC's intact. A &O X4. . Pt arrives with c/o low hemoglobin (5.8) from his infusion clinic. Sent to ED for blood transfusion.       Visitors during triage: Spouse      Triage Interventions: N/A    Ambulatory: Yes    Meets Stroke Criteria?: No    Meets Trauma Criteria?: No    Shock Index: <0.8, for provider reference    Directed to: Triage Lobby    Pronouns: he/him

## 2022-03-22 NOTE — PHARMACY-ADMISSION MEDICATION HISTORY
Pharmacy Medication History  Admission medication history interview status for the 3/22/2022  admission is complete. See EPIC admission navigator for prior to admission medications     Location of Interview: Patient room  Medication history sources: Patient    Significant changes made to the medication list:       In the past week, patient estimated taking medication this percent of the time: greater than 90%    Additional medication history information:        Medication reconciliation completed by provider prior to medication history? No    Time spent in this activity: 20min    Prior to Admission medications    Medication Sig Last Dose Taking? Auth Provider   albuterol (PROAIR HFA) 108 (90 Base) MCG/ACT inhaler INHALE 2 PUFFS BY MOUTH FOUR TIMES DAILY AS NEEDED 3/22/2022 at Unknown time Yes Christian Davidson MD   atorvastatin (LIPITOR) 20 MG tablet Take 1 tablet (20 mg) by mouth daily 3/22/2022 at am Yes Christian Davidson MD   carvedilol (COREG) 6.25 MG tablet Take 1 tablet (6.25 mg) by mouth 2 times daily (with meals) 3/22/2022 at am Yes Kelly Sen PA-C   clobetasol (TEMOVATE) 0.05 % external cream Apply topically daily as needed prn Yes Unknown, Entered By History   clopidogrel (PLAVIX) 75 MG tablet Take 1 tablet (75 mg) by mouth daily 3/22/2022 at am Yes Christian Davidson MD   dorzolamide-timolol (COSOPT) 2-0.5 % ophthalmic solution Place 1 drop into the right eye 2 times daily  3/22/2022 at am Yes Unknown, Entered By History   fluticasone (FLONASE) 50 MCG/ACT nasal spray Spray 2 sprays into both nostrils daily 3/22/2022 at am Yes Christian Davidson MD   folic acid-vit B6-vit B12 (FOLGARD) 0.8-10-0.115 MG TABS per tablet Take 1 tablet by mouth daily Continue per Nephrology recommendation 3/22/2022 at am Yes Amador Almanza MD   hydrocortisone (CORTAID) 1 % external cream Apply 1 g topically At Bedtime For itching on BACK  Yes Unknown, Entered By History   insulin  glargine (LANTUS SOLOSTAR) 100 UNIT/ML pen ADMINISTER 25 UNITS UNDER THE SKIN AT BEDTIME 3/21/2022 at 9 u am Yes Christian Davidson MD   insulin lispro (HUMALOG KWIKPEN) 100 UNIT/ML (1 unit dial) KWIKPEN Inject 5-15 Units Subcutaneous 3 times daily (before meals) Sliding scale with meals  Patient taking differently: Inject 5-15 Units Subcutaneous 3 times daily (before meals) Sliding scale with meals:  <100: 5 units  101-150: 7 units  151-250: 9 units  251-350: 12 units  >351 15 units 3/22/2022 at am Yes Christian Davidson MD   pantoprazole (PROTONIX) 40 MG EC tablet TAKE 1 TABLET(40 MG) BY MOUTH TWICE DAILY 3/22/2022 at Unknown time Yes Christian Davidson MD   potassium chloride ER (K-TAB) 20 MEQ CR tablet Take 1 tablet (20 mEq) by mouth daily 3/22/2022 at Unknown time Yes Radha Gonzalez DO   SENNA-docusate sodium (SENNA S) 8.6-50 MG tablet Take 1 tablet by mouth 2 times daily  Patient taking differently: Take 1 tablet by mouth nightly as needed  prn Yes Christian Davidson MD   sertraline (ZOLOFT) 50 MG tablet Take 1 tablet (50 mg) by mouth daily 3/21/2022 at pm Yes Christian Davidson MD   torsemide (DEMADEX) 20 MG tablet Take 1 tablet (20 mg) by mouth daily  Patient taking differently: Take by mouth See Admin Instructions Take 40mg alternating every other day with 80mg. 3/21/2022 at 80mg Yes Christian Davidson MD   traZODone (DESYREL) 100 MG tablet TAKE 1 TABLET(100 MG) BY MOUTH AT BEDTIME 3/21/2022 at hs Yes Christian Davidson MD   vitamin B-12 (CYANOCOBALAMIN) 1000 MCG tablet Take 1 tablet (1,000 mcg) by mouth daily Continue unitl hematology evaluation as borderline low vitamin b12. 3/22/2022 at am Yes Kristine Nash MD   blood glucose (STEVE CONTOUR) test strip TESTING FOUR TIMES DAILY OR AS DIRECTED   Christian Davidson MD   Ferrous Sulfate 324 (65 Fe) MG TBEC Take 1 tablet (324 mg) by mouth 2 times daily not taking  Leif Camacho MD   insulin pen  needle (BD PEN NEEDLE FERCHO 2ND GEN) 32G X 4 MM miscellaneous USE AS DIRECTED UP TO FOUR TIMES DAILY   Christian Davidson MD       The information provided in this note is only as accurate as the sources available at the time of update(s)

## 2022-03-22 NOTE — ED NOTES
[facility]  ED Nurse Handoff Report    ED Chief complaint: Abnormal Labs      ED Diagnosis:   Final diagnoses:   Chronic kidney disease, unspecified CKD stage   Anemia, unspecified type       Code Status: Full Code    Allergies:   Allergies   Allergen Reactions     No Known Allergies        Patient Story: Patient had labs drawn this am and was called to present to ED because of his low hgb.  Focused Assessment:  Patient states he is more tired than usual and is pale.    Treatments and/or interventions provided: Blood products to be started soon.  Patient's response to treatments and/or interventions: n/s    To be done/followed up on inpatient unit:  See orders.    Does this patient have any cognitive concerns?: None.    Activity level - Baseline/Home:  Independent w/cane  Activity Level - Current:   Cane    Patient's Preferred language: English   Needed?: No    Isolation: None  Infection: Not Applicable  Patient tested for COVID 19 prior to admission: YES  Bariatric?: No    Vital Signs:   Vitals:    03/22/22 1142   BP: 123/58   Pulse: 83   Resp: 18   Temp: 97.1  F (36.2  C)   TempSrc: Temporal   SpO2: 100%   Weight: 92.5 kg (204 lb)   Height: 1.829 m (6')       Cardiac Rhythm:     Was the PSS-3 completed:   Yes  What interventions are required if any?               Family Comments: Patient's wife is bedside offering appropriate support. Patient is very pleasant and able to make needs known.   OBS brochure/video discussed/provided to patient/family: N/A              Name of person given brochure if not patient: N/A              Relationship to patient: N/A    For the majority of the shift this patient's behavior was Green.   Behavioral interventions performed were Absolutely none needed.    ED NURSE PHONE NUMBER: [phone]

## 2022-03-22 NOTE — PLAN OF CARE
Pt is A&Ox4, VSS on RA. Ax2, cane/IV pole and GB. Modcarb/low Na diet. Tele SR. Pt is receiving second unit of blood currently. Denies dizziness but admits feeling very weak and tired. Pt had 1 small hard BM this evening, is continent of B&B. Denies SOB. Voiding well. Lasix given after 1st unit of blood complete. Skin rash on back, chest, itchy per pt. Plan for echo tomorrow. Discharge pending.

## 2022-03-22 NOTE — PROGRESS NOTES
Nursing Note:  Justyn Olivas presents today for peripheral labs.    Patient seen by provider today: No   present during visit today: Not Applicable.    Note: N/A.    Intravenous Access:  Lab draw site left AC, Needle type butterfly, Gauge 25.  Labs drawn without difficulty.    Discharge Plan:   Patient was sent to [facility] for [facility] appointment.    Lizzeth Hogan RN

## 2022-03-23 PROBLEM — I31.39 PERICARDIAL EFFUSION: Status: ACTIVE | Noted: 2022-01-01

## 2022-03-23 NOTE — DISCHARGE SUMMARY
[facility]  Hospitalist Discharge Summary      Date of Admission:  3/22/2022  Date of Discharge:  3/23/2022  1:22 PM  Discharging Provider: Marilee Manley MD  Discharge Service: Hospitalist Service    Discharge Diagnoses   Acute blood loss anemia, suspect small bowel bleeding  H/o Chronic iron deficiency anemia due to chronic blood loss  Acute diastolic CHF    Chronic medical problems:  HTN  HLP  CKD stage 4  DM type 2  H/o CVA    Follow-ups Needed After Discharge   Follow-up Appointments     Follow-up and recommended labs and tests       Follow up with primary care provider, Christian Davidson MD, within   7 days for hospital follow- up.  The following labs/tests are recommended:   Hb, BMP.    Follow up with GI- Dr Lane for capsule endoscopy.  Follow up with nephrology- Dr Groves  Follow up with Cardiology as scheduled.  Follow up with Neurology in 2-3 weeks.             Unresulted Labs Ordered in the Past 30 Days of this Admission    None.     Discharge Disposition   Discharged to home  Condition at discharge: Good      Hospital Course   Justyn Olivas is a 81 year old male who presents with abnormal labs, found to have hgb 5.8.  As per H&P- Admitted 2/25-2/27 underwent upper and lower endoscopy without evidence of active bleed. His plavix was held for a week total and was resumed 3 days after discharge. Since then he has had ongoing drop in Hgb and ongoing fatigue. He was due for echocardiogram follow up and outpatient capsule study, but did not complete either in the last 2.5 weeks. He denies change in his stools other than maybe some firmer stools in last few days that appeared darker. No blood or abdominal pain. Discussed at length stroke risk/stopping antiplatelets/bleeding risk. Reviewed case with Dr Lane. He reports that he has been adjusting diuretics with Dr Greene and seeing good improvement in his lower extremity edema.  Denies fevers, chills, chest pain,  abdominal pain, nausea, vomiting.     Acute blood loss anemia, suspect small bowel bleeding  H/o Chronic iron deficiency anemia due to chronic blood loss  - Hgb 5.8 on admit; iron studies suggest iron deficiency anemia; occult blood positive..   - Admitted 2/25-2/27 underwent upper and lower endoscopy without evidence of active bleed. His plavix was held for a week total and was resumed 3 days after discharge  - Baseline hgb 8-9 at goal. Aranesp was recently ordered at increased dose by Dr Greene and he was also ordered for 2 infusions of iron; history of angiodysplasia of colon and duodenum in the past. Suspect his bleeding is multifactorial with CKD and likely some small bowel bleed. He was unable to follow up for capsule study outpatient  - EGD 2/26 was without sign of obvious bleeding.   - Colonoscopy 2/27 noted poor prep but no evidence of bleeding, they suspect possibly bleeding from the small bowels  - continue protonix BID  - PTA Plavix stopped  - reported to have issues with aspirin and bleeding in past as well?  - Dr Lane will set up outpatient capsule study on Thurs/Fri.  - Given 2 units PRBCs in ED  - Hb up to 7.5--7.6  - repeat Hb in PMD office  - follow up with Dr Lane for capsule endoscopy     Acute Diastolic CHF, preserved EF  Noted to have lower extremity edema and possible abdominal ascites. Due for echo during last admit, but discharged prior to completion. Dr Greene has adjusted torsemide outpatient with 80mg and 40mg on alternating days. BNP 2059. CXR shows improving right mid/lower lung patchy consolidation. Mild cardiomegaly, bilateral vascular congestion.  - Echo 3/24- EF 55%; The right ventricle is normal in structure, function and size  - received Lasix 60mg IV x3 doses  - plan to resume PTA torsemide 40 mg alternating with 80 mg po every other days on discharge     Insomnia, unspecified type  - ontinue prior to admission trazodone     Benign essential hypertension  Hyperlipidemia LDL  goal <100  - Continue prior to admission Lipitor/Coreg     Cerebrovascular accident (CVA) due to embolism of cerebral artery  Noted (this is why he is on plavix).   - given ongoing bleeding risk while being on antiplatelets- it appears that the benefits of stopping plavix outweigh risk of stroke at this time (given frequent admits with ongoing bleeding requiring transfusion at this time)  - this was discussed with the patient and he agreed with this plan  - PTA Plavix was held on admission, plan to not resume after discharge  - he should follow up with his PMD and Neurology to see what would be the best approach regarding anticoagulation, given his ongoing blood loss.      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)  - baseline cr around 2.2-2.6 range; cr here 2.57.  - He follows with Dr Greene outpatient     Type 2 diabetes mellitus with long-term current use of insulin (H)  - resume PTA 25u lantus at bedtime and ISS after discharge.  - last HbA1c 8.2 on 2/16/2022.     Skin rash, chronic  Follow up with dermatology as scheduled 3/24    Consultations This Hospital Stay   GASTROENTEROLOGY IP CONSULT    Code Status   Full Code    Time Spent on this Encounter   I, Marilee Manley MD, personally saw the patient today and spent greater than 30 minutes discharging this patient.       Marilee Manley MD  [facility] HEART [address]  [facility] [address]  Phone: [phone]  ______________________________________________________________________    Physical Exam   Vital Signs: Temp: 98.2  F (36.8  C) Temp src: Oral BP: 108/55 Pulse: 76   Resp: 16 SpO2: 97 % O2 Device: None (Room air)    Weight: 203 lbs 11.2 oz     Constitutional: Awake, alert, cooperative, no apparent distress.  Eyes: Conjunctiva and pupils examined and normal.  HEENT: Moist mucous membranes, normal dentition.  Respiratory: Clear to auscultation bilaterally, no crackles or  wheezing.  Cardiovascular: Regular rate and rhythm, normal S1 and S2, and no murmur noted.  GI: Soft, protuberant abdomen, non-tender, normal bowel sounds.  Skin: No cyanosis, trace lower extremity edema. Scattered small red spots on skin upper and lower extremities.  Musculoskeletal: No joint swelling, erythema or tenderness.  Neurologic: Cranial nerves 2-12 intact, normal strength and sensation.  Psychiatric: Alert, oriented to person, place and time, no obvious anxiety or depression.    Primary Care Physician   Christian Davidson MD    Discharge Orders      Medication Therapy Management Referral      Reason for your hospital stay    Anemia     Follow-up and recommended labs and tests     Follow up with primary care provider, Christian Davidson MD, within 7 days for hospital follow- up.  The following labs/tests are recommended: Hb, BMP.    Follow up with GI- Dr Lane for capsule endoscopy.  Follow up with nephrology- Dr Groves  Follow up with Cardiology as scheduled.  Follow up with Neurology in 2-3 weeks.     Activity    Your activity upon discharge: activity as tolerated     Monitor and record    weight every day; call your provider if weight up 3lbs/day or 5lbs/week.     When to contact your care team    Call your primary doctor or return to ER if you have any of the following: temperature greater than 100.5 or less than 96, chills, increased shortness of breath, chest pain, dizziness, loss of consciousness, nausea, vomiting, abdominal pain, bloody stools, black stools.     Diet    Follow this diet upon discharge: Orders Placed This Encounter      Combination Diet Moderate Consistent Carb (60 g CHO per Meal) Diet; 2 gm NA Diet       Significant Results and Procedures   Most Recent 3 CBC's:Recent Labs   Lab Test 03/23/22  0541 03/22/22  2230 03/22/22  1313 03/22/22  1008   WBC 8.7  --  8.0 8.1   HGB 7.6* 7.5* 5.8* 5.8*   MCV 76*  --  73* 74*     --  221 223     Most Recent 3 BMP's:Recent Labs    Lab Test 03/23/22  1144 03/23/22  0541 03/23/22  0206 03/22/22  1614 03/22/22  1313 03/22/22  1008   NA  --  139  --   --  137 138   POTASSIUM  --  3.7  --   --  4.2 4.5   CHLORIDE  --  106  --   --  104 104   CO2  --  28  --   --  30 26   BUN  --  53*  --   --  57* 55*   CR  --  2.57*  --   --  2.57* 2.51*   ANIONGAP  --  5  --   --  3 8   VANESA  --  8.4*  --   --  8.5 8.2*   * 60* 139*   < > 179* 257*    < > = values in this interval not displayed.     Most Recent 2 LFT's:Recent Labs   Lab Test 02/25/22  1656 10/13/21  0808   AST 17 15   ALT 17 27   ALKPHOS 165* 163*   BILITOTAL 0.6 0.4     Most Recent 3 INR's:Recent Labs   Lab Test 03/22/22  1313 02/25/22  1656 09/04/20  1338   INR 1.25* 1.23* 0.99     Most Recent 3 Creatinines:Recent Labs   Lab Test 03/23/22  0541 03/22/22  1313 03/22/22  1008   CR 2.57* 2.57* 2.51*     Most Recent 3 Hemoglobins:Recent Labs   Lab Test 03/23/22  0541 03/22/22  2230 03/22/22  1313   HGB 7.6* 7.5* 5.8*     Most Recent 3 Troponin's:Recent Labs   Lab Test 10/13/21  0808 06/02/21  2102 03/26/21  1326 02/03/21  1225   TROPI  --  0.032 0.022 <0.015   TROPONIN <0.015  --   --   --      Most Recent 3 BNP's:Recent Labs   Lab Test 03/22/22  1313 02/25/22  1656 02/01/22  1656 04/14/21  0820 03/26/21  1326 02/03/21  1225   NTBNPI 2,059* 2,304*  --   --  2,513*  --    NTBNP  --   --  2,657* 1,954*  --  3,783*     Most Recent D-dimer:Recent Labs   Lab Test 02/25/22  1656   DD 2.87*     Most Recent Hemoglobin A1c:Recent Labs   Lab Test 02/16/22  1616   A1C 8.2*     Most Recent 6 glucoses:Recent Labs   Lab Test 03/23/22  1144 03/23/22  0541 03/23/22  0206 03/22/22  2123 03/22/22  1614 03/22/22  1313   * 60* 139* 186* 152* 179*     Most Recent Anemia Panel:Recent Labs   Lab Test 03/23/22  0541 03/22/22  1313 03/22/22  1008 07/01/21  1500 06/17/21  1137 05/27/21  1107 05/14/21  1135 03/27/21  0610 03/26/21  1326   WBC 8.7   < > 8.1   < > 7.0   < > 6.8   < > 4.9   HGB 7.6*   < > 5.8*    < > 8.4*   < > 9.2*   < > 6.8*   HCT 25.8*   < > 20.9*   < > 27.3*   < > 29.5*   < > 22.1*   MCV 76*   < > 74*   < > 83   < > 88   < > 92      < > 223   < > 207   < > 159   < > 190   IRON  --   --  17*   < > 30*   < >  --    < > 44   IRONSAT  --   --  4*   < > 7*   < >  --    < > 14*   RETICABSCT  --   --   --   --   --   --  54.6   < >  --    RETP  --   --   --   --   --   --  1.6   < >  --    FEB  --   --  473*   < > 427   < >  --    < > 315   VIRGINIA  --   --  10*   < > 12*   < >  --   --  45   B12  --   --   --   --  2,550*   < >  --   --  215   FOLIC  --   --   --   --   --   --   --   --  15.3   EPOE  --   --   --   --   --   --   --   --  42*    < > = values in this interval not displayed.   ,   Results for orders placed or performed during the hospital encounter of 22   XR Chest Port 1 View    Narrative    CHEST ONE VIEW PORTABLE   3/22/2022 3:46 PM     HISTORY: History of CHF, anemia.    COMPARISON: 2022.      Impression    IMPRESSION: Patchy consolidation at the right mid to low lung appears  improved compared to 2022. No new airspace disease identified.  Mild cardiomegaly appears stable. Bilateral vascular congestion  appears stable.   Echocardiogram Complete     Value    LVEF  55%    Narrative    [id number]  [id number]  [id number]  [id number]^HE^STERLING^CORDELL     [facility]  Echocardiography Laboratory  [address] [place] [address]     Name: LISA CAMARA  MRN: [id number]  : 1940  Study Date: 2022 09:29 AM  Age: 81 yrs  Gender: Male  Patient Location: [facility]  Reason For Study: CHF  Ordering Physician: STERLING CUTLER  Referring Physician: Christian Davidson,  Performed By: Kaushik Courtney     BSA: 2.1 m2  Height: 72 in  Weight: 204 lb  HR: 76  BP: 126/74 mmHg  ______________________________________________________________________________  Procedure  Complete Portable Echo  Adult.  ______________________________________________________________________________  Interpretation Summary     1. The left ventricle is normal in structure, function and size. The visual  ejection fraction is estimated at 55%.  2. The right ventricle is normal in structure, function and size.  3. No valve disease.     Echo from 3/2021 showed EF 50-55%, inferior hypokinesis.  ______________________________________________________________________________  Left Ventricle  The left ventricle is normal in structure, function and size. There is normal  left ventricular wall thickness. The visual ejection fraction is estimated at  55%. Left ventricular diastolic function is normal. Normal left ventricular  wall motion.     Right Ventricle  The right ventricle is normal in structure, function and size.     Atria  The left atrium is mildly dilated. Right atrial size is normal. There is no  atrial shunt seen.     Mitral Valve  There is trace mitral regurgitation.     Tricuspid Valve  There is mild (1+) tricuspid regurgitation. The right ventricular systolic  pressure is approximated at 21.6 mmHg plus the right atrial pressure.     Aortic Valve  The aortic valve is normal in structure and function.     Pulmonic Valve  The pulmonic valve is normal in structure and function.     Vessels  The ascending aorta is Borderline dilated. Normal IVC size with reduced  respiratory collapse.     Pericardium  There is no pericardial effusion.     Rhythm  Sinus rhythm was noted.  ______________________________________________________________________________  MMode/2D Measurements & Calculations  IVSd: 0.80 cm     LVIDd: 5.1 cm  LVIDs: 3.5 cm  LVPWd: 1.1 cm  FS: 31.6 %  LV mass(C)d: 176.3 grams  LV mass(C)dI: 82.1 grams/m2  Ao root diam: 4.1 cm  LA dimension: 4.0 cm  asc Aorta Diam: 3.7 cm  LA/Ao: 0.99  LVOT diam: 2.3 cm  LVOT area: 4.1 cm2  LA Volume (BP): 90.3 ml  LA Volume Index (BP): 42.0 ml/m2  RWT: 0.43     Doppler Measurements &  Calculations  MV E max dayna: 114.0 cm/sec  MV A max dayna: 87.3 cm/sec  MV E/A: 1.3  MV dec slope: 677.3 cm/sec2  MV dec time: 0.17 sec  PA acc time: 0.15 sec  TR max dayna: 232.1 cm/sec  TR max P.6 mmHg  E/E' av.7  Lateral E/e': 11.3  Medial E/e': 14.2     ______________________________________________________________________________  Report approved by: Oneida Mcqueen 2022 11:24 AM               Discharge Medications   Current Discharge Medication List      CONTINUE these medications which have CHANGED    Details   torsemide (DEMADEX) 20 MG tablet Take 40mg alternating every other day with 80mg.    Associated Diagnoses: Pericardial effusion         CONTINUE these medications which have NOT CHANGED    Details   albuterol (PROAIR HFA) 108 (90 Base) MCG/ACT inhaler INHALE 2 PUFFS BY MOUTH FOUR TIMES DAILY AS NEEDED  Qty: 8.5 g, Refills: 3    Associated Diagnoses: Shortness of breath      atorvastatin (LIPITOR) 20 MG tablet Take 1 tablet (20 mg) by mouth daily  Qty: 90 tablet, Refills: 3    Associated Diagnoses: Hyperlipidemia LDL goal <100      carvedilol (COREG) 6.25 MG tablet Take 1 tablet (6.25 mg) by mouth 2 times daily (with meals)  Qty: 180 tablet, Refills: 3    Associated Diagnoses: Pericardial effusion      clobetasol (TEMOVATE) 0.05 % external cream Apply topically daily as needed      dorzolamide-timolol (COSOPT) 2-0.5 % ophthalmic solution Place 1 drop into the right eye 2 times daily       fluticasone (FLONASE) 50 MCG/ACT nasal spray Spray 2 sprays into both nostrils daily  Qty: 16 g, Refills: 0    Associated Diagnoses: Chronic non-seasonal allergic rhinitis      folic acid-vit B6-vit B12 (FOLGARD) 0.8-10-0.115 MG TABS per tablet Take 1 tablet by mouth daily Continue per Nephrology recommendation  Qty: 30 tablet, Refills: 0    Associated Diagnoses: Anemia, unspecified type      hydrocortisone (CORTAID) 1 % external cream Apply 1 g topically At Bedtime For itching on BACK      insulin  glargine (LANTUS SOLOSTAR) 100 UNIT/ML pen ADMINISTER 25 UNITS UNDER THE SKIN AT BEDTIME  Qty: 45 mL, Refills: 3    Comments: If Lantus is not covered by insurance, may substitute Basaglar or Semglee or other insulin glargine product per insurance preference at same dose and frequency.    Associated Diagnoses: Type 2 diabetes mellitus without complication, with long-term current use of insulin (H)      insulin lispro (HUMALOG KWIKPEN) 100 UNIT/ML (1 unit dial) KWIKPEN Inject 5-15 Units Subcutaneous 3 times daily (before meals) Sliding scale with meals  Qty: 45 mL, Refills: 3    Associated Diagnoses: Type 2 diabetes mellitus without complication, with long-term current use of insulin (H)      pantoprazole (PROTONIX) 40 MG EC tablet TAKE 1 TABLET(40 MG) BY MOUTH TWICE DAILY  Qty: 180 tablet, Refills: 2    Associated Diagnoses: Acute superficial gastritis with hemorrhage      potassium chloride ER (K-TAB) 20 MEQ CR tablet Take 1 tablet (20 mEq) by mouth daily    Associated Diagnoses: Hypokalemia      SENNA-docusate sodium (SENNA S) 8.6-50 MG tablet Take 1 tablet by mouth 2 times daily  Qty: 60 tablet, Refills: 1    Associated Diagnoses: Constipation, unspecified constipation type      sertraline (ZOLOFT) 50 MG tablet Take 1 tablet (50 mg) by mouth daily  Qty: 90 tablet, Refills: 3    Comments: For Profile Only - patient will call to fill - Dose increase  Associated Diagnoses: Mild major depression (H); Insomnia, unspecified type      traZODone (DESYREL) 100 MG tablet TAKE 1 TABLET(100 MG) BY MOUTH AT BEDTIME  Qty: 90 tablet, Refills: 3    Associated Diagnoses: Insomnia, unspecified type      vitamin B-12 (CYANOCOBALAMIN) 1000 MCG tablet Take 1 tablet (1,000 mcg) by mouth daily Continue unitl hematology evaluation as borderline low vitamin b12.  Qty: 90 tablet, Refills: 3    Associated Diagnoses: Other dietary vitamin B12 deficiency anemia      blood glucose (STEVE CONTOUR) test strip TESTING FOUR TIMES DAILY OR AS  DIRECTED  Qty: 400 strip, Refills: 0    Associated Diagnoses: Type 2 diabetes mellitus without complication, with long-term current use of insulin (H)      Ferrous Sulfate 324 (65 Fe) MG TBEC Take 1 tablet (324 mg) by mouth 2 times daily    Associated Diagnoses: Anemia, unspecified type      insulin pen needle (BD PEN NEEDLE FERCHO 2ND GEN) 32G X 4 MM miscellaneous USE AS DIRECTED UP TO FOUR TIMES DAILY  Qty: 400 each, Refills: 3    Associated Diagnoses: Type 2 diabetes mellitus without complication, with long-term current use of insulin (H)         STOP taking these medications       clopidogrel (PLAVIX) 75 MG tablet Comments:   Reason for Stopping:             Allergies   Allergies   Allergen Reactions     No Known Allergies

## 2022-03-23 NOTE — UTILIZATION REVIEW
"  Admission Status; Secondary Review Determination         Under the authority of the Utilization Management Committee, the utilization review process indicated a secondary review on the above patient.  The review outcome is based on review of the medical records, discussions with staff, and applying clinical experience noted on the date of the review.       (x) Observation Status Appropriate - This patient does not meet hospital inpatient criteria and is placed in observation status. If this patient's primary payer is Medicare and was admitted as an inpatient, Condition Code 44 should be used and patient status changed to \"observation\".     RATIONALE FOR DETERMINATION   The patient is a 81-year-old male admitted on 3/22/2022.  Patient was sent to the ED due to a hemoglobin of 5.8 noted at the infusion clinic.  Patient had resumed his Plavix.  Patient did have recent evaluation with a colonoscopy due to chronic blood loss which did not show an acute bleed area.  Patient also had an EGD on 2/26 without signs of obvious bleeding.  The patient has been getting iron infusions.  According to the chart, the patient is likely to be discharged today and hemoglobin is currently 7.6.  Chart notes state that 2 units of packed red cells were administered.  Based on 1 midnight stay in stable condition currently, observation status is recommended.  Dr. Desouza Will be sent an [employer] paging text with this recommendation and to call me if there are any questions.    The severity of illness, intensity of service provided, expected LOS and risk for adverse outcome make the care complex, high risk and appropriate for hospital admission.        The information on this document is developed by the utilization review team in order for the business office to ensure compliance.  This only denotes the appropriateness of proper admission status and does not reflect the quality of care rendered.         The definitions of Inpatient Status and " Observation Status used in making the determination above are those provided in the CMS Coverage Manual, Chapter 1 and Chapter 6, section 70.4.      Sincerely,     Kiko Mcgee MD  Physician Advisor  Utilization Review/ Case Management  [facility].

## 2022-03-23 NOTE — PROGRESS NOTES
Clinic Care Coordination Contact  Ambulatory Care Coordination to Inpatient Care Management   Hand-In Communication    Date:  March 23, 2022  Name: Justyn Olivas is enrolled in Ambulatory Care Coordination program and I am the Lead Care Coordinator.  CC Contact Information: Epic InMevvyet + phone  Payor Source: Payor: MEDICARE / Plan: MEDICARE / Product Type: Medicare /   Current services in place:     Please see the CC Snaphot and Care Management Flowsheets for specific  details of this Justyn Olivas care plan.   Additional details/specific concerns r/t this admission:    No additional concerns at this time .    I will follow this admission in Epic. Please feel free to contact me with questions or for further collaboration in discharge planning.    Hellen Chaidez RN Care Coordinator  [facility]  Email: [email or web address]  Phone: [phone]

## 2022-03-23 NOTE — PLAN OF CARE
VSS. Room air. Up with SBA and lars. Blood products transfused and lasix given overnight. Pt c/o constipation despite several small Bm's. Senna given. Pt with many scratches to butt and legs that bleeding due to patient itching. Pt slept poorly overnight due to multiple trips to the bathroom.

## 2022-03-24 NOTE — PROGRESS NOTES
Clinic Care Coordination Contact  [facility]/Voicemail       Clinical Data: Care Coordinator Outreach  Outreach attempted x 1.  Left message on patient's voicemail with call back information and requested return call.  Plan: RN Care Coordinator will try to reach patient again in 1-2 business days.    Hellen Chaidez RN Care Coordinator  [facility]  Email: [email or web address]  Phone: [phone]

## 2022-03-25 NOTE — PROGRESS NOTES
Clinic Care Coordination Contact  [facility]: Post-Discharge Note  SITUATION                                                      Admission:    Admission Date: 03/22/22   Reason for Admission: Anemia  Discharge:   Discharge Date: 03/23/22  Discharge Diagnosis: Anemia    BACKGROUND                                                      Per hospital discharge summary and inpatient provider notes:  Justyn Olivas is a 81 year old male who presents with fatigue, found to have abnormal labs. Admitted 2/25-2/27 underwent upper and lower endoscopy without evidence of active bleed. His plavix was held for a week total and was resumed 3 days after discharge. Since then he has had ongoing drop in Hgb and ongoing fatigue. He was due for echocardiogram follow up and outpatient capsule study, but did not complete either in the last 2.5 weeks. He denies change in his stools other than maybe some firmer stools in last few days that appeared darker. No blood or abdominal pain. Discussed at length stroke risk/stopping antiplatelets/bleeding risk. Reviewed case with Dr Lane. He reports that he has been adjusting diuretics with Dr Greene and seeing good improvement in his lower extremity edema.  Denies fevers, chills, chest pain, abdominal pain, nausea, vomiting.    ASSESSMENT      Enrollment  Primary Care Care Coordination Status: Enrolled  Clinical Pathway Name: None  Outreach Frequency: monthly    Discharge Assessment  How are you doing now that you are home?: RN CC called and spoke with patient. Kamran states that he is feeling much better since he received 2 units of RBC during hospitalization. Patient is in stable condition. Reviewed AVS. Medication list and recommendation for PCP and lab follow up. RN CC assisted patient in scheduling lab and PCP appointments. No new need for additional support or resources identified at this time.  How are your symptoms? (Red Flag symptoms escalate to triage hotline per guidelines):  Improved  Do you feel your condition is stable enough to be safe at home until your provider visit?: Yes  Does the patient have their discharge instructions? : Yes  Does the patient have questions regarding their discharge instructions? : No  Were you started on any new medications or were there changes to any of your previous medications? : Yes  Does the patient have all of their medications?: Yes  Do you have questions regarding any of your medications? : No  Do you have all of your needed medical supplies or equipment (DME)?  (i.e. oxygen tank, CPAP, cane, etc.): Yes  Discharge follow-up appointment scheduled within 14 calendar days? : No  Is patient agreeable to assistance with scheduling? : Yes              Care Management       Care Mgmt General Assessment  Referral  Referral Source: IP Report  Health Care Home/Utilization  Preferred Hospital: [facility]  [phone]  Preferred Urgent Care: [facility] [phone]  Living Situation  Current living arrangement:: I live in a private home with family  Type of residence:: Private home - stairs  Resources  Patient receiving home care services:: No (Just ordered in the hospital, has not been evaluated yet)  Skilled Home Care Services: Skilled Nursing;Physicial Therapy  Community Resources: DME;[facility]  Supplies Currently Used at Home: Hearing Aid Batteries;Diabetic Supplies  Equipment Currently Used at Home: cane, straight  Referrals Placed: None  Employment Status: retired  Psychosocial  Adventist or spiritual beliefs that impact treatment:: No  Mental health DX:: No  Mental health management concern (GOAL):: No  Informal Support system:: Family;Spouse  Functional Status  Dependent ADLs:: Independent (Patient is use cane outside of home for long distance)  Dependent IADLs:: Transportation  Bed or wheelchair confined:: No  Mobility Status: Independent  Advance Care Plan/Directive  Advanced Care Plans/Directives on  file:: In process  Advanced Care Plan/Directive Status: In Process (has, just not on file)    PLAN                                                      Outpatient Plan:  RN CC will follow up with patient in one month.     Future Appointments   Date Time Provider Department Center   3/28/2022  1:30 PM CS LAB [facility]    3/31/2022 12:45 PM Betty, Bishop, PT EDPT [address]   4/1/2022  9:30 AM Elizabeth Munguia, PAJADE [facility]IM    4/7/2022 12:45 PM BettyMarandad, PT EDPT [address]   4/14/2022 12:45 PM Annamarie Boyd, PT EDPT [address]   4/20/2022 10:40 AM Kristine Nash MD [facility]   4/21/2022 12:45 PM WingMarandad, PT EDPT [address]   4/28/2022 12:45 PM Maranda Hernándezd, PT EDPT [address]   5/10/2022 11:45 AM Cielo Rosenbaum MD [facility]         For any urgent concerns, please contact our 24 hour nurse triage line: [phone] ([phone])         Namita Chaidez RN

## 2022-03-25 NOTE — LETTER
[facility] CARE COORDINATION  [address]  [facility] [address]    March 25, 2022        Justyn Olivas  [address] [place] [address]          Dear Kamran,     Attached is an updated Complex Care Plan for your continued enrollment in Care Coordination. Please let us know if you have additional questions, concerns, or goals that we can assist with.    Sincerely,    Hellen Chaidez RN Care Coordinator  [facility]  Email: [email or web address]  Phone: [phone]            [facility]  Patient Centered Plan of Care  About Me:        Patient Name:  Justyn FRIAS Deisy    YOB: 1940  Age:         81 year old   [place] MRN:    [id number] Telephone Information:  Home Phone [phone]   Mobile [phone]       Address:  [address] [place] [address] Email address:  [email or web address]      Emergency Contact(s)    Name Relationship Lgl Grd Work Phone Home Phone Mobile Phone   1. DEISY LINH* Son   [phone]   2. OMAR MELISSA Daughter    [phone]           Primary language:  English     needed? No   [employer]:  [phone] op. 1  Other communication barriers:Hearing aides    Preferred Method of Communication:  Mail  Current living arrangement: I live in a private home with family    Mobility Status/ Medical Equipment: Independent        Health Maintenance  Health Maintenance Reviewed: Due/Overdue   Health Maintenance Due   Topic Date Due     HF ACTION PLAN  Never done     DEPRESSION ACTION PLAN  Never done     PHQ-9  Never done     MICROALBUMIN  05/10/2020     MEDICARE ANNUAL WELLNESS VISIT  02/10/2021     LIPID  03/29/2022     My Access Plan  Medical Emergency 911   Primary Clinic Line [facility] - [phone]   24 Hour Appointment Line [phone] or  [phone]) (toll-free)   24 Hour Nurse Line [phone] (toll-free)   Preferred Urgent  [facility] - [place], [phone]     Preferred Hospital [facility], [place]  [phone]     Preferred [facility]acy [id number] - [place], [place] - [address] AT [facility] [place] [place] [address]     Behavioral Health Crisis Line The National Suicide Prevention Lifeline at 1-794.487.8388 or 911             My Care Team Members  Patient Care Team       Relationship Specialty Notifications Start End    Christian Davidson MD PCP - General Internal Medicine  2/9/21     Phone: [phone] Pager: [phone] Fax: [phone] [address] [place] [address]    Esteban Ruiz PA-C Assigned Heart and Vascular Provider   3/17/21     Phone: [phone] Fax: [phone] [address] [place] [address]    Shaniqua Ram PA-C Physician Assistant Cardiovascular Disease  4/14/21     Phone: [phone] Pager: [phone] Fax: [phone] [address] [place] [address]    Brien Greene MD MD Nephrology  4/15/21     Phone: [phone] Pager: [phone] Fax: [phone]        [facility] [address] [place] [address]    Juanita Moore APRN CNP Nurse Practitioner Nephrology  4/15/21     Phone: [phone] Fax: [phone]         [facility] [address] [place] [address]    Kristine Nash MD Assigned Cancer Care Provider   5/9/21     Phone: [phone] Fax: [phone] [address] [place] [address]    Namita Chaidez, RN Lead Care Coordinator  Admissions 7/3/21     Christian Davidson MD Assigned PCP   7/18/21     Phone: [phone] Pager: [phone] Fax: [phone] [address] [place] [address]    Geovanna oLwery, PharmD Pharmacist Pharmacist  10/4/21     Phone: [phone] Fax: [phone] [address] [place] [address]    Len Coates MD Assigned Surgical Provider    12/19/21     Phone: [phone] Fax: [phone]         [address] [place] [address]            My Care Plans  Self Management and Treatment Plan  Goals and (Comments)  Goals        General     1. Improve chronic symptoms (pt-stated)      Notes - Note edited  12/14/2021  9:30 AM by Namita Chaidez RN     Goal Statement: I will verbalize an increase in my strength and mobility and correct knowledge of adequate management of my chronic health conditions to maintain my health and wellness in preventing hospital readmission.   Date Goal set: 12/24/20; Updated/modified: 12/14/21  Barriers: Multiple health concerns.  Strengths: Motivated and engaged in care coordination.   Date to Achieve By: Updated 6/1/22  Patient expressed understanding of goal: Yes    Action steps to achieve this goal:  1. I will work with Physical Therapy to build my strength and mobility.   2. I will follow up with my providers as scheduled/recommended. (Primary Care Provider, [facility], sleep studies TBD, Dr. Ariana PARSON, [facility] hematology/oncology, nephrology)   3. I will take my medications as prescribed.   4. I will continue monitoring my blood sugars, blood pressures, weight daily as recommended.  5. I will use my CHF action plan to monitor/manage my symptoms.   6. I will follow care team recommendations of fluid restriction, diabetic and cardiac diet.   7. I will use my incentive spirometer as directed and recommended by my care team.   8. I will continue to work with care coordination for any additional resources and support.  9. I will contact my care team with questions, concerns, support needs. I will use the clinic as a resource and I understand I can contact my clinic with 24/7 after hours services available. Care Coordinator will remain available as needed.         2. Pain Management (pt-stated)      Notes - Note edited  12/14/2021  9:31 AM by Namita Chaidez, RN     Goal Statement: I would like to address and understand the  treatment plan for my upper right quadrant abdominal pain.   Date Goal set: 8/6/21  Barriers: Unknown etiology   Strengths: Patient is motivated  Date to Achieve By: Updated: 6/1/22  Patient expressed understanding of goal: yes  Action steps to achieve this goal:  1. I will have Hepatobiliary scan on 8/18/21  2. I will follow up with my general surgery after scan  3. I will follow up with PCP 8/23/21 or sooner if he is able to make an ecception to his schedule  4. I will continue to work with care coordination for any additional resources and support                 Advance Care Plans/Directives Type:   No data recorded      Current Medications and Allergies:    Current Outpatient Medications   Medication Instructions     albuterol (PROAIR HFA) 108 (90 Base) MCG/ACT inhaler INHALE 2 PUFFS BY MOUTH FOUR TIMES DAILY AS NEEDED     atorvastatin (LIPITOR) 20 mg, Oral, DAILY     blood glucose (STEVE CONTOUR) test strip TESTING FOUR TIMES DAILY OR AS DIRECTED     carvedilol (COREG) 6.25 mg, Oral, 2 TIMES DAILY WITH MEALS     clobetasol (TEMOVATE) 0.05 % external cream Topical, DAILY PRN     dorzolamide-timolol (COSOPT) 2-0.5 % ophthalmic solution 1 drop, Right Eye, 2 TIMES DAILY     Ferrous Sulfate 324 mg, Oral, 2 TIMES DAILY     fluticasone (FLONASE) 50 MCG/ACT nasal spray 2 sprays, Both Nostrils, DAILY     folic acid-vit B6-vit B12 (FOLGARD) 0.8-10-0.115 MG TABS per tablet 1 tablet, Oral, DAILY, Continue per Nephrology recommendation     hydrocortisone (CORTAID) 1 g, Topical, AT BEDTIME, For itching on BACK      insulin glargine (LANTUS SOLOSTAR) 100 UNIT/ML pen ADMINISTER 25 UNITS UNDER THE SKIN AT BEDTIME     insulin lispro (HUMALOG KWIKPEN) 5-15 Units, Subcutaneous, 3 TIMES DAILY BEFORE MEALS, Sliding scale with meals     insulin pen needle (BD PEN NEEDLE FERCHO 2ND GEN) 32G X 4 MM miscellaneous USE AS DIRECTED UP TO FOUR TIMES DAILY     pantoprazole (PROTONIX) 40 MG EC tablet TAKE 1 TABLET(40 MG) BY MOUTH TWICE DAILY      potassium chloride ER (K-TAB) 20 MEQ CR tablet 20 mEq, Oral, DAILY     SENNA-docusate sodium (SENNA S) 8.6-50 MG tablet 1 tablet, Oral, 2 TIMES DAILY     sertraline (ZOLOFT) 50 mg, Oral, DAILY     torsemide (DEMADEX) 20 MG tablet Take 40mg alternating every other day with 80mg.     traZODone (DESYREL) 100 MG tablet TAKE 1 TABLET(100 MG) BY MOUTH AT BEDTIME     vitamin B-12 (CYANOCOBALAMIN) 1,000 mcg, Oral, DAILY, Continue unitl hematology evaluation as borderline low vitamin b12.         Care Coordination Start Date: 12/24/2020   Frequency of Care Coordination: monthly     Form Last Updated: 03/25/2022

## 2022-03-28 NOTE — TELEPHONE ENCOUNTER
[facility] is calling with critical results.    HGB 7.6    Hemoglobin   Date Value Ref Range Status   03/28/2022 7.6 (LL) 13.3 - 17.7 g/dL Final   03/23/2022 7.6 (L) 13.3 - 17.7 g/dL Final   07/01/2021 7.2 (L) 13.3 - 17.7 g/dL Final   06/17/2021 8.4 (L) 13.3 - 17.7 g/dL Final     Routing to care team for further review.     Pam Gale, RN, BSN, PHN  [facility]

## 2022-03-31 PROBLEM — D50.9 IRON DEFICIENCY ANEMIA, UNSPECIFIED: Status: ACTIVE | Noted: 2022-01-01

## 2022-03-31 PROBLEM — N18.4 CKD (CHRONIC KIDNEY DISEASE) STAGE 4, GFR 15-29 ML/MIN (H): Status: ACTIVE | Noted: 2022-01-01

## 2022-03-31 NOTE — RESULT ENCOUNTER NOTE
Dear Kamran,    Here is a summary of your recent test results:  -Cholesterol levels are at your goal levels.  ADVISE: continuing your medication, a regular exercise program with at least 150 minutes of aerobic exercise per week, and eating a low saturated fat/low carbohydrate diet.  Also, you should recheck this fasting cholesterol panel in 12 months.  -Microalbumin (urine protein) level is elevated. This is suggestive of early damage to your kidneys from high blood pressure and diabetes.  ADVISE: avoiding anti-inflamatory agents such as ibuprofen (Advil, Motrin) or naproxen (Aleve) as much as possible, keeping your blood pressure in a normal range.    For additional lab test information, www.testing.com is a very good reference.    In addition, here is a list of due or overdue Health Maintenance reminders:    Annual Wellness Visit due on 02/10/2021    Please call us at [phone] (or use [facility]) to address the above recommendations if needed.           Thank you very much for trusting me and [facility].     Have a peaceful day.    Healthy regards,  Norman Camacho MD

## 2022-04-01 NOTE — PROGRESS NOTES
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 Greene  Plan: Basic metabolic panel  (Ca, Cl, CO2, Creat,         Gluc, K, Na, BUN)    (D62) Anemia due to acute blood loss  Comment: hgb 5.8 on admission, recent upper and lower endoscopies unrevealing, recent capsule endoscopy results not back yet, following with Ariana PARSON, plavix on hold, has had some dark stools but no ralph blood  Plan: CBC with platelets  Follow up per GI  Continue to hold plavix    (E11.22,  N18.32,  Z79.4) Type 2 diabetes mellitus with stage 3b chronic kidney disease, with long-term current use of insulin (H)  Comment: on lantus and sliding scale humalog  Plan: cont abvoe    (F32.0) Mild major depression (H)  Comment: on zoloft, PHQ-9 today  Plan: cont zoloft    (I63.40) Cerebrovascular accident (CVA) due to embolism of cerebral artery (H)  Comment: 2016, needs neurologist in [place], previously on plavix  Plan: cont to hold plavix, neurology referral for establish    (K29.01) Acute superficial gastritis with hemorrhage  Comment: on protonix bid  Plan: pantoprazole (PROTONIX) 40 MG EC tablet    (E78.5) Hyperlipidemia LDL goal <100  Comment:   Plan: atorvastatin (LIPITOR) 20 MG tablet      (D64.9) Anemia, unspecified type  Comment: needs folgard refill  Plan: folic acid-vit B6-vit B12 (FOLGARD)         0.8-10-0.115 MG TABS per tablet      Elizabeth Muse PA-C  45 minutes on the day of the encounter doing chart review, history and exam, documentation and further activities as noted above.      Stephanie Andrew is a 81 year old who presents for the  following health issues  HPI     Post Discharge Outreach 3/25/2022   Admission Date 3/22/2022   Reason for Admission Anemia   Discharge Date 3/23/2022   Discharge Diagnosis Anemia   How are you doing now that you are home? RN CC called and spoke with patient. Kamran states that he is feeling much better since he received 2 units of RBC during hospitalization. Patient is in stable condition. Reviewed AVS. Medication list and recommendation for PCP and lab follow up. RN CC assisted patient in scheduling lab and PCP appointments. No new need for additional support or resources identified at this time.   How are your symptoms? (Red Flag symptoms escalate to triage hotline per guidelines) Improved   Do you feel your condition is stable enough to be safe at home until your provider visit? Yes   Does the patient have their discharge instructions?  Yes   Does the patient have questions regarding their discharge instructions?  No   Were you started on any new medications or were there changes to any of your previous medications?  Yes   Does the patient have all of their medications? Yes   Do you have questions regarding any of your medications?  No   Do you have all of your needed medical supplies or equipment (DME)?  (i.e. oxygen tank, CPAP, cane, etc.) Yes   Discharge follow-up appointment scheduled within 14 calendar days?  No   Discharge Follow Up Appointment Date -   Discharge Follow Up Appointment Scheduled with? -     Hospital Follow-up Visit:    Hospital: [facility]  Date of Admission: 3/22/22  Date of Discharge: 3/23/22  Reason(s) for Admission:   Acute blood loss anemia, suspect small bowel bleeding  H/o Chronic iron deficiency anemia due to chronic blood loss  Acute diastolic CHF      Was your hospitalization related to COVID-19? No   Problems taking medications regularly:  None  Medication changes since discharge: None  Problems adhering to non-medication therapy:  None    Summary of  hospitalization:  [facility] discharge summary reviewed  Diagnostic Tests/Treatments reviewed.  Follow up needed: see below  Other Healthcare Providers Involved in Patient s Care:         see below  Update since discharge: stable.       Post Discharge Medication Reconciliation: discharge medications reconciled, continue medications without change.  Plan of care communicated with patient and wife                He is here for hospital follow-up.  He was admitted on 3/22/22 for acute blood loss anemia, suspected small bowel bleeding.  His hemoglobin was 5.8 on admission.  Occult blood was positive.  He had a previous admission on 2/25/22 for the same, upper and lower endoscopies at that time were without evidence of active bleed.  His Plavix was held for a week and then resumed after discharge.  During his most recent admission the Plavix was stopped altogether.  He had an outpatient capsule endoscopy with Dr. Lane's group on Tuesday of this week, 4 days ago.  He was given 2 units of packed red blood cells in the ED.  At discharge his hemoglobin was 7.6.    The patient does have history of diastolic CHF.  On admission, he was noted to have lower extremity edema and possible abdominal ascites.  He was given IV Lasix 60 mg for 3 doses with improvement.  Echo was performed on March 24 which showed EF of 55%, right ventricle is normal in structure function and size.  He is currently taking torsemide 40 mg alternating with 80 mg, he follows with Dr. Greene.  He reports improvement in LE edema.    He has a history of embolic CVA, in 2016.  He was visiting friends in the [facility] when this happened.  He has not established with a neurologist here.  He was previously on Plavix due to the stroke but this was stopped.  He has not resumed it.    He feels like he is back to baseline     He has trouble sleeping/relaxing which is not new   He takes trazodone at bedtime, he is unsure if it helps  He  occasionally takes tylenol PM    He has had some dark stools since discharge, no bloody stool  He denies abdominal pain, nausea/vomiting     He is awaiting results of capsule endoscopy    Saw derm last week for itchy rash, using topical cream    Review of Systems   See above      Objective    /75 (BP Location: Left arm, Patient Position: Sitting, Cuff Size: Adult Regular)   Pulse 90   Temp 97.4  F (36.3  C) (Temporal)   Resp 16   Ht 1.829 m (6')   Wt 89.8 kg (198 lb)   SpO2 100%   BMI 26.85 kg/m    Body mass index is 26.85 kg/m .     Physical Exam     GENERAL: pale, alert and no distress  ENT: tacky mm  RESP: lungs clear to auscultation - no rales, no rhonchi, no wheezes  ABD: soft, NT, mildly distended, +Bs  CV: regular rates and rhythm, normal S1 S2, no S3 or S4 and no murmur, no click or rub   MS: extremities- no gross deformities noted, moderate pitting edema, no open sores, no weeping

## 2022-04-01 NOTE — RESULT ENCOUNTER NOTE
Dear Kamran,     Here are your recent rlab results which show stable anemia and reduced but stable kidney funtion.  Your electrolytes were normal.  Please recheck labs when you see Dr. Davidson on 4/22/22.    Please let us know if you have any questions or concerns.    Regards,  Elizabeth Muse PA-C

## 2022-04-06 NOTE — PROGRESS NOTES
Clinic Care Coordination Contact    Follow Up Progress Note      Assessment: RN CC received incoming call from patient. Kamran states that he just received his carvedilol prescription and he was not aware that he should be taking it twice daily with meals. Patient has only been taking carvedilol once daily. Reviewed medication list, carvedilol uses/interactions. RN CC advised patient to follow medication directions and take it as recommended. If patient has any adverse side effects, patient directed to call PCP nurse triage line. Patient verbalized understanding.     No other questions or concerns at this time.     Goals addressed this encounter:   Goals Addressed                    This Visit's Progress       1. Improve chronic symptoms (pt-stated)   90%      Goal Statement: I will verbalize an increase in my strength and mobility and correct knowledge of adequate management of my chronic health conditions to maintain my health and wellness in preventing hospital readmission.   Date Goal set: 12/24/20; Updated/modified: 12/14/21  Barriers: Multiple health concerns.  Strengths: Motivated and engaged in care coordination.   Date to Achieve By: Updated 6/1/22  Patient expressed understanding of goal: Yes    Action steps to achieve this goal:  1. I will work with Physical Therapy to build my strength and mobility.   2. I will follow up with my providers as scheduled/recommended. (Primary Care Provider, [facility], sleep studies TBD, Dr. Ariana PARSON, [facility] hematology/oncology, nephrology)   3. I will take my medications as prescribed.   4. I will continue monitoring my blood sugars, blood pressures, weight daily as recommended.  5. I will use my CHF action plan to monitor/manage my symptoms.   6. I will follow care team recommendations of fluid restriction, diabetic and cardiac diet.   7. I will use my incentive spirometer as directed and recommended by my care team.   8. I will continue to work with care coordination for any  additional resources and support.  9. I will contact my care team with questions, concerns, support needs. I will use the clinic as a resource and I understand I can contact my clinic with 24/7 after hours services available. Care Coordinator will remain available as needed.           2. Pain Management (pt-stated)   90%      Goal Statement: I would like to address and understand the treatment plan for my upper right quadrant abdominal pain.   Date Goal set: 8/6/21  Barriers: Unknown etiology   Strengths: Patient is motivated  Date to Achieve By: Updated: 6/1/22  Patient expressed understanding of goal: yes  Action steps to achieve this goal:  1. I will have Hepatobiliary scan on 8/18/21  2. I will follow up with my general surgery after scan  3. I will follow up with PCP 8/23/21 or sooner if he is able to make an ecception to his schedule  4. I will continue to work with care coordination for any additional resources and support              Intervention/Education provided during outreach: Chronic disease management and support.      Outreach Frequency: monthly      Plan: RN Care Coordinator will follow up in 3-4 weeks.     Hellen Chaidez RN Care Coordinator  [facility]  Email: [email or web address]  Phone: [phone]

## 2022-04-06 NOTE — TELEPHONE ENCOUNTER
Prescription approved per [facility] Refill Protocol.    Kimberly MEDRANO RN  EP Triage

## 2022-04-10 NOTE — PROGRESS NOTES
[facility]    Hematology/Oncology Established Patient Follow-up Note      Today's Date: 4/20/2022    Reason for Follow-up: Anemia.    HISTORY OF PRESENT ILLNESS: Justyn Olivas is an 81 year old male with DM2, stage 3 CKD, CVA, CHF, pericardial effusion, iron deficiency anemia who presents with the following hematologic history:  --Hospitalized 3/26/2021 for dyspnea, lower extremity edema, and weight gain. He has been on diuretics for his CHF but started to gain weight more recently.  He reports fatigue, generalized imbalance, but no hematochezia, melena, or hematemesis.    --On admission he was found to have Hgb 6.8 with MCV 92, compared to previous Hgb of 9.8 on 2/3/2021.    --Historically, his Hgb has been low in the 8-9 g/dL range since 12/2020.  Back in 12/2020, his Hgb was decreased to 6.9. 12/16/2020 EGD had shown esophagitis, no bleeding, small hiatal hernia, and a 3 mm angiodysplastic lesion without bleeding in the 3rd portion of the duodenum. Rest was unremarkable. Prior to 12/2020, his Hgb had fluctuated in the 8-13 g/dL range. Iron level was intermittently low in past on 2/25/2020 and 12/1/2020.  --3/26/2021: His ferritin was normal at 45, serum iron normal at 53, TIBC 276, and iron saturation index low at 14. Transferrin was normal at 251. Folate was normal at 15.3. Vitamin B12 was at lower end of normal at 215. 3/29/2021 TSH was normal at 1.09.   --On 3/27/2021, EGD showed 4 non-bleeding angioectasias in duodenum treated with APC. Kamran received 1 unit packed RBCs.  --3/31/2021: Hgb 7.9, MCV 95, WBC 5.2, platelets 189,000; absolute reticulocyte count elevated at 176; VIK negative, LDH normal at 178, total and direct bilirubin normal, haptoglobin normal; SPEP showed no monoclonal protein. Peripheral blood smear showed moderate normocytic hypochromic anemia; moderate absolute lymphopenia.  --3/31/2021 Colonoscopy showed single medium-sized localized angiodysplastic lesion without  bleeding in cecum; APC applied at this site.  --5/14/2021 Bone marrow biopsy showed normocellular marrow for age with trilineage hematopoietic maturation; no increase internal jugular blasts; no dysplasia; rare lymphoid aggregates favored to be reactive; adequate to increased storage iron; decreased sideroblastic iron. Cytogenetics: 45,X,-Y[10]/46,XY[10] .    INTERIM HISTORY:  Kamran was hospitalized 3/22/2022 for acute blood loss anemia with suspected small bowel bleeding. He was given 2 units pRBCs and underwent inpatient EGD 2/26/2022 that showed no abnormalities; colonoscopy on 2/27/2022 that showed multiple polyps, no active bleeding; outpatient capsule endoscopy that was normal. Plavix is on hold. He is on pantoprazole. Her reports feeling tired.    REVIEW OF SYSTEMS:   14 point ROS was reviewed and is negative other than as noted above in HPI.       HOME MEDICATIONS:  Current Outpatient Medications   Medication Sig Dispense Refill     albuterol (PROAIR HFA) 108 (90 Base) MCG/ACT inhaler INHALE 2 PUFFS BY MOUTH FOUR TIMES DAILY AS NEEDED 8.5 g 3     atorvastatin (LIPITOR) 20 MG tablet Take 1 tablet (20 mg) by mouth daily 90 tablet 3     blood glucose (STEVE CONTOUR) test strip TESTING FOUR TIMES DAILY OR AS DIRECTED 400 strip 0     carvedilol (COREG) 6.25 MG tablet Take 1 tablet (6.25 mg) by mouth 2 times daily (with meals) 180 tablet 3     clobetasol (TEMOVATE) 0.05 % external cream Apply topically daily as needed       dorzolamide-timolol (COSOPT) 2-0.5 % ophthalmic solution Place 1 drop into the right eye 2 times daily        fluticasone (FLONASE) 50 MCG/ACT nasal spray SHAKE LIQUID AND USE 2 SPRAYS IN EACH NOSTRIL DAILY 16 g 3     folic acid-vit B6-vit B12 (FOLGARD) 0.8-10-0.115 MG TABS per tablet Take 1 tablet by mouth daily Continue per Nephrology recommendation 90 tablet 3     hydrocortisone (CORTAID) 1 % external cream Apply 1 g topically At Bedtime For itching on BACK       insulin glargine (LANTUS  SOLOSTAR) 100 UNIT/ML pen ADMINISTER 25 UNITS UNDER THE SKIN AT BEDTIME 45 mL 3     insulin lispro (HUMALOG KWIKPEN) 100 UNIT/ML (1 unit dial) KWIKPEN Inject 5-15 Units Subcutaneous 3 times daily (before meals) Sliding scale with meals (Patient taking differently: Inject 5-15 Units Subcutaneous 3 times daily (before meals) Sliding scale with meals:  <100: 5 units  101-150: 7 units  151-250: 9 units  251-350: 12 units  >351 15 units) 45 mL 3     insulin pen needle (BD PEN NEEDLE FERCHO 2ND GEN) 32G X 4 MM miscellaneous USE AS DIRECTED UP TO FOUR TIMES DAILY 400 each 3     pantoprazole (PROTONIX) 40 MG EC tablet TAKE 1 TABLET(40 MG) BY MOUTH TWICE DAILY 180 tablet 2     potassium chloride ER (K-TAB) 20 MEQ CR tablet Take 1 tablet (20 mEq) by mouth daily       SENNA-docusate sodium (SENNA S) 8.6-50 MG tablet Take 1 tablet by mouth 2 times daily (Patient taking differently: Take 1 tablet by mouth nightly as needed ) 60 tablet 1     sertraline (ZOLOFT) 50 MG tablet Take 1 tablet (50 mg) by mouth daily 90 tablet 3     torsemide (DEMADEX) 20 MG tablet Take 40mg alternating every other day with 80mg.       traZODone (DESYREL) 100 MG tablet TAKE 1 TABLET(100 MG) BY MOUTH AT BEDTIME 90 tablet 3     vitamin B-12 (CYANOCOBALAMIN) 1000 MCG tablet Take 1 tablet (1,000 mcg) by mouth daily Continue unitl hematology evaluation as borderline low vitamin b12. 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)      Cerebral infarction (H)      Chronic diastolic heart failure (H)      Diabetes (H)      [place] (hard of hearing)      Hyperlipemia      Hypokalemia      Renal disease          PAST SURGICAL HISTORY:  Past Surgical History:   Procedure Laterality Date     BONE MARROW BIOPSY, BONE  SPECIMEN, NEEDLE/TROCAR N/A 2021    Procedure: BIOPSY, BONE MARROW;  Surgeon: Juanjo Hoffman MD;  Location:  GI     COLONOSCOPY N/A 2022    Procedure: COLONOSCOPY, FLEXIBLE, WITH LESION REMOVAL USING SNARE;  Surgeon: Jules Lane MD;  Location:  GI     COLONOSCOPY N/A 2022    Procedure: COLONOSCOPY, WITH POLYPECTOMY AND BIOPSY;  Surgeon: Jules Lane MD;  Location:  GI     CV PERICARDIOCENTESIS N/A 2020    Procedure: Pericardiocentesis;  Surgeon: Chas Chambers MD;  Location:  HEART CARDIAC CATH LAB     ENT SURGERY       ESOPHAGOSCOPY, GASTROSCOPY, DUODENOSCOPY (EGD), COMBINED N/A 3/27/2021    Procedure: Esophagogastroduodenoscopy, With Biopsy;  Surgeon: Luc Nash MD;  Location:  GI     ESOPHAGOSCOPY, GASTROSCOPY, DUODENOSCOPY (EGD), COMBINED N/A 2022    Procedure: ESOPHAGOGASTRODUODENOSCOPY (EGD);  Surgeon: Jules Lane MD;  Location: [facility]     LAPAROSCOPIC CHOLECYSTECTOMY N/A 2021    Procedure: LAPAROSCOPIC CHOLECYSTECTOMY;  Surgeon: Len Coates MD;  Location:  OR     SOFT TISSUE SURGERY      skin cancer surgery on nose         SOCIAL HISTORY:  Social History     Socioeconomic History     Marital status:      Spouse name: Cecile     Number of children: Not on file     Years of education: Not on file     Highest education level: Not on file   Occupational History     Not on file   Tobacco Use     Smoking status: Former Smoker     Packs/day: 2.00     Years: 11.00     Pack years: 22.00     Types: Cigarettes     Start date:      Quit date:      Years since quittin.3     Smokeless tobacco: Never Used   Substance and Sexual Activity     Alcohol use: No     Alcohol/week: 0.0 standard drinks     Drug use: No     Sexual activity: Yes     Partners: Female     Birth control/protection: None   Other Topics Concern     Parent/sibling w/ CABG, MI or angioplasty before 65F 55M? Not Asked   Social History Narrative      Not on file     Social Determinants of Health     Financial Resource Strain: Not on file   Food Insecurity: Not on file   Transportation Needs: Not on file   Physical Activity: Not on file   Stress: Not on file   Social Connections: Not on file   Intimate Partner Violence: Not on file   Housing Stability: Not on file         FAMILY HISTORY:  Family History   Problem Relation Age of Onset     Family History Negative Mother      Family History Negative Father          PHYSICAL EXAM:  Vital signs:  /60   Pulse 72   Resp 16   Wt 89.8 kg (198 lb)   SpO2 99%   BMI 26.85 kg/m     ECO  GENERAL: No acute distress.  EYES: No scleral icterus. No overt erythema.  RESPIRATORY: No audible cough, wheezing, or labored breathing.  MUSCULOSKELETAL: Range of motion in the neck, shoulders, and arms appear normal.  SKIN: No overt rashes, discolorations, or lesions over the face and neck.  NEUROLOGIC: Alert.  No overt tremors.  PSYCHIATRIC: Normal affect and mood.  Does not appear anxious.    LABS:  CBC RESULTS: Recent Labs   Lab Test 22  1149 22  1001   WBC  --  7.2   RBC  --  3.48*   HGB 6.4* 7.5*   HCT 22.8* 26.5*   MCV  --  76*   MCH  --  21.6*   MCHC  --  28.3*   RDW  --  21.6*   PLT  --  196     Last Comprehensive Metabolic Panel:  Sodium   Date Value Ref Range Status   2022 137 133 - 144 mmol/L Final   2021 138 133 - 144 mmol/L Final     Potassium   Date Value Ref Range Status   2022 4.6 3.4 - 5.3 mmol/L Final   2021 4.3 3.4 - 5.3 mmol/L Final     Chloride   Date Value Ref Range Status   2022 105 94 - 109 mmol/L Final   2021 106 94 - 109 mmol/L Final     Carbon Dioxide   Date Value Ref Range Status   2021 28 20 - 32 mmol/L Final     Carbon Dioxide (CO2)   Date Value Ref Range Status   2022 28 20 - 32 mmol/L Final     Anion Gap   Date Value Ref Range Status   2022 4 3 - 14 mmol/L Final   2021 4 3 - 14 mmol/L Final     Glucose   Date Value Ref  Range Status   04/01/2022 244 (H) 70 - 99 mg/dL Final   07/01/2021 226 (H) 70 - 99 mg/dL Final     Urea Nitrogen   Date Value Ref Range Status   04/01/2022 51 (H) 7 - 30 mg/dL Final   07/01/2021 40 (H) 7 - 30 mg/dL Final     Creatinine   Date Value Ref Range Status   04/01/2022 2.55 (H) 0.66 - 1.25 mg/dL Final   07/01/2021 2.63 (H) 0.66 - 1.25 mg/dL Final     GFR Estimate   Date Value Ref Range Status   04/01/2022 24 (L) >60 mL/min/1.73m2 Final     Comment:     Effective December 21, 2021 eGFRcr in adults is calculated using the 2021 CKD-EPI creatinine equation which includes age and gender (Parish sanford al., [place], DOI: 10.1056/APWLdi7927457)   07/01/2021 22 (L) >60 mL/min/[1.73_m2] Final     Comment:     Non  GFR Calc  Starting 12/18/2018, serum creatinine based estimated GFR (eGFR) will be   calculated using the Chronic Kidney Disease Epidemiology Collaboration   (CKD-EPI) equation.       GFR, ESTIMATED POCT   Date Value Ref Range Status   12/23/2021 31 (L) >60 mL/min/1.73m2 Final     Calcium   Date Value Ref Range Status   04/01/2022 8.8 8.5 - 10.1 mg/dL Final   07/01/2021 8.1 (L) 8.5 - 10.1 mg/dL Final     Bilirubin Total   Date Value Ref Range Status   02/25/2022 0.6 0.2 - 1.3 mg/dL Final   06/02/2021 0.6 0.2 - 1.3 mg/dL Final     Alkaline Phosphatase   Date Value Ref Range Status   02/25/2022 165 (H) 40 - 150 U/L Final   06/02/2021 118 40 - 150 U/L Final     ALT   Date Value Ref Range Status   02/25/2022 17 0 - 70 U/L Final   06/02/2021 24 0 - 70 U/L Final     AST   Date Value Ref Range Status   02/25/2022 17 0 - 45 U/L Final   06/02/2021 14 0 - 45 U/L Final       PATHOLOGY:  2/27/2022: Cecum colon polyp -- tubular adenoma, negative for high-grade dysplasia and malignancy. Two transverse colon polyps negative for malignancy.    IMAGING:  3/22/2022:  Chest x-ray showed patchy consolidation at right mid to low lung improved compared to 2/25/2022; bilateral vascular congestion  stable.    ASSESSMENT/PLAN:  Justyn Olivas is an 81 year old male with the following issues:  1. Anemia of chronic kidney disease  2. Congestive heart failure  3. Stage 3B chronic kidney disease  4. Vitamin B12 deficiency  5. Iron deficiency anemia  6. Diabetes mellitus type 2  --Discussed with Kamran that his anemia is likely multifactorial, attributed to component of low vitamin B12, iron deficiency, and anemia of chronic kidney disease.    --3/2021 EGD showed 3 non-bleeding angioectasias and colonoscopy showed non-bleeding angiodysplastic lesion.  --2/27/2022 Colonoscopy showed three colon polyps negative for malignancy. 2/26/2022 EGD was normal. Capsule endoscopy was normal.  --5/14/2021 bone marrow showed no evidence for malignancy or iron deficiency.  --Continue Aranesp every 3 weeks for Hgb < 10 g/dL. Titrate up to response.  --He will continue on B12 and folate supplement. Prescriptions issued.  --Periodically check iron levels every 2-3 months and replenish with IV iron (Feraheme) as needed.  --Transfuse for Hgb < 7 g/dL.  --He can have next CBC checked this Friday with Dr. Davidson.    Return in 1 year.    Kristine Nash MD  Hematology/Oncology  [facility]    Total time spent: 30 minutes in patient evaluation, counseling, documentation, and coordination of care.

## 2022-04-12 NOTE — TELEPHONE ENCOUNTER
Critical HGB of 6.4 called to the triage nurse today from [facility] lab. Patient is scheduled for Aranesp at 12:30 today and Iron tomorrow. Patientt does meet parameters for transfusion.  Charge nurse and nurse assigned to this patient are with a patient at this time. Message left with clinic staff for nurse to follow up.   Shalonda Schrader RN on 4/12/2022 at 12:11 PM  Charge Nurse called back and is working with the patient to add a transfusion on tomorrow.   Shalonda Schrader RN on 4/12/2022 at 12:38 PM

## 2022-04-12 NOTE — PROGRESS NOTES
Infusion Nursing Note:  Justyn Olivas presents today for Aranesp injection.    Patient seen by provider today: No   present during visit today: Not Applicable.    Note: c/o itching on back that keeps him awake at night. Continues red spots/rash on BUE & BLE- dermatology thought it was related to medications      Intravenous Access:  No Intravenous access/labs at this visit.    Treatment Conditions:  Lab Results   Component Value Date    HGB 6.4 (LL) 04/12/2022    WBC 7.2 04/01/2022    ANEU 4.8 06/17/2021    ANEUTAUTO 5.3 03/22/2022     04/01/2022      Results reviewed, labs MET treatment parameters, ok to proceed with treatment.      Post Infusion Assessment:  Patient tolerated injection without incident.       Discharge Plan:   Discharge instructions reviewed with: Patient.  Patient and/or family verbalized understanding of discharge instructions and all questions answered.  AVS to patient via [employer].  Patient will return 4/13 for next appointment.   Patient discharged in stable condition accompanied by: self.  Departure Mode: Ambulatory.      Erika Redmond RN

## 2022-04-13 NOTE — PROGRESS NOTES
Received results of Capsule Endoscopy report from [facility] 6/28/2021. Copy sent to scan

## 2022-04-13 NOTE — PROGRESS NOTES
Infusion Nursing Note:  Justyn Olivas presents today for feraheme and 1 unit PRBCs.    Patient seen by provider today: No   present during visit today: Not Applicable.    Note: N/A.      Intravenous Access:  Peripheral IV placed.    Treatment Conditions:  Lab Results   Component Value Date    HGB 6.4 (LL) 04/12/2022    WBC 7.2 04/01/2022    ANEU 4.8 06/17/2021    ANEUTAUTO 5.3 03/22/2022     04/01/2022      Results reviewed, labs MET treatment parameters, ok to proceed with treatment.  Blood transfusion consent signed 2/2/22.      Post Infusion Assessment:  Patient tolerated infusion without incident.  Patient observed for 30 minutes post feraheme per protocol.  Blood return noted pre and post infusion.  Site patent and intact, free from redness, edema or discomfort.  No evidence of extravasations.  Access discontinued per protocol.       Discharge Plan:   Patient declined prescription refills.  Discharge instructions reviewed with: Patient.  Patient and/or family verbalized understanding of discharge instructions and all questions answered.  AVS to patient via [employer].  Patient will return 4/18/22 for next appointment.   Patient discharged in stable condition accompanied by: self.  Departure Mode: Ambulatory.      Deepthi Haley RN

## 2022-04-18 NOTE — PROGRESS NOTES
Infusion Nursing Note:  Justyn Olivas presents today for Feraheme infusion.    Patient seen by provider today: No   present during visit today: Not Applicable.    Note: no new concerns.      Intravenous Access:  Peripheral IV placed.    Treatment Conditions:  Not Applicable.      Post Infusion Assessment:  Patient tolerated infusion without incident.  Blood return noted pre and post infusion.  Site patent and intact, free from redness, edema or discomfort.  No evidence of extravasations.  Access discontinued per protocol.       Discharge Plan:   Patient and/or family verbalized understanding of discharge instructions and all questions answered.  AVS to patient via [employer].  Patient will return 5/3 for next appointment.   Patient discharged in stable condition accompanied by: self.  Departure Mode: Ambulatory.      Erika Redmond RN

## 2022-04-20 NOTE — PROGRESS NOTES
Oncology Rooming Note    April 20, 2022 10:50 AM   Justyn Olivas is a 81 year old male who presents for:    Chief Complaint   Patient presents with     Oncology Clinic Visit     Initial Vitals: /60   Pulse 72   Resp 16   Wt 89.8 kg (198 lb)   SpO2 99%   BMI 26.85 kg/m   Estimated body mass index is 26.85 kg/m  as calculated from the following:    Height as of 4/1/22: 1.829 m (6').    Weight as of this encounter: 89.8 kg (198 lb). Body surface area is 2.14 meters squared.  No Pain (0) Comment: Data Unavailable   No LMP for male patient.  Allergies reviewed: Yes  Medications reviewed: Yes    Medications: Medication refills not needed today.  Pharm[facility] name entered into [facility]: [id number] [place] - [address] AT [facility] [address]    Clinical concerns: SOB and very tired.       Quyen Butler, CMA

## 2022-04-20 NOTE — LETTER
4/20/2022         RE: Justyn Olivas  [address] [place] [address]        Dear Colleague,    Thank you for referring your patient, Justyn Olivas, to the [facility] [place]. Please see a copy of my visit note below.    [facility]    Hematology/Oncology Established Patient Follow-up Note      Today's Date: 4/20/2022    Reason for Follow-up: Anemia.    HISTORY OF PRESENT ILLNESS: Justyn Olivas is an 81 year old male with DM2, stage 3 CKD, CVA, CHF, pericardial effusion, iron deficiency anemia who presents with the following hematologic history:  --Hospitalized 3/26/2021 for dyspnea, lower extremity edema, and weight gain. He has been on diuretics for his CHF but started to gain weight more recently.  He reports fatigue, generalized imbalance, but no hematochezia, melena, or hematemesis.    --On admission he was found to have Hgb 6.8 with MCV 92, compared to previous Hgb of 9.8 on 2/3/2021.    --Historically, his Hgb has been low in the 8-9 g/dL range since 12/2020.  Back in 12/2020, his Hgb was decreased to 6.9. 12/16/2020 EGD had shown esophagitis, no bleeding, small hiatal hernia, and a 3 mm angiodysplastic lesion without bleeding in the 3rd portion of the duodenum. Rest was unremarkable. Prior to 12/2020, his Hgb had fluctuated in the 8-13 g/dL range. Iron level was intermittently low in past on 2/25/2020 and 12/1/2020.  --3/26/2021: His ferritin was normal at 45, serum iron normal at 53, TIBC 276, and iron saturation index low at 14. Transferrin was normal at 251. Folate was normal at 15.3. Vitamin B12 was at lower end of normal at 215. 3/29/2021 TSH was normal at 1.09.   --On 3/27/2021, EGD showed 4 non-bleeding angioectasias in duodenum treated with APC. Kamran received 1 unit packed RBCs.  --3/31/2021: Hgb 7.9, MCV 95, WBC 5.2, platelets 189,000; absolute reticulocyte count elevated at 176; VIK negative, LDH normal at 178, total and direct bilirubin  normal, haptoglobin normal; SPEP showed no monoclonal protein. Peripheral blood smear showed moderate normocytic hypochromic anemia; moderate absolute lymphopenia.  --3/31/2021 Colonoscopy showed single medium-sized localized angiodysplastic lesion without bleeding in cecum; APC applied at this site.  --5/14/2021 Bone marrow biopsy showed normocellular marrow for age with trilineage hematopoietic maturation; no increase internal jugular blasts; no dysplasia; rare lymphoid aggregates favored to be reactive; adequate to increased storage iron; decreased sideroblastic iron. Cytogenetics: 45,X,-Y[10]/46,XY[10] .    INTERIM HISTORY:  Kamran was hospitalized 3/22/2022 for acute blood loss anemia with suspected small bowel bleeding. He was given 2 units pRBCs and underwent inpatient EGD 2/26/2022 that showed no abnormalities; colonoscopy on 2/27/2022 that showed multiple polyps, no active bleeding; outpatient capsule endoscopy that was normal. Plavix is on hold. He is on pantoprazole. Her reports feeling tired.    REVIEW OF SYSTEMS:   14 point ROS was reviewed and is negative other than as noted above in HPI.       HOME MEDICATIONS:  Current Outpatient Medications   Medication Sig Dispense Refill     albuterol (PROAIR HFA) 108 (90 Base) MCG/ACT inhaler INHALE 2 PUFFS BY MOUTH FOUR TIMES DAILY AS NEEDED 8.5 g 3     atorvastatin (LIPITOR) 20 MG tablet Take 1 tablet (20 mg) by mouth daily 90 tablet 3     blood glucose (STEVE CONTOUR) test strip TESTING FOUR TIMES DAILY OR AS DIRECTED 400 strip 0     carvedilol (COREG) 6.25 MG tablet Take 1 tablet (6.25 mg) by mouth 2 times daily (with meals) 180 tablet 3     clobetasol (TEMOVATE) 0.05 % external cream Apply topically daily as needed       dorzolamide-timolol (COSOPT) 2-0.5 % ophthalmic solution Place 1 drop into the right eye 2 times daily        fluticasone (FLONASE) 50 MCG/ACT nasal spray SHAKE LIQUID AND USE 2 SPRAYS IN EACH NOSTRIL DAILY 16 g 3     folic acid-vit B6-vit B12  (FOLGARD) 0.8-10-0.115 MG TABS per tablet Take 1 tablet by mouth daily Continue per Nephrology recommendation 90 tablet 3     hydrocortisone (CORTAID) 1 % external cream Apply 1 g topically At Bedtime For itching on BACK       insulin glargine (LANTUS SOLOSTAR) 100 UNIT/ML pen ADMINISTER 25 UNITS UNDER THE SKIN AT BEDTIME 45 mL 3     insulin lispro (HUMALOG KWIKPEN) 100 UNIT/ML (1 unit dial) KWIKPEN Inject 5-15 Units Subcutaneous 3 times daily (before meals) Sliding scale with meals (Patient taking differently: Inject 5-15 Units Subcutaneous 3 times daily (before meals) Sliding scale with meals:  <100: 5 units  101-150: 7 units  151-250: 9 units  251-350: 12 units  >351 15 units) 45 mL 3     insulin pen needle (BD PEN NEEDLE FERCHO 2ND GEN) 32G X 4 MM miscellaneous USE AS DIRECTED UP TO FOUR TIMES DAILY 400 each 3     pantoprazole (PROTONIX) 40 MG EC tablet TAKE 1 TABLET(40 MG) BY MOUTH TWICE DAILY 180 tablet 2     potassium chloride ER (K-TAB) 20 MEQ CR tablet Take 1 tablet (20 mEq) by mouth daily       SENNA-docusate sodium (SENNA S) 8.6-50 MG tablet Take 1 tablet by mouth 2 times daily (Patient taking differently: Take 1 tablet by mouth nightly as needed ) 60 tablet 1     sertraline (ZOLOFT) 50 MG tablet Take 1 tablet (50 mg) by mouth daily 90 tablet 3     torsemide (DEMADEX) 20 MG tablet Take 40mg alternating every other day with 80mg.       traZODone (DESYREL) 100 MG tablet TAKE 1 TABLET(100 MG) BY MOUTH AT BEDTIME 90 tablet 3     vitamin B-12 (CYANOCOBALAMIN) 1000 MCG tablet Take 1 tablet (1,000 mcg) by mouth daily Continue unitl hematology evaluation as borderline low vitamin b12. 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)      Cerebral infarction (H)       Chronic diastolic heart failure (H)      Diabetes (H)      [place] (hard of hearing)      Hyperlipemia      Hypokalemia      Renal disease          PAST SURGICAL HISTORY:  Past Surgical History:   Procedure Laterality Date     BONE MARROW BIOPSY, BONE SPECIMEN, NEEDLE/TROCAR N/A 2021    Procedure: BIOPSY, BONE MARROW;  Surgeon: Juanjo Hoffman MD;  Location:  GI     COLONOSCOPY N/A 2022    Procedure: COLONOSCOPY, FLEXIBLE, WITH LESION REMOVAL USING SNARE;  Surgeon: Jules Lane MD;  Location:  GI     COLONOSCOPY N/A 2022    Procedure: COLONOSCOPY, WITH POLYPECTOMY AND BIOPSY;  Surgeon: Jules Lane MD;  Location: [facility] [place]     CV PERICARDIOCENTESIS N/A 2020    Procedure: Pericardiocentesis;  Surgeon: Chas Chambers MD;  Location: [facility] CARDIAC CATH LAB     ENT SURGERY       ESOPHAGOSCOPY, GASTROSCOPY, DUODENOSCOPY (EGD), COMBINED N/A 3/27/2021    Procedure: Esophagogastroduodenoscopy, With Biopsy;  Surgeon: Luc Nash MD;  Location:  GI     ESOPHAGOSCOPY, GASTROSCOPY, DUODENOSCOPY (EGD), COMBINED N/A 2022    Procedure: ESOPHAGOGASTRODUODENOSCOPY (EGD);  Surgeon: Jules Lane MD;  Location: [facility] [place]     LAPAROSCOPIC CHOLECYSTECTOMY N/A 2021    Procedure: LAPAROSCOPIC CHOLECYSTECTOMY;  Surgeon: Len Coates MD;  Location:  OR     SOFT TISSUE SURGERY      skin cancer surgery on nose         SOCIAL HISTORY:  Social History     Socioeconomic History     Marital status:      Spouse name: Cecile     Number of children: Not on file     Years of education: Not on file     Highest education level: Not on file   Occupational History     Not on file   Tobacco Use     Smoking status: Former Smoker     Packs/day: 2.00     Years: 11.00     Pack years: 22.00     Types: Cigarettes     Start date:      Quit date:      Years since quittin.3     Smokeless tobacco: Never Used   Substance and Sexual Activity     Alcohol use: No      Alcohol/week: 0.0 standard drinks     Drug use: No     Sexual activity: Yes     Partners: Female     Birth control/protection: None   Other Topics Concern     Parent/sibling w/ CABG, MI or angioplasty before 65F 55M? Not Asked   Social History Narrative     Not on file     Social Determinants of Health     Financial Resource Strain: Not on file   Food Insecurity: Not on file   Transportation Needs: Not on file   Physical Activity: Not on file   Stress: Not on file   Social Connections: Not on file   Intimate Partner Violence: Not on file   Housing Stability: Not on file         FAMILY HISTORY:  Family History   Problem Relation Age of Onset     Family History Negative Mother      Family History Negative Father          PHYSICAL EXAM:  Vital signs:  /60   Pulse 72   Resp 16   Wt 89.8 kg (198 lb)   SpO2 99%   BMI 26.85 kg/m     ECO  GENERAL: No acute distress.  EYES: No scleral icterus. No overt erythema.  RESPIRATORY: No audible cough, wheezing, or labored breathing.  MUSCULOSKELETAL: Range of motion in the neck, shoulders, and arms appear normal.  SKIN: No overt rashes, discolorations, or lesions over the face and neck.  NEUROLOGIC: Alert.  No overt tremors.  PSYCHIATRIC: Normal affect and mood.  Does not appear anxious.    LABS:  CBC RESULTS: Recent Labs   Lab Test 22  1149 22  1001   WBC  --  7.2   RBC  --  3.48*   HGB 6.4* 7.5*   HCT 22.8* 26.5*   MCV  --  76*   MCH  --  21.6*   MCHC  --  28.3*   RDW  --  21.6*   PLT  --  196     Last Comprehensive Metabolic Panel:  Sodium   Date Value Ref Range Status   2022 137 133 - 144 mmol/L Final   2021 138 133 - 144 mmol/L Final     Potassium   Date Value Ref Range Status   2022 4.6 3.4 - 5.3 mmol/L Final   2021 4.3 3.4 - 5.3 mmol/L Final     Chloride   Date Value Ref Range Status   2022 105 94 - 109 mmol/L Final   2021 106 94 - 109 mmol/L Final     Carbon Dioxide   Date Value Ref Range Status   2021 28  20 - 32 mmol/L Final     Carbon Dioxide (CO2)   Date Value Ref Range Status   04/01/2022 28 20 - 32 mmol/L Final     Anion Gap   Date Value Ref Range Status   04/01/2022 4 3 - 14 mmol/L Final   07/01/2021 4 3 - 14 mmol/L Final     Glucose   Date Value Ref Range Status   04/01/2022 244 (H) 70 - 99 mg/dL Final   07/01/2021 226 (H) 70 - 99 mg/dL Final     Urea Nitrogen   Date Value Ref Range Status   04/01/2022 51 (H) 7 - 30 mg/dL Final   07/01/2021 40 (H) 7 - 30 mg/dL Final     Creatinine   Date Value Ref Range Status   04/01/2022 2.55 (H) 0.66 - 1.25 mg/dL Final   07/01/2021 2.63 (H) 0.66 - 1.25 mg/dL Final     GFR Estimate   Date Value Ref Range Status   04/01/2022 24 (L) >60 mL/min/1.73m2 Final     Comment:     Effective December 21, 2021 eGFRcr in adults is calculated using the 2021 CKD-EPI creatinine equation which includes age and gender (Parish et al., NEJM, DOI: 10.1056/FAWYol8949706)   07/01/2021 22 (L) >60 mL/min/[1.73_m2] Final     Comment:     Non  GFR Calc  Starting 12/18/2018, serum creatinine based estimated GFR (eGFR) will be   calculated using the Chronic Kidney Disease Epidemiology Collaboration   (CKD-EPI) equation.       GFR, ESTIMATED POCT   Date Value Ref Range Status   12/23/2021 31 (L) >60 mL/min/1.73m2 Final     Calcium   Date Value Ref Range Status   04/01/2022 8.8 8.5 - 10.1 mg/dL Final   07/01/2021 8.1 (L) 8.5 - 10.1 mg/dL Final     Bilirubin Total   Date Value Ref Range Status   02/25/2022 0.6 0.2 - 1.3 mg/dL Final   06/02/2021 0.6 0.2 - 1.3 mg/dL Final     Alkaline Phosphatase   Date Value Ref Range Status   02/25/2022 165 (H) 40 - 150 U/L Final   06/02/2021 118 40 - 150 U/L Final     ALT   Date Value Ref Range Status   02/25/2022 17 0 - 70 U/L Final   06/02/2021 24 0 - 70 U/L Final     AST   Date Value Ref Range Status   02/25/2022 17 0 - 45 U/L Final   06/02/2021 14 0 - 45 U/L Final       PATHOLOGY:  2/27/2022: Cecum colon polyp -- tubular adenoma, negative for high-grade  dysplasia and malignancy. Two transverse colon polyps negative for malignancy.    IMAGING:  3/22/2022:  Chest x-ray showed patchy consolidation at right mid to low lung improved compared to 2/25/2022; bilateral vascular congestion stable.    ASSESSMENT/PLAN:  Justyn Oilvas is an 81 year old male with the following issues:  1. Anemia of chronic kidney disease  2. Congestive heart failure  3. Stage 3B chronic kidney disease  4. Vitamin B12 deficiency  5. Iron deficiency anemia  6. Diabetes mellitus type 2  --Discussed with Kamran that his anemia is likely multifactorial, attributed to component of low vitamin B12, iron deficiency, and anemia of chronic kidney disease.    --3/2021 EGD showed 3 non-bleeding angioectasias and colonoscopy showed non-bleeding angiodysplastic lesion.  --2/27/2022 Colonoscopy showed three colon polyps negative for malignancy. 2/26/2022 EGD was normal. Capsule endoscopy was normal.  --5/14/2021 bone marrow showed no evidence for malignancy or iron deficiency.  --Continue Aranesp every 3 weeks for Hgb < 10 g/dL. Titrate up to response.  --He will continue on B12 and folate supplement. Prescriptions issued.  --Periodically check iron levels every 2-3 months and replenish with IV iron (Feraheme) as needed.  --Transfuse for Hgb < 7 g/dL.  --He can have next CBC checked this Friday with Dr. Davidson.    Return in 1 year.    Kristine Nash MD  Hematology/Oncology  [facility]    Total time spent: 30 minutes in patient evaluation, counseling, documentation, and coordination of care.    Oncology Rooming Note    April 20, 2022 10:50 AM   Justyn Olivas is a 81 year old male who presents for:    Chief Complaint   Patient presents with     Oncology Clinic Visit     Initial Vitals: /60   Pulse 72   Resp 16   Wt 89.8 kg (198 lb)   SpO2 99%   BMI 26.85 kg/m   Estimated body mass index is 26.85 kg/m  as calculated from the following:    Height as of 4/1/22: 1.829 m  (6').    Weight as of this encounter: 89.8 kg (198 lb). Body surface area is 2.14 meters squared.  No Pain (0) Comment: Data Unavailable   No LMP for male patient.  Allergies reviewed: Yes  Medications reviewed: Yes    Medications: Medication refills not needed today.  Pharmacy name entered into daysoft: [id number] - [place], [place] - [address] AT [facility] [address]    Clinical concerns: SOB and very tired.       Quyen Butler CMA                Again, thank you for allowing me to participate in the care of your patient.        Sincerely,        Kristine Nash MD

## 2022-04-20 NOTE — PATIENT INSTRUCTIONS
Lab draw every 3 weeks with Aranesp every 3 weeks.  Lab draw (ferritin) in 2 months.  RTC MD in 1 year.

## 2022-04-22 NOTE — PATIENT INSTRUCTIONS
(N18.30,  D63.1) Anemia due to stage 3 chronic kidney disease, unspecified whether stage 3a or 3b CKD (H)  (primary encounter diagnosis)  Comment: We will check labs today and refer to [facility] for both gastroenterology and hematology  Plan: Adult Gastro Ref - Consult Only, Adult         Oncology/Hematology Referral, CBC with         platelets            (D62) Anemia due to blood loss, acute  Comment: We will check labs again today  and continue plan to consult with hematology  Plan: Adult Gastro Ref - Consult Only, Adult         Oncology/Hematology Referral, CBC with         platelets            (J30.89) Chronic non-seasonal allergic rhinitis, unspecified trigger  Comment: no issues  Plan:       (I63.40) Cerebrovascular accident (CVA) due to embolism of cerebral artery (H)  Comment: Discussed options to follow up in neurology and will resume aspirin 81 mg and monitor for recurrence of bleeding  Plan: Adult Gastro Ref - Consult Only              (E11.22,  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

## 2022-04-22 NOTE — PROGRESS NOTES
[facility]  CLINIC PROGRESS NOTE    Subjective:  Anemia due to stage 3 chronic kidney disease, unspecified whether stage 3a or 3b CKD (H)    Justyn Olivas continues to have anemia despite receiving recent packed red blood cells in the hospital and follow up iron infusion and arenesp.  He has not had any bleeding in the stool since leaving the hospital.  He wonders if there may be yet unexplained reason why he continues to have such a difficult case to manage.  He has followed with gastroenterology and hematology and had extensive work-up, any other has not been a clear reason for his ongoing anemia other than his known chronic kidney disease which is being treated  Chronic kidney disease   He is following closely in nephrology and current medications have been stable.    Stroke   Has been advised to hold plavix given suspicion for small bowel bleeding episode.  He did discuss options to start aspirin.  Today he notes that he recalls no prior history of bleeding with aspirin.         Past medical history, medications, allergies, social history, family history reviewed and updated in [facility] as of 4/22/2022 .    ROS  CONSTITUTIONAL: no fatigue, no unexpected change in weight  SKIN: no worrisome rashes, no worrisome moles, no worrisome lesions  EYES: no acute vision problems or changes  ENT: no ear problems, no mouth problems, no throat problems  RESP: no significant cough, no shortness of breath  CV: no chest pain, no palpitations   GI: no nausea, no vomiting   : no frequency, no dysuria, no hematuria  MS: no claudication, no myalgias, no joint aches  PSYCHIATRIC: no changes in mood or affect- taking sertraline in the evening and recommended to help with sleep by taking sertraline 50 mg daily.       Objective:  Vitals  /68 (BP Location: Right arm, Patient Position: Sitting, Cuff Size: Adult Regular)   Pulse 71   Temp 97.7  F (36.5  C) (Temporal)   Resp 16   Ht 1.829 m (6')   SpO2 96%   BMI  26.85 kg/m    GEN: Alert Oriented x3 NAD  HEENT: Atraumatic, normocephalic, neck supple,   CV: RRR no murmurs or rubs  PULM: CTA no wheezes or crackles  ABD: Soft, nontender nondistended, no hepatosplenomegally  SKIN: No visible skin lesion or ulcerations  EXT:  stable 1+ edema bilateral lower extremities  NEURO: Gait and station deferred, No focal neurologic deficits   PSYCH: Mood good, affect mood congruent    No images are attached to the encounter.    Results for orders placed or performed in visit on 04/22/22 (from the past 24 hour(s))   CBC with platelets   Result Value Ref Range    WBC Count 8.0 4.0 - 11.0 10e3/uL    RBC Count 3.53 (L) 4.40 - 5.90 10e6/uL    Hemoglobin 8.3 (L) 13.3 - 17.7 g/dL    Hematocrit 29.1 (L) 40.0 - 53.0 %    MCV 82 78 - 100 fL    MCH 23.5 (L) 26.5 - 33.0 pg    MCHC 28.5 (L) 31.5 - 36.5 g/dL    RDW 28.9 (H) 10.0 - 15.0 %    Platelet Count 182 150 - 450 10e3/uL       Assessment/Plan:  Patient Instructions   (N18.30,  D63.1) Anemia due to stage 3 chronic kidney disease, unspecified whether stage 3a or 3b CKD (H)  (primary encounter diagnosis)  Comment: We will check labs today and refer to [facility] for both gastroenterology and hematology  Plan: Adult Gastro Ref - Consult Only, Adult         Oncology/Hematology Referral, CBC with         platelets            (D62) Anemia due to blood loss, acute  Comment: We will check labs again today  and continue plan to consult with hematology  Plan: Adult Gastro Ref - Consult Only, Adult         Oncology/Hematology Referral, CBC with         platelets            (J30.89) Chronic non-seasonal allergic rhinitis, unspecified trigger  Comment: no issues  Plan:       (I63.40) Cerebrovascular accident (CVA) due to embolism of cerebral artery (H)  Comment: Discussed options to follow up in neurology and will resume aspirin 81 mg and monitor for recurrence of bleeding  Plan: Adult Gastro Ref - Consult Only              (E11.22,  N18.4) CKD stage 4 due to  type 2 diabetes mellitus (H)  Comment: check labs   Plan: Basic metabolic panel  (Ca, Cl, CO2, Creat,         Gluc, K, Na, BUN)               Follow up in 3 months    Disclaimer: This note consists of symbols derived from keyboarding, dictation and/or voice recognition software. As a result, there may be errors in the script that have gone undetected. Please consider this when interpreting information found in this chart.    Christian Davidson MD  [phone]

## 2022-04-22 NOTE — PROGRESS NOTES
"Oncology Distress Screening Follow-up  Clinical Social Work  [facility]    Identified Concern and Score From Distress Screenin. How concerned are you about your ability to eat?  3       2. How concerned are you about unintended weight loss or your current weight?  0       3. How concerned are you about feeling depressed or very sad?  6 Abnormal        4. How concerned are you about feeling anxious or very scared?  3       5. Do you struggle with the loss of meaning and silvio in your life?  Quite a bit Abnormal        6. How concerned are you about work and home life issues that may be affected by your cancer?  5       7. How concerned are you about knowing what resources are available to help you?  0       8. Do you currently have what you would describe as Sikhism or spiritual struggles?             Not at all         Date of Distress Screenin22    Intervention:   Kamran is an 81-year-old gentleman with a diagnosis of anemia who is followed by Dr. Nash at [facility]. At time of last visit with Dr. Nash, Kamran scored positive on oncology distress screen. This clinician called Kamran today with goal of following up on elevated distress.     Kamran repots that illness, \"has continued for so long\" and feels defeated by \"trying so many things to get back on track.\" Kamran denies depression, but endorses a \"spirit of futility\" hoping that something will improve his situation. Kamran reports that he has tremendous support from wife and children which help his spirits.     Kamran reports that greatest concern is low hemoglobin which makes him fatigued and not steady on feet. Kamran reports multiple blood/iron transfusions, and denies positive change in symptoms/daily quality of life. Kamran reports that he is planning to talk with PCP today about prognosis and what future looks like, and if there is anything else that can be done to improve daily quality of life.     This clinician provided emotional support " "and validation of expressed concerns. Kamran reports, \"I haven't given up\" and that he is going to keep working on it. Oriented to role of  as part of care team and ways to connect in future as needed.     Education Provided:   How to contact     Follow-up Required:   This clinician will continue to be available as needed for ongoing psychosocial support. Kamran knows to outreach as needed.     YOLY Pereyra, [facility]  Phone: [phone]  [facility]: M, Thu  *every other Tue, 8am-4:30pm  [facility]: W, F, *every other Tue, 8am-4:30pm       "

## 2022-04-22 NOTE — RESULT ENCOUNTER NOTE
Rickie Alejandra,    I have had the opportunity to review your recent results and an interpretation is as follows:  Your follow up hemoglobin is improved - we can continue with plan to consult with gastroenterology and hematology      Sincerely,  Christian Davidson MD

## 2022-05-03 NOTE — PROGRESS NOTES
"Infusion Nursing Note:  Justyn Olivas presents today for aranesp.    Patient seen by provider today: No   present during visit today: Not Applicable.    Note: Patient's hgb noted at 6.1, previously 8.3 on 4/22. Patient states he has had significant increase in fatigue, continues with SOB on exertion, denies dizziness but feels unsteady on his feet. Denies recent falls. Patient stated that he had a black colored bowel movement just prior to appointment. Denies any ralph red blood or other signs of active bleeding. This was his first instance of noticing black stool. Per Dr. Nash's last note from 4/20/22, \"Kamran was hospitalized 3/22/2022 for acute blood loss anemia with suspected small bowel bleeding. He was given 2 units pRBCs and underwent inpatient EGD 2/26/2022 that showed no abnormalities; colonoscopy on 2/27/2022 that showed multiple polyps, no active bleeding; outpatient capsule endoscopy that was normal. Plavix is on hold.\"     Discussed above with covering provider, Bhargav Babb NP, orders as follows:    Transfuse 1 unit PRBC  If unable to get transfusion at our clinic, clinic RN to check with care suites. If care suites unable to take patient in the next 2 days, patient to go to ER  Ok to give aranesp today    Reviewed above plan with patient, he is agreeable. No openings at our clinic today or tomorrow for transfusion. Reviewed above and report given to [facility] clinic RN who was able to take over arranging plan of care for patient.      Intravenous Access:  No Intravenous access/labs at this visit.    Treatment Conditions:  Lab Results   Component Value Date    HGB 6.1 (LL) 05/03/2022    WBC 8.6 05/03/2022    ANEU 4.8 06/17/2021    ANEUTAUTO 6.1 05/03/2022     05/03/2022      Results reviewed, labs MET treatment parameters for aranesp, ok to proceed with treatment.      Post Infusion Assessment:  Patient tolerated injection without incident.  Site patent and intact, free from redness, " edema or discomfort.       Discharge Plan:   Patient discharged in stable condition accompanied by: wife.  Departure Mode: Wheelchair.  Face to Face time: 60+ minutes coordinating care.      Felicia Ryan RN

## 2022-05-03 NOTE — PROGRESS NOTES
Care Suites Discharge Nursing Note    Patient Information  Name: Justyn Olivas  Age: 81 year old    Discharge Education:  Discharge instructions reviewed: Yes  Additional education/resources provided: No  Patient/patient representative verbalizes understanding: Yes  Patient discharging on new medications: No  Medication education completed: N/A    Discharge Plans:   Discharge location: home  Discharge ride contacted: Yes  Approximate discharge time: 1845    Discharge Criteria:  Discharge criteria met and vital signs stable: Yes    Patient Belongs:  Patient belongings returned to patient: Yes    Kailee Barber RN

## 2022-05-03 NOTE — DISCHARGE INSTRUCTIONS
Blood Transfusion Discharge Instructions     After you go home:    After a blood transfusion (red blood cells, platelets, plasma, cryo or granulocytes), you will need to watch for signes of a reaction for the next 48 hours.    Medicines:    You may resume all your medications            Call the provider who ordered your blood transfusion or call 911 or go to the Emergency Room if you have any signs of a reaction:    Shaking or chills  Fever - temperature above 100.0 F  Headache  Nausea  Hives  Itching  Swelling of the face or feeling flushed  Ongoing dry cough (nothing is coughed up)  Trouble breathing or wheezing    Some signs of a reaction won't show up for a few days or up to 4 weeks. These may include:    Fatigue  Dizziness  Pink or red urine    If you have questions call your provider who ordered the blood transfusion.

## 2022-05-04 NOTE — TELEPHONE ENCOUNTER
PCP see below, pt asking if okay to take 1 Zyrtec before bedtime (OTC) for itching?     Miguelina FLOOD, Triage RN  [facility]

## 2022-05-04 NOTE — PROGRESS NOTES
Clinic Care Coordination Contact    Follow Up Progress Note      Assessment: RN CC called and spoke with patient. Patient's chronic amenia continues to be a concern for him.  Kamran completed blood transfusion yesterday as HGB was 6.1 on 5/3/22. Kamran states that he is not feeling any better after transfusion (faitgue). Patient has labored breathing over the phone. Kamran notes shortness of breath, especially with activity. Denies any chest pain/pressure, states that his weight increased 10 lbs after transfusion and fluids given yesterday. Kamran states that his weight is down a bit this morning. Patient is monitoring weight closely and knows to message/call cardiology if weight does not decrease back to baseline. Patient has follow up scheduled with cardiology on 5/10/22.    Kamran completed PCP follow up on 4/22/22. He was referred to [facility] for both gastroenterology and hematology. Patient states that he needs to call and schedule. Family is encouraging him to due so. Kamran denies needing any assistance with this.     Kamran will have repeat labs tomorrow. Kamran denies the need for any additional support or resources at this time.       Care Gaps:    Health Maintenance Due   Topic Date Due     HF ACTION PLAN  Never done     DEPRESSION ACTION PLAN  Never done     DTAP/TDAP/TD IMMUNIZATION (1 - Tdap) 03/06/2013     MEDICARE ANNUAL WELLNESS VISIT  02/10/2021     COVID-19 Vaccine (4 - Booster for Pfizer series) 01/29/2022       Scheduled to be addressed at on going PCP follow up appointments.       Goals addressed this encounter:    Goals Addressed                    This Visit's Progress       1. Improve chronic symptoms (pt-stated)   90%      Goal Statement: I will verbalize an increase in my strength and mobility and correct knowledge of adequate management of my chronic health conditions to maintain my health and wellness in preventing hospital readmission.   Date Goal set: 12/24/20; Updated/modified: 12/14/21  Barriers:  Multiple health concerns.  Strengths: Motivated and engaged in care coordination.   Date to Achieve By: Updated 6/1/22  Patient expressed understanding of goal: Yes    Action steps to achieve this goal:  1. I will work with Physical Therapy to build my strength and mobility.   2. I will follow up with my providers as scheduled/recommended. (Primary Care Provider, [facility], sleep studies TBD, Dr. Ariana PARSON, [facility] hematology/oncology, nephrology)   3. I will take my medications as prescribed.   4. I will continue monitoring my blood sugars, blood pressures, weight daily as recommended.  5. I will use my CHF action plan to monitor/manage my symptoms.   6. I will follow care team recommendations of fluid restriction, diabetic and cardiac diet.   7. I will use my incentive spirometer as directed and recommended by my care team.   8. I will continue to work with care coordination for any additional resources and support.  9. I will contact my care team with questions, concerns, support needs. I will use the clinic as a resource and I understand I can contact my clinic with 24/7 after hours services available. Care Coordinator will remain available as needed.             2. Pain Management (pt-stated)   90%      Goal Statement: I would like to address and understand the treatment plan for my upper right quadrant abdominal pain.   Date Goal set: 8/6/21  Barriers: Unknown etiology   Strengths: Patient is motivated  Date to Achieve By: Updated: 6/1/22  Patient expressed understanding of goal: yes  Action steps to achieve this goal:  1. I will have Hepatobiliary scan on 8/18/21  2. I will follow up with my general surgery after scan  3. I will follow up with PCP 8/23/21 or sooner if he is able to make an ecception to his schedule  4. I will continue to work with care coordination for any additional resources and support                Intervention/Education provided during outreach: Chronic disease management and support.       Outreach Frequency: monthly    Plan: RN Care Coordinator will follow up in one month.     Hellen Chaidez RN Care Coordinator  [facility]  Email: [email or web address]  Phone: [phone]

## 2022-05-05 NOTE — TELEPHONE ENCOUNTER
[facility] is able to see patient today at 2 pm for 1 unit of blood. Called patient and both home and mobile phones and left a message to call back.     Pam Gale RN, BSN, PHN  M[facility]

## 2022-05-05 NOTE — TELEPHONE ENCOUNTER
Called patient to discuss test results and help facilitate appointment for transfusion, left a message to call back.     Kristine Nash MD Leon, Veronica, RN  Caller: Unspecified (Today, 10:44 AM)  Hi,     I would recommend setting up for transfusion of 1 unit pRBCs.     Thank you,   Kristine Nash MD   Hematology/Oncology   [facility]

## 2022-05-05 NOTE — TELEPHONE ENCOUNTER
Called patient and spoke with wife, they will be in today at 2:30. [facility] information given.    Pam Gale, RN, BSN, PHN  M.[facility]

## 2022-05-05 NOTE — PROGRESS NOTES
Patient's wife, Cecile, called in today because she said they were not able to make the appointment for 1 unit of PRBC's that was scheduled in [place] for this afternoon 5/5/22.  She said that the earliest they could reschedule was for Sunday 5/8 and they didn't want to wait that long.  Called [facility] scheduling, and they are able to get patient in tomorrow 5/6/22 at 2:30pm.  Patient needs T&C, but called blood bank and lab at [facility] and they are able to add the T&C on to the blood sample that was drawn there this morning.  [facility] 's scheduled 1 unit PRBC's at 2:30pm tomorrow.  Cecile aware of plan and in agreement with plan.    Stephenie Hutchison, RN, BSN    Triage RN  [facility]/[place]rin Rose    [phone]

## 2022-05-05 NOTE — TELEPHONE ENCOUNTER
[facility] is calling to report critical results.   HGB 6.7    CBC RESULTS: Recent Labs   Lab Test 05/05/22  1002   WBC 9.2   RBC 2.76*   HGB 6.7*   HCT 23.3*   MCV 84   MCH 24.3*   MCHC 28.8*   RDW 26.3*        Paging provider for review.     Pam Gale, RN, BSN, PHN  [facility]

## 2022-05-10 PROBLEM — I50.9 CONGESTIVE HEART FAILURE, UNSPECIFIED HF CHRONICITY, UNSPECIFIED HEART FAILURE TYPE (H): Status: ACTIVE | Noted: 2022-01-01

## 2022-05-10 PROBLEM — N19 RENAL FAILURE, UNSPECIFIED CHRONICITY: Status: ACTIVE | Noted: 2022-01-01

## 2022-05-10 NOTE — ED NOTES
DATE:  5/10/2022   TIME OF RECEIPT FROM LAB:  9773  LAB TEST:  hemoglobin  LAB VALUE:  6.1  RESULTS GIVEN WITH READ-BACK TO (PROVIDER):  Jonatan  TIME LAB VALUE REPORTED TO PROVIDER:   2870

## 2022-05-10 NOTE — H&P
[facility]    History and Physical - Hospitalist Service       Date of Admission:  5/10/2022    Assessment & Plan      Justyn Olivas is a 81 year old male with a history of HFpEF, transfusion dependent chronic anemia, CKD stage IV, DM type II who recently was started on steroids for shoulder pain and was sent in to the ED on 5/10/2022 from cardiology clinic due to concerns for decompensated heart failure     Decompensated HFpEF  Acute hypoxic respiratory distress due to above   Patient was placed on steroids on Sunday and since then has had worsening lower extremity edema, MARKS and fluid retention in the abdomen.  Since then he has had approximately 15 pound weight gain.  He was seen in cardiology clinic this AM and they sent him in to the ED for hospitalization for decompensated heart failure.  On my evaluation patient was requiring 2 L of O2 to maintain his oxygenation   Last echocardiogram from March showed a normal EF of 55%  - Admit to inpatient  - Monitor on telemetry  - Strict I/Os and daily weights   - Patient received Bumex 1 mg IV in ED and will continue with Lasix 80 mg TID   - PTA Coreg  - Does not appear to be on ACE/ARB, likely due to significant CKD   - Cardiology consulted and appreciate their recommendations     Transfusion dependent chronic anemia, likely related to chronic disease   Patient's hemoglobin has continued to drift down.  It was 6.1 the ED while being 6.7 on 5/5 and 6.1 on 5/3.  Last transfusion appears to have been 5/6.  Patient denies any recent bleeding.  He has had a thorough work up so far.  He has had colonoscopy/endoscopy/pill study with GI without clear cause.  He also follows with hem/onc and has gotten IV infusions in the past.  In the ED he was consented and 1 unit of PRBCs was ordered  - Continue to monitor  - Hem/onc consulted and will defer further transfusions to them given the national blood shortage     CKD stage IV   Patient's Cr is  actually slightly improved from baseline.  Cr was 2.3 on arrival and baseline appears to be 2.5-2.6  - Avoid nephrotoxins  - Continue to monitor with daily BMPs while diuresing  - If worsens or patient not responding well to diuretics may need to consider nephrology evaluation     DM type II   Last HgbA1c was 8.2 on 2/16/22   - Lantus 20 U at bedtime   - Medium intensity SSI for now     Shoulder pain   Has had gradually worsening shoulder pain for some time.  Actually seen at [facility] urgent care on Sunday and supposedly had MRIs of the shoulder and spine.  At that time he was started on Prednisone which we believe may be the cause of him gaining fluid.  No issues currently   - Stopping steroid   - Consider ortho consult if having issues          Diet:   Cardiac diet   DVT Prophylaxis: Pneumatic Compression Devices  Glasgow Catheter: Not present  Central Lines: None  Cardiac Monitoring: None  Code Status:   Full code per patient     Clinically Significant Risk Factors Present on Admission             # Hypoalbuminemia: Albumin = 2.6 g/dL (Ref range: 3.4 - 5.0 g/dL) on admission, will monitor as appropriate    # Platelet Defect: home medication list includes an antiplatelet medication   # Overweight: Estimated body mass index is 29.16 kg/m  as calculated from the following:    Height as of an earlier encounter on 5/10/22: 1.829 m (6').    Weight as of an earlier encounter on 5/10/22: 97.5 kg (215 lb).      Disposition Plan   Expected Discharge:   Anticipated discharge location:  Awaiting care coordination huddle  Delays:        The patient's care was discussed with the Patient, Patient's Family and ED provider.    Eagle Robles DO  Hospitalist Service  [facility]  Securely message with the Vocera Web Console (learn more here)  Text page via [facility] Paging/Directory         ______________________________________________________________________    Chief Complaint   Dyspnea     History is obtained  from the patient    History of Present Illness   Justyn Olivas is a 81 year old male with a history of HFpEF, transfusion dependent chronic anemia, CKD stage IV, DM type II who recently was started on steroids for shoulder pain and was sent in to the ED on 5/10/2022 from cardiology clinic due to concerns for decompensated heart failure.  Patient was placed on steroids on Sunday and since then has had worsening lower extremity edema, MARKS and fluid retention in the abdomen.  Since then he has had approximately 15 pound weight gain.  He was seen in cardiology clinic this AM and they sent him in to the ED for hospitalization for decompensated heart failure.  On my evaluation patient was requiring 2 L of O2 to maintain his oxygenation and still complaining of shortness of breath.  It is unclear if he has had any orthopnea.  He denies any chest pain.  No recent fevers, chills, cough, sore throat, N/V/D or problems with urination.        Review of Systems    The 10 point Review of Systems is negative other than noted in the HPI    Past Medical History    I have reviewed this patient's medical history and updated it with pertinent information if needed.   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  Past Surgical History:   Procedure Laterality Date     BONE MARROW BIOPSY, BONE SPECIMEN, NEEDLE/TROCAR N/A 5/14/2021    Procedure: BIOPSY, BONE MARROW;  Surgeon: Juanjo Hoffman MD;  Location:  GI     COLONOSCOPY N/A 2/27/2022    Procedure: COLONOSCOPY, FLEXIBLE, WITH LESION REMOVAL USING  SNARE;  Surgeon: Jules Lane MD;  Location:  GI     COLONOSCOPY N/A 2022    Procedure: COLONOSCOPY, WITH POLYPECTOMY AND BIOPSY;  Surgeon: Jules Lane MD;  Location:  GI     CV PERICARDIOCENTESIS N/A 2020    Procedure: Pericardiocentesis;  Surgeon: Chas Chambers MD;  Location:  HEART CARDIAC CATH LAB     ENT SURGERY       ESOPHAGOSCOPY, GASTROSCOPY, DUODENOSCOPY (EGD), COMBINED N/A 3/27/2021    Procedure: Esophagogastroduodenoscopy, With Biopsy;  Surgeon: Luc Nash MD;  Location:  GI     ESOPHAGOSCOPY, GASTROSCOPY, DUODENOSCOPY (EGD), COMBINED N/A 2022    Procedure: ESOPHAGOGASTRODUODENOSCOPY (EGD);  Surgeon: Jules Lane MD;  Location: [facility]     LAPAROSCOPIC CHOLECYSTECTOMY N/A 2021    Procedure: LAPAROSCOPIC CHOLECYSTECTOMY;  Surgeon: Len Coates MD;  Location:  OR     SOFT TISSUE SURGERY      skin cancer surgery on nose       Social History   I have reviewed this patient's social history and updated it with pertinent information if needed.  Social History     Tobacco Use     Smoking status: Former Smoker     Packs/day: 2.00     Years: 11.00     Pack years: 22.00     Types: Cigarettes     Start date:      Quit date:      Years since quittin.3     Smokeless tobacco: Never Used   Substance Use Topics     Alcohol use: No     Alcohol/week: 0.0 standard drinks     Drug use: No       Family History   I have reviewed this patient's family history and updated it with pertinent information if needed.  Family History   Problem Relation Age of Onset     Family History Negative Mother      Family History Negative Father        Prior to Admission Medications   Prior to Admission Medications   Prescriptions Last Dose Informant Patient Reported? Taking?   SENNA-docusate sodium (SENNA S) 8.6-50 MG tablet   No No   Sig: Take 1 tablet by mouth 2 times daily   Patient taking differently: Take 1 tablet by mouth nightly as needed    albuterol (PROAIR HFA) 108 (90 Base) MCG/ACT inhaler  Self No No   Sig: INHALE 2 PUFFS BY MOUTH FOUR TIMES DAILY AS NEEDED   aspirin 81 MG EC tablet   Yes No   Sig: Take 81 mg by mouth daily   atorvastatin (LIPITOR) 20 MG tablet   No No   Sig: Take 1 tablet (20 mg) by mouth daily   blood glucose (STEVE CONTOUR) test strip  Self No No   Sig: TESTING FOUR TIMES DAILY OR AS DIRECTED   carvedilol (COREG) 6.25 MG tablet   No No   Sig: Take 1 tablet (6.25 mg) by mouth 2 times daily (with meals)   clobetasol (TEMOVATE) 0.05 % external cream  Self Yes No   Sig: Apply topically daily as needed   dorzolamide-timolol (COSOPT) 2-0.5 % ophthalmic solution  Self Yes No   Sig: Place 1 drop into the right eye 2 times daily    fluticasone (FLONASE) 50 MCG/ACT nasal spray   No No   Sig: SHAKE LIQUID AND USE 2 SPRAYS IN EACH NOSTRIL DAILY   folic acid (FOLVITE) 400 MCG tablet   No No   Sig: Take 1 tablet (400 mcg) by mouth daily   folic acid-vit B6-vit B12 (FOLGARD) 0.8-10-0.115 MG TABS per tablet   No No   Sig: Take 1 tablet by mouth daily Continue per Nephrology recommendation   hydrocortisone (CORTAID) 1 % external cream   Yes No   Sig: Apply 1 g topically At Bedtime For itching on BACK   insulin glargine (LANTUS SOLOSTAR) 100 UNIT/ML pen  Self No No   Sig: ADMINISTER 25 UNITS UNDER THE SKIN AT BEDTIME   insulin lispro (HUMALOG KWIKPEN) 100 UNIT/ML (1 unit dial) KWIKPEN  Self No No   Sig: Inject 5-15 Units Subcutaneous 3 times daily (before meals) Sliding scale with meals   Patient taking differently: Inject 5-15 Units Subcutaneous 3 times daily (before meals) Sliding scale with meals:  <100: 5 units  101-150: 7 units  151-250: 9 units  251-350: 12 units  >351 15 units   insulin pen needle (BD PEN NEEDLE FERCHO 2ND GEN) 32G X 4 MM miscellaneous  Self No No   Sig: USE AS DIRECTED UP TO FOUR TIMES DAILY   pantoprazole (PROTONIX) 40 MG EC tablet   No No   Sig: TAKE 1 TABLET(40 MG) BY MOUTH TWICE DAILY   potassium chloride ER (K-TAB)  20 MEQ CR tablet  Self No No   Sig: Take 1 tablet (20 mEq) by mouth daily   sertraline (ZOLOFT) 50 MG tablet  Self No No   Sig: Take 1 tablet (50 mg) by mouth daily   torsemide (DEMADEX) 20 MG tablet   Yes No   Sig: Take 80 mg by mouth daily   traZODone (DESYREL) 100 MG tablet  Self No No   Sig: TAKE 1 TABLET(100 MG) BY MOUTH AT BEDTIME   vitamin B-12 (CYANOCOBALAMIN) 1000 MCG tablet   No No   Sig: Take 1 tablet (1,000 mcg) by mouth daily      Facility-Administered Medications: None     Allergies   Allergies   Allergen Reactions     No Known Allergies        Physical Exam   Vital Signs: Temp: 98  F (36.7  C) Temp src: Temporal BP: 120/63 Pulse: 84   Resp: 26 SpO2: 97 %      Weight: 0 lbs 0 oz    General Appearance: Resting comfortably. NAD   Eyes: EOMI.  Normal conjuctiva  HEENT: NC/AT.  Moist mucous membranes  Respiratory: Clear to auscultation.  No respiratory distress  Cardiovascular: RRR.  Distant heart sounds to difficult to appreciate any murmur   GI: Abdomen is moderately distended.  Bowel sounds noted  Skin: Venous stasis changes on the lower extremities.  No obvious rashes or cyanosis to exposed skin  Musculoskeletal: 3+ edema to bilateral lower extremities.   No calf tenderness  Neurologic: CN appear intact.  Moving all extremities grossly   Psychiatric: Alert.  Oriented     Data   Data reviewed today: I reviewed all medications, new labs and imaging results over the last 24 hours. I personally reviewed   CXR: Normal cardiac silhouette.  No obvious pleural effusions   EKG:  NSR.  No obvious ST or T wave changes to suggest acute ischemia     Recent Labs   Lab 05/10/22  1416 05/05/22  1002   WBC 11.9* 9.2   HGB 6.1* 6.7*   MCV 85 84    235     --    POTASSIUM 4.6  --    CHLORIDE 107  --    CO2 23  --    BUN 76*  --    CR 2.30*  --    ANIONGAP 6  --    VANESA 8.4*  --    *  --    ALBUMIN 2.6*  --    PROTTOTAL 6.4*  --    BILITOTAL 0.9  --    ALKPHOS 120  --    ALT 25  --    AST 20  --       Recent Results (from the past 24 hour(s))   XR Chest Port 1 View    Narrative    EXAM: XR CHEST PORT 1 VIEW  LOCATION: [facility]  DATE/TIME: 5/10/2022 5:35 PM    INDICATION: Weak.  COMPARISON: Chest x-ray on 03/20/2022.      Impression    IMPRESSION: Single AP view of the chest was obtained. Cardiomediastinal silhouette is within normal limits. Right basilar pulmonary opacities, minimally improved as compared to 03/20/2022 exam. No significant pleural effusion or pneumothorax.

## 2022-05-10 NOTE — ED NOTES
[facility]  ED Nurse Handoff Report    ED Chief complaint: Edema      ED Diagnosis:   Final diagnoses:   Congestive heart failure, unspecified HF chronicity, unspecified heart failure type (H)   Anemia, unspecified type   Renal failure, unspecified chronicity       Code Status: Full Code    Allergies:   Allergies   Allergen Reactions     No Known Allergies        Patient Story: pt presents with ongoing weakness and sob especially on exertion. Found to have hemoglobin of 6.1 with edema   Focused Assessment:  Blood work imaging EKG     Treatments and/or interventions provided: blood work imaging   Patient's response to treatments and/or interventions: blood work and blood admin     To be done/followed up on inpatient unit:  continuous low hemoglobin    Does this patient have any cognitive concerns?: none    Activity level - Baseline/Home:  Stand with Assist  Activity Level - Current:   Stand with assist x2    Patient's Preferred language: English   Needed?: No    Isolation: None  Infection: Not Applicable  Patient tested for COVID 19 prior to admission: YES  Bariatric?: No    Vital Signs:   Vitals:    05/10/22 1417   BP: 120/63   Pulse: 84   Resp: 26   Temp: 98  F (36.7  C)   TempSrc: Temporal   SpO2: 97%       Cardiac Rhythm:     Was the PSS-3 completed:   Yes  What interventions are required if any?               Family Comments: concerns for low hemoglobin   OBS brochure/video discussed/provided to patient/family: Yes              Name of person given brochure if not patient:               Relationship to patient: wife    For the majority of the shift this patient's behavior was Green.   Behavioral interventions performed were none.    ED NURSE PHONE NUMBER: RN 2

## 2022-05-10 NOTE — PROGRESS NOTES
Clinic visit note dictated. Dictation reference number - [id number]        Today's clinic visit entailed:  Review of the result(s) of each unique test - ECG, labs, echocardiogram.  Discussion of management or test interpretation with external physician/other qualified healthcare professional/appropriate source - Dr. Brown, ER physician.  60 minutes spent on the date of the encounter doing chart review, history and exam, documentation and further activities per the note  Provider  Link to MDM Help Grid     The level of medical decision making during this visit was of high complexity.        CURRENT MEDICATIONS:  Current Outpatient Medications   Medication Sig Dispense Refill     albuterol (PROAIR HFA) 108 (90 Base) MCG/ACT inhaler INHALE 2 PUFFS BY MOUTH FOUR TIMES DAILY AS NEEDED 8.5 g 3     aspirin 81 MG EC tablet Take 81 mg by mouth daily       atorvastatin (LIPITOR) 20 MG tablet Take 1 tablet (20 mg) by mouth daily 90 tablet 3     blood glucose (STEVE CONTOUR) test strip TESTING FOUR TIMES DAILY OR AS DIRECTED 400 strip 0     carvedilol (COREG) 6.25 MG tablet Take 1 tablet (6.25 mg) by mouth 2 times daily (with meals) 180 tablet 3     clobetasol (TEMOVATE) 0.05 % external cream Apply topically daily as needed       dorzolamide-timolol (COSOPT) 2-0.5 % ophthalmic solution Place 1 drop into the right eye 2 times daily        fluticasone (FLONASE) 50 MCG/ACT nasal spray SHAKE LIQUID AND USE 2 SPRAYS IN EACH NOSTRIL DAILY 16 g 3     folic acid (FOLVITE) 400 MCG tablet Take 1 tablet (400 mcg) by mouth daily 90 tablet 3     folic acid-vit B6-vit B12 (FOLGARD) 0.8-10-0.115 MG TABS per tablet Take 1 tablet by mouth daily Continue per Nephrology recommendation 90 tablet 3     hydrocortisone (CORTAID) 1 % external cream Apply 1 g topically At Bedtime For itching on BACK       insulin glargine (LANTUS SOLOSTAR) 100 UNIT/ML pen ADMINISTER 25 UNITS UNDER THE SKIN AT BEDTIME 45 mL 3     insulin lispro (HUMALOG KWIKPEN) 100  UNIT/ML (1 unit dial) KWIKPEN Inject 5-15 Units Subcutaneous 3 times daily (before meals) Sliding scale with meals (Patient taking differently: Inject 5-15 Units Subcutaneous 3 times daily (before meals) Sliding scale with meals:  <100: 5 units  101-150: 7 units  151-250: 9 units  251-350: 12 units  >351 15 units) 45 mL 3     insulin pen needle (BD PEN NEEDLE FERCHO 2ND GEN) 32G X 4 MM miscellaneous USE AS DIRECTED UP TO FOUR TIMES DAILY 400 each 3     pantoprazole (PROTONIX) 40 MG EC tablet TAKE 1 TABLET(40 MG) BY MOUTH TWICE DAILY 180 tablet 2     potassium chloride ER (K-TAB) 20 MEQ CR tablet Take 1 tablet (20 mEq) by mouth daily       SENNA-docusate sodium (SENNA S) 8.6-50 MG tablet Take 1 tablet by mouth 2 times daily (Patient taking differently: Take 1 tablet by mouth nightly as needed) 60 tablet 1     sertraline (ZOLOFT) 50 MG tablet Take 1 tablet (50 mg) by mouth daily 90 tablet 3     torsemide (DEMADEX) 20 MG tablet Take 80 mg by mouth daily       traZODone (DESYREL) 100 MG tablet TAKE 1 TABLET(100 MG) BY MOUTH AT BEDTIME 90 tablet 3     vitamin B-12 (CYANOCOBALAMIN) 1000 MCG tablet Take 1 tablet (1,000 mcg) by mouth daily 90 tablet 3         ALLERGIES:  Allergies   Allergen Reactions     No Known Allergies        PAST MEDICAL HISTORY:    Past Medical History:   Diagnosis Date     Anemia      Anemia      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)      Cerebral infarction (H)      Chronic diastolic heart failure (H)      Diabetes (H)      [place] (hard of hearing)      Hyperlipemia      Hypokalemia      Renal disease        PAST SURGICAL HISTORY:    Past Surgical History:   Procedure Laterality Date     BONE MARROW BIOPSY, BONE SPECIMEN, NEEDLE/TROCAR N/A 5/14/2021    Procedure: BIOPSY, BONE MARROW;  Surgeon: Juanjo Hoffman MD;  Location:  GI     COLONOSCOPY N/A 2/27/2022    Procedure: COLONOSCOPY, FLEXIBLE, WITH LESION REMOVAL USING SNARE;   Surgeon: Jules Lane MD;  Location:  GI     COLONOSCOPY N/A 2022    Procedure: COLONOSCOPY, WITH POLYPECTOMY AND BIOPSY;  Surgeon: Jules Lane MD;  Location:  GI     CV PERICARDIOCENTESIS N/A 2020    Procedure: Pericardiocentesis;  Surgeon: Chas Chambers MD;  Location:  HEART CARDIAC CATH LAB     ENT SURGERY       ESOPHAGOSCOPY, GASTROSCOPY, DUODENOSCOPY (EGD), COMBINED N/A 3/27/2021    Procedure: Esophagogastroduodenoscopy, With Biopsy;  Surgeon: Luc Nash MD;  Location:  GI     ESOPHAGOSCOPY, GASTROSCOPY, DUODENOSCOPY (EGD), COMBINED N/A 2022    Procedure: ESOPHAGOGASTRODUODENOSCOPY (EGD);  Surgeon: Jules Lane MD;  Location:  GI     LAPAROSCOPIC CHOLECYSTECTOMY N/A 2021    Procedure: LAPAROSCOPIC CHOLECYSTECTOMY;  Surgeon: Len Coates MD;  Location:  OR     SOFT TISSUE SURGERY      skin cancer surgery on nose       FAMILY HISTORY:    Family History   Problem Relation Age of Onset     Family History Negative Mother      Family History Negative Father        SOCIAL HISTORY:    Social History     Socioeconomic History     Marital status:      Spouse name: Cecile   Tobacco Use     Smoking status: Former Smoker     Packs/day: 2.00     Years: 11.00     Pack years: 22.00     Types: Cigarettes     Start date:      Quit date:      Years since quittin.3     Smokeless tobacco: Never Used   Substance and Sexual Activity     Alcohol use: No     Alcohol/week: 0.0 standard drinks     Drug use: No     Sexual activity: Yes     Partners: Female     Birth control/protection: None       PHYSICAL EXAM:    Vitals: /68   Pulse 86   Ht 1.829 m (6')   Wt 97.5 kg (215 lb)   SpO2 98%   BMI 29.16 kg/m    Wt Readings from Last 5 Encounters:   05/10/22 97.5 kg (215 lb)   22 89.8 kg (198 lb)   22 89.8 kg (198 lb)   22 92.4 kg (203 lb 11.2 oz)   22 95.8 kg (211 lb 3.2 oz)           Encounter Diagnoses    Name Primary?     Heart failure, diastolic, with acute decompensation (H) Yes     CKD (chronic kidney disease) stage 4, GFR 15-29 ml/min (H)      Transfusion-dependent anemia      Type 2 diabetes mellitus with stage 3b chronic kidney disease, with long-term current use of insulin (H)      Frailty syndrome in geriatric patient        No orders of the defined types were placed in this encounter.

## 2022-05-10 NOTE — ED PROVIDER NOTES
History   Chief Complaint:  Shortness of Breath & Edema     The history is provided by the patient.      Justyn Olivas is a 81 year old male with history of CHF, diabetes, hypertension and hyperlipidemia who presents with shortness of breath and edema. Patient reports that he took two days of prednisone and immediately developed edema. States his bladder and bowel movements have not changed. Notes that he is short of breath. Denies fevers or chills.    Per chart review, patient had a cardiology appointment this morning. His cardiologist is Dr. Rosenbaum, who reports that the patient has transfusion dependent anemia and his last required transfusion was 03/22-03/23 when his hemoglobin dropped to 5.8. Her assessment of the patient is included below.     This unfortunate gentleman with recurrent hospitalizations for both heart failure and transfusion-dependent anemia now presents with over a week of progressive heart failure symptoms that acutely deteriorated after he was put on prednisone for shoulder discomfort.  He currently has pulmonary edema and gross ascites.  I am not sure what his renal function from today is, but there is a high probability this is worse from baseline.  This cannot be managed as an outpatient.  He needs to be hospitalized.  The patient and his wife are agreeable with this.    Review of Systems   Constitutional: Negative for chills and fever.   Respiratory: Positive for shortness of breath.    Cardiovascular: Positive for leg swelling.   Gastrointestinal: Negative for constipation and diarrhea.   Genitourinary: Negative for decreased urine volume and difficulty urinating.   All other systems reviewed and are negative.    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  Cerebral  infarction  CHF  Diabetes  Hyperlipidemia  Hypertension  Renal disease  CKD    Past Surgical History:    Bone marrow biopsy  CV pericardiocentesis  Cholecystectomy    Social History:  Presents with his wife      Physical Exam     Patient Vitals for the past 24 hrs:   BP Temp Temp src Pulse Resp SpO2   05/10/22 2100 115/66 -- -- 88 15 95 %   05/10/22 2045 100/59 -- -- 85 10 90 %   05/10/22 2030 116/70 -- -- 89 12 93 %   05/10/22 2015 (!) 130/108 -- -- 87 18 90 %   05/10/22 2000 112/66 -- -- 90 14 94 %   05/10/22 1955 (!) 115/90 97.3  F (36.3  C) -- 91 14 93 %   05/10/22 1950 117/80 -- -- 90 13 91 %   05/10/22 1945 (!) 112/93 -- -- 91 17 93 %   05/10/22 1940 119/74 97.1  F (36.2  C) -- 86 16 96 %   05/10/22 1930 119/74 -- -- 89 19 95 %   05/10/22 1900 114/72 -- -- 84 18 90 %   05/10/22 1830 110/70 -- -- 84 -- --   05/10/22 1800 117/74 -- -- 84 -- --   05/10/22 1730 121/85 -- -- 89 -- --   05/10/22 1700 98/65 -- -- 89 -- 90 %   05/10/22 1417 120/63 98  F (36.7  C) Temporal 84 26 97 %       Physical Exam  Constitutional: Elderly white male, sitting, no respiratory distress.  HENT: No signs of trauma.   Eyes: EOM are normal. Pupils are equal, round, and reactive to light.   Neck: Normal range of motion. Positive JVD. No cervical adenopathy.  dim breath sound bases   Cardiovascular: Regular rhythm.  Exam reveals no gallop and no friction rub.    No murmur heard.  Pulmonary/Chest: Diminished breath sounds in the bases. No wheezes, rhonchi or rales.  Abdominal: Soft. Distended and non tenderness. No rebound or guarding. 1+ femoral pulse. 1+ edema extending up to thighs   Musculoskeletal: 1+ edema extending up to thighs. Left shoulder tender to palpation and movement with small effusion present  Lymphadenopathy: No lymphadenopathy.   Neurological: Alert and oriented to person, place, and time. Normal strength. Coordination normal.   Skin: Skin is warm and dry. No rash noted. No erythema. Multiple excoriations on  abdomen and lower extremities.    Emergency Department Course   ECG  ECG obtained at 1415  NSR  ST & T wave abnormality, consider lateral ischemia   Rate 81 bpm. AK interval 158 ms. QRS duration 78 ms. QT/QTc 380/441 ms. P-R-T axes 33 27 180.     Imaging:  XR Chest Port 1 View   Final Result   IMPRESSION: Single AP view of the chest was obtained. Cardiomediastinal silhouette is within normal limits. Right basilar pulmonary opacities, minimally improved as compared to 03/20/2022 exam. No significant pleural effusion or pneumothorax.        Report per radiology    Laboratory:  Labs Ordered and Resulted from Time of ED Arrival to Time of ED Departure   COMPREHENSIVE METABOLIC PANEL - Abnormal       Result Value    Sodium 136      Potassium 4.6      Chloride 107      Carbon Dioxide (CO2) 23      Anion Gap 6      Urea Nitrogen 76 (*)     Creatinine 2.30 (*)     Calcium 8.4 (*)     Glucose 167 (*)     Alkaline Phosphatase 120      AST 20      ALT 25      Protein Total 6.4 (*)     Albumin 2.6 (*)     Bilirubin Total 0.9      GFR Estimate 28 (*)    CBC WITH PLATELETS AND DIFFERENTIAL - Abnormal    WBC Count 11.9 (*)     RBC Count 2.50 (*)     Hemoglobin 6.1 (*)     Hematocrit 21.3 (*)     MCV 85      MCH 24.4 (*)     MCHC 28.6 (*)     RDW 22.2 (*)     Platelet Count 268      % Neutrophils 75      % Lymphocytes 5      % Monocytes 13      % Eosinophils 6      % Basophils 0      % Immature Granulocytes 1      NRBCs per 100 WBC 0      Absolute Neutrophils 8.9 (*)     Absolute Lymphocytes 0.6 (*)     Absolute Monocytes 1.6 (*)     Absolute Eosinophils 0.7      Absolute Basophils 0.0      Absolute Immature Granulocytes 0.1      Absolute NRBCs 0.0     COVID-19 VIRUS (CORONAVIRUS) BY PCR - Normal    SARS CoV2 PCR Negative     TYPE AND SCREEN, ADULT    ABO/RH(D) A POS      Antibody Screen Negative      SPECIMEN EXPIRATION DATE [id number]     PREPARE RED BLOOD CELLS (UNIT)    CROSSMATCH Compatible      UNIT ABO/RH A Pos      Unit  Number [id number]      Unit Status Issued      Blood Component Type Red Blood Cells      Product Code [id number]      CODING SYSTEM [id number]      UNIT TYPE ISBT 6200      ISSUE DATE AND TIME [id number]     PREPARE RED BLOOD CELLS (UNIT)   TRANSFUSE RED BLOOD CELLS (UNIT)   ABO/RH TYPE AND SCREEN      Emergency Department Course:         Reviewed:  I reviewed nursing notes, vitals, past medical history and Care Everywhere    Assessments:  1657 I obtained history and examined the patient as noted above.   1745 I rechecked the patient and explained findings.     Consults:  1712 I spoke to Dr. Robles, of the hospital service, who accepts admission.     Interventions:  1713 Norco, 2 tablet, PO           Bumex, 1 mg, IV  RBCs, 1 unit(s), IV    Disposition:  The patient was admitted to the hospital under the care of Dr. Robles.     Impression & Plan   Medical Decision Making:  Justyn is an 81 year old male who was seen in the heart clinic today. He has underlying heart failure and transfusion require anemia. In clinic today, he states that he has gained weight with increased fluid in his abdomen and legs. He had gotten an MRI of his left shoulder a few days ago and was prescribed prednisone. He thinks that this may be making his symptoms worse. He feels overall unwell. On exam, he has some swelling of his abdomen and his legs. His neck vein is also up. His lungs do not sound bad. His labs reveal a stable creatinine at 2.30. It had been 2.67 just a few weeks ago. His hemoglobin has dropped to 6.1. Patient has received some IV diuretics and 1 unit of blood. We will bring him into the hospital for further management of his heart failure and anemia.     Covid-19  Justyn Olivas was evaluated during a global COVID-19 pandemic, which necessitated consideration that the patient might be at risk for infection with the SARS-CoV-2 virus that causes COVID-19.   Applicable protocols for evaluation were followed during the  patient's care.   COVID-19 was considered as part of the patient's evaluation. The plan for testing is:  a test was obtained during this visit.    Diagnosis:    ICD-10-CM    1. Congestive heart failure, unspecified HF chronicity, unspecified heart failure type (H)  I50.9    2. Anemia, unspecified type  D64.9    3. Renal failure, unspecified chronicity  N19      Scribe Disclosure:  Breann COOK, am serving as a scribe at 4:48 PM on 5/10/2022 to document services personally performed by Saroj Norris MD based on my observations and the provider's statements to me.            Saroj Norris MD  05/10/22 4529

## 2022-05-10 NOTE — ED TRIAGE NOTES
Generalized fluid retention for the past week.  States its related to recent prednisone administration.      Triage Assessment     Row Name 05/10/22 1406       Triage Assessment (Adult)    Airway WDL WDL

## 2022-05-10 NOTE — PROGRESS NOTES
Service Date: 05/10/2022    PRIMARY CARE PROVIDER:  Christian Davidson MD    [facility]:  Shaniqua Ram PA-C    REASON FOR VISIT:  1.  Increasing shortness of breath, weight gain in a patient with known HFpEF, stage 4 CKD and transfusion-dependent anemia.    HISTORY OF PRESENT ILLNESS:    Kamran was accompanied by his wife, Cecile today.  Immediately on seeing him, it is apparent that he is unwell.  He appears exhausted, has conversational dyspnea, poor muscle mass and is in obvious pain from left shoulder discomfort.  He was in a wheelchair.    He follows with me and Shaniqua Ram in the Heart Failure Clinic.  He has had recurrent hospitalizations for both heart failure and transfusion-dependent anemia with his last being from 03/22-03/23 when his hemoglobin dropped to 5.8 and he was transfused.  He was also noted to be occult blood positive at that time and is waiting for an outpatient capsule endoscopy by Dr. Lane.    His other comorbidities are significant and include:  1.  Heart failure with preserved ejection fraction with LVEF of 55%, normal right ventricular systolic function, no significant valve disease on his last echocardiogram in 03/2022.  2.  Type 2 diabetes mellitus, insulin-dependent, stage 4 CKD and neuropathy.  3.  Transfusion dependent anemia.  Sees Dr. Nash.  Gets Aranesp for kidney disease.  4.  Basilar stroke 2 years ago, on Plavix.  5.  No history of CAD.    Kamran says his weight has been slowly going up over the last 7-10 days.  Three days ago he had a cardiac MRI for significant left shoulder discomfort and was put on oral prednisone.  This really accelerated his fluid overload.  His abdomen is taut with fluid, his lower extremity edema has worsened, he is orthopneic and short of breath.  His BP is 108/68, his pulse is 86.  He saw his nephrologist, Dr. Greene, earlier today and was advised to go up to torsemide 80 mg daily (from 80 mg/40 mg on alternate days). We do not have his labs from  today, but his last renal function from 3 weeks ago shows already a BUN of 52, potassium 4.7, creatinine 2.6.  His hemoglobin from a few days ago was 6.7 (this is after transfusion 2 weeks ago).  I note he is also on potassium supplement (unclear why) as serum potassium over the last month has 4.6-4.7.    He feels fatigued, dejected and has no appetite and clearly has malnutrition with very poor muscle mass.    PHYSICAL EXAMINATION:      He has conversational dyspnea, his abdomen is taut with fluid, his JVP is elevated up to the angle of his jaw, he has bilateral rales up to his mid lungs consistent with pulmonary edema, and he has 3+ pitting edema at least up to his knees.    DIAGNOSES:    1.  Acute decompensated heart failure with preserved ejection fraction, LVEF 55%, in the context of the current anemia, recent prednisone and stage 4 CKD.  2.  Other comorbidities listed above.    ASSESSMENT:    This unfortunate gentleman with recurrent hospitalizations for both heart failure and transfusion-dependent anemia now presents with over a week of progressive heart failure symptoms that acutely deteriorated after he was put on prednisone for shoulder discomfort.  He currently has pulmonary edema and gross ascites.  I am not sure what his renal function from today is, but there is a high probability this is worse from baseline.  This cannot be managed as an outpatient.  He needs to be hospitalized.  The patient and his wife are agreeable with this.    PLAN:    1.  I have liaised with our ER physician, Dr. Brown, and given him a verbal report for patient to be transferred there.  I liaised with our nursing staff who will personally escort him to the emergency room.  I have updated our inpatient Cardiology team.  2.  Recommend patient be admitted on the Hospitalist Service, inpatient Cardiology will be consulted for IV diuretic therapy, transfusion/Hematology consult per Hospitalist team.  3.  Once discharged, he will  follow up with [facility] at very close weekly intervals.  This would be a good patient to get a CardioMEMS in place to preemptively treat his heart failure with diuretic adjustment and/or IV diuretic infusion therapy.    TOTAL TIME:  60 minutes.  High complexity.    Cielo Rosenbaum MD        D: 05/10/2022   T: 05/10/2022   MT: isabel    Name:     LISA CAMARAModesto  MRN:      [id number]        Account:      [id number]   :      1940           Service Date: 05/10/2022       Document: [id number]

## 2022-05-10 NOTE — PROGRESS NOTES
Left a message with the contact phone for [facility]. Patient is not a current [facility] patient, will check with Dr. Rosenbaum to see if she wants him enrolled or to see SHAHANA post ED visit.    1400: Reply from Dr. Rosenbaum: He has recurrent heart failure hospitalizations, needs CardioMEMS and intermittent IV diuretic infusions.  start in [facility] clinic.    Order placed for [facility] visit. Message sent to [facility] team.

## 2022-05-10 NOTE — PHARMACY-ADMISSION MEDICATION HISTORY
Pharmacy Medication History  Admission medication history interview status for the 5/10/2022  admission is complete. See EPIC admission navigator for prior to admission medications     Location of Interview: Patient room  Medication history sources: Patient, Patient's family/friend (Cecile, patient's wife) and Surescripts    Significant changes made to the medication list:  Added methocarbamol    In the past week, patient estimated taking medication this percent of the time: greater than 90%    Additional medication history information:   New torsemide dose as of today 5/10    Medication reconciliation completed by provider prior to medication history? No    Time spent in this activity: 15 minutes    Prior to Admission medications    Medication Sig Last Dose Taking? Auth Provider   albuterol (PROAIR HFA) 108 (90 Base) MCG/ACT inhaler INHALE 2 PUFFS BY MOUTH FOUR TIMES DAILY AS NEEDED  at prn Yes Christian Davidson MD   aspirin 81 MG EC tablet Take 81 mg by mouth daily 5/10/2022 at am Yes Reported, Patient   atorvastatin (LIPITOR) 20 MG tablet Take 1 tablet (20 mg) by mouth daily 5/9/2022 at pm Yes Elizabeth Munguia PA-C   carvedilol (COREG) 6.25 MG tablet Take 1 tablet (6.25 mg) by mouth 2 times daily (with meals) 5/10/2022 at am Yes Elizabeth Munguia PA-C   clobetasol (TEMOVATE) 0.05 % external cream Apply topically daily as needed  at prn Yes Unknown, Entered By History   dorzolamide-timolol (COSOPT) 2-0.5 % ophthalmic solution Place 1 drop into the right eye 2 times daily  5/10/2022 at am Yes Unknown, Entered By History   fluticasone (FLONASE) 50 MCG/ACT nasal spray SHAKE LIQUID AND USE 2 SPRAYS IN EACH NOSTRIL DAILY 5/10/2022 at am Yes Christian Davidson MD   folic acid-vit B6-vit B12 (FOLGARD) 0.8-10-0.115 MG TABS per tablet Take 1 tablet by mouth daily Continue per Nephrology recommendation 5/10/2022 at am Yes Elizabeth Munguia PA-C   hydrocortisone  (CORTAID) 1 % external cream Apply 1 g topically nightly as needed For itching on BACK  at prn Yes Unknown, Entered By History   insulin glargine (LANTUS SOLOSTAR) 100 UNIT/ML pen ADMINISTER 25 UNITS UNDER THE SKIN AT BEDTIME  Yes Christian Davidson MD   insulin lispro (HUMALOG KWIKPEN) 100 UNIT/ML (1 unit dial) KWIKPEN Inject 5-15 Units Subcutaneous 3 times daily (before meals) Sliding scale with meals  Patient taking differently: Inject 5-15 Units Subcutaneous 3 times daily (before meals) Sliding scale with meals:  <100: 5 units  101-150: 7 units  151-250: 9 units  251-350: 12 units  >351 15 units  Yes Christian Davidson MD   methocarbamol (ROBAXIN) 500 MG tablet Take 500 mg by mouth 4 times daily as needed for muscle spasms  at prn Yes Unknown, Entered By History   pantoprazole (PROTONIX) 40 MG EC tablet TAKE 1 TABLET(40 MG) BY MOUTH TWICE DAILY 5/10/2022 at am Yes Elizabeth Munguia PA-C   potassium chloride ER (K-TAB) 20 MEQ CR tablet Take 1 tablet (20 mEq) by mouth daily 5/10/2022 at am Yes Radha Gonzalez DO   SENNA-docusate sodium (SENNA S) 8.6-50 MG tablet Take 1 tablet by mouth 2 times daily  Patient taking differently: Take 1 tablet by mouth nightly as needed  at prn Yes Christian Davidson MD   sertraline (ZOLOFT) 50 MG tablet Take 1 tablet (50 mg) by mouth daily 5/10/2022 at am Yes Christian Davidson MD   torsemide (DEMADEX) 20 MG tablet Take 80 mg by mouth daily 5/10/2022 at increased dose today per MD Yes Reported, Patient   traZODone (DESYREL) 100 MG tablet TAKE 1 TABLET(100 MG) BY MOUTH AT BEDTIME 5/9/2022 at pm Yes Christian Davidson MD   blood glucose (STEVE CONTOUR) test strip TESTING FOUR TIMES DAILY OR AS DIRECTED   Christian Davidson MD   insulin pen needle (BD PEN NEEDLE FERCHO 2ND GEN) 32G X 4 MM miscellaneous USE AS DIRECTED UP TO FOUR TIMES DAILY   Stuart, Christian Becerril MD       The information provided in this note is only as  accurate as the sources available at the time of update(s)     Tonia Núñez, PharmD  Inpatient Clinical Pharmacist  [phone]

## 2022-05-10 NOTE — LETTER
5/10/2022    Christian Davidson MD, MD  [address]  [place] [address]    RE: Justyn Olivas       Dear Colleague,     I had the pleasure of seeing Justyn Olivas in the [facility].  Clinic visit note dictated. Dictation reference number - [id number]        Today's clinic visit entailed:  Review of the result(s) of each unique test - ECG, labs, echocardiogram.  Discussion of management or test interpretation with external physician/other qualified healthcare professional/appropriate source - Dr. Brown, ER physician.  60 minutes spent on the date of the encounter doing chart review, history and exam, documentation and further activities per the note  Provider  Link to MDM Help Grid     The level of medical decision making during this visit was of high complexity.        CURRENT MEDICATIONS:  Current Outpatient Medications   Medication Sig Dispense Refill     albuterol (PROAIR HFA) 108 (90 Base) MCG/ACT inhaler INHALE 2 PUFFS BY MOUTH FOUR TIMES DAILY AS NEEDED 8.5 g 3     aspirin 81 MG EC tablet Take 81 mg by mouth daily       atorvastatin (LIPITOR) 20 MG tablet Take 1 tablet (20 mg) by mouth daily 90 tablet 3     blood glucose (STEVE CONTOUR) test strip TESTING FOUR TIMES DAILY OR AS DIRECTED 400 strip 0     carvedilol (COREG) 6.25 MG tablet Take 1 tablet (6.25 mg) by mouth 2 times daily (with meals) 180 tablet 3     clobetasol (TEMOVATE) 0.05 % external cream Apply topically daily as needed       dorzolamide-timolol (COSOPT) 2-0.5 % ophthalmic solution Place 1 drop into the right eye 2 times daily        fluticasone (FLONASE) 50 MCG/ACT nasal spray SHAKE LIQUID AND USE 2 SPRAYS IN EACH NOSTRIL DAILY 16 g 3     folic acid (FOLVITE) 400 MCG tablet Take 1 tablet (400 mcg) by mouth daily 90 tablet 3     folic acid-vit B6-vit B12 (FOLGARD) 0.8-10-0.115 MG TABS per tablet Take 1 tablet by mouth daily Continue per Nephrology recommendation 90 tablet 3     hydrocortisone  (CORTAID) 1 % external cream Apply 1 g topically At Bedtime For itching on BACK       insulin glargine (LANTUS SOLOSTAR) 100 UNIT/ML pen ADMINISTER 25 UNITS UNDER THE SKIN AT BEDTIME 45 mL 3     insulin lispro (HUMALOG KWIKPEN) 100 UNIT/ML (1 unit dial) KWIKPEN Inject 5-15 Units Subcutaneous 3 times daily (before meals) Sliding scale with meals (Patient taking differently: Inject 5-15 Units Subcutaneous 3 times daily (before meals) Sliding scale with meals:  <100: 5 units  101-150: 7 units  151-250: 9 units  251-350: 12 units  >351 15 units) 45 mL 3     insulin pen needle (BD PEN NEEDLE FERCHO 2ND GEN) 32G X 4 MM miscellaneous USE AS DIRECTED UP TO FOUR TIMES DAILY 400 each 3     pantoprazole (PROTONIX) 40 MG EC tablet TAKE 1 TABLET(40 MG) BY MOUTH TWICE DAILY 180 tablet 2     potassium chloride ER (K-TAB) 20 MEQ CR tablet Take 1 tablet (20 mEq) by mouth daily       SENNA-docusate sodium (SENNA S) 8.6-50 MG tablet Take 1 tablet by mouth 2 times daily (Patient taking differently: Take 1 tablet by mouth nightly as needed) 60 tablet 1     sertraline (ZOLOFT) 50 MG tablet Take 1 tablet (50 mg) by mouth daily 90 tablet 3     torsemide (DEMADEX) 20 MG tablet Take 80 mg by mouth daily       traZODone (DESYREL) 100 MG tablet TAKE 1 TABLET(100 MG) BY MOUTH AT BEDTIME 90 tablet 3     vitamin B-12 (CYANOCOBALAMIN) 1000 MCG tablet Take 1 tablet (1,000 mcg) by mouth daily 90 tablet 3         ALLERGIES:  Allergies   Allergen Reactions     No Known Allergies        PAST MEDICAL HISTORY:    Past Medical History:   Diagnosis Date     Anemia      Anemia      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)      Cerebral infarction (H)      Chronic diastolic heart failure (H)      Diabetes (H)      [place] (hard of hearing)      Hyperlipemia      Hypokalemia      Renal disease        PAST SURGICAL HISTORY:    Past Surgical History:   Procedure Laterality Date     BONE MARROW  BIOPSY, BONE SPECIMEN, NEEDLE/TROCAR N/A 2021    Procedure: BIOPSY, BONE MARROW;  Surgeon: Juanjo Hoffman MD;  Location:  GI     COLONOSCOPY N/A 2022    Procedure: COLONOSCOPY, FLEXIBLE, WITH LESION REMOVAL USING SNARE;  Surgeon: Jules Lane MD;  Location:  GI     COLONOSCOPY N/A 2022    Procedure: COLONOSCOPY, WITH POLYPECTOMY AND BIOPSY;  Surgeon: Jules Lane MD;  Location: [facility]     CV PERICARDIOCENTESIS N/A 2020    Procedure: Pericardiocentesis;  Surgeon: Chas Chambers MD;  Location:  HEART CARDIAC CATH LAB     ENT SURGERY       ESOPHAGOSCOPY, GASTROSCOPY, DUODENOSCOPY (EGD), COMBINED N/A 3/27/2021    Procedure: Esophagogastroduodenoscopy, With Biopsy;  Surgeon: Luc Nash MD;  Location: [facility]     ESOPHAGOSCOPY, GASTROSCOPY, DUODENOSCOPY (EGD), COMBINED N/A 2022    Procedure: ESOPHAGOGASTRODUODENOSCOPY (EGD);  Surgeon: Jules Lane MD;  Location: [facility]     LAPAROSCOPIC CHOLECYSTECTOMY N/A 2021    Procedure: LAPAROSCOPIC CHOLECYSTECTOMY;  Surgeon: Len Coates MD;  Location:  OR     SOFT TISSUE SURGERY      skin cancer surgery on nose       FAMILY HISTORY:    Family History   Problem Relation Age of Onset     Family History Negative Mother      Family History Negative Father        SOCIAL HISTORY:    Social History     Socioeconomic History     Marital status:      Spouse name: Cecile   Tobacco Use     Smoking status: Former Smoker     Packs/day: 2.00     Years: 11.00     Pack years: 22.00     Types: Cigarettes     Start date:      Quit date:      Years since quittin.3     Smokeless tobacco: Never Used   Substance and Sexual Activity     Alcohol use: No     Alcohol/week: 0.0 standard drinks     Drug use: No     Sexual activity: Yes     Partners: Female     Birth control/protection: None       PHYSICAL EXAM:    Vitals: /68   Pulse 86   Ht 1.829 m (6')   Wt 97.5 kg (215 lb)   SpO2 98%   BMI  29.16 kg/m    Wt Readings from Last 5 Encounters:   05/10/22 97.5 kg (215 lb)   04/20/22 89.8 kg (198 lb)   04/01/22 89.8 kg (198 lb)   03/23/22 92.4 kg (203 lb 11.2 oz)   03/04/22 95.8 kg (211 lb 3.2 oz)           Encounter Diagnoses   Name Primary?     Heart failure, diastolic, with acute decompensation (H) Yes     CKD (chronic kidney disease) stage 4, GFR 15-29 ml/min (H)      Transfusion-dependent anemia      Type 2 diabetes mellitus with stage 3b chronic kidney disease, with long-term current use of insulin (H)      Frailty syndrome in geriatric patient        No orders of the defined types were placed in this encounter.    Thank you for allowing me to participate in the care of your patient.      Sincerely,     Cielo Rosenbaum MD     [facility]  cc:   Shaniqua Ram PA-C  [address]  [place] [address]

## 2022-05-11 NOTE — PROVIDER NOTIFICATION
MD Notification    Notified Person: MD    Notified Person Name: Dr. Uriostegui    Notification Date/Time: 5/11/22 7958    Notification Interaction: Telephone    Purpose of Notification: Pt complaining of severe left shoulder pain. Pt rolling around non-stop in bed trying to get comfortable. Can he get something for pain?    Orders Received: MD ordered Tramadol q6h    Comments:

## 2022-05-11 NOTE — CONSULTS
"Consult received for \"Heart Failure - Dietitian to instruct patient on 2 gram sodium diet\"  Chart reviewed:   - ECHO showed EF of 55%.   - Noted fluid retention started after initiated on prednisone.   - Does not appear to meet criteria for 2g NA diet at this time. Will reassess after Cards/CORE consults.     Roxy Colvin RD, LD  [facility], [facility], Ortho, Ortho Spine  Pager: [phone]  Weekend Pager: [phone]    "

## 2022-05-11 NOTE — PROGRESS NOTES
RECEIVING UNIT ED HANDOFF REVIEW    ED Nurse Handoff Report was reviewed by: Yakelin Bonner RN on May 10, 2022 at 9:40 PM

## 2022-05-11 NOTE — PROGRESS NOTES
Clinic Care Coordination Contact  Ambulatory Care Coordination to Inpatient Care Management   Hand-In Communication    Date:  May 11, 2022  Name: Justyn Olivas is enrolled in Ambulatory Care Coordination program and I am the Lead Care Coordinator.  CC Contact Information: Epic In[employer] [place] + phone  Payor Source: Payor: MEDICARE / Plan: MEDICARE / Product Type: Medicare /   Current services in place: DME, [facility]   Please see the CC [place] and Care Management Flowsheets for specific  details of this Justyn Olivas care plan.   Additional details/specific concerns r/t this admission:    No additional concerns at this time .    I will follow this admission in Epic. Please feel free to contact me with questions or for further collaboration in discharge planning.    Hellen Chaidez RN Care Coordinator  [facility]  Email: [email or web address]  Phone: [phone]

## 2022-05-11 NOTE — PROGRESS NOTES
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 Of note, his brother  two weeks ago.    Kamran considered the wisdom and perspective he's gained as he looks back over his life. He's been  for 49 years and spoke lovingly of his wife, children, and grandchildren. In the context of his illness, Kamran spoke about hope and love. He voiced his hope for a sense of reunion with loved ones after death. He shared his understanding of his Restorationist dariel and how it impacts his perspective as he considers nearing the end of his life. He named his belief that the three most important words are, \"I love you,\" and how he shares them with his family as often as he can. I offered reflective listening, affirmed his love for his family, and validated the reflections he shared.    PLAN: Spiritual Health remains available, as needed.    Shaniqua Goetz  Associate   Phone: [phone]   " ambulate

## 2022-05-11 NOTE — CONSULTS
[facility]  Cardiology Consultation     Date of Admission:  5/10/2022    Assessment & Plan   Justyn Olivas is a 81 year old male with    1.  Heart failure with preserved ejection fraction  2.  Anemia of unclear etiology  3.  CKD  4.  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  Type 2 diabetes    Recommendation:   1.  Continue aggressive diuresis with IV Lasix. Cr improved.   2.  Daily standing weight, strict ins and outs, 2 g sodium diet, fluid restriction to less than 1.5 L/day  3.  Patient would definitely benefit from CardioMEMS.  Unfortunately, provided who does this is not in the Cath Lab for the next few days.  Hence, will diurese the patient and set him up for the procedure as outpatient.    Renaldo Gillespie MD    Code Status    Full Code    Reason for Consult   Reason for consult: Heart failure    Primary Care Physician   Christian Davidson MD    Chief Complaint   Shortness of breath, heart failure exacerbation    History of Present Illness   Justyn Olivas is a 81 year old male with past medical history of heart failure with preserved ejection fraction, anemia and CKD who is followed very closely in our heart failure clinic by Dr. Rosenbaum and Shaniqua Ram who presented to the clinic for a regular visit and was admitted from there for heart failure exacerbation.    Vitals remained stable.  Weight is 216 pounds similar to the weight during his clinic visit in 05/2021 with Dr. Rosenbaum when he was asymptomatic.  Currently 1 L negative.  Last echocardiogram few months earlier showed normal ejection fraction, no significant valvular disease.    Patient Active Problem List   Diagnosis     Insomnia, unspecified type     Type 2 diabetes mellitus without complication, with long-term current use of insulin (H)     Benign essential hypertension     Hyperlipidemia LDL goal <100     Non-seasonal allergic rhinitis due to pollen     Primary osteoarthritis of both hips     Primary osteoarthritis  involving multiple joints     Chronic non-seasonal allergic rhinitis, unspecified trigger     Cerebrovascular accident (CVA) due to embolism of cerebral artery (H)     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)     Anemia due to blood loss, acute     Iron deficiency anemia due to chronic blood loss     Type 2 diabetes mellitus with stage 3b chronic kidney disease, with long-term current use of insulin (H)     Anemia due to stage 3 chronic kidney disease, unspecified whether stage 3a or 3b CKD (H)     Chronic diastolic heart failure with preserved ejection fraction (H)     Biliary colic     Right upper quadrant abdominal pain     Mild major depression (H)     Shortness of breath     Acute on chronic diastolic heart failure (H)     Anasarca     Anemia in other chronic diseases classified elsewhere     Mediastinal lymphadenopathy     Anemia, unspecified type     Chronic kidney disease, unspecified CKD stage     Pericardial effusion     Iron deficiency anemia, unspecified     CKD (chronic kidney disease) stage 4, GFR 15-29 ml/min (H)     Renal failure, unspecified chronicity     Congestive heart failure, unspecified HF chronicity, unspecified heart failure type (H)       Past Medical History   I have reviewed this patient's medical history and updated it with pertinent information if needed.   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  Past Surgical History:   Procedure Laterality Date     BONE MARROW BIOPSY, BONE SPECIMEN, NEEDLE/TROCAR N/A 5/14/2021    Procedure: BIOPSY, BONE  MARROW;  Surgeon: Juanjo Hoffman MD;  Location:  GI     COLONOSCOPY N/A 2/27/2022    Procedure: COLONOSCOPY, FLEXIBLE, WITH LESION REMOVAL USING SNARE;  Surgeon: Jules Lane MD;  Location:  GI     COLONOSCOPY N/A 2/27/2022    Procedure: COLONOSCOPY, WITH POLYPECTOMY AND BIOPSY;  Surgeon: Jules Lane MD;  Location:  GI     CV PERICARDIOCENTESIS N/A 12/21/2020    Procedure: Pericardiocentesis;  Surgeon: Chas Chambers MD;  Location:  HEART CARDIAC CATH LAB     ENT SURGERY       ESOPHAGOSCOPY, GASTROSCOPY, DUODENOSCOPY (EGD), COMBINED N/A 3/27/2021    Procedure: Esophagogastroduodenoscopy, With Biopsy;  Surgeon: Luc Nash MD;  Location:  GI     ESOPHAGOSCOPY, GASTROSCOPY, DUODENOSCOPY (EGD), COMBINED N/A 2/26/2022    Procedure: ESOPHAGOGASTRODUODENOSCOPY (EGD);  Surgeon: Jules Lane MD;  Location:  GI     LAPAROSCOPIC CHOLECYSTECTOMY N/A 9/2/2021    Procedure: LAPAROSCOPIC CHOLECYSTECTOMY;  Surgeon: Len Coates MD;  Location:  OR     SOFT TISSUE SURGERY      skin cancer surgery on nose       Prior to Admission Medications   Prior to Admission Medications   Prescriptions Last Dose Informant Patient Reported? Taking?   SENNA-docusate sodium (SENNA S) 8.6-50 MG tablet  at prn  No Yes   Sig: Take 1 tablet by mouth 2 times daily   Patient taking differently: Take 1 tablet by mouth nightly as needed   albuterol (PROAIR HFA) 108 (90 Base) MCG/ACT inhaler  at prn Self No Yes   Sig: INHALE 2 PUFFS BY MOUTH FOUR TIMES DAILY AS NEEDED   aspirin 81 MG EC tablet 5/10/2022 at am  Yes Yes   Sig: Take 81 mg by mouth daily   atorvastatin (LIPITOR) 20 MG tablet 5/9/2022 at pm  No Yes   Sig: Take 1 tablet (20 mg) by mouth daily   blood glucose (STEVE CONTOUR) test strip  Self No No   Sig: TESTING FOUR TIMES DAILY OR AS DIRECTED   carvedilol (COREG) 6.25 MG tablet 5/10/2022 at am  No Yes   Sig: Take 1 tablet (6.25 mg) by mouth 2 times daily (with meals)   clobetasol  (TEMOVATE) 0.05 % external cream  at prn Self Yes Yes   Sig: Apply topically daily as needed   dorzolamide-timolol (COSOPT) 2-0.5 % ophthalmic solution 5/10/2022 at am Self Yes Yes   Sig: Place 1 drop into the right eye 2 times daily    fluticasone (FLONASE) 50 MCG/ACT nasal spray 5/10/2022 at am  No Yes   Sig: SHAKE LIQUID AND USE 2 SPRAYS IN EACH NOSTRIL DAILY   folic acid-vit B6-vit B12 (FOLGARD) 0.8-10-0.115 MG TABS per tablet 5/10/2022 at am  No Yes   Sig: Take 1 tablet by mouth daily Continue per Nephrology recommendation   hydrocortisone (CORTAID) 1 % external cream  at prn  Yes Yes   Sig: Apply 1 g topically nightly as needed For itching on BACK   insulin glargine (LANTUS SOLOSTAR) 100 UNIT/ML pen  Self No Yes   Sig: ADMINISTER 25 UNITS UNDER THE SKIN AT BEDTIME   insulin lispro (HUMALOG KWIKPEN) 100 UNIT/ML (1 unit dial) KWIKPEN  Self No Yes   Sig: Inject 5-15 Units Subcutaneous 3 times daily (before meals) Sliding scale with meals   Patient taking differently: Inject 5-15 Units Subcutaneous 3 times daily (before meals) Sliding scale with meals:  <100: 5 units  101-150: 7 units  151-250: 9 units  251-350: 12 units  >351 15 units   insulin pen needle (BD PEN NEEDLE FERCHO 2ND GEN) 32G X 4 MM miscellaneous  Self No No   Sig: USE AS DIRECTED UP TO FOUR TIMES DAILY   methocarbamol (ROBAXIN) 500 MG tablet  at prn  Yes Yes   Sig: Take 500 mg by mouth 4 times daily as needed for muscle spasms   pantoprazole (PROTONIX) 40 MG EC tablet 5/10/2022 at am  No Yes   Sig: TAKE 1 TABLET(40 MG) BY MOUTH TWICE DAILY   potassium chloride ER (K-TAB) 20 MEQ CR tablet 5/10/2022 at am Self No Yes   Sig: Take 1 tablet (20 mEq) by mouth daily   sertraline (ZOLOFT) 50 MG tablet 5/10/2022 at am Self No Yes   Sig: Take 1 tablet (50 mg) by mouth daily   torsemide (DEMADEX) 20 MG tablet 5/10/2022 at increased dose today per MD  Yes Yes   Sig: Take 80 mg by mouth daily   traZODone (DESYREL) 100 MG tablet 5/9/2022 at pm Self No Yes   Sig:  TAKE 1 TABLET(100 MG) BY MOUTH AT BEDTIME      Facility-Administered Medications: None     Current Facility-Administered Medications   Medication Dose Route Frequency     aspirin  81 mg Oral Daily     atorvastatin  20 mg Oral Daily     carvedilol  6.25 mg Oral BID w/meals     dorzolamide-timolol  1 drop Right Eye BID     furosemide  80 mg Intravenous Q8H     insulin aspart  5 Units Subcutaneous TID w/meals     insulin aspart  1-7 Units Subcutaneous TID AC     insulin aspart  1-5 Units Subcutaneous At Bedtime     insulin glargine  25 Units Subcutaneous At Bedtime     pantoprazole  40 mg Oral QAM AC     sertraline  50 mg Oral Daily     sodium chloride (PF)  3 mL Intracatheter Q8H     traZODone  100 mg Oral At Bedtime     Current Facility-Administered Medications   Medication Last Rate     Allergies   Allergies   Allergen Reactions     No Known Allergies        Social History    reports that he quit smoking about 46 years ago. His smoking use included cigarettes. He started smoking about 57 years ago. He has a 22.00 pack-year smoking history. He has never used smokeless tobacco. He reports that he does not drink alcohol and does not use drugs.    Family History   Family History   Problem Relation Age of Onset     Family History Negative Mother      Family History Negative Father        Review of Systems   The comprehensive 10 point Review of Systems is negative other than noted in the HPI or here.     Physical Exam   Temp: 97.6  F (36.4  C) Temp src: Oral BP: 118/68 Pulse: 87   Resp: 16 SpO2: 100 % O2 Device: Nasal cannula Oxygen Delivery: 2 LPM  Vital Signs with Ranges  Temp:  [97.1  F (36.2  C)-98.1  F (36.7  C)] 97.6  F (36.4  C)  Pulse:  [73-91] 87  Resp:  [9-26] 16  BP: ()/() 118/68  SpO2:  [90 %-100 %] 100 %  216 lbs 4.8 oz    Constitutional: Alert, no apparent distress,    Lungs: Normal bilateral breath sounds   Cardiovascular: Regular rate and rhythm, normal S1 and S2, and no murmur   Lymphm  node  Neck No enlargement  No jugular vein extension or carotid bruit   Skin: Normal   Extremity: No edema       Data   Results for orders placed or performed during the hospital encounter of 05/10/22 (from the past 24 hour(s))   CBC with platelets + differential    Narrative    The following orders were created for panel order CBC with platelets + differential.  Procedure                               Abnormality         Status                     ---------                               -----------         ------                     CBC with platelets and d...[[id number]]  Abnormal            Final result                 Please view results for these tests on the individual orders.   Comprehensive metabolic panel   Result Value Ref Range    Sodium 136 133 - 144 mmol/L    Potassium 4.6 3.4 - 5.3 mmol/L    Chloride 107 94 - 109 mmol/L    Carbon Dioxide (CO2) 23 20 - 32 mmol/L    Anion Gap 6 3 - 14 mmol/L    Urea Nitrogen 76 (H) 7 - 30 mg/dL    Creatinine 2.30 (H) 0.66 - 1.25 mg/dL    Calcium 8.4 (L) 8.5 - 10.1 mg/dL    Glucose 167 (H) 70 - 99 mg/dL    Alkaline Phosphatase 120 40 - 150 U/L    AST 20 0 - 45 U/L    ALT 25 0 - 70 U/L    Protein Total 6.4 (L) 6.8 - 8.8 g/dL    Albumin 2.6 (L) 3.4 - 5.0 g/dL    Bilirubin Total 0.9 0.2 - 1.3 mg/dL    GFR Estimate 28 (L) >60 mL/min/1.73m2   [place] Draw    Narrative    The following orders were created for panel order [place] Draw.  Procedure                               Abnormality         Status                     ---------                               -----------         ------                     Extra Blue Top Tube[[id number]]                              Final result                 Please view results for these tests on the individual orders.   CBC with platelets and differential   Result Value Ref Range    WBC Count 11.9 (H) 4.0 - 11.0 10e3/uL    RBC Count 2.50 (L) 4.40 - 5.90 10e6/uL    Hemoglobin 6.1 (LL) 13.3 - 17.7 g/dL    Hematocrit 21.3 (L) 40.0 - 53.0 %     MCV 85 78 - 100 fL    MCH 24.4 (L) 26.5 - 33.0 pg    MCHC 28.6 (L) 31.5 - 36.5 g/dL    RDW 22.2 (H) 10.0 - 15.0 %    Platelet Count 268 150 - 450 10e3/uL    % Neutrophils 75 %    % Lymphocytes 5 %    % Monocytes 13 %    % Eosinophils 6 %    % Basophils 0 %    % Immature Granulocytes 1 %    NRBCs per 100 WBC 0 <1 /100    Absolute Neutrophils 8.9 (H) 1.6 - 8.3 10e3/uL    Absolute Lymphocytes 0.6 (L) 0.8 - 5.3 10e3/uL    Absolute Monocytes 1.6 (H) 0.0 - 1.3 10e3/uL    Absolute Eosinophils 0.7 0.0 - 0.7 10e3/uL    Absolute Basophils 0.0 0.0 - 0.2 10e3/uL    Absolute Immature Granulocytes 0.1 <=0.4 10e3/uL    Absolute NRBCs 0.0 10e3/uL   Extra Blue Top Tube   Result Value Ref Range    Hold Specimen JIC    ABO/Rh type and screen *Canceled*    Narrative    The following orders were created for panel order ABO/Rh type and screen.  Procedure                               Abnormality         Status                     ---------                               -----------         ------                       Please view results for these tests on the individual orders.   Prepare red blood cells (unit)   Result Value Ref Range    CROSSMATCH Compatible     UNIT ABO/RH A Pos     Unit Number [id number]     Unit Status Issued     Blood Component Type Red Blood Cells     Product Code [id number]     CODING SYSTEM [id number]     UNIT TYPE ISBT 6200     ISSUE DATE AND TIME [id number]    Asymptomatic COVID-19 Virus (Coronavirus) by PCR Nasopharyngeal    Specimen: Nasopharyngeal; Swab   Result Value Ref Range    SARS CoV2 PCR Negative Negative    Narrative    Testing was performed using the Xpert Xpress SARS-CoV-2 Assay on the   Cepheid Gene-Xpert Instrument Systems. Additional information about   this Emergency Use Authorization (EUA) assay can be found via the Lab   Guide. This test should be ordered for the detection of SARS-CoV-2 in   individuals who meet SARS-CoV-2 clinical and/or epidemiological   criteria. Test performance is  unknown in asymptomatic patients. This   test is for in vitro diagnostic use under the FDA EUA for   laboratories certified under CLIA to perform high complexity testing.   This test has not been FDA cleared or approved. A negative result   does not rule out the presence of PCR inhibitors in the specimen or   target RNA in concentration below the limit of detection for the   assay. The possibility of a false negative should be considered if   the patient's recent exposure or clinical presentation suggests   COVID-19. This test was validated by the [facility]. This laboratory is certified under the Clinical Laboratory Improvement Amendments of 1988 (CLIA-88) as qualified to perform high complexity laboratory testing.     ABO/Rh type and screen *Canceled*    Narrative    The following orders were created for panel order ABO/Rh type and screen.  Procedure                               Abnormality         Status                     ---------                               -----------         ------                     Adult Type and Screen[[id number]]                                                         Please view results for these tests on the individual orders.   XR Chest Port 1 View    Narrative    EXAM: XR CHEST PORT 1 VIEW  LOCATION: [facility]  DATE/TIME: 5/10/2022 5:35 PM    INDICATION: Weak.  COMPARISON: Chest x-ray on 03/20/2022.      Impression    IMPRESSION: Single AP view of the chest was obtained. Cardiomediastinal silhouette is within normal limits. Right basilar pulmonary opacities, minimally improved as compared to 03/20/2022 exam. No significant pleural effusion or pneumothorax.   ABO/Rh type and screen    Narrative    The following orders were created for panel order ABO/Rh type and screen.  Procedure                               Abnormality         Status                     ---------                               -----------          ------                     Adult Type and Screen[[id number]]                            Final result                 Please view results for these tests on the individual orders.   Adult Type and Screen   Result Value Ref Range    ABO/RH(D) A POS     Antibody Screen Negative Negative    SPECIMEN EXPIRATION DATE [id number]    Potassium   Result Value Ref Range    Potassium 4.4 3.4 - 5.3 mmol/L   Glucose by meter   Result Value Ref Range    GLUCOSE BY METER POCT 244 (H) 70 - 99 mg/dL   Glucose by meter   Result Value Ref Range    GLUCOSE BY METER POCT 260 (H) 70 - 99 mg/dL   Basic metabolic panel   Result Value Ref Range    Sodium 139 133 - 144 mmol/L    Potassium 4.1 3.4 - 5.3 mmol/L    Chloride 109 94 - 109 mmol/L    Carbon Dioxide (CO2) 24 20 - 32 mmol/L    Anion Gap 6 3 - 14 mmol/L    Urea Nitrogen 73 (H) 7 - 30 mg/dL    Creatinine 2.33 (H) 0.66 - 1.25 mg/dL    Calcium 8.4 (L) 8.5 - 10.1 mg/dL    Glucose 215 (H) 70 - 99 mg/dL    GFR Estimate 27 (L) >60 mL/min/1.73m2   CBC with platelets   Result Value Ref Range    WBC Count 11.1 (H) 4.0 - 11.0 10e3/uL    RBC Count 2.73 (L) 4.40 - 5.90 10e6/uL    Hemoglobin 6.9 (LL) 13.3 - 17.7 g/dL    Hematocrit 22.6 (L) 40.0 - 53.0 %    MCV 83 78 - 100 fL    MCH 25.3 (L) 26.5 - 33.0 pg    MCHC 30.5 (L) 31.5 - 36.5 g/dL    RDW 20.8 (H) 10.0 - 15.0 %    Platelet Count 220 150 - 450 10e3/uL   Glucose by meter   Result Value Ref Range    GLUCOSE BY METER POCT 182 (H) 70 - 99 mg/dL

## 2022-05-11 NOTE — PROGRESS NOTES
[facility]    Hospitalist Progress Note    Date of Service (when I saw the patient): 05/11/2022  Admit date: 5/10/2022    Assessment & Plan   Justyn Olivas is a 81 year old male with a history of HFpEF, transfusion dependent chronic anemia, CKD stage IV, DM type II who recently was started on steroids for shoulder pain and was sent in to the ED on 5/10/2022 from cardiology clinic due to concerns for decompensated heart failure     Patient was placed on steroids on Sunday and since then has had worsening lower extremity edema, MARKS and fluid retention in the abdomen.  Since then he has had approximately 15 pound weight gain.  He was seen in cardiology clinic this AM and they sent him in to the ED for hospitalization for decompensated heart failure.  At presentation to hospital, requiring 2 L of O2 to maintain his oxygenation      Decompensated HFpEF  Acute hypoxic respiratory distress due to above   * Echo 3/22/22: EF 55%.  RV normal in structure function and size.  No valve disease.      Patient received Bumex 1 mg IV in ED. Continues on Lasix 80 mg TID x 3 doses total. Then reassess.     Strict I/Os and daily weights     On 05/11/22, excellent UOP with stable Cr, K    Continue PTA Coreg    Cardiology consulted and appreciate their recommendations     Vitals:    05/11/22 0614   Weight: 98.1 kg (216 lb 4.8 oz)     I/O last 3 completed shifts:  In: 540 [P.O.:240]  Out: 2250 [Urine:2250]  Recent Labs   Lab 05/10/22  2319 05/10/22  1416   CO2  --  23   POTASSIUM 4.4 4.6   BUN  --  76*   CR  --  2.30*         Transfusion dependent chronic anemia, likely related to chronic disease   Patient's hemoglobin has continued to drift down.  It was 6.1 the ED while being 6.7 on 5/5 and 6.1 on 5/3.   * Last transfusion appears to have been 5/6.  Patient denies any recent bleeding.  He has had a thorough work up so far.  He has had colonoscopy/endoscopy/pill study with GI without clear cause.  He also  follows with hem/onc and has gotten IV infusions in the past.  In the ED he was consented and 1 unit of PRBCs was ordered    Received 1 unit on 5/10/22 with appropriate rise in hgb    Continue to monitor    Discussed with Hem/onc doctor, Dr. Nash.    Agrees with holding on transfusion today for hemoglobin of 6.9.    Continue to follow hemoglobin daily    Iron panel on 5/11/2022 is consistent with iron deficiency.  Ordered Venofer 300 mg daily x2.  Defer further doses to hematology.     CKD stage IV, stable  Cr 2.3 on admission and baseline appears to be 2.5-2.6    Avoid nephrotoxins    Daily BMPs while diuresing    Recent Labs   Lab 05/11/22  0832 05/10/22  1416   CR 2.33* 2.30*        DM type II   Last HgbA1c was 8.2 on 2/16/22     Increased lantus to PTA dose of 25 units (from 20 units) on 05/11/22     Added prandial novolog 5 units AC on 05/11/22    Continue medium intensity correction doses for now     Steroids stopped so anticipate blood sugars will trend down.    Hemoglobin A1C   Date Value Ref Range Status   02/16/2022 8.2 (H) 0.0 - 5.6 % Final     Comment:     Normal <5.7%   Prediabetes 5.7-6.4%    Diabetes 6.5% or higher     Note: Adopted from ADA consensus guidelines.     Recent Labs   Lab 05/11/22  1335 05/11/22  0847 05/11/22  0832 05/11/22  0309 05/10/22  2337 05/10/22  1416   * 182* 215* 260* 244* 167*          Shoulder pain   Has had gradually worsening shoulder pain for some time.  He states its to the point he is not sleeping well..  He was evaluated by Dr. Bingham at [facility] urgent care on Sunday and per patient's report he had MRIs of the shoulder and spine and was told that the spine is the source of his pain. At that time he was started on Prednisone which we believe may be the cause of him gaining fluid.  No issues currently     Steroid stopped due to above.     Patient requests seeing [facility] while here.  He hopes to see Dr. Solano his primary orthopedist     Diet: Orders Placed This  Encounter      Combination Diet Low Saturated Fat Na <2400mg Diet, No Caffeine Diet     IVF: None.  Gl[place] Catheter: Not present     DVT Prophylaxis: ASA, PCDs. Hold on chemoppx given low hgb and iron deficiency.   Code Status: Full Code     Disposition: Expected discharge 5/13/2022 to prior living situation  Communication: Discussed with RN, wife, hematologist, and patient on 05/11/22    Shalonda Henao MD  Hospitalist Service  [facility]  Securely message with the Vocera Web Console (learn more here)  Text page via FlipKey Paging/Directory    Interval History   Events over last 24 hours as outlined above.   Patient breathing is much better today.  His swelling has gone down as well.  He states he has been dealing with shoulder pain for a long time and is to the point he is not sleeping well.  He states he was told by orthopedic surgeon that this was from his spine but he thinks is from his shoulder.  He request to see Dr. Gonzalez at shift possible.  Denies any CP, abdominal pain, N/V/D.    -Data reviewed today: I reviewed all new labs and imaging results over the last 24 hours. I personally reviewed no images or EKG's today.    Physical Exam   Temp: 97.5  F (36.4  C) Temp src: Oral BP: 119/57 Pulse: 73   Resp: 16 SpO2: 95 % O2 Device: Nasal cannula Oxygen Delivery: 3 LPM  Vitals:    05/11/22 0614   Weight: 98.1 kg (216 lb 4.8 oz)     Vital Signs with Ranges  Temp:  [97.1  F (36.2  C)-98.1  F (36.7  C)] 97.5  F (36.4  C)  Pulse:  [73-91] 73  Resp:  [9-26] 16  BP: ()/() 119/57  SpO2:  [90 %-98 %] 95 %  I/O last 3 completed shifts:  In: 300   Out: 1450 [Urine:1450]    Today's Exam  Constitutional:  NAD,   Neuropsyche:  alert and oriented, answers questions appropriately.   Respiratory:  Breathing comfortably, decreased air exchange at bases, no wheezes, no crackles.   Cardiovascular:  Regular rate and rhythm, no edema.  GI:  soft, NT/ND, BS normal  Skin/Integumen:  No acute rash or  sign of bleeding.     Medications   All medications reviewed on 05/11/22       aspirin  81 mg Oral Daily     atorvastatin  20 mg Oral Daily     carvedilol  6.25 mg Oral BID w/meals     dorzolamide-timolol  1 drop Right Eye BID     furosemide  80 mg Intravenous Q8H     insulin aspart  1-7 Units Subcutaneous TID AC     insulin aspart  1-5 Units Subcutaneous At Bedtime     insulin glargine  20 Units Subcutaneous At Bedtime     pantoprazole  40 mg Oral QAM AC     sertraline  50 mg Oral Daily     sodium chloride (PF)  3 mL Intracatheter Q8H     traZODone  100 mg Oral At Bedtime       Data   Recent Labs   Lab 05/11/22  0309 05/10/22  2337 05/10/22  2319 05/10/22  1416 05/05/22  1002   WBC  --   --   --  11.9* 9.2   HGB  --   --   --  6.1* 6.7*   MCV  --   --   --  85 84   PLT  --   --   --  268 235   NA  --   --   --  136  --    POTASSIUM  --   --  4.4 4.6  --    CHLORIDE  --   --   --  107  --    CO2  --   --   --  23  --    BUN  --   --   --  76*  --    CR  --   --   --  2.30*  --    ANIONGAP  --   --   --  6  --    VANESA  --   --   --  8.4*  --    * 244*  --  167*  --    ALBUMIN  --   --   --  2.6*  --    PROTTOTAL  --   --   --  6.4*  --    BILITOTAL  --   --   --  0.9  --    ALKPHOS  --   --   --  120  --    ALT  --   --   --  25  --    AST  --   --   --  20  --        Recent Results (from the past 24 hour(s))   XR Chest Port 1 View    Narrative    EXAM: XR CHEST PORT 1 VIEW  LOCATION: [facility]  DATE/TIME: 5/10/2022 5:35 PM    INDICATION: Weak.  COMPARISON: Chest x-ray on 03/20/2022.      Impression    IMPRESSION: Single AP view of the chest was obtained. Cardiomediastinal silhouette is within normal limits. Right basilar pulmonary opacities, minimally improved as compared to 03/20/2022 exam. No significant pleural effusion or pneumothorax.       ]

## 2022-05-11 NOTE — PLAN OF CARE
DATE & TIME: 5/10/22 1259-3099   Cognitive Concerns/ Orientation : Pt A/Ox4   BEHAVIOR & AGGRESSION TOOL COLOR: Green   ABNL VS/O2: VSS on 2L NC, pt not on oxygen at home  MOBILITY: Assist x1 with cane, fall risk  PAIN MANAGMENT: Complaints of left shoulder and neck pain, managed with repositioning and PRN Tramadol  DIET: Cardiac  BOWEL/BLADDER: Incontinent of urine, external catheter in place. Strict I&Os.  ABNL LAB/BG: Cr 2.30/Ca 8.4/WBC 11.9/Hgb 6.1 (1 unit PRBCs transfused, recheck in AM)/, 260  DRAIN/DEVICES: IV SL  TELEMETRY RHYTHM: Sinus dysrythmia  SKIN: Scattered bruising. Blanchable redness to coccyx. Abrasions/scabs to bilateral shins, thighs, and knees. Edema +2 to abdomen and ankles.  TESTS/PROCEDURES: Cardiology and Oncology consulted.  D/C DAY/GOALS/PLACE: Discharge pending progress.

## 2022-05-11 NOTE — CONSULTS
Care Management Initial Consult    General Information  Assessment completed with: Patient,    Type of CM/SW Visit: CM Role Introduction    Primary Care Provider verified and updated as needed: Yes   Readmission within the last 30 days: no previous admission in last 30 days      Reason for Consult: discharge planning  Advance Care Planning:            Communication Assessment  Patient's communication style: spoken language (English or Bilingual)    Hearing Difficulty or Deaf: yes   Wear Glasses or Blind: yes    Cognitive  Cognitive/Neuro/Behavioral: WDL                      Living Environment:   People in home: spouse  Cecile  Current living Arrangements: house      Able to return to prior arrangements: yes       Family/Social Support:  Care provided by: self  Provides care for: no one, unable/limited ability to care for self  Marital Status:   Wife, Children  Cecile       Description of Support System: Supportive, Involved    Support Assessment: Adequate family and caregiver support, Adequate social supports    Current Resources:   Patient receiving home care services: No     Community Resources:    Equipment currently used at home: cane, straight  Supplies currently used at home:      Employment/Financial:  Employment Status: retired        Financial Concerns: No concerns identified   Referral to Financial Worker: No       Lifestyle & Psychosocial Needs:  Social Determinants of Health     Tobacco Use: Medium Risk     Smoking Tobacco Use: Former Smoker     Smokeless Tobacco Use: Never Used   Alcohol Use: Not on file   Financial Resource Strain: Not on file   Food Insecurity: Not on file   Transportation Needs: Not on file   Physical Activity: Not on file   Stress: Not on file   Social Connections: Not on file   Intimate Partner Violence: Not on file   Depression: Not at risk     PHQ-2 Score: 0   Housing Stability: Not on file       Functional Status:  Prior to admission patient needed assistance: independent  "with ambulation and a cane. Spouse drives.             Mental Health Status:          Chemical Dependency Status:                Values/Beliefs:  Spiritual, Cultural Beliefs, Jehovah's witness Practices, Values that affect care:                 Additional Information:  Writer met with the patient at the bedside. Patient is A+Ox4 and has not been out of bed today.He is currently on Room air and does not use supplemental O2 at home.  Writer explained role and scope of safe discharge planning.    Patient states that he resides at home independently with his spouse in a two level home. Patient has 14 stairs to get up to his bedroom, however he has a bedroom/family room and bathroom on the main unit if he needs to stay there.  Patient stated that he and his spouse have recently started to look into moving to a new place but nothing is finalized.  Patient has a daughter and son that live locally and a son that lives out of state.   Patient was attending outpatient therapies however in the last couple of weeks he was unable to attend due to not feeling well. Patient is open to the idea of possible homecare at discharge. Writer went over homecare \"homebound status\" and went over the current issues with capacity. Patient agreeable to have this writer contact [facility] ([facility]) or homecare at discharge and possible other referrals if [facility] is unable to take.  Patient confirmed his address, PCP and contact information is correct in Epic.  He prefers homecare agency contact his spouse for assessment and scheduling.  Patient agreeable to have this writer schedule follow up appointments as recommended he prefers later afternoon ~13:00 for follow. Up.  [facility] task for PCP, awaiting Cards/CORE, [facility] recommendations.  Will continue to follow for further discharge planning needs as identified.     Addendum: 5/11/22 13:31  Per AC they are able to take this patient for RN/PT start of care would be week of 5/16 vs 5/17.  Will " put in a sticky note for orders with face to face.       Yudi Gamboa RN, BSN, ACM   Care Transitions Specialist  [facility]  Care Transitions Specialist  [email or web address] [email or web address]  [email or web address]  Office: [phone] Fax: [phone]  [facility]

## 2022-05-11 NOTE — CONSULTS
[facility] Consultation      Justyn Olivas MRN# [id number]   YOB: 1940 Age: 81 year old   Date of Admission: 5/10/2022  Requesting physician: Eagle Robles DO  Reason for consult:  Chronic anemia.           Assessment and Plan:   81-year-old male with anemia related to stage IV chronic kidney disease and iron deficiency, admitted for decompensated heart failure and severe anemia.    1.  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  Stage IV chronic kidney disease  3.  Decompensated heart failure  4.  Diabetes mellitus type 2  5.  Shoulder pain  - Recent 2/2022 EGD, colonoscopy, capsule endoscopy showed no active bleeding.  - Prior bone marrow biopsy from 5/14/2021 showed no evidence for malignancy.  - He has been receiving Aranesp as outpatient with up titration of the dose.  He has had recurrent iron deficiency requiring IV iron.  - Discussed with patient and Dr. Shalonda Henao that we are to check iron panel as the recent ferritin may be normalized as acute phase reactant.  The iron panel has resulted, consistent with iron deficiency.  We will give Venofer.  - Patient is not yet due for his next dose of Aranesp.  He will receive this as outpatient in the next couple weeks.  - Discussed with Dr. Henao that we can hold off on transfusion today for his hemoglobin of 6.9.  However, if this decreases below that level, to transfuse 1 unit of packed RBCs.  - Follow hemoglobin daily.    Thank you for the consult.  Will follow with you.    Kristine Nash MD  Hematology/Oncology  [facility]               Chief Complaint:   Edema           History of Present Illness:   This patient is a 81 year old male well-known to me with a history of related to stage IV chronic kidney disease and iron deficiency.  He is currently admitted for decompensated heart failure, after visit to the cardiology clinic on  5/10/2022.    In regards to his anemia, he has had prior EGD from 3/21/2021 which showed 4 nonbleeding angiectasia's in the duodenum treated with APC.  He had a repeat EGD on 2022 that showed no abnormalities.  He had prior colonoscopy on 3/31/2021 that showed a single medium-sized localized angiodysplastic lesion without bleeding in the cecum; APC was applied at the site.  Any recent prior hospitalization, colonoscopy on 2022 showed multiple polyps with no active bleeding.  Outpatient capsule endoscopy was normal.  He had a bone marrow biopsy on 2021 with no evidence of dysplasia or malignancy.  Cytogenetics were normal for age.  He has been transfusion dependent despite treatment with Aranesp and frequent iron infusions.    2022 Hemoglobin 6.7 for which he received 1 unit of packed RBCs.  On admission 5/10/2022, hemoglobin was 6.1 with MCV 85.  He was transfused 1 unit of packed RBCs.  His hemoglobin today, 2022, is 6.9.  His platelet count is normal at 220,000 with WBC of 11.1.  Last echocardiogram from 3/2022, showed LVEF 55%.  Last ferritin from 2022 was 143.  Creatinine from 2022 was 2.33, his baseline.  2022 serum iron is low at 22 with iron binding capacity elevated at 531 and low iron saturation index of 4.    Currently, patient complains of worsening shoulder pain for which she was given a recent course of prednisone.  He felt this increased edema in his body.  Prednisone has been discontinued.  He has been given Bumex and Lasix.         Physical Exam:   Vitals were reviewed  Blood pressure 102/55, pulse 80, temperature 98.3  F (36.8  C), temperature source Oral, resp. rate 16, weight 98.1 kg (216 lb 4.8 oz), SpO2 95 %.  Temperatures:  Current - Temp: 98.3  F (36.8  C); Max - Temp  Av.7  F (36.5  C)  Min: 97.1  F (36.2  C)  Max: 98.3  F (36.8  C)  Respiration range: Resp  Av.9  Min: 9  Max: 19  Pulse range: Pulse  Av.3  Min: 73  Max: 91  Blood pressure  range: Systolic (24hrs), Av , Min:98 , Max:130   ; Diastolic (24hrs), Av, Min:55, Max:108    Pulse oximetry range: SpO2  Av.4 %  Min: 90 %  Max: 100 %    Intake/Output Summary (Last 24 hours) at 2022 1624  Last data filed at 2022 1001  Gross per 24 hour   Intake 540 ml   Output 2250 ml   Net -1710 ml       GENERAL: No acute distress.  SKIN: No rashes or jaundice.  HEENT: Normocephalic, atraumatic. Eyes anicteric. Oropharynx is clear.  LYMPH: No palpable lymphadenopathy in the cervical, supraclavicular, axillary, or inguinal regions.  HEART: Regular rate and rhythm with no murmurs.  LUNGS: Clear bilaterally.  ABDOMEN: Soft, nontender, nondistended with no palpable hepatosplenomegaly.  EXTREMITIES: No clubbing, cyanosis, or edema.  MENTAL: Alert and oriented to person, place, and time.  NEURO: Cranial nerves II through XII grossly intact with no focal motor deficits.              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)      Cerebral infarction (H)      Chronic diastolic heart failure (H)      Diabetes (H)      [place] (hard of hearing)      Hyperlipemia      Hypokalemia      Renal disease              Past Surgical History:   I have reviewed this patient's past surgical history  Past Surgical History:   Procedure Laterality Date     BONE MARROW BIOPSY, BONE SPECIMEN, NEEDLE/TROCAR N/A 2021    Procedure: BIOPSY, BONE MARROW;  Surgeon: Juanjo Hoffman MD;  Location: [facility]     COLONOSCOPY N/A 2022    Procedure: COLONOSCOPY, FLEXIBLE, WITH LESION REMOVAL USING SNARE;  Surgeon: Jules Lane MD;  Location: [facility]     COLONOSCOPY N/A 2022    Procedure: COLONOSCOPY, WITH POLYPECTOMY AND BIOPSY;  Surgeon: Jules Lane MD;  Location: [facility]     CV  PERICARDIOCENTESIS N/A 2020    Procedure: Pericardiocentesis;  Surgeon: Chas Chambers MD;  Location:  HEART CARDIAC CATH LAB     ENT SURGERY       ESOPHAGOSCOPY, GASTROSCOPY, DUODENOSCOPY (EGD), COMBINED N/A 3/27/2021    Procedure: Esophagogastroduodenoscopy, With Biopsy;  Surgeon: Luc Nash MD;  Location:  GI     ESOPHAGOSCOPY, GASTROSCOPY, DUODENOSCOPY (EGD), COMBINED N/A 2022    Procedure: ESOPHAGOGASTRODUODENOSCOPY (EGD);  Surgeon: Jules Lane MD;  Location:  GI     LAPAROSCOPIC CHOLECYSTECTOMY N/A 2021    Procedure: LAPAROSCOPIC CHOLECYSTECTOMY;  Surgeon: Len Coates MD;  Location:  OR     SOFT TISSUE SURGERY      skin cancer surgery on nose               Social History:   I have reviewed this patient's social history  Social History     Tobacco Use     Smoking status: Former Smoker     Packs/day: 2.00     Years: 11.00     Pack years: 22.00     Types: Cigarettes     Start date:      Quit date:      Years since quittin.3     Smokeless tobacco: Never Used   Substance Use Topics     Alcohol use: No     Alcohol/week: 0.0 standard drinks             Family History:   I have reviewed this patient's family history  Family History   Problem Relation Age of Onset     Family History Negative Mother      Family History Negative Father              Allergies:     Allergies   Allergen Reactions     No Known Allergies              Medications:   I have reviewed this patient's current medications  Medications Prior to Admission   Medication Sig Dispense Refill Last Dose     albuterol (PROAIR HFA) 108 (90 Base) MCG/ACT inhaler INHALE 2 PUFFS BY MOUTH FOUR TIMES DAILY AS NEEDED 8.5 g 3  at prn     aspirin 81 MG EC tablet Take 81 mg by mouth daily   5/10/2022 at am     atorvastatin (LIPITOR) 20 MG tablet Take 1 tablet (20 mg) by mouth daily 90 tablet 3 2022 at pm     carvedilol (COREG) 6.25 MG tablet Take 1 tablet (6.25 mg) by mouth 2 times daily (with  meals) 180 tablet 3 5/10/2022 at am     clobetasol (TEMOVATE) 0.05 % external cream Apply topically daily as needed    at prn     dorzolamide-timolol (COSOPT) 2-0.5 % ophthalmic solution Place 1 drop into the right eye 2 times daily    5/10/2022 at am     fluticasone (FLONASE) 50 MCG/ACT nasal spray SHAKE LIQUID AND USE 2 SPRAYS IN EACH NOSTRIL DAILY 16 g 3 5/10/2022 at am     folic acid-vit B6-vit B12 (FOLGARD) 0.8-10-0.115 MG TABS per tablet Take 1 tablet by mouth daily Continue per Nephrology recommendation 90 tablet 3 5/10/2022 at am     hydrocortisone (CORTAID) 1 % external cream Apply 1 g topically nightly as needed For itching on BACK    at prn     insulin glargine (LANTUS SOLOSTAR) 100 UNIT/ML pen ADMINISTER 25 UNITS UNDER THE SKIN AT BEDTIME 45 mL 3      insulin lispro (HUMALOG KWIKPEN) 100 UNIT/ML (1 unit dial) KWIKPEN Inject 5-15 Units Subcutaneous 3 times daily (before meals) Sliding scale with meals (Patient taking differently: Inject 5-15 Units Subcutaneous 3 times daily (before meals) Sliding scale with meals:  <100: 5 units  101-150: 7 units  151-250: 9 units  251-350: 12 units  >351 15 units) 45 mL 3      methocarbamol (ROBAXIN) 500 MG tablet Take 500 mg by mouth 4 times daily as needed for muscle spasms    at prn     pantoprazole (PROTONIX) 40 MG EC tablet TAKE 1 TABLET(40 MG) BY MOUTH TWICE DAILY 180 tablet 2 5/10/2022 at am     potassium chloride ER (K-TAB) 20 MEQ CR tablet Take 1 tablet (20 mEq) by mouth daily   5/10/2022 at am     SENNA-docusate sodium (SENNA S) 8.6-50 MG tablet Take 1 tablet by mouth 2 times daily (Patient taking differently: Take 1 tablet by mouth nightly as needed) 60 tablet 1  at prn     sertraline (ZOLOFT) 50 MG tablet Take 1 tablet (50 mg) by mouth daily 90 tablet 3 5/10/2022 at am     torsemide (DEMADEX) 20 MG tablet Take 80 mg by mouth daily   5/10/2022 at increased dose today per MD     traZODone (DESYREL) 100 MG tablet TAKE 1 TABLET(100 MG) BY MOUTH AT BEDTIME 90  tablet 3 5/9/2022 at pm     blood glucose (STEVE CONTOUR) test strip TESTING FOUR TIMES DAILY OR AS DIRECTED 400 strip 0      insulin pen needle (BD PEN NEEDLE FERCHO 2ND GEN) 32G X 4 MM miscellaneous USE AS DIRECTED UP TO FOUR TIMES DAILY 400 each 3      Current Facility-Administered Medications Ordered in Epic   Medication Dose Route Frequency Last Rate Last Admin     aspirin EC tablet 81 mg  81 mg Oral Daily   81 mg at 05/11/22 0819     atorvastatin (LIPITOR) tablet 20 mg  20 mg Oral Daily   20 mg at 05/11/22 0819     carvedilol (COREG) tablet 6.25 mg  6.25 mg Oral BID w/meals   6.25 mg at 05/11/22 0819     glucose gel 15-30 g  15-30 g Oral Q15 Min PRN        Or     dextrose 50 % injection 25-50 mL  25-50 mL Intravenous Q15 Min PRN        Or     glucagon injection 1 mg  1 mg Subcutaneous Q15 Min PRN         dorzolamide-timolol (COSOPT) ophthalmic solution 1 drop  1 drop Right Eye BID   1 drop at 05/11/22 0944     HOLD: nitroGLYcerin IF   Does not apply HOLD         insulin aspart (NovoLOG) injection (RAPID ACTING)  5 Units Subcutaneous TID w/meals   5 Units at 05/11/22 1431     insulin aspart (NovoLOG) injection (RAPID ACTING)  1-7 Units Subcutaneous TID AC   2 Units at 05/11/22 1431     insulin aspart (NovoLOG) injection (RAPID ACTING)  1-5 Units Subcutaneous At Bedtime   1 Units at 05/10/22 2338     insulin glargine (LANTUS PEN) injection 25 Units  25 Units Subcutaneous At Bedtime         iron sucrose (VENOFER) 300 mg in sodium chloride 0.9 % 250 mL intermittent infusion  300 mg Intravenous Daily         lidocaine (LMX4) cream   Topical Q1H PRN         lidocaine 1 % 0.1-1 mL  0.1-1 mL Other Q1H PRN         melatonin tablet 1 mg  1 mg Oral At Bedtime PRN         naloxone (NARCAN) injection 0.2 mg  0.2 mg Intravenous Q2 Min PRN        Or     naloxone (NARCAN) injection 0.4 mg  0.4 mg Intravenous Q2 Min PRN        Or     naloxone (NARCAN) injection 0.2 mg  0.2 mg Intramuscular Q2 Min PRN        Or     naloxone  (NARCAN) injection 0.4 mg  0.4 mg Intramuscular Q2 Min PRN         pantoprazole (PROTONIX) EC tablet 40 mg  40 mg Oral QAM AC   40 mg at 05/11/22 0653     sertraline (ZOLOFT) tablet 50 mg  50 mg Oral Daily   50 mg at 05/11/22 0819     sodium chloride (PF) 0.9% PF flush 3 mL  3 mL Intracatheter Q8H         sodium chloride (PF) 0.9% PF flush 3 mL  3 mL Intracatheter q1 min prn         traMADol (ULTRAM) tablet 50 mg  50 mg Oral Q6H PRN   50 mg at 05/11/22 1016     traZODone (DESYREL) tablet 100 mg  100 mg Oral At Bedtime   100 mg at 05/10/22 2330     No current Epic-ordered outpatient medications on file.             Review of Systems:     The 14 point Review of Systems is negative other than noted in the HPI.            Data:   All laboratory data reviewed  Results for orders placed or performed during the hospital encounter of 05/10/22 (from the past 24 hour(s))   ABO/Rh type and screen *Canceled*    Narrative    The following orders were created for panel order ABO/Rh type and screen.  Procedure                               Abnormality         Status                     ---------                               -----------         ------                       Please view results for these tests on the individual orders.   Prepare red blood cells (unit)   Result Value Ref Range    CROSSMATCH Compatible     UNIT ABO/RH A Pos     Unit Number [id number]     Unit Status Issued     Blood Component Type Red Blood Cells     Product Code [id number]     CODING SYSTEM [id number]     UNIT TYPE ISBT 6200     ISSUE DATE AND TIME [id number]    Asymptomatic COVID-19 Virus (Coronavirus) by PCR Nasopharyngeal    Specimen: Nasopharyngeal; Swab   Result Value Ref Range    SARS CoV2 PCR Negative Negative    Narrative    Testing was performed using the Xpert Xpress SARS-CoV-2 Assay on the   [employer]. Additional information about   this Emergency Use Authorization (EUA) assay can be found via the Lab   Guide.  This test should be ordered for the detection of SARS-CoV-2 in   individuals who meet SARS-CoV-2 clinical and/or epidemiological   criteria. Test performance is unknown in asymptomatic patients. This   test is for in vitro diagnostic use under the FDA EUA for   laboratories certified under CLIA to perform high complexity testing.   This test has not been FDA cleared or approved. A negative result   does not rule out the presence of PCR inhibitors in the specimen or   target RNA in concentration below the limit of detection for the   assay. The possibility of a false negative should be considered if   the patient's recent exposure or clinical presentation suggests   COVID-19. This test was validated by the [facility]. This laboratory is certified under the Clinical Laboratory Improvement Amendments of 1988 (CLIA-88) as qualified to perform high complexity laboratory testing.     ABO/Rh type and screen *Canceled*    Narrative    The following orders were created for panel order ABO/Rh type and screen.  Procedure                               Abnormality         Status                     ---------                               -----------         ------                     Adult Type and Screen[[id number]]                                                         Please view results for these tests on the individual orders.   XR Chest Port 1 View    Narrative    EXAM: XR CHEST PORT 1 VIEW  LOCATION: [facility]  DATE/TIME: 5/10/2022 5:35 PM    INDICATION: Weak.  COMPARISON: Chest x-ray on 03/20/2022.      Impression    IMPRESSION: Single AP view of the chest was obtained. Cardiomediastinal silhouette is within normal limits. Right basilar pulmonary opacities, minimally improved as compared to 03/20/2022 exam. No significant pleural effusion or pneumothorax.   ABO/Rh type and screen    Narrative    The following orders were created for panel order ABO/Rh type  and screen.  Procedure                               Abnormality         Status                     ---------                               -----------         ------                     Adult Type and Screen[[id number]]                            Final result                 Please view results for these tests on the individual orders.   Adult Type and Screen   Result Value Ref Range    ABO/RH(D) A POS     Antibody Screen Negative Negative    SPECIMEN EXPIRATION DATE [id number]    Potassium   Result Value Ref Range    Potassium 4.4 3.4 - 5.3 mmol/L   Glucose by meter   Result Value Ref Range    GLUCOSE BY METER POCT 244 (H) 70 - 99 mg/dL   Glucose by meter   Result Value Ref Range    GLUCOSE BY METER POCT 260 (H) 70 - 99 mg/dL   Basic metabolic panel   Result Value Ref Range    Sodium 139 133 - 144 mmol/L    Potassium 4.1 3.4 - 5.3 mmol/L    Chloride 109 94 - 109 mmol/L    Carbon Dioxide (CO2) 24 20 - 32 mmol/L    Anion Gap 6 3 - 14 mmol/L    Urea Nitrogen 73 (H) 7 - 30 mg/dL    Creatinine 2.33 (H) 0.66 - 1.25 mg/dL    Calcium 8.4 (L) 8.5 - 10.1 mg/dL    Glucose 215 (H) 70 - 99 mg/dL    GFR Estimate 27 (L) >60 mL/min/1.73m2   CBC with platelets   Result Value Ref Range    WBC Count 11.1 (H) 4.0 - 11.0 10e3/uL    RBC Count 2.73 (L) 4.40 - 5.90 10e6/uL    Hemoglobin 6.9 (LL) 13.3 - 17.7 g/dL    Hematocrit 22.6 (L) 40.0 - 53.0 %    MCV 83 78 - 100 fL    MCH 25.3 (L) 26.5 - 33.0 pg    MCHC 30.5 (L) 31.5 - 36.5 g/dL    RDW 20.8 (H) 10.0 - 15.0 %    Platelet Count 220 150 - 450 10e3/uL   Iron and iron binding capacity   Result Value Ref Range    Iron 22 (L) 35 - 180 ug/dL    Iron Binding Capacity 531 (H) 240 - 430 ug/dL    Iron Sat Index 4 (L) 15 - 46 %   Glucose by meter   Result Value Ref Range    GLUCOSE BY METER POCT 182 (H) 70 - 99 mg/dL   Glucose by meter   Result Value Ref Range    GLUCOSE BY METER POCT 200 (H) 70 - 99 mg/dL

## 2022-05-11 NOTE — PLAN OF CARE
DATE & TIME: 5/11/22 AM shift  Cognitive Concerns/ Orientation : Pt A/Ox4.   BEHAVIOR & AGGRESSION TOOL COLOR: Green, calm and cooperative.   ABNL VS/O2: VSS on 2L NC this morning; weaned off oxygen at 10 AM, sating 96% on RA. Denies chest pain or SOB. MARKS noted.  MOBILITY: Assist x1 with cane, fall risk. Encouraged to ambulate and get up in chair for meals. Declined this morning, will try for lunch.   PAIN MANAGMENT: Complaints of left shoulder and neck pain, managed with repositioning and PRN Tramadol given x 1. Pt reports some relief. Pt states pain is 3/10 when at rest but when starts moving pain escalates to 8-9/10.   DIET: Cardiac, good appetite. St I/O.   BOWEL/BLADDER: Incontinent of urine, external catheter in place. Strict I&Os. No BM this shift. +BS. Abdomen is distended.   ABNL LAB/BG: hg 6.9, MD notified, will wait for hem/oncology to see pt. /200. Cr 2.33. WBC 11.1  DRAIN/DEVICES: IV SL. Pt is on IV lasix q 8 hr.   TELEMETRY RHYTHM: SD  SKIN: Scattered bruising. Blanchable redness to coccyx. Abrasions/scabs to bilateral shins, thighs, and knees. Edema +2 to abdomen and ankles.  TESTS/PROCEDURES: Cardiology and Hem/Oncology consulted.  D/C DAY/GOALS/PLACE: Discharge pending consults recommendations, stable hemoglobin and stable respiratory status.

## 2022-05-12 NOTE — PROGRESS NOTES
[facility]    Hospitalist Progress Note    Date of Service (when I saw the patient): 05/12/2022  Admit date: 5/10/2022    Assessment & Plan   Justyn Olivas is a 81 year old male with a history of HFpEF, transfusion dependent chronic anemia, CKD stage IV, DM type II who recently was started on steroids for shoulder pain and was sent in to the ED on 5/10/2022 from cardiology clinic due to concerns for decompensated heart failure     Patient was placed on steroids on Sunday and since then has had worsening lower extremity edema, MARKS and fluid retention in the abdomen.  Since then he has had approximately 15 pound weight gain.  He was seen in cardiology clinic this AM and they sent him in to the ED for hospitalization for decompensated heart failure.  At presentation to hospital, requiring 2 L of O2 to maintain his oxygenation      Decompensated HFpEF  Acute hypoxic respiratory distress due to above   * Echo 3/22/22: EF 55%.  RV normal in structure function and size.  No valve disease.  * U/S shows moderate ascites.   * Admit weight 216#, baseline wt ~ 200#       Patient received Bumex 1 mg IV in ED > lasix 80 mg IV BID x 3 doses    Strict I/Os and daily weights     Continue lasix 80 mg IV BID today.     On 05/12/22, wt down. UOP adequate.     Continue PTA Coreg, atorvastatin    Cardiology considering CardioMEMS     Vitals:    05/11/22 0614 05/12/22 0500   Weight: 98.1 kg (216 lb 4.8 oz) 97.9 kg (215 lb 14.4 oz)     I/O last 3 completed shifts:  In: 960 [P.O.:960]  Out: 1800 [Urine:1800]  Recent Labs   Lab 05/11/22  0832 05/10/22  2319 05/10/22  1416   CO2 24  --  23   POTASSIUM 4.1 4.4 4.6   BUN 73*  --  76*   CR 2.33*  --  2.30*         Transfusion dependent chronic anemia, likely related to chronic disease   Iron deficiency - by iron panel on 5/11/22  Patient's hemoglobin has continued to drift down.  It was 6.1 the ED while being 6.7 on 5/5 and 6.1 on 5/3.   * Last transfusion PTA:  5/6.  Patient denies any recent bleeding.  He has had a thorough work up so far.  He has had colonoscopy/endoscopy/pill study with GI without clear cause.  He also follows with hem/onc and has gotten IV infusions in the past.  In the ED he was consented and 1 unit of PRBCs was ordered    Received 1 unit on 5/10/22 with appropriate rise in hgb    Transfuse 1 unit on 05/12/22 for hgb of 6.5 and CHF.     Venofer 300 mg x 2 ordered. Defer to hematology further doses.      CKD stage IV, stable  Cr 2.3 on admission and baseline appears to be 2.5-2.6    Avoid nephrotoxins    Daily BMPs while diuresing    Recent Labs   Lab 05/12/22  0935 05/11/22  0832 05/10/22  1416   CR 2.50* 2.33* 2.30*        Ascites, secondary to CHF  * U/S mild to moderate ascites.   * No history or evidence of liver disease.   Recent Labs   Lab 05/10/22  1416   ALBUMIN 2.6*   BILITOTAL 0.9   ALT 25   AST 20   ALKPHOS 120       Diuresis as above.     DM type II   Last HgbA1c was 8.2 on 2/16/22     Increased lantus to PTA dose of 25 units (from 20 units) on 05/11/22.     Added prandial novolog on 5/11/22, increase to 7 units AC on 05/11/22.     Continue medium intensity correction doses for now.     Steroids stopped so anticipate blood sugars will trend down.    Hemoglobin A1C   Date Value Ref Range Status   02/16/2022 8.2 (H) 0.0 - 5.6 % Final     Comment:     Normal <5.7%   Prediabetes 5.7-6.4%    Diabetes 6.5% or higher     Note: Adopted from ADA consensus guidelines.     Recent Labs   Lab 05/12/22  1248 05/12/22  0935 05/12/22  0725 05/12/22  0200 05/11/22  2119 05/11/22  1732   * 211* 165* 243* 298* 230*        Shoulder pain   Has had gradually worsening shoulder pain for some time.  He states its to the point he is not sleeping well..  He was evaluated by Dr. Bingham at [facility] urgent care on Sunday and had MRIs of the shoulder and spine.       Ortho consult appreciated. Noted to have both cervical spine stenosis and left shoulder impingement  "syndrome.     Not candidate for c-spine injections.     Recommended continued pain meds PRN, WBAT of upper extremities, and OK to stop steroids.     On 5/12, patient reports a terrible night of sleep due to pain. We changed his tramadol to oxycodone this AM and after 5 mg he \"was out.\" Today he feels better and thinks he can tolerate pain during the day, but would like to be able to sleep at night.     Oxycodone 2.5-5 mg q 6 hours. Advised that he try to limit this to night time doses for best effect.      Follow up with ortho spine as outpatient.       Diet: Orders Placed This Encounter      Combination Diet Low Saturated Fat Na <2400mg Diet, No Caffeine Diet     IVF: None.  [place] Catheter: Not present     DVT Prophylaxis: ASA, PCDs. Hold on chemoppx given low hgb and iron deficiency.   Code Status: Full Code     Disposition: Expected discharge 5/13/2022 to prior living situation  Communication: Discussed with RN, wife, hematologist, and patient on 05/11/22    Shalonda Henao MD  Hospitalist Service  [facility]  Securely message with the Vocera Web Console (learn more here)  Text page via [employer] Paging/Directory    Interval History   Events over last 24 hours as outlined above.   As above, lots a shoulder pain overnight.   Breathing is about the same. Notes abdominal distension and swelling.   No CP, N/V/D.     -Data reviewed today: I reviewed all new labs and imaging results over the last 24 hours. I personally reviewed no images or EKG's today.    Physical Exam   Temp: 98  F (36.7  C) Temp src: Oral BP: 100/58 Pulse: 84   Resp: 16 SpO2: 95 % O2 Device: None (Room air) Oxygen Delivery: 2 LPM  Vitals:    05/11/22 0614 05/12/22 0500   Weight: 98.1 kg (216 lb 4.8 oz) 97.9 kg (215 lb 14.4 oz)     Vital Signs with Ranges  Temp:  [97.4  F (36.3  C)-98.3  F (36.8  C)] 98  F (36.7  C)  Pulse:  [78-88] 84  Resp:  [16] 16  BP: (100-118)/(55-71) 100/58  SpO2:  [95 %-100 %] 95 %  I/O last 3 completed " shifts:  In: 960 [P.O.:960]  Out: 1800 [Urine:1800]    Today's Exam  Constitutional:  NAD,   Neuropsyche:  alert and oriented, answers questions appropriately.   Respiratory:  Breathing comfortably, decreased air exchange at bases, no wheezes, no crackles.   Cardiovascular:  Regular rate and rhythm, no edema.  GI:  soft, NT/ND, BS normal  Skin/Integumen:  No acute rash or sign of bleeding.     Medications   All medications reviewed on 05/12/22.       aspirin  81 mg Oral Daily     atorvastatin  20 mg Oral Daily     carvedilol  6.25 mg Oral BID w/meals     dorzolamide-timolol  1 drop Right Eye BID     insulin aspart  5 Units Subcutaneous TID w/meals     insulin aspart  1-7 Units Subcutaneous TID AC     insulin aspart  1-5 Units Subcutaneous At Bedtime     insulin glargine  25 Units Subcutaneous At Bedtime     iron sucrose  300 mg Intravenous Daily     pantoprazole  40 mg Oral QAM AC     sertraline  50 mg Oral Daily     sodium chloride (PF)  3 mL Intracatheter Q8H     traZODone  100 mg Oral At Bedtime       Data   Recent Labs   Lab 05/12/22  0200 05/11/22  2119 05/11/22  1732 05/11/22  0847 05/11/22  0832 05/10/22  2337 05/10/22  2319 05/10/22  1416 05/05/22  1002   0000   WBC  --   --   --   --  11.1*  --   --  11.9* 9.2  --    HGB  --   --   --   --  6.9*  --   --  6.1* 6.7*  --    MCV  --   --   --   --  83  --   --  85 84  --    PLT  --   --   --   --  220  --   --  268 235  --    NA  --   --   --   --  139  --   --  136  --   --    POTASSIUM  --   --   --   --  4.1  --  4.4 4.6  --   --    CHLORIDE  --   --   --   --  109  --   --  107  --   --    CO2  --   --   --   --  24  --   --  23  --   --    BUN  --   --   --   --  73*  --   --  76*  --   --    CR  --   --   --   --  2.33*  --   --  2.30*  --   --    ANIONGAP  --   --   --   --  6  --   --  6  --   --    VANESA  --   --   --   --  8.4*  --   --  8.4*  --   --    * 298* 230*   < > 215*   < >  --  167*  --    < >   ALBUMIN  --   --   --   --   --   --    --  2.6*  --   --    PROTTOTAL  --   --   --   --   --   --   --  6.4*  --   --    BILITOTAL  --   --   --   --   --   --   --  0.9  --   --    ALKPHOS  --   --   --   --   --   --   --  120  --   --    ALT  --   --   --   --   --   --   --  25  --   --    AST  --   --   --   --   --   --   --  20  --   --     < > = values in this interval not displayed.       Recent Results (from the past 24 hour(s))   US Abdomen Limited    Narrative    US ABDOMEN LIMITED   5/11/2022 4:44 PM     HISTORY:  Abdominal distention, ascites check.    COMPARISON: CT abdomen pelvis on 12/23/2021    FINDINGS:  Limited abdominal ultrasound to check for free fluid in the  abdomen, demonstrate mild to moderate volume ascites.      Impression    IMPRESSION:  Mild to moderate volume ascites.    KAY HIDALGO MD         SYSTEM ID:  [id number]       ]

## 2022-05-12 NOTE — CONSULTS
[facility]    Orthopedic Consultation    Justyn Olivas MRN# [id number]   Age: 81 year old YOB: 1940     Date of Admission: 5/10/2022    Reason for consult: Left shoulder/cervical pain        Requesting provider: Shalonda Henao        Level of consult: Consult, follow and place orders           Assessment and Plan:   Assessment:   Left shoulder and cervical spine pain  Multi-level C-spine stenosis  Left shoulder impingement syndrome       Plan:   Symptoms do appear to be primarily evolving from his cervical spine vs shoulder.    Discussed possible C-spine injection with Dr Scott as this was the provider he saw in clinic.  Unfortunately, he does not feel an injection would be beneficial.    Continue pain medication prn  Agree with discontinuing oral steroids  WBAT bilateral upper extremities            Chief Complaint:   Left upper extremity pain          History of Present Illness:   Justyn Olivas is a 81 year old male who was started on steroids for shoulder pain and was sent in to the ED on 5/10/2022 from cardiology clinic due to concerns for decompensated heart failure. Patient was placed on steroids on Sunday due to worsening pain after having his MRIs.  He began noticing worsening lower extremity edema, shortness of breath and fluid retention.   He was seen in cardiology clinic this AM and they sent him in to the ED for hospitalization for decompensated heart failure.   Patient requested to be seen by orthopedics.  States he has had shoulder/neck pain that has been worsening over several weeks.  He was seen by Dr Scott and MRIs were completed.  He has not been able to follow-up for MRI results.   He continues to have pain in her left arm, distal to the shoulder.  He does have occasional radiculopathy.      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  Moderate to severe towards the right C5-6 and left C3-4 with cord impingement.  Moderate  towards the left at C2-3, mild right C6-7.  Foraminal stenosis, severe on the left at C3-4, bilateral C4-5, left at C5-6, bilateral at C6-7.      Shoulder: Mild supraspinatus tendinosis with rotator cuff tendinosis, AC arthrosis with inferior osteophyte.  Biceps tendinosis.               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)      Cerebral infarction (H)      Chronic diastolic heart failure (H)      Diabetes (H)      [place] (hard of hearing)      Hyperlipemia      Hypokalemia      Renal disease              Past Surgical History:     Past Surgical History:   Procedure Laterality Date     BONE MARROW BIOPSY, BONE SPECIMEN, NEEDLE/TROCAR N/A 5/14/2021    Procedure: BIOPSY, BONE MARROW;  Surgeon: Juanjo Hoffman MD;  Location:  GI     COLONOSCOPY N/A 2/27/2022    Procedure: COLONOSCOPY, FLEXIBLE, WITH LESION REMOVAL USING SNARE;  Surgeon: Jules Lane MD;  Location:  GI     COLONOSCOPY N/A 2/27/2022    Procedure: COLONOSCOPY, WITH POLYPECTOMY AND BIOPSY;  Surgeon: Jules Lane MD;  Location: [facility]     CV PERICARDIOCENTESIS N/A 12/21/2020    Procedure: Pericardiocentesis;  Surgeon: Chas Chambers MD;  Location: [facility] CARDIAC CATH LAB     ENT SURGERY       ESOPHAGOSCOPY, GASTROSCOPY, DUODENOSCOPY (EGD), COMBINED N/A 3/27/2021    Procedure: Esophagogastroduodenoscopy, With Biopsy;  Surgeon: Luc Nash MD;  Location:  GI     ESOPHAGOSCOPY, GASTROSCOPY, DUODENOSCOPY (EGD), COMBINED N/A 2/26/2022    Procedure: ESOPHAGOGASTRODUODENOSCOPY (EGD);  Surgeon: Jules Lane MD;  Location:  GI     LAPAROSCOPIC CHOLECYSTECTOMY N/A 9/2/2021    Procedure: LAPAROSCOPIC CHOLECYSTECTOMY;  Surgeon: Len Coates MD;  Location:  OR     SOFT TISSUE SURGERY      skin cancer surgery on nose             Social History:      Social History     Tobacco Use     Smoking status: Former Smoker     Packs/day: 2.00     Years: 11.00     Pack years: 22.00     Types: Cigarettes     Start date:      Quit date:      Years since quittin.3     Smokeless tobacco: Never Used   Substance Use Topics     Alcohol use: No     Alcohol/week: 0.0 standard drinks             Family History:     Family History   Problem Relation Age of Onset     Family History Negative Mother      Family History Negative Father              Immunizations:     VACCINE/DOSE   Diptheria   DPT   DTAP   HBIG   Hepatitis A   Hepatitis B   HIB   Influenza   Measles   Meningococcal   MMR   Mumps   Pneumococcal   Polio   Rubella   Small Pox   TDAP   Varicella   Zoster             Allergies:     Allergies   Allergen Reactions     No Known Allergies              Medications:     Current Facility-Administered Medications   Medication     aspirin EC tablet 81 mg     atorvastatin (LIPITOR) tablet 20 mg     carvedilol (COREG) tablet 6.25 mg     glucose gel 15-30 g    Or     dextrose 50 % injection 25-50 mL    Or     glucagon injection 1 mg     dorzolamide-timolol (COSOPT) ophthalmic solution 1 drop     HOLD: nitroGLYcerin IF     insulin aspart (NovoLOG) injection (RAPID ACTING)     insulin aspart (NovoLOG) injection (RAPID ACTING)     insulin aspart (NovoLOG) injection (RAPID ACTING)     insulin glargine (LANTUS PEN) injection 25 Units     iron sucrose (VENOFER) 300 mg in sodium chloride 0.9 % 250 mL intermittent infusion     lidocaine (LMX4) cream     lidocaine 1 % 0.1-1 mL     melatonin tablet 1 mg     naloxone (NARCAN) injection 0.2 mg    Or     naloxone (NARCAN) injection 0.4 mg    Or     naloxone (NARCAN) injection 0.2 mg    Or     naloxone (NARCAN) injection 0.4 mg     pantoprazole (PROTONIX) EC tablet 40 mg     sertraline (ZOLOFT) tablet 50 mg     sodium chloride (PF) 0.9% PF flush 3 mL     sodium chloride (PF) 0.9% PF flush 3 mL     traMADol (ULTRAM) tablet 50 mg      traZODone (DESYREL) tablet 100 mg             Review of Systems:   ROS:  10 point ROS neg other than the symptoms noted above in the HPI.            Physical Exam:   All vitals have been reviewed  Patient Vitals for the past 24 hrs:   BP Temp Temp src Pulse Resp SpO2 Weight   05/11/22 2217 114/71 97.4  F (36.3  C) Oral 88 16 95 % --   05/11/22 2134 112/64 -- -- 86 -- -- --   05/11/22 2100 113/64 98.1  F (36.7  C) -- 83 -- 96 % --   05/11/22 2042 115/63 -- -- 88 -- -- --   05/11/22 1750 108/63 -- -- 78 -- -- --   05/11/22 1526 102/55 98.3  F (36.8  C) Oral 80 16 95 % --   05/11/22 1101 -- -- -- -- 16 -- --   05/11/22 1016 -- -- -- -- 16 -- --   05/11/22 0904 -- -- -- -- 16 96 % --   05/11/22 0745 118/68 97.6  F (36.4  C) Oral 87 16 100 % --   05/11/22 0614 -- -- -- -- -- -- 98.1 kg (216 lb 4.8 oz)   05/11/22 0440 -- -- -- -- 16 -- --   05/10/22 2358 119/57 97.5  F (36.4  C) Oral 73 16 95 % --   05/10/22 2259 121/71 98.1  F (36.7  C) Oral 82 16 91 % --       Intake/Output Summary (Last 24 hours) at 5/11/2022 2229  Last data filed at 5/11/2022 2100  Gross per 24 hour   Intake 1150 ml   Output 2650 ml   Net -1500 ml         Physical Exam   Temp: 97.4  F (36.3  C) Temp src: Oral BP: 114/71 Pulse: 88   Resp: 16 SpO2: 95 % O2 Device: None (Room air) Oxygen Delivery: 2 LPM  Vital Signs with Ranges  Temp:  [97.4  F (36.3  C)-98.3  F (36.8  C)] 97.4  F (36.3  C)  Pulse:  [73-88] 88  Resp:  [16] 16  BP: (102-121)/(55-71) 114/71  SpO2:  [91 %-100 %] 95 %  216 lbs 4.8 oz    Constitutional: Pleasant, alert, appropriate, following commands.  HEENT: Head atraumatic normocephalic. Pupils equal round and reactive to light.  Respiratory: Unlabored breathing, on O2.  Cardiovascular: Regular rate and rhythm per pulses  GI: Abdomen non-distended.  Lymph/Hematologic: No lymphadenopathy in areas examined  Genitourinary:  No will  Skin: No rashes, no cyanosis.  Does have lower extremity edema.   Musculoskeletal: Mild TTP along c-spine.   Decreased ROM in all plains of cervical ROM.  Increased left arm pain with active neck extension and lateral tilt to the left.  Positive spurling to left.  No right arm symptoms.  Positive impingement test on the left shoulder.  No effusion.  No erythema or ecchymosis.  Patient reports equal sensation to light touch bilaterally.  Pulses present.  Neurologic: normal without focal findings, mental status, speech normal, alert and oriented x iii  Neuropsychiatric: stable             Data:   All laboratory data reviewed  Results for orders placed or performed during the hospital encounter of 05/10/22   XR Chest Port 1 View     Status: None    Narrative    EXAM: XR CHEST PORT 1 VIEW  LOCATION: [facility]  DATE/TIME: 5/10/2022 5:35 PM    INDICATION: Weak.  COMPARISON: Chest x-ray on 03/20/2022.      Impression    IMPRESSION: Single AP view of the chest was obtained. Cardiomediastinal silhouette is within normal limits. Right basilar pulmonary opacities, minimally improved as compared to 03/20/2022 exam. No significant pleural effusion or pneumothorax.   US Abdomen Limited     Status: None    Narrative    US ABDOMEN LIMITED   5/11/2022 4:44 PM     HISTORY:  Abdominal distention, ascites check.    COMPARISON: CT abdomen pelvis on 12/23/2021    FINDINGS:  Limited abdominal ultrasound to check for free fluid in the  abdomen, demonstrate mild to moderate volume ascites.      Impression    IMPRESSION:  Mild to moderate volume ascites.    KAY HIDALGO MD         SYSTEM ID:  [id number]   Comprehensive metabolic panel     Status: Abnormal   Result Value Ref Range    Sodium 136 133 - 144 mmol/L    Potassium 4.6 3.4 - 5.3 mmol/L    Chloride 107 94 - 109 mmol/L    Carbon Dioxide (CO2) 23 20 - 32 mmol/L    Anion Gap 6 3 - 14 mmol/L    Urea Nitrogen 76 (H) 7 - 30 mg/dL    Creatinine 2.30 (H) 0.66 - 1.25 mg/dL    Calcium 8.4 (L) 8.5 - 10.1 mg/dL    Glucose 167 (H) 70 - 99 mg/dL    Alkaline Phosphatase 120  40 - 150 U/L    AST 20 0 - 45 U/L    ALT 25 0 - 70 U/L    Protein Total 6.4 (L) 6.8 - 8.8 g/dL    Albumin 2.6 (L) 3.4 - 5.0 g/dL    Bilirubin Total 0.9 0.2 - 1.3 mg/dL    GFR Estimate 28 (L) >60 mL/min/1.73m2   [place] Draw     Status: None    Narrative    The following orders were created for panel order [place] Draw.  Procedure                               Abnormality         Status                     ---------                               -----------         ------                     Extra Blue Top Tube[[id number]]                              Final result                 Please view results for these tests on the individual orders.   CBC with platelets and differential     Status: Abnormal   Result Value Ref Range    WBC Count 11.9 (H) 4.0 - 11.0 10e3/uL    RBC Count 2.50 (L) 4.40 - 5.90 10e6/uL    Hemoglobin 6.1 (LL) 13.3 - 17.7 g/dL    Hematocrit 21.3 (L) 40.0 - 53.0 %    MCV 85 78 - 100 fL    MCH 24.4 (L) 26.5 - 33.0 pg    MCHC 28.6 (L) 31.5 - 36.5 g/dL    RDW 22.2 (H) 10.0 - 15.0 %    Platelet Count 268 150 - 450 10e3/uL    % Neutrophils 75 %    % Lymphocytes 5 %    % Monocytes 13 %    % Eosinophils 6 %    % Basophils 0 %    % Immature Granulocytes 1 %    NRBCs per 100 WBC 0 <1 /100    Absolute Neutrophils 8.9 (H) 1.6 - 8.3 10e3/uL    Absolute Lymphocytes 0.6 (L) 0.8 - 5.3 10e3/uL    Absolute Monocytes 1.6 (H) 0.0 - 1.3 10e3/uL    Absolute Eosinophils 0.7 0.0 - 0.7 10e3/uL    Absolute Basophils 0.0 0.0 - 0.2 10e3/uL    Absolute Immature Granulocytes 0.1 <=0.4 10e3/uL    Absolute NRBCs 0.0 10e3/uL   Extra Blue Top Tube     Status: None   Result Value Ref Range    Hold Specimen [facility]    Asymptomatic COVID-19 Virus (Coronavirus) by PCR Nasopharyngeal     Status: Normal    Specimen: Nasopharyngeal; Swab   Result Value Ref Range    SARS CoV2 PCR Negative Negative    Narrative    Testing was performed using the Xpert Xpress SARS-CoV-2 Assay on the   Cepheid Gene-Xpert Instrument Systems. Additional information  about   this Emergency Use Authorization (EUA) assay can be found via the Lab   Guide. This test should be ordered for the detection of SARS-CoV-2 in   individuals who meet SARS-CoV-2 clinical and/or epidemiological   criteria. Test performance is unknown in asymptomatic patients. This   test is for in vitro diagnostic use under the FDA EUA for   laboratories certified under CLIA to perform high complexity testing.   This test has not been FDA cleared or approved. A negative result   does not rule out the presence of PCR inhibitors in the specimen or   target RNA in concentration below the limit of detection for the   assay. The possibility of a false negative should be considered if   the patient's recent exposure or clinical presentation suggests   COVID-19. This test was validated by the [facility]. This laboratory is certified under the Clinical Laboratory Improvement Amendments of 1988 (CLIA-88) as qualified to perform high complexity laboratory testing.     Potassium     Status: Normal   Result Value Ref Range    Potassium 4.4 3.4 - 5.3 mmol/L   Glucose by meter     Status: Abnormal   Result Value Ref Range    GLUCOSE BY METER POCT 244 (H) 70 - 99 mg/dL   Basic metabolic panel     Status: Abnormal   Result Value Ref Range    Sodium 139 133 - 144 mmol/L    Potassium 4.1 3.4 - 5.3 mmol/L    Chloride 109 94 - 109 mmol/L    Carbon Dioxide (CO2) 24 20 - 32 mmol/L    Anion Gap 6 3 - 14 mmol/L    Urea Nitrogen 73 (H) 7 - 30 mg/dL    Creatinine 2.33 (H) 0.66 - 1.25 mg/dL    Calcium 8.4 (L) 8.5 - 10.1 mg/dL    Glucose 215 (H) 70 - 99 mg/dL    GFR Estimate 27 (L) >60 mL/min/1.73m2   CBC with platelets     Status: Abnormal   Result Value Ref Range    WBC Count 11.1 (H) 4.0 - 11.0 10e3/uL    RBC Count 2.73 (L) 4.40 - 5.90 10e6/uL    Hemoglobin 6.9 (LL) 13.3 - 17.7 g/dL    Hematocrit 22.6 (L) 40.0 - 53.0 %    MCV 83 78 - 100 fL    MCH 25.3 (L) 26.5 - 33.0 pg    MCHC 30.5 (L) 31.5 - 36.5  g/dL    RDW 20.8 (H) 10.0 - 15.0 %    Platelet Count 220 150 - 450 10e3/uL   Glucose by meter     Status: Abnormal   Result Value Ref Range    GLUCOSE BY METER POCT 260 (H) 70 - 99 mg/dL   Glucose by meter     Status: Abnormal   Result Value Ref Range    GLUCOSE BY METER POCT 182 (H) 70 - 99 mg/dL   Glucose by meter     Status: Abnormal   Result Value Ref Range    GLUCOSE BY METER POCT 200 (H) 70 - 99 mg/dL   Iron and iron binding capacity     Status: Abnormal   Result Value Ref Range    Iron 22 (L) 35 - 180 ug/dL    Iron Binding Capacity 531 (H) 240 - 430 ug/dL    Iron Sat Index 4 (L) 15 - 46 %   Glucose by meter     Status: Abnormal   Result Value Ref Range    GLUCOSE BY METER POCT 230 (H) 70 - 99 mg/dL   Glucose by meter     Status: Abnormal   Result Value Ref Range    GLUCOSE BY METER POCT 298 (H) 70 - 99 mg/dL   Adult Type and Screen     Status: None   Result Value Ref Range    ABO/RH(D) A POS     Antibody Screen Negative Negative    SPECIMEN EXPIRATION DATE [id number]    Prepare red blood cells (unit)     Status: None (Preliminary result)   Result Value Ref Range    CROSSMATCH Compatible     UNIT ABO/RH A Pos     Unit Number [id number]     Unit Status Issued     Blood Component Type Red Blood Cells     Product Code [id number]     CODING SYSTEM [id number]     UNIT TYPE ISBT 6200     ISSUE DATE AND TIME [id number]    CBC with platelets + differential     Status: Abnormal    Narrative    The following orders were created for panel order CBC with platelets + differential.  Procedure                               Abnormality         Status                     ---------                               -----------         ------                     CBC with platelets and d...[[id number]]  Abnormal            Final result                 Please view results for these tests on the individual orders.   ABO/Rh type and screen *Canceled*     Status: None ()    Narrative    The following orders were created for panel  order ABO/Rh type and screen.  Procedure                               Abnormality         Status                     ---------                               -----------         ------                       Please view results for these tests on the individual orders.   ABO/Rh type and screen *Canceled*     Status: None ()    Narrative    The following orders were created for panel order ABO/Rh type and screen.  Procedure                               Abnormality         Status                     ---------                               -----------         ------                     Adult Type and Screen[[id number]]                                                         Please view results for these tests on the individual orders.   ABO/Rh type and screen     Status: None    Narrative    The following orders were created for panel order ABO/Rh type and screen.  Procedure                               Abnormality         Status                     ---------                               -----------         ------                     Adult Type and Screen[[id number]]                            Final result                 Please view results for these tests on the individual orders.          Attestation:  I have reviewed today's vital signs, notes, medications, labs and imaging with Dr. Scott.  Amount of time performed on this consult: 40 minutes.    Ashleigh Escamilla PA-C

## 2022-05-12 NOTE — PLAN OF CARE
DATE & TIME: 5/12/22 7426-4090  Cognitive Concerns/ Orientation : A&O x4  BEHAVIOR & AGGRESSION TOOL COLOR: Green, calm and cooperative.   ABNL VS/O2: VSS RA  MOBILITY: Assist x1 with gb/cane, fall risk. Up to chair this morning.   PAIN MANAGMENT: Left shoulder and neck pain, managed with repositioning, ice packs and PRN Tramadol given x 1  DIET: Cardiac  BOWEL/BLADDER: Incontinent of urine, external catheter in place. Strict I&Os. BM smear x1 this shift.  ABNL LAB/BG:   DRAIN/DEVICES: PIV SL.   TELEMETRY RHYTHM: NSR with PACs  SKIN: Scattered bruising. Blanchable redness to coccyx. Abrasions/scabs to bilateral shins, thighs, and knees. Dry skin on arms, scratching. Dressing applied to LUE due to bleeding from scratching. BLE edema 2+.  TESTS/PROCEDURES: None  D/C DAY/GOALS/PLACE: Discharge pending consults recommendations, stable hemoglobin and stable respiratory status.   OTHER IMPORTANT INFO: MARKS.  Abdomen distended, BS audible.

## 2022-05-12 NOTE — PLAN OF CARE
DATE & TIME: 5/12/22 AM shift  Cognitive Concerns/ Orientation : Pt A/Ox4. Pt got very sleepy and lethargic after first dose of oxycodone this morning; awake/alert more this afternoon.   BEHAVIOR & AGGRESSION TOOL COLOR: Green, calm and cooperative.   ABNL VS/O2: Hypotensive, 100s/60s. MD aware. On RA. Denies chest pain or SOB. MARKS noted.  MOBILITY: Assist x1 with cane, fall risk. Ambulated to the bathroom this morning, very unsteady. Will differ going to bathroom until pt done with blood transfusion and feels better. BSC x 1 used today. Up in the chair, encourage to weight shift and stand by the chair. Will ask for PT eval. OT ordered.   PAIN MANAGMENT: Complaints of left shoulder and neck pain, managed with oxycodone, good relief but pt got very sleepy. MD aware; will continue with 5 mg so pt can get sleep overnight.   DIET: Cardiac, good appetite. St I/O.   BOWEL/BLADDER: Incontinent of urine, external catheter in place. Strict I&Os. No BM this shift. +BS. Abdomen is distended.   ABNL LAB/BG: hg 6.5, MD notified, transfusion of one unit initiated this afternoon. /191. Cr 2.5. K 3.7, replaced. Also, pt was started on Potassium BID.   DRAIN/DEVICES: IV SL. Pt was started on IV lasix q 6 hr.   TELEMETRY RHYTHM: SR  SKIN: Scattered bruising. Blanchable redness to coccyx, barrier applied.  Abrasions/scabs to bilateral shins, thighs, and knees. Edema +2 to abdomen and ankles. Pt is itching all over, camphor-menthol lotion ordered, will try. There are new abrasion from itching on buttock and scrotum area.   TESTS/PROCEDURES: Cardiology and Hem/Oncology consulted.  D/C DAY/GOALS/PLACE: Discharge pending stable hemoglobin; and transitioned back to PO diuretics.

## 2022-05-12 NOTE — PROGRESS NOTES
[facility]    Cardiology Progress Note    Primary Cardiologist: Dr. Glo Rosenbaum and Shaniqua Ram PA-C    Date of Admission: 05/10/2022  Service Date: 05/12/2022    Summary:  Mr. 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     Interval History   No acute events overnight.  Vital signs stable. Response to IV Lasix suboptimal with net negative 840 mL output over the past 24 hours. Hemoglobin at 6.5 today.     Telemetry: Sinus rhythm with occasional PACs    Assessment & Plan   1. Acute on chronic HFpEF  - Etiology of current exacerbation likely secondary worsening anemia and being on a course of prednisone for management of shoulder pain.  - Weight upon admit at 216 lbs. Previous baseline weight around 198-200 lbs.  - PTA on a diuretic regimen of torsemide 80 mg once daily which was recently increased as an outpatient from 80 mg daily alternating with 40 mg daily.     2. Transfusion dependent chronic anemia related to stage IV chronic kidney disease and iron deficiency  - Hematology following.  - Receiving aranesp as outpatient.  - Hgb at 6.5 today.     3.  Stage IV chronic kidney disease  - Baseline creatinine at around 2.3-2.5 range recently.   - Stable here at 2.5 on 5/12.    4. Type 2 diabetes mellitus    5. Left shoulder pain    Plan:   1. Would likely benefit from transfusion of PRBCs to keep hemoglobin higher as able to help with diuresis and maintaining volume status.  2. Agree with increasing IV lasix from 80 mg q 6 hrs today. If continues to have sub-optimal response, can consider switching to IV lasix drip. Can plan to  transition back to torsemide at 80 mg once daily once nearing his typical baseline weight of closer to 198-200 lbs.  3. Continue with daily weights, strict I/Os, low sodium diet, and close monitoring of renal function and electrolytes.  4. May be a good candidate for CardioMEMS device implant in the future given frequent exacerbations of HFpEF.  5. Cardiology will continue to follow along. Anticipate he will need several more days of IV diuresis yet. Will arrange close follow-up in the CORE clinic with Shaniqua Ram PA-C in 1 to 2 weeks post discharge with a repeat BMP and NT pro BNP beforehand.    I spent 45 minutes face-to-face or coordinating care of Justyn Olivas. Over 50% of our time on the unit was spent counseling the patient and/or coordinating care.    Thank you for the opportunity to participate in this pleasant patient's care.     BARI Reis, CNP   Nurse Practitioner  [facility]  Pager: [phone]  Text Page  (8am - 5pm, M-F)    Patient Active Problem List   Diagnosis     Insomnia, unspecified type     Type 2 diabetes mellitus without complication, with long-term current use of insulin (H)     Benign essential hypertension     Hyperlipidemia LDL goal <100     Non-seasonal allergic rhinitis due to pollen     Primary osteoarthritis of both hips     Primary osteoarthritis involving multiple joints     Chronic non-seasonal allergic rhinitis, unspecified trigger     Cerebrovascular accident (CVA) due to embolism of cerebral artery (H)     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)     Anemia due to blood loss, acute     Iron deficiency anemia due to chronic blood loss     Type 2 diabetes mellitus with stage 3b chronic kidney disease, with long-term current use of insulin (H)     Anemia due to stage 3 chronic kidney disease, unspecified whether stage 3a or 3b CKD (H)     Chronic diastolic heart failure with preserved ejection fraction (H)     Biliary colic     Right upper  quadrant abdominal pain     Mild major depression (H)     Shortness of breath     Acute on chronic diastolic heart failure (H)     Anasarca     Anemia in other chronic diseases classified elsewhere     Mediastinal lymphadenopathy     Anemia, unspecified type     Chronic kidney disease, unspecified CKD stage     Pericardial effusion     Iron deficiency anemia, unspecified     CKD (chronic kidney disease) stage 4, GFR 15-29 ml/min (H)     Renal failure, unspecified chronicity     Congestive heart failure, unspecified HF chronicity, unspecified heart failure type (H)     Physical Exam   Temp: 97.7  F (36.5  C) Temp src: Oral BP: 102/62 Pulse: 91   Resp: 16 SpO2: 95 % O2 Device: None (Room air)    Vitals:    05/11/22 0614 05/12/22 0500   Weight: 98.1 kg (216 lb 4.8 oz) 97.9 kg (215 lb 14.4 oz)     Vital Signs with Ranges  Temp:  [97.4  F (36.3  C)-98.3  F (36.8  C)] 97.7  F (36.5  C)  Pulse:  [78-91] 91  Resp:  [16] 16  BP: (100-115)/(55-71) 102/62  SpO2:  [95 %-96 %] 95 %  I/O last 3 completed shifts:  In: 960 [P.O.:960]  Out: 1800 [Urine:1800]    Constitutional: Appears his stated age, well nourished, and in no acute distress.  Eyes: Pupils equal, round. Sclerae anicteric.   HEENT: Normocephalic, atraumatic.   Neck: Supple. JVP elevated.  Respiratory: Breathing non-labored. Lungs diminished in the bases bilaterally with fine crackles.  Cardiovascular: Regular rate and rhythm, normal S1 and S2. No murmur, rub, or gallop.  GI: Hard, taut and distended, non-tender.  Skin: Warm, dry.   Musculoskeletal/Extremities: Moves all extremities well and symmetrically. 2+ pitting LE edema bilaterally.  Neurologic: No gross focal deficits. Alert, cooperative.  Psychiatric: Affect appropriate. Responding to questions appropriately.    Medications       aspirin  81 mg Oral Daily     atorvastatin  20 mg Oral Daily     carvedilol  6.25 mg Oral BID w/meals     dorzolamide-timolol  1 drop Right Eye BID     furosemide  80 mg Intravenous  Q6H     insulin aspart  5 Units Subcutaneous TID w/meals     insulin aspart  1-7 Units Subcutaneous TID AC     insulin aspart  1-5 Units Subcutaneous At Bedtime     insulin glargine  25 Units Subcutaneous At Bedtime     pantoprazole  40 mg Oral QAM AC     potassium chloride  10 mEq Oral Once     sennosides  1-2 tablet Oral BID     sertraline  50 mg Oral Daily     sodium chloride (PF)  3 mL Intracatheter Q8H     traZODone  100 mg Oral At Bedtime     Data   Recent Labs   Lab 05/12/22  0935 05/12/22  0725 05/12/22  0200 05/11/22  0847 05/11/22  0832 05/10/22  2337 05/10/22  2319 05/10/22  1416   WBC  --   --   --   --  11.1*  --   --  11.9*   HGB 6.5*  --   --   --  6.9*  --   --  6.1*   MCV  --   --   --   --  83  --   --  85   PLT  --   --   --   --  220  --   --  268     --   --   --  139  --   --  136   POTASSIUM 3.7  --   --   --  4.1  --  4.4 4.6   CHLORIDE 105  --   --   --  109  --   --  107   CO2 23  --   --   --  24  --   --  23   BUN 72*  --   --   --  73*  --   --  76*   CR 2.50*  --   --   --  2.33*  --   --  2.30*   ANIONGAP 9  --   --   --  6  --   --  6   VANESA 7.5*  --   --   --  8.4*  --   --  8.4*   * 165* 243*   < > 215*   < >  --  167*   ALBUMIN  --   --   --   --   --   --   --  2.6*   PROTTOTAL  --   --   --   --   --   --   --  6.4*   BILITOTAL  --   --   --   --   --   --   --  0.9   ALKPHOS  --   --   --   --   --   --   --  120   ALT  --   --   --   --   --   --   --  25   AST  --   --   --   --   --   --   --  20    < > = values in this interval not displayed.     This note was completed in part using Dragon voice recognition software. Although reviewed after completion, some word and grammatical errors may occur.

## 2022-05-12 NOTE — PROGRESS NOTES
Chart check    Please see Dr. Nash note on 05/11  -No new recommendations    Transfusion dependent chronic anemia   secondary to chronic kidney disease and iron deficiency  Recent EGD/colonoscopy, capsule colonoscopy no evidence of active bleeding    For iron deficiency anemia   patient received Venofer    Continue Aranesp every 3 for hemoglobin less than 10 as outpatient  -We have held off on blood transfusion for hemoglobin 6.9 yesterday  Transfuse for hemoglobin below 6.5     Schedule with me after discharge    Oncology will sign off  Please call with questions    Bhargav Babb  [facility]   [facility] [place]    No

## 2022-05-12 NOTE — CONSULTS
[facility]       Received CORE Clinic Consult from [place].     Per record review, patient admitted from cardiology clinic w/fatigue, conversational dyspnea, weight gain and abdominal bloating. Most recent ECHO done 3/2022 shows EF 55%. As a result, patient does not qualify for CORE. Will have him continue to follow in general cardiology. I have messaged our schedulers to arrange for follow up after discharge.      Please call with questions.          Nydia Monroe RN   CORE Clinic RN Care Coordinator   [facility]   [phone]   CORE Clinic: Cardiomyopathy, Optimization, Rehabilitation, Education

## 2022-05-12 NOTE — PROGRESS NOTES
Date/Time: 2022 9331-2117  Summary: Anemia, renal failure  Precautions: fall risk  Cognitive Concerns/Orientation: A&Ox4  Behavior and Aggression Color: green  ABNL VS/O2: VSS on RA  Edema: +2 bilat legs, knees, and ankles  Mobility: Ax1 GB/W or cane  Pain Management: 10/10 pain in left shoulder/neck managed with prn tramadol given x1 and ice packs. Pain increases with activity   Diet: tolerating low saturated fat. Na <2400mg. No caffeine.   Bowel/Bladder: incontinent. On lasix. External cath with adequate output.  Abdomen is distended. Pt had abdominal US today for ascites. Strict I&O. Audible bowel sounds. 1 smear BM this shift.   ABNL Labs/BG: Hgb: 6.9. MD aware. Hem/oncology following. Iron infusion earlier this evening. B, 298  Drain/Devices: PIV SL. Pt on IV lasix q8hrs and iron infusion  Telemetry Rhythm: sinus dysrhythmia   Skin: very dry skin with scattered scratches/scabs. Scattered bruising. Abrasion to right forearm- covered with mepi. Scabs to bilat shins/thighs. Blanchable redness to sacrum/coccyx  Tests/Procedures: cardiology and hem/oncology consulted.   Discharge Date: pending stable Hgb, stable respiratory status, and consults completed.   Other Info:

## 2022-05-12 NOTE — PLAN OF CARE
DATE & TIME: 5/12/22 1500-1930  Cognitive Concerns/ Orientation : Sleepy but arousable at start of shift, more alert into the evening, oriented x4, forgetful.   BEHAVIOR & AGGRESSION TOOL COLOR: Green  ABNL VS/O2: BP soft (90s-100s/60s), otherwise VSS on RA.   MOBILITY: Assist x1 with cane/walker, GB to [facility], up in chair all of shift.    PAIN MANAGMENT: Complaints of left shoulder and neck pain, received oxycodone on previous shift, which provided good relief but pt got very sleepy. MD aware and decreased available dose of oxycodone; not given this shift, pt states he wants it at bedtime.   DIET: Cardiac diet, tolerating, good appetite.   BOWEL/BLADDER: Incontinent of urine, external catheter in place for strict I&Os. No BM today despite attempts on commode, senna given earlier.    ABNL LAB/BG: Hgb 6.5, completed transfusion of 1 unit PRBCs this evening, recheck Hgb in am.   -Creat 2.5.   -On high K replacement protocol, 3.7 today, received replacement and started on scheduled dose; recheck in am. .   DRAIN/DEVICES: PIV, saline locked.   TELEMETRY RHYTHM: NSR, now discontinued.  SKIN: Scattered bruising. Blanchable redness to coccyx, barrier applied.  Abrasions/scabs to bilateral shins, thighs, and knees; pt itching and opening scabs, cream ordered and applied for itching; open scabs covered with mepliex/dressings. +2 BLE edema.  TESTS/PROCEDURES: n/a  D/C DAY/GOALS/PLACE: Discharge pending stable hemoglobin; and transitioned back to PO diuretics.   OTHER IMPORTANT INFO: LS clear, MARKS, on IV lasix, strict I&O. Abd obese/round/distended; BS+. Hem/Onc, cardiology and ortho following. OT consulted.     Goal Outcome Evaluation: plan of care reviewed with patient and spouse

## 2022-05-13 NOTE — PLAN OF CARE
DATE & TIME: 5/12/22 1900- 5/13/22 4505  Cognitive Concerns/ Orientation : A&O x4, forgetful  BEHAVIOR & AGGRESSION TOOL COLOR: Green  ABNL VS/O2: VSS except BP soft (104/57) RA  MOBILITY: Assist x1 with cane/walker, GB to [facility], up in chair majority of shift, reminded to shift weight.  PAIN MANAGMENT: PRN Oxycodone 2.5 mg given x2 for L shoulder/ neck pain  DIET: Cardiac diet, tolerating, good appetite.   BOWEL/BLADDER: Incontinent of urine, external catheter in place for strict I&Os. No BM, Senna given x1  ABNL LAB/BG: None new, rechecks AM  DRAIN/DEVICES: PIV, saline locked.   TELEMETRY RHYTHM: NA  SKIN: Scattered bruising. Blanchable redness to coccyx, barrier applied.  Abrasions/scabs to bilateral shins, thighs, and knees; pt itching and opening scabs, cream applied for itching; open scabs covered with mepliex/dressings. +2 BLE edema.    TESTS/PROCEDURES: n/a  D/C DAY/GOALS/PLACE: Discharge pending stable hemoglobin and transitioned back to PO diuretics.   OTHER IMPORTANT INFO: LS clear, MARKS, on IV lasix, strict I&O. Abd obese/ distended; BS+. Hem/Onc, cardiology and ortho following. OT consulted.

## 2022-05-13 NOTE — PROGRESS NOTES
[facility]    Cardiology Progress Note    Primary Cardiologist: Dr. Glo Rosenbaum and Shaniqua Ram PA-C    Date of Admission: 05/10/2022  Service Date: 05/13/2022    Summary:  Mr. 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     Interval History   No acute events overnight.  Vital signs stable. Feels swelling and shortness of breath is gradually improving. Response to IV Lasix suboptimal and +660 mL I/O balance over the past 24 hours. Awaiting BMP today as labs got delayed because patient is a hard stick. Weight trending down significantly from 215 lbs to 201 lbs today so accuracy unclear. Continues to have abdominal bloating and swelling and 1+ LE edema bilaterally with JVP elevated.    Telemetry: Sinus rhythm with occasional PACs    Assessment & Plan   1. Acute on chronic HFpEF  - Etiology of current exacerbation likely secondary worsening anemia and being on a course of prednisone for management of shoulder pain.  - Weight upon admit at 216 lbs. Previous baseline weight around 198-200 lbs.  - PTA on a diuretic regimen of torsemide 80 mg once daily which was recently increased as an outpatient from 80 mg daily alternating with 40 mg daily.     2. Transfusion dependent chronic anemia related to stage IV chronic kidney disease and iron deficiency  - Hematology following.  - Receiving aranesp as outpatient.  - Hgb at 6.5 today.     3.  Stage IV chronic kidney disease  - Baseline creatinine at around 2.3-2.5 range recently.   - Stable here at 2.5 on 5/12.    4. Type 2 diabetes mellitus    5. Left shoulder pain    Plan:   1. Will start  IV lasix drip at 10 mg/Hr and can titrate as needed pending response.  2. Would likely benefit from transfusion of PRBCs to keep hemoglobin higher as able to help with diuresis and maintaining volume status.  3. Can reassess tomorrow morning and plan to switch to p.o. torsemide at higher dose of 80 mg once daily likely tomorrow morning once symptoms improved and weight closer to previous baseline around 198 lbs.  4. Continue with daily weights, strict I/Os, low sodium diet, and close monitoring of renal function and electrolytes. Supplement potassium per protocol.  5. May be a good candidate for CardioMEMS device implant in the future given frequent exacerbations of HFpEF.  6. Cardiology will continue to follow along. Anticipate possible discharge tomorrow or Sunday. Will arrange close follow-up in the CORE clinic with Shaniqua Ram PA-C in 1 to 2 weeks post discharge with a repeat BMP and NT pro BNP beforehand.    I spent 45 minutes face-to-face or coordinating care of Justyn Olivas. Over 50% of our time on the unit was spent counseling the patient and/or coordinating care.    Thank you for the opportunity to participate in this pleasant patient's care.     BARI Reis, CNP   Nurse Practitioner  [facility]  Pager: [phone]  Text Page  (8am - 5pm, M-F)    Patient Active Problem List   Diagnosis     Insomnia, unspecified type     Type 2 diabetes mellitus without complication, with long-term current use of insulin (H)     Benign essential hypertension     Hyperlipidemia LDL goal <100     Non-seasonal allergic rhinitis due to pollen     Primary osteoarthritis of both hips     Primary osteoarthritis involving multiple joints     Chronic non-seasonal allergic rhinitis, unspecified trigger     Cerebrovascular accident (CVA) due to embolism of cerebral artery (H)     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)     Anemia due to blood loss, acute     Iron deficiency anemia due to  chronic blood loss     Type 2 diabetes mellitus with stage 3b chronic kidney disease, with long-term current use of insulin (H)     Anemia due to stage 3 chronic kidney disease, unspecified whether stage 3a or 3b CKD (H)     Chronic diastolic heart failure with preserved ejection fraction (H)     Biliary colic     Right upper quadrant abdominal pain     Mild major depression (H)     Shortness of breath     Acute on chronic diastolic heart failure (H)     Anasarca     Anemia in other chronic diseases classified elsewhere     Mediastinal lymphadenopathy     Anemia, unspecified type     Chronic kidney disease, unspecified CKD stage     Pericardial effusion     Iron deficiency anemia, unspecified     CKD (chronic kidney disease) stage 4, GFR 15-29 ml/min (H)     Renal failure, unspecified chronicity     Congestive heart failure, unspecified HF chronicity, unspecified heart failure type (H)     Physical Exam   Temp: 97.9  F (36.6  C) Temp src: Oral BP: 108/61 Pulse: 80   Resp: 18 SpO2: 97 % O2 Device: None (Room air)    Vitals:    05/11/22 0614 05/12/22 0500 05/13/22 0625   Weight: 98.1 kg (216 lb 4.8 oz) 97.9 kg (215 lb 14.4 oz) 91.4 kg (201 lb 8 oz)     Vital Signs with Ranges  Temp:  [97.7  F (36.5  C)-98.2  F (36.8  C)] 97.9  F (36.6  C)  Pulse:  [77-92] 80  Resp:  [14-18] 18  BP: ()/(53-64) 108/61  SpO2:  [95 %-99 %] 97 %  I/O last 3 completed shifts:  In: 1560 [P.O.:1260]  Out: 900 [Urine:900]    Constitutional: Appears his stated age, well nourished, and in no acute distress.  Eyes: Pupils equal, round. Sclerae anicteric.   HEENT: Normocephalic, atraumatic.   Neck: Supple. JVP elevated.  Respiratory: Breathing non-labored. Lungs diminished in the bases bilaterally. No crackles or wheezes.  Cardiovascular: Regular rate and rhythm, normal S1 and S2. No murmur, rub, or gallop.  GI: Hard, taut and distended, non-tender.  Skin: Warm, dry.   Musculoskeletal/Extremities: Moves all extremities well and  symmetrically. 1+ pitting LE edema bilaterally.  Neurologic: No gross focal deficits. Alert, cooperative.  Psychiatric: Affect appropriate. Responding to questions appropriately.    Medications     furosemide (LASIX) infusion ADULT STANDARD         aspirin  81 mg Oral Daily     atorvastatin  20 mg Oral Daily     carvedilol  6.25 mg Oral BID w/meals     dorzolamide-timolol  1 drop Right Eye BID     insulin aspart  7 Units Subcutaneous TID w/meals     insulin aspart  1-7 Units Subcutaneous TID AC     insulin aspart  1-5 Units Subcutaneous At Bedtime     insulin glargine  25 Units Subcutaneous At Bedtime     pantoprazole  40 mg Oral QAM AC     potassium chloride ER  10 mEq Oral BID     sennosides  1-2 tablet Oral BID     sertraline  50 mg Oral Daily     sodium chloride (PF)  3 mL Intracatheter Q8H     traZODone  100 mg Oral At Bedtime     Data   Recent Labs   Lab 05/13/22  1154 05/13/22  0720 05/13/22  0142 05/12/22  2146 05/12/22  1248 05/12/22  0935 05/11/22  0847 05/11/22  0832 05/10/22  2337 05/10/22  2319 05/10/22  1416   WBC  --   --   --   --   --   --   --  11.1*  --   --  11.9*   HGB 7.9*  --   --   --   --  6.5*  --  6.9*  --   --  6.1*   MCV  --   --   --   --   --   --   --  83  --   --  85   PLT  --   --   --   --   --   --   --  220  --   --  268   NA  --   --   --   --   --  137  --  139  --   --  136   POTASSIUM  --   --   --   --   --  3.7  --  4.1  --  4.4 4.6   CHLORIDE  --   --   --   --   --  105  --  109  --   --  107   CO2  --   --   --   --   --  23  --  24  --   --  23   BUN  --   --   --   --   --  72*  --  73*  --   --  76*   CR  --   --   --   --   --  2.50*  --  2.33*  --   --  2.30*   ANIONGAP  --   --   --   --   --  9  --  6  --   --  6   VANESA  --   --   --   --   --  7.5*  --  8.4*  --   --  8.4*   GLC  --  160* 164* 220*   < > 211*   < > 215*   < >  --  167*   ALBUMIN  --   --   --   --   --   --   --   --   --   --  2.6*   PROTTOTAL  --   --   --   --   --   --   --   --   --   --   6.4*   BILITOTAL  --   --   --   --   --   --   --   --   --   --  0.9   ALKPHOS  --   --   --   --   --   --   --   --   --   --  120   ALT  --   --   --   --   --   --   --   --   --   --  25   AST  --   --   --   --   --   --   --   --   --   --  20    < > = values in this interval not displayed.     This note was completed in part using Dragon voice recognition software. Although reviewed after completion, some word and grammatical errors may occur.

## 2022-05-13 NOTE — CONSULTS
"[facility] Nurse Inpatient Wound Assessment   Reason for consultation: Evaluate and treat scabbing/reddness to extremities, scrotum, and coccyx     Assessment  Extremity wounds due to edema and patient scratching/picking  Status: initial assessment  Multiple small scabs over all extremities approximately 4 open areas.    Coccyx blanchable redness within intergluteal cleft in \"V\" formation, mirrored. No wound. Will treat with prevention. Mild scrotal generalized redness, patient with external catheter, continue shift perineal cares.   Treatment Plan   All extremities and back skin care: BID and PRN   1. Cleanse skin with Yumiko Cleanse and Protect Perineal Lotion and wipe clean with gauze or soft cloth.  2. Apply Sarna Lotion to all extremities and back.   3. Cover any open areas with foam cover dressing (optifoam with border or polymem 2x4) and change every 3 days. To assist in dressing adhesion apply cavilon no sting barrier film to periwound skin.  4. Time and date new dressings.    Coccyx skin care: Q3D and PRN  1. Clean wound with soap and water, pat dry  2. Wipe / \"clean\" the surrounding periwound tissue with skin prep (Cavilon No Sting Skin Prep #[id number]) and allow to dry. This will help protect periwound and help dressing adherence  3. Press a Mepilex Sacral to the area, making sure to conform nicely to skin curvatures.   4. Time and date dressing change  NOTE  -Reposition pt side to side sirisha when in bed, every 2 hours-get the pt way over on side to completely offload pressure. This will benefit skin and respiratory function   -Keep heels elevated and floating on pillows at all times. Try using at least 2 pillows under each calf.  -When up to the chair pt needs to fully offload every 2 hours and use a chair cushion if needed     Orders Written  Recommended provider order: None, at this time  [facility] Nurse follow-up plan:every other week  Nursing to notify the Provider(s) and re-consult the [facility] Nurse if wound(s) " deteriorates or new skin concern.    Patient History  According to provider note(s):  Justyn Olivas is a 81 year old male with a history of HFpEF, transfusion dependent chronic anemia, CKD stage IV, DM type II who recently was started on steroids for shoulder pain and was sent in to the ED on 5/10/2022 from cardiology clinic due to concerns for decompensated heart failure      Patient was placed on steroids on Sunday and since then has had worsening lower extremity edema, MARKS and fluid retention in the abdomen.  Since then he has had approximately 15 pound weight gain.  He was seen in cardiology clinic this AM and they sent him in to the ED for hospitalization for decompensated heart failure.  At presentation to hospital, requiring 2 L of O2 to maintain his oxygenation      Decompensated HFpEF  Acute hypoxic respiratory distress due to above     Objective Data  Containment of urine/stool: Incontinent pad in bed and Primofit external catheter    Active Diet Order  Orders Placed This Encounter      Combination Diet Low Saturated Fat Na <2400mg Diet, No Caffeine Diet      Output:   I/O last 3 completed shifts:  In: 1560 [P.O.:1260]  Out: 900 [Urine:900]    Risk Assessment:   Sensory Perception: 4-->no impairment  Moisture: 3-->occasionally moist  Activity: 3-->walks occasionally  Mobility: 4-->no limitation  Nutrition: 3-->adequate  Friction and Shear: 3-->no apparent problem  Adam Score: 20                          Labs:   Recent Labs   Lab 05/12/22  0935 05/11/22  0832 05/10/22  1416   ALBUMIN  --   --  2.6*   HGB 6.5* 6.9* 6.1*   WBC  --  11.1* 11.9*       Physical Exam  Areas of skin assessed: full head to toe inspection completed    Wound Location:  All extremities  Date of last photo 5/13/22    Left arm                                                                Right arm      Left leg                                                                  Right leg    Wound History: Patient with woody edema  up into flank area. Reports itching skin started approximately 1 month ago and has seen a dermatologist who has started him on Sarna Lotion    Wound Description. Patient has small scabbing scattered over all extremities from picking. Approximately 4 open areas on assessment. Largest measuring 0.6cm x 0.6cm x 0.1cm      Palpation of the wound bed: normal      Drainage: small     Description of drainage: serosanguinous     Tunneling N/A     Undermining N/A  Periwound skin: edematous      Color: pink      Temperature: normal   Odor: none  Pain: absent and denies , none    Interventions  Visual inspection and assessment completed   Wound Care Rationale Protect periwound skin, Improve absorptive capacity, Promote moist wound healing without tissue dehydration  and Provide protection   Wound Care: done per plan of care  Supplies: discussed with RN and discussed with patient  Current off-loading measures: Pillows under calves and Pillows  Current support surface: Standard  Atmos Air mattress  Education provided to: importance of repositioning, plan of care, Moisture management, Hygiene and Off-loading pressure  Discussed plan of care with Patient and Nurse    Tawnya Johnson RN CWOCN

## 2022-05-13 NOTE — PLAN OF CARE
DATE & TIME: 5/13/22 AM shift  Cognitive Concerns/ Orientation : Pt A/Ox4.  BEHAVIOR & AGGRESSION TOOL COLOR: Green, calm and cooperative.   ABNL VS/O2: Hypotensive, 100s/60s. MD aware. On RA. Denies chest pain or SOB. MARKS noted.  MOBILITY: Assist x1 with walker, fall risk. Prefers chair; encourage to stand up by the chair. PT/OT ordered. Pt is very unsteady. Up to BSC.   PAIN MANAGMENT: Complaints of left shoulder and neck pain, managed with oxycodone, 2.5 mg q6hr. Pt gets better relief with 5 mg but unfortunately gets very sleepy on it; prefers 2.5 mg during the day and 5 mg can be given during the night.   DIET: Cardiac, good appetite. St I/O.   BOWEL/BLADDER: Incontinent of urine, external catheter in place. Strict I&Os. No BM this shift, on scheduled senna. +BS. Abdomen is distended.   ABNL LAB/BG: Hg 7.9; INR 1.25. Cr 2.42  DRAIN/DEVICES: IV SL. Pt was started on IV lasix gtt, 10ml/hr.   TELEMETRY RHYTHM: n/a  SKIN: Scattered bruising. Blanchable redness to coccyx, barrier applied and mepilex in place. Abrasions/scabs to bilateral shins, thighs, and knees, scrotum, buttock. Edema +2 to abdomen and ankles. Pt is itching all over, camphor-menthol lotion applied. Skin care orders in place, WOC RN saw pt today.   TESTS/PROCEDURES:n/a  D/C DAY/GOALS/PLACE: Discharge pending stable hemoglobin; and transitioned back to PO diuretics. Weight daily. Standing preferred. Cardiology is following.

## 2022-05-13 NOTE — PROGRESS NOTES
[facility]    Hospitalist Progress Note    Date of Service (when I saw the patient): 05/13/2022  Admit date: 5/10/2022    Interval History  Events over last 24 hours as outlined below.   Did not sleep well last night due to shoulder pain. (see below)  Discusses recent loss of his brother to renal failure. We discussed that his kidney function is stable and we are not concerned about need for dialysis at this time. He was relieved.   No CP, N/V/D. States breathing is good today.     Assessment & Plan   Justyn Olivas is a 81 year old male with a history of HFpEF, transfusion dependent chronic anemia, CKD stage IV, DM type II who recently was started on steroids for shoulder pain and was sent in to the ED on 5/10/2022 from cardiology clinic due to concerns for decompensated heart failure     Patient was placed on steroids on Sunday and since then has had worsening lower extremity edema, MARKS and fluid retention in the abdomen.  Since then he has had approximately 15 pound weight gain.  He was seen in cardiology clinic this AM and they sent him in to the ED for hospitalization for decompensated heart failure.  At presentation to hospital, requiring 2 L of O2 to maintain his oxygenation      Decompensated HFpEF  Acute hypoxic respiratory distress due to above   * Echo 3/22/22: EF 55%.  RV normal in structure function and size.  No valve disease.  * U/S shows moderate ascites.   * Admit weight 216#, baseline wt ~ 200#       Patient received Bumex 1 mg IV in ED > lasix 80 mg IV BID x 5 dose    On 05/13/22, significant wt loss, but minimal UOP.     On 05/13/22, Cardiology started on lasix gtt with K replacement protocol.     Continue PTA Coreg, atorvastatin    Cardiology considering CardioMEMS     Cardiology will arrange follow-up in the [facility] with Shaniqua Ram PA-C in 1 to 2 weeks post discharge with a repeat BMP and NT pro BNP beforehand.    Strict I and O's, daily weights.     Vitals:     05/11/22 0614 05/12/22 0500 05/13/22 0625   Weight: 98.1 kg (216 lb 4.8 oz) 97.9 kg (215 lb 14.4 oz) 91.4 kg (201 lb 8 oz)     I/O last 3 completed shifts:  In: 1440 [P.O.:1140]  Out: 900 [Urine:900]  Recent Labs   Lab 05/13/22  1154 05/12/22  0935 05/11/22  0832 05/10/22  2319 05/10/22  1416   CO2 22 23 24  --  23   POTASSIUM 4.3 3.7 4.1 4.4 4.6   BUN 76* 72* 73*  --  76*   CR 2.42* 2.50* 2.33*  --  2.30*         Transfusion dependent chronic anemia, likely related to chronic disease   Iron deficiency - by iron panel on 5/11/22  Patient's hemoglobin has continued to drift down.  It was 6.1 the ED while being 6.7 on 5/5 and 6.1 on 5/3.   * Last transfusion PTA: 5/6.  Patient denies any recent bleeding.  He has had a thorough work up so far.  He has had colonoscopy/endoscopy/pill study with GI without clear cause.  He also follows with hem/onc and has gotten IV infusions in the past.  In the ED he was consented and 1 unit of PRBCs was ordered  * Received 1 unit on 5/10/22 with appropriate rise in hgb  * Received  1 unit on 05/12/22 for hgb of 6.5 and CHF.       Venofer 300 mg x 2 ordered. Defer to hematology further doses.     Transfuse for hgb < 7.     Recent Labs   Lab 05/13/22  1154 05/12/22  0935 05/11/22  0832 05/10/22  1416   HGB 7.9* 6.5* 6.9* 6.1*        CKD stage IV, stable  Cr 2.3 on admission and baseline appears to be 2.5-2.6    Avoid nephrotoxins    Daily BMPs while diuresing    Recent Labs   Lab 05/13/22  1154 05/12/22  0935 05/11/22  0832 05/10/22  1416   CR 2.42* 2.50* 2.33* 2.30*        Ascites, secondary to CHF  * U/S mild to moderate ascites.   * No history or evidence of liver disease.   Recent Labs   Lab 05/13/22  1154 05/10/22  1416   ALBUMIN 2.4* 2.6*   BILITOTAL 0.5 0.9   ALT 23 25   AST 17 20   ALKPHOS 127 120   INR 1.25*  --        Diuresis as above.     DM type II   Last HgbA1c was 8.2 on 2/16/22     Increased lantus to PTA dose of 25 units (from 20 units) on 05/11/22.     Added prandial  novolog on 5/11/22, increase to 7 units AC on 05/11/22.     Continue medium intensity correction doses for now.     Steroids stopped so anticipate blood sugars will trend down.    BS better controlled today. No lows.     Hemoglobin A1C   Date Value Ref Range Status   02/16/2022 8.2 (H) 0.0 - 5.6 % Final     Comment:     Normal <5.7%   Prediabetes 5.7-6.4%    Diabetes 6.5% or higher     Note: Adopted from ADA consensus guidelines.     Recent Labs   Lab 05/13/22  1154 05/13/22  0720 05/13/22  0142 05/12/22  2146 05/12/22  1814 05/12/22  1248   * 160* 164* 220* 201* 191*        Shoulder pain   Has had gradually worsening shoulder pain for some time.  He states its to the point he is not sleeping well..  He was evaluated by Dr. Bingham at [facility] urgent care on Sunday and had MRIs of the shoulder and spine.       Ortho consult on 5/11 appreciated. 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     Not candidate for c-spine injections.     Recommended continued pain meds PRN, WBAT of upper extremities, and OK to stop steroids.     No further ortho visits after initial consult .     On 5/13, 2.5 mg did not provide adequate relief of pain overnight. Very severe at night. Plan to try 5 mg daily.     Oxycodone 2.5-5 mg q 6 hours. Advised that he try to limit this to night time doses for best effect.      Plan to give 5 mg at HS tonight 05/13/22 as patient did not have adequate response to 2.5 mg dose    Started low dose neurontin 200 mg q HS.     Follow up with ortho spine as outpatient.       Diet: Orders Placed This Encounter      Combination Diet Low Saturated Fat Na <2400mg Diet, No Caffeine Diet     IVF: None.  [place] Catheter: Not present     DVT Prophylaxis: ASA, PCDs. Hold on chemoppx given low hgb and iron deficiency.   Code Status: Full Code     Disposition: Expected discharge Sunday.   Communication: Discussed with RN, daughter and patient on 05/13/22.     Shalonda Henao,  MD  Hospitalist Service  [facility]  Securely message with the [employer] Web Console (learn more here)  Text page via [employer] Paging/Directory          -Data reviewed today: I reviewed all new labs and imaging results over the last 24 hours. I personally reviewed no images or EKG's today.    Physical Exam   Temp: 97.9  F (36.6  C) Temp src: Oral BP: 106/65 Pulse: 82   Resp: 18 SpO2: 98 % O2 Device: None (Room air)    Vitals:    05/11/22 0614 05/12/22 0500 05/13/22 0625   Weight: 98.1 kg (216 lb 4.8 oz) 97.9 kg (215 lb 14.4 oz) 91.4 kg (201 lb 8 oz)     Vital Signs with Ranges  Temp:  [97.8  F (36.6  C)-98.2  F (36.8  C)] 97.9  F (36.6  C)  Pulse:  [77-82] 82  Resp:  [16-18] 18  BP: (103-113)/(57-65) 106/65  SpO2:  [95 %-98 %] 98 %  I/O last 3 completed shifts:  In: 1440 [P.O.:1140]  Out: 900 [Urine:900]    Today's Exam  Constitutional:  NAD,   Neuropsyche:  alert and oriented, answers questions appropriately.   Respiratory:  Breathing comfortably, decreased air exchange at bases, no wheezes, no crackles.   Cardiovascular:  Regular rate and rhythm, no edema.  GI:  soft, NT/ND, BS normal  Skin/Integumen:  No acute rash or sign of bleeding.     Medications   All medications reviewed on 05/12/22.     furosemide (LASIX) infusion ADULT STANDARD 10 mg/hr (05/13/22 1336)       aspirin  81 mg Oral Daily     atorvastatin  20 mg Oral Daily     carvedilol  6.25 mg Oral BID w/meals     dorzolamide-timolol  1 drop Right Eye BID     insulin aspart  7 Units Subcutaneous TID w/meals     insulin aspart  1-7 Units Subcutaneous TID AC     insulin aspart  1-5 Units Subcutaneous At Bedtime     insulin glargine  25 Units Subcutaneous At Bedtime     pantoprazole  40 mg Oral QAM AC     potassium chloride ER  10 mEq Oral BID     sennosides  1-2 tablet Oral BID     sertraline  50 mg Oral Daily     sodium chloride (PF)  3 mL Intracatheter Q8H     traZODone  100 mg Oral At Bedtime       Data   Recent Labs   Lab  05/13/22  1154 05/13/22  0720 05/13/22  0142 05/12/22  1248 05/12/22  0935 05/11/22  0847 05/11/22  0832 05/10/22  2319 05/10/22  1416   WBC  --   --   --   --   --   --  11.1*  --  11.9*   HGB 7.9*  --   --   --  6.5*  --  6.9*  --  6.1*   MCV  --   --   --   --   --   --  83  --  85   PLT  --   --   --   --   --   --  220  --  268   INR 1.25*  --   --   --   --   --   --   --   --      --   --   --  137  --  139  --  136   POTASSIUM 4.3  --   --   --  3.7  --  4.1   < > 4.6   CHLORIDE 107  --   --   --  105  --  109  --  107   CO2 22  --   --   --  23  --  24  --  23   BUN 76*  --   --   --  72*  --  73*  --  76*   CR 2.42*  --   --   --  2.50*  --  2.33*  --  2.30*   ANIONGAP 8  --   --   --  9  --  6  --  6   VANESA 8.1*  --   --   --  7.5*  --  8.4*  --  8.4*   * 160* 164*   < > 211*   < > 215*   < > 167*   ALBUMIN 2.4*  --   --   --   --   --   --   --  2.6*   PROTTOTAL 6.3*  --   --   --   --   --   --   --  6.4*   BILITOTAL 0.5  --   --   --   --   --   --   --  0.9   ALKPHOS 127  --   --   --   --   --   --   --  120   ALT 23  --   --   --   --   --   --   --  25   AST 17  --   --   --   --   --   --   --  20    < > = values in this interval not displayed.       No results found for this or any previous visit (from the past 24 hour(s)).    ]

## 2022-05-14 NOTE — PLAN OF CARE
DATE & TIME: 5/13/22 1900- 5/14/22 1103  Cognitive Concerns/ Orientation : Pt A/Ox4 forgetful  BEHAVIOR & AGGRESSION TOOL COLOR: Green, calm and cooperative but frustrated at times.   ABNL VS/O2: VSS RA   MOBILITY: Assist x1 with walker, fall risk. Prefers chair; encouraged to weight shift.  PAIN MANAGMENT: PRN Oxycodone 5 mg given at hs for L shoulder/neck pain, pt appeared to be sleeping after awhile.  Woke up in excruciating pain, PRN Oxycodone 5 mg given again with decrease in pain.   DIET: Cardiac, good appetite. St I/O.   BOWEL/BLADDER: Incontinent of urine, external catheter in place. Strict I&Os. No BM this shift, on scheduled senna. +BS. Abdomen is distended.   ABNL LAB/BG:   DRAIN/DEVICES: IV SL. Continues on IV Lasix gtt at 15 mL/hr  TELEMETRY RHYTHM: n/a  SKIN: Scattered bruising. Blanchable redness to coccyx, mepilex in place. Abrasions/scabs to bilateral shins, thighs, and knees, scrotum, buttock. Edema +2 to abdomen and ankles. Pt is itching all over, camphor-menthol lotion available. Skin care orders in place,.  TESTS/PROCEDURES: None  D/C DAY/GOALS/PLACE: Discharge pending stable hemoglobin; and transitioned back to PO diuretics. Weight daily. Standing preferred. Cardiology/ WOC  following. MARKS

## 2022-05-14 NOTE — PROGRESS NOTES
[facility]    Hospitalist Progress Note    Date of Service (when I saw the patient): 05/14/2022  Admit date: 5/10/2022    Interval History  Events over last 24 hours as outlined below.   Continue to have severe shoulder pain. Using oxycodone at night for the pain 10 mg last night.   Feels stomach is less distended but weight remains up, swollen legs.   No CP, N/V/D. States breathing is good today.     Assessment & Plan   Justyn Olivas is a 81 year old male with a history of HFpEF, transfusion dependent chronic anemia, CKD stage IV, DM type II who recently was started on steroids for shoulder pain and was sent in to the ED on 5/10/2022 from cardiology clinic due to concerns for decompensated heart failure     Patient was placed on steroids on Sunday and since then has had worsening lower extremity edema, MARKS and fluid retention in the abdomen.  Since then he has had approximately 15 pound weight gain.  He was seen in cardiology clinic this AM and they sent him in to the ED for hospitalization for decompensated heart failure.  At presentation to hospital, requiring 2 L of O2 to maintain his oxygenation      Decompensated HFpEF  Acute hypoxic respiratory distress due to above   * Echo 3/22/22: EF 55%.  RV normal in structure function and size.  No valve disease.  * U/S shows moderate ascites.   * Admit weight 216#, baseline wt ~ 200#       Patient received Bumex 1 mg IV in ED > lasix 80 mg IV BID x 5 dose    On 05/13/22, significant wt loss, but minimal UOP.     On 05/13/22, Cardiology started on lasix gtt with K replacement protocol.     On 05/14/22, cardiology switched to bumex 2 mg IV x 1, followed by bumex gtt at 0.5 mg/hr    On 05/14/22, Wt up, Improved UOP, but missing UOP measurements for overnight shift?    Glasgow catheter placed 05/14/22 for urinary retention and to follow strict UOP.    AM labs for 05/14/22, still not collected at 4:15 PM. I have communicated with RN throughout  the day, who has communicated with lab    Continue PTA Coreg, atorvastatin    Cardiology considering CardioMEMS     Cardiology will arrange follow-up in the [facility] with Shaniqua Ram PA-C in 1 to 2 weeks post discharge with a repeat BMP and NT pro BNP beforehand.    Strict I and O's, daily weights.     Ace wraps to legs.     Vitals:    05/11/22 0614 05/12/22 0500 05/13/22 0625 05/13/22 1653   Weight: 98.1 kg (216 lb 4.8 oz) 97.9 kg (215 lb 14.4 oz) 91.4 kg (201 lb 8 oz) 99.3 kg (219 lb)    05/14/22 1300   Weight: 99.3 kg (218 lb 14.4 oz)     I/O last 3 completed shifts:  In: 720 [P.O.:720]  Out: 1225 [Urine:1225]  Recent Labs   Lab 05/13/22  1655 05/13/22  1154 05/12/22  0935 05/11/22  0832 05/10/22  2319 05/10/22  1416   CO2 25 22 23 24  --  23   POTASSIUM 4.4 4.3 3.7 4.1 4.4 4.6   BUN 75* 76* 72* 73*  --  76*   CR 2.50* 2.42* 2.50* 2.33*  --  2.30*         Transfusion dependent chronic anemia, likely related to chronic disease   Iron deficiency - by iron panel on 5/11/22  Patient's hemoglobin has continued to drift down.  It was 6.1 the ED while being 6.7 on 5/5 and 6.1 on 5/3.   * Last transfusion PTA: 5/6.  Patient denies any recent bleeding.  He has had a thorough work up so far.  He has had colonoscopy/endoscopy/pill study with GI without clear cause.  He also follows with hem/onc and has gotten IV infusions in the past.  In the ED he was consented and 1 unit of PRBCs was ordered  * Received 1 unit on 5/10/22 with appropriate rise in hgb  * Received  1 unit on 05/12/22 for hgb of 6.5 and CHF.       Venofer 300 mg x 2 ordered. Defer to hematology further doses.     Transfuse for hgb < 7.     Recent Labs   Lab 05/13/22  1154 05/12/22  0935 05/11/22  0832 05/10/22  1416   HGB 7.9* 6.5* 6.9* 6.1*        CKD stage IV, stable  Cr 2.3 on admission and baseline appears to be 2.5-2.6    Avoid nephrotoxins    Daily BMPs while diuresing    Recent Labs   Lab 05/13/22  1655 05/13/22  1154 05/12/22  0935 05/11/22  0832  05/10/22  1416   CR 2.50* 2.42* 2.50* 2.33* 2.30*        Ascites, likely secondary to CHF  Mild elevation in INR (1.2)   * U/S mild to moderate ascites.   * No history or evidence of liver disease by liver panel or prior imaging. However, does have chronic mild elevation of INR.     Recent Labs   Lab 05/13/22  1154 05/10/22  1416   ALBUMIN 2.4* 2.6*   BILITOTAL 0.5 0.9   ALT 23 25   AST 17 20   ALKPHOS 127 120   INR 1.25*  --        Diuresis as above.     Vitamin K 5 mg daily x 3, INR on Monday 5/16    Follow up ultrasound on Monday, 5/16. If ascites present, paracentesis.     DM type II   Last HgbA1c was 8.2 on 2/16/22     Increased lantus to PTA dose of 25 units (from 20 units) on 05/11/22.     Added prandial novolog on 5/11/22, increase to 7 units AC on 05/11/22.     Continue medium intensity correction doses for now.     Steroids stopped so anticipate blood sugars will trend down.    BS better controlled today. No lows.     Hemoglobin A1C   Date Value Ref Range Status   02/16/2022 8.2 (H) 0.0 - 5.6 % Final     Comment:     Normal <5.7%   Prediabetes 5.7-6.4%    Diabetes 6.5% or higher     Note: Adopted from ADA consensus guidelines.     Recent Labs   Lab 05/14/22  1233 05/14/22  0759 05/14/22  0221 05/13/22  2138 05/13/22  1724 05/13/22  1655   * 138* 167* 215* 150* 159*        Shoulder pain   Has had gradually worsening shoulder pain for some time.  He states its to the point he is not sleeping well..  He was evaluated by Dr. Bingham at [facility] urgent care on Sunday and had MRIs of the shoulder and spine.       Ortho consult on 5/11 appreciated. 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     Not candidate for c-spine injections.     Recommended continued pain meds PRN, WBAT of upper extremities, and OK to stop steroids.     No follow up ortho visits after initial consult.      On 5/13, 2.5 mg did not provide adequate relief of pain overnight. Very severe at night. Plan to  try 5 mg daily.     Oxycodone 2.5-5 mg q 6 hours. Advised that he try to limit this to night time doses for best effect.      Started low dose neurontin 200 mg q HS on 5/13/22.     On 05/14/22, patient used 10 mg overnight and 2.5 during the day in the last 24 hours.     Follow up with ortho spine as outpatient.     Multiple scabs over extremities (~ 4)   Coccyx blanchable redness (no wound)     Appreciate WOC consult    Deconditioned state, requesting home care    Pt previously using outpatient PT, requests home care.     Prolonged hospital stay with decompensated CHF, high risk for deconditioning.     I placed a PT consult ~ 5/12, still pending. We need to assess him for qualification for home care, also he is becoming more deconditioned during his stay. Please get up and move 4x/day.     Urinary retention on 05/14/22    Will catheter placed on 05/14/22 for both retention and strict I and O measurement.     Start flomax in anticipation of discontinuing will and voiding trial.       Diet: Orders Placed This Encounter      Combination Diet Low Saturated Fat Na <2400mg Diet, No Caffeine Diet     IVF: None.  Will Catheter: Not present     DVT Prophylaxis: ASA, PCDs. Hold on chemoppx given low hgb and iron deficiency.   Code Status: Full Code     Disposition: Expected discharge Sunday.   Communication: Discussed with RN, daughter and patient on 05/13/22.     Shalonda Henao MD  Hospitalist Service  [facility]  Securely message with the Vocera Web Console (learn more here)  Text page via [employer] Paging/Directory      -Data reviewed today: I reviewed all new labs and imaging results over the last 24 hours. I personally reviewed no images or EKG's today.    Physical Exam   Temp: 98.3  F (36.8  C) Temp src: Oral BP: 117/77 Pulse: 84   Resp: 18 SpO2: 99 % O2 Device: None (Room air)    Vitals:    05/12/22 0500 05/13/22 0625 05/13/22 1653   Weight: 97.9 kg (215 lb 14.4 oz) 91.4 kg (201 lb 8 oz)  99.3 kg (219 lb)     Vital Signs with Ranges  Temp:  [98.3  F (36.8  C)] 98.3  F (36.8  C)  Pulse:  [82-85] 84  Resp:  [18] 18  BP: (106-117)/(61-77) 117/77  SpO2:  [97 %-99 %] 99 %  I/O last 3 completed shifts:  In: 720 [P.O.:720]  Out: 1225 [Urine:1225]    Today's Exam  Constitutional:  NAD,   Neuropsyche:  alert and oriented, answers questions appropriately.   Respiratory:  Breathing comfortably, decreased air exchange at bases, no wheezes, no crackles.   Cardiovascular:  Regular rate and rhythm, no edema.  GI:  soft, NT/ND, BS normal  Skin/Integumen:  No acute rash or sign of bleeding.     Medications   All medications reviewed on 05/14/22     bumetanide 0.5 mg/hr (05/14/22 1347)       aspirin  81 mg Oral Daily     atorvastatin  20 mg Oral Daily     carvedilol  6.25 mg Oral BID w/meals     dorzolamide-timolol  1 drop Right Eye BID     gabapentin  200 mg Oral At Bedtime     insulin aspart  7 Units Subcutaneous TID w/meals     insulin aspart  1-7 Units Subcutaneous TID AC     insulin aspart  1-5 Units Subcutaneous At Bedtime     insulin glargine  25 Units Subcutaneous At Bedtime     pantoprazole  40 mg Oral QAM AC     phytonadione  5 mg Oral Daily     potassium chloride ER  10 mEq Oral BID     sennosides  1-2 tablet Oral BID     sertraline  50 mg Oral Daily     sodium chloride (PF)  3 mL Intracatheter Q8H     tamsulosin  0.4 mg Oral Daily     traZODone  100 mg Oral At Bedtime       Data   Recent Labs   Lab 05/14/22  0221 05/13/22  2138 05/13/22  1724 05/13/22  1655 05/13/22  1158 05/13/22  1154 05/12/22  1248 05/12/22  0935 05/11/22  0847 05/11/22  0832 05/10/22  2319 05/10/22  1416   WBC  --   --   --   --   --   --   --   --   --  11.1*  --  11.9*   HGB  --   --   --   --   --  7.9*  --  6.5*  --  6.9*  --  6.1*   MCV  --   --   --   --   --   --   --   --   --  83  --  85   PLT  --   --   --   --   --   --   --   --   --  220  --  268   INR  --   --   --   --   --  1.25*  --   --   --   --   --   --    NA  --    --   --  138  --  137  --  137  --  139  --  136   POTASSIUM  --   --   --  4.4  --  4.3  --  3.7  --  4.1   < > 4.6   CHLORIDE  --   --   --  106  --  107  --  105  --  109  --  107   CO2  --   --   --  25  --  22  --  23  --  24  --  23   BUN  --   --   --  75*  --  76*  --  72*  --  73*  --  76*   CR  --   --   --  2.50*  --  2.42*  --  2.50*  --  2.33*  --  2.30*   ANIONGAP  --   --   --  7  --  8  --  9  --  6  --  6   VANESA  --   --   --  8.1*  --  8.1*  --  7.5*  --  8.4*  --  8.4*   * 215* 150* 159*   < > 158*   < > 211*   < > 215*   < > 167*   ALBUMIN  --   --   --   --   --  2.4*  --   --   --   --   --  2.6*   PROTTOTAL  --   --   --   --   --  6.3*  --   --   --   --   --  6.4*   BILITOTAL  --   --   --   --   --  0.5  --   --   --   --   --  0.9   ALKPHOS  --   --   --   --   --  127  --   --   --   --   --  120   ALT  --   --   --   --   --  23  --   --   --   --   --  25   AST  --   --   --   --   --  17  --   --   --   --   --  20    < > = values in this interval not displayed.       No results found for this or any previous visit (from the past 24 hour(s)).    ]

## 2022-05-14 NOTE — CONSULTS
[facility]    Cardiology Progress Note     Assessment & Plan   Justyn Olivas is a 81 year old male who was admitted on 5/10/2022.     1. Acute on chronic heart failure with preserved ejection fraction  2. CKD stage IV  3. Transfusion dependent chronic anemia related to CKD   Type 2 diabetes    Given his renal function the intravascular volume status is difficult to determine in this patient.  His clinical examination still is supportive of hypervolemia.  Favor transitioning to Bumex and if he does not have a response with the Bumex drip we will plan on adding on metolazone this evening.  If he has rising creatinine with diuresis (assuming he responds to the current doses) we may need to consult with nephrology.  Also introduced the possibility of requiring a right heart catheterization for further assessment of his intravascular volume.     Recommendations:    Diuretic recommendations: Switch to Bumex.  Bumex 2 mg IV x1 followed by 0.5 mg/h.  If inadequate response will add on metolazone this evening.    Monitor electrolytes with IV diuresis.    Continue aspirin, atorvastatin, and Coreg     Recommendations were discussed with the bedside nurse, patient and his son.    Mic Rajput MD    Interval History   Overall appears to be net even from urine output perspective.  Weight is the same at 219 pounds.  I had the patient weighed in the room while I examined him.    Physical Exam   Temp: (P) 97.5  F (36.4  C) Temp src: (P) Oral BP: 104/62 Pulse: (P) 77   Resp: (P) 18 SpO2: (P) 99 % O2 Device: (P) None (Room air)    Vitals:    05/12/22 0500 05/13/22 0625 05/13/22 1653   Weight: 97.9 kg (215 lb 14.4 oz) 91.4 kg (201 lb 8 oz) 99.3 kg (219 lb)     Vital Signs with Ranges  Temp:  [97.5  F (36.4  C)-98.3  F (36.8  C)] (P) 97.5  F (36.4  C)  Pulse:  [77-85] (P) 77  Resp:  [18] (P) 18  BP: (104-117)/(61-77) 104/62  SpO2:  [97 %-99 %] (P) 99 %  I/O last 3 completed shifts:  In: 720  [P.O.:720]  Out: 1225 [Urine:1225]  Patient Active Problem List   Diagnosis     Insomnia, unspecified type     Type 2 diabetes mellitus without complication, with long-term current use of insulin (H)     Benign essential hypertension     Hyperlipidemia LDL goal <100     Non-seasonal allergic rhinitis due to pollen     Primary osteoarthritis of both hips     Primary osteoarthritis involving multiple joints     Chronic non-seasonal allergic rhinitis, unspecified trigger     Cerebrovascular accident (CVA) due to embolism of cerebral artery (H)     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)     Anemia due to blood loss, acute     Iron deficiency anemia due to chronic blood loss     Type 2 diabetes mellitus with stage 3b chronic kidney disease, with long-term current use of insulin (H)     Anemia due to stage 3 chronic kidney disease, unspecified whether stage 3a or 3b CKD (H)     Chronic diastolic heart failure with preserved ejection fraction (H)     Biliary colic     Right upper quadrant abdominal pain     Mild major depression (H)     Shortness of breath     Acute on chronic diastolic heart failure (H)     Anasarca     Anemia in other chronic diseases classified elsewhere     Mediastinal lymphadenopathy     Anemia, unspecified type     Chronic kidney disease, unspecified CKD stage     Pericardial effusion     Iron deficiency anemia, unspecified     CKD (chronic kidney disease) stage 4, GFR 15-29 ml/min (H)     Renal failure, unspecified chronicity     Congestive heart failure, unspecified HF chronicity, unspecified heart failure type (H)     Somewhat sleepy, but easily arousable.  No acute distress  Seated in the chair  JVP is elevated to 12 to 14 cm  Nonlabored breathing, diminished breath sounds bilaterally  Regular rate and rhythm  Abdomen is obese  Lower extremities with 2+ bilateral pitting edema.    Medications     furosemide (LASIX) infusion ADULT STANDARD 15 mg/hr (05/14/22 0820)       aspirin  81 mg  Oral Daily     atorvastatin  20 mg Oral Daily     carvedilol  6.25 mg Oral BID w/meals     dorzolamide-timolol  1 drop Right Eye BID     gabapentin  200 mg Oral At Bedtime     insulin aspart  7 Units Subcutaneous TID w/meals     insulin aspart  1-7 Units Subcutaneous TID AC     insulin aspart  1-5 Units Subcutaneous At Bedtime     insulin glargine  25 Units Subcutaneous At Bedtime     pantoprazole  40 mg Oral QAM AC     phytonadione  5 mg Oral Daily     potassium chloride ER  10 mEq Oral BID     sennosides  1-2 tablet Oral BID     sertraline  50 mg Oral Daily     sodium chloride (PF)  3 mL Intracatheter Q8H     traZODone  100 mg Oral At Bedtime       Data   Recent Labs   Lab 05/14/22  1233 05/14/22  0759 05/14/22  0221 05/13/22  1724 05/13/22  1655 05/13/22  1158 05/13/22  1154 05/12/22  1248 05/12/22  0935 05/11/22  0847 05/11/22  0832 05/10/22  2319 05/10/22  1416   WBC  --   --   --   --   --   --   --   --   --   --  11.1*  --  11.9*   HGB  --   --   --   --   --   --  7.9*  --  6.5*  --  6.9*  --  6.1*   MCV  --   --   --   --   --   --   --   --   --   --  83  --  85   PLT  --   --   --   --   --   --   --   --   --   --  220  --  268   INR  --   --   --   --   --   --  1.25*  --   --   --   --   --   --    NA  --   --   --   --  138  --  137  --  137  --  139  --  136   POTASSIUM  --   --   --   --  4.4  --  4.3  --  3.7  --  4.1   < > 4.6   CHLORIDE  --   --   --   --  106  --  107  --  105  --  109  --  107   CO2  --   --   --   --  25  --  22  --  23  --  24  --  23   BUN  --   --   --   --  75*  --  76*  --  72*  --  73*  --  76*   CR  --   --   --   --  2.50*  --  2.42*  --  2.50*  --  2.33*  --  2.30*   ANIONGAP  --   --   --   --  7  --  8  --  9  --  6  --  6   VANESA  --   --   --   --  8.1*  --  8.1*  --  7.5*  --  8.4*  --  8.4*   * 138* 167*   < > 159*   < > 158*   < > 211*   < > 215*   < > 167*   ALBUMIN  --   --   --   --   --   --  2.4*  --   --   --   --   --  2.6*   PROTTOTAL  --   --    --   --   --   --  6.3*  --   --   --   --   --  6.4*   BILITOTAL  --   --   --   --   --   --  0.5  --   --   --   --   --  0.9   ALKPHOS  --   --   --   --   --   --  127  --   --   --   --   --  120   ALT  --   --   --   --   --   --  23  --   --   --   --   --  25   AST  --   --   --   --   --   --  17  --   --   --   --   --  20    < > = values in this interval not displayed.

## 2022-05-14 NOTE — PROGRESS NOTES
05/14/22 1400   Quick Adds   Type of Visit Initial Occupational Therapy Evaluation   Living Environment   People in Home spouse   Current Living Arrangements house   Home Accessibility stairs to enter home;stairs within home   Number of Stairs, Main Entrance 2   Stair Railings, Main Entrance none   Number of Stairs, Within Home, Primary greater than 10 stairs   Stair Railings, Within Home, Primary railing on left side (ascending)   Transportation Anticipated family or friend will provide   Living Environment Comments 14 stairs to bed/bath   Self-Care   Usual Activity Tolerance moderate   Current Activity Tolerance poor   Regular Exercise No   Equipment Currently Used at Home cane, straight;grab bar, tub/shower;tub bench  (HH shower)   Fall history within last six months yes   Number of times patient has fallen within last six months 2   Activity/Exercise/Self-Care Comment Per pt report: spouse provides min A with dressing, bathing and recently on stairs as well. Pt indep meds, spouse does driving and all HH tasks.   General Information   Onset of Illness/Injury or Date of Surgery 05/10/22   Referring Physician Eagle Robles DO   Patient/Family Therapy Goal Statement (OT) hopes to return home   Additional Occupational Profile Info/Pertinent History of Current Problem 81 year old male with a history of HFpEF, transfusion dependent chronic anemia, CKD stage IV, DM type II who recently was started on steroids for shoulder pain and was sent in to the ED on 5/10/2022 from cardiology clinic due to concerns for decompensated heart failure   Existing Precautions/Restrictions fall   Cognitive Status Examination   Orientation Status orientation to person, place and time   Affect/Mental Status (Cognitive) WFL   Follows Commands follows one-step commands;over 90% accuracy   Visual Perception   Visual Impairment/Limitations WFL;corrective lenses full-time   Pain Assessment   Patient Currently in Pain Yes, see Vital Sign  flowsheet   Range of Motion Comprehensive   Comment, General Range of Motion RUE WNL; L jaswinder flex approx 160o   Strength Comprehensive (MMT)   Comment, General Manual Muscle Testing (MMT) Assessment Unable to tolerate any resistance on L jaswinder d/t pain/injury - being seen by [facility] to address   Bed Mobility   Bed Mobility sit-supine   Sit-Supine [place] (Bed Mobility) minimum assist (75% patient effort)   Transfers   Transfers bed-chair transfer   Transfer Skill: Bed to Chair/Chair to Bed   Bed-Chair [place] (Transfers) minimum assist (75% patient effort)   Assistive Device (Bed-Chair Transfers) rolling walker   Transfer Comments Patient returning to bed so nursing could place will. Min assist with walker   Clinical Impression   Criteria for Skilled Therapeutic Interventions Met (OT) Yes, treatment indicated   OT Diagnosis below baseline with mobility and ADLS   OT Problem List-Impairments impacting ADL activity tolerance impaired;mobility;pain;strength   Assessment of Occupational Performance 3-5 Performance Deficits   Identified Performance Deficits bathing, dressing, toileting, home management, mobility   Planned Therapy Interventions (OT) ADL retraining;strengthening;transfer training;IADL retraining   Clinical Decision Making Complexity (OT) low complexity   Risk & Benefits of therapy have been explained evaluation/treatment results reviewed;care plan/treatment goals reviewed;risks/benefits reviewed;current/potential barriers reviewed;participants voiced agreement with care plan;participants included;patient   OT Discharge Planning   OT Discharge Recommendation (DC Rec) Transitional Care Facility   OT Rationale for DC Rec Patient below baseline for ADLS and mobility due to decreased activity tolerance and strength. Patient has not been able to tolerate mobility further than transfers to/from chair. Patient requiring significant assist with ADL tasks. Would benefit from further therapy at TCU to improve  independence.   Total Evaluation Time (Minutes)   Total Evaluation Time (Minutes) 10   OT Goals   Therapy Frequency (OT) 5 times/wk   OT Predicted Duration/Target Date for Goal Attainment 05/20/22   OT Goals Hygiene/Grooming;Lower Body Dressing;Toilet Transfer/Toileting   OT: Hygiene/Grooming supervision/stand-by assist;while standing   OT: Lower Body Dressing Minimal assist   OT: Toilet Transfer/Toileting Supervision/stand-by assist;cleaning and garment management;toilet transfer

## 2022-05-14 NOTE — PLAN OF CARE
Goal Outcome Evaluation:                      Cognitive Concerns/ Orientation : Pt A/Ox4 forgetful  BEHAVIOR & AGGRESSION TOOL COLOR: Green, calm and cooperative   ABNL VS/O2: VSS RA   MOBILITY: Assist x1 with walker, fall risk. Prefers chair; encouraged to weight shift.  PAIN MANAGMENT: pain free majority of shift except when in bed briefly, pain improved after back in chair.  DIET: Cardiac, good appetite. St I/O.   BOWEL/BLADDER: Pt c/o diffficulty voiding,bladder scanned for 550cc. Will placed. Strict I&Os. No BM this shift, on scheduled senna. +BS. Abdomen is distended.   ABNL LAB/BG: /161/  DRAIN/DEVICES: PIV,will  TELEMETRY RHYTHM: n/a  SKIN: Scattered bruising. Blanchable redness to coccyx, mepilex in place. Abrasions/scabs to bilateral shins, thighs, and knees, scrotum, buttock. Edema +2 to abdomen and legs. Pt is itching all over, camphor-menthol lotion available. Skin care orders in place,.  TESTS/PROCEDURES: None  D/C DAY/GOALS/PLACE: Discharge pending stable hemoglobin; and transitioned back to PO diuretics. Weight daily. Standing preferred. Cardiology/ WOC  following. MARKS  Changed diuretic to bumex,given bolus and then gtt started. Will update cardiology at 1800 with progress report.

## 2022-05-15 NOTE — PROGRESS NOTES
[facility]    Cardiology Progress Note     Assessment & Plan   Justyn Olivas is a 81 year old male who was admitted on 5/10/2022.     1. Acute on chronic heart failure with preserved ejection fraction  2. CKD stage IV  3. 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 Type 2 diabetes    Doing well with IV Bumex (transition from Lasix yesterday) Glasgow catheter placed with more accurate intake and output.  Weights are now coming down.  We will continue to challenge dry weight as long as renal function remains stable.     Recommendations:    Diuretic recommendations: Continue Bumex 0.5 mg/h.  If renal function is trending up discontinue Bumex drip we will plan on transitioning to oral diuretics in the next 24 to 48 hours.  Previous home dose of torsemide 80 mg daily    Monitor twice daily electrolytes and creatinine with IV Bumex drip.    Continue with lower extremity wrapping.    Transfusions per hospital medicine.    Mic Rajput MD    Interval History   Diuresing well with switch to Bumex.  Weight is down today.  Indicates that he feels better.    Physical Exam   Temp: 97.8  F (36.6  C) Temp src: Oral BP: 108/60 Pulse: 80   Resp: 18 SpO2: 97 % O2 Device: None (Room air)    Vitals:    05/15/22 0113 05/15/22 0119 05/15/22 0900   Weight: 99.4 kg (219 lb 3.2 oz) 99.4 kg (219 lb 3.2 oz) 96.4 kg (212 lb 8 oz)     Vital Signs with Ranges  Temp:  [97.8  F (36.6  C)-98  F (36.7  C)] 97.8  F (36.6  C)  Pulse:  [80-86] 80  Resp:  [18] 18  BP: (108-120)/(59-60) 108/60  SpO2:  [97 %] 97 %  I/O last 3 completed shifts:  In: 973.3 [P.O.:940; I.V.:33.3]  Out: 3450 [Urine:3450]  Patient Active Problem List   Diagnosis     Insomnia, unspecified type     Type 2 diabetes mellitus without complication, with long-term current use of insulin (H)     Benign essential hypertension     Hyperlipidemia LDL goal <100     Non-seasonal allergic rhinitis due to pollen     Primary osteoarthritis of both  hips     Primary osteoarthritis involving multiple joints     Chronic non-seasonal allergic rhinitis, unspecified trigger     Cerebrovascular accident (CVA) due to embolism of cerebral artery (H)     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)     Anemia due to blood loss, acute     Iron deficiency anemia due to chronic blood loss     Type 2 diabetes mellitus with stage 3b chronic kidney disease, with long-term current use of insulin (H)     Anemia due to stage 3 chronic kidney disease, unspecified whether stage 3a or 3b CKD (H)     Chronic diastolic heart failure with preserved ejection fraction (H)     Biliary colic     Right upper quadrant abdominal pain     Mild major depression (H)     Shortness of breath     Acute on chronic diastolic heart failure (H)     Anasarca     Anemia in other chronic diseases classified elsewhere     Mediastinal lymphadenopathy     Anemia, unspecified type     Chronic kidney disease, unspecified CKD stage     Pericardial effusion     Iron deficiency anemia, unspecified     CKD (chronic kidney disease) stage 4, GFR 15-29 ml/min (H)     Renal failure, unspecified chronicity     Congestive heart failure, unspecified HF chronicity, unspecified heart failure type (H)     Somewhat sleepy, but easily arousable.  No acute distress  Seated in the chair  JVP is elevated to 12 to 14 cm  Nonlabored breathing, diminished breath sounds bilaterally  Regular rate and rhythm  Abdomen is obese  Lower extremities with 2+ bilateral pitting edema.    Medications     bumetanide 0.5 mg/hr (05/15/22 0911)       aspirin  81 mg Oral Daily     atorvastatin  20 mg Oral Daily     carvedilol  6.25 mg Oral BID w/meals     dorzolamide-timolol  1 drop Right Eye BID     gabapentin  200 mg Oral At Bedtime     insulin aspart  7 Units Subcutaneous TID w/meals     insulin aspart  1-7 Units Subcutaneous TID AC     insulin aspart  1-5 Units Subcutaneous At Bedtime     insulin glargine  25 Units Subcutaneous At Bedtime      pantoprazole  40 mg Oral QAM AC     phytonadione  5 mg Oral Daily     potassium chloride ER  10 mEq Oral BID     sennosides  1-2 tablet Oral BID     sertraline  50 mg Oral Daily     sodium chloride (PF)  3 mL Intracatheter Q8H     tamsulosin  0.4 mg Oral Daily     traZODone  100 mg Oral At Bedtime       Data   Recent Labs   Lab 05/15/22  0807 05/15/22  0806 05/15/22  0144 05/14/22  2206 05/14/22  1920 05/13/22  1724 05/13/22  1655 05/13/22  1158 05/13/22  1154 05/11/22  0847 05/11/22  0832 05/10/22  2319 05/10/22  1416   WBC  --   --   --   --   --   --   --   --   --   --  11.1*  --  11.9*   HGB  --  7.8*  --   --  7.3*  --   --   --  7.9*   < > 6.9*  --  6.1*   MCV  --   --   --   --   --   --   --   --   --   --  83  --  85   PLT  --   --   --   --   --   --   --   --   --   --  220  --  268   INR  --   --   --   --   --   --   --   --  1.25*  --   --   --   --    NA  --  139  --   --  137  --  138  --  137   < > 139  --  136   POTASSIUM  --  3.8  --   --  3.8  --  4.4  --  4.3   < > 4.1   < > 4.6   CHLORIDE  --  106  --   --  105  --  106  --  107   < > 109  --  107   CO2  --  26  --   --  25  --  25  --  22   < > 24  --  23   BUN  --  76*  --   --  75*  --  75*  --  76*   < > 73*  --  76*   CR  --  2.39*  --   --  2.45*  --  2.50*  --  2.42*   < > 2.33*  --  2.30*   ANIONGAP  --  7  --   --  7  --  7  --  8   < > 6  --  6   VANESA  --  7.8*  --   --  7.4*  --  8.1*  --  8.1*   < > 8.4*  --  8.4*   GLC 91 83 192*   < > 265*   < > 159*   < > 158*   < > 215*   < > 167*   ALBUMIN  --  2.3*  --   --   --   --   --   --  2.4*  --   --   --  2.6*   PROTTOTAL  --   --   --   --   --   --   --   --  6.3*  --   --   --  6.4*   BILITOTAL  --   --   --   --   --   --   --   --  0.5  --   --   --  0.9   ALKPHOS  --   --   --   --   --   --   --   --  127  --   --   --  120   ALT  --   --   --   --   --   --   --   --  23  --   --   --  25   AST  --   --   --   --   --   --   --   --  17  --   --   --  20    < > = values in  this interval not displayed.

## 2022-05-15 NOTE — PLAN OF CARE
Goal Outcome Evaluation:      DATE & TIME: 5/14  [id number]  Cognitive Concerns/ Orientation : Pt A/Ox4 forgetful  BEHAVIOR & AGGRESSION TOOL COLOR: Green, calm and cooperative   ABNL VS/O2: VSS RA   MOBILITY: Assist x1 with walker, fall risk. Prefers chair; encouraged to weight shift.  PAIN MANAGMENT: pain free majority of shift except when in bed briefly, pain improved after back in chair.pain in neck and L shoulder  DIET: Cardiac, good appetite. St I/O.   BOWEL/BLADDER: Pt c/o diffficulty voiding,bladder scanned for 550cc. Will placed. Strict I&Os. No BM this shift, on scheduled senna. +BS. Abdomen is distended.   ABNL LAB/BG: /192; K+ 3.8 replaced with recheck at 0600  DRAIN/DEVICES: PIV,will  TELEMETRY RHYTHM: NSR  SKIN: Scattered bruising. Blanchable redness to coccyx, mepilex in place. Abrasions/scabs to bilateral shins, thighs, and knees, scrotum, buttock. Edema +2 to abdomen and legs. Pt is itching all over, camphor-menthol lotion available. Skin care orders in place,.  TESTS/PROCEDURES: None  D/C DAY/GOALS/PLACE: Discharge pending stable hemoglobin; and transitioned back to PO diuretics. Weight daily. Standing preferred. Cardiology/ WOC  following. MARKS  Changed diuretic to bumex,given bolus and then gtt started.

## 2022-05-15 NOTE — PROGRESS NOTES
"   05/15/22 1300   Quick Adds   Type of Visit Initial PT Evaluation       Present no   Living Environment   People in Home spouse   Current Living Arrangements house   Home Accessibility stairs to enter home;stairs within home   Number of Stairs, Main Entrance 2   Stair Railings, Main Entrance none   Number of Stairs, Within Home, Primary greater than 10 stairs   Stair Railings, Within Home, Primary railing on left side (ascending)   Transportation Anticipated family or friend will provide   Living Environment Comments Pt lives in a house with his spouse. Pt reports needing to negotiate stairs to enter and within the home. Pt reports a family member will pick him up upon discharge and provide 24/7 assist as needed.   Self-Care   Usual Activity Tolerance moderate   Current Activity Tolerance fair   Regular Exercise No   Equipment Currently Used at Home cane, straight;grab bar, tub/shower;tub bench   Fall history within last six months yes   Number of times patient has fallen within last six months 2   Activity/Exercise/Self-Care Comment Pt reports needing assist with all ADLs including cooking, cleaning, dressing and bathing. Pt reports ambulating at baseline with a SEC but does have a FWW at home. Pt reports stairs are difficult for him. Pt reports being able to ambulate ~150' w/ SEC before needing a rest break. Pt reports his spouse drives and performs errand management.   General Information   Onset of Illness/Injury or Date of Surgery 05/15/22   Referring Physician Shalonda Henao MD   Patient/Family Therapy Goals Statement (PT) \"To get better\"   Pertinent History of Current Problem (include personal factors and/or comorbidities that impact the POC) Per Chart: Justyn Olivas is a 81 year old male with a history of HFpEF, transfusion dependent chronic anemia, CKD stage IV, DM type II who recently was started on steroids for shoulder pain and was sent in to the ED on 5/10/2022 from " cardiology clinic due to concerns for decompensated heart failure   Existing Precautions/Restrictions fall   Weight-Bearing Status - LLE full weight-bearing   Weight-Bearing Status - RLE full weight-bearing   Cognition   Orientation Status (Cognition) oriented x 3   Pain Assessment   Patient Currently in Pain No   Integumentary/Edema   Integumentary/Edema no deficits were identifed   Posture    Posture Forward head position;Protracted shoulders   Range of Motion (ROM)   Range of Motion ROM is WFL   Strength (Manual Muscle Testing)   Strength (Manual Muscle Testing) Deficits observed during functional mobility   Strength Comments Pt requiring A x 1 with all functional mobility   Bed Mobility   Comment, (Bed Mobility) Not assessed   Transfers   Comment, (Transfers) Sit>stand w/ FWW and min A x 1   Gait/Stairs (Locomotion)   Orient Level (Gait) contact guard   Assistive Device (Gait) walker, front-wheeled   Distance in Feet (Required for LE Total Joints) 75'  (10' eval)   Comment, (Gait/Stairs) Pt ambulated ~10' w/ FWW and CGA for eval. Pt was unsteady, having 1 LOB needing assist from PT to regain balance. Pt complaining of neck pain throughout ambulation.   Balance   Balance Comments Pt unsteady with ambulation, having 1 LOB needing assist from PT to regain balance.   Sensory Examination   Sensory Perception patient reports no sensory changes   Clinical Impression   Criteria for Skilled Therapeutic Intervention Yes, treatment indicated   PT Diagnosis (PT) Impaired gait   Influenced by the following impairments Decreased activity tolerance; decreased balance; decreased strength; Increased pain   Functional limitations due to impairments Impaired functional mobility   Clinical Presentation (PT Evaluation Complexity) Stable/Uncomplicated   Clinical Presentation Rationale Clinical Judgement   Clinical Decision Making (Complexity) low complexity   Planned Therapy Interventions (PT) balance training;bed mobility  training;gait training;patient/family education;stair training;strengthening;transfer training   Risk & Benefits of therapy have been explained evaluation/treatment results reviewed;care plan/treatment goals reviewed;risks/benefits reviewed;current/potential barriers reviewed;participants voiced agreement with care plan;participants included;patient   PT Discharge Planning   PT Discharge Recommendation (DC Rec) Transitional Care Facility;home with home care physical therapy;home with assist   PT Rationale for DC Rec Pt is below baseline. Pt requiring assist with all functional mobility. Pt presents with deficits in activity tolerance, balance, and strength. Due to these deficits, pt is a high falls risk and unsafe to return home at this time as pt has 14 stairs to access bedroom. Pt would benefit from continued skilled PT services via TCU to address deficits and improve IND with safety and functional mobility. If pt were to return home, pt would require 24/7 assist with all functional mobility and ADLs, along with HHPT.   PT Brief overview of current status Sit>stand w/ FWW and min A x 1; sit>stand w/ FWW and CGA   Total Evaluation Time   Total Evaluation Time (Minutes) 10   Physical Therapy Goals   PT Frequency 5x/week   PT Predicted Duration/Target Date for Goal Attainment 05/22/22   PT Goals Bed Mobility;Transfers;Gait;Stairs   PT: Bed Mobility Supervision/stand-by assist;Supine to/from sit   PT: Transfers Supervision/stand-by assist;Sit to/from stand;Assistive device   PT: Gait Supervision/stand-by assist;Assistive device;150 feet   PT: Stairs Minimal assist;Greater than 10 stairs;Rail on left

## 2022-05-15 NOTE — PROGRESS NOTES
"[facility]    Hospitalist Progress Note    Date of Service (when I saw the patient): 05/15/2022  Admit date: 5/10/2022    Interval History  Events over last 24 hours as outlined below.   Per nursing notes, patient was mostly pain free and did not use any oxycodone over the last 24 hours. Patient did have a better night but states he misunderstood and though oxycodone had been discontinued.   Great UOP overnight (>3 L) weights stable, and bowel remains distended.   He does state he feels \"better.\"   No CP, no SOB, N/V/D.    Assessment & Plan   Justyn Olivas is a 81 year old male with a history of HFpEF, transfusion dependent chronic anemia, CKD stage IV, DM type II who recently was started on steroids for shoulder pain and was sent in to the ED on 5/10/2022 from cardiology clinic due to concerns for decompensated heart failure     Patient was placed on steroids on Sunday and since then has had worsening lower extremity edema, MARKS and fluid retention in the abdomen.  Since then he has had approximately 15 pound weight gain.  He was seen in cardiology clinic this AM and they sent him in to the ED for hospitalization for decompensated heart failure.  At presentation to hospital, requiring 2 L of O2 to maintain his oxygenation      Decompensated HFpEF  Acute hypoxic respiratory distress due to above   * Echo 3/22/22: EF 55%.  RV normal in structure function and size.  No valve disease.  * U/S shows moderate ascites.   * Admit weight 216#, baseline wt ~ 200#       Patient received Bumex 1 mg IV in ED > lasix 80 mg IV BID x 5 dose    On 05/13/22, cardiology started on lasix gtt with K replacement protocol.     On 05/14/22, cardiology switched to bumex 2 mg IV x 1, followed by bumex gtt at 0.5 mg/hr.    On K 10 meq BID while diuresing.     On 05/15/22, excellent diuresis (> 3L out) over last 24 hours, wt stable. Cr and K stable. Wt stable.     Continue PTA Coreg, atorvastatin    Cardiology " considering RHC prior to discharge and CardioMEMS     Cardiology will arrange follow-up in the [facility] clinic with Shaniqua Ram PA-C in 1 to 2 weeks post discharge with a repeat BMP and NT pro BNP beforehand.    Strict I and O's, daily weights.     Ace wraps to legs.     Ultrasound for potential paracentesis ordered for Monday.     Vitals:    05/13/22 0625 05/13/22 1653 05/14/22 1300 05/15/22 0113   Weight: 91.4 kg (201 lb 8 oz) 99.3 kg (219 lb) 99.3 kg (218 lb 14.4 oz) 99.4 kg (219 lb 3.2 oz)    05/15/22 0119   Weight: 99.4 kg (219 lb 3.2 oz)     I/O last 3 completed shifts:  In: 973.3 [P.O.:940; I.V.:33.3]  Out: 3450 [Urine:3450]  Recent Labs   Lab 05/14/22  1920 05/13/22  1655 05/13/22  1154 05/12/22  0935 05/11/22  0832 05/10/22  2319 05/10/22  1416   CO2 25 25 22 23 24  --  23   POTASSIUM 3.8 4.4 4.3 3.7 4.1 4.4 4.6   BUN 75* 75* 76* 72* 73*  --  76*   CR 2.45* 2.50* 2.42* 2.50* 2.33*  --  2.30*         Transfusion dependent chronic anemia, likely related to chronic disease   Iron deficiency - by iron panel on 5/11/22  Patient's hemoglobin has continued to drift down.  It was 6.1 the ED while being 6.7 on 5/5 and 6.1 on 5/3.   * Last transfusion PTA: 5/6.  Patient denies any recent bleeding.  He has had a thorough work up so far.  He has had colonoscopy/endoscopy/pill study with GI without clear cause.  He also follows with hem/onc and has gotten IV infusions in the past.  In the ED he was consented and 1 unit of PRBCs was ordered  * Received 1 unit on 5/10/22 with appropriate rise in hgb  * Received  1 unit on 05/12/22 for hgb of 6.5 and CHF.       Venofer 300 mg x 2 ordered. Defer to hematology further doses.     Transfuse for hgb < 7. We currently have a blood product shortage.      Recent Labs   Lab 05/15/22  0806 05/14/22  1920 05/13/22  1154 05/12/22  0935 05/11/22  0832 05/10/22  1416   HGB 7.8* 7.3* 7.9* 6.5* 6.9* 6.1*        CKD stage IV, stable  Cr 2.3 on admission and baseline appears to be  2.5-2.6    Avoid nephrotoxins    Daily BMPs while diuresing    Recent Labs   Lab 05/15/22  0806 05/14/22  1920 05/13/22  1655 05/13/22  1154 05/12/22  0935 05/11/22  0832   CR 2.39* 2.45* 2.50* 2.42* 2.50* 2.33*        Ascites, likely secondary to CHF  Mild elevation in INR (1.2)   * U/S mild to moderate ascites.   * No history or evidence of liver disease by liver panel or prior imaging. However, does have chronic mild elevation of INR.     Recent Labs   Lab 05/15/22  0806 05/13/22  1154 05/10/22  1416   ALBUMIN 2.3* 2.4* 2.6*   BILITOTAL  --  0.5 0.9   ALT  --  23 25   AST  --  17 20   ALKPHOS  --  127 120   INR  --  1.25*  --        Diuresis as above.     Vitamin K 5 mg daily x 3, INR on Monday 5/16    F/U ultrasound for potential paracentesis ordered for Monday with albumin, protein and cell count ordered. Anticipate SAAG > 1.1, and protein > 2.5 g/dL in ascites from heart failure.      DM type II   Last HgbA1c was 8.2 on 2/16/22     Increased lantus to PTA dose of 25 units (from 20 units) on 05/11/22.     Added prandial novolog on 5/11/22, increase to 7 units AC on 05/11/22.     Note elevated BS evening of 5/14/22 > adjust insulin if BS remain elevated.     Continue medium intensity correction doses for now.     Steroids stopped so anticipate blood sugars will trend down.    BS better controlled today. No lows.     Hemoglobin A1C   Date Value Ref Range Status   02/16/2022 8.2 (H) 0.0 - 5.6 % Final     Comment:     Normal <5.7%   Prediabetes 5.7-6.4%    Diabetes 6.5% or higher     Note: Adopted from ADA consensus guidelines.     Recent Labs   Lab 05/15/22  0144 05/14/22  2206 05/14/22  1920 05/14/22  1748 05/14/22  1233 05/14/22  0759   * 243* 265* 198* 161* 138*        Shoulder pain   Has had gradually worsening shoulder pain for some time.  He states its to the point he is not sleeping well..  He was evaluated by Dr. Bingham at [facility] urgent care on Sunday and had MRIs of the shoulder and spine.       Ortho  consult on 5/11 appreciated. 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     Not candidate for c-spine injections.     Recommended continued pain meds PRN, WBAT of upper extremities, and OK to stop steroids.     No follow up ortho visits after initial consult.  I would re-consult ortho on Monday to follow up with outpatient follow up plan.    Oxycodone 2.5-5 mg q 6 hours. Advised that he try to limit this to night time doses for best effect.      Started low dose neurontin 200 mg q HS on 5/13/22.     On 05/15/22, patient mostly pain free and did not need any oxycodone 5/14/22!     Multiple scabs over extremities (~ 4)   Coccyx blanchable redness (no wound)     Appreciate WOC consult    Deconditioned state, requesting home care    Pt previously using outpatient PT, requests home care.     Prolonged hospital stay with decompensated CHF, high risk for deconditioning.     Appreciate PT consult, currently recommending TCU as of 5/14/22.     Urinary retention on 05/14/22    Will catheter placed on 05/14/22 for both retention and strict I and O measurement.     Started flomax on 5/14/22 in anticipation of discontinuing will and voiding trial. (history of difficulty with urination PTA)      Constipation     On 05/15/22, states he has not had a BM for a week.     Scheduled senna and PRN dulcolax ordered    Narcotics definitely contribute.    Start miralax daily on 05/15/22, increase to BID tomorrow if not effect.        Diet: Orders Placed This Encounter      Combination Diet Low Saturated Fat Na <2400mg Diet, No Caffeine Diet     IVF: None.  Will Catheter: PRESENT, indication: Strict 1-2 Hour I&O     DVT Prophylaxis: ASA, PCDs. Hold on chemoppx given low hgb and iron deficiency.   Code Status: Full Code     Disposition: Expected discharge earliest Tuesday 5/17 to TCU vs home.   Communication: Discussed with RN, daughter and patient on 05/15/22.     Shalonda Henao MD  Hospitalist  [facility]  Securely message with the [employer] Web Console (learn more here)  Text page via [employer] Paging/Directory      -Data reviewed today: I reviewed all new labs and imaging results over the last 24 hours. I personally reviewed no images or EKG's today.    Physical Exam   Temp: 98  F (36.7  C) Temp src: Oral BP: 116/59 Pulse: 86   Resp: 18 SpO2: 97 % O2 Device: None (Room air)    Vitals:    05/14/22 1300 05/15/22 0113 05/15/22 0119   Weight: 99.3 kg (218 lb 14.4 oz) 99.4 kg (219 lb 3.2 oz) 99.4 kg (219 lb 3.2 oz)     Vital Signs with Ranges  Temp:  [97.5  F (36.4  C)-98  F (36.7  C)] 98  F (36.7  C)  Pulse:  [77-86] 86  Resp:  [18] 18  BP: (102-120)/(58-62) 116/59  SpO2:  [97 %-99 %] 97 %  I/O last 3 completed shifts:  In: 973.3 [P.O.:940; I.V.:33.3]  Out: 3450 [Urine:3450]    Today's Exam  Constitutional:  NAD,   Neuropsyche:  alert and oriented, answers questions appropriately.   Respiratory:  Breathing comfortably, decreased air exchange at bases with some crackles. no wheezes.    Cardiovascular:  Regular rate and rhythm, legs wrapped.  GI:  soft, NT, notable distention today, BS normal  Skin/Integumen:  No acute rash or sign of bleeding.     Medications   All medications reviewed on 05/15/22     bumetanide 0.5 mg/hr (05/14/22 1347)       aspirin  81 mg Oral Daily     atorvastatin  20 mg Oral Daily     carvedilol  6.25 mg Oral BID w/meals     dorzolamide-timolol  1 drop Right Eye BID     gabapentin  200 mg Oral At Bedtime     insulin aspart  7 Units Subcutaneous TID w/meals     insulin aspart  1-7 Units Subcutaneous TID AC     insulin aspart  1-5 Units Subcutaneous At Bedtime     insulin glargine  25 Units Subcutaneous At Bedtime     pantoprazole  40 mg Oral QAM AC     phytonadione  5 mg Oral Daily     potassium chloride ER  10 mEq Oral BID     sennosides  1-2 tablet Oral BID     sertraline  50 mg Oral Daily     sodium chloride (PF)  3 mL Intracatheter Q8H     tamsulosin  0.4  mg Oral Daily     traZODone  100 mg Oral At Bedtime       Data   Recent Labs   Lab 05/15/22  0144 05/14/22  2206 05/14/22  1920 05/13/22  1724 05/13/22  1655 05/13/22  1158 05/13/22  1154 05/12/22  1248 05/12/22  0935 05/11/22  0847 05/11/22  0832 05/10/22  2319 05/10/22  1416   WBC  --   --   --   --   --   --   --   --   --   --  11.1*  --  11.9*   HGB  --   --  7.3*  --   --   --  7.9*  --  6.5*  --  6.9*  --  6.1*   MCV  --   --   --   --   --   --   --   --   --   --  83  --  85   PLT  --   --   --   --   --   --   --   --   --   --  220  --  268   INR  --   --   --   --   --   --  1.25*  --   --   --   --   --   --    NA  --   --  137  --  138  --  137  --  137  --  139  --  136   POTASSIUM  --   --  3.8  --  4.4  --  4.3  --  3.7  --  4.1   < > 4.6   CHLORIDE  --   --  105  --  106  --  107  --  105  --  109  --  107   CO2  --   --  25  --  25  --  22  --  23  --  24  --  23   BUN  --   --  75*  --  75*  --  76*  --  72*  --  73*  --  76*   CR  --   --  2.45*  --  2.50*  --  2.42*  --  2.50*  --  2.33*  --  2.30*   ANIONGAP  --   --  7  --  7  --  8  --  9  --  6  --  6   VANESA  --   --  7.4*  --  8.1*  --  8.1*  --  7.5*  --  8.4*  --  8.4*   * 243* 265*   < > 159*   < > 158*   < > 211*   < > 215*   < > 167*   ALBUMIN  --   --   --   --   --   --  2.4*  --   --   --   --   --  2.6*   PROTTOTAL  --   --   --   --   --   --  6.3*  --   --   --   --   --  6.4*   BILITOTAL  --   --   --   --   --   --  0.5  --   --   --   --   --  0.9   ALKPHOS  --   --   --   --   --   --  127  --   --   --   --   --  120   ALT  --   --   --   --   --   --  23  --   --   --   --   --  25   AST  --   --   --   --   --   --  17  --   --   --   --   --  20    < > = values in this interval not displayed.       No results found for this or any previous visit (from the past 24 hour(s)).    ]

## 2022-05-15 NOTE — PROGRESS NOTES
Care Management Follow Up    Length of Stay (days): 5    Expected Discharge Date: 5/17/22     Concerns to be Addressed: discharge planning     Patient plan of care discussed at interdisciplinary rounds: Yes    Anticipated Discharge Disposition: Home Care versus TCU     Anticipated Discharge Services: None  Anticipated Discharge DME: None    Patient/family educated on Medicare website which has current facility and service quality ratings:  Yes  Education Provided on the Discharge Plan:    Patient/Family in Agreement with the Plan: yes    Referrals Placed by CM/SW: Internal Clinic Care Coordination, External Care Coordination, Homecare, Specialty Providers  Private pay costs discussed: Patient requests a private room and was informed of the private pay charges for this.    Additional Information:  SW met with patient and spouse to discuss the plan for discharge, anticipated for 5/17. Noted PT recommendation is now for a TCU stay, as assistance is needed with all mobility. Patient and spouse agree and request referrals to [place] (preferred location) [facility] and [facility]. Medicare guidelines for coverage and ratings were discussed. Spouse plans to provide transport.  DOD referrals sent.  SW will follow.       ALO Adrian

## 2022-05-16 NOTE — PROGRESS NOTES
[facility]    Medicine Progress Note - Hospitalist Service    Date of Admission:  5/10/2022    Assessment & Plan        Justyn Olivas is a 81 year old male with a history of HFpEF, transfusion dependent chronic anemia, CKD stage IV, DM type II who recently was started on steroids for shoulder pain and was sent in to the ED on 5/10/2022 from cardiology clinic due to concerns for decompensated heart failure      Patient was placed on steroids on Sunday and since then has had worsening lower extremity edema, MARKS and fluid retention in the abdomen.  Since then he has had approximately 15 pound weight gain.  He was seen in cardiology clinic this AM and they sent him in to the ED for hospitalization for decompensated heart failure.  At presentation to hospital, requiring 2 L of O2 to maintain his oxygenation      Decompensated HFpEF  Acute hypoxic respiratory distress due to above   * Echo 3/22/22: EF 55%.  RV normal in structure function and size.  No valve disease.  * U/S shows moderate ascites.   * Admit weight 216#, baseline wt ~ 200#    Patient received Bumex 1 mg IV in ED > lasix 80 mg IV BID x 5 dose    On 05/13/22, cardiology started on lasix gtt with K replacement protocol.     On 05/14/22, cardiology switched to bumex 2 mg IV x 1, followed by bumex gtt at 0.5 mg/hr.    On K 10 meq BID while diuresing.     On 05/15/22, excellent diuresis (> 3L out) over last 24 hours, wt stable. Cr and K stable. Wt stable.     Continue PTA Coreg, atorvastatin    Cardiology considering RHC prior to discharge and CardioMEMS     Cardiology will arrange follow-up in the [facility] clinic with Shaniqua Ram PA-C in 1 to 2 weeks post discharge with a repeat BMP and NT pro BNP beforehand.    Strict I and O's, daily weights.     Ace wraps to legs.     US paracentesis with 3.1L removed 5/16 - reports improvement    Transfusion dependent chronic anemia, likely related to chronic disease   Iron deficiency - by iron  panel on 5/11/22  Patient's hemoglobin has continued to drift down.  It was 6.1 the ED while being 6.7 on 5/5 and 6.1 on 5/3.   * Last transfusion PTA: 5/6.  Patient denies any recent bleeding.  He has had a thorough work up so far.  He has had colonoscopy/endoscopy/pill study with GI without clear cause.  He also follows with hem/onc and has gotten IV infusions in the past.  In the ED he was consented and 1 unit of PRBCs was ordered  * Received 1 unit on 5/10/22 with appropriate rise in hgb  * Received  1 unit on 05/12/22 for hgb of 6.5 and CHF.    Venofer 300 mg x 2 ordered. Defer to hematology further doses.     Transfuse for hgb < 7. We currently have a blood product shortage.        CKD stage IV, stable  Cr 2.3 on admission and baseline appears to be 2.5-2.6    Avoid nephrotoxins    Daily BMPs while diuresing    5/16 creat 2.19 (tolerating diuresis)     Ascites, likely secondary to CHF  Mild elevation in INR (1.2)   * U/S mild to moderate ascites.   * No history or evidence of liver disease by liver panel or prior imaging. However, does have chronic mild elevation of INR.   Diuresis as above.     Vitamin K 5 mg daily x 3, INR on Monday 5/16    F/U ultrasound for potential paracentesis ordered for Monday with albumin, protein and cell count ordered. Anticipate SAAG > 1.1, and protein > 2.5 g/dL in ascites from heart failure.       DM type II   Last HgbA1c was 8.2 on 2/16/22     Increased lantus to PTA dose of 25 units (from 20 units) on 05/11/22.     Added prandial novolog on 5/11/22, increase to 7 units AC on 05/11/22.     Note elevated BS evening of 5/14/22 > adjust insulin if BS remain elevated.     Continue medium intensity correction doses for now.     Steroids stopped so anticipate blood sugars will trend down.    BS better controlled today. No lows.      Shoulder pain   Has had gradually worsening shoulder pain for some time.  He states its to the point he is not sleeping well..  He was evaluated by   Otilia at [facility] urgent care on Sunday and had MRIs of the shoulder and spine.        Ortho consult on 5/11 appreciated. 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     Not candidate for c-spine injections.     Recommended continued pain meds PRN, WBAT of upper extremities, and OK to stop steroids.     Oxycodone 2.5-5 mg q 6 hours. Advised that he try to limit this to night time doses for best effect.      Started low dose neurontin 200 mg q HS on 5/13/22.     Appreciate orthopedic surgery assistance in planning outpatient follow up     Multiple scabs over extremities (~ 4)   Coccyx blanchable redness (no wound)     Appreciate WOC consult     Deconditioned state, requesting home care    Pt previously using outpatient PT, requests home care.     Prolonged hospital stay with decompensated CHF, high risk for deconditioning.     Appreciate PT consult, currently recommending TCU as of 5/14/22 - patient and spouse are going to discuss further. Spouse concerned of being able to manage at home safely.      Urinary retention on 05/14/22    Will catheter placed on 05/14/22 for both retention and strict I and O measurement.     Started flomax on 5/14/22 in anticipation of discontinuing will and voiding trial. (history of difficulty with urination PTA)       Constipation     On 05/15/22, states he has not had a BM for a week.     Scheduled senna and PRN dulcolax ordered    Narcotics definitely contribute.    Started miralax daily on 05/15/22, increase to BID tomorrow if not effect.       Diet: Combination Diet Low Saturated Fat Na <2400mg Diet, No Caffeine Diet    DVT Prophylaxis: Pneumatic Compression Devices  Will Catheter: PRESENT, indication: Strict 1-2 Hour I&O;Retention  Central Lines: None  Cardiac Monitoring: ACTIVE order. Indication: Acute decompensated heart failure (48 hours)  Code Status: Full Code      Disposition Plan   Expected Discharge: 05/19/2022     Anticipated discharge  location: home with family;home with help/services        The patient's care was discussed with the Bedside Nurse, Patient and Patient's Family.    Mynor Becker MD  Hospitalist Service  [facility]  Securely message with the [employer] Web Console (learn more here)  Text page via [employer] Paging/Directory         Clinically Significant Risk Factors Present on Admission                    ______________________________________________________________________    Interval History   Care assumed today. Pt seen and examined midday with spouse at bedside.  He is up in chair and reports improved abdominal distension and breathing since paracentesis. No fevers. No new pain but ongoing L shoulder discomfort noted.      Data reviewed today: I reviewed all medications, new labs and imaging results over the last 24 hours. I personally reviewed no images or EKG's today.    Physical Exam   Vital Signs: Temp: 97.5  F (36.4  C) Temp src: Oral BP: 114/65 Pulse: 83   Resp: 16 SpO2: 97 % O2 Device: None (Room air)    Weight: 212 lbs 15.43 oz    Gen: NAD, pleasant, up in chair  HEENT: EOMI, MMM  Resp: no focal crackles,  no wheezes, no increased work of resp  CV: S1S2 heard, reg rhythm, reg rate  Abdo: soft, nontender, nondistended, bowel sounds present  Ext: calves nontender, well perfused  Neuro: aaox3, CN grossly intact, no facial asymmetry      Data   Recent Labs   Lab 05/16/22  1329 05/16/22  0923 05/16/22  0826 05/15/22  0807 05/15/22  0806 05/14/22  2206 05/14/22  1920 05/13/22  1158 05/13/22  1154 05/11/22  0847 05/11/22  0832 05/10/22  2319 05/10/22  1416   WBC  --   --   --   --   --   --   --   --   --   --  11.1*  --  11.9*   HGB  --   --  7.3*  --  7.8*  --  7.3*  --  7.9*   < > 6.9*  --  6.1*   MCV  --   --   --   --   --   --   --   --   --   --  83  --  85   PLT  --   --   --   --   --   --   --   --   --   --  220  --  268   INR  --   --  1.30*  --   --   --   --   --  1.25*  --   --   --   --     NA  --   --  139  --  139  --  137   < > 137   < > 139  --  136   POTASSIUM  --   --  3.9  --  3.8  --  3.8   < > 4.3   < > 4.1   < > 4.6   CHLORIDE  --   --  106  --  106  --  105   < > 107   < > 109  --  107   CO2  --   --  33*  --  26  --  25   < > 22   < > 24  --  23   BUN  --   --  69*  --  76*  --  75*   < > 76*   < > 73*  --  76*   CR  --   --  2.19*  --  2.39*  --  2.45*   < > 2.42*   < > 2.33*  --  2.30*   ANIONGAP  --   --  <1*  --  7  --  7   < > 8   < > 6  --  6   VANESA  --   --  7.7*  --  7.8*  --  7.4*   < > 8.1*   < > 8.4*  --  8.4*   * 165* 171*   < > 83   < > 265*   < > 158*   < > 215*   < > 167*   ALBUMIN  --   --   --   --  2.3*  --   --   --  2.4*  --   --   --  2.6*   PROTTOTAL  --   --   --   --   --   --   --   --  6.3*  --   --   --  6.4*   BILITOTAL  --   --   --   --   --   --   --   --  0.5  --   --   --  0.9   ALKPHOS  --   --   --   --   --   --   --   --  127  --   --   --  120   ALT  --   --   --   --   --   --   --   --  23  --   --   --  25   AST  --   --   --   --   --   --   --   --  17  --   --   --  20    < > = values in this interval not displayed.     Recent Results (from the past 24 hour(s))   US Paracentesis Portable    [facility]    US PARACENTESIS PORTABLE 5/16/2022 10:47 AM     HISTORY: Ascites, moderate ascites, perform paracentesis if enough  fluid present.    FINDINGS: Ultrasound was used to evaluate for the presence and best  approach for paracentesis. Written and oral informed consent was  obtained. A pause for the cause procedure to verify the correct  patient and correct procedure. The skin overlying the right lower  quadrant was prepped and draped in the usual sterile fashion. The  subcutaneous tissues were anesthetized with 10mL 1% lidocaine. A  catheter was advanced into the peritoneal space and 3.1 L of  straw  colored fluid was drained. There were no immediate complications.  Ultrasound images were permanently stored.  Patient left the  ultrasound suite  in satisfactory condition.      Impression    IMPRESSION: Technically successful paracentesis without immediate  complications.

## 2022-05-16 NOTE — PROGRESS NOTES
[facility]  Cardiology Progress Note    Date of Service (when I saw the patient): 05/16/2022    Assessment & Plan   Justyn Olivas is a 81 year old male who was admitted on 5/10/2022.     1.  : Acute on chronic diastolic heart failure  - Echocardiogram 3/22 showed normal EF 55%, normal RV size and function and no vavlular disease  - Moderate diuresis on Bumex drip  - Net neg 1.6 L, down 2 kg since admission. Wt 212 lb ( Baseline 205 lb)  - Paracentesis today w/ 3.1 L straw colored fluid removed.   - PTA carvedilol    2.  : CKD stage IV  - Creatinine 2.19    3.  : Type 2 diabetes  4. Transfusion dependent chronic anemia     Plan  1. Feeling better s/p paracentesis  2. Diuresing on IV Bumex, still volume up. Continue Bumex drip.   3. Continue carvedilol   4. Echocardiogram today   5. NTpro BNP  6. Daily weights, strict I/O's, and low sodium diet   7. Lower extremity wraps.       BARI Gonzalez CNP  Text Page  (M-F, 7:30 am - 4:00 pm)    Interval History   Up in chair without cardiac complaint. Breathing better s/p paracentesis. Diuresing on Bumex drip, continue for additional day. BP borderline soft. Echocardiogram today.     Physical Exam   Temp: 97.6  F (36.4  C) Temp src: Oral BP: 93/50 Pulse: 70   Resp: 16 SpO2: 96 % O2 Device: None (Room air)    Vitals:    05/15/22 0119 05/15/22 0900 05/16/22 0500   Weight: 99.4 kg (219 lb 3.2 oz) 96.4 kg (212 lb 8 oz) 96.6 kg (212 lb 15.4 oz)     Vital Signs with Ranges  Temp:  [97.5  F (36.4  C)-98  F (36.7  C)] 97.6  F (36.4  C)  Pulse:  [70-83] 70  Resp:  [16-18] 16  BP: ()/(47-65) 93/50  SpO2:  [96 %-97 %] 96 %  I/O last 3 completed shifts:  In: 1223 [P.O.:1180; I.V.:43]  Out: 2900 [Urine:2900]    GEN:  In general, this is a overweight elderly male in no acute distress.   HEENT:  Pupils equal, round. Sclerae nonicteric. Clear oropharynx. Mucous membranes moist.  NECK: Supple, no masses appreciated. Trachea midline. Difficult to  assess JVD  C/V:  Regular rate and rhythm, no murmur, rub or gallop. No S3 or RV heave.   RESP: Respirations are unlabored. Decreased RLL   GI: Abdomen soft, nontender, nondistended. No HSM appreciated.   EXTREM: 1+ bilateral LE edema. No cyanosis or clubbing.  NEURO: Alert and oriented, cooperative. . No obvious focal deficits.   PSYCH: Normal affect.  SKIN: Warm and dry. No rashes or petechiae appreciated.       Medications     bumetanide 0.5 mg/hr (05/16/22 0610)       aspirin  81 mg Oral Daily     atorvastatin  20 mg Oral Daily     carvedilol  6.25 mg Oral BID w/meals     dorzolamide-timolol  1 drop Right Eye BID     gabapentin  200 mg Oral At Bedtime     insulin aspart  7 Units Subcutaneous TID w/meals     insulin aspart  1-7 Units Subcutaneous TID AC     insulin aspart  1-5 Units Subcutaneous At Bedtime     insulin glargine  25 Units Subcutaneous At Bedtime     pantoprazole  40 mg Oral QAM AC     polyethylene glycol  17 g Oral Daily     potassium chloride ER  10 mEq Oral BID     sennosides  1-2 tablet Oral BID     sertraline  50 mg Oral Daily     sodium chloride (PF)  3 mL Intracatheter Q8H     tamsulosin  0.4 mg Oral Daily     traZODone  100 mg Oral At Bedtime       Data   Reviewed       Kailee Gonzalez, APRN CNP 5/16/2022

## 2022-05-16 NOTE — PLAN OF CARE
Goal Outcome Evaluation:      DATE & TIME: 5/15   Cognitive Concerns/ Orientation : Pt A/Ox4 BEHAVIOR & AGGRESSION TOOL COLOR: Green, calm and cooperative   ABNL VS/O2: VSS RA   MOBILITY: Assist x1 with GB/walker, fall risk. Prefers chair; encouraged to weight shift. Chair cushion obtained  PAIN MANAGMENT: oxy 2.5 mg given  DIET: Cardiac, good appetite. St I/O.   BOWEL/BLADDER:: Constipated, given dulcolax supp, started miralax(refused after BM).+BS. had large BM in evening, loose and watery stools at 0200    ABNL LAB/BG: , 173; hgb 7.8, creat 2.39  DRAIN/DEVICES: PIV,will for accurate I/O  TELEMETRY RHYTHM: NSR  SKIN: Scattered bruising. Blanchable redness to coccyx, mepilex changed 2x this shift due to incontinence. Abrasions/scabs to bilateral shins, thighs, and knees, scrotum, buttock. Edema +2 to abdomen and legs., camphor-menthol lotion available for itching. Pt states itching is much less.. Skin care orders in place,.  TESTS/PROCEDURES:  US paracentesis ordered for 5/16  D/C DAY/GOALS/PLACE: Discharge pending stable hemoglobin; and transitioned back to PO diuretics. Weight daily. Standing preferred. Cardiology/ WOC  following. MARKS  On bumex gtt.Lower legs wrapped with aces. Can sirisha tolerate elevation of legs for short periods. Pt feels breathing improved today, less winded with exertion.    Pt had episode of hypotension and weakness after straining on the toilet and standing with walker shortly after. Education pt on straining and hypotension.

## 2022-05-16 NOTE — PROGRESS NOTES
Ortho progress note    Patient discussed with Dr. Scott    Unfortunately the degree spinal stenosis patient has does not allow for a cortisone injection to be done with any benefit    Patient should see Dr. Scott as outpatient in clinic to discuss operative options for pain relief   Likely a large source for his shoulder pain is his spinal stenosis    Continue current plan  Outpatient follow-up with Dr. Sonia Yoder PAC

## 2022-05-16 NOTE — PLAN OF CARE
Goal Outcome Evaluation:                  DATE & TIME: 5/16/22  1430  Cognitive Concerns/ Orientation : Pt A/Ox4 BEHAVIOR & AGGRESSION TOOL COLOR: Green, calm and cooperative   ABNL VS/O2:BP slightly low at noon, other VSS, on RA.   MOBILITY: Assist x1 with GB/walker, fall risk. Prefers chair; encouraged to weight shift. Chair cushion obtained  PAIN MANAGMENT: Voltaren gel started today for left shoulder and neck pain. Pt rates at a 4 level.  DIET: Cardiac, good appetite. St I/O.   BOWEL/BLADDER:: Has Will cath in place, [facility] this shift.  ABNL LAB/BG:  & 284; hgb 7.3, creat 2.19, BNP 3,810.  DRAIN/DEVICES: PIV,will for accurate I/O  TELEMETRY RHYTHM: NSR  SKIN: Scattered bruising. Blanchable redness to coccyx, mepilex changed x1 this shift. Abrasions/scabs to bilateral shins, thighs, and knees, scrotum, buttock. Edema +2 to abdomen and legs., camphor-menthol lotion available for itching. Pt states itching is much less.. Skin care orders in place,.  TESTS/PROCEDURES: Had US paracentesis, 3.1L fluid removed.  D/C DAY/GOALS/PLACE: Discharge pending stable hemoglobin; and transitioned back to PO diuretics. Weight daily. Standing preferred. Cardiology/ WOC  following. MARKS  On bumex gtt.Lower legs wrapped with aces. Can sirisha tolerate elevation of legs for short periods. Pt feels breathing improved today, less winded with exertion. 2+ edema dilip LE's.    Pt had episode of hypotension and weakness after straining on the toilet and standing with walker shortly after. Education pt on straining and hypotension.

## 2022-05-16 NOTE — TELEPHONE ENCOUNTER
Routing refill request to provider for review/approval because:  Drug not active on patient's medication list  Labs out of range:    Hemoglobin   Date Value Ref Range Status   05/16/2022 7.3 (L) 13.3 - 17.7 g/dL Final   07/01/2021 7.2 (L) 13.3 - 17.7 g/dL Final      stated

## 2022-05-16 NOTE — PLAN OF CARE
Goal Outcome Evaluation:                    Cognitive Concerns/ Orientation : Pt A/Ox4 BEHAVIOR & AGGRESSION TOOL COLOR: Green, calm and cooperative   ABNL VS/O2: VSS RA   MOBILITY: Assist x1 with walker, fall risk. Prefers chair; encouraged to weight shift. Chair cushion obtained  PAIN MANAGMENT: refused any analgesia  DIET: Cardiac, good appetite. St I/O.   BOWEL/BLADDER:: Constipated, given dulcolax supp, started miralax(refused after BM).+BS. had large BM Abdomen is distended.   ABNL LAB/BG: BG 91/299; hgb 7.8, creat 2.39  DRAIN/DEVICES: PIV,will for accurate I/O  TELEMETRY RHYTHM: NSR  SKIN: Scattered bruising. Blanchable redness to coccyx, mepilex in place. Abrasions/scabs to bilateral shins, thighs, and knees, scrotum, buttock. Edema +2 to abdomen and legs., camphor-menthol lotion available for itching. Pt states itching is much less.. Skin care orders in place,.  TESTS/PROCEDURES: None. US paracentesis ordered for 5/16  D/C DAY/GOALS/PLACE: Discharge pending stable hemoglobin; and transitioned back to PO diuretics. Weight daily. Standing preferred. Cardiology/ WOC  following. MARKS  On bumex gtt.Lower legs wrapped with aces,off at 1600 as pt uncomfortable. Can sirisha tolerate elevation of legs for short periods. Pt feels breathing improved today, less winded with exertion.

## 2022-05-17 NOTE — PLAN OF CARE
Goal Outcome Evaluation:                    DATE & TIME: 5/17/22 1430    Cognitive Concerns/ Orientation : Pt A/Ox4, forgetful   BEHAVIOR & AGGRESSION TOOL COLOR: Green   ABNL VS/O2: VSS on RA  MOBILITY: Assist x1 with gait belt and walker, fall risk. Turn and repo when in bed.   Encouraged to shift weight when up in chair.   PAIN MANAGEMENT: Rates left neck and shoulder pain 3-4, using Voltaren gel.  DIET: Cardiac, NPO currently for US later this afternoon.  BOWEL/BLADDER: [place] patent with adequate output. One BM this shift.   ABNL LAB/BG:  & 277, Hb 8.1, Creat 2.24, BUN 67.  DRAIN/DEVICES: PIV with Bumex gtt infusing  TELEMETRY RHYTHM: NSR  SKIN: Blanchable redness on coccyx, Mepilex CDI. Edema +2 to abdomen and legs. Wearing compression stockings. Scattered bruising. Scattered abrasions/scabs bilateral shins, thighs, knees, scrotum, buttock and arms. Pt will scratch at them.  TESTS/PROCEDURES: US of abd later today.  D/C DATE: Discharge to TCU pending stable hemoglobin and transitioning back to PO diuretics.   OTHER IMPORTANT INFO: SW following and sent referrals. MARKS improved. Feels better since paracentesis yesterday.

## 2022-05-17 NOTE — CONSULTS
[facility]    Nephrology Consultation     Date of Admission:  5/10/2022    Assessment & Plan     Justyn Olivas is a 81 year old male who was admitted on 5/10/2022.     1) Volume Overload:  Cause does not seem to be cardiac or liver disease.  Volume overload this severe that is on the basis of kidney disease is typically from nephrotic syndrome. But he does not have that much albuminuria.      2) CKD 4: On basis of ? NSAIDs ?  HTN ?     3) Prior Heavy NSAID use.    4) HTN    5) DM    6) Ascites without liver disease.  Normal SAAG suggests normal portal pressure.    Plan:    Add metolazone  U P/C ratio  CRP  UA c micro    Chas Campos MD  [facility] - Nephrology  [phone]    Reason for Consult     I was asked to see the patient for edema and CKD 4    Primary Care Physician     Christian Davidson MD    Chief Complaint     edema    History is obtained from the patient and chart review.      History of Present Illness     Justyn Olivas is a 81 year old male who presents with edema and CKD 4.    He was admitted 5/10/22 via ED after presenting with abnormal labs, SOB and edema.    He follows with my partner Dr. Greene, who he saw last on the day of admission.  He advise an increase in torsemide to 80 mg daily.  However, Kamran was too short of breath to make it at home so he went to ED after another MD appt with Dr. Rosenbaum.      His hospital course has been notable for:    Diuresis with a bumet drip.  He is down 1.5 kg so far but still has generous volume overload.    Paracentesis 5/16/22 of 3.1 L.  Serum-Ascites Albumin Gradient is only 1 suggesting that he does not have portal HTN.    INR is 1.25  Liver enzymes and bili are normal.    Fairly stable kidney function with GFR in high 20s.  He has been found to have low grade albuminuria in the past U a/c ratio was 100-200 mg/g.    His echo continues to show normal cardiac function.      He also has had severe anemia with  need for multiple transfusions.  Iron sat index has been very low.  He has had IV iron and Aranesp.  He underwent a bone marrow biopsy which was non diagnostic.  Dr. Fournier suggested anemia of chronic inflammation/chronic disease may be contributing.      Past Medical History   I have reviewed this patient's medical history and updated it with pertinent information if needed.   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  Past Surgical History:   Procedure Laterality Date     BONE MARROW BIOPSY, BONE SPECIMEN, NEEDLE/TROCAR N/A 5/14/2021    Procedure: BIOPSY, BONE MARROW;  Surgeon: Juanjo Hoffman MD;  Location: [facility]     COLONOSCOPY N/A 2/27/2022    Procedure: COLONOSCOPY, FLEXIBLE, WITH LESION REMOVAL USING SNARE;  Surgeon: Jules Lane MD;  Location: [facility]     COLONOSCOPY N/A 2/27/2022    Procedure: COLONOSCOPY, WITH POLYPECTOMY AND BIOPSY;  Surgeon: Jules Lane MD;  Location: [facility]     CV PERICARDIOCENTESIS N/A 12/21/2020    Procedure: Pericardiocentesis;  Surgeon: Chas Chambers MD;  Location: [facility] CARDIAC CATH LAB     ENT SURGERY       ESOPHAGOSCOPY, GASTROSCOPY, DUODENOSCOPY (EGD), COMBINED N/A 3/27/2021    Procedure: Esophagogastroduodenoscopy, With Biopsy;  Surgeon: Luc Nash MD;  Location: [facility]     ESOPHAGOSCOPY, GASTROSCOPY, DUODENOSCOPY (EGD), COMBINED N/A 2/26/2022    Procedure: ESOPHAGOGASTRODUODENOSCOPY (EGD);  Surgeon: Jules Lane MD;  Location: [facility]     LAPAROSCOPIC CHOLECYSTECTOMY N/A 9/2/2021    Procedure:  LAPAROSCOPIC CHOLECYSTECTOMY;  Surgeon: Len Coates MD;  Location: [facility] OR     SOFT TISSUE SURGERY      skin cancer surgery on nose       Prior to Admission Medications   Prior to Admission Medications   Prescriptions Last Dose Informant Patient Reported? Taking?   SENNA-docusate sodium (SENNA S) 8.6-50 MG tablet  at prn  No Yes   Sig: Take 1 tablet by mouth 2 times daily   Patient taking differently: Take 1 tablet by mouth nightly as needed   albuterol (PROAIR HFA) 108 (90 Base) MCG/ACT inhaler  at prn Self No Yes   Sig: INHALE 2 PUFFS BY MOUTH FOUR TIMES DAILY AS NEEDED   aspirin 81 MG EC tablet 5/10/2022 at am  Yes Yes   Sig: Take 81 mg by mouth daily   atorvastatin (LIPITOR) 20 MG tablet 5/9/2022 at pm  No Yes   Sig: Take 1 tablet (20 mg) by mouth daily   blood glucose (STEVE CONTOUR) test strip  Self No No   Sig: TESTING FOUR TIMES DAILY OR AS DIRECTED   carvedilol (COREG) 6.25 MG tablet 5/10/2022 at am  No Yes   Sig: Take 1 tablet (6.25 mg) by mouth 2 times daily (with meals)   clobetasol (TEMOVATE) 0.05 % external cream  at prn Self Yes Yes   Sig: Apply topically daily as needed   clopidogrel (PLAVIX) 75 MG tablet   No No   Sig: TAKE 1 TABLET(75 MG) BY MOUTH DAILY   dorzolamide-timolol (COSOPT) 2-0.5 % ophthalmic solution 5/10/2022 at am Self Yes Yes   Sig: Place 1 drop into the right eye 2 times daily    fluticasone (FLONASE) 50 MCG/ACT nasal spray 5/10/2022 at am  No Yes   Sig: SHAKE LIQUID AND USE 2 SPRAYS IN EACH NOSTRIL DAILY   folic acid-vit B6-vit B12 (FOLGARD) 0.8-10-0.115 MG TABS per tablet 5/10/2022 at am  No Yes   Sig: Take 1 tablet by mouth daily Continue per Nephrology recommendation   hydrocortisone (CORTAID) 1 % external cream  at prn  Yes Yes   Sig: Apply 1 g topically nightly as needed For itching on BACK   insulin glargine (LANTUS SOLOSTAR) 100 UNIT/ML pen  Self No Yes   Sig: ADMINISTER 25 UNITS UNDER THE SKIN AT BEDTIME   insulin lispro (HUMALOG KWIKPEN) 100 UNIT/ML (1 unit dial)  KWIKPEN  Self No Yes   Sig: Inject 5-15 Units Subcutaneous 3 times daily (before meals) Sliding scale with meals   Patient taking differently: Inject 5-15 Units Subcutaneous 3 times daily (before meals) Sliding scale with meals:  <100: 5 units  101-150: 7 units  151-250: 9 units  251-350: 12 units  >351 15 units   insulin pen needle (BD PEN NEEDLE FERCHO 2ND GEN) 32G X 4 MM miscellaneous  Self No No   Sig: USE AS DIRECTED UP TO FOUR TIMES DAILY   methocarbamol (ROBAXIN) 500 MG tablet  at prn  Yes Yes   Sig: Take 500 mg by mouth 4 times daily as needed for muscle spasms   pantoprazole (PROTONIX) 40 MG EC tablet 5/10/2022 at am  No Yes   Sig: TAKE 1 TABLET(40 MG) BY MOUTH TWICE DAILY   potassium chloride ER (K-TAB) 20 MEQ CR tablet 5/10/2022 at am Self No Yes   Sig: Take 1 tablet (20 mEq) by mouth daily   sertraline (ZOLOFT) 50 MG tablet 5/10/2022 at am Self No Yes   Sig: Take 1 tablet (50 mg) by mouth daily   torsemide (DEMADEX) 20 MG tablet 5/10/2022 at increased dose today per MD  Yes Yes   Sig: Take 80 mg by mouth daily   traZODone (DESYREL) 100 MG tablet 5/9/2022 at pm Self No Yes   Sig: TAKE 1 TABLET(100 MG) BY MOUTH AT BEDTIME      Facility-Administered Medications: None     Allergies   Allergies   Allergen Reactions     No Known Allergies        Social History   I have reviewed this patient's social history and updated it with pertinent information if needed. Justyn Olivas  reports that he quit smoking about 46 years ago. His smoking use included cigarettes. He started smoking about 57 years ago. He has a 22.00 pack-year smoking history. He has never used smokeless tobacco. He reports that he does not drink alcohol and does not use drugs.    Family History   I have reviewed this patient's family history and updated it with pertinent information if needed.   Family History   Problem Relation Age of Onset     Family History Negative Mother      Family History Negative Father        Review of Systems   The 10  point Review of Systems is negative other than noted in the HPI.     Physical Exam   Temp: 97.5  F (36.4  C) Temp src: Oral BP: 97/54 Pulse: 68   Resp: 16 SpO2: 97 % O2 Device: None (Room air)    Vital Signs with Ranges  Temp:  [97.3  F (36.3  C)-98  F (36.7  C)] 97.5  F (36.4  C)  Pulse:  [68-76] 68  Resp:  [16] 16  BP: ()/(49-64) 97/54  SpO2:  [95 %-98 %] 97 %  212 lbs 15.43 oz    GENERAL: alert, NAD  HEENT:  Normocephalic. No gross abnormalities.  Pupils equal.  MMM.  Dentition is ok.  CV: RRR, no murmurs, no clicks, gallops, or rubs, tense edema to thighs, JVP is 5 cm  RESP: fine insp crackles 1/4 way up both lung fields.    GI: Abdomen soft, distended, non tender  MUSCULOSKELETAL: extremities nl - no gross deformities noted  SKIN: excoriations to BLE  NEURO:  Strength normal and symmetric.   PSYCH: mood and affect appropriate  LYMPH: No palpable ant/post cervical and supraclavicular adenopathy    Data   BMP  Recent Labs   Lab 05/17/22  1233 05/17/22  1156 05/17/22  0859 05/17/22  0213 05/16/22  0923 05/16/22  0826 05/15/22  0807 05/15/22  0806 05/14/22  2206 05/14/22  1920     --   --   --   --  139  --  139  --  137   POTASSIUM 4.2  --   --   --   --  3.9  --  3.8  --  3.8   CHLORIDE 103  --   --   --   --  106  --  106  --  105   VANESA 8.0*  --   --   --   --  7.7*  --  7.8*  --  7.4*   CO2 27  --   --   --   --  33*  --  26  --  25   BUN 67*  --   --   --   --  69*  --  76*  --  75*   CR 2.24*  --   --   --   --  2.19*  --  2.39*  --  2.45*   * 277* 224* 284*   < > 171*   < > 83   < > 265*    < > = values in this interval not displayed.     [email or web address](phos:4)  CBC)  Recent Labs   Lab 05/17/22  1233 05/16/22  0826 05/15/22  0806 05/14/22  1920 05/12/22  0935 05/11/22  0832   WBC  --   --   --   --   --  11.1*   HGB 8.1* 7.3* 7.8* 7.3*   < > 6.9*   HCT  --   --   --   --   --  22.6*   MCV  --   --   --   --   --  83   PLT  --   --   --   --   --  220    < > = values in this interval not  displayed.     Recent Labs   Lab 05/13/22  1154   AST 17   ALT 23   ALKPHOS 127   BILITOTAL 0.5     Recent Labs   Lab 05/16/22  0826   INR 1.30*

## 2022-05-17 NOTE — PROGRESS NOTES
[facility]    Medicine Progress Note - Hospitalist Service    Date of Admission:  5/10/2022    Assessment & Plan        Justyn Olivas is a 81 year old male with a history of HFpEF, transfusion dependent chronic anemia, CKD stage IV, DM type II who recently was started on steroids for shoulder pain and was sent in to the ED on 5/10/2022 from cardiology clinic due to concerns for decompensated heart failure      Patient was placed on steroids on Sunday and since then has had worsening lower extremity edema, MARKS and fluid retention in the abdomen.  Since then he has had approximately 15 pound weight gain.  He was seen in cardiology clinic this AM and they sent him in to the ED for hospitalization for decompensated heart failure.  At presentation to hospital, requiring 2 L of O2 to maintain his oxygenation      Concern of decompensated HFpEF   Acute hypoxic respiratory distress due to above   * Echo 3/22/22: EF 55%.  RV normal in structure function and size.  No valve disease.  * U/S shows moderate ascites.   * Admit weight 216#, baseline wt ~ 200#    Patient received Bumex 1 mg IV in ED > lasix 80 mg IV BID x 5 dose    On 05/13/22, cardiology started on lasix gtt with K replacement protocol.     On 05/14/22, cardiology switched to bumex 2 mg IV x 1, followed by bumex gtt at 0.5 mg/hr.    On K 10 meq BID while diuresing.     On 05/15/22, excellent diuresis (> 3L out) over last 24 hours, wt stable. Cr and K stable. Wt stable.     Continue PTA Coreg, atorvastatin    Cardiology was considering RHC prior to discharge and CardioMEMS     Cardiology will arrange follow-up in the [facility] clinic with Shaniqua Ram PA-C in 1 to 2 weeks post discharge with a repeat BMP and NT pro BNP beforehand.    Strict I and O's, daily weights.     Compression stockings or wraps on legs    US paracentesis with 3.1L removed 5/16 - reports improvement    5/17 after echo completed 5/16 - no longer felt to be of cardiac  etiology - more likely seeming to be due to renal disease however unclear.      RUQ US    Nephrology consultation (appreciated)    Continue bumex for now - cardiology signed off     Transfusion dependent chronic anemia, likely related to chronic disease   Iron deficiency - by iron panel on 5/11/22  Patient's hemoglobin has continued to drift down.  It was 6.1 the ED while being 6.7 on 5/5 and 6.1 on 5/3.   * Last transfusion PTA: 5/6.  Patient denies any recent bleeding.  He has had a thorough work up so far.  He has had colonoscopy/endoscopy/pill study with GI without clear cause.  He also follows with hem/onc and has gotten IV infusions in the past.  In the ED he was consented and 1 unit of PRBCs was ordered  * Received 1 unit on 5/10/22 with appropriate rise in hgb  * Received  1 unit on 05/12/22 for hgb of 6.5 and CHF.    Venofer 300 mg x 2 ordered. Defer to hematology further doses.     Transfuse for hgb < 7. We currently have a blood product shortage.      Outpatient follow up with Hematology after discharge      CKD stage IV, stable  Cr 2.3 on admission and baseline appears to be 2.5-2.6    Avoid nephrotoxins    Daily BMPs while diuresing    5/16 creat 2.19 (tolerating diuresis)  ---> 5/17 creat 2.24 (stable)    Nephrology consulted and greatly appreciated     Ascites, likely secondary to CHF  Mild elevation in INR (1.2)   * U/S mild to moderate ascites.   * No history or evidence of liver disease by liver panel or prior imaging. However, does have chronic mild elevation of INR.   Diuresis as above.     Vitamin K 5 mg daily x 3, INR on Monday 5/16    F/U ultrasound for potential paracentesis ordered for Monday with albumin, protein and cell count ordered. As anticipated SAAG > 1.1, and protein > 2.5 g/dL in ascites from - SAAG 1.6, protein 2.9 in ascites     DM type II   Last HgbA1c was 8.2 on 2/16/22     Increased lantus to PTA dose of 28 units (from 25 units) on 05/17    Added prandial novolog on 5/11/22,  increase to 7 units AC on 05/11/22.     Note elevated BS evening of 5/14/22 > adjust insulin if BS remain elevated.     Increased to high res SSI    Steroids stopped so anticipate blood sugars will trend down.    BS better controlled today. No lows.      Shoulder pain   Has had gradually worsening shoulder pain for some time.  He states its to the point he is not sleeping well..  He was evaluated by Dr. Bingham at [facility] urgent care on Sunday and had MRIs of the shoulder and spine.        Ortho consult on 5/11 appreciated. 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     Not candidate for c-spine injections.     Recommended continued pain meds PRN, WBAT of upper extremities, and OK to stop steroids.     Oxycodone 2.5-5 mg q 6 hours. Advised that he try to limit this to night time doses for best effect.      Started low dose neurontin 200 mg q HS on 5/13/22.     Appreciate orthopedic surgery assistance in planning outpatient follow up     Multiple scabs over extremities (~ 4)   Coccyx blanchable redness (no wound)     Appreciate WOC consult     Deconditioned state, requesting home care    Pt previously using outpatient PT, requests home care.     Prolonged hospital stay with decompensated CHF, high risk for deconditioning.     Appreciate PT consult, currently recommending TCU as of 5/14/22 - patient and spouse are going to discuss further. Spouse concerned of being able to manage at home safely.      Urinary retention on 05/14/22    Will catheter placed on 05/14/22 for both retention and strict I and O measurement.     Started flomax on 5/14/22 in anticipation of discontinuing will and voiding trial. (history of difficulty with urination PTA)       Constipation     On 05/15/22, states he has not had a BM for a week.     Scheduled senna and PRN dulcolax ordered    Narcotics definitely contribute.    Started miralax daily on 05/15/22, increase to BID tomorrow if not  effect.           Diet: Combination Diet Low Saturated Fat Na <2400mg Diet, No Caffeine Diet    DVT Prophylaxis: Pneumatic Compression Devices  Glasgow Catheter: PRESENT, indication: Strict 1-2 Hour I&O;Retention  Central Lines: None  Cardiac Monitoring: ACTIVE order. Indication: Acute decompensated heart failure (48 hours)  Code Status: Full Code      Disposition Plan   Expected Discharge: 05/19/2022     Anticipated discharge location: home with family;home with help/services           The patient's care was discussed with the Bedside Nurse and Patient.    Mynor Becker MD  Hospitalist Service  [facility]  Securely message with the Vocera Web Console (learn more here)  Text page via [employer] Paging/Directory         Clinically Significant Risk Factors Present on Admission                    ______________________________________________________________________    Interval History   Patient seen and examined.  He is up in chair and does report some softer voice but denies any sore throat, fevers or chills, new cough, etc.  Continuing with Bumex infusion-cardiology now signed off given echo fairly unremarkable and not seemingly of cardiac etiology.  Patient denies new pain.    Data reviewed today: I reviewed all medications, new labs and imaging results over the last 24 hours. I personally reviewed no images or EKG's today.    Physical Exam   Vital Signs: Temp: 97.3  F (36.3  C) Temp src: Oral BP: 103/49 Pulse: 74   Resp: 16 SpO2: 95 % O2 Device: None (Room air)    Weight: 212 lbs 15.43 oz    Gen: NAD, pleasant  HEENT: EOMI, MMM, no tenderness in anterior neck on palpation  Resp: no crackles,  no wheezes, no increased work of resp  CV: S1S2 heard, reg rhythm, reg rate  Abdo: soft, nontender, nondistended, bowel sounds present  Ext: stockings in place  Neuro: aaox3, CN grossly intact, no facial asymmetry      Data   Recent Labs   Lab 05/17/22  1233 05/17/22  1156 05/17/22  0859 05/16/22  0923  05/16/22  0826 05/15/22  0807 05/15/22  0806 05/13/22  1158 05/13/22  1154 05/11/22  0847 05/11/22  0832   WBC  --   --   --   --   --   --   --   --   --   --  11.1*   HGB 8.1*  --   --   --  7.3*  --  7.8*   < > 7.9*   < > 6.9*   MCV  --   --   --   --   --   --   --   --   --   --  83   PLT  --   --   --   --   --   --   --   --   --   --  220   INR  --   --   --   --  1.30*  --   --   --  1.25*  --   --      --   --   --  139  --  139   < > 137   < > 139   POTASSIUM 4.2  --   --   --  3.9  --  3.8   < > 4.3   < > 4.1   CHLORIDE 103  --   --   --  106  --  106   < > 107   < > 109   CO2 27  --   --   --  33*  --  26   < > 22   < > 24   BUN 67*  --   --   --  69*  --  76*   < > 76*   < > 73*   CR 2.24*  --   --   --  2.19*  --  2.39*   < > 2.42*   < > 2.33*   ANIONGAP 6  --   --   --  <1*  --  7   < > 8   < > 6   VANESA 8.0*  --   --   --  7.7*  --  7.8*   < > 8.1*   < > 8.4*   * 277* 224*   < > 171*   < > 83   < > 158*   < > 215*   ALBUMIN  --   --   --   --   --   --  2.3*  --  2.4*  --   --    PROTTOTAL  --   --   --   --   --   --   --   --  6.3*  --   --    BILITOTAL  --   --   --   --   --   --   --   --  0.5  --   --    ALKPHOS  --   --   --   --   --   --   --   --  127  --   --    ALT  --   --   --   --   --   --   --   --  23  --   --    AST  --   --   --   --   --   --   --   --  17  --   --     < > = values in this interval not displayed.     No results found for this or any previous visit (from the past 24 hour(s)).

## 2022-05-17 NOTE — PLAN OF CARE
DATE & TIME: 5/16/22 5441-4996    Cognitive Concerns/ Orientation : Pt A/Ox4, forgetful   BEHAVIOR & AGGRESSION TOOL COLOR: Green   ABNL VS/O2: VSS on RA  MOBILITY: Assist x1 with gait belt and walker, fall risk. Refused repositioning in bed, encouraged to reposition at midnight. Educated on importance of repositioning.  PAIN MANAGMENT: Pt sleeping no signs of pain  DIET: Cardiac  BOWEL/BLADDER: Glasgow patent with adequate output. Per patient BM today, refused senna.  ABNL LAB/BG: Cr 2.19/BNP 3810/Hgb 7.3/INR 1.30/  DRAIN/DEVICES: Bumex gtt infusing  TELEMETRY RHYTHM: NSR  SKIN: Blanchable redness on coccyx, Mepilex CDI. Edema +2 to abdomen and legs. Scattered bruising. Scattered abrasions/scabs bilateral shins, thighs, knees, scrotum, buttock and arms. Pt will scratch at them.  TESTS/PROCEDURES: Paracentesis today, 3.1L removed  D/C DATE: Discharge pending hemoglobin and transitioning back to PO diuretics.   OTHER IMPORTANT INFO: Cardiology signs off. MARKS.

## 2022-05-17 NOTE — PLAN OF CARE
DATE & TIME: 5/17/22 0977-0476    Cognitive Concerns/ Orientation : Pt A/Ox4, forgetful   BEHAVIOR & AGGRESSION TOOL COLOR: Green   ABNL VS/O2: VSS on RA  MOBILITY: Assist x1 with gait belt and walker, fall risk. Turn and repo when in bed.  Prefers to sleep in the chair.  Encouraged to shift weight. Educated on importance of repositioning.  PAIN MANAGMENT: PRN Oxycodone given x1 for L shoulder/ neck pain 8/10, effective.  DIET: Cardiac  BOWEL/BLADDER: Glasgow patent with adequate output. No BM this shift.   ABNL LAB/BG:   DRAIN/DEVICES: PIV with Bumex gtt infusing  TELEMETRY RHYTHM: NSR  SKIN: Blanchable redness on coccyx, Mepilex CDI. Edema +2 to abdomen and legs. Scattered bruising. Scattered abrasions/scabs bilateral shins, thighs, knees, scrotum, buttock and arms. Pt will scratch at them.  TESTS/PROCEDURES: None  D/C DATE: Discharge to TCU pending stable hemoglobin and transitioning back to PO diuretics.   OTHER IMPORTANT INFO: KASSY following and sent referrals. MARKS improved.

## 2022-05-18 NOTE — PROGRESS NOTES
[facility]    Medicine Progress Note - Hospitalist Service    Date of Admission:  5/10/2022    Assessment & Plan        Justyn Olivas is a 81 year old male with a history of HFpEF, transfusion dependent chronic anemia, CKD stage IV, DM type II who recently was started on steroids for shoulder pain and was sent in to the ED on 5/10/2022 from cardiology clinic due to concerns for decompensated heart failure      Patient was placed on steroids on Sunday and since then has had worsening lower extremity edema, MARKS and fluid retention in the abdomen.  Since then he has had approximately 15 pound weight gain.  He was seen in cardiology clinic this AM and they sent him in to the ED for hospitalization for decompensated heart failure.  At presentation to hospital, requiring 2 L of O2 to maintain his oxygenation        Concern of decompensated HFpEF, now less likely   Acute hypoxic respiratory distress due to above   * Echo 3/22/22: EF 55%.  RV normal in structure function and size.  No valve disease.  * U/S shows moderate ascites.   * Admit weight 216#, baseline wt ~ 200#    Patient received Bumex 1 mg IV in ED > lasix 80 mg IV BID x 5 dose    On 05/13/22, cardiology started on lasix gtt with K replacement protocol.     On 05/14/22, cardiology switched to bumex 2 mg IV x 1, followed by bumex gtt at 0.5 mg/hr.    On K 10 meq BID while diuresing.     On 05/15/22, excellent diuresis (> 3L out) over last 24 hours, wt stable. Cr and K stable. Wt stable.     Continue PTA Coreg, atorvastatin    Cardiology was considering RHC prior to discharge and CardioMEMS     Cardiology will arrange follow-up in the [facility] clinic with Shaniqua Ram PA-C in 1 to 2 weeks post discharge with a repeat BMP and NT pro BNP beforehand.    Strict I and O's, daily weights.     Compression stockings or wraps on legs    US paracentesis with 3.1L removed 5/16 - reports improvement in symptoms    5/17 after echo completed 5/16 - no  longer felt to be of cardiac etiology - more likely seeming to be due to renal disease however unclear.      RUQ US pending    Cardiology signed off 5/17    Nephrology consultation (appreciated) - metolazone added 5/17 - on RA     Continue bumex gtt - defer to nephrology ultimately    Wt down to 210, net neg 9.7L       CKD stage IV, stable  Cr 2.3 on admission and baseline appears to be 2.5-2.6    Avoid nephrotoxins    Daily BMPs while diuresing    5/16 creat 2.19 (tolerating diuresis)  ---> 5/17 creat 2.24 --> 5/18 2.19 (stable)    Nephrology consulted and greatly appreciated - nephrotic syndrome not totally congruent     Ascites, likely secondary to CHF, s/p paracentesis 5/16  Mild elevation in INR (1.2)   * U/S mild to moderate ascites.   * No history or evidence of liver disease by liver panel or prior imaging. However, does have chronic mild elevation of INR.   Diuresis as above.     Vitamin K 5 mg daily x 3, INR on Monday 5/16    Paracentesis done 5/16 - transudative - as anticipated SAAG > 1.1, and protein > 2.5 g/dL in ascites from - SAAG 1.6, protein 2.9 in ascites     Transfusion dependent chronic anemia, likely related to chronic disease   Iron deficiency - by iron panel on 5/11/22  Patient's hemoglobin has continued to drift down.  It was 6.1 the ED while being 6.7 on 5/5 and 6.1 on 5/3.   * Last transfusion PTA: 5/6.  Patient denies any recent bleeding.  He has had a thorough work up so far.  He has had colonoscopy/endoscopy/pill study with GI without clear cause.  He also follows with hem/onc and has gotten IV infusions in the past.  In the ED he was consented and 1 unit of PRBCs was ordered  * Received 1 unit on 5/10/22 with appropriate rise in hgb  * Received  1 unit on 05/12/22 for hgb of 6.5 and CHF.    Venofer 300 mg x 2 ordered. Defer to hematology further doses.     Transfuse for hgb < 7. We currently have a blood product shortage.      Outpatient follow up with Hematology after discharge    DM  type II   Last HgbA1c was 8.2 on 2/16/22     Increased lantus to PTA dose of 30 units (from 25 units) on 05/18    Added prandial novolog on 5/11/22, increase to 7 units AC on 05/11/22.     Note elevated BS evening of 5/14/22 > adjust insulin if BS remain elevated.     Increased to high res SSI    Steroids stopped so anticipate blood sugars will trend down.    BS considerably variable, adjustments as above     L Shoulder pain - impingement  C spine stenosis  Has had gradually worsening shoulder pain for some time.  He states its to the point he is not sleeping well..  He was evaluated by Dr. Bingham at [facility] urgent care on Sunday and had MRIs of the shoulder and spine.        Ortho consult on 5/11 appreciated. 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     Not candidate for c-spine injections.     Recommended continued pain meds PRN, WBAT of upper extremities, and OK to stop steroids.     Oxycodone 2.5-5 mg q 6 hours. Advised that he try to limit this to night time doses for best effect.      Started low dose neurontin 200 mg q HS on 5/13/22.     Appreciate orthopedic surgery assistance in planning outpatient follow up    5/18 - will consult spine surgery given his ongoing neck pain and prolonged hospitalization - appreciate assistance     Multiple scabs over extremities (~ 4)   Coccyx blanchable redness (no wound)     Appreciate WOC consult     Deconditioned state, requesting home care    Pt previously using outpatient PT, requests home care.     Prolonged hospital stay with decompensated CHF, high risk for deconditioning.     Appreciate PT consult, currently recommending TCU as of 5/14/22 - patient and spouse are going to discuss further. Spouse concerned of being able to manage at home safely.      Urinary retention on 05/14/22    Glasgow catheter placed on 05/14/22 for both retention and strict I and O measurement.     Started flomax on 5/14/22 in anticipation of discontinuing  will and voiding trial. (history of difficulty with urination PTA)       Constipation     On 05/15/22, states he has not had a BM for a week.     Scheduled senna and PRN dulcolax ordered    Narcotics definitely contribute.    Started miralax daily on 05/15/22, increase to BID tomorrow if not effect.           Diet: Combination Diet Low Saturated Fat Na <2400mg Diet, No Caffeine Diet    DVT Prophylaxis: Pneumatic Compression Devices  Will Catheter: PRESENT, indication: Strict 1-2 Hour I&O  Central Lines: None  Cardiac Monitoring: ACTIVE order. Indication: Acute decompensated heart failure (48 hours)  Code Status: Full Code      Disposition Plan   Expected Discharge: 05/19/2022     Anticipated discharge location: home with family;home with help/services        Total encounter time 38 minutes with greater than 50% spent in direct patient care, discussion and counseling with patient on the floor regarding ongoing diuresis and neck discomfort with spine surgery consultation requested, as well as coordination of care with staff, , and bedside RN.         The patient's care was discussed with the Bedside Nurse and Patient.    Mynor Becker MD  Hospitalist Service  [facility]  Securely message with the Vocera Web Console (learn more here)  Text page via [employer] Paging/Directory         Clinically Significant Risk Factors Present on Admission                    ______________________________________________________________________    Interval History   Pt seen and examined, up in chair and doing a bit better. Denies fever or sob. Nephrology appreciated. Neck continues to cause discomfort and into L shoulder.    Data reviewed today: I reviewed all medications, new labs and imaging results over the last 24 hours. I personally reviewed no images or EKG's today.    Physical Exam   Vital Signs: Temp: 97.6  F (36.4  C) Temp src: Oral BP: 101/50 Pulse: 68   Resp: 18 SpO2: 93 % O2 Device:  None (Room air)    Weight: 210 lbs 8 oz    Gen: NAD, pleasant  HEENT: EOMI, MMM  Resp: no focal crackles,  no wheezes, no increased work of resp  CV: S1S2 heard, reg rhythm, reg rate  Abdo: soft, nontender, nondistended, bowel sounds present  Ext: calves nontender, well perfused  Neuro: aaox3, CN grossly intact, no facial asymmetry      Data   Recent Labs   Lab 05/18/22  1204 05/18/22  0949 05/18/22  0736 05/17/22  1727 05/17/22  1233 05/16/22  0923 05/16/22  0826 05/15/22  0807 05/15/22  0806 05/13/22  1158 05/13/22  1154   HGB  --   --   --   --  8.1*  --  7.3*  --  7.8*   < > 7.9*   INR  --   --   --   --   --   --  1.30*  --   --   --  1.25*   NA  --  135  --   --  136  --  139  --  139   < > 137   POTASSIUM  --  3.5  --   --  4.2  --  3.9  --  3.8   < > 4.3   CHLORIDE  --  101  --   --  103  --  106  --  106   < > 107   CO2  --  29  --   --  27  --  33*  --  26   < > 22   BUN  --  66*  --   --  67*  --  69*  --  76*   < > 76*   CR  --  2.19*  --   --  2.24*  --  2.19*  --  2.39*   < > 2.42*   ANIONGAP  --  5  --   --  6  --  <1*  --  7   < > 8   VANESA  --  8.0*  --   --  8.0*  --  7.7*  --  7.8*   < > 8.1*   * 292* 165*   < > 302*   < > 171*   < > 83   < > 158*   ALBUMIN  --  2.1*  --   --   --   --   --   --  2.3*  --  2.4*   PROTTOTAL  --   --   --   --   --   --   --   --   --   --  6.3*   BILITOTAL  --   --   --   --   --   --   --   --   --   --  0.5   ALKPHOS  --   --   --   --   --   --   --   --   --   --  127   ALT  --   --   --   --   --   --   --   --   --   --  23   AST  --   --   --   --   --   --   --   --   --   --  17    < > = values in this interval not displayed.     No results found for this or any previous visit (from the past 24 hour(s)).

## 2022-05-18 NOTE — PLAN OF CARE
Goal Outcome Evaluation:      DATE & TIME: 5/17/22 8774-4423   Cognitive Concerns/ Orientation : Pt A/Ox4, forgetful   BEHAVIOR & AGGRESSION TOOL COLOR: Green   ABNL VS/O2: VSS on RA  MOBILITY: Assist x1 with gait belt and walker, fall risk. Turn and repo when in bed.   Encouraged to shift weight when up in chair.   PAIN MANAGEMENT: Rates left neck and shoulder pain 3-4, using Voltaren gel.  DIET: Cardiac,   BOWEL/BLADDER: Glasgow patent with adequate output. One BM this shift.   ABNL LAB/BG:  & 267, Hb 8.1, Creat 2.24, BUN 67.  DRAIN/DEVICES: PIV with Bumex gtt infusing  TELEMETRY RHYTHM: NSR  SKIN: Blanchable redness on coccyx, Mepilex CDI. Edema +2 to abdomen and legs. Wearing compression stockings. Scattered bruising. Scattered abrasions/scabs bilateral shins, thighs, knees, scrotum, buttock and arms. Pt will scratch at them.  TESTS/PROCEDURES: US of abd yesterday.  D/C DATE: Discharge to TCU pending stable hemoglobin and transitioning back to PO diuretics.   OTHER IMPORTANT INFO: SW following and sent referrals. MARKS improved. Feels better since paracentesis yesterday.    Pt has new sore on tip of penis under catheter- scant bleeding, cleaned and cream applied

## 2022-05-18 NOTE — PROGRESS NOTES
Chart check.    No new recommendations for patient's anemia related to iron deficiency and chronic disease as well as chronic kidney disease. S/p Venofer x 2. Continue daily Hgb monitoring and transfuse for Hgb < 7 g/dL.  He will continue Aranesp outpatient every 3 weeks.    Will signoff.  Please call if questions.    Kristine Nash MD  Hematology/Oncology  [facility]

## 2022-05-18 NOTE — PLAN OF CARE
Goal Outcome Evaluation:                    DATE & TIME: 5/18/22 07-19   Cognitive Concerns/ Orientation : Pt A/Ox4, f  BEHAVIOR & AGGRESSION TOOL COLOR: Green   ABNL VS/O2: VSS on RA  MOBILITY: Assist x1 with gait belt and walker, fall risk. Turn and repo when in bed.   Encouraged to shift weight when up in chair.   PAIN MANAGEMENT: Rates left neck and shoulder pain 8, 5 post oxy, using Voltaren gel.  DIET: Cardiac, good appetite  BOWEL/BLADDER: Glasgow patent with adequate output. Small open area at end of penis, reddened,barrier cream applied.   ABNL LAB/BG: /296/358, Hb 8.1, Creat 2.19,.  DRAIN/DEVICES: PIV with Bumex gtt infusing. 2nd IV site started as left SL pink, but then cleared up with good blood return.  TELEMETRY RHYTHM: NSR  SKIN: Blanchable redness on coccyx, Mepilex CDI. Edema +2 to abdomen and legs. Wearing compression stockings. Scattered bruising. Scattered abrasions/scabs bilateral shins, thighs, knees, scrotum, buttock and arms. Pt will scratch at them.Yahaira lotion prn.  TESTS/PROCEDURES: US of abd yesterday,results pendig    D/C DATE: Discharge to TCU pending stable hemoglobin and transitioning back to PO diuretics.   OTHER IMPORTANT INFO: SW following and sent referrals. MARKS improved.  Pt seen by Ortho as he wanted to have something done for his neck before discharge, but probably not a surgical candidate at this time per pt. Pt and wife upset.

## 2022-05-18 NOTE — CONSULTS
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 I had extensive discussion with the patient and his wife today.  I noted the diagnosis and noted the natural history of cervical myelopathy.  2. I discussed that the treatment for cervical myelopathy and associated radicular symptoms is surgical in the form of an anterior cervical discectomy and fusion.  Unfortunately the patient's comorbidities currently would make him extremely high risk to proceed to the operating room, and would not recommend during this hospitalization.  3. I discussed with the patient that if in the coming weeks his cardiology team, nephrology team, and the internal medicine team feel that he is medically optimized and the risks of proceeding surgically acceptable, we could consider treating this operatively.  I noted that even an optimized scenario he is likely at much higher risk for complications related to his age, heavy comorbid load, and chronic anemia.  4. We discussed temporizing methods to try to control his left shoulder radicular pain.  We discussed steroid injections.  At this time I would not recommend as I think it unlikely to be beneficial as the underlying source is spinal cord compression, rather than nerve root compression.  Additionally with his diabetic status it would likely cause his blood sugars to transiently run high.  I noted that if he still wanted to proceed with a steroid injection, I would be open to ordering it, though again I would have limited expectations that it may make a substantial difference given the underlying source of his symptoms is likely the spinal cord compression  itself rather than nerve root.  5. Plan have the patient follow-up with me approximately 2 weeks post discharge, hopefully at that time we can have some clarity from cardiology and nephrology regarding his optimization status and whether there is room for additional optimization as well as the associated risks if he were to proceed surgically.           Chief Complaint:   Left shoulder radicular pain           History of Present Illness:   This patient is a 81 year old male with a significant past medical history of heart failure, chronic anemia, chronic kidney disease who presents with left shoulder pain.    Saw the patient recently in the office as an outpatient where we discussed his left shoulder pain.  He was referred for an MRI which was obtained, however the day that he was scheduled to follow-up to discuss the results he was admitted to the hospital related to his comorbidities.    Since that time the patient is remained inpatient with improvement in his cardiac status and some improvement in his renal status to date.  Throughout the hospitalization he has continued to experience severe left shoulder radicular pain, extending somewhat down the forearm as well in a nondermatomal pattern.  This pain has been debilitating.     Symptom Profile  Location of symptoms:   Left shoulder predominantly (possibly C5 distribution), with additional extension down to the dorsal forearm in a nondermatomal pattern  Severity:   Severe  Exacerbating:    Spurling maneuver  Alleviate:   Pain medication    He reports over recent years that his dexterity and balance have declined.          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)      Cerebral infarction (H)      Chronic diastolic heart failure (H)      Diabetes (H)      Council (hard of hearing)      Hyperlipemia       Hypokalemia      Renal disease             Past Surgical History:     Past Surgical History:   Procedure Laterality Date     BONE MARROW BIOPSY, BONE SPECIMEN, NEEDLE/TROCAR N/A 2021    Procedure: BIOPSY, BONE MARROW;  Surgeon: Juanjo Hoffman MD;  Location:  GI     COLONOSCOPY N/A 2022    Procedure: COLONOSCOPY, FLEXIBLE, WITH LESION REMOVAL USING SNARE;  Surgeon: Jules Lane MD;  Location:  GI     COLONOSCOPY N/A 2022    Procedure: COLONOSCOPY, WITH POLYPECTOMY AND BIOPSY;  Surgeon: Jules Lane MD;  Location: [facility]     CV PERICARDIOCENTESIS N/A 2020    Procedure: Pericardiocentesis;  Surgeon: Chas Chambers MD;  Location:  HEART CARDIAC CATH LAB     ENT SURGERY       ESOPHAGOSCOPY, GASTROSCOPY, DUODENOSCOPY (EGD), COMBINED N/A 3/27/2021    Procedure: Esophagogastroduodenoscopy, With Biopsy;  Surgeon: Luc Nash MD;  Location: [facility]     ESOPHAGOSCOPY, GASTROSCOPY, DUODENOSCOPY (EGD), COMBINED N/A 2022    Procedure: ESOPHAGOGASTRODUODENOSCOPY (EGD);  Surgeon: Jules Lane MD;  Location: [facility]     LAPAROSCOPIC CHOLECYSTECTOMY N/A 2021    Procedure: LAPAROSCOPIC CHOLECYSTECTOMY;  Surgeon: Len Coates MD;  Location:  OR     SOFT TISSUE SURGERY      skin cancer surgery on nose            Social History:   Smoking: None  Ambulatory status: Walker    Social History     Tobacco Use     Smoking status: Former Smoker     Packs/day: 2.00     Years: 11.00     Pack years: 22.00     Types: Cigarettes     Start date:      Quit date:      Years since quittin.4     Smokeless tobacco: Never Used   Substance Use Topics     Alcohol use: No     Alcohol/week: 0.0 standard drinks            Family History:   Denies family history of bleeding or clotting disorders  Family History   Problem Relation Age of Onset     Family History Negative Mother      Family History Negative Father             Allergies:     Allergies   Allergen  Reactions     No Known Allergies             Medications:   Anticoagulants:  aspirin  Medications Prior to Admission   Medication Sig Dispense Refill Last Dose     albuterol (PROAIR HFA) 108 (90 Base) MCG/ACT inhaler INHALE 2 PUFFS BY MOUTH FOUR TIMES DAILY AS NEEDED 8.5 g 3  at prn     aspirin 81 MG EC tablet Take 81 mg by mouth daily   5/10/2022 at am     atorvastatin (LIPITOR) 20 MG tablet Take 1 tablet (20 mg) by mouth daily 90 tablet 3 5/9/2022 at pm     carvedilol (COREG) 6.25 MG tablet Take 1 tablet (6.25 mg) by mouth 2 times daily (with meals) 180 tablet 3 5/10/2022 at am     clobetasol (TEMOVATE) 0.05 % external cream Apply topically daily as needed    at prn     dorzolamide-timolol (COSOPT) 2-0.5 % ophthalmic solution Place 1 drop into the right eye 2 times daily    5/10/2022 at am     fluticasone (FLONASE) 50 MCG/ACT nasal spray SHAKE LIQUID AND USE 2 SPRAYS IN EACH NOSTRIL DAILY 16 g 3 5/10/2022 at am     folic acid-vit B6-vit B12 (FOLGARD) 0.8-10-0.115 MG TABS per tablet Take 1 tablet by mouth daily Continue per Nephrology recommendation 90 tablet 3 5/10/2022 at am     hydrocortisone (CORTAID) 1 % external cream Apply 1 g topically nightly as needed For itching on BACK    at prn     insulin glargine (LANTUS SOLOSTAR) 100 UNIT/ML pen ADMINISTER 25 UNITS UNDER THE SKIN AT BEDTIME 45 mL 3      insulin lispro (HUMALOG KWIKPEN) 100 UNIT/ML (1 unit dial) KWIKPEN Inject 5-15 Units Subcutaneous 3 times daily (before meals) Sliding scale with meals (Patient taking differently: Inject 5-15 Units Subcutaneous 3 times daily (before meals) Sliding scale with meals:  <100: 5 units  101-150: 7 units  151-250: 9 units  251-350: 12 units  >351 15 units) 45 mL 3      methocarbamol (ROBAXIN) 500 MG tablet Take 500 mg by mouth 4 times daily as needed for muscle spasms    at prn     pantoprazole (PROTONIX) 40 MG EC tablet TAKE 1 TABLET(40 MG) BY MOUTH TWICE DAILY 180 tablet 2 5/10/2022 at am     potassium chloride ER  (K-TAB) 20 MEQ CR tablet Take 1 tablet (20 mEq) by mouth daily   5/10/2022 at am     SENNA-docusate sodium (SENNA S) 8.6-50 MG tablet Take 1 tablet by mouth 2 times daily (Patient taking differently: Take 1 tablet by mouth nightly as needed) 60 tablet 1  at prn     sertraline (ZOLOFT) 50 MG tablet Take 1 tablet (50 mg) by mouth daily 90 tablet 3 5/10/2022 at am     torsemide (DEMADEX) 20 MG tablet Take 80 mg by mouth daily   5/10/2022 at increased dose today per MD     traZODone (DESYREL) 100 MG tablet TAKE 1 TABLET(100 MG) BY MOUTH AT BEDTIME 90 tablet 3 5/9/2022 at pm     blood glucose (STEVE CONTOUR) test strip TESTING FOUR TIMES DAILY OR AS DIRECTED 400 strip 0      insulin pen needle (BD PEN NEEDLE FERCHO 2ND GEN) 32G X 4 MM miscellaneous USE AS DIRECTED UP TO FOUR TIMES DAILY 400 each 3              Review of Systems:   A 10 point review of systems was performed, and was negative except as noted in HPI.         Physical Exam:     Patient Vitals for the past 8 hrs:   BP Temp Temp src Pulse Resp SpO2   05/18/22 1544 106/56 97.5  F (36.4  C) Oral 76 16 98 %   05/18/22 1200 100/45 -- -- 75 -- --   05/18/22 1130 -- -- -- -- 16 --       General: awake, alert, cooperative, no apparent distress, appears stated age  HEENT: normal  Respiratory: breathing non-labored  Cardiovascular: skin warm and well perfused  Skin: no rashes or lesions  Abdomen:  non-distended  Lymph:  No abnormal swelling of uninjured extremities  Heme:  No petechiae  Neurological: CN II-XII grossly intact  Musculoskeletal:       Spine:   Skin: intact about neck without evidence of infection    Sensation:      R       L    C5:   Intact   Intact    C6:     Intact   Intact    C7:   Intact   Intact    C8:   Intact   Intact     Motor:     R L    C5: Deltoid   5  5    C6:   Biceps    5  5    C7: Triceps   5  5    C8:     5  5    T1: Intrinsics  5 5    Positive Spurling maneuver on examination           Data:   MRI from 2 weeks ago demonstrates severe  central stenosis C4-5 with spinal cord compression and myelomalacia associated.  Multilevel foraminal stenosis also evident, though artifact related to motion makes it difficult to specify exactly which levels are involved.          Total time of this encounter, including non-face-to-face time was 30 minutes.         Kareem Scott MD  Orthopaedic Spine Surgery  [facility]

## 2022-05-18 NOTE — PROGRESS NOTES
Care Management Follow Up    Length of Stay (days): 8    Expected Discharge Date: 05/20/2022     Concerns to be Addressed: discharge planning to TCU.  Patient clinically accepted at [place] pending bed availability at time of discharge.  Patient plan of care discussed at interdisciplinary rounds: Yes    Anticipated Discharge Disposition: TCU     Anticipated Discharge Services: None  Anticipated Discharge DME: None    Patient/family educated on Medicare website which has current facility and service quality ratings:    Education Provided on the Discharge Plan:    Patient/Family in Agreement with the Plan: yes    Referrals Placed by CM/SW: Internal Clinic Care Coordination, External Care Coordination, Homecare, Specialty Providers  Private pay costs discussed: private room/amenity fees    Additional Information:  [facility] Dr Becker anticipates the earliest patient will be ready for discharge is Friday May 20.        FLORIDALMA Mari

## 2022-05-18 NOTE — PROGRESS NOTES
[facility]    Nephrology Progress Note     Assessment & Plan       Justyn Olivas is a 81 year old male who was admitted on 5/10/2022.      1) Volume Overload:  Cause does not seem to be cardiac or liver disease.  Volume overload this severe that is on the basis of kidney disease is typically from nephrotic syndrome. But he does not have that much albuminuria and U P/C ratio is low grade.      2) CKD 4: On basis of ? NSAIDs ?  HTN ? UA is bland.       3) Prior Heavy NSAID use.     4) HTN - BP is soft.  Best cut back on carvedilol.       5) DM     6) Ascites without liver disease.  Normal SAAG suggests normal portal pressure.     Plan:     Continue bumet + metolazone.      Chas Campos MD  [facility]phrology  [phone]    Interval History     He seems to be diuresing better.  Weight is down 1 kg.  I/O ?  He thinks his legs are a little better.      Physical Exam   Temp: 97.6  F (36.4  C) Temp src: Oral BP: 100/45 Pulse: 75   Resp: 16 SpO2: 93 % O2 Device: None (Room air)    Vitals:    05/15/22 0900 05/16/22 0500 05/17/22 2008   Weight: 96.4 kg (212 lb 8 oz) 96.6 kg (212 lb 15.4 oz) 95.5 kg (210 lb 8 oz)     Vital Signs with Ranges  Temp:  [97.3  F (36.3  C)-97.6  F (36.4  C)] 97.6  F (36.4  C)  Pulse:  [68-75] 75  Resp:  [16-18] 16  BP: (100-107)/(45-55) 100/45  SpO2:  [93 %-99 %] 93 %  I/O last 3 completed shifts:  In: 874 [P.O.:800; I.V.:74]  Out: 2750 [Urine:2750]    GENERAL APPEARANCE: pleasant, NAD, a & o  HEENT:  Eyes/ears/nose/neck grossly normal  RESP: lungs cta b c good efforts, no crackles, rhonchi or wheezes  CV: RRR, nl S1/S2, no m/r/g   ABDOMEN: o/s/nt/nd, bs present  EXTREMITIES/SKIN: no rashes/lesions; 2-3+ BLE edema    Medications     bumetanide 0.5 mg/hr (05/17/22 6847)       aspirin  81 mg Oral Daily     atorvastatin  20 mg Oral Daily     carvedilol  6.25 mg Oral BID w/meals     dorzolamide-timolol  1 drop Right Eye BID     gabapentin  200 mg Oral  At Bedtime     insulin aspart  1-10 Units Subcutaneous TID AC     insulin aspart  1-7 Units Subcutaneous At Bedtime     insulin aspart  7 Units Subcutaneous TID w/meals     insulin glargine  30 Units Subcutaneous At Bedtime     metolazone  10 mg Oral Daily     pantoprazole  40 mg Oral QAM AC     polyethylene glycol  17 g Oral Daily     potassium chloride ER  10 mEq Oral BID     sennosides  1-2 tablet Oral BID     sertraline  50 mg Oral Daily     sodium chloride (PF)  3 mL Intracatheter Q8H     tamsulosin  0.4 mg Oral Daily     traZODone  100 mg Oral At Bedtime       Data   BMP  Recent Labs   Lab 05/18/22  1204 05/18/22  0949 05/18/22  0736 05/18/22  0142 05/17/22  1727 05/17/22  1233 05/16/22  0923 05/16/22  0826 05/15/22  0807 05/15/22  0806   NA  --  135  --   --   --  136  --  139  --  139   POTASSIUM  --  3.5  --   --   --  4.2  --  3.9  --  3.8   CHLORIDE  --  101  --   --   --  103  --  106  --  106   VANESA  --  8.0*  --   --   --  8.0*  --  7.7*  --  7.8*   CO2  --  29  --   --   --  27  --  33*  --  26   BUN  --  66*  --   --   --  67*  --  69*  --  76*   CR  --  2.19*  --   --   --  2.24*  --  2.19*  --  2.39*   * 292* 165* 267*   < > 302*   < > 171*   < > 83    < > = values in this interval not displayed.     Phos@LABRCNTIPR(phos:4)  CBC)  Recent Labs   Lab 05/17/22  1233 05/16/22  0826 05/15/22  0806 05/14/22  1920   HGB 8.1* 7.3* 7.8* 7.3*     Recent Labs   Lab 05/13/22  1154   AST 17   ALT 23   ALKPHOS 127   BILITOTAL 0.5     Recent Labs   Lab 05/16/22  0826   INR 1.30*       Attestation:   I have reviewed today's relevant vital signs, notes, medications, labs and imaging.

## 2022-05-19 NOTE — PROGRESS NOTES
[facility]    Nephrology Progress Note     Assessment & Plan     Justyn Olivas is a 81 year old male who was admitted on 5/10/2022.      1) Volume Overload:  Cause does not seem to be cardiac or liver disease.  Volume overload this severe that is on the basis of kidney disease is typically from nephrotic syndrome. But he does not have that much albuminuria and U P/C ratio is low grade.      2) CKD 4: On basis of ? NSAIDs ?  HTN ? UA is bland.       3) Prior Heavy NSAID use.     4) HTN - BP is soft.  Best stop carvedilol.       5) DM     6) Ascites without liver disease.  Normal SAAG suggests normal portal pressure.     Plan:     Stop bumet drip  Begin bumet 4 mg BID tomorrow.                 Chas Campos MD  [facility]  [phone]    Interval History     Weight is down 15#.  Cr up a but today to 2.43  BP remains low - 105/60.  He feels weak today.  Collapsed on way to [place].    Bumet drip stopped.      Physical Exam   Temp: 97.7  F (36.5  C) Temp src: Axillary BP: 105/60 Pulse: 79   Resp: 18 SpO2: 96 % O2 Device: None (Room air)    Vitals:    05/16/22 0500 05/17/22 2008 05/18/22 2016   Weight: 96.6 kg (212 lb 15.4 oz) 95.5 kg (210 lb 8 oz) 92.7 kg (204 lb 6.4 oz)     Vital Signs with Ranges  Temp:  [97.5  F (36.4  C)-97.8  F (36.6  C)] 97.7  F (36.5  C)  Pulse:  [76-85] 79  Resp:  [16-18] 18  BP: (105-112)/(56-62) 105/60  SpO2:  [96 %-98 %] 96 %  I/O last 3 completed shifts:  In: 1230 [P.O.:1230]  Out: 4660 [Urine:4660]    GENERAL APPEARANCE: pleasant, NAD, a & o  HEENT:  Eyes/ears/nose/neck grossly normal  RESP: lungs cta b c good efforts, no crackles, rhonchi or wheezes  CV: RRR, nl S1/S2, no m/r/g   ABDOMEN: o/s/nt/nd, bs present  EXTREMITIES/SKIN: no rashes/lesions; 2+ BLE edema    Medications       aspirin  81 mg Oral Daily     atorvastatin  20 mg Oral Daily     [START ON 5/20/2022] bumetanide  4 mg Oral BID     carvedilol  3.125 mg Oral BID w/meals      clopidogrel  75 mg Oral Daily     dorzolamide-timolol  1 drop Right Eye BID     gabapentin  200 mg Oral At Bedtime     insulin aspart  1-10 Units Subcutaneous TID AC     insulin aspart  1-7 Units Subcutaneous At Bedtime     insulin aspart  7 Units Subcutaneous TID w/meals     insulin glargine  30 Units Subcutaneous At Bedtime     pantoprazole  40 mg Oral QAM AC     polyethylene glycol  17 g Oral Daily     potassium chloride ER  10 mEq Oral BID     sennosides  1-2 tablet Oral BID     sertraline  50 mg Oral Daily     sodium chloride (PF)  3 mL Intracatheter Q8H     tamsulosin  0.4 mg Oral Daily     traZODone  100 mg Oral At Bedtime       Data   BMP  Recent Labs   Lab 05/19/22  1157 05/19/22  1100 05/19/22  0902 05/19/22  0824 05/18/22  1204 05/18/22  0949 05/17/22  1727 05/17/22  1233 05/16/22  0923 05/16/22  0826   NA  --   --  137  --   --  135  --  136  --  139   POTASSIUM  --   --  3.4  --   --  3.5  --  4.2  --  3.9   CHLORIDE  --   --  98  --   --  101  --  103  --  106   VANESA  --   --  8.3*  --   --  8.0*  --  8.0*  --  7.7*   CO2  --   --  32  --   --  29  --  27  --  33*   BUN  --   --  70*  --   --  66*  --  67*  --  69*   CR  --   --  2.43*  --   --  2.19*  --  2.24*  --  2.19*   * 205* 102* 95   < > 292*   < > 302*   < > 171*    < > = values in this interval not displayed.     Phos@LABRCNTIPR(phos:4)  CBC)  Recent Labs   Lab 05/17/22  1233 05/16/22  0826 05/15/22  0806 05/14/22  1920   HGB 8.1* 7.3* 7.8* 7.3*     Recent Labs   Lab 05/13/22  1154   AST 17   ALT 23   ALKPHOS 127   BILITOTAL 0.5     Recent Labs   Lab 05/16/22  0826   INR 1.30*       Attestation:   I have reviewed today's relevant vital signs, notes, medications, labs and imaging.

## 2022-05-19 NOTE — PROGRESS NOTES
[facility]    Medicine Progress Note - Hospitalist Service    Date of Admission:  5/10/2022    Assessment & Plan        Justyn Olivas is a 81 year old male with a history of HFpEF, transfusion dependent chronic anemia, CKD stage IV, DM type II who recently was started on steroids for shoulder pain and was sent in to the ED on 5/10/2022 from cardiology clinic due to concerns for decompensated heart failure      Patient was placed on steroids on Sunday and since then has had worsening lower extremity edema, MARKS and fluid retention in the abdomen.  Since then he has had approximately 15 pound weight gain.  He was seen in cardiology clinic this AM and they sent him in to the ED for hospitalization for decompensated heart failure.  At presentation to hospital, requiring 2 L of O2 to maintain his oxygenation         Concern of decompensated HFpEF, now less likely   Acute hypoxic respiratory distress due to above   * Echo 3/22/22: EF 55%.  RV normal in structure function and size.  No valve disease.  * U/S shows moderate ascites.   * Admit weight 216#, baseline wt ~ 200#    Patient received Bumex 1 mg IV in ED > lasix 80 mg IV BID x 5 dose    On 05/13/22, cardiology started on lasix gtt with K replacement protocol.     On 05/14/22, cardiology switched to bumex 2 mg IV x 1, followed by bumex gtt at 0.5 mg/hr.    On K 10 meq BID while diuresing.     On 05/15/22, excellent diuresis (> 3L out) over last 24 hours, wt stable. Cr and K stable. Wt stable.     Continue PTA Coreg, atorvastatin    Cardiology was considering RHC prior to discharge and CardioMEMS     Cardiology will arrange follow-up in the [facility] with Shaniqua Ram PA-C in 1 to 2 weeks post discharge with a repeat BMP and NT pro BNP beforehand.    Strict I and O's, daily weights.     Compression stockings or wraps on legs    US paracentesis with 3.1L removed 5/16 - reports improvement in symptoms    5/17 after echo completed 5/16 -  no longer felt to be of cardiac etiology - more likely seeming to be due to renal disease however unclear.      5/17 RUQ US Small ascites. Coarse appearance of the liver that could represent underlying liver disease. No visible lesion but assessment is somewhat limited. If there is concern, MRI could be performed for more sensitive assessment of the liver.    Cardiology signed off 5/17    Nephrology consultation (appreciated) - metolazone added 5/17 - on RA     Wt down to 206, net neg 12.7L (over 3L in a day or so)    Creat slightly bumped, concur with stopping bumex gtt as below       CKD stage IV, stable  Cr 2.3 on admission and baseline appears to be 2.5-2.6    Avoid nephrotoxins    Daily BMPs while diuresing    5/16 creat 2.19 (tolerating diuresis)  ---> 5/17 creat 2.24 --> 5/18 2.19 --> 5/19 2.4, mild increase    bumex gtt stopped, likey PO dosing tmrw per nephro     Ascites, likely secondary to CHF, s/p paracentesis 5/16  Mild elevation in INR (1.2)   * U/S mild to moderate ascites.   * No history or evidence of liver disease by liver panel or prior imaging. However, does have chronic mild elevation of INR.   Diuresis as above.     Vitamin K 5 mg daily x 3, INR on Monday 5/16    Paracentesis done 5/16 - transudative - as anticipated SAAG > 1.1, and protein > 2.5 g/dL in ascites from - SAAG 1.6, protein 2.9 in ascites    US as above, still feeling much improvement since tap     Transfusion dependent chronic anemia, likely related to chronic disease   Iron deficiency - by iron panel on 5/11/22  Patient's hemoglobin has continued to drift down.  It was 6.1 the ED while being 6.7 on 5/5 and 6.1 on 5/3.   * Last transfusion PTA: 5/6.  Patient denies any recent bleeding.  He has had a thorough work up so far.  He has had colonoscopy/endoscopy/pill study with GI without clear cause.  He also follows with hem/onc and has gotten IV infusions in the past.  In the ED he was consented and 1 unit of PRBCs was ordered  *  Received 1 unit on 5/10/22 with appropriate rise in hgb  * Received  1 unit on 05/12/22 for hgb of 6.5 and CHF.    Venofer 300 mg x 2 ordered. Defer to hematology further doses.     Transfuse for hgb < 7. We currently have a blood product shortage.      Outpatient follow up with Hematology after discharge     DM type II   Last HgbA1c was 8.2 on 2/16/22     Increased lantus to PTA dose of 30 units (from 25 units) on 05/18    Added prandial novolog on 5/11/22, increase to 7 units AC on 05/11/22.     Note elevated BS evening of 5/14/22 > adjust insulin if BS remain elevated.     Continue high res SSI    BS considerably variable, adjustment as necessary     L Shoulder pain - impingement  C spine stenosis  Has had gradually worsening shoulder pain for some time.  He states its to the point he is not sleeping well..  He was evaluated by Dr. Scott at [facility] urgent care on Sunday and had MRIs of the shoulder and spine.        Ortho consult on 5/11 appreciated. 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     Not candidate for c-spine injections.     Recommended continued pain meds PRN, WBAT of upper extremities, and OK to stop steroids.     Oxycodone 2.5-5 mg q 6 hours. Advised that he try to limit this to night time doses for best effect.      Started low dose neurontin 200 mg q HS on 5/13/22.     Appreciate orthopedic surgery assistance in planning outpatient follow up    5/18 - will consult spine surgery given his ongoing neck pain and prolonged hospitalization - appreciate assistance - plan for outpatient follow up 2 wks after discharge - no surgical option currently     Multiple scabs over extremities (~ 4)   Coccyx blanchable redness (no wound)     Appreciate WOC consult     Deconditioned state, requesting home care    Pt previously using outpatient PT, requests home care.     Prolonged hospital stay with decompensated CHF, high risk for deconditioning.     Appreciate PT consult,  currently recommending TCU as of 5/14/22 - patient and spouse are going to discuss further. Spouse concerned of being able to manage at home safely.      Urinary retention on 05/14/22    Will catheter placed on 05/14/22 for both retention and strict I and O measurement.     Started flomax on 5/14/22 in anticipation of discontinuing will and voiding trial. (history of difficulty with urination PTA)       Constipation     On 05/15/22, states he has not had a BM for a week.     Scheduled senna and PRN dulcolax ordered    Narcotics definitely contribute.    Started miralax daily on 05/15/22 can do BID if necessary          Diet: Combination Diet Low Saturated Fat Na <2400mg Diet, No Caffeine Diet    DVT Prophylaxis: Pneumatic Compression Devices  Will Catheter: PRESENT, indication: Strict 1-2 Hour I&O  Central Lines: None  Cardiac Monitoring: None  Code Status: Full Code      Disposition Plan   Expected Discharge: 05/20/2022     Anticipated discharge location: home with family;home with help/services    Delays:     Placement - TCU  IV Medication - consider oral or Home Infusion            The patient's care was discussed with the Bedside Nurse and Patient.    Mynor Becker MD  Hospitalist Service  [facility]  Securely message with the Vocera Web Console (learn more here)  Text page via [employer] Paging/Directory         Clinically Significant Risk Factors Present on Admission                    ______________________________________________________________________    Interval History   Patient seen and examined midday.  Up in chair feeling a bit fatigued in general.  No new or focal weakness numbness reported.  Patient felt well in the morning and then worked with therapy.  We discussed that he may be a bit on the dry side after dropping over 3 L yesterday not to mention with increasing activity after being hospitalized for over a week he is expected to be deconditioned.  Denies fevers  chills or new cough or new pain.  Appreciate specialist assistance.    Data reviewed today: I reviewed all medications, new labs and imaging results over the last 24 hours. I personally reviewed no images or EKG's today.    Physical Exam   Vital Signs: Temp: 97.7  F (36.5  C) Temp src: Axillary BP: 105/60 Pulse: 79   Resp: 18 SpO2: 96 % O2 Device: None (Room air)    Weight: 204 lbs 6.4 oz    Gen: NAD, pleasant  HEENT: EOMI, MMM  Resp: a few basilar crackles,  no wheezes, no increased work of resp  CV: S1S2 heard, reg rhythm, reg rate  Abdo: soft, nontender, nondistended, bowel sounds present  Ext: calves nontender, well perfused  Neuro: aaox3, CN grossly intact, no facial asymmetry      Data   Recent Labs   Lab 05/19/22  1157 05/19/22  1100 05/19/22  0902 05/18/22  1204 05/18/22  0949 05/17/22  1727 05/17/22  1233 05/16/22  0923 05/16/22  0826 05/15/22  0807 05/15/22  0806 05/13/22  1158 05/13/22  1154   HGB  --   --   --   --   --   --  8.1*  --  7.3*  --  7.8*   < > 7.9*   INR  --   --   --   --   --   --   --   --  1.30*  --   --   --  1.25*   NA  --   --  137  --  135  --  136  --  139  --  139   < > 137   POTASSIUM  --   --  3.4  --  3.5  --  4.2  --  3.9  --  3.8   < > 4.3   CHLORIDE  --   --  98  --  101  --  103  --  106  --  106   < > 107   CO2  --   --  32  --  29  --  27  --  33*  --  26   < > 22   BUN  --   --  70*  --  66*  --  67*  --  69*  --  76*   < > 76*   CR  --   --  2.43*  --  2.19*  --  2.24*  --  2.19*  --  2.39*   < > 2.42*   ANIONGAP  --   --  7  --  5  --  6  --  <1*  --  7   < > 8   VANESA  --   --  8.3*  --  8.0*  --  8.0*  --  7.7*  --  7.8*   < > 8.1*   * 205* 102*   < > 292*   < > 302*   < > 171*   < > 83   < > 158*   ALBUMIN  --   --   --   --  2.1*  --   --   --   --   --  2.3*  --  2.4*   PROTTOTAL  --   --   --   --   --   --   --   --   --   --   --   --  6.3*   BILITOTAL  --   --   --   --   --   --   --   --   --   --   --   --  0.5   ALKPHOS  --   --   --   --   --   --    --   --   --   --   --   --  127   ALT  --   --   --   --   --   --   --   --   --   --   --   --  23   AST  --   --   --   --   --   --   --   --   --   --   --   --  17    < > = values in this interval not displayed.     No results found for this or any previous visit (from the past 24 hour(s)).

## 2022-05-19 NOTE — PLAN OF CARE
Goal Outcome Evaluation:                    ATE & TIME: 5/19/22 07-19   Cognitive Concerns/ Orientation : Pt A/Ox4, forgetful  BEHAVIOR & AGGRESSION TOOL COLOR: Green  ABNL VS/O2: BP soft, coreg dc'd  MOBILITY: Assist x1 with gait belt and walker, fall risk. Gets fatigued with walking in ortiz- keep w/c close for him to rest.Turn and repo when in bed.   Encouraged to shift weight when up in chair.   PAIN MANAGEMENT:, Voltaren gel applied x2.   DIET: Cardiac, good appetite  BOWEL/BLADDER: [place] dc'd at 1400, has voided without problem 300cc. Small open area at end of penis, reddened,barrier cream applied.  ABNL LAB/BG: BG 95/209/ 181 Hb 8.1 5/17, Creat 2.43,.  DRAIN/DEVICES: PIV x2.  TELEMETRY RHYTHM: NSR-dc'd  SKIN: Blanchable redness on coccyx, open area on cheek,RIGHT<left Mepilex CHGD Edema +1-2 to abdomen and legs. Wearing compression stockings. Scattered bruising. Scattered abrasions/scabs bilateral shins, thighs, knees, scrotum, buttock and arms;  much improvedPt will scratch at them.Yahaira lotion prn.  TESTS/PROCEDURES na    D/C DATE: Discharge to TCU ,pending stable hemoglobin and transitioning back to PO diuretics.   OTHER IMPORTANT INFO: [facility] following and sent referrals. MARKS improved. Pt had episode during PT, became very weak,had to sit. /56 and blood sugar 206 during the episode, pt denied dizziness but said he lost all of his energy.

## 2022-05-19 NOTE — PLAN OF CARE
Goal Outcome Evaluation:      DATE & TIME: 5/18/22    Cognitive Concerns/ Orientation : Pt A/Ox4, forgetful  BEHAVIOR & AGGRESSION TOOL COLOR: Green   ABNL VS/O2: BP soft, coreg dose adjusted  MOBILITY: Assist x1 with gait belt and walker, fall risk. Gets fatigued with walking in ortiz- keep w/c close for him to rest.Turn and repo when in bed.   Encouraged to shift weight when up in chair.   PAIN MANAGEMENT: Rates left neck and shoulder pain 8, 5 post oxy, Voltaren gel applied x2. Oxy given x1 this shift  DIET: Cardiac, good appetite  BOWEL/BLADDER: [place] patent with adequate output. Small open area at end of penis, reddened,barrier cream applied.   ABNL LAB/BG: /130 Hb 8.1, Creat 2.19,.  DRAIN/DEVICES: PIV with Bumex gtt infusing. 2nd IV site started as left SL pink, but then cleared up with good blood return.  TELEMETRY RHYTHM: NSR  SKIN: Blanchable redness on coccyx, Mepilex CDI. Edema +2 to abdomen and legs. Wearing compression stockings. Scattered bruising. Scattered abrasions/scabs bilateral shins, thighs, knees, scrotum, buttock and arms. Pt will scratch at them.Yahaira lotion prn.  TESTS/PROCEDURES: US of abd 5/17,results pending    D/C DATE: Discharge to TCU pending stable hemoglobin and transitioning back to PO diuretics.   OTHER IMPORTANT INFO: [facility] following and sent referrals. MARKS improved.

## 2022-05-20 NOTE — PLAN OF CARE
Goal Outcome Evaluation:    Cognitive Concerns/ Orientation : Patient alert and oriented, forgetful  BEHAVIOR & AGGRESSION TOOL COLOR: Green  ABNL VS/O2: VSS on room air  MOBILITY: Assist x1 with gait belt and walker, fall risk. Gets fatigued with walking in ortiz- keep w/c close for him to rest. Turn and repo when in bed. Encouraged to shift weight when up in chair.  Declined to ambulate in hallway this evening.   PAIN MANAGEMENT: Complains of left shoulder and neck pain, Voltaren gel and oxycodone PRN.   DIET: Cardiac, good appetite  BOWEL/BLADDER: [place] dc'd at 1400 on 5-19, has voided without problem. Small open area at end of penis, reddened, barrier cream applied.  ABNL LAB/BG:  and 297 Hb 8.1 5/17  Creat 2.43  DRAIN/DEVICES: PIV x2 SL  TELEMETRY RHYTHM: NSR-dc'd  SKIN: Blanchable redness on coccyx, open area on cheek, right<left Mepilex CHGD Edema +1-2 to abdomen and legs. Wearing compression stockings. Scattered bruising. Scattered abrasions/scabs bilateral shins, thighs, knees, scrotum, buttock and arms, Pt will scratch at them. Sarna lotion prn.   TESTS/PROCEDURES na  D/C DATE: Discharge to TCU, pending stable hemoglobin and transitioning back to PO diuretics.   OTHER IMPORTANT INFO: [facility] following and sent referrals.

## 2022-05-20 NOTE — PLAN OF CARE
Goal Outcome Evaluation:    Plan of Care Reviewed With: patient     DATE & TIME: 5/20/2022 [id number]   Cognitive Concerns/ Orientation : Patient alert and oriented x4, forgetful  BEHAVIOR & AGGRESSION TOOL COLOR: Green  ABNL VS/O2: BP remains soft 93/50, asymptomatic, other VSS, O2 96% RA.   MOBILITY: Assist x1 with gait belt and walker, fall risk. Gets fatigued with walking in hallway, knee catherine at times, keep w/c close while walking for safety. Turn and repo when in bed. Encouraged to shift weight when up in chair.   PAIN MANAGEMENT: Complains of left shoulder and neck pain 3/10, applied Voltaren get x2.   DIET: Cardiac, good appetite  BOWEL/BLADDER: [place] dc'd yesterday., has been voiding adequately. Small open area at end of penis, reddened.  ABNL LAB/BG: , 169, 92, Hgb 7.2 (8.1 on 5/17), no signs of active bleeding. K 3.8 (on high protocol) replaced, recheck tomorrow.    DRAIN/DEVICES: PIV x2 SL  TELEMETRY RHYTHM: NA   SKIN: Blanchable redness on coccyx, open area on Rt cheek, open to air, wound care orders in place. Edema +1-2 to abdomen and legs. Wearing compression stockings. Scattered bruising. Scattered abrasions/scabs bilateral shins, thighs, knees, scrotum, buttock and arms, Pt will scratch at them. Sarna lotion prn.   TESTS/PROCEDURES na  D/C DATE: Discharge to TCU, pending stable hemoglobin and transitioning back to PO diuretics.    OTHER IMPORTANT INFO: SW following and sent referrals. Bumex on hold d/t worseing Cr. Nephrology following.

## 2022-05-20 NOTE — PROGRESS NOTES
[facility]    Nephrology Progress Note     Assessment & Plan       Justyn Olivas is a 81 year old male who was admitted on 5/10/2022.      1) Volume Overload:  Cause does not seem to be cardiac or liver disease.  Volume overload this severe that is on the basis of kidney disease is typically from nephrotic syndrome. But he does not have that much albuminuria and U P/C ratio is low grade.      2) CKD 4: On basis of ? NSAIDs ?  HTN ? UA is bland.       3) Prior Heavy NSAID use.     4) HTN - BP is soft.  Best stop carvedilol.       5) DM     6) Ascites without liver disease.  Normal SAAG suggests normal portal pressure.     Plan:     Hold oral bumet for now.             Chas Campos MD  [facility] - Nephrology  [phone]    Interval History     Weigh down further even with stopping IV bumet.  90.9 kg down from 92.7 yesterday.  Down a total of 8.5 kg so far.  BP still low and he has had LH/dizziness when standing.      Pt denies f/c/n/v.   No cp/sob/cough.    No dysuria/hematuria/abd or flank pain.      Physical Exam   Temp: 97.5  F (36.4  C) Temp src: Oral BP: 93/50 Pulse: 65   Resp: 18 SpO2: 96 % O2 Device: None (Room air)    Vitals:    05/17/22 2008 05/18/22 2016 05/20/22 0600   Weight: 95.5 kg (210 lb 8 oz) 92.7 kg (204 lb 6.4 oz) 90.9 kg (200 lb 6.4 oz)     Vital Signs with Ranges  Temp:  [97.3  F (36.3  C)-97.9  F (36.6  C)] 97.5  F (36.4  C)  Pulse:  [65-85] 65  Resp:  [18-20] 18  BP: ()/(43-54) 93/50  SpO2:  [94 %-96 %] 96 %  I/O last 3 completed shifts:  In: 1320 [P.O.:1320]  Out: 1825 [Urine:1825]    GENERAL APPEARANCE: pleasant, NAD, a & o  HEENT:  Eyes/ears/nose/neck grossly normal  RESP: lungs cta b c good efforts, no crackles, rhonchi or wheezes  CV: RRR, nl S1/S2, no m/r/g   ABDOMEN: o/s/nt/nd, bs present  EXTREMITIES/SKIN: no rashes/lesions; 1+ BLE edema    Medications       aspirin  81 mg Oral Daily     atorvastatin  20 mg Oral Daily     [Held by  provider] bumetanide  4 mg Oral BID     clopidogrel  75 mg Oral Daily     dorzolamide-timolol  1 drop Right Eye BID     gabapentin  200 mg Oral At Bedtime     insulin aspart  1-10 Units Subcutaneous TID AC     insulin aspart  1-7 Units Subcutaneous At Bedtime     insulin aspart  7 Units Subcutaneous TID w/meals     insulin glargine  30 Units Subcutaneous At Bedtime     pantoprazole  40 mg Oral QAM AC     polyethylene glycol  17 g Oral Daily     potassium chloride ER  10 mEq Oral BID     sennosides  1-2 tablet Oral BID     sertraline  50 mg Oral Daily     sodium chloride (PF)  3 mL Intracatheter Q8H     tamsulosin  0.4 mg Oral Daily     traZODone  100 mg Oral At Bedtime       Data   BMP  Recent Labs   Lab 05/20/22  1126 05/20/22  0915 05/20/22  0756 05/20/22  0157 05/19/22  1100 05/19/22  0902 05/18/22  1204 05/18/22  0949 05/17/22  1727 05/17/22  1233   NA  --  133  --   --   --  137  --  135  --  136   POTASSIUM  --  3.8  --   --   --  3.4  --  3.5  --  4.2   CHLORIDE  --  96  --   --   --  98  --  101  --  103   VANESA  --  8.3*  --   --   --  8.3*  --  8.0*  --  8.0*   CO2  --  31  --   --   --  32  --  29  --  27   BUN  --  77*  --   --   --  70*  --  66*  --  67*   CR  --  2.55*  --   --   --  2.43*  --  2.19*  --  2.24*   * 201* 147* 297*   < > 102*   < > 292*   < > 302*    < > = values in this interval not displayed.     [email or web address](phos:4)  CBC)  Recent Labs   Lab 05/20/22  1050 05/17/22  1233 05/16/22  0826 05/15/22  0806   HGB 7.2* 8.1* 7.3* 7.8*       Recent Labs   Lab 05/16/22  0826   INR 1.30*       Attestation:   I have reviewed today's relevant vital signs, notes, medications, labs and imaging.

## 2022-05-20 NOTE — CONSULTS
[facility] Nurse Inpatient Wound Assessment   Reason for consultation: Evaluate and treat scabbing/reddness to extremities, scrotum, and coccyx     Assessment  Upper and lower extremity wounds due to edema and patient scratching/picking  Status: improving     Inner buttocks: superficial denudement related to moisture, friction, possibly pt scratching, and possibly a rash/derm issue.  Not an obvious pressure injury; open areas are on the fleshy area and not the bony prominences, coccyx is intact.     Penis: mild skin irritation to meatus, and general erythema and induration to distal shaft and glans.  Small amount stringy adherent slough noted coming out of meatus.  Catheter removed 2 days ago per report.  Defer to MD for further assessment and treatment of any infection.     Treatment Plan  Extremities and back skin care: BID and PRN   1. Cleanse skin with Yumiko Cleanse and Protect Perineal Lotion and wipe clean with gauze or soft cloth.  2. Apply Sarna Lotion to all extremities and back.   3. Cover any open draining areas with foam cover dressing as needed (optifoam with border or polymem 2x4) and change every 3 days. To assist in dressing adhesion apply cavilon no sting barrier film to periwound skin.  4. Time and date new dressings.    Coccyx/ inner buttocks/ perineal cares: BID and prn:  1.  Cleanse with Yumiko perineal lotion and soft dry wipes  2.  If area appears rashy, ask MD to assess for prescription topical treatment  3.  Apply very thin glaze of Critic-aid barrier paste to open areas   4.  Try leaving open to air.  If area worsens and it is feasible to keep a dressing clean/dry/intact, can trial Optifoam border 4x4 dressings to each inner buttock, instead of the paste.  Avoid Mepilex Sacral as this may be trapping moisture.    5.  PIP measures. Pt should stand for a few minutes every 1-2 hrs when up in the chair.     Penis: BID and prn:   1.  Clean with Yumiko perineal lotion and soft dry wipes; leave open to  air  2.  Defer to MD for any prescription treatment prn    Pressure Injury Prevention (PIP) Plan:  -If patient is declining pressure injury prevention interventions: Explore reason why and address patient's concerns and Educate on pressure injury risk and prevention intervention(s)  -Mattress: Follow bed algorithm, reassess daily and order specialty mattress, if indicated.  -HOB: Maintain at or below 30 degrees, unless contraindicated  -Repositioning in bed: Every 1-2 hours , Left/right positioning; avoid supine and Raise foot of bed prior to raising head of bed, to reduce patient sliding down (shear)  -Heels: Keep elevated off mattress and Pillows under calves  -Protective Dressing: None  -Chair positioning: Chair cushion (#[id number]) , Repositions self: patient to shift weight every 15 minutes and Assist patient to reposition hourly   -Moisture Management: Perineal cleansing /protection: Follow Incontinence Protocol, Clean and dry skin folds with bathing  and Moisturize dry skin  -Under Devices: Inspect skin under all medical devices during skin inspection , Ensure tubes are stabilized without tension and Ensure patient is not lying on medical devices or equipment when repositioned      Orders Updated  Recommended provider order: None, at this time  WOC Nurse follow-up plan:weekly  Nursing to notify the Provider(s) and re-consult the WOC Nurse if wound(s) deteriorates or new skin concern.    Patient History  According to provider note(s):  Justyn Olivas is a 81 year old male with a history of HFpEF, transfusion dependent chronic anemia, CKD stage IV, DM type II who recently was started on steroids for shoulder pain and was sent in to the ED on 5/10/2022 from cardiology clinic due to concerns for decompensated heart failure      Patient was placed on steroids on Sunday and since then has had worsening lower extremity edema, MARKS and fluid retention in the abdomen.  Since then he has had approximately 15 pound  weight gain.  He was seen in cardiology clinic this AM and they sent him in to the ED for hospitalization for decompensated heart failure.  At presentation to hospital, requiring 2 L of O2 to maintain his oxygenation      Decompensated HFpEF  Acute hypoxic respiratory distress due to above     Objective Data  Containment of urine/stool: Incontinent pad in bed and Primofit external catheter; up to commode; pull-on briefs    Active Diet Order  Orders Placed This Encounter      Combination Diet Low Saturated Fat Na <2400mg Diet, No Caffeine Diet    Output:   I/O last 3 completed shifts:  In: 1320 [P.O.:1320]  Out: 1825 [Urine:1825]    Risk Assessment:   Sensory Perception: 4-->no impairment  Moisture: 4-->rarely moist  Activity: 3-->walks occasionally  Mobility: 3-->slightly limited  Nutrition: 3-->adequate  Friction and Shear: 1-->problem  Adam Score: 18                          Labs:   Recent Labs   Lab 05/20/22  1050 05/18/22  0949 05/17/22  1233 05/16/22  0826   ALBUMIN  --  2.1*  --   --    HGB 7.2*  --    < > 7.3*   INR  --   --   --  1.30*   CRP  --  34.8*  --   --     < > = values in this interval not displayed.       Physical Exam  Areas of skin assessed: full head to toe inspection completed    Wound Location:  All extremities  Date of last photo 5/13/22 (no new photos 5/20, areas all look similar or improved)    Left arm                                                                Right arm      Left leg                                                                  Right leg    Wound History: Patient with woody edema up into flank area. Reports itching skin started approximately 1 month ago and has seen a dermatologist who has started him on Sarna Lotion    Wound Description. Patient has small scabbing scattered over all extremities from picking. Approximately 4 open areas on assessment. Largest measuring 0.6cm x 0.6cm x 0.1cm      Palpation of the wound bed: normal      Drainage: small      Description of drainage: serosanguinous     Tunneling N/A     Undermining N/A  Periwound skin: edematous      Color: pink      Temperature: normal   Odor: none  Pain: absent and denies , none       Wound Location: bilateral inner buttocks    Last photo: 5-20-22      Wound due to: Friction, Shear, Moisture Associated Skin Damage (MASD) and possible rash, scratching  Wound history/plan of care:   WOC notified by Nursing 5/19 that area seemed worsening so WOC following up today 5/20.  Pt able to stand with walker and SBA.  Has chair cushion in place but spends most of the day in chair per report. Some incontinence, catheter just removed 5/19.  Up to commode at assessment.   Wound base: semi-moist red tissue with scaly edges     Palpation of the wound bed: normal      Drainage: scant     Description of drainage: serosanguinous     Measurements (length x width x depth, in cm) right inner buttock approx 3 x 2 x <0.1cm      Tunneling N/A     Undermining N/A  Periwound skin: Denuded, Dry/scaly, Erythema- blanchable and Rash      Color: pink      Temperature: normal   Odor: none  Pain: tender  Treatment goal: promote moisture balance and tissue healing  STATUS: initial assessment  Supplies ordered: at bedside      Wound Location: Penile meatus     Last photo: 5-20-22      Wound due to: Unknown Etiology  Wound history/plan of care:   Had prior indwelling catheter  Wound base: right side of meatus has red moist tissue; meatal opening has small stringy tan tissue hanging out, adherent     Palpation of the wound bed: normal but distal penis is indurated and swollen and reddened     Drainage: scant     Description of drainage: serosanguinous and small yellow from meatus     Measurements (length x width x depth, in cm) approx 0.5 x 0.5 x 0cm   Periwound skin: reddened, swollen, indurated      Color: pink and red      Temperature: normal to warm  Odor: none  Pain: tender with palpation  Pain interventions prior to dressing change:  N/A  Treatment goal: keep clean and stable; defer to MD for treatment of s/s infection  STATUS: initial assessment  Supplies ordered: at bedside      Interventions  Visual inspection and assessment completed   Wound Care Rationale Protect periwound skin, Improve absorptive capacity, Promote moist wound healing without tissue dehydration  and Provide protection   Wound Care: done per plan of care  Supplies: discussed with RN and discussed with patient  Current off-loading measures: Pillows under calves and Pillows  Current support surface: Standard  Atmos Air mattress  Education provided to: importance of repositioning, plan of care, Moisture management, Hygiene and Off-loading pressure  Discussed plan of care with Patient and Nurse    Loida Alonzo RN CWOCN

## 2022-05-20 NOTE — PROGRESS NOTES
[facility]    Medicine Progress Note - Hospitalist Service    Date of Admission:  5/10/2022    Assessment & Plan        Justyn Olivas is a 81 year old male with a history of HFpEF, transfusion dependent chronic anemia, CKD stage IV, DM type II who recently was started on steroids for shoulder pain and was sent in to the ED on 5/10/2022 from cardiology clinic due to concerns for decompensated heart failure      Patient was placed on steroids on Sunday and since then has had worsening lower extremity edema, MARKS and fluid retention in the abdomen.  Since then he has had approximately 15 pound weight gain.  He was seen in cardiology clinic this AM and they sent him in to the ED for hospitalization for decompensated heart failure.  At presentation to hospital, requiring 2 L of O2 to maintain his oxygenation      Fluid overload  Initially concern of decompensated HFpEF, but essentially ruled out  Acute hypoxic respiratory distress - resolved  * Echo 3/22/22: EF 55%.  RV normal in structure function and size.  No valve disease.  * U/S shows moderate ascites.   * Admit weight 216#, baseline wt ~ 200#    Patient received Bumex 1 mg IV in ED > lasix 80 mg IV BID x 5 dose    On 05/13/22, cardiology started on lasix gtt with K replacement protocol.     On 05/14/22, cardiology switched to bumex 2 mg IV x 1, followed by bumex gtt at 0.5 mg/hr.    On K 10 meq BID while diuresing.     On 05/15/22, excellent diuresis (> 3L out) over last 24 hours, wt stable. Cr and K stable. Wt stable.     Continue PTA Coreg, atorvastatin    Cardiology was considering RHC prior to discharge and CardioMEMS     Cardiology will arrange follow-up in the [facility] with Shaniqua Ram PA-C in 1 to 2 weeks post discharge with a repeat BMP and NT pro BNP beforehand.    Strict I and O's, daily weights.     Compression stockings or wraps on legs    US paracentesis with 3.1L removed 5/16 - reports improvement in symptoms    5/17  after echo completed 5/16 - no longer felt to be of cardiac etiology - more likely seeming to be due to renal disease however unclear.      5/17 RUQ US Small ascites. Coarse appearance of the liver that could represent underlying liver disease. No visible lesion but assessment is somewhat limited. If there is concern, MRI could be performed for more sensitive assessment of the liver.    Cardiology signed off 5/17    Nephrology consultation (appreciated) - metolazone added 5/17 - on RA     Wt down to 200, net neg 13.5L       CKD stage IV  Cr 2.3 on admission and baseline appears to be 2.5-2.6.  History of NSAID use.    Daily BMPs while diuresing    5/16 creat 2.19 (tolerating diuresis)  ---> 5/17 creat 2.24 --> 5/18 2.19 --> 5/19 2.4 --> 2.55 5/20    bumex gtt stopped 5/19    Started bumex 4mg po bid 5/20 but then held per nephro (creat still increasing slightly, BP borderline)     Ascites, s/p paracentesis 5/16  Mild elevation in INR (1.2)   * U/S mild to moderate ascites.   * No history or evidence of liver disease by liver panel or prior imaging. However, does have chronic mild elevation of INR.   Diuresis as above.     Vitamin K 5 mg daily x 3, INR on Monday 5/16    Paracentesis done 5/16 - transudative - as anticipated SAAG > 1.1, and protein > 2.5 g/dL in ascites from - SAAG 1.6, protein 2.9 in ascites    US showed Small ascites. Coarse appearance of the liver that could represent underlying liver disease. No visible lesion but assessment is somewhat limited. If there is concern, MRI could be performed for more sensitive assessment of the liver    still feeling much improvement since tap 5/16     Transfusion dependent chronic anemia, likely related to chronic disease   Iron deficiency - by iron panel on 5/11/22  Patient's hemoglobin has continued to drift down.  It was 6.1 the ED while being 6.7 on 5/5 and 6.1 on 5/3.   * Last transfusion PTA: 5/6.  Patient denies any recent bleeding.  He has had a thorough  work up so far.  He has had colonoscopy/endoscopy/pill study with GI without clear cause.  He also follows with hem/onc and has gotten IV infusions in the past.  In the ED he was consented and 1 unit of PRBCs was ordered  * Received 1 unit on 5/10/22 with appropriate rise in hgb  * Received  1 unit on 05/12/22 for hgb of 6.5 and CHF.    Venofer 300 mg x 2 ordered. Defer to hematology further doses.     Transfuse for hgb < 7. We currently have a blood product shortage.      Outpatient follow up with Hematology after discharge     DM type II   Last HgbA1c was 8.2 on 2/16/22     Increased lantus to PTA dose of 30 units (from 25 units) on 05/18    Added prandial novolog on 5/11/22, increase to 7 units AC on 05/11/22.     Note elevated BS evening of 5/14/22 > adjust insulin if BS remain elevated.     Continue high res SSI    BS considerably variable, adjustment as necessary     L Shoulder pain - impingement  C spine stenosis  Has had gradually worsening shoulder pain for some time.  He states its to the point he is not sleeping well..  He was evaluated by Dr. Scott at [facility] urgent care on Sunday and had MRIs of the shoulder and spine.     Ortho consult on 5/11 appreciated. 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     Not candidate for c-spine injections.     Recommended continued pain meds PRN, WBAT of upper extremities, and OK to stop steroids.     Oxycodone 2.5-5 mg q 6 hours. Advised that he try to limit this to night time doses for best effect.      Started low dose neurontin 200 mg q HS on 5/13/22.     Appreciate orthopedic surgery assistance in planning outpatient follow up    5/18 - consulted spine surgery given his ongoing neck pain and prolonged hospitalization - appreciate assistance - plan for outpatient follow up 2 wks after discharge - no surgical option currently, no indication for steroid injection     Multiple scabs over extremities (~ 4)   Coccyx blanchable redness  (no wound)     Appreciate WOC consult     Deconditioned state, requesting home care    Pt previously using outpatient PT, requests home care.     Prolonged hospital stay with decompensated CHF, high risk for deconditioning.     Appreciate PT consult, currently recommending TCU as of 5/14/22 - patient and spouse are going to discuss further. Spouse concerned of being able to manage at home safely.      Urinary retention on 05/14/22    Will catheter placed on 05/14/22 for both retention and strict I and O measurement.     Started flomax on 5/14/22 in anticipation of discontinuing will and voiding trial. (history of difficulty with urination PTA)       Constipation     On 05/15/22, states he has not had a BM for a week.     Scheduled senna and PRN dulcolax ordered    Narcotics definitely contribute.    Started miralax daily on 05/15/22 can do BID if necessary        Diet: Combination Diet Low Saturated Fat Na <2400mg Diet, No Caffeine Diet    DVT Prophylaxis: Pneumatic Compression Devices  Will Catheter: Not present  Central Lines: None  Cardiac Monitoring: None  Code Status: Full Code      Disposition Plan   Expected Discharge: 05/20/2022     Anticipated discharge location: home with family;home with help/services    Delays:     Placement - TCU  IV Medication - consider oral or Home Infusion            The patient's care was discussed with the Bedside Nurse, Care Coordinator/ and Patient.    Mynor Becker MD  Hospitalist Service  [facility]  Securely message with the Vocera Web Console (learn more here)  Text page via Sprig Paging/Directory         Clinically Significant Risk Factors Present on Admission                    ______________________________________________________________________    Interval History   Patient seen and examined midmorning.  He is up in the chair and still feels a little out of it overall, he has a difficult time describing it.  Denies any new  pain or shortness of breath.  No fevers or chills.  At times may be a little bit foggy or lightheaded but not room spinning.  We discussed that with the aggressive diuresis as well as low-dose pain medications he may likely feel this way.    Data reviewed today: I reviewed all medications, new labs and imaging results over the last 24 hours. I personally reviewed no images or EKG's today.    Physical Exam   Vital Signs: Temp: 97.5  F (36.4  C) Temp src: Oral BP: 93/50 Pulse: 65   Resp: 18 SpO2: 96 % O2 Device: None (Room air)    Weight: 200 lbs 6.37 oz    Gen: NAD, pleasant  HEENT: EOMI, MMM  Resp: no focal crackles,  no wheezes, no increased work of resp  CV: S1S2 heard, reg rhythm, reg rate  Abdo: soft, nontender, nondistended, bowel sounds present  Ext: calves nontender, well perfused  Neuro: aaox3, CN grossly intact, no facial asymmetry      Data   Recent Labs   Lab 05/20/22  1126 05/20/22  1050 05/20/22  0915 05/20/22  0756 05/19/22  1100 05/19/22  0902 05/18/22  1204 05/18/22  0949 05/17/22  1727 05/17/22  1233 05/16/22  0923 05/16/22  0826 05/15/22  0807 05/15/22  0806   HGB  --  7.2*  --   --   --   --   --   --   --  8.1*  --  7.3*  --  7.8*   INR  --   --   --   --   --   --   --   --   --   --   --  1.30*  --   --    NA  --   --  133  --   --  137  --  135  --  136  --  139  --  139   POTASSIUM  --   --  3.8  --   --  3.4  --  3.5  --  4.2  --  3.9  --  3.8   CHLORIDE  --   --  96  --   --  98  --  101  --  103  --  106  --  106   CO2  --   --  31  --   --  32  --  29  --  27  --  33*  --  26   BUN  --   --  77*  --   --  70*  --  66*  --  67*  --  69*  --  76*   CR  --   --  2.55*  --   --  2.43*  --  2.19*  --  2.24*  --  2.19*  --  2.39*   ANIONGAP  --   --  6  --   --  7  --  5  --  6  --  <1*  --  7   VANESA  --   --  8.3*  --   --  8.3*  --  8.0*  --  8.0*  --  7.7*  --  7.8*   *  --  201* 147*   < > 102*   < > 292*   < > 302*   < > 171*   < > 83   ALBUMIN  --   --   --   --   --   --   --  2.1*   --   --   --   --   --  2.3*    < > = values in this interval not displayed.     No results found for this or any previous visit (from the past 24 hour(s)).

## 2022-05-21 NOTE — PLAN OF CARE
Goal Outcome Evaluation:    Plan of Care Reviewed With: patient   DATE & TIME: 5/21/2022 [id number]             Cognitive Concerns/ Orientation : Patient alert and oriented x4, forgetful  BEHAVIOR & AGGRESSION TOOL COLOR: Green  ABNL VS/O2: BP remains soft SBP 90s, low 100s, c/o mild light headedness, other VSS, O2 96% RA.   MOBILITY: Assist x1 with gait belt and walker, fall risk. Gets fatigued with walking in hallway, knee catherine at times, keep w/c close while walking for safety.Turn and repo when in bed. Encouraged to shift weight when up in chair.   PAIN MANAGEMENT: Complains of left shoulder and neck pain 3/10, applied Voltaren get x2.   DIET: Cardiac, good appetite  BOWEL/BLADDER: reddened and tenderness area at end of penis, continent.   ABNL LAB/BG: , 249 Hgb 7.0 (7.2 yesterday, s/p 1U PRBC today, tolerated, no signs of active bleeding, K 3.8 (on high protocol) replaced, recheck tomorrow.    DRAIN/DEVICES: PIV x2 SL  TELEMETRY RHYTHM: NA   SKIN: Blanchable redness on coccyx, open area on Rt cheek, open to air, wound care orders in place. Edema +1-2 to abdomen and legs. Wearing compression stockings. Scattered bruising. Scattered abrasions/scabs bilateral shins, thighs, knees, scrotum, buttock and arms, Pt will scratch at them. Sarna lotion prn.   TESTS/PROCEDURES NA   D/C DATE: Discharge to TCU, pending stable hemoglobin and transitioning back to PO diuretics.    OTHER IMPORTANT INFO: SW following and sent referrals. Bumex on hold d/t worseing Cr. Nephrology following. Received one time dose of subcutaneous aranesp.

## 2022-05-21 NOTE — PROGRESS NOTES
Renal Medicine Progress Note                                Justyn Olivas MRN# [id number]   Age: 81 year old YOB: 1940   Date of Admission: 5/10/2022 Hospital LOS: 11                  Assessment/Plan:     Justyn Olivas is a 81 year old male who was admitted on 5/10/2022.      1) Volume Overload:      Cause does not seem to be cardiac or liver disease.  Volume overload this severe that is on the basis of kidney disease is typically from nephrotic syndrome. But he does not have that much albuminuria and U P/C ratio is low grade.      2) CKD 4: On basis of ? NSAIDs ?  HTN ? UA is bland.       3) Prior Heavy NSAID use.     4) HTN - BP is soft.  Best stop carvedilol.       5) DM     6) Ascites without liver disease.  Normal SAAG suggests normal portal pressure.       range off meds  Creatinine up slightly    Hold BP meds/diuretic  Aranesp  Check Mirian     Interval History:     Up in bed at bedside  Pee RICHARDS reviewed    Labs reviewed with family    Feels OK        ROS:     GENERAL: NAD, No fever,chills  RESP:NEGATIVE for significant cough or SOB and dyspnea on exertion  CV: NEGATIVE for chest pain, palpitations  EXT: no change edema  ROS otherwise negative    Medications and Allergies:     Reviewed    Physical Exam:     Vitals were reviewed  Patient Vitals for the past 8 hrs:   BP Temp Temp src Pulse Resp SpO2 Weight   05/21/22 1253 100/58 97.5  F (36.4  C) Oral 67 18 95 % --   05/21/22 1155 103/52 97.5  F (36.4  C) Oral 70 18 95 % --   05/21/22 1140 95/53 97.4  F (36.3  C) Oral 69 18 94 % --   05/21/22 0751 93/50 97.8  F (36.6  C) Oral 77 18 97 % --   05/21/22 0608 -- -- -- -- -- -- 90.6 kg (199 lb 11.2 oz)     I/O last 3 completed shifts:  In: 940 [P.O.:940]  Out: 1870 [Urine:1770; Emesis/NG output:100]    Vitals:    05/16/22 0500 05/17/22 2008 05/18/22 2016 05/20/22 0600   Weight: 96.6 kg (212 lb 15.4 oz) 95.5 kg (210 lb 8 oz) 92.7 kg (204 lb 6.4 oz) 90.9 kg (200 lb 6.4 oz)    05/21/22  0608   Weight: 90.6 kg (199 lb 11.2 oz)         GENERAL: awake, alert, follows  HEENT: NC/AT, PERRLA, EOMI, non icteric, pharynx moist without lesion  CV: RRR, normal S1 S2  ABDOMEN: soft, nontender, no HSM or masses and bowel sounds normal  :  will catheter  MS: no clubbing, cyanosis   SKIN: clear without significant rashes or lesions  NEURO: speech normal and cranial nerves 2-12 intact  PSYCH: affect normal/bright  EXT: edema to thighs     Data:     Recent Labs   Lab 05/21/22  1203 05/21/22  0743 05/21/22  0711 05/21/22  0214 05/20/22  1126 05/20/22  0915 05/19/22  1100 05/19/22  0902 05/18/22  1204 05/18/22  0949   NA  --   --  135  --   --  133  --  137  --  135   POTASSIUM  --   --  3.8  3.8  --   --  3.8  --  3.4  --  3.5   CHLORIDE  --   --  95  --   --  96  --  98  --  101   CO2  --   --  31  --   --  31  --  32  --  29   ANIONGAP  --   --  9  --   --  6  --  7  --  5   * 189* 172* 225*   < > 201*   < > 102*   < > 292*   BUN  --   --  84*  --   --  77*  --  70*  --  66*   CR  --   --  2.62*  --   --  2.55*  --  2.43*  --  2.19*   GFRESTIMATED  --   --  24*  --   --  24*  --  26*  --  29*   VANESA  --   --  7.5*  --   --  8.3*  --  8.3*  --  8.0*    < > = values in this interval not displayed.         Recent Labs   Lab Test 05/21/22  0711 05/20/22  0915 05/19/22  0902 05/18/22  0949 05/17/22  1233 05/16/22  0826 05/15/22  0806 05/14/22  1920 05/13/22  1655 05/13/22  1154   CR 2.62* 2.55* 2.43* 2.19* 2.24* 2.19* 2.39* 2.45* 2.50* 2.42*     Recent Labs   Lab 05/18/22  0949 05/15/22  0806   ALBUMIN 2.1* 2.3*     Recent Labs   Lab 05/21/22  0834 05/20/22  1050 05/18/22  0949 05/17/22  1233 05/16/22  0826   PHOS  --   --  3.8  --   --    HGB 7.0* 7.2*  --  8.1* 7.3*         G Justyn Mata MD    [facility] - Nephrology  [phone]

## 2022-05-21 NOTE — PROGRESS NOTES
[facility]    Medicine Progress Note - Hospitalist Service    Date of Admission:  5/10/2022    Assessment & Plan        Justyn Olivas is a 81 year old male with a history of HFpEF, transfusion dependent chronic anemia, CKD stage IV, DM type II who recently was started on steroids for shoulder pain and was sent in to the ED on 5/10/2022 from cardiology clinic due to concerns for decompensated heart failure      Patient was placed on steroids on Sunday and since then has had worsening lower extremity edema, MARKS and fluid retention in the abdomen.  Since then he has had approximately 15 pound weight gain.  He was seen in cardiology clinic this AM and they sent him in to the ED for hospitalization for decompensated heart failure.  At presentation to hospital, requiring 2 L of O2 to maintain his oxygenation      Fluid overload  Initially concern of decompensated HFpEF, but essentially ruled out  Acute hypoxic respiratory distress - resolved  * Echo 3/22/22: EF 55%.  RV normal in structure function and size.  No valve disease.  * U/S shows moderate ascites.   * Admit weight 216#, baseline wt ~ 200#    Patient received Bumex 1 mg IV in ED > lasix 80 mg IV BID x 5 dose    On 05/13/22, cardiology started on lasix gtt with K replacement protocol.     On 05/14/22, cardiology switched to bumex 2 mg IV x 1, followed by bumex gtt at 0.5 mg/hr.    On K 10 meq BID while diuresing.     On 05/15/22, excellent diuresis (> 3L out) over last 24 hours, wt stable. Cr and K stable. Wt stable.     Continue PTA Coreg, atorvastatin    Cardiology was considering RHC prior to discharge and CardioMEMS     Cardiology will arrange follow-up in the [facility] clinic with Shaniqua Ram PA-C in 1 to 2 weeks post discharge with a repeat BMP and NT pro BNP beforehand.    Strict I and O's, daily weights.     Compression stockings or wraps on legs    US paracentesis with 3.1L removed 5/16 - reports improvement in symptoms    5/17  after echo completed 5/16 - no longer felt to be of cardiac etiology - more likely seeming to be due to renal disease however unclear.      5/17 RUQ US Small ascites. Coarse appearance of the liver that could represent underlying liver disease. No visible lesion but assessment is somewhat limited. If there is concern, MRI could be performed for more sensitive assessment of the liver.    Cardiology signed off 5/17    Nephrology consultation (appreciated) - metolazone added 5/17 - on RA     Wt at 199, net neg 14.1L       CKD stage IV  Cr 2.3 on admission and baseline appears to be 2.5-2.6.  History of NSAID use.    Daily BMPs while diuresing    5/16 creat 2.19 (tolerating diuresis)  ---> 5/17 creat 2.24 --> 5/18 2.19 --> 5/19 2.4 --> 2.55 5/20 --> 2.6 5/21    bumex gtt stopped 5/19    Started bumex 4mg po bid 5/20 but then held per nephro (creat still increasing slightly, BP borderline), creat slowly leveling off as above     Ascites, s/p paracentesis 5/16  Mild elevation in INR (1.2)   * U/S mild to moderate ascites.   * No history or evidence of liver disease by liver panel or prior imaging. However, does have chronic mild elevation of INR.   Diuresis as above.     Vitamin K 5 mg daily x 3, INR on Monday 5/16    Paracentesis done 5/16 - transudative - as anticipated SAAG > 1.1, and protein > 2.5 g/dL in ascites from - SAAG 1.6, protein 2.9 in ascites    US showed Small ascites. Coarse appearance of the liver that could represent underlying liver disease. No visible lesion but assessment is somewhat limited. If there is concern, MRI could be performed for more sensitive assessment of the liver    still feeling much improvement since tap 5/16     Transfusion dependent chronic anemia, likely related to chronic disease   Iron deficiency - by iron panel on 5/11/22  Patient's hemoglobin has continued to drift down.  It was 6.1 the ED while being 6.7 on 5/5 and 6.1 on 5/3.   * Last transfusion PTA: 5/6.  Patient denies any  recent bleeding.  He has had a thorough work up so far.  He has had colonoscopy/endoscopy/pill study with GI without clear cause.  He also follows with hem/onc and has gotten IV infusions in the past.  In the ED he was consented and 1 unit of PRBCs was ordered  * Received 1 unit on 5/10/22 with appropriate rise in hgb  * Received  1 unit on 05/12/22 for hgb of 6.5 and CHF.    Venofer 300 mg x 2 ordered. Defer to hematology further doses.     Transfuse for hgb < 7. We currently have a blood product shortage.      Outpatient follow up with Hematology after discharge    5/21 hgb 7.0, he is essentially at dry weight after aggressive diuresis additionally; will order 1 unit PRBC    5/21 - per nephro, another dose of aranesp ordered     DM type II   Last HgbA1c was 8.2 on 2/16/22     Increased lantus to PTA dose of 30 units (from 25 units) on 05/18    Added prandial novolog on 5/11/22, increase to 7 units AC on 05/11/22.     Note elevated BS evening of 5/14/22 > adjust insulin if BS remain elevated.     Continue high res SSI    BS considerably variable, adjustment as necessary     L Shoulder pain - impingement  C spine stenosis  Has had gradually worsening shoulder pain for some time.  He states its to the point he is not sleeping well..  He was evaluated by Dr. Scott at [facility] urgent care on Sunday and had MRIs of the shoulder and spine.     Ortho consult on 5/11 appreciated. 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     Not candidate for c-spine injections.     Recommended continued pain meds PRN, WBAT of upper extremities, and OK to stop steroids.     Oxycodone 2.5-5 mg q 6 hours. Advised that he try to limit this to night time doses for best effect.      Started low dose neurontin 200 mg q HS on 5/13/22.     Appreciate orthopedic surgery assistance in planning outpatient follow up    5/18 - consulted spine surgery given his ongoing neck pain and prolonged hospitalization -  appreciate assistance - plan for outpatient follow up 2 wks after discharge - no surgical option currently, no indication for steroid injection     Multiple scabs over extremities (~ 4)   Coccyx blanchable redness (no wound)     Appreciate WOC consult     Deconditioned state, requesting home care    Pt previously using outpatient PT, requests home care.     Prolonged hospital stay with decompensated CHF, high risk for deconditioning.     Appreciate PT consult, currently recommending TCU as of 5/14/22 - patient and spouse are going to discuss further. Spouse concerned of being able to manage at home safely.      Urinary retention on 05/14/22 - resolved    Will catheter placed on 05/14/22 for both retention and strict I and O measurement.     Started flomax on 5/14/22 in anticipation of discontinuing will and voiding trial. (history of difficulty with urination PTA)      Will out 5/19 as bumex gtt stopped, voiding ok     Constipation     On 05/15/22, states he has not had a BM for a week.     Scheduled senna and PRN dulcolax ordered    Narcotics definitely contribute.    Started miralax daily on 05/15/22 can do BID if necessary           Diet: Combination Diet Low Saturated Fat Na <2400mg Diet, No Caffeine Diet    DVT Prophylaxis: Pneumatic Compression Devices  Will Catheter: Not present  Central Lines: None  Cardiac Monitoring: None  Code Status: Full Code      Disposition Plan   Expected Discharge: 05/22/2022     Anticipated discharge location: home with family;home with help/services    Delays:     Placement - TCU  IV Medication - consider oral or Home Infusion            The patient's care was discussed with the Bedside Nurse, Patient and Patient's Family.    Mynor Becker MD  Hospitalist Service  [facility]  Securely message with the Vocera Web Console (learn more here)  Text page via [facility] Paging/Directory         Clinically Significant Risk Factors Present on Admission                     ______________________________________________________________________    Interval History   Patient seen and examined this afternoon with spouse at bedside.  Patient up in chair still has some lightheadedness at times but no focal weakness or numbness.  We discussed that is likely due to the aggressive diuresis and resultant lower blood pressures.  Appreciate nephrology assistance.  Patient denies new pain, fevers or chills, or shortness of breath.      Data reviewed today: I reviewed all medications, new labs and imaging results over the last 24 hours. I personally reviewed no images or EKG's today.    Physical Exam   Vital Signs: Temp: 97.8  F (36.6  C) Temp src: Oral BP: 93/50 Pulse: 77   Resp: 18 SpO2: 97 % O2 Device: None (Room air)    Weight: 199 lbs 11.2 oz    Gen: NAD, very pleasant, up in chair, spouse at bedside  HEENT: EOMI, MMM  Resp: no crackles,  no wheezes, no increased work of resp  CV: S1S2 heard, reg rhythm, reg rate  Abdo: soft, nontender, nondistended, bowel sounds present  Ext: calves nontender, well perfused  Neuro: aaox3, CN grossly intact, no facial asymmetry      Data   Recent Labs   Lab 05/21/22  1203 05/21/22  0834 05/21/22  0743 05/21/22  0711 05/20/22  1126 05/20/22  1050 05/20/22  0915 05/19/22  1100 05/19/22  0902 05/18/22  1204 05/18/22  0949 05/17/22  1727 05/17/22  1233 05/16/22  0923 05/16/22  0826 05/15/22  0807 05/15/22  0806   WBC  --  8.4  --   --   --   --   --   --   --   --   --   --   --   --   --   --   --    HGB  --  7.0*  --   --   --  7.2*  --   --   --   --   --   --  8.1*  --  7.3*  --  7.8*   MCV  --  83  --   --   --   --   --   --   --   --   --   --   --   --   --   --   --    PLT  --  194  --   --   --   --   --   --   --   --   --   --   --   --   --   --   --    INR  --   --   --   --   --   --   --   --   --   --   --   --   --   --  1.30*  --   --    NA  --   --   --  135  --   --  133  --  137  --  135  --  136  --  139  --  139   POTASSIUM  --    --   --  3.8  3.8  --   --  3.8  --  3.4  --  3.5  --  4.2  --  3.9  --  3.8   CHLORIDE  --   --   --  95  --   --  96  --  98  --  101  --  103  --  106  --  106   CO2  --   --   --  31  --   --  31  --  32  --  29  --  27  --  33*  --  26   BUN  --   --   --  84*  --   --  77*  --  70*  --  66*  --  67*  --  69*  --  76*   CR  --   --   --  2.62*  --   --  2.55*  --  2.43*  --  2.19*  --  2.24*  --  2.19*  --  2.39*   ANIONGAP  --   --   --  9  --   --  6  --  7  --  5  --  6  --  <1*  --  7   VANESA  --   --   --  7.5*  --   --  8.3*  --  8.3*  --  8.0*  --  8.0*  --  7.7*  --  7.8*   *  --  189* 172*   < >  --  201*   < > 102*   < > 292*   < > 302*   < > 171*   < > 83   ALBUMIN  --   --   --   --   --   --   --   --   --   --  2.1*  --   --   --   --   --  2.3*    < > = values in this interval not displayed.     No results found for this or any previous visit (from the past 24 hour(s)).

## 2022-05-21 NOTE — PLAN OF CARE
Goal Outcome Evaluation:      DATE & TIME: 5/20/22-5/21/22 [id number]  Cognitive Concerns/ Orientation: Alert and oriented x4, forgetful  BEHAVIOR & AGGRESSION TOOL COLOR: Green  ABNL VS/O2: BP remains soft 109/61. other VSS, on RA.   MOBILITY: Assist x1 GB/W, fall risk. Turn and repo when in bed. Weight shifting  PAIN MANAGEMENT: Neck and shoulder pain, manages with Heat pack/ Voltaren gel and PRN oxycodone.   DIET: Cardiac.  BOWEL/BLADDER:Continent, uses urinal.   ABNL LAB/BG: , Hgb 7.2 (8.1 on 5/17), no signs of active bleeding. K 3.8 (on high protocol) recheck in AM.  DRAIN/DEVICES: PIV x2 SL  TELEMETRY RHYTHM: NA   SKIN: Blanchable redness on coccyx, open area on Rt cheek, open to air, wound care orders in place. 2+ edema to BLE  Wearing compression stockings. Scattered bruises, Scattered abrasions/scabs bilateral shins, thighs, knees, scrotum, buttock and arms,Small open area at end of penis. Pt scratches skin. Sarna lotion available.   TESTS/PROCEDURES: None at this time  D/C DATE: Discharge to TCU, pending stable hemoglobin and transitioning back to PO diuretics.    OTHER IMPORTANT INFO: SW following and sent referrals. Bumex on hold d/t worseing Cr. Nephrology following.

## 2022-05-22 NOTE — PROGRESS NOTES
Care Management Follow Up    Length of Stay (days): 12    Expected Discharge Date: 05/24/2022     Concerns to be Addressed: discharge planning     Patient plan of care discussed at interdisciplinary rounds: Yes    Anticipated Discharge Disposition: Transitional Care     Anticipated Discharge Services: None  Anticipated Discharge DME: None    Patient/family educated on Medicare website which has current facility and service quality ratings:    Education Provided on the Discharge Plan:    Patient/Family in Agreement with the Plan: yes    Referrals Placed by CM/SW: Internal Clinic Care Coordination, External Care Coordination, Homecare, Specialty Providers  Private pay costs discussed: Not applicable    Additional Information:    Lina checked back in with [facility] regarding pt's acceptance; [facility] states that they need to reassess pt.  Sw resent referral.  Sw reached back out to spouse to go over TCU referrals.  Spouse inquired into [facility] and [facility] - Sw explained that they both declined due to not having a bed available but given it was sent on 5/15, beds may not be available.  Spouse is agreeable to resending referrals to [facility], [facility], and [facility].  Spouse requests a private room and is aware of additional cost per day (ranging between 30-60/day).  Sw to f/u with spouse once facilities review referrals.      Padmini Burrows, GILBERTOSW

## 2022-05-22 NOTE — PROGRESS NOTES
[facility]    Medicine Progress Note - Hospitalist Service    Date of Admission:  5/10/2022    Assessment & Plan        Justyn Olivas is a 81 year old male with a history of HFpEF, transfusion dependent chronic anemia, CKD stage IV, DM type II who recently was started on steroids for shoulder pain and was sent in to the ED on 5/10/2022 from cardiology clinic due to concerns for decompensated heart failure      Patient was placed on steroids on Sunday and since then has had worsening lower extremity edema, MARKS and fluid retention in the abdomen.  Since then he has had approximately 15 pound weight gain.  He was seen in cardiology clinic this AM and they sent him in to the ED for hospitalization for decompensated heart failure.  At presentation to hospital, requiring 2 L of O2 to maintain his oxygenation      Fluid overload  Initially concern of decompensated HFpEF, but essentially ruled out  Acute hypoxic respiratory distress - resolved  * Echo 3/22/22: EF 55%.  RV normal in structure function and size.  No valve disease.  * U/S shows moderate ascites.   * Admit weight 216#, baseline wt ~ 200#    Patient received Bumex 1 mg IV in ED > lasix 80 mg IV BID x 5 dose    On 05/13/22, cardiology started on lasix gtt with K replacement protocol.     On 05/14/22, cardiology switched to bumex 2 mg IV x 1, followed by bumex gtt at 0.5 mg/hr.    On K 10 meq BID while diuresing.     On 05/15/22, excellent diuresis (> 3L out) over last 24 hours, wt stable. Cr and K stable. Wt stable.     Continue PTA Coreg, atorvastatin    Cardiology was considering RHC prior to discharge and CardioMEMS     Cardiology will arrange follow-up in the [facility] clinic with Shaniqua Ram PA-C in 1 to 2 weeks post discharge with a repeat BMP and NT pro BNP beforehand.    Strict I and O's, daily weights.     Compression stockings or wraps on legs    US paracentesis with 3.1L removed 5/16 - reports improvement in symptoms    5/17  after echo completed 5/16 - no longer felt to be of cardiac etiology - more likely seeming to be due to renal disease however unclear.      5/17 RUQ US Small ascites. Coarse appearance of the liver that could represent underlying liver disease. No visible lesion but assessment is somewhat limited. If there is concern, MRI could be performed for more sensitive assessment of the liver.    Cardiology signed off 5/17    Nephrology consultation (appreciated) - metolazone added 5/17 - on RA     Wt at 199, net neg 14.1L on 5/21      CKD stage IV  Cr 2.3 on admission and baseline appears to be 2.5-2.6.  History of NSAID use.    Daily BMPs while diuresing    5/16 creat 2.19 (tolerating diuresis)  ---> 5/17 creat 2.24 --> 5/18 2.19 --> 5/19 2.4 --> 2.55 5/20 --> 2.6 5/21 --> 2.7 5/22    bumex gtt stopped 5/19    Started bumex 4mg po bid 5/20 but then held per nephro (creat still increasing slightly, BP borderline), creat slowly leveling off as above    5/22 - nephro ordered albumin/diuretic combo     Ascites, s/p paracentesis 5/16  Mild elevation in INR (1.2)   * U/S mild to moderate ascites.   * No history or evidence of liver disease by liver panel or prior imaging. However, does have chronic mild elevation of INR.   Diuresis as above.     Vitamin K 5 mg daily x 3, INR on Monday 5/16    Paracentesis done 5/16 - transudative - as anticipated SAAG > 1.1, and protein > 2.5 g/dL in ascites from - SAAG 1.6, protein 2.9 in ascites    US showed Small ascites. Coarse appearance of the liver that could represent underlying liver disease. No visible lesion but assessment is somewhat limited. If there is concern, MRI could be performed for more sensitive assessment of the liver    5/22 still feeling much improvement since tap 5/16     Transfusion dependent chronic anemia, likely related to chronic disease   Iron deficiency - by iron panel on 5/11/22  Patient's hemoglobin has continued to drift down.  It was 6.1 the ED while being 6.7  on 5/5 and 6.1 on 5/3.   * Last transfusion PTA: 5/6.  Patient denies any recent bleeding.  He has had a thorough work up so far.  He has had colonoscopy/endoscopy/pill study with GI without clear cause.  He also follows with hem/onc and has gotten IV infusions in the past.  In the ED he was consented and 1 unit of PRBCs was ordered  * Received 1 unit on 5/10/22 with appropriate rise in hgb  * Received  1 unit on 05/12/22 for hgb of 6.5 and CHF.    Venofer 300 mg x 2 ordered. Defer to hematology further doses.     Transfuse for hgb < 7. We currently have a blood product shortage.      Outpatient follow up with Hematology after discharge    5/21 hgb 7.0, he is essentially at dry weight after aggressive diuresis additionally; will order 1 unit PRBC    5/21 - per nephro, another dose of aranesp ordered    5/22 - hgb 7.5     DM type II   Last HgbA1c was 8.2 on 2/16/22     Increased lantus to PTA dose of 30 units (from 25 units) on 05/18    Added prandial novolog on 5/11/22, increase to 7 units AC on 05/11/22.     Note elevated BS evening of 5/14/22 > adjust insulin if BS remain elevated.     Continue high res SSI    BS considerably variable, adjustment as necessary     L Shoulder pain - impingement  C spine stenosis  Has had gradually worsening shoulder pain for some time.  He states its to the point he is not sleeping well..  He was evaluated by Dr. Scott at [facility] urgent care on Sunday and had MRIs of the shoulder and spine.     Ortho consult on 5/11 appreciated. 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     Not candidate for c-spine injections.     Recommended continued pain meds PRN, WBAT of upper extremities, and OK to stop steroids.     Oxycodone 2.5-5 mg q 6 hours. Advised that he try to limit this to night time doses for best effect.      Started low dose neurontin 200 mg q HS on 5/13/22.     Appreciate orthopedic surgery assistance in planning outpatient follow up    5/18  - consulted spine surgery given his ongoing neck pain and prolonged hospitalization - appreciate assistance - plan for outpatient follow up 2 wks after discharge - no surgical option currently, no indication for steroid injection     Multiple scabs over extremities (~ 4)   Coccyx blanchable redness (no wound)     Appreciate WOC consult     Deconditioned state, requesting home care    Pt previously using outpatient PT, requests home care.     Prolonged hospital stay with decompensated CHF, high risk for deconditioning.     Appreciate PT consult, currently recommending TCU as of 5/14/22 - patient and spouse are going to discuss further. Spouse concerned of being able to manage at home safely.      Urinary retention on 05/14/22 - resolved    Will catheter placed on 05/14/22 for both retention and strict I and O measurement.     Started flomax on 5/14/22 in anticipation of discontinuing will and voiding trial. (history of difficulty with urination PTA)      Will out 5/19 as bumex gtt stopped, voiding ok     Constipation     On 05/15/22, states he has not had a BM for a week.     Scheduled senna and PRN dulcolax ordered    Narcotics definitely contribute.    Started miralax daily on 05/15/22 can do BID if necessary          Diet: Combination Diet Low Saturated Fat Na <2400mg Diet, No Caffeine Diet    DVT Prophylaxis: Pneumatic Compression Devices  Will Catheter: Not present  Central Lines: None  Cardiac Monitoring: None  Code Status: Full Code      Disposition Plan   Expected Discharge: 05/24/2022     Anticipated discharge location: home with family;home with help/services    Delays:     Placement - TCU  IV Medication - consider oral or Home Infusion            The patient's care was discussed with the Bedside Nurse and Patient.    Mynor Becker MD  Hospitalist Service  [facility]  Securely message with the Vocera Web Console (learn more here)  Text page via "[employer] Paging/Directory          Clinically Significant Risk Factors Present on Admission                    ______________________________________________________________________    Interval History   Seen and examined midday. Up in chair, just had BM. Breathing is good. No fevers or new pain.  Fatigue/lightheadedness slightly better.    Data reviewed today: I reviewed all medications, new labs and imaging results over the last 24 hours. I personally reviewed no images or EKG's today.    Physical Exam   Vital Signs: Temp: 97.4  F (36.3  C) Temp src: Oral BP: 93/49 Pulse: 80   Resp: 16 SpO2: 96 % O2 Device: None (Room air)    Weight: 199 lbs 11.2 oz    Gen: NAD, pleasant  HEENT: EOMI, MMM  Resp: no crackles,  no wheezes, no increased work of resp  CV: S1S2 heard, reg rhythm, reg rate  Abdo: soft, nontender, nondistended, bowel sounds present  Ext: calves nontender, well perfused, stockings in place  Neuro: aaox3, CN grossly intact, no facial asymmetry      Data   Recent Labs   Lab 05/22/22  1727 05/22/22  1208 05/22/22  0805 05/21/22  1203 05/21/22  0834 05/21/22  0743 05/21/22  0711 05/20/22  1126 05/20/22  1050 05/20/22  0915 05/18/22  1204 05/18/22  0949 05/16/22  0923 05/16/22  0826   WBC  --   --   --   --  8.4  --   --   --   --   --   --   --   --   --    HGB  --   --  7.5*  --  7.0*  --   --   --  7.2*  --   --   --    < > 7.3*   MCV  --   --   --   --  83  --   --   --   --   --   --   --   --   --    PLT  --   --   --   --  194  --   --   --   --   --   --   --   --   --    INR  --   --   --   --   --   --   --   --   --   --   --   --   --  1.30*   NA  --   --  135  --   --   --  135  --   --  133   < > 135   < > 139   POTASSIUM  --   --  3.9  --   --   --  3.8  3.8  --   --  3.8   < > 3.5   < > 3.9   CHLORIDE  --   --  96  --   --   --  95  --   --  96   < > 101   < > 106   CO2  --   --  30  --   --   --  31  --   --  31   < > 29   < > 33*   BUN  --   --  94*  --   --   --  84*  --   --  77*   < > 66*   < > 69*   CR  --   --   2.71*  --   --   --  2.62*  --   --  2.55*   < > 2.19*   < > 2.19*   ANIONGAP  --   --  9  --   --   --  9  --   --  6   < > 5   < > <1*   VANESA  --   --  8.3*  --   --   --  7.5*  --   --  8.3*   < > 8.0*   < > 7.7*   * 191* 149*   < >  --    < > 172*   < >  --  201*   < > 292*   < > 171*   ALBUMIN  --   --   --   --   --   --   --   --   --   --   --  2.1*  --   --     < > = values in this interval not displayed.     No results found for this or any previous visit (from the past 24 hour(s)).

## 2022-05-22 NOTE — PLAN OF CARE
Goal Outcome Evaluation:    Plan of Care Reviewed With: patient, spouse     DATE & TIME: 5/22/2022 8987-5831             Cognitive Concerns/ Orientation: Patient alert and oriented x4, forgetful at times  BEHAVIOR & AGGRESSION TOOL COLOR: Green  ABNL VS/O2: BP remains soft SBP 90s, low 100s, c/o mild light headedness, other VSS, O2 96% RA.   MOBILITY: Assist x1 with gait belt and walker, fall risk. Gets fatigued with walking in hallway, knee catherine at times, keep w/c close while walking for safety.Turn and repo while in bed. Encouraged to shift weight while up in chair.   PAIN MANAGEMENT: Complains of left shoulder and neck pain 3/10, applied Voltaren get x2.   DIET: Cardiac, good appetite  BOWEL/BLADDER: continent, reddened/ tenderness area at  of penis. BM x1.   ABNL LAB/BG: , 191, 194, Hgb 7.5(7.0 yesterday, s/p 1U PRBC), no signs of active bleeding. Cr 2.71, GFR 23   DRAIN/DEVICES: PIV x2 SL  TELEMETRY RHYTHM: NA   SKIN: Blanchable redness on coccyx, open area on bilat butt cheek, open to air per WOC order. Wearing compression stockings. Scattered bruising, scattered  abrasions/scabs bilateral shins, thighs, knees, scrotum, buttock and arms. Pt will scratch at them and bleeds, applied prn Sarna lotion. Rash/itchiness (back of legs, chest area)  TESTS/PROCEDURES NA   D/C DATE: Discharge to TCU, pending stable hemoglobin and transitioning back to PO diuretics per Nephrology.    OTHER IMPORTANT INFO: Nephrology started pt on IV Albumin 25% x3 doses and IV Bumex 2mg x3 doses (post complex of each albumin). PT/OT/SW following.

## 2022-05-22 NOTE — PROGRESS NOTES
Renal Medicine Progress Note                                Justyn Olivas MRN# [id number]   Age: 81 year old YOB: 1940   Date of Admission: 5/10/2022 Hospital LOS: 12                  Assessment/Plan:     Justyn Olivas is a 81 year old male who was admitted on 5/10/2022.      1) Volume Overload:      Cause does not seem to be cardiac or liver disease.  Volume overload this severe that is on the basis of kidney disease is typically from nephrotic syndrome. But he does not have that much albuminuria and U P/C ratio is low grade.      2) CKD 4: On basis of ? NSAIDs ?  HTN ? UA is bland.       3) Prior Heavy NSAID use.     4) HTN - BP is soft.  Best stop carvedilol.       5) DM     6) Ascites without liver disease.  Normal SAAG suggests normal portal pressure.    7) Hypoalbuminemia    -third spacing     Mirian 22    Creatinine continues to rise despite holding diuretic (last 05/20/22)  Remains significantly volume up    Trial 25% albumin/diuretic       Interval History:     Up in bed at bedside  Pee RICHARDS reviewed    Creatinine up further  Persistent edema to thigh       ROS:     GENERAL: NAD, No fever,chills  RESP:NEGATIVE for significant cough or SOB and dyspnea on exertion  CV: NEGATIVE for chest pain, palpitations  EXT: no change edema  ROS otherwise negative    Medications and Allergies:     Reviewed    Physical Exam:     Vitals were reviewed  Patient Vitals for the past 8 hrs:   BP Temp Temp src Pulse Resp SpO2   05/22/22 0723 93/49 97.4  F (36.3  C) Oral 80 16 96 %     I/O last 3 completed shifts:  In: 1110 [P.O.:760]  Out: 1100 [Urine:1100]    Vitals:    05/16/22 0500 05/17/22 2008 05/18/22 2016 05/20/22 0600   Weight: 96.6 kg (212 lb 15.4 oz) 95.5 kg (210 lb 8 oz) 92.7 kg (204 lb 6.4 oz) 90.9 kg (200 lb 6.4 oz)    05/21/22 0608   Weight: 90.6 kg (199 lb 11.2 oz)       GENERAL: awake, alert, follows  HEENT: NC/AT, PERRLA, EOMI, non icteric, pharynx moist without lesion  CV: RRR, normal S1  S2  ABDOMEN: soft, nontender, no HSM or masses and bowel sounds normal  :  will catheter  MS: no clubbing, cyanosis   SKIN: clear without significant rashes or lesions  NEURO: speech normal and cranial nerves 2-12 intact  PSYCH: affect normal/bright  EXT: edema to thighs     Data:     Recent Labs   Lab 05/22/22  0805 05/22/22  0721 05/22/22  0235 05/21/22  2131 05/21/22  0743 05/21/22  0711 05/20/22  1126 05/20/22  0915 05/19/22  1100 05/19/22  0902     --   --   --   --  135  --  133  --  137   POTASSIUM 3.9  --   --   --   --  3.8  3.8  --  3.8  --  3.4   CHLORIDE 96  --   --   --   --  95  --  96  --  98   CO2 30  --   --   --   --  31  --  31  --  32   ANIONGAP 9  --   --   --   --  9  --  6  --  7   * 110* 142* 171*   < > 172*   < > 201*   < > 102*   BUN 94*  --   --   --   --  84*  --  77*  --  70*   CR 2.71*  --   --   --   --  2.62*  --  2.55*  --  2.43*   GFRESTIMATED 23*  --   --   --   --  24*  --  24*  --  26*   VANESA 8.3*  --   --   --   --  7.5*  --  8.3*  --  8.3*    < > = values in this interval not displayed.         Recent Labs   Lab Test 05/22/22  0805 05/21/22  0711 05/20/22  0915 05/19/22  0902 05/18/22  0949 05/17/22  1233 05/16/22  0826 05/15/22  0806 05/14/22  1920 05/13/22  1655   CR 2.71* 2.62* 2.55* 2.43* 2.19* 2.24* 2.19* 2.39* 2.45* 2.50*     Recent Labs   Lab 05/18/22  0949   ALBUMIN 2.1*     Recent Labs   Lab 05/22/22  0805 05/21/22  0834 05/20/22  1050 05/18/22  0949 05/17/22  1233   PHOS  --   --   --  3.8  --    HGB 7.5* 7.0* 7.2*  --  8.1*         G Justyn Mata MD    [facility] - Nephrology  [phone]

## 2022-05-22 NOTE — PLAN OF CARE
Goal Outcome Evaluation:       DATE & TIME: 5/21/2022- 5/22/22 9969-1562             Cognitive Concerns/ Orientation : Patient alert and oriented x4, forgetful  BEHAVIOR & AGGRESSION TOOL COLOR: Green  ABNL VS/O2: BP remains soft SBP 90s, low 100s other VSS, O2 96% RA.   MOBILITY: Assist x1 with gait belt and walker or cane, fall risk. Turn and repo q2h when in bed. Encouraged to shift weight when up in chair.   PAIN MANAGEMENT: Complains of left shoulder and neck pain 3/10, given oxy x2 and applied Voltaren gel x1.   DIET: Cardiac, good appetite. Low fat, 2g Na.  BOWEL/BLADDER: reddened and tenderness area at end of penis, continent. Extra large hard, formed stool 5/21/22.  ABNL LAB/BG: , 142. Hgb 7.0 (7.2 yesterday, s/p 1U PRBC today, tolerated, no signs of active bleeding, K 3.8 (on high protocol) replaced, recheck 5/22.    DRAIN/DEVICES: L PIV x2 SL.  SKIN: Blanchable redness on coccyx, Red with open minimal bleeding on L buttock and redness and peeling on R buttock. Cleaned and covered per orders. Edema +1-2 to abdomen and legs. Wearing compression stockings. Scattered bruising & abrasions/scabs bilateral shins, thighs, knees, scrotum, buttock and arms, Pt will scratch at them.    D/C DATE: Discharge to TCU, pending stable hemoglobin and transitioning back to PO diuretics.    OTHER IMPORTANT INFO: SW following and sent referrals. Bumex on hold d/t worsening Cr. Nephrology following.

## 2022-05-23 NOTE — PROGRESS NOTES
[facility]    Medicine Progress Note - Hospitalist Service    Date of Admission:  5/10/2022    Assessment & Plan        Justyn Olivas is a 81 year old male with a history of HFpEF, transfusion dependent chronic anemia, CKD stage IV, DM type II who recently was started on steroids for shoulder pain and was sent in to the ED on 5/10/2022 from cardiology clinic due to concerns for decompensated heart failure      Patient was placed on steroids on Sunday and since then has had worsening lower extremity edema, MARKS and fluid retention in the abdomen.  Since then he has had approximately 15 pound weight gain.  He was seen in cardiology clinic this AM and they sent him in to the ED for hospitalization for decompensated heart failure.  At presentation to hospital, requiring 2 L of O2 to maintain his oxygenation      Fluid overload  Initially concern of decompensated HFpEF, but essentially ruled out  Acute hypoxic respiratory distress - resolved  * Echo 3/22/22: EF 55%.  RV normal in structure function and size.  No valve disease.  * U/S shows moderate ascites.   * Admit weight 216#, baseline wt ~ 200#    Patient received Bumex 1 mg IV in ED > lasix 80 mg IV BID x 5 dose    On 05/13/22, cardiology started on lasix gtt with K replacement protocol.     On 05/14/22, cardiology switched to bumex 2 mg IV x 1, followed by bumex gtt at 0.5 mg/hr.    On K 10 meq BID while diuresing.     On 05/15/22, excellent diuresis (> 3L out) over last 24 hours, wt stable. Cr and K stable. Wt stable.     Continue PTA Coreg, atorvastatin    Cardiology was considering RHC prior to discharge and Cardio[facility]     Cardiology will arrange follow-up in the [facility] with Shaniqua Ram PA-C in 1 to 2 weeks post discharge with a repeat BMP and NT pro BNP beforehand.    Strict I and O's, daily weights.     Compression stockings or wraps on legs    US paracentesis with 3.1L removed 5/16 - reports improvement in symptoms    5/17  after echo completed 5/16 - no longer felt to be of cardiac etiology - more likely seeming to be due to renal disease however unclear.      5/17 RUQ US Small ascites. Coarse appearance of the liver that could represent underlying liver disease. No visible lesion but assessment is somewhat limited. If there is concern, MRI could be performed for more sensitive assessment of the liver.    Cardiology signed off 5/17    Nephrology consultation (appreciated) - metolazone added 5/17 - on RA     Wt at 199, net neg 14.1L on 5/21 (not updated since)      CKD stage IV  Cr 2.3 on admission and baseline appears to be 2.5-2.6.  History of NSAID use.    Daily BMPs while diuresing    5/16 creat 2.19 (tolerating diuresis)  ---> 5/17 creat 2.24 --> 5/18 2.19 --> 5/19 2.4 --> 2.55 5/20 --> 2.6 5/21 --> 2.7 5/22 --> 2.67 5/23    bumex gtt stopped 5/19    Started bumex 4mg po bid 5/20 but then held per nephro (creat still increasing slightly, BP borderline), creat slowly leveling off as above    5/22 - nephro ordered albumin/diuretic combo    5/23 - discussed with nephro re anemia, will await hgb 5/24 as he is diuresing now, may be able to avoid another transfusion of PRBC (transfusion could worsen fluid overload)     Ascites, s/p paracentesis 5/16  Mild elevation in INR (1.2)   * U/S mild to moderate ascites.   * No history or evidence of liver disease by liver panel or prior imaging. However, does have chronic mild elevation of INR.   Diuresis as above.     Vitamin K 5 mg daily x 3, INR on Monday 5/16    Paracentesis done 5/16 - transudative - as anticipated SAAG > 1.1, and protein > 2.5 g/dL in ascites from - SAAG 1.6, protein 2.9 in ascites    US showed Small ascites. Coarse appearance of the liver that could represent underlying liver disease. No visible lesion but assessment is somewhat limited. If there is concern, MRI could be performed for more sensitive assessment of the liver    5/22 still feeling much improvement since  tap 5/16     Transfusion dependent chronic anemia, likely related to chronic disease   Iron deficiency - by iron panel on 5/11/22  Patient's hemoglobin has continued to drift down.  It was 6.1 the ED while being 6.7 on 5/5 and 6.1 on 5/3.   * Last transfusion PTA: 5/6.  Patient denies any recent bleeding.  He has had a thorough work up so far.  He has had colonoscopy/endoscopy/pill study with GI without clear cause.  He also follows with hem/onc and has gotten IV infusions in the past.  In the ED he was consented and 1 unit of PRBCs was ordered  * Received 1 unit on 5/10/22 with appropriate rise in hgb  * Received  1 unit on 05/12/22 for hgb of 6.5 and CHF.    Venofer 300 mg x 2 ordered. Defer to hematology further doses.     Transfuse for hgb < 7. We currently have a blood product shortage.      Outpatient follow up with Hematology after discharge    5/21 hgb 7.0, he is essentially at dry weight after aggressive diuresis additionally; will order 1 unit PRBC    5/21 - per nephro, another dose of aranesp given    5/22 - hgb 7.5      5/23 - hgb 7.0, no bleeding concerns, will await diuresis and check Hgb in AM      DM type II   Last HgbA1c was 8.2 on 2/16/22     Increased lantus to PTA dose of 30 units (from 25 units) on 05/18    Added prandial novolog on 5/11/22, increase to 7 units AC on 05/11/22.     Note elevated BS evening of 5/14/22 > adjust insulin if BS remain elevated.     Continue high res SSI    BS considerably variable, adjustment as necessary     L Shoulder pain - impingement  C spine stenosis  Has had gradually worsening shoulder pain for some time.  He states its to the point he is not sleeping well..  He was evaluated by Dr. Scott at [facility] urgent care on Sunday and had MRIs of the shoulder and spine.     Ortho consult on 5/11 appreciated. 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     Not candidate for c-spine injections.     Recommended continued pain meds PRN,  WBAT of upper extremities, and OK to stop steroids.     Oxycodone 2.5-5 mg q 6 hours. Advised that he try to limit this to night time doses for best effect.      Started low dose neurontin 200 mg q HS on 5/13/22.     Appreciate orthopedic surgery assistance in planning outpatient follow up    5/18 - consulted spine surgery given his ongoing neck pain and prolonged hospitalization - appreciate assistance - plan for outpatient follow up 2 wks after discharge - no surgical option currently, no indication for steroid injection     Multiple scabs over extremities (~ 4)   Coccyx blanchable redness (no wound)     Appreciate WOC consult     Deconditioned state, requesting home care    Pt previously using outpatient PT, requests home care.     Prolonged hospital stay with decompensated CHF, high risk for deconditioning.     Appreciate PT consult, currently recommending TCU as of 5/14/22 - patient and spouse are going to discuss further. Spouse concerned of being able to manage at home safely.      Urinary retention on 05/14/22 - resolved    Will catheter placed on 05/14/22 for both retention and strict I and O measurement.     Started flomax on 5/14/22 in anticipation of discontinuing will and voiding trial. (history of difficulty with urination PTA)      Will out 5/19 as bumex gtt stopped, voiding ok     Constipation     On 05/15/22, states he has not had a BM for a week.     Scheduled senna and PRN dulcolax ordered    Narcotics definitely contribute.    Started miralax daily on 05/15/22 can do BID if necessary          Diet: Combination Diet Low Saturated Fat Na <2400mg Diet, No Caffeine Diet    DVT Prophylaxis: Pneumatic Compression Devices  Will Catheter: Not present  Central Lines: None  Cardiac Monitoring: None  Code Status: Full Code      Disposition Plan   Expected Discharge: 05/25/2022     Anticipated discharge location: home with family;home with help/services    Delays:     Placement - TCU  IV Medication -  consider oral or Home Infusion            The patient's care was discussed with the Bedside Nurse, Patient, Patient's Family and Nephro Consultant.    Mynor Becker MD  Hospitalist Service  [facility]  Securely message with the Vocera Web Console (learn more here)  Text page via [facility] Paging/Directory     Total encounter time 38 minutes with greater than 50% spent in direct patient care, discussion and counseling with patient and his spouse at bedside regarding creatinine leveling off as well as ongoing need to diurese and low hemoglobin, as well as coordination of care with RN, staff, and nephrology consultant.    Clinically Significant Risk Factors Present on Admission                    ______________________________________________________________________    Interval History   Patient seen and examined midday.  Spouse at bedside.  Patient a bit more somnolent than I have seen him in recent days.  He is able to awaken and cooperate with exam and answer questions appropriately.  He is oriented appropriately.  Denies new pain or fevers.  Per staff he was awake and talking normally earlier today.  No focal deficits appreciated.    Data reviewed today: I reviewed all medications, new labs and imaging results over the last 24 hours. I personally reviewed no images or EKG's today.    Physical Exam   Vital Signs: Temp: 97.4  F (36.3  C) Temp src: Oral BP: 96/53 Pulse: 84   Resp: 18 SpO2: 97 % O2 Device: None (Room air)    Weight: 199 lbs 11.2 oz    Gen: NAD, pleasant, sleepy but able to respond approp and participate in exam  HEENT: EOMI, MMM  Resp: no focal crackles,  no wheezes, no increased work of resp  CV: S1S2 heard, reg rhythm, reg rate  Abdo: soft, nontender, nondistended, bowel sounds present  Ext: calves nontender, well perfused  Neuro: aaox3, CN grossly intact, no facial asymmetry      Data   Recent Labs   Lab 05/23/22  1221 05/23/22  0902 05/23/22  0759 05/22/22  1208  05/22/22  0805 05/21/22  1203 05/21/22  0834 05/21/22  0743 05/21/22  0711 05/18/22  1204 05/18/22  0949   WBC  --   --   --   --   --   --  8.4  --   --   --   --    HGB  --  7.0*  --   --  7.5*  --  7.0*  --   --    < >  --    MCV  --   --   --   --   --   --  83  --   --   --   --    PLT  --   --   --   --   --   --  194  --   --   --   --    NA  --  133  --   --  135  --   --   --  135   < > 135   POTASSIUM  --  3.6  --   --  3.9  --   --   --  3.8  3.8   < > 3.5   CHLORIDE  --  95  --   --  96  --   --   --  95   < > 101   CO2  --  29  --   --  30  --   --   --  31   < > 29   BUN  --  101*  --   --  94*  --   --   --  84*   < > 66*   CR  --  2.67*  --   --  2.71*  --   --   --  2.62*   < > 2.19*   ANIONGAP  --  9  --   --  9  --   --   --  9   < > 5   VANESA  --  8.5  --   --  8.3*  --   --   --  7.5*   < > 8.0*   * 229* 150*   < > 149*   < >  --    < > 172*   < > 292*   ALBUMIN  --  2.9*  --   --   --   --   --   --   --   --  2.1*    < > = values in this interval not displayed.     No results found for this or any previous visit (from the past 24 hour(s)).

## 2022-05-23 NOTE — PLAN OF CARE
Goal Outcome Evaluation:      DATE & TIME: 5/23/2022 0700- 1930            Cognitive Concerns/ Orientation: Patient alert and oriented x4, forgetful at times  BEHAVIOR & AGGRESSION TOOL COLOR: Green  ABNL VS/O2: BP remains soft SBP 90s-, 100s,  VSS, O2 95% RA.   MOBILITY: Assist x1 with gait belt and walker, fall risk.  PAIN MANAGEMENT: PRN  oxycodone x 1 for shoulder, neck and buttock pain  DIET: Cardiac,  BOWEL/BLADDER: continent, reddened/ tenderness area at  of penis. BM x2this shift .placed external cath  Per patients request.   ABNL LAB/BG: BGM  150,197,234.Hgb 7.0 no signs of active bleeding.  Hgb recheck tomorrow.Cr 2.67, GFR 23, , Albu 2.9   DRAIN/DEVICES: PIV x2 SL, external male cath  TELEMETRY RHYTHM: NA   SKIN: Blanchable redness on coccyx, open area on bilat butt cheek, open to air per applied barrier  cream, Wearing compression stockings. Scattered bruising, scattered  abrasions/scabs bilateral shins, thighs, knees, scrotum, buttock and arms.  TESTS/PROCEDURES NA   D/C DATE: Discharge to TCU, pending stable hemoglobin and transitioning back to PO diuretics per Nephrology.    OTHER IMPORTANT INFO: Nephrology  PT/OT/SW following. Continues on IV albumin and Bumex.

## 2022-05-23 NOTE — PLAN OF CARE
Goal Outcome Evaluation:    DATE & TIME: 5/22/2022 [id number]             Cognitive Concerns/ Orientation: Patient alert and oriented x4, forgetful at times  BEHAVIOR & AGGRESSION TOOL COLOR: Green  ABNL VS/O2: BP remains soft SBP 90s, low 100s,  VSS, O2 95% RA.   MOBILITY: Assist x1 with gait belt and walker, fall risk. T/R q2h    PAIN MANAGEMENT: Denied pain this shift - has Voltaren gel for shoulder and neck pain  DIET: Cardiac,  BOWEL/BLADDER: continent, reddened/ tenderness area at  of penis. No BM this shift    ABNL LAB/BG: , Hgb 7.5, no signs of active bleeding. Cr 2.71, GFR 23   DRAIN/DEVICES: PIV x2 SL  TELEMETRY RHYTHM: NA   SKIN: Blanchable redness on coccyx, open area on bilat butt cheek, open to air per WOC order. Wearing compression stockings. Scattered bruising, scattered  abrasions/scabs bilateral shins, thighs, knees, scrotum, buttock and arms. Pt will scratch at them and bleeds, applied prn Sarna lotion. Rash/itchiness (back of legs, chest area)  TESTS/PROCEDURES NA   D/C DATE: Discharge to TCU, pending stable hemoglobin and transitioning back to PO diuretics per Nephrology.    OTHER IMPORTANT INFO: Nephrology started pt on IV Albumin 25% x3 doses and IV Bumex 2mg x3 doses (post complex of each albumin). PT/OT/SW following.

## 2022-05-23 NOTE — PROGRESS NOTES
[facility]     Renal Progress Note       SHORTHAND KEY FOR MY NOTES:  c = with, s = without, p = after, a = before, x = except, asx = asymptomatic, tx = transplant or treatment, sx = symptoms or symptomatic, cx = canceled or culture, rxn = reaction, yday = yesterday, nl = normal, abx = antibiotics, fxn = function, dx = diagnosis, dz = disease, m/h = melena/hematochezia, c/d/l/ha = cramping/dizziness/lightheadedness/headache, d/c = discharge or diarrhea/constipation, f/c/n/v = fevers/chills/nausea/vomiting, cp/sob = chest pain/shortness of breath, tbv = total body volume, rxn = reaction, tdc = tunneled dialysis catheter, pta = prior to admission, hd = hemodialysis, pd = peritoneal dialysis, hhd = home hemodialysis, edw = estimated dry wt         Assessment/Plan:     1.  CKD IV.  Pt's cr is in the mid-2s and BUN is rising a bit c diuretics.  He is not feeling too dry and the labs don't suggest contraction at this time.  No major uremic sx.  He remains TBV up.  A.  Continue to follow labs, uo, sx daily.    2.  Volume overload.  He has hypoalbuminemic third-spacing and is responding to IV alb/bumet.  He doesn't have liver dz, CHF or nephrotic syndrome.  A.  Continue IV alb/bumet.  B.  Follow clinically.    3.  Anemia.  Pt's hb is down to 7, but there is a dilutional component to it.  He has blood available, but we will hold on a transfusion for now.  A.  Check hb in the AM.  B.  Decision re transfusion tmrw.    4.  HTN.  Controlled c diuretics alone.  Carvedilol has been held due to low BPs.  A.  Follow BP, clinically.    5.  FEN.  Electrolytes are ok.  Na corrects for high glc.  A.  Continue same diet.        Interval History:     Pt is feeling ok and denies any f/c/n/v.  He is itching and has excoriations.  He is urinating quite a bit today.  No cp/sob/abd pain.  He is still pretty weak.          Medications and Allergies:       albumin human  25 g Intravenous Q8H     aspirin  81 mg Oral  Daily     atorvastatin  20 mg Oral Daily     bumetanide  2 mg Intravenous Q8H     clopidogrel  75 mg Oral Daily     dorzolamide-timolol  1 drop Right Eye BID     gabapentin  200 mg Oral At Bedtime     insulin aspart  1-10 Units Subcutaneous TID AC     insulin aspart  1-7 Units Subcutaneous At Bedtime     insulin aspart  7 Units Subcutaneous TID w/meals     insulin glargine  30 Units Subcutaneous At Bedtime     pantoprazole  40 mg Oral QAM AC     polyethylene glycol  17 g Oral Daily     potassium chloride ER  10 mEq Oral BID     sennosides  1-2 tablet Oral BID     sertraline  50 mg Oral Daily     sodium chloride (PF)  3 mL Intracatheter Q8H     tamsulosin  0.4 mg Oral Daily     traZODone  100 mg Oral At Bedtime     Allergies   Allergen Reactions     No Known Allergies           Physical Exam:     Vitals were reviewed     , Blood pressure 116/59, pulse 83, temperature 97.2  F (36.2  C), temperature source Oral, resp. rate 18, weight 90.6 kg (199 lb 11.2 oz), SpO2 97 %.  Wt Readings from Last 3 Encounters:   05/21/22 90.6 kg (199 lb 11.2 oz)   05/10/22 97.5 kg (215 lb)   04/20/22 89.8 kg (198 lb)     Intake/Output Summary (Last 24 hours) at 5/23/2022 1500  Last data filed at 5/23/2022 1437  Gross per 24 hour   Intake 700 ml   Output 3020 ml   Net -2320 ml     GENERAL APPEARANCE: pleasant, NAD, alert  HEENT:  eyes/ears/nose/neck grossly nl  RESP: CTA B c good efforts; slight R crackles  CV: RRR, nl S1/S2   ABDOMEN: s/nt/nd  EXTREMITIES/SKIN: some excoriations, 2-3+ ble edema         Data:     CBC RESULTS:     Recent Labs   Lab 05/23/22  0902 05/22/22  0805 05/21/22  0834 05/20/22  1050 05/17/22  1233   WBC  --   --  8.4  --   --    RBC  --   --  2.87*  --   --    HGB 7.0* 7.5* 7.0* 7.2* 8.1*   HCT  --   --  23.9*  --   --    PLT  --   --  194  --   --      Basic Metabolic Panel:  Recent Labs   Lab 05/23/22  1221 05/23/22  0902 05/23/22  0759 05/23/22  0151 05/22/22  2120 05/22/22  1727 05/22/22  1208 05/22/22  0805  05/21/22  0743 05/21/22  0711 05/20/22  1126 05/20/22  0915 05/19/22  1100 05/19/22  0902 05/18/22  1204 05/18/22  0949   NA  --  133  --   --   --   --   --  135  --  135  --  133  --  137  --  135   POTASSIUM  --  3.6  --   --   --   --   --  3.9  --  3.8  3.8  --  3.8  --  3.4  --  3.5   CHLORIDE  --  95  --   --   --   --   --  96  --  95  --  96  --  98  --  101   CO2  --  29  --   --   --   --   --  30  --  31  --  31  --  32  --  29   BUN  --  101*  --   --   --   --   --  94*  --  84*  --  77*  --  70*  --  66*   CR  --  2.67*  --   --   --   --   --  2.71*  --  2.62*  --  2.55*  --  2.43*  --  2.19*   * 229* 150* 177* 344* 194*   < > 149*   < > 172*   < > 201*   < > 102*   < > 292*   VANESA  --  8.5  --   --   --   --   --  8.3*  --  7.5*  --  8.3*  --  8.3*  --  8.0*    < > = values in this interval not displayed.     INRNo lab results found in last 7 days.   Attestation:   I have reviewed today's relevant vital signs, notes, medications, labs and imaging.    Dawit Salvador MD  [facility] - Nephrology  [phone]

## 2022-05-23 NOTE — PLAN OF CARE
Date/Time: May 23, 2022 4981-9546   Reason for Admission: MARKS    Cognitive/Mentation: AOx4  Neuros/CMS: CMS intact  VS: VSS, on RA. B/P: 100/56, T: 97.5, P: 84, R: 18     GI: denies n/v.  : continent, no BM during this shift.   Pain: Pt denied pain during this shift, Voltaren gel for shoulder and neck pain.      Skin: Open wound on bilateral buttock cheeks, open to air. Abrasions/scabs mostly on BLE.  Scattered bruising.   Activity: A1, gait and walker.   Diet: Cardiac     Discharge: Pending discharge to TCU.     End of shift summary: No other events overnight.

## 2022-05-24 NOTE — PLAN OF CARE
Goal Outcome Evaluation:  DATE & TIME:-5/24/22 5871-3521       Cognitive Concerns/ Orientation: Alert and oriented x4, forgetful at times  BEHAVIOR & AGGRESSION TOOL COLOR: Green  ABNL VS/O2: BP remains soft SBP in  90-100s,  other VSS on RA.   MOBILITY: Assist x1 with gait belt and walker, fall risk. .  PAIN MANAGEMENT: Denied pain this shift - PRN Oxycodone/Voltaren gel available.  DIET: Cardiac,  BOWEL/BLADDER: Incontinent at times. External male catheter in place.  ABNL LAB/BG: ,/276/396. Hgb 6.5 no signs of active bleeding. Cr 2.73, , K 3.4, on high protocol, replaced , recheck 3.2, replaced 2nd time, recheck at 2000  DRAIN/DEVICES: PIV x2 SL  TELEMETRY RHYTHM: NA   SKIN: Blanchable redness on coccyx, open area on bilateral butt cheek, open to air per WOC, applied barrier  cream, Wearing compression stockings. Scattered bruising, scattered  abrasions/scabs bilateral shins, thighs, knees, scrotum, buttock and arms. Reddened/ tenderness at tip of penis.   TESTS/PROCEDURES NA   D/C DATE: Discharge to TCU, pending stable hemoglobin and transitioning back to PO diuretics per Nephrology.    OTHER IMPORTANT INFO: Nephrology  PT/OT/SW following.  Given one unit of PRBCs for hgb 6.5, Hgb recheck tomorrow.

## 2022-05-24 NOTE — PROGRESS NOTES
[facility]     Renal Progress Note       SHORTHAND KEY FOR MY NOTES:  c = with, s = without, p = after, a = before, x = except, asx = asymptomatic, tx = transplant or treatment, sx = symptoms or symptomatic, cx = canceled or culture, rxn = reaction, yday = yesterday, nl = normal, abx = antibiotics, fxn = function, dx = diagnosis, dz = disease, m/h = melena/hematochezia, c/d/l/ha = cramping/dizziness/lightheadedness/headache, d/c = discharge or diarrhea/constipation, f/c/n/v = fevers/chills/nausea/vomiting, cp/sob = chest pain/shortness of breath, tbv = total body volume, rxn = reaction, tdc = tunneled dialysis catheter, pta = prior to admission, hd = hemodialysis, pd = peritoneal dialysis, hhd = home hemodialysis, edw = estimated dry wt         Assessment/Plan:     1.  CKD IV.  Pt's cr is stable ~2.7 and BUN ~ 110s.  No major uremic sx.  He has an intention tremor, but not clear asterixis.  He remains TBV up, but better c mobilization of fluids.  A.  Follow labs, uo, sx daily.    2.  Volume overload.  He has hypoalbuminemic third-spacing and is responding to IV alb/bumet.  He doesn't have liver dz, CHF or nephrotic syndrome.  The blood will help.  A.  Continue IV alb/bumet.  B.  Follow clinically.    3.  Anemia.  Pt's hb is down to the 6s and he is getting blood.  His wife wonders why he hasn't been responding to ESAs.  He was prob fe def given the low MCV and now has rec'd fe c the blood transfusions.    A.  Agree c blood.  B.  Follow hb, clinically.    4.  Hypotension.  He is mostly asx and he is on diuretics only.  A.  Continue to hold BP meds.  B.  Ok to hold bumet if SBP < 90.    5.  FEN.  Electrolytes are ok x low K.  He is now on KCl.  Na corrects for high glc.  A.  Continue same diet.  B.  Agree c KCl.  C.  Check electrolytes daily.        Interval History:     Pt is feeling fatigued and weaker than yday.  He is breathing ok and isn't having any cp.  No f/c/n/v.  He is eating ok.   Good uo noted.  No c/d/l/ha.          Medications and Allergies:       albumin human  25 g Intravenous Q8H     aspirin  81 mg Oral Daily     atorvastatin  20 mg Oral Daily     bumetanide  2 mg Intravenous Q8H     clopidogrel  75 mg Oral Daily     dorzolamide-timolol  1 drop Right Eye BID     gabapentin  200 mg Oral At Bedtime     insulin aspart  1-10 Units Subcutaneous TID AC     insulin aspart  1-7 Units Subcutaneous At Bedtime     insulin aspart  7 Units Subcutaneous TID w/meals     insulin glargine  30 Units Subcutaneous At Bedtime     pantoprazole  40 mg Oral QAM AC     polyethylene glycol  17 g Oral Daily     potassium chloride ER  10 mEq Oral BID     sennosides  1-2 tablet Oral BID     sertraline  50 mg Oral Daily     sodium chloride (PF)  3 mL Intracatheter Q8H     tamsulosin  0.4 mg Oral Daily     traZODone  100 mg Oral At Bedtime     Allergies   Allergen Reactions     No Known Allergies           Physical Exam:     Vitals were reviewed     , Blood pressure 99/52, pulse 81, temperature 98.7  F (37.1  C), temperature source Oral, resp. rate 16, weight 90.7 kg (199 lb 14.4 oz), SpO2 98 %.  Wt Readings from Last 3 Encounters:   05/24/22 90.7 kg (199 lb 14.4 oz)   05/10/22 97.5 kg (215 lb)   04/20/22 89.8 kg (198 lb)     Intake/Output Summary (Last 24 hours) at 5/24/2022 1708  Last data filed at 5/24/2022 1312  Gross per 24 hour   Intake 1420 ml   Output 800 ml   Net 620 ml     GENERAL APPEARANCE: looks tired and sick, alert, sitting in recliner  HEENT:  eyes/ears/nose/neck grossly nl  RESP: CTA B c decent efforts  CV: RRR, nl S1/S2   ABDOMEN: s/nt/nd  EXTREMITIES/SKIN: some excoriations, 2-3+ ble edema  NEURO:  intention tremor - R > L; no significant asterixis  OTHER:  ext male catheter         Data:     CBC RESULTS:     Recent Labs   Lab 05/24/22  0841 05/23/22  0902 05/22/22  0805 05/21/22  0834 05/20/22  1050   WBC 5.8  --   --  8.4  --    RBC 2.52*  --   --  2.87*  --    HGB 6.5* 7.0* 7.5* 7.0* 7.2*   HCT  22.1*  --   --  23.9*  --      --   --  194  --      Basic Metabolic Panel:  Recent Labs   Lab 05/24/22  1430 05/24/22  1249 05/24/22  0841 05/24/22  0645 05/24/22  0200 05/23/22  2103 05/23/22  1702 05/23/22  1221 05/23/22  0902 05/22/22  1208 05/22/22  0805 05/21/22  0743 05/21/22  0711 05/20/22  1126 05/20/22  0915 05/19/22  1100 05/19/22  0902   NA  --   --  133  --   --   --   --   --  133  --  135  --  135  --  133  --  137   POTASSIUM 3.2*  --  3.4  --   --   --   --   --  3.6  --  3.9  --  3.8  3.8  --  3.8  --  3.4   CHLORIDE  --   --  94  --   --   --   --   --  95  --  96  --  95  --  96  --  98   CO2  --   --  30  --   --   --   --   --  29  --  30  --  31  --  31  --  32   BUN  --   --  115*  --   --   --   --   --  101*  --  94*  --  84*  --  77*  --  70*   CR  --   --  2.73*  --   --   --   --   --  2.67*  --  2.71*  --  2.62*  --  2.55*  --  2.43*   GLC  --  276* 297* 203* 224* 280* 234*   < > 229*   < > 149*   < > 172*   < > 201*   < > 102*   VANESA  --   --  8.2*  --   --   --   --   --  8.5  --  8.3*  --  7.5*  --  8.3*  --  8.3*    < > = values in this interval not displayed.     INRNo lab results found in last 7 days.   Attestation:   I have reviewed today's relevant vital signs, notes, medications, labs and imaging.    Dawit Salvador MD  [facility] - Nephrology  [phone]

## 2022-05-24 NOTE — TELEPHONE ENCOUNTER
Pt asking about dosing for irbasartan and if should be increased from 150mg.    Unsure based on notes.  Please review  Lucero Baca RN     no

## 2022-05-24 NOTE — PLAN OF CARE
Goal Outcome Evaluation:      DATE & TIME:5/23/22-5/24/22 7522-2422         Cognitive Concerns/ Orientation: Alert and oriented x4, forgetful at times  BEHAVIOR & AGGRESSION TOOL COLOR: Green  ABNL VS/O2: BP remains soft SBP in 100s,  other VSS on RA.   MOBILITY: Assist x1 with gait belt and walker, fall risk. T/R q2h  in bed. Refused repositioning at night.  PAIN MANAGEMENT: Denied pain this shift - PRN Oxycodone/Voltaren gel available.  DIET: Cardiac,  BOWEL/BLADDER: Incontinent at times> on diuretic. External male catheter in place.  ABNL LAB/BG: ,224. Hgb 7.0 no signs of active bleeding. Cr 2.71, GFR 23   DRAIN/DEVICES: PIV x2 SL  TELEMETRY RHYTHM: NA   SKIN: Blanchable redness on coccyx, open area on bilateral butt cheek, open to air per WOC, applied barrier  cream, Wearing compression stockings. Scattered bruising, scattered  abrasions/scabs bilateral shins, thighs, knees, scrotum, buttock and arms. Reddened/ tenderness at tip of penis.   TESTS/PROCEDURES NA   D/C DATE: Discharge to TCU, pending stable hemoglobin and transitioning back to PO diuretics per Nephrology.    OTHER IMPORTANT INFO: Nephrology  PT/OT/SW following.

## 2022-05-24 NOTE — PROGRESS NOTES
[facility]    Medicine Progress Note - Hospitalist Service    Date of Admission:  5/10/2022    Assessment & Plan        Justyn Olivas is a 81 year old male with a history of HFpEF, transfusion dependent chronic anemia, CKD stage IV, DM type II who recently was started on steroids for shoulder pain and was sent in to the ED on 5/10/2022 from cardiology clinic due to concerns for decompensated heart failure      Patient was placed on steroids on Sunday and since then has had worsening lower extremity edema, MARKS and fluid retention in the abdomen.  Since then he has had approximately 15 pound weight gain.  He was seen in cardiology clinic this AM and they sent him in to the ED for hospitalization for decompensated heart failure.  At presentation to hospital, requiring 2 L of O2 to maintain his oxygenation      Fluid overload  Initially concern of decompensated HFpEF, but essentially ruled out  Acute hypoxic respiratory distress - resolved  * Echo 3/22/22: EF 55%.  RV normal in structure function and size.  No valve disease.  * U/S shows moderate ascites.   * Admit weight 216#, baseline wt ~ 200#    Patient received Bumex 1 mg IV in ED > lasix 80 mg IV BID x 5 dose    On 05/13/22, cardiology started on lasix gtt with K replacement protocol.     On 05/14/22, cardiology switched to bumex 2 mg IV x 1, followed by bumex gtt at 0.5 mg/hr.    On K 10 meq BID while diuresing.     On 05/15/22, excellent diuresis (> 3L out) over last 24 hours, wt stable. Cr and K stable. Wt stable.     Continue PTA Coreg, atorvastatin    Cardiology was considering RHC prior to discharge and CardioMEMS     Cardiology will arrange follow-up in the [facility] clinic with Shaniqua Ram PA-C in 1 to 2 weeks post discharge with a repeat BMP and NT pro BNP beforehand.    Strict I and O's, daily weights.     Compression stockings or wraps on legs    US paracentesis with 3.1L removed 5/16 - reports improvement in symptoms    5/17  after echo completed 5/16 - no longer felt to be of cardiac etiology - more likely seeming to be due to renal disease however unclear.      5/17 RUQ US Small ascites. Coarse appearance of the liver that could represent underlying liver disease. No visible lesion but assessment is somewhat limited. If there is concern, MRI could be performed for more sensitive assessment of the liver.    Cardiology signed off 5/17    Nephrology consultation (appreciated) - metolazone added 5/17 - on RA     Wt at 199, net neg L on 5/24    Bumex dosing per Nephro - will give after PRBC transfusion today 5/24 - albumin at discretion of nephro.  He is feeling more tired today and yesterday, nothing focal, discussed with nephrologist whether uremia contributing since it is now >100 and climbing.       CKD stage IV  Cr 2.3 on admission and baseline appears to be 2.5-2.6.  History of NSAID use.    Daily BMPs while diuresing    5/16 creat 2.19 (tolerating diuresis)  ---> 5/17 creat 2.24 --> 5/18 2.19 --> 5/19 2.4 --> 2.55 5/20 --> 2.6 5/21 --> 2.7 5/22 --> 2.67 5/23 --> 2.7 5/24    bumex gtt stopped 5/19    Started bumex 4mg po bid 5/20 but then held per nephro (creat still increasing slightly, BP borderline), creat slowly leveling off as above    5/22 - nephro ordered albumin/diuretic combo    5/23 - discussed with nephro re anemia, will await hgb 5/24 as he is diuresing now, may be able to avoid another transfusion of PRBC (transfusion could worsen fluid overload)    5/24 - hgb 6.5, transfuse 1uPRBC and bumex held until after as above     Ascites, s/p paracentesis 5/16  Mild elevation in INR (1.2)   * U/S mild to moderate ascites.   * No history or evidence of liver disease by liver panel or prior imaging. However, does have chronic mild elevation of INR.   Diuresis as above.     Vitamin K 5 mg daily x 3, INR on Monday 5/16    Paracentesis done 5/16 - transudative - as anticipated SAAG > 1.1, and protein > 2.5 g/dL in ascites from - SAAG  1.6, protein 2.9 in ascites    US showed Small ascites. Coarse appearance of the liver that could represent underlying liver disease. No visible lesion but assessment is somewhat limited. If there is concern, MRI could be performed for more sensitive assessment of the liver    5/24 still feeling much improvement since tap 5/16 with regard to abdominal girth/fullness     Transfusion dependent chronic anemia, likely related to chronic disease   Iron deficiency - by iron panel on 5/11/22  Patient's hemoglobin has continued to drift down.  It was 6.1 the ED while being 6.7 on 5/5 and 6.1 on 5/3.   * Last transfusion PTA: 5/6.  Patient denies any recent bleeding.  He has had a thorough work up so far.  He has had colonoscopy/endoscopy/pill study with GI without clear cause.  He also follows with hem/onc and has gotten IV infusions in the past.  In the ED he was consented and 1 unit of PRBCs was ordered  * Received 1 unit on 5/10/22 with appropriate rise in hgb  * Received  1 unit on 05/12/22 for hgb of 6.5 and CHF.    Venofer 300 mg x 2 ordered. Defer to hematology further doses.     Transfuse for hgb < 7. We currently have a blood product shortage.      Outpatient follow up with Hematology after discharge    5/21 hgb 7.0, he is essentially at dry weight after aggressive diuresis additionally; will order 1 unit PRBC    5/21 - per nephro, another dose of aranesp given    5/22 - hgb 7.5      5/23 - hgb 7.0, no bleeding concerns, will await diuresis and check Hgb in AM     5/24 -  hgb 6.5, transfuse 1uPRBC and bumex held until after as above     DM type II   Last HgbA1c was 8.2 on 2/16/22     Increased lantus to PTA dose of 30 units (from 25 units) on 05/18    Added prandial novolog on 5/11/22, increase to 7 units AC on 05/11/22.     Note elevated BS evening of 5/14/22 > adjust insulin if BS remain elevated.     Continue high res SSI    BS considerably variable, adjustment as necessary     L Shoulder pain - impingement  C  spine stenosis  Has had gradually worsening shoulder pain for some time.  He states its to the point he is not sleeping well..  He was evaluated by Dr. Scott at [facility] urgent care on Sunday and had MRIs of the shoulder and spine.     Ortho consult on 5/11 appreciated. 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     Not candidate for c-spine injections.     Recommended continued pain meds PRN, WBAT of upper extremities, and OK to stop steroids.     Oxycodone 2.5-5 mg q 6 hours. Advised that he try to limit this to night time doses for best effect.      Started low dose neurontin 200 mg q HS on 5/13/22.     Appreciate orthopedic surgery assistance in planning outpatient follow up    5/18 - consulted spine surgery given his ongoing neck pain and prolonged hospitalization - appreciate assistance - plan for outpatient follow up 2 wks after discharge - no surgical option currently, no indication for steroid injection     Multiple scabs over extremities (~ 4)   Coccyx blanchable redness (no wound)     Appreciate WOC consult     Deconditioned state, requesting home care    Pt previously using outpatient PT, requests home care.     Prolonged hospital stay with decompensated CHF, high risk for deconditioning.     Appreciate PT consult, currently recommending TCU as of 5/14/22 - patient and spouse are going to discuss further. Spouse concerned of being able to manage at home safely.      Urinary retention on 05/14/22 - resolved    Will catheter placed on 05/14/22 for both retention and strict I and O measurement.     Started flomax on 5/14/22 in anticipation of discontinuing will and voiding trial. (history of difficulty with urination PTA)      Will out 5/19 as bumex gtt stopped, voiding ok     Constipation     On 05/15/22, states he has not had a BM for a week.     Scheduled senna and PRN dulcolax ordered    Narcotics definitely contribute.    Started miralax daily on 05/15/22 can do BID  if necessary        Diet: Combination Diet Low Saturated Fat Na <2400mg Diet, No Caffeine Diet    DVT Prophylaxis: Pneumatic Compression Devices  Glasgow Catheter: Not present  Central Lines: None  Cardiac Monitoring: None  Code Status: Full Code      Disposition Plan   Expected Discharge: 05/27/2022     Anticipated discharge location: home with family;home with help/services    Delays:     Placement - TCU            The patient's care was discussed with the Bedside Nurse, Patient and Nephro Consultant.    Mynor Becker MD  Hospitalist Service  [facility]  Securely message with the Vocera Web Console (learn more here)  Text page via [employer] Paging/Directory         Clinically Significant Risk Factors Present on Admission                    ______________________________________________________________________    Interval History   Seen and examined midday. Sleepy again today but able to awaken and participate in exam and questions. No focal deficits. Denies new pain, fevers, or sob. Diuresis continued with nephrology. I have concern he may be getting sleepy not only because of poor sleep in hospital but also due to . Transfuse 1uPRBC as hgb 6.5 despite further diuresis.    Data reviewed today: I reviewed all medications, new labs and imaging results over the last 24 hours. I personally reviewed no images or EKG's today.    Physical Exam   Vital Signs: Temp: 98.3  F (36.8  C) Temp src: Oral BP: 107/56 Pulse: 82   Resp: 18 SpO2: 93 % O2 Device: None (Room air)    Weight: 199 lbs 14.4 oz    Gen: NAD, pleasant, sleepy but awakens to voice  HEENT: EOMI, MMM  Resp: no crackles,  no wheezes, no increased work of resp  CV: S1S2 heard, reg rhythm, reg rate  Abdo: soft, nontender, nondistended, bowel sounds present  Ext: calves nontender, well perfused  Neuro: aaox3, CN grossly intact, no facial asymmetry, moving all 4      Data   Recent Labs   Lab 05/24/22  1721 05/24/22  1430 05/24/22  1249  05/24/22  0841 05/23/22  1221 05/23/22  0902 05/22/22  1208 05/22/22  0805 05/21/22  1203 05/21/22  0834   WBC  --   --   --  5.8  --   --   --   --   --  8.4   HGB  --   --   --  6.5*  --  7.0*  --  7.5*  --  7.0*   MCV  --   --   --  88  --   --   --   --   --  83   PLT  --   --   --  165  --   --   --   --   --  194   NA  --   --   --  133  --  133  --  135  --   --    POTASSIUM  --  3.2*  --  3.4  --  3.6  --  3.9  --   --    CHLORIDE  --   --   --  94  --  95  --  96  --   --    CO2  --   --   --  30  --  29  --  30  --   --    BUN  --   --   --  115*  --  101*  --  94*  --   --    CR  --   --   --  2.73*  --  2.67*  --  2.71*  --   --    ANIONGAP  --   --   --  9  --  9  --  9  --   --    VANESA  --   --   --  8.2*  --  8.5  --  8.3*  --   --    *  --  276* 297*   < > 229*   < > 149*   < >  --    ALBUMIN  --   --   --  3.2*  --  2.9*  --   --   --   --     < > = values in this interval not displayed.     No results found for this or any previous visit (from the past 24 hour(s)).

## 2022-05-25 NOTE — PROGRESS NOTES
[facility]    Hospitalist Progress Note    Interval History     Patient is awake but appears very pale and fatigued.  Feels that he is much worse today.  Was barely able to get up out of the bed this morning.  Hemoglobin continues to remain less than 7.  Receiving another unit of packed RBCs.  Continues to have facial edema.    -Data reviewed today: I reviewed all new labs and imaging results over the last 24 hours. I personally reviewed no images or EKG's today.    Physical Exam   Temp: 97.8  F (36.6  C) Temp src: Oral BP: 105/59 Pulse: 80   Resp: 20 SpO2: 95 % O2 Device: None (Room air)    Vitals:    05/21/22 0608 05/24/22 0608 05/25/22 0623   Weight: 90.6 kg (199 lb 11.2 oz) 90.7 kg (199 lb 14.4 oz) 88.4 kg (194 lb 14.4 oz)     Vital Signs with Ranges  Temp:  [97.1  F (36.2  C)-98.7  F (37.1  C)] 97.8  F (36.6  C)  Pulse:  [75-98] 80  Resp:  [16-20] 20  BP: ()/(46-68) 105/59  SpO2:  [94 %-98 %] 95 %  I/O last 3 completed shifts:  In: 1420 [P.O.:720]  Out: 1650 [Urine:1650]    Physical Exam  Constitutional:       Appearance: Normal appearance.   HENT:      Head: Normocephalic.      Comments: Has facial edema.  Eyes:      Pupils: Pupils are equal, round, and reactive to light.   Cardiovascular:      Rate and Rhythm: Regular rhythm. Tachycardia present.      Pulses: Normal pulses.      Heart sounds: Normal heart sounds.   Pulmonary:      Effort: Pulmonary effort is normal. No respiratory distress.      Breath sounds: Normal breath sounds.      Comments: basal crackles present.  Abdominal:      General: Abdomen is flat. Bowel sounds are normal. There is no distension.      Tenderness: There is no abdominal tenderness. There is no guarding.   Musculoskeletal:         General: Normal range of motion.      Cervical back: Normal range of motion.      Right lower leg: Edema present.      Left lower leg: Edema present.   Skin:     General: Skin is warm and dry.   Neurological:      General:  No focal deficit present.   Psychiatric:         Mood and Affect: Mood normal.           Medications       albumin human  25 g Intravenous Q8H     aspirin  81 mg Oral Daily     atorvastatin  20 mg Oral Daily     bumetanide  2 mg Intravenous Q8H     clopidogrel  75 mg Oral Daily     dorzolamide-timolol  1 drop Right Eye BID     gabapentin  200 mg Oral At Bedtime     insulin aspart  1-10 Units Subcutaneous TID AC     insulin aspart  1-7 Units Subcutaneous At Bedtime     insulin aspart  7 Units Subcutaneous TID w/meals     insulin glargine  30 Units Subcutaneous At Bedtime     pantoprazole  40 mg Oral QAM AC     polyethylene glycol  17 g Oral Daily     potassium chloride ER  10 mEq Oral BID     sennosides  1-2 tablet Oral BID     sertraline  50 mg Oral Daily     sodium chloride (PF)  3 mL Intracatheter Q8H     tamsulosin  0.4 mg Oral Daily     traZODone  100 mg Oral At Bedtime       Data   Recent Labs   Lab 05/25/22  0916 05/25/22  0834 05/25/22  0727 05/25/22  0305 05/24/22  2117 05/24/22  2111 05/24/22  1249 05/24/22  0841 05/23/22  1221 05/23/22  0902   WBC 6.1 6.0  --   --   --   --   --  5.8  --   --    HGB 6.6* 6.5*  --   --   --   --   --  6.5*  --  7.0*   MCV 85 86  --   --   --   --   --  88  --   --     187  --   --   --   --   --  165  --   --    NA  --  133  --   --   --   --   --  133  --  133   POTASSIUM  --  3.6  3.6  --  3.2*  --  3.1*   < > 3.4  --  3.6   CHLORIDE  --  96  --   --   --   --   --  94  --  95   CO2  --  29  --   --   --   --   --  30  --  29   BUN  --  120*  --   --   --   --   --  115*  --  101*   CR  --  2.88*  --   --   --   --   --  2.73*  --  2.67*   ANIONGAP  --  8  --   --   --   --   --  9  --  9   VANESA  --  8.4*  --   --   --   --   --  8.2*  --  8.5   GLC  --  300* 204*  --  344*  --    < > 297*   < > 229*   ALBUMIN  --   --   --   --   --   --   --  3.2*  --  2.9*    < > = values in this interval not displayed.       No results found for this or any previous visit (from  the past 24 hour(s)).      Assessment & Plan          Justyn Olivas is a 81 year old male with a history of HFpEF, transfusion dependent chronic anemia, CKD stage IV, DM type II who recently was started on steroids for shoulder pain and was sent in to the ED on 5/10/2022 from cardiology clinic due to concerns for decompensated heart failure      Patient was placed on steroids on Sunday and since then has had worsening lower extremity edema, MARKS and fluid retention in the abdomen.  Since then he has had approximately 15 pound weight gain.  He was seen in cardiology clinic this AM and they sent him in to the ED for hospitalization for decompensated heart failure.  At presentation to hospital, requiring 2 L of O2 to maintain his oxygenation      Fluid overload with anasarca  Initially concern of decompensated HFpEF, but essentially ruled out  Acute hypoxic respiratory distress - resolved  * Echo 3/22/22: EF 55%.  RV normal in structure function and size.  No valve disease.  * U/S shows moderate ascites.   * Admit weight 216#, baseline wt ~ 200#    Patient received Bumex 1 mg IV in ED > lasix 80 mg IV BID x 5 dose    On 05/13/22, cardiology started on lasix gtt with K replacement protocol.     On 05/14/22, cardiology switched to bumex 2 mg IV x 1, followed by bumex gtt at 0.5 mg/hr.    On K 10 meq BID while diuresing.     On 05/15/22, excellent diuresis (> 3L out) over last 24 hours, wt stable. Cr and K stable. Wt stable.     Continue PTA Coreg, atorvastatin    Cardiology was considering RHC prior to discharge and CardioMEMS     Cardiology will arrange follow-up in the [facility] clinic with Shaniqua Ram PA-C in 1 to 2 weeks post discharge with a repeat BMP and NT pro BNP beforehand.    Strict I and O's, daily weights.     Compression stockings or wraps on legs    US paracentesis with 3.1L removed 5/16 - reports improvement in symptoms    5/17 after echo completed 5/16 - no longer felt to be of cardiac etiology - more  likely seeming to be due to renal disease however unclear.  No evidence of nephrotic syndrome.    5/17 RUQ US Small ascites. Coarse appearance of the liver that could represent underlying liver disease. No visible lesion but assessment is somewhat limited. If there is concern, MRI could be performed for more sensitive assessment of the liver.    Cardiology signed off 5/17    Nephrology consultation (appreciated) - metolazone added 5/17 - on RA .  Patient is likely third spacing from hypoalbuminemia.  Currently receiving IV albumin 25 mg every 8 hours.  It helps with diuresis in combination with Bumex    Patient is net -15 L since admission.  Bumex dosing per Nephro -was given a unit of packed RBCs yesterday and today.  Patient continues to have significant fatigue which could be a combination of anemia and uremia.        CKD stage IV  Cr 2.3 on admission and baseline appears to be 2.5-2.6.  History of NSAID use.    Daily BMPs while diuresing    5/16 creat 2.19 (tolerating diuresis)  ---> 5/17 creat 2.24 --> 5/18 2.19 --> 5/19 2.4 --> 2.55 5/20 --> 2.6 5/21 --> 2.7 5/22 --> 2.67 5/23 --> 2.7 5/24--- 2.8 on 5/25    bumex gtt stopped 5/19    Started bumex 4mg po bid 5/20 but then held per nephro (creat still increasing slightly, BP borderline), creat slowly leveling off as above    5/22 - nephro ordered albumin/diuretic combo    5/23 - discussed with nephro re anemia, will await hgb 5/24 as he is diuresing now, may be able to avoid another transfusion of PRBC (transfusion could worsen fluid overload)    5/24 - hgb 6.5, transfuse 1uPRBC and bumex held until after as above    5/25/2022-hemoglobin at 6.6.  Reordered another unit of packed RBCs.  Repeat type and screen today.     Ascites, s/p paracentesis 5/16  Mild elevation in INR (1.2)   * U/S mild to moderate ascites.   * No history or evidence of liver disease by liver panel or prior imaging. However, does have chronic mild elevation of INR.   Diuresis as above.      Vitamin K 5 mg daily x 3, INR on Monday 5/16    Paracentesis done 5/16 - transudative - as anticipated SAAG > 1.1, and protein > 2.5 g/dL in ascites from - SAAG 1.6, protein 2.9 in ascites    US showed Small ascites. Coarse appearance of the liver that could represent underlying liver disease specially in light of elevated INR and low albumin.. No visible lesion but assessment is somewhat limited. If there is concern, MRI could be performed for more sensitive assessment of the liver    5/24 still feeling much improvement since tap 5/16 with regard to abdominal girth/fullness     Transfusion dependent chronic anemia, likely related to chronic disease   Iron deficiency - by iron panel on 5/11/22  Patient's hemoglobin has continued to drift down.  It was 6.1 the ED while being 6.7 on 5/5 and 6.1 on 5/3.   * Last transfusion PTA: 5/6.  Patient denies any recent bleeding.  He has had a thorough work up so far.  He has had colonoscopy/endoscopy/pill study with GI without clear cause.  He also follows with hem/onc and has gotten IV infusions in the past.  In the ED he was consented and 1 unit of PRBCs was ordered  * Received 1 unit on 5/10/22 with appropriate rise in hgb  * Received  1 unit on 05/12/22 for hgb of 6.5 and CHF.    Venofer 300 mg x 2 ordered. Defer to hematology further doses.     Transfuse for hgb < 7. We currently have a blood product shortage.      Outpatient follow up with Hematology after discharge    5/21 hgb 7.0, he is essentially at dry weight after aggressive diuresis additionally; will order 1 unit PRBC    5/21 - per nephro, another dose of aranesp given    5/22 - hgb 7.5      5/23 - hgb 7.0, no bleeding concerns, will await diuresis and check Hgb in AM     5/24 -  hgb 6.5, transfuse 1uPRBC and bumex held until after as above     DM type II   Last HgbA1c was 8.2 on 2/16/22     Increased lantus to PTA dose of 30 units (from 25 units) on 05/18    Added prandial novolog on 5/11/22, increase to 7  units AC on 05/11/22.     Note elevated BS evening of 5/14/22 > adjust insulin if BS remain elevated.     Continue high res SSI    BS considerably variable, adjustment as necessary     L Shoulder pain - impingement  C spine stenosis  Has had gradually worsening shoulder pain for some time.  He states its to the point he is not sleeping well..  He was evaluated by Dr. Scott at [facility] urgent care on Sunday and had MRIs of the shoulder and spine.     Ortho consult on 5/11 appreciated. 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     Not candidate for c-spine injections.     Recommended continued pain meds PRN, WBAT of upper extremities, and OK to stop steroids.     Oxycodone 2.5-5 mg q 6 hours. Advised that he try to limit this to night time doses for best effect.      Started low dose neurontin 200 mg q HS on 5/13/22.     Appreciate orthopedic surgery assistance in planning outpatient follow up    5/18 - consulted spine surgery given his ongoing neck pain and prolonged hospitalization - appreciate assistance - plan for outpatient follow up 2 wks after discharge - no surgical option currently, no indication for steroid injection     Multiple scabs over extremities (~ 4)   Coccyx blanchable redness (no wound)     Appreciate WOC consult     Deconditioned state, requesting home care    Pt previously using outpatient PT, requests home care.     Prolonged hospital stay with decompensated CHF, high risk for deconditioning.     Appreciate PT consult, currently recommending TCU as of 5/14/22 - patient and spouse are going to discuss further. Spouse concerned of being able to manage at home safely.      Urinary retention on 05/14/22 - resolved    Will catheter placed on 05/14/22 for both retention and strict I and O measurement.     Started flomax on 5/14/22 in anticipation of discontinuing will and voiding trial. (history of difficulty with urination PTA)      Will out 5/19 as bumex gtt  stopped, voiding ok     Constipation     On 05/15/22, states he has not had a BM for a week.     Scheduled senna and PRN dulcolax ordered    Narcotics definitely contribute.    Started miralax daily on 05/15/22 can do BID if necessary        Diet: Combination Diet Low Saturated Fat Na <2400mg Diet, No Caffeine Diet    DVT Prophylaxis: Pneumatic Compression Devices  Glasgow Catheter: Not present  Central Lines: None  Cardiac Monitoring: None  Code Status: Full Code      Julieth Oliveira MD, MD  [phone](p)  [phone]( c)

## 2022-05-25 NOTE — PLAN OF CARE
Goal Outcome Evaluation:      DATE&TIME:5/25/22 0700- 1530  Cognitive Concerns/ Orientation: Alert and oriented x4, forgetful at times. Lethargic   BEHAVIOR & AGGRESSION TOOL COLOR: Green  ABNL VS/O2: BP remains soft SBP in  90s/ 50s other VSS on RA.   MOBILITY: Assist x1 with gait belt and walker, fall risk. Lethargic.  PAIN MANAGEMENT: given PRN Oxycodone x1. Left shoulder pain Voltaren gel available. Heat applied.  DIET: Cardiac,  BOWEL/BLADDER: Incontinent at times. External catheter in place.  ABNL LAB/BG: /221 Hgb 6.5 no signs of active bleeding. Cr 2.73, , K 3.6(on high protocol),  replaced Recheck tomorrow.  DRAIN/DEVICES: PIV x2   TELEMETRY RHYTHM: NA   SKIN: Blanchable redness on coccyx, open area on bilateral butt cheek, open to air per WOC, applied barrier  cream, Wearing compression stockings. Scattered bruising, scattered  abrasions/scabs bilateral shins, thighs, knees, scrotum, buttock and arms. Reddened/ tenderness at tip of penis.   TESTS/PROCEDURES NA   D/C DATE: Discharge to TCU, pending stable hemoglobin  kidney function.    OTHER IMPORTANT INFO: Nephrology  PT/OT/SW following. Got one unit of PRBCs for hgb 6.5 ,Hgb recheck in AM.

## 2022-05-25 NOTE — PLAN OF CARE
Goal Outcome Evaluation:      DATE&TIME:5/24/22-5/25/22 9521-5877      Cognitive Concerns/ Orientation: Alert and oriented x4, forgetful at times. Lethargic tonight.  BEHAVIOR & AGGRESSION TOOL COLOR: Green  ABNL VS/O2: BP remains soft SBP in  100's,  other VSS on RA.   MOBILITY: Assist x1 with gait belt and walker, fall risk. Lethargic.  PAIN MANAGEMENT: Endorsed pain, was given PRN Oxycodone. Voltaren gel available. Heat applied.  DIET: Cardiac,  BOWEL/BLADDER: Incontinent at times. External catheter in place.  ABNL LAB/BG: . Hgb 6.5 no signs of active bleeding. Cr 2.73, , K 3.1, (on high protocol), Recheck @ 0815.  DRAIN/DEVICES: PIV x2   TELEMETRY RHYTHM: NA   SKIN: Blanchable redness on coccyx, open area on bilateral butt cheek, open to air per WOC, applied barrier  cream, Wearing compression stockings. Scattered bruising, scattered  abrasions/scabs bilateral shins, thighs, knees, scrotum, buttock and arms. Reddened/ tenderness at tip of penis.   TESTS/PROCEDURES NA   D/C DATE: Discharge to TCU, pending stable hemoglobin and transitioning back to PO diuretics per Nephrology.    OTHER IMPORTANT INFO: Nephrology  PT/OT/SW following. Got one unit of PRBCs for hgb 6.5 yesterday >Hgb recheck in AM.

## 2022-05-25 NOTE — PROGRESS NOTES
[facility]     Renal Progress Note       SHORTHAND KEY FOR MY NOTES:  c = with, s = without, p = after, a = before, x = except, asx = asymptomatic, tx = transplant or treatment, sx = symptoms or symptomatic, cx = canceled or culture, rxn = reaction, yday = yesterday, nl = normal, abx = antibiotics, fxn = function, dx = diagnosis, dz = disease, m/h = melena/hematochezia, c/d/l/ha = cramping/dizziness/lightheadedness/headache, d/c = discharge or diarrhea/constipation, f/c/n/v = fevers/chills/nausea/vomiting, cp/sob = chest pain/shortness of breath, tbv = total body volume, rxn = reaction, tdc = tunneled dialysis catheter, pta = prior to admission, hd = hemodialysis, pd = peritoneal dialysis, hhd = home hemodialysis, edw = estimated dry wt         Assessment/Plan:     1.  CASS/CKD IV.  Pt's BUN/Cr are slowly rising p starting the IV diuretics.  He remains TBV up, but better c IV alb to help mobilize fluids.  He doesn't have any major uremic sx.  He has decent uo, but it should be more so I wonder if he's obstructed a bit and not emptying his bladder properly.  Bladder scan showed 700+ ml and the bladder looked big, but unsure how much of it is ascites.  A.  Follow labs, uo, sx daily.  B.  Place will.    2.  Volume overload.  Pt is TBV up and is responding to IV alb/bumet, but wonder if he's retaining fluid due to obst (as above).    A.  Continue IV alb/bumet.  B.  Follow clinically.    3.  Anemia.  Pt's hb remains in the 6s despite blood and IV diuretics.  Reviewed H/O Consult re tranfusion-dependent anemia.  BMBx was neg last yr.  He has rec'd iron via the multiple blood transfusions, which should make for his fe sats of 4%.  Since he's getting blood now, won't recheck fe studies now.  The fe def is part of his EPO resistance, so that should improve.  In addition, he may have some resistance if the PTH is too high.  It was only 137 last yr.  A.  Agree c blood transfusion today.  B.  Follow  hb, clinically.  C.  Check PTH.    4.  Hypotension.  He is mostly asx and he is on diuretics only.   A.  Continue to hold BP meds.  B.  Ok to hold bumet if SBP < 90.    5.  FEN.  Electrolytes are ok x low K from the diuretics.  He is now on KCl 10 bid.  Na corrects for high glc.  A.  Continue same diet.  B.  Increase KCl to 20 bid for now.  C.  Check electrolytes daily.        Interval History:     Pt is pretty tired today.  He is sleepy, but is listening.  He is getting blood right now.  No cp/sob.  He didn't eat lunch, but did have bfast.  UO is ok via the ext catheter.  He has some abd discomfort.  No f/c/n/v.         Medications and Allergies:       albumin human  25 g Intravenous Q8H     aspirin  81 mg Oral Daily     atorvastatin  20 mg Oral Daily     bumetanide  2 mg Intravenous Q8H     clopidogrel  75 mg Oral Daily     dorzolamide-timolol  1 drop Right Eye BID     gabapentin  200 mg Oral At Bedtime     insulin aspart  1-10 Units Subcutaneous TID AC     insulin aspart  1-7 Units Subcutaneous At Bedtime     insulin aspart  7 Units Subcutaneous TID w/meals     insulin glargine  30 Units Subcutaneous At Bedtime     pantoprazole  40 mg Oral QAM AC     polyethylene glycol  17 g Oral Daily     potassium chloride ER  10 mEq Oral BID     sennosides  1-2 tablet Oral BID     sertraline  50 mg Oral Daily     sodium chloride (PF)  3 mL Intracatheter Q8H     tamsulosin  0.4 mg Oral Daily     traZODone  100 mg Oral At Bedtime     Allergies   Allergen Reactions     No Known Allergies           Physical Exam:     Vitals were reviewed     , Blood pressure 105/59, pulse 80, temperature 97.8  F (36.6  C), temperature source Oral, resp. rate 20, weight 88.4 kg (194 lb 14.4 oz), SpO2 95 %.  Wt Readings from Last 3 Encounters:   05/25/22 88.4 kg (194 lb 14.4 oz)   05/10/22 97.5 kg (215 lb)   04/20/22 89.8 kg (198 lb)     Intake/Output Summary (Last 24 hours) at 5/25/2022 3323  Last data filed at 5/25/2022 1300  Gross per 24 hour    Intake 720 ml   Output 1650 ml   Net -930 ml     GENERAL APPEARANCE: looks tired and sick, alert, sitting in recliner  HEENT:  eyes/ears/nose/neck grossly nl  RESP: CTA B c decent efforts  CV: RRR, nl S1/S2   ABDOMEN: s/nt/nd  EXTREMITIES/SKIN: some excoriations, 2-3+ ble edema  NEURO:  intention tremor - R > L; no significant asterixis  OTHER:  ext male catheter         Data:     CBC RESULTS:     Recent Labs   Lab 05/25/22  0916 05/25/22  0834 05/24/22  0841 05/23/22  0902 05/22/22  0805 05/21/22  0834   WBC 6.1 6.0 5.8  --   --  8.4   RBC 2.64* 2.52* 2.52*  --   --  2.87*   HGB 6.6* 6.5* 6.5* 7.0* 7.5* 7.0*   HCT 22.5* 21.7* 22.1*  --   --  23.9*    187 165  --   --  194     Basic Metabolic Panel:  Recent Labs   Lab 05/25/22  0834 05/25/22  0727 05/25/22  0305 05/24/22  2117 05/24/22  2111 05/24/22  1721 05/24/22  1430 05/24/22  1249 05/24/22  0841 05/23/22  1221 05/23/22  0902 05/22/22  1208 05/22/22  0805 05/21/22  0743 05/21/22  0711 05/20/22  1126 05/20/22  0915     --   --   --   --   --   --   --  133  --  133  --  135  --  135  --  133   POTASSIUM 3.6  3.6  --  3.2*  --  3.1*  --  3.2*  --  3.4  --  3.6  --  3.9  --  3.8  3.8  --  3.8   CHLORIDE 96  --   --   --   --   --   --   --  94  --  95  --  96  --  95  --  96   CO2 29  --   --   --   --   --   --   --  30  --  29  --  30  --  31  --  31   *  --   --   --   --   --   --   --  115*  --  101*  --  94*  --  84*  --  77*   CR 2.88*  --   --   --   --   --   --   --  2.73*  --  2.67*  --  2.71*  --  2.62*  --  2.55*   * 204*  --  344*  --  395*  --  276* 297*   < > 229*   < > 149*   < > 172*   < > 201*   VANESA 8.4*  --   --   --   --   --   --   --  8.2*  --  8.5  --  8.3*  --  7.5*  --  8.3*    < > = values in this interval not displayed.     INRNo lab results found in last 7 days.   Attestation:   I have reviewed today's relevant vital signs, notes, medications, labs and imaging.    Dawit Salvador MD  [facility] -  Nephrology  [phone]

## 2022-05-26 NOTE — PROGRESS NOTES
[facility] Nurse Inpatient Wound Assessment   Reason for consultation: Evaluate and treat scabbing/reddness to extremities, scrotum, and coccyx     Assessment  Upper and lower extremity wounds due to edema and patient scratching/picking  Status: improving, mostly healed     Inner buttocks: worsening skin breakdown related to moisture, friction, pressure, possibly pt scratching, and possibly exacerbated by a rash/derm issue.  Pt has scattered plaque-like rash areas all over, and this buttock area has a similar scaly outline.  Will continue to hold off on classifying this as a pressure injury due to the number of various factors; also the open areas are on the fleshy areas and not the bony prominences, coccyx is intact.  Although wound is much more extensive, it remains clean and shallow.  POC changed to Triad paste and a low air loss mattress ordered today 5/26.  Dressings are difficult to this area due to proximity to anus and would likely result in moisture-trapping, but will continue to reassess need for dressings.     Penis: small ulceration to right lateral meatus, and general erythema and induration to distal shaft and glans which seems slightly improved from last week.  Per report, catheter was out for past week and was replaced yesterday 5/25.  Unclear if ulceration is from pressure vs trauma vs other.  Defer to MD for further assessment and treatment of any infection prn.     Treatment Plan  Extremities and back skin care: BID and PRN   1. Cleanse skin with Yumiko Cleanse and Protect Perineal Lotion and wipe clean with gauze or soft cloth.  2. Apply Sarna Lotion to all extremities and back.   3. Cover any open draining areas with foam cover dressing as needed (optifoam with border or polymem 2x4) and change every 3 days. To assist in dressing adhesion apply cavilon no sting barrier film to periwound skin.  4. Time and date new dressings.    Coccyx/ inner buttocks/ perineal cares: BID and prn:  1.  Cleanse with Yumiko  perineal lotion and soft dry wipes  2.  Apply moderate layer of Triad paste (in yellow tube) to open areas  3.  Try leaving open to air.  If area worsens and it is feasible to keep a dressing clean/dry/intact, can trial foam dressings, angling them to avoid crossing over the perianal area.   4.  PIP measures, including Pulsate low air loss mattress and GeoMatt chair cushion.  Pt should stand for a few minutes every 1-2 hrs when up in the chair.   Keep HOB as low as tolerated.     Penis: BID and prn:   1.  Clean with Yumiko perineal lotion and soft dry wipes; leave open to air  2.  Ensure catheter secured properly without any tension  3.  Minimize use of briefs with catheter    Pressure Injury Prevention (PIP) Plan:  -If patient is declining pressure injury prevention interventions: Explore reason why and address patient's concerns and Educate on pressure injury risk and prevention intervention(s)  -Mattress: Pulsate  -HOB: Maintain at or below 30 degrees, unless contraindicated  -Repositioning in bed: Every 1-2 hours , Left/right positioning; avoid supine and Raise foot of bed prior to raising head of bed, to reduce patient sliding down (shear)  -Heels: Keep elevated off mattress and Pillows under calves  -Protective Dressing: None  -Chair positioning: Chair cushion (#[id number]) , Repositions self: patient to shift weight every 15 minutes and Assist patient to reposition hourly   -Moisture Management: Perineal cleansing /protection: Follow Incontinence Protocol, Clean and dry skin folds with bathing  and Moisturize dry skin  -Under Devices: Inspect skin under all medical devices during skin inspection , Ensure tubes are stabilized without tension and Ensure patient is not lying on medical devices or equipment when repositioned      Orders Updated  Recommended provider order: None, at this time  WO Nurse follow-up plan: will increase to 2x week and prn  Nursing to notify the Provider(s) and re-consult the WOC Nurse if  wound(s) deteriorates or new skin concern.    Patient History  According to provider note(s):  Justyn Olivas is a 81 year old male with a history of HFpEF, transfusion dependent chronic anemia, CKD stage IV, DM type II who recently was started on steroids for shoulder pain and was sent in to the ED on 5/10/2022 from cardiology clinic due to concerns for decompensated heart failure      Patient was placed on steroids on Sunday and since then has had worsening lower extremity edema, MARKS and fluid retention in the abdomen.  Since then he has had approximately 15 pound weight gain.  He was seen in cardiology clinic this AM and they sent him in to the ED for hospitalization for decompensated heart failure.  At presentation to hospital, requiring 2 L of O2 to maintain his oxygenation      Decompensated HFpEF  Acute hypoxic respiratory distress due to above     Objective Data  Containment of urine/stool: Incontinent pad in bed and Primofit external catheter; up to commode; pull-on briefs    Active Diet Order  Orders Placed This Encounter      Combination Diet Low Saturated Fat Na <2400mg Diet, No Caffeine Diet    Output:   I/O last 3 completed shifts:  In: 1420 [P.O.:720]  Out: 2800 [Urine:2800]    Risk Assessment:   Sensory Perception: 3-->slightly limited  Moisture: 4-->rarely moist  Activity: 3-->walks occasionally  Mobility: 3-->slightly limited  Nutrition: 3-->adequate  Friction and Shear: 1-->problem  Adam Score: 17                          Labs:   Recent Labs   Lab 05/26/22  0811   ALBUMIN 3.5   HGB 7.2*   WBC 6.5       Physical Exam  Areas of skin assessed: full head to toe inspection completed    Wound Location:  All extremities  Date of last photo 5/13/22 (no new arm photos 5/26, areas all look similar or improved)    Left arm                                                                Right arm    5-26-22 BLE      5-26-22 rt lateral lower leg      Wound History: Patient with woody edema up into flank  area. Reports itching skin started approximately 1 month PTA and has seen a dermatologist who has started him on Sarna Lotion    Wound Description. Patient has small scabbing scattered over all extremities from picking vs rash.        Palpation of the wound bed: normal      Drainage: scant     Description of drainage: serosanguinous     Tunneling N/A     Undermining N/A  Periwound skin: edematous      Color: pink      Temperature: normal   Odor: none  Pain: absent and denies , none       Wound Location: bilateral inner buttocks    Last photo: 5-26-22 5-20-22      Wound due to: Friction, Shear, Moisture Associated Skin Damage (MASD) and possible rash, scratching  Wound history/plan of care: worsening today 5/26.  Last week pt able to stand with walker and SBA; today pt was in bed and rolled with assist x 1-2 but did not tolerate being on either side for more than about 30 seconds due to shoulder pain.  Pt also wants HOB raised higher than 30 degrees due to the pain.  Has chair cushion in place in chair but spends most of the day in chair per report. Some incontinence, catheter removed 5/19 and replaced 5/25 per Nursing, with some initial difficulty due to scabbing at meatus.   Wound base: semi-moist red-pink tissue with thickened macerated ridges of peeling tissue and scaly edges; right outer buttock ecchymotic hue     Palpation of the wound bed: normal      Drainage: scant     Description of drainage: serosanguinous     Measurements (length x width x depth, in cm) right inner buttock approx 6 x 7 x 0.2cm overall area, somewhat butterfly-shaped      Tunneling N/A     Undermining N/A  Periwound skin: Denuded, Dry/scaly, Erythema- blanchable and Rash      Color: pink      Temperature: normal   Odor: none  Pain: tender  Treatment goal: promote moisture balance and tissue healing  STATUS: deteriorating  Supplies ordered: at bedside      Wound Location: Penile meatus     Last photo:  5-26-22 5-20-22      Wound due to: Unknown Etiology  Wound history/plan of care:   Had prior indwelling catheter  Wound base: right side of meatus has pale yellow moist tissue     Palpation of the wound bed: normal but distal penis is indurated and swollen and reddened     Drainage: scant     Description of drainage: serosanguinous and small yellow from meatus     Measurements (length x width x depth, in cm) approx 0.6 x 0.7 x 0.1cm   Periwound skin: mildly reddened, swollen, indurated, improved slightly from prior week      Color: pink      Temperature: normal to warm  Odor: none  Pain: tender with palpation  Pain interventions prior to dressing change: N/A  Treatment goal: keep clean and stable; defer to MD for treatment of any s/s infection  STATUS: evolving  Supplies ordered: at bedside      Interventions  Visual inspection and assessment completed   Wound Care Rationale Protect periwound skin, Improve absorptive capacity, Promote moist wound healing without tissue dehydration  and Provide protection   Wound Care: done per plan of care  Supplies: discussed with RN and discussed with patient  Current off-loading measures: Pillows under calves and Pillows  Current support surface: Standard  Atmos Air mattress - Pulsate ordered today 5/26  Education provided to: importance of repositioning, plan of care, Moisture management, Hygiene and Off-loading pressure  Discussed plan of care with Patient and Nurse    Loida Alonzo RN CWOCN

## 2022-05-26 NOTE — PROGRESS NOTES
[facility]    Hematology/Oncology Follow-up Note      Today's Date: 05/26/22  Date of Admission:  5/10/2022  Reason for Consultation: Anemia, persistent transfusion needs      ASSESSMENT/PLAN:  Justyn Olivas is a 81 year old male known to Dr. Kristine Nash for anemia related to stage IV chronic kidney disease and iron deficiency, admitted for decompensated heart failure and severe anemia.     1.  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  Stage IV chronic kidney disease  3.  Decompensated heart failure  4.  Diabetes mellitus type 2      - Recent 2/2022 EGD, colonoscopy, capsule endoscopy showed no active bleeding.  - Prior bone marrow biopsy from 5/14/2021 showed no evidence for malignancy.  - He has been receiving Aranesp as outpatient with up titration of the dose.  He has had recurrent iron deficiency requiring IV iron.  - s/p venofer 5/11 and 5/13.  - Aranesp given on 5/21.  - s/p 5U PRBC this admission.  -- Antibody screen is negative. LDH, hapto WNL. Repeat iron studies and check peripheral smear.  - Follow hemoglobin daily and transfuse for Hb <7.  -- Patient and family have met with the palliative care team this afternoon to discuss goals of care. I was able to review Dr. Nash's notes in regards to patient's hematologic history. He has anemia refractory to MARGE/IV iron and multiple blood transfusions. GI workup for iron deficiency has been unremarkable. Bone marrow biopsy in May 2021 was negative for malignancy. I discussed with Cecile given the patient's refractoriness, he is likely to require repetetive hospitalizations for blood transfusion and is at risk of develop antibodies as well as fluid overload given his history of CHF. I understand the difficulty in discussing overall goals of care, but agree this is appropriate. Patient appears comfortable at this time and does not have any complaints of pain or shortness  of breath. Cecile has asked me to minimize discussion in regards to goals of care as they have just met with the palliative care team. Awaiting family arrival.     Thank you for the consultation. I will update the patient's primary hematologist, Dr. Nash. We will sign off at this time.     Najma Redmond, DO  Hematology/Oncology  [facility]          INTERIM HISTORY:  Patient has received aranesp again on 5/21. He last received venofer on 5/11 and 5/12. Thus far, he has received 5U PRBC this admission.    At the time of my evaluation, the patient is seated upright in bed eating lunch. He is having a cookie and fruit. I discuss his case with spouse, Cecile. She is quite tearful and informs me they have just met with the palliative care team to discuss goals of care. Patient's son is en route to visit.     Patient appears comfortable and does not complain of pain.     MEDICATIONS:  Current Facility-Administered Medications   Medication     albumin human 25 % injection 25 g     aspirin EC tablet 81 mg     atorvastatin (LIPITOR) tablet 20 mg     bisacodyl (DULCOLAX) Suppository 10 mg     bumetanide (BUMEX) injection 2 mg     camphor-menthol (DERMASARRA) lotion     clopidogrel (PLAVIX) tablet 75 mg     glucose gel 15-30 g    Or     dextrose 50 % injection 25-50 mL    Or     glucagon injection 1 mg     diclofenac (VOLTAREN) 1 % topical gel 2 g     dorzolamide-timolol (COSOPT) ophthalmic solution 1 drop     gabapentin (NEURONTIN) capsule 200 mg     insulin aspart (NovoLOG) injection (RAPID ACTING)     insulin aspart (NovoLOG) injection (RAPID ACTING)     insulin aspart (NovoLOG) injection (RAPID ACTING)     insulin glargine (LANTUS PEN) injection 30 Units     lidocaine (LMX4) cream     lidocaine 1 % 0.1-1 mL     melatonin tablet 1 mg     naloxone (NARCAN) injection 0.2 mg    Or     naloxone (NARCAN) injection 0.4 mg    Or     naloxone (NARCAN) injection 0.2 mg    Or     naloxone (NARCAN) injection 0.4 mg      oxyCODONE IR (ROXICODONE) half-tab 2.5-5 mg     pantoprazole (PROTONIX) EC tablet 40 mg     polyethylene glycol (MIRALAX) Packet 17 g     potassium chloride ER (KLOR-CON M) CR tablet 20 mEq     sennosides (SENOKOT) tablet 1-2 tablet     sertraline (ZOLOFT) tablet 50 mg     sodium chloride (PF) 0.9% PF flush 3 mL     sodium chloride (PF) 0.9% PF flush 3 mL     tamsulosin (FLOMAX) capsule 0.4 mg     traZODone (DESYREL) tablet 100 mg           ALLERGIES:  Allergies   Allergen Reactions     No Known Allergies          PHYSICAL EXAM:  Vital signs:  Temp: 97.9  F (36.6  C) Temp src: Oral BP: 97/55 Pulse: 92   Resp: 20 SpO2: 94 % O2 Device: None (Room air) Oxygen Delivery: 2 LPM   Weight: 85.5 kg (188 lb 6.4 oz)  Estimated body mass index is 25.55 kg/m  as calculated from the following:    Height as of an earlier encounter on 5/10/22: 1.829 m (6').    Weight as of this encounter: 85.5 kg (188 lb 6.4 oz).    GENERAL/CONSTITUTIONAL: No acute distress. Pallor. Chronically ill appearing.  EYES: No scleral icterus.  RESPIRATORY: No accessory muscle use.  CARDIOVASCULAR: Regular rate and rhythm without murmurs, gallops, or rubs.  GASTROINTESTINAL: No hepatosplenomegaly, masses, or tenderness. No guarding.  No distention.      LABS:  CBC RESULTS:   Recent Labs   Lab Test 05/26/22  0811   WBC 6.5   RBC 2.72*   HGB 7.2*   HCT 23.1*   MCV 85   MCH 26.5   MCHC 31.2*   RDW 18.9*          Recent Labs   Lab Test 05/26/22  1220 05/26/22  0811 05/25/22  1659 05/25/22  0834   NA  --  135  --  133   POTASSIUM  --  3.3*  --  3.6  3.6   CHLORIDE  --  97  --  96   CO2  --  30  --  29   ANIONGAP  --  8  --  8   * 219*   < > 300*   BUN  --  124*  --  120*   CR  --  3.06*  --  2.88*   VANESA  --  8.6  --  8.4*    < > = values in this interval not displayed.                 Najma Redmond DO  Hematology/Oncology  [facility]

## 2022-05-26 NOTE — PROGRESS NOTES
[facility]    Hospitalist Progress Note    Interval History     Patient is awake and alert.  Sitting up in chair.  Very fatigued.  Continues to have abdominal distention.    -Data reviewed today: I reviewed all new labs and imaging results over the last 24 hours. I personally reviewed no images or EKG's today.    Physical Exam   Temp: 97.9  F (36.6  C) Temp src: Oral BP: 97/55 Pulse: 92   Resp: 20 SpO2: 94 % O2 Device: None (Room air)    Vitals:    05/24/22 0608 05/25/22 0623 05/26/22 0614   Weight: 90.7 kg (199 lb 14.4 oz) 88.4 kg (194 lb 14.4 oz) 85.5 kg (188 lb 6.4 oz)     Vital Signs with Ranges  Temp:  [97.7  F (36.5  C)-97.9  F (36.6  C)] 97.9  F (36.6  C)  Pulse:  [74-92] 92  Resp:  [18-20] 20  BP: ()/(55-60) 97/55  SpO2:  [94 %-97 %] 94 %  I/O last 3 completed shifts:  In: 1300 [P.O.:600]  Out: 2800 [Urine:2800]    Physical Exam  Constitutional:       Appearance: Normal appearance.   HENT:      Head: Normocephalic.      Comments: Has facial edema.  Eyes:      Pupils: Pupils are equal, round, and reactive to light.   Cardiovascular:      Rate and Rhythm: Regular rhythm. Tachycardia present.      Pulses: Normal pulses.      Heart sounds: Normal heart sounds.   Pulmonary:      Effort: Pulmonary effort is normal. No respiratory distress.      Breath sounds: Normal breath sounds.      Comments: basal crackles present.  Abdominal:      General: Abdomen is flat. Bowel sounds are normal. There is no distension.      Tenderness: There is no abdominal tenderness. There is no guarding.   Musculoskeletal:         General: Normal range of motion.      Cervical back: Normal range of motion.      Right lower leg: Edema present.      Left lower leg: Edema present.   Skin:     General: Skin is warm and dry.   Neurological:      General: No focal deficit present.   Psychiatric:         Mood and Affect: Mood normal.           Medications       albumin human  25 g Intravenous Q12H     aspirin  81  mg Oral Daily     atorvastatin  20 mg Oral Daily     bumetanide  2 mg Intravenous Q12H     clopidogrel  75 mg Oral Daily     dorzolamide-timolol  1 drop Right Eye BID     gabapentin  200 mg Oral At Bedtime     insulin aspart  1-10 Units Subcutaneous TID AC     insulin aspart  1-7 Units Subcutaneous At Bedtime     insulin aspart  7 Units Subcutaneous TID w/meals     insulin glargine  30 Units Subcutaneous At Bedtime     pantoprazole  40 mg Oral QAM AC     polyethylene glycol  17 g Oral Daily     potassium chloride ER  20 mEq Oral BID     sennosides  1-2 tablet Oral BID     sertraline  50 mg Oral Daily     sodium chloride (PF)  3 mL Intracatheter Q8H     tamsulosin  0.4 mg Oral Daily     traZODone  100 mg Oral At Bedtime       Data   Recent Labs   Lab 05/26/22  1327 05/26/22  1220 05/26/22  0811 05/26/22  0657 05/25/22  1659 05/25/22  0916 05/25/22  0834 05/24/22  1249 05/24/22  0841   WBC 7.1  --  6.5  --   --  6.1 6.0  --  5.8   HGB 7.2*  --  7.2*  --   --  6.6* 6.5*  --  6.5*   MCV 88  --  85  --   --  85 86  --  88     --  198  --   --  198 187  --  165   NA  --   --  135  --   --   --  133  --  133   POTASSIUM 3.5  --  3.3*  --   --   --  3.6  3.6   < > 3.4   CHLORIDE  --   --  97  --   --   --  96  --  94   CO2  --   --  30  --   --   --  29  --  30   BUN  --   --  124*  --   --   --  120*  --  115*   CR  --   --  3.06*  --   --   --  2.88*  --  2.73*   ANIONGAP  --   --  8  --   --   --  8  --  9   VANESA  --   --  8.6  --   --   --  8.4*  --  8.2*   GLC  --  200* 219* 257*   < >  --  300*   < > 297*   ALBUMIN  --   --  3.5  3.5  --   --   --   --   --  3.2*   PROTTOTAL  --   --  7.0  --   --   --   --   --   --    BILITOTAL  --   --  1.1  --   --   --   --   --   --    ALKPHOS  --   --  185*  --   --   --   --   --   --    ALT  --   --  65  --   --   --   --   --   --    AST  --   --  77*  --   --   --   --   --   --     < > = values in this interval not displayed.       No results found for this or any  previous visit (from the past 24 hour(s)).      Assessment & Plan          Justyn Olivas is a 81 year old male with a history of HFpEF, transfusion dependent chronic anemia, CKD stage IV, DM type II who recently was started on steroids for shoulder pain and was sent in to the ED on 5/10/2022 from cardiology clinic due to concerns for decompensated heart failure      Patient was placed on steroids on Sunday and since then has had worsening lower extremity edema, MARKS and fluid retention in the abdomen.  Since then he has had approximately 15 pound weight gain.  He was seen in cardiology clinic this AM and they sent him in to the ED for hospitalization for decompensated heart failure.  At presentation to hospital, requiring 2 L of O2 to maintain his oxygenation      Fluid overload with anasarca  Initially concern of decompensated HFpEF, but essentially ruled out  Acute hypoxic respiratory distress - resolved  * Echo 3/22/22: EF 55%.  RV normal in structure function and size.  No valve disease.  * U/S shows moderate ascites.   * Admit weight 216#, baseline wt ~ 200#    Patient received Bumex 1 mg IV in ED > lasix 80 mg IV BID x 5 dose    On 05/13/22, cardiology started on lasix gtt with K replacement protocol.     On 05/14/22, cardiology switched to bumex 2 mg IV x 1, followed by bumex gtt at 0.5 mg/hr.    On K 10 meq BID while diuresing.     On 05/15/22, excellent diuresis (> 3L out) over last 24 hours, wt stable. Cr and K stable. Wt stable.     Continue PTA Coreg, atorvastatin    Cardiology was considering RHC prior to discharge and CardioMEMS     Cardiology will arrange follow-up in the [facility] with Shaniqua Ram PA-C in 1 to 2 weeks post discharge with a repeat BMP and NT pro BNP beforehand.    Strict I and O's, daily weights.     Compression stockings or wraps on legs    US paracentesis with 3.1L removed 5/16 - reports improvement in symptoms    5/17 after echo completed 5/16 - no longer felt to be of  cardiac etiology - more likely seeming to be due to renal disease however unclear.  No evidence of nephrotic syndrome.    5/17 RUQ US Small ascites. Coarse appearance of the liver that could represent underlying liver disease. No visible lesion but assessment is somewhat limited. If there is concern, MRI could be performed for more sensitive assessment of the liver.    Cardiology signed off 5/17    Nephrology consultation (appreciated) - metolazone added 5/17 - on RA .  Patient is likely third spacing from hypoalbuminemia.  Currently receiving IV albumin 25 mg every 8 hours.  It helps with diuresis in combination with Bumex    Patient is net -15 L since admission.  Bumex dosing per Nephro -       CKD stage IV  Cr 2.3 on admission and baseline appears to be 2.5-2.6.  History of NSAID use.    Daily BMPs while diuresing    5/16 creat 2.19 (tolerating diuresis)  ---> creatinine has been creeping upwards since admission due to aggressive diuresis.  bumex gtt stopped 5/19    Started bumex 4mg po bid 5/20 but then held per nephro (creat still increasing slightly, BP borderline), creat slowly leveling off as above    5/22 - nephro ordered albumin/diuretic combo    5/23 - discussed with nephro re anemia,    5/24 - hgb 6.5, transfuse 1uPRBC and bumex held until after as above    5/25/2022-hemoglobin at 6.6.  Reordered another unit of packed RBCs.  Repeat type and screen today.    5/26-hemoglobin at 7.2.  We will hold off on any further transfusion today.    We discussed his medical condition with nephrology today.  Feels that this would be an ongoing problem with persistent anemia and fluid overload every time he gets a blood transfusion.  We need to have goals of care discussion with family.  Palliative care has been consulted and they have discussed this with family.  Son is on the way.    Hematology team has been reconsulted and they recommended with the family.  Did not have any further recommendation since extensive  work-up has already been done.  Need to have was serious goals of care discussion since patient is going to end up needing persistent transfusions if they want aggressive cares.    Per nephrology patient is not a good candidate for dialysis.     Ascites, s/p paracentesis 5/16  Mild elevation in INR (1.2)   * U/S mild to moderate ascites.   * No history or evidence of liver disease by liver panel or prior imaging. However, does have chronic mild elevation of INR.   Diuresis as above.     Vitamin K 5 mg daily x 3, INR on Monday 5/16    Paracentesis done 5/16 - transudative - as anticipated SAAG > 1.1, and protein > 2.5 g/dL in ascites from - SAAG 1.6, protein 2.9 in ascites    US showed Small ascites. Coarse appearance of the liver that could represent underlying liver disease specially in light of elevated INR and low albumin.. No visible lesion but assessment is somewhat limited. If there is concern, MRI could be performed for more sensitive assessment of the liver    5/24 still feeling much improvement since tap 5/16 with regard to abdominal girth/fullness     Transfusion dependent chronic anemia, likely related to chronic disease   Iron deficiency - by iron panel on 5/11/22  Patient's hemoglobin has continued to drift down.  It was 6.1 the ED while being 6.7 on 5/5 and 6.1 on 5/3.   * Last transfusion PTA: 5/6.  Patient denies any recent bleeding.  He has had a thorough work up so far.  He has had colonoscopy/endoscopy/pill study with GI without clear cause.  He also follows with hem/onc and has gotten IV infusions in the past.  In the ED he was consented and 1 unit of PRBCs was ordered  * Received 1 unit on 5/10/22 with appropriate rise in hgb  * Received  1 unit on 05/12/22 for hgb of 6.5 and CHF.    Venofer 300 mg x 2 ordered. Defer to hematology further doses.     Transfuse for hgb < 7. We currently have a blood product shortage.      Outpatient follow up with Hematology after discharge    5/21 hgb 7.0, he is  essentially at dry weight after aggressive diuresis additionally; will order 1 unit PRBC    5/21 - per nephro, another dose of aranesp given    5/22 - hgb 7.5      5/23 - hgb 7.0, no bleeding concerns, will await diuresis and check Hgb in AM     5/24 -  hgb 6.5, transfuse 1uPRBC and bumex held until after as above    5/25-hemoglobin 6.6.  Transfused another unit of packed RBCs.    5/26-stable hemoglobin at 7.3.  No transfusion today.  Hematology reconsulted.  No new recommendations.  Need to have goals of care discussion since patient is going to need ongoing transfusions.  He has been resistant to IV iron, blood transfusion, erythropoietin.     DM type II   Last HgbA1c was 8.2 on 2/16/22     Increased lantus to PTA dose of 30 units (from 25 units) on 05/18    Added prandial novolog on 5/11/22, increase to 7 units AC on 05/11/22.     Note elevated BS evening of 5/14/22 > adjust insulin if BS remain elevated.     Continue high res SSI    BS considerably variable, adjustment as necessary     L Shoulder pain - impingement  C spine stenosis  Has had gradually worsening shoulder pain for some time.  He states its to the point he is not sleeping well..  He was evaluated by Dr. Scott at [facility] urgent care on Sunday and had MRIs of the shoulder and spine.     Ortho consult on 5/11 appreciated. 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     Not candidate for c-spine injections.     Recommended continued pain meds PRN, WBAT of upper extremities, and OK to stop steroids.     Oxycodone 2.5-5 mg q 6 hours. Advised that he try to limit this to night time doses for best effect.      Started low dose neurontin 200 mg q HS on 5/13/22.     Appreciate orthopedic surgery assistance in planning outpatient follow up    5/18 - consulted spine surgery given his ongoing neck pain and prolonged hospitalization - appreciate assistance - plan for outpatient follow up 2 wks after discharge - no surgical  option currently, no indication for steroid injection     Multiple scabs over extremities (~ 4)   Coccyx blanchable redness (no wound)     Appreciate WOC consult     Deconditioned state, requesting home care    Pt previously using outpatient PT, requests home care.     Prolonged hospital stay with decompensated CHF, high risk for deconditioning.     Appreciate PT consult, currently recommending TCU as of 5/14/22 - patient and spouse are going to discuss further. Spouse concerned of being able to manage at home safely.      Urinary retention on 05/14/22     Will catheter placed on 05/14/22 for both retention and strict I and O measurement.     Started flomax on 5/14/22 in anticipation of discontinuing will and voiding trial. (history of difficulty with urination PTA)      Will out 5/19 as bumex gtt stopped, voiding ok    Will had to be replaced on 5/25/2022 for acute urinary retention.  Bladder scan showed over 800 cc urine.  Had good urine output after Will has been placed.     Constipation     On 05/15/22, states he has not had a BM for a week.     Scheduled senna and PRN dulcolax ordered    Narcotics definitely contribute.    Started miralax daily on 05/15/22 can do BID if necessary    Palliative care consult.  -Persistent transfusion dependent anemia with resistance to IV iron and erythropoietin.  No obvious source of bleeding.  Recurrent diffuse anasarca that gets worse with transfusions.  Need to have goals of care discussion with the family.  Palliative care briefly spoke with his wife today.  She is understandably upset regarding his poor prognosis and lack of improvement.  We will need to have ongoing discussions.  We will reconvene tomorrow with family.    Diet: Combination Diet Low Saturated Fat Na <2400mg Diet, No Caffeine Diet    DVT Prophylaxis: Pneumatic Compression Devices  Will Catheter: Not present  Central Lines: None  Cardiac Monitoring: None  Code Status: Full Code      Julieth Oliveira MD,  MD  [phone](p)  [phone]( c)

## 2022-05-26 NOTE — CONSULTS
[facility]  Palliative Care Consultation Note    Patient: Justyn Olivas  Date of Admission:  5/10/2022    Requesting Clinician / Team: Dr. Oliveira  Reason for consult: Goals of care  Decisional support  Patient and family support    Recommendations:    Goals of care: Transition to comfort care. Pt's goal is to be able to die at home surrounded by family.  consulted and will work with family to try and get pt home with hospice.     DNR/DNI    Will focus all medications on comfort and symptom management.  We will stop all procedures and medications not contributing to comfort and will no longer transfuse patient.  This was discussed with patient and family during the care conference.    If pt develops shortness of breath, recommend hydromorphone initial dose 0.3-0.5 mg IV Q1 hr PRN or hydromorphone 1 mg p.o. q2hrs PRN.     If patient develops acute shortness of breath, please contact on-call palliative care physician at [phone].     Palliative care will not be in hospital until 5/31. Please feel free to call on call physician for any questions or concerned that arise before then. Thank you.     These recommendations have been discussed with Dr Oliveira and Dr Salvador.      Thank you for the opportunity to participate in the care of this patient and family. Our team: will continue to follow.     During regular M-F work hours -- if you are not sure who specifically to contact -- please contact us [phone]    After regular work hours and on weekends/holidays, you can call our answering service at [phone]. Also, who's on call for us is available in [employer].     Attestation:    Total time on the floor involved in the patient's care: 85 minutes with >50% spent counseling and/or coordination of care regarding goals of care, plan of care, symptom management.  Karina Montejo MD / Palliative Medicine Physician    Assessments:  Justyn Olivas is a 81 year  old male with a past medical history significant for heart failure, CKD stage IV, transfusion dependent anemia, ascites, DM II, FTT with deconditioning who was sent to the hospital from cardiology clinic on 5/10/22 for concerns of decompensated heart failure. Since admission, he has had worsening kidney function, continued decreasing hemoglobin requiring transfusions and overall progressive weakness and slow decompensation.  Palliative care was consulted for goals of care.    Today, the patient was seen for:  Acute on chronic kidney disease  Transfusion dependent anemia  Failure to thrive  Goals of care  Support    Prognosis, Goals, & Planning:      Functional Status just prior to hospitalization: 2 (Ambulatory and capable of all selfcare but unable to carry out any work activities; may need help with IADLs up and about > 50% of waking hours)    3 (Capable of only limited self-care; needs help with ADLs; in bed/chair >50% of waking hours)      Prognosis, Goals, and/or Advance Care Planning were addressed today: Yes        Summary/Comments: Care conference today in patient's room with patient, his wife and 3 children as well as Dr Oliveira, Dr Salvador and myself.  We discussed patient's medical decline over the past weeks and months and his continued decline since admission.  We explained that his kidney function continues to worsen and that there is no cure for his continued low hemoglobin that is not responding to transfusions and that unfortunately we no longer have a way to fix all of his medical problems and he is now dying.  Kamran has stated that he does not want dialysis and we explained that this was a good decision as dialysis would not help him get better and would likely only make him feel worse.  Kamran stated that what he wanted was to be able to go home to die and be surrounded by his family and feel comfortable until he dies.  We recommended a transition to comfort care and focusing on helping him feel as good  as possible until he .  We explained that we would involve social work to try and get him home with hospice so that he could die at home and that we would begin focusing on his comfort now in the hospital with the hope that we can get him home to die.      Patient's decision making preferences: shared with support from loved ones          Patient has decision-making capacity today for complex decisions: Yes            I have concerns about the patient/family's health literacy today: No           Patient has a completed Health Care Directive: No.       Code status: No CPR / No Intubation    Coping, Meaning, & Spirituality:   Mood, coping, and/or meaning in the context of serious illness were addressed today: Yes  Summary/Comments: Kamran is [employer] and states he is not scared to die and tells us that his family is so incredible and gives him strength and comfort.    Social:     Living situation: Lives at home with his wife    Rodriguez family / caregivers: Wife, son and daughter live in the area and 1 son who lives out of town.  Several grandchildren as well    History of Present Illness:  History gathered today from: patient, family/loved ones, medical chart, medical team members    Justyn Olivas is a 81 year old male with a past medical history significant for heart failure, CKD stage IV, transfusion dependent anemia, ascites, DM II, FTT with deconditioning who was sent to the hospital from cardiology clinic on 5/10/22 for concerns of decompensated heart failure. Since admission, he has had worsening kidney function, continued decreasing hemoglobin requiring transfusions and overall progressive weakness and slow decompensation.  Palliative care was consulted for goals of care.    Kamran has had frequent hospitalizations over the past months but has always been able to return home after the hospitalizations and although he has been getting weaker and sicker over the past months, all of this feels very sudden to  patient and family and Kamran did not think he was dying when he was admitted to the hospital and thought he would have more time to live.    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)      Cerebral infarction (H)      Chronic diastolic heart failure (H)      Diabetes (H)      [place] (hard of hearing)      Hyperlipemia      Hypokalemia      Renal disease         Past Surgical History:  Past Surgical History:   Procedure Laterality Date     BONE MARROW BIOPSY, BONE SPECIMEN, NEEDLE/TROCAR N/A 5/14/2021    Procedure: BIOPSY, BONE MARROW;  Surgeon: Juanjo Hoffman MD;  Location:  GI     COLONOSCOPY N/A 2/27/2022    Procedure: COLONOSCOPY, FLEXIBLE, WITH LESION REMOVAL USING SNARE;  Surgeon: Jules Lane MD;  Location:  GI     COLONOSCOPY N/A 2/27/2022    Procedure: COLONOSCOPY, WITH POLYPECTOMY AND BIOPSY;  Surgeon: Jules Lane MD;  Location[facility]     CV PERICARDIOCENTESIS N/A 12/21/2020    Procedure: Pericardiocentesis;  Surgeon: Chas Chambers MD;  Location:  HEART CARDIAC CATH LAB     ENT SURGERY       ESOPHAGOSCOPY, GASTROSCOPY, DUODENOSCOPY (EGD), COMBINED N/A 3/27/2021    Procedure: Esophagogastroduodenoscopy, With Biopsy;  Surgeon: Luc Nash MD;  Location:  GI     ESOPHAGOSCOPY, GASTROSCOPY, DUODENOSCOPY (EGD), COMBINED N/A 2/26/2022    Procedure: ESOPHAGOGASTRODUODENOSCOPY (EGD);  Surgeon: Jules Lane MD;  Location:  GI     LAPAROSCOPIC CHOLECYSTECTOMY N/A 9/2/2021    Procedure: LAPAROSCOPIC CHOLECYSTECTOMY;  Surgeon: Len Coates MD;  Location:  OR     SOFT TISSUE SURGERY      skin cancer  surgery on nose         Family History:  Family History   Problem Relation Age of Onset     Family History Negative Mother      Family History Negative Father          Allergies:  Allergies   Allergen Reactions     No Known Allergies         Medications:  I have reviewed this patient's medication profile and medications from this hospitalization.     Physical Exam:  Vital Signs: Temp: 97.9  F (36.6  C) Temp src: Oral BP: 97/55 Pulse: 92   Resp: 20 SpO2: 94 % O2 Device: None (Room air)    Weight: 188 lbs 6.4 oz  Gen: Lying in bed, alert, appears chronically ill, stated age, in no acute distress  Eyes: Conjunctiva clear. Sclera anicteric   HENT: NCAT; mucous membranes slightly dry  Resp: No increased work of breathing while lying in bed, speaking full sentences  Neuro: A&O x 3; CN II-XII grossly intact;   Mental status/Psych: Tired, engaged, appropriate, pleasant; sensorium intact    Data reviewed:  Reviewed recent labs and pertinent imaging

## 2022-05-26 NOTE — PLAN OF CARE
Goal Outcome Evaluation:      DATE&TIME:5/25/22-5/26/22 [id number]  Cognitive Concerns/ Orientation:  Lethargic and arousal to voice this shift. Orientated X 4 when awake.  BEHAVIOR & AGGRESSION TOOL COLOR: Green  ABNL VS/O2: No Vitals while sleeping, per MD note.  MOBILITY: Up with 1 assist BG/W while awake. T&R while in bed, refused repositioning.   PAIN MANAGEMENT: Denies.  DIET: Cardiac, no caffeine.   BOWEL/BLADDER: [place] in place due to retention. No bm.  ABNL LAB/BG:  this AM. Hgb 6.5 no signs of active bleeding. Cr 2.88, K 3.6(on high protocol), Recheck in AM.  DRAIN/DEVICES: PIV x2 SL   TELEMETRY RHYTHM: NA   SKIN: Blanchable redness on coccyx, open area on bilateral butt cheek, open to air per WOC, applied barrier  cream, Wearing compression stockings. Scattered bruising, scattered  abrasions/scabs bilateral shins, thighs, knees, scrotum, buttock and arms. Reddened/ tenderness at tip of penis.   TESTS/PROCEDURES: None on this shift  D/C DATE: Discharge to TCU, pending stable hemoglobin  kidney function.    OTHER IMPORTANT INFO: Nephrology  PT/OT/SW following. Got one unit of PRBCs yesterday for hgb 6.5 ,Hgb recheck in AM.

## 2022-05-26 NOTE — PROGRESS NOTES
"[facility]     Renal Progress Note       SHORTHAND KEY FOR MY NOTES:  c = with, s = without, p = after, a = before, x = except, asx = asymptomatic, tx = transplant or treatment, sx = symptoms or symptomatic, cx = canceled or culture, rxn = reaction, yday = yesterday, nl = normal, abx = antibiotics, fxn = function, dx = diagnosis, dz = disease, m/h = melena/hematochezia, c/d/l/ha = cramping/dizziness/lightheadedness/headache, d/c = discharge or diarrhea/constipation, f/c/n/v = fevers/chills/nausea/vomiting, cp/sob = chest pain/shortness of breath, tbv = total body volume, rxn = reaction, tdc = tunneled dialysis catheter, pta = prior to admission, hd = hemodialysis, pd = peritoneal dialysis, hhd = home hemodialysis, edw = estimated dry wt         Assessment/Plan:     1.  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  Pt's BUN/Cr continue to rise as he gets drier, which is expected as he diureses.  He is still TBV up, but better.  The alb is \"nl\" due to giving him alb, but this should help mobilize fluids.  The will has been beneficial and he's had 2+L out since it was placed.  He now has asterixis, which seems related to the renal dysfxn.  A.  Follow labs, uo, sx daily.  B.  Continue will placement.  C.  Continue IV alb/bumet combo, but decrease to q 12h.    2.  Anemia.  Pt's hb is 7 today p another blood transfusion yday.  He has rec'd enough IV fe in the blood so he prob doesn't need any more right now.  PTH isn't that high (223), so doubt that is contributing to MARGE resistance.  H/O will be asked to see pt again.  A.  Agree c blood transfusion today.  B.  Follow hb, clinically.  C.  Agree c getting H/O involved again.    3.  Hypotension.  He is mostly asx and he is on diuretics only.   A.  Continue to hold BP meds.  B.  Ok to hold bumet if SBP < 90.    4.  FEN.  Electrolytes are ok x low K.  He is now on KCl 10 bid.  Na corrects for high glc.  A.  Continue same diet.  B.  Increase KCl to 20 " "bid.  C.  Check electrolytes daily.    5.  Code Status.  Pt is Full Code.  D/w pt/wife re his overall px, which isn't great.  He hasn't made a decision re dialysis or not when the time comes, but he doesn't seem like he wants it.  Today, he said, \"everything up until dialysis.\"  We will have a full family conf tmrw to discuss possible end of life issues and will include pt/wife/3 kids.    D/w Dr. Oliveira.        Interval History:     Pt is still tired today.  He has less abd discomfort p the will was placed, but is still distended.  He is not sure that he wants to do dialysis if/when the time comes.  No obv GIB sx.  He is urinating very well via the will.  No cp/sob right now.    He didn't have the tremor prior to this hosp.         Medications and Allergies:       albumin human  25 g Intravenous Q8H     aspirin  81 mg Oral Daily     atorvastatin  20 mg Oral Daily     bumetanide  2 mg Intravenous Q8H     clopidogrel  75 mg Oral Daily     dorzolamide-timolol  1 drop Right Eye BID     gabapentin  200 mg Oral At Bedtime     insulin aspart  1-10 Units Subcutaneous TID AC     insulin aspart  1-7 Units Subcutaneous At Bedtime     insulin aspart  7 Units Subcutaneous TID w/meals     insulin glargine  30 Units Subcutaneous At Bedtime     pantoprazole  40 mg Oral QAM AC     polyethylene glycol  17 g Oral Daily     potassium chloride ER  10 mEq Oral BID     sennosides  1-2 tablet Oral BID     sertraline  50 mg Oral Daily     sodium chloride (PF)  3 mL Intracatheter Q8H     tamsulosin  0.4 mg Oral Daily     traZODone  100 mg Oral At Bedtime     Allergies   Allergen Reactions     No Known Allergies           Physical Exam:     Vitals were reviewed     , Blood pressure 97/55, pulse 92, temperature 97.9  F (36.6  C), temperature source Oral, resp. rate 20, weight 85.5 kg (188 lb 6.4 oz), SpO2 94 %.  Wt Readings from Last 3 Encounters:   05/26/22 85.5 kg (188 lb 6.4 oz)   05/10/22 97.5 kg (215 lb)   04/20/22 89.8 kg (198 lb) "     Intake/Output Summary (Last 24 hours) at 5/26/2022 1128  Last data filed at 5/26/2022 0841  Gross per 24 hour   Intake 1300 ml   Output 2800 ml   Net -1500 ml     GENERAL APPEARANCE: looks tired and sick, alert, lying in bed  HEENT:  eyes/ears/nose/neck grossly nl  RESP: CTA B c decent efforts  CV: RRR, nl S1/S2   ABDOMEN: s/nt/nd  EXTREMITIES/SKIN: some excoriations, 2-3+ ble edema  NEURO:  intention tremor - R > L; + asterixis - worse than yday  OTHER:  + will         Data:     CBC RESULTS:     Recent Labs   Lab 05/26/22  0811 05/25/22  0916 05/25/22  0834 05/24/22  0841 05/23/22  0902 05/22/22  0805 05/21/22  0834   WBC 6.5 6.1 6.0 5.8  --   --  8.4   RBC 2.72* 2.64* 2.52* 2.52*  --   --  2.87*   HGB 7.2* 6.6* 6.5* 6.5* 7.0* 7.5* 7.0*   HCT 23.1* 22.5* 21.7* 22.1*  --   --  23.9*    198 187 165  --   --  194     Basic Metabolic Panel:  Recent Labs   Lab 05/26/22  0811 05/26/22  0657 05/25/22  2112 05/25/22  1659 05/25/22  0834 05/25/22  0727 05/25/22  0305 05/24/22  2117 05/24/22  2111 05/24/22  1721 05/24/22  1430 05/24/22  1249 05/24/22  0841 05/23/22  1221 05/23/22  0902 05/22/22  1208 05/22/22  0805 05/21/22  0743 05/21/22  0711     --   --   --  133  --   --   --   --   --   --   --  133  --  133  --  135  --  135   POTASSIUM 3.3*  --   --   --  3.6  3.6  --  3.2*  --  3.1*  --  3.2*  --  3.4  --  3.6  --  3.9  --  3.8  3.8   CHLORIDE 97  --   --   --  96  --   --   --   --   --   --   --  94  --  95  --  96  --  95   CO2 30  --   --   --  29  --   --   --   --   --   --   --  30  --  29  --  30  --  31   *  --   --   --  120*  --   --   --   --   --   --   --  115*  --  101*  --  94*  --  84*   CR 3.06*  --   --   --  2.88*  --   --   --   --   --   --   --  2.73*  --  2.67*  --  2.71*  --  2.62*   * 257* 192* 229* 300* 204*  --    < >  --    < >  --    < > 297*   < > 229*   < > 149*   < > 172*   VANESA 8.6  --   --   --  8.4*  --   --   --   --   --   --   --  8.2*  --  8.5   --  8.3*  --  7.5*    < > = values in this interval not displayed.     INRNo lab results found in last 7 days.   Attestation:   I have reviewed today's relevant vital signs, notes, medications, labs and imaging.    Dawit Salvador MD  [facility] - Nephrology  [phone]

## 2022-05-26 NOTE — PLAN OF CARE
Goal Outcome Evaluation:    Plan of Care Reviewed With: patient     Overall Patient Progress: no change         DATE&TIME:5/25/22 3-11pm  Cognitive Concerns/ Orientation:  Lethargic and arousal to voice. Orientated X 4.  BEHAVIOR & AGGRESSION TOOL COLOR: Green  ABNL VS/O2: VSS, on RA  MOBILITY: patient stays in bed this shift due to  Lethargic. T&R.  PAIN MANAGEMENT: Denies.  DIET: Cardiac, no caffeine. Fair appetite.  BOWEL/BLADDER: [place] in place due to retention. No bm.  ABNL LAB/BG: /192. Hgb 6.5 no signs of active bleeding. Cr 2.88, K 3.6(on high protocol),  replaced today in the morning. Recheck tomorrow.  DRAIN/DEVICES: PIV x2 SL   TELEMETRY RHYTHM: NA   SKIN: Blanchable redness on coccyx, open area on bilateral butt cheek, open to air per WOC, applied barrier  cream, Wearing compression stockings. Scattered bruising, scattered  abrasions/scabs bilateral shins, thighs, knees, scrotum, buttock and arms. Reddened/ tenderness at tip of penis.   TESTS/PROCEDURES COVID test done, negative.  D/C DATE: Discharge to TCU, pending stable hemoglobin  kidney function.    OTHER IMPORTANT INFO: Nephrology  PT/OT/SW following. Got one unit of PRBCs today for hgb 6.5 ,Hgb recheck in AM. Schedule Trazadone held due to patient Lethargic.

## 2022-05-26 NOTE — PLAN OF CARE
Goal Outcome Evaluation:           DATE&TIME:-5/26/22 0700- 1530  Cognitive Concerns/ Orientation:   Alert . Orientated X 4   BEHAVIOR & AGGRESSION TOOL COLOR: Green  ABNL VS/O2: vss , on RA, BP soft  MOBILITY: Up with 1 -2 assist BG/W .   PAIN MANAGEMENT: Denies.  DIET: Cardiac, no caffeine.   BOWEL/BLADDER: [place] in place due to retention.BMx 1  ABNL LAB/BG: /200. Hgb 7.2 no signs of active bleeding. Cr 3.06, ,K3.3 on high protocol),  replaced . Recheck 3.5, replaced again recheck tomorrow.  SKIN: Blanchable redness on coccyx, open area on bilateral butt cheek, open to air per WOC, applied barrier  cream, Wearing compression stockings. Scattered bruising, scattered  abrasions/scabs bilateral shins, thighs, knees, scrotum, buttock and arms. Reddened/ tenderness at tip of penis.   TESTS/PROCEDURES: None on this shift  D/C DATE: Discharge to TCU, pending stable hemoglobin  kidney function.    OTHER IMPORTANT INFO: Nephrology  PT/OT/SW following. Seen by Hem/Onc. palliative consult pending. Plan for family conference  to discuss the end of the life issuses.

## 2022-05-27 NOTE — PROGRESS NOTES
[facility]     Renal Progress Note       SHORTHAND KEY FOR MY NOTES:  c = with, s = without, p = after, a = before, x = except, asx = asymptomatic, tx = transplant or treatment, sx = symptoms or symptomatic, cx = canceled or culture, rxn = reaction, yday = yesterday, nl = normal, abx = antibiotics, fxn = function, dx = diagnosis, dz = disease, m/h = melena/hematochezia, c/d/l/ha = cramping/dizziness/lightheadedness/headache, d/c = discharge or diarrhea/constipation, f/c/n/v = fevers/chills/nausea/vomiting, cp/sob = chest pain/shortness of breath, tbv = total body volume, rxn = reaction, tdc = tunneled dialysis catheter, pta = prior to admission, hd = hemodialysis, pd = peritoneal dialysis, hhd = home hemodialysis, edw = estimated dry wt         Assessment/Plan:     1.  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  Pt's BUN/Cr continue to rise and he has asterixis.  He does not want dialysis and has elected to move to comfort care.  A.  Continue bumet 2 mg po bid.  B.  Stop IV alb.  C.  Keep will for comfort.  D.  We will cx appt at our office.    2.  FEN.  Electrolytes are ok x low K.    A.  Change to reg diet.    3.  Code Status.  We had a family conf c pt, wife, dtr, 2 sons.  George Oliveira and Nikia were also present.  He has elected to focus on comfort at home.  Explained it can take days to weeks for death to occur, but everyone is different.  A.  Change to DNR/DNI  B.  SW to arrange home hospice.    Thank you for this consultation.  I will sign off.  Please call if any questions.        Interval History:     Pt is feeling weak and tired.  Explained the whole situation and he has elected comfort care.  Long convo c family (pt, wife, dtr, 2 sons) in the room.  All questions were answered.         Medications and Allergies:       bumetanide  2 mg Oral BID     dorzolamide-timolol  1 drop Right Eye BID     gabapentin  100 mg Oral At Bedtime     insulin aspart  7 Units Subcutaneous TID w/meals      insulin glargine  30 Units Subcutaneous At Bedtime     sennosides  1-2 tablet Oral BID     sertraline  50 mg Oral Daily     sodium chloride (PF)  3 mL Intracatheter Q8H     traZODone  100 mg Oral At Bedtime     Allergies   Allergen Reactions     No Known Allergies           Physical Exam:     Vitals were reviewed     , Blood pressure 99/56, pulse 77, temperature 98.3  F (36.8  C), temperature source Oral, resp. rate 16, weight 85.5 kg (188 lb 6.4 oz), SpO2 92 %.  Wt Readings from Last 3 Encounters:   05/26/22 85.5 kg (188 lb 6.4 oz)   05/10/22 97.5 kg (215 lb)   04/20/22 89.8 kg (198 lb)     Intake/Output Summary (Last 24 hours) at 5/27/2022 1530  Last data filed at 5/27/2022 0531  Gross per 24 hour   Intake 240 ml   Output 1400 ml   Net -1160 ml     GENERAL APPEARANCE: looks tired and sick, alert, lying in bed; family at bedside  RESP: CTA B c decent efforts  CV: RRR, nl S1/S2   ABDOMEN: s/nt/nd  EXTREMITIES/SKIN: some excoriations, 2-3+ ble edema  NEURO:  + B asterixis - worse than yday  OTHER:  + will         Data:     CBC RESULTS:     Recent Labs   Lab 05/27/22  0826 05/26/22  1327 05/26/22  0811 05/25/22  0916 05/25/22  0834 05/24/22  0841   WBC 7.7 7.1 6.5 6.1 6.0 5.8   RBC 2.55* 2.69* 2.72* 2.64* 2.52* 2.52*   HGB 6.8* 7.2* 7.2* 6.6* 6.5* 6.5*   HCT 21.9* 23.6* 23.1* 22.5* 21.7* 22.1*    204 198 198 187 165     Basic Metabolic Panel:  Recent Labs   Lab 05/27/22  1204 05/27/22  0940 05/27/22  0826 05/27/22  0806 05/27/22  0218 05/26/22  2135 05/26/22  1644 05/26/22  1327 05/26/22  1220 05/26/22  0811 05/25/22  1659 05/25/22  0834 05/25/22  0727 05/25/22  0305 05/24/22  2117 05/24/22  2111 05/24/22  1249 05/24/22  0841 05/23/22  1221 05/23/22  0902 05/22/22  1208 05/22/22  0805   NA  --   --  138  --   --   --   --   --   --  135  --  133  --   --   --   --   --  133  --  133  --  135   POTASSIUM  --   --  3.4  --   --   --   --  3.5  --  3.3*  --  3.6  3.6  --  3.2*  --  3.1*   < > 3.4  --  3.6   --  3.9   CHLORIDE  --   --  99  --   --   --   --   --   --  97  --  96  --   --   --   --   --  94  --  95  --  96   CO2  --   --  32  --   --   --   --   --   --  30  --  29  --   --   --   --   --  30  --  29  --  30   BUN  --   --  133*  --   --   --   --   --   --  124*  --  120*  --   --   --   --   --  115*  --  101*  --  94*   CR  --   --  3.06*  --   --   --   --   --   --  3.06*  --  2.88*  --   --   --   --   --  2.73*  --  2.67*  --  2.71*   * 129* 59* 70 85 187*   < >  --    < > 219*   < > 300*   < >  --    < >  --    < > 297*   < > 229*   < > 149*   VANESA  --   --  8.7  --   --   --   --   --   --  8.6  --  8.4*  --   --   --   --   --  8.2*  --  8.5  --  8.3*    < > = values in this interval not displayed.     INRNo lab results found in last 7 days.   Attestation:   I have reviewed today's relevant vital signs, notes, medications, labs and imaging.    Dawit Salvador MD  [facility] - Nephrology  [phone]

## 2022-05-27 NOTE — PLAN OF CARE
Goal Outcome Evaluation:    Plan of Care Reviewed With: patient     Overall Patient Progress: no change         DATE&TIME:-5/26/22 3-11pm  Cognitive Concerns/ Orientation:   Alert . Orientated X 4   BEHAVIOR & AGGRESSION TOOL COLOR: Green  ABNL VS/O2: vss , on RA, BP soft  103//57  MOBILITY: Up with 2 assist BG/W . Generalized weakness.  PAIN MANAGEMENT: Denies.  DIET: Cardiac, no caffeine.   BOWEL/BLADDER: [place] in place due to retention. No bm.  ABNL LAB/BG: /187. Hgb 7.2 no signs of active bleeding. Cr 3.06, , K3.5 (on high protocol),  K replaced in the morning, recheck tomorrow morning.  TELEMETRY RHYTHM: NA   SKIN: Blanchable redness on coccyx, open area on bilateral butt cheek, open to air per WOC, applied barrier  cream, Wearing compression stockings. Scattered bruising, scattered  abrasions/scabs bilateral shins, thighs, knees, buttock and arms. Reddened/ tenderness at tip of penis.   TESTS/PROCEDURES: None on this shift  D/C DATE: Discharge to TCU, pending stable hemoglobin  kidney function.    OTHER IMPORTANT INFO: Nephrology  PT/OT/SW following. Oncology signed off. Palliative  consulted. Plan for family conference tomorrow to discuss the  end of the life issuses.

## 2022-05-27 NOTE — PLAN OF CARE
DATE&TIME:-5/26/22 9478-0543  Cognitive Concerns/ Orientation:   Alert . Orientated X 4   BEHAVIOR & AGGRESSION TOOL COLOR: Green  ABNL VS/O2: VSS on RA  MOBILITY: A2 w/lift.  Generalized weakness, unable to step for daily WT.  PAIN MANAGEMENT: Denies.  DIET: Cardiac, no caffeine.   BOWEL/BLADDER: [place] in place due to retention. No bm.  ABNL LAB/BG: BGM 85. Hgb 7.2 no signs of active bleeding. Cr 3.06, , K3.5 (on high protocol),  K replaced in the morning, recheck tomorrow morning.  TELEMETRY RHYTHM: NA   SKIN: Blanchable redness on coccyx, open area on bilateral butt cheek, open to air per WOC, applied barrier  cream, Wearing compression stockings. Scattered bruising, scattered  abrasions/scabs bilateral shins, thighs, knees, buttock and arms. Reddened/ tenderness at tip of penis, replaced stat lock, WOC following.   TESTS/PROCEDURES: None on this shift  D/C DATE: Discharge to TCU, pending stable hemoglobin  kidney function.    OTHER IMPORTANT INFO: Nephrology  PT/OT/SW following. Oncology signed off. Palliative  consulted. Plan for family conference tomorrow to discuss the  end of the life issuses.

## 2022-05-27 NOTE — PROGRESS NOTES
MD Notification    Notified Person: MD     Notified Person Name:  Realisabel    Notification Date/Time: 0930 5/27/22    Notification Interaction: Web based paging    Purpose of Notification: Low HGB 6.8, there are no orders to transfuse, no signs of bleeding , do you want transfusion?    Orders Received: Hold blood transfusion for now until after further details about plan of care after palliative care meeting this afternoon.    Comments:

## 2022-05-27 NOTE — PROGRESS NOTES
Care Management SW note  Summary; At this time the discharge plan is to his home on Monday with [facility] and 24 hour care provided by [facility].  This writer and the hospice liaison Brielle will meet further with wife, son and patient tomorrow to continue organizing the plan.

## 2022-05-27 NOTE — PLAN OF CARE
Dx: HF, CKD, chronic anemia, DM type 2  DATE&TIME:-5/27/22 9734-7289  Cognitive Concerns/ Orientation:   Alert . Orientated X 4, calm,& cooperative.  BEHAVIOR & AGGRESSION TOOL COLOR: Green  ABNL VS/O2: VSS on RA  MOBILITY: A2 w/lift.  Generalized weakness.  PAIN MANAGEMENT: Pain in left shoulder and neck, oxy given x1.  DIET: Regular diet.   BOWEL/BLADDER: [place] in place for comfort.  ABNL LAB/BG: BG 70, 129,189, 211. Hgb 6.8  no signs of active bleeding. Cr 3.06,, K 3.4 (on high protocol),  K replaced,   TELEMETRY RHYTHM: NA   SKIN: Blanchable redness on coccyx, open area on bilateral butt cheek, pt refused wound care, in too much pain,  compression stockings on. Scattered bruising, scattered  abrasions/scabs bilateral shins, thighs, knees, buttock and arms. Reddened/ tenderness at tip of penis, WOC following.   TESTS/PROCEDURES: None on this shift  D/C DATE: Transitioned to comfort care and plan is to discharge home on hospice.   OTHER IMPORTANT INFO: neprology following, Oncology signed off. PT signed off. Transitioned to comfort cares with discharge home on hospice.

## 2022-05-27 NOTE — PLAN OF CARE
Physical Therapy Discharge Summary    Reason for therapy discharge:    Transition to [facility] with discharge home on hospice.    Progress towards therapy goal(s). See goals on Care Plan in Epic electronic health record for goal details.  Goals partially met.  Barriers to achieving goals:   limited tolerance for therapy.    Therapy recommendation(s):    Following care conference this afternoon, patient has transitioned to comfort cares and plans to discharge home on hospice.  Patient with no further IP PT needs identified, PT orders completed.      From previous physical therapy note, appears that patient would benefit from 24/7 assistance with ADLs and mobility.  Patient may benefit from hospital bed, manual wheelchair, and bedside commode at discharge.  Patient owns [employer] and [employer].  There are 2 steps to enter patient's home, anticipate he may require wheelchair vs stretcher transport to access home.

## 2022-05-27 NOTE — PROGRESS NOTES
[facility]    Hospitalist Progress Note    Interval History   Patient is awake and alert.  Had goals of care discussion with multidisciplinary teams present in the room.  Entire family at bedside.  Decided to proceed with comfort cares.    -Data reviewed today: I reviewed all new labs and imaging results over the last 24 hours. I personally reviewed no images or EKG's today.    Physical Exam   Temp: 98.3  F (36.8  C) Temp src: Oral BP: 99/56 Pulse: 77   Resp: 16 SpO2: 92 % O2 Device: None (Room air)    Vitals:    05/24/22 0608 05/25/22 0623 05/26/22 0614   Weight: 90.7 kg (199 lb 14.4 oz) 88.4 kg (194 lb 14.4 oz) 85.5 kg (188 lb 6.4 oz)     Vital Signs with Ranges  Temp:  [98.1  F (36.7  C)-98.4  F (36.9  C)] 98.3  F (36.8  C)  Pulse:  [77-85] 77  Resp:  [16-18] 16  BP: ()/(56-60) 99/56  SpO2:  [92 %-97 %] 92 %  I/O last 3 completed shifts:  In: 240 [P.O.:240]  Out: 1400 [Urine:1400]    Physical Exam  Constitutional:       Appearance: He is ill-appearing.      Comments: Is very pale and weak.   HENT:      Head: Normocephalic.   Eyes:      Pupils: Pupils are equal, round, and reactive to light.   Cardiovascular:      Rate and Rhythm: Regular rhythm. Tachycardia present.      Pulses: Normal pulses.      Heart sounds: Normal heart sounds.   Pulmonary:      Effort: Respiratory distress present.      Breath sounds: Normal breath sounds.   Abdominal:      General: There is distension.      Tenderness: There is no abdominal tenderness. There is no guarding.   Genitourinary:     Comments: Glasgow catheter in place  Musculoskeletal:         General: Normal range of motion.      Cervical back: Normal range of motion.      Right lower leg: Edema present.      Left lower leg: Edema present.   Skin:     General: Skin is warm and dry.      Comments: Has skin excoriation and wounds in the inner buttocks from pressure and friction   Neurological:      General: No focal deficit present.   Psychiatric:          Mood and Affect: Mood normal.           Medications       bumetanide  2 mg Oral BID     dorzolamide-timolol  1 drop Right Eye BID     gabapentin  100 mg Oral At Bedtime     insulin aspart  7 Units Subcutaneous TID w/meals     insulin glargine  30 Units Subcutaneous At Bedtime     sennosides  1-2 tablet Oral BID     sertraline  50 mg Oral Daily     sodium chloride (PF)  3 mL Intracatheter Q8H     traZODone  100 mg Oral At Bedtime       Data   Recent Labs   Lab 05/27/22  1204 05/27/22  0940 05/27/22  0826 05/26/22  1644 05/26/22  1327 05/26/22  1220 05/26/22  0811 05/25/22  0916 05/25/22  0834   WBC  --   --  7.7  --  7.1  --  6.5   < > 6.0   HGB  --   --  6.8*  --  7.2*  --  7.2*   < > 6.5*   MCV  --   --  86  --  88  --  85   < > 86   PLT  --   --  195  --  204  --  198   < > 187   NA  --   --  138  --   --   --  135  --  133   POTASSIUM  --   --  3.4  --  3.5  --  3.3*  --  3.6  3.6   CHLORIDE  --   --  99  --   --   --  97  --  96   CO2  --   --  32  --   --   --  30  --  29   BUN  --   --  133*  --   --   --  124*  --  120*   CR  --   --  3.06*  --   --   --  3.06*  --  2.88*   ANIONGAP  --   --  7  --   --   --  8  --  8   VANESA  --   --  8.7  --   --   --  8.6  --  8.4*   * 129* 59*   < >  --    < > 219*   < > 300*   ALBUMIN  --   --  3.3*  --   --   --  3.5  3.5  --   --    PROTTOTAL  --   --   --   --   --   --  7.0  --   --    BILITOTAL  --   --   --   --   --   --  1.1  --   --    ALKPHOS  --   --   --   --   --   --  185*  --   --    ALT  --   --   --   --   --   --  65  --   --    AST  --   --   --   --   --   --  77*  --   --     < > = values in this interval not displayed.       No results found for this or any previous visit (from the past 24 hour(s)).      Assessment & Plan   Justyn Olivas is a 81 year old male with a history of HFpEF, transfusion dependent chronic anemia, CKD stage IV, DM type II who recently was started on steroids for shoulder pain and was sent in to the ED on  5/10/2022 from cardiology clinic due to concerns for decompensated heart failure      Patient was placed on steroids on Sunday and since then has had worsening lower extremity edema, MARKS and fluid retention in the abdomen.  Since then he has had approximately 15 pound weight gain.  He was seen in cardiology clinic this AM and they sent him in to the ED for hospitalization for decompensated heart failure.  At presentation to hospital, requiring 2 L of O2 to maintain his oxygenation      Fluid overload with anasarca  Initially concern of decompensated HFpEF, but essentially ruled out  Acute hypoxic respiratory distress - resolved  * Echo 3/22/22: EF 55%.  RV normal in structure function and size.  No valve disease.  * U/S shows moderate ascites.   * Admit weight 216#, baseline wt ~ 200#    Patient received Bumex 1 mg IV in ED > lasix 80 mg IV BID x 5 dose    On 05/13/22, cardiology started on lasix gtt with K replacement protocol.     On 05/14/22, cardiology switched to bumex 2 mg IV x 1, followed by bumex gtt at 0.5 mg/hr.    On K 10 meq BID while diuresing.     On 05/15/22, excellent diuresis (> 3L out) over last 24 hours, wt stable. Cr and K stable. Wt stable.     Continue PTA Coreg, atorvastatin    Cardiology was considering RHC prior to discharge and CardioMEMS     Cardiology will arrange follow-up in the [facility] with Shaniqua Ram PA-C in 1 to 2 weeks post discharge with a repeat BMP and NT pro BNP beforehand.    Strict I and O's, daily weights.     Compression stockings or wraps on legs    US paracentesis with 3.1L removed 5/16 - reports improvement in symptoms    5/17 after echo completed 5/16 - no longer felt to be of cardiac etiology - more likely seeming to be due to renal disease however unclear.  No evidence of nephrotic syndrome.    5/17 RUQ US Small ascites. Coarse appearance of the liver that could represent underlying liver disease. No visible lesion but assessment is somewhat limited. If there is  concern, MRI could be performed for more sensitive assessment of the liver.    Cardiology signed off 5/17    Nephrology consultation (appreciated) - metolazone added 5/17 - on RA .  Patient is likely third spacing from hypoalbuminemia.  Currently receiving IV albumin 25 mg every 8 hours.  It helps with diuresis in combination with Bumex    Patient is net -15 L since admission.  Bumex dosing changed over to oral Bumex 2 mg twice daily for comfort.    CKD stage IV  Cr 2.3 on admission and baseline appears to be 2.5-2.6.  History of NSAID use.    Daily BMPs while diuresing    5/16 creat 2.19 (tolerating diuresis)  ---> creatinine has been creeping upwards since admission due to aggressive diuresis.  bumex gtt stopped 5/19    Started bumex 4mg po bid 5/20 but then held per nephro (creat still increasing slightly, BP borderline), creat slowly leveling off as above    5/22 - nephro ordered albumin/diuretic combo    5/23 - discussed with nephro re anemia,    5/24 - hgb 6.5, transfuse 1uPRBC and bumex held until after as above    5/25/2022-hemoglobin at 6.6.  Reordered another unit of packed RBCs.  Repeat type and screen today.    5/26-hemoglobin at 7.2.  We will hold off on any further transfusion today.    We discussed his medical condition with nephrology today.  Feels that this would be an ongoing problem with persistent anemia and fluid overload every time he gets a blood transfusion.  We need to have goals of care discussion with family.  Palliative care has been consulted and they have discussed this with family.  Son is on the way.    Hematology team has been reconsulted and they recommended with the family.  Did not have any further recommendation since extensive work-up has already been done.  Need to have was serious goals of care discussion since patient is going to end up needing persistent transfusions if they want aggressive cares.    Per nephrology patient is not a good candidate for dialysis.    Decided to  proceed with comfort cares on 5/27.  Working on getting him home with hospice.  This might be available as early as tomorrow.  Care coordination is working on this.  Orders have been changed to reflect comfort cares.  He is DNR/DNI.     Ascites, s/p paracentesis 5/16  Mild elevation in INR (1.2)   * U/S mild to moderate ascites.   * No history or evidence of liver disease by liver panel or prior imaging. However, does have chronic mild elevation of INR.   Diuresis as above.     Vitamin K 5 mg daily x 3, INR on Monday 5/16    Paracentesis done 5/16 - transudative - as anticipated SAAG > 1.1, and protein > 2.5 g/dL in ascites from - SAAG 1.6, protein 2.9 in ascites    US showed Small ascites. Coarse appearance of the liver that could represent underlying liver disease specially in light of elevated INR and low albumin.. No visible lesion but assessment is somewhat limited. If there is concern, MRI could be performed for more sensitive assessment of the liver    5/24 still feeling much improvement since tap 5/16 with regard to abdominal girth/fullness     Transfusion dependent chronic anemia, likely related to chronic disease   Iron deficiency - by iron panel on 5/11/22  Patient's hemoglobin has continued to drift down.  It was 6.1 the ED while being 6.7 on 5/5 and 6.1 on 5/3.   * Last transfusion PTA: 5/6.  Patient denies any recent bleeding.  He has had a thorough work up so far.  He has had colonoscopy/endoscopy/pill study with GI without clear cause.  He also follows with hem/onc and has gotten IV infusions in the past.  In the ED he was consented and 1 unit of PRBCs was ordered  * Received 1 unit on 5/10/22 with appropriate rise in hgb  * Received  1 unit on 05/12/22 for hgb of 6.5 and CHF.    Venofer 300 mg x 2 ordered. Defer to hematology further doses.     Transfuse for hgb < 7. We currently have a blood product shortage.      Outpatient follow up with Hematology after discharge    5/21 hgb 7.0, he is  essentially at dry weight after aggressive diuresis additionally; will order 1 unit PRBC    5/21 - per nephro, another dose of aranesp given    5/22 - hgb 7.5      5/23 - hgb 7.0, no bleeding concerns, will await diuresis and check Hgb in AM     5/24 -  hgb 6.5, transfuse 1uPRBC and bumex held until after as above    5/25-hemoglobin 6.6.  Transfused another unit of packed RBCs.    5/26-stable hemoglobin at 7.3.  No transfusion today.  Hematology reconsulted.  No new recommendations.  Need to have goals of care discussion since patient is going to need ongoing transfusions.  He has been resistant to IV iron, blood transfusion, erythropoietin.    5/27-had an extensive discussion with family and the patient today.  Nephrology and palliative were involved in the discussion as well.  Decided to proceed with comfort cares.  Patient and family would like for him to return home with hospice.  Social work and care coordinator has been consulted.  Changes have been made to all the medications and patient is currently on comfort meds only.  Discontinued any further transfusions or hemoglobin checks.     DM type II   Last HgbA1c was 8.2 on 2/16/22     Increased lantus to PTA dose of 30 units (from 25 units) on 05/18    Added prandial novolog on 5/11/22, increase to 7 units AC on 05/11/22.     Note elevated BS evening of 5/14/22 > adjust insulin if BS remain elevated.     Continue high res SSI    BS considerably variable, adjustment as necessary    Discontinued sliding scale insulin.  We will continue basal and mealtime insulin from a comfort focused care standpoint.     L Shoulder pain - impingement  C spine stenosis  Has had gradually worsening shoulder pain for some time.  He states its to the point he is not sleeping well..  He was evaluated by Dr. Scott at [facility] urgent care on Sunday and had MRIs of the shoulder and spine.     Ortho consult on 5/11 appreciated. Noted to have both cervical spine stenosis and left shoulder  impingement syndrome.     Not candidate for c-spine injections.     Recommended continued pain meds PRN, WBAT of upper extremities, and OK to stop steroids.     Oxycodone 2.5-5 mg q 6 hours. Advised that he try to limit this to night time doses for best effect.      Started low dose neurontin 200 mg q HS on 5/13/22.     Appreciate orthopedic surgery assistance in planning outpatient follow up    5/18 - consulted spine surgery given his ongoing neck pain and prolonged hospitalization - appreciate assistance - plan for outpatient follow up 2 wks after discharge - no surgical option currently, no indication for steroid injection     Multiple scabs over extremities (~ 4)   Coccyx blanchable redness (no wound)     Appreciate WOC consult     Deconditioned state, requesting home care    Pt previously using outpatient PT, requests home care.     Prolonged hospital stay with decompensated CHF, high risk for deconditioning.     Appreciate PT consult, currently recommending TCU as of 5/14/22 - patient and spouse are going to discuss further. Spouse concerned of being able to manage at home safely.      Urinary retention on 05/14/22     Will catheter placed on 05/14/22 for both retention and strict I and O measurement.     Started flomax on 5/14/22 in anticipation of discontinuing will and voiding trial. (history of difficulty with urination PTA)      Will out 5/19 as bumex gtt stopped, voiding ok    Will had to be replaced on 5/25/2022 for acute urinary retention.  Bladder scan showed over 800 cc urine.  Had good urine output after Will has been placed.     Constipation     On 05/15/22, states he has not had a BM for a week.     Scheduled senna and PRN dulcolax ordered    Narcotics definitely contribute.    Started miralax daily on 05/15/22 can do BID if necessary    Palliative care consult.  -Patient currently on comfort focused care.  Does have IV and oral hydromorphone.  Will avoid morphine due to his underlying renal  disease.  Patient is having significant uremia with flapping tremors.  Will stop aggressive diuresis and blood transfusions at this time.  Proceeding with home care with hospice.  Care coordination social work working on this.  Patient will revert to regular diet.  No more transfusions.  Decided to discharge him with Glasgow catheter in place.    Diet: Regular diet  DVT Prophylaxis: Pneumatic Compression Devices  Glasgow Catheter: Glasgow catheter present  Central Lines: None  Cardiac Monitoring: None  Code Status: DNR/DNI    Julieth Oliveira MD, MD  [phone](p)  [phone]( c)

## 2022-05-28 NOTE — PLAN OF CARE
Dx: HF, CKD, chronic anemia, DM type 2  DATE&TIME:-5/27/22 9306-0478  Cognitive Concerns/ Orientation: A&Ox4, calm  BEHAVIOR & AGGRESSION TOOL COLOR: Green  ABNL VS/O2: VSS RA   MOBILITY: A2 w/lift. Turn & repo q 2hrs.   PAIN MANAGEMENT: PRN oxycodone given x1 for L shoulder/neck pain. Heat applied.   DIET: Regular diet.   BOWEL/BLADDER: Glasgow in place for retention. AUO.   ABNL LAB/BG: Refused 200 BG. Hgb 6.8, no transfusion per MD note. Cr 3.06.  TELEMETRY RHYTHM: NA   SKIN: Wound cares completed per POC on buttocks/coccyx. Scattered bruising & scabs. WOC following. 2 PIV in L arm SL.   TESTS/PROCEDURES: None on this shift  D/C DATE: Transitioned to comfort care today. Plan to discharge to his home on Monday w/ hospice.   OTHER IMPORTANT INFO: neprology following. PT & hem/onc signed off. Discharge to home w/ hospice.

## 2022-05-28 NOTE — PROGRESS NOTES
[facility]    Hospitalist Progress Note    Interval History   Patient is seen laying in bed, sleeping.  Did briefly arouse upon my arrival but then fell back asleep during the encounter.  Multiple family members at bedside.  Patient denied being in pain, states that he just felt tired and then fell asleep.  Family notes that he is not eating much.    -Data reviewed today: I reviewed all new labs and imaging results over the last 24 hours. I personally reviewed no images or EKG's today.    Physical Exam   Temp: 98.7  F (37.1  C) Temp src: Oral BP: 104/57 Pulse: 92   Resp: 16 SpO2: 93 % O2 Device: None (Room air)    Vitals:    05/24/22 0608 05/25/22 0623 05/26/22 0614   Weight: 90.7 kg (199 lb 14.4 oz) 88.4 kg (194 lb 14.4 oz) 85.5 kg (188 lb 6.4 oz)     Vital Signs with Ranges  Temp:  [98.6  F (37  C)-98.7  F (37.1  C)] 98.7  F (37.1  C)  Pulse:  [83-92] 92  Resp:  [15-18] 16  BP: (103-104)/(57) 104/57  SpO2:  [93 %-94 %] 93 %  I/O last 3 completed shifts:  In: 100 [P.O.:100]  Out: 2175 [Urine:2175]    General: Sleepy but still easily arousable, appears to be resting comfortably, not in any distress  Respiratory: Nonlabored breathing  Cardiovascular: RRR  Abdominal: Soft, nontender  Psych: Calm    Medications       bumetanide  2 mg Oral BID     dorzolamide-timolol  1 drop Right Eye BID     gabapentin  100 mg Oral At Bedtime     insulin aspart  7 Units Subcutaneous TID w/meals     insulin glargine  30 Units Subcutaneous At Bedtime     sennosides  1-2 tablet Oral BID     sertraline  50 mg Oral Daily     sodium chloride (PF)  3 mL Intracatheter Q8H     traZODone  100 mg Oral At Bedtime       Data   Recent Labs   Lab 05/28/22  1335 05/28/22  0744 05/27/22  2155 05/27/22  0940 05/27/22  0826 05/26/22  1644 05/26/22  1327 05/26/22  1220 05/26/22  0811 05/25/22  0916 05/25/22  0834   WBC  --   --   --   --  7.7  --  7.1  --  6.5   < > 6.0   HGB  --   --   --   --  6.8*  --  7.2*  --  7.2*   < >  6.5*   MCV  --   --   --   --  86  --  88  --  85   < > 86   PLT  --   --   --   --  195  --  204  --  198   < > 187   NA  --   --   --   --  138  --   --   --  135  --  133   POTASSIUM  --   --   --   --  3.4  --  3.5  --  3.3*  --  3.6  3.6   CHLORIDE  --   --   --   --  99  --   --   --  97  --  96   CO2  --   --   --   --  32  --   --   --  30  --  29   BUN  --   --   --   --  133*  --   --   --  124*  --  120*   CR  --   --   --   --  3.06*  --   --   --  3.06*  --  2.88*   ANIONGAP  --   --   --   --  7  --   --   --  8  --  8   VANESA  --   --   --   --  8.7  --   --   --  8.6  --  8.4*   GLC 84 137* 210*   < > 59*   < >  --    < > 219*   < > 300*   ALBUMIN  --   --   --   --  3.3*  --   --   --  3.5  3.5  --   --    PROTTOTAL  --   --   --   --   --   --   --   --  7.0  --   --    BILITOTAL  --   --   --   --   --   --   --   --  1.1  --   --    ALKPHOS  --   --   --   --   --   --   --   --  185*  --   --    ALT  --   --   --   --   --   --   --   --  65  --   --    AST  --   --   --   --   --   --   --   --  77*  --   --     < > = values in this interval not displayed.       No results found for this or any previous visit (from the past 24 hour(s)).      Assessment & Plan   Justyn Olivas is a 81 year old male with a history of HFpEF, transfusion dependent chronic anemia, CKD stage IV, DM type II who recently was started on steroids for shoulder pain and was sent in to the ED on 5/10/2022 from cardiology clinic due to concerns for decompensated heart failure      Patient  has had worsening lower extremity edema, MARKS and fluid retention in the abdomen since starting on steroids.  Since then he has had approximately 15 pound weight gain.      Fluid overload with anasarca due to worsening renal failure  Acute hypoxic respiratory distress - resolved  * Echo 3/22/22: EF 55%.  RV normal in structure function and size.  No valve disease.  * U/S shows moderate ascites.   * Admit weight 216#, baseline wt ~ 200#     Patient received Bumex 1 mg IV in ED > lasix 80 mg IV BID x 5 dose    On 05/13/22, cardiology started on lasix gtt with K replacement protocol.     On 05/14/22, cardiology switched to bumex 2 mg IV x 1, followed by bumex gtt at 0.5 mg/hr.    On K 10 meq BID while diuresing.     On 05/15/22, excellent diuresis (> 3L out) over last 24 hours, wt stable. Cr and K stable. Wt stable.     Continue PTA Coreg, atorvastatin    Cardiology was considering RHC prior to discharge and CardioMEMS     US paracentesis with 3.1L removed 5/16 - reports improvement in symptoms    5/17 after echo completed 5/16 - no longer felt to be of cardiac etiology - more likely seeming to be due to renal disease however unclear.  No evidence of nephrotic syndrome.    5/17 RUQ US Small ascites. Coarse appearance of the liver that could represent underlying liver disease. No visible lesion but assessment is somewhat limited.    Cardiology signed off 5/17    Nephrology consultation (appreciated) - metolazone added 5/17 - on RA .  Patient is likely third spacing from hypoalbuminemia.  Continued on albumin and Bumex.    Bumex dosing changed over to oral Bumex 2 mg twice daily for comfort.      CASS on CKD stage IV  Cr 2.3 on admission and baseline appears to be 2.5-2.6.  History of NSAID use.    5/16 creat 2.19 (tolerating diuresis)  ---> creatinine has been creeping upwards since admission due to aggressive diuresis.  bumex gtt stopped 5/19    Started bumex 4mg po bid 5/20 but then held per nephro (creat still increasing slightly, BP borderline), creat slowly leveling off as above    5/22 - nephro ordered albumin/diuretic combo    5/23 - discussed with nephro re anemia,    5/24 - hgb 6.5, transfuse 1uPRBC and bumex held until after as above    5/25/2022-hemoglobin at 6.6.  Received additional PRBC transfusion    5/26-hemoglobin at 7.2.      We discussed his medical condition with nephrology today.  Feels that this would be an ongoing problem with  persistent anemia and fluid overload every time he gets a blood transfusion.  We need to have goals of care discussion with family.  Palliative care has been consulted and they have discussed this with family.  Son is on the way.    Hematology team has been reconsulted and they did not have any further recommendation since extensive work-up has already been done.      Per nephrology patient is not a good candidate for dialysis.    Decided to proceed with comfort cares on 5/27.  Working on getting him home with hospice on 5/30.  Care coordination is working on this.  Orders have been changed to reflect comfort cares.  He is DNR/DNI.     Comfort cares  -Patient currently on comfort focused care.  Does have IV and oral hydromorphone.  Will avoid morphine due to his underlying renal disease.  Patient is having significant uremia with flapping tremors.  Will stop aggressive diuresis and blood transfusions at this time.  Proceeding with home care with hospice.  Care coordination social work working on this.  Patient will revert to regular diet.  No more transfusions.  Decided to discharge him with Glasgow catheter in place.    Ascites, s/p paracentesis 5/16  Mild elevation in INR (1.2)   * U/S mild to moderate ascites.   * No history or evidence of liver disease by liver panel or prior imaging. However, does have chronic mild elevation of INR.   Diuresis as above.     He was given vitamin K few doses.    Paracentesis done 5/16 - transudative - as anticipated SAAG > 1.1, and protein > 2.5 g/dL in ascites from - SAAG 1.6, protein 2.9 in ascites    US showed Small ascites. Coarse appearance of the liver that could represent underlying liver disease specially in light of elevated INR and low albumin.. No visible lesion but assessment is somewhat limited. If there is concern, MRI could be performed for more sensitive assessment of the liver    5/24: feeling much improvement since tap 5/16 with regard to abdominal  girth/fullness     Transfusion dependent chronic anemia, likely related to chronic disease   Iron deficiency - by iron panel on 5/11/22  Patient's hemoglobin has continued to drift down.  It was 6.1 the ED while being 6.7 on 5/5 and 6.1 on 5/3.   * Last transfusion PTA: 5/6.  Patient denies any recent bleeding.  He has had a thorough work up so far.  He has had colonoscopy/endoscopy/pill study with GI without clear cause.  He also follows with hem/onc and has gotten IV infusions in the past.  In the ED he was consented and 1 unit of PRBCs was ordered  * Received 1 unit on 5/10/22 with appropriate rise in hgb  * Received  1 unit on 05/12/22 for hgb of 6.5 and CHF.    Venofer 300 mg x 2 ordered. Defer to hematology further doses.     Transfused for hgb < 7.   patient received several units of blood transfusion during this hospitalization.  Also received Aranesp per nephrology.  Hematology reconsulted.  No new recommendations.  Need to have goals of care discussion since patient is going to need ongoing transfusions.  He has been resistant to IV iron, blood transfusion, erythropoietin.    5/27-hospitalist, nephrology and palliative extensive discussion with family.  Decided to proceed with comfort cares.  Patient and family would like for him to return home with hospice.  Social work and care coordinator has been consulted.  Changes have been made to all the medications and patient is currently on comfort meds only.  Discontinued any further transfusions or hemoglobin checks.     DM type II   Last HgbA1c was 8.2 on 2/16/22     Managed with insulin during hospitalization, adjusted doses as indicated.  Now that he has transitioned to comfort cares, will discontinue glucose checks and insulin.  Oral intake is poor.     L Shoulder pain - impingement  C spine stenosis  Has had gradually worsening shoulder pain for some time.  He states its to the point he is not sleeping well..  He was evaluated by Dr. Scott at [facility] urgent  care on Sunday and had MRIs of the shoulder and spine.     Ortho consult on 5/11 appreciated. 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     Not candidate for c-spine injections.     Recommended continued pain meds PRN, WBAT of upper extremities, and OK to stop steroids.     Oxycodone 2.5-5 mg q 6 hours. Advised that he try to limit this to night time doses for best effect.      Started low dose neurontin 200 mg q HS on 5/13/22.     Appreciate orthopedic surgery assistance in planning outpatient follow up    5/18 - consulted spine surgery given his ongoing neck pain and prolonged hospitalization - appreciate assistance - plan for outpatient follow up 2 wks after discharge    Patient has since elected to go on comfort cares only.  As needed pain meds available.     Multiple scabs over extremities (~ 4)   Coccyx blanchable redness (no wound)     Appreciate WOC consult     Deconditioned state, requesting home care    Pt previously using outpatient PT, requests home care.     Prolonged hospital stay with decompensated CHF, high risk for deconditioning.     Patient will discharge home with hospice cares.    Urinary retention on 05/14/22     Will catheter placed on 05/14/22 for both retention and strict I and O measurement.     Started flomax on 5/14/22 in anticipation of discontinuing will and voiding trial. (history of difficulty with urination PTA)      Will out 5/19 as bumex gtt stopped, voiding ok    Will had to be replaced on 5/25/2022 for acute urinary retention.  Will leave Will in place now with comfort cares.     Constipation     On 05/15/22, states he has not had a BM for a week.     Scheduled senna and PRN dulcolax ordered    Narcotics definitely contribute.    Started miralax daily on 05/15/22 can do BID if necessary      Dispo: Anticipate discharging home with hospice on 5/30/2022.

## 2022-05-28 NOTE — PLAN OF CARE
Dx: HF, CKD, chronic anemia, DM type 2  DATE&TIME:-5/27/22 1900-2330  Cognitive Concerns/ Orientation: A&Ox4, calm  BEHAVIOR & AGGRESSION TOOL COLOR: Green  ABNL VS/O2: VSS on RA (pt preferred to have vitals taken)  MOBILITY: A2 w/lift. Turn & repo q 2hrs.   PAIN MANAGEMENT: PRN oxycodone given x1 for L shoulder/neck pain. Heat applied.   DIET: Regular diet.   BOWEL/BLADDER: Glasgow in place for retention. Good urine output. Had a BM x1 this evening.  ABNL LAB/BG: BG  210. Hgb 6.8, no transfusion per MD note. Cr 3.06.  TELEMETRY RHYTHM: NA   SKIN: Wound cares completed per POC. Scattered bruising & scabs. WOC following. 2 PIV in L arm SL.   TESTS/PROCEDURES: None on this shift  D/C DATE: Transitioned to comfort care today. Plan to discharge to his home on Monday w/ hospice.   OTHER IMPORTANT INFO: neprology following. PT & hem/onc signed off. Discharge to home w/ hospice.

## 2022-05-28 NOTE — PLAN OF CARE
"Shift Note [id number]:   Patient admitted to unit for Fluid overload, comfort cares. Patient is alert and oriented x4, sometimes forgetful. Assist x2 with turn and repo, patient requested to be turned or repositioned when patient asks for it due to chronic left shoulder pain. Painad pain scale used, pain level \"7/10\", prn diluadid given for first time. Family requests to try other methods for pain management due to patient's sleepiness during day. Aggression Stop Light green.    "

## 2022-05-29 NOTE — PROVIDER NOTIFICATION
Having some nausea.  No zofran ordered.  Has aromatherapy and ginger ale.  Irina paged Dr. Rosales for zofran order.

## 2022-05-29 NOTE — PLAN OF CARE
DATE & TIME: 5/28/22, 2300 - 9555   Cognitive Concerns/ Orientation : A&O x 3. Disoriented to time    BEHAVIOR & AGGRESSION TOOL COLOR: Green   ABNL VS/O2: Deferred - comfort cares  MOBILITY: Lift, assist x 2. Turned and repositioned for comfort  PAIN MANAGMENT: Managed with Oxycodone x 2 doses and hot pack to shoulder/neck  DIET: Regular  BOWEL/BLADDER: No BM this shift. Will in place and patent  ABNL LAB/BG: NA  DRAIN/DEVICES: PIV SL, will  TELEMETRY RHYTHM: NA  SKIN: Scattered bruises and scabs. Buttock/coccyx wounds open to air  TESTS/PROCEDURES: NA  D/C DATE: Plan for discharge on Monday 5/30/22 to home with hospice  OTHER IMPORTANT INFO: Comfort cares    Goal Outcome Evaluation:    Plan of Care Reviewed With: patient     Overall Patient Progress: no change

## 2022-05-29 NOTE — PLAN OF CARE
"Dx: HF, CKD, chronic anemia, DM type 2  DATE&TIME: 5/28/206217-5308  Cognitive Concerns/ Orientation: A&Ox3-4, unsure of day & time.   BEHAVIOR & AGGRESSION TOOL COLOR: Green  ABNL VS/O2: deferred- comfort cares  MOBILITY: A2 w/lift. Turn & repo q 2hrs, pt & family requested to not turn patient if sleeping.   PAIN MANAGEMENT: resting comfortably in bed. Heat applied to L shoulder.  DIET: Regular diet, good appetite.   BOWEL/BLADDER: Glasgow in place for retention w/ adequate urine output.   ABNL LAB/BG: no new labs   TELEMETRY RHYTHM: NA   SKIN: Scattered bruising & scabs. Buttock/coccyx wounds open to air. Wound cares completed per POC. PIV in L arm SL.   TESTS/PROCEDURES: None on this shift  D/C DATE: Plan to discharge to his home on Monday w/ hospice.   OTHER IMPORTANT INFO: Pt sleeping throughout evening. Ate his entire dinner. Stated he is \"looking forward\" to going home.   "

## 2022-05-29 NOTE — CONSULTS
Care Management Follow Up    Length of Stay (days): 19    Expected Discharge Date: 05/30/2022     Concerns to be Addressed: discharge planning arrangement for hospice services at home.    Goals of Care have changed to hospice care.  Patient plan of care discussed at interdisciplinary rounds: Yes    Anticipated Discharge Disposition: to home on 5/30 with [facility].     Anticipated Discharge Services: None  Anticipated Discharge DME: None    Patient/family educated on Medicare website which has current facility and service quality ratings:    Education Provided on the Discharge Plan:  yes  Patient/Family in Agreement with the Plan: yes    Referrals Placed by CM/SW: Internal Clinic Care Coordination, External Care Coordination, Homecare, Specialty Providers  Private pay costs discussed:     Additional Information:  On Friday, Chucky Teresa spoke with patient and family and obtain permission to make a referral to [facility]. Patient/family desire for patient to discharge to home.[facility] can open patient to services in his home on 5/30 in the later morning.  Family is requesting 24 hour care for patient.  [facility] hospice liaison recommended the [facility] home care.    Writer contacted the agency and spoke with Christine Stone.  She reports they have home health aide staff available and can begin services on Monday.   On Saturday Christine Stone was scheduled to call wife of patient and discuss the cost and schedule. Brielle, the [facility] liaison was also to meet with patient and family.  Today, writer will finalize plans with family, [facility] and [facility] liaison.    Today the [facility] Liaison will document recommended comfort medications which will be filled by the discharge pharmacy and go home with patient           FLORIDALMA Mari

## 2022-05-29 NOTE — CONSULTS
Writer spoke with patients spouse over the phone to discuss hospice philosophy and goals for comfort at time of discharge.  Writer attempted to visit room x 2 on 5/28, however, family politely declined visit due to timing.  During today's consult we discussed discharge planning at length including plans for DME delivery, medications, visit frequencies and covered vs non covered items under Medicare.  Spouse confirmed that she would like a hospital bed, and OBT delivered later this afternoon.  Writer ordered equipment from [facility] for delivery this afternoon.  Spouse aware that [facility] will call 30-45 minutes prior to arrival to provide ETA to allow time  to drive home from [facility] to receive equipment. Spouse confirmed that she did speak with [facility] on 5/28 and they have availability to start Monday 5/30.  Writer called [facility] to confirm and left a message requesting call back.  No call back received at this time.  [facility] hospice nurse will arrive to patients home at 1:00 PM for enrollment.  Please plan to discharge patient with any new medications that he is to continue taking along with the following comfort medications:    Lorazepam 2mg/mL; Take 0.5-1mg (0.25-0.5mL) PO/SL every 4 hours as needed for anxiety or restlessness.  QTY 30mL    Haloperidol 2mg/mL; Take 1-2mg (0.5-1mL) every 6 hours as needed for severe anxiety or nausea.  QTY 30mL     Atropine %1 drops; give 2-4 drops PO/SL every 2 hours as needed for oral secretions.  QTY 1 bottle     Senna 8.6mg tab; take 1-2 tabs twice daily as needed for constipation.  QTY 30 tabs     0xycodone 5 mg tablet; take 1/2 tablet every 6 hours as needed for pain  QTY 30 tabs       Please send all medications with patient at time of transport.    Thank you for this referral,    Brielle MILLIGAN RN  [facility]

## 2022-05-29 NOTE — PLAN OF CARE
Goal Outcome Evaluation:     Dx: HF, CKD, chronic anemia, DM type 2    DATE & TIME: 5/29/22 3845-2219          Cognitive Concerns/ Orientation : A&O x 3. Forgetful to time    BEHAVIOR & AGGRESSION TOOL COLOR: Green     ABNL VS/O2: Deferred - comfort cares  MOBILITY: up to recliner and commode asssit of 2/walker/GB; returned to bed via ceiling lift.  turned and repositioned for comfort, off buttocks due to pain.  Mostly comfortable in high fowlers due to neck pain.  PAIN MANAGMENT: Oxycodone for buttock pain with some relief; also Voltaren gel to L neck/shoulder;    DIET: Regular; poor intake but is taking sips of soda and water.    BOWEL/BLADDER: 1 black BM this shift on BSC. Will in place and patent of davey urine.  ABNL LAB/BG: NA  DRAIN/DEVICES: L PIV SL, will  TELEMETRY RHYTHM: NA  SKIN: Scattered bruises and scabs. Buttock/coccyx wounds (bleed easily) with Triad paste and sacral mepilex; small ulcer tip of penis.    TESTS/PROCEDURES: NA  D/C DATE: Plan for discharge on Monday 5/30/22 to home with hospice  OTHER IMPORTANT INFO: nausea; zofran given, also aromatherapy and sips of ginger ale.  Family present and supportive.

## 2022-05-29 NOTE — PROGRESS NOTES
Care Management Follow Up    Length of Stay (days): 19    Expected Discharge Date: 05/30/2022     Concerns to be Addressed: discharge planning     Patient plan of care discussed at interdisciplinary rounds: Yes    Anticipated Discharge Disposition:  (Home with family, [facility] and [facility])     Anticipated Discharge Services: None  Anticipated Discharge DME: None    Patient/family educated on Medicare website which has current facility and service quality ratings:    Education Provided on the Discharge Plan:    Patient/Family in Agreement with the Plan: yes    Referrals Placed by CM/SW: Internal Clinic Care Coordination, External Care Coordination, Homecare, Specialty Providers  Private pay costs discussed:     Additional Information:  The [facility] RN will be able to arrive at patient's home on Monday at 1300.  The [facility] staff will arrive at the home at 1230.   [facility] transportation is arranged at 1200.  The comfort medications will be filled here and sent home with patient.    FLORIDALMA Mari

## 2022-05-29 NOTE — PROGRESS NOTES
[facility]    Hospitalist Progress Note    Interval History   Patient is seen sitting up in recliner, is alert and conversant.  Family at bedside.  Patient just feels tired but denies any other specific complaints.  Awaiting arrangements for discharge home with hospice tomorrow.    -Data reviewed today: I reviewed all new labs and imaging results over the last 24 hours. I personally reviewed no images or EKG's today.    Physical Exam             Resp: 18        Vitals:    05/24/22 0608 05/25/22 0623 05/26/22 0614   Weight: 90.7 kg (199 lb 14.4 oz) 88.4 kg (194 lb 14.4 oz) 85.5 kg (188 lb 6.4 oz)     Vital Signs with Ranges  Resp:  [18] 18  I/O last 3 completed shifts:  In: 240 [P.O.:240]  Out: 2200 [Urine:2200]    General: Alert, answering simple questions, sitting up in recliner comfortable, not in any distress  Respiratory: Nonlabored breathing  Cardiovascular: RRR  Abdominal: Soft, nontender  Psych: Calm    Medications       bumetanide  2 mg Oral BID     dorzolamide-timolol  1 drop Right Eye BID     gabapentin  100 mg Oral At Bedtime     sertraline  50 mg Oral Daily     sodium chloride (PF)  3 mL Intracatheter Q8H     traZODone  100 mg Oral At Bedtime       Data   Recent Labs   Lab 05/28/22  1335 05/28/22  0744 05/27/22  2155 05/27/22  0940 05/27/22  0826 05/26/22  1644 05/26/22  1327 05/26/22  1220 05/26/22  0811 05/25/22  0916 05/25/22  0834   WBC  --   --   --   --  7.7  --  7.1  --  6.5   < > 6.0   HGB  --   --   --   --  6.8*  --  7.2*  --  7.2*   < > 6.5*   MCV  --   --   --   --  86  --  88  --  85   < > 86   PLT  --   --   --   --  195  --  204  --  198   < > 187   NA  --   --   --   --  138  --   --   --  135  --  133   POTASSIUM  --   --   --   --  3.4  --  3.5  --  3.3*  --  3.6  3.6   CHLORIDE  --   --   --   --  99  --   --   --  97  --  96   CO2  --   --   --   --  32  --   --   --  30  --  29   BUN  --   --   --   --  133*  --   --   --  124*  --  120*   CR  --   --   --    --  3.06*  --   --   --  3.06*  --  2.88*   ANIONGAP  --   --   --   --  7  --   --   --  8  --  8   VANESA  --   --   --   --  8.7  --   --   --  8.6  --  8.4*   GLC 84 137* 210*   < > 59*   < >  --    < > 219*   < > 300*   ALBUMIN  --   --   --   --  3.3*  --   --   --  3.5  3.5  --   --    PROTTOTAL  --   --   --   --   --   --   --   --  7.0  --   --    BILITOTAL  --   --   --   --   --   --   --   --  1.1  --   --    ALKPHOS  --   --   --   --   --   --   --   --  185*  --   --    ALT  --   --   --   --   --   --   --   --  65  --   --    AST  --   --   --   --   --   --   --   --  77*  --   --     < > = values in this interval not displayed.       No results found for this or any previous visit (from the past 24 hour(s)).      Assessment & Plan   Justyn Olivas is a 81 year old male with a history of HFpEF, transfusion dependent chronic anemia, CKD stage IV, DM type II who recently was started on steroids for shoulder pain and was sent in to the ED on 5/10/2022 from cardiology clinic due to concerns for decompensated heart failure      Patient  has had worsening lower extremity edema, MARKS and fluid retention in the abdomen since starting on steroids.  Since then he has had approximately 15 pound weight gain.      Fluid overload with anasarca due to worsening renal failure  Acute hypoxic respiratory distress - resolved  * Echo 3/22/22: EF 55%.  RV normal in structure function and size.  No valve disease.  * U/S shows moderate ascites.   * Admit weight 216#, baseline wt ~ 200#    Patient received Bumex 1 mg IV in ED > lasix 80 mg IV BID x 5 dose    On 05/13/22, cardiology started on lasix gtt with K replacement protocol.     On 05/14/22, cardiology switched to bumex 2 mg IV x 1, followed by bumex gtt at 0.5 mg/hr.    On K 10 meq BID while diuresing.     On 05/15/22, excellent diuresis (> 3L out) over last 24 hours, wt stable. Cr and K stable. Wt stable.     Continue PTA Coreg,  atorvastatin    Cardiology was considering RHC prior to discharge and CardioMEMS     US paracentesis with 3.1L removed 5/16 - reports improvement in symptoms    5/17 after echo completed 5/16 - no longer felt to be of cardiac etiology - more likely seeming to be due to renal disease however unclear.  No evidence of nephrotic syndrome.    5/17 RUQ US Small ascites. Coarse appearance of the liver that could represent underlying liver disease. No visible lesion but assessment is somewhat limited.    Cardiology signed off 5/17    Nephrology consultation (appreciated) - metolazone added 5/17 - on RA .  Patient is likely third spacing from hypoalbuminemia.  Continued on albumin and Bumex.    Bumex dosing changed over to oral Bumex 2 mg twice daily for comfort.      CASS on CKD stage IV  Cr 2.3 on admission and baseline appears to be 2.5-2.6.  History of NSAID use.    5/16 creat 2.19 (tolerating diuresis)  ---> creatinine has been creeping upwards since admission due to aggressive diuresis.  bumex gtt stopped 5/19    Started bumex 4mg po bid 5/20 but then held per nephro (creat still increasing slightly, BP borderline), creat slowly leveling off as above    5/22 - nephro ordered albumin/diuretic combo    5/23 - discussed with nephro re anemia,    5/24 - hgb 6.5, transfuse 1uPRBC and bumex held until after as above    5/25/2022-hemoglobin at 6.6.  Received additional PRBC transfusion    5/26-hemoglobin at 7.2.      We discussed his medical condition with nephrology today.  Feels that this would be an ongoing problem with persistent anemia and fluid overload every time he gets a blood transfusion.  We need to have goals of care discussion with family.  Palliative care has been consulted and they have discussed this with family.  Son is on the way.    Hematology team has been reconsulted and they did not have any further recommendation since extensive work-up has already been done.      Per nephrology patient is not a good  candidate for dialysis.    Decided to proceed with comfort cares on 5/27.  Working on getting him home with hospice on 5/30.  Care coordination is working on this.  Orders have been changed to reflect comfort cares.  He is DNR/DNI.  Comfort medications have been prescribed for discharging home with hospice per recommendations from hospice team.  Prescriptions sent to hospital discharge pharmacy.     Comfort cares  -Patient currently on comfort focused care.  Does have IV and oral hydromorphone.  Will avoid morphine due to his underlying renal disease.  Patient is having significant uremia with flapping tremors.  Will stop aggressive diuresis and blood transfusions at this time.  Proceeding with home care with hospice.  Care coordination social work working on this.  Patient will revert to regular diet.  No more transfusions.  Decided to discharge him with Glasgow catheter in place.    Ascites, s/p paracentesis 5/16  Mild elevation in INR (1.2)   * U/S mild to moderate ascites.   * No history or evidence of liver disease by liver panel or prior imaging. However, does have chronic mild elevation of INR.   Diuresis as above.     He was given vitamin K few doses.    Paracentesis done 5/16 - transudative - as anticipated SAAG > 1.1, and protein > 2.5 g/dL in ascites from - SAAG 1.6, protein 2.9 in ascites    US showed Small ascites. Coarse appearance of the liver that could represent underlying liver disease specially in light of elevated INR and low albumin.. No visible lesion but assessment is somewhat limited. If there is concern, MRI could be performed for more sensitive assessment of the liver    5/24: feeling much improvement since tap 5/16 with regard to abdominal girth/fullness     Transfusion dependent chronic anemia, likely related to chronic disease   Iron deficiency - by iron panel on 5/11/22  Patient's hemoglobin has continued to drift down.  It was 6.1 the ED while being 6.7 on 5/5 and 6.1 on 5/3.   * Last  transfusion PTA: 5/6.  Patient denies any recent bleeding.  He has had a thorough work up so far.  He has had colonoscopy/endoscopy/pill study with GI without clear cause.  He also follows with hem/onc and has gotten IV infusions in the past.  In the ED he was consented and 1 unit of PRBCs was ordered  * Received 1 unit on 5/10/22 with appropriate rise in hgb  * Received  1 unit on 05/12/22 for hgb of 6.5 and CHF.    Venofer 300 mg x 2 ordered. Defer to hematology further doses.     Transfused for hgb < 7.   patient received several units of blood transfusion during this hospitalization.  Also received Aranesp per nephrology.  Hematology reconsulted.  No new recommendations.  Need to have goals of care discussion since patient is going to need ongoing transfusions.  He has been resistant to IV iron, blood transfusion, erythropoietin.    5/27-hospitalist, nephrology and palliative extensive discussion with family.  Decided to proceed with comfort cares.  Patient and family would like for him to return home with hospice.  Social work and care coordinator has been consulted.  Changes have been made to all the medications and patient is currently on comfort meds only.  Discontinued any further transfusions or hemoglobin checks.     DM type II   Last HgbA1c was 8.2 on 2/16/22     Managed with insulin during hospitalization, adjusted doses as indicated.  Now that he has transitioned to comfort cares, will discontinue glucose checks and insulin.  Oral intake is poor.     L Shoulder pain - impingement  C spine stenosis  Has had gradually worsening shoulder pain for some time.  He states its to the point he is not sleeping well..  He was evaluated by Dr. Scott at [facility] urgent care on Sunday and had MRIs of the shoulder and spine.     Ortho consult on 5/11 appreciated. 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     Not candidate for c-spine injections.     Recommended continued pain meds  PRN, WBAT of upper extremities, and OK to stop steroids.     Oxycodone 2.5-5 mg q 6 hours. Advised that he try to limit this to night time doses for best effect.      Started low dose neurontin 200 mg q HS on 5/13/22.     Appreciate orthopedic surgery assistance in planning outpatient follow up    5/18 - consulted spine surgery given his ongoing neck pain and prolonged hospitalization - appreciate assistance - plan for outpatient follow up 2 wks after discharge    Patient has since elected to go on comfort cares only.  As needed pain meds available.     Multiple scabs over extremities (~ 4)   Coccyx blanchable redness (no wound)     Appreciate WOC consult     Deconditioned state, requesting home care    Pt previously using outpatient PT, requests home care.     Prolonged hospital stay with decompensated CHF, high risk for deconditioning.     Patient will discharge home with hospice cares.    Urinary retention on 05/14/22     Will catheter placed on 05/14/22 for both retention and strict I and O measurement.     Started flomax on 5/14/22 in anticipation of discontinuing will and voiding trial. (history of difficulty with urination PTA)      Will out 5/19 as bumex gtt stopped, voiding ok    Will had to be replaced on 5/25/2022 for acute urinary retention.  Will leave Will in place now with comfort cares.     Constipation     On 05/15/22, states he has not had a BM for a week.     Scheduled senna and PRN dulcolax ordered    Narcotics definitely contribute.    Started miralax daily on 05/15/22 can do BID if necessary      Dispo: Anticipate discharging home with hospice on 5/30/2022.

## 2022-05-30 NOTE — PLAN OF CARE
DATE & TIME: 5/29/22, 7410 - 4704   Cognitive Concerns/ Orientation : A&O x 3, disoriented to time   BEHAVIOR & AGGRESSION TOOL COLOR: Green   ABNL VS/O2: Deferred - comfort care  MOBILITY: Assist x 2, GB and walker. Not out of bed this shift. Turned and repositioned for comfort  PAIN MANAGMENT: Given Prn Oxycodone and Dilaudid along with application of hot pack to left neck/shoulder for pain  DIET: Regular  BOWEL/BLADDER: Will in place and patent. No BM this shift  ABNL LAB/BG: NA  DRAIN/DEVICES: PIV SL, will  TELEMETRY RHYTHM: NA  SKIN: Scattered bruises and scabs. Mepilex in place to wounds to buttock/coccyx. Small ulcer to tip of penis  TESTS/PROCEDURES: NA  D/C DATE: Plans for discharge home today, Monday 5/30/22  with hospice  OTHER IMPORTANT INFO: Comfort care    Goal Outcome Evaluation:    Plan of Care Reviewed With: patient     Overall Patient Progress: no change

## 2022-05-30 NOTE — DISCHARGE SUMMARY
[facility]  Discharge Summary        Justyn Olivas MRN# [id number]   YOB: 1940 Age: 81 year old     Date of Admission:  5/10/2022  Date of Discharge:  5/30/2022  Admitting Physician:  No admitting provider for patient encounter.  Discharge Physician: Kit Salazar MD  Discharging Service: Hospitalist     Primary Provider:  Christian Davidson  Primary Care Physician Phone Number: [phone]         Discharge Diagnoses/Problem Oriented Hospital Course (Providers):    Justyn Olivas was admitted on 5/10/2022 by No admitting provider for patient encounter. and I would refer you to their history and physical.  The following problems were addressed during his hospitalization:    Justyn Olivas is a 81 year old male with a history of HFpEF, transfusion dependent chronic anemia, CKD stage IV, DM type II who recently was started on steroids for shoulder pain and was sent in to the ED on 5/10/2022 from cardiology clinic due to concerns for decompensated heart failure      Patient  has had worsening lower extremity edema, MARKS and fluid retention in the abdomen since starting on steroids.  Since then he has had approximately 15 pound weight gain.      Fluid overload with anasarca due to worsening renal failure  Acute hypoxic respiratory distress - resolved  * Echo 3/22/22: EF 55%.  RV normal in structure function and size.  No valve disease.  * U/S shows moderate ascites.   * Admit weight 216#, baseline wt ~ 200#    Patient received Bumex 1 mg IV in ED > lasix 80 mg IV BID x 5 dose    On 05/13/22, cardiology started on lasix gtt with K replacement protocol.     On 05/14/22, cardiology switched to bumex 2 mg IV x 1, followed by bumex gtt at 0.5 mg/hr.    On K 10 meq BID while diuresing.     On 05/15/22, excellent diuresis (> 3L out) over last 24 hours, wt stable. Cr and K stable. Wt stable.     Continue PTA Coreg, atorvastatin    Cardiology was considering RHC prior to  discharge and CardioMEMS     US paracentesis with 3.1L removed 5/16 - reports improvement in symptoms    5/17 after echo completed 5/16 - no longer felt to be of cardiac etiology - more likely seeming to be due to renal disease however unclear.  No evidence of nephrotic syndrome.    5/17 RUQ US Small ascites. Coarse appearance of the liver that could represent underlying liver disease. No visible lesion but assessment is somewhat limited.    Cardiology signed off 5/17    Nephrology consultation (appreciated) - metolazone added 5/17 - on RA .  Patient is likely third spacing from hypoalbuminemia.  Continued on albumin and Bumex.    Bumex dosing changed over to oral Bumex 2 mg twice daily for comfort.        CASS on CKD stage IV  Cr 2.3 on admission and baseline appears to be 2.5-2.6.  History of NSAID use.    5/16 creat 2.19 (tolerating diuresis)  ---> creatinine has been creeping upwards since admission due to aggressive diuresis.  bumex gtt stopped 5/19    Started bumex 4mg po bid 5/20 but then held per nephro (creat still increasing slightly, BP borderline), creat slowly leveling off as above    5/22 - nephro ordered albumin/diuretic combo    5/23 - discussed with nephro re anemia,    5/24 - hgb 6.5, transfuse 1uPRBC and bumex held until after as above    5/25/2022-hemoglobin at 6.6.  Received additional PRBC transfusion    5/26-hemoglobin at 7.2.      We discussed his medical condition with nephrology today.  Feels that this would be an ongoing problem with persistent anemia and fluid overload every time he gets a blood transfusion.  We need to have goals of care discussion with family.  Palliative care has been consulted and they have discussed this with family.  Son is on the way.    Hematology team has been reconsulted and they did not have any further recommendation since extensive work-up has already been done.      Per nephrology patient is not a good candidate for dialysis.    Decided to proceed with  comfort cares on 5/27.  Working on getting him home with hospice on 5/30.  Care coordination is working on this.  Orders have been changed to reflect comfort cares.  He is DNR/DNI.  Comfort medications have been prescribed for discharging home with hospice per recommendations from hospice team.  Prescriptions sent to hospital discharge pharmacy.     Comfort cares  -Patient currently on comfort focused care.  Does have IV and oral hydromorphone.  Will avoid morphine due to his underlying renal disease.  Patient is having significant uremia with flapping tremors.  Will stop aggressive diuresis and blood transfusions at this time.  Proceeding with home care with hospice.  Care coordination social work working on this.  Patient will revert to regular diet.  No more transfusions.  Decided to discharge him with Glasgow catheter in place.     Ascites, s/p paracentesis 5/16  Mild elevation in INR (1.2)   * U/S mild to moderate ascites.   * No history or evidence of liver disease by liver panel or prior imaging. However, does have chronic mild elevation of INR.   Diuresis as above.     He was given vitamin K few doses.    Paracentesis done 5/16 - transudative - as anticipated SAAG > 1.1, and protein > 2.5 g/dL in ascites from - SAAG 1.6, protein 2.9 in ascites    US showed Small ascites. Coarse appearance of the liver that could represent underlying liver disease specially in light of elevated INR and low albumin.. No visible lesion but assessment is somewhat limited. If there is concern, MRI could be performed for more sensitive assessment of the liver    5/24: feeling much improvement since tap 5/16 with regard to abdominal girth/fullness     Transfusion dependent chronic anemia, likely related to chronic disease   Iron deficiency - by iron panel on 5/11/22  Patient's hemoglobin has continued to drift down.  It was 6.1 the ED while being 6.7 on 5/5 and 6.1 on 5/3.   * Last transfusion PTA: 5/6.  Patient denies any recent  bleeding.  He has had a thorough work up so far.  He has had colonoscopy/endoscopy/pill study with GI without clear cause.  He also follows with hem/onc and has gotten IV infusions in the past.  In the ED he was consented and 1 unit of PRBCs was ordered  * Received 1 unit on 5/10/22 with appropriate rise in hgb  * Received  1 unit on 05/12/22 for hgb of 6.5 and CHF.    Venofer 300 mg x 2 ordered. Defer to hematology further doses.     Transfused for hgb < 7.   patient received several units of blood transfusion during this hospitalization.  Also received Aranesp per nephrology.  Hematology reconsulted.  No new recommendations.  Need to have goals of care discussion since patient is going to need ongoing transfusions.  He has been resistant to IV iron, blood transfusion, erythropoietin.    5/27-hospitalist, nephrology and palliative extensive discussion with family.  Decided to proceed with comfort cares.  Patient and family would like for him to return home with hospice.  Social work and care coordinator has been consulted.  Changes have been made to all the medications and patient is currently on comfort meds only.  Discontinued any further transfusions or hemoglobin checks.     DM type II   Last HgbA1c was 8.2 on 2/16/22     Managed with insulin during hospitalization, adjusted doses as indicated.  Now that he has transitioned to comfort cares, will discontinue glucose checks and insulin.  Oral intake is poor.     L Shoulder pain - impingement  C spine stenosis  Has had gradually worsening shoulder pain for some time.  He states its to the point he is not sleeping well..  He was evaluated by Dr. Scott at [facility] urgent care on Sunday and had MRIs of the shoulder and spine.     Ortho consult on 5/11 appreciated. 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     Not candidate for c-spine injections.     Recommended continued pain meds PRN, WBAT of upper extremities, and OK to stop  steroids.     Oxycodone 2.5-5 mg q 6 hours. Advised that he try to limit this to night time doses for best effect.      Started low dose neurontin 200 mg q HS on 5/13/22.     Appreciate orthopedic surgery assistance in planning outpatient follow up    5/18 - consulted spine surgery given his ongoing neck pain and prolonged hospitalization - appreciate assistance     Patient has since elected to go on comfort cares only.  As needed pain meds available.     Multiple scabs over extremities (~ 4)   Coccyx blanchable redness (no wound)     Appreciate WOC consult     Deconditioned state, requesting home care    Pt previously using outpatient PT, requests home care.     Prolonged hospital stay with decompensated CHF, high risk for deconditioning.     Patient will discharge home with hospice cares.     Urinary retention on 05/14/22     Will catheter placed on 05/14/22 for both retention and strict I and O measurement.     Started flomax on 5/14/22 in anticipation of discontinuing will and voiding trial. (history of difficulty with urination PTA)      Will out 5/19 as bumex gtt stopped, voiding ok    Will had to be replaced on 5/25/2022 for acute urinary retention. Leave Will in place now with comfort cares.     Constipation     On 05/15/22, states he has not had a BM for a week.     Scheduled senna and PRN dulcolax ordered    Narcotics definitely contribute.    miralax daily on 05/15/22 can do BID if necessary            Code Status:      DNR / DNI         Important Results:      See below         Pending Results:        Unresulted Labs Ordered in the Past 30 Days of this Admission     No orders found from 4/10/2022 to 5/11/2022.               Discharge Instructions and Follow-Up:      Follow-up Appointments     Follow-up and recommended labs and tests       Follow up with hospice                  Discharge Disposition:      Discharged to home         Discharge Medications:        Current Discharge Medication List       START taking these medications    Details   atropine 1 % ophthalmic solution Place 2-4 drops under the tongue every 2 hours as needed for secretions  Qty: 2 mL, Refills: 0    Associated Diagnoses: End of life care      bumetanide (BUMEX) 2 MG tablet Take 1 tablet (2 mg) by mouth 2 times daily  Qty: 60 tablet, Refills: 0    Associated Diagnoses: End of life care      haloperidol (HALDOL) 2 MG/ML (HIGH CONC) solution Take 0.5-1 mLs (1-2 mg) by mouth every 6 hours as needed for agitation (anxiety, nausea)  Qty: 30 mL, Refills: 0    Associated Diagnoses: End of life care      LORazepam (ATIVAN) 2 MG/ML (HIGH CONC) oral solution Take 0.25-0.5 mLs (0.5-1 mg) by mouth every 4 hours as needed for anxiety  Qty: 30 mL, Refills: 0    Associated Diagnoses: End of life care      ondansetron (ZOFRAN ODT) 4 MG ODT tab Take 1 tablet (4 mg) by mouth every 8 hours as needed for nausea  Qty: 20 tablet, Refills: 0    Associated Diagnoses: End of life care      oxyCODONE (ROXICODONE) 5 MG tablet Take 0.5 tablets (2.5 mg) by mouth every 6 hours as needed for pain  Qty: 30 tablet, Refills: 0    Associated Diagnoses: End of life care      sennosides (SENOKOT) 8.6 MG tablet Take 1 tablet by mouth 2 times daily as needed for constipation  Qty: 30 tablet, Refills: 0    Associated Diagnoses: End of life care         CONTINUE these medications which have NOT CHANGED    Details   albuterol (PROAIR HFA) 108 (90 Base) MCG/ACT inhaler INHALE 2 PUFFS BY MOUTH FOUR TIMES DAILY AS NEEDED  Qty: 8.5 g, Refills: 3    Associated Diagnoses: Shortness of breath      clobetasol (TEMOVATE) 0.05 % external cream Apply topically daily as needed      dorzolamide-timolol (COSOPT) 2-0.5 % ophthalmic solution Place 1 drop into the right eye 2 times daily       hydrocortisone (CORTAID) 1 % external cream Apply 1 g topically nightly as needed For itching on BACK      sertraline (ZOLOFT) 50 MG tablet Take 1 tablet (50 mg) by mouth daily  Qty: 90 tablet, Refills: 3     Comments: For Profile Only - patient will call to fill - Dose increase  Associated Diagnoses: Mild major depression (H); Insomnia, unspecified type      traZODone (DESYREL) 100 MG tablet TAKE 1 TABLET(100 MG) BY MOUTH AT BEDTIME  Qty: 90 tablet, Refills: 3    Associated Diagnoses: Insomnia, unspecified type      blood glucose (STEVE CONTOUR) test strip TESTING FOUR TIMES DAILY OR AS DIRECTED  Qty: 400 strip, Refills: 0    Associated Diagnoses: Type 2 diabetes mellitus without complication, with long-term current use of insulin (H)      insulin pen needle (BD PEN NEEDLE FERCHO 2ND GEN) 32G X 4 MM miscellaneous USE AS DIRECTED UP TO FOUR TIMES DAILY  Qty: 400 each, Refills: 3    Associated Diagnoses: Type 2 diabetes mellitus without complication, with long-term current use of insulin (H)         STOP taking these medications       aspirin 81 MG EC tablet Comments:   Reason for Stopping:         atorvastatin (LIPITOR) 20 MG tablet Comments:   Reason for Stopping:         carvedilol (COREG) 6.25 MG tablet Comments:   Reason for Stopping:         fluticasone (FLONASE) 50 MCG/ACT nasal spray Comments:   Reason for Stopping:         folic acid-vit B6-vit B12 (FOLGARD) 0.8-10-0.115 MG TABS per tablet Comments:   Reason for Stopping:         insulin glargine (LANTUS SOLOSTAR) 100 UNIT/ML pen Comments:   Reason for Stopping:         insulin lispro (HUMALOG KWIKPEN) 100 UNIT/ML (1 unit dial) KWIKPEN Comments:   Reason for Stopping:         methocarbamol (ROBAXIN) 500 MG tablet Comments:   Reason for Stopping:         pantoprazole (PROTONIX) 40 MG EC tablet Comments:   Reason for Stopping:         potassium chloride ER (K-TAB) 20 MEQ CR tablet Comments:   Reason for Stopping:         SENNA-docusate sodium (SENNA S) 8.6-50 MG tablet Comments:   Reason for Stopping:         torsemide (DEMADEX) 20 MG tablet Comments:   Reason for Stopping:                    Allergies:         Allergies   Allergen Reactions     No Known Allergies              Consultations This Hospital Stay:      Consultation during this admission received from cardiology, nephrology, oncology, palliative, orthopedics          Discharge Orders for Skilled Facility (from Discharge Orders):        After Care Instructions     Activity      Your activity upon discharge: activity as tolerated         Diet      Follow this diet upon discharge: Orders Placed This Encounter      Regular Diet Adult             Future tests that were ordered for you     N terminal pro BNP outpatient      Basic metabolic panel               Rehab orders for Skilled Facility (from Discharge Orders):      Referrals     Future Labs/Procedures    Follow-Up with Cardiology SHAHANA     Comments:    [facility] will call you to coordinate your care as prescribed by   your provider. If you have concerns about scheduling, please call   [phone].      Primary Care - Care Coordination Referral     Process Instructions:    Services are provided by a Care Coordinator for people with complex needs   such as: medical, social, or financial troubles.  The Care Coordinator   works with the patient and their Primary Care Provider to determine health   goals, obtain resources, achieve outcomes, and develop care plans that   help coordinate the patient's care.     Comments:         Medication Therapy Management Referral     Process Instructions:        This referral will be filtered to a centralized scheduling office at   [facility] and the patient will receive a call   to schedule an appointment at a [facility] location most convenient for   them.    Scheduling Instructions:    MTM referral reason  Total Score: 2           Patient had a hospital or ED visit in last 6 months and has more than 10   PTA or Discharge medications    Patient has 5 PTA or Discharge Medications AND one of the following   diagnoses: DM,HF,COPD, or AMI DX       Comments:    This service is designed to help you get  the most from your medications.  A specially trained pharmacist will work closely with you and your doctors  to solve any problems related to your medications and to help you get the   best results from taking them.      The Medication Therapy Management staff will call you to schedule an   appointment.                 Discharge Time:      Greater than 30 minutes.        Image Results From This Hospital Stay (For Non-EPIC Providers):        Results for orders placed or performed during the hospital encounter of 05/10/22   XR Chest Port 1 View    Narrative    EXAM: XR CHEST PORT 1 VIEW  LOCATION: [facility]  DATE/TIME: 5/10/2022 5:35 PM    INDICATION: Weak.  COMPARISON: Chest x-ray on 03/20/2022.      Impression    IMPRESSION: Single AP view of the chest was obtained. Cardiomediastinal silhouette is within normal limits. Right basilar pulmonary opacities, minimally improved as compared to 03/20/2022 exam. No significant pleural effusion or pneumothorax.   US Abdomen Limited    Narrative    US ABDOMEN LIMITED   5/11/2022 4:44 PM     HISTORY:  Abdominal distention, ascites check.    COMPARISON: CT abdomen pelvis on 12/23/2021    FINDINGS:  Limited abdominal ultrasound to check for free fluid in the  abdomen, demonstrate mild to moderate volume ascites.      Impression    IMPRESSION:  Mild to moderate volume ascites.    KAY HIDALGO MD         SYSTEM ID:  [id number]   US Paracentesis Portable    Narrative    US PARACENTESIS PORTABLE 5/16/2022 10:47 AM     HISTORY: Ascites, moderate ascites, perform paracentesis if enough  fluid present.    FINDINGS: Ultrasound was used to evaluate for the presence and best  approach for paracentesis. Written and oral informed consent was  obtained. A pause for the cause procedure to verify the correct  patient and correct procedure. The skin overlying the right lower  quadrant was prepped and draped in the usual sterile fashion. The  subcutaneous tissues  were anesthetized with 10mL 1% lidocaine. A  catheter was advanced into the peritoneal space and 3.1 L of  straw  colored fluid was drained. There were no immediate complications.  Ultrasound images were permanently stored.  Patient left the  ultrasound suite in satisfactory condition.      Impression    IMPRESSION: Technically successful paracentesis without immediate  complications.    WAYLON MCRAE MD         SYSTEM ID:  [id number]   US Abdomen Limited    Narrative    EXAM: US ABDOMEN LIMITED  LOCATION: [facility]  DATE/TIME: 2022 4:49 PM    INDICATION: Ascites, ?liver disease.  COMPARISON: None.  TECHNIQUE: Limited abdominal ultrasound.    FINDINGS:    GALLBLADDER: Surgically removed.    BILE DUCTS: Common duct cannot be visualized for measurement. No convincing biliary ductal dilatation.     LIVER: Coarse echogenicity of the liver. No visible focal lesion by ultrasound.    RIGHT KIDNEY: No hydronephrosis.    PANCREAS: The pancreas is largely obscured by overlying gas.    Small ascites.      Impression    IMPRESSION:  1.  Small ascites.  2.  Coarse appearance of the liver that could represent underlying liver disease. No visible lesion but assessment is somewhat limited. If there is concern, MRI could be performed for more sensitive assessment of the liver.             Echocardiogram Limited     Value    LVEF  60-65%    Narrative    [id number]  [id number]  [id number]  [id number]^RICHARD^KAYLA     [facility]  Echocardiography Laboratory  [address] [place] [address]     Name: LISA CAMARA  MRN: [id number]  : 1940  Study Date: 2022 02:50 PM  Age: 81 yrs  Gender: Male  Patient Location: [facility]  Reason For Study: Heart Failure  Ordering Physician: KAYLA RAMOS  Referring Physician: KAYLA RAMOS  Performed By: Norma Cohn     BSA: 2.2 m2  Height: 72 in  Weight: 212  lb  ______________________________________________________________________________  Procedure  Limited Portable Echo Adult.  ______________________________________________________________________________  Interpretation Summary     1. Normal left ventricular size and systolic function. LVEF 60-65%.  2. No regional wall motion abnormalities.  3. Normal right ventricular size and systolic function.  4. No significant valve disease.  5. Dilated inferior vena cava.     No significant changes compared to previous study dated 3/23/2022.  ______________________________________________________________________________  Left Ventricle  The left ventricle is normal in size. There is normal left ventricular wall  thickness. Left ventricular systolic function is normal. The visual ejection  fraction is 60-65%. Left ventricular diastolic function is normal. No regional  wall motion abnormalities noted.     Right Ventricle  The right ventricle is normal in size and function.     Atria  Normal left atrial size. Right atrial size is normal. There is no color  Doppler evidence of an atrial shunt.     Mitral Valve  The mitral valve leaflets appear normal. There is no evidence of stenosis,  fluttering, or prolapse. There is trace mitral regurgitation.     Tricuspid Valve  Normal tricuspid valve. There is trace tricuspid regurgitation. Right  ventricular systolic pressure could not be approximated due to inadequate  tricuspid regurgitation.     Aortic Valve  The aortic valve is trileaflet. No aortic regurgitation is present. No aortic  stenosis is present.     Pulmonic Valve  Normal pulmonic valve. There is trace pulmonic valvular regurgitation.     Vessels  The aortic root is not well visualized. The ascending aorta is Borderline  dilated. 3.8 cm. Dilation of the inferior vena cava is present with abnormal  respiratory variation in diameter.     Pericardium  There is no pericardial effusion.     Rhythm  Sinus rhythm was  noted.  ______________________________________________________________________________  MMode/2D Measurements & Calculations  asc Aorta Diam: 3.8 cm     Doppler Measurements & Calculations  MV E max dayna: 106.0 cm/sec  MV A max dayna: 82.9 cm/sec  MV E/A: 1.3  MV dec time: 0.24 sec  Ao V2 max: 148.0 cm/sec  Ao max P.0 mmHg  Lateral E/e': 11.0     ______________________________________________________________________________  Report approved by: Dr Cielo Lombardi 2022 05:09 PM                 Most Recent Lab Results In EPIC (For Non-EPIC Providers):    Most Recent 3 CBC's:  Recent Labs   Lab Test 22  0826 22  1327 22  0811   WBC 7.7 7.1 6.5   HGB 6.8* 7.2* 7.2*   MCV 86 88 85    204 198      Most Recent 3 BMP's:  Recent Labs   Lab Test 22  1335 22  0744 22  2155 22  0940 22  0826 22  1644 22  1327 22  1220 22  0811 22  1659 22  0834   NA  --   --   --   --  138  --   --   --  135  --  133   POTASSIUM  --   --   --   --  3.4  --  3.5  --  3.3*  --  3.6  3.6   CHLORIDE  --   --   --   --  99  --   --   --  97  --  96   CO2  --   --   --   --  32  --   --   --  30  --  29   BUN  --   --   --   --  133*  --   --   --  124*  --  120*   CR  --   --   --   --  3.06*  --   --   --  3.06*  --  2.88*   ANIONGAP  --   --   --   --  7  --   --   --  8  --  8   VANESA  --   --   --   --  8.7  --   --   --  8.6  --  8.4*   GLC 84 137* 210*   < > 59*   < >  --    < > 219*   < > 300*    < > = values in this interval not displayed.     Most Recent 3 Troponin's:  Recent Labs   Lab Test 10/13/21  0808 21  2102 21  1326 21  1225   TROPI  --  0.032 0.022 <0.015   TROPONIN <0.015  --   --   --      Most Recent 3 INR's:  Recent Labs   Lab Test 22  0826 22  1154 22  1313   INR 1.30* 1.25* 1.25*     Most Recent 2 LFT's:  Recent Labs   Lab Test 22  0811 22  1154   AST 77* 17   ALT 65 23   ALKPHOS  185* 127   BILITOTAL 1.1 0.5     Most Recent Cholesterol Panel:  Recent Labs   Lab Test 03/28/22  1337   CHOL 77   LDL 29   HDL 34*   TRIG 71     Most Recent 6 Bacteria Isolates From Any Culture (See EPIC Reports for Culture Details):  Recent Labs   Lab Test 12/21/20  1450 12/20/20  1802 12/20/20  1615   CULT No growth No growth No growth     Most Recent TSH, T4 and HgbA1c:  Recent Labs   Lab Test 02/25/22  1656 02/16/22  1616   TSH 2.03  --    A1C  --  8.2*

## 2022-05-30 NOTE — PLAN OF CARE
Dx: HF, CKD, chronic anemia, DM type 2    DATE & TIME: 5/29/22          Cognitive Concerns/ Orientation : A&O x 3. Forgetful to time . Drowsy this evening.    BEHAVIOR & AGGRESSION TOOL COLOR: Green     ABNL VS/O2: Deferred - comfort cares  MOBILITY: up to recliner and commode asssit of 2/walker/GB;on kirill shift .Turned and repositioned for comfort x2 , off buttocks .   Mostly comfortable in high fowlers  but denies pain when asked. .  PAIN MANAGMENT: Oxycodone for buttock pain with some relief on previous shift.   DIET: Regular; poor intake but is taking sips of soda and water. Coughig noted after swallowing.    BOWEL/BLADDER: 1 black BM  on previous shift on BSC. Will in place and patent of davey urine.  ABNL LAB/BG: NA  DRAIN/DEVICES: L PIV SL, will  TELEMETRY RHYTHM: NA  SKIN: Scattered bruises and scabs. Buttock/coccyx wounds with Triad paste and sacral mepilex intact; small ulcer tip of penis.    TESTS/PROCEDURES: NA  D/C DATE: Plan for discharge on Monday 5/30/22 to home with hospice  OTHER IMPORTANT INFO: nausea on previous shift with  zofran given,   Family present part of the shift.

## 2022-05-30 NOTE — PROGRESS NOTES
Discharge    Patient discharged to home via stretcher with HE.  Care plan note: Patient had VSS, denied any chest pain and SOB. Patient is on comfort cares, patient seems comfortable at this time. Daughter already in room, packed room. All medications have been filled and are with patient/daughter. IV access taken out and tip intact; will catheter left in place for end of life simplicity. All discharge instructions explained to daughter and patient.    Listed belongings gathered and given to patient (including from security/pharmacy). Yes  Care Plan and Patient education resolved: Yes  Prescriptions if needed, hard copies sent with patient  NA  Medication Bin checked and emptied on discharge Yes  SW/care coordinator/charge RN aware of discharge: Yes

## 2022-05-31 NOTE — PROGRESS NOTES
Clinic Care Coordination Contact    Situation: Patient chart reviewed by care coordinator.    Background: Patient was enrolled in primary care coordination for the below goals.    Goals        1. Improve chronic symptoms (pt-stated)       Goal Statement: I will verbalize an increase in my strength and mobility and correct knowledge of adequate management of my chronic health conditions to maintain my health and wellness in preventing hospital readmission.   Date Goal set: 12/24/20; Updated/modified: 12/14/21  Barriers: Multiple health concerns.  Strengths: Motivated and engaged in care coordination.   Date to Achieve By: Updated 6/1/22  Patient expressed understanding of goal: Yes    Action steps to achieve this goal:  1. I will work with Physical Therapy to build my strength and mobility.   2. I will follow up with my providers as scheduled/recommended. (Primary Care Provider, [facility], sleep studies TBD, Dr. Ariana PARSON, [facility] hematology/oncology, nephrology)   3. I will take my medications as prescribed.   4. I will continue monitoring my blood sugars, blood pressures, weight daily as recommended.  5. I will use my CHF action plan to monitor/manage my symptoms.   6. I will follow care team recommendations of fluid restriction, diabetic and cardiac diet.   7. I will use my incentive spirometer as directed and recommended by my care team.   8. I will continue to work with care coordination for any additional resources and support.  9. I will contact my care team with questions, concerns, support needs. I will use the clinic as a resource and I understand I can contact my clinic with 24/7 after hours services available. Care Coordinator will remain available as needed.             2. Pain Management (pt-stated)       Goal Statement: I would like to address and understand the treatment plan for my upper right quadrant abdominal pain.   Date Goal set: 8/6/21  Barriers: Unknown etiology   Strengths: Patient is  motivated  Date to Achieve By: Updated: 6/1/22  Patient expressed understanding of goal: yes  Action steps to achieve this goal:  1. I will have Hepatobiliary scan on 8/18/21  2. I will follow up with my general surgery after scan  3. I will follow up with PCP 8/23/21 or sooner if he is able to make an ecception to his schedule  4. I will continue to work with care coordination for any additional resources and support                Assessment: Patient was at [facility] 5/10 - 5/30/2022 for CHF, patient has progressed to End of Life care and discharged 5/30/22 with  [facility].    Plan/Recommendations: Patient has enrolled in hospice and is no longer a candidate for primary care coordination. RN CC will remain available as needed. No further care coordination outreaches will be made at this time.       Hellen Chaidez RN Care Coordinator  [facility]  Email: [email or web address]  Phone: [phone]

## 2022-05-31 NOTE — PLAN OF CARE
Occupational Therapy Discharge Summary    Reason for therapy discharge:    Discharged to Home on hospice    Progress towards therapy goal(s). See goals on Care Plan in [facility] electronic health record for goal details.  Goals not met.  Barriers to achieving goals:   limited tolerance for therapy.    Therapy recommendation(s):    No further therapy is recommended.

## 2022-06-09 NOTE — TELEPHONE ENCOUNTER
Day 4 of Covid:     ACUTE ILL    HEADACHE:  No  HEAD CONGESTION:  Yes   NASAL DRAINAGE: Yes yellow  POST NASAL DRIP:  Yes    SNEEZING: Yes  SORE THROAT:  Yes - VERY SORE  EAR PAIN: No  CHEST CONGESTION: No  COUGH: Yes   BRINGING UP PHLEGM: Yes yellow  S.O.B.: No    WHEEZING:  Yes   BODY ACHES:  No  VOMITING: No   DIARRHEA: No   TEMP: No  SYMPTOMS FOR HOW MANY DAYS: 5 days  WHAT MEDS HAS PT TRIED:  Ibuprofen     Also has sweating and not sleeping at night.      Scheduled for VV today for Covid concerns Denied  Too early; Rx sent 9/14/2020 for 2 months  Gloria FAUST RN     [email or web address]. pdf    Amlodipine Besylate Oral tablet  What is this medicine? AMLODIPINE (am NA javi teddy) is a calcium-channel blocker. It affects the amount of calcium found in your heart and muscle cells. This relaxes your blood vessels, which can reduce the amount of work the heart has to do. This medicine is used to lower high blood pressure. It is also used to prevent chest pain. This medicine may be used for other purposes; ask your health care provider or pharmacist if you have questions. What should I tell my health care provider before I take this medicine? They need to know if you have any of these conditions:  Â· heart problems like heart failure or aortic stenosis  Â· liver disease  Â· an unusual or allergic reaction to amlodipine, other medicines, foods, dyes, or preservatives  Â· pregnant or trying to get pregnant  Â· breast-feeding  How should I use this medicine? Take this medicine by mouth with a glass of water. Follow the directions on the prescription label. Take your medicine at regular intervals. Do not take more medicine than directed. Talk to your pediatrician regarding the use of this medicine in children. Special care may be needed. This medicine has been used in children as young as 6. Persons over 72years old may have a stronger reaction to this medicine and need smaller doses. Overdosage: If you think you have taken too much of this medicine contact a poison control center or emergency room at once. NOTE: This medicine is only for you. Do not share this medicine with others. What if I miss a dose? If you miss a dose, take it as soon as you can. If it is almost time for your next dose, take only that dose. Do not take double or extra doses. What may interact with this medicine?   Â· herbal or dietary supplements  Â· local or general anesthetics  Â· medicines for high blood pressure  Â· medicines for prostate problems  Â· rifampin  This list may not describe all possible interactions. Give your health care provider a list of all the medicines, herbs, non-prescription drugs, or dietary supplements you use. Also tell them if you smoke, drink alcohol, or use illegal drugs. Some items may interact with your medicine. What should I watch for while using this medicine? Visit your doctor or health care professional for regular check ups. Check your blood pressure and pulse rate regularly. Ask your health care professional what your blood pressure and pulse rate should be, and when you should contact him or her. This medicine may make you feel confused, dizzy or lightheaded. Do not drive, use machinery, or do anything that needs mental alertness until you know how this medicine affects you. To reduce the risk of dizzy or fainting spells, do not sit or stand up quickly, especially if you are an older patient. Avoid alcoholic drinks; they can make you more dizzy. Do not suddenly stop taking amlodipine. Ask your doctor or health care professional how you can gradually reduce the dose. What side effects may I notice from receiving this medicine? Side effects that you should report to your doctor or health care professional as soon as possible:  Â· allergic reactions like skin rash, itching or hives, swelling of the face, lips, or tongue  Â· breathing problems  Â· changes in vision or hearing  Â· chest pain  Â· fast, irregular heartbeat  Â· swelling of legs or ankles  Side effects that usually do not require medical attention (report to your doctor or health care professional if they continue or are bothersome):  Â· dry mouth  Â· facial flushing  Â· nausea, vomiting  Â· stomach gas, pain  Â· tired, weak  Â· trouble sleeping  This list may not describe all possible side effects. Call your doctor for medical advice about side effects. You may report side effects to FDA at 3-497-FDA-5804. Where should I keep my medicine? Keep out of the reach of children.   Store at room temperature between 59 and 86 degrees F (15 and 30 degrees C). Protect from light. Keep container tightly closed. Throw away any unused medicine after the expiration date. NOTE:This sheet is a summary. It may not cover all possible information. If you have questions about this medicine, talk to your doctor, pharmacist, or health care provider.  CopyrightÂ© 2016 [employer]

## 2023-01-01 NOTE — TELEPHONE ENCOUNTER
Reason for Call:    insulin glargine (LANTUS SOLOSTAR) 100 UNIT/ML injection    Detailed comments: Justyn Olivas is calling to check on the status of his refill. He called in on Monday or Tuesday and has been waiting for the prescription to be filled. He only has medication until tomorrow so he needs it filled today.       Phone Number Patient can be reached at: Cell number on file:    Telephone Information:   Mobile [phone]       Best Time: ASAP     Can we leave a detailed message on this number? YES    Call taken on 7/21/2017 at 10:48 AM by Amanda Barakat       [phone]

## 2023-03-12 NOTE — TELEPHONE ENCOUNTER
I spoke to Kamran and he will plan to follow up in hematology as well as nephrology in the next week with follow up in primary care clinic with myself next week    Christian Davidson MD, MD    DIZZINESS/NAUSEA

## 2023-03-13 NOTE — PATIENT INSTRUCTIONS
Congestive heart failure    Plan to follow-up with cardiology.  We will check labs today.  Continue on higher dose of torsemide.  Plan to lab follow up in cardiology for echocardiogram [facility] - [phone]     Acute blood loss anemia, suspect small bowel bleeding  H/o Chronic iron deficiency anemia due to chronic blood loss   Noted that your hemoglobin is now stable.  We will continue to monitor and recheck today.  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

## 2023-06-06 NOTE — CONSULTS
Care Management Initial Consult    General Information  Assessment completed with: Patient,    Type of CM/SW Visit: Offer D/C Planning    Primary Care Provider verified and updated as needed:     Readmission within the last 30 days: no previous admission in last 30 days      Reason for Consult: discharge planning  Advance Care Planning: Advance Care Planning Reviewed: no concerns identified          Communication Assessment  Patient's communication style: spoken language (English or Bilingual)    Hearing Difficulty or Deaf: no   Wear Glasses or Blind: yes    Cognitive  Cognitive/Neuro/Behavioral: WDL                      Living Environment:   People in home: spouse     Current living Arrangements: house      Able to return to prior arrangements: yes       Family/Social Support:  Care provided by: self  Provides care for: no one, unable/limited ability to care for self  Marital Status:   Wife          Description of Support System: Supportive    Support Assessment: Adequate family and caregiver support    Current Resources:   Patient receiving home care services: No     Community Resources:    Equipment currently used at home: (Comfort ht stool, walk-in shower w/hand held hose on 2nd fl,)  Supplies currently used at home:      Employment/Financial:  Employment Status: retired        Financial Concerns: No concerns identified           Lifestyle & Psychosocial Needs:        Socioeconomic History     Marital status:      Spouse name: Cecile     Number of children: Not on file     Years of education: Not on file     Highest education level: Not on file     Tobacco Use     Smoking status: Former Smoker     Packs/day: 2.00     Years: 11.00     Pack years: 22.00     Types: Cigarettes     Start date:      Quit date:      Years since quittin.2     Smokeless tobacco: Never Used   Substance and Sexual Activity     Alcohol use: No     Alcohol/week: 0.0 standard drinks     Drug use: No     Sexual  activity: Yes     Partners: Female     Birth control/protection: None       Functional Status:  Prior to admission patient needed assistance:   Dependent ADLs:: Independent          Mental Health Status:  Mental Health Status: No Current Concerns       Chemical Dependency Status:  Chemical Dependency Status: No Current Concerns             Values/Beliefs:  Spiritual, Cultural Beliefs, Buddhist Practices, Values that affect care: no               Additional Information:  Met with patient to discuss discharge plans. Patient stating he lives with spouse in a 2 story home with stairs and will be able to return home without difficulty. Patient stating he is independent but weak and assures writer that his spouse will be able to assist if needed.     Chucky Teresa RN  Inpatient Care Coordinator  [facility]  #[phone]     Previously Declined (within the last year)

## 2023-11-30 NOTE — PROGRESS NOTES
Called to follow-up on appointment that was supposed to be scheduled after his infusion and the patient reports that he has been having a dry hacking cough without any nasal secretions by report.  He wonders whether he should get Prilosec to control the coughing.  He is not having any symptoms suggestive of reflux or heartburn.  He was seen in the cardiology clinic yesterday and was suggested to use Tessalon Perles which have not added any help.  He complains of a dry throat has no fevers chills or sweats or any symptoms suggestive of an acute illness.4 times daily since yesterday with minimal improvement he has been taking his inhaler.  He stopped taking his Claritin.    I recommended taking Claritin and Flonase as well as sugarless lozenges or hard candies to moisturize his throat.  He will continue to use his inhaler and I will phone Robitussin-AC to his drugstore as well.  If he is not making progress over the weekend he will return to clinic on Monday otherwise he should return to clinic after his second infusion later on in the week.  
n/a

## 2024-04-17 NOTE — PROGRESS NOTES
DATE & TIME: 5/13/22 Evening  Cognitive Concerns/ Orientation : Pt A/Ox4.  BEHAVIOR & AGGRESSION TOOL COLOR: Green, calm and cooperative.   ABNL VS/O2: Hypotensive, 100s/60s. MD aware. On RA. Denies chest pain or SOB. MARKS noted.  MOBILITY: Assist x1 with walker, fall risk. Prefers chair; encourage to stand up by the chair. PT/OT ordered. Pt is very unsteady. Up to BSC.   PAIN MANAGMENT: Complaints of left shoulder and neck pain, managed with oxycodone, 2.5 mg q6hr. Pt gets better relief with 5 mg but unfortunately gets very sleepy on it; prefers 2.5 mg during the day and 5 mg can be given during the night.   DIET: Cardiac, good appetite. St I/O.   BOWEL/BLADDER: Incontinent of urine, external catheter in place. Strict I&Os. No BM this shift, on scheduled senna. +BS. Abdomen is distended.   ABNL LAB/BG: Hg 7.9; INR 1.25. Cr 2.42  DRAIN/DEVICES: IV SL. Pt was started on IV lasix gtt, increased to 15ml/hr.   TELEMETRY RHYTHM: n/a  SKIN: Scattered bruising. Blanchable redness to coccyx, barrier applied and mepilex in place. Abrasions/scabs to bilateral shins, thighs, and knees, scrotum, buttock. Edema +2 to abdomen and ankles. Pt is itching all over, camphor-menthol lotion applied. Skin care orders in place, WOC RN saw pt today.   TESTS/PROCEDURES:n/a  D/C DAY/GOALS/PLACE: Discharge pending needs for diuretics, weight and urine output.   No

## (undated) DEVICE — SUCTION IRR STRYKERFLOW II W/TIP 250-070-520

## (undated) DEVICE — SOL WATER IRRIG 1000ML BOTTLE 2F7114

## (undated) DEVICE — PEN MARKING SKIN

## (undated) DEVICE — PREP CHLORAPREP 26ML TINTED ORANGE  260815

## (undated) DEVICE — ENDO TROCAR SLEEVE KII Z-THREADED 05X100MM CTS02

## (undated) DEVICE — PACK LAP CHOLE SLC15LCFSD

## (undated) DEVICE — GLOVE PROTEXIS W/NEU-THERA 7.5  2D73TE75

## (undated) DEVICE — SU VICRYL 0 UR-6 27" J603H

## (undated) DEVICE — TOTE ANGIO CORP PC15AT SAN32CC83O

## (undated) DEVICE — ENDO TROCAR FIRST ENTRY KII FIOS Z-THRD 05X100MM CTF03

## (undated) DEVICE — KIT DRAIN 6FR CATHETER PIGTAIL PERICARDIOCENTESIS PC101/A

## (undated) DEVICE — LINEN TOWEL PACK X5 5464

## (undated) DEVICE — ENDO POUCH UNIV RETRIEVAL SYSTEM INZII 10MM CD001

## (undated) DEVICE — DEFIB PRO-PADZ LVP LQD GEL ADULT 8900-2105-01

## (undated) DEVICE — SOL NACL 0.9% INJ 1000ML BAG 2B1324X

## (undated) DEVICE — SPECIMEN CONTAINER 60MLW/10% FORMALIN 59601

## (undated) DEVICE — ESU HOLDER LAP INST DISP PURPLE LONG 330MM H-PRO-330

## (undated) DEVICE — GLOVE GAMMEX DERMAPRENE ULTRA SZ 8 LF 8516

## (undated) DEVICE — INTRODUCER CATH VASC 5FRX10CM  MPIS-501-NT-U-SST

## (undated) DEVICE — APPLICATOR ENDOSCOPIC 5 SURGICEL POWDER 3123SPEA

## (undated) DEVICE — SU MONOCRYL 4-0 PS-2 18" UND Y496G

## (undated) DEVICE — DECANTER VIAL 2006S

## (undated) DEVICE — ENDO TROCAR FIRST ENTRY KII FIOS Z-THRD 12X100MM CTF73

## (undated) DEVICE — ENDO SCOPE WARMER LF TM500

## (undated) DEVICE — SURGICEL POWDER ABSORBABLE HEMOSTAT 3GM 3013SP

## (undated) DEVICE — CLIP APPLIER ENDO 5MM M/L LIGAMAX EL5ML

## (undated) RX ORDER — BUPIVACAINE HYDROCHLORIDE AND EPINEPHRINE 2.5; 5 MG/ML; UG/ML
INJECTION, SOLUTION EPIDURAL; INFILTRATION; INTRACAUDAL; PERINEURAL
Status: DISPENSED
Start: 2021-01-01

## (undated) RX ORDER — LABETALOL HYDROCHLORIDE 5 MG/ML
INJECTION, SOLUTION INTRAVENOUS
Status: DISPENSED
Start: 2021-01-01

## (undated) RX ORDER — LIDOCAINE HYDROCHLORIDE AND EPINEPHRINE 10; 10 MG/ML; UG/ML
INJECTION, SOLUTION INFILTRATION; PERINEURAL
Status: DISPENSED
Start: 2021-01-01

## (undated) RX ORDER — FENTANYL CITRATE 50 UG/ML
INJECTION, SOLUTION INTRAMUSCULAR; INTRAVENOUS
Status: DISPENSED
Start: 2022-01-01

## (undated) RX ORDER — GLYCOPYRROLATE 0.2 MG/ML
INJECTION, SOLUTION INTRAMUSCULAR; INTRAVENOUS
Status: DISPENSED
Start: 2021-01-01

## (undated) RX ORDER — DEXAMETHASONE SODIUM PHOSPHATE 4 MG/ML
INJECTION, SOLUTION INTRA-ARTICULAR; INTRALESIONAL; INTRAMUSCULAR; INTRAVENOUS; SOFT TISSUE
Status: DISPENSED
Start: 2021-01-01

## (undated) RX ORDER — NEOSTIGMINE METHYLSULFATE 1 MG/ML
VIAL (ML) INJECTION
Status: DISPENSED
Start: 2021-01-01

## (undated) RX ORDER — CEFAZOLIN SODIUM 2 G/100ML
INJECTION, SOLUTION INTRAVENOUS
Status: DISPENSED
Start: 2021-01-01

## (undated) RX ORDER — FENTANYL CITRATE 50 UG/ML
INJECTION, SOLUTION INTRAMUSCULAR; INTRAVENOUS
Status: DISPENSED
Start: 2020-12-21

## (undated) RX ORDER — FENTANYL CITRATE 0.05 MG/ML
INJECTION, SOLUTION INTRAMUSCULAR; INTRAVENOUS
Status: DISPENSED
Start: 2021-01-01

## (undated) RX ORDER — ONDANSETRON 2 MG/ML
INJECTION INTRAMUSCULAR; INTRAVENOUS
Status: DISPENSED
Start: 2021-01-01

## (undated) RX ORDER — PROPOFOL 10 MG/ML
INJECTION, EMULSION INTRAVENOUS
Status: DISPENSED
Start: 2021-01-01

## (undated) RX ORDER — HYDROMORPHONE HYDROCHLORIDE 1 MG/ML
INJECTION, SOLUTION INTRAMUSCULAR; INTRAVENOUS; SUBCUTANEOUS
Status: DISPENSED
Start: 2021-01-01

## (undated) RX ORDER — FENTANYL CITRATE 50 UG/ML
INJECTION, SOLUTION INTRAMUSCULAR; INTRAVENOUS
Status: DISPENSED
Start: 2021-03-27

## (undated) RX ORDER — HEPARIN SODIUM 1000 [USP'U]/ML
INJECTION, SOLUTION INTRAVENOUS; SUBCUTANEOUS
Status: DISPENSED
Start: 2020-12-21

## (undated) RX ORDER — FENTANYL CITRATE 50 UG/ML
INJECTION, SOLUTION INTRAMUSCULAR; INTRAVENOUS
Status: DISPENSED
Start: 2021-03-31

## (undated) RX ORDER — LIDOCAINE HYDROCHLORIDE 10 MG/ML
INJECTION, SOLUTION EPIDURAL; INFILTRATION; INTRACAUDAL; PERINEURAL
Status: DISPENSED
Start: 2020-12-21

## (undated) RX ORDER — OXYCODONE AND ACETAMINOPHEN 5; 325 MG/1; MG/1
TABLET ORAL
Status: DISPENSED
Start: 2021-01-01

## (undated) RX ORDER — FENTANYL CITRATE 50 UG/ML
INJECTION, SOLUTION INTRAMUSCULAR; INTRAVENOUS
Status: DISPENSED
Start: 2021-01-01

## (undated) RX ORDER — LIDOCAINE HYDROCHLORIDE 20 MG/ML
INJECTION, SOLUTION EPIDURAL; INFILTRATION; INTRACAUDAL; PERINEURAL
Status: DISPENSED
Start: 2021-01-01